# Patient Record
Sex: FEMALE | Race: WHITE | NOT HISPANIC OR LATINO | Employment: FULL TIME | URBAN - METROPOLITAN AREA
[De-identification: names, ages, dates, MRNs, and addresses within clinical notes are randomized per-mention and may not be internally consistent; named-entity substitution may affect disease eponyms.]

---

## 2017-01-31 ENCOUNTER — HOSPITAL ENCOUNTER (OUTPATIENT)
Dept: NUTRITION | Facility: CLINIC | Age: 52
Discharge: HOME | End: 2017-01-31
Attending: FAMILY MEDICINE | Admitting: FAMILY MEDICINE
Payer: COMMERCIAL

## 2017-01-31 PROCEDURE — 97803 MED NUTRITION INDIV SUBSEQ: CPT | Performed by: DIETITIAN, REGISTERED

## 2017-01-31 NOTE — PROGRESS NOTES
"NUTRITION REASSESSMENT NOTE     REASON FOR ASSESSMENT   Angy Haji referred by Dr. Spatz for MNT related to obesity     Patient accompanied by self     NUTRITION HISTORY  Patient continues to track Weight Watcher points and finds she continues to consistently overeat on her flex points.  Patient feels her emotional eating has increased due stress.  Patient continues to be more mindful when eating in motion and reminding herself to sit down when she eats.  Patient was unable to meet goal of limiting weight gain to five pounds during the holidays.                 DIET RECALL   Breakfast: steel cut oats with 1 T brown sugar and cinnamon; strawberry yogurt   Lunch: meatloaf and mashed potatoes, broccoli and orange  Dinner: pulled pork over 1 cup polenta and mixed vegetables; lasagna, carrots and orange; chickpea stew with orzo   Snacks: peanut butter and banana, premium ice cream or regular ice cream, banana, Dove dark chocolates  Beverages: water, coffee, mineral water, alcohol 1 time per week at most  Dining out: varies   Exercise: Patient exercises 5-7 days per week.  Patient is walking or swimming.          ANTHROPOMETRICS  Height: 5'9\"  Weight: 254 lbs  BMI (kg/m2): 37.5 kg/m2   %IBW: 175%  Weight History:  Wt Readings from Last 5 Encounters:   12/07/16 113.399 kg (250 lb)   09/27/16 112.946 kg (249 lb)   07/13/16 112.038 kg (247 lb)   06/23/16 111 kg (244 lb 11.4 oz)   06/20/16 113.172 kg (249 lb 8 oz)     ASSESSED NUTRITION NEEDS  Estimated Energy Needs: 4792-1471 kcals (15-20 kcals/kg) for gradual weight loss   Estimated Protein Needs: 66-79 (1-1.2 gm/kg IBW) for repletion with exercise  RMR: 1440    EVALUATION/PROGRESS TOWARDS GOALS   Previous goals:  Stop food on the move-improving  Practice alternatives to emotional eating-improving  Include snack in the afternoon during the work week-met    Previous Nutrition Diagnosis: Obesity related to excess energy intake as evidenced by BMI of 36.6 kg/m2-no " change    Current Nutrition Diagnosis:Obesity related to excess energy intake as evidenced by BMI of 37.5 kg/m2    INTERVENTIONS   Nutrition Prescription   Continue tracking foods and exercising 5-7 times per week; increase intensity of exercise; increase protein intake during the day to 30 grams per meal      Implementation:   Meals and Snacks: Continue eating 3 meals + snacks as hungry   Nutrition Education (Content):    a)  Discussed progress towards goals.  Congratulated patient on being more mindful with emotional eating.  Encouraged patient to continue practicing emotional eating alternatives.  Supported patient in her struggle with weight loss and emotional eating.    Nutrition Education (Application):    a) Reviewed lower calorie snack options for afternoon snack.       b) Patient verbalized understanding of diet by stating will reduce emotional eating.            Anticipate fair-good compliance    CURRENT GOALS   Check-in with self regarding stress level for the day after dinner to determine plan to avoid emotional eating at night   Add short walk at work 3 out of 4 days     FOLLOW UP/MONITORING    Patient to follow up in 6 weeks  Patient has RD contact information     Time spent with patient: 30 minutes    Mattie Amaya, RD, LD  [facility] Outpatient Dietitian  [phone] (office phone)

## 2017-02-16 ENCOUNTER — THERAPY VISIT (OUTPATIENT)
Dept: PHYSICAL THERAPY | Facility: CLINIC | Age: 52
End: 2017-02-16
Payer: COMMERCIAL

## 2017-02-16 DIAGNOSIS — M54.41 CHRONIC BILATERAL LOW BACK PAIN WITH RIGHT-SIDED SCIATICA: ICD-10-CM

## 2017-02-16 DIAGNOSIS — G89.29 CHRONIC BILATERAL LOW BACK PAIN WITH RIGHT-SIDED SCIATICA: ICD-10-CM

## 2017-02-16 PROCEDURE — 97112 NEUROMUSCULAR REEDUCATION: CPT | Mod: GP | Performed by: PHYSICAL THERAPIST

## 2017-02-16 PROCEDURE — 97110 THERAPEUTIC EXERCISES: CPT | Mod: GP | Performed by: PHYSICAL THERAPIST

## 2017-02-16 NOTE — PROGRESS NOTES
"Subjective:    HPI                    Objective:    System    Physical Exam    General     ROS    Assessment/Plan:      DISCHARGE REPORT    Progress reporting period is from 10/11/16 to 2/16/17.     SUBJECTIVE  Subjective: Some daily pain, sleeping better but not great. Last week had her hip \"go out\" and it resolved with inc frequency of side glide.       Initial Pain level: 3/10   Changes in function: Yes, see goal flow sheet for change in function    ;   ,     The objective findings are from DOS 2/16/17.    OBJECTIVE  Objective: Dec lumbo pelvic control with DL/SL stance. ROM functional limits with some pain with R SB. With ab bracing, pt pain is less and demos improved control      ASSESSMENT/PLAN  Updated problem list and treatment plan: Diagnosis 1:  LBP  Pain -  self management, education, directional preference exercise and home program  Decreased strength - home program  Decreased proprioception - home program  Impaired posture - home program  STG/LTGs have been met or progress has been made towards goals:  Yes (See Goal flow sheet completed today.)  Assessment of Progress: The patient's condition has potential to improve.  The patient has had set backs in their progress.  Self Management Plans:  Patient has been instructed in a home treatment program.  Patient is independent in a home treatment program.  Patient  has been instructed in self management of symptoms.  Patient is independent in self management of symptoms.  I have re-evaluated this patient and find that the nature, scope, duration and intensity of the therapy is appropriate for the medical condition of the patient.  Angy continues to require the following intervention to meet STG and LTG's: PT intervention is no longer required to meet STG/LTG.      Recommendations:  This patient is ready to be discharged from therapy and continue their home treatment program.    Please refer to the daily flowsheet for treatment today, total treatment time and " time spent performing 1:1 timed codes.

## 2017-02-16 NOTE — MR AVS SNAPSHOT
After Visit Summary   2/16/2017    Angy Haji    MRN: [id number]           Patient Information     Date Of Birth          1965        Visit Information        Provider Department      2/16/2017 8:10 AM Maki Ridley PT [facility] Athletic [facility] Physical [facility]        Today's Diagnoses     Chronic bilateral low back pain with right-sided sciatica           Follow-ups after your visit        Your next 10 appointments already scheduled     Feb 21, 2017  2:40 PM CST   SHORT with Maryjane Quintanilla MD   [facility] ([facility])    [address] [place] [address] [phone]            Mar 08, 2017  2:30 PM CST   Follow Up with Mar Xiong RD, LD   [facility] Nutrition Services ([facility])    [address], [address]  [facility] [address] [phone]              Who to contact     If you have questions or need follow up information about today's clinic visit or your schedule please contact [facility] ATHLETIC [facility] PHYSICAL THERAPY directly at [phone].  Normal or non-critical lab and imaging results will be communicated to you by MyChart, letter or phone within 4 business days after the clinic has received the results. If you do not hear from us within 7 days, please contact the clinic through Airbiquityhart or phone. If you have a critical or abnormal lab result, we will notify you by phone as soon as possible.  Submit refill requests through Digital Trowel or call your pharmacy and they will forward the refill request to us. Please allow 3 business days for your refill to be completed.          Additional Information About Your Visit        MyChart Information     Digital Trowel gives you secure access to your electronic health record. If you see a primary care provider, you can also send messages to your care team and make appointments. If you have questions, please  call your primary care clinic.  If you do not have a primary care provider, please call [phone] and they will assist you.        Care EveryWhere ID     This is your Care EveryWhere ID. This could be used by other organizations to access your [facility] medical records  [id number]         Blood Pressure from Last 3 Encounters:   12/07/16 153/66   09/27/16 126/76   09/12/16 131/71    Weight from Last 3 Encounters:   12/07/16 113.4 kg (250 lb)   09/27/16 112.9 kg (249 lb)   07/13/16 112 kg (247 lb)              We Performed the Following     GEOVANNI Progress Notes Report     Neuromuscular Re-Education     Therapeutic Exercises        Primary Care Provider Office Phone # Fax #    Maryjane Quintanilla [phone]       [facility] [address]  [facility] [address]        Thank you!     Thank you for choosing [facility] PHYSICAL THERAPY  for your care. Our goal is always to provide you with excellent care. Hearing back from our patients is one way we can continue to improve our services. Please take a few minutes to complete the written survey that you may receive in the mail after your visit with us. Thank you!             Your Updated Medication List - Protect others around you: Learn how to safely use, store and throw away your medicines at www.disposemymeds.org.          This list is accurate as of: 2/16/17 11:40 AM.  Always use your most recent med list.                   Brand Name Dispense Instructions for use    ciclopirox 0.77 % cream    LOPROX     Apply  topically 2 times daily as needed.       ibuprofen 800 MG tablet    ADVIL/MOTRIN    270 tablet    Take 1 tablet (800 mg) by mouth every 8 hours as needed for pain       lidocaine HCl 2 % Soln     1 Bottle    Swish and spit 5 mL every 2 hours as needed       loratadine 10 MG capsule      Take 10 mg by mouth daily as needed.       metroNIDAZOLE 1 % gel    METROGEL    60 g    Apply topically daily       *  order for DME      Use your CPAP device as directed by your provider.       order for DME     2 Units    Support/compression stockings - knee high, medium compression       * order for DME     1 each    Please measure and distribute 1 pair of 20mmHg - 30mmHg knee high open toe compression stockings. Jobst ultrasheer or equivalent.       triamcinolone 0.1 % cream    KENALOG    30 g    Apply topically 2 times daily       VITAMIN D (CHOLECALCIFEROL) PO      Take 2,000 Units by mouth daily       * Notice:  This list has 2 medication(s) that are the same as other medications prescribed for you. Read the directions carefully, and ask your doctor or other care provider to review them with you.

## 2017-02-27 ENCOUNTER — OFFICE VISIT (OUTPATIENT)
Dept: FAMILY MEDICINE | Facility: CLINIC | Age: 52
End: 2017-02-27
Payer: COMMERCIAL

## 2017-02-27 VITALS
HEIGHT: 68 IN | HEART RATE: 63 BPM | BODY MASS INDEX: 39.33 KG/M2 | TEMPERATURE: 98.1 F | DIASTOLIC BLOOD PRESSURE: 90 MMHG | RESPIRATION RATE: 12 BRPM | OXYGEN SATURATION: 100 % | SYSTOLIC BLOOD PRESSURE: 136 MMHG | WEIGHT: 259.5 LBS

## 2017-02-27 DIAGNOSIS — F43.23 ADJUSTMENT DISORDER WITH MIXED ANXIETY AND DEPRESSED MOOD: Primary | ICD-10-CM

## 2017-02-27 DIAGNOSIS — N95.1 PERIMENOPAUSAL: ICD-10-CM

## 2017-02-27 PROCEDURE — 99214 OFFICE O/P EST MOD 30 MIN: CPT | Performed by: FAMILY MEDICINE

## 2017-02-27 ASSESSMENT — PATIENT HEALTH QUESTIONNAIRE - PHQ9: 5. POOR APPETITE OR OVEREATING: SEVERAL DAYS

## 2017-02-27 ASSESSMENT — ANXIETY QUESTIONNAIRES
6. BECOMING EASILY ANNOYED OR IRRITABLE: NOT AT ALL
2. NOT BEING ABLE TO STOP OR CONTROL WORRYING: SEVERAL DAYS
IF YOU CHECKED OFF ANY PROBLEMS ON THIS QUESTIONNAIRE, HOW DIFFICULT HAVE THESE PROBLEMS MADE IT FOR YOU TO DO YOUR WORK, TAKE CARE OF THINGS AT HOME, OR GET ALONG WITH OTHER PEOPLE: SOMEWHAT DIFFICULT
5. BEING SO RESTLESS THAT IT IS HARD TO SIT STILL: NOT AT ALL
7. FEELING AFRAID AS IF SOMETHING AWFUL MIGHT HAPPEN: NOT AT ALL
3. WORRYING TOO MUCH ABOUT DIFFERENT THINGS: SEVERAL DAYS
1. FEELING NERVOUS, ANXIOUS, OR ON EDGE: SEVERAL DAYS
GAD7 TOTAL SCORE: 4

## 2017-02-27 NOTE — NURSING NOTE
"Chief Complaint   Patient presents with     Menopausal Sx       Initial /90 (BP Location: Left arm, Patient Position: Chair, Cuff Size: Adult Large)  Pulse 63  Temp 98.1  F (36.7  C) (Oral)  Resp 12  Ht 5' 8\" (1.727 m)  Wt 259 lb 8 oz (117.7 kg)  LMP 01/04/2017  SpO2 100%  BMI 39.46 kg/m2 Estimated body mass index is 39.46 kg/(m^2) as calculated from the following:    Height as of this encounter: 5' 8\" (1.727 m).    Weight as of this encounter: 259 lb 8 oz (117.7 kg).  Medication Reconciliation: complete       Karishma Maguire MA     "

## 2017-02-27 NOTE — PROGRESS NOTES
SUBJECTIVE:                                                    Angy Haji is a 51 year old female who presents to clinic today for the following health issues:    She has been feeling some depression and anxiety symptoms for the past months. She has been feeling less interest in activities. Motivation is less. She feels worried and stressed a lot. She has a lot of things in her life that have increased her stress levels. Some systems have changed at work. 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 Her daughter, Linda, has had some health issues lately and difficulty sleeping. Her partner, Rachael, does not have as much patience with their daughter at bedtime, so Angy finds herself taking on those duties more. She is not sleeping as well because of this and also has had some hot flashes that have kept her up at times, usually in spurts. She goes to bed at 10pm and awakens at 6am. [facility], but has only been getting 15 minutes of exercise in at times. She used to meditate, but has not been doing so lately. Had a therapist that moved to [place] and now the drive longer than she is able to manage for appointments. She describes herself as a perfectionist and admits she is hard on herself at times. She denies any SI. She drinks alcohol maybe once or twice per month 1-2 glasses. She denies any drug use.    PHQ-9 SCORE 2/27/2017   Total Score 7     JOANNA-7 SCORE 2/27/2017   Total Score 4            Problem list and histories reviewed & adjusted, as indicated.  Additional history: as documented    Patient Active Problem List   Diagnosis     Oral aphthae     Obesity (BMI 30-39.9)     Disorder of bursae and tendons in shoulder region     CARDIOVASCULAR SCREENING; LDL GOAL LESS THAN 160     Rosacea     Tear of medial cartilage or meniscus of knee, current     Raynaud disease     Vitamin D deficiency disease     Right elbow pain     Sprain of foot     Mild sleep apnea     Atopic rhinitis      Chronic bilateral low back pain with right-sided sciatica     Past Surgical History   Procedure Laterality Date     Hc tooth extraction w/forcep  1984/85     [facility] [facility]     Laparoscopic cholecystectomy  7/29/2013     Procedure: LAPAROSCOPIC CHOLECYSTECTOMY;  Laparoscopic Cholecystectomy;  Surgeon: Fabio Johnson MD;  Location: [facility] OR     Laparoscopic oophorectomy Left 6/23/2016     Procedure: LAPAROSCOPIC OOPHORECTOMY;  Surgeon: Kayley Donnelly MD;  Location: [facility] OR     Laparoscopic salpingectomy Bilateral 6/23/2016     Procedure: LAPAROSCOPIC SALPINGECTOMY;  Surgeon: Kayley Donnelly MD;  Location: [facility] OR       Social History   Substance Use Topics     Smoking status: Never Smoker     Smokeless tobacco: Never Used      Comment: non smoke home     Alcohol use Yes      Comment: 2 Drinks Per Month     Family History   Problem Relation Age of Onset     Cardiovascular Paternal Grandfather      anyerism     Obesity Paternal Grandfather      Respiratory Paternal Grandfather      pulmonary (air sacs hardening)     Allergies Father      hayfever     Genitourinary Problems Father      BPH     Lipids Father      diet therapy     Gallbladder Disease Father      cholecystectomy      Eye Disorder Father      retinal tear x 2      Alzheimer Disease Maternal Grandmother      ? poor STM     Arthritis Maternal Grandmother      HEART DISEASE Maternal Grandmother      Arrythmia-got a pacemaker at age 89     Arthritis Mother      Eye Disorder Mother      [place] /macular tear in one eye     CANCER Paternal Grandmother      Uterine CA- as well as her sister     Gynecology Paternal Grandmother      prolapsed uterus- had 10 kids     Obesity Paternal Grandmother      Breast Cancer Maternal Aunt      Other Cancer Paternal Aunt 80     endo. cancer          Current Outpatient Prescriptions   Medication Sig Dispense Refill     triamcinolone (KENALOG) 0.1 % cream Apply topically 2 times daily 30 g 1     order for DME  Please measure and distribute 1 pair of 20mmHg - 30mmHg knee high open toe compression stockings. Jobst ultrasheer or equivalent. 1 each 1     metroNIDAZOLE (METROGEL) 1 % gel Apply topically daily 60 g 9     ibuprofen (ADVIL,MOTRIN) 800 MG tablet Take 1 tablet (800 mg) by mouth every 8 hours as needed for pain 270 tablet 0     lidocaine HCl 2 % SOLN Swish and spit 5 mL every 2 hours as needed 1 Bottle 11     VITAMIN D, CHOLECALCIFEROL, PO Take 2,000 Units by mouth daily       ORDER FOR DME Use your CPAP device as directed by your provider.       ORDER FOR DME Support/compression stockings - knee high, medium compression   2 Units 11     ciclopirox (LOPROX) 0.77 % cream Apply  topically 2 times daily as needed.       Loratadine 10 MG capsule Take 10 mg by mouth daily as needed.       Allergies   Allergen Reactions     Penicillins Rash     Thimerosal      blisters on inside of eyelid     Recent Labs   Lab Test  10/03/16   0742  06/20/16   0843  09/24/15   0904  09/15/14   0733  09/20/13   1145  09/16/13   0739  08/22/12   0752  08/16/11   0757   05/21/09   0856  05/17/09   1448   A1C   --    --   5.2   --    --   5.1  5.2  5.3   < >   --    --    LDL  51   --   47  24   --   44  50  34   < >   --    --    HDL  75   --   73  75   --   62  66  47*   < >   --    --    TRIG  56   --   58  46   --   64  50  97   < >   --    --    ALT   --    --    --    --    --    --    --   13   --    --   21   CR   --   0.50*   --   0.56   --   0.47*  0.54  0.59   < >   --   0.70   GFRESTIMATED   --   >90  Non  GFR Calc     --   >90  Non  GFR Calc     --   >90  >90  >90   < >   --   >90   GFRESTBLACK   --   >90   GFR Calc     --   >90   GFR Calc     --   >90  >90  >90   < >   --   >90   POTASSIUM   --   3.7   --   4.3   --   3.9  4.3  4.1   < >   --   4.0   TSH   --    --    --    --   1.45   --    --    --    --   1.39   --     < > = values in this interval not displayed.  "     BP Readings from Last 3 Encounters:   12/07/16 153/66   09/27/16 126/76   09/12/16 131/71    Wt Readings from Last 3 Encounters:   12/07/16 113.4 kg (250 lb)   09/27/16 112.9 kg (249 lb)   07/13/16 112 kg (247 lb)        ROS:  Const: no fever     This document serves as a record of the services and decisions personally performed and made by Maryjane Quintanilla MD. It was created on her behalf by Zahra Soni, a trained medical scribe. The creation of this document is based on the provider's statements to the medical scribe.  Zahra Soni February 27, 2017 4:24 PM     OBJECTIVE:                                                    /90 (BP Location: Left arm, Patient Position: Chair, Cuff Size: Adult Large)  Pulse 63  Temp 98.1  F (36.7  C) (Oral)  Resp 12  Ht 1.727 m (5' 8\")  Wt 117.7 kg (259 lb 8 oz)  LMP 01/04/2017  SpO2 100%  BMI 39.46 kg/m2   Body mass index is 39.46 kg/(m^2).   GENERAL: healthy, alert and no distress  PSYCH: mentation appears normal, affect depressed, tearful at times     Diagnostic Test Results:  none      ASSESSMENT/PLAN:                                                    1. Adjustment disorder with mixed anxiety and depressed mood  She is not currently wanting to start medication, but wanted to discuss options at this point. 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 She will pursue finding a therapist on her own or may also consider EAP through [facility]. She is going to start increasing her exercise by committing to a class at the [employer] and is going to start back with meditation, which she did some years ago. We discussed mood changes are common in the perimenopausal transition for many reasons. She is currently not bothered significantly by hot flashes, but I discussed with her that antidepressant medications can also be helpful for hot flashes, as can cutting down on caffeine.     2. " Perimenopausal  See above     Time spent with pt answering questions, discussing findings, counseling and coordinating care was at least half the appointment time, 25 minutes.    Maryjane Quintanilla M.D.       The information in this document, created by the medical scribe for me, accurately reflects the services I personally performed and the decisions made by me. I have reviewed and approved this document for accuracy prior to leaving the patient care area.  2/27/2017 4:24 PM         Maryjane Quintanilla MD  [facility]

## 2017-02-27 NOTE — MR AVS SNAPSHOT
After Visit Summary   2/27/2017    Angy Haji    MRN: [id number]           Patient Information     Date Of Birth          1965        Visit Information        Provider Department      2/27/2017 3:40 PM Maryjane Quintanilla MD [facility]        Today's Diagnoses     Adjustment disorder with mixed anxiety and depressed mood    -  1    Perimenopausal           Follow-ups after your visit        Your next 10 appointments already scheduled     Mar 22, 2017  2:30 PM CDT   Follow Up with Mar Xiong RD, LD   [facility] Nutrition Services ([facility])    [address]  [facility] [address] [phone]              Who to contact     If you have questions or need follow up information about today's clinic visit or your schedule please contact [facility] directly at [phone].  Normal or non-critical lab and imaging results will be communicated to you by MyChart, letter or phone within 4 business days after the clinic has received the results. If you do not hear from us within 7 days, please contact the clinic through Vyattahart or phone. If you have a critical or abnormal lab result, we will notify you by phone as soon as possible.  Submit refill requests through Groove Club or call your pharmacy and they will forward the refill request to us. Please allow 3 business days for your refill to be completed.          Additional Information About Your Visit        MyChart Information     [employer] Club gives you secure access to your electronic health record. If you see a primary care provider, you can also send messages to your care team and make appointments. If you have questions, please call your primary care clinic.  If you do not have a primary care provider, please call [phone] and they will assist you.        Care EveryWhere ID     This is your Care EveryWhere ID. This could be used by other organizations to access your  "[facility] medical records  [id number]        Your Vitals Were     Pulse Temperature Respirations Height Last Period Pulse Oximetry    63 98.1  F (36.7  C) (Oral) 12 5' 8\" (1.727 m) 01/04/2017 100%    BMI (Body Mass Index)                   39.46 kg/m2            Blood Pressure from Last 3 Encounters:   02/27/17 136/90   12/07/16 153/66   09/27/16 126/76    Weight from Last 3 Encounters:   02/27/17 259 lb 8 oz (117.7 kg)   12/07/16 250 lb (113.4 kg)   09/27/16 249 lb (112.9 kg)              Today, you had the following     No orders found for display       Primary Care Provider Office Phone # Fax #    Maryjane Quintanilla [phone]       [facility] [address]  [facility] [address]        Thank you!     Thank you for choosing [facility]  for your care. Our goal is always to provide you with excellent care. Hearing back from our patients is one way we can continue to improve our services. Please take a few minutes to complete the written survey that you may receive in the mail after your visit with us. Thank you!             Your Updated Medication List - Protect others around you: Learn how to safely use, store and throw away your medicines at www.disposemymeds.org.          This list is accurate as of: 2/27/17  5:17 PM.  Always use your most recent med list.                   Brand Name Dispense Instructions for use    ciclopirox 0.77 % cream    LOPROX     Apply  topically 2 times daily as needed.       ibuprofen 800 MG tablet    ADVIL/MOTRIN    270 tablet    Take 1 tablet (800 mg) by mouth every 8 hours as needed for pain       lidocaine HCl 2 % Soln     1 Bottle    Swish and spit 5 mL every 2 hours as needed       loratadine 10 MG capsule      Take 10 mg by mouth daily as needed.       metroNIDAZOLE 1 % gel    METROGEL    60 g    Apply topically daily       * order for DME      Use your CPAP device as directed by your provider.       order for DME     2 Units    " Support/compression stockings - knee high, medium compression       * order for DME     1 each    Please measure and distribute 1 pair of 20mmHg - 30mmHg knee high open toe compression stockings. Jobst ultrasheer or equivalent.       triamcinolone 0.1 % cream    KENALOG    30 g    Apply topically 2 times daily       VITAMIN D (CHOLECALCIFEROL) PO      Take 2,000 Units by mouth daily       * Notice:  This list has 2 medication(s) that are the same as other medications prescribed for you. Read the directions carefully, and ask your doctor or other care provider to review them with you.

## 2017-02-28 ASSESSMENT — PATIENT HEALTH QUESTIONNAIRE - PHQ9: SUM OF ALL RESPONSES TO PHQ QUESTIONS 1-9: 7

## 2017-02-28 ASSESSMENT — ANXIETY QUESTIONNAIRES: GAD7 TOTAL SCORE: 4

## 2017-03-22 ENCOUNTER — HOSPITAL ENCOUNTER (OUTPATIENT)
Dept: NUTRITION | Facility: CLINIC | Age: 52
Discharge: HOME | End: 2017-03-22
Attending: DIETITIAN, REGISTERED | Admitting: DIETITIAN, REGISTERED
Payer: COMMERCIAL

## 2017-03-22 PROCEDURE — 97803 MED NUTRITION INDIV SUBSEQ: CPT | Performed by: DIETITIAN, REGISTERED

## 2017-03-23 ENCOUNTER — TELEPHONE (OUTPATIENT)
Dept: FAMILY MEDICINE | Facility: CLINIC | Age: 52
End: 2017-03-23

## 2017-03-23 DIAGNOSIS — E66.9 OBESITY (BMI 30-39.9): Primary | ICD-10-CM

## 2017-03-23 NOTE — PROGRESS NOTES
"NUTRITION REASSESSMENT NOTE     REASON FOR ASSESSMENT   Angy Haji referred by Dr. Spatz for MNT related to obesity     Patient accompanied by self     NUTRITION HISTORY  Patient continues to track Weight Watcher points and finds she continues to consistently overeat on her flex points.  Patient aiming for \"blue dot\" system with Weight Watchers as indicator of staying within points for the day.  Patient feels she is continuing her emotional eating due to stress.  Patient finding herself eating while multi-taking at home which she is frustrated with herself.               DIET RECALL   Breakfast: steel cut oats with 1 T brown sugar and cinnamon; English muffin with peanut butter; Greek yogurt  Lunch: leftovers   Dinner: lasagna; chicken enchilada; chickpea stew with orzo   Snacks: peanut butter and banana, premium ice cream or regular ice cream, banana, Dove dark chocolates  Beverages: water, coffee, mineral water, alcohol 1 time per week at most  Dining out: varies   Exercise: Patient exercises 5-7 days per week.  Patient is walking or swimming.          ANTHROPOMETRICS  Height: 5'9\"  Weight: 260.6 lbs  BMI (kg/m2): 38.4 kg/m2   %IBW: 179%  Weight History:  Wt Readings from Last 5 Encounters:   02/27/17 117.7 kg (259 lb 8 oz)   12/07/16 113.4 kg (250 lb)   09/27/16 112.9 kg (249 lb)   07/13/16 112 kg (247 lb)   06/23/16 111 kg (244 lb 11.4 oz)     ASSESSED NUTRITION NEEDS  Estimated Energy Needs: 5901-7137 kcals (15-20 kcals/kg) for gradual weight loss   Estimated Protein Needs: 66-79 (1-1.2 gm/kg IBW) for repletion with exercise  RMR: 1440    EVALUATION/PROGRESS TOWARDS GOALS   Previous goals:  Check-in with self regarding stress level for the day after dinner to determine plan to avoid emotional eating at night-not met   Add short walk at work 3 out of 4 days-improving     Previous Nutrition Diagnosis: Obesity related to excess energy intake as evidenced by BMI of 37.5 kg/m2-no change    Current Nutrition " Diagnosis:Obesity related to excess energy intake as evidenced by BMI of 38.4 kg/m2    INTERVENTIONS   Nutrition Prescription   Continue tracking foods and exercising 5-7 times per week; increase intensity of exercise; increase protein intake during the day to 30 grams per meal      Implementation:   Meals and Snacks: Continue eating 3 meals + snacks as hungry   Nutrition Education (Content):    a)  Discussed progress towards goals.  Congratulated patient on increasing walking at work.  Encouraged patient to continue practicing emotional eating alternatives.  Suggest patient focus less on her negative self talk towards food intake.  Supported patient in her struggle with weight loss and emotional eating.    Nutrition Education (Application):    a) Reviewed alternatives to emotional eating.       b) Patient verbalized understanding of diet by stating will reduce emotional eating.            Anticipate fair-good compliance    CURRENT GOALS   Complete physical therapy exercises while checking in with self regarding stress level for the day to determine plan to avoid emotional eating at night   Add 60 minutes for exercise 1 time per week + continue current exercise regimen  Stop and sit when eating     FOLLOW UP/MONITORING    Patient to follow up in 8 weeks  Patient has RD contact information     Time spent with patient: 30 minutes    Mattie Amaya, RD, LD  [facility] Outpatient Dietitian  [phone] (office phone)

## 2017-03-23 NOTE — TELEPHONE ENCOUNTER
FW: nutrition referral  Received: Today       Maryjane Quintanilla MD Casnovsky, Anne, RN                       Previous Messages       ----- Message -----      From: Mar Zee, RD, LD      Sent: 3/23/2017   9:55 AM        To: Maryjane Quintanilla MD   Subject: nutrition referral                               Dear Dr. Quintanilla,     I am working with Angy on her emotional eating and focus on weight loss.  Her nutrition referral has  and am wondering if you can put in a new nutrition referral for me to continue seeing her.  If you agree, can you please enter a new order?     Thank you.     Mattie Gomez, RD, LD   [facility] Outpatient Dietitian   [phone] (office phone)              Referral pended.    Routing to PCP.    Dr. Quintanilla-Please sign and and may close encounter.    Thank you!  HEATHER Rosa, RN

## 2017-04-11 ENCOUNTER — TELEPHONE (OUTPATIENT)
Dept: SLEEP MEDICINE | Facility: CLINIC | Age: 52
End: 2017-04-11

## 2017-04-11 NOTE — TELEPHONE ENCOUNTER
Pt stopped by clinic today to see if she could get a refill on her cpap supplies.  DME company is [facility].    DANNIELLE Yuen

## 2017-04-21 DIAGNOSIS — G47.33 OSA (OBSTRUCTIVE SLEEP APNEA): Primary | ICD-10-CM

## 2017-04-24 DIAGNOSIS — G47.33 OSA (OBSTRUCTIVE SLEEP APNEA): Primary | ICD-10-CM

## 2017-04-25 ENCOUNTER — DOCUMENTATION ONLY (OUTPATIENT)
Dept: SLEEP MEDICINE | Facility: CLINIC | Age: 52
End: 2017-04-25

## 2017-04-25 NOTE — PROGRESS NOTES
Patient was offered choice of vendor and chose [facility].  Patient Angy Haji was set up at [place] on April 25, 2017. Patient received a Katie RespirLearnBops DreamStation CPAP. Pressures were set at 9 cm H2O.   Patient s ramp is  cm H2O for Off and FLEX/EPR is C Flex, 3.  Patient received a Resmed Mask name: QUATTRO  Full Face mask Size Medium, heated tubing and heated humidifier.  Patient is not enrolled in the STM Program and does not need to meet compliance.   Jelani Gong

## 2017-06-08 ENCOUNTER — TELEPHONE (OUTPATIENT)
Dept: SLEEP MEDICINE | Facility: CLINIC | Age: 52
End: 2017-06-08

## 2017-06-08 NOTE — TELEPHONE ENCOUNTER
Pt left voicemail asking when follow up appointment should be.  Return call made to pt and voicemail left for pt informing her per Dr. Calloway's note she should follow up in year or sooner if she has any concerns.  Pt asked to call back with any further questions.    DANNIELLE Yuen

## 2017-07-12 ENCOUNTER — HOSPITAL ENCOUNTER (OUTPATIENT)
Dept: NUTRITION | Facility: CLINIC | Age: 52
Discharge: HOME | End: 2017-07-12
Attending: DIETITIAN, REGISTERED | Admitting: PHYSICIAN ASSISTANT
Payer: COMMERCIAL

## 2017-07-12 PROCEDURE — 97803 MED NUTRITION INDIV SUBSEQ: CPT | Performed by: DIETITIAN, REGISTERED

## 2017-07-12 NOTE — PROGRESS NOTES
"NUTRITION REASSESSMENT NOTE     REASON FOR ASSESSMENT   Angy QUINTANA Eduardodeann referred by Dr. Spatz for MNT related to obesity     Patient accompanied by self     NUTRITION HISTORY  Patient continues to track Weight Watcher points and has been aiming for \"blue dot\" system with Weight Watchers as indicator of staying within points for the day.  Patient feels she is managing her emotional eating better by using alternatives to emotional eating.                 DIET RECALL   Breakfast: steel cut oats with 1 T brown sugar and cinnamon; English muffin with peanut butter; Greek yogurt  Lunch: leftovers   Dinner: lasagna; chicken enchilada; chickpea stew with orzo   Snacks: peanut butter and banana, premium ice cream or regular ice cream, banana, Dove dark chocolates  Beverages: water, coffee, mineral water, alcohol 1 time per week at most  Dining out: varies   Exercise: Patient exercises 5-7 days per week.  Patient is walking or swimming.  Patient added 1 day of 60 minutes exercise.        ANTHROPOMETRICS  Height: 5'9\"  Weight: 254 lbs  BMI (kg/m2): 37.5 kg/m2   %IBW: 175%  Weight History:  Wt Readings from Last 5 Encounters:   02/27/17 117.7 kg (259 lb 8 oz)   12/07/16 113.4 kg (250 lb)   09/27/16 112.9 kg (249 lb)   07/13/16 112 kg (247 lb)   06/23/16 111 kg (244 lb 11.4 oz)     ASSESSED NUTRITION NEEDS  Estimated Energy Needs: 2847-8913 kcals (15-20 kcals/kg) for gradual weight loss   Estimated Protein Needs: 66-79 (1-1.2 gm/kg IBW) for repletion with exercise  RMR: 1440    EVALUATION/PROGRESS TOWARDS GOALS   Previous goals:  Complete physical therapy exercises while checking in with self regarding stress level for the day to determine plan to avoid emotional eating at night-improving   Add 60 minutes for exercise 1 time per week + continue current exercise regimen-met  Stop and sit when eating-improving     Previous Nutrition Diagnosis: Obesity related to excess energy intake as evidenced by BMI of 37.8 kg/m2-no " change    Current Nutrition Diagnosis:Obesity related to excess energy intake as evidenced by BMI of 37.5 kg/m2    INTERVENTIONS   Nutrition Prescription   Continue tracking foods and exercising 5-7 times per week; increase intensity of exercise; increase protein intake during the day to 30 grams per meal      Implementation:   Meals and Snacks: Continue eating 3 meals + snacks as hungry   Nutrition Education (Content):    a)  Discussed progress towards goals.  Congratulated patient on increasing walking at work and 60 minute exercise day.  Encouraged patient to continue practicing emotional eating alternatives.  Suggest patient focus less on her negative self talk towards food intake.  Supported patient in her struggle with weight loss and emotional eating.    Nutrition Education (Application):    a) Reviewed alternatives to emotional eating and ways to make mindful choices when on vacation.     b) Patient verbalized understanding of diet by stating will reduce emotional eating.            Anticipate fair-good compliance    CURRENT GOALS   Practice alternatives to emotional eating  Ask self if hungry before snacking    Add 60 minutes for exercise 2 time per week (Saturday and Sunday) + continue current exercise regimen  Challenge negative food self talk    FOLLOW UP/MONITORING    Patient to follow up in 8 weeks  Patient has RD contact information     Time spent with patient: 30 minutes    Mattie Amaya, RD, LD  [facility] Outpatient Dietitian  [phone] (office phone)

## 2017-09-14 DIAGNOSIS — K12.0 ORAL APHTHAE: ICD-10-CM

## 2017-09-14 NOTE — TELEPHONE ENCOUNTER
Medication Detail      Disp Refills Start End SALMA   lidocaine HCl 2 % SOLN 1 Bottle 11 8/3/2015  No   Sig: Swish and spit 5 mL every 2 hours as needed       Last Office Visit with G, P or [facility] prescribing provider: 2/27/17  Next 5 appointments (look out 90 days)     Oct 02, 2017  8:00 AM CDT   PHYSICAL with Maryjane Quintanilla MD   [facility]    [address] [place] [address] [phone]                   Creatinine   Date Value Ref Range Status   06/20/2016 0.50 (L) 0.52 - 1.04 mg/dL Final

## 2017-09-18 NOTE — TELEPHONE ENCOUNTER
Prescription approved per [facility] Refill Protocol.  Kayley Nathan RN

## 2017-09-21 ENCOUNTER — ALLIED HEALTH/NURSE VISIT (OUTPATIENT)
Dept: NURSING | Facility: CLINIC | Age: 52
End: 2017-09-21
Payer: COMMERCIAL

## 2017-09-21 DIAGNOSIS — Z23 NEED FOR PROPHYLACTIC VACCINATION AND INOCULATION AGAINST INFLUENZA: Primary | ICD-10-CM

## 2017-09-21 PROCEDURE — 90471 IMMUNIZATION ADMIN: CPT

## 2017-09-21 PROCEDURE — 99207 ZZC NO CHARGE NURSE ONLY: CPT

## 2017-09-21 PROCEDURE — 90686 IIV4 VACC NO PRSV 0.5 ML IM: CPT

## 2017-09-21 NOTE — MR AVS SNAPSHOT
After Visit Summary   9/21/2017    Angy Haji    MRN: [id number]           Patient Information     Date Of Birth          1965        Visit Information        Provider Department      9/21/2017 6:00 PM CARE COORDINATOR [facility]        Today's Diagnoses     Need for prophylactic vaccination and inoculation against influenza    -  1       Follow-ups after your visit        Your next 10 appointments already scheduled     Sep 27, 2017  7:30 AM CDT   Follow Up with Mar Xiong RD, LD   [facility] ([facility])    [address]  [facility] [address]   [phone]            Sep 28, 2017  9:15 AM CDT   MA SCREENING DIGITAL BILATERAL with [id number]   [facility] ([facility])    [address] [place] [address], [address]  [facility] [address] [phone]           Do not use any powder, lotion or deodorant under your arms or on your breast. If you do, we will ask you to remove it before your exam.  Wear comfortable, two-piece clothing.  If you have any allergies, tell your care team.  Bring any previous mammograms from other facilities or have them mailed to the breast center.            Oct 02, 2017  8:00 AM CDT   PHYSICAL with Maryjane Quintanilla MD   [facility] ([facility]    [address] [facility] [address] [phone]            Oct 23, 2017  9:45 AM CDT   Colonoscopy with Marshall Alan MD   [facility] ([facility])    [address]  [address]  [facility] [id number] [phone]            Nov 15, 2017  3:00 PM CST   Return Sleep Patient with Nellie Calloway MD   [facility] Sleep [facility] ([facility])    [address] [place] [address] [phone]              Who to contact     If you have questions or need follow up  information about today's clinic visit or your schedule please contact [facility] directly at [phone].  Normal or non-critical lab and imaging results will be communicated to you by MyChart, letter or phone within 4 business days after the clinic has received the results. If you do not hear from us within 7 days, please contact the clinic through [employer] or phone. If you have a critical or abnormal lab result, we will notify you by phone as soon as possible.  Submit refill requests through [employer] or call your pharmacy and they will forward the refill request to us. Please allow 3 business days for your refill to be completed.          Additional Information About Your Visit        [employer] Information     [employer] gives you secure access to your electronic health record. If you see a primary care provider, you can also send messages to your care team and make appointments. If you have questions, please call your primary care clinic.  If you do not have a primary care provider, please call [phone] and they will assist you.        Care EveryWhere ID     This is your Care EveryWhere ID. This could be used by other organizations to access your [facility] medical records  [id number]         Blood Pressure from Last 3 Encounters:   02/27/17 136/90   12/07/16 153/66   09/27/16 126/76    Weight from Last 3 Encounters:   02/27/17 259 lb 8 oz (117.7 kg)   12/07/16 250 lb (113.4 kg)   09/27/16 249 lb (112.9 kg)              We Performed the Following     HC FLU VAC PRESRV FREE QUAD SPLIT VIR 3+YRS IM     Vaccine Administration, Initial [[id number]]        Primary Care Provider Office Phone # Fax #    Maryjane Quintanilla [phone] [address]  [facility] [address]        Equal Access to Services     LUPE NOGUEIRA : Russ Wagner, subhash parker, svetlana mendiola. So [facility] [phone].    ATENCIÓN: Si  ashley santos, tiene a franco disposición servicios gratuitos de asistencia lingüística. Haley bradley [phone].    We comply with applicable federal civil rights laws and [place] laws. We do not discriminate on the basis of race, color, national origin, age, disability sex, sexual orientation or gender identity.            Thank you!     Thank you for choosing [facility]  for your care. Our goal is always to provide you with excellent care. Hearing back from our patients is one way we can continue to improve our services. Please take a few minutes to complete the written survey that you may receive in the mail after your visit with us. Thank you!             Your Updated Medication List - Protect others around you: Learn how to safely use, store and throw away your medicines at www.disposemymeds.org.          This list is accurate as of: 9/21/17  7:40 PM.  Always use your most recent med list.                   Brand Name Dispense Instructions for use Diagnosis    ciclopirox 0.77 % cream    LOPROX     Apply  topically 2 times daily as needed.    Eczema       ibuprofen 800 MG tablet    ADVIL/MOTRIN    270 tablet    Take 1 tablet (800 mg) by mouth every 8 hours as needed for pain    Disorder of bursae and tendons in shoulder region       lidocaine (viscous) 2 % solution    XYLOCAINE    100 mL    SWISH AND SPIT 5ML BY MOUTH EVERY 2 HOURS AS NEEDED    Oral aphthae       loratadine 10 MG capsule      Take 10 mg by mouth daily as needed.        metroNIDAZOLE 1 % gel    METROGEL    60 g    Apply topically daily    Rosacea, Perioral dermatitis       * order for DME      Use your CPAP device as directed by your provider.        order for DME     2 Units    Support/compression stockings - knee high, medium compression    Varicose veins of legs       * order for DME     1 each    Please measure and distribute 1 pair of 20mmHg - 30mmHg knee high open toe compression stockings. Jobst ultrasheer or  equivalent.    Varicose veins of both lower extremities with complications       triamcinolone 0.1 % cream    KENALOG    30 g    Apply topically 2 times daily    Eczema, unspecified type       VITAMIN D (CHOLECALCIFEROL) PO      Take 2,000 Units by mouth daily    Perioral dermatitis, Rosacea       * Notice:  This list has 2 medication(s) that are the same as other medications prescribed for you. Read the directions carefully, and ask your doctor or other care provider to review them with you.

## 2017-09-27 ENCOUNTER — HOSPITAL ENCOUNTER (OUTPATIENT)
Dept: NUTRITION | Facility: CLINIC | Age: 52
Discharge: HOME | End: 2017-09-27
Attending: FAMILY MEDICINE | Admitting: FAMILY MEDICINE
Payer: COMMERCIAL

## 2017-09-27 PROCEDURE — 97803 MED NUTRITION INDIV SUBSEQ: CPT | Performed by: DIETITIAN, REGISTERED

## 2017-09-27 NOTE — PROGRESS NOTES
"NUTRITION REASSESSMENT NOTE     REASON FOR ASSESSMENT   Angy LILIAN Haji referred by Dr. Spatz for MNT related to obesity     Patient accompanied by self     NUTRITION HISTORY  Patient continues to track Weight Watcher points and has been aiming for \"blue dot\" system with Weight Watchers as indicator of staying within points for the day.  Patient feels she is managing her emotional eating better by using alternatives to emotional eating.  Patient managed food while on vacation by staying where they can prepare their own meals and was mindful with treats.  Patient interested in healthy family meal classes.                   DIET RECALL   Breakfast: fruit, yogurt and cereal; steel cut oats with 1 T brown sugar and cinnamon  Lunch: leftovers   Dinner: lasagna; chicken enchilada; chickpea stew with orzo   Snacks: peanut butter and banana, premium ice cream or regular ice cream, banana, Dove dark chocolates  Beverages: water, coffee, mineral water, alcohol 1 time per week at most  Dining out: varies   Exercise: Patient exercises 5-7 days per week.  Patient is walking, biking or swimming.  Patient added 1 day of 60 minutes exercise.        ANTHROPOMETRICS  Height: 5'9\"  Weight: 246.2   BMI (kg/m2): 36.3 kg/m2   %IBW: 169%  Weight History:  Wt Readings from Last 5 Encounters:   02/27/17 117.7 kg (259 lb 8 oz)   12/07/16 113.4 kg (250 lb)   09/27/16 112.9 kg (249 lb)   07/13/16 112 kg (247 lb)   06/23/16 111 kg (244 lb 11.4 oz)     ASSESSED NUTRITION NEEDS  Estimated Energy Needs: 1517-6851 kcals (15-20 kcals/kg) for gradual weight loss   Estimated Protein Needs: 66-79 (1-1.2 gm/kg IBW) for repletion with exercise  RMR: 1440    EVALUATION/PROGRESS TOWARDS GOALS   Previous goals:  Practice alternatives to emotional eating-improving  Ask self if hungry before snacking-improving   Add 60 minutes for exercise 2 time per week (Saturday and Sunday) + continue current exercise regimen-improving  Challenge negative food self " talk-improving     Previous Nutrition Diagnosis: Obesity related to excess energy intake as evidenced by BMI of 37.5 kg/m2-no change    Current Nutrition Diagnosis:Obesity related to excess energy intake as evidenced by BMI of 36.3 kg/m2    INTERVENTIONS   Nutrition Prescription   Continue tracking foods and exercising 5-7 times per week; increase intensity of exercise; increase protein intake during the day to 30 grams per meal      Implementation:   Meals and Snacks: Continue eating 3 meals + snacks as hungry   Nutrition Education (Content):    a)  Discussed progress towards goals.  Congratulated patient on successful meal planning when on vacation. Encouraged patient to continue practicing emotional eating alternatives due to increased stress with school year starting.  Supported patient in her struggle with weight loss and emotional eating.    Nutrition Education (Application):    a) Reviewed alternatives to emotional eating and ways to reduce negative self food talk.   b) Patient verbalized understanding of diet by stating will reduce emotional eating.            Anticipate fair-good compliance    CURRENT GOALS (Patient determined)  5 lb weight loss in 7 weeks   Practice alternatives to emotional eating  Add 60 minutes for exercise 2 time per week (Saturday and Sunday) + continue current exercise regimen (30 minutes 5 times per week)  Challenge negative food self talk    FOLLOW UP/MONITORING    Patient to follow up in 7 weeks  Patient has RD contact information     Time spent with patient: 30 minutes    Mattie Amaya, RD, LD  [facility] Outpatient Dietitian  [phone] (office phone)

## 2017-10-02 ENCOUNTER — OFFICE VISIT (OUTPATIENT)
Dept: FAMILY MEDICINE | Facility: CLINIC | Age: 52
End: 2017-10-02
Payer: COMMERCIAL

## 2017-10-02 VITALS
WEIGHT: 148.5 LBS | TEMPERATURE: 98 F | BODY MASS INDEX: 22.51 KG/M2 | HEART RATE: 65 BPM | SYSTOLIC BLOOD PRESSURE: 126 MMHG | HEIGHT: 68 IN | RESPIRATION RATE: 18 BRPM | OXYGEN SATURATION: 100 % | DIASTOLIC BLOOD PRESSURE: 76 MMHG

## 2017-10-02 DIAGNOSIS — M54.5 LOW BACK PAIN, UNSPECIFIED BACK PAIN LATERALITY, UNSPECIFIED CHRONICITY, WITH SCIATICA PRESENCE UNSPECIFIED: ICD-10-CM

## 2017-10-02 DIAGNOSIS — G47.30 MILD SLEEP APNEA: ICD-10-CM

## 2017-10-02 DIAGNOSIS — L71.9 ROSACEA: ICD-10-CM

## 2017-10-02 DIAGNOSIS — N81.89 PELVIC FLOOR RELAXATION: ICD-10-CM

## 2017-10-02 DIAGNOSIS — L71.0 PERIORAL DERMATITIS: ICD-10-CM

## 2017-10-02 DIAGNOSIS — Z00.00 ENCOUNTER FOR ROUTINE ADULT HEALTH EXAMINATION WITHOUT ABNORMAL FINDINGS: Primary | ICD-10-CM

## 2017-10-02 DIAGNOSIS — M79.671 RIGHT FOOT PAIN: ICD-10-CM

## 2017-10-02 DIAGNOSIS — Z13.1 SCREENING FOR DIABETES MELLITUS: ICD-10-CM

## 2017-10-02 DIAGNOSIS — E55.9 VITAMIN D DEFICIENCY: ICD-10-CM

## 2017-10-02 DIAGNOSIS — E66.9 OBESITY (BMI 30-39.9): ICD-10-CM

## 2017-10-02 DIAGNOSIS — Z13.6 CARDIOVASCULAR SCREENING; LDL GOAL LESS THAN 160: ICD-10-CM

## 2017-10-02 LAB
CHOLEST SERPL-MCNC: 156 MG/DL
DEPRECATED CALCIDIOL+CALCIFEROL SERPL-MC: 25 UG/L (ref 20–75)
GLUCOSE SERPL-MCNC: 88 MG/DL (ref 70–99)
HDLC SERPL-MCNC: 96 MG/DL
LDLC SERPL CALC-MCNC: 49 MG/DL
NONHDLC SERPL-MCNC: 60 MG/DL
TRIGL SERPL-MCNC: 55 MG/DL

## 2017-10-02 PROCEDURE — 99213 OFFICE O/P EST LOW 20 MIN: CPT | Mod: 25 | Performed by: FAMILY MEDICINE

## 2017-10-02 PROCEDURE — 82947 ASSAY GLUCOSE BLOOD QUANT: CPT | Performed by: FAMILY MEDICINE

## 2017-10-02 PROCEDURE — 80061 LIPID PANEL: CPT | Performed by: FAMILY MEDICINE

## 2017-10-02 PROCEDURE — 82306 VITAMIN D 25 HYDROXY: CPT | Performed by: FAMILY MEDICINE

## 2017-10-02 PROCEDURE — 99396 PREV VISIT EST AGE 40-64: CPT | Performed by: FAMILY MEDICINE

## 2017-10-02 PROCEDURE — 36415 COLL VENOUS BLD VENIPUNCTURE: CPT | Performed by: FAMILY MEDICINE

## 2017-10-02 RX ORDER — METRONIDAZOLE 10 MG/G
GEL TOPICAL DAILY
Qty: 60 G | Refills: 9 | Status: SHIPPED | OUTPATIENT
Start: 2017-10-02 | End: 2020-01-24

## 2017-10-02 NOTE — LETTER
October 3, 2017      Angy Haji  [address]  [place] [address]        Dear ,    We are writing to inform you of your test results.    Normal results.     Resulted Orders   Lipid panel reflex to direct LDL   Result Value Ref Range    Cholesterol 156 <200 mg/dL    Triglycerides 55 <150 mg/dL      Comment:      Fasting specimen    HDL Cholesterol 96 >49 mg/dL    LDL Cholesterol Calculated 49 <100 mg/dL      Comment:      Desirable:       <100 mg/dl    Non HDL Cholesterol 60 <130 mg/dL   Glucose   Result Value Ref Range    Glucose 88 70 - 99 mg/dL      Comment:      Fasting specimen   Vitamin D Deficiency   Result Value Ref Range    Vitamin D Deficiency screening 25 20 - 75 ug/L      Comment:      Season, race, dietary intake, and treatment affect the concentration of   25-hydroxy-Vitamin D. Values may decrease during winter months and increase   during summer months. Values 20-29 ug/L may indicate Vitamin D insufficiency   and values <20 ug/L may indicate Vitamin D deficiency.  Vitamin D determination is routinely performed by an immunoassay specific for   25 hydroxyvitamin D3.  If an individual is on vitamin D2 (ergocalciferol)   supplementation, please specify 25 OH vitamin D2 and D3 level determination by   LCMSMS test [id number].         If you have any questions or concerns, please call the clinic at the number listed above.       Sincerely,        Maryjane Quintanilla MD/nr

## 2017-10-02 NOTE — MR AVS SNAPSHOT
After Visit Summary   10/2/2017    Angy Haji    MRN: [id number]           Patient Information     Date Of Birth          1965        Visit Information        Provider Department      10/2/2017 8:00 AM Maryjane Quintanilla MD [facility]        Today's Diagnoses     Encounter for routine adult health examination without abnormal findings    -  1    CARDIOVASCULAR SCREENING; LDL GOAL LESS THAN 160        Screening for diabetes mellitus        Obesity (BMI 30-39.9)        Mild sleep apnea        Rosacea        Perioral dermatitis        Vitamin D deficiency        Right foot pain        Low back pain, unspecified back pain laterality, unspecified chronicity, with sciatica presence unspecified        Vaginal prolapse          Care Instructions      Preventive Health Recommendations  Female Ages 50 - 64    Yearly exam: See your health care provider every year in order to  o Review health changes.   o Discuss preventive care.    o Review your medicines if your doctor has prescribed any.      Get a Pap test every three years (unless you have an abnormal result and your provider advises testing more often).    If you get Pap tests with HPV test, you only need to test every 5 years, unless you have an abnormal result.     You do not need a Pap test if your uterus was removed (hysterectomy) and you have not had cancer.    You should be tested each year for STDs (sexually transmitted diseases) if you're at risk.     Have a mammogram every 1 to 2 years.    Have a colonoscopy at age 50, or have a yearly FIT test (stool test). These exams screen for colon cancer.      Have a cholesterol test every 5 years, or more often if advised.    Have a diabetes test (fasting glucose) every three years. If you are at risk for diabetes, you should have this test more often.     If you are at risk for osteoporosis (brittle bone disease), think about having a bone density scan (DEXA).    Shots: Get a flu shot  each year. Get a tetanus shot every 10 years.    Nutrition:     Eat at least 5 servings of fruits and vegetables each day.    Eat whole-grain bread, whole-wheat pasta and brown rice instead of white grains and rice.    Talk to your provider about Calcium and Vitamin D.     Lifestyle    Exercise at least 150 minutes a week (30 minutes a day, 5 days a week). This will help you control your weight and prevent disease.    Limit alcohol to one drink per day.    No smoking.     Wear sunscreen to prevent skin cancer.     See your dentist every six months for an exam and cleaning.    See your eye doctor every 1 to 2 years.            Follow-ups after your visit        Additional Services     OB/GYN REFERRAL       Your provider has referred you to:  [facility] [place] [phone]   [email or web address]    Please be aware that coverage of these services is subject to the terms and limitations of your health insurance plan.  Call member services at your health plan with any benefit or coverage questions.      Please bring the following with you to your appointment:    (1) Any X-Rays, CTs or MRIs which have been performed.  Contact the facility where they were done to arrange for  prior to your scheduled appointment.   (2) List of current medications   (3) This referral request   (4) Any documents/labs given to you for this referral            ORTHO  REFERRAL       [facility] is referring you to the Orthopedic  Services at [facility].       The  Representative will assist you in the coordination of your Orthopedic and Musculoskeletal Care as prescribed by your physician.    The  Representative will call you within 1 business day to help schedule your appointment, or you may contact the  Representative at:    All areas ~ [phone]     Type of Referral : Non Surgical       Timeframe requested:  "Routine    Coverage of these services is subject to the terms and limitations of your health insurance plan.  Please call member services at your health plan with any benefit or coverage questions.      If X-rays, CT or MRI's have been performed, please contact the facility where they were done to arrange for , prior to your scheduled appointment.  Please bring this referral request to your appointment and present it to your specialist.                  Your next 10 appointments already scheduled     Oct 05, 2017  8:00 AM CDT   (Arrive by 7:45 AM)   MA SCREENING BILATERAL W/ PEARL with [id number]   [facility] ([facility])    [address]  2nd Floor  [facility] [id number] [phone]           Three-dimensional (3D) mammograms are available at [place] locations in [facility], [facility], [facility], and [place]. [facility], [place] locations include [facility] in [place]. Benefits of 3D mammograms include: - Improved rate of cancer detection - Decreases your chance of having to go back for more tests, which means fewer: - \"False-positive\" results (This means that there is an abnormal area but it isn't cancer.) - Invasive testing procedures, such as a biopsy or surgery - Can provide clearer images of the breast if you have dense breast tissue. 3D mammography is an optional exam that anyone can have with a 2D mammogram. It doesn't replace or take the place of a 2D mammogram. 2D mammograms remain an effective screening test for all women.  Not all insurance companies cover the cost of a 3D mammogram. Check with your insurance.            Oct 23, 2017  9:45 AM CDT   Colonoscopy with Marshall Alan MD   [facility] ([facility])    [address]  [address]  [place] [address]   [phone]            Nov 15, 2017  7:30 AM CST   Follow Up with Mar Ornelas " "Constantin Xiong RD, LD   [facility] ([facility])    [address]  [place] [id number] [phone]            Nov 15, 2017  3:00 PM CST   Return Sleep Patient with Nellie Calloway MD   [facility] [place], Sleep Study ([facility])    [address] [place] [address] [phone]              Who to contact     If you have questions or need follow up information about today's clinic visit or your schedule please contact [facility] directly at [phone].  Normal or non-critical lab and imaging results will be communicated to you by [employer], letter or phone within 4 business days after the clinic has received the results. If you do not hear from us within 7 days, please contact the clinic through [employer] or phone. If you have a critical or abnormal lab result, we will notify you by phone as soon as possible.  Submit refill requests through [employer] or call your pharmacy and they will forward the refill request to us. Please allow 3 business days for your refill to be completed.          Additional Information About Your Visit        [employer] Information     [employer] gives you secure access to your electronic health record. If you see a primary care provider, you can also send messages to your care team and make appointments. If you have questions, please call your primary care clinic.  If you do not have a primary care provider, please call [phone] and they will assist you.        Care EveryWhere ID     This is your Care EveryWhere ID. This could be used by other organizations to access your [place] medical records  [id number]        Your Vitals Were     Pulse Temperature Respirations Height Pulse Oximetry BMI (Body Mass Index)    65 98  F (36.7  C) (Oral) 18 5' 8\" (1.727 m) 100% 22.58 kg/m2       Blood Pressure from Last 3 Encounters:   10/02/17 126/76   02/27/17 136/90 "   12/07/16 153/66    Weight from Last 3 Encounters:   10/02/17 148 lb 8 oz (67.4 kg)   02/27/17 259 lb 8 oz (117.7 kg)   12/07/16 250 lb (113.4 kg)              We Performed the Following     Glucose     Lipid panel reflex to direct LDL     OB/GYN REFERRAL     ORTHO  REFERRAL     Vitamin D Deficiency          Where to get your medicines      These medications were sent to [facility] [address]  [address], [facility] [address]     Phone:  [phone]     metroNIDAZOLE 1 % gel          Primary Care Provider Office Phone # Fax #    Maryjane Quintanilla [phone] [address]  [facility] [address]        Equal Access to Services     LUPE NOGUEIRA : Russ Wagner, waaxezio luqadaha, qaybta kaalmada adelynnette, svetlana tam . So [facility] [phone].    ATENCIÓN: Si habla español, tiene a franco disposición servicios gratuitos de asistencia lingüística. Llame al [phone].    We comply with applicable federal civil rights laws and [place] laws. We do not discriminate on the basis of race, color, national origin, age, disability, sex, sexual orientation, or gender identity.            Thank you!     Thank you for choosing [facility]  for your care. Our goal is always to provide you with excellent care. Hearing back from our patients is one way we can continue to improve our services. Please take a few minutes to complete the written survey that you may receive in the mail after your visit with us. Thank you!             Your Updated Medication List - Protect others around you: Learn how to safely use, store and throw away your medicines at www.disposemymeds.org.          This list is accurate as of: 10/2/17  8:33 AM.  Always use your most recent med list.                   Brand Name Dispense Instructions for use Diagnosis    ciclopirox 0.77 % cream    LOPROX     Apply  topically 2 times  daily as needed.    Eczema       ibuprofen 800 MG tablet    ADVIL/MOTRIN    270 tablet    Take 1 tablet (800 mg) by mouth every 8 hours as needed for pain    Disorder of bursae and tendons in shoulder region       lidocaine (viscous) 2 % solution    XYLOCAINE    100 mL    SWISH AND SPIT 5ML BY MOUTH EVERY 2 HOURS AS NEEDED    Oral aphthae       loratadine 10 MG capsule      Take 10 mg by mouth daily as needed.        metroNIDAZOLE 1 % gel    METROGEL    60 g    Apply topically daily    Rosacea, Perioral dermatitis       * order for DME      Use your CPAP device as directed by your provider.        order for DME     2 Units    Support/compression stockings - knee high, medium compression    Varicose veins of legs       * order for DME     1 each    Please measure and distribute 1 pair of 20mmHg - 30mmHg knee high open toe compression stockings. Jobst ultrasheer or equivalent.    Varicose veins of both lower extremities with complications       triamcinolone 0.1 % cream    KENALOG    30 g    Apply topically 2 times daily    Eczema, unspecified type       VITAMIN D (CHOLECALCIFEROL) PO      Take 2,000 Units by mouth daily    Perioral dermatitis, Rosacea       * Notice:  This list has 2 medication(s) that are the same as other medications prescribed for you. Read the directions carefully, and ask your doctor or other care provider to review them with you.

## 2017-10-02 NOTE — PROGRESS NOTES
"   SUBJECTIVE:   CC: Angy Haji is an 52 year old woman who presents for preventive health visit.     Physical   Annual:     Getting at least 3 servings of Calcium per day::  Yes    Bi-annual eye exam::  Yes    Dental care twice a year::  Yes    Sleep apnea or symptoms of sleep apnea::  Sleep apnea    Diet::  Regular (no restrictions)    Frequency of exercise::  6-7 days/week    Duration of exercise::  30-45 minutes    Taking medications regularly::  Yes    Medication side effects::  Not applicable    Additional concerns today::  YES      LMP 6/15/17 15 days long, nothing since.      Would like to check vitamin D. Takes 2000iu daily.     Mood is good. Did not start therapy. Started meditation weekly at lunchtime. Upped the exercise.     She is having a variety of pain. It hurts \"everywhere\" when she starts to move and walk. Her left hip causes problems. She feel weak going up stairs. Waddles up the stairs. When she is seated for two hours, it really hurts to get up ans start moving, lower half of her body hurts when she gets going. Still having pain in her foot. Cramps when she is sleeping right foot or arch or top of the side of the foot. Having trouble sitting up straight.     Pelvic floor seems to have dropped. Seems like there is a bulge.         Today's PHQ-2 Score:   PHQ-2 ( 1999 Pfizer) 10/1/2017   Q1: Little interest or pleasure in doing things 0   Q2: Feeling down, depressed or hopeless 0   PHQ-2 Score 0   Q1: Little interest or pleasure in doing things Not at all   Q2: Feeling down, depressed or hopeless Not at all   PHQ-2 Score 0       Abuse: Current or Past(Physical, Sexual or Emotional)- No  Do you feel safe in your environment - Yes    Social History   Substance Use Topics     Smoking status: Never Smoker     Smokeless tobacco: Never Used      Comment: non smoke home     Alcohol use Yes      Comment: 2 Drinks Per Month     The patient does not drink >3 drinks per day nor >7 drinks per " week.    Reviewed orders with patient.  Reviewed health maintenance and updated orders accordingly - Yes  BP Readings from Last 3 Encounters:   10/02/17 126/76   02/27/17 136/90   12/07/16 153/66    Wt Readings from Last 3 Encounters:   10/02/17 148 lb 8 oz (67.4 kg)   02/27/17 259 lb 8 oz (117.7 kg)   12/07/16 250 lb (113.4 kg)                  Patient Active Problem List   Diagnosis     Oral aphthae     Obesity (BMI 30-39.9)     Disorder of bursae and tendons in shoulder region     CARDIOVASCULAR SCREENING; LDL GOAL LESS THAN 160     Rosacea     Tear of medial cartilage or meniscus of knee, current     Raynaud disease     Vitamin D deficiency disease     Right elbow pain     Sprain of foot     Mild sleep apnea     Atopic rhinitis     Chronic bilateral low back pain with right-sided sciatica     Past Surgical History:   Procedure Laterality Date     HC TOOTH EXTRACTION W/FORCEP  1984/85    [place] Teeth     LAPAROSCOPIC CHOLECYSTECTOMY  7/29/2013    Procedure: LAPAROSCOPIC CHOLECYSTECTOMY;  Laparoscopic Cholecystectomy;  Surgeon: Fabio Johnson MD;  [facility]     LAPAROSCOPIC OOPHORECTOMY Left 6/23/2016    Procedure: LAPAROSCOPIC OOPHORECTOMY;  Surgeon: Kayley Donnelly MD;  [facility]     LAPAROSCOPIC SALPINGECTOMY Bilateral 6/23/2016    Procedure: LAPAROSCOPIC SALPINGECTOMY;  Surgeon: Kayley Donnelly MD;  [facility] OR       Social History   Substance Use Topics     Smoking status: Never Smoker     Smokeless tobacco: Never Used      Comment: non smoke home     Alcohol use Yes      Comment: 2 Drinks Per Month     Family History   Problem Relation Age of Onset     Cardiovascular Paternal Grandfather      anyerism     Obesity Paternal Grandfather      Respiratory Paternal Grandfather      pulmonary (air sacs hardening)     Allergies Father      hayfever     Genitourinary Problems Father      BPH     Lipids Father      diet therapy     Gallbladder Disease Father       cholecystectomy      Eye Disorder Father      retinal tear x 2      Alzheimer Disease Maternal Grandmother      ? poor STM     Arthritis Maternal Grandmother      HEART DISEASE Maternal Grandmother      Arrythmia-got a pacemaker at age 89     Arthritis Mother      Eye Disorder Mother      Cat[place] /macular tear in one eye     CANCER Paternal Grandmother      Uterine CA- as well as her sister     Gynecology Paternal Grandmother      prolapsed uterus- had 10 kids     Obesity Paternal Grandmother      Breast Cancer Maternal Aunt      Other Cancer Paternal Aunt 80     endo. cancer          Current Outpatient Prescriptions   Medication Sig Dispense Refill     lidocaine, viscous, (XYLOCAINE) 2 % solution SWISH AND SPIT 5ML BY MOUTH EVERY 2 HOURS AS NEEDED 100 mL 1     triamcinolone (KENALOG) 0.1 % cream Apply topically 2 times daily 30 g 1     order for DME Please measure and distribute 1 pair of 20mmHg - 30mmHg knee high open toe compression stockings. Jobst ultrasheer or equivalent. 1 each 1     metroNIDAZOLE (METROGEL) 1 % gel Apply topically daily 60 g 9     ibuprofen (ADVIL,MOTRIN) 800 MG tablet Take 1 tablet (800 mg) by mouth every 8 hours as needed for pain 270 tablet 0     VITAMIN D, CHOLECALCIFEROL, PO Take 2,000 Units by mouth daily       ORDER FOR DME Use your CPAP device as directed by your provider.       ORDER FOR DME Support/compression stockings - knee high, medium compression   2 Units 11     ciclopirox (LOPROX) 0.77 % cream Apply  topically 2 times daily as needed.       Loratadine 10 MG capsule Take 10 mg by mouth daily as needed.       Allergies   Allergen Reactions     Penicillins Rash     Thimerosal      blisters on inside of eyelid     Recent Labs   Lab Test  10/03/16   0742  06/20/16   0843  09/24/15   0904  09/15/14   0733  09/20/13   1145  09/16/13   0739  08/22/12   0752  08/16/11   0757   A1C   --    --   5.2   --    --   5.1  5.2  5.3   LDL  51   --   47  24   --   44  50  34   HDL  75   --    73  75   --   62  66  47*   TRIG  56   --   58  46   --   64  50  97   ALT   --    --    --    --    --    --    --   13   CR   --   0.50*   --   0.56   --   0.47*  0.54  0.59   GFRESTIMATED   --   >90  Non  GFR Calc     --   >90  Non  GFR Calc     --   >90  >90  >90   GFRESTBLACK   --   >90   GFR Calc     --   >90   GFR Calc     --   >90  >90  >90   POTASSIUM   --   3.7   --   4.3   --   3.9  4.3  4.1   TSH   --    --    --    --   1.45   --    --    --               Patient over age 50, mutual decision to screen reflected in health maintenance.      Pertinent mammograms are reviewed under the imaging tab.  History of abnormal Pap smear:   Last 3 Pap Results:   PAP (no units)   Date Value   09/24/2015 OTHER-NIL, See Result   09/07/2012 NIL   08/18/2009 NIL       Reviewed and updated as needed this visit by clinical staff  Tobacco  Allergies  Meds  Med Hx  Surg Hx  Fam Hx  Soc Hx        Reviewed and updated as needed this visit by Provider        Past Medical History:   Diagnosis Date     Disorders of bursae and tendons in shoulder region, unspecified      Elevated blood pressure reading without diagnosis of hypertension      Female infertility of unspecified origin      Gallbladder sludge 5/2013     Mild sleep apnea 5/13/2014     Obesity, unspecified      Oral aphthae      Other acne      Other specified hypoglycemia      PONV (postoperative nausea and vomiting)       Past Surgical History:   Procedure Laterality Date     HC TOOTH EXTRACTION W/FORCEP  1984/85    [place] Teeth     LAPAROSCOPIC CHOLECYSTECTOMY  7/29/2013    Procedure: LAPAROSCOPIC CHOLECYSTECTOMY;  Laparoscopic Cholecystectomy;  Surgeon: Fabio Johnson MD;  [facility]     LAPAROSCOPIC OOPHORECTOMY Left 6/23/2016    Procedure: LAPAROSCOPIC OOPHORECTOMY;  Surgeon: Kayley Donnelly MD;  [facility]     LAPAROSCOPIC SALPINGECTOMY Bilateral 6/23/2016     "Procedure: LAPAROSCOPIC SALPINGECTOMY;  Surgeon: Kayley Donnelly MD;  [facility]     Obstetric History       T0      L0     SAB0   TAB0   Ectopic0   Multiple0   Live Births0           ROS:   ROS: 10 point ROS neg other than the symptoms noted above in the HPI.      OBJECTIVE:   /76  Pulse 65  Temp 98  F (36.7  C) (Oral)  Resp 18  Ht 5' 8\" (1.727 m)  Wt 148 lb 8 oz (67.4 kg)  SpO2 100%  BMI 22.58 kg/m2  EXAM:  GENERAL APPEARANCE: healthy, alert and no distress  EYES: Eyes grossly normal to inspection, PERRL and conjunctivae and sclerae normal  HENT: ear canals and TM's normal, nose and mouth without ulcers or lesions, oropharynx clear and oral mucous membranes moist  NECK: no adenopathy, no asymmetry, masses, or scars and thyroid normal to palpation  RESP: lungs clear to auscultation - no rales, rhonchi or wheezes  BREAST: normal without masses, tenderness or nipple discharge and no palpable axillary masses or adenopathy  CV: regular rate and rhythm, normal S1 S2, no S3 or S4, no murmur, click or rub, no peripheral edema and peripheral pulses strong  ABDOMEN: soft, nontender, no hepatosplenomegaly, no masses and bowel sounds normal  : External exam only today reveals slight bulge at the vaginal introitus. Vulva normal.   MS: no musculoskeletal defects are noted and gait is age appropriate without ataxia  SKIN: no suspicious lesions or rashes  NEURO: Normal strength and tone, sensory exam grossly normal, mentation intact and speech normal  PSYCH: mentation appears normal and affect normal/bright    ASSESSMENT/PLAN:   1. Encounter for routine adult health examination without abnormal findings  Pap is utd, mammo is scheduled, colonoscopy is scheduled     2. CARDIOVASCULAR SCREENING; LDL GOAL LESS THAN 160     - Lipid panel reflex to direct LDL    3. Screening for diabetes mellitus     - Glucose    4. Obesity (BMI 30-39.9)   diet and exercise --she is working on this and has " "lost some weight.     5. Mild sleep apnea  cpap daily    6. Rosacea   needs refill   - metroNIDAZOLE (METROGEL) 1 % gel; Apply topically daily  Dispense: 60 g; Refill: 9    7. Perioral dermatitis  Needs refill   - metroNIDAZOLE (METROGEL) 1 % gel; Apply topically daily  Dispense: 60 g; Refill: 9    8. Vitamin D deficiency  Requests vitamin D check today   - Vitamin D Deficiency    9. Right foot pain   she would like further eval.    - ORTHO  REFERRAL    10. Low back pain, unspecified back pain laterality, unspecified chronicity, with sciatica presence unspecified   she would like further eval   - ORTHO  REFERRAL    11. Pelvic floor relaxation     - OB/GYN REFERRAL   COUNSELING:  Reviewed preventive health counseling, as reflected in patient instructions         reports that she has never smoked. She has never used smokeless tobacco.    Estimated body mass index is 22.58 kg/(m^2) as calculated from the following:    Height as of this encounter: 5' 8\" (1.727 m).    Weight as of this encounter: 148 lb 8 oz (67.4 kg).   Weight management plan: diet and exercise    Counseling Resources:  ATP IV Guidelines  Pooled Cohorts Equation Calculator  Breast Cancer Risk Calculator  FRAX Risk Assessment  ICSI Preventive Guidelines  Dietary Guidelines for Americans, 2010  USDA's MyPlate  ASA Prophylaxis  Lung CA Screening    Maryjane Quintanilla MD, MD  [facility]  Answers for HPI/ROS submitted by the patient on 10/1/2017   PHQ-2 Score: 0    "

## 2017-10-02 NOTE — NURSING NOTE
"Chief Complaint   Patient presents with     Physical       Initial /76  Pulse 65  Temp 98  F (36.7  C) (Oral)  Resp 18  Ht 5' 8\" (1.727 m)  Wt 148 lb 8 oz (67.4 kg)  SpO2 100%  BMI 22.58 kg/m2 Estimated body mass index is 22.58 kg/(m^2) as calculated from the following:    Height as of this encounter: 5' 8\" (1.727 m).    Weight as of this encounter: 148 lb 8 oz (67.4 kg).  Medication Reconciliation: complete   Cheryl Fiore MA      "

## 2017-10-05 ENCOUNTER — RADIANT APPOINTMENT (OUTPATIENT)
Dept: MAMMOGRAPHY | Facility: CLINIC | Age: 52
End: 2017-10-05

## 2017-10-05 DIAGNOSIS — Z12.31 VISIT FOR SCREENING MAMMOGRAM: ICD-10-CM

## 2017-10-30 ENCOUNTER — TELEPHONE (OUTPATIENT)
Dept: GASTROENTEROLOGY | Facility: OUTPATIENT CENTER | Age: 52
End: 2017-10-30

## 2017-10-31 ENCOUNTER — TELEPHONE (OUTPATIENT)
Dept: GASTROENTEROLOGY | Facility: OUTPATIENT CENTER | Age: 52
End: 2017-10-31

## 2017-10-31 NOTE — TELEPHONE ENCOUNTER
Patient taking any blood thinners ? no    Heart disease ? denies    Lung disease ? denies      Sleep apnea ? Cpap    Diabetic ? denies    Kidney disease ? denies    Dialysis ? n/a    Electronic implanted medical devices ? denies    Are you taking any narcotic pain medication ?no   What is your daily dosage ?    PTSD ? n/a    Prep instructions reviewed with patient ? Instructions,  policy, MAC sedation plan reviewed. Advised patient to have someone stay with her post exam    Pharmacy : n/a    Indication for procedure :    Colon cancer screening [Z12.11            Referring provider :Maryjane Quintanilla MD

## 2017-11-06 ENCOUNTER — TRANSFERRED RECORDS (OUTPATIENT)
Dept: HEALTH INFORMATION MANAGEMENT | Facility: CLINIC | Age: 52
End: 2017-11-06

## 2017-11-06 ENCOUNTER — DOCUMENTATION ONLY (OUTPATIENT)
Dept: GASTROENTEROLOGY | Facility: OUTPATIENT CENTER | Age: 52
End: 2017-11-06

## 2017-11-08 LAB — COPATH REPORT: NORMAL

## 2017-11-15 ENCOUNTER — HOSPITAL ENCOUNTER (OUTPATIENT)
Dept: NUTRITION | Facility: CLINIC | Age: 52
Discharge: HOME | End: 2017-11-15
Attending: FAMILY MEDICINE | Admitting: FAMILY MEDICINE
Payer: COMMERCIAL

## 2017-11-15 PROCEDURE — 97803 MED NUTRITION INDIV SUBSEQ: CPT | Performed by: DIETITIAN, REGISTERED

## 2017-11-15 NOTE — PROGRESS NOTES
"NUTRITION REASSESSMENT NOTE     REASON FOR ASSESSMENT   Angy QUINTANA Eduardodeann referred by Dr. Spatz for MNT related to obesity     Patient accompanied by self     NUTRITION HISTORY  Patient has been inconsistent with tracking intake during the past 2 weeks due to life stressors.  Patient is trying to reduce emotional eating but feels it has increased in the past 2-3 weeks as she bought 3 pints of ice cream and has eaten them.                     DIET RECALL   Breakfast: fruit, yogurt and cereal; steel cut oats with 1 T brown sugar and cinnamon  Lunch: leftovers   Dinner: lasagna; chicken enchilada; chickpea stew with orzo   Snacks: peanut butter and banana, premium ice cream or regular ice cream, banana, Dove dark chocolates  Beverages: water, coffee, mineral water, alcohol 1 time per week at most  Dining out: varies   Exercise: Patient has not been as consistent with exercise.  Patient walking dog prior to work but has not been going to the gym.          ANTHROPOMETRICS  Height: 5'9\"  Weight: 249 lbs per patient  BMI (kg/m2): 36.7 kg/m2   %IBW: 171%  Weight History: Note error on weight taken at clinic appointment on 10/2/17.  Patient's weight was entered in lbs not kg.  Wt Readings from Last 5 Encounters:   10/02/17 67.4 kg (148 lb 8 oz)   02/27/17 117.7 kg (259 lb 8 oz)   12/07/16 113.4 kg (250 lb)   09/27/16 112.9 kg (249 lb)   07/13/16 112 kg (247 lb)     ASSESSED NUTRITION NEEDS  Estimated Energy Needs: 3124-8296 kcals (15-20 kcals/kg) for gradual weight loss   Estimated Protein Needs: 66-79 (1-1.2 gm/kg IBW) for repletion with exercise  RMR: 1440    EVALUATION/PROGRESS TOWARDS GOALS   Previous goals:  5 lb weight loss in 7 weeks-not met   Practice alternatives to emotional eating-improving   Add 60 minutes for exercise 2 time per week (Saturday and Sunday) + continue current exercise regimen (30 minutes 5 times per week)-not met  Challenge negative food self talk-improving     Previous Nutrition Diagnosis: Obesity " related to excess energy intake as evidenced by BMI of 36.3 kg/m2-no change    Current Nutrition Diagnosis:Obesity related to excess energy intake as evidenced by BMI of 36.7 kg/m2    INTERVENTIONS   Nutrition Prescription   Continue tracking foods and exercising 5-7 times per week; increase intensity of exercise; increase protein intake during the day to 30 grams per meal      Implementation:   Meals and Snacks: Continue eating 3 meals + snacks as hungry   Nutrition Education (Content):    a)  Discussed progress towards goals.  Also discussed high fiber foods with recent diagnosis of diverticulosis. Reviewed emotional eating.  Encouraged patient to continue practicing emotional eating alternatives due to increased stress.  Discussed food tracking and ways to use as tool for useful information instead of obligatory task.  Supported patient in her struggle with weight loss and emotional eating.  Provided Academy [employer] High Fiber diet and Tips to Increase Fiber in Diet handouts along with fiber content of foods handout.    Nutrition Education (Application):    a) Determined eating plan for the holidays by limiting how many cookies patient will bake.     b) Patient verbalized understanding of diet by stating will allow holiday meal to be 1 day not days of increased eating.              Anticipate fair-good compliance    CURRENT GOALS (Patient determined)  Determine how many cookie choices will be baked this holiday season  Focus on holiday meal as one day not days of eating   Be consistent with exercise 3-5 days per week     FOLLOW UP/MONITORING    Patient to follow up in 8 weeks  Patient has RD contact information     Time spent with patient: 30 minutes    Mattie Amaya, RD, LD  [facility] Outpatient Dietitian  [phone] (office phone)

## 2017-12-06 ENCOUNTER — OFFICE VISIT (OUTPATIENT)
Dept: SLEEP MEDICINE | Facility: CLINIC | Age: 52
End: 2017-12-06
Attending: INTERNAL MEDICINE
Payer: COMMERCIAL

## 2017-12-06 VITALS
BODY MASS INDEX: 38.49 KG/M2 | HEART RATE: 65 BPM | DIASTOLIC BLOOD PRESSURE: 65 MMHG | OXYGEN SATURATION: 100 % | SYSTOLIC BLOOD PRESSURE: 118 MMHG | RESPIRATION RATE: 16 BRPM | WEIGHT: 254 LBS | HEIGHT: 68 IN

## 2017-12-06 DIAGNOSIS — G47.30 MILD SLEEP APNEA: Primary | ICD-10-CM

## 2017-12-06 PROCEDURE — 99211 OFF/OP EST MAY X REQ PHY/QHP: CPT | Mod: ZF

## 2017-12-06 NOTE — NURSING NOTE
"Chief Complaint   Patient presents with     CPAP Follow Up       Initial /65  Pulse 65  Resp 16  Ht 1.727 m (5' 7.99\")  Wt 115.2 kg (254 lb)  SpO2 100%  BMI 38.63 kg/m2 Estimated body mass index is 38.63 kg/(m^2) as calculated from the following:    Height as of this encounter: 1.727 m (5' 7.99\").    Weight as of this encounter: 115.2 kg (254 lb).  Medication Reconciliation: complete   Milana Almanza [facility] ~[place]      "

## 2017-12-06 NOTE — PROGRESS NOTES
"Chief complaint: Follow-up of obstructive sleep apnea    History of present illness: 52-year-old female with history of severe obstructive sleep apnea most recent assessment after significant and sustained weight loss showed mild sleep apnea.  She is symptoms of hypersomnolence.  Since her last visit she has received a new machine.  It is currently set at fixed pressure of 9.  She notes that the modifier seems to run out of water she thinks it is related to the smaller water chamber.  She also finds that it no longer swivels 360  at the level of the tubing.  Otherwise she finds it satisfactory.  She wears a fullface mask as some difficulties breathing through her nose.  She has gained back some weight.  She associates this with stress eating.  She describes problems with schooling and behavior issues with her daughter.  She drinks more caffeine than she used to previously about 32 ounces now over 40.  And her exercise regimen has deteriorated.  Suffering from cramps in her right foot that does disrupt her ability to fall asleep on some nights.  She is working with her primary care physician regarding this and is rooming resuming some physical therapy exercises.  She did not denies daytime symptoms of sleepiness on therapy with an Obion of 0.  She rarely drinks alcohol.  No sleep aids.    Download of data from her device in the last 30 days shows excellent compliance with percent of days used greater than 4 hours of 97%  Average use of 7 hours and 40 minutes  Average AHI 3.0  CPAP setting at 9  Excessive leak is noted with average time in large leak over hour and a half.  Individual night graphs document the excessive leak.    Past medical history, medications, allergies reviewed    Social history: See HPI non-smoker works days    Exam pleasant obese female no distress  /65  Pulse 65  Resp 16  Ht 1.727 m (5' 7.99\")  Wt 115.2 kg (254 lb)  SpO2 100%  BMI 38.63 kg/m2     Assessment: 52-year-old female with " overall mild obstructive sleep apnea hypersomnolence associated with sleep apnea resolved on Pap therapy.  Currently with excellent compliance and ongoing good benefit.  At this point her current settings remain effective despite the weight gain.      Plan: Extensive discussion today regarding the importance of weight control and ways to get back into her routine that was effective for her.  She should continue to work with primary care and physical therapy to improve her pain symptoms.  Consider resuming regular swimming.  Consider mindfulness as a way to manage stress and minimize stress eating.  Prescription generated for her to keep her supplies up-to-date no setting change at this time.  She was counseled on the importance of notifying me should she have further weight gain as she may need a pressure adjustment.  Would then put her machine into auto mode.  Otherwise patient will contact me with any new issues or that arise.  I will renew her orders in 1 year with a face-to-face follow-up in 2 years per patient request.  Patient is counseled on the importance of decreasing caffeine use back to 3 before 3.  She was also reminded of the effects of alcohol sedatives and narcotics on worsening sleep apnea.  She does take her device with her when she goes to surgeries.    Twenty-five minutes spent with patient, >50% spent counseling and coordinating care.    Nellie Calloway M.D.  Pulmonary/Critical Care/Sleep Medicine    The above note was dictated using voice recognition software and may include typographical errors. Please contact the author for any clarifications.

## 2017-12-06 NOTE — PATIENT INSTRUCTIONS
1.  Continue CPAP every night for all hours that you are sleeping.  If you nap use CPAP.  As always, try to get at least 8 hours of sleep or more each day, and avoid sleep deprivation. Avoid alcohol.    2.  Reasons that you might need a change to your pressure therapy would be weight gain or loss, waking having inadvertently removed your CPAP overnight, having previously felt refreshed by sleep with CPAP use and now waking un-refreshed, and return of daytime sleepiness. Also, the development of new medical problems can sometimes affect breathing at night-heart failure, stroke, medications such as narcotics.    3.  Please bring CPAP with you if you are hospitalized.  If anticipating surgery be sure to discuss with your surgeon that you have sleep apnea and use PAP therapy.      4.  Maintain your equipment as recommended which includes routine cleaning and replacement of supplies.  Call DME for any questions regarding supplies or maintenance.      5.  Do not drive on engage in potentially dangerous activities if feeling sleepy.    6.  Please see me again in 12-24 months and bring your machine and card to your follow-up visit.      [facility] DME and cpap specialist Dona [phone]  [facility] [phone]    [facility] DME [phone], CPAP specialist [phone]  [facility] [phone]    [place] [facility], [facility] [phone]    [facility] [place] DME  Karie Blanco [phone]  [facility] DME Maryjane [phone]  [facility] phone [phone]         Tips for your CPAP and BIPAP use-    Mask fitting tips  Mask fitting exercise:    To improve your mask seal and your mobility at night, put mask on and secure in place.  Lie down in bed with full pressure and roll to one side, adjust headgear while in that position to  eliminate any leaks. Repeat process rolling to other side.     The mask seal does not have to be perfect:   CPAP machines are designed to make up for small leaks. However, you will not tolerate leaks blowing in your eyes so you will need to adjust.   Any leak should only be near or at the bottom of the mask.  We expect your mask to leak slightly at night.    Do not over-tighten the headgear straps, tighter IS NOT better, we expect minimal leak.    First try re-positioning the mask or headgear before tightening the headgear straps.  Mask leaks are expected due to changing sleeping positions. Try pulling the mask away from your skin allowing the cushion to re-inflate will minimize the leak.  If you struggle for a good fit, try turning the CPAP off and then readjust the mask by pulling it away from your face and then turning back on the CPAP.        Humidifier tips  Humidifiers can be adjusted to increase or decrease the amount of moisture according to your comfort level. You may need to adjust this frequently at first, but then might only change it with seasonal weather changes.     Try INCREASING the humidity if:  You experience a dry, irritated nasal passage or throat.  You have a runny, drippy nose or sneezing fits after using CPAP.  You experience nasal congestion during or after CPAP use.    Try DECREASING the humidity if:  You have excessive condensation or  rain out  in the tubing or mask.  Otherwise keep the tubing warm during the night by running it underneath the blankets or pillow.      Clinic visit after initial CPAP and BIPAP set-up   Bring your equipment with you to your 4 week follow up clinic visit.  We will be extracting your data from the machine.        Travel  Always take your equipment with you.  If you fly with your equipment bring it on with you as a carry on.  Medical equipment does not count as a carry on.    If you travel international the machines take 110-240.  The only adapter needed is  the adapter that will fit into the receptacle (outlet).    You may also want to bring an extension cord as many hotel rooms have limited outlets at the bedside.  Do not travel with water in your humidifier chamber.     Cleaning and Maintenance Guidelines    Equipment Frequency Cleaning Method   Mask First Day    Daily      Weekly Soak mask in hot soapy water for 30 minutes, rinse and air dry.  Wipe nasal cushion with a hot soapy (Ivory, baby shampoo) cloth and rinse.  Baby wipes may also be used.  Do not use anti-bacterial soaps,Reina  liquid soap, rubbing alcohol, bleach or ammonia.  Wash frame in hot soapy water (Ivory, baby shampoo) rinse and let air dry   Headgear Biweekly Wash in hot soapy water, rinse and air dry   Reusable Gray Filter Weekly Wash in hot soapy water, rinse, put in towel squeeze moisture out, let air dry   Disposable White Filter Check Weekly Replace when brown or gray in color; at least every 2 to 3 months   Humidifier Chamber Daily    Weekly Empty distilled water from humidifier and let air dry    Hand wash in hot soapy water, rinse and air dry   Tubing Weekly Wash in hot soapy water, rinse and let air dry   Mask, Tubing and Humidifier Chamber As needed Disinfect: Soak in 1 part vinegar to 3 parts hot water for 30 minutes, rinse well and air dry  Not the material headgear        MASK AND SUPPLY REORDERING  Reminder: Most insurance companies will allow for a new mask, headgear, tubing, and reusable gray filter every six months.  Disposable white ultra-fine filters are covered monthly.    EQUIPMENT NEEDS  Please call our CPAP specialist with any equipment problems, questions or needs.    HOME AND SAFETY INSTRUCTIONS    Do not use frayed or cracked electrical cords, multi plug adaptors, or switched receptacles    Do not immerse electrical equipment into water    Assure that electrical cords do not become a tripping hazard      Your BMI is Body mass index is 38.63 kg/(m^2).  Weight management is a  personal decision.  If you are interested in exploring weight loss strategies, the following discussion covers the approaches that may be successful. Body mass index (BMI) is one way to tell whether you are at a healthy weight, overweight, or obese. It measures your weight in relation to your height.  A BMI of 18.5 to 24.9 is in the healthy range. A person with a BMI of 25 to 29.9 is considered overweight, and someone with a BMI of 30 or greater is considered obese. More than two-thirds of American adults are considered overweight or obese.  Being overweight or obese increases the risk for further weight gain. Excess weight may lead to heart disease and diabetes.  Creating and following plans for healthy eating and physical activity may help you improve your health.  Weight control is part of healthy lifestyle and includes exercise, emotional health, and healthy eating habits. Careful eating habits lifelong are the mainstay of weight control. Though there are significant health benefits from weight loss, long-term weight loss with diet alone may be very difficult to achieve- studies show long-term success with dietary management in less than 10% of people. Attaining a healthy weight may be especially difficult to achieve in those with severe obesity. In some cases, medications, devices and surgical management might be considered.  What can you do?  If you are overweight or obese and are interested in methods for weight loss, you should discuss this with your provider.     Consider reducing daily calorie intake by 500 calories.     Keep a food journal.     Avoiding skipping meals, consider cutting portions instead.    Diet combined with exercise helps maintain muscle while optimizing fat loss. Strength training is particularly important for building and maintaining muscle mass. Exercise helps reduce stress, increase energy, and improves fitness. Increasing exercise without diet control, however, may not burn enough  calories to loose weight.       Start walking three days a week 10-20 minutes at a time    Work towards walking thirty minutes five days a week     Eventually, increase the speed of your walking for 1-2 minutes at time    In addition, we recommend that you review healthy lifestyles and methods for weight loss available through the National Institutes of Health patient information sites:  http://win.niddk.nih.gov/publications/index.htm    And look into health and wellness programs that may be available through your health insurance provider, employer, local community center, or rodolfo club.    Weight management plan: Patient was referred to their PCP to discuss a diet and exercise plan.   Get back into routine with exercise, PT

## 2018-01-24 ENCOUNTER — TRANSFERRED RECORDS (OUTPATIENT)
Dept: HEALTH INFORMATION MANAGEMENT | Facility: CLINIC | Age: 53
End: 2018-01-24

## 2018-02-07 ENCOUNTER — HOSPITAL ENCOUNTER (OUTPATIENT)
Dept: NUTRITION | Facility: CLINIC | Age: 53
Discharge: HOME | End: 2018-02-07
Attending: FAMILY MEDICINE | Admitting: FAMILY MEDICINE
Payer: COMMERCIAL

## 2018-02-07 ENCOUNTER — TELEPHONE (OUTPATIENT)
Dept: FAMILY MEDICINE | Facility: CLINIC | Age: 53
End: 2018-02-07

## 2018-02-07 DIAGNOSIS — E66.9 OBESITY (BMI 30-39.9): Primary | ICD-10-CM

## 2018-02-07 PROCEDURE — 97803 MED NUTRITION INDIV SUBSEQ: CPT | Performed by: DIETITIAN, REGISTERED

## 2018-02-07 NOTE — PROGRESS NOTES
"NUTRITION REASSESSMENT NOTE     REASON FOR ASSESSMENT   Angy QUINTANA Eduardodeann referred by Dr. Quintanilla for MNT related to obesity   Patient accompanied by self     NUTRITION HISTORY  Patient states she struggled with her food intake during the holidays and gained more weight than expected.  Patient has reduced weight in the past 4 weeks.  Patient finds that if she is not on track with her eating during the day that she will then track her intake for more awareness.  Patient feels her emotional eating has increased due to continued life stressors.                       DIET RECALL   Breakfast: fruit, yogurt and cereal; steel cut oats with 1 T brown sugar and cinnamon  Lunch: main dish salad (greens, grain and or beans) + fruit   Dinner: lasagna; chicken enchilada; chickpea stew with orzo   Snacks: peanut butter and banana, premium ice cream or regular ice cream, banana, Dove dark chocolates  Beverages: mainly water, coffee, mineral water, alcohol 1 time per week at most  Dining out: varies   Exercise: Patient has been able to exercise 12 times per month at the .  Patient swimming and cycling.  Patient attended 2 hour cycling class and was able to ride 70 minutes.  Patient swimming 20 minutes but was able to swim 30 minutes which was pleased with herself.            ANTHROPOMETRICS  Height: 5'9\"  Weight: 251.2 lbs per patient  BMI (kg/m2): 37 kg/m2   %IBW: 173%  Weight History: Note error on weight taken at clinic appointment on 10/2/17.  Patient's weight was entered in lbs not kg.  Wt Readings from Last 5 Encounters:   12/06/17 115.2 kg (254 lb)   10/02/17 67.4 kg (148 lb 8 oz)   02/27/17 117.7 kg (259 lb 8 oz)   12/07/16 113.4 kg (250 lb)   09/27/16 112.9 kg (249 lb)     ASSESSED NUTRITION NEEDS  Estimated Energy Needs: 3931-5862 kcals (15-20 kcals/kg) for gradual weight loss   Estimated Protein Needs: 66-79 (1-1.2 gm/kg IBW) for repletion with exercise  RMR: 1440    EVALUATION/PROGRESS TOWARDS GOALS   Previous " goals:  Determine how many cookie choices will be baked this holiday season-met  Focus on holiday meal as one day not days of eating-met  Be consistent with exercise 3-5 days per week-met past 4 weeks     Previous Nutrition Diagnosis: Obesity related to excess energy intake as evidenced by BMI of 36.7 kg/m2-no change    Current Nutrition Diagnosis:Obesity related to excess energy intake as evidenced by BMI of 37 kg/m2    INTERVENTIONS   Nutrition Prescription   Recommend exercise 3-5 times per week; track intake if eating mindlessly; increase fiber to 25-30 grams per day     Implementation:   Meals and Snacks: Continue eating 3 meals + snacks as hungry   Nutrition Education (Content):    a)  Discussed progress towards goals.  Reviewed high fiber foods and emotional eating.  Congratulated patient for refocusing on portion sizes to help reduce weight in the past 4 weeks.  Supported patient in her struggle with weight loss and emotional eating.  Nutrition Education (Application):    a) Determined ways to increase whole grains in the evening by preparing grain ahead of time and freezing.   b) Patient verbalized understanding of diet by stating will aim for 25 grams fiber per day.               Anticipate fair-good compliance    CURRENT GOALS (Patient determined)  Aim for 25 grams fiber per day   Exercise 3 days per week and increase duration to 30 minute sessions  Walk 1 time per week for 10 minutes at work       FOLLOW UP/MONITORING    Patient to follow up in 9 weeks  Patient has RD contact information     Time spent with patient: 30 minutes    Mattie Amaya, RD, LD  [facility] Outpatient Dietitian  [phone] (office phone)

## 2018-02-19 ENCOUNTER — TRANSFERRED RECORDS (OUTPATIENT)
Dept: HEALTH INFORMATION MANAGEMENT | Facility: CLINIC | Age: 53
End: 2018-02-19

## 2018-02-28 ENCOUNTER — APPOINTMENT (OUTPATIENT)
Dept: SLEEP MEDICINE | Facility: CLINIC | Age: 53
End: 2018-02-28
Payer: COMMERCIAL

## 2018-03-14 ENCOUNTER — OFFICE VISIT (OUTPATIENT)
Dept: OBGYN | Facility: CLINIC | Age: 53
End: 2018-03-14
Payer: COMMERCIAL

## 2018-03-14 VITALS
SYSTOLIC BLOOD PRESSURE: 151 MMHG | HEART RATE: 72 BPM | DIASTOLIC BLOOD PRESSURE: 75 MMHG | HEIGHT: 68 IN | WEIGHT: 246 LBS | BODY MASS INDEX: 37.28 KG/M2

## 2018-03-14 DIAGNOSIS — N95.1 PERIMENOPAUSAL VASOMOTOR SYMPTOMS: Primary | ICD-10-CM

## 2018-03-14 PROCEDURE — 99214 OFFICE O/P EST MOD 30 MIN: CPT | Performed by: OBSTETRICS & GYNECOLOGY

## 2018-03-14 ASSESSMENT — ANXIETY QUESTIONNAIRES
IF YOU CHECKED OFF ANY PROBLEMS ON THIS QUESTIONNAIRE, HOW DIFFICULT HAVE THESE PROBLEMS MADE IT FOR YOU TO DO YOUR WORK, TAKE CARE OF THINGS AT HOME, OR GET ALONG WITH OTHER PEOPLE: SOMEWHAT DIFFICULT
7. FEELING AFRAID AS IF SOMETHING AWFUL MIGHT HAPPEN: NOT AT ALL
5. BEING SO RESTLESS THAT IT IS HARD TO SIT STILL: NOT AT ALL
2. NOT BEING ABLE TO STOP OR CONTROL WORRYING: MORE THAN HALF THE DAYS
6. BECOMING EASILY ANNOYED OR IRRITABLE: NOT AT ALL
3. WORRYING TOO MUCH ABOUT DIFFERENT THINGS: MORE THAN HALF THE DAYS
1. FEELING NERVOUS, ANXIOUS, OR ON EDGE: MORE THAN HALF THE DAYS
GAD7 TOTAL SCORE: 8

## 2018-03-14 ASSESSMENT — PATIENT HEALTH QUESTIONNAIRE - PHQ9: 5. POOR APPETITE OR OVEREATING: MORE THAN HALF THE DAYS

## 2018-03-14 NOTE — NURSING NOTE
"Chief Complaint   Patient presents with     Consult       Initial /75  Pulse 72  Ht 5' 8\" (1.727 m)  Wt 246 lb (111.6 kg)  LMP 2018  BMI 37.4 kg/m2 Estimated body mass index is 37.4 kg/(m^2) as calculated from the following:    Height as of this encounter: 5' 8\" (1.727 m).    Weight as of this encounter: 246 lb (111.6 kg).  BP completed using cuff size: regular        The following HM Due: NONE      The following patient reported/Care Every where data was sent to:  P ABSTRACT QUALITY INITIATIVES [[id number]]        N/a      Lani Cantu MA                "

## 2018-03-14 NOTE — MR AVS SNAPSHOT
After Visit Summary   3/14/2018    Angy Haji    MRN: [id number]           Patient Information     Date Of Birth          1965        Visit Information        Provider Department      3/14/2018 2:00 PM Romy Min MD [facility]        Today's Diagnoses     Perimenopausal vasomotor symptoms    -  1       Follow-ups after your visit        Your next 10 appointments already scheduled     Mar 28, 2018 12:30 PM CDT   Office Visit with Romy Min MD   [facility] ([facility])    [address] [place] [id number] [phone]           Bring a current list of meds and any records pertaining to this visit. For Physicals, please bring immunization records and any forms needing to be filled out. Please arrive 10 minutes early to complete paperwork.            Apr 11, 2018  7:30 AM CDT   Follow Up with Mar Xiong RD, LD   [facility] ([facility])    [address] [place] [address], [address]  [facility] [address] [phone]              Who to contact     If you have questions or need follow up information about today's clinic visit or your schedule please contact [facility] directly at [phone].  Normal or non-critical lab and imaging results will be communicated to you by MyChart, letter or phone within 4 business days after the clinic has received the results. If you do not hear from us within 7 days, please contact the clinic through MyChart or phone. If you have a critical or abnormal lab result, we will notify you by phone as soon as possible.  Submit refill requests through [employer] or call your pharmacy and they will forward the refill request to us. Please allow 3 business days for your refill to be completed.          Additional Information About Your Visit        App[employer] Information     [employer] gives you secure access to  "your electronic health record. If you see a primary care provider, you can also send messages to your care team and make appointments. If you have questions, please call your primary care clinic.  If you do not have a primary care provider, please call [phone] and they will assist you.        Care EveryWhere ID     This is your Care EveryWhere ID. This could be used by other organizations to access your [facility] medical records  [id number]        Your Vitals Were     Pulse Height Last Period BMI (Body Mass Index)          72 5' 8\" (1.727 m) 01/08/2018 37.4 kg/m2         Blood Pressure from Last 3 Encounters:   03/14/18 151/75   12/06/17 118/65   10/02/17 126/76    Weight from Last 3 Encounters:   03/14/18 246 lb (111.6 kg)   12/06/17 254 lb (115.2 kg)   10/02/17 148 lb 8 oz (67.4 kg)              Today, you had the following     No orders found for display         Today's Medication Changes          These changes are accurate as of 3/14/18 11:59 PM.  If you have any questions, ask your nurse or doctor.               Start taking these medicines.        Dose/Directions    estrogen (conjugated)-medroxyPROGESTERone 0.3-1.5 MG per tablet   Commonly known as:  PREMPRO   Used for:  Perimenopausal vasomotor symptoms   Started by:  Romy Min MD        Dose:  1 tablet   Take 1 tablet by mouth daily   Quantity:  30 tablet   Refills:  1            Where to get your medicines      These medications were sent to [facility] [place], [place] - [address]  [address] [facility] [address] [address]     Phone:  [phone]     estrogen (conjugated)-medroxyPROGESTERone 0.3-1.5 MG per tablet                Primary Care Provider Office Phone # Fax #    Maryjane Quintanilla [phone] [address]  [facility] [address]        Equal Access to Services     LUPE NOGUEIRA AH: Russ Wagner, subhash parker, jay jay dalal, svetlana patel " mara sherwoodaaglenroy ah. So [facility] [phone].    ATENCIÓN: Si ashley santos, tiene a franco disposición servicios gratuitos de asistencia lingüística. Haley bradley [phone].    We comply with applicable federal civil rights laws and [place] laws. We do not discriminate on the basis of race, color, national origin, age, disability, sex, sexual orientation, or gender identity.            Thank you!     Thank you for choosing [facility]  for your care. Our goal is always to provide you with excellent care. Hearing back from our patients is one way we can continue to improve our services. Please take a few minutes to complete the written survey that you may receive in the mail after your visit with us. Thank you!             Your Updated Medication List - Protect others around you: Learn how to safely use, store and throw away your medicines at www.disposemymeds.org.          This list is accurate as of 3/14/18 11:59 PM.  Always use your most recent med list.                   Brand Name Dispense Instructions for use Diagnosis    ciclopirox 0.77 % cream    LOPROX     Apply  topically 2 times daily as needed.    Eczema       estrogen (conjugated)-medroxyPROGESTERone 0.3-1.5 MG per tablet    PREMPRO    30 tablet    Take 1 tablet by mouth daily    Perimenopausal vasomotor symptoms       ibuprofen 800 MG tablet    ADVIL/MOTRIN    270 tablet    Take 1 tablet (800 mg) by mouth every 8 hours as needed for pain    Disorder of bursae and tendons in shoulder region       lidocaine (viscous) 2 % solution    XYLOCAINE    100 mL    SWISH AND SPIT 5ML BY MOUTH EVERY 2 HOURS AS NEEDED    Oral aphthae       loratadine 10 MG capsule      Take 10 mg by mouth daily as needed.        metroNIDAZOLE 1 % gel    METROGEL    60 g    Apply topically daily    Rosacea, Perioral dermatitis       * order for DME      Use your CPAP device as directed by your provider.        order for DME     2 Units    Support/compression stockings - knee  high, medium compression    Varicose veins of legs       * order for DME     1 each    Please measure and distribute 1 pair of 20mmHg - 30mmHg knee high open toe compression stockings. Jobst ultrasheer or equivalent.    Varicose veins of both lower extremities with complications       triamcinolone 0.1 % cream    KENALOG    30 g    Apply topically 2 times daily    Eczema, unspecified type       VITAMIN D (CHOLECALCIFEROL) PO      Take 2,000 Units by mouth daily    Perioral dermatitis, Rosacea       * Notice:  This list has 2 medication(s) that are the same as other medications prescribed for you. Read the directions carefully, and ask your doctor or other care provider to review them with you.

## 2018-03-14 NOTE — PROGRESS NOTES
GYN Clinic Consultation    Date of visit: 3/18/2018     Chief Complaint: perimenopausal symptoms    HPI:   Angy Haji is a 52 year old  female who I was asked to see in consultation by Maryjane Quintanilla for evaluation of perimenopausal symptoms. She presents today with her wife Danielle. She complains of hot flashes, night sweats, moodiness or irritability, insomnia or sleep disturbance, decreased mental sharpness.     Hot flushes started end of 2018. Throughout the day (approx 4x) and at night (2-6x).   Lots of difficulty with sleep, waking up and unable to fall back asleep  Trouble concentrating, mood swings. Difficulty with decision making.    PHQ-9 SCORE 2017 3/14/2018 3/14/2018   Total Score 7 13 15     JOANNA-7 SCORE 2017 3/14/2018   Total Score 4 8     Menses have been irregular over the past year, less frequent than previous. Patient's last menstrual period was 2018.  Light flow, up to 10 days. Period in , October, December.     Treatments tried: none    History of breast cancer: no  History of coronary heart disease: no  History of venous thromboembolic event or stroke: no  Active liver disease: no    Last mammogram: 10/5/17 normal  Last pap: 9/24/15 NIL, endometrial cells present (was bleeding at the time), neg high risk HPV      Obstetric History:   Obstetric History       T0      L0     SAB0   TAB0   Ectopic0   Multiple0   Live Births0           Gynecologic History:  Patient's last menstrual period was 2018.  STI history: none  Last Pap: 2015   History of abnormal pap: no  History of cervical procedures: no   Contraceptive History: no  The patient is sexually active with female partners.       Past Medical History:  Past Medical History:   Diagnosis Date     Disorders of bursae and tendons in shoulder region, unspecified      Elevated blood pressure reading without diagnosis of hypertension      Female infertility of unspecified origin      Gallbladder  sludge 5/2013     Mild sleep apnea 5/13/2014     Obesity, unspecified      Oral aphthae      Other acne      Other specified hypoglycemia      PONV (postoperative nausea and vomiting)        Past Surgical History:  Past Surgical History:   Procedure Laterality Date     HC TOOTH EXTRACTION W/FORCEP  1984/85    [place]     LAPAROSCOPIC CHOLECYSTECTOMY  7/29/2013    Procedure: LAPAROSCOPIC CHOLECYSTECTOMY;  Laparoscopic Cholecystectomy;  Surgeon: Fabio Johnson MD;  Location: [facility]     LAPAROSCOPIC OOPHORECTOMY Left 6/23/2016    Procedure: LAPAROSCOPIC OOPHORECTOMY;  Surgeon: Kayley Donnelly MD;  Location: [facility] OR     LAPAROSCOPIC SALPINGECTOMY Bilateral 6/23/2016    Procedure: LAPAROSCOPIC SALPINGECTOMY;  Surgeon: Kayley Donnelly MD;  Location: [facility] OR         Medications:  Current Outpatient Prescriptions   Medication     estrogen, conjugated,-medroxyPROGESTERone (PREMPRO) 0.3-1.5 MG per tablet     metroNIDAZOLE (METROGEL) 1 % gel     lidocaine, viscous, (XYLOCAINE) 2 % solution     triamcinolone (KENALOG) 0.1 % cream     order for DME     ibuprofen (ADVIL,MOTRIN) 800 MG tablet     VITAMIN D, CHOLECALCIFEROL, PO     ORDER FOR DME     ORDER FOR DME     ciclopirox (LOPROX) 0.77 % cream     Loratadine 10 MG capsule     No current facility-administered medications for this visit.        Allergy:  Allergies   Allergen Reactions     Penicillins Rash     Thimerosal      blisters on inside of eyelid     Patient denies food, latex or environmental allergies.     Social History:  Works as a therapist at [employer]. Lives with wife and daughter.  Social History   Substance Use Topics     Smoking status: Never Smoker     Smokeless tobacco: Never Used      Comment: non smoke home     Alcohol use Yes      Comment: 2 Drinks Per Month       Family History   Problem Relation Age of Onset     Cardiovascular Paternal Grandfather      anyerism     Obesity Paternal Grandfather      Respiratory Paternal  "Grandfather      pulmonary (air sacs hardening)     Allergies Father      hayfever     Genitourinary Problems Father      BPH     Lipids Father      diet therapy     Gallbladder Disease Father      cholecystectomy      Eye Disorder Father      retinal tear x 2      Alzheimer Disease Maternal Grandmother      ? poor STM     Arthritis Maternal Grandmother      HEART DISEASE Maternal Grandmother      Arrythmia-got a pacemaker at age 89     Arthritis Mother      Eye Disorder Mother      [place] /macular tear in one eye     CANCER Paternal Grandmother      Uterine CA- as well as her sister     Gynecology Paternal Grandmother      prolapsed uterus- had 10 kids     Obesity Paternal Grandmother      Breast Cancer Maternal Aunt      Other Cancer Paternal Aunt 80     endo. cancer        Review of Systems:  Skin: negative  Eyes: negative  Ears/Nose/Throat: negative  Respiratory: No shortness of breath, dyspnea on exertion, cough, or hemoptysis  Cardiovascular: negative  Gastrointestinal: negative  Genitourinary: vaginal dryness, irregular menses  Musculoskeletal: negative  Neurologic: negative  Psychiatric: as above -sleep disturbance and feeling anxious, no suicidal ideation  Hematologic/Lymphatic/Immunologic: negative  Endocrine: +hot flashes and night sweats    Physical Exam:  Vitals:    18 1406   BP: 151/75   Pulse: 72   Weight: 246 lb (111.6 kg)   Height: 5' 8\" (1.727 m)     Body mass index is 37.4 kg/(m^2).    Gen: healthy, alert, active, no distress      Assessment:  Angy Haji is a 52 year old  who presents in consultation for bothersome symptoms in the menopausal transition including hot flushes, sleep disturbance and mood changes.    Plan:  1. Perimenopausal vasomotor symptoms  - estrogen, conjugated,-medroxyPROGESTERone (PREMPRO) 0.3-1.5 MG per tablet; Take 1 tablet by mouth daily  Dispense: 30 tablet; Refill: 1      Discussed that systemic hormone therapy, with estrogen alone or in combination with " progestin, is the most effective therapy for vasomotor symptoms related to menopause. Discussed risks of hormone therapy including thromboembolic disease and breast cancer. As patient has a uterus, discussed importance of progesterone component to protect uterine lining.  Reviewed side effects including breast tenderness, vaginal bleeding, bloating, and headaches. Patient does not have any contraindications. Discussed recommendation of using lowest dose for shortest time needed to relieve symptoms. Patient is  interested in hormone therapy, Plan to re-evaluate yearly.     As an alternative or adjunct to hormone therapy, discussed that antidepressant agents SSRIs and SSNRIs are effective for the treatment of vasomotor symptoms associated with menopause. Additionally discussed that gabapentin is an anticonvulsant agent that has been shown to reduce vasomotor symptoms in several studies but is not FDA-approved for this indication. Common adverse effects from gabapentin include dizziness, somnolence, and peripheral edema.     Discussed lifestyle modifications that can help manage vasomotor symptoms including  layering of clothing, maintaining a lower ambient temperature, consuming cool drinks, avoiding consumption of alcohol and caffeine and exercising.    Follow up: recommend follow up appt in 1 month to see how things are going. Discussed that adjustments can be made to medications. Patient prefers to follow up face to face.    Over 50% of this 30 minute appointment was spent in face to face counseling about the menopausal transition and management options.     Romy Min MD

## 2018-03-15 ASSESSMENT — ANXIETY QUESTIONNAIRES: GAD7 TOTAL SCORE: 8

## 2018-03-15 ASSESSMENT — PATIENT HEALTH QUESTIONNAIRE - PHQ9: SUM OF ALL RESPONSES TO PHQ QUESTIONS 1-9: 15

## 2018-03-27 ENCOUNTER — DOCUMENTATION ONLY (OUTPATIENT)
Dept: SLEEP MEDICINE | Facility: CLINIC | Age: 53
End: 2018-03-27
Payer: COMMERCIAL

## 2018-03-27 NOTE — PROGRESS NOTES
3/27/18 ARABELLA CAME IN AND STATED SHE IS NOT SLEEPING WELL. HER MASK LEAK IS NOT BAD, BUT NOT GREAT. WE TRIED ON THE SIMPLUS FF, AIRFIT F20 IN LARGE. AND MEDIUM, ARABELLA RETURNED THE MEDIUM DEMO CUSHION AND WAS INSISTENT THAT THE LARGE FITS BETTER. RAN A MASK FIR IN OFFICE, WAS ABLE TO GET A GOOD SEAL, WHEN SHE WEARS THE MASK SHE DOES GET A WHISTLING NOISE FROM THE MACHINE, SENT HER HOME WITH NEW TUBING. AS ARABELLA IS NOT SLEEPING WELL, AND THE MASK ISSUES PERSISTS, I WILL INVOLVE STM AND POSSIBLE RT TO HELP WITH PLAN TO GET ARABELLA COMFORTABLE. ARABELLA WILL BE RETURNING TO THE SLEEP LAB ON 4/5/18 TO DO A MASK FITTING IN THE SLEEP LAB BEDS

## 2018-04-05 ENCOUNTER — DOCUMENTATION ONLY (OUTPATIENT)
Dept: SLEEP MEDICINE | Facility: CLINIC | Age: 53
End: 2018-04-05
Payer: COMMERCIAL

## 2018-04-05 NOTE — PROGRESS NOTES
4/5/18 ARABLELA CAME IN FOR A MASK FITTING WITH MYSELF AND STM HOWEVER LOOKING AT HER LEAK SHE HAS GREATLY IMPROVED TO 92% AND AN AHI ALMOST ALWAYS BELOW 5. SHE IS STILL FEELING EXHAUSTED SHE DENIED WAKING UP FROM THE LEAK/MASK BUT DOES WAKE UP TO USE THE BATHROOM, WHICH CAUSES HER TO READJUST THE MASK BEFORE GOING BACK TO BED. I SUGGESTED SPEAKING WITH THE DR ABOUT CHANGING PRESSURES, AS PER A MECHANICAL STANDPOINT EVERYTHING IS IN GREAT WORKING ORDER. WILL CONSULT WITH STM

## 2018-04-06 ENCOUNTER — OFFICE VISIT (OUTPATIENT)
Dept: OBGYN | Facility: CLINIC | Age: 53
End: 2018-04-06
Payer: COMMERCIAL

## 2018-04-06 VITALS
DIASTOLIC BLOOD PRESSURE: 75 MMHG | WEIGHT: 246 LBS | BODY MASS INDEX: 37.4 KG/M2 | HEART RATE: 60 BPM | TEMPERATURE: 97.4 F | SYSTOLIC BLOOD PRESSURE: 131 MMHG

## 2018-04-06 DIAGNOSIS — N95.1 SYMPTOMATIC MENOPAUSAL OR FEMALE CLIMACTERIC STATES: Primary | ICD-10-CM

## 2018-04-06 DIAGNOSIS — R53.83 OTHER FATIGUE: ICD-10-CM

## 2018-04-06 DIAGNOSIS — F41.9 ANXIETY: ICD-10-CM

## 2018-04-06 PROCEDURE — 82947 ASSAY GLUCOSE BLOOD QUANT: CPT | Performed by: OBSTETRICS & GYNECOLOGY

## 2018-04-06 PROCEDURE — 99213 OFFICE O/P EST LOW 20 MIN: CPT | Performed by: OBSTETRICS & GYNECOLOGY

## 2018-04-06 PROCEDURE — 36415 COLL VENOUS BLD VENIPUNCTURE: CPT | Performed by: OBSTETRICS & GYNECOLOGY

## 2018-04-06 PROCEDURE — 84443 ASSAY THYROID STIM HORMONE: CPT | Performed by: OBSTETRICS & GYNECOLOGY

## 2018-04-06 RX ORDER — VENLAFAXINE HYDROCHLORIDE 37.5 MG/1
CAPSULE, EXTENDED RELEASE ORAL
Qty: 46 CAPSULE | Refills: 0 | Status: SHIPPED | OUTPATIENT
Start: 2018-04-06 | End: 2018-08-27

## 2018-04-06 ASSESSMENT — PATIENT HEALTH QUESTIONNAIRE - PHQ9: 5. POOR APPETITE OR OVEREATING: MORE THAN HALF THE DAYS

## 2018-04-06 ASSESSMENT — ANXIETY QUESTIONNAIRES
6. BECOMING EASILY ANNOYED OR IRRITABLE: NOT AT ALL
1. FEELING NERVOUS, ANXIOUS, OR ON EDGE: MORE THAN HALF THE DAYS
7. FEELING AFRAID AS IF SOMETHING AWFUL MIGHT HAPPEN: SEVERAL DAYS
IF YOU CHECKED OFF ANY PROBLEMS ON THIS QUESTIONNAIRE, HOW DIFFICULT HAVE THESE PROBLEMS MADE IT FOR YOU TO DO YOUR WORK, TAKE CARE OF THINGS AT HOME, OR GET ALONG WITH OTHER PEOPLE: SOMEWHAT DIFFICULT
3. WORRYING TOO MUCH ABOUT DIFFERENT THINGS: NEARLY EVERY DAY
GAD7 TOTAL SCORE: 11
2. NOT BEING ABLE TO STOP OR CONTROL WORRYING: MORE THAN HALF THE DAYS
5. BEING SO RESTLESS THAT IT IS HARD TO SIT STILL: SEVERAL DAYS

## 2018-04-06 NOTE — PROGRESS NOTES
GYN CLINIC VISIT  4/6/18  CC: perimenopause follow up     HPI:  Angy Haji is a 52 year old perimenopausal female here for follow up.     In regard to vasomotor symptoms:  Started prempro Friday 3/16.   Intensity of hot flushes is less but still having them. Feels like it is an improvement but not enough that she is comfortable. No libido.     In regard to sleep:  Sleep is still bad. Waking up multiple times. Up for about 1 hour in the middle of the night. Waking up too early. Very tired during the day. Trying new CPAP mask which is not helping sleep.     In regard to mood:  Anxious, worrying, poor concentration, trouble with decision making. Avoidance behaviors. Denies any thoughts of harming self or others.   JOANNA-7 SCORE 2/27/2017 3/14/2018 4/6/2018   Total Score 4 8 11     PHQ-9 SCORE 3/14/2018 3/14/2018 4/6/2018   Total Score 13 15 13       Vitals:    04/06/18 1131   BP: 131/75   Pulse: 60   Temp: 97.4  F (36.3  C)   TempSrc: Oral   Weight: 246 lb (111.6 kg)       Gen: alert, oriented, very pleasant, somewhat anxious      ASSESSMENT:  Perimenopausal sx, anxiety and fatigue.  Some improvement of vasomotor sx on prempro (0.3-1.5mg)    PLAN:  Increase prempro dose and add SNRI.    1. Symptomatic menopausal or female climacteric states  - estrogen, conjugated,-medroxyPROGESTERone (PREMPRO) 0.625-2.5 MG per tablet; Take 1 tablet by mouth daily  Dispense: 30 tablet; Refill: 11    2. Anxiety  - venlafaxine (EFFEXOR-XR) 37.5 MG 24 hr capsule; Take 1 capsule daily for 14 days, then take 2 capsules daily.  Dispense: 46 capsule; Refill: 0    3. Other fatigue  - Glucose  - TSH with free T4 reflex    Over 50% of this 15 min appt was spent discussing perimenopausal sx and anxiety.    Romy Min MD

## 2018-04-06 NOTE — NURSING NOTE
"Chief Complaint   Patient presents with     RECHECK       Initial /75  Pulse 60  Temp 97.4  F (36.3  C) (Oral)  Wt 246 lb (111.6 kg)  BMI 37.4 kg/m2 Estimated body mass index is 37.4 kg/(m^2) as calculated from the following:    Height as of 3/14/18: 5' 8\" (1.727 m).    Weight as of this encounter: 246 lb (111.6 kg).  BP completed using cuff size: large        The following HM Due: NONE      The following patient reported/Care Every where data was sent to:  P ABSTRACT QUALITY INITIATIVES [[id number]]  na      n/a              "

## 2018-04-06 NOTE — MR AVS SNAPSHOT
After Visit Summary   4/6/2018    Angy Haji    MRN: [id number]           Patient Information     Date Of Birth          1965        Visit Information        Provider Department      4/6/2018 11:30 AM Romy Min MD [facility]        Today's Diagnoses     Symptomatic menopausal or female climacteric states    -  1    Anxiety        Other fatigue           Follow-ups after your visit        Your next 10 appointments already scheduled     May 02, 2018  2:30 PM CDT   Follow Up with Mar Xiong RD, LD   [facility] ([facility])    [address] [place] [address], [address]  [facility] [address] [phone]            May 04, 2018 11:30 AM CDT   Office Visit with Romy Min MD   [facility] ([facility])    [address] [place] [address]  [facility] [address] [phone]           Bring a current list of meds and any records pertaining to this visit. For Physicals, please bring immunization records and any forms needing to be filled out. Please arrive 10 minutes early to complete paperwork.              Who to contact     If you have questions or need follow up information about today's clinic visit or your schedule please contact [facility] directly at [phone].  Normal or non-critical lab and imaging results will be communicated to you by MyChart, letter or phone within 4 business days after the clinic has received the results. If you do not hear from us within 7 days, please contact the clinic through MyChart or phone. If you have a critical or abnormal lab result, we will notify you by phone as soon as possible.  Submit refill requests through [employer] or call your pharmacy and they will forward the refill request to us. Please allow 3 business days for your refill to be completed.          Additional Information About Your Visit        [facility]  Information     [employer] gives you secure access to your electronic health record. If you see a primary care provider, you can also send messages to your care team and make appointments. If you have questions, please call your primary care clinic.  If you do not have a primary care provider, please call [phone] and they will assist you.        Care EveryWhere ID     This is your Care EveryWhere ID. This could be used by other organizations to access your [facility] medical records  [id number]        Your Vitals Were     Pulse Temperature BMI (Body Mass Index)             60 97.4  F (36.3  C) (Oral) 37.4 kg/m2          Blood Pressure from Last 3 Encounters:   04/06/18 131/75   03/14/18 151/75   12/06/17 118/65    Weight from Last 3 Encounters:   04/06/18 246 lb (111.6 kg)   03/14/18 246 lb (111.6 kg)   12/06/17 254 lb (115.2 kg)              We Performed the Following     Glucose     TSH with free T4 reflex          Today's Medication Changes          These changes are accurate as of 4/6/18 11:59 PM.  If you have any questions, ask your nurse or doctor.               Start taking these medicines.        Dose/Directions    venlafaxine 37.5 MG 24 hr capsule   Commonly known as:  EFFEXOR-XR   Used for:  Anxiety   Started by:  Romy Min MD        Take 1 capsule daily for 14 days, then take 2 capsules daily.   Quantity:  46 capsule   Refills:  0         These medicines have changed or have updated prescriptions.        Dose/Directions    * estrogen (conjugated)-medroxyPROGESTERone 0.3-1.5 MG per tablet   Commonly known as:  PREMPRO   This may have changed:  Another medication with the same name was added. Make sure you understand how and when to take each.   Used for:  Perimenopausal vasomotor symptoms   Changed by:  Romy Min MD        Dose:  1 tablet   Take 1 tablet by mouth daily   Quantity:  30 tablet   Refills:  1       * estrogen (conjugated)-medroxyPROGESTERone 0.625-2.5 MG per  tablet   Commonly known as:  PREMPRO   This may have changed:  You were already taking a medication with the same name, and this prescription was added. Make sure you understand how and when to take each.   Used for:  Symptomatic menopausal or female climacteric states   Changed by:  Romy Min MD        Dose:  1 tablet   Take 1 tablet by mouth daily   Quantity:  30 tablet   Refills:  11       * Notice:  This list has 2 medication(s) that are the same as other medications prescribed for you. Read the directions carefully, and ask your doctor or other care provider to review them with you.         Where to get your medicines      These medications were sent to [facility] [place], [place] - [address]  [address] [address]     Phone:  [phone]     estrogen (conjugated)-medroxyPROGESTERone 0.625-2.5 MG per tablet    venlafaxine 37.5 MG 24 hr capsule                Primary Care Provider Office Phone # Fax #    Maryjane Quintanilla [phone] [address]  [facility] [address]        Equal Access to Services     [facility] : Hadii ariana mckinnon hadasho Sopeter, waaxda luqadaha, qaybta kaalmada adelynnette, svetlana tam . So [facility] [phone].    ATENCIÓN: Si habla español, tiene a franco disposición servicios gratuitos de asistencia lingüística. [facility] [phone].    We comply with applicable federal civil rights laws and [place] laws. We do not discriminate on the basis of race, color, national origin, age, disability, sex, sexual orientation, or gender identity.            Thank you!     Thank you for choosing [facility]  for your care. Our goal is always to provide you with excellent care. Hearing back from our patients is one way we can continue to improve our services. Please take a few minutes to complete the written survey that you may receive in the mail after your visit with us. Thank  you!             Your Updated Medication List - Protect others around you: Learn how to safely use, store and throw away your medicines at www.disposemymeds.org.          This list is accurate as of 4/6/18 11:59 PM.  Always use your most recent med list.                   Brand Name Dispense Instructions for use Diagnosis    ciclopirox 0.77 % cream    LOPROX     Apply  topically 2 times daily as needed.    Eczema       * estrogen (conjugated)-medroxyPROGESTERone 0.3-1.5 MG per tablet    PREMPRO    30 tablet    Take 1 tablet by mouth daily    Perimenopausal vasomotor symptoms       * estrogen (conjugated)-medroxyPROGESTERone 0.625-2.5 MG per tablet    PREMPRO    30 tablet    Take 1 tablet by mouth daily    Symptomatic menopausal or female climacteric states       ibuprofen 800 MG tablet    ADVIL/MOTRIN    270 tablet    Take 1 tablet (800 mg) by mouth every 8 hours as needed for pain    Disorder of bursae and tendons in shoulder region       lidocaine (viscous) 2 % solution    XYLOCAINE    100 mL    SWISH AND SPIT 5ML BY MOUTH EVERY 2 HOURS AS NEEDED    Oral aphthae       loratadine 10 MG capsule      Take 10 mg by mouth daily as needed.        metroNIDAZOLE 1 % gel    METROGEL    60 g    Apply topically daily    Rosacea, Perioral dermatitis       order for DME      Use your CPAP device as directed by your provider.        order for DME     2 Units    Support/compression stockings - knee high, medium compression    Varicose veins of legs       order for DME     1 each    Please measure and distribute 1 pair of 20mmHg - 30mmHg knee high open toe compression stockings. Jobst ultrasheer or equivalent.    Varicose veins of both lower extremities with complications       triamcinolone 0.1 % cream    KENALOG    30 g    Apply topically 2 times daily    Eczema, unspecified type       venlafaxine 37.5 MG 24 hr capsule    EFFEXOR-XR    46 capsule    Take 1 capsule daily for 14 days, then take 2 capsules daily.    Anxiety        VITAMIN D (CHOLECALCIFEROL) PO      Take 2,000 Units by mouth daily    Perioral dermatitis, Rosacea       * Notice:  This list has 2 medication(s) that are the same as other medications prescribed for you. Read the directions carefully, and ask your doctor or other care provider to review them with you.

## 2018-04-07 LAB
GLUCOSE SERPL-MCNC: 81 MG/DL (ref 70–99)
TSH SERPL DL<=0.005 MIU/L-ACNC: 0.99 MU/L (ref 0.4–4)

## 2018-04-07 ASSESSMENT — ANXIETY QUESTIONNAIRES: GAD7 TOTAL SCORE: 11

## 2018-04-07 ASSESSMENT — PATIENT HEALTH QUESTIONNAIRE - PHQ9: SUM OF ALL RESPONSES TO PHQ QUESTIONS 1-9: 13

## 2018-04-10 ENCOUNTER — ALLIED HEALTH/NURSE VISIT (OUTPATIENT)
Dept: NURSING | Facility: CLINIC | Age: 53
End: 2018-04-10
Payer: COMMERCIAL

## 2018-04-10 DIAGNOSIS — Z23 NEED FOR DIPHTHERIA-TETANUS-PERTUSSIS (TDAP) VACCINE: Primary | ICD-10-CM

## 2018-04-10 PROCEDURE — 90715 TDAP VACCINE 7 YRS/> IM: CPT

## 2018-04-10 PROCEDURE — 90471 IMMUNIZATION ADMIN: CPT

## 2018-04-10 PROCEDURE — 99207 ZZC NO CHARGE NURSE ONLY: CPT

## 2018-05-02 ENCOUNTER — HOSPITAL ENCOUNTER (OUTPATIENT)
Dept: NUTRITION | Facility: CLINIC | Age: 53
Discharge: HOME | End: 2018-05-02
Attending: FAMILY MEDICINE | Admitting: FAMILY MEDICINE
Payer: COMMERCIAL

## 2018-05-02 PROCEDURE — 97803 MED NUTRITION INDIV SUBSEQ: CPT | Performed by: DIETITIAN, REGISTERED

## 2018-05-02 NOTE — PROGRESS NOTES
"NUTRITION REASSESSMENT NOTE     REASON FOR ASSESSMENT   Angy LILIAN Clarkdeann referred by Dr. Quintanilla for MNT related to obesity   Patient accompanied by self     NUTRITION HISTORY  Patient states she has been able to refocus on food choices and has reduced weight since last RD appointment. Patient having difficulty with sleep but is not choosing to eat due to lack of sleep.  Patient has been able to avoid dessert for the past 5 weeks.  Patient has eaten ice cream in the past couple of weeks but is not eating it daily.  Patient has started on hormone therapy and anitdepressant for perimenopausal symptoms.                           DIET RECALL   Breakfast: fruit, yogurt and cereal; steel cut oats with 1 T brown sugar and cinnamon  Lunch: main dish salad (greens, grain and or beans) + fruit   Dinner: lasagna; chicken enchilada; chickpea stew with orzo   Snacks: peanut butter and banana, premium ice cream or regular ice cream, banana, [employer] dark chocolates  Beverages: mainly water, coffee, mineral water, alcohol 1 time per week at most  Dining out: varies   Ex[facility]: [facility]  Patient walking and cycling.     ANTHROPOMETRICS  Height: 5'8\" (note height change in chart previously 5'9\")  Weight: 244 lbs per patient  BMI (kg/m2): 37 kg/m2   %IBW: 174%  Weight History: Note error on weight taken at clinic appointment on 10/2/17.  Patient's weight was entered in lbs not kg.  Wt Readings from Last 5 Encounters:   04/06/18 111.6 kg (246 lb)   03/14/18 111.6 kg (246 lb)   12/06/17 115.2 kg (254 lb)   10/02/17 67.4 kg (148 lb 8 oz)   02/27/17 117.7 kg (259 lb 8 oz)     ASSESSED NUTRITION NEEDS  Estimated Energy Needs: 9082-3782 kcals (15-20 kcals/kg) for gradual weight loss   Estimated Protein Needs: 66-79 (1-1.2 gm/kg IBW) for repletion with exercise  RMR: 1440    EVALUATION/PROGRESS TOWARDS GOALS   Previous goals:  Aim for 25 grams fiber per day-not met   Exercise 3 days per week and " increase duration to 30 minute sessions-met  Walk 1 time per week for 10 minutes at work-met     Previous Nutrition Diagnosis: Obesity related to excess energy intake as evidenced by BMI of 36.7 kg/m2-no change    Current Nutrition Diagnosis:Obesity related to excess energy intake as evidenced by BMI of 37 kg/m2    INTERVENTIONS   Nutrition Prescription   Recommend exercise 3-5 times per week; track intake if eating mindlessly; increase fiber to 25-30 grams per day     Implementation:   Meals and Snacks: Continue eating 3 meals + snacks as hungry   Nutrition Education (Content):    a)  Discussed progress towards goals.  Reviewed current intake.  Congratulated patient on recent weight loss and on increasing exercise regimen.  Encouraged patient to increase movement for weight loss and improved sleep.  Patient receptive to suggestion.  Supported patient in her struggle with weight loss and emotional eating.  Nutrition Education (Application):    a) Determined ways to be mindful with dessert choices [place].     b) Patient verbalized understanding of diet by stating will limit desserts to 1 time per day.                 Anticipate fair-good compliance    CURRENT GOALS (Patient determined)  Exercise 5 days per week for 30 minutes   Limit desserts to 1 time per day when on vacation      FOLLOW UP/MONITORING    Patient to follow up in 12 weeks  Patient has RD contact information     Time spent with patient: 30 minutes    Mtatie Amaya, RD, LD  [facility] Outpatient Dietitian  [phone] (office phone)

## 2018-05-04 ENCOUNTER — OFFICE VISIT (OUTPATIENT)
Dept: OBGYN | Facility: CLINIC | Age: 53
End: 2018-05-04
Payer: COMMERCIAL

## 2018-05-04 VITALS
BODY MASS INDEX: 36.64 KG/M2 | SYSTOLIC BLOOD PRESSURE: 146 MMHG | DIASTOLIC BLOOD PRESSURE: 76 MMHG | HEART RATE: 63 BPM | WEIGHT: 241 LBS

## 2018-05-04 DIAGNOSIS — N95.1 PERIMENOPAUSAL VASOMOTOR SYMPTOMS: Primary | ICD-10-CM

## 2018-05-04 DIAGNOSIS — F41.9 ANXIETY: ICD-10-CM

## 2018-05-04 PROCEDURE — 99214 OFFICE O/P EST MOD 30 MIN: CPT | Performed by: OBSTETRICS & GYNECOLOGY

## 2018-05-04 RX ORDER — SERTRALINE HYDROCHLORIDE 100 MG/1
100 TABLET, FILM COATED ORAL DAILY
Qty: 90 TABLET | Refills: 3 | Status: SHIPPED | OUTPATIENT
Start: 2018-05-04 | End: 2018-10-25

## 2018-05-04 ASSESSMENT — ANXIETY QUESTIONNAIRES
7. FEELING AFRAID AS IF SOMETHING AWFUL MIGHT HAPPEN: SEVERAL DAYS
3. WORRYING TOO MUCH ABOUT DIFFERENT THINGS: MORE THAN HALF THE DAYS
GAD7 TOTAL SCORE: 9
5. BEING SO RESTLESS THAT IT IS HARD TO SIT STILL: NOT AT ALL
6. BECOMING EASILY ANNOYED OR IRRITABLE: NOT AT ALL
2. NOT BEING ABLE TO STOP OR CONTROL WORRYING: MORE THAN HALF THE DAYS
1. FEELING NERVOUS, ANXIOUS, OR ON EDGE: MORE THAN HALF THE DAYS

## 2018-05-04 ASSESSMENT — PATIENT HEALTH QUESTIONNAIRE - PHQ9: 5. POOR APPETITE OR OVEREATING: MORE THAN HALF THE DAYS

## 2018-05-04 NOTE — NURSING NOTE
"Chief Complaint   Patient presents with     Menopausal Sx       Initial /76  Pulse 63  Wt 241 lb (109.3 kg)  BMI 36.64 kg/m2 Estimated body mass index is 36.64 kg/(m^2) as calculated from the following:    Height as of 3/14/18: 5' 8\" (1.727 m).    Weight as of this encounter: 241 lb (109.3 kg).  BP completed using cuff size: large        The following HM Due: NONE      The following patient reported/Care Every where data was sent to:  P ABSTRACT QUALITY INITIATIVES [01475]  na      n/a              "

## 2018-05-04 NOTE — PROGRESS NOTES
18  CC: mood and hot flush follow up    HPI:  Angy Haji is a 53 year old perimenopausal female who presents in follow up of hot flushes and anxiety. Initiated HRT (prempro 0.3-1.5)  for hot flushes and other perimenopausal sx 3/14, increased dose to 0.625-2.5 on .     Hot flushes improved. Fewer and less intense. 2-3 per day, less hot, not sweating.   Prior LMP January. Had a period in April.     Sleep improved but only 50% of the time. Every other day is better.     Still feeling anxious, maybe slight improvement. Started sertraline 50mg on 4/10. No thoughts of harming self or others. Has trouble with decision making, feels overwhelmed easily. Going on a vacation with wife to [place] at the end of May, wants to feel better so she can enjoy her trip.     Constipation. Every 2-3 days hard stool.   JOANNA-7 SCORE 3/14/2018 2018 2018   Total Score 8 11 9     PHQ-9 SCORE 3/14/2018 2018 2018   Total Score 15 13 8     Vitals:    18 1133   BP: 146/76   Pulse: 63   Weight: 241 lb (109.3 kg)     Gen: alert, oriented, very pleasant      53 year old  perimenopausal female with vasomotor symptoms and anxiety.     1. Perimenopausal vasomotor symptoms  Continue current dose as hot flushes are improved.  - estrogen, conjugated,-medroxyPROGESTERone (PREMPRO) 0.625-2.5 MG per tablet; Take 1 tablet by mouth daily  Dispense: 90 tablet; Refill: 3    2. Anxiety  Plan to increase dose of sertraline to 100mg. Encouraged patient to make some behavior changes as well, consider therapy. 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 Discussed sleep hygeine, sleep aids, referral to sleep clinic. Will have patient check back in a few weeks.   - sertraline (ZOLOFT) 100 MG tablet; Take 1 tablet (100 mg) by mouth daily  Dispense: 90 tablet; Refill: 3    Over 50% of this 30 min appointment spent discussing vasomotor symptoms, anxiety and medications.    Romy Min MD

## 2018-05-04 NOTE — MR AVS SNAPSHOT
After Visit Summary   5/4/2018    Angy Haji    MRN: [id number]           Patient Information     Date Of Birth          1965        Visit Information        Provider Department      5/4/2018 11:30 AM Romy Min MD [facility]        Today's Diagnoses     Perimenopausal vasomotor symptoms    -  1    Anxiety           Follow-ups after your visit        Your next 10 appointments already scheduled     May 23, 2018 11:30 AM CDT   Office Visit with Romy Min MD   [facility] ([facility]    [address] [place] [address] [phone]           Bring a current list of meds and any records pertaining to this visit. For Physicals, please bring immunization records and any forms needing to be filled out. Please arrive 10 minutes early to complete paperwork.            Jul 25, 2018  2:30 PM CDT   Follow Up with Mar Xiong RD, LD   [facility] ([facility])    [address] [place], [place] [address], [address]  [facility] [address] [phone]              Who to contact     If you have questions or need follow up information about today's clinic visit or your schedule please contact [facility] directly at [phone].  Normal or non-critical lab and imaging results will be communicated to you by MyChart, letter or phone within 4 business days after the clinic has received the results. If you do not hear from us within 7 days, please contact the clinic through MyChart or phone. If you have a critical or abnormal lab result, we will notify you by phone as soon as possible.  Submit refill requests through [employer] or call your pharmacy and they will forward the refill request to us. Please allow 3 business days for your refill to be completed.          Additional Information About Your Visit        [employer] Information     [employer] gives you  secure access to your electronic health record. If you see a primary care provider, you can also send messages to your care team and make appointments. If you have questions, please call your primary care clinic.  If you do not have a primary care provider, please call [phone] and they will assist you.        Care EveryWhere ID     This is your Care EveryWhere ID. This could be used by other organizations to access your [facility] medical records  [id number]        Your Vitals Were     Pulse BMI (Body Mass Index)                63 36.64 kg/m2           Blood Pressure from Last 3 Encounters:   05/04/18 146/76   04/06/18 131/75   03/14/18 151/75    Weight from Last 3 Encounters:   05/04/18 241 lb (109.3 kg)   04/06/18 246 lb (111.6 kg)   03/14/18 246 lb (111.6 kg)              Today, you had the following     No orders found for display         Today's Medication Changes          These changes are accurate as of 5/4/18 11:59 PM.  If you have any questions, ask your nurse or doctor.               These medicines have changed or have updated prescriptions.        Dose/Directions    * estrogen (conjugated)-medroxyPROGESTERone 0.3-1.5 MG per tablet   Commonly known as:  PREMPRO   This may have changed:  Another medication with the same name was added. Make sure you understand how and when to take each.   Used for:  Perimenopausal vasomotor symptoms   Changed by:  Romy Min MD        Dose:  1 tablet   Take 1 tablet by mouth daily   Quantity:  30 tablet   Refills:  1       * estrogen (conjugated)-medroxyPROGESTERone 0.625-2.5 MG per tablet   Commonly known as:  PREMPRO   This may have changed:  Another medication with the same name was added. Make sure you understand how and when to take each.   Used for:  Symptomatic menopausal or female climacteric states   Changed by:  Romy Min MD        Dose:  1 tablet   Take 1 tablet by mouth daily   Quantity:  30 tablet   Refills:  11       *  estrogen (conjugated)-medroxyPROGESTERone 0.625-2.5 MG per tablet   Commonly known as:  PREMPRO   This may have changed:  You were already taking a medication with the same name, and this prescription was added. Make sure you understand how and when to take each.   Used for:  Perimenopausal vasomotor symptoms   Changed by:  Romy Min MD        Dose:  1 tablet   Take 1 tablet by mouth daily   Quantity:  90 tablet   Refills:  3       * sertraline 50 MG tablet   Commonly known as:  ZOLOFT   This may have changed:  Another medication with the same name was added. Make sure you understand how and when to take each.   Used for:  Anxiety   Changed by:  Romy Min MD        Take 1/2 tablet (25 mg) for 1-2 weeks, then increase to 1 tablet orally daily   Quantity:  30 tablet   Refills:  0       * sertraline 100 MG tablet   Commonly known as:  ZOLOFT   This may have changed:  You were already taking a medication with the same name, and this prescription was added. Make sure you understand how and when to take each.   Used for:  Anxiety   Changed by:  Romy Min MD        Dose:  100 mg   Take 1 tablet (100 mg) by mouth daily   Quantity:  90 tablet   Refills:  3       * Notice:  This list has 5 medication(s) that are the same as other medications prescribed for you. Read the directions carefully, and ask your doctor or other care provider to review them with you.         Where to get your medicines      These medications were sent to [facility] [place], [place] - [address]  [address] [address]     Phone:  [phone]     estrogen (conjugated)-medroxyPROGESTERone 0.625-2.5 MG per tablet    sertraline 100 MG tablet                Primary Care Provider Office Phone # Fax #    Maryjane Quintanilla [phone] [address]  [facility] [address]        Equal Access to Services     LUPE NOGUEIRA AH: Rsus jasso  Bernard, attilada lusondraadaha, qasissyta kacarrol dalal, svetlana migdaliain hayaan braydenshaan jeffradha laMandeepaki avi. So [facility] [phone].    ATENCIÓN: Si ashley santos, tiene a franco disposición servicios gratuitos de asistencia lingüística. Haley al [phone].    We comply with applicable federal civil rights laws and [place] laws. We do not discriminate on the basis of race, color, national origin, age, disability, sex, sexual orientation, or gender identity.            Thank you!     Thank you for choosing [facility]  for your care. Our goal is always to provide you with excellent care. Hearing back from our patients is one way we can continue to improve our services. Please take a few minutes to complete the written survey that you may receive in the mail after your visit with us. Thank you!             Your Updated Medication List - Protect others around you: Learn how to safely use, store and throw away your medicines at www.disposemymeds.org.          This list is accurate as of 5/4/18 11:59 PM.  Always use your most recent med list.                   Brand Name Dispense Instructions for use Diagnosis    ciclopirox 0.77 % cream    LOPROX     Apply  topically 2 times daily as needed.    Eczema       * estrogen (conjugated)-medroxyPROGESTERone 0.3-1.5 MG per tablet    PREMPRO    30 tablet    Take 1 tablet by mouth daily    Perimenopausal vasomotor symptoms       * estrogen (conjugated)-medroxyPROGESTERone 0.625-2.5 MG per tablet    PREMPRO    30 tablet    Take 1 tablet by mouth daily    Symptomatic menopausal or female climacteric states       * estrogen (conjugated)-medroxyPROGESTERone 0.625-2.5 MG per tablet    PREMPRO    90 tablet    Take 1 tablet by mouth daily    Perimenopausal vasomotor symptoms       ibuprofen 800 MG tablet    ADVIL/MOTRIN    270 tablet    Take 1 tablet (800 mg) by mouth every 8 hours as needed for pain    Disorder of bursae and tendons in shoulder region       lidocaine (viscous) 2 %  solution    XYLOCAINE    100 mL    SWISH AND SPIT 5ML BY MOUTH EVERY 2 HOURS AS NEEDED    Oral aphthae       loratadine 10 MG capsule      Take 10 mg by mouth daily as needed.        metroNIDAZOLE 1 % gel    METROGEL    60 g    Apply topically daily    Rosacea, Perioral dermatitis       order for DME      Use your CPAP device as directed by your provider.        order for DME     2 Units    Support/compression stockings - knee high, medium compression    Varicose veins of legs       order for DME     1 each    Please measure and distribute 1 pair of 20mmHg - 30mmHg knee high open toe compression stockings. Jobst ultrasheer or equivalent.    Varicose veins of both lower extremities with complications       * sertraline 50 MG tablet    ZOLOFT    30 tablet    Take 1/2 tablet (25 mg) for 1-2 weeks, then increase to 1 tablet orally daily    Anxiety       * sertraline 100 MG tablet    ZOLOFT    90 tablet    Take 1 tablet (100 mg) by mouth daily    Anxiety       triamcinolone 0.1 % cream    KENALOG    30 g    Apply topically 2 times daily    Eczema, unspecified type       venlafaxine 37.5 MG 24 hr capsule    EFFEXOR-XR    46 capsule    Take 1 capsule daily for 14 days, then take 2 capsules daily.    Anxiety       VITAMIN D (CHOLECALCIFEROL) PO      Take 2,000 Units by mouth daily    Perioral dermatitis, Rosacea       * Notice:  This list has 5 medication(s) that are the same as other medications prescribed for you. Read the directions carefully, and ask your doctor or other care provider to review them with you.

## 2018-05-05 ASSESSMENT — ANXIETY QUESTIONNAIRES: GAD7 TOTAL SCORE: 9

## 2018-05-05 ASSESSMENT — PATIENT HEALTH QUESTIONNAIRE - PHQ9: SUM OF ALL RESPONSES TO PHQ QUESTIONS 1-9: 8

## 2018-06-08 NOTE — PROGRESS NOTES
SUBJECTIVE:   Angy Haji is a 53 year old female who presents to clinic today for the following health issues:      Musculoskeletal problem/pain      Duration: 2 weeks    Description  Location:  Both leg; foot, ankle and calfs. Worst on right leg    Intensity:  severe    Accompanying signs and symptoms: warmth, swelling and redness    History  Previous similar problem: no   Previous evaluation:   Saw [facility] doctor when pt was over sea; rule it was infection and was prescribe Cipro 500mg      Precipitating or alleviating factors:  Trauma or overuse: no   Aggravating factors include:  The swelling increase as the day goes by and with rest the swelling does down    Therapies tried and outcome: massage and physical therapy and  Cipro       Leg swelling started 2 weeks ago several days after plane ride to [place] 6/1.  Both legs were swollen, right was worse.    6/2 started doing lymph massage, has not noticed huge improvement.    6/4 developed red splotches to right leg in addition to swelling, some more mild splotches to left leg.  Redness extended about senior care up lower leg and was most significant in band around ankle.  6/5 right ankle was warm to touch, was seen by physician at [facility], diagnosed with infection, started on 6d cipro 500mg twice a day (last dose this morning).  It is significantly less red    Today redness significantly improved since starting cipro.  Just got back from [place] last night, worsening swelling after the flight again to bilateral legs.  some leg aching at the end of the day to ankle area, no distinct leg pain, no calf pain.  No fever, chills, or body aches.  No chest pain or shortness of breath.  Swelling was better this morning after elevating legs at night.  Worse now at the end of the day.      Does have compression stockings for varicose veins, has not had swelling in the past.      No personal history of blood clot.  Was perimenopausal, late shalini had significant anxiety, depression,  and terrible hot flashes interrupting her sleep, she was started on prempro by MANISHA valencia.      Patient Active Problem List   Diagnosis     Oral aphthae     Obesity (BMI 30-39.9)     Disorder of bursae and tendons in shoulder region     CARDIOVASCULAR SCREENING; LDL GOAL LESS THAN 160     Rosacea     Tear of medial cartilage or meniscus of knee, current     Raynaud disease     Vitamin D deficiency disease     Right elbow pain     Sprain of foot     Mild sleep apnea     Atopic rhinitis     Chronic bilateral low back pain with right-sided sciatica     Past Surgical History:   Procedure Laterality Date     HC TOOTH EXTRACTION W/FORCEP  1984/85    [place] [facility]     LAPAROSCOPIC CHOLECYSTECTOMY  7/29/2013    Procedure: LAPAROSCOPIC CHOLECYSTECTOMY;  Laparoscopic Cholecystectomy;  Surgeon: Fabio Johnson MD;  Location: [facility]     LAPAROSCOPIC OOPHORECTOMY Left 6/23/2016    Procedure: LAPAROSCOPIC OOPHORECTOMY;  Surgeon: Kayley Donnelly MD;  [facility]     LAPAROSCOPIC SALPINGECTOMY Bilateral 6/23/2016    Procedure: LAPAROSCOPIC SALPINGECTOMY;  Surgeon: Kayley Donnelly MD;  Location[facility]       Social History   Substance Use Topics     Smoking status: Never Smoker     Smokeless tobacco: Never Used      Comment: non smoke home     Alcohol use Yes      Comment: 2 Drinks Per Month     Family History   Problem Relation Age of Onset     Cardiovascular Paternal Grandfather      anyerism     Obesity Paternal Grandfather      Respiratory Paternal Grandfather      pulmonary (air sacs hardening)     Allergies Father      hayfever     Genitourinary Problems Father      BPH     Lipids Father      diet therapy     Gallbladder Disease Father      cholecystectomy      Eye Disorder Father      retinal tear x 2      Alzheimer Disease Maternal Grandmother      ? poor STM     Arthritis Maternal Grandmother      HEART DISEASE Maternal Grandmother      Arrythmia-got a pacemaker at age 89     Arthritis Mother       Eye Disorder Mother      [place] /macular tear in one eye     CANCER Paternal Grandmother      Uterine CA- as well as her sister     Gynecology Paternal Grandmother      prolapsed uterus- had 10 kids     Obesity Paternal Grandmother      Breast Cancer Maternal Aunt      Other Cancer Paternal Aunt 80     endo. cancer          Current Outpatient Prescriptions   Medication Sig Dispense Refill     ciclopirox (LOPROX) 0.77 % cream Apply  topically 2 times daily as needed.       estrogen, conjugated,-medroxyPROGESTERone (PREMPRO) 0.625-2.5 MG per tablet Take 1 tablet by mouth daily 90 tablet 3     estrogen, conjugated,-medroxyPROGESTERone (PREMPRO) 0.625-2.5 MG per tablet Take 1 tablet by mouth daily 30 tablet 11     ibuprofen (ADVIL,MOTRIN) 800 MG tablet Take 1 tablet (800 mg) by mouth every 8 hours as needed for pain 270 tablet 0     lidocaine, viscous, (XYLOCAINE) 2 % solution SWISH AND SPIT 5ML BY MOUTH EVERY 2 HOURS AS NEEDED 100 mL 1     Loratadine 10 MG capsule Take 10 mg by mouth daily as needed.       metroNIDAZOLE (METROGEL) 1 % gel Apply topically daily 60 g 9     order for DME Please measure and distribute 1 pair of 20mmHg - 30mmHg knee high open toe compression stockings. Jobst ultrasheer or equivalent. 1 each 1     ORDER FOR DME Use your CPAP device as directed by your provider.       ORDER FOR DME Support/compression stockings - knee high, medium compression   2 Units 11     sertraline (ZOLOFT) 100 MG tablet Take 1 tablet (100 mg) by mouth daily 90 tablet 3     triamcinolone (KENALOG) 0.1 % cream Apply topically 2 times daily 30 g 1     VITAMIN D, CHOLECALCIFEROL, PO Take 2,000 Units by mouth daily       venlafaxine (EFFEXOR-XR) 37.5 MG 24 hr capsule Take 1 capsule daily for 14 days, then take 2 capsules daily. (Patient not taking: Reported on 6/11/2018) 46 capsule 0     [DISCONTINUED] estrogen, conjugated,-medroxyPROGESTERone (PREMPRO) 0.3-1.5 MG per tablet Take 1 tablet by mouth daily 30 tablet 1      [DISCONTINUED] sertraline (ZOLOFT) 50 MG tablet Take 1/2 tablet (25 mg) for 1-2 weeks, then increase to 1 tablet orally daily 30 tablet 0       ROS:  CV, Resp, MSK,  as above, otherwise negative       OBJECTIVE:                                                    /68 (BP Location: Right arm, Patient Position: Chair, Cuff Size: Adult Large)  Pulse 54  Temp 98.7  F (37.1  C) (Oral)  Resp 18  Wt 251 lb 8 oz (114.1 kg)  SpO2 100%  BMI 38.24 kg/m2   GENERAL APPEARANCE: healthy, alert and no distress  EYES: Eyes grossly normal to inspection and conjunctivae and sclerae normal  RESP: respirations nonlabored   MS: pitting edema worse at ankle level and extending half way up bilateral lower legs.  Slight erythema noted to right ankle. Not warm to touch.  Edema is +1 bilaterally.  No calf redness or swelling.  Pedal pulses +2 bilaterally, feet are pink and warm to touch.    PSYCH: mentation appears normal and affect normal/bright        ASSESSMENT/PLAN:                                                    (R60.9) Peripheral edema  (primary encounter diagnosis)  Comment: bilateral presentation not consistent with DVT (though recent travel and is on estrogen) and no calf pain/redness.  No significant erythema, constitutional symptoms, or warmth to suggest cellulitis.  Pedal pulses rule out PAD.  Suspect peripheral edema with hx varicose veins, exacerbated by increase walking on trip and flight with change in atmospheric pressure.    Plan: reassured regaridng above differential.  recommend compression, rest, elevation above heart level, and limiting salt.  If not improving in 1-2 weeks may need to see lymphedema.  Follow up if fever, increased redness, swelling, or more unilateral appearance.      (R03.0) Elevated blood pressure reading without diagnosis of hypertension  Comment:   Plan: follow up with PCP        See Patient Instructions    Mikayla Myers, CNP  [facility]    Patient Instructions   1.  I  think this is peripheral edema which was probably triggered by the pressure changes from the plane ride and increased walking on vacation.  I recommend compression with stockings or ace bandage, elevating legs at night and throughout the day above heart level, limiting salt in diet.  Follow up if not improving on 1-2 weeks, might need to see lymphedema.  I am not seeing symptoms of blood clot or infection.

## 2018-06-11 ENCOUNTER — OFFICE VISIT (OUTPATIENT)
Dept: FAMILY MEDICINE | Facility: CLINIC | Age: 53
End: 2018-06-11
Payer: COMMERCIAL

## 2018-06-11 VITALS
TEMPERATURE: 98.7 F | HEART RATE: 54 BPM | DIASTOLIC BLOOD PRESSURE: 68 MMHG | OXYGEN SATURATION: 100 % | BODY MASS INDEX: 38.24 KG/M2 | SYSTOLIC BLOOD PRESSURE: 140 MMHG | WEIGHT: 251.5 LBS | RESPIRATION RATE: 18 BRPM

## 2018-06-11 DIAGNOSIS — R03.0 ELEVATED BLOOD PRESSURE READING WITHOUT DIAGNOSIS OF HYPERTENSION: ICD-10-CM

## 2018-06-11 DIAGNOSIS — R60.0 PERIPHERAL EDEMA: Primary | ICD-10-CM

## 2018-06-11 PROCEDURE — 99214 OFFICE O/P EST MOD 30 MIN: CPT | Performed by: NURSE PRACTITIONER

## 2018-06-11 NOTE — MR AVS SNAPSHOT
After Visit Summary   6/11/2018    Angy Haji    MRN: [id number]           Patient Information     Date Of Birth          1965        Visit Information        Provider Department      6/11/2018 6:20 PM Mikayla Myers APRN CNP [facility]        Today's Diagnoses     Peripheral edema    -  1      Care Instructions    1.  I think this is peripheral edema which was probably triggered by the pressure changes from the plane ride and increased walking on vacation.  I recommend compression with stockings or ace bandage, elevating legs at night and throughout the day above heart level, limiting salt in diet.  Follow up if not improving on 1-2 weeks, might need to see lymphedema.  I am not seeing symptoms of blood clot or infection.            Follow-ups after your visit        Your next 10 appointments already scheduled     Jun 22, 2018 11:30 AM CDT   Office Visit with Romy Min MD   [facility] ([facility]    [address] [place] [address] [phone]           Bring a current list of meds and any records pertaining to this visit. For Physicals, please bring immunization records and any forms needing to be filled out. Please arrive 10 minutes early to complete paperwork.            Jul 25, 2018  2:30 PM CDT   Follow Up with Mar Xiong RD, LD   [facility] ([facility])    [address] [place], [place] [address]jennie, [address] [address] [phone]              Who to contact     If you have questions or need follow up information about today's clinic visit or your schedule please contact [facility] directly at [phone].  Normal or non-critical lab and imaging results will be communicated to you by MyChart, letter or phone within 4 business days after the clinic has received the results. If you do not hear from us within 7 days,  please contact the clinic through [employer] or phone. If you have a critical or abnormal lab result, we will notify you by phone as soon as possible.  Submit refill requests through [employer] or call your pharmacy and they will forward the refill request to us. Please allow 3 business days for your refill to be completed.          Additional Information About Your Visit        [employer] Information     [employer] gives you secure access to your electronic health record. If you see a primary care provider, you can also send messages to your care team and make appointments. If you have questions, please call your primary care clinic.  If you do not have a primary care provider, please call [phone] and they will assist you.        Care EveryWhere ID     This is your Care EveryWhere ID. This could be used by other organizations to access your [place] medical records  [id number]        Your Vitals Were     Pulse Temperature Respirations Pulse Oximetry BMI (Body Mass Index)       59 98.7  F (37.1  C) (Oral) 18 100% 38.24 kg/m2        Blood Pressure from Last 3 Encounters:   06/11/18 141/79   05/04/18 146/76   04/06/18 131/75    Weight from Last 3 Encounters:   06/11/18 251 lb 8 oz (114.1 kg)   05/04/18 241 lb (109.3 kg)   04/06/18 246 lb (111.6 kg)              Today, you had the following     No orders found for display       Primary Care Provider Office Phone # Fax #    Maryjane Quintanilla [phone]       [address]  [facility] [address]        Equal Access to Services     LUPE NOGUERIA : Hadii aad ku hadasho Soomaali, waaxda luqadaha, qaybta kaalmada adeegyada, svetlana cárdenas. So [facility] [phone].    ATENCIÓN: Si habla español, tiene a franco disposición servicios gratuitos de asistencia lingüística. Llame al [phone].    We comply with applicable federal civil rights laws and [place] laws. We do not discriminate on the basis of race, color, national origin, age,  disability, sex, sexual orientation, or gender identity.            Thank you!     Thank you for choosing [facility]  for your care. Our goal is always to provide you with excellent care. Hearing back from our patients is one way we can continue to improve our services. Please take a few minutes to complete the written survey that you may receive in the mail after your visit with us. Thank you!             Your Updated Medication List - Protect others around you: Learn how to safely use, store and throw away your medicines at www.disposemymeds.org.          This list is accurate as of 6/11/18  6:52 PM.  Always use your most recent med list.                   Brand Name Dispense Instructions for use Diagnosis    ciclopirox 0.77 % cream    LOPROX     Apply  topically 2 times daily as needed.    Eczema       * estrogen (conjugated)-medroxyPROGESTERone 0.625-2.5 MG per tablet    PREMPRO    30 tablet    Take 1 tablet by mouth daily    Symptomatic menopausal or female climacteric states       * estrogen (conjugated)-medroxyPROGESTERone 0.625-2.5 MG per tablet    PREMPRO    90 tablet    Take 1 tablet by mouth daily    Perimenopausal vasomotor symptoms       ibuprofen 800 MG tablet    ADVIL/MOTRIN    270 tablet    Take 1 tablet (800 mg) by mouth every 8 hours as needed for pain    Disorder of bursae and tendons in shoulder region       lidocaine (viscous) 2 % solution    XYLOCAINE    100 mL    SWISH AND SPIT 5ML BY MOUTH EVERY 2 HOURS AS NEEDED    Oral aphthae       loratadine 10 MG capsule      Take 10 mg by mouth daily as needed.        metroNIDAZOLE 1 % gel    METROGEL    60 g    Apply topically daily    Rosacea, Perioral dermatitis       order for DME      Use your CPAP device as directed by your provider.        order for DME     2 Units    Support/compression stockings - knee high, medium compression    Varicose veins of legs       order for DME     1 each    Please measure and distribute 1 pair of  20mmHg - 30mmHg knee high open toe compression stockings. Jobst ultrasheer or equivalent.    Varicose veins of both lower extremities with complications       sertraline 100 MG tablet    ZOLOFT    90 tablet    Take 1 tablet (100 mg) by mouth daily    Anxiety       triamcinolone 0.1 % cream    KENALOG    30 g    Apply topically 2 times daily    Eczema, unspecified type       venlafaxine 37.5 MG 24 hr capsule    EFFEXOR-XR    46 capsule    Take 1 capsule daily for 14 days, then take 2 capsules daily.    Anxiety       VITAMIN D (CHOLECALCIFEROL) PO      Take 2,000 Units by mouth daily    Perioral dermatitis, Rosacea       * Notice:  This list has 2 medication(s) that are the same as other medications prescribed for you. Read the directions carefully, and ask your doctor or other care provider to review them with you.

## 2018-07-11 ENCOUNTER — OFFICE VISIT (OUTPATIENT)
Dept: OBGYN | Facility: CLINIC | Age: 53
End: 2018-07-11
Payer: COMMERCIAL

## 2018-07-11 VITALS
DIASTOLIC BLOOD PRESSURE: 90 MMHG | SYSTOLIC BLOOD PRESSURE: 130 MMHG | WEIGHT: 242 LBS | TEMPERATURE: 97 F | BODY MASS INDEX: 36.8 KG/M2 | HEART RATE: 59 BPM

## 2018-07-11 DIAGNOSIS — N95.1 PERIMENOPAUSAL VASOMOTOR SYMPTOMS: Primary | ICD-10-CM

## 2018-07-11 DIAGNOSIS — F41.9 ANXIETY: ICD-10-CM

## 2018-07-11 PROCEDURE — 99214 OFFICE O/P EST MOD 30 MIN: CPT | Performed by: OBSTETRICS & GYNECOLOGY

## 2018-07-11 ASSESSMENT — ANXIETY QUESTIONNAIRES
7. FEELING AFRAID AS IF SOMETHING AWFUL MIGHT HAPPEN: NOT AT ALL
GAD7 TOTAL SCORE: 1
1. FEELING NERVOUS, ANXIOUS, OR ON EDGE: NOT AT ALL
6. BECOMING EASILY ANNOYED OR IRRITABLE: NOT AT ALL
5. BEING SO RESTLESS THAT IT IS HARD TO SIT STILL: NOT AT ALL
IF YOU CHECKED OFF ANY PROBLEMS ON THIS QUESTIONNAIRE, HOW DIFFICULT HAVE THESE PROBLEMS MADE IT FOR YOU TO DO YOUR WORK, TAKE CARE OF THINGS AT HOME, OR GET ALONG WITH OTHER PEOPLE: NOT DIFFICULT AT ALL
2. NOT BEING ABLE TO STOP OR CONTROL WORRYING: NOT AT ALL
3. WORRYING TOO MUCH ABOUT DIFFERENT THINGS: SEVERAL DAYS

## 2018-07-11 ASSESSMENT — PATIENT HEALTH QUESTIONNAIRE - PHQ9: 5. POOR APPETITE OR OVEREATING: NOT AT ALL

## 2018-07-11 NOTE — NURSING NOTE
"Chief Complaint   Patient presents with     MOOD CHANGES       Initial /81  Pulse 59  Temp 97  F (36.1  C) (Oral)  Wt 242 lb (109.8 kg)  BMI 36.8 kg/m2 Estimated body mass index is 36.8 kg/(m^2) as calculated from the following:    Height as of 3/14/18: 5' 8\" (1.727 m).    Weight as of this encounter: 242 lb (109.8 kg).  BP completed using cuff size: large        The following HM Due: NONE      The following patient reported/Care Every where data was sent to:  P ABSTRACT QUALITY INITIATIVES [92451]  na      n/a              "

## 2018-07-11 NOTE — MR AVS SNAPSHOT
After Visit Summary   7/11/2018    Angy Haji    MRN: [id number]           Patient Information     Date Of Birth          1965        Visit Information        Provider Department      7/11/2018 11:30 AM Romy Min MD [facility]        Today's Diagnoses     Perimenopausal vasomotor symptoms    -  1    Anxiety           Follow-ups after your visit        Your next 10 appointments already scheduled     Jul 25, 2018  2:30 PM CDT   Follow Up with Mar Xiong RD, LD   [facility] ([facility])    [address], [address]  [facility] [address] [phone]              Who to contact     If you have questions or need follow up information about today's clinic visit or your schedule please contact [facility] directly at [phone].  Normal or non-critical lab and imaging results will be communicated to you by MyChart, letter or phone within 4 business days after the clinic has received the results. If you do not hear from us within 7 days, please contact the clinic through MyChart or phone. If you have a critical or abnormal lab result, we will notify you by phone as soon as possible.  Submit refill requests through [employer] or call your pharmacy and they will forward the refill request to us. Please allow 3 business days for your refill to be completed.          Additional Information About Your Visit        MyChart Information     [employer] gives you secure access to your electronic health record. If you see a primary care provider, you can also send messages to your care team and make appointments. If you have questions, please call your primary care clinic.  If you do not have a primary care provider, please call [phone] and they will assist you.        Care EveryWhere ID     This is your Care EveryWhere ID. This could be used by other organizations to access your [facility]  records  [id number]        Your Vitals Were     Pulse Temperature Last Period BMI (Body Mass Index)          59 97  F (36.1  C) (Oral) 06/11/2018 36.8 kg/m2         Blood Pressure from Last 3 Encounters:   07/11/18 130/90   06/11/18 140/68   05/04/18 146/76    Weight from Last 3 Encounters:   07/11/18 242 lb (109.8 kg)   06/11/18 251 lb 8 oz (114.1 kg)   05/04/18 241 lb (109.3 kg)              Today, you had the following     No orders found for display       Primary Care Provider Office Phone # Fax #    Maryjane Quintanilla [phone] [address]  [facility] [address]        Equal Access to Services     LUPE NOGUEIRA : Russ Wagner, wakayla luqadaha, qaybta kaalmada adelynnette, svetlana tam . So [facility] [phone].    ATENCIÓN: Si habla español, tiene a franco disposición servicios gratuitos de asistencia lingüística. Llame al [phone].    We comply with applicable federal civil rights laws and [place] laws. We do not discriminate on the basis of race, color, national origin, age, disability, sex, sexual orientation, or gender identity.            Thank you!     Thank you for choosing [facility]  for your care. Our goal is always to provide you with excellent care. Hearing back from our patients is one way we can continue to improve our services. Please take a few minutes to complete the written survey that you may receive in the mail after your visit with us. Thank you!             Your Updated Medication List - Protect others around you: Learn how to safely use, store and throw away your medicines at www.disposemymeds.org.          This list is accurate as of 7/11/18 11:59 PM.  Always use your most recent med list.                   Brand Name Dispense Instructions for use Diagnosis    ciclopirox 0.77 % cream    LOPROX     Apply  topically 2 times daily as needed.    Eczema       * estrogen (conjugated)-medroxyPROGESTERone  0.625-2.5 MG per tablet    PREMPRO    30 tablet    Take 1 tablet by mouth daily    Symptomatic menopausal or female climacteric states       * estrogen (conjugated)-medroxyPROGESTERone 0.625-2.5 MG per tablet    PREMPRO    90 tablet    Take 1 tablet by mouth daily    Perimenopausal vasomotor symptoms       ibuprofen 800 MG tablet    ADVIL/MOTRIN    270 tablet    Take 1 tablet (800 mg) by mouth every 8 hours as needed for pain    Disorder of bursae and tendons in shoulder region       lidocaine (viscous) 2 % solution    XYLOCAINE    100 mL    SWISH AND SPIT 5ML BY MOUTH EVERY 2 HOURS AS NEEDED    Oral aphthae       loratadine 10 MG capsule      Take 10 mg by mouth daily as needed.        metroNIDAZOLE 1 % gel    METROGEL    60 g    Apply topically daily    Rosacea, Perioral dermatitis       order for DME      Use your CPAP device as directed by your provider.        order for DME     2 Units    Support/compression stockings - knee high, medium compression    Varicose veins of legs       order for DME     1 each    Please measure and distribute 1 pair of 20mmHg - 30mmHg knee high open toe compression stockings. Jobst ultrasheer or equivalent.    Varicose veins of both lower extremities with complications       sertraline 100 MG tablet    ZOLOFT    90 tablet    Take 1 tablet (100 mg) by mouth daily    Anxiety       triamcinolone 0.1 % cream    KENALOG    30 g    Apply topically 2 times daily    Eczema, unspecified type       venlafaxine 37.5 MG 24 hr capsule    EFFEXOR-XR    46 capsule    Take 1 capsule daily for 14 days, then take 2 capsules daily.    Anxiety       VITAMIN D (CHOLECALCIFEROL) PO      Take 2,000 Units by mouth daily    Perioral dermatitis, Rosacea       * Notice:  This list has 2 medication(s) that are the same as other medications prescribed for you. Read the directions carefully, and ask your doctor or other care provider to review them with you.

## 2018-07-11 NOTE — PROGRESS NOTES
"GYN CLINIC VISIT  2018  CC: mood and perimenopause check    HPI:  Angy Haji is a 53 year old  perimenopausal female who presents today in follow up of mood check and vasomotor sx.    I last saw Angy on May 4 at which time we increased her Zoloft to 100 mg daily.  Her hot flashes were well controlled on Prempro 0.625-2.5 mg per tablet so we decided to continue that at that dose.  Angy reports she is feeling well today. She reports improvement and  she is happy with her symptoms.  Her mood is notably better.  She and her wife recently returned from a trip to [place] which they enjoyed.    Hot flushes down to 1/day and not as intense  Sleep is better. Able to fall back asleep when she wakes early, no longer waking up at 430am for the day  Worry is down    Appetite improved. Had lost 6lb but then regained it.  Trying to increase exercise.  Makes most meals from scratch and brings her lunch, rarely eats out.    Still having irregular periods. LMP 18, lasted 6 days, moderate flow.  Recommend patient continue to track menses.  Discussed that it is recommended she normal for irregular periods as she is perimenopausal.  Counseled patient to contact me if periods become prolonged or extremely heavy.    JOANNA-7 SCORE 2018   Total Score 11 9 1       PHQ-9 SCORE 2018   Total Score 13 8 1         Vitals:    18 1125   BP: 150/81   Pulse: 59   Temp: 97  F (36.1  C)   TempSrc: Oral   Weight: 242 lb (109.8 kg)     Estimated body mass index is 36.8 kg/(m^2) as calculated from the following:    Height as of 3/14/18: 5' 8\" (1.727 m).    Weight as of this encounter: 242 lb (109.8 kg).     Well-appearing, pleasant, no distress    ASSESSMENT:  53 year old  perimenopausal female with vasomotor symptoms and anxiety well controlled on Prempro and Zoloft.    PLAN:  Continue medications at current dose.  Recheck in one year sooner as needed.    Patient has been noted " to be hypertensive and a few of our visits.  I encouraged her to follow-up with her primary care physician regarding this.  In the meantime discussed increasing exercise and a low-sodium diet.  Briefly discussed weight loss.    Greater than 50% of this 30 minute appointment was spent in counseling with the patient on issues described above in the history of present illness and in the plan, including evaluation and management of perimenopausal symptoms and anxiety.    Romy Min MD

## 2018-07-12 ASSESSMENT — PATIENT HEALTH QUESTIONNAIRE - PHQ9: SUM OF ALL RESPONSES TO PHQ QUESTIONS 1-9: 1

## 2018-07-12 ASSESSMENT — ANXIETY QUESTIONNAIRES: GAD7 TOTAL SCORE: 1

## 2018-07-25 ENCOUNTER — HOSPITAL ENCOUNTER (OUTPATIENT)
Dept: NUTRITION | Facility: CLINIC | Age: 53
Discharge: HOME | End: 2018-07-25
Attending: DIETITIAN, REGISTERED | Admitting: DIETITIAN, REGISTERED
Payer: COMMERCIAL

## 2018-07-25 PROCEDURE — 97803 MED NUTRITION INDIV SUBSEQ: CPT | Performed by: DIETITIAN, REGISTERED

## 2018-07-26 NOTE — PROGRESS NOTES
"NUTRITION REASSESSMENT NOTE     REASON FOR ASSESSMENT   Angy Haji referred by Dr. Quintanilla for MNT related to obesity   Patient accompanied by self     NUTRITION HISTORY  Patient has been able to refocus on her food choices that she is back from her trip to [place].  Patient has started on hormone therapy and anitdepressant for perimenopausal symptoms and feels it is helping her.  Patient's daughter wants to ride 30 mile bike ride this fall so patient had increased her biking.                    DIET RECALL   Breakfast: fruit, yogurt and cereal; steel cut oats with 1 T brown sugar and cinnamon  Lunch: main dish salad (greens, grain and or beans) + fruit   Dinner: lasagna; chicken enchilada; chickpea stew with orzo   Snacks: peanut butter and banana, premium ice cream or regular ice cream, banana, Dove dark chocolates  Beverages: mainly water, coffee, mineral water, alcohol 1 time per week at most  Dining out: varies   Ex[facility]: [facility]  Patient walking and cycling.     ANTHROPOMETRICS  Height: 5'8\" (note height change in chart previously 5'9\")  Weight: 242 lbs   BMI (kg/m2): 36.1 kg/m2   %IBW: 172%  Weight History: Note error on weight taken at clinic appointment on 10/2/17.  Patient's weight was entered in lbs not kg.  Wt Readings from Last 5 Encounters:   07/11/18 109.8 kg (242 lb)   06/11/18 114.1 kg (251 lb 8 oz)   05/04/18 109.3 kg (241 lb)   04/06/18 111.6 kg (246 lb)   03/14/18 111.6 kg (246 lb)     ASSESSED NUTRITION NEEDS  Estimated Energy Needs: 3379-2287 kcals (15-20 kcals/kg) for gradual weight loss   Estimated Protein Needs: 66-79 (1-1.2 gm/kg IBW) for repletion with exercise  RMR: 1440    EVALUATION/PROGRESS TOWARDS GOALS   Previous goals:  Exercise 5 days per week for 30 minutes-met  Limit desserts to 1 time per day when on vacation-not met      Previous Nutrition Diagnosis: Obesity related to excess energy intake as evidenced by BMI of 37 " kg/m2-no change    Current Nutrition Diagnosis:Obesity related to excess energy intake as evidenced by BMI of 36.1 kg/m2    INTERVENTIONS   Nutrition Prescription   Recommend exercise 3-5 times per week; track intake if eating mindlessly; increase fiber to 25-30 grams per day     Implementation:   Meals and Snacks: Continue eating 3 meals + snacks as hungry   Nutrition Education (Content):    a)  Discussed progress towards goals.  Reviewed current intake.  Congratulated patient on recent weight loss and on increasing exercise regimen.  Encouraged patient to increase movement for weight loss and improved sleep.  Patient receptive to suggestion.  Supported patient in her struggle with weight loss and emotional eating.  Nutrition Education (Application):    a) Determined ways to be mindful with dessert choices.       b) Patient verbalized understanding of diet by stating will limit desserts to 1-2 times per week.                   Anticipate fair-good compliance    CURRENT GOALS (Patient determined)  Exercise 5 days per week for 30 minutes   Add 1 hour continuous exercise per week   Limit alcohol 1-2 times per week  Limit desserts 1-2 times per week       FOLLOW UP/MONITORING    Patient to follow up in 12 weeks  Patient has RD contact information     Time spent with patient: 30 minutes    Mattie Amaya, RD, LD  [facility] Outpatient Dietitian  [phone] (office phone)

## 2018-07-27 ENCOUNTER — TELEPHONE (OUTPATIENT)
Dept: FAMILY MEDICINE | Facility: CLINIC | Age: 53
End: 2018-07-27

## 2018-07-27 DIAGNOSIS — M67.919 DISORDER OF BURSAE AND TENDONS IN SHOULDER REGION: ICD-10-CM

## 2018-07-27 DIAGNOSIS — M71.9 DISORDER OF BURSAE AND TENDONS IN SHOULDER REGION: ICD-10-CM

## 2018-07-27 NOTE — TELEPHONE ENCOUNTER
"Requested Prescriptions   Pending Prescriptions Disp Refills     ibuprofen (ADVIL/MOTRIN) 800 MG tablet [Pharmacy Med Name: IBUPROFEN 800MG TABS]  Last Written Prescription Date:  8/9/2016  Last Fill Quantity: 270 tablet,  # refills: 0   Last Office Visit: 6/11/2018   Future Office Visit:    Next 5 appointments (look out 90 days)     Oct 09, 2018  8:20 AM CDT   PHYSICAL with Maryjane Quintanilla MD   [facility]    [address] [place] [id number] [phone]                270 tablet 0     Sig: TAKE ONE TABLET BY MOUTH EVERY 8 HOURS AS NEEDED FOR PAIN    NSAID Medications Failed    7/27/2018  1:39 PM       Failed - Blood pressure under 140/90 in past 12 months    BP Readings from Last 3 Encounters:   07/11/18 130/90   06/11/18 140/68   05/04/18 146/76                Failed - Normal ALT on file in past 12 months    Recent Labs   Lab Test  08/16/11   0757   ALT  13            Failed - Normal AST on file in past 12 months    Recent Labs   Lab Test  08/16/11   0757   AST  24            Failed - Normal CBC on file in past 12 months    Recent Labs   Lab Test  06/21/16   1122   WBC  5.2   RBC  4.57   HGB  13.7   HCT  40.3   PLT  212       For [facility] ONLY: [id number] = WBC, [id number] = RBC         Failed - Normal serum creatinine on file in past 12 months    Recent Labs   Lab Test  06/20/16   0843   CR  0.50*            Passed - Recent (12 mo) or future (30 days) visit within the authorizing provider's specialty    Patient had office visit in the last 12 months or has a visit in the next 30 days with authorizing provider or within the authorizing provider's specialty.  See \"Patient Info\" tab in inbasket, or \"Choose Columns\" in Meds & Orders section of the refill encounter.           Passed - Patient is age 6-64 years       Passed - No active pregnancy on record       Passed - No positive pregnancy test in past 12 months          "

## 2018-08-01 NOTE — TELEPHONE ENCOUNTER
Routing refill request to provider for review/approval because:  Labs not current  BP not at goal.  Xi Gorman RN [facility] Triage.

## 2018-08-01 NOTE — TELEPHONE ENCOUNTER
Please place lab orders as needed and let Angy know that lab appt and blood pressure check are needed for refill. Maryjane Quintanilla M.D.

## 2018-08-01 NOTE — TELEPHONE ENCOUNTER
Ordered needed labs.  Reception- please call pt and make lab and MA BP appt.  Routing refill back to pcp to sign.  Thanks.  UBALDO Payne

## 2018-08-02 RX ORDER — IBUPROFEN 800 MG/1
TABLET, FILM COATED ORAL
Qty: 270 TABLET | Refills: 0 | Status: SHIPPED | OUTPATIENT
Start: 2018-08-02 | End: 2018-08-14

## 2018-08-02 NOTE — TELEPHONE ENCOUNTER
The patient can be reached today until 1615 at  and after 1615 you can reach her on her cell phone at .

## 2018-08-02 NOTE — TELEPHONE ENCOUNTER
The patient is scheduled for both BP check and labs 8/9/18.  The patient is requesting clarification of the why behind the labs are needed.

## 2018-08-08 ENCOUNTER — TELEPHONE (OUTPATIENT)
Dept: SLEEP MEDICINE | Facility: CLINIC | Age: 53
End: 2018-08-08

## 2018-08-08 NOTE — TELEPHONE ENCOUNTER
Patient called and left message here in [place] today 08/08/18 stating that she needs an appointment to get a new mask. She has seen Leticia EVANS in the past and patient also said Leticia has tried every mask on her already but nothing seems to work. But she wants to know if she should stay with the full face mask or nasal mask.      I spoke with Leticia EVANS today and she said she has tried almost every most there is for the patient. So I sent an email today to the [facility] team to call and schedule an appointment for the patient to see them.

## 2018-08-09 ENCOUNTER — ALLIED HEALTH/NURSE VISIT (OUTPATIENT)
Dept: NURSING | Facility: CLINIC | Age: 53
End: 2018-08-09
Payer: COMMERCIAL

## 2018-08-09 VITALS — DIASTOLIC BLOOD PRESSURE: 60 MMHG | SYSTOLIC BLOOD PRESSURE: 120 MMHG | HEART RATE: 60 BPM

## 2018-08-09 DIAGNOSIS — M67.919 DISORDER OF BURSAE AND TENDONS IN SHOULDER REGION: ICD-10-CM

## 2018-08-09 DIAGNOSIS — M71.9 DISORDER OF BURSAE AND TENDONS IN SHOULDER REGION: ICD-10-CM

## 2018-08-09 DIAGNOSIS — Z01.30 BP CHECK: Primary | ICD-10-CM

## 2018-08-09 LAB
ALT SERPL W P-5'-P-CCNC: 23 U/L (ref 0–50)
AST SERPL W P-5'-P-CCNC: 20 U/L (ref 0–45)
CREAT SERPL-MCNC: 0.58 MG/DL (ref 0.52–1.04)
ERYTHROCYTE [DISTWIDTH] IN BLOOD BY AUTOMATED COUNT: 13.3 % (ref 10–15)
GFR SERPL CREATININE-BSD FRML MDRD: >90 ML/MIN/1.7M2
HCT VFR BLD AUTO: 42.7 % (ref 35–47)
HGB BLD-MCNC: 14.1 G/DL (ref 11.7–15.7)
MCH RBC QN AUTO: 28.3 PG (ref 26.5–33)
MCHC RBC AUTO-ENTMCNC: 33 G/DL (ref 31.5–36.5)
MCV RBC AUTO: 86 FL (ref 78–100)
PLATELET # BLD AUTO: 190 10E9/L (ref 150–450)
RBC # BLD AUTO: 4.98 10E12/L (ref 3.8–5.2)
WBC # BLD AUTO: 4.2 10E9/L (ref 4–11)

## 2018-08-09 PROCEDURE — 85027 COMPLETE CBC AUTOMATED: CPT | Performed by: FAMILY MEDICINE

## 2018-08-09 PROCEDURE — 99207 ZZC NO CHARGE NURSE ONLY: CPT

## 2018-08-09 PROCEDURE — 82565 ASSAY OF CREATININE: CPT | Performed by: FAMILY MEDICINE

## 2018-08-09 PROCEDURE — 36415 COLL VENOUS BLD VENIPUNCTURE: CPT | Performed by: FAMILY MEDICINE

## 2018-08-09 PROCEDURE — 84450 TRANSFERASE (AST) (SGOT): CPT | Performed by: FAMILY MEDICINE

## 2018-08-09 PROCEDURE — 84460 ALANINE AMINO (ALT) (SGPT): CPT | Performed by: FAMILY MEDICINE

## 2018-08-09 NOTE — NURSING NOTE
Angy Haji is a 53 year old patient who comes in today for a Blood Pressure check.  Initial BP:  /60 (BP Location: Left arm, Patient Position: Chair, Cuff Size: Adult Large)  Pulse 60     60  Disposition: follow-up as previously indicated by provider.    Axel Tafoya MA

## 2018-08-09 NOTE — MR AVS SNAPSHOT
After Visit Summary   8/9/2018    Angy Haji    MRN: [id number]           Patient Information     Date Of Birth          1965        Visit Information        Provider Department      8/9/2018 9:00 AM  MEDICAL ASSISTANT [facility]        Today's Diagnoses     BP check    -  1       Follow-ups after your visit        Your next 10 appointments already scheduled     Oct 09, 2018  8:20 AM CDT   PHYSICAL with Maryjane Quintanilla MD   [facility] ([facility])    [address] [place] [id number] [phone]            Oct 17, 2018  2:30 PM CDT   Follow Up with Mar Xiong RD, LD   [facility] ([facility])    [address], [address]  [facility] [address] [phone]              Who to contact     If you have questions or need follow up information about today's clinic visit or your schedule please contact [facility][note 3] directly at [phone].  Normal or non-critical lab and imaging results will be communicated to you by 1Cas[employer]art, letter or phone within 4 business days after the clinic has received the results. If you do not hear from us within 7 days, please contact the clinic through [employer]t or phone. If you have a critical or abnormal lab result, we will notify you by phone as soon as possible.  Submit refill requests through [employer] or call your pharmacy and they will forward the refill request to us. Please allow 3 business days for your refill to be completed.          Additional Information About Your Visit        MyChart Information     [employer] gives you secure access to your electronic health record. If you see a primary care provider, you can also send messages to your care team and make appointments. If you have questions, please call your primary care clinic.  If you do not have a primary care provider, please call [phone] and they will assist  you.        Care EveryWhere ID     This is your Care EveryWhere ID. This could be used by other organizations to access your [place] medical records  [id number]        Your Vitals Were     Pulse                   60            Blood Pressure from Last 3 Encounters:   08/09/18 120/60   07/11/18 130/90   06/11/18 140/68    Weight from Last 3 Encounters:   07/11/18 242 lb (109.8 kg)   06/11/18 251 lb 8 oz (114.1 kg)   05/04/18 241 lb (109.3 kg)              Today, you had the following     No orders found for display       Primary Care Provider Office Phone # Fax #    Maryjane Quintanilla [phone] [address]  [facility] [place] [address]        Equal Access to Services     LUPE NOGUEIRA : Hadii ariana castroo Sopeter, waaxda luqadaha, qaybta kaalmada adeegyada, svetlana tam . So [facility] [phone].    ATENCIÓN: Si habla español, tiene a franco disposición servicios gratuitos de asistencia lingüística. Llame al [phone].    We comply with applicable federal civil rights laws and [place] laws. We do not discriminate on the basis of race, color, national origin, age, disability, sex, sexual orientation, or gender identity.            Thank you!     Thank you for choosing [facility]  for your care. Our goal is always to provide you with excellent care. Hearing back from our patients is one way we can continue to improve our services. Please take a few minutes to complete the written survey that you may receive in the mail after your visit with us. Thank you!             Your Updated Medication List - Protect others around you: Learn how to safely use, store and throw away your medicines at www.disposemymeds.org.          This list is accurate as of 8/9/18  9:44 AM.  Always use your most recent med list.                   Brand Name Dispense Instructions for use Diagnosis    ciclopirox 0.77 % cream    LOPROX     Apply  topically 2 times daily as needed.     Eczema       * estrogen (conjugated)-medroxyPROGESTERone 0.625-2.5 MG per tablet    PREMPRO    30 tablet    Take 1 tablet by mouth daily    Symptomatic menopausal or female climacteric states       * estrogen (conjugated)-medroxyPROGESTERone 0.625-2.5 MG per tablet    PREMPRO    90 tablet    Take 1 tablet by mouth daily    Perimenopausal vasomotor symptoms       ibuprofen 800 MG tablet    ADVIL/MOTRIN    270 tablet    TAKE ONE TABLET BY MOUTH EVERY 8 HOURS AS NEEDED FOR PAIN    Disorder of bursae and tendons in shoulder region       lidocaine (viscous) 2 % solution    XYLOCAINE    100 mL    SWISH AND SPIT 5ML BY MOUTH EVERY 2 HOURS AS NEEDED    Oral aphthae       loratadine 10 MG capsule      Take 10 mg by mouth daily as needed.        metroNIDAZOLE 1 % gel    METROGEL    60 g    Apply topically daily    Rosacea, Perioral dermatitis       order for DME      Use your CPAP device as directed by your provider.        order for DME     2 Units    Support/compression stockings - knee high, medium compression    Varicose veins of legs       order for DME     1 each    Please measure and distribute 1 pair of 20mmHg - 30mmHg knee high open toe compression stockings. Jobst ultrasheer or equivalent.    Varicose veins of both lower extremities with complications       sertraline 100 MG tablet    ZOLOFT    90 tablet    Take 1 tablet (100 mg) by mouth daily    Anxiety       triamcinolone 0.1 % cream    KENALOG    30 g    Apply topically 2 times daily    Eczema, unspecified type       venlafaxine 37.5 MG 24 hr capsule    EFFEXOR-XR    46 capsule    Take 1 capsule daily for 14 days, then take 2 capsules daily.    Anxiety       VITAMIN D (CHOLECALCIFEROL) PO      Take 2,000 Units by mouth daily    Perioral dermatitis, Rosacea       * Notice:  This list has 2 medication(s) that are the same as other medications prescribed for you. Read the directions carefully, and ask your doctor or other care provider to review them with you.

## 2018-08-09 NOTE — PROGRESS NOTES
Excellent! Please call or sent a [employer] message if you have any questions. Maryjane Quintanilla M.D.

## 2018-08-13 ENCOUNTER — TELEPHONE (OUTPATIENT)
Dept: FAMILY MEDICINE | Facility: CLINIC | Age: 53
End: 2018-08-13

## 2018-08-13 NOTE — PROGRESS NOTES
Excellent! Please call or sent a [employer] message if you have any questions. Maryjane Quintanilla M.D.

## 2018-08-13 NOTE — TELEPHONE ENCOUNTER
Patient states for the past 2 to 3 weeks she has noticed that it hurts to swallow anything with structure.  It has progressed to where she will cough with swallowing  Has had a hard time swallowing carrots and chips and now today it hurt to eat a donut.  Patient has also been having issues with dry mouth and is not grecia eif related  She does not feel she needs ER eval as she is not actually choking.  Appointment scheduled for tomorrow AM with Dr Quintanilla.  Debbie Santos RN

## 2018-08-14 ENCOUNTER — TELEPHONE (OUTPATIENT)
Dept: OTHER | Facility: CLINIC | Age: 53
End: 2018-08-14

## 2018-08-14 ENCOUNTER — OFFICE VISIT (OUTPATIENT)
Dept: FAMILY MEDICINE | Facility: CLINIC | Age: 53
End: 2018-08-14
Payer: COMMERCIAL

## 2018-08-14 VITALS
WEIGHT: 250 LBS | TEMPERATURE: 98 F | HEART RATE: 64 BPM | DIASTOLIC BLOOD PRESSURE: 80 MMHG | BODY MASS INDEX: 37.89 KG/M2 | OXYGEN SATURATION: 100 % | HEIGHT: 68 IN | RESPIRATION RATE: 16 BRPM | SYSTOLIC BLOOD PRESSURE: 144 MMHG

## 2018-08-14 DIAGNOSIS — M71.9 DISORDER OF BURSAE AND TENDONS IN SHOULDER REGION: ICD-10-CM

## 2018-08-14 DIAGNOSIS — I87.2 VENOUS (PERIPHERAL) INSUFFICIENCY: ICD-10-CM

## 2018-08-14 DIAGNOSIS — R15.2 FECAL URGENCY: ICD-10-CM

## 2018-08-14 DIAGNOSIS — N39.41 URGE INCONTINENCE: ICD-10-CM

## 2018-08-14 DIAGNOSIS — R49.0 HOARSE VOICE QUALITY: ICD-10-CM

## 2018-08-14 DIAGNOSIS — R13.10 DYSPHAGIA, UNSPECIFIED TYPE: Primary | ICD-10-CM

## 2018-08-14 DIAGNOSIS — M67.919 DISORDER OF BURSAE AND TENDONS IN SHOULDER REGION: ICD-10-CM

## 2018-08-14 DIAGNOSIS — R60.0 PERIPHERAL EDEMA: ICD-10-CM

## 2018-08-14 PROBLEM — E66.01 MORBID OBESITY (H): Status: ACTIVE | Noted: 2018-08-14

## 2018-08-14 PROCEDURE — 99214 OFFICE O/P EST MOD 30 MIN: CPT | Performed by: FAMILY MEDICINE

## 2018-08-14 RX ORDER — NYSTATIN 100000/ML
500000 SUSPENSION, ORAL (FINAL DOSE FORM) ORAL 4 TIMES DAILY
Qty: 280 ML | Refills: 0 | Status: CANCELLED | OUTPATIENT
Start: 2018-08-14 | End: 2018-08-28

## 2018-08-14 RX ORDER — IBUPROFEN 800 MG/1
800 TABLET, FILM COATED ORAL EVERY 8 HOURS PRN
Qty: 100 TABLET | Refills: 0 | Status: SHIPPED | OUTPATIENT
Start: 2018-08-14 | End: 2018-09-06

## 2018-08-14 NOTE — PROGRESS NOTES
SUBJECTIVE:   Angy Haji is a 53 year old female who presents to clinic today for the following health issues:      Concern - Trouble Swallowing   Onset: 2-3 weeks     Description:   Trouble swallowing solids like meat, carrots, donuts, food feels like it is getting stuck, voice is hoarse, tongue has white coating, no swelling, coughing    Intensity: mild    Progression of Symptoms:  worsening    Accompanying Signs & Symptoms:  Dry mouth     Previous history of similar problem:   no    Precipitating factors:   Worsened by: hard foods    Alleviating factors:  Improved by: liquids and hard candy and gum     Therapies Tried and outcome: none       It may have been longer than this. About 2-3 weeks ago, hurt to swallow, difficulty was mild. No severe pain. She thought maybe due to dry mouth which she has noticed after sertraline and prempro. Thought maybe allergies, but not really. Was uncomfortable to swallow at first. Now started to have trouble swallowing, coughing when eating chips, carrots, meat, too large bites. Yesterday was eating a donut and it hurt to swallow.   Hurts to sing for about a year. Hurts a bit to talk last two weeks.   She is having some urge incontinence again and fecal urgency.     Her peripheral edema has improved.  She has noticed more varicose veins.  She would like another referral to vascular surgery and has seen vascular surgery in the past.  She and her wife Danielle are moving to the [facility] as they have gotten her daughter enrolled in [employer] through open enrollment.  The experienced significant frustration with experience at [employer] and began looking at other options this summer.  Problem list and histories reviewed & adjusted, as indicated.  Additional history: as documented    Patient Active Problem List   Diagnosis     Oral aphthae     Obesity (BMI 30-39.9)     Disorder of bursae and tendons in shoulder region     CARDIOVASCULAR SCREENING; LDL GOAL LESS  THAN 160     Rosacea     Tear of medial cartilage or meniscus of knee, current     Raynaud disease     Vitamin D deficiency disease     Right elbow pain     Sprain of foot     Mild sleep apnea     Atopic rhinitis     Chronic bilateral low back pain with right-sided sciatica     Past Surgical History:   Procedure Laterality Date     HC TOOTH EXTRACTION W/FORCEP  1984/85    [place] Teeth     LAPAROSCOPIC CHOLECYSTECTOMY  7/29/2013    Procedure: LAPAROSCOPIC CHOLECYSTECTOMY;  Laparoscopic Cholecystectomy;  Surgeon: Fabio Johnson MD;  Location: [facility] OR     LAPAROSCOPIC OOPHORECTOMY Left 6/23/2016    Procedure: LAPAROSCOPIC OOPHORECTOMY;  Surgeon: Kayley Donnelly MD;  Location: [facility] OR     LAPAROSCOPIC SALPINGECTOMY Bilateral 6/23/2016    Procedure: LAPAROSCOPIC SALPINGECTOMY;  Surgeon: Kayley Donnelly MD;  Location: [facility] OR       Social History   Substance Use Topics     Smoking status: Never Smoker     Smokeless tobacco: Never Used      Comment: non smoke home     Alcohol use Yes      Comment: 2 Drinks Per Month     Family History   Problem Relation Age of Onset     Cardiovascular Paternal Grandfather      anyerism     Obesity Paternal Grandfather      Respiratory Paternal Grandfather      pulmonary (air sacs hardening)     Allergies Father      hayfever     Genitourinary Problems Father      BPH     Lipids Father      diet therapy     Gallbladder Disease Father      cholecystectomy      Eye Disorder Father      retinal tear x 2      Alzheimer Disease Maternal Grandmother      ? poor STM     Arthritis Maternal Grandmother      HEART DISEASE Maternal Grandmother      Arrythmia-got a pacemaker at age 89     Arthritis Mother      Eye Disorder Mother      [place] /macular tear in one eye     Cancer Paternal Grandmother      Uterine CA- as well as her sister     Gynecology Paternal Grandmother      prolapsed uterus- had 10 kids     Obesity Paternal Grandmother      Breast Cancer Maternal Aunt      Other  Cancer Paternal Aunt 80     endo. cancer          Current Outpatient Prescriptions   Medication Sig Dispense Refill     ciclopirox (LOPROX) 0.77 % cream Apply  topically 2 times daily as needed.       estrogen, conjugated,-medroxyPROGESTERone (PREMPRO) 0.625-2.5 MG per tablet Take 1 tablet by mouth daily 90 tablet 3     estrogen, conjugated,-medroxyPROGESTERone (PREMPRO) 0.625-2.5 MG per tablet Take 1 tablet by mouth daily 30 tablet 11     lidocaine, viscous, (XYLOCAINE) 2 % solution SWISH AND SPIT 5ML BY MOUTH EVERY 2 HOURS AS NEEDED 100 mL 1     Loratadine 10 MG capsule Take 10 mg by mouth daily as needed.       metroNIDAZOLE (METROGEL) 1 % gel Apply topically daily 60 g 9     order for DME Please measure and distribute 1 pair of 20mmHg - 30mmHg knee high open toe compression stockings. Jobst ultrasheer or equivalent. 1 each 1     ORDER FOR DME Use your CPAP device as directed by your provider.       ORDER FOR DME Support/compression stockings - knee high, medium compression   2 Units 11     sertraline (ZOLOFT) 100 MG tablet Take 1 tablet (100 mg) by mouth daily 90 tablet 3     VITAMIN D, CHOLECALCIFEROL, PO Take 2,000 Units by mouth daily       ibuprofen (ADVIL/MOTRIN) 800 MG tablet TAKE ONE TABLET BY MOUTH EVERY 8 HOURS AS NEEDED FOR PAIN (Patient not taking: Reported on 8/14/2018) 270 tablet 0     triamcinolone (KENALOG) 0.1 % cream Apply topically 2 times daily (Patient not taking: Reported on 8/14/2018) 30 g 1     venlafaxine (EFFEXOR-XR) 37.5 MG 24 hr capsule Take 1 capsule daily for 14 days, then take 2 capsules daily. (Patient not taking: Reported on 6/11/2018) 46 capsule 0     Allergies   Allergen Reactions     Penicillins Rash     Thimerosal      blisters on inside of eyelid     Recent Labs   Lab Test  08/09/18   0937  04/06/18   1222  10/02/17   0842  10/03/16   0742  06/20/16   0843  09/24/15   0904  09/15/14   0733  09/20/13   1145  09/16/13   0739  08/22/12   0752  08/16/11   0757   A1C   --    --     "--    --    --   5.2   --    --   5.1  5.2  5.3   LDL   --    --   49  51   --   47  24   --   44  50  34   HDL   --    --   96  75   --   73  75   --   62  66  47*   TRIG   --    --   55  56   --   58  46   --   64  50  97   ALT  23   --    --    --    --    --    --    --    --    --   13   CR  0.58   --    --    --   0.50*   --   0.56   --   0.47*  0.54  0.59   GFRESTIMATED  >90   --    --    --   >90  Non  GFR Calc     --   >90  Non  GFR Calc     --   >90  >90  >90   GFRESTBLACK  >90   --    --    --   >90   GFR Calc     --   >90   GFR Calc     --   >90  >90  >90   POTASSIUM   --    --    --    --   3.7   --   4.3   --   3.9  4.3  4.1   TSH   --   0.99   --    --    --    --    --   1.45   --    --    --       BP Readings from Last 3 Encounters:   08/14/18 144/80   08/09/18 120/60   07/11/18 130/90    Wt Readings from Last 3 Encounters:   08/14/18 250 lb (113.4 kg)   07/11/18 242 lb (109.8 kg)   06/11/18 251 lb 8 oz (114.1 kg)              Reviewed and updated as needed this visit by clinical staff       Reviewed and updated as needed this visit by Provider         ROS:  As above     OBJECTIVE:     /80 (BP Location: Left arm, Patient Position: Sitting, Cuff Size: Adult Large)  Pulse 64  Temp 98  F (36.7  C) (Oral)  Resp 16  Ht 5' 8\" (1.727 m)  Wt 250 lb (113.4 kg)  SpO2 100%  BMI 38.01 kg/m2  Body mass index is 38.01 kg/(m^2).  GENERAL: healthy, alert and no distress  EYES: Eyes grossly normal to inspection, PERRL and conjunctivae and sclerae normal  HENT: ear canals and TM's normal, nose and mouth without ulcers or lesions  NECK: no adenopathy, no asymmetry, masses, or scars and thyroid normal to palpation    Diagnostic Test Results:  Results for orders placed or performed in visit on 08/09/18   **Creatinine FUTURE anytime   Result Value Ref Range    Creatinine 0.58 0.52 - 1.04 mg/dL    GFR Estimate >90 >60 mL/min/1.7m2    GFR Estimate " If Black >90 >60 mL/min/1.7m2   **CBC with platelets FUTURE anytime   Result Value Ref Range    WBC 4.2 4.0 - 11.0 10e9/L    RBC Count 4.98 3.8 - 5.2 10e12/L    Hemoglobin 14.1 11.7 - 15.7 g/dL    Hematocrit 42.7 35.0 - 47.0 %    MCV 86 78 - 100 fl    MCH 28.3 26.5 - 33.0 pg    MCHC 33.0 31.5 - 36.5 g/dL    RDW 13.3 10.0 - 15.0 %    Platelet Count 190 150 - 450 10e9/L   **AST FUTURE 2mo   Result Value Ref Range    AST 20 0 - 45 U/L   **ALT FUTURE anytime   Result Value Ref Range    ALT 23 0 - 50 U/L        ASSESSMENT/PLAN:        1. Dysphagia, unspecified type   no evidence of etiology on exam today. Referred to ent for further eval.   - OTOLARYNGOLOGY REFERRAL    2. Hoarse voice quality  As above  - OTOLARYNGOLOGY REFERRAL    3. Urge incontinence  She has had pelvic floor therapy in the past and found it helpful.  She requests another referral to physical therapy.  - [facility]    4. Fecal urgency  Along with her urge incontinence recently she has noted some sense of fecal urgency though no incontinence she requests referral to PT.  - [facility] REF[facility]    5. Peripheral edema  Improving     6. Venous (peripheral) insufficiency  She requests referral to vascular surgery whom she has seen in the past.  - VASCULAR SURGERY REFERRAL    7. Disorder of bursae and tendons in shoulder region  She requests refill of her ibuprofen.  Renal function is stable.  - ibuprofen (ADVIL/MOTRIN) 800 MG tablet; Take 1 tablet (800 mg) by mouth every 8 hours as needed for moderate pain  Dispense: 100 tablet; Refill: 0     Patient Instructions   Schedule with the ENT.         Maryjane Quintanilla MD  [facility]

## 2018-08-14 NOTE — TELEPHONE ENCOUNTER
"Referral received via EPIC \"in box\", per  guidelines referral forwarded to [facility].     Katy Donnelly, ALYN, RN    "

## 2018-08-14 NOTE — MR AVS SNAPSHOT
After Visit Summary   8/14/2018    Angy Haji    MRN: [id number]           Patient Information     Date Of Birth          1965        Visit Information        Provider Department      8/14/2018 11:00 AM Maryjane Quintanilla MD [facility]        Today's Diagnoses     Dysphagia, unspecified type    -  1    Hoarse voice quality        Urge incontinence        Fecal urgency          Care Instructions    Schedule with the ENT.   Schedule with physical therapy.           Follow-ups after your visit        Additional Services     [facility] PT, HAND, AND CHIROPRACTIC REFERRAL       **This order will print in the [facility] Scheduling Office**    Physical Therapy, Hand Therapy and Chiropractic Care are available through:    *[facility]  *[facility]  *[facility]    Call one number to schedule at any of the above locations: [phone].    Your provider has referred you to: Physical Therapy at [facility] or [facility]    Indication/Reason for Referral: Women's Health (Please Complete Special Programs SmartList)  Onset of Illness: longstanding   Therapy Orders: Evaluate and Treat  Special Programs: None and Women's Health: Pelvic Floor Weakness: Incontinence: urge   Special Request: None    Deborah Cabrera      Additional Comments for the Therapist or Chiropractor:      Please be aware that coverage of these services is subject to the terms and limitations of your health insurance plan.  Call member services at your health plan with any benefit or coverage questions.      Please bring the following to your appointment:    *Your personal calendar for scheduling future appointments  *Comfortable clothing            OTOLARYNGOLOGY REFERRAL       Your provider has referred you to: [facility]: Adult Ear, Nose [facility] Throat Clinic (Otolaryngology) - [place] [phone]  [email or web address]  [facility]: [facility] Nose & Throat Specialty Care  [facility] [phone]   [email or web address]    Please be aware that coverage of these services is subject to the terms and limitations of your health insurance plan.  Call member services at your health plan with any benefit or coverage questions.      Please bring the following with you to your appointment:    (1) Any X-Rays, CTs or MRIs which have been performed.  Contact the facility where they were done to arrange for  prior to your scheduled appointment.   (2) List of current medications  (3) This referral request   (4) Any documents/labs given to you for this referral                  Your next 10 appointments already scheduled     Sep 05, 2018  1:30 PM CDT   (Arrive by 1:15 PM)   GEOVANNI For Women Only with Angy Villafuerte PT   [facility] [place] [facility]    [address] [place] [address] [phone]            Sep 12, 2018  3:30 PM CDT   GEOVANNI For Women Only with Angy Villafuerte PT   [facility] [place] ([facility])    [address] [place] [address] [phone]            Sep 19, 2018  3:30 PM CDT   GEOVANNI For Women Only with Angy Villafuerte PT   [facility] [place] [facility]    [address] [place] [address] [phone]            Oct 09, 2018  8:20 AM CDT   PHYSICAL with Maryjane Quintanilla MD   [facility] [place] ([facility])    [address] [place] [address] [phone]            Oct 17, 2018  2:30 PM CDT   Follow Up with Mar Xiong RD, LD   [facility] ([facility])    [address]  [facility] [address] [phone]              Who to contact     If you have questions or need follow up information about today's clinic visit or your schedule please contact [facility] directly at  "[phone].  Normal or non-critical lab and imaging results will be communicated to you by MyChart, letter or phone within 4 business days after the clinic has received the results. If you do not hear from us within 7 days, please contact the clinic through [employer]KAM[employer] or phone. If you have a critical or abnormal lab result, we will notify you by phone as soon as possible.  Submit refill requests through [employer] Here or call your pharmacy and they will forward the refill request to us. Please allow 3 business days for your refill to be completed.          Additional Information About Your Visit        OKKAMhart Information     [employer] Here gives you secure access to your electronic health record. If you see a primary care provider, you can also send messages to your care team and make appointments. If you have questions, please call your primary care clinic.  If you do not have a primary care provider, please call [phone] and they will assist you.        Care EveryWhere ID     This is your Care EveryWhere ID. This could be used by other organizations to access your [place] medical records  [id number]        Your Vitals Were     Pulse Temperature Respirations Height Pulse Oximetry BMI (Body Mass Index)    64 98  F (36.7  C) (Oral) 16 5' 8\" (1.727 m) 100% 38.01 kg/m2       Blood Pressure from Last 3 Encounters:   08/14/18 144/80   08/09/18 120/60   07/11/18 130/90    Weight from Last 3 Encounters:   08/14/18 250 lb (113.4 kg)   07/11/18 242 lb (109.8 kg)   06/11/18 251 lb 8 oz (114.1 kg)              We Performed the Following     [facility]NI PT, HAND, AND CHIROPRACTIC REFERRAL     OTOLARYNGOLOGY REFERRAL        Primary Care Provider Office Phone # Fax #    Maryjane Quintanilla [phone] [address] [place] [address]        Equal Access to Services     LUPE NOGUEIRA : Russ Wagner, waaxda luqadaha, qaybta kaalmasvetlana mendenhall. So wa " [phone].    ATENCIÓN: Si ashley santos, tiene a franco disposición servicios gratuitos de asistencia lingüística. Haley bradley [phone].    We comply with applicable federal civil rights laws and [place] laws. We do not discriminate on the basis of race, color, national origin, age, disability, sex, sexual orientation, or gender identity.            Thank you!     Thank you for choosing [facility]  for your care. Our goal is always to provide you with excellent care. Hearing back from our patients is one way we can continue to improve our services. Please take a few minutes to complete the written survey that you may receive in the mail after your visit with us. Thank you!             Your Updated Medication List - Protect others around you: Learn how to safely use, store and throw away your medicines at www.disposemymeds.org.          This list is accurate as of 8/14/18 12:12 PM.  Always use your most recent med list.                   Brand Name Dispense Instructions for use Diagnosis    ciclopirox 0.77 % cream    LOPROX     Apply  topically 2 times daily as needed.    Eczema       * estrogen (conjugated)-medroxyPROGESTERone 0.625-2.5 MG per tablet    PREMPRO    30 tablet    Take 1 tablet by mouth daily    Symptomatic menopausal or female climacteric states       * estrogen (conjugated)-medroxyPROGESTERone 0.625-2.5 MG per tablet    PREMPRO    90 tablet    Take 1 tablet by mouth daily    Perimenopausal vasomotor symptoms       ibuprofen 800 MG tablet    ADVIL/MOTRIN    270 tablet    TAKE ONE TABLET BY MOUTH EVERY 8 HOURS AS NEEDED FOR PAIN    Disorder of bursae and tendons in shoulder region       lidocaine (viscous) 2 % solution    XYLOCAINE    100 mL    SWISH AND SPIT 5ML BY MOUTH EVERY 2 HOURS AS NEEDED    Oral aphthae       loratadine 10 MG capsule      Take 10 mg by mouth daily as needed.        metroNIDAZOLE 1 % gel    METROGEL    60 g    Apply topically daily    Rosacea, Perioral  dermatitis       order for DME      Use your CPAP device as directed by your provider.        order for DME     2 Units    Support/compression stockings - knee high, medium compression    Varicose veins of legs       order for DME     1 each    Please measure and distribute 1 pair of 20mmHg - 30mmHg knee high open toe compression stockings. Jobst ultrasheer or equivalent.    Varicose veins of both lower extremities with complications       sertraline 100 MG tablet    ZOLOFT    90 tablet    Take 1 tablet (100 mg) by mouth daily    Anxiety       triamcinolone 0.1 % cream    KENALOG    30 g    Apply topically 2 times daily    Eczema, unspecified type       venlafaxine 37.5 MG 24 hr capsule    EFFEXOR-XR    46 capsule    Take 1 capsule daily for 14 days, then take 2 capsules daily.    Anxiety       VITAMIN D (CHOLECALCIFEROL) PO      Take 2,000 Units by mouth daily    Perioral dermatitis, Rosacea       * Notice:  This list has 2 medication(s) that are the same as other medications prescribed for you. Read the directions carefully, and ask your doctor or other care provider to review them with you.

## 2018-08-21 ENCOUNTER — DOCUMENTATION ONLY (OUTPATIENT)
Dept: SLEEP MEDICINE | Facility: CLINIC | Age: 53
End: 2018-08-21

## 2018-08-21 NOTE — TELEPHONE ENCOUNTER
FUTURE VISIT INFORMATION      FUTURE VISIT INFORMATION:    Date: 08/30/2018    Time: 1:15    Location: [facility]  REFERRAL INFORMATION:    Referring provider:  DR ROSE    Referring providers clinic:  [facility]    Reason for visit/diagnosis  swallowing issues, dry mouth, hoarseness and throat pain    RECORDS REQUESTED FROM:       Clinic name Comments Records Status Imaging Status   [facility] OFFICE VISIT:  08/14/2018, 02/15/2011 INTERNAL N/A                                   RECORDS STATUS

## 2018-08-21 NOTE — PROGRESS NOTES
STM Recheck Visit    Subjective measures:    Pt is struggling with her mask.  She would like to come in for a mask fitting.  I went over  December, April and her current downloads with her and it looks like things are better with her new mask. She is still struggling with insomnia and being tired.  She has an appointment with her OB and with her sleep provider.  She scheduled a mask fitting with me for 9/4/18.  She also asked me to print out her downloads and leave them at the  of the [facility] so she can take them to her appointment with her OB next week.    Assessment: Pt not meeting objective benchmarks for leak Patient failing following subjective benchmarks: leak issues  and not feeling benefit from therapy  Action plan: scheduled mask fit appointment  Patient has a follow up visit with Dr. Calloway on 10/25/18  Device type: Auto CPAP  PAP settings: CPAP fixed 9 cm  H20  Objective measures: 14 day rolling measures         Compliance  100 %     % of night spent in large leak  26.4% last  upload      AHI 6.1   last  upload      Average number of minutes 419        Objective measure goal  Compliance   Goal >70%  Leak   Goal < 10%  AHI  Goal < 5  Usage  Goal >240

## 2018-08-27 ENCOUNTER — OFFICE VISIT (OUTPATIENT)
Dept: FAMILY MEDICINE | Facility: CLINIC | Age: 53
End: 2018-08-27
Payer: COMMERCIAL

## 2018-08-27 ENCOUNTER — OFFICE VISIT (OUTPATIENT)
Dept: OBGYN | Facility: CLINIC | Age: 53
End: 2018-08-27
Payer: COMMERCIAL

## 2018-08-27 VITALS
BODY MASS INDEX: 37.89 KG/M2 | DIASTOLIC BLOOD PRESSURE: 80 MMHG | HEIGHT: 68 IN | HEART RATE: 74 BPM | SYSTOLIC BLOOD PRESSURE: 180 MMHG | OXYGEN SATURATION: 99 % | WEIGHT: 250 LBS

## 2018-08-27 VITALS
TEMPERATURE: 98.4 F | WEIGHT: 247 LBS | DIASTOLIC BLOOD PRESSURE: 87 MMHG | BODY MASS INDEX: 37.44 KG/M2 | SYSTOLIC BLOOD PRESSURE: 154 MMHG | HEIGHT: 68 IN | RESPIRATION RATE: 16 BRPM | OXYGEN SATURATION: 98 % | HEART RATE: 82 BPM

## 2018-08-27 DIAGNOSIS — G47.00 INSOMNIA, UNSPECIFIED TYPE: ICD-10-CM

## 2018-08-27 DIAGNOSIS — F43.0 ACUTE REACTION TO STRESS: Primary | ICD-10-CM

## 2018-08-27 DIAGNOSIS — F41.9 ANXIETY: ICD-10-CM

## 2018-08-27 DIAGNOSIS — I83.893 VARICOSE VEINS OF LEG WITH SWELLING, BILATERAL: ICD-10-CM

## 2018-08-27 DIAGNOSIS — F41.9 ANXIETY: Primary | ICD-10-CM

## 2018-08-27 DIAGNOSIS — I87.2 VENOUS (PERIPHERAL) INSUFFICIENCY: Primary | ICD-10-CM

## 2018-08-27 DIAGNOSIS — R03.0 ELEVATED BLOOD PRESSURE READING WITHOUT DIAGNOSIS OF HYPERTENSION: ICD-10-CM

## 2018-08-27 DIAGNOSIS — E66.01 MORBID OBESITY (H): ICD-10-CM

## 2018-08-27 PROCEDURE — 99213 OFFICE O/P EST LOW 20 MIN: CPT | Performed by: PHYSICIAN ASSISTANT

## 2018-08-27 PROCEDURE — 99214 OFFICE O/P EST MOD 30 MIN: CPT | Performed by: OBSTETRICS & GYNECOLOGY

## 2018-08-27 ASSESSMENT — ANXIETY QUESTIONNAIRES
2. NOT BEING ABLE TO STOP OR CONTROL WORRYING: MORE THAN HALF THE DAYS
7. FEELING AFRAID AS IF SOMETHING AWFUL MIGHT HAPPEN: MORE THAN HALF THE DAYS
1. FEELING NERVOUS, ANXIOUS, OR ON EDGE: MORE THAN HALF THE DAYS
5. BEING SO RESTLESS THAT IT IS HARD TO SIT STILL: NEARLY EVERY DAY
GAD7 TOTAL SCORE: 14
6. BECOMING EASILY ANNOYED OR IRRITABLE: NOT AT ALL
3. WORRYING TOO MUCH ABOUT DIFFERENT THINGS: MORE THAN HALF THE DAYS

## 2018-08-27 ASSESSMENT — PATIENT HEALTH QUESTIONNAIRE - PHQ9: 5. POOR APPETITE OR OVEREATING: NEARLY EVERY DAY

## 2018-08-27 NOTE — MR AVS SNAPSHOT
After Visit Summary   8/27/2018    Angy Haji    MRN: [id number]           Patient Information     Date Of Birth          1965        Visit Information        Provider Department      8/27/2018 3:20 PM Andrea Lewis PA-C [facility]        Today's Diagnoses     Acute reaction to stress    -  1    Anxiety        Elevated blood pressure reading without diagnosis of hypertension        Morbid obesity (H)           Follow-ups after your visit        Follow-up notes from your care team     Return in about 2 weeks (around 9/10/2018).      Your next 10 appointments already scheduled     Aug 30, 2018  1:15 PM CDT   (Arrive by 1:00 PM)   NEW THROAT with Miah Wiseman MD   [facility] ([facility])    [address] [place] [address] [phone]           This is a multi-disciplinary care team visit as patients with your type of problem are usually seen by a team of an MD and a Speech-Language Pathologist (who is a specialist in disorders of the voice, throat, and breathing).  Please plan about 2 hours for your visit, which will likely include Laryngeal Function Studies, a Voice/Swallow/Breathing Evaluation, and an Endoscopic Laryngeal Examination to provide a comprehensive evaluation.  Please check with your insurance company to ensure you are covered for these services. - It is important to know that if you are evaluated and/or treated by both a physician and a speech pathologist during your visit, your billing will reflect the input that you receive from both providers as separate professionals. Although most insurance plans do cover these services, we encourage you to contact your insurance company prior to your visit to determine whether there are any coverage limitations that might affect you financially. - Billing/procedure codes that are frequently associated with visits to our clinic include  (but are not limited to) the ones listed below. Most patients will not need all of these items, but it may be useful to ask your insurance company's patient . 56845: Flexible fiberoptic laryngoscopy, 63307: Laryngoscopy; flexible or rigid fiberoptic, with stroboscopy, 61465: Flexible endoscopic evaluation of swallowing, 89572: Laryngeal function aerodynamic evaluation, 80660: Evaluation of Voice and Resonance, 64477: Speech pathology treatment for voice, speech, communication, 89604: Speech pathology treatment for swallowing/oral function for feeding - If you have any concerns or questions, or if you would prefer not to have a speech pathologist involved in your visit, please contact our Clinic Coordinator at [phone], at least 24 hours prior to your appointment.            Aug 30, 2018  1:15 PM CDT   (Arrive by 1:00 PM)   [facility] Speech Pathology and ENT Evaluation with  Ent Dysphonia Slp Provider   [facility] ([facility] [place])    [address]  [address]  [facility] [address] [phone]            Sep 04, 2018 10:30 AM CDT   DME RETURN with VIRTUAL CARE COORDINATOR 4   [facility] [facility]    [address] [place] [address], [address]  [facility] [address] [phone]            Sep 05, 2018 10:00 AM CDT   Office Visit with Maryjane Quintanilla MD   [facility]    [address] [place] [address] [phone]           Bring a current list of meds and any records pertaining to this visit. For Physicals, please bring immunization records and any forms needing to be filled out. Please arrive 10 minutes early to complete paperwork.            Sep 05, 2018  1:30 PM CDT   (Arrive by 1:15 PM)   GEOVANNI For Women Only with Angy Villafuerte PT   [facility] [place] ([facility] [place])    [address] [place] [address] [phone]             Sep 07, 2018  2:30 PM CDT   Office Visit with Romy Min MD   [facility]    [address] [facility]  [address]  [facility] [address] [phone]           Bring a current list of meds and any records pertaining to this visit. For Physicals, please bring immunization records and any forms needing to be filled out. Please arrive 10 minutes early to complete paperwork.            Sep 12, 2018  3:30 PM CDT   GEOVANNI For Women Only with Angy Villafuerte PT   [facility] [place] ([facility] [place])    [address] [facility] [address] [phone]            Sep 19, 2018  3:30 PM CDT   GEOVANNI For Women Only with Angy Villafuerte PT   [facility] [place] ([facility] [place])    [address] [facility] [address] [phone]            Oct 09, 2018  8:20 AM CDT   PHYSICAL with Maryjane Quintanilla MD   [facility]    [address] [facility] [address] [phone]            Oct 17, 2018  2:30 PM CDT   Follow Up with Mar Xiong RD, LD   [facility] ([facility])    [facility] [address]  [facility] [address] [phone]              Future tests that were ordered for you today     Open Future Orders        Priority Expected Expires Ordered    US Venous Competency Bilateral Routine  8/27/2019 8/27/2018            Who to contact     If you have questions or need follow up information about today's clinic visit or your schedule please contact [facility] directly at [phone].  Normal or non-critical lab and imaging results will be communicated to you by MyChart, letter or phone within 4 business days after the clinic has received the results. If you do not hear from us within 7 days, please contact the clinic through MyChart or phone. If you have a critical or abnormal lab  "result, we will notify you by phone as soon as possible.  Submit refill requests through [employer] or call your pharmacy and they will forward the refill request to us. Please allow 3 business days for your refill to be completed.          Additional Information About Your Visit        Cloud[employer] IDEhart Information     [employer] gives you secure access to your electronic health record. If you see a primary care provider, you can also send messages to your care team and make appointments. If you have questions, please call your primary care clinic.  If you do not have a primary care provider, please call [phone] and they will assist you.        Care EveryWhere ID     This is your Care EveryWhere ID. This could be used by other organizations to access your [place] medical records  [id number]        Your Vitals Were     Pulse Temperature Respirations Height Pulse Oximetry BMI (Body Mass Index)    82 98.4  F (36.9  C) (Oral) 16 5' 8\" (1.727 m) 98% 37.56 kg/m2       Blood Pressure from Last 3 Encounters:   08/27/18 154/87   08/27/18 180/80   08/14/18 144/80    Weight from Last 3 Encounters:   08/27/18 247 lb (112 kg)   08/27/18 250 lb (113.4 kg)   08/14/18 250 lb (113.4 kg)              Today, you had the following     No orders found for display       Primary Care Provider Office Phone # Fax #    Maryjane Quintanilla [phone]       [address]  [facility] [address]        Equal Access to Services     [place] : Hadii ariana ku hadasho Soomaali, waaxda luqadaha, qaybta kaalmada amandayaezio, svetlana tam . So [facility] [phone].    ATENCIÓN: Si habla español, tiene a franco disposición servicios gratuitos de asistencia lingüística. Llame al [phone].    We comply with applicable federal civil rights laws and [place] laws. We do not discriminate on the basis of race, color, national origin, age, disability, sex, sexual orientation, or gender identity.            Thank you!     Thank " you for choosing [facility]  for your care. Our goal is always to provide you with excellent care. Hearing back from our patients is one way we can continue to improve our services. Please take a few minutes to complete the written survey that you may receive in the mail after your visit with us. Thank you!             Your Updated Medication List - Protect others around you: Learn how to safely use, store and throw away your medicines at www.disposemymeds.org.          This list is accurate as of 8/27/18 11:59 PM.  Always use your most recent med list.                   Brand Name Dispense Instructions for use Diagnosis    ciclopirox 0.77 % cream    LOPROX     Apply  topically 2 times daily as needed.    Eczema       estrogen (conjugated)-medroxyPROGESTERone 0.625-2.5 MG per tablet    PREMPRO    90 tablet    Take 1 tablet by mouth daily    Perimenopausal vasomotor symptoms       ibuprofen 800 MG tablet    ADVIL/MOTRIN    100 tablet    Take 1 tablet (800 mg) by mouth every 8 hours as needed for moderate pain    Disorder of bursae and tendons in shoulder region       lidocaine (viscous) 2 % solution    XYLOCAINE    100 mL    SWISH AND SPIT 5ML BY MOUTH EVERY 2 HOURS AS NEEDED    Oral aphthae       loratadine 10 MG capsule      Take 10 mg by mouth daily as needed.        metroNIDAZOLE 1 % gel    METROGEL    60 g    Apply topically daily    Rosacea, Perioral dermatitis       order for DME      Use your CPAP device as directed by your provider.        order for DME     2 Units    Support/compression stockings - knee high, medium compression    Varicose veins of legs       order for DME     1 each    Please measure and distribute 1 pair of 20mmHg - 30mmHg knee high open toe compression stockings. Jobst ultrasheer or equivalent.    Varicose veins of both lower extremities with complications       sertraline 100 MG tablet    ZOLOFT    90 tablet    Take 1 tablet (100 mg) by mouth daily    Anxiety        triamcinolone 0.1 % cream    KENALOG    30 g    Apply topically 2 times daily    Eczema, unspecified type       VITAMIN D (CHOLECALCIFEROL) PO      Take 2,000 Units by mouth daily    Perioral dermatitis, Rosacea

## 2018-08-27 NOTE — LETTER
[facility]  [address] [place]  [facility] [address]  Phone: [phone]    August 27, 2018        Angy Haji  [address]  [place] [address]          To whom it may concern:    RE: Angy Haji    Patient was seen and treated today at our clinic and will be missing work starting 8/28/18 for medical leave.  She will be working with PCP to determine length.      Please contact me for questions or concerns.      Sincerely,        Andrea Lewis PA-C

## 2018-08-27 NOTE — PROGRESS NOTES
"SUBJECTIVE:  Angy Haji is a 53 year old  who presents to discuss insomnia and amxiety.  She has consulted with Dr. Min regarding menopausal symptoms, and currently takes Prempro 0.625/2.5 mg daily.  She had initially had very significant hot flashes, and these did improve with use of hormone replacement.  However, she continued to report significant anxiety and Zoloft (50 mg) was begun in early April.  She comes in today as she has noted increased depression and increased sleep disturbance, has had a great number of family and work stressors, and is interested in exploring leave of absence.  Her medical history includes borderline blood glucose testing, with subsequent measures including metformin and weight reduction.  She was able to lose over 100 pounds with dietary modification.  She saw a psychiatrist about 5 years ago and was treated with Wellbutrin.  Her blood pressure is elevated today (180/80).  she is a psychotherapist herself.  She gives a clear and cohesive, well-documented history.  Her speech is rapid.      OBJECTIVE: /80  Pulse 74  Ht 5' 8\" (1.727 m)  Wt 250 lb (113.4 kg)  SpO2 99%  BMI 38.01 kg/m2 Patient was not otherwise examined.     PHQ-9 13 (question 9: 0).     ASSESSMENT: Insomnia, anxiety, depression.    PLAN: We discussed her current symptoms.  I do not think her that her current problems can be attributed to menopause, or treated with further modification of hormone therapy.  We discussed potential risk posed by her elevated blood pressure.  Our triage nurses were able to arrange a family practice appointment at the [facility] for later today.    Please note greater than 50% of this 25 minute appointment were spent in counseling with the patient of the issues described above in the history of present illness and in the plan, including evaluation and managment of menopause, anxiety.    "

## 2018-08-27 NOTE — PROGRESS NOTES
SUBJECTIVE:   Angy Haji is a 53 year old female who presents to clinic today for the following health issues:      Hypertension Follow-up  Patient has had headache the past two weeks, sleep deprived , hormone issues.     Outpatient blood pressures are not being checked.    Low Salt Diet: not monitoring salt      Amount of exercise or physical activity: 2-3 days/week for an average of 15-30 minutes    Problems taking medications regularly: No    Medication side effects: none    Diet: regular (no restrictions)            Problem list and histories reviewed & adjusted, as indicated.  Additional history: 52 y/o new to me female here to discuss some ongoing stress and mental health concerns.  Patient has been overly stressed due to life/work stressors along with going through menopause.  She has been working with GYN who has started HRT along with zoloft.  Despite these treatments, she does feel completely overwhelmed, unable to sleep and generally feels like she needs a break.  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    Things for her sleep are a bit complicated by the fact that she has sleep apnea, and she thinks her mask leaks or has a fit problem.  She is working with them on that.    She has history of morbid obesity and has worked very hard to lose over 100 lbs, but she has been struggling with keeping up on her diet/lifestyle changes with the increase in her stress.  She has also noticed that her BP has been elevated at her visit with GYN today.      BP Readings from Last 3 Encounters:   08/27/18 154/87   08/27/18 180/80   08/14/18 144/80    Wt Readings from Last 3 Encounters:   08/27/18 247 lb (112 kg)   08/27/18 250 lb (113.4 kg)   08/14/18 250 lb (113.4 kg)                    Reviewed and updated as needed this visit by clinical staff       Reviewed and updated as needed this visit by Provider      "    ROS:  Constitutional, HEENT, cardiovascular, pulmonary, gi and gu systems are negative, except as otherwise noted.    OBJECTIVE:     /87  Pulse 82  Temp 98.4  F (36.9  C) (Oral)  Resp 16  Ht 5' 8\" (1.727 m)  Wt 247 lb (112 kg)  SpO2 98%  BMI 37.56 kg/m2  Body mass index is 37.56 kg/(m^2).  GENERAL: alert and no distress  EYES: Eyes grossly normal to inspection  RESP: lungs clear to auscultation - no rales, rhonchi or wheezes  CV: regular rate and rhythm, normal S1 S2, no S3 or S4, no murmur, click or rub, no peripheral edema and peripheral pulses strong  PSYCH: judgement seems intact, does have a bit of pressured speech.    Diagnostic Test Results:  none     ASSESSMENT/PLAN:             1. Acute reaction to stress  Patient has been working very hard trying to cope with multiple stressors, and I do agree she may need some time away from work to help focus on her health.  I did give her an off work note explaining that she is going to start medical leave tomorrow.  I explained that I can fill out forms, but she does have upcoming OV with PCP who does know patient and situation better and may be better to determine the duration of need.  Patient is going to see when paperwork is due, and if before her visit, she can mychart it to me and I will fill out to the best of my ability.    2. Anxiety  She is currently on zoloft and HRT.  She does not want to make any adjustments at this time.    3. Elevated blood pressure reading without diagnosis of hypertension  I think this is a consequence of her poor sleep and overall stress.  Will watch this and see if we need treatment in her upcoming visits.    4. Morbid obesity (H)  Has has recent success in loss of over 100 lbs, but has reached a plateau.  Hopefully addressing her mental health with correlate to her getting back motivated.      She has follow up with PCP 9/5/18.      Andrea Lewis PA-C  [facility]      "

## 2018-08-27 NOTE — MR AVS SNAPSHOT
After Visit Summary   8/27/2018    Angy Haji    MRN: [id number]           Patient Information     Date Of Birth          1965        Visit Information        Provider Department      8/27/2018 11:30 AM Cristel Villafana MD [facility]        Today's Diagnoses     Anxiety    -  1    Insomnia, unspecified type           Follow-ups after your visit        Your next 10 appointments already scheduled     Aug 27, 2018  3:20 PM CDT   Office Visit with Andrea Lewis PA-C   [facility] ([facility])    [address] [facility] [id number] [phone]           Bring a current list of meds and any records pertaining to this visit. For Physicals, please bring immunization records and any forms needing to be filled out. Please arrive 10 minutes early to complete paperwork.            Aug 30, 2018  1:15 PM CDT   (Arrive by 1:00 PM)   NEW THROAT with Miah Wiseman MD   [facility] ([facility])    [address] [id number] [phone]           This is a multi-disciplinary care team visit as patients with your type of problem are usually seen by a team of an MD and a Speech-Language Pathologist (who is a specialist in disorders of the voice, throat, and breathing).  Please plan about 2 hours for your visit, which will likely include Laryngeal Function Studies, a Voice/Swallow/Breathing Evaluation, and an Endoscopic Laryngeal Examination to provide a comprehensive evaluation.  Please check with your insurance company to ensure you are covered for these services. - It is important to know that if you are evaluated and/or treated by both a physician and a speech pathologist during your visit, your billing will reflect the input that you receive from both providers as separate professionals. Although most insurance plans do cover these services, we encourage you  to contact your insurance company prior to your visit to determine whether there are any coverage limitations that might affect you financially. - Billing/procedure codes that are frequently associated with visits to our clinic include (but are not limited to) the ones listed below. Most patients will not need all of these items, but it may be useful to ask your insurance company's patient . 43737: Flexible fiberoptic laryngoscopy, 86335: Laryngoscopy; flexible or rigid fiberoptic, with stroboscopy, 58666: Flexible endoscopic evaluation of swallowing, 14476: Laryngeal function aerodynamic evaluation, 68674: Evaluation of Voice and Resonance, 48461: Speech pathology treatment for voice, speech, communication, 32141: Speech pathology treatment for swallowing/oral function for feeding - If you have any concerns or questions, or if you would prefer not to have a speech pathologist involved in your visit, please contact our Clinic Coordinator at [phone], at least 24 hours prior to your appointment.            Aug 30, 2018  1:15 PM CDT   (Arrive by 1:00 PM)   [facility] Speech Pathology and ENT Evaluation with  Ent Dysphonia Slp Provider    [facility] ([facility] [place])    [address] [facility]  4th Floor  [facility] [id number] [phone]            Sep 04, 2018 10:30 AM CDT   DME RETURN with VIRTUAL CARE COORDINATOR 4   [facility] ([facility])    [address] [place] [phone]            Sep 05, 2018 10:00 AM CDT   Office Visit with Maryjane Quintanilla MD   [facility] ([facility])    [address] [facility] [address] [phone]           Bring a current list of meds and any records pertaining to this visit. For Physicals, please bring immunization records and any forms needing to be filled out. Please arrive 10 minutes early to complete  paperwork.            Sep 05, 2018  1:30 PM CDT   (Arrive by 1:15 PM)   GEOVANNI For Women Only with Angy Villafuerte PT   [facility] [place] ([facility] [place])    [address] [facility] [address]   [phone]            Sep 07, 2018  2:30 PM CDT   Office Visit with Romy Min MD   [facility] ([facility])    [address] [address] [phone]           Bring a current list of meds and any records pertaining to this visit. For Physicals, please bring immunization records and any forms needing to be filled out. Please arrive 10 minutes early to complete paperwork.            Sep 12, 2018  3:30 PM CDT   GEOVANNI For Women Only with Angy Villafuerte PT   [facility] [place] ([facility] [place])    [address] [place] [address]   [phone]            Sep 19, 2018  3:30 PM CDT   GEOVANNI For Women Only with Angy Villafuerte PT   [facility] [place] ([facility] [place])    [address] [place] [id number] [phone]            Oct 09, 2018  8:20 AM CDT   PHYSICAL with Maryjane Quintanilla MD   [facility] ([facility])    [address] [place] [id number] [phone]              Who to contact     If you have questions or need follow up information about today's clinic visit or your schedule please contact [facility] [place] directly at [phone].  Normal or non-critical lab and imaging results will be communicated to you by MyChart, letter or phone within 4 business days after the clinic has received the results. If you do not hear from us within 7 days, please contact the clinic through MyChart or phone. If you have a critical or abnormal lab result, we will notify you by phone as soon as possible.  Submit refill requests through [employer] or call your pharmacy and they will forward the refill request to us.  "Please allow 3 business days for your refill to be completed.          Additional Information About Your Visit        MyChart Information     [employer] gives you secure access to your electronic health record. If you see a primary care provider, you can also send messages to your care team and make appointments. If you have questions, please call your primary care clinic.  If you do not have a primary care provider, please call [phone] and they will assist you.        Care EveryWhere ID     This is your Care EveryWhere ID. This could be used by other organizations to access your [place] medical records  [id number]        Your Vitals Were     Pulse Height Pulse Oximetry BMI (Body Mass Index)          74 5' 8\" (1.727 m) 99% 38.01 kg/m2         Blood Pressure from Last 3 Encounters:   08/27/18 180/80   08/14/18 144/80   08/09/18 120/60    Weight from Last 3 Encounters:   08/27/18 250 lb (113.4 kg)   08/14/18 250 lb (113.4 kg)   07/11/18 242 lb (109.8 kg)              Today, you had the following     No orders found for display       Primary Care Provider Office Phone # Fax #    Maryjane Quintanilla [phone]       [address]  [facility] [address]        Equal Access to Services     LUPE NOGUEIRA : Hadii ariana ku hadasho Soomaali, waaxda luqadaha, qaybta kaalmada adeegyada, waxay idiin hayanastasiyan amanda terry lajarod cárdenas. So [facility] [phone].    ATENCIÓN: Si habla español, tiene a franco disposición servicios gratuitos de asistencia lingüística. Llame al [phone].    We comply with applicable federal civil rights laws and [place] laws. We do not discriminate on the basis of race, color, national origin, age, disability, sex, sexual orientation, or gender identity.            Thank you!     Thank you for choosing [facility]  for your care. Our goal is always to provide you with excellent care. Hearing back from our patients is one way we can continue to improve our services. Please take " a few minutes to complete the written survey that you may receive in the mail after your visit with us. Thank you!             Your Updated Medication List - Protect others around you: Learn how to safely use, store and throw away your medicines at www.disposemymeds.org.          This list is accurate as of 8/27/18  1:52 PM.  Always use your most recent med list.                   Brand Name Dispense Instructions for use Diagnosis    ciclopirox 0.77 % cream    LOPROX     Apply  topically 2 times daily as needed.    Eczema       * estrogen (conjugated)-medroxyPROGESTERone 0.625-2.5 MG per tablet    PREMPRO    30 tablet    Take 1 tablet by mouth daily    Symptomatic menopausal or female climacteric states       * estrogen (conjugated)-medroxyPROGESTERone 0.625-2.5 MG per tablet    PREMPRO    90 tablet    Take 1 tablet by mouth daily    Perimenopausal vasomotor symptoms       ibuprofen 800 MG tablet    ADVIL/MOTRIN    100 tablet    Take 1 tablet (800 mg) by mouth every 8 hours as needed for moderate pain    Disorder of bursae and tendons in shoulder region       lidocaine (viscous) 2 % solution    XYLOCAINE    100 mL    SWISH AND SPIT 5ML BY MOUTH EVERY 2 HOURS AS NEEDED    Oral aphthae       loratadine 10 MG capsule      Take 10 mg by mouth daily as needed.        metroNIDAZOLE 1 % gel    METROGEL    60 g    Apply topically daily    Rosacea, Perioral dermatitis       order for DME      Use your CPAP device as directed by your provider.        order for DME     2 Units    Support/compression stockings - knee high, medium compression    Varicose veins of legs       order for DME     1 each    Please measure and distribute 1 pair of 20mmHg - 30mmHg knee high open toe compression stockings. Jobst ultrasheer or equivalent.    Varicose veins of both lower extremities with complications       sertraline 100 MG tablet    ZOLOFT    90 tablet    Take 1 tablet (100 mg) by mouth daily    Anxiety       triamcinolone 0.1 % cream     KENALOG    30 g    Apply topically 2 times daily    Eczema, unspecified type       venlafaxine 37.5 MG 24 hr capsule    EFFEXOR-XR    46 capsule    Take 1 capsule daily for 14 days, then take 2 capsules daily.    Anxiety       VITAMIN D (CHOLECALCIFEROL) PO      Take 2,000 Units by mouth daily    Perioral dermatitis, Rosacea       * Notice:  This list has 2 medication(s) that are the same as other medications prescribed for you. Read the directions carefully, and ask your doctor or other care provider to review them with you.

## 2018-08-28 ASSESSMENT — ANXIETY QUESTIONNAIRES: GAD7 TOTAL SCORE: 14

## 2018-08-28 ASSESSMENT — PATIENT HEALTH QUESTIONNAIRE - PHQ9: SUM OF ALL RESPONSES TO PHQ QUESTIONS 1-9: 13

## 2018-08-30 ENCOUNTER — OFFICE VISIT (OUTPATIENT)
Dept: OTOLARYNGOLOGY | Facility: CLINIC | Age: 53
End: 2018-08-30
Payer: COMMERCIAL

## 2018-08-30 ENCOUNTER — PRE VISIT (OUTPATIENT)
Dept: OTOLARYNGOLOGY | Facility: CLINIC | Age: 53
End: 2018-08-30

## 2018-08-30 DIAGNOSIS — R49.0 MUSCLE TENSION DYSPHONIA: ICD-10-CM

## 2018-08-30 DIAGNOSIS — R13.10 DYSPHAGIA, UNSPECIFIED TYPE: ICD-10-CM

## 2018-08-30 DIAGNOSIS — R49.0 MUSCLE TENSION DYSPHONIA: Primary | ICD-10-CM

## 2018-08-30 DIAGNOSIS — R49.0 DYSPHONIA: Primary | ICD-10-CM

## 2018-08-30 NOTE — MR AVS SNAPSHOT
After Visit Summary   8/30/2018    Angy Haji    MRN: [id number]           Patient Information     Date Of Birth          1965        Visit Information        Provider Department      8/30/2018 1:15 PM Ruth Walter, SLP [facility]        Today's Diagnoses     Muscle tension dysphonia    -  1    Dysphagia, unspecified type           Follow-ups after your visit        Your next 10 appointments already scheduled     Sep 04, 2018 10:30 AM CDT   DME RETURN with VIRTUAL CARE COORDINATOR 4   [facility] ([facility])    [address] [place] [address] [address]  [facility] [phone] [phone]            Sep 05, 2018 10:00 AM CDT   Office Visit with Maryjane Quintanilla MD   [facility] ([facility])    [address] [place] [address] [phone]           Bring a current list of meds and any records pertaining to this visit. For Physicals, please bring immunization records and any forms needing to be filled out. Please arrive 10 minutes early to complete paperwork.            Sep 05, 2018  1:30 PM CDT   (Arrive by 1:15 PM)   GEOVANNI For Women Only with Angy Villafuerte PT   [facility] [place] ([facility] [place])    [address] [place] [address] [phone]            Sep 07, 2018  2:30 PM CDT   Office Visit with Romy Min MD   [facility] ([facility])    [address] [place] [address]  [address]  [facility] [address] [phone]           Bring a current list of meds and any records pertaining to this visit. For Physicals, please bring immunization records and any forms needing to be filled out. Please arrive 10 minutes early to complete paperwork.            Sep 12, 2018  3:30 PM CDT   GEOVANNI For Women Only with Angy Villafuerte PT   [facility] [place] ([facility] [place])    [address] [facility] [address]    [phone]            Sep 19, 2018  3:30 PM CDT   GEOVANNI For Women Only with Angy Villafuerte PT   [facility] ([facility] [place])    [address] [place] [address] [phone]            Oct 09, 2018  8:20 AM CDT   PHYSICAL with Maryjane Quintanilla MD   [facility] ([facility])    [address] [place] [address] [phone]            Oct 17, 2018  2:30 PM CDT   Follow Up with Mar Xiong RD, LD   [facility] ([facility])    [address]  [facility] [address] [phone]            Oct 25, 2018 11:30 AM CDT   Return Sleep Patient with Nellie Calloway MD   [facility] ([facility])    [address] [place] [address] [phone]              Who to contact     Please call your clinic at [phone] to:    Ask questions about your health    Make or cancel appointments    Discuss your medicines    Learn about your test results    Speak to your doctor            Additional Information About Your Visit        Acer Information     [employer]cer gives you secure access to your electronic health record. If you see a primary care provider, you can also send messages to your care team and make appointments. If you have questions, please call your primary care clinic.  If you do not have a primary care provider, please call [phone] and they will assist you.      Acer is an electronic gateway that provides easy, online access to your medical records. With [employer], you can request a clinic appointment, read your test results, renew a prescription or communicate with your care team.     To access your existing account, please contact your [facility] or call [phone] for assistance.        Care EveryWhere ID     This is your Care EveryWhere  ID. This could be used by other organizations to access your [place] medical records  [id number]         Blood Pressure from Last 3 Encounters:   08/31/18 142/82   08/27/18 154/87   08/27/18 180/80    Weight from Last 3 Encounters:   08/31/18 112 kg (247 lb)   08/27/18 112 kg (247 lb)   08/27/18 113.4 kg (250 lb)              We Performed the Following     C BEHAVIORAL & QUALITATIVE ANALYSIS VOICE AND RESONANCE        Primary Care Provider Office Phone # Fax #    Maryjane Quintanilla [phone] [address]  [facility] [address]        Equal Access to Services     ORQUIDEA NOGUEIRA : Hadii ariana Wagner, waaxda demianadaha, qaybta kaalmada katlin, svetlana tam . So [facility] [phone].    ATENCIÓN: Si habla español, tiene a franco disposición servicios gratuitos de asistencia lingüística. [facility] [phone].    We comply with applicable federal civil rights laws and [place] laws. We do not discriminate on the basis of race, color, national origin, age, disability, sex, sexual orientation, or gender identity.            Thank you!     Thank you for choosing [facility]  for your care. Our goal is always to provide you with excellent care. Hearing back from our patients is one way we can continue to improve our services. Please take a few minutes to complete the written survey that you may receive in the mail after your visit with us. Thank you!             Your Updated Medication List - Protect others around you: Learn how to safely use, store and throw away your medicines at www.disposemymeds.org.          This list is accurate as of 8/30/18 11:59 PM.  Always use your most recent med list.                   Brand Name Dispense Instructions for use Diagnosis    ciclopirox 0.77 % cream    LOPROX     Apply  topically 2 times daily as needed.    Eczema       estrogen (conjugated)-medroxyPROGESTERone 0.625-2.5 MG per tablet    PREMPRO    90 tablet    Take 1  tablet by mouth daily    Perimenopausal vasomotor symptoms       ibuprofen 800 MG tablet    ADVIL/MOTRIN    100 tablet    Take 1 tablet (800 mg) by mouth every 8 hours as needed for moderate pain    Disorder of bursae and tendons in shoulder region       lidocaine (viscous) 2 % solution    XYLOCAINE    100 mL    SWISH AND SPIT 5ML BY MOUTH EVERY 2 HOURS AS NEEDED    Oral aphthae       loratadine 10 MG capsule      Take 10 mg by mouth daily as needed.        metroNIDAZOLE 1 % gel    METROGEL    60 g    Apply topically daily    Rosacea, Perioral dermatitis       order for DME      Use your CPAP device as directed by your provider.        order for DME     2 Units    Support/compression stockings - knee high, medium compression    Varicose veins of legs       order for DME     1 each    Please measure and distribute 1 pair of 20mmHg - 30mmHg knee high open toe compression stockings. Jobst ultrasheer or equivalent.    Varicose veins of both lower extremities with complications       sertraline 100 MG tablet    ZOLOFT    90 tablet    Take 1 tablet (100 mg) by mouth daily    Anxiety       triamcinolone 0.1 % cream    KENALOG    30 g    Apply topically 2 times daily    Eczema, unspecified type       VITAMIN D (CHOLECALCIFEROL) PO      Take 2,000 Units by mouth daily    Perioral dermatitis, Rosacea

## 2018-08-30 NOTE — LETTER
8/30/2018       RE: Angy Haji  [address]  [place] [address]     Dear Colleague,    Thank you for referring your patient, Angy Haji, to the [facility] EAR NOSE AND THROAT at [facility]. Please see a copy of my visit note below.    HISTORY OF PRESENT ILLNESS: Angy Haji is a 53 year old female with a history of difficulty swallowing over the last 4-6 weeks and hoarseness.  She notes he is really she is recently had menopause is having difficulty with CPAP and having difficulty with sleep and also is having difficulty with seasonal allergies.  She feels that her mouth is dry and has to drink a large amount of water to maintain sensation of hydration in her mouth.  She will feel some soreness in her neck approximately a level of 1/10.  She does have vocal fatigue.  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  She has had some difficulty with solid foods over this 4-6 week.  She notes she has lost a great deal of weight and currently weighs 248 pounds.  She has no airway difficulties and except for food sticking she does have food sticking and some soreness in her neck she has no swallowing difficulties    Last 2 Scores for Patient-Answered VHI Questionnaire  VHI Total Score 8/30/2018   VHI Total Score 11       Last 2 Scores for Patient-Answered CSI Questionnaire  No flowsheet data found.      Last 2 Scores for Patient-Answered EAT Questionnaire  No flowsheet data found.        PAST MEDICAL HISTORY:   Past Medical History:   Diagnosis Date     Disorders of bursae and tendons in shoulder region, unspecified      Elevated blood pressure reading without diagnosis of hypertension      Female infertility of unspecified origin      Gallbladder sludge 5/2013     Mild sleep apnea 5/13/2014     Obesity, unspecified      Oral aphthae      Other acne      Other specified hypoglycemia      PONV (postoperative  nausea and vomiting)        PAST SURGICAL HISTORY:   Past Surgical History:   Procedure Laterality Date     HC TOOTH EXTRACTION W/FORCEP  1984/85    [place] [facility]     LAPAROSCOPIC CHOLECYSTECTOMY  7/29/2013    Procedure: LAPAROSCOPIC CHOLECYSTECTOMY;  Laparoscopic Cholecystectomy;  Surgeon: Fabio Johnson MD;  Location: [facility] OR     LAPAROSCOPIC OOPHORECTOMY Left 6/23/2016    Procedure: LAPAROSCOPIC OOPHORECTOMY;  Surgeon: Kayley Donnelly MD;  Location: [facility] OR     LAPAROSCOPIC SALPINGECTOMY Bilateral 6/23/2016    Procedure: LAPAROSCOPIC SALPINGECTOMY;  Surgeon: Kayley Donnelly MD;  Location: [facility] OR       FAMILY HISTORY:   Family History   Problem Relation Age of Onset     Cardiovascular Paternal Grandfather      anyerism     Obesity Paternal Grandfather      Respiratory Paternal Grandfather      pulmonary (air sacs hardening)     Allergies Father      hayfever     Genitourinary Problems Father      BPH     Lipids Father      diet therapy     Gallbladder Disease Father      cholecystectomy      Eye Disorder Father      retinal tear x 2      Alzheimer Disease Maternal Grandmother      ? poor STM     Arthritis Maternal Grandmother      HEART DISEASE Maternal Grandmother      Arrythmia-got a pacemaker at age 89     Arthritis Mother      Eye Disorder Mother      [place] /macular tear in one eye     Cancer Paternal Grandmother      Uterine CA- as well as her sister     Gynecology Paternal Grandmother      prolapsed uterus- had 10 kids     Obesity Paternal Grandmother      Breast Cancer Maternal Aunt      Other Cancer Paternal Aunt 80     endo. cancer        SOCIAL HISTORY:   Social History   Substance Use Topics     Smoking status: Never Smoker     Smokeless tobacco: Never Used      Comment: non smoke home     Alcohol use Yes      Comment: 2 Drinks Per Month       REVIEW OF SYSTEMS: Ten point review of systems was performed and is negative except for: No flowsheet data found.     ALLERGIES: Penicillins  and Thimerosal    MEDICATIONS:   Current Outpatient Prescriptions   Medication Sig Dispense Refill     ciclopirox (LOPROX) 0.77 % cream Apply  topically 2 times daily as needed.       estrogen, conjugated,-medroxyPROGESTERone (PREMPRO) 0.625-2.5 MG per tablet Take 1 tablet by mouth daily 90 tablet 3     ibuprofen (ADVIL/MOTRIN) 800 MG tablet Take 1 tablet (800 mg) by mouth every 8 hours as needed for moderate pain 100 tablet 0     lidocaine, viscous, (XYLOCAINE) 2 % solution SWISH AND SPIT 5ML BY MOUTH EVERY 2 HOURS AS NEEDED 100 mL 1     Loratadine 10 MG capsule Take 10 mg by mouth daily as needed.       metroNIDAZOLE (METROGEL) 1 % gel Apply topically daily 60 g 9     order for DME Please measure and distribute 1 pair of 20mmHg - 30mmHg knee high open toe compression stockings. Jobst ultrasheer or equivalent. 1 each 1     ORDER FOR DME Use your CPAP device as directed by your provider.       ORDER FOR DME Support/compression stockings - knee high, medium compression   2 Units 11     sertraline (ZOLOFT) 100 MG tablet Take 1 tablet (100 mg) by mouth daily 90 tablet 3     triamcinolone (KENALOG) 0.1 % cream Apply topically 2 times daily 30 g 1     VITAMIN D, CHOLECALCIFEROL, PO Take 2,000 Units by mouth daily           PHYSICAL EXAMINATION:  She  is awake, alert and in no apparent distress.    Her tympanic membranes are clear and intact bilaterally. External auditory canals are clear.  Nasal exam shows a mild septal deviation without obstruction.  Examination of the oral cavity shows no suspicious lesions. The mucosa is mildly dry.   There is symmetric movement of the tongue and soft palate.    The oropharynx is clear.  Her neck is supple without significant adenopathy.  There is tenderness to palpation of the thyrohyoid muscle.  Pulse is regular.  Upper airway is clear.  Cranial nerves II-XII are grossly intact.       PROCEDURE: A flexible laryngoscopy  was performed.  Informed consent was obtained and a time out  was performed. 3% lidocaine and 0.25% phenylephrine was sprayed into the nasal cavity and allowed 3 to 5 minutes for effect. The scope was passed through the right sided nostril. Examination showed the vocal folds to be mobile and meet in the midline.  No nodules, polyps or ulcerations are seen.  There is mild inflammation or erythema of the supraglottic or glottic larynx.  With phonation there is moderate t o severe contraction of the supraglottic larynx.  The hypopharynx is otherwise clear as is the subglottis.     At rest       Phonation         IMPRESSION/PLAN: A significant muscle tension dysphonia with myofascial pain of the thyrohyoid muscle which is causing irritation of her swallowing and focal fatigue.  This is exacerbated by a recent menopause and allergies increased recently increased secretions.  In addition to increased hydration I recommended a trial of speech therapy.  I will see her back after completion of therapy if her symptoms are not satisfactorily resolved.      Again, thank you for allowing me to participate in the care of your patient.      Sincerely,    Miah Wiseman MD

## 2018-08-30 NOTE — PROGRESS NOTES
"[facility] VOICE CLINIC  Miah Wiseman Jr., M.D., F.A.C.S.  Theresa Lai M.D., M.P.H.  Ruth Walter, Ph.D., CCC/SLP  Kelli Washburn M.M. (voice), M.JESSIE., CCC/SLP  Sam Ibrahim M.M. (voice), M.JESSIE., [facility]/SLP    [facility] VOICE [facility]  VOICE/SPEECH/BREATHING EVALUATION AND LARYNGEAL EXAMINATION REPORT    Patient: Angy Haji  Date of Visit: 8/30/2018    CHIEF COMPLAINT:  Voice     HISTORY  Angy Haji was seen for initial voice evaluation and laryngeal examination in conjunction with a visit to Dr. Wiseman.  Please refer to Dr. Wiseman s dictation for a more complete history and impressions. 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    Voice symptoms began gradually a year ago, especially noted in singing    Hurts to talk too much, or to sing    Voice fatigues, and becomes gravelly    Swallow symptoms began gradually 4-6 weeks ago, and this problem seems unrelated to the voice problem    Started with difficulties with foods with \"rough edges,\" but has progressed to soft foods    Coughs on foods, but no sense of aspiration; however, she thinks this is because she is being careful with her swallow    Claritin has helped the swallow somewhat    No swallow study or speech pathology intervention    Also cough, breathing, and globus problems that seem related to the voice problem    DIAGNOSIS/REASON FOR REFERRAL  Dysphonia/ Evaluate, perform laryngeal exam, treat as appropriate    Patient Supplied Answers To [facility] Intake General Questionnaire  [facility] - General Form Review 8/30/2018   Are you having any of these symptoms? Increased effort to talk/sing, Throat irritation, Dry throat, Mucus in throat, Shortness of breath, Poor voice quality   Are you having any of these symptoms? Voice tires easily, Voice breaks, Change in vocal pitch, Change in vocal volume, Change in range, Gravelly voice, Raspy voice, Scratchy voice " "  Do you use caffeine? Yes   If you do use caffeine, how many oz. do you typically drink in a day? 32   How many oz. of non-caffeinated fluid do you typically drink in a day? 64   How often do you experience heartburn, indigestion, chest pain, stomach acid coming up, and/or tasting acid in your mouth or throat? Rarely   If you marked \"Other\", please comment: spicey foods  wine   Have reflux medications been recommended to you? No   If yes, are you taking them regularly? N/A   Voice: Yes   Swallowing: Yes   Cough and/or throat-clearing: Yes   Breathing problem: Yes   Throat discomfort and/or the feeling of a lump in your throat: Yes   Other physicians/health care providers who should receive a copy of today's note (please provide first and last name(s), city): [address]rian [address] [place]       Patient Supplied Answers To [employer] Intake Voice Questionnaire  [facility] Intake - Voice Review 8/30/2018   How long have you had this voice problem? 4 to 6 weeks current symproms 12months singing changes   In regards to your voice problem, was there anything unusual about the time the problem was first noticed (such as illness, accident, surgery, etc.)?  If yes, please describe.  You have 200 characters to respond.  We will ask for more detail at your visit. dry mouth med side effect allergies   What do you think caused this voice problem? hopefully GERD or allergies or menopause changes   How quickly did the voice problem develop? Gradually   For your voice problem, how do the symptoms vary? Worse after heavy voice use   Over time, how has the voice problem changed? Worse   How would you rate your typical vocal demand? Extensive: Voice needs go beyond everyday speech; voice demands are high but can be met even if voice is not perfect   Please list activities that involve your voice (such as singing, coaching, etc.): psychotherapist career   Effort for speaking 5   Effort for singing 9   Overall vocal quality 3 " "  Is your voice ever normal, even briefly? No   What health care professionals have you seen for this voice problem?  Who and when? DR House 8 2018   For your voice problem, what testing/studies have you done (such as imaging/reflux testing)? none   Did you receive any therapy or treatment for your voice problem?  Please describe briefly. no   Prior to this episode, have you ever had a voice problem before?  If yes, at that time did you have therapy or treatment for the voice problem?  Please provide a brief description of that treatment. childhood   For your voice problem, is there anything else you'd like to tell us? no   There isn't much I can do to help myself feel better about this problem. Strongly Disagree   How I deal with the voice problem now is under my control. Strongly Agree   I don't have much control over my emotional reactions to this problem. Strongly Disagree   When I am upset about the voice problem, I can find a way to feel better. Strongly Agree   I have control over my day-to-day reactions to the voice problem. Strongly Agree   There isn't much I can do to keep the voice problem from affecting me. Strongly Disagree   I have control over how I think about the voice problem. Strongly Agree   My reaction to the voice problem is not under my control. Strongly Disagree   VPCMEAN 2.5         Patient Supplied Answers To VHI Questionnaire  Voice Handicap Index (VHI-10) 8/30/2018   My voice makes it difficult for people to hear me 1   People have difficulty understanding me in a noisy room 1   My voice difficulties restrict my personal and social life.  2   I feel left out of conversations because of my voice 1   My voice problem causes me to lose income 0   I feel as though I have to strain to produce voice 3   The clarity of my voice is unpredictable 3   My voice problem upsets me 2   My voice makes me feel handicapped 0   People ask, \"What's wrong with your voice?\" 1   VHI-10 14       Patient " Supplied Answers To [facility] Intake Cough/Throat-Clearing Questionnaire  No flowsheet data found.    Patient Supplied Answers To [facility] Intake Swallow Questionnaire  No flowsheet data found.    Patient Supplied Answers To [facility] Intake Throat Discomfort Questionnaire  No flowsheet data found.    CURRENT PATIENT COMPLAINTS    Primary: voice     Secondary: swallow (effort, discomfort, and changed function)    Denies significant dyspnea, dysphagia, or pain    OTHER PERTINENT HISTORY    Significant weight loss (120+ pounds)    Many medications resulting in dry oral mucosa    Please also see Dr. Wiseman's dictation    OBJECTIVE FINDINGS  VOICE/ SPEECH/ NON-COMMUNICATIVE LARYNGEAL BEHAVIORS EVALUATION  An evaluation of the voice was accomplished today; salient features are as follows:    Palpation of the laryngeal area shows:    Tenderness of the thyrohyoid area     Cough/ Throat clear:    Not observed    Highly verbose    Breathing pattern:    Inspirations are often inadequate for speech in volume and frequency    Phonation is not always coordinated with respiration    Talks past end of breath stream    No overt tension is evident.    Voice quality is characterized by    Mild to moderate inconsistent pressed/strained quality, though within cultural normal limits    Mild inconsistent roughness    Mild to moderate inconsistent glottal gomez    Short utterances can be clear, with no noise or pressed quality    Habitual pitch was not formally tested, but is judged to be WNL and appropriate    Loudness is WNL and appropriate for the setting, or sometimes mildly loud    Sustained vowels: mild pressed quality    A singing task shows voice quality is somewhat improved in singing, with less gomez and less pressed quality    GLOBAL ASSESSMENT OF DYSPHONIA: 32 /100    CAPE-V Overall Severity:   15/100    LARYNGEAL EXAMINATION    Endoscopic laryngeal examination was performed by:  Miah Wiseman MD  I provided technical support, and  provided the protocol of instructions for the patient.  Type of exam:   Flexible endoscopy with chip-tip technology     This exam shows:    Laryngeal and Vocal Fold Mucosa    Essentially healthy laryngeal mucosa    moderate presence of dry thickened secretions on the vocal folds and throughout the laryngeal area    Vocal fold mucosa is   o Within normal limits, with no lesions and straight vibratory margins    Neurological and Functional Integrity of the Larynx    Vocal folds are mobile and meet at midline; movement is brisk and symmetric; exam is neurologically normal     Fatigue is evident during a task of 20 quickly repeated vowels  o There seems to be some mild resistance to movement in both adduction and abduction    Elongation of the vocal folds for pitch increase is WNL    On phonation, glottic closure is pressed    Supraglottic Function and Therapy Probes    Moderate four-way constriction of the supraglottic larynx during connected speech    Supraglottic function during singing is improved    Stroboscopy was not warranted  No evaluation of swallow function was done today.    Dr. Wiseman and I reviewed this laryngeal exam with Ms. Haji today, and I provided pertinent explanations, as well as written information:    Endoscopic findings are consistent with audio-perceptual assessment and patient Hx/complaints.    her symptoms are accounted for by dryness, and muscular hyperfunction/imbalance     Because there appears to be a functional component to her symptoms, she would most likely benefit from functional speech therapy.    ASSESSMENT / PLAN  IMPRESSIONS:  R49.0 (Dysphonia)  R13.10 (Dysphagia)    Laryngeal evaluation demonstrated dryness and muscular hyperfunction and imbalance  Dysphonia/dysphagia/discomfort is accounted for by the imbalanced function of the intrinsic and extrinsic laryngeal musculature    Because there appears to be a functional component to her symptoms, she would most likely benefit from  a course of functional speech therapy  RECOMMENDATIONS:     A course of speech therapy is recommended to optimize vocal technique, improve voice quality, promote reduced discomfort, effort and fatigue and help reduce swallow problems through a more balanced muscular function for swallow.    She demonstrates a Good prognosis for improvement given adherence to therapeutic recommendations. Therapeutic     Positive indicators: commitment to process    Negative indicators: none    TREATMENT PLAN  Speech therapy    DURATION/FREQUENCY OF TREATMENT  Six weekly or bi-weekly, one-hour sessions, with two monthly one-hour follow-up sessions    GOALS  Patient goal:    To understand the problem and fix it as much as possible  To have a normal and acceptable voice quality for all her voice needs  To swallow normally and comfortably    Long-term goal(s): In 8 months, Ms. Haji will:  1.  Report a week of typical vocal activities, in which dysphonia and dysphagia do not exceed a level of 2 out of 10, 80% of the time   2.  Report a speaking and singing voice quality and stamina that are acceptable to her, 90%of the time  3.  In a 20-minute conversation task, demonstrate pressed quality and glottal gomez that do not exceed a level of 2 out of 10, 80% of the time, by therapist judgment    This treatment plan was developed with the patient who agreed with the recommendations.    PRIMARY ICD-10 code:  R49.0 (Dysphonia)  SECONDARY ICD-10 code:  R13.10 (Dysphagia)     TOTAL SERVICE TIME: 45 minutes  EVALUATION OF VOICE AND RESONANCE: (84819): 45 minutes    NO CHARGE FACILITY FEE (01637)      Ruth Walter, Ph.D., [facility]-SLP  Speech-Language Pathologist  Director, [facility]  [phone]

## 2018-08-30 NOTE — MR AVS SNAPSHOT
After Visit Summary   8/30/2018    Angy Haji    MRN: [id number]           Patient Information     Date Of Birth          1965        Visit Information        Provider Department      8/30/2018 1:15 PM Miah Wiseman MD [facility]        Today's Diagnoses     Dysphonia    -  1    Muscle tension dysphonia           Follow-ups after your visit        Follow-up notes from your care team     Return if symptoms worsen or fail to improve, for after completion of therapy.      Your next 10 appointments already scheduled     Sep 04, 2018 10:30 AM CDT   DME RETURN with VIRTUAL CARE COORDINATOR 4   [facility]    [address] [place] [address] [address]  [facility] [id number] [phone]            Sep 05, 2018 10:00 AM CDT   Office Visit with Maryjane Quintanilla MD   [facility] ([facility])    [address] [place] [address] [phone]           Bring a current list of meds and any records pertaining to this visit. For Physicals, please bring immunization records and any forms needing to be filled out. Please arrive 10 minutes early to complete paperwork.            Sep 05, 2018  1:30 PM CDT   (Arrive by 1:15 PM)   GEOVANNI For Women Only with Angy Villafuerte PT   [facility] [place] ([facility])    [address] [place] [id number] [phone]            Sep 07, 2018  2:30 PM CDT   Office Visit with Romy Min MD   [facility]    [address] [place] [address]  [address]  [facility] [id number] [phone]           Bring a current list of meds and any records pertaining to this visit. For Physicals, please bring immunization records and any forms needing to be filled out. Please arrive 10 minutes early to complete paperwork.            Sep 12, 2018  3:30 PM CDT   GEOVANNI For Women Only with Angy VASQUEZ  MARLY Villafuerte   [facility] ([facility])    [address] [facility] [id number] [phone]            Sep 19, 2018  3:30 PM CDT   GEOVANNI For Women Only with Angy Villafuerte PT   [facility] ([facility])    [address] [facility] [address] [phone]            Oct 09, 2018  8:20 AM CDT   PHYSICAL with Maryjane Quintanilla MD   [facility] ([facility])    [address] [facility] [address] [phone]            Oct 17, 2018  2:30 PM CDT   Follow Up with Mar Xiong RD, LD   [facility] ([facility])    [address]  [facility] [id number] [phone]            Oct 25, 2018 11:30 AM CDT   Return Sleep Patient with Nellie Calloway MD   [facility] ([facility])    [address] [facility] [address] [phone]              Who to contact     Please call your clinic at [phone] to:    Ask questions about your health    Make or cancel appointments    Discuss your medicines    Learn about your test results    Speak to your doctor            Additional Information About Your Visit        [facility] Information     [facility] gives you secure access to your electronic health record. If you see a primary care provider, you can also send messages to your care team and make appointments. If you have questions, please call your primary care clinic.  If you do not have a primary care provider, please call [phone] and they will assist you.      [facility] is an electronic gateway that provides easy, online access to your medical records. With [facility], you can request a clinic appointment, read your test results, renew a prescription or communicate with your care team.     To access your existing account, please contact your [facility]  [facility] or call [phone] for assistance.        Care EveryWhere ID     This is your Care EveryWhere ID. This could be used by other organizations to access your [place] medical records  [id number]         Blood Pressure from Last 3 Encounters:   08/27/18 154/87   08/27/18 180/80   08/14/18 144/80    Weight from Last 3 Encounters:   08/27/18 112 kg (247 lb)   08/27/18 113.4 kg (250 lb)   08/14/18 113.4 kg (250 lb)              We Performed the Following     IMAGESTREAM RECORDING ORDER     LARYNGOSCOPY FLEX FIBEROPTIC, DIAGNOSTIC        Primary Care Provider Office Phone # Fax #    Maryjane Quintanilla [phone] [address] [facility] [address]        Equal Access to Services     LUPE NOGUEIRA : Hadii ariana mckinnon hadasho Sopeter, waaxda luqadaha, qaybta kaalmada adeegyada, svetlana tam . So [facility] [phone].    ATENCIÓN: Si habla español, tiene a franco disposición servicios gratuitos de asistencia lingüística. Llame al [phone].    We comply with applicable federal civil rights laws and [place] laws. We do not discriminate on the basis of race, color, national origin, age, disability, sex, sexual orientation, or gender identity.            Thank you!     Thank you for choosing [facility]  for your care. Our goal is always to provide you with excellent care. Hearing back from our patients is one way we can continue to improve our services. Please take a few minutes to complete the written survey that you may receive in the mail after your visit with us. Thank you!             Your Updated Medication List - Protect others around you: Learn how to safely use, store and throw away your medicines at www.disposemymeds.org.          This list is accurate as of 8/30/18 11:59 PM.  Always use your most recent med list.                   Brand Name Dispense Instructions for use Diagnosis    ciclopirox 0.77 % cream    LOPROX     Apply   topically 2 times daily as needed.    Eczema       estrogen (conjugated)-medroxyPROGESTERone 0.625-2.5 MG per tablet    PREMPRO    90 tablet    Take 1 tablet by mouth daily    Perimenopausal vasomotor symptoms       ibuprofen 800 MG tablet    ADVIL/MOTRIN    100 tablet    Take 1 tablet (800 mg) by mouth every 8 hours as needed for moderate pain    Disorder of bursae and tendons in shoulder region       lidocaine (viscous) 2 % solution    XYLOCAINE    100 mL    SWISH AND SPIT 5ML BY MOUTH EVERY 2 HOURS AS NEEDED    Oral aphthae       loratadine 10 MG capsule      Take 10 mg by mouth daily as needed.        metroNIDAZOLE 1 % gel    METROGEL    60 g    Apply topically daily    Rosacea, Perioral dermatitis       order for DME      Use your CPAP device as directed by your provider.        order for DME     2 Units    Support/compression stockings - knee high, medium compression    Varicose veins of legs       order for DME     1 each    Please measure and distribute 1 pair of 20mmHg - 30mmHg knee high open toe compression stockings. Jobst ultrasheer or equivalent.    Varicose veins of both lower extremities with complications       sertraline 100 MG tablet    ZOLOFT    90 tablet    Take 1 tablet (100 mg) by mouth daily    Anxiety       triamcinolone 0.1 % cream    KENALOG    30 g    Apply topically 2 times daily    Eczema, unspecified type       VITAMIN D (CHOLECALCIFEROL) PO      Take 2,000 Units by mouth daily    Perioral dermatitis, Rosacea

## 2018-08-30 NOTE — NURSING NOTE
Chief Complaint   Patient presents with     Consult     throat pain, dry mouth, swallowing issues, hoarseness     Dalton Dasilva

## 2018-08-30 NOTE — PROGRESS NOTES
HISTORY OF PRESENT ILLNESS: Angy Haji is a 53 year old female with a history of difficulty swallowing over the last 4-6 weeks and hoarseness.  She notes he is really she is recently had menopause is having difficulty with CPAP and having difficulty with sleep and also is having difficulty with seasonal allergies.  She feels that her mouth is dry and has to drink a large amount of water to maintain sensation of hydration in her mouth.  She will feel some soreness in her neck approximately a level of 1/10.  She does have vocal fatigue.  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  She has had some difficulty with solid foods over this 4-6 week.  She notes she has lost a great deal of weight and currently weighs 248 pounds.  She has no airway difficulties and except for food sticking she does have food sticking and some soreness in her neck she has no swallowing difficulties    Last 2 Scores for Patient-Answered VHI Questionnaire  VHI Total Score 8/30/2018   VHI Total Score 11       Last 2 Scores for Patient-Answered CSI Questionnaire  No flowsheet data found.      Last 2 Scores for Patient-Answered EAT Questionnaire  No flowsheet data found.        PAST MEDICAL HISTORY:   Past Medical History:   Diagnosis Date     Disorders of bursae and tendons in shoulder region, unspecified      Elevated blood pressure reading without diagnosis of hypertension      Female infertility of unspecified origin      Gallbladder sludge 5/2013     Mild sleep apnea 5/13/2014     Obesity, unspecified      Oral aphthae      Other acne      Other specified hypoglycemia      PONV (postoperative nausea and vomiting)        PAST SURGICAL HISTORY:   Past Surgical History:   Procedure Laterality Date     HC TOOTH EXTRACTION W/FORCEP  1984/85    [place] [facility]     LAPAROSCOPIC CHOLECYSTECTOMY  7/29/2013    Procedure: LAPAROSCOPIC CHOLECYSTECTOMY;  Laparoscopic Cholecystectomy;   Surgeon: Fabio Johnson MD;  Location: [facility]     LAPAROSCOPIC OOPHORECTOMY Left 6/23/2016    Procedure: LAPAROSCOPIC OOPHORECTOMY;  Surgeon: Kayley Donnelly MD;  Location: [facility]     LAPAROSCOPIC SALPINGECTOMY Bilateral 6/23/2016    Procedure: LAPAROSCOPIC SALPINGECTOMY;  Surgeon: Kayley Donnelly MD;  Location: [facility] OR       FAMILY HISTORY:   Family History   Problem Relation Age of Onset     Cardiovascular Paternal Grandfather      anyerism     Obesity Paternal Grandfather      Respiratory Paternal Grandfather      pulmonary (air sacs hardening)     Allergies Father      hayfever     Genitourinary Problems Father      BPH     Lipids Father      diet therapy     Gallbladder Disease Father      cholecystectomy      Eye Disorder Father      retinal tear x 2      Alzheimer Disease Maternal Grandmother      ? poor STM     Arthritis Maternal Grandmother      HEART DISEASE Maternal Grandmother      Arrythmia-got a pacemaker at age 89     Arthritis Mother      Eye Disorder Mother      Catrack /macular tear in one eye     Cancer Paternal Grandmother      Uterine CA- as well as her sister     Gynecology Paternal Grandmother      prolapsed uterus- had 10 kids     Obesity Paternal Grandmother      Breast Cancer Maternal Aunt      Other Cancer Paternal Aunt 80     endo. cancer        SOCIAL HISTORY:   Social History   Substance Use Topics     Smoking status: Never Smoker     Smokeless tobacco: Never Used      Comment: non smoke home     Alcohol use Yes      Comment: 2 Drinks Per Month       REVIEW OF SYSTEMS: Ten point review of systems was performed and is negative except for: No flowsheet data found.     ALLERGIES: Penicillins and Thimerosal    MEDICATIONS:   Current Outpatient Prescriptions   Medication Sig Dispense Refill     ciclopirox (LOPROX) 0.77 % cream Apply  topically 2 times daily as needed.       estrogen, conjugated,-medroxyPROGESTERone (PREMPRO) 0.625-2.5 MG per tablet Take 1 tablet by  mouth daily 90 tablet 3     ibuprofen (ADVIL/MOTRIN) 800 MG tablet Take 1 tablet (800 mg) by mouth every 8 hours as needed for moderate pain 100 tablet 0     lidocaine, viscous, (XYLOCAINE) 2 % solution SWISH AND SPIT 5ML BY MOUTH EVERY 2 HOURS AS NEEDED 100 mL 1     Loratadine 10 MG capsule Take 10 mg by mouth daily as needed.       metroNIDAZOLE (METROGEL) 1 % gel Apply topically daily 60 g 9     order for DME Please measure and distribute 1 pair of 20mmHg - 30mmHg knee high open toe compression stockings. Jobst ultrasheer or equivalent. 1 each 1     ORDER FOR DME Use your CPAP device as directed by your provider.       ORDER FOR DME Support/compression stockings - knee high, medium compression   2 Units 11     sertraline (ZOLOFT) 100 MG tablet Take 1 tablet (100 mg) by mouth daily 90 tablet 3     triamcinolone (KENALOG) 0.1 % cream Apply topically 2 times daily 30 g 1     VITAMIN D, CHOLECALCIFEROL, PO Take 2,000 Units by mouth daily           PHYSICAL EXAMINATION:  She  is awake, alert and in no apparent distress.    Her tympanic membranes are clear and intact bilaterally. External auditory canals are clear.  Nasal exam shows a mild septal deviation without obstruction.  Examination of the oral cavity shows no suspicious lesions. The mucosa is mildly dry.   There is symmetric movement of the tongue and soft palate.    The oropharynx is clear.  Her neck is supple without significant adenopathy.  There is tenderness to palpation of the thyrohyoid muscle.  Pulse is regular.  Upper airway is clear.  Cranial nerves II-XII are grossly intact.       PROCEDURE: A flexible laryngoscopy  was performed.  Informed consent was obtained and a time out was performed. 3% lidocaine and 0.25% phenylephrine was sprayed into the nasal cavity and allowed 3 to 5 minutes for effect. The scope was passed through the right sided nostril. Examination showed the vocal folds to be mobile and meet in the midline.  No nodules, polyps or  ulcerations are seen.  There is mild inflammation or erythema of the supraglottic or glottic larynx.  With phonation there is moderate t o severe contraction of the supraglottic larynx.  The hypopharynx is otherwise clear as is the subglottis.     At rest       Phonation         IMPRESSION/PLAN: A significant muscle tension dysphonia with myofascial pain of the thyrohyoid muscle which is causing irritation of her swallowing and focal fatigue.  This is exacerbated by a recent menopause and allergies increased recently increased secretions.  In addition to increased hydration I recommended a trial of speech therapy.  I will see her back after completion of therapy if her symptoms are not satisfactorily resolved.

## 2018-08-31 ENCOUNTER — OFFICE VISIT (OUTPATIENT)
Dept: URGENT CARE | Facility: URGENT CARE | Age: 53
End: 2018-08-31
Payer: COMMERCIAL

## 2018-08-31 VITALS
DIASTOLIC BLOOD PRESSURE: 82 MMHG | WEIGHT: 247 LBS | TEMPERATURE: 98.1 F | HEART RATE: 74 BPM | SYSTOLIC BLOOD PRESSURE: 142 MMHG | OXYGEN SATURATION: 100 % | BODY MASS INDEX: 37.44 KG/M2 | HEIGHT: 68 IN

## 2018-08-31 DIAGNOSIS — F41.9 ANXIETY: ICD-10-CM

## 2018-08-31 DIAGNOSIS — F51.01 PRIMARY INSOMNIA: ICD-10-CM

## 2018-08-31 DIAGNOSIS — R00.2 PALPITATIONS: Primary | ICD-10-CM

## 2018-08-31 PROBLEM — R49.0 MUSCLE TENSION DYSPHONIA: Status: ACTIVE | Noted: 2018-08-31

## 2018-08-31 PROCEDURE — 36415 COLL VENOUS BLD VENIPUNCTURE: CPT | Performed by: INTERNAL MEDICINE

## 2018-08-31 PROCEDURE — 84443 ASSAY THYROID STIM HORMONE: CPT | Performed by: INTERNAL MEDICINE

## 2018-08-31 PROCEDURE — 99214 OFFICE O/P EST MOD 30 MIN: CPT | Performed by: INTERNAL MEDICINE

## 2018-08-31 PROCEDURE — 93000 ELECTROCARDIOGRAM COMPLETE: CPT | Performed by: INTERNAL MEDICINE

## 2018-08-31 RX ORDER — PROPRANOLOL HYDROCHLORIDE 20 MG/1
20 TABLET ORAL 3 TIMES DAILY
Qty: 60 TABLET | Refills: 0 | Status: SHIPPED | OUTPATIENT
Start: 2018-08-31 | End: 2018-09-06

## 2018-08-31 NOTE — MR AVS SNAPSHOT
After Visit Summary   8/31/2018    Angy Haji    MRN: [id number]           Patient Information     Date Of Birth          1965        Visit Information        Provider Department      8/31/2018 7:00 PM Digna Ramirez MD [facility]        Today's Diagnoses     Palpitations    -  1    Anxiety        Primary insomnia          Care Instructions      Propranolol tablets  Brand Name: Inderal  What is this medicine?  PROPRANOLOL (proe PRAN oh lole) is a beta-blocker. Beta-blockers reduce the workload on the heart and help it to beat more regularly. This medicine is used to treat high blood pressure, to control irregular heart rhythms (arrhythmias) and to relieve chest pain caused by angina. It may also be helpful after a heart attack. This medicine is also used to prevent migraine headaches, relieve uncontrollable shaking (tremors), and help certain problems related to the thyroid gland and adrenal gland.  How should I use this medicine?  Take this medicine by mouth with a glass of water. Follow the directions on the prescription label. Take your doses at regular intervals. Do not take your medicine more often than directed. Do not stop taking except on your the advice of your doctor or health care professional.  Talk to your pediatrician regarding the use of this medicine in children. Special care may be needed.  What side effects may I notice from receiving this medicine?  Side effects that you should report to your doctor or health care professional as soon as possible:    allergic reactions like skin rash, itching or hives, swelling of the face, lips, or tongue    breathing problems    changes in blood sugar    cold hands or feet    difficulty sleeping, nightmares    dry peeling skin    hallucinations    muscle cramps or weakness    slow heart rate    swelling of the legs and ankles    vomiting  Side effects that usually do not require medical attention (report to your  doctor or health care professional if they continue or are bothersome):    change in sex drive or performance    diarrhea    dry sore eyes    hair loss    nausea    weak or tired  What may interact with this medicine?  Do not take this medicine with any of the following medications:    feverfew    phenothiazines like chlorpromazine, mesoridazine, prochlorperazine, thioridazine  This medicine may also interact with the following medications:    aluminum hydroxide gel    antipyrine    antiviral medicines for HIV or AIDS    barbiturates like phenobarbital    certain medicines for blood pressure, heart disease, irregular heart beat    cimetidine    ciprofloxacin    diazepam    fluconazole    haloperidol    isoniazid    medicines for cholesterol like cholestyramine or colestipol    medicines for mental depression    medicines for migraine headache like almotriptan, eletriptan, frovatriptan, naratriptan, rizatriptan, sumatriptan, zolmitriptan    NSAIDs, medicines for pain and inflammation, like ibuprofen or naproxen    phenytoin    rifampin    teniposide    theophylline    thyroid medicines    tolbutamide    warfarin    zileuton  What if I miss a dose?  If you miss a dose, take it as soon as you can. If it is almost time for your next dose, take only that dose. Do not take double or extra doses.  Where should I keep my medicine?  Keep out of the reach of children.  Store at room temperature between 15 and 30 degrees C (59 and 86 degrees F). Protect from light. Throw away any unused medicine after the expiration date.  What should I tell my health care provider before I take this medicine?  They need to know if you have any of these conditions:    circulation problems or blood vessel disease    diabetes    history of heart attack or heart disease, vasospastic angina    kidney disease    liver disease    lung or breathing disease, like asthma or emphysema    pheochromocytoma    slow heart rate    thyroid disease    an  unusual or allergic reaction to propranolol, other beta-blockers, medicines, foods, dyes, or preservatives    pregnant or trying to get pregnant    breast-feeding  What should I watch for while using this medicine?  Visit your doctor or health care professional for regular check ups. Check your blood pressure and pulse rate regularly. Ask your health care professional what your blood pressure and pulse rate should be, and when you should contact them.  You may get drowsy or dizzy. Do not drive, use machinery, or do anything that needs mental alertness until you know how this drug affects you. Do not stand or sit up quickly, especially if you are an older patient. This reduces the risk of dizzy or fainting spells. Alcohol can make you more drowsy and dizzy. Avoid alcoholic drinks.  This medicine can affect blood sugar levels. If you have diabetes, check with your doctor or health care professional before you change your diet or the dose of your diabetic medicine.  Do not treat yourself for coughs, colds, or pain while you are taking this medicine without asking your doctor or health care professional for advice. Some ingredients may increase your blood pressure.  NOTE:This sheet is a summary. It may not cover all possible information. If you have questions about this medicine, talk to your doctor, pharmacist, or health care provider. Copyright  2018 [employer]                Follow-ups after your visit        Your next 10 appointments already scheduled     Sep 04, 2018 10:30 AM CDT   DME RETURN with VIRTUAL CARE COORDINATOR 4   [facility] ([facility]    [address] [place] [id number] [phone]            Sep 05, 2018 10:00 AM CDT   Office Visit with Maryjane Quintanilla MD   [facility] ([facility])    [address] [facility] [address] [phone]           Bring a current list of meds and any records  pertaining to this visit. For Physicals, please bring immunization records and any forms needing to be filled out. Please arrive 10 minutes early to complete paperwork.            Sep 05, 2018  1:30 PM CDT   (Arrive by 1:15 PM)   [facility] For Women Only with Angy Villafuerte PT   [facility] [place] ([facility] [place])    [address] [facility] [address]   [phone]            Sep 07, 2018  2:30 PM CDT   Office Visit with Romy Min MD   [facility] ([facility])    [address] [place] [address] [phone]           Bring a current list of meds and any records pertaining to this visit. For Physicals, please bring immunization records and any forms needing to be filled out. Please arrive 10 minutes early to complete paperwork.            Sep 12, 2018  3:30 PM CDT   GEOVANNI For Women Only with Angy Villafuerte PT   [facility] [place] ([facility] [place])    [address] [facility] [address]   [phone]            Sep 19, 2018  3:30 PM CDT   GEOVANNI For Women Only with Angy Villafuerte PT   [facility] [place] ([facility] [place])    [address] [place] [address]   [phone]            Oct 09, 2018  8:20 AM CDT   PHYSICAL with Maryjane Quintanilla MD   [facility] ([facility])    [address] [place] [address]   [phone]            Oct 17, 2018  2:30 PM CDT   Follow Up with Mar Xiong RD, LD   [facility] ([facility])    [address]  [facility] [id number] [phone]            Oct 25, 2018 11:30 AM CDT   Return Sleep Patient with Nellie Calloway MD   [facility] ([facility])    [address] [place] [address] [phone]              Who to contact      "If you have questions or need follow up information about today's clinic visit or your schedule please contact [facility] directly at [phone].  Normal or non-critical lab and imaging results will be communicated to you by MyChart, letter or phone within 4 business days after the clinic has received the results. If you do not hear from us within 7 days, please contact the clinic through [employer] or phone. If you have a critical or abnormal lab result, we will notify you by phone as soon as possible.  Submit refill requests through [employer] or call your pharmacy and they will forward the refill request to us. Please allow 3 business days for your refill to be completed.          Additional Information About Your Visit        [employer] Information     [employer] gives you secure access to your electronic health record. If you see a primary care provider, you can also send messages to your care team and make appointments. If you have questions, please call your primary care clinic.  If you do not have a primary care provider, please call [phone] and they will assist you.        Care EveryWhere ID     This is your Care EveryWhere ID. This could be used by other organizations to access your [facility] [place] medical records  [id number]        Your Vitals Were     Pulse Temperature Height Pulse Oximetry BMI (Body Mass Index)       74 98.1  F (36.7  C) (Tympanic) 5' 8\" (1.727 m) 100% 37.56 kg/m2        Blood Pressure from Last 3 Encounters:   08/31/18 142/82   08/27/18 154/87   08/27/18 180/80    Weight from Last 3 Encounters:   08/31/18 247 lb (112 kg)   08/27/18 247 lb (112 kg)   08/27/18 250 lb (113.4 kg)              We Performed the Following     EKG 12-lead complete w/read - Clinics     TSH        Primary Care Provider Office Phone # Fax #    Maryjane Quintanilla [phone] [address] [facility] [address]        Equal Access to Services     LUPE NOGUEIRA AH: Hadii aad ku " romero Wagner, wakiritda luqadaha, qaybta kaalmada katlin, svetlana patel jeffradha lareidglenroy avi. So [facility] [phone].    ATENCIÓN: Si ashley santos, tiene a franco disposición servicios gratuitos de asistencia lingüística. Haley al [phone].    We comply with applicable federal civil rights laws and [place] laws. We do not discriminate on the basis of race, color, national origin, age, disability, sex, sexual orientation, or gender identity.            Thank you!     Thank you for choosing [facility]  for your care. Our goal is always to provide you with excellent care. Hearing back from our patients is one way we can continue to improve our services. Please take a few minutes to complete the written survey that you may receive in the mail after your visit with us. Thank you!             Your Updated Medication List - Protect others around you: Learn how to safely use, store and throw away your medicines at www.disposemymeds.org.          This list is accurate as of 8/31/18  8:23 PM.  Always use your most recent med list.                   Brand Name Dispense Instructions for use Diagnosis    ciclopirox 0.77 % cream    LOPROX     Apply  topically 2 times daily as needed.    Eczema       estrogen (conjugated)-medroxyPROGESTERone 0.625-2.5 MG per tablet    PREMPRO    90 tablet    Take 1 tablet by mouth daily    Perimenopausal vasomotor symptoms       ibuprofen 800 MG tablet    ADVIL/MOTRIN    100 tablet    Take 1 tablet (800 mg) by mouth every 8 hours as needed for moderate pain    Disorder of bursae and tendons in shoulder region       lidocaine (viscous) 2 % solution    XYLOCAINE    100 mL    SWISH AND SPIT 5ML BY MOUTH EVERY 2 HOURS AS NEEDED    Oral aphthae       loratadine 10 MG capsule      Take 10 mg by mouth daily as needed.        metroNIDAZOLE 1 % gel    METROGEL    60 g    Apply topically daily    Rosacea, Perioral dermatitis       order for DME      Use your CPAP device  as directed by your provider.        order for DME     2 Units    Support/compression stockings - knee high, medium compression    Varicose veins of legs       order for DME     1 each    Please measure and distribute 1 pair of 20mmHg - 30mmHg knee high open toe compression stockings. Jobst ultrasheer or equivalent.    Varicose veins of both lower extremities with complications       sertraline 100 MG tablet    ZOLOFT    90 tablet    Take 1 tablet (100 mg) by mouth daily    Anxiety       triamcinolone 0.1 % cream    KENALOG    30 g    Apply topically 2 times daily    Eczema, unspecified type       VITAMIN D (CHOLECALCIFEROL) PO      Take 2,000 Units by mouth daily    Perioral dermatitis, Rosacea

## 2018-09-01 LAB — TSH SERPL DL<=0.005 MIU/L-ACNC: 2.19 MU/L (ref 0.4–4)

## 2018-09-01 NOTE — PATIENT INSTRUCTIONS
Propranolol tablets  Brand Name: Inderal  What is this medicine?  PROPRANOLOL (proe PRAN oh lole) is a beta-blocker. Beta-blockers reduce the workload on the heart and help it to beat more regularly. This medicine is used to treat high blood pressure, to control irregular heart rhythms (arrhythmias) and to relieve chest pain caused by angina. It may also be helpful after a heart attack. This medicine is also used to prevent migraine headaches, relieve uncontrollable shaking (tremors), and help certain problems related to the thyroid gland and adrenal gland.  How should I use this medicine?  Take this medicine by mouth with a glass of water. Follow the directions on the prescription label. Take your doses at regular intervals. Do not take your medicine more often than directed. Do not stop taking except on your the advice of your doctor or health care professional.  Talk to your pediatrician regarding the use of this medicine in children. Special care may be needed.  What side effects may I notice from receiving this medicine?  Side effects that you should report to your doctor or health care professional as soon as possible:    allergic reactions like skin rash, itching or hives, swelling of the face, lips, or tongue    breathing problems    changes in blood sugar    cold hands or feet    difficulty sleeping, nightmares    dry peeling skin    hallucinations    muscle cramps or weakness    slow heart rate    swelling of the legs and ankles    vomiting  Side effects that usually do not require medical attention (report to your doctor or health care professional if they continue or are bothersome):    change in sex drive or performance    diarrhea    dry sore eyes    hair loss    nausea    weak or tired  What may interact with this medicine?  Do not take this medicine with any of the following medications:    feverfew    phenothiazines like chlorpromazine, mesoridazine, prochlorperazine, thioridazine  This medicine  may also interact with the following medications:    aluminum hydroxide gel    antipyrine    antiviral medicines for HIV or AIDS    barbiturates like phenobarbital    certain medicines for blood pressure, heart disease, irregular heart beat    cimetidine    ciprofloxacin    diazepam    fluconazole    haloperidol    isoniazid    medicines for cholesterol like cholestyramine or colestipol    medicines for mental depression    medicines for migraine headache like almotriptan, eletriptan, frovatriptan, naratriptan, rizatriptan, sumatriptan, zolmitriptan    NSAIDs, medicines for pain and inflammation, like ibuprofen or naproxen    phenytoin    rifampin    teniposide    theophylline    thyroid medicines    tolbutamide    warfarin    zileuton  What if I miss a dose?  If you miss a dose, take it as soon as you can. If it is almost time for your next dose, take only that dose. Do not take double or extra doses.  Where should I keep my medicine?  Keep out of the reach of children.  Store at room temperature between 15 and 30 degrees C (59 and 86 degrees F). Protect from light. Throw away any unused medicine after the expiration date.  What should I tell my health care provider before I take this medicine?  They need to know if you have any of these conditions:    circulation problems or blood vessel disease    diabetes    history of heart attack or heart disease, vasospastic angina    kidney disease    liver disease    lung or breathing disease, like asthma or emphysema    pheochromocytoma    slow heart rate    thyroid disease    an unusual or allergic reaction to propranolol, other beta-blockers, medicines, foods, dyes, or preservatives    pregnant or trying to get pregnant    breast-feeding  What should I watch for while using this medicine?  Visit your doctor or health care professional for regular check ups. Check your blood pressure and pulse rate regularly. Ask your health care professional what your blood pressure and  pulse rate should be, and when you should contact them.  You may get drowsy or dizzy. Do not drive, use machinery, or do anything that needs mental alertness until you know how this drug affects you. Do not stand or sit up quickly, especially if you are an older patient. This reduces the risk of dizzy or fainting spells. Alcohol can make you more drowsy and dizzy. Avoid alcoholic drinks.  This medicine can affect blood sugar levels. If you have diabetes, check with your doctor or health care professional before you change your diet or the dose of your diabetic medicine.  Do not treat yourself for coughs, colds, or pain while you are taking this medicine without asking your doctor or health care professional for advice. Some ingredients may increase your blood pressure.  NOTE:This sheet is a summary. It may not cover all possible information. If you have questions about this medicine, talk to your doctor, pharmacist, or health care provider. Copyright  2018 Elsevier

## 2018-09-01 NOTE — PROGRESS NOTES
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  Was 180/80 last week, improved to 150/80 on second check. /82 today.  No chest pain. + palpitations * 2 days.  Very nervous about elevated BP. No syncope.  No spells.    2) Inability to sleep.  Present * 6 months, got better with conservative measures.  Worse again for past 1 month.  Pt sleeping 4.5 hours.      3) Severe anxiety, pressured speech.  Does not want to harm self or others.  Very stressful job, which she is in the process of leaving.  Also stressful home situation, moving for daughter's school.    4) Full neck feeling, trouble swallowing.  TSH 0.9 in April 2018.  + wt loss. + stress. + alternating diarrhea/const.    5) Perimenopause. Worsening hot flashes. Recent period.      Past Medical History:   Diagnosis Date     Disorders of bursae and tendons in shoulder region, unspecified      Elevated blood pressure reading without diagnosis of hypertension      Female infertility of unspecified origin      Gallbladder sludge 5/2013     Mild sleep apnea 5/13/2014     Obesity, unspecified      Oral aphthae      Other acne      Other specified hypoglycemia      PONV (postoperative nausea and vomiting)      Past Surgical History:   Procedure Laterality Date     HC TOOTH EXTRACTION W/FORCEP  1984/85    Webb Teeth     LAPAROSCOPIC CHOLECYSTECTOMY  7/29/2013    Procedure: LAPAROSCOPIC CHOLECYSTECTOMY;  Laparoscopic Cholecystectomy;  Surgeon: Fabio Johnson MD;  Location: [facility]     LAPAROSCOPIC OOPHORECTOMY Left 6/23/2016    Procedure: LAPAROSCOPIC OOPHORECTOMY;  Surgeon: Kayley Donnelly MD;  Location: [facility]     LAPAROSCOPIC SALPINGECTOMY Bilateral 6/23/2016    Procedure: LAPAROSCOPIC SALPINGECTOMY;  Surgeon: Kayley Donnelly MD;  Location: [facility] OR     Social History   Substance Use Topics      "Smoking status: Never Smoker     Smokeless tobacco: Never Used      Comment: non smoke home     Alcohol use Yes      Comment: 2 Drinks Per Month       ROS:  Pertinent positive and negative ROS as noted above.    Objective:  /82  Pulse 74  Temp 98.1  F (36.7  C) (Tympanic)  Ht 5' 8\" (1.727 m)  Wt 247 lb (112 kg)  SpO2 100%  BMI 37.56 kg/m2    Physical Exam:  General: Not in acute distress. Comfortable.  Eyes: Anicteric, no conjunctival erythema.  ENT: Normocephalic, atraumatic.  Moist oral mucosa.  Pulm: No increased work of breathing . CTAB  CV: 2+ pulses peripherally, warm and well perfused. RRR  Abdomen:  Soft, nontender, nondistended   Skin: No rashes, lesions noted on exposed skin.  Neuro: Gait is normal. No focal weakness on gross examination.  Psych: Pressured speech. Mood fluctuating.    Assessment and Plan:  (R00.2) Palpitations  (primary encounter diagnosis)  (F41.9) Anxiety  (F51.01) Primary insomnia  Plan: EKG 12-lead complete w/read - Clinics, TSH    1)  Elevated BP.  Ok today.  Will start propranolol for anxiety, palpitations, sleep.  Warned of SE of sleepiness and bradycardia.     2) Inability to sleep.  Propranolol as above and melatonin 10mg HS    3) Severe anxiety, pressured speech.  Seeing therapy as of today.  Warned to go to ER if SI    4) Full neck feeling, trouble swallowing. Recheck TSH given sx.    5) Perimenopause. Worsening hot flashes. Recent period.  May need to adjust hormones once BP better controlled.  Has appt next week      There are no Patient Instructions on file for this visit.      Digna Ramirez MD    "

## 2018-09-04 ENCOUNTER — DOCUMENTATION ONLY (OUTPATIENT)
Dept: SLEEP MEDICINE | Facility: CLINIC | Age: 53
End: 2018-09-04

## 2018-09-05 ENCOUNTER — THERAPY VISIT (OUTPATIENT)
Dept: PHYSICAL THERAPY | Facility: CLINIC | Age: 53
End: 2018-09-05
Payer: COMMERCIAL

## 2018-09-05 DIAGNOSIS — N39.46 MIXED STRESS AND URGE URINARY INCONTINENCE: Primary | ICD-10-CM

## 2018-09-05 PROCEDURE — 97535 SELF CARE MNGMENT TRAINING: CPT | Mod: GP | Performed by: PHYSICAL THERAPIST

## 2018-09-05 PROCEDURE — 97530 THERAPEUTIC ACTIVITIES: CPT | Mod: GP | Performed by: PHYSICAL THERAPIST

## 2018-09-05 PROCEDURE — 97112 NEUROMUSCULAR REEDUCATION: CPT | Mod: GP | Performed by: PHYSICAL THERAPIST

## 2018-09-05 PROCEDURE — 97162 PT EVAL MOD COMPLEX 30 MIN: CPT | Mod: GP | Performed by: PHYSICAL THERAPIST

## 2018-09-05 NOTE — PROGRESS NOTES
Pt came in today because she's still struggling with being tired and she's wondering if her mask is the issue.  I reviewed her downloads and it appears that the mask was working well when she first got it and then recently there is intermittent leaking. She has not replaced the cushion in quite awhile.  We decided that since she has struggled to find a mask that pt likes, that she will stick with the Airfit F20 large.  She is eligible for a new mask and will be going to [facility] in [place] to get one.  She has a lot of stress in her life right now that is contributing to her sleep issues.  She is following up with her OB, primary care and her sleep providers to address her concerns.

## 2018-09-05 NOTE — PROGRESS NOTES
SUBJECTIVE:   Angy Haji is a 53 year old female who presents to clinic today for the following health issues:     Insomnia    Onset: 1 month(s) ago     Description:  How long does it take to fall asleep (sleep latency): 15 minutes  Early morning/middle of night awakening: YES                 Excessive snoring/apnea: YES pt uses CPAP  Daytime sleepiness/napping: YES    Precipitating  factors:    Any new stressful situation: YES, family health problems and work  Chronic pain: no  Frequent urination: YES  Caffeine intake: YES  OTC decongestants: YES  Any new medications: propanolol and hormone therapy        Leg discomfort: no        Changes in weight: no    History:    History of hormone therapy recently on treatment   Alcohol use: no                 Self medicating with Alcohol/drugs:  No     She saw her sleep specialist on September 4 and just got a new CPAP mask because her previous mask is leaking.     She was seen in urgent care on August 31 and reported 6 months of inability to sleep initially getting better with conservative measures but worse again for the past month.  She reports she is sleeping 4.5 hours a night.  She has been experiencing severe anxiety.  She has a very stressful job, which she is in the process of leaving.  She also has a stressful home situation now because she is moving for her daughter's school.  She has also had worsening hot flashes.  She saw Dr. Villafana for this recently.  At the urgent care, she was given propranolol to help with anxiety and sleep and melatonin 10 mg at bedtime.    She has been drinking caffeine about 24 to 32 ounces. She is here primarily today for FMLA and short term disability paperwork due to her insomnia and high blood pressure. BP is back to normal.     She is falling asleep well with melatonin 10 mg at bedtime.  However, she is experiencing nighttime awakenings with frequent hot flashes and reports awakening easily with noises around the house as well.   She has continued to work up until she was seen in the urgent care, at which time she was given a letter to be off work.  Her job has been higher stress lately.  She has been able to function at work, but has been finding it more difficult.  She has been having headaches daily.  None severe.    Problem list and histories reviewed & adjusted, as indicated.  Additional history: as documented    Patient Active Problem List   Diagnosis     Oral aphthae     Obesity (BMI 30-39.9)     Disorder of bursae and tendons in shoulder region     CARDIOVASCULAR SCREENING; LDL GOAL LESS THAN 160     Rosacea     Tear of medial cartilage or meniscus of knee, current     Raynaud disease     Vitamin D deficiency disease     Mild sleep apnea     Atopic rhinitis     Morbid obesity (H)     Muscle tension dysphonia     Palpitations     Past Surgical History:   Procedure Laterality Date     HC TOOTH EXTRACTION W/FORCEP  1984/85    [place] [facility]     LAPAROSCOPIC CHOLECYSTECTOMY  7/29/2013    Procedure: LAPAROSCOPIC CHOLECYSTECTOMY;  Laparoscopic Cholecystectomy;  Surgeon: Fabio Johnson MD;  Location: [facility]     LAPAROSCOPIC OOPHORECTOMY Left 6/23/2016    Procedure: LAPAROSCOPIC OOPHORECTOMY;  Surgeon: Kayley Donnelly MD;  Location: [facility]     LAPAROSCOPIC SALPINGECTOMY Bilateral 6/23/2016    Procedure: LAPAROSCOPIC SALPINGECTOMY;  Surgeon: Kayley Donnelly MD;  Location: [facility] OR       Social History   Substance Use Topics     Smoking status: Never Smoker     Smokeless tobacco: Never Used      Comment: non smoke home     Alcohol use Yes      Comment: 2 Drinks Per Month     Family History   Problem Relation Age of Onset     Cardiovascular Paternal Grandfather      anyerism     Obesity Paternal Grandfather      Respiratory Paternal Grandfather      pulmonary (air sacs hardening)     Allergies Father      hayfever     Genitourinary Problems Father      BPH     Lipids Father      diet therapy     Gallbladder Disease Father       cholecystectomy      Eye Disorder Father      retinal tear x 2      Alzheimer Disease Maternal Grandmother      ? poor STM     Arthritis Maternal Grandmother      HEART DISEASE Maternal Grandmother      Arrythmia-got a pacemaker at age 89     Arthritis Mother      Eye Disorder Mother      [place] /macular tear in one eye     Cancer Paternal Grandmother      Uterine CA- as well as her sister     Gynecology Paternal Grandmother      prolapsed uterus- had 10 kids     Obesity Paternal Grandmother      Breast Cancer Maternal Aunt      Other Cancer Paternal Aunt 80     endo. cancer          Current Outpatient Prescriptions   Medication Sig Dispense Refill     ciclopirox (LOPROX) 0.77 % cream Apply  topically 2 times daily as needed.       estrogen, conjugated,-medroxyPROGESTERone (PREMPRO) 0.625-2.5 MG per tablet Take 1 tablet by mouth daily 90 tablet 3     ibuprofen (ADVIL/MOTRIN) 800 MG tablet Take 1 tablet (800 mg) by mouth every 8 hours as needed for moderate pain 100 tablet 0     lidocaine, viscous, (XYLOCAINE) 2 % solution SWISH AND SPIT 5ML BY MOUTH EVERY 2 HOURS AS NEEDED 100 mL 1     Loratadine 10 MG capsule Take 10 mg by mouth daily as needed.       metroNIDAZOLE (METROGEL) 1 % gel Apply topically daily 60 g 9     order for DME Please measure and distribute 1 pair of 20mmHg - 30mmHg knee high open toe compression stockings. Jobst ultrasheer or equivalent. 1 each 1     ORDER FOR DME Use your CPAP device as directed by your provider.       ORDER FOR DME Support/compression stockings - knee high, medium compression   2 Units 11     propranolol (INDERAL) 20 MG tablet Take 1 tablet (20 mg) by mouth 3 times daily 60 tablet 0     sertraline (ZOLOFT) 100 MG tablet Take 1 tablet (100 mg) by mouth daily 90 tablet 3     triamcinolone (KENALOG) 0.1 % cream Apply topically 2 times daily 30 g 1     VITAMIN D, CHOLECALCIFEROL, PO Take 2,000 Units by mouth daily       Allergies   Allergen Reactions     Penicillins Rash      "Thimerosal      blisters on inside of eyelid     Recent Labs   Lab Test  08/31/18 2029 08/09/18   0937  04/06/18   1222  10/02/17   0842  10/03/16   0742  06/20/16   0843  09/24/15   0904  09/15/14   0733   09/16/13   0739  08/22/12   0752  08/16/11   0757   A1C   --    --    --    --    --    --   5.2   --    --   5.1  5.2  5.3   LDL   --    --    --   49  51   --   47  24   --   44  50  34   HDL   --    --    --   96  75   --   73  75   --   62  66  47*   TRIG   --    --    --   55  56   --   58  46   --   64  50  97   ALT   --   23   --    --    --    --    --    --    --    --    --   13   CR   --   0.58   --    --    --   0.50*   --   0.56   --   0.47*  0.54  0.59   GFRESTIMATED   --   >90   --    --    --   >90  Non  GFR Calc     --   >90  Non  GFR Calc     --   >90  >90  >90   GFRESTBLACK   --   >90   --    --    --   >90   GFR Calc     --   >90   GFR Calc     --   >90  >90  >90   POTASSIUM   --    --    --    --    --   3.7   --   4.3   --   3.9  4.3  4.1   TSH  2.19   --   0.99   --    --    --    --    --    < >   --    --    --     < > = values in this interval not displayed.      BP Readings from Last 3 Encounters:   09/06/18 130/74   08/31/18 142/82   08/27/18 154/87    Wt Readings from Last 3 Encounters:   09/06/18 253 lb (114.8 kg)   08/31/18 247 lb (112 kg)   08/27/18 247 lb (112 kg)              Reviewed and updated as needed this visit by clinical staff  Tobacco  Allergies  Meds  Med Hx  Surg Hx  Fam Hx  Soc Hx      Reviewed and updated as needed this visit by Provider         ROS:  As above     OBJECTIVE:     /74 (BP Location: Right arm, Patient Position: Sitting)  Pulse 70  Temp 97.9  F (36.6  C) (Tympanic)  Resp 14  Ht 5' 8\" (1.727 m)  Wt 253 lb (114.8 kg)  SpO2 98%  BMI 38.47 kg/m2  Body mass index is 38.47 kg/(m^2).   BP Readings from Last 6 Encounters:   09/06/18 130/74   08/31/18 142/82   08/27/18 154/87 "   08/27/18 180/80   08/14/18 144/80   08/09/18 120/60      GENERAL: healthy, alert and no distress  PSYCH: mentation appears normal, tearful, speech somewhat pressured, judgement and insight intact and appearance well groomed    Diagnostic Test Results:  none     ASSESSMENT/PLAN:       1. Insomnia, unspecified type  Worse x 1 month. Able to fall asleep with melatonin 10mg nightly. Continues to struggle with awakenings. She has her new cpap mask. I recommended she gradually cut back on her caffeine intake from 32 oz coffee to 0 ounces over the next week and avoid caffeine after noon. Discussed she may temporarily have more headaches while she is cutting down on her caffeine.  However, this will likely help both her insomnia and her hot flashes.  I recommended scheduling with the sleep therapist.  She has begun to see a therapist again for her anxiety as well.  She requests FMLA and short-term disability paperwork today for her insomnia and high blood pressure.  - MENTAL HEALTH REFERRAL  - Adult; Outpatient Treatment; Individual/Couples/Family/Group Therapy/Health Psychology; Other: Not Listed - Enter Referral Details in Scheduling Comments Below    2. Anxiety  High stress lately due to her job and [place] daughter's school. She has seen her therapist and has an appointment scheduled next week.  She will also schedule with her previous psychiatrist.  I recommended she discuss FMLA and short-term disability paperwork with her mental health providers.  She continues sertraline 100 mg daily.  - MENTAL HEALTH REFERRAL  - Adult; Outpatient Treatment; Individual/Couples/Family/Group Therapy/Health Psychology; Other: Not Listed - Enter Referral Details in Scheduling Comments Below    3. Palpitations  Her palpitations and blood pressure have improved with propranolol.  - propranolol (INDERAL) 20 MG tablet; Take 1 tablet (20 mg) by mouth 3 times daily  Dispense: 60 tablet; Refill: 1    4.  Nonintractable episodic headache, unspecified headache type  Suspect secondary to stress, difficulty sleeping, and possibly caffeine.  See recommendations above for insomnia.  She has begun to see a therapist for anxiety.  She will slowly wean off caffeine.  Discussed she may experience headaches as this is happening.    5. Elevated blood pressure reading without diagnosis of hypertension  She was started on propranol in the  for elevated blood pressure and palpitations and anxiety. Today, her bp is normal.     6. Menopausal syndrome (hot flashes)  She has follow up scheduled with Dr. Min. She will continue to work on reducing her caffeine intake as well.     7. Mild sleep apnea  She received her new cpap mask yesterday.          Patient Instructions     1) The goal is to get down to zero caffeine daily while you are struggling with insomnia. This can help your hot flashes and your anxiety as well as your insomnia.   2) Schedule with the sleep therapist.     Treating Insomnia  Good sleeping habits are a key part of treatment. If needed, some medications may help you sleep better at first. Making healthy lifestyle changes and learning to relax can improve your sleep. Treating insomnia takes commitment, but trust that your efforts will pay off. Talk to your health care provider before taking any medication.    Healthy Lifestyle  Your lifestyle affects your health and your sleep. Here are some healthy habits:    Keep a regular sleep schedule. Go to bed and get up at the same time each day.    Exercise regularly. It may help you reduce stress. Avoid strenuous exercise for two to four hours before bedtime.    Avoid or limit naps.    Use your bed only for sleep and sex.    Don t spend too much time in bed trying to fall asleep. If you can t fall asleep, get up and do something until you become tired and drowsy.    Avoid or limit caffeine and nicotine. They can keep you awake at night. Also avoid alcohol. It may help  you fall asleep at first, but your sleep will not be restful.  Before Bedtime  To sleep better every night, try these tips:    Have a bedtime routine to let your body and mind know when it s time to sleep.    Going to bed should be relaxing so try to do only relaxing things around bedtime. Sleep will come sooner.    If your worries don t let you sleep, write them down in a diary. Then close it, and go to bed.    Make sure the room is not too hot or too cold. If it s not dark enough, an eye mask can help. If it s noisy, try using earplugs.  Learn to Relax  Stress, anxiety, and body tension may keep you awake at night. To unwind before bedtime, try reading a book, meditation, or yoga. Also, try the following:    Deep breathing. Sit or lie back in a chair. Take a slow, deep breath. Hold it for 5 counts. Then breathe out slowly through your mouth. Keep doing this until you feel relaxed.    Imagery. Think of the last fun trip you took. In your mind, walk through the trip from start to finish. Put as much detail into the memory as you can remember. It will help you relax.  Cognitive Behavioral Therapy (CBT)  CBT is the most effective treatment for long-term insomnia. It tries to address the underlying causes of your sleep problems, including your habits and how you think about sleep.   Individual Therapy  Hernan Harden, PhD  Insomnia   [facility] [place] [facility]    [facility]: [phone]    [facility]: [phone]  Group Therapy  Dates and times to be announced.  Online Programs    www.SHUTi.me (pronounced shut eye). There is a fee for this program. Enter the code  [place]  if you decide to enroll in this program.     www.sleepIO.com (pronounced sleep ee oh). There is a fee for this program. Enter the code  [place]  if you decide to enroll in this program.  Suggested Resources  Insomnia Treatment Books:    Overcoming Insomnia by Brian Arredondo and Delia Card  (2008)    No More Sleepless Nights by Tanner Carter and Jeanne Euceda (1996)    Say Maya to Insomnia by Hao Anand (2009)    The Insomnia Workbook by Theresa Sarah and Eduard Flaherty (2009)    The Insomnia Answer by Shimon Bates and Roman Henriquez (2006)?  Stress Management and Relaxation Books:    The Relaxation and Stress Reduction Workbook by Rosa Burr, Maki Shultz and Ricki Rowe (2008)    Stress Management Workbook: Techniques and Self-Assessment Procedures by Esperanza Lund and Kei Calixto (1997)    A Mindfulness-Based Stress Reduction Workbook by Jl Mendoza and Dee Dee Walton (2010)    The Complete Stress Management Workbook by Chance Scott, Sp Ruiz and Onofre Lopes (1996)    Assert Yourself by Stephanie Stephens and Taurus Stephens (1977)  Relaxation Resources for Computer Download   These websites offer resources to help you relax. This list is for information only. [facility] is not responsible for the quality of services or the actions of any person or organization  Progressive Muscle Relaxation (PMR):    http://www.Feedback.[employer]/progressive-muscle-relaxation-exercise.html    http://studentsupport.[facility]/counseling/resources/self-help/relaxation-and-stress-management/  Deep Breathing Exercises:    http://www.Feedback.[employer]/breathing-awareness.html  Meditation:       www.Streamfile    www.Communication ScienceguidedIpercastmeditation-site.com You may have to pay for some of these resources.  Guided Imagery:    http://www.[employer]/guided-imagery-scripts.html    http://SOLO/library/cxftxredyj-lcnpwl-kdjbqyu/  Counseling / Behavioral Health  [facility]  Visit [email or web address] or call [phone] to find a clinic close to you.   This is not a prescription and these resources are optional. You must pay for any costs when using these resources. Please ask your insurance carrier if you can be  reimbursed for these resources. If so, you are responsible for sending the needed details to your insurance carrier. These resources may also be tax deductible as medical expenses. Check with your .  These programs and publications are not affiliated in any way with [facility].    2262-3307 [employer]. [address] [place] [address] 18455. All rights reserved. This information is not intended as a substitute for professional medical care. Always follow your healthcare professional's instructions.  This information has been modified by your health care provider with permission from the publisher.          Time spent with pt answering questions, discussing findings, counseling and coordinating care was at least half the appointment time, 25 minutes.       Maryjane Quintanilla M.D.        [facility]

## 2018-09-05 NOTE — PROGRESS NOTES
[facility] Initial Evaluation  Subjective:  HPI                    Objective:  System                                 Pelvic Dysfunction Evaluation:      Diagnostic Tests:    Pelvic Exam:  Recent        Urodynamics:  2014                Abdominal Wall:  Abdominal wall pelvic: increase in IAP and rib flare with effort.        Pelvic Clock Exam:    Ischiocavernosis pain:  -  Bulbocavernosis pain:  -  Transverse Perineal:  -  Levator ANI:  -  Perineal Body:  -      External Assessment:  External assessment pelvic: Gr 2 cystocele w strain; inc IAP w PFM contraction.      Bearing Down/Coughing:  Cystocele      Muscle Contraction/Perineal Mobility:  Slight lift, no urogential triangle descent and substitution  Internal Assessment:      Contraction/Grade:  Fllicker muscles stretched (1)        Symmetry of Contraction Response:  Pushing out during attempts at contraction  SEMG Biofeedback:          Baseline EMG PM:  2 mV          Position:  SupineAdditional History:    Number of Pregnancies: 0                         General     ROS History of current episode:    Onset date/MD order: 8/14/2018.    CC/Present symptoms: RODO, constipation, prolapse.   Pain rating (0-10): 1/10  Conditioning is improving/unchanging/worsening: worseing since onset of menapause    Pelvic/Abdominal Surgeries:2016 left ovary removed  Hx of or present sexually transmitted disease:no  SMHx: Pelvic floor weakness with UI treated w PF PT successfully 4 yrs ago, w worsening of symptoms, including constipation, during perimenopause in 2016  Current occupation: pyschotherapist on    Current activity: not exercising   Goals: decrease incontinence and increase strength  Red flags:no      Urination:  Do you leak on the way to the bathroom or with a strong urge to void? Yes   Do you leak with cough,sneeze, jumping, running?Yes   Any other activities that cause leaking? unsure   Do you have triggers that make you feel you can't wait to go  to the bathroom? Yes What are they? shower.  Type of pad and number used per day? 0  When you leak what is the amount? Small to medium  How long can you delay the need to urinate? 3 - 10 minutes.   Do you feel excessive pressure in pelvic floor:yes.  When? variable  Frequency of daytime urination:every 1-2 hrs  Frequency of nighttime urination:2-4  Can you stop the flow of urine when on the toilet? No  Is the volume of urine passed usually: medium. (8sec rule= 250ml with average bladder storing 400-600ml)  Do you strain to pass urine? No  Do you have a slow or hesitant urinary stream? Yes with incomplete emptying and spray w stream  Do you have difficulty initiating the urine stream? Yes  Is urination painful? No  How many bladder infections have you had in last 12 months?0  Fluid intake(one glass is 8oz or one cup) 6 glasses/day, 4 caffinated glasses/day  0-1 alcohol glasses/day.  Bowel habits:  Frequency of bowel movements? 4-5 times a week  Consistancy of stool? soft, soft formed, hard, Love Stool Scale 1-7 (constipation f/b diarrhea)  Do you ignore the urge to defecate? Yes  Do you strain to pass stool? Yes  Pelvic Pain:  Do you have any pelvic pain with intercourse, exams, use of tampons? No  Is initial penetration during intercourse painful? Yes  Is deeper penetration painful? Yes  Do you use lubricant? Yes What kind? Not sure  Are you sexually active?Yes  Have you ever been worried for your physical safety? No  Have you practiced the PF(kegel) exercises for 4 or more weeks?no  Marinoff Scale:Level 1 (female partner)  (Level 3: Abstinence from intercourse because of severe pain. Level 2: Painful intercourse which limites frequency of activity. Level 1: Painful intercourse not severe enough to prevent activity.)    Treatment/Education provided this session (see flow sheet for additional information):    Self Care Management/Patient education (12 min): Today's session consisted of education regarding pelvic  "floor muscle anatomy, normal bladder function, urge suppression techniques and/or relaxation techniques as indicated, and instruction in how to complete a bladder diary for assessment next visit. Depending on patient presentation, timed voids, double voids, and proper fluid/fiber intake discussed. Pt was instructed in the pathoanatomy of the pelvic floor utilizing pelvic model.  We discussed what pelvic floor physical therapy is, components of exam, and typical patient progression. Prior to internal PFM exam,  patient was told that they were in control of exam progression, and if at any time were uncomfortable and wished to discontinue we would.        NMR: Patient instruction in correct isolation of PFM/TrA then coordinated contraction of \"canister\" muscles: diaphragm, Transverse Abdominals, PFM, and posterior spine musculature.  Educated in initiating contraction from anal sphinctor, elevating through PFM, contraction of TrA, while exhaling slowly.  Cued for maximal and sub-maximal contractions, using hip rotators and adductors for overflow if needed.  Pt required cuing for each progression, with decreasing feedback as tolerated.  Instructed to avoid Valsalva/increased inter-abdominal pressure.  Pt cued through verbal, tactile (internal and external), and visual cuing utilizing biofeedback.  For HEP, pt encouraged to separate each phase of the exercise, then work towards coordination of the phases together with good breath technique with goal of developing good neuromuscular control of area.                          Assessment/Plan:    Patient is a 53 year old female with pelvic complaints.    Patient has the following significant findings with corresponding treatment plan.                Diagnosis 1:  RODO  Decreased strength - therapeutic exercise and therapeutic activities  Decreased proprioception - neuro re-education and therapeutic activities  Impaired muscle performance - neuro re-education  Decreased " function - therapeutic activities  Impaired posture - neuro re-education  Diagnosis 2:  Prolapse   Pain -  self management, education and home program  Decreased strength - therapeutic exercise and therapeutic activities  Decreased proprioception - neuro re-education and therapeutic activities  Impaired muscle performance - neuro re-education  Decreased function - therapeutic activities  Impaired posture - neuro re-education    Therapy Evaluation Codes:   1) History comprised of:   Personal factors that impact the plan of care:      Anxiety and Time since onset of symptoms.    Comorbidity factors that impact the plan of care are:      Menopausal.     Medications impacting care: None.  2) Examination of Body Systems comprised of:   Body structures and functions that impact the plan of care:      Pelvis.   Activity limitations that impact the plan of care are:      [place] and Urinary incontinence.  3) Clinical presentation characteristics are:   Stable/Uncomplicated.  4) Decision-Making    Low complexity using standardized patient assessment instrument and/or measureable assessment of functional outcome.  Cumulative Therapy Evaluation is: Low complexity.    Previous and current functional limitations:  (See Goal Flow Sheet for this information)    Short term and Long term goals: (See Goal Flow Sheet for this information)     Communication ability:  Patient appears to be able to clearly communicate and understand verbal and written communication and follow directions correctly.  Treatment Explanation - The following has been discussed with the patient:   RX ordered/plan of care  Anticipated outcomes  Possible risks and side effects  This patient would benefit from PT intervention to resume normal activities.   Rehab potential is good.    Frequency:  1 X week, once daily  Duration:  for 3 weeks tapering to 1 X a month over 12 weeks  Discharge Plan:  Achieve all LTG.  Independent in home treatment program.  Reach  maximal therapeutic benefit.    Please refer to the daily flowsheet for treatment today, total treatment time and time spent performing 1:1 timed codes.

## 2018-09-06 ENCOUNTER — OFFICE VISIT (OUTPATIENT)
Dept: FAMILY MEDICINE | Facility: CLINIC | Age: 53
End: 2018-09-06
Payer: COMMERCIAL

## 2018-09-06 VITALS
RESPIRATION RATE: 14 BRPM | WEIGHT: 253 LBS | HEART RATE: 70 BPM | DIASTOLIC BLOOD PRESSURE: 74 MMHG | BODY MASS INDEX: 38.34 KG/M2 | OXYGEN SATURATION: 98 % | HEIGHT: 68 IN | SYSTOLIC BLOOD PRESSURE: 130 MMHG | TEMPERATURE: 97.9 F

## 2018-09-06 DIAGNOSIS — R03.0 ELEVATED BLOOD PRESSURE READING WITHOUT DIAGNOSIS OF HYPERTENSION: ICD-10-CM

## 2018-09-06 DIAGNOSIS — F41.9 ANXIETY: ICD-10-CM

## 2018-09-06 DIAGNOSIS — N95.1 MENOPAUSAL SYNDROME (HOT FLASHES): ICD-10-CM

## 2018-09-06 DIAGNOSIS — R00.2 PALPITATIONS: ICD-10-CM

## 2018-09-06 DIAGNOSIS — R51.9 NONINTRACTABLE EPISODIC HEADACHE, UNSPECIFIED HEADACHE TYPE: ICD-10-CM

## 2018-09-06 DIAGNOSIS — G47.00 INSOMNIA, UNSPECIFIED TYPE: Primary | ICD-10-CM

## 2018-09-06 DIAGNOSIS — G47.30 MILD SLEEP APNEA: ICD-10-CM

## 2018-09-06 PROCEDURE — 99214 OFFICE O/P EST MOD 30 MIN: CPT | Performed by: FAMILY MEDICINE

## 2018-09-06 RX ORDER — PROPRANOLOL HYDROCHLORIDE 20 MG/1
20 TABLET ORAL 3 TIMES DAILY
Qty: 60 TABLET | Refills: 1 | Status: SHIPPED | OUTPATIENT
Start: 2018-09-06 | End: 2018-10-19

## 2018-09-06 NOTE — MR AVS SNAPSHOT
After Visit Summary   9/6/2018    Angy Haji    MRN: [id number]           Patient Information     Date Of Birth          1965        Visit Information        Provider Department      9/6/2018 8:20 AM Maryjane Quintanilla MD [facility]        Today's Diagnoses     Insomnia, unspecified type    -  1    Anxiety        Palpitations          Care Instructions      1) The goal is to get down to zero caffeine daily while you are struggling with insomnia. This can help your hot flashes and your anxiety as well as your insomnia.   2) Schedule with the sleep therapist.     Treating Insomnia  Good sleeping habits are a key part of treatment. If needed, some medications may help you sleep better at first. Making healthy lifestyle changes and learning to relax can improve your sleep. Treating insomnia takes commitment, but trust that your efforts will pay off. Talk to your health care provider before taking any medication.    Healthy Lifestyle  Your lifestyle affects your health and your sleep. Here are some healthy habits:    Keep a regular sleep schedule. Go to bed and get up at the same time each day.    Exercise regularly. It may help you reduce stress. Avoid strenuous exercise for two to four hours before bedtime.    Avoid or limit naps.    Use your bed only for sleep and sex.    Don t spend too much time in bed trying to fall asleep. If you can t fall asleep, get up and do something until you become tired and drowsy.    Avoid or limit caffeine and nicotine. They can keep you awake at night. Also avoid alcohol. It may help you fall asleep at first, but your sleep will not be restful.  Before Bedtime  To sleep better every night, try these tips:    Have a bedtime routine to let your body and mind know when it s time to sleep.    Going to bed should be relaxing so try to do only relaxing things around bedtime. Sleep will come sooner.    If your worries don t let you sleep, write them down in a  diary. Then close it, and go to bed.    Make sure the room is not too hot or too cold. If it s not dark enough, an eye mask can help. If it s noisy, try using earplugs.  Learn to Relax  Stress, anxiety, and body tension may keep you awake at night. To unwind before bedtime, try reading a book, meditation, or yoga. Also, try the following:    Deep breathing. Sit or lie back in a chair. Take a slow, deep breath. Hold it for 5 counts. Then breathe out slowly through your mouth. Keep doing this until you feel relaxed.    Imagery. Think of the last fun trip you took. In your mind, walk through the trip from start to finish. Put as much detail into the memory as you can remember. It will help you relax.  Cognitive Behavioral Therapy (CBT)  CBT is the most effective treatment for long-term insomnia. It tries to address the underlying causes of your sleep problems, including your habits and how you think about sleep.   Individual Therapy  Hernan Harden, PhD  Insomnia   [facility]: [phone]    [facility]: [phone]  Group Therapy  Dates and times to be announced.  Online Programs    www.Kovio.me (pronounced shut eye). There is a fee for this program. Enter the code  [facility]  if you decide to enroll in this program.     www.sleepIO.com (pronounced sleep ee oh). There is a fee for this program. Enter the code  [facility]  if you decide to enroll in this program.  Suggested Resources  Insomnia Treatment Books:    Overcoming Insomnia by Brian Arredondo and Delia Card (2008)    No More Sleepless Nights by Tanner Carter and Jeanne Euceda (1996)    Say Maay to Insomnia by Hao Anand (2009)    The Insomnia Workbook by Theresa Sarah and Eduard Flaherty (2009)    The Insomnia Answer by Shimon Bates and Roman Henriquez (2006)?  Stress Management and Relaxation Books:    The Relaxation and Stress Reduction Workbook by Maki Antonio  Kasandra Shultz and Ricki Rowe (2008)    Stress Management Workbook: Techniques and Self-Assessment Procedures by Esperanza Lund and Kei Calixto (1997)    A Mindfulness-Based Stress Reduction Workbook by Jl Mendoza and Dee Dee Walton (2010)    The Complete Stress Management Workbook by Chance Scott, Sp Ruiz and Onofre Lopes (1996)    Assert Yourself by Stephanie Stephens and Taurus Stephens (1977)  Relaxation Resources for Computer Download   These websites offer resources to help you relax. This list is for information only. [facility] is not responsible for the quality of services or the actions of any person or organization  Progressive Muscle Relaxation (PMR):    http://www.Wifi Online/progressive-muscle-relaxation-exercise.html    http://studentsupport.[facility]/counseling/resources/self-help/relaxation-and-stress-management/  Deep Breathing Exercises:    http://www.Wifi Online/breathing-awareness.html  Meditation:       wwwVantage [employer] Centers    www.[employer]oomer[employer] Commercemeditation-site.com You may have to pay for some of these resources.  Guided Imagery:    http://www.Wifi Online/guided-imagery-scripts.html    http://GIVVER/library/vpgkjzfuvo-dkibos-cwlwstx/  Counseling / Behavioral Health  [facility]  Visit www.[facility].org or call [phone] to find a clinic close to you.   This is not a prescription and these resources are optional. You must pay for any costs when using these resources. Please ask your insurance carrier if you can be reimbursed for these resources. If so, you are responsible for sending the needed details to your insurance carrier. These resources may also be tax deductible as medical expenses. Check with your .  These programs and publications are not affiliated in any way with [facility].    7056-2152 [employer]. [address] [place] [address], [place] [address]. All rights  reserved. This information is not intended as a substitute for professional medical care. Always follow your healthcare professional's instructions.  This information has been modified by your health care provider with permission from the publisher.                Follow-ups after your visit        Additional Services     MENTAL HEALTH REFERRAL  - Adult; Outpatient Treatment; Individual/Couples/Family/Group Therapy/Health Psychology; Other: Not Listed - Enter Referral Details in Scheduling Comments Below       All scheduling is subject to the client's specific insurance plan & benefits, provider/location availability, and provider clinical specialities.  Please arrive 15 minutes early for your first appointment and bring your completed paperwork.    Please be aware that coverage of these services is subject to the terms and limitations of your health insurance plan.  Call member services at your health plan with any benefit or coverage questions.                            Your next 10 appointments already scheduled     Sep 07, 2018  2:30 PM CDT   Office Visit with Romy Min MD   [facility]    [address] [place] [id number] [phone]           Bring a current list of meds and any records pertaining to this visit. For Physicals, please bring immunization records and any forms needing to be filled out. Please arrive 10 minutes early to complete paperwork.            Sep 12, 2018  3:30 PM CDT   GEOVANNI For Women Only with Angy Villafuerte PT   [facility] [place] ([facility] [place])    [address] [place] [address] [phone]            Sep 13, 2018  9:40 AM CDT   [facility] with [facility]   [facility]    [address] [place] [address] [phone]           Bring a current list of meds and any records pertaining to this  visit. For Physicals, please bring immunization records and any forms needing to be filled out. Please arrive 10 minutes early to complete paperwork.            Sep 19, 2018  3:30 PM CDT   GEOVANNI For Women Only with Angy Villafuerte PT   [facility] ([facility] [place])    [address] [place] [id number] [phone]            Oct 03, 2018  3:30 PM CDT   GEOVANNI For Women Only with Angy Villafuerte PT   [facility] ([facility] [place])    [address] [place] [id number] [phone]            Oct 09, 2018  8:20 AM CDT   PHYSICAL with Maryjane Quintanilla MD   [facility] ([facility])    [address] [place] [id number] [phone]            Oct 16, 2018  9:10 AM CDT   GEOVANNI For Women Only with Angy Villafuerte PT   [facility] ([facility])    [address] [place] [id number] [phone]            Oct 17, 2018  2:30 PM CDT   Follow Up with Mar Xiong RD, MAKEDA   [facility] ([facility])    [address]  [facility] [id number] [phone]            Oct 25, 2018 11:30 AM CDT   Return Sleep Patient with Nellie Calloway MD   [facility] ([facility])    [address] [phone]            Oct 30, 2018  9:10 AM CDT   GEOVANNI For Women Only with Angy Villafuerte PT   [facility] ([facility])    [address] [place] [id number] [phone]              Who to contact     If you have questions or need follow up information about today's clinic visit or your schedule please contact [facility] directly at [phone].  Normal or non-critical lab and imaging results will be communicated to you by MyChart, letter or phone within 4  "business days after the clinic has received the results. If you do not hear from us within 7 days, please contact the clinic through Rover or phone. If you have a critical or abnormal lab result, we will notify you by phone as soon as possible.  Submit refill requests through Rover or call your pharmacy and they will forward the refill request to us. Please allow 3 business days for your refill to be completed.          Additional Information About Your Visit        Rover Information     Rover gives you secure access to your electronic health record. If you see a primary care provider, you can also send messages to your care team and make appointments. If you have questions, please call your primary care clinic.  If you do not have a primary care provider, please call [phone] and they will assist you.        Care EveryWhere ID     This is your Care EveryWhere ID. This could be used by other organizations to access your [facility] medical records  [id number]        Your Vitals Were     Pulse Temperature Respirations Height Pulse Oximetry BMI (Body Mass Index)    70 97.9  F (36.6  C) (Tympanic) 14 5' 8\" (1.727 m) 98% 38.47 kg/m2       Blood Pressure from Last 3 Encounters:   09/06/18 130/74   08/31/18 142/82   08/27/18 154/87    Weight from Last 3 Encounters:   09/06/18 253 lb (114.8 kg)   08/31/18 247 lb (112 kg)   08/27/18 247 lb (112 kg)              We Performed the Following     MENTAL HEALTH REFERRAL  - Adult; Outpatient Treatment; Individual/Couples/Family/Group Therapy/Health Psychology; Other: Not Listed - Enter Referral Details in Scheduling Comments Below          Today's Medication Changes          These changes are accurate as of 9/6/18  9:31 AM.  If you have any questions, ask your nurse or doctor.               Stop taking these medicines if you haven't already. Please contact your care team if you have questions.     ibuprofen 800 MG tablet   Commonly known as:  ADVIL/MOTRIN   Stopped by:  " Maryjane Quintanilla MD                Where to get your medicines      These medications were sent to [id number] - [place], [place] - [address] AT [facility]  [address], [place] [address]     Phone:  [phone]     propranolol 20 MG tablet                Primary Care Provider Office Phone # Fax #    Maryjane Quintanilla [phone]       [address]  [facility] [address]        Equal Access to Services     [facility] : Hadii aad ku hadasho Soomaali, waaxda luqadaha, qaybta kaalmada adeegyada, waxay idiin hayaan adeeg kharakarly tam . So [facility] [phone].    ATENCIÓN: Si habla español, tiene a franco disposición servicios gratuitos de asistencia lingüística. [facility] [phone].    We comply with applicable federal civil rights laws and [place] laws. We do not discriminate on the basis of race, color, national origin, age, disability, sex, sexual orientation, or gender identity.            Thank you!     Thank you for choosing [facility]  for your care. Our goal is always to provide you with excellent care. Hearing back from our patients is one way we can continue to improve our services. Please take a few minutes to complete the written survey that you may receive in the mail after your visit with us. Thank you!             Your Updated Medication List - Protect others around you: Learn how to safely use, store and throw away your medicines at www.disposemymeds.org.          This list is accurate as of 9/6/18  9:31 AM.  Always use your most recent med list.                   Brand Name Dispense Instructions for use Diagnosis    ciclopirox 0.77 % cream    LOPROX     Apply  topically 2 times daily as needed.    Eczema       estrogen (conjugated)-medroxyPROGESTERone 0.625-2.5 MG per tablet    PREMPRO    90 tablet    Take 1 tablet by mouth daily    Perimenopausal vasomotor symptoms       lidocaine (viscous) 2 % solution    XYLOCAINE    100 mL     SWISH AND SPIT 5ML BY MOUTH EVERY 2 HOURS AS NEEDED    Oral aphthae       loratadine 10 MG capsule      Take 10 mg by mouth daily as needed.        metroNIDAZOLE 1 % gel    METROGEL    60 g    Apply topically daily    Rosacea, Perioral dermatitis       order for DME      Use your CPAP device as directed by your provider.        order for DME     2 Units    Support/compression stockings - knee high, medium compression    Varicose veins of legs       order for DME     1 each    Please measure and distribute 1 pair of 20mmHg - 30mmHg knee high open toe compression stockings. Jobst ultrasheer or equivalent.    Varicose veins of both lower extremities with complications       propranolol 20 MG tablet    INDERAL    60 tablet    Take 1 tablet (20 mg) by mouth 3 times daily    Palpitations       sertraline 100 MG tablet    ZOLOFT    90 tablet    Take 1 tablet (100 mg) by mouth daily    Anxiety       triamcinolone 0.1 % cream    KENALOG    30 g    Apply topically 2 times daily    Eczema, unspecified type       VITAMIN D (CHOLECALCIFEROL) PO      Take 2,000 Units by mouth daily    Perioral dermatitis, Rosacea

## 2018-09-06 NOTE — PATIENT INSTRUCTIONS
1) The goal is to get down to zero caffeine daily while you are struggling with insomnia. This can help your hot flashes and your anxiety as well as your insomnia.   2) Schedule with the sleep therapist.     Treating Insomnia  Good sleeping habits are a key part of treatment. If needed, some medications may help you sleep better at first. Making healthy lifestyle changes and learning to relax can improve your sleep. Treating insomnia takes commitment, but trust that your efforts will pay off. Talk to your health care provider before taking any medication.    Healthy Lifestyle  Your lifestyle affects your health and your sleep. Here are some healthy habits:    Keep a regular sleep schedule. Go to bed and get up at the same time each day.    Exercise regularly. It may help you reduce stress. Avoid strenuous exercise for two to four hours before bedtime.    Avoid or limit naps.    Use your bed only for sleep and sex.    Don t spend too much time in bed trying to fall asleep. If you can t fall asleep, get up and do something until you become tired and drowsy.    Avoid or limit caffeine and nicotine. They can keep you awake at night. Also avoid alcohol. It may help you fall asleep at first, but your sleep will not be restful.  Before Bedtime  To sleep better every night, try these tips:    Have a bedtime routine to let your body and mind know when it s time to sleep.    Going to bed should be relaxing so try to do only relaxing things around bedtime. Sleep will come sooner.    If your worries don t let you sleep, write them down in a diary. Then close it, and go to bed.    Make sure the room is not too hot or too cold. If it s not dark enough, an eye mask can help. If it s noisy, try using earplugs.  Learn to Relax  Stress, anxiety, and body tension may keep you awake at night. To unwind before bedtime, try reading a book, meditation, or yoga. Also, try the following:    Deep breathing. Sit or lie back in a chair. Take  a slow, deep breath. Hold it for 5 counts. Then breathe out slowly through your mouth. Keep doing this until you feel relaxed.    Imagery. Think of the last fun trip you took. In your mind, walk through the trip from start to finish. Put as much detail into the memory as you can remember. It will help you relax.  Cognitive Behavioral Therapy (CBT)  CBT is the most effective treatment for long-term insomnia. It tries to address the underlying causes of your sleep problems, including your habits and how you think about sleep.   Individual Therapy  Hernan Harden, PhD  Insomnia   [facility]: [phone]    [facility]: [phone]  Group Therapy  Dates and times to be announced.  Online Programs    www.[employer] (pronounced shut eye). There is a fee for this program. Enter the code  [facility]  if you decide to enroll in this program.     www.sleepIO.com (pronounced sleep ee oh). There is a fee for this program. Enter the code  [facility]  if you decide to enroll in this program.  Suggested Resources  Insomnia Treatment Books:    Overcoming Insomnia by Brian Arredondo and Delia Card (2008)    No More Sleepless Nights by Tanner Carter and Jeanne Euceda (1996)    Say Maya to Insomnia by Hao Anand (2009)    The Insomnia Workbook by Theresa Sarah and Eduard Flaherty (2009)    The Insomnia Answer by Shimon Bates and Roman Henriquez (2006)?  Stress Management and Relaxation Books:    The Relaxation and Stress Reduction Workbook by Rosa Burr, Maki Shultz and Ricki Rowe (2008)    Stress Management Workbook: Techniques and Self-Assessment Procedures by Esperanza Lund and Kei Calixto (1997)    A Mindfulness-Based Stress Reduction Workbook by Jl Mendoza and Dee Dee Walton (2010)    The Complete Stress Management Workbook by Chance Scott, Sp Ruiz and Onofre Lopes (1996)    Assert Yourself by Stephanie Norton  Angelo and Taurus Stephens (1977)  Relaxation Resources for Computer Download   These websites offer resources to help you relax. This list is for information only. [facility] is not responsible for the quality of services or the actions of any person or organization  Progressive Muscle Relaxation (PMR):    http://www.[employer]/progressive-muscle-relaxation-exercise.html    http://studentsupport.[facility]/counseling/resources/self-help/relaxation-and-stress-management/  Deep Breathing Exercises:    http://www.[employer]/breathing-awareness.html  Meditation:       wwwYourTime Solutions    www.LivelyguidedFuturaMediameditation-site.com You may have to pay for some of these resources.  Guided Imagery:    http://www.[employer]/guided-imagery-scripts.html    http://[employer]/library/abitvfasko-xrwncs-ywwoweg/  Counseling / Behavioral Health  [facility]  Visit www.[email or web address].org or call [phone] to find a clinic close to you.   This is not a prescription and these resources are optional. You must pay for any costs when using these resources. Please ask your insurance carrier if you can be reimbursed for these resources. If so, you are responsible for sending the needed details to your insurance carrier. These resources may also be tax deductible as medical expenses. Check with your .  These programs and publications are not affiliated in any way with [facility].    7064-8312 [employer]. [address] [place] [address], [place] [address]. All rights reserved. This information is not intended as a substitute for professional medical care. Always follow your healthcare professional's instructions.  This information has been modified by your health care provider with permission from the publisher.

## 2018-09-07 ENCOUNTER — TELEPHONE (OUTPATIENT)
Dept: OBGYN | Facility: CLINIC | Age: 53
End: 2018-09-07

## 2018-09-07 ENCOUNTER — OFFICE VISIT (OUTPATIENT)
Dept: OBGYN | Facility: CLINIC | Age: 53
End: 2018-09-07
Payer: COMMERCIAL

## 2018-09-07 VITALS
DIASTOLIC BLOOD PRESSURE: 76 MMHG | HEIGHT: 68 IN | BODY MASS INDEX: 38.28 KG/M2 | SYSTOLIC BLOOD PRESSURE: 138 MMHG | WEIGHT: 252.6 LBS

## 2018-09-07 DIAGNOSIS — R53.83 OTHER FATIGUE: ICD-10-CM

## 2018-09-07 DIAGNOSIS — N95.1 PERIMENOPAUSAL VASOMOTOR SYMPTOMS: Primary | ICD-10-CM

## 2018-09-07 DIAGNOSIS — F41.9 ANXIETY: ICD-10-CM

## 2018-09-07 DIAGNOSIS — G47.00 INSOMNIA, UNSPECIFIED TYPE: ICD-10-CM

## 2018-09-07 PROCEDURE — 99215 OFFICE O/P EST HI 40 MIN: CPT | Performed by: OBSTETRICS & GYNECOLOGY

## 2018-09-07 ASSESSMENT — ANXIETY QUESTIONNAIRES
2. NOT BEING ABLE TO STOP OR CONTROL WORRYING: MORE THAN HALF THE DAYS
5. BEING SO RESTLESS THAT IT IS HARD TO SIT STILL: MORE THAN HALF THE DAYS
1. FEELING NERVOUS, ANXIOUS, OR ON EDGE: MORE THAN HALF THE DAYS
3. WORRYING TOO MUCH ABOUT DIFFERENT THINGS: MORE THAN HALF THE DAYS
6. BECOMING EASILY ANNOYED OR IRRITABLE: NOT AT ALL
GAD7 TOTAL SCORE: 10
7. FEELING AFRAID AS IF SOMETHING AWFUL MIGHT HAPPEN: NOT AT ALL

## 2018-09-07 ASSESSMENT — PATIENT HEALTH QUESTIONNAIRE - PHQ9: 5. POOR APPETITE OR OVEREATING: MORE THAN HALF THE DAYS

## 2018-09-07 NOTE — MR AVS SNAPSHOT
After Visit Summary   9/7/2018    Angy Haji    MRN: [id number]           Patient Information     Date Of Birth          1965        Visit Information        Provider Department      9/7/2018 2:30 PM Romy Min MD [facility]        Today's Diagnoses     Perimenopausal vasomotor symptoms    -  1    Anxiety        Insomnia, unspecified type        Other fatigue           Follow-ups after your visit        Additional Services     ACUPUNCTURE REFERRAL       Your provider has referred you to: [facility] [phone]    Please be aware that coverage of these services is subject to the terms and limitations of your health insurance plan.  Call member services at your health plan with any benefit or coverage questions.      Please bring the following with you to your appointment:    (1) Any X-Rays, CTs or MRIs which have been performed.  Contact the facility where they were done to arrange for  prior to your scheduled appointment.  (2) List of current medications   (3) This referral request   (4) Any documents/labs given to you for this referral  Services Requested: acucpuncture therapy for anxiety and hot flushes/perimenopausal symptoms    Please answer the following questions:    1. How long have you been treating this patient for this pain issue?  Perimenopausal issues for 6 months    2. Have there been any of the following problematic behaviors?      Medication Management Issues: NO      Chemical Dependency: NO      Psychiatric History or Current Psych/Social Issues: YES anxiety    3. Has the patient been to any other pain clinics and/or programs?      NO                  Your next 10 appointments already scheduled     Sep 13, 2018  9:40 AM CDT   Office Visit with Maryjane Quintanilla MD   [facility] ([facility])    [address] [place] [address] [phone]           Bring a current list  of meds and any records pertaining to this visit. For Physicals, please bring immunization records and any forms needing to be filled out. Please arrive 10 minutes early to complete paperwork.            Sep 19, 2018  3:30 PM CDT   [facility] For Women Only with Angy Villafuerte, PT   [facility] ([facility] [place])    [address] [place] [address]   [phone]            Sep 24, 2018  9:30 AM CDT   (Arrive by 9:15 AM)   RETURN SLP VOICE with RALPH [facility] ([facility])    [address] [facility]  4th [facility] [id number] [phone]            Sep 24, 2018  1:50 PM CDT   GEOVANNI For Women Only with Angy Villafuerte PT   [facility] [place] ([facility] [place])    [address] [place] [address]   [phone]            Oct 03, 2018  3:30 PM CDT   GEOVANNI For Women Only with Angy Villafuerte PT   [facility] [place] ([facility] [place])    [address] [place] [address]   [phone]            Oct 04, 2018  8:30 AM CDT   (Arrive by 8:15 AM)   RETURN SLP VOICE with RALPH [facility] ([facility])    [address] [place] [address] [phone]            Oct 08, 2018 11:00 AM CDT   Office Visit with Romy Min MD   [facility]    [address] [place] [id number] [phone]           Bring a current list of meds and any records pertaining to this visit. For Physicals, please bring immunization records and any forms needing to be filled out. Please arrive 10 minutes early to complete paperwork.            Oct 09, 2018  8:20 AM CDT   PHYSICAL with Maryjane Quintanilla MD   [facility] ([facility])    [address] [place] [address] [phone]            Oct 09, 2018  2:00  "PM CDT   New Sleep Patient with Jose Angel Piña PsyD   [facility])    [address] [facility] [address]  [facility] [id number] [phone]            Oct 15, 2018  8:30 AM CDT   (Arrive by 8:15 AM)   RETURN SLP VOICE with RALPH [facility] ([facility])    [address] [facility]  4th Floor  [facility] [address] [phone]              Who to contact     If you have questions or need follow up information about today's clinic visit or your schedule please contact [facility] directly at [phone].  Normal or non-critical lab and imaging results will be communicated to you by [employer] naturalhart, letter or phone within 4 business days after the clinic has received the results. If you do not hear from us within 7 days, please contact the clinic through [employer] natural[employer] or phone. If you have a critical or abnormal lab result, we will notify you by phone as soon as possible.  Submit refill requests through [employer] or call your pharmacy and they will forward the refill request to us. Please allow 3 business days for your refill to be completed.          Additional Information About Your Visit        [employer] Information     [employer] gives you secure access to your electronic health record. If you see a primary care provider, you can also send messages to your care team and make appointments. If you have questions, please call your primary care clinic.  If you do not have a primary care provider, please call [phone] and they will assist you.        Care EveryWhere ID     This is your Care EveryWhere ID. This could be used by other organizations to access your [facility] medical records  [id number]        Your Vitals Were     Height BMI (Body Mass Index)                5' 8\" (1.727 m) 38.41 kg/m2           Blood Pressure from Last 3 Encounters:   09/07/18 138/76   09/06/18 130/74   08/31/18 142/82    Weight from Last " 3 Encounters:   09/07/18 252 lb 9.6 oz (114.6 kg)   09/06/18 253 lb (114.8 kg)   08/31/18 247 lb (112 kg)              We Performed the Following     ACUPUNCTURE REFERRAL        Primary Care Provider Office Phone # Fax #    Maryjane Quintanilla [phone]       [address]  [facility] [address]        Equal Access to Services     [facility]QU[facility]A [facility] : Hadii aad ku hadasho Soomaali, waaxda luqadaha, qaybta kaalmada adeegyada, waxay idiin hayaan adeeg kharash la'anastasiyan ah. So [facility] [phone].    ATENCIÓN: Si ashley santos, tiene a franco disposición servicios gratuitos de asistencia lingüística. Llame al [phone].    We comply with applicable federal civil rights laws and [place] laws. We do not discriminate on the basis of race, color, national origin, age, disability, sex, sexual orientation, or gender identity.            Thank you!     Thank you for choosing [facility]  for your care. Our goal is always to provide you with excellent care. Hearing back from our patients is one way we can continue to improve our services. Please take a few minutes to complete the written survey that you may receive in the mail after your visit with us. Thank you!             Your Updated Medication List - Protect others around you: Learn how to safely use, store and throw away your medicines at www.disposemymeds.org.          This list is accurate as of 9/7/18 11:59 PM.  Always use your most recent med list.                   Brand Name Dispense Instructions for use Diagnosis    estrogen (conjugated)-medroxyPROGESTERone 0.625-2.5 MG per tablet    PREMPRO    90 tablet    Take 1 tablet by mouth daily    Perimenopausal vasomotor symptoms       loratadine 10 MG capsule      Take 10 mg by mouth daily as needed.        metroNIDAZOLE 1 % gel    METROGEL    60 g    Apply topically daily    Rosacea, Perioral dermatitis       order for DME      Use your CPAP device as directed by your provider.        order for DME      1 each    Please measure and distribute 1 pair of 20mmHg - 30mmHg knee high open toe compression stockings. Jobst ultrasheer or equivalent.    Varicose veins of both lower extremities with complications       propranolol 20 MG tablet    INDERAL    60 tablet    Take 1 tablet (20 mg) by mouth 3 times daily    Palpitations       sertraline 100 MG tablet    ZOLOFT    90 tablet    Take 1 tablet (100 mg) by mouth daily    Anxiety       VITAMIN D (CHOLECALCIFEROL) PO      Take 2,000 Units by mouth daily    Perioral dermatitis, Rosacea

## 2018-09-07 NOTE — PROGRESS NOTES
GYN CLINIC VISIT  2018  CC: anxiety and hot flushes    Angy is a 53 year old  perimenopausal female here today for follow up of anxiety and hot flushes.      During her most recent appointment with me on 18 we had checked in and patient was doing well on sertraline 100 mg daily and Prempro 0.625-2.5 mg so we decided to continue at that dose.      At the end of August patient reports her symptoms returned and worsened.  Her sleep disturbance returned, hot flashes and night sweats worsened and she experienced early awakening approximately 3 nights per week.  She regularly practices cognitive behavioral sleep hygiene skills. during the day she was feeling very fatigued, having difficulty making decisions, had difficulty prioritizing tasks and performing her job.  Anxiety heightened despite practices of mindfulness meditation skills and increased exercise.     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  She has been here for 21 years and her job requires a high level of attention to detail, ability to prior to his work tasks, ability to respond quickly to clinical crises, and the need to complete clinical evaluations in a timely manner.  Due to her insomnia, anxiety, and worsening perimenopausal symptoms overall she is finding it very difficult to perform her job duties.      She was seen by my partner Dr. Villafana on 18.  At that time her blood pressure was elevated 180/80.  She has a history of intermittently mildly elevated blood pressures but has not had a formal diagnosis of hypertension and does not take any medication for it.  Is working with a dietitian and started DASH diet.  She was seen by Dr. Andrea Hoffman at [facility] that same day for evaluation of her high blood pressure. She was diagnosed with an acute reaction to stress and recommended to go on medical leave starting .    In addition to perimenopausal symptoms, anxiety  "and hypertension she is dealing with hoarse voice, sleep apnea, insomnia, obesity.  She expresses that she needs time off of work to attend multiple appointments for these issues and this is a source of stress for her.    She denies any thoughts of harming herself or others.    JOANNA-7 SCORE 2018   Total Score 9 1 14     JOANNA 7 today: 10    PHQ-9 SCORE 2018   Total Score 8 1 13     PHQ 9 today: 4      Vitals:    18 1438   BP: 138/76   Weight: 252 lb 9.6 oz (114.6 kg)   Height: 5' 8\" (1.727 m)      Alert, oriented, no distress      ASSESSMENT:  Angy is a 53 year old  perimenopausal female who is experiencing worsening vasomotor symptoms, insomnia and anxiety.  These symptoms may be related to or exacerbated by perimenopause.  Due to the constellation of issues she is dealing with at this time she does not feel it is safe for her to be at work as her clinical judgment impacts the care of adult mental health patients.    1. Perimenopausal vasomotor symptoms  We discussed increasing her Prempro dose.  Patient would like to stay at the current dose until she gets her hypertension under control.  Recommend resting as much as possible continuing psychotherapy and recommend trying acupuncture as an adjunct.  - ACUPUNCTURE REFERRAL    2. Anxiety  Continue sertraline 100 mg daily.  Discussed that we can increase this dose if needed.  Discussed that she may need to see a psychiatrist to manage her anxiety if it continues to worsen.  She is seeing psychotherapy every other week.  - ACUPUNCTURE REFERRAL    3. Insomnia, unspecified type  For her sleep she is working with obstructive sleep apnea specialist and recently got a new mask.  So has an appointment for with the sleep cognitive therapist in mid October.    4. Other fatigue  Recent TSH was 2.1.  Her fatigue is likely attributed to her chronic sleep disturbance and sleep apnea.    Patient and I discussed taking a " limited amount of time off of work in order to address these health issues so that she can be more functional at her job.  She will take 4 weeks off and then restart her job part-time on 9/24.  I recommend she come back to clinic to see me in 2-4 weeks to reassess.  Listed below are the appointments she has set up to address her health issues.    Voice/speech swallow 6-8 sessions  PT for pelvic floor incontinence 8 sessions  Psychotherapy every other week  Sleep cognitive therapist in Mid October  Sleep follow up for obstructive sleep apnea in late October  Follow up with sleep technician for CPAP mask effectiveness  Dietician for weight loss and hypertension every month  Accupuncture for hot flushes and anxiety    Greater than 50% of this 40 minute appointment was spent in counseling with the patient on issues described above in the history of present illness and in the plan, including evaluation and management of perimenopausal symptoms, anxiety, vasomotor symptoms, sleep disturbance and fatigue.    Romy Min MD

## 2018-09-08 ASSESSMENT — ANXIETY QUESTIONNAIRES: GAD7 TOTAL SCORE: 10

## 2018-09-08 ASSESSMENT — PATIENT HEALTH QUESTIONNAIRE - PHQ9: SUM OF ALL RESPONSES TO PHQ QUESTIONS 1-9: 4

## 2018-09-11 DIAGNOSIS — K12.0 CANKER SORE: Primary | ICD-10-CM

## 2018-09-13 NOTE — TELEPHONE ENCOUNTER
LIDOCAINE VISCOUS 2% SOLN        Not on current med list  Last Office Visit: 9/6/2018  Future Office visit:    Next 5 appointments (look out 90 days)     Oct 08, 2018 11:00 AM CDT   Office Visit with Romy Min MD   [facility] ([facility])    [address] [address] [phone]            Oct 09, 2018  8:20 AM CDT   PHYSICAL with Maryjane Quintanilla MD   [facility] ([facility])    [address] [place] [address] [phone]                   Routing refill request to provider for review/approval because:  Drug not on the [facility], [facility] or [facility] refill protocol or controlled substance

## 2018-09-19 ENCOUNTER — OFFICE VISIT (OUTPATIENT)
Dept: OBGYN | Facility: CLINIC | Age: 53
End: 2018-09-19
Payer: COMMERCIAL

## 2018-09-19 VITALS
DIASTOLIC BLOOD PRESSURE: 78 MMHG | WEIGHT: 256 LBS | TEMPERATURE: 97.7 F | HEART RATE: 72 BPM | BODY MASS INDEX: 38.92 KG/M2 | SYSTOLIC BLOOD PRESSURE: 138 MMHG

## 2018-09-19 DIAGNOSIS — G47.00 INSOMNIA, UNSPECIFIED TYPE: ICD-10-CM

## 2018-09-19 DIAGNOSIS — N95.1 PERIMENOPAUSAL VASOMOTOR SYMPTOMS: Primary | ICD-10-CM

## 2018-09-19 DIAGNOSIS — F41.9 ANXIETY: ICD-10-CM

## 2018-09-19 PROCEDURE — 99214 OFFICE O/P EST MOD 30 MIN: CPT | Performed by: OBSTETRICS & GYNECOLOGY

## 2018-09-19 ASSESSMENT — ANXIETY QUESTIONNAIRES
7. FEELING AFRAID AS IF SOMETHING AWFUL MIGHT HAPPEN: SEVERAL DAYS
2. NOT BEING ABLE TO STOP OR CONTROL WORRYING: MORE THAN HALF THE DAYS
5. BEING SO RESTLESS THAT IT IS HARD TO SIT STILL: SEVERAL DAYS
6. BECOMING EASILY ANNOYED OR IRRITABLE: SEVERAL DAYS
1. FEELING NERVOUS, ANXIOUS, OR ON EDGE: MORE THAN HALF THE DAYS
GAD7 TOTAL SCORE: 11
3. WORRYING TOO MUCH ABOUT DIFFERENT THINGS: MORE THAN HALF THE DAYS

## 2018-09-19 ASSESSMENT — PATIENT HEALTH QUESTIONNAIRE - PHQ9: 5. POOR APPETITE OR OVEREATING: MORE THAN HALF THE DAYS

## 2018-09-19 NOTE — PROGRESS NOTES
"GYN CLINIC VISIT  2018  CC:     HPI:  Angy Haji is a 53-year-old  perimenopausal female here for follow-up of anxiety/pression, hot flashes and sleep disorder.   She has been taking a break off of work to try and get her mental and physical health in order.  She is still having a lot of sleep trouble.  She has an appointment with insomnia therapist appt on 10/8  Sleep is still bad, usually only 4-5 hours per night on most nights. Only getting 7+ hours 1-2 nights per week. Waking up in the middle of the night. Some hot flushes. Mind races and hard to fall back asleep. Then wakes up at 4-430am and can't go back to sleep.     Other interventions she is implemented include the following  Cut caffeine in half  Increased exercise, biking or walking almost daily  DASH diet  Weekly therapy started    PHQ-9 SCORE 2018   Total Score 13 4 9     JOANNA-7 SCORE 2018   Total Score 14 10 11        Does not feel ready to return to work on .  Feeling very anxious and having difficulty with decision-making due to sleep deprivation.     Will plan for return to work 10/15, paperwork completed      Vitals:    18 1419 18 1430   BP: (!) 179/91 138/78   Pulse: 72    Temp: 97.7  F (36.5  C)    TempSrc: Oral    Weight: 256 lb (116.1 kg)      Estimated body mass index is 38.92 kg/(m^2) as calculated from the following:    Height as of 18: 5' 8\" (1.727 m).    Weight as of this encounter: 256 lb (116.1 kg).     Gen: Alert and oriented, very pleasant, anxious with some pressured speech      ASSESSMENT:  Angy is a 53-year-old lady with perimenopausal symptoms, anxiety, insomnia    PLAN:  1. Perimenopausal vasomotor symptoms  Continue current dose of HRT  Patient pursuing acupuncture as well    2. Anxiety  Recommend patient increase dose of sertraline to 150 milligrams daily for the next 3 weeks.  Continue weekly therapy.    3. Insomnia, unspecified type  Continue to " work on sleep hygiene and keep appointment with insomnia therapist    Plan to take an additional 3 weeks off of work to get these health issues under control.  [place] paperwork filled out with goal of return to work on 10/15.    In regard to patient's hypertension, I recommend she have this addressed at her appointment with Dr. Quintanilla 10/9.    Greater than 50% of this 30 minute appointment was spent in counseling with the patient on issues described above in the history of present illness and in the plan, including evaluation and management of perimenopausal symptoms, anxiety and insomnia.    Romy Min MD

## 2018-09-19 NOTE — MR AVS SNAPSHOT
After Visit Summary   9/19/2018    Angy Haji    MRN: [id number]           Patient Information     Date Of Birth          1965        Visit Information        Provider Department      9/19/2018 2:00 PM Romy Min MD [facility]        Today's Diagnoses     Perimenopausal vasomotor symptoms    -  1    Anxiety        Insomnia, unspecified type           Follow-ups after your visit        Your next 10 appointments already scheduled     Sep 24, 2018  9:30 AM CDT   (Arrive by 9:15 AM)   RETURN SLP VOICE with RALPH [facility] ([facility])    [address] [address]   [phone]            Oct 03, 2018  3:30 PM CDT   GEOVANNI For Women Only with Angy Villafuerte PT   [place] [facility] [place] ([facility])    [address] [place] [address] [phone]            Oct 04, 2018  8:30 AM CDT   (Arrive by 8:15 AM)   RETURN SLP VOICE with RALPH [facility] ([facility])    [address] [id number] [phone]            Oct 08, 2018 11:00 AM CDT   Office Visit with Romy Min MD   [facility] ([facility])    [address] [place] [id number] [phone]           Bring a current list of meds and any records pertaining to this visit. For Physicals, please bring immunization records and any forms needing to be filled out. Please arrive 10 minutes early to complete paperwork.            Oct 09, 2018  8:20 AM CDT   PHYSICAL with Maryjane Quintanilla MD   [facility] ([facility])    [address] [place] [address] [phone]            Oct 09, 2018  2:00 PM CDT   New Sleep Patient with Jose Angel Piña PsyD   [facility] ([facility])     [address] [facility]  [address]  [facility] [address]   [phone]            Oct 15, 2018  8:30 AM CDT   (Arrive by 8:15 AM)   RETURN SLP VOICE with RALPH [facility] ([facility])    [address] [facility] [address]  [address]th [facility] [address] [phone]            Oct 16, 2018  9:10 AM CDT   GEOVANNI For Women Only with Angy Villafuerte PT   [place] [facility] [place] ([facility] [place])    [address] [facility] [id number] [phone]            Oct 17, 2018  2:30 PM CDT   Follow Up with Mar Xiong RD, LD   [facility] ([facility])    [address] [facility]  [facility] [address]   [phone]            Oct 25, 2018 11:30 AM CDT   Return Sleep Patient with Nellie Calloway MD   [facility] [place] ([facility])    [address] [place] [id number] [phone]              Who to contact     If you have questions or need follow up information about today's clinic visit or your schedule please contact [facility] directly at [phone].  Normal or non-critical lab and imaging results will be communicated to you by [employer], letter or phone within 4 business days after the clinic has received the results. If you do not hear from us within 7 days, please contact the clinic through [employer] or phone. If you have a critical or abnormal lab result, we will notify you by phone as soon as possible.  Submit refill requests through [employer] or call your pharmacy and they will forward the refill request to us. Please allow 3 business days for your refill to be completed.          Additional Information About Your Visit        MyChart Information     [employer] gives you secure access to your electronic health record. If you see a primary care provider, you can also send messages to your care team  and make appointments. If you have questions, please call your primary care clinic.  If you do not have a primary care provider, please call [phone] and they will assist you.        Care EveryWhere ID     This is your Care EveryWhere ID. This could be used by other organizations to access your [place] medical records  [id number]        Your Vitals Were     Pulse Temperature BMI (Body Mass Index)             72 97.7  F (36.5  C) (Oral) 38.92 kg/m2          Blood Pressure from Last 3 Encounters:   09/19/18 138/78   09/07/18 138/76   09/06/18 130/74    Weight from Last 3 Encounters:   09/19/18 256 lb (116.1 kg)   09/07/18 252 lb 9.6 oz (114.6 kg)   09/06/18 253 lb (114.8 kg)              Today, you had the following     No orders found for display       Primary Care Provider Office Phone # Fax #    Maryjane Quintanilla [phone]       [address] [facility] [address]        Equal Access to Services     [facility]: Hadii aad ku hadasho Soomaali, waaxda luqadaha, qaybta kaalmada adeegyada, svetlana tam . So [facility] [phone].    ATENCIÓN: Si habla español, tiene a franco disposición servicios gratuitos de asistencia lingüística. Llame al [phone].    We comply with applicable federal civil rights laws and [place] laws. We do not discriminate on the basis of race, color, national origin, age, disability, sex, sexual orientation, or gender identity.            Thank you!     Thank you for choosing [facility]  for your care. Our goal is always to provide you with excellent care. Hearing back from our patients is one way we can continue to improve our services. Please take a few minutes to complete the written survey that you may receive in the mail after your visit with us. Thank you!             Your Updated Medication List - Protect others around you: Learn how to safely use, store and throw away your medicines at www.disposemymeds.org.           This list is accurate as of 9/19/18  5:47 PM.  Always use your most recent med list.                   Brand Name Dispense Instructions for use Diagnosis    estrogen (conjugated)-medroxyPROGESTERone 0.625-2.5 MG per tablet    PREMPRO    90 tablet    Take 1 tablet by mouth daily    Perimenopausal vasomotor symptoms       lidocaine (viscous) 2 % solution    XYLOCAINE    100 mL    SWISH AND SPIT 5ML BY MOUTH EVERY 3 HOURS AS NEEDED    Canker sore       loratadine 10 MG capsule      Take 10 mg by mouth daily as needed.        metroNIDAZOLE 1 % gel    METROGEL    60 g    Apply topically daily    Rosacea, Perioral dermatitis       order for DME      Use your CPAP device as directed by your provider.        order for DME     1 each    Please measure and distribute 1 pair of 20mmHg - 30mmHg knee high open toe compression stockings. Jobst ultrasheer or equivalent.    Varicose veins of both lower extremities with complications       propranolol 20 MG tablet    INDERAL    60 tablet    Take 1 tablet (20 mg) by mouth 3 times daily    Palpitations       sertraline 100 MG tablet    ZOLOFT    90 tablet    Take 1 tablet (100 mg) by mouth daily    Anxiety       VITAMIN D (CHOLECALCIFEROL) PO      Take 2,000 Units by mouth daily    Perioral dermatitis, Rosacea

## 2018-09-19 NOTE — NURSING NOTE
"Chief Complaint   Patient presents with     RECHECK       Initial BP (!) 179/91  Pulse 72  Temp 97.7  F (36.5  C) (Oral)  Wt 256 lb (116.1 kg)  BMI 38.92 kg/m2 Estimated body mass index is 38.92 kg/(m^2) as calculated from the following:    Height as of 18: 5' 8\" (1.727 m).    Weight as of this encounter: 256 lb (116.1 kg).  BP completed using cuff size: large        The following HM Due: NONE      The following patient reported/Care Every where data was sent to:  P ABSTRACT QUALITY INITIATIVES [76867]  na      n/a              "

## 2018-09-20 ASSESSMENT — ANXIETY QUESTIONNAIRES: GAD7 TOTAL SCORE: 11

## 2018-09-20 ASSESSMENT — PATIENT HEALTH QUESTIONNAIRE - PHQ9: SUM OF ALL RESPONSES TO PHQ QUESTIONS 1-9: 9

## 2018-09-24 ENCOUNTER — TELEPHONE (OUTPATIENT)
Dept: VASCULAR SURGERY | Facility: CLINIC | Age: 53
End: 2018-09-24

## 2018-09-24 ENCOUNTER — OFFICE VISIT (OUTPATIENT)
Dept: OTOLARYNGOLOGY | Facility: CLINIC | Age: 53
End: 2018-09-24
Payer: COMMERCIAL

## 2018-09-24 DIAGNOSIS — R49.0 MUSCLE TENSION DYSPHONIA: Primary | ICD-10-CM

## 2018-09-24 DIAGNOSIS — R13.10 DYSPHAGIA, UNSPECIFIED TYPE: ICD-10-CM

## 2018-09-24 NOTE — MR AVS SNAPSHOT
After Visit Summary   9/24/2018    Angy Haji    MRN: [id number]           Patient Information     Date Of Birth          1965        Visit Information        Provider Department      9/24/2018 9:30 AM [facility]         Follow-ups after your visit        Your next 10 appointments already scheduled     Oct 03, 2018  3:30 PM CDT   GEOVANNI For Women Only with Angy Villafuerte PT   [place] [facility] [place] ([facility] [place])    [address] [place] [address] [phone]            Oct 04, 2018  8:30 AM CDT   (Arrive by 8:15 AM)   RETURN SLP VOICE with [facility]PH [facility] ([facility])    [address] [place] [address]  4th [facility] [phone] [phone]            Oct 04, 2018  9:40 AM CDT   (Arrive by 9:25 AM)   Return Vascular Visit with Jose Angel Solorio MD   [facility] ([facility])    [address] [place] [phone] [phone]            Oct 08, 2018 11:00 AM CDT   Office Visit with Romy Min MD   [facility] ([facility])    [address] [place] [phone]           Bring a current list of meds and any records pertaining to this visit. For Physicals, please bring immunization records and any forms needing to be filled out. Please arrive 10 minutes early to complete paperwork.            Oct 09, 2018  8:20 AM CDT   PHYSICAL with Maryjane Quintanilla MD   [facility] ([facility])    [address] [place] [phone]            Oct 09, 2018  2:00 PM CDT   New Sleep Patient with Jose Angel Piña PsyD   [facility] ([facility])    [address] [address] [phone]            Oct 15, 2018  8:30 AM CDT    (Arrive by 8:15 AM)   RETURN SLP VOICE with RALPH [facility]   [facility]    [address] [facility] [address]  [address] [facility] [phone] [phone]            Oct 16, 2018  9:10 AM CDT   GEOVANNI For Women Only with Angy Villafuerte PT   [place] [facility] [place] ([facility] [place])    [address] [facility] [address] [phone]            Oct 17, 2018  2:30 PM CDT   Follow Up with Mar Xiong RD, LD   [facility] ([facility])    [address]  [facility] [address] [phone]            Oct 25, 2018 11:30 AM CDT   Return Sleep Patient with Nellie Calloway MD   [facility] [place] ([facility])    [address] [facility] [address] [phone]              Who to contact     Please call your clinic at [phone] to:    Ask questions about your health    Make or cancel appointments    Discuss your medicines    Learn about your test results    Speak to your doctor            Additional Information About Your Visit        [employer] Information     [employer] gives you secure access to your electronic health record. If you see a primary care provider, you can also send messages to your care team and make appointments. If you have questions, please call your primary care clinic.  If you do not have a primary care provider, please call [phone] and they will assist you.      [employer] is an electronic gateway that provides easy, online access to your medical records. With [employer], you can request a clinic appointment, read your test results, renew a prescription or communicate with your care team.     To access your existing account, please contact your [facility] or call [phone] for assistance.        Care EveryWhere ID     This is your Care EveryWhere ID. This  could be used by other organizations to access your [place] medical records  [id number]         Blood Pressure from Last 3 Encounters:   09/19/18 138/78   09/07/18 138/76   09/06/18 130/74    Weight from Last 3 Encounters:   09/19/18 116.1 kg (256 lb)   09/07/18 114.6 kg (252 lb 9.6 oz)   09/06/18 114.8 kg (253 lb)              Today, you had the following     No orders found for display       Primary Care Provider Office Phone # Fax #    Maryjane Quintanilla [phone] [address]  [facility] [address]        Equal Access to Services     ORQUIDEA NOGUEIRA : Hadluis Wagner, subhash parker, jay jay dalal, svetlana cárdenas. So [facility] [phone].    ATENCIÓN: Si habla español, tiene a franco disposición servicios gratuitos de asistencia lingüística. Llame al [phone].    We comply with applicable federal civil rights laws and [place] laws. We do not discriminate on the basis of race, color, national origin, age, disability, sex, sexual orientation, or gender identity.            Thank you!     Thank you for choosing [facility]  for your care. Our goal is always to provide you with excellent care. Hearing back from our patients is one way we can continue to improve our services. Please take a few minutes to complete the written survey that you may receive in the mail after your visit with us. Thank you!             Your Updated Medication List - Protect others around you: Learn how to safely use, store and throw away your medicines at www.disposemymeds.org.          This list is accurate as of 9/24/18 11:03 AM.  Always use your most recent med list.                   Brand Name Dispense Instructions for use Diagnosis    estrogen (conjugated)-medroxyPROGESTERone 0.625-2.5 MG per tablet    PREMPRO    90 tablet    Take 1 tablet by mouth daily    Perimenopausal vasomotor symptoms       lidocaine (viscous) 2 % solution    XYLOCAINE    100 mL     SWISH AND SPIT 5ML BY MOUTH EVERY 3 HOURS AS NEEDED    Canker sore       loratadine 10 MG capsule      Take 10 mg by mouth daily as needed.        metroNIDAZOLE 1 % gel    METROGEL    60 g    Apply topically daily    Rosacea, Perioral dermatitis       order for DME      Use your CPAP device as directed by your provider.        order for DME     1 each    Please measure and distribute 1 pair of 20mmHg - 30mmHg knee high open toe compression stockings. Jobst ultrasheer or equivalent.    Varicose veins of both lower extremities with complications       propranolol 20 MG tablet    INDERAL    60 tablet    Take 1 tablet (20 mg) by mouth 3 times daily    Palpitations       sertraline 100 MG tablet    ZOLOFT    90 tablet    Take 1 tablet (100 mg) by mouth daily    Anxiety       VITAMIN D (CHOLECALCIFEROL) PO      Take 2,000 Units by mouth daily    Perioral dermatitis, Rosacea

## 2018-09-24 NOTE — PROGRESS NOTES
[facility]ICE [facility]  Miah Wiseman Jr., M.D., F.A.C.S.  Theresa Lai M.D., M.P.H.  uRth Walter, Ph.D., CCC/SLP  Kelli Washburn M.M. (voice), M.JESSIE., CCC/SLP  Sam Ibrahim M.M. (voice), M.A., [facility]/SLP    [facility]  VOICE/SPEECH/BREATHING THERAPY PROGRESS REPORT    Patient: Angy Haji  Date of Service: 9/24/2018  Date of Last Service: 8/30/18    I had the pleasure of seeing Ms. Haji today, for the 1st therapy session (CPT code 09513).  She was referred by Dr. Wiseman, for medically necessary speech therapy to address a diagnosis of:  R49.0 (Dysphonia)   R13.10 (Dysphagia)  Please refer to her initial evaluation report of 8/30/18 for complete details regarding diagnostic findings.    PROGRESS SINCE LAST SESSION  Ms. aHji was seen for evaluation on 8/30/18.  At that time, it was determined that she would benefit from a course of speech therapy to address the above diagnoses.  Since then, she states she has not had any change in her symptoms, which is expected, as no therapeutic suggestions were made at the time.    Ms. Haji states that:    Has been on Claritin for 3 weeks; has less PND    Her difficulty swallowing dry foods has improved, but is not resolved    She is still on medical leave, so voice is less sore because she is talking less    Talking still makes her voice sore    She still has hypertension and sleep disturbance    Ms. Haji presents today with the following:    Continues to be quite verbose  Voice quality:    Glottal gomez and persistent moderate roughness    Persistent moderate strain, which is another source of noise    Obvious poor airflow, and talks well past the end of her breathstream    Short bursts can be clear    Pitch in speech is at Eb3 to Bb3, with frequent gomez that obscures the perception of pitch     THERAPEUTIC ACTIVITIES  Today Ms. Haji participated in the following therapeutic activities:    [place] concepts of glottal gomez and its effect on her voice  "quality and comfort  o Immediately improved awareness of her own glottal gomez, with some minimal ability to change when she became aware, but very poor carryover in spontaneous speech.    [place] exercises for optimal respiratory mechanics for speech and singing.  o I provided explanation of the anatomy and physiology of respiration for speech and singing; she found this to be helpful  o with guidance, learned improved abdominal relaxation for inhalation; needed frequent cueing throughout the session  o learned techniques for optimal airflow on exhalation  o good learning, but will need practice    [place] exercises to add phonation to the optimal flowing airstream.  o Semi-occluded vocal tract exercises with a straw (\"cup and bubbles\") were highly facilitating for improved quality and comfort  o I provided explanation of the anatomy and physiology of semi-occluded vocla tract exercises, which she found to be helpful  o Nasal continuants were also facilitating  o progressed from neutral syllables to sentences  o Instructed to use for coordination of breath flow with phonation  o good learning, but will need practice     [place] exercises to experience a more forward sensation during phonation.  o speech material with nasal continuants was facilitating, see above  o able to recognize improvement in quality and comfort    [place] techniques to use an optimal pitch range in speech.  o today s pitch range: G3 to D4  o able to maintain in the above exercises in chant mode, with poor carryover to speech    [place] concepts of an optimal regimen for practice.  o she should use an interval schedule of practice, with brief periods of practice frequently throughout each day  o [place] concepts of volitional practice to facilitate motor learning.    An audio recording of today's therapeutic activities was provided, to facilitate practice.    IMPRESSIONS/GOALS/PLAN  Ms. Haji had a productive first session of therapy today, " working on techniques/strategies/exercises that will help her achieve her goal of reducing  R49.0 (Dysphonia)   R13.10 (Dysphagia)   And achieving acceptable and comfortable voice use, allowing her to meet personal and professional vocal demands and fully engage in activities of daily living.   She will work on her exercises on a daily basis, and work on incorporating the techniques into her daily vocal activities.    Goals for this practice period:     practice all exercises according to instructions    attempt to incorporate techniques into daily vocal activities    maintain vigilance for vocal technique; reduce glottal gomez    Plan: I will see Ms. Haji in a week to work on education, modification, and carryover of therapeutic activities to more complex phonatory tasks.    PRIMARY ICD-10 code:  R49.0 (Dysphonia)  SECONDARY ICD-10 code:  R13.10 (Dysphagia)     TOTAL SERVICE TIME: 60 minutes  TREATMENT (11778): 60 minutes  NO CHARGE FACILITY FEE (80806)    Ruth Walter, Ph.D., [facility]-SLP  Speech-Language Pathologist  Director, [facility]  [phone]

## 2018-09-24 NOTE — TELEPHONE ENCOUNTER
----- Message from Cristel Vigil RN sent at 9/24/2018 12:47 PM CDT -----  Regarding: RE: Osmin add on   There is a bilateral venous comp study US that needs to be completed prior to seeing Dr. Solorio in clinic.    Please contact pt to schedule.    Thanks!  ELADIO Vigil, RN, BSN  Interventional Radiology Care Coordinator   Phone:  [phone]    ----- Message -----     From: [place] Licea     Sent: 9/24/2018  11:04 AM       To: Cristel Vigil RN  Subject: Osmin add on                                 Hi-   Patient seen Dr. García in the past and [facility] referred her back to us.   Can you take a look see if she needs anything else, imaging wise  Thanks

## 2018-09-27 ASSESSMENT — ENCOUNTER SYMPTOMS
WEAKNESS: 0
DYSPNEA ON EXERTION: 1
DIARRHEA: 1
SNORES LOUDLY: 0
JOINT SWELLING: 0
PALPITATIONS: 1
SHORTNESS OF BREATH: 1
DYSURIA: 0
COUGH DISTURBING SLEEP: 0
NAIL CHANGES: 0
POLYPHAGIA: 0
DOUBLE VISION: 0
NUMBNESS: 1
EYE PAIN: 0
HEMOPTYSIS: 0
SMELL DISTURBANCE: 0
DIFFICULTY URINATING: 0
EYE WATERING: 0
CHILLS: 0
ALTERED TEMPERATURE REGULATION: 1
BACK PAIN: 1
BLOOD IN STOOL: 0
POSTURAL DYSPNEA: 0
DIZZINESS: 0
DECREASED APPETITE: 0
ABDOMINAL PAIN: 0
NECK MASS: 0
EYE IRRITATION: 1
HALLUCINATIONS: 0
HYPOTENSION: 0
LIGHT-HEADEDNESS: 0
POOR WOUND HEALING: 1
TINGLING: 0
TROUBLE SWALLOWING: 1
DEPRESSION: 0
DISTURBANCES IN COORDINATION: 0
HEADACHES: 1
INCREASED ENERGY: 1
VOMITING: 0
SLEEP DISTURBANCES DUE TO BREATHING: 0
ORTHOPNEA: 0
LEG PAIN: 0
STIFFNESS: 1
JAUNDICE: 0
NAUSEA: 0
SINUS PAIN: 0
SINUS CONGESTION: 1
DECREASED LIBIDO: 1
NERVOUS/ANXIOUS: 1
HEARTBURN: 0
BLOATING: 0
SKIN CHANGES: 0
PARALYSIS: 0
FEVER: 0
FLANK PAIN: 0
SEIZURES: 0
TASTE DISTURBANCE: 0
NIGHT SWEATS: 1
ARTHRALGIAS: 0
CONSTIPATION: 1
SORE THROAT: 1
RECTAL PAIN: 0
WEIGHT LOSS: 0
EXERCISE INTOLERANCE: 1
COUGH: 0
MEMORY LOSS: 0
HOARSE VOICE: 1
HEMATURIA: 0
BOWEL INCONTINENCE: 0
MUSCLE WEAKNESS: 0
SPEECH CHANGE: 1
SYNCOPE: 0
TREMORS: 0
SPUTUM PRODUCTION: 0
POLYDIPSIA: 0
INSOMNIA: 1
LOSS OF CONSCIOUSNESS: 0
HYPERTENSION: 1
NECK PAIN: 0
MYALGIAS: 0
WHEEZING: 0
PANIC: 0
DECREASED CONCENTRATION: 1
FATIGUE: 1
HOT FLASHES: 1
MUSCLE CRAMPS: 1
BRUISES/BLEEDS EASILY: 0
EYE REDNESS: 1
WEIGHT GAIN: 1
SWOLLEN GLANDS: 0

## 2018-09-30 PROBLEM — R13.10 DYSPHAGIA, UNSPECIFIED TYPE: Status: ACTIVE | Noted: 2018-09-30

## 2018-10-01 ENCOUNTER — RADIANT APPOINTMENT (OUTPATIENT)
Dept: ULTRASOUND IMAGING | Facility: CLINIC | Age: 53
End: 2018-10-01
Attending: RADIOLOGY
Payer: COMMERCIAL

## 2018-10-01 DIAGNOSIS — I83.893 VARICOSE VEINS OF LEG WITH SWELLING, BILATERAL: ICD-10-CM

## 2018-10-01 DIAGNOSIS — I87.2 VENOUS (PERIPHERAL) INSUFFICIENCY: ICD-10-CM

## 2018-10-04 ENCOUNTER — OFFICE VISIT (OUTPATIENT)
Dept: VASCULAR SURGERY | Facility: CLINIC | Age: 53
End: 2018-10-04
Payer: COMMERCIAL

## 2018-10-04 VITALS — HEART RATE: 56 BPM | SYSTOLIC BLOOD PRESSURE: 146 MMHG | DIASTOLIC BLOOD PRESSURE: 77 MMHG

## 2018-10-04 DIAGNOSIS — I83.893 SYMPTOMATIC VARICOSE VEINS OF BOTH LOWER EXTREMITIES: Primary | ICD-10-CM

## 2018-10-04 ASSESSMENT — PAIN SCALES - GENERAL: PAINLEVEL: NO PAIN (0)

## 2018-10-04 NOTE — PATIENT INSTRUCTIONS
You have been seen today by Dr. Solorio for venous insufficiency.  -Prescription for Thigh high compression stockings was given, will need for after procedure.  -I will submit for prior authorization for Right leg vein treatment including Endovenous laser ablation and microphlebectomy.  I will call you to schedule once prior authorization is obtained.       For now continue to wear compression stockings as tolerated daily, put bandaid over scabbed ankle area to prevent rubbing or scratching.    Please don't hesitate to contact me with questions or concerns.    Thanks,   ELADIO Vigil, RN, BSN  Interventional Radiology Care Coordinator   Phone:  [phone]

## 2018-10-04 NOTE — LETTER
10/4/2018       RE: Angy Haji  [address]  [place] [address]     Dear Colleague,    Thank you for referring your patient, Angy Haji, to the [facility] at [facility]. Please see a copy of my visit note below.        INTERVENTIONAL RADIOLOGY FOLLOWUP    Name: Angy Haji  Age: 53 year old   Referring Physician: Dr. Quintanilla   REASON FOR VISIT: Venous insufficiency       S: Angy Olivarez is a 53-year-old female who was seen in interventional radiology clinic by Dr. García on 9/19/2016 for venous insufficiency.  She had relatively asymptomatic varicose veins and venous insufficiency at that time.  A mutual decision was made to defer treatment.  She did however engage in conservative treatment with daily compression stocking use which she was compliant with.  There was no significant change in her lower extremity varicose veins/mild swelling until her and her partner went to [place] for vacation in June.  Due to various concerns she did not bring her compression stockings on a trip and during the trip she noted significantly increased swelling as well as lower extremity redness which resulted in a doctor visit in [place].  Symptoms include swelling and aching that are exacerbated by being on her feet and relieved by rest and elevation.  No significant relief with NSAIDs.  Symptoms are significantly worse in the right leg.  She has also noticed increased prominence of her varicosities as well as her spider veins.  She denies any other significant changes in her health with the exception of weight gain.        PAST MEDICAL HISTORY:   Past Medical History:   Diagnosis Date     Disorders of bursae and tendons in shoulder region, unspecified      Elevated blood pressure reading without diagnosis of hypertension      Female infertility of unspecified origin      Gallbladder sludge 5/2013     Mild sleep apnea 5/13/2014     Obesity, unspecified      Oral aphthae       Other acne      Other specified hypoglycemia      PONV (postoperative nausea and vomiting)        PAST SURGICAL HISTORY:   Past Surgical History:   Procedure Laterality Date     HC TOOTH EXTRACTION W/FORCEP  1984/85    [place] [facility]     LAPAROSCOPIC CHOLECYSTECTOMY  7/29/2013    Procedure: LAPAROSCOPIC CHOLECYSTECTOMY;  Laparoscopic Cholecystectomy;  Surgeon: Fabio Johnson MD;  [facility]: [facility]     LAPAROSCOPIC OOPHORECTOMY Left 6/23/2016    Procedure: LAPAROSCOPIC OOPHORECTOMY;  Surgeon: Kayley Donnelly MD;  [facility]     LAPAROSCOPIC SALPINGECTOMY Bilateral 6/23/2016    Procedure: LAPAROSCOPIC SALPINGECTOMY;  Surgeon: Kayley Donnelly MD;  [facility] OR       FAMILY HISTORY:   Family History   Problem Relation Age of Onset     Cardiovascular Paternal Grandfather      anyerism     Obesity Paternal Grandfather      Respiratory Paternal Grandfather      pulmonary (air sacs hardening)     Allergies Father      hayfever     Genitourinary Problems Father      BPH     Lipids Father      diet therapy     Gallbladder Disease Father      cholecystectomy      Eye Disorder Father      retinal tear x 2      Alzheimer Disease Maternal Grandmother      ? poor STM     Arthritis Maternal Grandmother      HEART DISEASE Maternal Grandmother      Arrythmia-got a pacemaker at age 89     Arthritis Mother      Eye Disorder Mother      Catrack /macular tear in one eye     Cancer Paternal Grandmother      Uterine CA- as well as her sister     Gynecology Paternal Grandmother      prolapsed uterus- had 10 kids     Obesity Paternal Grandmother      Breast Cancer Maternal Aunt      Other Cancer Paternal Aunt 80     endo. cancer        SOCIAL HISTORY:   Social History   Substance Use Topics     Smoking status: Never Smoker     Smokeless tobacco: Never Used      Comment: non smoke home     Alcohol use Yes      Comment: 2 Drinks Per Month       PROBLEM LIST:   Patient Active Problem List    Diagnosis Date  Noted     Dysphagia, unspecified type 09/30/2018     Priority: Medium     Muscle tension dysphonia 08/31/2018     Priority: Medium     Palpitations 08/31/2018     Priority: Medium     Morbid obesity (H) 08/14/2018     Priority: Medium     Mild sleep apnea 05/13/2014     Priority: Medium     Atopic rhinitis 05/13/2014     Priority: Medium     Vitamin D deficiency disease 09/20/2013     Priority: Medium     Raynaud disease 04/19/2013     Priority: Medium     Tear of medial cartilage or meniscus of knee, current 09/20/2012     Priority: Medium     Rosacea 09/07/2012     Priority: Medium     CARDIOVASCULAR SCREENING; LDL GOAL LESS THAN 160 05/09/2010     Priority: Medium     Obesity (BMI 30-39.9) 11/15/2003     Priority: Medium     Disorder of bursae and tendons in shoulder region 11/15/2003     Priority: Medium     Problem list name updated by automated process. Provider to review       Oral aphthae 11/10/2003     Priority: Medium       MEDICATIONS:   Prescription Medications as of 10/4/2018             estrogen, conjugated,-medroxyPROGESTERone (PREMPRO) 0.625-2.5 MG per tablet Take 1 tablet by mouth daily    lidocaine, viscous, (XYLOCAINE) 2 % solution SWISH AND SPIT 5ML BY MOUTH EVERY 3 HOURS AS NEEDED    Loratadine 10 MG capsule Take 10 mg by mouth daily as needed.    metroNIDAZOLE (METROGEL) 1 % gel Apply topically daily    order for DME Please measure and distribute 1 pair of 20mmHg - 30mmHg knee high open toe compression stockings. Jobst ultrasheer or equivalent.    ORDER FOR DME Use your CPAP device as directed by your provider.    propranolol (INDERAL) 20 MG tablet Take 1 tablet (20 mg) by mouth 3 times daily    sertraline (ZOLOFT) 100 MG tablet Take 1 tablet (100 mg) by mouth daily    VITAMIN D, CHOLECALCIFEROL, PO Take 2,000 Units by mouth daily          ALLERGIES:   Penicillins and Thimerosal    ROS:  As per HPI      Physical Examination:     PHOTOS:                                                   VITALS:    /77 (BP Location: Left arm, Patient Position: Chair, Cuff Size: Adult Regular)  Pulse 56  Constitutional: Alert, oriented and in no distress.  Cardiovascular: RRR, no m,r,g.  Respiratory: Clear to auscultation bilaterally  Gastrointestinal: Soft, non-tender, non-distended  Neurologic: Grossly intact  Psychiatric: Normal bright affect  Extremities: Warm and well perfused.  Palpable DP pulses.  Prominent varicosities extending from the right medial thigh through the right medial calf.  Increased pigmentation at the distal right calf/ankle.  Prominent patch spider and reticular veins in the posterior right thigh/calf.  Scattered bilateral spider veins.  Bilateral lower extremity edema, right greater than left.  Corona phlebectasia at the medial right ankle with small eschar adjacent to the medial malleolus.    Clinical CEAP: Right C1,2,3,4.  Left C1,3.    Labs:    BMP RESULTS:  Lab Results   Component Value Date     06/20/2016    POTASSIUM 3.7 06/20/2016    CHLORIDE 105 06/20/2016    CO2 27 06/20/2016    ANIONGAP 7 06/20/2016    GLC 81 04/06/2018    BUN 11 06/20/2016    CR 0.58 08/09/2018    GFRESTIMATED >90 08/09/2018    GFRESTBLACK >90 08/09/2018    SHERRIE 9.0 06/20/2016        CBC RESULTS:  Lab Results   Component Value Date    WBC 4.2 08/09/2018    RBC 4.98 08/09/2018    HGB 14.1 08/09/2018    HCT 42.7 08/09/2018    MCV 86 08/09/2018    MCH 28.3 08/09/2018    MCHC 33.0 08/09/2018    RDW 13.3 08/09/2018     08/09/2018       INR/PTT:  No results found for: INR, PTT    Diagnostic studies:     Venous competency study, 10/1/2018;    RLE - +AASV, + GSV from SFJ to midcalf. Incompetent truncal varicosity off GSV at the left thigh and midcalf measuring up to 8mm.  Incompetent perforators, posterior mid thigh, medial calf and posterior calf. +SSV upper calf to mid calf. CFV + with Valsalva at the SF J and femoral vein at the mid thigh.    LLE: +AASV, +GSV @ SFJ with valsalva only and at mid calf.  Posterior left thigh truncal varicosity. +SSV at SPJ- mid calf. + perf posterior calf x 2        Assessment: 53-year-old female with prior history of asymptomatic venous insufficiency with varicosities, now significantly symptomatic (achy pain and swelling), right greater than left.  Physical exam demonstrates increased prominence of varicosities, asymmetric right greater than left leg swelling and right distal calf/ankle skin changes including a small eschar near the medial malleolus.  Imaging demonstrates significant venous incompetence as above.  Laser ablation and microphlebectomy was described to Angy including details such as that it is an outpatient procedure with light sedation (Valium) and performed in the procedural area at the hospital.  Post ablation cares were described.  She is interested in proceeding with the procedure.  LLE will continue to be managed with compression pending results on more symptomatic right side and be reassessed on follow up visits.    Plan:  -Obtain insurance authorization.  -Schedule right AASV and right GSV ablation (dual access) with microphlebectomy of associated symptomatic truncal varicosities in the thigh and calf.     David Gibbons MD  Interventional Radiology Fellow  [facility]  [phone] after hours    I was present with the fellow during the history and exam.  I discussed the case with the fellow and agree with the findings as documented in the assessment and plan.    I spent a total of 25 minutes face-to-face with Angy Haji during today's office visit.  Over 50% of this time was spent counseling the patient and/or coordinating care regarding management of symptomatic BLE varicosities R>L.  See note for details.    Jose Angel Solorio MD  Interventional Radiology and Vascular Imaging Attending  Department of Radiology  [facility]      CC  Patient Care Team:  Maryjane Quintanilla MD as PCP - General (Family  Practice)  Zeyad Barbour MD as Resident (Radiology)  Cody García MD as Resident (Radiology)  MAT ROSE

## 2018-10-04 NOTE — MR AVS SNAPSHOT
After Visit Summary   10/4/2018    Angy Haji    MRN: [id number]           Patient Information     Date Of Birth          1965        Visit Information        Provider Department      10/4/2018 9:40 AM Jose Angel Solorio MD [facility]        Today's Diagnoses     Symptomatic varicose veins of both lower extremities    -  1      Care Instructions    You have been seen today by Dr. Solorio for venous insufficiency.  -Prescription for Thigh high compression stockings was given, will need for after procedure.  -I will submit for prior authorization for Right leg vein treatment including Endovenous laser ablation and microphlebectomy.  I will call you to schedule once prior authorization is obtained.       For now continue to wear compression stockings as tolerated daily, put bandaid over scabbed ankle area to prevent rubbing or scratching.    Please don't hesitate to contact me with questions or concerns.    Thanks,   ELADIO Vigil RN, BSN  Interventional Radiology Care Coordinator   Phone:  [phone]            Follow-ups after your visit        Your next 10 appointments already scheduled     Oct 08, 2018 11:00 AM CDT   Office Visit with Romy Min MD   [facility] ([facility])    [address] [place] [address] [phone]           Bring a current list of meds and any records pertaining to this visit. For Physicals, please bring immunization records and any forms needing to be filled out. Please arrive 10 minutes early to complete paperwork.            Oct 08, 2018  1:30 PM CDT   MA SCREENING BILATERAL W/ PEARL with [id number]   [facility] ([facility])    [address]  [facility] [address] [phone]           How do I prepare for my exam? (Food and drink instructions) No Food and Drink Restrictions.  How do I prepare for my  "exam? (Other instructions) Do not use any powder, lotion or deodorant under your arms or on your breast. If you do, we will ask you to remove it before your exam.  What should I wear: Wear comfortable, two-piece clothing.  How long does the exam take: Most scans will take 15 minutes.  What should I bring: Bring any previous mammograms from other facilities or have them mailed to the breast center.  Do I need a :  No  is needed.  What do I need to tell my doctor: If you have any allergies, tell your care team.  What should I do after the exam: No restrictions, You may resume normal activities.  What is this test: This test is an x-ray of the breast to look for breast disease. The breast is pressed between two plates to flatten and spread the tissue. An X-ray is taken of the breast from different angles.  Who should I call with questions: If you have any questions, please call the Imaging Department where you will have your exam. Directions, parking instructions, and other information is available on our website, [email or web address].  Other information about my exam Three-dimensional (3D) mammograms are available at [place] locations in [facility], [facility], [place], and [place]. [facility] locations include [place] and the [facility] in [place].  Benefits of 3D mammograms include: * Improved rate of cancer detection * Decreases your chance of having to go back for more tests, which means fewer: * \"False-positive\" results (This means that there is an abnormal area but it isn't cancer.) * Invasive testing procedures, such as a biopsy or surgery * Can provide clearer images of the breast if you have dense breast tissue.  *3D mammography is an optional exam that anyone can have with a 2D mammogram. It doesn't replace or take the place of a 2D mammogram. 2D mammograms remain an effective screening test for all women.  Not all insurance companies cover " the cost of a 3D mammogram. Check with your insurance.            Oct 09, 2018  2:00 PM CDT   New Sleep Patient with Jose Angel Piña PsyD   [facility] ([facility])    [address]  [address]  [facility] [address]   [phone]            Oct 15, 2018  8:30 AM CDT   (Arrive by 8:15 AM)   RETURN SLP VOICE with RALPH [facility] ([facility])    [address] [id number] [phone]            Oct 16, 2018  9:10 AM CDT   GEOVANNI For Women Only with Angy Villafuerte PT   [facility] ([facility])    [address] [place] [id number] [phone]            Oct 17, 2018  2:30 PM CDT   Follow Up with Mar Xiong RD, LD   [facility] ([facility])    [address]  [facility] [address]   [phone]            Oct 22, 2018  8:10 AM CDT   GEOVANNI For Women Only with Angy Villafuerte PT   [facility] ([facility])    [address] [place] [id number] [phone]            Oct 25, 2018 11:30 AM CDT   Return Sleep Patient with Nellie Calloway MD   [facility] ([facility])    [address] [place] [id number] [phone]            Oct 29, 2018  8:30 AM CDT   (Arrive by 8:15 AM)   RETURN SLP VOICE with RALPH [facility] ([facility])    [address] [id number] [phone]            Oct 30, 2018 10:00 AM CDT   GEOVANNI For Women Only with Angy Villafuerte PT   [facility] ([facility])    [address] [place] [address] [phone]              Who to contact     Please call your clinic at [phone] to:    Ask questions about your  health    Make or cancel appointments    Discuss your medicines    Learn about your test results    Speak to your doctor            Additional Information About Your Visit        [facility] Information     [employer] gives you secure access to your electronic health record. If you see a primary care provider, you can also send messages to your care team and make appointments. If you have questions, please call your primary care clinic.  If you do not have a primary care provider, please call [phone] and they will assist you.      [employer] is an electronic gateway that provides easy, online access to your medical records. With [employer], you can request a clinic appointment, read your test results, renew a prescription or communicate with your care team.     To access your existing account, please contact your [facility] or call [phone] for assistance.        Care EveryWhere ID     This is your Care EveryWhere ID. This could be used by other organizations to access your [facility] medical records  [id number]        Your Vitals Were     Pulse                   56            Blood Pressure from Last 3 Encounters:   10/04/18 146/77   09/19/18 138/78   09/07/18 138/76    Weight from Last 3 Encounters:   09/19/18 116.1 kg (256 lb)   09/07/18 114.6 kg (252 lb 9.6 oz)   09/06/18 114.8 kg (253 lb)              Today, you had the following     No orders found for display         Today's Medication Changes          These changes are accurate as of 10/4/18 11:59 PM.  If you have any questions, ask your nurse or doctor.               Start taking these medicines.        Dose/Directions    order for DME   Used for:  Symptomatic varicose veins of both lower extremities   Started by:  Jose Angel Solorio MD        Please measure and distribute 1 pair of 20mmHg - 30mmHg thigh high open or closed toe compression stockings. Jobst ultrasheer or equivalent.   Quantity:  2 each   Refills:  3             Where to get your medicines      Some of these will need a paper prescription and others can be bought over the counter.  Ask your nurse if you have questions.     Bring a paper prescription for each of these medications     order for DME                Primary Care Provider Office Phone # Fax #    Maryjane Quintanilla [phone] [address]  [facility] [address]        Equal Access to Services     [facility] : Hadii aad ku hadasho Soomaali, waaxda luqadaha, qaybta kaalmada adeegyada, waxay idiin hayaan adeeg khjoycekarly lajarod . So [facility] [phone].    ATENCIÓN: Si bassamla danielle, tiene a franco disposición servicios gratuitos de asistencia lingüística. Haley al [phone].    We comply with applicable federal civil rights laws and [place] laws. We do not discriminate on the basis of race, color, national origin, age, disability, sex, sexual orientation, or gender identity.            Thank you!     Thank you for choosing [facility]  for your care. Our goal is always to provide you with excellent care. Hearing back from our patients is one way we can continue to improve our services. Please take a few minutes to complete the written survey that you may receive in the mail after your visit with us. Thank you!             Your Updated Medication List - Protect others around you: Learn how to safely use, store and throw away your medicines at www.disposemymeds.org.          This list is accurate as of 10/4/18 11:59 PM.  Always use your most recent med list.                   Brand Name Dispense Instructions for use Diagnosis    estrogen (conjugated)-medroxyPROGESTERone 0.625-2.5 MG per tablet    PREMPRO    90 tablet    Take 1 tablet by mouth daily    Perimenopausal vasomotor symptoms       lidocaine (viscous) 2 % solution    XYLOCAINE    100 mL    SWISH AND SPIT 5ML BY MOUTH EVERY 3 HOURS AS NEEDED    Canker sore       loratadine 10 MG capsule      Take 10 mg by mouth daily as  needed.        metroNIDAZOLE 1 % gel    METROGEL    60 g    Apply topically daily    Rosacea, Perioral dermatitis       order for DME      Use your CPAP device as directed by your provider.        order for DME     1 each    Please measure and distribute 1 pair of 20mmHg - 30mmHg knee high open toe compression stockings. Jobst ultrasheer or equivalent.    Varicose veins of both lower extremities with complications       order for DME     2 each    Please measure and distribute 1 pair of 20mmHg - 30mmHg thigh high open or closed toe compression stockings. Jobst ultrasheer or equivalent.    Symptomatic varicose veins of both lower extremities       propranolol 20 MG tablet    INDERAL    60 tablet    Take 1 tablet (20 mg) by mouth 3 times daily    Palpitations       sertraline 100 MG tablet    ZOLOFT    90 tablet    Take 1 tablet (100 mg) by mouth daily    Anxiety       VITAMIN D (CHOLECALCIFEROL) PO      Take 2,000 Units by mouth daily    Perioral dermatitis, Rosacea

## 2018-10-04 NOTE — PROGRESS NOTES
INTERVENTIONAL RADIOLOGY FOLLOWUP    Name: Angy Haji  Age: 53 year old   Referring Physician: Dr. Quintanilla   REASON FOR VISIT: Venous insufficiency       S: Angy Olivarez is a 53-year-old female who was seen in interventional radiology clinic by Dr. García on 9/19/2016 for venous insufficiency.  She had relatively asymptomatic varicose veins and venous insufficiency at that time.  A mutual decision was made to defer treatment.  She did however engage in conservative treatment with daily compression stocking use which she was compliant with.  There was no significant change in her lower extremity varicose veins/mild swelling until her and her partner went to [place] for vacation in June.  Due to various concerns she did not bring her compression stockings on a trip and during the trip she noted significantly increased swelling as well as lower extremity redness which resulted in a doctor visit in [place].  Symptoms include swelling and aching that are exacerbated by being on her feet and relieved by rest and elevation.  No significant relief with NSAIDs.  Symptoms are significantly worse in the right leg.  She has also noticed increased prominence of her varicosities as well as her spider veins.  She denies any other significant changes in her health with the exception of weight gain.        PAST MEDICAL HISTORY:   Past Medical History:   Diagnosis Date     Disorders of bursae and tendons in shoulder region, unspecified      Elevated blood pressure reading without diagnosis of hypertension      Female infertility of unspecified origin      Gallbladder sludge 5/2013     Mild sleep apnea 5/13/2014     Obesity, unspecified      Oral aphthae      Other acne      Other specified hypoglycemia      PONV (postoperative nausea and vomiting)        PAST SURGICAL HISTORY:   Past Surgical History:   Procedure Laterality Date     HC TOOTH EXTRACTION W/FORCEP  1984/85    [facility]     LAPAROSCOPIC CHOLECYSTECTOMY  7/29/2013     Procedure: LAPAROSCOPIC CHOLECYSTECTOMY;  Laparoscopic Cholecystectomy;  Surgeon: Fabio Johnson MD;  Location: [facility]     LAPAROSCOPIC OOPHORECTOMY Left 6/23/2016    Procedure: LAPAROSCOPIC OOPHORECTOMY;  Surgeon: Kayley Donnelly MD;  [facility]     LAPAROSCOPIC SALPINGECTOMY Bilateral 6/23/2016    Procedure: LAPAROSCOPIC SALPINGECTOMY;  Surgeon: Kayley Donnelly MD;  [facility]       FAMILY HISTORY:   Family History   Problem Relation Age of Onset     Cardiovascular Paternal Grandfather      anyerism     Obesity Paternal Grandfather      Respiratory Paternal Grandfather      pulmonary (air sacs hardening)     Allergies Father      hayfever     Genitourinary Problems Father      BPH     Lipids Father      diet therapy     Gallbladder Disease Father      cholecystectomy      Eye Disorder Father      retinal tear x 2      Alzheimer Disease Maternal Grandmother      ? poor STM     Arthritis Maternal Grandmother      HEART DISEASE Maternal Grandmother      Arrythmia-got a pacemaker at age 89     Arthritis Mother      Eye Disorder Mother      Catrack /macular tear in one eye     Cancer Paternal Grandmother      Uterine CA- as well as her sister     Gynecology Paternal Grandmother      prolapsed uterus- had 10 kids     Obesity Paternal Grandmother      Breast Cancer Maternal Aunt      Other Cancer Paternal Aunt 80     endo. cancer        SOCIAL HISTORY:   Social History   Substance Use Topics     Smoking status: Never Smoker     Smokeless tobacco: Never Used      Comment: non smoke home     Alcohol use Yes      Comment: 2 Drinks Per Month       PROBLEM LIST:   Patient Active Problem List    Diagnosis Date Noted     Dysphagia, unspecified type 09/30/2018     Priority: Medium     Muscle tension dysphonia 08/31/2018     Priority: Medium     Palpitations 08/31/2018     Priority: Medium     Morbid obesity (H) 08/14/2018     Priority: Medium     Mild sleep apnea 05/13/2014     Priority: Medium      Atopic rhinitis 05/13/2014     Priority: Medium     Vitamin D deficiency disease 09/20/2013     Priority: Medium     Raynaud disease 04/19/2013     Priority: Medium     Tear of medial cartilage or meniscus of knee, current 09/20/2012     Priority: Medium     Rosacea 09/07/2012     Priority: Medium     CARDIOVASCULAR SCREENING; LDL GOAL LESS THAN 160 05/09/2010     Priority: Medium     Obesity (BMI 30-39.9) 11/15/2003     Priority: Medium     Disorder of bursae and tendons in shoulder region 11/15/2003     Priority: Medium     Problem list name updated by automated process. Provider to review       Oral aphthae 11/10/2003     Priority: Medium       MEDICATIONS:   Prescription Medications as of 10/4/2018             estrogen, conjugated,-medroxyPROGESTERone (PREMPRO) 0.625-2.5 MG per tablet Take 1 tablet by mouth daily    lidocaine, viscous, (XYLOCAINE) 2 % solution SWISH AND SPIT 5ML BY MOUTH EVERY 3 HOURS AS NEEDED    Loratadine 10 MG capsule Take 10 mg by mouth daily as needed.    metroNIDAZOLE (METROGEL) 1 % gel Apply topically daily    order for DME Please measure and distribute 1 pair of 20mmHg - 30mmHg knee high open toe compression stockings. Jobst ultrasheer or equivalent.    ORDER FOR DME Use your CPAP device as directed by your provider.    propranolol (INDERAL) 20 MG tablet Take 1 tablet (20 mg) by mouth 3 times daily    sertraline (ZOLOFT) 100 MG tablet Take 1 tablet (100 mg) by mouth daily    VITAMIN D, CHOLECALCIFEROL, PO Take 2,000 Units by mouth daily          ALLERGIES:   Penicillins and Thimerosal    ROS:  As per HPI      Physical Examination:     PHOTOS:                                                   VITALS:   /77 (BP Location: Left arm, Patient Position: Chair, Cuff Size: Adult Regular)  Pulse 56  Constitutional: Alert, oriented and in no distress.  Cardiovascular: RRR, no m,r,g.  Respiratory: Clear to auscultation bilaterally  Gastrointestinal: Soft, non-tender,  non-distended  Neurologic: Grossly intact  Psychiatric: Normal bright affect  Extremities: Warm and well perfused.  Palpable DP pulses.  Prominent varicosities extending from the right medial thigh through the right medial calf.  Increased pigmentation at the distal right calf/ankle.  Prominent patch spider and reticular veins in the posterior right thigh/calf.  Scattered bilateral spider veins.  Bilateral lower extremity edema, right greater than left.  Corona phlebectasia at the medial right ankle with small eschar adjacent to the medial malleolus.    Clinical CEAP: Right C1,2,3,4.  Left C1,3.    Labs:    BMP RESULTS:  Lab Results   Component Value Date     06/20/2016    POTASSIUM 3.7 06/20/2016    CHLORIDE 105 06/20/2016    CO2 27 06/20/2016    ANIONGAP 7 06/20/2016    GLC 81 04/06/2018    BUN 11 06/20/2016    CR 0.58 08/09/2018    GFRESTIMATED >90 08/09/2018    GFRESTBLACK >90 08/09/2018    SHERRIE 9.0 06/20/2016        CBC RESULTS:  Lab Results   Component Value Date    WBC 4.2 08/09/2018    RBC 4.98 08/09/2018    HGB 14.1 08/09/2018    HCT 42.7 08/09/2018    MCV 86 08/09/2018    MCH 28.3 08/09/2018    MCHC 33.0 08/09/2018    RDW 13.3 08/09/2018     08/09/2018       INR/PTT:  No results found for: INR, PTT    Diagnostic studies:     Venous competency study, 10/1/2018;    RLE - +AASV, + GSV from SFJ to midcalf. Incompetent truncal varicosity off GSV at the left thigh and midcalf measuring up to 8mm.  Incompetent perforators, posterior mid thigh, medial calf and posterior calf. +SSV upper calf to mid calf. CFV + with Valsalva at the SF J and femoral vein at the mid thigh.    LLE: +AASV, +GSV @ SFJ with valsalva only and at mid calf. Posterior left thigh truncal varicosity. +SSV at SPJ- mid calf. + perf posterior calf x 2        Assessment: 53-year-old female with prior history of asymptomatic venous insufficiency with varicosities, now significantly symptomatic (achy pain and swelling), right greater  than left.  Physical exam demonstrates increased prominence of varicosities, asymmetric right greater than left leg swelling and right distal calf/ankle skin changes including a small eschar near the medial malleolus.  Imaging demonstrates significant venous incompetence as above.  Laser ablation and microphlebectomy was described to Angy including details such as that it is an outpatient procedure with light sedation (Valium) and performed in the procedural area at the hospital.  Post ablation cares were described.  She is interested in proceeding with the procedure.  LLE will continue to be managed with compression pending results on more symptomatic right side and be reassessed on follow up visits.    Plan:  -Obtain insurance authorization.  -Schedule right AASV and right GSV ablation (dual access) with microphlebectomy of associated symptomatic truncal varicosities in the thigh and calf.     David Gibbons MD  Interventional Radiology Fellow  [facility]  [phone] after hours    I was present with the fellow during the history and exam.  I discussed the case with the fellow and agree with the findings as documented in the assessment and plan.    I spent a total of 25 minutes face-to-face with Angy Haji during today's office visit.  Over 50% of this time was spent counseling the patient and/or coordinating care regarding management of symptomatic BLE varicosities R>L.  See note for details.    Jose Angel Solorio MD  Interventional Radiology and Vascular Imaging Attending  Department of Radiology  [facility]      CC  Patient Care Team:  Maryjane Rose MD as PCP - General (Family Practice)  Zeyad Barbour MD as Resident (Radiology)  Cody García MD as Resident (Radiology)  MARYJANE ROSE

## 2018-10-08 ENCOUNTER — RADIANT APPOINTMENT (OUTPATIENT)
Dept: MAMMOGRAPHY | Facility: CLINIC | Age: 53
End: 2018-10-08
Payer: COMMERCIAL

## 2018-10-08 ENCOUNTER — OFFICE VISIT (OUTPATIENT)
Dept: OBGYN | Facility: CLINIC | Age: 53
End: 2018-10-08
Payer: COMMERCIAL

## 2018-10-08 VITALS
HEART RATE: 59 BPM | WEIGHT: 260 LBS | TEMPERATURE: 97.8 F | DIASTOLIC BLOOD PRESSURE: 81 MMHG | SYSTOLIC BLOOD PRESSURE: 146 MMHG | BODY MASS INDEX: 39.53 KG/M2

## 2018-10-08 DIAGNOSIS — N95.1 PERIMENOPAUSAL VASOMOTOR SYMPTOMS: Primary | ICD-10-CM

## 2018-10-08 DIAGNOSIS — Z12.31 VISIT FOR SCREENING MAMMOGRAM: ICD-10-CM

## 2018-10-08 DIAGNOSIS — G47.00 INSOMNIA, UNSPECIFIED TYPE: ICD-10-CM

## 2018-10-08 DIAGNOSIS — F41.9 ANXIETY: ICD-10-CM

## 2018-10-08 PROCEDURE — 99214 OFFICE O/P EST MOD 30 MIN: CPT | Performed by: OBSTETRICS & GYNECOLOGY

## 2018-10-08 RX ORDER — SERTRALINE HYDROCHLORIDE 100 MG/1
150 TABLET, FILM COATED ORAL DAILY
Qty: 135 TABLET | Refills: 3 | Status: ON HOLD | OUTPATIENT
Start: 2018-10-08 | End: 2018-11-15

## 2018-10-08 ASSESSMENT — PATIENT HEALTH QUESTIONNAIRE - PHQ9: 5. POOR APPETITE OR OVEREATING: MORE THAN HALF THE DAYS

## 2018-10-08 ASSESSMENT — ANXIETY QUESTIONNAIRES
5. BEING SO RESTLESS THAT IT IS HARD TO SIT STILL: MORE THAN HALF THE DAYS
2. NOT BEING ABLE TO STOP OR CONTROL WORRYING: SEVERAL DAYS
7. FEELING AFRAID AS IF SOMETHING AWFUL MIGHT HAPPEN: SEVERAL DAYS
6. BECOMING EASILY ANNOYED OR IRRITABLE: SEVERAL DAYS
3. WORRYING TOO MUCH ABOUT DIFFERENT THINGS: SEVERAL DAYS
GAD7 TOTAL SCORE: 10
1. FEELING NERVOUS, ANXIOUS, OR ON EDGE: MORE THAN HALF THE DAYS

## 2018-10-08 NOTE — MR AVS SNAPSHOT
After Visit Summary   10/8/2018    Angy Haji    MRN: [id number]           Patient Information     Date Of Birth          1965        Visit Information        Provider Department      10/8/2018 11:00 AM Romy Min MD [facility]        Today's Diagnoses     Perimenopausal vasomotor symptoms    -  1    Anxiety        Insomnia, unspecified type           Follow-ups after your visit        Your next 10 appointments already scheduled     Oct 09, 2018  2:00 PM CDT   New Sleep Patient with Jose Angel Piña PsyD   [facility] ([facility])    [address]  [address]  [facility] [address]   [phone]            Oct 15, 2018  8:30 AM CDT   (Arrive by 8:15 AM)   RETURN SLP VOICE with RALPH Osborne CRAiLAR Voice ([facility] [place])    [address] [id number] [phone]            Oct 16, 2018  9:10 AM CDT   GEOVANNI For Women Only with Angy Villafuerte PT   [facility] ([facility] [place])    [address] [place] [id number] [phone]            Oct 17, 2018  2:30 PM CDT   Follow Up with Mar Xiong RD, MAKEDA   [facility] ([facility])    [address]  [facility] [address]   [phone]            Oct 22, 2018  8:10 AM CDT   GEOVANNI For Women Only with Angy Villafuerte PT   [facility] ([facility] [place])    [address] [place] [id number] [phone]            Oct 25, 2018 11:30 AM CDT   Return Sleep Patient with Nellie Calloway MD   [facility] ([facility])    [address] [facility] [id number] [phone]            Oct 29, 2018  8:30 AM CDT   (Arrive by 8:15 AM)   RETURN SLP VOICE with RALPH Osborne CRAiLAR Voice (  [facility])    [address]  [address]  [facility] [address]   [phone]            Oct 30, 2018 10:00 AM CDT   GEOVANNI For Women Only with Angy Villafuerte PT   [facility] ([facility] [place])    [address] [place] [id number] [phone]            Nov 12, 2018  8:10 AM CST   GEOVANNI For Women Only with Angy Villafuerte PT   [facility] ([facility] [place])    [address] [place] [address] [phone]            Nov 12, 2018  5:00 PM CST   [facility] with Maryjane Quintanilla MD   [facility] ([facility])    [address] [place] [address] [phone]              Who to contact     If you have questions or need follow up information about today's clinic visit or your schedule please contact [facility] directly at [phone].  Normal or non-critical lab and imaging results will be communicated to you by [employer], letter or phone within 4 business days after the clinic has received the results. If you do not hear from us within 7 days, please contact the clinic through [employer] or phone. If you have a critical or abnormal lab result, we will notify you by phone as soon as possible.  Submit refill requests through [employer] or call your pharmacy and they will forward the refill request to us. Please allow 3 business days for your refill to be completed.          Additional Information About Your Visit        [employer] Information     [employer] gives you secure access to your electronic health record. If you see a primary care provider, you can also send messages to your care team and make appointments. If you have questions, please call your primary care clinic.  If you do not have a primary care provider, please call [phone] and they will assist you.        Care EveryWhere ID     This is your Care EveryWhere ID. This could be used by  other organizations to access your [facility] medical records  [id number]        Your Vitals Were     Pulse Temperature BMI (Body Mass Index)             59 97.8  F (36.6  C) (Oral) 39.53 kg/m2          Blood Pressure from Last 3 Encounters:   10/08/18 146/81   10/04/18 146/77   09/19/18 138/78    Weight from Last 3 Encounters:   10/08/18 260 lb (117.9 kg)   09/19/18 256 lb (116.1 kg)   09/07/18 252 lb 9.6 oz (114.6 kg)              Today, you had the following     No orders found for display         Today's Medication Changes          These changes are accurate as of 10/8/18  8:54 PM.  If you have any questions, ask your nurse or doctor.               These medicines have changed or have updated prescriptions.        Dose/Directions    * sertraline 100 MG tablet   Commonly known as:  ZOLOFT   This may have changed:  Another medication with the same name was added. Make sure you understand how and when to take each.   Used for:  Anxiety   Changed by:  Romy Min MD        Dose:  100 mg   Take 1 tablet (100 mg) by mouth daily   Quantity:  90 tablet   Refills:  3       * sertraline 100 MG tablet   Commonly known as:  ZOLOFT   This may have changed:  You were already taking a medication with the same name, and this prescription was added. Make sure you understand how and when to take each.   Used for:  Anxiety, Perimenopausal vasomotor symptoms   Changed by:  Romy Min MD        Dose:  150 mg   Take 1.5 tablets (150 mg) by mouth daily   Quantity:  135 tablet   Refills:  3       * Notice:  This list has 2 medication(s) that are the same as other medications prescribed for you. Read the directions carefully, and ask your doctor or other care provider to review them with you.         Where to get your medicines      These medications were sent to [facility] [place], [place] - [address]  [address] [address]     Phone:  [phone]      sertraline 100 MG tablet                Primary Care Provider Office Phone # Fax #    Maryjane Quintanilla [phone] [address]  [facility] [address]        Equal Access to Services     LUPE NOGUEIRA : Hadii aad ku hadterio Sojermaineali, waaxda luqadaha, qaybta kaalmada adeshaanyada, svetlana terry yonatan cárdenas. So [facility] [phone].    ATENCIÓN: Si habla español, tiene a franco disposición servicios gratuitos de asistencia lingüística. Llame al [phone].    We comply with applicable federal civil rights laws and [place] laws. We do not discriminate on the basis of race, color, national origin, age, disability, sex, sexual orientation, or gender identity.            Thank you!     Thank you for choosing [facility]  for your care. Our goal is always to provide you with excellent care. Hearing back from our patients is one way we can continue to improve our services. Please take a few minutes to complete the written survey that you may receive in the mail after your visit with us. Thank you!             Your Updated Medication List - Protect others around you: Learn how to safely use, store and throw away your medicines at www.disposemymeds.org.          This list is accurate as of 10/8/18  8:54 PM.  Always use your most recent med list.                   Brand Name Dispense Instructions for use Diagnosis    estrogen (conjugated)-medroxyPROGESTERone 0.625-2.5 MG per tablet    PREMPRO    90 tablet    Take 1 tablet by mouth daily    Perimenopausal vasomotor symptoms       lidocaine (viscous) 2 % solution    XYLOCAINE    100 mL    SWISH AND SPIT 5ML BY MOUTH EVERY 3 HOURS AS NEEDED    Canker sore       loratadine 10 MG capsule      Take 10 mg by mouth daily as needed.        metroNIDAZOLE 1 % gel    METROGEL    60 g    Apply topically daily    Rosacea, Perioral dermatitis       order for DME      Use your CPAP device as directed by your provider.        order for DME     1  each    Please measure and distribute 1 pair of 20mmHg - 30mmHg knee high open toe compression stockings. Jobst ultrasheer or equivalent.    Varicose veins of both lower extremities with complications       order for DME     2 each    Please measure and distribute 1 pair of 20mmHg - 30mmHg thigh high open or closed toe compression stockings. Jobst ultrasheer or equivalent.    Symptomatic varicose veins of both lower extremities       propranolol 20 MG tablet    INDERAL    60 tablet    Take 1 tablet (20 mg) by mouth 3 times daily    Palpitations       * sertraline 100 MG tablet    ZOLOFT    90 tablet    Take 1 tablet (100 mg) by mouth daily    Anxiety       * sertraline 100 MG tablet    ZOLOFT    135 tablet    Take 1.5 tablets (150 mg) by mouth daily    Anxiety, Perimenopausal vasomotor symptoms       VITAMIN D (CHOLECALCIFEROL) PO      Take 2,000 Units by mouth daily    Perioral dermatitis, Rosacea       * Notice:  This list has 2 medication(s) that are the same as other medications prescribed for you. Read the directions carefully, and ask your doctor or other care provider to review them with you.

## 2018-10-08 NOTE — LETTER
[address] [address]  [phone]    2018    Re: Angy Haji  [address]  [place] [address]  [phone] (home) [phone] (work)    : 1965      To Whom It May Concern:      Angy Haji was seen in clinic on 10/8/2018.  She may return to work on 10/9/18 with no restrictions but will need intermittent absences for follow up appointments regarding her health condition. She may use her FMLA for these intermittent absences.        Sincerely,            Romy Min

## 2018-10-08 NOTE — PROGRESS NOTES
"GYN CLINIC VISIT  10/8/2018  CC: follow up on perimenopausal symptoms    HPI:  Angy Haji is a 53 year old  perimenopausal female who presents in follow up for perimenopausal symptoms including hot flushes, sleep disturbance and anxiety. These symptoms had previously been debilitating and interfering with her ability to perform her job effectively and safely. She has taken some time off to attend to multiple appointments and focus on her health. She feels like her symptoms are improved and she is able to return to work.     Last appointment we increased sertraline dose from 100mg to 150mg. She feels like this dose is more effective at controlling her anxiety and she is sleeping more at night. She is still taking same dose of HRT (antonino pro estrogen 0.625-medroxyprogesterone 2.5mg).    Sleep is better - 6-7h per night (previously about 4h on ave) able to fall back asleep if she wakes to go to the bathroom when previously she would be up potentially for >1 hour. Less early awakening  Anxiety improved, overall 6/10 spikes occasionally to 8/10 whereas she previously \"lived at 8/10 with spikes to 11\"  Concentration better, not perfect but a lot better. Still distracted by stressors but generally able to prioritize effectively, still working on it  Less catastrophic thinking  Makes a plan, thinks about how to get support  Talks to a friend or her wife and that helps  Occasional hot flushes but not sweating as much, less frequent  Energy level still low but better in the morning and able to get through until mid afternoon    Going to therapy weekly  Has sleep therapy appt scheduled  Daily exercise  DASH diet  Settling into their new place and routine (moved so daughter can attend school in a different district)    JOANNA 7 score from : 14  JOANNA-7 SCORE 2018 2018 10/8/2018   Total Score 10 11 10     PHQ 9 score from : 13  PHQ-9 SCORE 2018 2018 10/8/2018   Total Score 4 9 9       Vitals:    " 10/08/18 1135   BP: 146/81   Pulse: 59   Temp: 97.8  F (36.6  C)   TempSrc: Oral   Weight: 260 lb (117.9 kg)     Gen: alert, oriented, no distress, normal speech, appropriate eye contact      ASSESSMENT:  Angy Haji is a 53 year old  with perimenopausal vasomotor symptoms (hot flushing and night sweats), anxiety and sleep disturbance. Great improvement with multi-modal therapies. Currently on HRT and sertraline. Additionally patient has made lifestyle changes, sees a therapist regularly, seeing specialist for sleep apnea and sleep hygiene.    PLAN:  Plan to return to work tomorrow with intermittent absences for follow up appointments.     1. Perimenopausal vasomotor symptoms  Continue HRT on current dose.   - sertraline (ZOLOFT) 100 MG tablet; Take 1.5 tablets (150 mg) by mouth daily  Dispense: 135 tablet; Refill: 3    2. Anxiety  Continue sertraline at 150mg daily, continue therapy  - sertraline (ZOLOFT) 100 MG tablet; Take 1.5 tablets (150 mg) by mouth daily  Dispense: 135 tablet; Refill: 3    3. Insomnia, unspecified type  Keep upcoming appt with sleep therapist    Return to clinic in 2 months for follow up and pelvic/pap, sooner PRN.    Over 50% of this 30 minute appointment was spent in discussion of anxiety, perimenopausal symptoms and sleep disturbance.    Romy Min MD

## 2018-10-08 NOTE — NURSING NOTE
"Chief Complaint   Patient presents with     RECHECK       Initial /81  Pulse 59  Temp 97.8  F (36.6  C) (Oral)  Wt 260 lb (117.9 kg)  BMI 39.53 kg/m2 Estimated body mass index is 39.53 kg/(m^2) as calculated from the following:    Height as of 18: 5' 8\" (1.727 m).    Weight as of this encounter: 260 lb (117.9 kg).  BP completed using cuff size: regular        The following HM Due: NONE      The following patient reported/Care Every where data was sent to:  P ABSTRACT QUALITY INITIATIVES [60348]  na      n/a              "

## 2018-10-09 ENCOUNTER — OFFICE VISIT (OUTPATIENT)
Dept: SLEEP MEDICINE | Facility: CLINIC | Age: 53
End: 2018-10-09
Payer: COMMERCIAL

## 2018-10-09 VITALS
HEART RATE: 60 BPM | SYSTOLIC BLOOD PRESSURE: 120 MMHG | HEIGHT: 69 IN | BODY MASS INDEX: 38.6 KG/M2 | RESPIRATION RATE: 16 BRPM | WEIGHT: 260.6 LBS | DIASTOLIC BLOOD PRESSURE: 67 MMHG | OXYGEN SATURATION: 100 %

## 2018-10-09 DIAGNOSIS — G47.00 INSOMNIA, UNSPECIFIED TYPE: Primary | ICD-10-CM

## 2018-10-09 DIAGNOSIS — G47.30 MILD SLEEP APNEA: ICD-10-CM

## 2018-10-09 PROCEDURE — 90791 PSYCH DIAGNOSTIC EVALUATION: CPT | Performed by: PSYCHOLOGIST

## 2018-10-09 ASSESSMENT — ANXIETY QUESTIONNAIRES
1. FEELING NERVOUS, ANXIOUS, OR ON EDGE: SEVERAL DAYS
7. FEELING AFRAID AS IF SOMETHING AWFUL MIGHT HAPPEN: SEVERAL DAYS
3. WORRYING TOO MUCH ABOUT DIFFERENT THINGS: MORE THAN HALF THE DAYS
GAD7 TOTAL SCORE: 11
6. BECOMING EASILY ANNOYED OR IRRITABLE: SEVERAL DAYS
2. NOT BEING ABLE TO STOP OR CONTROL WORRYING: MORE THAN HALF THE DAYS
GAD7 TOTAL SCORE: 10
5. BEING SO RESTLESS THAT IT IS HARD TO SIT STILL: MORE THAN HALF THE DAYS

## 2018-10-09 ASSESSMENT — PATIENT HEALTH QUESTIONNAIRE - PHQ9
5. POOR APPETITE OR OVEREATING: MORE THAN HALF THE DAYS
SUM OF ALL RESPONSES TO PHQ QUESTIONS 1-9: 9

## 2018-10-09 NOTE — NURSING NOTE
"Chief Complaint   Patient presents with     Sleep Problem     Insomnia       Initial /67  Pulse 60  Resp 16  Ht 1.753 m (5' 9\")  Wt 118.2 kg (260 lb 9.6 oz)  SpO2 100%  BMI 38.48 kg/m2 Estimated body mass index is 38.48 kg/(m^2) as calculated from the following:    Height as of this encounter: 1.753 m (5' 9\").    Weight as of this encounter: 118.2 kg (260 lb 9.6 oz).    Medication Reconciliation: complete     ESS   Leticia Blank        "

## 2018-10-09 NOTE — MR AVS SNAPSHOT
After Visit Summary   10/9/2018    Angy Haji    MRN: [id number]           Patient Information     Date Of Birth          1965        Visit Information        Provider Department      10/9/2018 2:00 PM Jose Angel Piña PsyD [facility]        Today's Diagnoses     Mild sleep apnea    -  1      Care Instructions      In discussing your insomnia it appears that menopause symptoms were triggers for your insomnia.  It is often the case that behavioral factors can serve to help worsen or maintain insomnia in addition to other factors.    I think you are a very good candidate for online CBT for insomnia which has been shown to be very effective with telesupport.  Recommend you continue to treat your anxiety with sertraline and your therapy you have scheduled.  Online programs to consider:    SHUTi    SLEEPIO  Go!To Sleep    Basic stimulus control within an 8 hour sleep window include:    Insomnia - Stimulus Control  When someone lays awake in bed over many nights, your body can actually learn to be awake in bed, mainly because that is what has happened so many times.  Your body has actually been  conditioned  or trained to be awake during the night because it has happened so often.  To break this habit you should try to follow these steps to improve your insomnia:    Set a strict bedtime and rise time to keep every day of the week, including weekends    Go to bed at the set time, but only if you are sleepy (not tired or fatigued but drowsy)    Don t lay in bed for more than 15-30 minutes if you can t sleep.  Get up and go do something relaxing like reading or watching TV until you get drowsy again     Get up at the same time every day regardless of how much sleep you get    Use the bedroom only for sleep and sex.  Do NOT watch TV, read, use the computer, play on your cell phone or do work while in bed      Do not take naps during the daytime and avoid any situations where you  might get drowsy or fall asleep unintentionally especially in the evening.             Follow-ups after your visit        Follow-up notes from your care team     Return if symptoms worsen or fail to improve.      Your next 10 appointments already scheduled     Oct 15, 2018  8:30 AM CDT   (Arrive by 8:15 AM)   RETURN SLP VOICE with RALPH [facility] ([facility])    [address] [id number] [phone]            Oct 16, 2018  9:10 AM CDT   GEOVANNI For Women Only with Angy Villafuerte PT   [facility] ([facility] [place])    [address] [place] [phone]            Oct 22, 2018  8:10 AM CDT   GEOVANNI For Women Only with Angy Villafuerte PT   [facility] ([facility] [place])    [address] [place] [phone]            Oct 25, 2018 11:30 AM CDT   Return Sleep Patient with Nellie Calloway MD   [facility] ([facility])    [address] [place] [phone]            Oct 29, 2018  8:30 AM CDT   (Arrive by 8:15 AM)   RETURN SLP VOICE with RAL[facility] ([facility])    [address] [id number] [phone]            Oct 29, 2018  4:30 PM CDT   Follow Up with Mar Xiong RD, LD   [facility] ([facility])    [address]  [facility] [address] [phone]            Oct 30, 2018 10:00 AM CDT   GEOVANNI For Women Only with Angy Villafuerte PT   [facility] ([facility] [place])    [address] [place] [phone]            Nov 12, 2018  8:10 AM CST   GEOVANNI For Women Only with Angy Villafuerte PT   [facility] ([facility] [place])  "   [address] [place] [phone]            Nov 12, 2018  5:00 PM CST   MyChart Physical Adult with Maryjane Quintanilla MD   [facility] ([facility])    [address] [place] [address] [phone]            Nov 13, 2018  8:30 AM CST   (Arrive by 8:15 AM)   RETURN SLP VOICE with RALPH [facility]   [facility] ([facility])    [address] [facility]  [address] [facility] [address] [phone]              Who to contact     If you have questions or need follow up information about today's clinic visit or your schedule please contact [facility] directly at [phone].  Normal or non-critical lab and imaging results will be communicated to you by MyChart, letter or phone within 4 business days after the clinic has received the results. If you do not hear from us within 7 days, please contact the clinic through [employer] or phone. If you have a critical or abnormal lab result, we will notify you by phone as soon as possible.  Submit refill requests through [employer] or call your pharmacy and they will forward the refill request to us. Please allow 3 business days for your refill to be completed.          Additional Information About Your Visit        MyChart Information     [employer] gives you secure access to your electronic health record. If you see a primary care provider, you can also send messages to your care team and make appointments. If you have questions, please call your primary care clinic.  If you do not have a primary care provider, please call [phone] and they will assist you.        Care EveryWhere ID     This is your Care EveryWhere ID. This could be used by other organizations to access your [place] medical records  [id number]        Your Vitals Were     Pulse Respirations Height Pulse Oximetry BMI (Body Mass Index)       60 16 1.753 m (5' 9\") 100% 38.48 kg/m2     "    Blood Pressure from Last 3 Encounters:   10/09/18 120/67   10/08/18 146/81   10/04/18 146/77    Weight from Last 3 Encounters:   10/09/18 118.2 kg (260 lb 9.6 oz)   10/08/18 117.9 kg (260 lb)   09/19/18 116.1 kg (256 lb)              Today, you had the following     No orders found for display       Primary Care Provider Office Phone # Fax #    Maryjane Quintanilla [phone]       [address]  [facility] [address]        Equal Access to Services     [facility]: Hadii ariana ku hadasho Soomaali, waaxda luqadaha, qaybta kaalmaezio dalal, svetlana tam . So [facility] [place] [phone].    ATENCIÓN: Si habla español, tiene a franco disposición servicios gratuitos de asistencia lingüística. [facility] [phone].    We comply with applicable federal civil rights laws and [place] laws. We do not discriminate on the basis of race, color, national origin, age, disability, sex, sexual orientation, or gender identity.            Thank you!     Thank you for choosing [facility]  for your care. Our goal is always to provide you with excellent care. Hearing back from our patients is one way we can continue to improve our services. Please take a few minutes to complete the written survey that you may receive in the mail after your visit with us. Thank you!             Your Updated Medication List - Protect others around you: Learn how to safely use, store and throw away your medicines at www.disposemymeds.org.          This list is accurate as of 10/9/18  3:12 PM.  Always use your most recent med list.                   Brand Name Dispense Instructions for use Diagnosis    estrogen (conjugated)-medroxyPROGESTERone 0.625-2.5 MG per tablet    PREMPRO    90 tablet    Take 1 tablet by mouth daily    Perimenopausal vasomotor symptoms       lidocaine (viscous) 2 % solution    XYLOCAINE    100 mL    SWISH AND SPIT 5ML BY MOUTH EVERY 3 HOURS AS NEEDED    Canker sore       loratadine  10 MG capsule      Take 10 mg by mouth daily as needed.        metroNIDAZOLE 1 % gel    METROGEL    60 g    Apply topically daily    Rosacea, Perioral dermatitis       order for DME      Use your CPAP device as directed by your provider.        order for DME     1 each    Please measure and distribute 1 pair of 20mmHg - 30mmHg knee high open toe compression stockings. Jobst ultrasheer or equivalent.    Varicose veins of both lower extremities with complications       order for DME     2 each    Please measure and distribute 1 pair of 20mmHg - 30mmHg thigh high open or closed toe compression stockings. Jobst ultrasheer or equivalent.    Symptomatic varicose veins of both lower extremities       propranolol 20 MG tablet    INDERAL    60 tablet    Take 1 tablet (20 mg) by mouth 3 times daily    Palpitations       * sertraline 100 MG tablet    ZOLOFT    90 tablet    Take 1 tablet (100 mg) by mouth daily    Anxiety       * sertraline 100 MG tablet    ZOLOFT    135 tablet    Take 1.5 tablets (150 mg) by mouth daily    Anxiety, Perimenopausal vasomotor symptoms       VITAMIN D (CHOLECALCIFEROL) PO      Take 2,000 Units by mouth daily    Perioral dermatitis, Rosacea       * Notice:  This list has 2 medication(s) that are the same as other medications prescribed for you. Read the directions carefully, and ask your doctor or other care provider to review them with you.

## 2018-10-09 NOTE — PROGRESS NOTES
SLEEP MEDICINE CONSULTATION  Sleep Psychology    Name: Angy Haji MRN# [id number]   Age: 53 year old YOB: 1965     Date of Consultation: Oct 9, 2018  Consultation is requested by: No referring provider defined for this encounter.  Primary care provider: Maryjane Quintanilla    Reason for Sleep Consultation     Angy Haji is a 53 year old female seen today for a behavioral sleep medicine consultation because of insomnia.      Assessment and Plan     Sleep Diagnoses/Recommendations:    1. Insomnia, unspecified type  Patient history of insomnia appears to be multi factored and triggered by perimenopausal symptoms and related anxiety.  Angy is generally good knowledge about sleep hygiene and sought consultation to see if there were other strategies to utilize to assist in improving sleep quality.  We discussed the components of cognitive behavioral therapy for insomnia including stimulus control, sleep restriction, cognitive restructuring and relaxation training.  Patient has skills in the number of these areas and may benefit from a structured online cognitive behavioral therapy program that will help guide her in implementing skills, strategies and insights she may already have.  Patient did outline a number of ongoing stressors which may also serve as maintenance factors for her insomnia.  She has had very good success in using behavioral program with medical support to achieve substantial weight loss.  An online CBT I program, which has been shown to be quite effective, also likely will suit a very busy work and life schedule.  Our [place] insomnia program will be available to provide support to the patient as she engages with the online CBT I program, specifically a program that also has a patella support concluded within it.    2. Mild sleep apnea  Follow-up as needed with Dr. Imelda Calloway at the [facility].      See patient instructions for initial treatment  recommendations and behavioral sleep plan.    Summary Counseling:      Angy was provided information about the pathophysiology of insomnia and psychophysiological factors contributing to the onset and maintenance of insomnia associated with mild sleep apnea, perimenopausal onset.  Treatment option were discussed including component of cognitive-behavioral therapy for insomnia. The benefits and potential early side effects of treatment including increased daytime sleepiness were discussed. She was advised to seek medical advise and consultation around use of or changes to prescription sleep medication, Patient was counseled on the importance of avoiding driving if drowsy.        Follow-up: 6 weeks     History of Present Sleep Complaint     Angy Haji is a 53 year old year old female who reports onset of insomnia in January 2018 which she associates with onset of significant menopausal symptoms including hot flashes, night sweats and anxiety.  She consulted with an OB/GYN specialist and was eventually placed on hormone replacement therapy and sertraline for management of symptoms.     Despite this, her insomnia symptoms have persisted.  Patient is a trained therapist and is aware that there have been significant psychosocial stressors also impacting her life.  These included concerns about her daughter in a suboptimal school situation, the family eventually decided to move from [place] to [place] for better schooling, a sense of loss related to moving from home she owns a 23 years, severe illness of a best friend, and concerned about health of parent.    Angy is aware of sleep hygienepractices..  She also is aware of sleep stimulus control strategies.  She indicates she is looking for guidance and insight as to how to implement these strategies or learn different ones to help improve sleep.  She has relaxation training skills including progressive muscle relaxation.    Patient current sleep schedule appears  "to be somewhat varied and she has tried in the past to limit her time in bed based on sleep restriction principles but indicates she has been unable to maintain for more than 4-5 days because she felt so exhausted.  Patient did not have sleep diaries but appears to be averaging about 8-9 hours in bed, consistent with her last recorded average CPAP use time of 7 hours and 40 minutes.  She is using CPAP consistently..  Sleep offset also varies sometimes depending upon how well she slept though work schedule and other family demands impact timing of sleep.  Patient is aware of the importance of avoiding use of screens and computers or TVs in the bedroom.    Previous Sleep Studies:    Initial sleep study in 2001 showed an apnea-hypopnea index of approximately 53 events per hour.  At that time her weight was 340 pounds.  After substantial weight loss of about 120 pounds her most recent sleep evaluation showed mild sleep apnea with an apnea popping index of 5, supine AHI of 14.  Patient continues on CPAP.      Substance Use:    Tobacco: None reported   Caffeine: 3 caffeinated beverages per record review   Alcohol: 2 alcoholic beverages per month  Recreational Drugs: None reported      Screening          [place] Sleepiness Scale  Total score - [place]: 0 .    MICHAEL Total Score: 22       PHQ-9 SCORE 10/9/2018   Total Score 9       JOANNA-7 SCORE 9/19/2018 10/8/2018 10/9/2018   Total Score 11 10 11       Patient Activation Score     FLAVIA Score (Last Two) 11/23/2010 8/23/2011   FLAVIA Raw Score 43 52   Activation Score 68.5 100   FLAVIA Level 4 4         Vitals     /67  Pulse 60  Resp 16  Ht 1.753 m (5' 9\")  Wt 118.2 kg (260 lb 9.6 oz)  SpO2 100%  BMI 38.48 kg/m2     Medical History     Past Medical History:   Diagnosis Date     Disorders of bursae and tendons in shoulder region, unspecified      Elevated blood pressure reading without diagnosis of hypertension      Female infertility of unspecified origin      Gallbladder " sludge 5/2013     Mild sleep apnea 5/13/2014     Obesity, unspecified      Oral aphthae      Other acne      Other specified hypoglycemia      PONV (postoperative nausea and vomiting)        Current Outpatient Prescriptions   Medication Sig Dispense Refill     estrogen, conjugated,-medroxyPROGESTERone (PREMPRO) 0.625-2.5 MG per tablet Take 1 tablet by mouth daily 90 tablet 3     lidocaine, viscous, (XYLOCAINE) 2 % solution SWISH AND SPIT 5ML BY MOUTH EVERY 3 HOURS AS NEEDED 100 mL 0     Loratadine 10 MG capsule Take 10 mg by mouth daily as needed.       metroNIDAZOLE (METROGEL) 1 % gel Apply topically daily 60 g 9     order for DME Please measure and distribute 1 pair of 20mmHg - 30mmHg thigh high open or closed toe compression stockings. Jobst ultrasheer or equivalent. 2 each 3     order for DME Please measure and distribute 1 pair of 20mmHg - 30mmHg knee high open toe compression stockings. Jobst ultrasheer or equivalent. 1 each 1     ORDER FOR DME Use your CPAP device as directed by your provider.       propranolol (INDERAL) 20 MG tablet Take 1 tablet (20 mg) by mouth 3 times daily 60 tablet 1     sertraline (ZOLOFT) 100 MG tablet Take 1.5 tablets (150 mg) by mouth daily 135 tablet 3     sertraline (ZOLOFT) 100 MG tablet Take 1 tablet (100 mg) by mouth daily 90 tablet 3     VITAMIN D, CHOLECALCIFEROL, PO Take 2,000 Units by mouth daily         Past Surgical History:   Procedure Laterality Date     HC TOOTH EXTRACTION W/FORCEP  1984/85    [place] Te[facility]     LAPAROSCOPIC CHOLECYSTECTOMY  7/29/2013    Procedure: LAPAROSCOPIC CHOLECYSTECTOMY;  Laparoscopic Cholecystectomy;  Surgeon: Fabio Johnson MD;  Location: [facility]     LAPAROSCOPIC OOPHORECTOMY Left 6/23/2016    Procedure: LAPAROSCOPIC OOPHORECTOMY;  Surgeon: Kayley Donnelly MD;  Location: [facility]     LAPAROSCOPIC SALPINGECTOMY Bilateral 6/23/2016    Procedure: LAPAROSCOPIC SALPINGECTOMY;  Surgeon: Kayley Donnelly MD;  Location: [facility]           Allergies   Allergen Reactions     Penicillins Rash     Thimerosal Other (See Comments)     blisters on inside of eyelid         Psychiatric History         Psychiatric Hospitalizations: none   Use of Psychotropics yes, sertraline, anxiety, perimenopausal symptoms      Chemical Use     Prior Chemical Dependency Treatment: none         Family History     Family History   Problem Relation Age of Onset     Cardiovascular Paternal Grandfather      anyerism     Obesity Paternal Grandfather      Respiratory Paternal Grandfather      pulmonary (air sacs hardening)     Allergies Father      hayfever     Genitourinary Problems Father      BPH     Lipids Father      diet therapy     Gallbladder Disease Father      cholecystectomy      Eye Disorder Father      retinal tear x 2      Alzheimer Disease Maternal Grandmother      ? poor STM     Arthritis Maternal Grandmother      HEART DISEASE Maternal Grandmother      Arrythmia-got a pacemaker at age 89     Arthritis Mother      Eye Disorder Mother      Catrack /macular tear in one eye     Cancer Paternal Grandmother      Uterine CA- as well as her sister     Gynecology Paternal Grandmother      prolapsed uterus- had 10 kids     Obesity Paternal Grandmother      Breast Cancer Maternal Aunt      Other Cancer Paternal Aunt 80     endo. cancer        Sleep Disorders: None reported    Social History     Social History     Social History     Marital status:      Spouse name: Danielle Zamarripa     Number of children: 0     Years of education: Masters     Occupational History     Psychotherapist [employer]     Social History Main Topics     Smoking status: Never Smoker     Smokeless tobacco: Never Used      Comment: non smoke home     Alcohol use Yes      Comment: 2 Drinks Per Month     Drug use: No     Sexual activity: Yes     Partners: Female     Other Topics Concern     Parent/Sibling W/ Cabg, Mi Or Angioplasty Before 65f 55m? No     Social History  Narrative    8/18/09    Balanced Diet - Yes    Osteoporosis Prevention Measures - Dairy servings per day: 2 and Medication/Supplements (See current meds)    Regular Exercise -  Yes Describe Walking, swimming, biking.     Dental Exam - YES - Date: 5/2009    Eye Exam - YES - Date: 8/2009    Self Breast Exam - No    Abuse: Current or Past (Physical, Sexual or Emotional)- No    Do you feel safe in your environment - Yes    Guns stored in the home - No    Sunscreen used - Yes    Seatbelts used - Yes    Lipids -  YES - Date: 8/10/09    Glucose -  YES - Date: 8/10/09    Colon Cancer Screening - No    Hemoccults - NO    Pap Test -  YES - Date: 7/6/06, no Hx of abnormal paps.    Do you have any concerns about STD's -  No    Mammography - YES - Date: 8/5/09    DEXA - NO    Immunizations reviewed and up to date - Yes, Tdap given 1/22/08    Amara Hall MA                           Angy is , has a 12-year-old daughter, works as a licensed independent clinical .     Mental Status Examination     Angy presented as appropriately dressed and groomed and was oriented X3 with speech language intact.  The patient was cooperative throughout the evaluation with no signs of hallucinations, delusions, loosening of associations or other thought disturbance.  Mood was normal.  Affect was congruent with mood. Insight and judgement we intact.  Memory was intact for recent and remote elements.  There was no report of suicidal ideation, intention or plan. Attention and concentration were within normal.      Time Spent:  50 minutes    Copy:   Maryjane Quintanilla               No referring provider defined for this encounter.    Jose Angel Piña PsyD, LP, CBSM  Certified, Behavioral Sleep Medicine  [facility] -  [place] and [place]

## 2018-10-09 NOTE — Clinical Note
Hi, Just an FYI that your patient indicates she was referred to me by her primary care physician for insomnia.  After consult she elected to do online CBTI with program support from me.

## 2018-10-09 NOTE — PATIENT INSTRUCTIONS
In discussing your insomnia it appears that menopause symptoms were triggers for your insomnia.  It is often the case that behavioral factors can serve to help worsen or maintain insomnia in addition to other factors.    I think you are a very good candidate for online CBT for insomnia which has been shown to be very effective with telesupport.  Recommend you continue to treat your anxiety with sertraline and your therapy you have scheduled.  Online programs to consider:    SHUTi    SLEEPIO  Go!To Sleep    Basic stimulus control within an 8 hour sleep window include:    Insomnia - Stimulus Control  When someone lays awake in bed over many nights, your body can actually learn to be awake in bed, mainly because that is what has happened so many times.  Your body has actually been  conditioned  or trained to be awake during the night because it has happened so often.  To break this habit you should try to follow these steps to improve your insomnia:    Set a strict bedtime and rise time to keep every day of the week, including weekends    Go to bed at the set time, but only if you are sleepy (not tired or fatigued but drowsy)    Don t lay in bed for more than 15-30 minutes if you can t sleep.  Get up and go do something relaxing like reading or watching TV until you get drowsy again     Get up at the same time every day regardless of how much sleep you get    Use the bedroom only for sleep and sex.  Do NOT watch TV, read, use the computer, play on your cell phone or do work while in bed      Do not take naps during the daytime and avoid any situations where you might get drowsy or fall asleep unintentionally especially in the evening.

## 2018-10-10 ASSESSMENT — ANXIETY QUESTIONNAIRES: GAD7 TOTAL SCORE: 11

## 2018-10-10 ASSESSMENT — PATIENT HEALTH QUESTIONNAIRE - PHQ9: SUM OF ALL RESPONSES TO PHQ QUESTIONS 1-9: 9

## 2018-10-12 ENCOUNTER — TELEPHONE (OUTPATIENT)
Dept: VASCULAR SURGERY | Facility: CLINIC | Age: 53
End: 2018-10-12

## 2018-10-12 ENCOUNTER — HOSPITAL ENCOUNTER (OUTPATIENT)
Facility: AMBULATORY SURGERY CENTER | Age: 53
End: 2018-10-12
Attending: RADIOLOGY
Payer: COMMERCIAL

## 2018-10-12 DIAGNOSIS — I83.893 SYMPTOMATIC VARICOSE VEINS OF BOTH LOWER EXTREMITIES: Primary | ICD-10-CM

## 2018-10-12 NOTE — TELEPHONE ENCOUNTER
I spoke with Angy and have mailed a letter with her upcoming vein treatment appt's.  All questions answered.  ELADIO Vigil RN, BSN  Interventional Radiology Care Coordinator   Phone:  [phone]

## 2018-10-12 NOTE — LETTER
October 12, 2018    Angy Haji  [address] [place] [address]    Dear Angy,     Appointment Reminder:    Your EVLA and Microphlebectomy procedure with Dr. Solorio is scheduled for Thursday, November 1st @ 1 pm, at the [facility] building, Please check in at 12 pm to the 5th floor [facility], [address] [place] [address].  -You will need a  as you will be getting sedation  -Go home after the procedure and elevate your legs    Your microsite check appointment with Vidya RUEDA, is scheduled for Friday, November 2nd @ 8:30 am at the [facility], imaging check in first floor.   -Bring your thigh high stockings with you to this appointment, Vidya will have you put the stockings on after she unwraps and inspects your leg.  Remember to wear your stocking day and night for 3 days (ok to remove to shower only) and then wear while awake for there remainder of 3 weeks.    Your Right leg rule out DVT ultrasound has been scheduled for Thursday, November 8th @ 11 am, you may go home after this appt, I will contact you with the results.     Please arrive 15 minutes early.     Allow 30 minutes for this exam.    If you need to change this appt time (keep the date) call [phone].    To check in for these appointments go to the new [facility] building located at [address] [place] [address]. Imaging will be completed on the first floor.        Please feel free to call me with any questions or concerns as this date approaches.    Sincerely,    Halima Vigil, RN, BSN  Interventional Radiology Care Coordinator  Office: [phone]

## 2018-10-19 DIAGNOSIS — R00.2 PALPITATIONS: ICD-10-CM

## 2018-10-22 RX ORDER — PROPRANOLOL HYDROCHLORIDE 20 MG/1
20 TABLET ORAL 3 TIMES DAILY
Qty: 270 TABLET | Refills: 1 | Status: SHIPPED | OUTPATIENT
Start: 2018-10-22 | End: 2018-10-25

## 2018-10-22 NOTE — TELEPHONE ENCOUNTER
"Requested Prescriptions   Pending Prescriptions Disp Refills     propranolol (INDERAL) 20 MG tablet  Last Written Prescription Date:  9/6/2018  Last Fill Quantity: 60 tablet,  # refills: 1   Last Office Visit: 9/6/2018   Future Office Visit:    Next 5 appointments (look out 90 days)     Nov 12, 2018  5:00 PM CST   MyChart Physical Adult with Maryjane Quintanilla MD   [facility]    [address] [facility] [address] [phone]            Dec 06, 2018  9:00 AM CST   Office Visit with Romy Min MD   [facility] ([facility]    [address] [id number] [phone]                  60 tablet 1     Sig: Take 1 tablet (20 mg) by mouth 3 times daily    Beta-Blockers Protocol Passed    10/19/2018 10:41 AM       Passed - Blood pressure under 140/90 in past 12 months    BP Readings from Last 3 Encounters:   10/09/18 120/67   10/08/18 146/81   10/04/18 146/77                Passed - Patient is age 6 or older       Passed - Recent (12 mo) or future (30 days) visit within the authorizing provider's specialty    Patient had office visit in the last 12 months or has a visit in the next 30 days with authorizing provider or within the authorizing provider's specialty.  See \"Patient Info\" tab in inbasket, or \"Choose Columns\" in Meds & Orders section of the refill encounter.                "

## 2018-10-23 NOTE — TELEPHONE ENCOUNTER
Last office visit addressed 9/6/18 - House -    3. Palpitations  Her palpitations and blood pressure have improved with propranolol.  - propranolol (INDERAL) 20 MG tablet; Take 1 tablet (20 mg) by mouth 3 times daily  Dispense: 60 tablet; Refill: 1      Prescription approved per [facility] Refill Protocol.    Signed Prescriptions:                        Disp   Refills    propranolol (INDERAL) 20 MG tablet         270 ta*1        Sig: Take 1 tablet (20 mg) by mouth 3 times daily  Authorizing Provider: MAT ROSE  Ordering User: ASHOK RAHMAN      Closing encounter - no further actions needed at this time    Ashok Rahman RN

## 2018-10-25 ENCOUNTER — OFFICE VISIT (OUTPATIENT)
Dept: FAMILY MEDICINE | Facility: CLINIC | Age: 53
End: 2018-10-25
Payer: COMMERCIAL

## 2018-10-25 VITALS
OXYGEN SATURATION: 100 % | TEMPERATURE: 98.2 F | HEART RATE: 64 BPM | DIASTOLIC BLOOD PRESSURE: 81 MMHG | SYSTOLIC BLOOD PRESSURE: 149 MMHG | BODY MASS INDEX: 38.4 KG/M2 | WEIGHT: 260 LBS

## 2018-10-25 DIAGNOSIS — Z78.0 MENOPAUSE: ICD-10-CM

## 2018-10-25 DIAGNOSIS — F41.9 ANXIETY: ICD-10-CM

## 2018-10-25 DIAGNOSIS — I10 BENIGN ESSENTIAL HYPERTENSION: Primary | ICD-10-CM

## 2018-10-25 RX ORDER — LISINOPRIL 10 MG/1
10 TABLET ORAL DAILY
Qty: 30 TABLET | Refills: 1 | Status: SHIPPED | OUTPATIENT
Start: 2018-10-25 | End: 2018-12-06

## 2018-10-25 RX ORDER — IBUPROFEN 800 MG/1
400 TABLET, FILM COATED ORAL EVERY 4 HOURS PRN
COMMUNITY
Start: 2018-08-16 | End: 2019-02-01

## 2018-10-25 RX ORDER — ESTROGEN,CON/M-PROGEST ACET 0.3-1.5MG
TABLET ORAL
COMMUNITY
Start: 2018-03-14 | End: 2018-10-25

## 2018-10-25 NOTE — MR AVS SNAPSHOT
After Visit Summary   10/25/2018    Angy Haji    MRN: [id number]           Patient Information     Date Of Birth          1965        Visit Information        Provider Department      10/25/2018 3:00 PM Licha Odell MD [facility]        Today's Diagnoses     Benign essential hypertension    -  1    Menopause          Care Instructions    Here is the plan from today's visit    1. Benign essential hypertension  Start the following  - lisinopril (PRINIVIL/ZESTRIL) 10 MG tablet; Take 1 tablet (10 mg) by mouth daily  Dispense: 30 tablet; Refill: 1  Decrease propranolol to two times a day for 1 week then once daily for 1 week     2. Menopause  Continue with current medications     Follow up plan  Please make a clinic appointment for follow up with me (LICHA ODELL) in 2-3  weeks for BP recheck.    Thank you for coming to [facility] today.  Lab Testing:  **If you had lab testing today and your results are reassuring or normal they will be mailed to you or sent through [employer] within 7 days.   **If the lab tests need quick action we will call you with the results.  The phone number we will call with results is # [phone] (home) [phone] (work). If this is not the best number please call our clinic and change the number.  Medication Refills:  If you need any refills please call your pharmacy and they will contact us.   If you need to  your refill at a new pharmacy, please contact the new pharmacy directly. The new pharmacy will help you get your medications transferred faster.   Scheduling:  If you have any concerns about today's visit or wish to schedule another appointment please call our office during normal business hours [phone] (8-5:00 M-F)  If a referral was made to a [facility] and you don't get a call from central scheduling please call [phone].  If a Mammogram was ordered for you at [facility] call  [phone] to schedule or change your appointment.  If you had an XRay/CT/Ultrasound/MRI ordered the number is [phone] to schedule or change your radiology appointment.   Medical Concerns:  If you have urgent medical concerns please call [phone] at any time of the day.    Jono Odell MD                        Dietary Approaches to Stop Hypertension (The DASH Diet)   What is hypertension?   Hypertension is blood pressure that keeps being higher than normal. Blood pressure is the force of blood against artery walls as the heart pumps blood through the body. Blood pressure can be unhealthy if it is above 120/80. The higher your blood pressure, the greater the health risk.   High blood pressure can be controlled if you take these steps:   Maintain a healthy weight.   Are physically active.   Follow a healthy eating plan, which includes foods that do not have a lot of salt and sodium.   Do not drink a lot of alcohol.   Diet affects high blood pressure. Following the DASH diet and reducing the amount of sodium in your diet will help lower your blood pressure. It will also help prevent high blood pressure.   What is the DASH diet?   Dietary Approaches to Stop Hypertension (DASH) is a diet that is low in saturated fat, cholesterol, and total fat. It emphasizes fruits, vegetables, and low-fat dairy foods. The DASH diet also includes whole-grain products, fish, poultry, and nuts. It encourages fewer servings of red meat, sweets, and sugar-containing beverages. It is rich in magnesium, potassium, and calcium, as well as protein and fiber.   How do I get started on the DASH diet?   The DASH diet requires no special foods and has no hard-to-follow recipes. Start by seeing how DASH compares with your current eating habits.   The DASH eating plan illustrated below is based on a diet of 2,000 calories a day. Your healthcare provider or a dietitian can help you determine how many calories a day you need. Most adults  need somewhere between 1600 and 2800 calories a day. Serving sizes for different foods vary from 1/2 cup to 1 and 1/4 cups. Check product nutrition labels for serving sizes and the number of calories per serving.                      Number of        Examples of  Food Group      servings         serving size  ----------------------------------------------------------------    Grains and      7 to 8 per day   1 slice of bread  grain products                   1 cup ready-to-eat cold cereal                                   1/2 cup cooked rice, pasta,                                   or cereal    Vegetables      4 to 5 per day   1 cup raw leafy vegetable                                   1/2 cup cooked vegetable                                   6 ounces (oz) vegetable juice      Fruits          4 to 5 per day   1 medium fruit                                   1/4 cup dried fruit                                   1/2 cup fresh, frozen, or                                   canned fruit                                   6 oz fruit juice      Low-fat or      2 to 3 per day   8 oz milk  fat-free                         1 cup yogurt  dairy foods                      1 and 1/2 oz cheese    Lean meats,  poultry,        2 or fewer per   3 oz cooked lean meat,  or fish         day              skinless poultry, or fish    Nuts, seeds,    4 to 5 per week  1/3 cup or 1 and 1/2 oz nuts  and dry beans                    1 tablespoon or 1/2 oz seeds                                   1/2 cup cooked dry beans    Fats and oils   2 to 3 per day   1 teaspoon soft margarine                                   1 tablespoon low-fat mayonnaise                                   2 tablespoons light salad                                   dressing                                   1 teaspoon vegetable oil    Sweets          5 per week       1 tablespoon sugar                                   1 tablespoon jelly or jam                                    1/2 oz jelly beans                                   8 oz lemonade  ----------------------------------------------------------------  Make changes gradually. Here are some suggestions that might help:   If you now eat 1 or 2 servings of vegetables a day, add a serving at lunch and another at dinner.   If you have not been eating fruit regularly, or have only juice at breakfast, add a serving to your meals or have it as a snack.   Drink milk or water with lunch or dinner instead of soda, sugar-sweetened tea, or alcohol. Choose low-fat (1%) or fat-free (nonfat) dairy products so that you are eating fewer calories and less saturated fat, total fat, and cholesterol.   Read food labels on margarines and salad dressings to choose products lowest in fat.   If you now eat large portions of meat, slowly cut back--by a half or a third at each meal. Limit meat to 6 ounces a day (two 3-ounce servings). Three to 4 ounces is about the size of a deck of cards.   Have 2 or more meatless meals each week. Increase servings of vegetables, rice, pasta, and beans in all meals. Try casseroles, pasta, and stir-gomez dishes, which have less meat and more vegetables, grains, and beans.   Use fruits canned in their own juice. Fresh fruits require little or no preparation. Dried fruits are a good choice to carry with you or to have ready in the car.   Try these snacks ideas: unsalted pretzels or nuts mixed with raisins, staci crackers, low-fat and fat-free yogurt or frozen yogurt, popcorn with no salt or butter added, and raw vegetables.   Choose whole-grain foods to get more nutrients, including minerals and fiber. For example, choose whole-wheat bread, whole-grain cereals, or brown rice.   Use fresh, frozen, or no-salt-added canned vegetables.   Remember to also reduce the salt and sodium in your diet. Try to have no more than 2000 milligrams (mg) of sodium per day, with a goal of further reducing the sodium to 1500 mg per day. Three  important ways to reduce sodium are:   Eat food products with reduced-sodium or no salt added.   Use less salt when you prepare foods and do not add salt to your food at the table.   Read food labels. Aim for foods that contain less than 5% of the daily value of sodium.   The DASH eating plan is not designed for weight loss. But it contains many lower-calorie foods, such as fruits and vegetables. You can make it lower in calories by replacing high-calorie foods with more fruits and vegetables. Some ideas to increase fruits and vegetables and decrease calories include:   Eat a medium apple instead of 4 shortbread cookies. You'll save 80 calories.   Eat 1/4 cup of dried apricots instead of a 2-ounce bag of pork rinds. You'll save 230 calories.   Have a hamburger that's 3 ounces instead of 6 ounces. Add a 1/2 cup serving of carrots and a 1/2 cup serving of spinach. You'll save more than 200 calories.   Instead of 5 ounces of chicken, have a stir gomez with 2 ounces of chicken and 1 and 1/2 cups of raw vegetables. Use just a small amount of vegetable oil. You'll save 50 calories.   Have a 1/2 cup serving of low-fat frozen yogurt instead of a 1-and-1/2-ounce chocolate bar. You'll save about 110 calories.   Use low-fat or fat-free condiments, such as fat-free salad dressings.   Eat smaller portions. Cut back gradually.   Use food labels to compare fat and calorie content in packaged foods. Items marked low fat or fat free may be lower in fat but not lower in calories than their regular versions.   Limit foods with lots of added sugar, such as pies, flavored yogurts, candy bars, ice cream, sherbet, regular soft drinks, and fruit drinks.   Drink water or club soda instead of cola or other soda drinks.     For more information, see the National Heart, Lung, and Blood [place] Web site at: http://www.nhlbi.nih.gov/health/public/heart/hbp/dash/.               Follow-ups after your visit        Your next 10 appointments already  scheduled     Oct 26, 2018 12:00 PM CDT   GEOVANNI For Women Only with Angy Villafuerte PT   [facility] [place] ([facility] [place])    [address] [facility] [address] [phone]            Oct 29, 2018  8:30 AM CDT   (Arrive by 8:15 AM)   RETURN SLP VOICE with RALPH Osborne   [facility] Voice ([facility] [place])    [address]  4th Floor  [facility] [address] [phone]            Oct 29, 2018  4:30 PM CDT   Follow Up with Mar Xiong RD, LD   [facility] ([facility])    [facility] [address]  [facility] [id number] [phone]            Nov 01, 2018  9:30 AM CDT   Office Visit with Romy Min MD   [facility] ([facility])    [address] [address] [phone]           Bring a current list of meds and any records pertaining to this visit. For Physicals, please bring immunization records and any forms needing to be filled out. Please arrive 10 minutes early to complete paperwork.            Nov 01, 2018   Procedure with Jose Angel Solorio MD   [facility] ([facility] [place])    [address]  5th [facility] [address] [phone]           Located in the Clinics and Surgery Center at [address] [place] [address].   parking is very convenient and highly recommended.  is a $6 flat rate fee.  Both  and self parkers should enter the main arrival plaza from [facility]; parking attendants will direct you based on your parking preference.            Nov 01, 2018  1:00 PM CDT   (Arrive by 12:00 PM)   IR STAB PHLEBECTOMY < 10 STABS with [id number]   [facility] ASC Imaging ([facility] [place])    [address]  [address] [facility] [address] [phone]           The day before  the exam:   You may eat your regular diet.   You are encouraged to drink at least 8 eight ounce glasses of clear liquids.   Drink no alcoholic beverages for 24 hours before or after the exam.  The day of the exam:   Do not eat any solid food or milk products for 6 hours prior to the exam. You may drink clear liquids until 2 hours prior to the exam. Clear liquids include the following: water, Jell-O, clear broth, apple juice or any non-carbonated drink that you can see through (no pop!)   The morning of the exam you may take medications as directed with a sip of water.  Please wear loose clothing, such as a sweat suit or jogging clothes. Avoid snaps, zippers and other metal. We may ask you to undress and put on a hospital gown.  Please bring any scans or X-rays taken at other hospitals, if similar tests were done. Also bring a list of your medicines, including vitamins, minerals and over-the-counter drugs. It is safest to leave personal items at home.  Someone will need to drive you to and from the hospital.  Tell your doctor in advance:   If you have allergies to x-ray contrast or iodine.   If you are or may be pregnant.   If you are taking Coumadin (or any other blood thinners) 5 days prior to the exam for any special instructions.   If you are diabetic to determine if your insulin needs have to be adjusted for the exam.  Your doctor will:   Need to do a history and physical within 30 days before this procedure.   Obtain necessary laboratory tests prior to the exam (CBCP, INR and PTT).  If you were given sedation, you cannot drive for 24 hours after the procedure, and an adult must be with you until then.  If you have any questions, please call the Imaging Department where you will have your exam. Directions, parking instructions, and other information are available on our website, [email or web address].            Nov 02, 2018  8:30 AM CDT   (Arrive by 8:15 AM)   Return Visit with SHANON Hair  [facility] ([facility] [place])    [address]  1st Floor  [facility] [address]   [phone]            Nov 07, 2018  9:20 AM CST   Return Sleep Patient with Nellie Calloway MD   [facility] ([facility])    [address] [place] [address]   [phone]            Nov 08, 2018 11:00 AM CST   (Arrive by 10:45 AM)   US LOWER EXTREMITY VENOUS DUPLEX RIGHT with UCUSV1   [facility] US ([facility] [place])    [address]  [address]st [facility] [address]   [phone]           How do I prepare for my exam? (Food and drink instructions) No Food and Drink Restrictions.  How do I prepare for my exam? (Other instructions) You do not need to do anything special to prepare for your exam.  What should I wear: Wear comfortable clothes.  How long does the exam take: Most ultrasounds take 30 to 60 minutes.  What should I bring: Bring a list of your medicines, including vitamins, minerals and over-the-counter drugs. It is safest to leave personal items at home.  Do I need a :  No  is needed.  What do I need to tell my doctor: Tell your doctor about any allergies you may have.  What should I do after the exam: No restrictions, You may resume normal activities.  What is this test: An ultrasound uses sound waves to make pictures of the body. Sound waves do not cause pain. The only discomfort may be the pressure of the wand against your skin or full bladder.  Who should I call with questions: If you have any questions, please call the Imaging Department where you will have your exam. Directions, parking instructions, and other information is available on our website, [email or web address].            Nov 09, 2018  9:30 AM CST   GEOVANNI For Women Only with Angy Villafuerte PT   [facility] [place] ([facility] [place])    [address] [place] [address]  [address]  [facility] [address] [phone]              Who to contact     Please call your clinic at [phone] to:    Ask questions about your health    Make or cancel appointments    Discuss your medicines    Learn about your test results    Speak to your doctor            Additional Information About Your Visit        MyChart Information     [employer] gives you secure access to your electronic health record. If you see a primary care provider, you can also send messages to your care team and make appointments. If you have questions, please call your primary care clinic.  If you do not have a primary care provider, please call [phone] and they will assist you.      [employer] is an electronic gateway that provides easy, online access to your medical records. With [employer], you can request a clinic appointment, read your test results, renew a prescription or communicate with your care team.     To access your existing account, please contact your [facility] or call [phone] for assistance.        Care EveryWhere ID     This is your Care EveryWhere ID. This could be used by other organizations to access your [facility] medical records  [id number]        Your Vitals Were     Pulse Temperature Last Period Pulse Oximetry BMI (Body Mass Index)       64 98.2  F (36.8  C) (Oral) 10/18/2018 100% 38.4 kg/m2        Blood Pressure from Last 3 Encounters:   10/25/18 149/81   10/09/18 120/67   10/08/18 146/81    Weight from Last 3 Encounters:   10/25/18 260 lb (117.9 kg)   10/09/18 260 lb 9.6 oz (118.2 kg)   10/08/18 260 lb (117.9 kg)              Today, you had the following     No orders found for display         Today's Medication Changes          These changes are accurate as of 10/25/18  3:56 PM.  If you have any questions, ask your nurse or doctor.               Start taking these medicines.        Dose/Directions    lisinopril 10 MG tablet   Commonly known as:  PRINIVIL/ZESTRIL    Used for:  Benign essential hypertension   Started by:  Jono Odell MD        Dose:  10 mg   Take 1 tablet (10 mg) by mouth daily   Quantity:  30 tablet   Refills:  1         These medicines have changed or have updated prescriptions.        Dose/Directions    estrogen (conjugated)-medroxyPROGESTERone 0.625-2.5 MG per tablet   Commonly known as:  PREMPRO   This may have changed:  Another medication with the same name was removed. Continue taking this medication, and follow the directions you see here.   Used for:  Perimenopausal vasomotor symptoms   Changed by:  Jono Odell MD        Dose:  1 tablet   Take 1 tablet by mouth daily   Quantity:  90 tablet   Refills:  3       sertraline 100 MG tablet   Commonly known as:  ZOLOFT   This may have changed:  Another medication with the same name was removed. Continue taking this medication, and follow the directions you see here.   Used for:  Anxiety, Perimenopausal vasomotor symptoms   Changed by:  Jono Odell MD        Dose:  150 mg   Take 1.5 tablets (150 mg) by mouth daily   Quantity:  135 tablet   Refills:  3         Stop taking these medicines if you haven't already. Please contact your care team if you have questions.     propranolol 20 MG tablet   Commonly known as:  INDERAL   Stopped by:  Jono Odell MD                Where to get your medicines      These medications were sent to [facility] [place], [place] - [address] [facility] [address]     Phone:  [phone]     lisinopril 10 MG tablet                Primary Care Provider Office Phone # Fax #    Jono Odell [phone]       [address] [facility] [address]        Equal Access to Services     [facility] : Russ jasso Sopeter, waaxda luqadaha, qaybta kaalmada katlin, svetlana cárdenas. So [facility] [phone].    ATENCIÓN: Si habla español, tiene a franco disposición servicios gratuitos de  asistencia lingüística. Haley al [phone].    We comply with applicable federal civil rights laws and [place] laws. We do not discriminate on the basis of race, color, national origin, age, disability, sex, sexual orientation, or gender identity.            Thank you!     Thank you for choosing [facility]  for your care. Our goal is always to provide you with excellent care. Hearing back from our patients is one way we can continue to improve our services. Please take a few minutes to complete the written survey that you may receive in the mail after your visit with us. Thank you!             Your Updated Medication List - Protect others around you: Learn how to safely use, store and throw away your medicines at www.disposemymeds.org.          This list is accurate as of 10/25/18  3:56 PM.  Always use your most recent med list.                   Brand Name Dispense Instructions for use Diagnosis    estrogen (conjugated)-medroxyPROGESTERone 0.625-2.5 MG per tablet    PREMPRO    90 tablet    Take 1 tablet by mouth daily    Perimenopausal vasomotor symptoms       ibuprofen 800 MG tablet    ADVIL/MOTRIN          lidocaine (viscous) 2 % solution    XYLOCAINE    100 mL    SWISH AND SPIT 5ML BY MOUTH EVERY 3 HOURS AS NEEDED    Canker sore       lisinopril 10 MG tablet    PRINIVIL/ZESTRIL    30 tablet    Take 1 tablet (10 mg) by mouth daily    Benign essential hypertension       loratadine 10 MG capsule      Take 10 mg by mouth daily as needed.        metroNIDAZOLE 1 % gel    METROGEL    60 g    Apply topically daily    Rosacea, Perioral dermatitis       order for DME      Use your CPAP device as directed by your provider.        order for DME     1 each    Please measure and distribute 1 pair of 20mmHg - 30mmHg knee high open toe compression stockings. Jobst ultrasheer or equivalent.    Varicose veins of both lower extremities with complications       order for DME     2 each    Please measure  and distribute 1 pair of 20mmHg - 30mmHg thigh high open or closed toe compression stockings. Jobst ultrasheer or equivalent.    Symptomatic varicose veins of both lower extremities       sertraline 100 MG tablet    ZOLOFT    135 tablet    Take 1.5 tablets (150 mg) by mouth daily    Anxiety, Perimenopausal vasomotor symptoms       VITAMIN D (CHOLECALCIFEROL) PO      Take 2,000 Units by mouth daily    Perioral dermatitis, Rosacea

## 2018-10-25 NOTE — PROGRESS NOTES
HPI:       Angy Haji is a 53 year old new patient who presents for the following    Patient has been diagnosed with elevated BP's over the last few years.  Has recently been started on propranolol for this due to associated palpitations and anxiety.  Has also been on HRT for menopausal symptoms for last year.  Elevated BP readings preceded HRT.  Patient here to establish care regarding BP.  She is accompanied by her spouse.    Problem, Medication and Allergy Lists were      Patient Active Problem List    Diagnosis Date Noted     Dysphagia, unspecified type 09/30/2018     Priority: Medium     Muscle tension dysphonia 08/31/2018     Priority: Medium     Palpitations 08/31/2018     Priority: Medium     Morbid obesity (H) 08/14/2018     Priority: Medium     Mild sleep apnea 05/13/2014     Priority: Medium     Atopic rhinitis 05/13/2014     Priority: Medium     Vitamin D deficiency disease 09/20/2013     Priority: Medium     Raynaud disease 04/19/2013     Priority: Medium     Tear of medial cartilage or meniscus of knee, current 09/20/2012     Priority: Medium     Rosacea 09/07/2012     Priority: Medium     CARDIOVASCULAR SCREENING; LDL GOAL LESS THAN 160 05/09/2010     Priority: Medium     Obesity (BMI 30-39.9) 11/15/2003     Priority: Medium     Disorder of bursae and tendons in shoulder region 11/15/2003     Priority: Medium     Problem list name updated by automated process. Provider to review       Oral aphthae 11/10/2003     Priority: Medium         Current Outpatient Prescriptions   Medication Sig Dispense Refill     estrogen, conjugated,-medroxyPROGESTERone (PREMPRO) 0.625-2.5 MG per tablet Take 1 tablet by mouth daily 90 tablet 3     ibuprofen (ADVIL/MOTRIN) 800 MG tablet        lidocaine, viscous, (XYLOCAINE) 2 % solution SWISH AND SPIT 5ML BY MOUTH EVERY 3 HOURS AS NEEDED 100 mL 0     Loratadine 10 MG capsule Take 10 mg by mouth daily as needed.       metroNIDAZOLE (METROGEL) 1 % gel Apply topically  daily 60 g 9     order for DME Please measure and distribute 1 pair of 20mmHg - 30mmHg thigh high open or closed toe compression stockings. Jobst ultrasheer or equivalent. 2 each 3     order for DME Please measure and distribute 1 pair of 20mmHg - 30mmHg knee high open toe compression stockings. Jobst ultrasheer or equivalent. 1 each 1            ORDER FOR DME Use your CPAP device as directed by your provider.       sertraline (ZOLOFT) 100 MG tablet Take 1.5 tablets (150 mg) by mouth daily 135 tablet 3    Propranolol 20 mg  One tab three times a day        VITAMIN D, CHOLECALCIFEROL, PO Take 2,000 Units by mouth daily           Allergies   Allergen Reactions     Penicillins Rash     Thimerosal Other (See Comments)     blisters on inside of eyelid   .    Patient is   a new patient to this clinic and so  I reviewed/updated the Past Medical History, the Family History and the Social History. ,   Past Medical History:   Diagnosis Date     Disorders of bursae and tendons in shoulder region, unspecified      Female infertility of unspecified origin      Gallbladder sludge 5/2013     Mild sleep apnea 5/13/2014     Obesity, unspecified      Oral aphthae      Other acne      Other specified hypoglycemia      PONV (postoperative nausea and vomiting)    ,   Family History     Problem (# of Occurrences) Relation (Name,Age of Onset)    Allergies (1) Father: hayfever    Alzheimer Disease (1) Maternal Grandmother: ? poor STM    Arthritis (2) Maternal Grandmother, Mother    Breast Cancer (1) Maternal Aunt    Cancer (1) Paternal Grandmother: Uterine CA- as well as her sister    Cardiovascular (1) Paternal Grandfather: anyerism    Eye Disorder (2) Father: retinal tear x 2 , Mother: [place] /macular tear in one eye    Gallbladder Disease (1) Father: cholecystectomy     Genitourinary Problems (1) Father: BPH    Gynecology (1) Paternal Grandmother: prolapsed uterus- had 10 kids    HEART DISEASE (1) Maternal Grandmother: Arrythmia-got a  pacemaker at age 89    Lipids (1) Father: diet therapy    Obesity (2) Paternal Grandfather, Paternal Grandmother    Other Cancer (1) Paternal Aunt (80): endo. cancer     Respiratory (1) Paternal Grandfather: pulmonary (air sacs hardening)       and   Social History     Social History     Marital status:      Spouse name: Danielle Zamarripa     Number of children: 2     Years of education: Masters     Occupational History     Psychotherapist [employer]     Social History Main Topics     Smoking status: Never Smoker     Smokeless tobacco: Never Used      Comment: non smoke home     Alcohol use Yes      Comment: 2 Drinks Per Month     Drug use: No     Sexual activity: Yes     Partners: Female     Other Topics Concern     Parent/Sibling W/ Cabg, Mi Or Angioplasty Before 65f 55m? No     Social History Narrative    8/18/09    Balanced Diet - Yes    Osteoporosis Prevention Measures - Dairy servings per day: 2 and Medication/Supplements (See current meds)    Regular Exercise -  Yes Describe Walking, swimming, biking.     Dental Exam - YES - Date: 5/2009    Eye Exam - YES - Date: 8/2009    Self Breast Exam - No    Abuse: Current or Past (Physical, Sexual or Emotional)- No    Do you feel safe in your environment - Yes    Guns stored in the home - No    Sunscreen used - Yes    Seatbelts used - Yes    Lipids -  YES - Date: 8/10/09    Glucose -  YES - Date: 8/10/09    Colon Cancer Screening - No    Hemoccults - NO    Pap Test -  YES - Date: 7/6/06, no Hx of abnormal paps.    Do you have any concerns about STD's -  No    Mammography - YES - Date: 8/5/09    DEXA - NO    Immunizations reviewed and up to date - Yes, Tdap given 1/22/08    Amara Hall MA   .         Review of Systems:   CONSTITUTIONAL: weight gain  SKIN: no worrisome rashes, no worrisome moles, no worrisome lesions  EYES:  Dry eyes  ENT: sore throat   RESP: SOB since propranolol  BREAST:  Negative   CV:  Bilateral LE edema  GI:   constpation   female: incontinence-stress  MUSCULOSKELETAL: negative   NEURO:  Negative   ENDOCRINE:  neg  HEME/ALLERGY/IMMUNE: neg  PSYCHIATRIC:  anxious         Physical Exam:     Vitals:    10/25/18 1455   BP: 149/81   Pulse: 64   Temp: 98.2  F (36.8  C)   TempSrc: Oral   SpO2: 100%   Weight: 260 lb (117.9 kg)     Body mass index is 38.4 kg/(m^2).  Vital signs normal except BP  GENERAL: alert, no distress and over weight  EYES: Eyes grossly normal to inspection, extraocular movements - intact, and PERRL  HENT: ear canals- normal; TMs- normal; Nose- normal; Mouth- no ulcers, no lesions  NECK: no tenderness, no adenopathy, no asymmetry, no masses, no stiffness; thyroid- normal to palpation  RESP: lungs clear to auscultation - no rales, no rhonchi, no wheezes  CV: regular rates and rhythm, normal S1 S2, no S3 or S4 and no murmur, no click or rub -  ABDOMEN: soft, no tenderness, no  hepatosplenomegaly, no masses, normal bowel sounds  MS: extremities- no gross deformities noted, no edema  SKIN: no suspicious lesions, no rashes  NEURO: strength and tone- normal, sensory exam- grossly normal, mentation- intact, speech- normal, reflexes- symmetric  BACK: no CVA tenderness, no paralumbar tenderness  PSYCH: anxious, concentration poor and tangential  LYMPHATICS: normal ant/post cervical, supraclavicular nodes      Results:     None     Assessment and Plan     1. Benign essential hypertension  Start lisinopril (PRINIVIL/ZESTRIL) 10 MG tablet; Take 1 tablet (10 mg) by mouth daily  Dispense: 30 tablet; Refill: 1    Wean propranolol 20 mg to two times a day for 1 week then daily for 1 week     Return to clinic in 2-3 weeks.  Check BP and BMP at that time    2. Menopause  Discussed that would like to get off HRT as it may be playing a role in hypertension.  Patient will discuss with her OB/Gyn    3. Anxiety  Continue Zoloft 150 mg daily and therapy.    45 min spent in direct face to face time with this patient, greater than  50% in counseling regarding above.    Options for treatment and follow-up care were reviewed with the patient. Angy Haji  engaged in the decision making process and verbalized understanding of the options discussed and agreed with the final plan.    Jono Odell MD

## 2018-10-25 NOTE — PATIENT INSTRUCTIONS
Here is the plan from today's visit    1. Benign essential hypertension  Start the following  - lisinopril (PRINIVIL/ZESTRIL) 10 MG tablet; Take 1 tablet (10 mg) by mouth daily  Dispense: 30 tablet; Refill: 1  Decrease propranolol to two times a day for 1 week then once daily for 1 week     2. Menopause  Continue with current medications     Follow up plan  Please make a clinic appointment for follow up with me (LICHA ODELL) in 2-3  weeks for BP recheck.    Thank you for coming to [facility] today.  Lab Testing:  **If you had lab testing today and your results are reassuring or normal they will be mailed to you or sent through [facility] within 7 days.   **If the lab tests need quick action we will call you with the results.  The phone number we will call with results is # [phone] (home) [phone] (work). If this is not the best number please call our clinic and change the number.  Medication Refills:  If you need any refills please call your pharmacy and they will contact us.   If you need to  your refill at a new pharmacy, please contact the new pharmacy directly. The new pharmacy will help you get your medications transferred faster.   Scheduling:  If you have any concerns about today's visit or wish to schedule another appointment please call our office during normal business hours [phone] (8-5:00 M-F)  If a referral was made to a [facility] and you don't get a call from central scheduling please call [phone].  If a Mammogram was ordered for you at [facility] call [phone] to schedule or change your appointment.  If you had an XRay/CT/Ultrasound/MRI ordered the number is [phone] to schedule or change your radiology appointment.   Medical Concerns:  If you have urgent medical concerns please call [phone] at any time of the day.    Licha Odell MD                        Dietary Approaches to Stop Hypertension (The DASH Diet)   What is  hypertension?   Hypertension is blood pressure that keeps being higher than normal. Blood pressure is the force of blood against artery walls as the heart pumps blood through the body. Blood pressure can be unhealthy if it is above 120/80. The higher your blood pressure, the greater the health risk.   High blood pressure can be controlled if you take these steps:   Maintain a healthy weight.   Are physically active.   Follow a healthy eating plan, which includes foods that do not have a lot of salt and sodium.   Do not drink a lot of alcohol.   Diet affects high blood pressure. Following the DASH diet and reducing the amount of sodium in your diet will help lower your blood pressure. It will also help prevent high blood pressure.   What is the DASH diet?   Dietary Approaches to Stop Hypertension (DASH) is a diet that is low in saturated fat, cholesterol, and total fat. It emphasizes fruits, vegetables, and low-fat dairy foods. The DASH diet also includes whole-grain products, fish, poultry, and nuts. It encourages fewer servings of red meat, sweets, and sugar-containing beverages. It is rich in magnesium, potassium, and calcium, as well as protein and fiber.   How do I get started on the DASH diet?   The DASH diet requires no special foods and has no hard-to-follow recipes. Start by seeing how DASH compares with your current eating habits.   The DASH eating plan illustrated below is based on a diet of 2,000 calories a day. Your healthcare provider or a dietitian can help you determine how many calories a day you need. Most adults need somewhere between 1600 and 2800 calories a day. Serving sizes for different foods vary from 1/2 cup to 1 and 1/4 cups. Check product nutrition labels for serving sizes and the number of calories per serving.                      Number of        Examples of  Food Group      servings         serving size  ----------------------------------------------------------------    Grains and       7 to 8 per day   1 slice of bread  grain products                   1 cup ready-to-eat cold cereal                                   1/2 cup cooked rice, pasta,                                   or cereal    Vegetables      4 to 5 per day   1 cup raw leafy vegetable                                   1/2 cup cooked vegetable                                   6 ounces (oz) vegetable juice      Fruits          4 to 5 per day   1 medium fruit                                   1/4 cup dried fruit                                   1/2 cup fresh, frozen, or                                   canned fruit                                   6 oz fruit juice      Low-fat or      2 to 3 per day   8 oz milk  fat-free                         1 cup yogurt  dairy foods                      1 and 1/2 oz cheese    Lean meats,  poultry,        2 or fewer per   3 oz cooked lean meat,  or fish         day              skinless poultry, or fish    Nuts, seeds,    4 to 5 per week  1/3 cup or 1 and 1/2 oz nuts  and dry beans                    1 tablespoon or 1/2 oz seeds                                   1/2 cup cooked dry beans    Fats and oils   2 to 3 per day   1 teaspoon soft margarine                                   1 tablespoon low-fat mayonnaise                                   2 tablespoons light salad                                   dressing                                   1 teaspoon vegetable oil    Sweets          5 per week       1 tablespoon sugar                                   1 tablespoon jelly or jam                                   1/2 oz jelly beans                                   8 oz lemonade  ----------------------------------------------------------------  Make changes gradually. Here are some suggestions that might help:   If you now eat 1 or 2 servings of vegetables a day, add a serving at lunch and another at dinner.   If you have not been eating fruit regularly, or have only juice at breakfast, add a  serving to your meals or have it as a snack.   Drink milk or water with lunch or dinner instead of soda, sugar-sweetened tea, or alcohol. Choose low-fat (1%) or fat-free (nonfat) dairy products so that you are eating fewer calories and less saturated fat, total fat, and cholesterol.   Read food labels on margarines and salad dressings to choose products lowest in fat.   If you now eat large portions of meat, slowly cut back--by a half or a third at each meal. Limit meat to 6 ounces a day (two 3-ounce servings). Three to 4 ounces is about the size of a deck of cards.   Have 2 or more meatless meals each week. Increase servings of vegetables, rice, pasta, and beans in all meals. Try casseroles, pasta, and stir-gomez dishes, which have less meat and more vegetables, grains, and beans.   Use fruits canned in their own juice. Fresh fruits require little or no preparation. Dried fruits are a good choice to carry with you or to have ready in the car.   Try these snacks ideas: unsalted pretzels or nuts mixed with raisins, staci crackers, low-fat and fat-free yogurt or frozen yogurt, popcorn with no salt or butter added, and raw vegetables.   Choose whole-grain foods to get more nutrients, including minerals and fiber. For example, choose whole-wheat bread, whole-grain cereals, or brown rice.   Use fresh, frozen, or no-salt-added canned vegetables.   Remember to also reduce the salt and sodium in your diet. Try to have no more than 2000 milligrams (mg) of sodium per day, with a goal of further reducing the sodium to 1500 mg per day. Three important ways to reduce sodium are:   Eat food products with reduced-sodium or no salt added.   Use less salt when you prepare foods and do not add salt to your food at the table.   Read food labels. Aim for foods that contain less than 5% of the daily value of sodium.   The DASH eating plan is not designed for weight loss. But it contains many lower-calorie foods, such as fruits and  vegetables. You can make it lower in calories by replacing high-calorie foods with more fruits and vegetables. Some ideas to increase fruits and vegetables and decrease calories include:   Eat a medium apple instead of 4 shortbread cookies. You'll save 80 calories.   Eat 1/4 cup of dried apricots instead of a 2-ounce bag of pork rinds. You'll save 230 calories.   Have a hamburger that's 3 ounces instead of 6 ounces. Add a 1/2 cup serving of carrots and a 1/2 cup serving of spinach. You'll save more than 200 calories.   Instead of 5 ounces of chicken, have a stir gomez with 2 ounces of chicken and 1 and 1/2 cups of raw vegetables. Use just a small amount of vegetable oil. You'll save 50 calories.   Have a 1/2 cup serving of low-fat frozen yogurt instead of a 1-and-1/2-ounce chocolate bar. You'll save about 110 calories.   Use low-fat or fat-free condiments, such as fat-free salad dressings.   Eat smaller portions. Cut back gradually.   Use food labels to compare fat and calorie content in packaged foods. Items marked low fat or fat free may be lower in fat but not lower in calories than their regular versions.   Limit foods with lots of added sugar, such as pies, flavored yogurts, candy bars, ice cream, sherbet, regular soft drinks, and fruit drinks.   Drink water or club soda instead of cola or other soda drinks.     For more information, see the National Heart, Lung, and [employer] [place] Web site at: http://www.nhlbi.nih.gov/health/public/heart/hbp/dash/.

## 2018-11-01 ENCOUNTER — OFFICE VISIT (OUTPATIENT)
Dept: OBGYN | Facility: CLINIC | Age: 53
End: 2018-11-01
Payer: COMMERCIAL

## 2018-11-01 ENCOUNTER — TELEPHONE (OUTPATIENT)
Dept: SLEEP MEDICINE | Facility: CLINIC | Age: 53
End: 2018-11-01

## 2018-11-01 VITALS
SYSTOLIC BLOOD PRESSURE: 140 MMHG | TEMPERATURE: 96.8 F | WEIGHT: 256 LBS | BODY MASS INDEX: 37.8 KG/M2 | DIASTOLIC BLOOD PRESSURE: 80 MMHG

## 2018-11-01 DIAGNOSIS — F41.1 GAD (GENERALIZED ANXIETY DISORDER): Primary | ICD-10-CM

## 2018-11-01 DIAGNOSIS — G47.9 SLEEP DISORDER: ICD-10-CM

## 2018-11-01 PROCEDURE — 99214 OFFICE O/P EST MOD 30 MIN: CPT | Performed by: OBSTETRICS & GYNECOLOGY

## 2018-11-01 ASSESSMENT — ANXIETY QUESTIONNAIRES
6. BECOMING EASILY ANNOYED OR IRRITABLE: NEARLY EVERY DAY
3. WORRYING TOO MUCH ABOUT DIFFERENT THINGS: NEARLY EVERY DAY
7. FEELING AFRAID AS IF SOMETHING AWFUL MIGHT HAPPEN: NEARLY EVERY DAY
2. NOT BEING ABLE TO STOP OR CONTROL WORRYING: NEARLY EVERY DAY
5. BEING SO RESTLESS THAT IT IS HARD TO SIT STILL: NEARLY EVERY DAY
GAD7 TOTAL SCORE: 21
1. FEELING NERVOUS, ANXIOUS, OR ON EDGE: NEARLY EVERY DAY

## 2018-11-01 ASSESSMENT — PATIENT HEALTH QUESTIONNAIRE - PHQ9
SUM OF ALL RESPONSES TO PHQ QUESTIONS 1-9: 11
5. POOR APPETITE OR OVEREATING: NEARLY EVERY DAY

## 2018-11-01 NOTE — PROGRESS NOTES
"GYN CLINIC VISIT  2018  CC: mood follow up    HPI:  Angy Haji is a 53 year old  who is well known to me and presents in follow up for perimenopausal symptoms, mood issues and sleep disturbance.    Angy is very upset today and states that her symptoms have been worsening, specifically anxiety and sleep.  She is back at work and is feeling extremely stressed.  She is very worried that her boss is trying to fire her and expresses a lot of anxiety about this possibility.  She expresses that she would feel a great deal of shame if she was fired.  Working at this job for 21 years.  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  Her sleep has been worse.  Sleeping on average 2-5 hours a night.  She has been up since 4 AM today and reports her energy level is very high.  One concern she has is she feels she has been excessively talkative, describing it as \"verbal vomit.\"  She is concerned that it may be due to the increased dose of sertraline.  Last month we increased her dose from 100 mg to 150 mg.  Would like to go back down to 100 mg.    Due to her difficulties performing her job well she request the following accommodations due to her intermittent severe sleep disturbance -   Not required to do in person report, okay for telephone hand off  Not having 3 consecutive groups in a row  Permission to start late on days with severe sleep disturbance    Additionally she requests her [place] be addended to allow for intermittent flare ups 1 per week for 8 hours for severe sleep disturbance.    Angy has established care with a primary care physician Jono Odell for chronic htn and other issues.  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    Vitals:    " 18 0945   BP: 140/80   Temp: 96.8  F (36  C)   TempSrc: Oral   Weight: 256 lb (116.1 kg)     Gen: alert, oriented, tearful, talkative with slightly pressured speech, very pleasant    ASSESSMENT:  53 year old  with worsening anxiety and sleep disorder    PLAN:  1. JOANNA (generalized anxiety disorder)  Strongly recommended that Angy establish care with a psychiatrist to manage her mood disorder and to help with her sleep issues.  For the time being recommend patient decrease sertraline dose to 100 mg daily and continue seeing her therapist weekly.  Contracts for safety.  Discussed that due to the complexity of her anxiety and that it has not improved I recommend she see a specialist.  Discussed my role as her gynecologist is primarily to manage her HRT and do her pelvic exams.    - MENTAL HEALTH REFERRAL  - Adult; Psychiatry and Medication Management; Psychiatry; Other: Not Listed - Enter Referral Details in Scheduling Comments Below; Patient call to schedule    2. Sleep disorder  Patient is working with sleep specialist and using her cyst new CPAP mask.  FMLA paperwork completed to allow for intermittent absences if her sleep disturbance is severe.    Greater than 50% of this 30 minute appointment was spent in counseling with the patient on issues described above in the history of present illness and in the plan, including evaluation and management of anxiety and sleep disorder.    Romy Min MD

## 2018-11-01 NOTE — TELEPHONE ENCOUNTER
Patient left a voice mail in regards to some questions she has with a potential treatment plan. She stated she would like to speak with you before she see's you on 11/15/18. Please give her a call at [phone].    Tete Marrufo

## 2018-11-01 NOTE — NURSING NOTE
"Chief Complaint   Patient presents with     Consult       Initial /80  Temp 96.8  F (36  C) (Oral)  Wt 256 lb (116.1 kg)  LMP 10/18/2018  BMI 37.8 kg/m2 Estimated body mass index is 37.8 kg/(m^2) as calculated from the following:    Height as of 10/9/18: 5' 9\" (1.753 m).    Weight as of this encounter: 256 lb (116.1 kg).  BP completed using cuff size: large    Questioned patient about current smoking habits.  Pt. has never smoked.          The following HM Due: NONE      The following patient reported/Care Every where data was sent to:  P ABSTRACT QUALITY INITIATIVES [[id number]]  na      n/a              "

## 2018-11-01 NOTE — MR AVS SNAPSHOT
After Visit Summary   11/1/2018    Angy Haji    MRN: [id number]           Patient Information     Date Of Birth          1965        Visit Information        Provider Department      11/1/2018 9:30 AM Romy Min MD [facility]'s Diagnoses     JOANNA (generalized anxiety disorder)    -  1    Sleep disorder           Follow-ups after your visit        Additional Services     MENTAL HEALTH REFERRAL  - Adult; Psychiatry and Medication Management; Psychiatry; Other: Not Listed - Enter Referral Details in Scheduling Comments Below; Patient call to schedule       All scheduling is subject to the client's specific insurance plan & benefits, provider/location availability, and provider clinical specialities.  Please arrive 15 minutes early for your first appointment and bring your completed paperwork.    Please be aware that coverage of these services is subject to the terms and limitations of your health insurance plan.  Call member services at your health plan with any benefit or coverage questions.                            Your next 10 appointments already scheduled     Nov 07, 2018  9:20 AM CST   Return Sleep Patient with Nellie Calloway MD   [facility] ([facility])    [address] [place] [id number] [phone]            Nov 08, 2018  4:00 PM CST   Return Visit with Jono Odell MD   [facility] ([facility])    [address],  [address]  [facility] [id number] [phone]            Nov 09, 2018  9:30 AM CST   GEOVANNI For Women Only with Angy Villafuerte PT   [facility] [place] ([facility] [place])    [address] [facility] [address] [phone]            Nov 12, 2018  5:00 PM CST   MyChart Physical Adult with Maryjane Quintanilla MD   [facility] [place] ([facility])    [address]  [facility] [address]   [phone]            Nov 13, 2018  8:30 AM CST   (Arrive by 8:15 AM)   RETURN SLP VOICE with RALPH Osborne [facility] ([facility])    [address] [address]   [phone]            Nov 15, 2018 10:00 AM CST   Return Sleep Patient with Jose Angel Piña PsyD   [facility])    [address] [place] [address]   [phone]            Nov 16, 2018  9:30 AM CST   GEOVANNI For Women Only with Angy Villafuerte PT   [facility] [place] ([facility] [place])    [address] [place] [address]   [phone]            Nov 23, 2018  9:30 AM CST   GEOVANNI For Women Only with Angy Villafuerte PT   [facility] [place] ([facility] [place])    [address] [place] [address]   [phone]            Nov 30, 2018  9:30 AM CST   GEOVANNI For Women Only with Angy Villafuerte PT   [facility] [place] ([facility] [place])    [address] [place] [address]   [phone]            Dec 03, 2018  8:30 AM CST   (Arrive by 8:15 AM)   RETURN SLP VOICE with RALPH [facility]borne [facility] ([facility])    [address] [address] [phone]              Who to contact     If you have questions or need follow up information about today's clinic visit or your schedule please contact [facility] directly at [phone].  Normal or non-critical lab and imaging results will be communicated to you by MyChart, letter or phone within 4 business days after the clinic has received the results. If you do not hear from us within 7 days, please contact the clinic through MyChart or phone. If you have a critical or abnormal lab result, we will notify you by phone as soon as  possible.  Submit refill requests through j-Grab or call your pharmacy and they will forward the refill request to us. Please allow 3 business days for your refill to be completed.          Additional Information About Your Visit        SongFlamehart Information     j-Grab gives you secure access to your electronic health record. If you see a primary care provider, you can also send messages to your care team and make appointments. If you have questions, please call your primary care clinic.  If you do not have a primary care provider, please call [phone] and they will assist you.        Care EveryWhere ID     This is your Care EveryWhere ID. This could be used by other organizations to access your [place] medical records  [id number]        Your Vitals Were     Temperature Last Period BMI (Body Mass Index)             96.8  F (36  C) (Oral) 10/18/2018 37.8 kg/m2          Blood Pressure from Last 3 Encounters:   11/01/18 140/80   10/25/18 149/81   10/09/18 120/67    Weight from Last 3 Encounters:   11/01/18 256 lb (116.1 kg)   10/25/18 260 lb (117.9 kg)   10/09/18 260 lb 9.6 oz (118.2 kg)              We Performed the Following     MENTAL HEALTH REFERRAL  - Adult; Psychiatry and Medication Management; Psychiatry; Other: Not Listed - Enter Referral Details in Scheduling Comments Below; Patient call to schedule        Primary Care Provider Office Phone # Fax #    Jono Gilson Odell [phone]       [address] [facility] [address]        Equal Access to Services     LUPE NOGUEIRA : Hadii ariana jasso Sopeter, waaxda luqadaha, qaybta kaalmada katlin, waxgauzz bertha cárdenas. So [facility] [phone].    ATENCIÓN: Si habla español, tiene a franco disposición servicios gratuitos de asistencia lingüística. Llame al [phone].    We comply with applicable federal civil rights laws and [place] laws. We do not discriminate on the basis of race, color, national origin, age,  disability, sex, sexual orientation, or gender identity.            Thank you!     Thank you for choosing [facility]  for your care. Our goal is always to provide you with excellent care. Hearing back from our patients is one way we can continue to improve our services. Please take a few minutes to complete the written survey that you may receive in the mail after your visit with us. Thank you!             Your Updated Medication List - Protect others around you: Learn how to safely use, store and throw away your medicines at www.disposemymeds.org.          This list is accurate as of 11/1/18 12:46 PM.  Always use your most recent med list.                   Brand Name Dispense Instructions for use Diagnosis    estrogen (conjugated)-medroxyPROGESTERone 0.625-2.5 MG per tablet    PREMPRO    90 tablet    Take 1 tablet by mouth daily    Perimenopausal vasomotor symptoms       ibuprofen 800 MG tablet    ADVIL/MOTRIN          lidocaine (viscous) 2 % solution    XYLOCAINE    100 mL    SWISH AND SPIT 5ML BY MOUTH EVERY 3 HOURS AS NEEDED    Canker sore       lisinopril 10 MG tablet    PRINIVIL/ZESTRIL    30 tablet    Take 1 tablet (10 mg) by mouth daily    Benign essential hypertension       loratadine 10 MG capsule      Take 10 mg by mouth daily as needed.        metroNIDAZOLE 1 % gel    METROGEL    60 g    Apply topically daily    Rosacea, Perioral dermatitis       order for DME      Use your CPAP device as directed by your provider.        order for DME     1 each    Please measure and distribute 1 pair of 20mmHg - 30mmHg knee high open toe compression stockings. Jobst ultrasheer or equivalent.    Varicose veins of both lower extremities with complications       order for DME     2 each    Please measure and distribute 1 pair of 20mmHg - 30mmHg thigh high open or closed toe compression stockings. Jobst ultrasheer or equivalent.    Symptomatic varicose veins of both lower extremities       sertraline 100  MG tablet    ZOLOFT    135 tablet    Take 1.5 tablets (150 mg) by mouth daily    Anxiety, Perimenopausal vasomotor symptoms       VITAMIN D (CHOLECALCIFEROL) PO      Take 2,000 Units by mouth daily    Perioral dermatitis, Rosacea

## 2018-11-02 ENCOUNTER — TELEPHONE (OUTPATIENT)
Dept: FAMILY MEDICINE | Facility: CLINIC | Age: 53
End: 2018-11-02

## 2018-11-02 ASSESSMENT — ANXIETY QUESTIONNAIRES: GAD7 TOTAL SCORE: 21

## 2018-11-02 NOTE — TELEPHONE ENCOUNTER
[facility] phone call message-patient reporting a symptom:     Symptom: Migraine; left hand cramping    When did symptoms begin? 6pm of 11/1/18    Characteristics: (location on body, intensity, what makes it better or worse, associated symptoms )  Under a lot of stress lately; drinking more water, in low light    Additional Details Has had 3 migraines in her lifetime and they were visual disturbances.  She is on BP meds and recently tapering off other med and began Lisinopril. She is a high stroke risk too.      Same Day Visit Offered: NA    Additional comments: Please return call to advise    OK to leave message on voice mail? Yes    Advised patient that RN would call back within 3 hours, unless emergent   Primary language: English      needed? No    Call taken on November 2, 2018 at 3:54 PM by Destiney Tesfaye

## 2018-11-02 NOTE — TELEPHONE ENCOUNTER
"RN returned call to patient to discuss symptoms.    Patient reports developing a left sided migraine yesterday. Denies visual disturbances. States work was stressful the past week. Notes decreased appetite. No fever or vomiting. States feeling a little dizzy. Has tried one dose of extra strength Tylenol. Unable to report if effective. States she got about seven hours of sleep. Reports battling with hypertension since March. Before was 170/92 and resulted with medical leave. Recently started Propanolol taper from 60 mg to 40 mg this week and yesterday 20 mg. Had blood pressure of 140/80's. Also taking Lisinopril for the past week. Just purchased BP machine so will take her blood pressure at home when it is opened. No change in fluid intake per patient. Has tried heat/cool therapy which was effective. Has tried resting and dimming the lights. Patient also notes today left hand feels like it is cramping around the ring finger. Both headache and hand pain \"comes and goes.\" No known injury or trauma.     RN advised home care measures: Tylenol, dim lights, increase fluids, take BP, decrease noises and screen time, and heat/cool therapy. If symptoms persist or worsen, need to be seen. If patient experiences dizziness, worsening pain, vision changes, numbness or tingling, difficulty breathing, chest pain, or shortness of breath needs to be seen in ER. Patient verbalized understanding. Okay with plan. Also informed of on-call provider option.    Jacque Veliz RN    "

## 2018-11-05 ENCOUNTER — OFFICE VISIT (OUTPATIENT)
Dept: FAMILY MEDICINE | Facility: CLINIC | Age: 53
End: 2018-11-05
Payer: COMMERCIAL

## 2018-11-05 VITALS
SYSTOLIC BLOOD PRESSURE: 162 MMHG | OXYGEN SATURATION: 100 % | WEIGHT: 258 LBS | HEART RATE: 60 BPM | BODY MASS INDEX: 38.1 KG/M2 | TEMPERATURE: 98.3 F | DIASTOLIC BLOOD PRESSURE: 100 MMHG | RESPIRATION RATE: 18 BRPM

## 2018-11-05 DIAGNOSIS — I10 BENIGN ESSENTIAL HYPERTENSION: ICD-10-CM

## 2018-11-05 DIAGNOSIS — G44.209 TENSION HEADACHE: Primary | ICD-10-CM

## 2018-11-05 RX ORDER — CYCLOBENZAPRINE HCL 10 MG
10 TABLET ORAL
Qty: 30 TABLET | Refills: 0 | Status: SHIPPED | OUTPATIENT
Start: 2018-11-05 | End: 2019-06-26

## 2018-11-05 NOTE — MR AVS SNAPSHOT
After Visit Summary   11/5/2018    Angy Haji    MRN: [id number]           Patient Information     Date Of Birth          1965        Visit Information        Provider Department      11/5/2018 1:20 PM Licha Odell MD [facility]        Today's Diagnoses     Tension headache    -  1    Benign essential hypertension          Care Instructions    Here is the plan from today's visit    1. Tension headache  OK to continue heat as needed on the neck and upper back.  May use ibuprofen or tylenol as needed for pain  Try the flexeril 10 mg at bedtime as needed  - cyclobenzaprine (FLEXERIL) 10 MG tablet; Take 1 tablet (10 mg) by mouth nightly as needed for other (headache)  Dispense: 30 tablet; Refill: 0    2. Benign essential hypertension  Continue lisinopril 10 mg daily   Continue to wean propranolol as we started last week   OK to continue to wean Zoloft as you are doing    Follow up plan  Please make a clinic appointment for follow up with me (LICHA ODELL) in 2  weeks for recheck BP.    Thank you for coming to [facility] today.  Lab Testing:  **If you had lab testing today and your results are reassuring or normal they will be mailed to you or sent through Compring within 7 days.   **If the lab tests need quick action we will call you with the results.  The phone number we will call with results is # [phone] (home) [phone] (work). If this is not the best number please call our clinic and change the number.  Medication Refills:  If you need any refills please call your pharmacy and they will contact us.   If you need to  your refill at a new pharmacy, please contact the new pharmacy directly. The new pharmacy will help you get your medications transferred faster.   Scheduling:  If you have any concerns about today's visit or wish to schedule another appointment please call our office during normal business hours [phone] (8-5:00 M-F)  If a  referral was made to a [facility] and you don't get a call from central scheduling please call [phone].  If a Mammogram was ordered for you at [facility] call [phone] to schedule or change your appointment.  If you had an XRay/CT/Ultrasound/MRI ordered the number is [phone] to schedule or change your radiology appointment.   Medical Concerns:  If you have urgent medical concerns please call [phone] at any time of the day.    Jono Odell MD            Follow-ups after your visit        Your next 10 appointments already scheduled     Nov 07, 2018  9:20 AM CST   Return Sleep Patient with Nellie Calloway MD   [facility] ([facility])    [address] [place] [id number] [phone]            Nov 08, 2018  4:00 PM CST   Return Visit with Jono Odell MD   [facility] ([facility])    [address] [place] [address],  [address]  [facility] [address]   [phone]            Nov 09, 2018  9:30 AM CST   GEOVANNI For Women Only with Angy Villafuerte PT   [facility] [place] ([facility] [place])    [address] [place] [id number] [phone]            Nov 13, 2018  8:30 AM CST   (Arrive by 8:15 AM)   RETURN SLP VOICE with RALPH Osborne    [facility] ([facility])    [address] [phone] [phone]            Nov 15, 2018 10:00 AM CST   Return Sleep Patient with Jose Angel Piña PsyD   [facility] ([facility] [place] [facility])    [address] [facility]  [address]  [facility] [address] [phone]            Nov 16, 2018  9:30 AM CST   GEOVANNI For Women Only with Angy Villafuerte PT   [facility] [place] ([facility] [place])    [address] [place] [id number] [phone]            Nov 23,  2018  9:30 AM CST   GEOVANNI For Women Only with Angy Villafuerte PT   [facility] [place] ([facility] [place])    [address] [facility] [phone] [phone]            Nov 30, 2018  9:30 AM CST   GEOVANNI For Women Only with Angy Villafuerte PT   [facility] [place] ([facility] [place])    [address] [place] [address] [phone]            Dec 03, 2018  8:30 AM CST   (Arrive by 8:15 AM)   RETURN SLP VOICE with RALPH Osborne   [facility])    [address] [facility]  4th Floor  [facility] [id number] [phone]            Dec 06, 2018  9:00 AM CST   Office Visit with Romy Min MD   [facility] ([facility])    [address] [place] [address] [phone]           Bring a current list of meds and any records pertaining to this visit. For Physicals, please bring immunization records and any forms needing to be filled out. Please arrive 10 minutes early to complete paperwork.              Who to contact     Please call your clinic at [phone] to:    Ask questions about your health    Make or cancel appointments    Discuss your medicines    Learn about your test results    Speak to your doctor            Additional Information About Your Visit        [employer] Information     Tookitaki gives you secure access to your electronic health record. If you see a primary care provider, you can also send messages to your care team and make appointments. If you have questions, please call your primary care clinic.  If you do not have a primary care provider, please call [phone] and they will assist you.      Tookitaki is an electronic gateway that provides easy, online access to your medical records. With Tookitaki, you can request a clinic appointment, read your test results, renew a prescription or communicate with your care team.     To access  your existing account, please contact your [facility] or call [phone] for assistance.        Care EveryWhere ID     This is your Care EveryWhere ID. This could be used by other organizations to access your [place] medical records  [id number]        Your Vitals Were     Pulse Temperature Respirations Last Period Pulse Oximetry Breastfeeding?    60 98.3  F (36.8  C) (Oral) 18 10/18/2018 100% No    BMI (Body Mass Index)                   38.1 kg/m2            Blood Pressure from Last 3 Encounters:   11/05/18 (!) 162/100   11/01/18 140/80   10/25/18 149/81    Weight from Last 3 Encounters:   11/05/18 258 lb (117 kg)   11/01/18 256 lb (116.1 kg)   10/25/18 260 lb (117.9 kg)              Today, you had the following     No orders found for display         Today's Medication Changes          These changes are accurate as of 11/5/18  2:15 PM.  If you have any questions, ask your nurse or doctor.               Start taking these medicines.        Dose/Directions    cyclobenzaprine 10 MG tablet   Commonly known as:  FLEXERIL   Used for:  Tension headache   Started by:  Jono Odell MD        Dose:  10 mg   Take 1 tablet (10 mg) by mouth nightly as needed for other (headache)   Quantity:  30 tablet   Refills:  0         These medicines have changed or have updated prescriptions.        Dose/Directions    sertraline 100 MG tablet   Commonly known as:  ZOLOFT   This may have changed:  how much to take   Used for:  Anxiety, Perimenopausal vasomotor symptoms        Dose:  150 mg   Take 1.5 tablets (150 mg) by mouth daily   Quantity:  135 tablet   Refills:  3            Where to get your medicines      These medications were sent to [facility] [place], [place] - [address]  [address] [address]     Phone:  [phone]     cyclobenzaprine 10 MG tablet                Primary Care Provider Office Phone # Fax #    Jono Odell [phone]  [phone] [address] [facility] [address]        Equal Access to Services     SLADEORQUIDEA MIRLANDE : Hadii aad ku hadterirosie Tamipeter, wakiritda keith, qasissyta cassieivoneezio dalal, svetlana aguilarjoycekarly cárdenas. So [facility] [phone].    ATENCIÓN: Si habla español, tiene a franco disposición servicios gratuitos de asistencia lingüística. Chynaame al [phone].    We comply with applicable federal civil rights laws and [place] laws. We do not discriminate on the basis of race, color, national origin, age, disability, sex, sexual orientation, or gender identity.            Thank you!     Thank you for choosing [facility]  for your care. Our goal is always to provide you with excellent care. Hearing back from our patients is one way we can continue to improve our services. Please take a few minutes to complete the written survey that you may receive in the mail after your visit with us. Thank you!             Your Updated Medication List - Protect others around you: Learn how to safely use, store and throw away your medicines at www.disposemymeds.org.          This list is accurate as of 11/5/18  2:15 PM.  Always use your most recent med list.                   Brand Name Dispense Instructions for use Diagnosis    cyclobenzaprine 10 MG tablet    FLEXERIL    30 tablet    Take 1 tablet (10 mg) by mouth nightly as needed for other (headache)    Tension headache       estrogen (conjugated)-medroxyPROGESTERone 0.625-2.5 MG per tablet    PREMPRO    90 tablet    Take 1 tablet by mouth daily    Perimenopausal vasomotor symptoms       ibuprofen 800 MG tablet    ADVIL/MOTRIN          lidocaine (viscous) 2 % solution    XYLOCAINE    100 mL    SWISH AND SPIT 5ML BY MOUTH EVERY 3 HOURS AS NEEDED    Canker sore       lisinopril 10 MG tablet    PRINIVIL/ZESTRIL    30 tablet    Take 1 tablet (10 mg) by mouth daily    Benign essential hypertension       loratadine 10 MG capsule      Take 10 mg by mouth  daily as needed.        metroNIDAZOLE 1 % gel    METROGEL    60 g    Apply topically daily    Rosacea, Perioral dermatitis       order for DME      Use your CPAP device as directed by your provider.        order for DME     1 each    Please measure and distribute 1 pair of 20mmHg - 30mmHg knee high open toe compression stockings. Jobst ultrasheer or equivalent.    Varicose veins of both lower extremities with complications       order for DME     2 each    Please measure and distribute 1 pair of 20mmHg - 30mmHg thigh high open or closed toe compression stockings. Jobst ultrasheer or equivalent.    Symptomatic varicose veins of both lower extremities       sertraline 100 MG tablet    ZOLOFT    135 tablet    Take 1.5 tablets (150 mg) by mouth daily    Anxiety, Perimenopausal vasomotor symptoms       VITAMIN D (CHOLECALCIFEROL) PO      Take 2,000 Units by mouth daily    Perioral dermatitis, Rosacea

## 2018-11-05 NOTE — PROGRESS NOTES
HPI:       Angy Haji is a 53 year old who presents for the following    Headache  Onset: 5 days ago    Description:   Location:  Left sided temporal - acute onset at end of the day  Character: throbbing pain, sharp pain, squeezing pain  Frequency:  Daily for the last 5 days  Duration:   Daily   Head trauma?: no  Able to do daily activities?: Yes   Do you wake with a headache in the am?: Yes Details: over the last 5 days  Do headaches wake you up at night?:no  Daily pain medication use:  no  New life stressors  :Yes Details: constant stress    Intensity: moderate    Progression of Symptoms:  same, intermittent and waxing and waning    Accompanying Signs & Symptoms:  Stiff neck: Yes Details:  chronic  Neck or upper back pain: Yes Details:  chronic  Fever: no  Sinus pressure: no  Nausea or vomitingYes Details:  Nausea but no vomitting   Dizziness:  :no  Numbness::no  Weakness (in one part of the body?:no  Visual changes: no    History:   Past history of migraines: Yes Details: approximately 3   Previous tests for headaches: no  Neurologist evaluations: no    Precipitating factors:   Does light make it worse: Yes  Does sound make it worse: no    Alleviating factors:  Does sleep help: YES- mildly  Therapies Tried and outcome:  Hot packs to the upper back/neck.  Rest.    Patient currently weaning off propranolol (now on 20 mg daily) and Zoloft (now decreased to 100 mg daily x 1 week) at same time.    Problem, Medication and Allergy Lists were reviewed and are current..    Patient is an established patient of this clinic.         Physical Exam:     Vitals:    11/05/18 1332 11/05/18 1339   BP: (!) 171/91 (!) 162/100   Pulse: 60    Resp: 18    Temp: 98.3  F (36.8  C)    TempSrc: Oral    SpO2: 100%    Weight: 258 lb (117 kg)      Body mass index is 38.1 kg/(m^2).  Vital signs normal except BP  GENERAL: overweight, alert and no distress  EYES: Eyes grossly normal to inspection, extraocular movements - intact, and  PERRL  HENT: TM's pearly grey, normal light reflex bilateral, rhinorrhea clear, oral mucous membranes moist and oropharynx clear  NECK:  Tenderness to cervical spine, no adenopathy, no asymmetry, no masses, no stiffness; thyroid- normal to palpation  RESP: lungs clear to auscultation - no rales, no rhonchi, no wheezes  CV: regular rates and rhythm, normal S1 S2, no S3 or S4 and no murmur, no click or rub -  MS: extremities- no gross deformities noted, no edema  PSYCH: anxious and expansive      Results:     None     Assessment and Plan     1. Tension headache  I suspect patient's headache are secondary to stress/tension.  Discussed etiology and likely exacerbation due to weaning off multiple meds at once.  Trial of muscle relaxer at bedtime and continue warm packs and over the counter pain meds prn during the day.  - cyclobenzaprine (FLEXERIL) 10 MG tablet; Take 1 tablet (10 mg) by mouth nightly as needed for other (headache)  Dispense: 30 tablet; Refill: 0    2. Benign essential hypertension  Not at goal.  Continue ACEI at current dose.  Wean Beta Blocker.  Return to clinic for recheck in 2 weeks.    I suspect patient's underlying anxiety is playing a large role in the above symptoms.  She is getting set up with a psychiatrist and continues to see her therapist.    40 min spent in direct face to face time with this patient, greater than 50% in counseling regarding above.    Options for treatment and follow-up care were reviewed with the patient. Angy Haji  engaged in the decision making process and verbalized understanding of the options discussed and agreed with the final plan.    Jono Odell MD

## 2018-11-05 NOTE — PATIENT INSTRUCTIONS
Here is the plan from today's visit    1. Tension headache  OK to continue heat as needed on the neck and upper back.  May use ibuprofen or tylenol as needed for pain  Try the flexeril 10 mg at bedtime as needed  - cyclobenzaprine (FLEXERIL) 10 MG tablet; Take 1 tablet (10 mg) by mouth nightly as needed for other (headache)  Dispense: 30 tablet; Refill: 0    2. Benign essential hypertension  Continue lisinopril 10 mg daily   Continue to wean propranolol as we started last week   OK to continue to wean Zoloft as you are doing    Follow up plan  Please make a clinic appointment for follow up with me (LICHA RAMACHANDRAN) in 2  weeks for recheck BP.    Thank you for coming to [facility] today.  Lab Testing:  **If you had lab testing today and your results are reassuring or normal they will be mailed to you or sent through [facility] within 7 days.   **If the lab tests need quick action we will call you with the results.  The phone number we will call with results is # [phone] (home) [phone] (work). If this is not the best number please call our clinic and change the number.  Medication Refills:  If you need any refills please call your pharmacy and they will contact us.   If you need to  your refill at a new pharmacy, please contact the new pharmacy directly. The new pharmacy will help you get your medications transferred faster.   Scheduling:  If you have any concerns about today's visit or wish to schedule another appointment please call our office during normal business hours [phone] (8-5:00 M-F)  If a referral was made to a [facility] and you don't get a call from central scheduling please call [phone].  If a Mammogram was ordered for you at [facility] call [phone] to schedule or change your appointment.  If you had an XRay/CT/Ultrasound/MRI ordered the number is [phone] to schedule or change your radiology appointment.   Medical Concerns:  If you  have urgent medical concerns please call [phone] at any time of the day.    Jono Odell MD

## 2018-11-07 ENCOUNTER — OFFICE VISIT (OUTPATIENT)
Dept: SLEEP MEDICINE | Facility: CLINIC | Age: 53
End: 2018-11-07
Payer: COMMERCIAL

## 2018-11-07 VITALS
SYSTOLIC BLOOD PRESSURE: 118 MMHG | BODY MASS INDEX: 38.8 KG/M2 | HEIGHT: 68 IN | WEIGHT: 256 LBS | OXYGEN SATURATION: 98 % | RESPIRATION RATE: 18 BRPM | HEART RATE: 67 BPM | DIASTOLIC BLOOD PRESSURE: 73 MMHG

## 2018-11-07 DIAGNOSIS — G47.00 INSOMNIA, UNSPECIFIED TYPE: ICD-10-CM

## 2018-11-07 DIAGNOSIS — G47.30 MILD SLEEP APNEA: Primary | ICD-10-CM

## 2018-11-07 PROCEDURE — 99214 OFFICE O/P EST MOD 30 MIN: CPT | Performed by: INTERNAL MEDICINE

## 2018-11-07 NOTE — PROGRESS NOTES
Chief complaint: Follow-up of obstructive sleep apnea    History of Present Illness: 53-year-old female with history of mild supine predominant obstructive sleep apnea and daytime sleepiness.  She has had good clinical benefit with the use of CPAP.  Today she reports multiple difficulties with life stressors affecting her sleep.  She reports perimenopausal symptoms that were aggravating sleep particularly earlier this year.  She went under a treatment trial of estrogen followed by SSRI.  She may be experiencing side effects of the SSRI, unclear.  She is aware of her symptoms of mild anthony.  She denies psychosis.  She has an upcoming consultation with psychiatry.  She has been on some short-term medical leave due to hypertension.  Her anthony symptoms are affecting her ability to perform her work duties.    We reviewed her CPAP download and it continues to show excessive leak.  She also has a somewhat elevated AHI.  Ideally would make some changes to her settings however this could potentially aggravate leak.  She did work with [facility] as well as her sleep therapy management team to try to optimize this leak and has not made acceptable progress.  She denies daytime sleepiness that is excessive.  She denies restlessness.  She does not drink excessive alcohol.  She is taking some over-the-counter sleep aid as needed.  She is drinking less caffeine than usual.  She has been going down from 32 to 24 now 16 ounces/day.    [place] Seepiness Scale:4 (Less than 10 normal)    Past Medical History:   Diagnosis Date     Disorders of bursae and tendons in shoulder region, unspecified      Female infertility of unspecified origin      Gallbladder sludge 5/2013     Mild sleep apnea 5/13/2014     Obesity, unspecified      Oral aphthae      Other acne      Other specified hypoglycemia      PONV (postoperative nausea and vomiting)        Allergies   Allergen Reactions     Penicillins Rash     Thimerosal Other (See  Comments)     blisters on inside of eyelid       Current Outpatient Prescriptions   Medication     cyclobenzaprine (FLEXERIL) 10 MG tablet     estrogen, conjugated,-medroxyPROGESTERone (PREMPRO) 0.625-2.5 MG per tablet     ibuprofen (ADVIL/MOTRIN) 800 MG tablet     lidocaine, viscous, (XYLOCAINE) 2 % solution     lisinopril (PRINIVIL/ZESTRIL) 10 MG tablet     Loratadine 10 MG capsule     metroNIDAZOLE (METROGEL) 1 % gel     order for DME     order for DME     ORDER FOR DME     sertraline (ZOLOFT) 100 MG tablet     VITAMIN D, CHOLECALCIFEROL, PO     No current facility-administered medications for this visit.        Social History     Social History     Marital status:      Spouse name: Danielle Zamarripa     Number of children: 1     Years of education: Masters     Occupational History     Psychotherapist [employer]     Social History Main Topics     Smoking status: Never Smoker     Smokeless tobacco: Never Used      Comment: non smoke home     Alcohol use Yes      Comment: 2 Drinks Per Month     Drug use: No     Sexual activity: Yes     Partners: Female     Other Topics Concern     Parent/Sibling W/ Cabg, Mi Or Angioplasty Before 65f 55m? No     Social History Narrative    8/18/09    Balanced Diet - Yes    Osteoporosis Prevention Measures - Dairy servings per day: 2 and Medication/Supplements (See current meds)    Regular Exercise -  Yes Describe Walking, swimming, biking.     Dental Exam - YES - Date: 5/2009    Eye Exam - YES - Date: 8/2009    Self Breast Exam - No    Abuse: Current or Past (Physical, Sexual or Emotional)- No    Do you feel safe in your environment - Yes    Guns stored in the home - No    Sunscreen used - Yes    Seatbelts used - Yes    Lipids -  YES - Date: 8/10/09    Glucose -  YES - Date: 8/10/09    Colon Cancer Screening - No    Hemoccults - NO    Pap Test -  YES - Date: 7/6/06, no Hx of abnormal paps.    Do you have any concerns about STD's -  No    Mammography -  "YES - Date: 8/5/09    DEXA - NO    Immunizations reviewed and up to date - Yes, Tdap given 1/22/08    Amara Hall MA                           Family History   Problem Relation Age of Onset     Cardiovascular Paternal Grandfather      anyerism     Obesity Paternal Grandfather      Respiratory Paternal Grandfather      pulmonary (air sacs hardening)     Allergies Father      hayfever     Genitourinary Problems Father      BPH     Lipids Father      diet therapy     Gallbladder Disease Father      cholecystectomy      Eye Disorder Father      retinal tear x 2      Alzheimer Disease Maternal Grandmother      ? poor STM     Arthritis Maternal Grandmother      HEART DISEASE Maternal Grandmother      Arrythmia-got a pacemaker at age 89     Arthritis Mother      Eye Disorder Mother      Catrack /macular tear in one eye     Cancer Paternal Grandmother      Uterine CA- as well as her sister     Gynecology Paternal Grandmother      prolapsed uterus- had 10 kids     Obesity Paternal Grandmother      Breast Cancer Maternal Aunt      Other Cancer Paternal Aunt 80     endo. cancer          EXAM: Alert, talkative  /73  Pulse 67  Resp 18  Ht 1.727 m (5' 8\")  Wt 116.1 kg (256 lb)  LMP 10/18/2018  SpO2 98%  BMI 38.92 kg/m2  Psychiatric: Pressured speech, difficulty remaining focused    PSG date:3/2/2011  Wt:241 lbs  AHI: 5.1, Supine 14.5, non supine 1    PAP download from 10/7/2018 to 11/5/2018 reviewed:  Per cent of days used greater than 4 Hours 97 % (minimum goal greater than 70%)  Average use on days used: 5 hours  38 min  Settings: CPAP 9 cmH2O  Average AHI 7.5 events per hour (goal less than 5)  Leak excessive    ASSESSMENT: 53-year-old female with mild obesity some insomnia symptoms currently experiencing some symptoms of anthony may be medication related.  Has a lot of stressors going on in her life right now.  Excessive leak hampering my ability to change her settings on CPAP to optimize treatment.    PLAN: We " will contact STM coaches to work with patient to find a better mask fit once that is obtained we will change her settings and put her into auto mode.  Patient should follow-up with her sleep psychologist for ongoing CBT.  She was reprinted instructions from that last visit as she states she lost them.  Patient was also reminded that she can log into my chart on a computer and see her previous instructions.  Prescription generated for her CPAP supplies to be renewed.    Twenty-five minutes spent with patient, >50% spent counseling and coordinating care.      Nellie Calloway M.D.  Pulmonary/Critical Care/Sleep Medicine    The above note was dictated using voice recognition software and may include typographical errors. Please contact the author for any clarifications.

## 2018-11-07 NOTE — MR AVS SNAPSHOT
After Visit Summary   11/7/2018    Angy Haji    MRN: [id number]           Patient Information     Date Of Birth          1965        Visit Information        Provider Department      11/7/2018 9:20 AM Nellie Calloway MD [facility]        Today's Diagnoses     Mild sleep apnea    -  1      Care Instructions    I will ask Terri to contact you to work on mask leak  Work on insomnia instruction (re printed)    1.  Continue CPAP every night for all hours that you are sleeping.  If you nap use CPAP.  As always, try to get at least 8 hours of sleep or more each day, and avoid sleep deprivation. Avoid alcohol.    2.  Reasons that you might need a change to your pressure therapy would be weight gain or loss, waking having inadvertently removed your CPAP overnight, having previously felt refreshed by sleep with CPAP use and now waking un-refreshed, and return of daytime sleepiness. Also, the development of new medical problems can sometimes affect breathing at night-heart failure, stroke, medications such as narcotics.    3.  Please bring CPAP with you if you are hospitalized.  If anticipating surgery be sure to discuss with your surgeon that you have sleep apnea and use PAP therapy.      4.  Maintain your equipment as recommended which includes routine cleaning and replacement of supplies.  Call DME for any questions regarding supplies or maintenance.      5.  Do not drive on engage in potentially dangerous activities if feeling sleepy.    6.  Please see me again in 12 months and bring your machine and card to your follow-up visit.      [facility] DME and cpap specialist [phone]  [facility] [phone]    [facility] DME [phone], CPAP specialist [phone]  [facility] [phone]    [facility], [facility] [phone]    [facility]  Karie Blanco [phone]  [facility] DME Maryjane [phone]  [facility] DME phone [phone]         Tips for your CPAP and BIPAP use-    Mask fitting tips  Mask fitting exercise:    To improve your mask seal and your mobility at night, put mask on and secure in place.  Lie down in bed with full pressure and roll to one side, adjust headgear while in that position to eliminate any leaks. Repeat process rolling to other side.     The mask seal does not have to be perfect:   CPAP machines are designed to make up for small leaks. However, you will not tolerate leaks blowing in your eyes so you will need to adjust.   Any leak should only be near or at the bottom of the mask.  We expect your mask to leak slightly at night.    Do not over-tighten the headgear straps, tighter IS NOT better, we expect minimal leak.    First try re-positioning the mask or headgear before tightening the headgear straps.  Mask leaks are expected due to changing sleeping positions. Try pulling the mask away from your skin allowing the cushion to re-inflate will minimize the leak.  If you struggle for a good fit, try turning the CPAP off and then readjust the mask by pulling it away from your face and then turning back on the CPAP.        Humidifier tips  Humidifiers can be adjusted to increase or decrease the amount of moisture according to your comfort level. You may need to adjust this frequently at first, but then might only change it with seasonal weather changes.     Try INCREASING the humidity if:  You experience a dry, irritated nasal passage or throat.  You have a runny, drippy nose or sneezing fits after using CPAP.  You experience nasal congestion during or after CPAP use.    Try DECREASING the humidity if:  You have excessive condensation or  rain out  in the tubing or mask.  Otherwise keep the tubing warm during the night by running it underneath the  blankets or pillow.      Clinic visit after initial CPAP and BIPAP set-up   Bring your equipment with you to your 4 week follow up clinic visit.  We will be extracting your data from the machine.        Travel  Always take your equipment with you.  If you fly with your equipment bring it on with you as a carry on.  Medical equipment does not count as a carry on.    If you travel international the machines take 110-240.  The only adapter needed is the adapter that will fit into the receptacle (outlet).    You may also want to bring an extension cord as many hot rooms have limited outlets at the bedside.  Do not travel with water in your humidifier chamber.     Cleaning and Maintenance Guidelines    Equipment Frequency Cleaning Method   Mask First Day    Daily      Weekly Soak mask in hot soapy water for 30 minutes, rinse and air dry.  Wipe nasal cushion with a hot soapy (Ivory, baby shampoo) cloth and rinse.  Baby wipes may also be used.  Do not use anti-bacterial soaps,Reina  liquid soap, rubbing alcohol, bleach or ammonia.  Wash frame in hot soapy water (Ivory, baby shampoo) rinse and let air dry   Headgear Biweekly Wash in hot soapy water, rinse and air dry   Reusable Gray Filter Weekly Wash in hot soapy water, rinse, put in towel squeeze moisture out, let air dry   Disposable White Filter Check Weekly Replace when brown or gray in color; at least every 2 to 3 months   Humidifier Chamber Daily    Weekly Empty distilled water from humidifier and let air dry    Hand wash in hot soapy water, rinse and air dry   Tubing Weekly Wash in hot soapy water, rinse and let air dry   Mask, Tubing and Humidifier Chamber As needed Disinfect: Soak in 1 part vinegar to 3 parts hot water for 30 minutes, rinse well and air dry  Not the material headgear        MASK AND SUPPLY REORDERING  Reminder: Most insurance companies will allow for a new mask, headgear, tubing, and reusable gray filter every six months.  Disposable white  ultra-fine filters are covered monthly.    EQUIPMENT NEEDS  Please call our CPAP specialist with any equipment problems, questions or needs.    HOME AND SAFETY INSTRUCTIONS    Do not use frayed or cracked electrical cords, multi plug adaptors, or switched receptacles    Do not immerse electrical equipment into water    Assure that electrical cords do not become a tripping hazard              Follow-ups after your visit        Follow-up notes from your care team     Return in about 1 year (around 11/7/2019).      Your next 10 appointments already scheduled     Nov 13, 2018  8:30 AM CST   (Arrive by 8:15 AM)   RETURN SLP VOICE with RALPH Osborne   [facility] ([facility])    [address]  [address] [facility] [id number] [phone]            Nov 15, 2018 10:00 AM CST   Return Sleep Patient with Jose Angel Piña PsyD   [facility])    [address]  [facility] [id number] [phone]            Nov 19, 2018 10:00 AM CST   RETURN EXTENDED with Jono Odell MD   [facility] ([facility])    [address] [place] [address],  [address]  [facility] [address]   [phone]            Dec 03, 2018  8:30 AM CST   (Arrive by 8:15 AM)   RETURN SLP VOICE with RALPH [facility] ([facility])    [address]  [address] [facility] [id number] [phone]            Dec 06, 2018  9:00 AM CST   Office Visit with Romy Min MD   [facility]    [address] [place] [address]  [address]  [facility] [id number] [phone]           Bring a current list of meds and any records pertaining to this visit. For Physicals, please bring immunization records and any forms needing to be filled out. Please arrive 10 minutes early to complete paperwork.             "  Who to contact     If you have questions or need follow up information about today's clinic visit or your schedule please contact [facility] directly at [phone].  Normal or non-critical lab and imaging results will be communicated to you by [employer], letter or phone within 4 business days after the clinic has received the results. If you do not hear from us within 7 days, please contact the clinic through [employer] or phone. If you have a critical or abnormal lab result, we will notify you by phone as soon as possible.  Submit refill requests through [employer] or call your pharmacy and they will forward the refill request to us. Please allow 3 business days for your refill to be completed.          Additional Information About Your Visit        [employer] Information     [employer] gives you secure access to your electronic health record. If you see a primary care provider, you can also send messages to your care team and make appointments. If you have questions, please call your primary care clinic.  If you do not have a primary care provider, please call [phone] and they will assist you.        Care EveryWhere ID     This is your Care EveryWhere ID. This could be used by other organizations to access your [place] medical records  [id number]        Your Vitals Were     Pulse Respirations Height Last Period Pulse Oximetry BMI (Body Mass Index)    67 18 1.727 m (5' 8\") 10/18/2018 98% 38.92 kg/m2       Blood Pressure from Last 3 Encounters:   11/07/18 118/73   11/05/18 (!) 162/100   11/01/18 140/80    Weight from Last 3 Encounters:   11/07/18 116.1 kg (256 lb)   11/05/18 117 kg (258 lb)   11/01/18 116.1 kg (256 lb)              We Performed the Following     Comprehensive DME          Today's Medication Changes          These changes are accurate as of 11/7/18 10:21 AM.  If you have any questions, ask your nurse or doctor.               These medicines have changed or have updated " prescriptions.        Dose/Directions    sertraline 100 MG tablet   Commonly known as:  ZOLOFT   This may have changed:  how much to take   Used for:  Anxiety, Perimenopausal vasomotor symptoms        Dose:  150 mg   Take 1.5 tablets (150 mg) by mouth daily   Quantity:  135 tablet   Refills:  3                Primary Care Provider Office Phone # Fax #    Jono Odell [phone] [address] [facility] [address]        Equal Access to Services     LUPE NOGUEIRA : Hadii ariana ku hadasho Soomaali, waaxda luqadaha, qaybta kaalmada adeegyada, waxay migdaliain hayanastasiyan amanda tam . So [facility] [phone].    ATENCIÓN: Si ashley santos, tiene a franco disposición servicios gratuitos de asistencia lingüística. [facility] [phone].    We comply with applicable federal civil rights laws and [place] laws. We do not discriminate on the basis of race, color, national origin, age, disability, sex, sexual orientation, or gender identity.            Thank you!     Thank you for choosing [facility]  for your care. Our goal is always to provide you with excellent care. Hearing back from our patients is one way we can continue to improve our services. Please take a few minutes to complete the written survey that you may receive in the mail after your visit with us. Thank you!             Your Updated Medication List - Protect others around you: Learn how to safely use, store and throw away your medicines at www.disposemymeds.org.          This list is accurate as of 11/7/18 10:21 AM.  Always use your most recent med list.                   Brand Name Dispense Instructions for use Diagnosis    cyclobenzaprine 10 MG tablet    FLEXERIL    30 tablet    Take 1 tablet (10 mg) by mouth nightly as needed for other (headache)    Tension headache       estrogen (conjugated)-medroxyPROGESTERone 0.625-2.5 MG per tablet    PREMPRO    90 tablet    Take 1 tablet by mouth daily    Perimenopausal  vasomotor symptoms       ibuprofen 800 MG tablet    ADVIL/MOTRIN          lidocaine (viscous) 2 % solution    XYLOCAINE    100 mL    SWISH AND SPIT 5ML BY MOUTH EVERY 3 HOURS AS NEEDED    Canker sore       lisinopril 10 MG tablet    PRINIVIL/ZESTRIL    30 tablet    Take 1 tablet (10 mg) by mouth daily    Benign essential hypertension       loratadine 10 MG capsule      Take 10 mg by mouth daily as needed.        metroNIDAZOLE 1 % gel    METROGEL    60 g    Apply topically daily    Rosacea, Perioral dermatitis       order for DME      Use your CPAP device as directed by your provider.        order for DME     1 each    Please measure and distribute 1 pair of 20mmHg - 30mmHg knee high open toe compression stockings. Jobst ultrasheer or equivalent.    Varicose veins of both lower extremities with complications       order for DME     2 each    Please measure and distribute 1 pair of 20mmHg - 30mmHg thigh high open or closed toe compression stockings. Jobst ultrasheer or equivalent.    Symptomatic varicose veins of both lower extremities       sertraline 100 MG tablet    ZOLOFT    135 tablet    Take 1.5 tablets (150 mg) by mouth daily    Anxiety, Perimenopausal vasomotor symptoms       VITAMIN D (CHOLECALCIFEROL) PO      Take 2,000 Units by mouth daily    Perioral dermatitis, Rosacea

## 2018-11-07 NOTE — PATIENT INSTRUCTIONS
I will ask Terri to contact you to work on mask leak  Work on insomnia instruction (re printed)    1.  Continue CPAP every night for all hours that you are sleeping.  If you nap use CPAP.  As always, try to get at least 8 hours of sleep or more each day, and avoid sleep deprivation. Avoid alcohol.    2.  Reasons that you might need a change to your pressure therapy would be weight gain or loss, waking having inadvertently removed your CPAP overnight, having previously felt refreshed by sleep with CPAP use and now waking un-refreshed, and return of daytime sleepiness. Also, the development of new medical problems can sometimes affect breathing at night-heart failure, stroke, medications such as narcotics.    3.  Please bring CPAP with you if you are hospitalized.  If anticipating surgery be sure to discuss with your surgeon that you have sleep apnea and use PAP therapy.      4.  Maintain your equipment as recommended which includes routine cleaning and replacement of supplies.  Call DME for any questions regarding supplies or maintenance.      5.  Do not drive on engage in potentially dangerous activities if feeling sleepy.    6.  Please see me again in 12 months and bring your machine and card to your follow-up visit.      [facility] DME and cpap specialist [phone]  [facility] [phone]    [facility] DME [phone], CPAP specialist [phone]  [facility] [phone]    [facility], [facility] [phone]    [facility] DME  Karie Blanco [phone]  [facility] D[facility] Maryjane [phone]  [facility] DME phone [phone]         Tips for your CPAP and BIPAP use-    Mask fitting tips  Mask fitting exercise:    To improve your mask seal and your mobility at night, put mask on and secure in place.  Lie down in bed  with full pressure and roll to one side, adjust headgear while in that position to eliminate any leaks. Repeat process rolling to other side.     The mask seal does not have to be perfect:   CPAP machines are designed to make up for small leaks. However, you will not tolerate leaks blowing in your eyes so you will need to adjust.   Any leak should only be near or at the bottom of the mask.  We expect your mask to leak slightly at night.    Do not over-tighten the headgear straps, tighter IS NOT better, we expect minimal leak.    First try re-positioning the mask or headgear before tightening the headgear straps.  Mask leaks are expected due to changing sleeping positions. Try pulling the mask away from your skin allowing the cushion to re-inflate will minimize the leak.  If you struggle for a good fit, try turning the CPAP off and then readjust the mask by pulling it away from your face and then turning back on the CPAP.        Humidifier tips  Humidifiers can be adjusted to increase or decrease the amount of moisture according to your comfort level. You may need to adjust this frequently at first, but then might only change it with seasonal weather changes.     Try INCREASING the humidity if:  You experience a dry, irritated nasal passage or throat.  You have a runny, drippy nose or sneezing fits after using CPAP.  You experience nasal congestion during or after CPAP use.    Try DECREASING the humidity if:  You have excessive condensation or  rain out  in the tubing or mask.  Otherwise keep the tubing warm during the night by running it underneath the blankets or pillow.      Clinic visit after initial CPAP and BIPAP set-up   Bring your equipment with you to your 4 week follow up clinic visit.  We will be extracting your data from the machine.        Travel  Always take your equipment with you.  If you fly with your equipment bring it on with you as a carry on.  Medical equipment does not count as a carry on.    If  you travel international the machines take 110-240.  The only adapter needed is the adapter that will fit into the receptacle (outlet).    You may also want to bring an extension cord as many hotel rooms have limited outlets at the bedside.  Do not travel with water in your humidifier chamber.     Cleaning and Maintenance Guidelines    Equipment Frequency Cleaning Method   Mask First Day    Daily      Weekly Soak mask in hot soapy water for 30 minutes, rinse and air dry.  Wipe nasal cushion with a hot soapy (Ivory, baby shampoo) cloth and rinse.  Baby wipes may also be used.  Do not use anti-bacterial soaps,Reina  liquid soap, rubbing alcohol, bleach or ammonia.  Wash frame in hot soapy water (Ivory, baby shampoo) rinse and let air dry   Headgear Biweekly Wash in hot soapy water, rinse and air dry   Reusable Gray Filter Weekly Wash in hot soapy water, rinse, put in towel squeeze moisture out, let air dry   Disposable White Filter Check Weekly Replace when brown or gray in color; at least every 2 to 3 months   Humidifier Chamber Daily    Weekly Empty distilled water from humidifier and let air dry    Hand wash in hot soapy water, rinse and air dry   Tubing Weekly Wash in hot soapy water, rinse and let air dry   Mask, Tubing and Humidifier Chamber As needed Disinfect: Soak in 1 part vinegar to 3 parts hot water for 30 minutes, rinse well and air dry  Not the material headgear        MASK AND SUPPLY REORDERING  Reminder: Most insurance companies will allow for a new mask, headgear, tubing, and reusable gray filter every six months.  Disposable white ultra-fine filters are covered monthly.    EQUIPMENT NEEDS  Please call our CPAP specialist with any equipment problems, questions or needs.    HOME AND SAFETY INSTRUCTIONS    Do not use frayed or cracked electrical cords, multi plug adaptors, or switched receptacles    Do not immerse electrical equipment into water    Assure that electrical cords do not become a tripping  hazard

## 2018-11-09 ENCOUNTER — TELEPHONE (OUTPATIENT)
Dept: BEHAVIORAL HEALTH | Facility: CLINIC | Age: 53
End: 2018-11-09

## 2018-11-09 ENCOUNTER — HOSPITAL ENCOUNTER (EMERGENCY)
Facility: CLINIC | Age: 53
Discharge: STILL ADMITTED OR ADMINISTRATIVE | End: 2018-11-09
Attending: PSYCHIATRY & NEUROLOGY | Admitting: PSYCHIATRY & NEUROLOGY
Payer: COMMERCIAL

## 2018-11-09 ENCOUNTER — HOSPITAL ENCOUNTER (INPATIENT)
Facility: CLINIC | Age: 53
LOS: 6 days | Discharge: HOME | DRG: 885 | End: 2018-11-15
Attending: PSYCHIATRY & NEUROLOGY | Admitting: PSYCHIATRY & NEUROLOGY
Payer: COMMERCIAL

## 2018-11-09 VITALS
DIASTOLIC BLOOD PRESSURE: 68 MMHG | SYSTOLIC BLOOD PRESSURE: 155 MMHG | RESPIRATION RATE: 16 BRPM | OXYGEN SATURATION: 100 % | TEMPERATURE: 97.9 F

## 2018-11-09 DIAGNOSIS — R68.2 DRY MOUTH: ICD-10-CM

## 2018-11-09 DIAGNOSIS — E22.2 SIADH (SYNDROME OF INAPPROPRIATE ADH PRODUCTION) (H): ICD-10-CM

## 2018-11-09 DIAGNOSIS — F30.9 MANIA (H): ICD-10-CM

## 2018-11-09 DIAGNOSIS — F30.10 MANIC BEHAVIOR (H): Primary | ICD-10-CM

## 2018-11-09 LAB
AMPHETAMINES UR QL SCN: NEGATIVE
BARBITURATES UR QL: NEGATIVE
BENZODIAZ UR QL: NEGATIVE
CANNABINOIDS UR QL SCN: NEGATIVE
COCAINE UR QL: NEGATIVE
ETHANOL UR QL SCN: NEGATIVE
OPIATES UR QL SCN: NEGATIVE

## 2018-11-09 PROCEDURE — 90791 PSYCH DIAGNOSTIC EVALUATION: CPT

## 2018-11-09 PROCEDURE — 12400006 ZZH R&B MH INTERMEDIATE

## 2018-11-09 PROCEDURE — 25000132 ZZH RX MED GY IP 250 OP 250 PS 637: Performed by: PSYCHIATRY & NEUROLOGY

## 2018-11-09 PROCEDURE — 80320 DRUG SCREEN QUANTALCOHOLS: CPT | Performed by: PSYCHIATRY & NEUROLOGY

## 2018-11-09 PROCEDURE — 99285 EMERGENCY DEPT VISIT HI MDM: CPT | Mod: 25 | Performed by: PSYCHIATRY & NEUROLOGY

## 2018-11-09 PROCEDURE — 99285 EMERGENCY DEPT VISIT HI MDM: CPT | Mod: Z6 | Performed by: PSYCHIATRY & NEUROLOGY

## 2018-11-09 PROCEDURE — 80307 DRUG TEST PRSMV CHEM ANLYZR: CPT | Performed by: PSYCHIATRY & NEUROLOGY

## 2018-11-09 RX ORDER — IBUPROFEN 600 MG/1
600 TABLET, FILM COATED ORAL EVERY 6 HOURS PRN
Status: DISCONTINUED | OUTPATIENT
Start: 2018-11-09 | End: 2018-11-15 | Stop reason: HOSPADM

## 2018-11-09 RX ORDER — OLANZAPINE 5 MG/1
5-10 TABLET ORAL EVERY 6 HOURS PRN
Status: DISCONTINUED | OUTPATIENT
Start: 2018-11-09 | End: 2018-11-15

## 2018-11-09 RX ORDER — HYDROXYZINE HYDROCHLORIDE 25 MG/1
25 TABLET, FILM COATED ORAL EVERY 4 HOURS PRN
Status: DISCONTINUED | OUTPATIENT
Start: 2018-11-09 | End: 2018-11-13

## 2018-11-09 RX ORDER — LORAZEPAM 1 MG/1
2 TABLET ORAL ONCE
Status: COMPLETED | OUTPATIENT
Start: 2018-11-09 | End: 2018-11-09

## 2018-11-09 RX ORDER — CYCLOBENZAPRINE HCL 10 MG
10 TABLET ORAL
Status: DISCONTINUED | OUTPATIENT
Start: 2018-11-09 | End: 2018-11-15 | Stop reason: HOSPADM

## 2018-11-09 RX ORDER — LISINOPRIL 10 MG/1
10 TABLET ORAL DAILY
Status: DISCONTINUED | OUTPATIENT
Start: 2018-11-10 | End: 2018-11-15 | Stop reason: HOSPADM

## 2018-11-09 RX ORDER — METRONIDAZOLE 7.5 MG/G
GEL TOPICAL DAILY PRN
Status: DISCONTINUED | OUTPATIENT
Start: 2018-11-10 | End: 2018-11-14

## 2018-11-09 RX ORDER — LORATADINE 10 MG/1
10 TABLET ORAL DAILY PRN
Status: DISCONTINUED | OUTPATIENT
Start: 2018-11-10 | End: 2018-11-15 | Stop reason: HOSPADM

## 2018-11-09 RX ORDER — OLANZAPINE 10 MG/1
10 TABLET ORAL AT BEDTIME
Status: DISCONTINUED | OUTPATIENT
Start: 2018-11-09 | End: 2018-11-10

## 2018-11-09 RX ADMIN — OLANZAPINE 10 MG: 10 TABLET, FILM COATED ORAL at 21:16

## 2018-11-09 RX ADMIN — LORAZEPAM 2 MG: 1 TABLET ORAL at 12:42

## 2018-11-09 ASSESSMENT — ENCOUNTER SYMPTOMS
HALLUCINATIONS: 0
SHORTNESS OF BREATH: 0
HYPERACTIVE: 1
DYSPHORIC MOOD: 0
ABDOMINAL PAIN: 0
BACK PAIN: 0
SLEEP DISTURBANCE: 1
FEVER: 0
CHEST TIGHTNESS: 0
DIZZINESS: 0
NERVOUS/ANXIOUS: 1
DECREASED CONCENTRATION: 1

## 2018-11-09 ASSESSMENT — ACTIVITIES OF DAILY LIVING (ADL)
ORAL_HYGIENE: INDEPENDENT
LAUNDRY: WITH SUPERVISION
GROOMING: INDEPENDENT
DRESS: STREET CLOTHES

## 2018-11-09 NOTE — ED PROVIDER NOTES
History     Chief Complaint   Patient presents with     Manic Behavior     increasing behavioor changes since april when she started HRT and then zoloft by ob/gyn     The history is provided by the patient, medical records and the spouse.     Angy Haji is a 53 year old female who comes in due to her anthony.  She is hyper verbal, pressured, slightly grandiose, not sleeping, having flight of ideas and spending money.  She is here with his wife who is supportive.  They have some financial stressors and recently moved in order to get a different school set up for their daughter.  She has been having issues getting FMLA for work. 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  She is willing to come into the hospital as she does not want to start antipsychotics.  She is willing to take other mood stabilizers.  She also wants to get off zoloft. She started zoloft for anxiety in August.  She believes this is what triggered the anthony. She denies any history of any manic periods before.  She has had one depressive episode in the past.  She denies any drug use.      Please see the 's assessment in EPIC from today (11/9/18) for further details.    I have reviewed the Medications, Allergies, Past Medical and Surgical History, and Social History in the Epic system.    Review of Systems   Constitutional: Negative for fever.   Eyes: Negative for visual disturbance.   Respiratory: Negative for chest tightness and shortness of breath.    Cardiovascular: Negative for chest pain.   Gastrointestinal: Negative for abdominal pain.   Musculoskeletal: Negative for back pain.   Neurological: Negative for dizziness.   Psychiatric/Behavioral: Positive for decreased concentration and sleep disturbance. Negative for dysphoric mood, hallucinations, self-injury and suicidal ideas. The patient is nervous/anxious and is hyperactive.    All other systems reviewed and are  negative.      Physical Exam   BP: (!) 203/92  Heart Rate: 122  Temp: 95.8  F (35.4  C)  Resp: 20  SpO2: 100 %      Physical Exam   Constitutional: She is oriented to person, place, and time. She appears well-developed and well-nourished.   Cardiovascular: Normal rate, regular rhythm and normal heart sounds.    Pulmonary/Chest: Effort normal and breath sounds normal. No respiratory distress.   Neurological: She is alert and oriented to person, place, and time.   Psychiatric: Thought content normal. Her affect is inappropriate. Her speech is rapid and/or pressured. She is hyperactive. She is not actively hallucinating. Thought content is not paranoid and not delusional. Cognition and memory are normal. She expresses impulsivity. She expresses no homicidal and no suicidal ideation. She expresses no suicidal plans and no homicidal plans.   Angy is a 54 y/o female who looks her age.  She is well groomed with good eye contact.  She is hyper verbal and difficult to interrupt with flight of ideas.   Nursing note and vitals reviewed.      ED Course     ED Course     Procedures               Labs Ordered and Resulted from Time of ED Arrival Up to the Time of Departure from the ED   DRUG ABUSE SCREEN 6 CHEM DEP URINE ([facility])            Assessments & Plan (with Medical Decision Making)   Angy will be admitted to [facility] under Dr. High due to her working at here at [employer] and there being no beds in a location that is not a boundary issue.  She is currently manic but it is unclear if this is a first episode of anthony secondary to an underlying bipolar disorder or due to the serotonin specific reuptake inhibitor (Zoloft) that she has been on.  She did get 2 mg of ativan which did start to slow her down a little and caused her to be slightly sleepy.  Angy would prefer to use a mood stabilizer not in an antipsychotic class of medication.  She is willing to be hospitalized to get this started and to help taper her zoloft.       I have reviewed the nursing notes.    I have reviewed the findings, diagnosis, plan and need for follow up with the patient.    New Prescriptions    No medications on file       Final diagnoses:   Thuy (H)       11/9/2018   [facility], [place], EMERGENCY DEPARTMENT     Lucien Plascencia MD  11/09/18 6308

## 2018-11-09 NOTE — ED AVS SNAPSHOT
[facility], Emergency Department    [address]    [place] [address]    Phone:  [phone]    Fax:  [phone]                                       Angy Haji   MRN: [id number]    Department:  [facility], Emergency Department   Date of Visit:  11/9/2018           Patient Information     Date Of Birth          1965        Your diagnoses for this visit were:     Thuy (H)        You were seen by Lucien Plascencia MD.      Your next 10 appointments already scheduled     Nov 13, 2018  8:30 AM CST   (Arrive by 8:15 AM)   RETURN SLP VOICE with R[facility] ([facility])    [address]  4th Floor  [facility] [id number] [phone]            Nov 15, 2018 10:00 AM CST   Return Sleep Patient with Jose Angel Piña PsyD   [facility] [place] 234 beds at the [facility])    [address]  [facility] [id number] [phone]            Nov 19, 2018 10:00 AM CST   RETURN EXTENDED with Jono Odell MD   [facility] ([facility])    [address] [place] [address],  [address]  [facility] [address]   [phone]            Dec 03, 2018  8:30 AM CST   (Arrive by 8:15 AM)   RETURN SLP VOICE with [facility]AL[facility] ([facility])    [address]  4th Floor  [facility] [id number] [phone]            Dec 06, 2018  9:00 AM CST   Office Visit with Romy Min MD   [facility] ([facility]    [address] [place] [address]  [address]  [facility] [address] [phone]           Bring a current list of meds and any records pertaining to this visit. For Physicals, please bring immunization records and any forms needing to be filled out. Please arrive 10 minutes early to complete paperwork.              24 Hour Appointment Hotline       To make an appointment at  any [facility] clinic, call [phone] ([phone]). If you don't have a family doctor or clinic, we will help you find one. [facility] clinics are conveniently located to serve the needs of you and your family.             Review of your medicines      Our records show that you are taking the medicines listed below. If these are incorrect, please call your family doctor or clinic.        Dose / Directions Last dose taken    cyclobenzaprine 10 MG tablet   Commonly known as:  FLEXERIL   Dose:  10 mg   Quantity:  30 tablet        Take 1 tablet (10 mg) by mouth nightly as needed for other (headache)   Refills:  0        estrogen (conjugated)-medroxyPROGESTERone 0.625-2.5 MG per tablet   Commonly known as:  PREMPRO   Dose:  1 tablet   Quantity:  90 tablet        Take 1 tablet by mouth daily   Refills:  3        ibuprofen 800 MG tablet   Commonly known as:  ADVIL/MOTRIN        Refills:  0        lidocaine (viscous) 2 % solution   Commonly known as:  XYLOCAINE   Quantity:  100 mL        SWISH AND SPIT 5ML BY MOUTH EVERY 3 HOURS AS NEEDED   Refills:  0        lisinopril 10 MG tablet   Commonly known as:  PRINIVIL/ZESTRIL   Dose:  10 mg   Quantity:  30 tablet        Take 1 tablet (10 mg) by mouth daily   Refills:  1        loratadine 10 MG capsule   Dose:  10 mg        Take 10 mg by mouth daily as needed.   Refills:  0        metroNIDAZOLE 1 % gel   Commonly known as:  METROGEL   Quantity:  60 g        Apply topically daily   Refills:  9        order for DME        Use your CPAP device as directed by your provider.   Refills:  0        order for DME   Quantity:  1 each        Please measure and distribute 1 pair of 20mmHg - 30mmHg knee high open toe compression stockings. Jobst ultrasheer or equivalent.   Refills:  1        order for DME   Quantity:  2 each        Please measure and distribute 1 pair of 20mmHg - 30mmHg thigh high open or closed toe compression stockings. Jobst ultrasheer or equivalent.   Refills:  3         sertraline 100 MG tablet   Commonly known as:  ZOLOFT   Dose:  150 mg   Quantity:  135 tablet        Take 1.5 tablets (150 mg) by mouth daily   Refills:  3        VITAMIN D (CHOLECALCIFEROL) PO   Dose:  2000 Units        Take 2,000 Units by mouth daily   Refills:  0                Procedures and tests performed during your visit     Drug abuse screen 6 urine (chem dep) ([facility])      Orders Needing Specimen Collection     None      Pending Results     No orders found from 11/7/2018 to 11/10/2018.            Pending Culture Results     No orders found from 11/7/2018 to 11/10/2018.            Pending Results Instructions     If you had any lab results that were not finalized at the time of your Discharge, you can call the ED Lab Result RN at [phone]. You will be contacted by this team for any positive Lab results or changes in treatment. The nurses are available 7 days a week from 10A to 6:30P.  You can leave a message 24 hours per day and they will return your call.        Thank you for choosing [facility]       Thank you for choosing [facility] for your care. Our goal is always to provide you with excellent care. Hearing back from our patients is one way we can continue to improve our services. Please take a few minutes to complete the written survey that you may receive in the mail after you visit with us. Thank you!        [employer] Information     "[employer] gives you secure access to your electronic health record. If you see a primary care provider, you can also send messages to your care team and make appointments. If you have questions, please call your primary care clinic.  If you do not have a primary care provider, please call [phone] and they will assist you.        Care EveryWhere ID     This is your Care EveryWhere ID. This could be used by other organizations to access your [facility] medical records  [id number]        Equal Access to Services     LUPE NOGUEIRA AH: subhash Nicole  jay jay parker waxay idiin hayaan adeeg kharash la'aan ah. So [facility] [phone].    ATENCIÓN: Si habla danielle, tiene a franco disposición servicios gratuitos de asistencia lingüística. Llame al [phone].    We comply with applicable federal civil rights laws and [place] laws. We do not discriminate on the basis of race, color, national origin, age, disability, sex, sexual orientation, or gender identity.            After Visit Summary       This is your record. Keep this with you and show to your community pharmacist(s) and doctor(s) at your next visit.

## 2018-11-09 NOTE — TELEPHONE ENCOUNTER
S: Liss gave clinical saying pt was bib her wife to [facility] er due to anthony.  B: pt appears manic. Hx of dep and anxiety. Manic phase since July. No hx of Bipolar. She started hormone replacement last January and Zoloft shortly after. Sleeping only 2 hrs per night since July. Pt has been very grandiose since July and this is reportedly getting worse. No psychosis. Issues at work w/ her functioning. Utox neg. Pt denies drug use. Pt uses a CPAP due to sleep apnea and pt has hypertension and Raynaud's Disease.   A: denies SI, not functioning, appears manic and grandiose, hyperverbal, tangential, coop, vol, med cleared  R: #77/chaitanya accepted for [facility] for ITC                   mbw

## 2018-11-09 NOTE — ED AVS SNAPSHOT
[facility], Emergency Department    [address]    [facility] [address]    Phone:  [phone]    Fax:  [phone]                                       Angy Haji   MRN: [id number]    Department:  [facility], Emergency Department   Date of Visit:  11/9/2018           After Visit Summary Signature Page     I have received my discharge instructions, and my questions have been answered. I have discussed any challenges I see with this plan with the nurse or doctor.    ..........................................................................................................................................  Patient/Patient Representative Signature      ..........................................................................................................................................  Patient Representative Print Name and Relationship to Patient    ..................................................               ................................................  Date                                   Time    ..........................................................................................................................................  Reviewed by Signature/Title    ...................................................              ..............................................  Date                                               Time          22EPIC Rev 08/18

## 2018-11-09 NOTE — IP AVS SNAPSHOT
[place] [address] [address]    Phone:  [phone]                                       After Visit Summary   11/9/2018    Angy Haji    MRN: [id number]           After Visit Summary Signature Page     I have received my discharge instructions, and my questions have been answered. I have discussed any challenges I see with this plan with the nurse or doctor.    ..........................................................................................................................................  Patient/Patient Representative Signature      ..........................................................................................................................................  Patient Representative Print Name and Relationship to Patient    ..................................................               ................................................  Date                                   Time    ..........................................................................................................................................  Reviewed by Signature/Title    ...................................................              ..............................................  Date                                               Time          22EPIC Rev 08/18

## 2018-11-09 NOTE — IP AVS SNAPSHOT
MRN:[id number]                      After Visit Summary   11/9/2018    Angy Haji    MRN: [id number]           Thank you!     Thank you for choosing [facility] for your care. Our goal is always to provide you with excellent care.        Patient Information     Date Of Birth          1965        Designated Caregiver       Most Recent Value    Caregiver    Will someone help with your care after discharge? no    Name of designated caregiver Danielle Zamarripa (wife)    Phone number of caregiver [phone]      About your hospital stay     You were admitted on:  November 9, 2018 You last received care in the:  [facility]    You were discharged on:  November 15, 2018       Who to Call     For medical emergencies, please call 911.  For non-urgent questions about your medical care, please call your primary care provider or clinic, [phone]          Attending Provider     Provider Specialty    Devora High MD Psychiatry       Primary Care Provider Office Phone # Fax #    Jono Odell [phone]      After Care Instructions     Diet       Follow this diet upon discharge:       Fluid restriction 1200 ML FLUID      Regular Diet Adult                  Follow-up Appointments     Follow-up and recommended labs and tests        Follow up with primary care provider, Jono Odell, within 7 days for hospital follow- up.  The following labs/tests are recommended: sodium level.                  Your next 10 appointments already scheduled     Nov 19, 2018 10:00 AM CST   RETURN EXTENDED with Jono Odell MD   [facility] ([facility])    [address] [place],  [address]  [facility] [address]   [phone]            Dec 03, 2018  8:30 AM CST   (Arrive by 8:15 AM)   RETURN SLP VOICE with R[facility] ([facility])    [address]  4th [address]  [facility]  [address]   [phone]            Dec 06, 2018  9:00 AM CST   Office Visit with Romy Min MD   [facility] [place] [facility]    [address] [place] [address] [address]  [facility] [id number] [phone]           Bring a current list of meds and any records pertaining to this visit. For Physicals, please bring immunization records and any forms needing to be filled out. Please arrive 10 minutes early to complete paperwork.              Future tests that were ordered for you     Sodium                 Further instructions from your care team       Behavioral Discharge Planning and Instructions    Summary:   Admitted with anthony.     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 Medication adjustment. Swallow study.     Symptoms to Report:  Feeling more agitated,  Losing more sleep, Mood getting worse or Thoughts of suicide    Lifestyle Adjustment:  Follow all treatment recommendations. Develop and follow safety plan.     Psychiatry Follow-up:     You have a therapy appointment with Aylin Caro MA, LP at [facility] on Monday, November 19, 2018 at 10:00 am.     [facility] [id number] [phone] / [phone] / [phone] fax    You have an appointment for medication management with Dr. Ingris Reynoso on Monday, November 26, 2018 at 10:00 am.     Dr. Ingris Reynoso - Psychiatry  [address] [place] [address], [address]  [place] [address]   [phone] /     You indicated that you would like to follow up for medication management with Dr. High at [facility].  You have an appointment with Dr. High on Wednesday, December 26, 2018 at 2:00 pm. Please arrive 15 minutes early to complete paperwork. Please bring your photo ID and insurance card with you to this appointment.      [facility] [address]  [place] [address]  [phone] / Fax: [phone]    Resources:   Crisis Intervention: 544.889.8354 or 566-892-9482 (TTY: 143.380.2026).  Call anytime for help.  National [place] on Mental Illness (www.mn.iliana.org): 602.204.2170 or 524-641-0901.  National Suicide Prevention Line (www.mentalhealthmn.org): 996-923-FKUQ (7057)  COPE (Crisis Services for Adults) in [facility]: [phone] (Available 24/7)    General Medication Instructions:   See your medication sheet(s) for instructions.   Take all medicines as directed.  Make no changes unless your doctor suggests them.   Go to all your doctor visits.  Be sure to have all your required lab tests. This way, your medicines can be refilled on time.  Do not use any drugs not prescribed by your doctor.  Avoid alcohol.    *Please make an appointment with your Primary Care Physician one week after discharge.  PCP to please checks labs - VPA (Valproic Acid Level) and NA+.  Continue with your 1200 ml fluid restriction. PCP to liberalize fluid as indicated. You can continue with saliva replacement as needed.    Pending Results     No orders found from 11/7/2018 to 11/10/2018.            Statement of Approval     Ordered          11/15/18 1436  I have reviewed and agree with all the recommendations and orders detailed in this document.  EFFECTIVE NOW     Approved and electronically signed by:  Devora High MD             Admission Information     Date & Time Provider Department Dept. Phone    11/9/2018 Devora High MD [facility] [phone]      Your Vitals Were     Blood Pressure Pulse Temperature Respirations Height  "Weight    141/77 70 98.7  F (37.1  C) (Oral) 18 1.753 m (5' 9\") 119.6 kg (263 lb 11.2 oz)    Last Period Pulse Oximetry BMI (Body Mass Index)             10/18/2018 98% 38.94 kg/m2         [facility] Information     [facility] gives you secure access to your electronic health record. If you see a primary care provider, you can also send messages to your care team and make appointments. If you have questions, please call your primary care clinic.  If you do not have a primary care provider, please call [phone] and they will assist you.        Care EveryWhere ID     This is your Care EveryWhere ID. This could be used by other organizations to access your [facility] medical records  [id number]        Equal Access to Services     LUPE NOGUEIRA : Russ Wagner, subhash parker, jay jay dalal, svetlana cárdenas. So [facility] [phone].    ATENCIÓN: Si habla español, tiene a franco disposición servicios gratuitos de asistencia lingüística. Llame al [phone].    We comply with applicable federal civil rights laws and [place] laws. We do not discriminate on the basis of race, color, national origin, age, disability, sex, sexual orientation, or gender identity.               Review of your medicines      START taking        Dose / Directions    artificial saliva Soln solution   Used for:  Dry mouth        Dose:  2 spray   Swish and spit 2 mLs (2 sprays) in mouth every hour as needed for dry mouth   Quantity:  44.3 mL   Refills:  0       benztropine 0.5 MG tablet   Commonly known as:  COGENTIN        Dose:  0.5 mg   Take 1 tablet (0.5 mg) by mouth 2 times daily as needed (EPSE)   Quantity:  15 tablet   Refills:  0       divalproex sodium delayed-release 500 MG DR tablet   Commonly known as:  DEPAKOTE        Dose:  1000 mg   Take 2 tablets (1,000 mg) by mouth 2 times daily   Quantity:  120 tablet   Refills:  0       QUEtiapine 100 MG tablet   Commonly known as:  SEROquel        " Dose:  100 mg   Take 1 tablet (100 mg) by mouth 2 times daily   Quantity:  60 tablet   Refills:  0         CONTINUE these medicines which have NOT CHANGED        Dose / Directions    cyclobenzaprine 10 MG tablet   Commonly known as:  FLEXERIL   Used for:  Tension headache        Dose:  10 mg   Take 1 tablet (10 mg) by mouth nightly as needed for other (headache)   Quantity:  30 tablet   Refills:  0       estrogen (conjugated)-medroxyPROGESTERone 0.625-2.5 MG per tablet   Commonly known as:  PREMPRO   Used for:  Perimenopausal vasomotor symptoms        Dose:  1 tablet   Take 1 tablet by mouth daily   Quantity:  90 tablet   Refills:  3       ibuprofen 800 MG tablet   Commonly known as:  ADVIL/MOTRIN        Refills:  0       lidocaine (viscous) 2 % solution   Commonly known as:  XYLOCAINE   Used for:  Canker sore        SWISH AND SPIT 5ML BY MOUTH EVERY 3 HOURS AS NEEDED   Quantity:  100 mL   Refills:  0       lisinopril 10 MG tablet   Commonly known as:  PRINIVIL/ZESTRIL   Used for:  Benign essential hypertension        Dose:  10 mg   Take 1 tablet (10 mg) by mouth daily   Quantity:  30 tablet   Refills:  1       loratadine 10 MG capsule        Dose:  10 mg   Take 10 mg by mouth daily as needed.   Refills:  0       metroNIDAZOLE 1 % gel   Commonly known as:  METROGEL   Used for:  Rosacea, Perioral dermatitis        Apply topically daily   Quantity:  60 g   Refills:  9       order for DME        Use your CPAP device as directed by your provider.   Refills:  0       order for DME   Used for:  Varicose veins of both lower extremities with complications        Please measure and distribute 1 pair of 20mmHg - 30mmHg knee high open toe compression stockings. Jobst ultrasheer or equivalent.   Quantity:  1 each   Refills:  1       order for DME   Used for:  Symptomatic varicose veins of both lower extremities        Please measure and distribute 1 pair of 20mmHg - 30mmHg thigh high open or closed toe compression stockings.  Jobst ultrasheer or equivalent.   Quantity:  2 each   Refills:  3       VITAMIN D (CHOLECALCIFEROL) PO   Used for:  Perioral dermatitis, Rosacea        Dose:  2000 Units   Take 2,000 Units by mouth daily   Refills:  0         STOP taking     sertraline 100 MG tablet   Commonly known as:  ZOLOFT                Where to get your medicines      These medications were sent to [facility] [place], [place] - [address]  [address], [place] [address]     Phone:  [phone]     artificial saliva Soln solution    benztropine 0.5 MG tablet    divalproex sodium delayed-release 500 MG DR tablet    QUEtiapine 100 MG tablet                Protect others around you: Learn how to safely use, store and throw away your medicines at www.disposemymeds.org.             Medication List: This is a list of all your medications and when to take them. Check marks below indicate your daily home schedule. Keep this list as a reference.      Medications           Morning Afternoon Evening Bedtime As Needed    artificial saliva Soln solution   Swish and spit 2 mLs (2 sprays) in mouth every hour as needed for dry mouth   Last time this was given:  2 sprays on 11/15/2018  8:16 AM                                   benztropine 0.5 MG tablet   Commonly known as:  COGENTIN   Take 1 tablet (0.5 mg) by mouth 2 times daily as needed (EPSE)   Last time this was given:  0.5 mg on 11/15/2018  8:24 AM                                   cyclobenzaprine 10 MG tablet   Commonly known as:  FLEXERIL   Take 1 tablet (10 mg) by mouth nightly as needed for other (headache)                                   divalproex sodium delayed-release 500 MG DR tablet   Commonly known as:  DEPAKOTE   Take 2 tablets (1,000 mg) by mouth 2 times daily   Last time this was given:  1,000 mg on 11/15/2018  8:16 AM            8 AM               9 PM           estrogen (conjugated)-medroxyPROGESTERone 0.625-2.5 MG per tablet   Commonly known as:  PREMPRO  "  Take 1 tablet by mouth daily   Last time this was given:  1 tablet on 11/15/2018  8:15 AM                                   ibuprofen 800 MG tablet   Commonly known as:  ADVIL/MOTRIN   Last time this was given:  600 mg on 11/13/2018  9:01 PM                                   lidocaine (viscous) 2 % solution   Commonly known as:  XYLOCAINE   SWISH AND SPIT 5ML BY MOUTH EVERY 3 HOURS AS NEEDED   Last time this was given:  5 mLs on 11/14/2018 10:44 PM                                   lisinopril 10 MG tablet   Commonly known as:  PRINIVIL/ZESTRIL   Take 1 tablet (10 mg) by mouth daily   Last time this was given:  10 mg on 11/15/2018  8:15 AM                                   loratadine 10 MG capsule   Take 10 mg by mouth daily as needed.                                   metroNIDAZOLE 1 % gel   Commonly known as:  METROGEL   Apply topically daily   Last time this was given:  1 g on 11/14/2018 10:43 PM                                   order for DME   Use your CPAP device as directed by your provider.                                order for DME   Please measure and distribute 1 pair of 20mmHg - 30mmHg knee high open toe compression stockings. Jobst ultrasheer or equivalent.                                order for DME   Please measure and distribute 1 pair of 20mmHg - 30mmHg thigh high open or closed toe compression stockings. Jobst ultrasheer or equivalent.                                QUEtiapine 100 MG tablet   Commonly known as:  SEROquel   Take 1 tablet (100 mg) by mouth 2 times daily   Last time this was given:  100 mg on 11/15/2018 12:19 PM                                      VITAMIN D (CHOLECALCIFEROL) PO   Take 2,000 Units by mouth daily   Last time this was given:  2,000 Units on 11/15/2018  8:15 AM                                             More Information        Bipolar Disorder  Bipolar disorder is an illness that causes strong mood swings between depression and  anthony . It used to be called \"manic " "depression.\" The mood swings are different from the normal ups and downs we all experience in our lives. They are more severe, last longer, and can interfere with work and relationships. These episodes are changes from our usual moods and behavior. Their severity can be mild, or drastic and explosive.    In a manic episode, you may think fast and do things quickly. It may seem like you are getting a lot done. At first, this may feel very good. But in the extreme this can lead to a lifestyle that is disorganized, chaotic, and includes risky behavior (spending sprees, sexual acting-out, or drug use). In later stages, it may affect eating (no interest in food) and sleeping (unable to sleep for days at a time). Speech may speed up and become difficult for others to understand. You may appear to others as if you are in your own world.    In a depressive episode, you may feel a lack of interest in normal activities. Sometimes there is sadness or guilt without any clear reason. Thinking may become slow and there can be a lack energy or feeling of hopelessness. Some people have thoughts of harming themselves at this stage. Thoughts can even turn to suicide.  Between these phases you may actually feel OK. This does not mean that the illness is gone. People with this disorder will usually have to treat it all of their life. Medicine and good care can greatly reduce the symptoms.  The exact cause of this illness is unknown. However, there is a genetic link that makes a person more likely to get this problem. Also, the use of drugs such as speed (amphetamine) and cocaine increase the chances of this illness appearing.  Home care  Here is what you can do at home:    Ongoing care and support help people manage this disease. Find a healthcare provider and therapist who meet your needs. Seek help when you feel like you may be heading into either a manic episode or a depressive state.    Be sure to take your medicine and get regular " blood work to check the levels of medicine in your body. Take the medicine and get the follow-up lab work as prescribed, even if you think you don t need to do it.    Be certain to tell each of your healthcare providers about all of the prescription and over-the-counter medicines, and supplements you take. Certain supplements interact with medicines and result in dangerous side effects. You can also use your pharmacist as a resource person when you have questions about medicine interactions.    Talk with your family and trusted friends about your thoughts and feelings. Ask them to help you recognize behavior changes early so you can get help and medicines can be adjusted.    Alcohol and drugs can bring on an episode, and make them worse    If your life is severely impacted by this illness, the Americans with Disabilities Act (ADA) may provide help. The ADA protects people with chronic physical and mental health problems. If you are having trouble keeping jobs, managing workplace issues, or caring for yourself because of your bipolar disorder, contact your local ADA office to see if it can help. The [place] Department of Justice operates a toll-free ADA information line at: 579.754.8537 (Voice); or 055-237-5860 (TTY). It can help you locate a local office.    Follow-up care  Follow up with your healthcare provider or therapist as advised. They can help you to find ways to improve your life.  Call 911  Call 911 if any of these happen:    You have suicidal thoughts, a plan, and the means to  harm yourself, or serious thoughts of hurting someone else    Trouble breathing    Confusion    Drowsiness or trouble wakening    Fainting or loss of consciousness    Rapid heart rate, very low heart rate, or a new irregular heart rate    Seizure    New chest pain that becomes more severe, lasts longer, or spreads into your shoulder, arm, neck, jaw, or back  When to seek medical advice  Call your healthcare provider right away if any of  these happen:    Feeling like your symptoms are getting worse (depression, agitation, and excess energy)    Unable to eat or sleep for more than 48 hours    Feeling out of control (racing thoughts, or poor concentration)    Feeling like you want to harm yourself or another    Being unable to care for yourself  Date Last Reviewed: 10/1/2017    0287-6592 [employer]. [address] [place] [address]. All rights reserved. This information is not intended as a substitute for professional medical care. Always follow your healthcare professional's instructions.

## 2018-11-10 LAB
ANION GAP SERPL CALCULATED.3IONS-SCNC: 5 MMOL/L (ref 3–14)
BUN SERPL-MCNC: 7 MG/DL (ref 7–30)
CALCIUM SERPL-MCNC: 8.9 MG/DL (ref 8.5–10.1)
CHLORIDE SERPL-SCNC: 97 MMOL/L (ref 94–109)
CO2 SERPL-SCNC: 26 MMOL/L (ref 20–32)
CREAT SERPL-MCNC: 0.41 MG/DL (ref 0.52–1.04)
ERYTHROCYTE [DISTWIDTH] IN BLOOD BY AUTOMATED COUNT: 12.4 % (ref 10–15)
GFR SERPL CREATININE-BSD FRML MDRD: >90 ML/MIN/1.7M2
GLUCOSE SERPL-MCNC: 95 MG/DL (ref 70–99)
HCT VFR BLD AUTO: 43.2 % (ref 35–47)
HGB BLD-MCNC: 14.9 G/DL (ref 11.7–15.7)
MCH RBC QN AUTO: 29.5 PG (ref 26.5–33)
MCHC RBC AUTO-ENTMCNC: 34.5 G/DL (ref 31.5–36.5)
MCV RBC AUTO: 86 FL (ref 78–100)
PLATELET # BLD AUTO: 193 10E9/L (ref 150–450)
POTASSIUM SERPL-SCNC: 4.1 MMOL/L (ref 3.4–5.3)
RBC # BLD AUTO: 5.05 10E12/L (ref 3.8–5.2)
SODIUM SERPL-SCNC: 128 MMOL/L (ref 133–144)
TSH SERPL DL<=0.005 MIU/L-ACNC: 2.29 MU/L (ref 0.4–4)
WBC # BLD AUTO: 4.8 10E9/L (ref 4–11)

## 2018-11-10 PROCEDURE — 90853 GROUP PSYCHOTHERAPY: CPT

## 2018-11-10 PROCEDURE — 25000132 ZZH RX MED GY IP 250 OP 250 PS 637: Performed by: PSYCHIATRY & NEUROLOGY

## 2018-11-10 PROCEDURE — 36415 COLL VENOUS BLD VENIPUNCTURE: CPT | Performed by: PSYCHIATRY & NEUROLOGY

## 2018-11-10 PROCEDURE — 85027 COMPLETE CBC AUTOMATED: CPT | Performed by: PSYCHIATRY & NEUROLOGY

## 2018-11-10 PROCEDURE — 12400006 ZZH R&B MH INTERMEDIATE

## 2018-11-10 PROCEDURE — 80048 BASIC METABOLIC PNL TOTAL CA: CPT | Performed by: PSYCHIATRY & NEUROLOGY

## 2018-11-10 PROCEDURE — 84443 ASSAY THYROID STIM HORMONE: CPT | Performed by: PSYCHIATRY & NEUROLOGY

## 2018-11-10 PROCEDURE — 99222 1ST HOSP IP/OBS MODERATE 55: CPT | Mod: AI | Performed by: STUDENT IN AN ORGANIZED HEALTH CARE EDUCATION/TRAINING PROGRAM

## 2018-11-10 RX ORDER — DIVALPROEX SODIUM 500 MG/1
500 TABLET, DELAYED RELEASE ORAL EVERY 12 HOURS SCHEDULED
Status: DISCONTINUED | OUTPATIENT
Start: 2018-11-10 | End: 2018-11-13

## 2018-11-10 RX ORDER — POLYETHYLENE GLYCOL 3350 17 G/17G
17 POWDER, FOR SOLUTION ORAL DAILY PRN
Status: DISCONTINUED | OUTPATIENT
Start: 2018-11-10 | End: 2018-11-15

## 2018-11-10 RX ORDER — DIVALPROEX SODIUM 500 MG/1
1000 TABLET, DELAYED RELEASE ORAL ONCE
Status: COMPLETED | OUTPATIENT
Start: 2018-11-10 | End: 2018-11-10

## 2018-11-10 RX ORDER — OLANZAPINE 10 MG/1
10 TABLET, ORALLY DISINTEGRATING ORAL AT BEDTIME
Status: DISCONTINUED | OUTPATIENT
Start: 2018-11-10 | End: 2018-11-12

## 2018-11-10 RX ORDER — CARBOXYMETHYLCELLULOSE SODIUM 5 MG/ML
1 SOLUTION/ DROPS OPHTHALMIC 3 TIMES DAILY PRN
Status: DISCONTINUED | OUTPATIENT
Start: 2018-11-10 | End: 2018-11-15 | Stop reason: HOSPADM

## 2018-11-10 RX ADMIN — VITAMIN D, TAB 1000IU (100/BT) 2000 UNITS: 25 TAB at 08:47

## 2018-11-10 RX ADMIN — IBUPROFEN 600 MG: 600 TABLET, FILM COATED ORAL at 23:00

## 2018-11-10 RX ADMIN — DIVALPROEX SODIUM 1000 MG: 500 TABLET, DELAYED RELEASE ORAL at 11:55

## 2018-11-10 RX ADMIN — OLANZAPINE 10 MG: 10 TABLET, ORALLY DISINTEGRATING ORAL at 22:58

## 2018-11-10 RX ADMIN — DIVALPROEX SODIUM 500 MG: 500 TABLET, DELAYED RELEASE ORAL at 22:58

## 2018-11-10 RX ADMIN — IBUPROFEN 600 MG: 600 TABLET, FILM COATED ORAL at 08:55

## 2018-11-10 RX ADMIN — POLYETHYLENE GLYCOL 3350 17 G: 17 POWDER, FOR SOLUTION ORAL at 22:59

## 2018-11-10 RX ADMIN — LISINOPRIL 10 MG: 10 TABLET ORAL at 08:47

## 2018-11-10 RX ADMIN — OLANZAPINE 10 MG: 10 TABLET, ORALLY DISINTEGRATING ORAL at 11:34

## 2018-11-10 RX ADMIN — HYDROXYZINE HYDROCHLORIDE 25 MG: 25 TABLET ORAL at 00:42

## 2018-11-10 RX ADMIN — CONJUGATED ESTROGENS AND MEDROXYPROGESTERONE ACETATE 1 TABLET: .625; 2.5 TABLET, SUGAR COATED ORAL at 08:47

## 2018-11-10 ASSESSMENT — ACTIVITIES OF DAILY LIVING (ADL)
LAUNDRY: WITH SUPERVISION
DRESS: STREET CLOTHES
ORAL_HYGIENE: INDEPENDENT
GROOMING: INDEPENDENT
GROOMING: INDEPENDENT
DRESS: STREET CLOTHES
ORAL_HYGIENE: INDEPENDENT
LAUNDRY: WITH SUPERVISION

## 2018-11-10 NOTE — PROGRESS NOTES
Pt has been hyper verbal and was up 3x throughout the night. She is requesting to have her wife present for the initial evaluation by the psychiatrist for a family meeting. She was encouraged to discuss the matter with psychiatrist. Pt's wife Danielle Zamarripa [phone] would like a phone call for update by the psychiatrist. She says she wants to provide him with collateral information. CARMEN is signed by patient.

## 2018-11-10 NOTE — PLAN OF CARE
Problem: Manic Symptoms  Goal: Manic Symptoms  Signs and symptoms of listed problems will be absent or manageable.   Outcome: No Change  Patient presents with a flat, blunt affect and depressed mood. Pt is tearful and complains that she hasn't had much to eat today. Pt was given meal and clamed down. Pt claims that she works at [employer] and is aware of the rules on the unit. Pt makes frequent requests for her clothing and photographs of her child. Pt was told that she would not be able to have her personal items until the doctor sees her tomorrow. Pt continues to request items and belives the rules do not apply to her. Pt's wife and friend where non compliant with visiting hours and did not leave the unit until 2150. Pt's wife questioned staff about when the doctor would be doing rounds in the morning as she would like to speak with the doctor as well. Staff encouraged the wife to request a family meeting for a further date when one can be arranged. Pt's wife became irritable and upset stating that she would be here tomorrow at 0800 regardless. Staff informed the wife that she would not be allowed on the unit until visiting hours. Pt's wife plans on arriving at 0800 anyway. Pt became tearful when her wife left. Pt retreated to her room and remains asleep for the duration of the shift.

## 2018-11-10 NOTE — H&P
Hospitalist  History and Physical         Date of Admission:  11/9/2018    Assessment & Plan   Angy Haji is a 53 year old female who presents with manic behavior.      Manic behavior (H)    Anxiety    Assessment: presents with worsening anxiety, manic symptoms that patient thinks is related to zoloft and also multiple stressors in her personal and work life. No SI/HI, she has no active hallucinations.     Plan:   - Further plan per psychiatry    Hyponatremia  Assessment: Na of 128, likely SIADH in setting of Zoloft  Plan:  - Fluid restrict  - Follow BMP  - hospitalist will continue to follow    Hypertension  Assessment: Currently on lisinopril 10 mg, most recent blood pressure is 137/91.  Plan:  - Continue lisinopril  - Would hold off adding further medications at this point.  - If Blood pressure is difficult to controlled, consider increasing lisinopril to 20 mg.      Obesity (BMI 30-39.9)    Morbid obesity (H)    Assessment: reports 120 lb weight loss over last few years. Does not want to start anti-psychotic due to fear of weight gain    Plan:   - Encouraged continued weight loss      Mild sleep apnea    Assessment: uses CPAP    Plan:   - Continue CPAP if possible       # Pain Assessment:  Current Pain Score 11/9/2018   Patient currently in pain? no   Pain score (0-10) -   Pain location -   Pain descriptors -   Angy cooper pain level was assessed and she currently denies pain.      DVT Prophylaxis: Ambulate every shift  Code Status: Full Code    Disposition: Expected discharge per psychiatry    Kobi Simeon MD    Primary Care Physician   Jono Odell    Chief Complaint     Anthony    History is obtained from the patient    History of Present Illness      Angy Haji is a 53 year old female who presents with anthony.     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  She reports that over the last 6 months she has been having worsening anxiety, insomnia, manic behavior.  Presented to ED due  to fear that she is no longer able to control her manic symptoms.  Reports multiple stressors both in her personal and work life.  She has been having issues getting FMLA approval for work.  She does not endorse any suicidal ideations or intent of self-harm.  She is  with one child.  She denies any history of previous manic episode before, she has had depression in the past, she denies any recreational drug abuse.  Does not endorse any fevers/chills, no headaches.  She reports that she thinks she has on treated hypertension.  She has no chest pain/shortness of breath.  She is tearful at times but otherwise has no other complaints.      Past Medical History    I have reviewed this patient's medical history and updated it with pertinent information if needed.   Past Medical History:   Diagnosis Date     Disorders of bursae and tendons in shoulder region, unspecified      Female infertility of unspecified origin      Gallbladder sludge 5/2013     Mild sleep apnea 5/13/2014     Obesity, unspecified      Oral aphthae      Other acne      Other specified hypoglycemia      PONV (postoperative nausea and vomiting)        Past Surgical History   I have reviewed this patient's surgical history and updated it with pertinent information if needed.  Past Surgical History:   Procedure Laterality Date     HC TOOTH EXTRACTION W/FORCEP  1984/85    [facility]     LAPAROSCOPIC CHOLECYSTECTOMY  7/29/2013    Procedure: LAPAROSCOPIC CHOLECYSTECTOMY;  Laparoscopic Cholecystectomy;  Surgeon: Fabio Johnson MD;  [facility]     LAPAROSCOPIC OOPHORECTOMY Left 6/23/2016    Procedure: LAPAROSCOPIC OOPHORECTOMY;  Surgeon: Kayley Donnelly MD;  [facility]     LAPAROSCOPIC SALPINGECTOMY Bilateral 6/23/2016    Procedure: LAPAROSCOPIC SALPINGECTOMY;  Surgeon: Kayley Donnelly MD;  Location[facility]       Prior to Admission Medications   Prior to Admission Medications   Prescriptions Last Dose Informant  Patient Reported? Taking?   Loratadine 10 MG capsule 11/9/2018 at Unknown time  Yes Yes   Sig: Take 10 mg by mouth daily as needed.   ORDER FOR DME 11/9/2018 at Unknown time  Yes Yes   Sig: Use your CPAP device as directed by your provider.   VITAMIN D, CHOLECALCIFEROL, PO 11/9/2018 at Unknown time  Yes Yes   Sig: Take 2,000 Units by mouth daily   cyclobenzaprine (FLEXERIL) 10 MG tablet 11/8/2018 at Unknown time  No Yes   Sig: Take 1 tablet (10 mg) by mouth nightly as needed for other (headache)   estrogen, conjugated,-medroxyPROGESTERone (PREMPRO) 0.625-2.5 MG per tablet 11/8/2018 at Unknown time  No Yes   Sig: Take 1 tablet by mouth daily   ibuprofen (ADVIL/MOTRIN) 800 MG tablet 11/8/2018 at Unknown time  Yes Yes   lidocaine, viscous, (XYLOCAINE) 2 % solution 11/9/2018 at Unknown time  No Yes   Sig: SWISH AND SPIT 5ML BY MOUTH EVERY 3 HOURS AS NEEDED   lisinopril (PRINIVIL/ZESTRIL) 10 MG tablet 11/9/2018 at Unknown time  No Yes   Sig: Take 1 tablet (10 mg) by mouth daily   metroNIDAZOLE (METROGEL) 1 % gel 11/9/2018 at Unknown time  No Yes   Sig: Apply topically daily   order for DME 11/8/2018 at Unknown time  No Yes   Sig: Please measure and distribute 1 pair of 20mmHg - 30mmHg knee high open toe compression stockings. Jobst ultrasheer or equivalent.   order for DME   No No   Sig: Please measure and distribute 1 pair of 20mmHg - 30mmHg thigh high open or closed toe compression stockings. Jobst ultrasheer or equivalent.   sertraline (ZOLOFT) 100 MG tablet 11/8/2018 at Unknown time  No Yes   Sig: Take 1.5 tablets (150 mg) by mouth daily   Patient taking differently: Take 100 mg by mouth daily       Facility-Administered Medications: None     Allergies   Allergies   Allergen Reactions     Penicillins Rash     Thimerosal Other (See Comments)     blisters on inside of eyelid       Social History   I have reviewed this patient's social history and updated it with pertinent information if needed. Angy Haji  reports  that she has never smoked. She has never used smokeless tobacco. She reports that she drinks alcohol. She reports that she does not use illicit drugs.    Family History   I have reviewed this patient's family history and updated it with pertinent information if needed.   Family History   Problem Relation Age of Onset     Cardiovascular Paternal Grandfather      anyerism     Obesity Paternal Grandfather      Respiratory Paternal Grandfather      pulmonary (air sacs hardening)     Allergies Father      hayfever     Genitourinary Problems Father      BPH     Lipids Father      diet therapy     Gallbladder Disease Father      cholecystectomy      Eye Disorder Father      retinal tear x 2      Alzheimer Disease Maternal Grandmother      ? poor STM     Arthritis Maternal Grandmother      HEART DISEASE Maternal Grandmother      Arrythmia-got a pacemaker at age 89     Arthritis Mother      Eye Disorder Mother      [place] /macular tear in one eye     Cancer Paternal Grandmother      Uterine CA- as well as her sister     Gynecology Paternal Grandmother      prolapsed uterus- had 10 kids     Obesity Paternal Grandmother      Breast Cancer Maternal Aunt      Other Cancer Paternal Aunt 80     endo. cancer        Review of Systems   The 10 point Review of Systems is negative other than noted in the HPI or here.     Physical Exam   Temp: 98.2  F (36.8  C) Temp src: Oral BP: (!) 157/92     Resp: 16 SpO2: 98 % O2 Device: None (Room air)    Vital Signs with Ranges  Temp:  [95.8  F (35.4  C)-98.2  F (36.8  C)] 98.2  F (36.8  C)  Heart Rate:  [] 88  Resp:  [16-20] 16  BP: (155-203)/(68-92) 157/92  SpO2:  [98 %-100 %] 98 %  258 lbs 0 oz    Constitutional: Awake, alert, cooperative, no apparent distress.  Eyes: Conjunctiva and pupils examined and normal.  HEENT: Moist mucous membranes, normal dentition.  Respiratory: Clear to auscultation bilaterally, no crackles or wheezing.  Cardiovascular: Regular rate and rhythm, normal S1 and  S2, and no murmur noted.  GI: Soft, non-distended, non-tender, normal bowel sounds.  Lymph/Hematologic: No anterior cervical or supraclavicular adenopathy.  Skin: No rashes, no cyanosis, no edema.  Musculoskeletal: No joint swelling, erythema or tenderness.  Neurologic: Cranial nerves 2-12 intact, normal strength and sensation.  Psychiatric: Anxious appearing, hyper-verbal.    Data   Data reviewed today:  I personally reviewed no images or EKG's today.  No lab results found in last 7 days.    No results found for this or any previous visit (from the past 24 hour(s)).

## 2018-11-10 NOTE — H&P
"Admitted:     11/09/2018      IDENTIFYING DATA AND REASON FOR REFERRAL:  Angy Haji is a 53-year-old lady who reports she is  to Danielle Can with whom she has been for the last 20 years.  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  She presented to [facility] Emergency Department on 11/09/2018, on account of manic behavior and requested hospitalization on account of her poor function.  Information was gathered through direct patient contact, in addition to chart review.  She is admitted as a voluntary patient.      CHIEF COMPLAINT:  \"Basically, the short version of this is that I shifted from being perimenopausal to being hypomanic.\"      HISTORY OF PRESENT ILLNESS:  Angy Haji reports no history of bipolar disorder.  She tells me that the only psychiatric diagnosis she has had in the past was that of adjustment disorder with depressed and anxious mood, which was ascribed to her in 2011 by Holly Blue MD.  While she was going through significant stress in the context of starting a solo practice which did not take off.  She went on to describe how she had become perimenopause about 5 years ago and had been having problems managing her sleep.  She reports she has had obstructive sleep apnea for 20 years and has been using a CPAP machine over that timeframe.  She states she underwent left ovarian cystectomy 2 years ago at which point, she was found to also have endometriosis and her tubes were also taken.  She reports that she was told at that time that she was not a candidate for hormone replacement therapy because she was still having irregular menstrual flow, had never given birth, was older, was obese and also had family history of endometrial cancer.  She reports she has tried to maintain her weight through diet, exercise and Weight Watchers.  She states she sees a dietitian " regularly and has done so for the last 15 years.      In January of this year, she reports that she became increasingly symptomatic with hot flashes, frequent awakening, urinary symptoms  in spite of her diligent sleep hygiene.  She states she used to listen to meditation tapes to help her with sleep, but this does not help her.  She sought the help of her primary care physician, but was referred to see a gynecologist.  She states she saw Dr. Romy Min in January and she was told that she had the option of hormone replacement therapy or antidepressant medications.  She reports that following much consideration, she was started on hormone replacement therapy in March; at which point, she was placed on Prempro at a low dose.  She reports that she did not immediately benefit from the hormone replacement as her sleep disruption increased but her night sweats decreased.  She reports that the dose of her Prempro was increased and in April, she noticed that she had developed poor concentration, depressed mood, anxiety, was quite distractible, and had difficulty exercising.      She tells me that Effexor was initiated in April, but she experienced significant agitation and tremulousness after just a couple days on the medication.  She tells me she became very concerned because she had also experienced agitation while she was on Wellbutrin in 2011.  However, at the time, she was also going through multiple psychosocial stressors including terminal illness of one of her best friends who had metastatic lung cancer.  She states her daughter was also having a difficult time in school on account of bullying and they had to come up with a 504 plan for her.  She reports that she was also having marital difficulties on account of her refusal to move from the neighborhood where they had resided for many years.  She tells me that she had paid off her home where she had lived for 23 years and was very reluctant to leave the comfort  "of the neighborhood where she knew most of her neighbors and felt very comfortable with the access she had to nearby convenience and grocery stores.  She ultimately stopped Effexor after a few days and started Zoloft initially at 25 mg, but it was slowly escalated to 100 mg. She reports that she noticed that it was benefiting her and decided to stay on that dose until after a planned vacation to [place] where she was gone for the 2 weeks.  She reports that things continued to go well, even after she returned from her vacation and her partner and friends noticed that she was \"more sparkly and back to being involved with my friends and checking my emails again.\"      Over the summer, she reports that a lot happened to her that was very stressful.  She reports that they had their daughter on a wait list to attend a different school but he kept being told that they would not know which school she would go until closer to the time of resumption and this was quite stressful for them.  She states that they eventually found out that it may take several weeks after school resumed for them to know if their daughter could go to a different school.  Consequently, they resolved to relocate to [place] and sold their home in [place] and moved their daughter to [place].  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  She reports that eventually, she had to drop the phone on the client and call 911, and things ended up with the client getting help, but she became less functional at work to the point that it became obvious to her colleagues that she was not doing well and that she was manic.  She claims she tried to get her gynecologist to fill out forms for FMLA, but this became problematic.  She states up to " now, she does not have any approved FMLA in place as her providers were inexperienced in completing the forms.  She therefore elected to come into the hospital to get help for her symptoms.      With respect to mental health symptoms, the patient admits to experiencing racing thoughts, difficulty initiating and maintaining sleep, rapid pressured speech, high anxiety, but she denies the presence of auditory or visual hallucinations.  She also does not endorse delusional themes; she admits that she is very distractible and inattentive.  She tells me she is aware she is overly inclusive in conversations, but she cannot help herself.  She denies use of alcohol or illicit chemicals.  She admits to intermittent crying spells as well as lability of mood.      PAST PSYCHIATRIC HISTORY:  As described in history of present illness.      CHEMICAL USE HISTORY:  None reported.      PAST MEDICAL HISTORY:   1.  Obstructive sleep apnea.   2.  Obesity.   3.  Postoperative nausea and vomiting.   4.  Endometriosis.   5.  History of ovarian cysts.      ALLERGIES:  PENICILLINS AND THIMEROSAL.       PAST SURGICAL HISTORY:  Laparoscopic cholecystectomy, laparoscopic oophorectomy, laparoscopic salpingectomy, wisdom teeth extraction.      MEDICATIONS  PRIOR TO ADMISSION:   1.  Loratadine 10 mg p.o. daily p.r.n.   2.  Flexeril 10 mg at bedtime p.r.n.   3.  Prempro 0.625-2.5 mg 1 p.o. daily.   4.  Ibuprofen 600 mg q.6 h. p.r.n.   5.  Xylocaine 2% swish and spit 5 mL q.3h.  p.r.n.    6.  Lisinopril 10 mg p.o. daily.   7.  MetroGel 1% topical daily p.r.n.   8.  Zoloft 100 mg 1.5 p.o. daily.   9.  Vitamin D3 2000 units p.o. daily.      FAMILY PSYCHIATRIC HISTORY:  None reported.      SOCIAL HISTORY:  The patient reports she grew up in [place], [place].  She reports that her mother is a speech pathologist while her father is a physician.  She has 2 brothers.  She denies history of physical, emotional or sexual abuse.  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  She is  to Danielle Can with whom she shares a 12-year-old daughter.  She denies  or criminal history.      REVIEW OF SYSTEMS:  A 10-point review of systems was negative apart from the pertinent positives in the history of present illness.      VITAL SIGNS:  Blood pressure 137/91,  pulse 79, respirations 17, temperature 97.4, weight 258 pounds.      MENTAL STATUS EXAMINATION:  This is a bespectacled, obese, middle-aged lady who appears her stated age of 53.  She is dressed in hospital scrubs and ambulates on her own without difficulty.  She does not display any abnormal involuntary movements.  She is hyperverbal, pressured, and difficult to interrupt.  She is very overly inclusive.  Her mood is expansive with a labile affect.  She is intermittently teary and at other times euphoric.  She denies the presence of auditory or visual hallucinations.  There are no delusions elicited.  She denies self-harm thoughts, plans, or intent.  She does not endorse any homicidal thoughts, plans, or intent.  She is alert and oriented to time, place and person.  Her fund of knowledge is adequate.  Her gait and station are within normal limits.  Her associations are tight.  Her language is appropriate.  Her recall of recent and remote events is intact.  Her impulse control is marginal.  Her gait and station are within normal limits.  Muscle strength is adequate.  Attention span and concentration are limited.  Risk assessment at this time is considered low to moderate.  She displays fair insight and limited judgment.      DIAGNOSTIC IMPRESSION:  Angy Haji is a 53-year-old  woman with no prior history of anthony who presents to the hospital on account of florid signs of anthony in the context of recent escalation in her sertraline dose from 100 to 150 mg.  She is also going through multiple psychosocial  stressors.  She is agreeable to medication management.      DIAGNOSES:   1.  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   2.  Obesity.   3.  Perimenopause.     4.  Hypertension.   5.  Hyponatremia.   6.  Obstructive sleep apnea.      PLAN:  The patient will be maintained on the ITC.  Staff will provide a safe environment for her and she will be encouraged to participate in individual, milieu and group therapy.  The initial plan was for her to commence lithium but it is noted that she is on lisinopril, which she claims has helped regulate her blood pressure, and she is reluctant to discontinue.  She will therefore be placed on Depakote ER 1000 mg stat and 500 mg b.i.d. while using Zyprexa Zydis 10 mg stat and 10 mg at bedtime; p.r.n. Zyprexa will also be made available and her prognosis has been discussed with her.  She is requesting a family meeting for Tuesday next week with her wife.  Estimated length of stay 4-7 days.         TITA STACK MD             D: 11/10/2018   T: 11/10/2018   MT: YARITZA      Name:     ARABELLA SHAH   MRN:      [id number]        Account:      [id number]   :      1965        Admitted:     2018                   Document: [id number]

## 2018-11-10 NOTE — PROGRESS NOTES
11/09/18 0819   Patient Belongings   Did you bring any home meds/supplements to the hospital?  No   Patient Belongings clothing   Disposition of Belongings Locker   Belongings Search Yes   Clothing Search Yes   Second Staff Lalit   General Info Comment In locker     Phone  Blue lace  Swiss card  Key yash  Keys x22  Shoes no strings  Magazines x2  Underwear x3  Socks x3  Bra  Pants  Nightgown  Shirt      Admission:  I am responsible for any personal items that are not sent to the safe or pharmacy. [facility] is not responsible for loss, theft or damage of any property in my possession.        Patient Signature: ___________________________________________      Date/Time:__________________________     Staff Signature: __________________________________      Date/Time:__________________________     [facility] Staff person, if patient is unable/unwilling to sign:      __________________________________________________________      Date/Time: __________________________        Discharge:  [facility] has returned all of my personal belongings:     Patient Signature: ________________________________________     Date/Time: ____________________________________     Staff Signature: ______________________________________     Date/Time:_____________________________________

## 2018-11-10 NOTE — PLAN OF CARE
Problem: General Plan of Care (Inpatient Behavioral)  Goal: Individualization/Patient Specific Goal (IP Behavioral)  The patient and/or their representative will achieve their patient-specific goals related to the plan of care.    The patient-specific goals include:  1. Take offered medications  2. Focus on self and not become involved in other patients issues  3.  Attend unit programing  4. Build some coping skills and tools to identity symptoms of anthony.       Pt presents with pressured speech, mood is agreeable.  She was very talkative during the admission assessment but able to complete.  2mg of ativan was given at [place] ED prior to admit.  Pt has a strong understanding of how the [facility] system functions this is a focus for talking points.  Contracts for safety.

## 2018-11-10 NOTE — PLAN OF CARE
Problem: Manic Symptoms  Goal: Manic Symptoms  Signs and symptoms of listed problems will be absent or manageable.   Pt has been cooperative with staff, up independent around the unit.  Pt still presents as manic; speech is hyper verbal and tangential.  Pt spent 45+ minutes with the Hospitalist and >115 minutes with Dr. High.  Multiple request during the shift, wife visited this afternoon and will be back later this evening with home CPAP.  Pt has been med compliant and this afternoon was able to sit and watch a film with peers being less intrusive.  Pt would like to attend unit programing this evening, okay for SDU.  Denies any SI.

## 2018-11-11 LAB
ANION GAP SERPL CALCULATED.3IONS-SCNC: 8 MMOL/L (ref 3–14)
BUN SERPL-MCNC: 9 MG/DL (ref 7–30)
CALCIUM SERPL-MCNC: 8.6 MG/DL (ref 8.5–10.1)
CHLORIDE SERPL-SCNC: 90 MMOL/L (ref 94–109)
CO2 SERPL-SCNC: 25 MMOL/L (ref 20–32)
CREAT SERPL-MCNC: 0.44 MG/DL (ref 0.52–1.04)
GFR SERPL CREATININE-BSD FRML MDRD: >90 ML/MIN/1.7M2
GLUCOSE SERPL-MCNC: 75 MG/DL (ref 70–99)
OSMOLALITY SERPL: 258 MMOL/KG (ref 275–295)
OSMOLALITY UR: 349 MMOL/KG (ref 100–1200)
POTASSIUM SERPL-SCNC: 4.4 MMOL/L (ref 3.4–5.3)
SODIUM SERPL-SCNC: 122 MMOL/L (ref 133–144)
SODIUM SERPL-SCNC: 123 MMOL/L (ref 133–144)
SODIUM SERPL-SCNC: 123 MMOL/L (ref 133–144)
SODIUM UR-SCNC: 21 MMOL/L

## 2018-11-11 PROCEDURE — 25000132 ZZH RX MED GY IP 250 OP 250 PS 637: Performed by: PSYCHIATRY & NEUROLOGY

## 2018-11-11 PROCEDURE — 83935 ASSAY OF URINE OSMOLALITY: CPT | Performed by: HOSPITALIST

## 2018-11-11 PROCEDURE — 12400006 ZZH R&B MH INTERMEDIATE

## 2018-11-11 PROCEDURE — 84295 ASSAY OF SERUM SODIUM: CPT | Performed by: HOSPITALIST

## 2018-11-11 PROCEDURE — 99232 SBSQ HOSP IP/OBS MODERATE 35: CPT | Performed by: HOSPITALIST

## 2018-11-11 PROCEDURE — 25000132 ZZH RX MED GY IP 250 OP 250 PS 637: Performed by: INTERNAL MEDICINE

## 2018-11-11 PROCEDURE — 25000125 ZZHC RX 250: Performed by: PSYCHIATRY & NEUROLOGY

## 2018-11-11 PROCEDURE — 36415 COLL VENOUS BLD VENIPUNCTURE: CPT | Performed by: HOSPITALIST

## 2018-11-11 PROCEDURE — 25000132 ZZH RX MED GY IP 250 OP 250 PS 637: Performed by: HOSPITALIST

## 2018-11-11 PROCEDURE — 84300 ASSAY OF URINE SODIUM: CPT | Performed by: HOSPITALIST

## 2018-11-11 PROCEDURE — 36415 COLL VENOUS BLD VENIPUNCTURE: CPT | Performed by: STUDENT IN AN ORGANIZED HEALTH CARE EDUCATION/TRAINING PROGRAM

## 2018-11-11 PROCEDURE — 80048 BASIC METABOLIC PNL TOTAL CA: CPT | Performed by: STUDENT IN AN ORGANIZED HEALTH CARE EDUCATION/TRAINING PROGRAM

## 2018-11-11 PROCEDURE — 83930 ASSAY OF BLOOD OSMOLALITY: CPT | Performed by: STUDENT IN AN ORGANIZED HEALTH CARE EDUCATION/TRAINING PROGRAM

## 2018-11-11 RX ORDER — SALIVA STIMULANT COMB. NO.3
2 SPRAY, NON-AEROSOL (ML) MUCOUS MEMBRANE
Status: DISCONTINUED | OUTPATIENT
Start: 2018-11-11 | End: 2018-11-15 | Stop reason: HOSPADM

## 2018-11-11 RX ADMIN — CARBOXYMETHYLCELLULOSE SODIUM 1 DROP: 5 SOLUTION/ DROPS OPHTHALMIC at 21:42

## 2018-11-11 RX ADMIN — Medication 1 LOZENGE: at 13:27

## 2018-11-11 RX ADMIN — OLANZAPINE 10 MG: 10 TABLET, ORALLY DISINTEGRATING ORAL at 19:42

## 2018-11-11 RX ADMIN — LIDOCAINE HYDROCHLORIDE 5 ML: 20 SOLUTION ORAL; TOPICAL at 02:34

## 2018-11-11 RX ADMIN — CONJUGATED ESTROGENS AND MEDROXYPROGESTERONE ACETATE 1 TABLET: .625; 2.5 TABLET, SUGAR COATED ORAL at 08:14

## 2018-11-11 RX ADMIN — IBUPROFEN 600 MG: 600 TABLET, FILM COATED ORAL at 08:14

## 2018-11-11 RX ADMIN — DIVALPROEX SODIUM 500 MG: 500 TABLET, DELAYED RELEASE ORAL at 19:42

## 2018-11-11 RX ADMIN — CARBOXYMETHYLCELLULOSE SODIUM 1 DROP: 5 SOLUTION/ DROPS OPHTHALMIC at 02:28

## 2018-11-11 RX ADMIN — VITAMIN D, TAB 1000IU (100/BT) 2000 UNITS: 25 TAB at 08:14

## 2018-11-11 RX ADMIN — DIVALPROEX SODIUM 500 MG: 500 TABLET, DELAYED RELEASE ORAL at 08:14

## 2018-11-11 RX ADMIN — Medication 1 LOZENGE: at 17:05

## 2018-11-11 RX ADMIN — IBUPROFEN 600 MG: 600 TABLET, FILM COATED ORAL at 18:18

## 2018-11-11 RX ADMIN — LISINOPRIL 10 MG: 10 TABLET ORAL at 08:14

## 2018-11-11 ASSESSMENT — ACTIVITIES OF DAILY LIVING (ADL)
DRESS: STREET CLOTHES
LAUNDRY: WITH SUPERVISION
LAUNDRY: WITH SUPERVISION
ORAL_HYGIENE: INDEPENDENT
DRESS: STREET CLOTHES
ORAL_HYGIENE: INDEPENDENT
GROOMING: INDEPENDENT
GROOMING: INDEPENDENT;SHOWER

## 2018-11-11 NOTE — PROGRESS NOTES
SPIRITUAL HEALTH SERVICES Progress Note  [facility]    Visited per request.     Pt asked for devotional reading and song. SH and Pt read from Spiritual Health Services Prayer pamphlet and Pt sang a benediction. Pt reflected on her yaron and feels supported by her yaron community.     SH provided a kind and caring presence, listening, devotional reading, and conversation.       SH will visit if needs arise.     Franklin Hernandez  Chaplain Resident

## 2018-11-11 NOTE — PLAN OF CARE
"Problem: Manic Symptoms  Goal: Manic Symptoms  Signs and symptoms of listed problems will be absent or manageable.   Outcome: Improving  Hypomanic,distractible. ,  hyperverbal , friendly and socially out going. Multiple requests sometimes. Pleasant and engaging. Bright affect and conversational. 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 In room or out in ITC lounge. Went to OT and wrap up group. Stated \" I feel better . I slept well last night. \". Had headache at end of shift and was given Ibuprofen 600 mg. Set with CPAP for the night.       "

## 2018-11-11 NOTE — PROGRESS NOTES
SPIRITUAL HEALTH SERVICES Progress Note  [facility]    Visited per follow up.     SH brought Pt a copy of the devotional readings that Pt and SH read earlier in the day. Pt asked for a follow up tomorrow.     SH will follow in the next day.     Franklin Hernandez  Chaplain Resident

## 2018-11-11 NOTE — PLAN OF CARE
Problem: Manic Symptoms  Goal: Manic Symptoms  Signs and symptoms of listed problems will be absent or manageable.   Pt has been up and independent about the unit this shift.  Showered and cleaned up her room prior to her visitors being on the unit.  Speech is less pressured, she still have multiple request but is able to check herself and allows for a break in her talking.  More accommodating to staff and peers time and space.  Na+ was again low, Hospitalist was on the unit new orders placed.  Multiple visitors this afternoon.  Pt continues to agree with the plan of care.  Denies SI.

## 2018-11-11 NOTE — PROGRESS NOTES
[facility]    Hospitalist Progress Note      Assessment & Plan   Angy Haji is a 53 year old female who was admitted on 11/9/2018.  Past history of MARLA who presents with manic behavior.    Manic behavior (H)  Anxiety  - management per Psych; stopped sertraline and started depakote     Hyponatremia  Admission Na 128.  Suspect SIADH, though cannot rule out hypovolemia.  - Na down to 123 this AM, but appears was not placed on fluid restriction yesterday  - check urine Na and osm, serum osm  - monitor Na q6h with goal correction of 6 mEq/24 hours  - as complains of severe dry mouth, will order saliva substitute  ADDENDUM:  Na studies consistent with SIADH, will start 1500 ml fluid restriction (contact hospitalist if >128 prior to 1400 on 11/12 or if Na continues to fall)     Hypertension  - continue PTA lisinopril       Obesity (BMI 30-39.9)    Morbid obesity (H)  Reports 120 lb weight loss over last few years.   - Encouraged continued weight loss       Mild sleep apnea  - Continue CPAP      DVT Prophylaxis: Low Risk/Ambulatory with no VTE prophylaxis indicated  Code Status: Full Code    Disposition: Expected discharge per Psych.    Shimon Gibson    Interval History   Reports some mild dizziness similar to known history of BPPV.  No confusion, no nausea but reports poor appetite.  Complains of dry mouth.  Also with constipation but that has improved this morning.  Numerous questions regarding her meds and possible side effects.    -Data reviewed today: I reviewed all new labs and imaging results over the last 24 hours. I personally reviewed no images or EKG's today.    Physical Exam   Temp: 97.8  F (36.6  C) Temp src: Oral BP: 151/83 Pulse: 63   Resp: 17        Vitals:    11/09/18 2132   Weight: 117 kg (258 lb)     Vital Signs with Ranges  Temp:  [97.8  F (36.6  C)] 97.8  F (36.6  C)  Pulse:  [63] 63  Resp:  [17] 17  BP: (151)/(83) 151/83       Constitutional: Well developed, obese female in no acute  distress  Respiratory: Lungs clear to ausculation bilaterally without crackles or wheezes, no tachypnea  Cardiovascular: regular rate and rhythm, normal S1/S2 without murmur, rubs or gallops, no peripheral edema  GI:   Lymph: trace pedal edema   Other:  alert, somewhat tangential with difficulty concentrating, cranial nerves grossly intact    Medications       cholecalciferol  2,000 Units Oral Daily     divalproex sodium delayed-release  500 mg Oral Q12H ART     estrogen (conjugated)-medroxyPROGESTERone  1 tablet Oral Daily     lisinopril  10 mg Oral Daily     OLANZapine zydis  10 mg Oral At Bedtime       Data     Recent Labs  Lab 11/11/18  0901 11/10/18  0756   WBC  --  4.8   HGB  --  14.9   MCV  --  86   PLT  --  193   * 128*   POTASSIUM 4.4 4.1   CHLORIDE 90* 97   CO2 25 26   BUN 9 7   CR 0.44* 0.41*   ANIONGAP 8 5   SHERRIE 8.6 8.9   GLC 75 95       No results found for this or any previous visit (from the past 24 hour(s)).

## 2018-11-12 ENCOUNTER — APPOINTMENT (OUTPATIENT)
Dept: SPEECH THERAPY | Facility: CLINIC | Age: 53
DRG: 885 | End: 2018-11-12
Attending: PSYCHIATRY & NEUROLOGY
Payer: COMMERCIAL

## 2018-11-12 LAB
SODIUM SERPL-SCNC: 123 MMOL/L (ref 133–144)
SODIUM SERPL-SCNC: 123 MMOL/L (ref 133–144)
SODIUM SERPL-SCNC: 127 MMOL/L (ref 133–144)
SODIUM SERPL-SCNC: 129 MMOL/L (ref 133–144)

## 2018-11-12 PROCEDURE — 99232 SBSQ HOSP IP/OBS MODERATE 35: CPT | Performed by: HOSPITALIST

## 2018-11-12 PROCEDURE — 92610 EVALUATE SWALLOWING FUNCTION: CPT | Mod: GN | Performed by: SPEECH-LANGUAGE PATHOLOGIST

## 2018-11-12 PROCEDURE — 90853 GROUP PSYCHOTHERAPY: CPT

## 2018-11-12 PROCEDURE — 36415 COLL VENOUS BLD VENIPUNCTURE: CPT | Performed by: HOSPITALIST

## 2018-11-12 PROCEDURE — 40000225 ZZH STATISTIC SLP WARD VISIT: Performed by: SPEECH-LANGUAGE PATHOLOGIST

## 2018-11-12 PROCEDURE — 25000132 ZZH RX MED GY IP 250 OP 250 PS 637: Performed by: HOSPITALIST

## 2018-11-12 PROCEDURE — 12400006 ZZH R&B MH INTERMEDIATE

## 2018-11-12 PROCEDURE — 25000132 ZZH RX MED GY IP 250 OP 250 PS 637: Performed by: PSYCHIATRY & NEUROLOGY

## 2018-11-12 PROCEDURE — 84295 ASSAY OF SERUM SODIUM: CPT | Performed by: HOSPITALIST

## 2018-11-12 PROCEDURE — 92526 ORAL FUNCTION THERAPY: CPT | Mod: GN | Performed by: SPEECH-LANGUAGE PATHOLOGIST

## 2018-11-12 RX ORDER — OLANZAPINE 10 MG/1
10 TABLET, ORALLY DISINTEGRATING ORAL 2 TIMES DAILY
Status: DISCONTINUED | OUTPATIENT
Start: 2018-11-12 | End: 2018-11-15

## 2018-11-12 RX ADMIN — DEXTRAN 70, AND HYPROMELLOSE 2910 2 DROP: 1; 3 SOLUTION/ DROPS OPHTHALMIC at 02:18

## 2018-11-12 RX ADMIN — HYDROXYZINE HYDROCHLORIDE 25 MG: 25 TABLET ORAL at 23:27

## 2018-11-12 RX ADMIN — VITAMIN D, TAB 1000IU (100/BT) 2000 UNITS: 25 TAB at 08:43

## 2018-11-12 RX ADMIN — Medication 1 LOZENGE: at 05:03

## 2018-11-12 RX ADMIN — DIVALPROEX SODIUM 500 MG: 500 TABLET, DELAYED RELEASE ORAL at 21:56

## 2018-11-12 RX ADMIN — CONJUGATED ESTROGENS AND MEDROXYPROGESTERONE ACETATE 1 TABLET: .625; 2.5 TABLET, SUGAR COATED ORAL at 08:43

## 2018-11-12 RX ADMIN — DIVALPROEX SODIUM 500 MG: 500 TABLET, DELAYED RELEASE ORAL at 08:43

## 2018-11-12 RX ADMIN — Medication 1 LOZENGE: at 02:46

## 2018-11-12 RX ADMIN — LISINOPRIL 10 MG: 10 TABLET ORAL at 08:43

## 2018-11-12 RX ADMIN — IBUPROFEN 600 MG: 600 TABLET, FILM COATED ORAL at 13:54

## 2018-11-12 RX ADMIN — OLANZAPINE 10 MG: 5 TABLET, FILM COATED ORAL at 11:20

## 2018-11-12 RX ADMIN — OLANZAPINE 10 MG: 10 TABLET, ORALLY DISINTEGRATING ORAL at 21:56

## 2018-11-12 RX ADMIN — IBUPROFEN 600 MG: 600 TABLET, FILM COATED ORAL at 22:46

## 2018-11-12 ASSESSMENT — ACTIVITIES OF DAILY LIVING (ADL)
GROOMING: INDEPENDENT
LAUNDRY: WITH SUPERVISION
DRESS: STREET CLOTHES
ORAL_HYGIENE: INDEPENDENT

## 2018-11-12 NOTE — PLAN OF CARE
Problem: Manic Symptoms  Goal: Manic Symptoms  Signs and symptoms of listed problems will be absent or manageable.   Outcome: No Change  Pt was nice and calm with her peers throughout the shift. However, pt got easily agitated if staffs didn't do exactly what she wanted ( heating food and during OT). Pt was paranoid about running into  and would not leave her room to go use the restroom if  was seen at the Nurse Station. She remains hyperverbal and overly inclusive in her conversations.Pt attended groups and worked on her craft work. Pt finished her food and was med compliant. and fluid restriction is 1200. NA should be checked every 6 hours.

## 2018-11-12 NOTE — PLAN OF CARE
Problem: Manic Symptoms  Goal: Manic Symptoms  Signs and symptoms of listed problems will be absent or manageable.   Outcome: No Change  Patient pleasant and cooperative. Visible and social on the unit. Spent time on SDU. Attended groups and participated appropriately. Hospitalist put 1500 fluid restrict in place, patient is aware. If sodium level goes over 128 we are to contact hospitalist.

## 2018-11-12 NOTE — PROGRESS NOTES
"[facility] Progress Note      Interval History:   Pt seen, team meeting held with , nursing staff, OT, and PA's to review diagnosis and treatment plan. Staff report that she has been able to sleep at night but she remains hyperverbal and overly inclusive in her conversations. She has not displayed any overt psychosis but is a tad grandiose. She reports she is still having word finding difficulties but believes her speech is not as pressured as it was prior to her admission. She states that her anxiety is very high on account of other patients on the [facility] that are more acute than she is. She admits that she remains distractible but a little more concise in her thoughts. She is worried about her difficulty swallowing and reports that she is currently being assessed at an outside clinic for dysphagia. She says she tends to get food going down her airway resulting in repetitive coughing. She also expressed concern about her job and getting her FMLA approved. She was advised that she is not currently in a state to provide direct patient care. 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 She is particularly concerned that \"there are no sensory interventions on the [facility] to help agitated patients.\" She denies the presence of auditory or visual hallucinations. Tolerating medications well without significant side effects.     Review of systems:   The Review of Systems is negative other than noted in the HPI     Medications:       cholecalciferol  2,000 Units Oral Daily     divalproex sodium delayed-release  500 mg Oral Q12H [facility]     estrogen (conjugated)-medroxyPROGESTERone  1 tablet Oral Daily     lisinopril  10 mg Oral Daily     OLANZapine zydis  10 mg Oral At Bedtime     artificial saliva, sore throat lozenge, Carboxymethylcellulose Sod PF, cyclobenzaprine, hydrOXYzine, hypromellose-dextran, ibuprofen, lidocaine " (viscous), loratadine, metroNIDAZOLE, OLANZapine, polyethylene glycol    Mental Status Examination:     Appearance:  awake, alert, adequately groomed and casually dressed  Eye Contact:  better  Speech:  clear, coherent, rapid and pressured. Difficult to interrupt.  Psychomotor Behavior:  no evidence of tardive dyskinesia, dystonia, or tics and fidgeting  Mood:  Manic.  Affect:  Mood congruent with labile affect. Expansive.  Thought Process:  Circumstantial with flight of ideas.  Thought Content:  no evidence of suicidal ideation or homicidal ideation. Denies the presence of auditory or visual hallucinations. Grandiose.  Oriented to:  time, person, and place  Attention Span and Concentration:  limited  Recent and Remote Memory:  Adequate  Fund of Knowledge: appropriate  Muscle Strength and Tone: normal  Gait and Station: Normal  Insight:  fair  Judgment:  limited          Labs/Vitals:     Recent Results (from the past 24 hour(s))   Sodium    Collection Time: 11/11/18  2:29 PM   Result Value Ref Range    Sodium 122 (L) 133 - 144 mmol/L   Sodium    Collection Time: 11/11/18  7:55 PM   Result Value Ref Range    Sodium 123 (L) 133 - 144 mmol/L   Sodium    Collection Time: 11/12/18  2:05 AM   Result Value Ref Range    Sodium 123 (L) 133 - 144 mmol/L   Sodium    Collection Time: 11/12/18  8:37 AM   Result Value Ref Range    Sodium 123 (L) 133 - 144 mmol/L     B/P: 138/81, T: 98, P: 65, R: 16    Impression:   Angy Haji is a 53-year-old  woman with no prior history of anthony who presents to the hospital on account of florid signs of anthony in the context of recent escalation in her sertraline dose from 100 to 150 mg.  She is also going through multiple psychosocial stressors.  She is agreeable to medication management.           DIagnoses:     1.  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   2.   Obesity.   3.  Perimenopause.     4.  Hypertension.   5.  Hyponatremia.   6.  Obstructive sleep apnea.            Plan:   1. Written information given on medications. Side effects, risks, benefits reviewed.  2. Move to SDU when bed available.  3. Continue current medications. Check valproic acid level tomorrow morning.   4. Schedule family meeting for tomorrow morning.   5. Speech consult for reported dysphagia.      Attestation:  Patient has been seen and evaluated by Devora muñiz MD    PATIENT ID  Name: Angy Haji  MRN:[id number]  YOB: 1965

## 2018-11-12 NOTE — PROGRESS NOTES
11/12/18 1526   General Information   Onset Date 11/09/18   Start of Care Date 11/12/18   Referring Physician Dr. Gibson   Patient Profile Review/OT: Additional Occupational Profile Info See Profile for full history and prior level of function   Patient/Family Goals Statement To find out the cause of her increased difficulty swallowing.    Swallowing Evaluation Bedside swallow evaluation   Behaviorial Observations Alert   Mode of current nutrition Oral diet   Type of oral diet Regular;Thin liquid   Respiratory Status Room air   Comments Angy Haji is a 53 year old female who was admitted on 11/9/2018.  Past history of MARLA who presents with manic behavior. Has a history of increased swallowing difficulties. She was seen for a voice/speech/breathing and laryngeal evaluation at the [facility]. Symptoms are accounted for by dryness, and muscular hyperfunction/imbalance.     Clinical Swallow Evaluation   Oral Musculature generally intact   Structural Abnormalities present  (Small bumps at the posterior tongue. )   Dentition present and adequate   Mucosal Quality dry   Mandibular Strength and Mobility intact   Oral Labial Strength and Mobility WFL   Lingual Strength and Mobility impaired anterior elevation;impaired left lateral movement   Velar Elevation intact   Buccal Strength and Mobility intact   Laryngeal Function Cough;Throat clear;Swallow;Voicing initiated;Dry swallow palpated   Oral Musculature Comments Mild deficits for elevation and lateralization to the left.    Additional Documentation Yes   Additional evaluation(s) completed today Recommended   Rationale for completing additional evaluation VFSS to fully assess pharyngeal function.   Swallow Eval   Feeding Assistance no assistance needed   Clinical Swallow Eval: Thin Liquid Texture Trial   Mode of Presentation, Thin Liquids cup;self-fed   Volume of Liquid or Food Presented 3 oz of water   Oral Phase of Swallow WFL   Pharyngeal Phase of Swallow  intact   Diagnostic Statement No overt Sx of aspiration.    Clinical Swallow Eval: Puree Solid Texture Trial   Mode of Presentation, Puree spoon;self-fed   Volume of Puree Presented 4 oz of pudding and 2 of apple sauce   Oral Phase, Puree WFL   Pharyngeal Phase, Puree intact   Clinical Swallow Eval: Solid Food Texture Trial   Mode of Presentation, Solid self-fed   Volume of Solid Food Presented 2 staci crackers   Oral Phase, Solid Poor AP movement;Residue in oral cavity;Premature pharyngeal entry   Oral Residue, Solid mid posterior tongue;soft palate   Pharyngeal Phase, Solid impaired;repeated swallows;feeling of something stuck in throat   Diagnostic Statement Mild to moderate oral residue with dry cracker that clears with an additional swallow and liquid rinse.    Swallow Compensations   Swallow Compensations Alternate viscosity of consistencies;Pacing;Reduce amounts;Multiple swallow   Results Oral difficulties only;Suspect silent aspiration   Esophageal Phase of Swallow   Patient reports or presents with symptoms of esophageal dysphagia Yes   Esophageal comments Complains of foods sticking or burning feeling in her chest.    Swallow Eval: Clinical Impressions   Skilled Criteria for Therapy Intervention Skilled criteria met.  Treatment indicated.   Functional Assessment Scale (FAS) 5   Treatment Diagnosis Mild oral and pharyngeal dysphagia   Diet texture recommendations Regular diet;Thin liquids   Recommended Feeding/Eating Techniques alternate between small bites and sips of food/liquid;maintain upright posture during/after eating for 30 mins;small sips/bites;check mouth frequently for oral residue/pocketing  (Double swallow)   Demonstrates Need for Referral to Another Service other (see comments)  (Continue with voice therapy as an OP.)   Therapy Frequency 5 times/wk   Predicted Duration of Therapy Intervention (days/wks) 5 days   Anticipated Discharge Disposition home w/ outpatient services   Risks and  Benefits of Treatment have been explained. Yes   Patient, family and/or staff in agreement with Plan of Care Yes   Clinical Impression Comments Patient presents with mild oral and pharyngeal dysphagia characterized by mildly reduced oral motor function for elevation and lateralization to the left. Dry mouth noted and currently on fluid restriction. She was very tangential during the evaluation with noted vocal gomez at times. She was able to identify it and self correct. Swallow response was timely for thin liquids with slightly reduced laryngeal elevation noted, but no overt Sx of aspiration. Pureed textures were WFL. Mastication was sufficient for solids with mild to moderate amount of oral residue noted throughout her oral cavity and tongue. It was reduced with a second swallow and cleared with a liquid rinse. She did complain of a globus sensation in her throat. Improvement with oral clearing and decreased globus sensation when the cracker was mixed with pudding to decrease the dryness. She was anxious about waiting for her swallowing to be addressed after she completed her voice therapy. She also complained of esophageal dysfunction of food sticking in her chest with a burning sensation at times. Education provided on soft moist food selections and video swallow study to fully assess pharyngeal function. She was in agreement at this time and MD present and will order a video with an esophagram. Recommend: 1. May continue on a regular diet and thin liquids. 2. Select soft moist foods, double swallow and alternate liquids/solids.    Total Evaluation Time   Total Evaluation Time (Minutes) 30

## 2018-11-12 NOTE — PROGRESS NOTES
Danielle Marilou, patient's wife, called to confirm she will be able to make meeting Tuesday, November 13 at 10:00 AM.

## 2018-11-12 NOTE — PROGRESS NOTES
Patient was informed she is on a fluid Restriction of 1.200 ml/ day.   This shift she had a total of 414. ml today.  She complained of problems swallowing, this is not a current problem but pre- existent.  Speech will do a video esophogram tomorrow.

## 2018-11-12 NOTE — PLAN OF CARE
Problem: Patient Care Overview  Goal: Team Discussion  Team Plan:   Outcome: No Change  BEHAVIORAL TEAM DISCUSSION    Participants: Dr. High, nurses, social workers, psych asst  Progress: Pt is overly inclusive when talking to staff, pt has pressured speech. Pt is still hypomanic. Pt has requested to talk to a  at one point this week.  Pt has a family meeting tomorrow at 1000.   Continued Stay Criteria/Rationale: Pt will remain on the unit, pt meds are being adjusted. Pt is allowed to go to groups and be on the SDU side.   Medical/Physical: Zyprexa 10 MG  Precautions:   Behavioral Orders   Procedures     Code 1 - Restrict to Unit     Routine Programming     As clinically indicated     Status 15     Every 15 minutes.     Plan: Pt will remain on the unit and continue treatment, pt is still hypomanic. Pt would like to move over to the SDU side. Pt meds are being adjusted.Pt will be having a family meeting tomorrow at 1000.   Rationale for change in precautions or plan: Pt needs further observation and treatment, pt will be move to a room on the SDU when a room is available.

## 2018-11-12 NOTE — PROGRESS NOTES
[facility]    Hospitalist Progress Note      Assessment & Plan   Angy Haji is a 53 year old female who was admitted on 11/9/2018.  Past history of MARLA who presents with manic behavior.    Manic behavior (H)  Anxiety  - management per Psych; stopped sertraline and started depakote 11/10     Hyponatremia due to SIADH  Admission Na 128.  Urine studies 11/11 consistent with SIADH, likely due to sertraline.  - Na stable at 123 this AM, will increase fluid restriction to 1200 ml daily  - monitor Na q6h with goal correction of 6 mEq/24 hours  - saliva substitute for dry mouth    Dysphagia  Reporting long history of dysphagia for which she has been seen by SLP and ENT at [facility].    - SLP consulted, recommending video swallow - ordered     Hypertension  - continue PTA lisinopril     Obesity (BMI 30-39.9)  Morbid obesity (H)  Reports 120 lb weight loss over last few years.   - Encouraged continued weight loss     Mild sleep apnea  - Continue CPAP      DVT Prophylaxis: Low Risk/Ambulatory with no VTE prophylaxis indicated  Code Status: Full Code    Disposition: Expected discharge per Psych.    Shimon Gibson    Interval History   Complains of headache and mild dizziness similar to prior vertigo (BPPV).  No significant nausea.  No other complaints.  Some forgetfulness.    -Data reviewed today: I reviewed all new labs and imaging results over the last 24 hours. I personally reviewed no images or EKG's today.    Physical Exam   Temp: 98  F (36.7  C) Temp src: Oral BP: 138/81 Pulse: 65   Resp: 16        Vitals:    11/09/18 2132   Weight: 117 kg (258 lb)     Vital Signs with Ranges  Temp:  [98  F (36.7  C)-98.2  F (36.8  C)] 98  F (36.7  C)  Pulse:  [65-70] 65  Resp:  [16-18] 16  BP: (138-147)/(81-83) 138/81       Constitutional: Well developed, obese female in no acute distress  Respiratory: Lungs clear to ausculation bilaterally without crackles or wheezes, no tachypnea  Cardiovascular: regular rate and rhythm,  normal S1/S2 without murmur, rubs or gallops, no peripheral edema  GI:   Lymph:  Other:  alert, difficulty concentrating, cranial nerves grossly intact    Medications       cholecalciferol  2,000 Units Oral Daily     divalproex sodium delayed-release  500 mg Oral Q12H ART     estrogen (conjugated)-medroxyPROGESTERone  1 tablet Oral Daily     lisinopril  10 mg Oral Daily     OLANZapine zydis  10 mg Oral At Bedtime       Data     Recent Labs  Lab 11/12/18  0837 11/12/18  0205 11/11/18  1955  11/11/18  0901 11/10/18  0756   WBC  --   --   --   --   --  4.8   HGB  --   --   --   --   --  14.9   MCV  --   --   --   --   --  86   PLT  --   --   --   --   --  193   * 123* 123*  < > 123* 128*   POTASSIUM  --   --   --   --  4.4 4.1   CHLORIDE  --   --   --   --  90* 97   CO2  --   --   --   --  25 26   BUN  --   --   --   --  9 7   CR  --   --   --   --  0.44* 0.41*   ANIONGAP  --   --   --   --  8 5   SHERRIE  --   --   --   --  8.6 8.9   GLC  --   --   --   --  75 95   < > = values in this interval not displayed.    No results found for this or any previous visit (from the past 24 hour(s)).

## 2018-11-12 NOTE — PLAN OF CARE
Problem: Patient Care Overview  Goal: Plan of Care/Patient Progress Review  Discharge Planner SLP   Patient plan for discharge: Did not state.  Current status: Bedside swallow evaluation completed per MD orders. Patient presents with mild oral and pharyngeal dysphagia characterized by mildly reduced oral motor function for elevation and lateralization to the left. Dry mouth noted and currently on fluid restriction. She was very tangential during the evaluation with noted vocal gomez at times. She was able to identify it and self correct. Swallow response was timely for thin liquids with slightly reduced laryngeal elevation noted, but no overt Sx of aspiration. Pureed textures were WFL. Mastication was sufficient for solids with mild to moderate amount of oral residue noted throughout her oral cavity and tongue. It was reduced with a second swallow and cleared with a liquid rinse. She did complain of a globus sensation in her throat. Improvement with oral clearing and decreased globus sensation when the cracker was mixed with pudding to decrease the dryness. She was anxious about waiting for her swallowing to be addressed after she completed her voice therapy. She also complained of esophageal dysfunction of food sticking in her chest with a burning sensation at times. Education provided on soft moist food selections and video swallow study to fully assess pharyngeal function. She was in agreement at this time and MD present and will order a video with an esophagram. Recommend: 1. May continue on a regular diet and thin liquids. 2. Select soft moist foods, avoid dry crusty foods/dry meats, double swallow and alternate liquids/solids. 3. SLP will complete a swallow study on 11/13/18.   Barriers to return to prior living situation: N/A  Recommendations for discharge: Pending results of VFSS and medical team.   Rationale for recommendations: Continue with OP voice therapy.        Entered by: Theresa Reno 11/12/2018  4:02 PM

## 2018-11-13 ENCOUNTER — APPOINTMENT (OUTPATIENT)
Dept: GENERAL RADIOLOGY | Facility: CLINIC | Age: 53
DRG: 885 | End: 2018-11-13
Attending: HOSPITALIST
Payer: COMMERCIAL

## 2018-11-13 ENCOUNTER — APPOINTMENT (OUTPATIENT)
Dept: SPEECH THERAPY | Facility: CLINIC | Age: 53
DRG: 885 | End: 2018-11-13
Attending: PSYCHIATRY & NEUROLOGY
Payer: COMMERCIAL

## 2018-11-13 LAB
SODIUM SERPL-SCNC: 126 MMOL/L (ref 133–144)
SODIUM SERPL-SCNC: 127 MMOL/L (ref 133–144)
SODIUM SERPL-SCNC: 135 MMOL/L (ref 133–144)
VALPROATE SERPL-MCNC: 52 MG/L (ref 50–100)

## 2018-11-13 PROCEDURE — 80164 ASSAY DIPROPYLACETIC ACD TOT: CPT | Performed by: HOSPITALIST

## 2018-11-13 PROCEDURE — 36415 COLL VENOUS BLD VENIPUNCTURE: CPT | Performed by: HOSPITALIST

## 2018-11-13 PROCEDURE — 84295 ASSAY OF SERUM SODIUM: CPT | Performed by: HOSPITALIST

## 2018-11-13 PROCEDURE — 99207 ZZC CDG-MDM COMPONENT: MEETS MODERATE - UP CODED: CPT | Performed by: HOSPITALIST

## 2018-11-13 PROCEDURE — 40000225 ZZH STATISTIC SLP WARD VISIT: Performed by: SPEECH-LANGUAGE PATHOLOGIST

## 2018-11-13 PROCEDURE — 90834 PSYTX W PT 45 MINUTES: CPT

## 2018-11-13 PROCEDURE — 92526 ORAL FUNCTION THERAPY: CPT | Mod: GN | Performed by: SPEECH-LANGUAGE PATHOLOGIST

## 2018-11-13 PROCEDURE — 25000125 ZZHC RX 250: Performed by: PSYCHIATRY & NEUROLOGY

## 2018-11-13 PROCEDURE — 25000132 ZZH RX MED GY IP 250 OP 250 PS 637: Performed by: PSYCHIATRY & NEUROLOGY

## 2018-11-13 PROCEDURE — 25000132 ZZH RX MED GY IP 250 OP 250 PS 637: Performed by: INTERNAL MEDICINE

## 2018-11-13 PROCEDURE — 25000132 ZZH RX MED GY IP 250 OP 250 PS 637: Performed by: HOSPITALIST

## 2018-11-13 PROCEDURE — 12400006 ZZH R&B MH INTERMEDIATE

## 2018-11-13 PROCEDURE — 92611 MOTION FLUOROSCOPY/SWALLOW: CPT | Mod: GN | Performed by: SPEECH-LANGUAGE PATHOLOGIST

## 2018-11-13 PROCEDURE — 99232 SBSQ HOSP IP/OBS MODERATE 35: CPT | Performed by: HOSPITALIST

## 2018-11-13 PROCEDURE — 74230 X-RAY XM SWLNG FUNCJ C+: CPT

## 2018-11-13 RX ORDER — DIVALPROEX SODIUM 500 MG/1
1000 TABLET, DELAYED RELEASE ORAL 2 TIMES DAILY
Status: DISCONTINUED | OUTPATIENT
Start: 2018-11-13 | End: 2018-11-15 | Stop reason: HOSPADM

## 2018-11-13 RX ORDER — DIVALPROEX SODIUM 500 MG/1
500 TABLET, DELAYED RELEASE ORAL ONCE
Status: COMPLETED | OUTPATIENT
Start: 2018-11-13 | End: 2018-11-13

## 2018-11-13 RX ORDER — BARIUM SULFATE 400 MG/ML
SUSPENSION ORAL ONCE
Status: DISCONTINUED | OUTPATIENT
Start: 2018-11-13 | End: 2018-11-13 | Stop reason: CLARIF

## 2018-11-13 RX ORDER — LORAZEPAM 0.5 MG/1
.5-1 TABLET ORAL EVERY 6 HOURS PRN
Status: DISCONTINUED | OUTPATIENT
Start: 2018-11-13 | End: 2018-11-15 | Stop reason: HOSPADM

## 2018-11-13 RX ADMIN — OLANZAPINE 10 MG: 10 TABLET, ORALLY DISINTEGRATING ORAL at 21:01

## 2018-11-13 RX ADMIN — IBUPROFEN 600 MG: 600 TABLET, FILM COATED ORAL at 21:01

## 2018-11-13 RX ADMIN — POLYETHYLENE GLYCOL 3350 17 G: 17 POWDER, FOR SOLUTION ORAL at 21:07

## 2018-11-13 RX ADMIN — OLANZAPINE 5 MG: 5 TABLET, FILM COATED ORAL at 11:36

## 2018-11-13 RX ADMIN — LIDOCAINE HYDROCHLORIDE 5 ML: 20 SOLUTION ORAL; TOPICAL at 22:14

## 2018-11-13 RX ADMIN — DIVALPROEX SODIUM 500 MG: 500 TABLET, DELAYED RELEASE ORAL at 08:03

## 2018-11-13 RX ADMIN — LISINOPRIL 10 MG: 10 TABLET ORAL at 08:03

## 2018-11-13 RX ADMIN — CARBOXYMETHYLCELLULOSE SODIUM 1 DROP: 5 SOLUTION/ DROPS OPHTHALMIC at 00:07

## 2018-11-13 RX ADMIN — CONJUGATED ESTROGENS AND MEDROXYPROGESTERONE ACETATE 1 TABLET: .625; 2.5 TABLET, SUGAR COATED ORAL at 08:03

## 2018-11-13 RX ADMIN — DIVALPROEX SODIUM 1000 MG: 500 TABLET, DELAYED RELEASE ORAL at 21:01

## 2018-11-13 RX ADMIN — Medication 1 LOZENGE: at 02:24

## 2018-11-13 RX ADMIN — VITAMIN D, TAB 1000IU (100/BT) 2000 UNITS: 25 TAB at 08:03

## 2018-11-13 RX ADMIN — OLANZAPINE 10 MG: 10 TABLET, ORALLY DISINTEGRATING ORAL at 08:03

## 2018-11-13 RX ADMIN — Medication 1 LOZENGE: at 10:10

## 2018-11-13 RX ADMIN — LIDOCAINE HYDROCHLORIDE 5 ML: 20 SOLUTION ORAL; TOPICAL at 00:06

## 2018-11-13 RX ADMIN — Medication 2 SPRAY: at 21:07

## 2018-11-13 RX ADMIN — DEXTRAN 70, AND HYPROMELLOSE 2910 2 DROP: 1; 3 SOLUTION/ DROPS OPHTHALMIC at 21:09

## 2018-11-13 RX ADMIN — DIVALPROEX SODIUM 500 MG: 500 TABLET, DELAYED RELEASE ORAL at 09:58

## 2018-11-13 ASSESSMENT — ACTIVITIES OF DAILY LIVING (ADL)
GROOMING: INDEPENDENT
ORAL_HYGIENE: INDEPENDENT
DRESS: STREET CLOTHES
LAUNDRY: WITH SUPERVISION

## 2018-11-13 NOTE — PROGRESS NOTES
11/13/18 1300   General Information   Has Not Attended OT as of: 11/13/18     Pt has not attended OT since admit.  Will continue to encourage participation and completion of  self-assessment as able. OT staff will explain the purpose of being involved with treatment plan and provide options to meet current needs and goals.

## 2018-11-13 NOTE — PLAN OF CARE
Problem: Manic Symptoms  Goal: Manic Symptoms  Signs and symptoms of listed problems will be absent or manageable.   Outcome: Improving  Patient still has pressured, tangential , speech and quite hyper verbal with multiple request. Pt has Speech eval today and was NPO from 0930 to 1330. Pt states she is to take sips of water between bites of food.Speech eval was essentially normal. Pt had her water pitcher and had 1195 ml in already in the AM. Water pitcher put on Nurses station and fluids monitored closely. Na level 126 at 0800

## 2018-11-13 NOTE — PROGRESS NOTES
[facility]    Hospitalist Progress Note      Assessment & Plan   Angy Haji is a 53 year old female who was admitted on 11/9/2018.  Past history of MARLA who presents with manic behavior.    Manic behavior (H)  Anxiety  - management per Psych; stopped sertraline and started depakote and olanzapine 11/10     Hyponatremia due to SIADH  Admission Na 128.  Urine studies 11/11 consistent with SIADH, likely due to sertraline.  Slowly improving with fluid restriction.  - Na improved to mid-upper 120's; continue 1200 ml fluid restriction  - decrease Na checks to q8h; goal correction of 6 mEq/24 hours  - saliva substitute for dry mouth    Dysphagia  Reporting long history of dysphagia for which she has been seen by SLP and ENT at [facility].    - SLP consulted, recommending video swallow - will be completed today     Hypertension  - continue PTA lisinopril     Obesity (BMI 30-39.9)  Morbid obesity (H)  Reports 120 lb weight loss over last few years.   - Encouraged continued weight loss     Mild sleep apnea  - Continue CPAP      DVT Prophylaxis: Low Risk/Ambulatory with no VTE prophylaxis indicated  Code Status: Full Code    Disposition: Expected discharge per Psych.    Shimon Gibson    Interval History   Reports mild lower extremity edema.  Tolerating fluid restriction.  No other complaints.    -Data reviewed today: I reviewed all new labs and imaging results over the last 24 hours. I personally reviewed no images or EKG's today.    Physical Exam   Temp: 98.8  F (37.1  C) Temp src: Oral BP: 165/72 Pulse: 97   Resp: 18        Vitals:    11/09/18 2132 11/13/18 0800   Weight: 117 kg (258 lb) 119.6 kg (263 lb 11.2 oz)     Vital Signs with Ranges  Temp:  [97.5  F (36.4  C)-98.8  F (37.1  C)] 98.8  F (37.1  C)  Pulse:  [66-97] 97  Resp:  [16-18] 18  BP: (121-165)/(65-76) 165/72  I/O last 3 completed shifts:  In: 864 [P.O.:864]  Out: 2 [Urine:2]    Constitutional: Well developed, obese female in no acute  distress  Respiratory: Lungs clear to ausculation bilaterally without crackles or wheezes, no tachypnea  Cardiovascular: regular rate and rhythm, normal S1/S2 without murmur, rubs or gallops, trace peripheral edema  GI:   Lymph:  Other:  alert and appropriate, cranial nerves grossly intact    Medications       cholecalciferol  2,000 Units Oral Daily     divalproex sodium delayed-release  1,000 mg Oral BID     estrogen (conjugated)-medroxyPROGESTERone  1 tablet Oral Daily     lisinopril  10 mg Oral Daily     OLANZapine zydis  10 mg Oral BID       Data     Recent Labs  Lab 11/13/18  0818 11/13/18  0400 11/12/18  2148  11/11/18  0901 11/10/18  0756   WBC  --   --   --   --   --  4.8   HGB  --   --   --   --   --  14.9   MCV  --   --   --   --   --  86   PLT  --   --   --   --   --  193   * 127* 129*  < > 123* 128*   POTASSIUM  --   --   --   --  4.4 4.1   CHLORIDE  --   --   --   --  90* 97   CO2  --   --   --   --  25 26   BUN  --   --   --   --  9 7   CR  --   --   --   --  0.44* 0.41*   ANIONGAP  --   --   --   --  8 5   SHERRIE  --   --   --   --  8.6 8.9   GLC  --   --   --   --  75 95   < > = values in this interval not displayed.    No results found for this or any previous visit (from the past 24 hour(s)).

## 2018-11-13 NOTE — PLAN OF CARE
Problem: Manic Symptoms  Goal: Manic Symptoms  Signs and symptoms of listed problems will be absent or manageable.   Pt was transferred to SDU this evening. She seems to be acclimating well. Still hyperverbal at times. Very pleasant, social, and supportive to her peers. Attended wrap up group and did well. C/o a headache and dry mouth before bed. Ibuprofen given for headache.

## 2018-11-13 NOTE — PROGRESS NOTES
11/13/18 1619   General Information   Onset Date 11/09/18   Start of Care Date 11/13/18   Referring Physician Dr. Gibson   Patient Profile Review/OT: Additional Occupational Profile Info See Profile for full history and prior level of function   Patient/Family Goals Statement Patient did not state.   Swallowing Evaluation Videofluoroscopic evaluation   Behaviorial Observations Alert   Mode of current nutrition Oral diet   Type of oral diet Regular;Thin liquid   Respiratory Status Room air   Comments Angy Haji is a 53 year old female who was admitted on 11/9/2018.  Past history of MARLA who presents with manic behavior. Has a history of increased swallowing difficulties. She was seen for a voice/speech/breathing and laryngeal evaluation at the [facility]. Symptoms are accounted for by dryness, and muscular hyperfunction/imbalance.     VFSS Evaluation   VFSS Additional Documentation Yes   VFSS Eval: Radiology   Radiologist Dr. Mireels   Views Taken left lateral   Physical Location of Procedure [facility]   VFSS Eval: Thin Liquid Texture Trial   Mode of Presentation, Thin Liquid cup;straw;self-fed   Order of Presentation 1 2 3 5   Preparatory Phase WFL   Oral Phase, Thin Liquid Premature pharyngeal entry   Pharyngeal Phase, Thin Liquid Residue in valleculae   Rosenbek's Penetration Aspiration Scale: Thin Liquid Trial Results 1 - no aspiration, contrast does not enter airway   Diagnostic Statement Premature spillage with minimal vallecular residue.   VFSS Eval: Puree Solid Texture Trial   Mode of Presentation, Puree spoon;self-fed   Order of Presentation 4   Preparatory Phase WFL   Oral Phase, Puree WFL   Pharyngeal Phase, Puree Residue in valleculae   Rosenbek's Penetration Aspiration Scale: Puree Food Trial Results 1 - no aspiration, contrast does not enter airway   Diagnostic Statement Mild vallecular residue.   VFSS Eval: Semisolid Texture Trial   Mode of Presentation, Semisolid spoon;fed by clinician   Order  of Presentation 5   Preparatory Phase WFL   Oral Phase, Semisolid Poor AP movement;Residue in oral cavity   Pharyngeal Phase, Semisolid Residue in valleculae   Rosenbek's Penetration Aspiration Scale: Semisolid Food Trial Results 1 - no aspiration, contrast does not enter airway   Diagnostic Statement Mild BOT and vallecular residue and piecemeal deglutition.   VFSS Eval: Solid Food Texture Trial   Mode of Presentation, Solid spoon;self-fed   Order of Presentation 6   Preparatory Phase WFL   Oral Phase, Solid Residue in oral cavity   Pharyngeal Phase, Solid Residue in valleculae   Rosenbek's Penetration Aspiration Scale: Solid Food Trial Results 1 - no aspiration, contrast does not enter airway   Diagnostic Statement Mild BOT and vallecular residue and piecemeal deglutition.   Esophageal Phase of Swallow   Patient reports or presents with symptoms of esophageal dysphagia Yes   Esophageal sweep performed during today s vidofluoroscopic exam  Yes;Please refer to radiologist's report for details   Esophageal comments Mild dysmotility and emptying.    Swallow Eval: Clinical Impressions   Skilled Criteria for Therapy Intervention No problems identified which require skilled intervention   Functional Assessment Scale (FAS) 6   Dysphagia Outcome Severity Scale (MIGUEL) Level 6 - MIGUEL   Treatment Diagnosis Minimal oral and pharyngeal dysphagia   Diet texture recommendations Regular diet;Thin liquids   Recommended Feeding/Eating Techniques alternate between small bites and sips of food/liquid;hard swallow w/ each bite or sip;maintain upright posture during/after eating for 30 mins;small sips/bites  (Double swallow)   Anticipated Discharge Disposition home w/ outpatient services   Risks and Benefits of Treatment have been explained. Yes   Patient, family and/or staff in agreement with Plan of Care Yes   Clinical Impression Comments Patient presents with minimal oral and pharyngeal dysphagia on today's study characterized by  premature entry of thin liquids to the level of the pyriform sinuses with minimal vallecular residue after the swallow. Mildly reduced lingual strength to propel the bolus posteriorly, Mildly residued BOT retraction that resulted in mild BOT and vallecular residue with pureed, semi-solids and solids. Piecemeal deglutition of both semi-solids and solids with mild vallecular residue that cleared with an additional swallow or liquid rinse. No penetration or aspiration occurred during this study. Esophagram followed the VFSS with mild dysmotility and mildly slow emptying. Please see radiologist report for more details. Patient was provided education after the study and handout given. Recommend: 1. May continue on a regular diet and thin liquids. 2. Upright and remain upright 30-60 minutes after meals, small bites/sips, double swallow and/or alternate liquids/solids. 2. No further skilled intervention is indicated for swallowing.    Total Evaluation Time   Total Evaluation Time (Minutes) 15

## 2018-11-13 NOTE — PLAN OF CARE
Family meeting held this morning with patient, her wife (Danielle Zamarripa), psychiatrist and . Psychiatrist reviewed case to date. Patient's wife provided additional collateral information about patient. She feels that some of patient's symptoms have coincided with her recent onset of menopause. She apparently saw her gynecologist who did several medication trials with her. She noted that as patient's dose of Zoloft was increased that patient had an increase in racing thoughts. She believes that patient is currently having a manic episode. Stressors currently for patient include issues at work, health issues, and a recent move from their longtime home in [place] to an apartment in [place]. Patient also has not been sleeping well. According to her wife she only sleeps two to four hours per night which then fuels her manic behaviors. Psychiatrist reviewed that he has started patient on Depakote and has seen some improvement, although she remains expansive. She continues to have pressured speech and still is not sleeping well. Patent had been on leave from her job, but had returned to work for one month in October before decompensating again. Patient's wife indicated that she is trying to assist patient in applying for FMLA. Patient recently started seeing therapist Aylin Caro MA, LP ([phone]) with the [facility]. She has upcoming appointments with her on Thursday 11/15 at 1800 and Monday 11/19 at 1000. She has an initial intake to see psychiatrist, Dr. Ingris Reynoso ([phone]), who is in private practice in [place] ([address] [place] [address] , [address]), on Monday, 11/26 at 1000. She indicated that she may want to follow up for psychiatry with Dr. High at [facility] in [place]. If an intake appointment for Dr. High is too far out, she is willing to see Dr. Reynoso in the meantime until she can get in to see Dr. High. Plan is for  patient to remain hospitalized in order to become more stable from her manic episode. In addition, psychiatrist wants to assist patient in getting back to a more normal sleep pattern again as sleep has been an issue for her. Patient will work with psychiatrist and hospitalist regarding doing a swallow study while she is here.

## 2018-11-13 NOTE — PLAN OF CARE
Problem: Patient Care Overview  Goal: Plan of Care/Patient Progress Review  Discharge Planner SLP   Patient plan for discharge: She did not state.   Current status: Video swallow study completed per MD orders. Patient presents with minimal oral and pharyngeal dysphagia on today's study characterized by premature entry of thin liquids to the level of the pyriform sinuses with minimal vallecular residue after the swallow. Mildly reduced lingual strength to propel the bolus posteriorly, Mildly residued BOT retraction that resulted in mild BOT and vallecular residue with pureed, semi-solids and solids. Piecemeal deglutition of both semi-solids and solids with mild vallecular residue that cleared with an additional swallow or liquid rinse. No penetration or aspiration occurred during this study. Esophagram followed the VFSS with mild dysmotility and mildly slow emptying. Please see radiologist report for more details. Patient was provided education after the study and handout given. Recommend: 1. May continue on a regular diet and thin liquids. 2. Upright and remain upright 30-60 minutes after meals, small bites/sips, double swallow and/or alternate liquids/solids. Follow standard GERDS precautions and dry mouth. 2. No further skilled intervention is indicated for swallowing at this time. Will complete the orders.   Barriers to return to prior living situation: None  Recommendations for discharge: Per medical team.  Rationale for recommendations: Patient should resume OP speech therapy for voice and any further f/u with swallowing as indicated.        Entered by: Theresa Reno 11/13/2018 4:38 PM

## 2018-11-14 LAB
SODIUM SERPL-SCNC: 132 MMOL/L (ref 133–144)
SODIUM SERPL-SCNC: 133 MMOL/L (ref 133–144)
SODIUM SERPL-SCNC: 134 MMOL/L (ref 133–144)
SODIUM SERPL-SCNC: 137 MMOL/L (ref 133–144)

## 2018-11-14 PROCEDURE — 99232 SBSQ HOSP IP/OBS MODERATE 35: CPT | Performed by: HOSPITALIST

## 2018-11-14 PROCEDURE — 12400006 ZZH R&B MH INTERMEDIATE

## 2018-11-14 PROCEDURE — 25000125 ZZHC RX 250: Performed by: PSYCHIATRY & NEUROLOGY

## 2018-11-14 PROCEDURE — 84295 ASSAY OF SERUM SODIUM: CPT | Performed by: HOSPITALIST

## 2018-11-14 PROCEDURE — 25000132 ZZH RX MED GY IP 250 OP 250 PS 637: Performed by: PSYCHIATRY & NEUROLOGY

## 2018-11-14 PROCEDURE — 36415 COLL VENOUS BLD VENIPUNCTURE: CPT | Performed by: HOSPITALIST

## 2018-11-14 PROCEDURE — 25000132 ZZH RX MED GY IP 250 OP 250 PS 637: Performed by: HOSPITALIST

## 2018-11-14 RX ORDER — METRONIDAZOLE 10 MG/G
1 GEL TOPICAL DAILY PRN
Status: DISCONTINUED | OUTPATIENT
Start: 2018-11-14 | End: 2018-11-15 | Stop reason: HOSPADM

## 2018-11-14 RX ORDER — BENZTROPINE MESYLATE 0.5 MG/1
0.5 TABLET ORAL 2 TIMES DAILY PRN
Status: DISCONTINUED | OUTPATIENT
Start: 2018-11-14 | End: 2018-11-15 | Stop reason: HOSPADM

## 2018-11-14 RX ADMIN — VITAMIN D, TAB 1000IU (100/BT) 2000 UNITS: 25 TAB at 07:51

## 2018-11-14 RX ADMIN — BENZTROPINE MESYLATE 0.5 MG: 0.5 TABLET ORAL at 23:07

## 2018-11-14 RX ADMIN — Medication 1 LOZENGE: at 23:11

## 2018-11-14 RX ADMIN — CONJUGATED ESTROGENS AND MEDROXYPROGESTERONE ACETATE 1 TABLET: .625; 2.5 TABLET, SUGAR COATED ORAL at 07:51

## 2018-11-14 RX ADMIN — BENZTROPINE MESYLATE 0.5 MG: 0.5 TABLET ORAL at 08:56

## 2018-11-14 RX ADMIN — DEXTRAN 70, AND HYPROMELLOSE 2910 2 DROP: 1; 3 SOLUTION/ DROPS OPHTHALMIC at 03:18

## 2018-11-14 RX ADMIN — OLANZAPINE 10 MG: 10 TABLET, ORALLY DISINTEGRATING ORAL at 07:53

## 2018-11-14 RX ADMIN — OLANZAPINE 5 MG: 5 TABLET, FILM COATED ORAL at 04:00

## 2018-11-14 RX ADMIN — LIDOCAINE HYDROCHLORIDE 5 ML: 20 SOLUTION ORAL; TOPICAL at 22:44

## 2018-11-14 RX ADMIN — OLANZAPINE 10 MG: 10 TABLET, ORALLY DISINTEGRATING ORAL at 21:15

## 2018-11-14 RX ADMIN — POLYETHYLENE GLYCOL 3350 17 G: 17 POWDER, FOR SOLUTION ORAL at 21:15

## 2018-11-14 RX ADMIN — LISINOPRIL 10 MG: 10 TABLET ORAL at 07:51

## 2018-11-14 RX ADMIN — Medication 2 SPRAY: at 00:09

## 2018-11-14 RX ADMIN — Medication 2 SPRAY: at 03:18

## 2018-11-14 RX ADMIN — DIVALPROEX SODIUM 1000 MG: 500 TABLET, DELAYED RELEASE ORAL at 07:51

## 2018-11-14 RX ADMIN — DEXTRAN 70, AND HYPROMELLOSE 2910 2 DROP: 1; 3 SOLUTION/ DROPS OPHTHALMIC at 00:09

## 2018-11-14 RX ADMIN — METRONIDAZOLE 1 G: 10 GEL TOPICAL at 22:43

## 2018-11-14 RX ADMIN — DIVALPROEX SODIUM 1000 MG: 500 TABLET, DELAYED RELEASE ORAL at 21:15

## 2018-11-14 ASSESSMENT — ACTIVITIES OF DAILY LIVING (ADL)
DRESS: INDEPENDENT
LAUNDRY: WITH SUPERVISION
ORAL_HYGIENE: INDEPENDENT
GROOMING: INDEPENDENT

## 2018-11-14 NOTE — PROGRESS NOTES
[facility]    Hospitalist Progress Note      Assessment & Plan   Angy Haji is a 53 year old female who was admitted on 11/9/2018.  Past history of MARLA who presents with manic behavior.    Manic behavior (H)  Anxiety  - management per Psych     Hyponatremia due to SIADH  Admission Na 128.  Urine studies 11/11 consistent with SIADH, likely due to sertraline.  Slowly improving with fluid restriction.  - continue 1200 ml fluid restriction  - decrease Na checks to q12h  - saliva substitute for dry mouth    Dysphagia  Reporting long history of dysphagia for which she has been seen by SLP and ENT at [facility].    - video swallow 11/14 with mild esophageal dysmotility - may follow up with GI as outpatient if symptoms persist     Hypertension  - continue PTA lisinopril     Obesity (BMI 30-39.9)  Morbid obesity (H)  Reports 120 lb weight loss over last few years.   - Encouraged continued weight loss     Mild sleep apnea  - Continue CPAP      DVT Prophylaxis: Low Risk/Ambulatory with no VTE prophylaxis indicated  Code Status: Full Code    Disposition: Expected discharge per Psych.    Shimon Gibson    Interval History   Numerous concerns addressed and questions answered.  Ongoing dry mouth.  Complains of constipation.  Concerned about wearing CPAP without any power a couple evenings ago.  Concerned about weight gain with medication changes, encouraged ongoing exercise and healthy diet choices.  Episode overnight where she felt her tongue was swelling after taking Zyprexa.  Denies any throat closure, dyspnea/wheezing or hives.    -Data reviewed today: I reviewed all new labs and imaging results over the last 24 hours. I personally reviewed no images or EKG's today.    Physical Exam   Temp: 97.8  F (36.6  C) Temp src: Oral BP: 125/63 Pulse: 76   Resp: 18        Vitals:    11/09/18 2132 11/13/18 0800   Weight: 117 kg (258 lb) 119.6 kg (263 lb 11.2 oz)     Vital Signs with Ranges  Temp:  [97.8  F (36.6  C)-98.2  F  (36.8  C)] 97.8  F (36.6  C)  Pulse:  [76-90] 76  Resp:  [18] 18  BP: (125-154)/(61-69) 125/63  I/O last 3 completed shifts:  In: 280 [P.O.:280]  Out: 1 [Urine:1]    Constitutional: Well developed, obese female in no acute distress, sitting on bed  Respiratory: Lungs clear to ausculation bilaterally without crackles or wheezes, no tachypnea  Cardiovascular: regular rate and rhythm, normal S1/S2 without murmur, rubs or gallops  GI:   Lymph:  Other:  alert and appropriate, cranial nerves grossly intact, gross motor movements intact    Medications       cholecalciferol  2,000 Units Oral Daily     divalproex sodium delayed-release  1,000 mg Oral BID     estrogen (conjugated)-medroxyPROGESTERone  1 tablet Oral Daily     lisinopril  10 mg Oral Daily     OLANZapine zydis  10 mg Oral BID       Data     Recent Labs  Lab 11/14/18  0810 11/14/18  0000 11/13/18  1820  11/11/18  0901 11/10/18  0756   WBC  --   --   --   --   --  4.8   HGB  --   --   --   --   --  14.9   MCV  --   --   --   --   --  86   PLT  --   --   --   --   --  193    132* 135  < > 123* 128*   POTASSIUM  --   --   --   --  4.4 4.1   CHLORIDE  --   --   --   --  90* 97   CO2  --   --   --   --  25 26   BUN  --   --   --   --  9 7   CR  --   --   --   --  0.44* 0.41*   ANIONGAP  --   --   --   --  8 5   SHERRIE  --   --   --   --  8.6 8.9   GLC  --   --   --   --  75 95   < > = values in this interval not displayed.    No results found for this or any previous visit (from the past 24 hour(s)).

## 2018-11-14 NOTE — PLAN OF CARE
Problem: Manic Symptoms  Goal: Manic Symptoms  Signs and symptoms of listed problems will be absent or manageable.   Outcome: Therapy, progress toward functional goals is gradual  Pt requested several 1:1 this shift. Pt appears labile, talking in normal voice with another staff member, then upon talking with this writer pt became hyper verbal and began slurring words. When pt was recommended to relax and breath, pt slowed down and words became clear.  Dr. High encouraged pt to take a nap, pt was reminded of this and finally did take a nap. Pt is currently resting in bed.  Pt c/o being constipated. Pt refused prune juice and refused to try Murelax and blamed her fluid restriction. Pt c/o feeling dizzy , orthostatic VS taken, not orthostatic drop.

## 2018-11-14 NOTE — PROGRESS NOTES
[facility] Psychiatric Progress Note      Interval History:   Pt seen, team meeting held with , nursing staff, OT, and PA's to review diagnosis and treatment plan.  Family meeting held with the patient's wife Danielle Zamarripa, unit  Amy and the undersigned.  In the context of the meeting, the patient's wife provided more collateral information regarding the events that had transpired before the patient's hospitalization.  It appears that the patient had been under a lot of stress at work and despite recent 6-week medical leave she had been overwhelmed upon returning to work.  She reportedly was only sleeping 2-4 hours per night on account of her preoccupation with getting her work done and reportedly stay him back for after his meetings.  At this point the patient's wife is taking on the responsibility of getting her FMLA regularized while at the same time setting up outpatient appointments.  She requested to have the patient follow-up with the undersigned at [facility] [place] because they would rather not have patient have to retell her history to a new psychiatrist as she is currently scheduled to see a Dr. Reynoso in [place] on 11/26/2018.  The patient expressed concerns about her interactions with the treatment team.  She expressed several complaints about how she is being treated claiming she was being blamed by staff members for being slow in putting her meal request and also claims to have been getting conflicting information about her fluid restrictions.  She claims she had a hard time comprehending what 1.5 L of fluid is in ounces or cups.  The patient was advised that given her current state and serum level of valproic acid, her dose of Depakote had been increased to 1000 mg twice daily while maintaining Zyprexa 10 mg twice daily.  She does not endorse any self-harm thoughts or plans and the patient's wife indicates that if she has to be  discharged early, she can provide oversight at home with a network of friends that they do have.  It was impressed on them that the patient's hyponatremia is the most important thing to curtail at this point.  She remains on 1200 ml fluid restriction.  Her dysphagia was also evaluated with video swallow by speech therapy on account of her reports of aspirating food.  Speech therapy report suggested no penetration or aspiration occurred during the video swallow study and the esophagram showed mild dysmotility and mildly slow emptying.   Review of systems:   The Review of Systems is negative other than noted in the HPI     Medications:       cholecalciferol  2,000 Units Oral Daily     divalproex sodium delayed-release  1,000 mg Oral BID     estrogen (conjugated)-medroxyPROGESTERone  1 tablet Oral Daily     lisinopril  10 mg Oral Daily     OLANZapine zydis  10 mg Oral BID     artificial saliva, sore throat lozenge, Carboxymethylcellulose Sod PF, cyclobenzaprine, hydrOXYzine, hypromellose-dextran, ibuprofen, lidocaine (viscous), loratadine, metroNIDAZOLE, OLANZapine, polyethylene glycol    Mental Status Examination:     Appearance:  awake, alert, adequately groomed and casually dressed  Eye Contact:  better  Speech:  clear, coherent, rapid and pressured. Difficult to interrupt.  Psychomotor Behavior:  no evidence of tardive dyskinesia, dystonia, or tics and fidgeting  Mood:  Manic.  Affect:  Mood congruent with labile affect. Expansive.   Thought Process:  Circumstantial with flight of ideas.  Admits to racing thoughts  Thought Content:  no evidence of suicidal ideation.  Denies presence of auditory or visual hallucinations  Oriented to:  time, person, and place  Attention Span and Concentration:  limited  Recent and Remote Memory:  Adequate  Fund of Knowledge: appropriate  Muscle Strength and Tone: normal  Gait and Station: Normal  Insight:  fair  Judgment:  limited          Labs/Vitals:     Recent Results (from the  past 24 hour(s))   Sodium    Collection Time: 11/12/18  9:48 PM   Result Value Ref Range    Sodium 129 (L) 133 - 144 mmol/L   Sodium    Collection Time: 11/13/18  4:00 AM   Result Value Ref Range    Sodium 127 (L) 133 - 144 mmol/L   Valproic acid    Collection Time: 11/13/18  4:00 AM   Result Value Ref Range    Valproic Acid Level 52 50 - 100 mg/L   Sodium    Collection Time: 11/13/18  8:18 AM   Result Value Ref Range    Sodium 126 (L) 133 - 144 mmol/L     B/P: 138/81, T: 98, P: 65, R: 16    Impression:   Angy Haji is a 53-year-old  woman with no prior history of anthony who presents to the hospital on account of florid signs of anthony in the context of recent escalation in her sertraline dose from 100 to 150 mg.  She is also going through multiple psychosocial stressors.  She is agreeable to medication management.           DIagnoses:     1.  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   2.  Obesity.   3.  Perimenopause.     4.  Hypertension.   5.  Hyponatremia.   6.  Obstructive sleep apnea.   7.  Dysphagia           Plan:   1. Written information given on medications. Side effects, risks, benefits reviewed.  2.  Increase Depakote DR to 1000 mg twice daily.  Add lorazepam 0.5-1 mg 3 times daily as needed anxiety/agitation.  3.  Continue other medications as prescribed.        Attestation:  Patient has been seen and evaluated by Devora muñiz MD    PATIENT ID  Name: Angy Haji  MRN:[id number]  YOB: 1965

## 2018-11-14 NOTE — PLAN OF CARE
Problem: Manic Symptoms  Goal: Manic Symptoms  Signs and symptoms of listed problems will be absent or manageable.   Outcome: No Change  Pt presents with a full range affect and a somewhat anxious mood. Pt is present and pleasant on the unit and spends time socializing with peers in the lounge and hallway. Pt's speech is pressured and tangential, and she is easily distractibility. Pt had a family meeting this morning and pt stated that it went well. Pt stated that she is not getting much sleep as she is being woken up for sodium checks, but she is now on checks for every 8 hours. Pt had a visitor and also signed CARMEN's this shift. Pt attended wrapup group and participated appropriately.

## 2018-11-15 VITALS
SYSTOLIC BLOOD PRESSURE: 111 MMHG | HEIGHT: 69 IN | DIASTOLIC BLOOD PRESSURE: 70 MMHG | TEMPERATURE: 98.5 F | HEART RATE: 79 BPM | OXYGEN SATURATION: 98 % | BODY MASS INDEX: 39.06 KG/M2 | WEIGHT: 263.7 LBS | RESPIRATION RATE: 16 BRPM

## 2018-11-15 LAB — SODIUM SERPL-SCNC: 133 MMOL/L (ref 133–144)

## 2018-11-15 PROCEDURE — 25000132 ZZH RX MED GY IP 250 OP 250 PS 637: Performed by: PSYCHIATRY & NEUROLOGY

## 2018-11-15 PROCEDURE — 90853 GROUP PSYCHOTHERAPY: CPT

## 2018-11-15 PROCEDURE — 36416 COLLJ CAPILLARY BLOOD SPEC: CPT | Performed by: HOSPITALIST

## 2018-11-15 PROCEDURE — 84295 ASSAY OF SERUM SODIUM: CPT | Performed by: HOSPITALIST

## 2018-11-15 PROCEDURE — 99232 SBSQ HOSP IP/OBS MODERATE 35: CPT | Performed by: HOSPITALIST

## 2018-11-15 RX ORDER — QUETIAPINE FUMARATE 100 MG/1
100 TABLET, FILM COATED ORAL 2 TIMES DAILY
Status: DISCONTINUED | OUTPATIENT
Start: 2018-11-15 | End: 2018-11-15 | Stop reason: HOSPADM

## 2018-11-15 RX ORDER — POLYETHYLENE GLYCOL 3350 17 G/17G
17 POWDER, FOR SOLUTION ORAL DAILY
Status: DISCONTINUED | OUTPATIENT
Start: 2018-11-15 | End: 2018-11-15 | Stop reason: HOSPADM

## 2018-11-15 RX ORDER — BENZTROPINE MESYLATE 0.5 MG/1
0.5 TABLET ORAL 2 TIMES DAILY PRN
Qty: 15 TABLET | Refills: 0 | Status: SHIPPED | OUTPATIENT
Start: 2018-11-15 | End: 2018-11-20

## 2018-11-15 RX ORDER — DIVALPROEX SODIUM 500 MG/1
1000 TABLET, DELAYED RELEASE ORAL 2 TIMES DAILY
Qty: 120 TABLET | Refills: 0 | Status: SHIPPED | OUTPATIENT
Start: 2018-11-15 | End: 2018-12-05

## 2018-11-15 RX ORDER — SALIVA STIMULANT COMB. NO.3
2 SPRAY, NON-AEROSOL (ML) MUCOUS MEMBRANE
Qty: 44.3 ML | Refills: 0 | Status: SHIPPED | OUTPATIENT
Start: 2018-11-15 | End: 2019-02-01

## 2018-11-15 RX ORDER — QUETIAPINE FUMARATE 100 MG/1
100 TABLET, FILM COATED ORAL 2 TIMES DAILY
Qty: 60 TABLET | Refills: 0 | Status: SHIPPED | OUTPATIENT
Start: 2018-11-15 | End: 2018-11-20

## 2018-11-15 RX ADMIN — QUETIAPINE FUMARATE 100 MG: 100 TABLET ORAL at 12:19

## 2018-11-15 RX ADMIN — Medication 2 SPRAY: at 04:32

## 2018-11-15 RX ADMIN — DIVALPROEX SODIUM 1000 MG: 500 TABLET, DELAYED RELEASE ORAL at 08:16

## 2018-11-15 RX ADMIN — POLYETHYLENE GLYCOL 3350 17 G: 17 POWDER, FOR SOLUTION ORAL at 12:17

## 2018-11-15 RX ADMIN — DEXTRAN 70, AND HYPROMELLOSE 2910 2 DROP: 1; 3 SOLUTION/ DROPS OPHTHALMIC at 01:51

## 2018-11-15 RX ADMIN — BENZTROPINE MESYLATE 0.5 MG: 0.5 TABLET ORAL at 08:24

## 2018-11-15 RX ADMIN — Medication 2 SPRAY: at 08:16

## 2018-11-15 RX ADMIN — CONJUGATED ESTROGENS AND MEDROXYPROGESTERONE ACETATE 1 TABLET: .625; 2.5 TABLET, SUGAR COATED ORAL at 08:15

## 2018-11-15 RX ADMIN — VITAMIN D, TAB 1000IU (100/BT) 2000 UNITS: 25 TAB at 08:15

## 2018-11-15 RX ADMIN — LISINOPRIL 10 MG: 10 TABLET ORAL at 08:15

## 2018-11-15 RX ADMIN — OLANZAPINE 10 MG: 10 TABLET, ORALLY DISINTEGRATING ORAL at 08:15

## 2018-11-15 ASSESSMENT — ACTIVITIES OF DAILY LIVING (ADL)
LAUNDRY: WITH SUPERVISION
ORAL_HYGIENE: INDEPENDENT
DRESS: INDEPENDENT
GROOMING: INDEPENDENT

## 2018-11-15 NOTE — PROGRESS NOTES
"Pt is med compliant, continues to be hyper verbal. Pt is to continue with 1200 ml fluid restriction, use Saliva replacement as needed and follow up with PCP in one week to recheck NA+, Dr. High also wants pt's VPA rechecked at that time. Pt is aware.  Pt plans to discharge tonight as pt said,\"My wife could be here between 4:30-5:30 PM for discharge.\" Pt has been up more today, attended groups and slept for 2 hours after Seroquel was given. Pt reports feeling better.  "

## 2018-11-15 NOTE — PROGRESS NOTES
met with patient to go over her appointments following discharge. Patient plans to see Dr. Reynoso for medication management in the interim until her appointment with Dr. High on 12/26/18.  brought up the safety plan which patient declined to discuss as she is familiar with them due to the nature of her job. Patient denies any thoughts of suicide or self harm at this time.

## 2018-11-15 NOTE — PROGRESS NOTES
[facility] [place]    Hospitalist Progress Note      Assessment & Plan   Angy Haji is a 53 year old female who was admitted on 11/9/2018.  Past history of MARLA who presents with manic behavior.    Manic behavior (H)  Anxiety  - management per Psych     Hyponatremia due to SIADH  Admission Na 128.  Urine studies 11/11 consistent with SIADH, likely due to sertraline.  Slowly improved with 1200 ml fluid restriction with Na stable wnl at time of discharge.  Continue 1200 ml fluid restriction and follow up with PCP within 1 week for repeat check.  Liberalize fluid as inidicated.  Continue saliva substitute for dry mouth.     Dysphagia  Reporting long history of dysphagia for which she has been seen by SLP and ENT at [facility].  Video swallow 11/14 with mild esophageal dysmotility - may follow up with GI as outpatient if symptoms persist.     Hypertension  - continue PTA lisinopril     Obesity (BMI 30-39.9)  Morbid obesity (H)  Reports 120 lb weight loss over last few years.   - Encouraged continued weight loss     Mild sleep apnea  - Continue CPAP      DVT Prophylaxis: Low Risk/Ambulatory with no VTE prophylaxis indicated  Code Status: Full Code    Disposition: Expected discharge per Psych.    Shimon Gibson    Interval History   Tolerating fluid restriction, though difficult.  Ongoing dry mouth.  Concerns about medication side effects - encouraged her to discuss with Psychiatry.    -Data reviewed today: I reviewed all new labs and imaging results over the last 24 hours. I personally reviewed no images or EKG's today.    Physical Exam   Temp: 98.7  F (37.1  C) Temp src: Oral BP: 141/77 Pulse: 70   Resp: 18        Vitals:    11/09/18 2132 11/13/18 0800   Weight: 117 kg (258 lb) 119.6 kg (263 lb 11.2 oz)     Vital Signs with Ranges  Temp:  [97.8  F (36.6  C)-98.7  F (37.1  C)] 98.7  F (37.1  C)  Pulse:  [70-90] 70  Resp:  [18] 18  BP: (125-141)/(61-77) 141/77  I/O last 3 completed shifts:  In: 860 [P.O.:860]  Out: -      Constitutional: Well developed, obese female in no acute distress, napping in bed  Respiratory: Lungs clear to ausculation bilaterally without crackles or wheezes, no tachypnea  Cardiovascular: regular rate and rhythm, normal S1/S2 without murmur, rubs or gallops  GI:   Lymph:  Other:  alert and appropriate, cranial nerves grossly intact    Medications       cholecalciferol  2,000 Units Oral Daily     divalproex sodium delayed-release  1,000 mg Oral BID     estrogen (conjugated)-medroxyPROGESTERone  1 tablet Oral Daily     lisinopril  10 mg Oral Daily     polyethylene glycol  17 g Oral Daily     QUEtiapine  100 mg Oral BID       Data     Recent Labs  Lab 11/15/18  0758 11/14/18 2059 11/14/18  1710  11/11/18  0901 11/10/18  0756   WBC  --   --   --   --   --  4.8   HGB  --   --   --   --   --  14.9   MCV  --   --   --   --   --  86   PLT  --   --   --   --   --  193    137 134  < > 123* 128*   POTASSIUM  --   --   --   --  4.4 4.1   CHLORIDE  --   --   --   --  90* 97   CO2  --   --   --   --  25 26   BUN  --   --   --   --  9 7   CR  --   --   --   --  0.44* 0.41*   ANIONGAP  --   --   --   --  8 5   SHERRIE  --   --   --   --  8.6 8.9   GLC  --   --   --   --  75 95   < > = values in this interval not displayed.    No results found for this or any previous visit (from the past 24 hour(s)).

## 2018-11-15 NOTE — PLAN OF CARE
Problem: General Plan of Care (Inpatient Behavioral)  Goal: Discharge Planning  Patient attended Process Group on 11/12/18 and participated completely and appropriately. She did not attend on 11/13 or 11/14/`18.

## 2018-11-15 NOTE — PLAN OF CARE
Problem: Manic Symptoms  Goal: Manic Symptoms  Signs and symptoms of listed problems will be absent or manageable.   Outcome: Improving  Pleasant and cooperative.  Was napping at the beginning of the shift and afterwards c/o feeling groggy. Restless. Remains hyperverbal.  Enjoyed visit with her wife, daughter and best friend. Labile. Elated then tearful. Continued to express guilt for not helping a patient.  Reinforced that she is a patient and here to receive help.  Encouraged pt to have appropriate boundaries.  Discussed frustration with trying to receive MH services prior to admission.  Expressed concern and fear with starting an antipsychotic medication given the potential side effects but hopes the benefits outweigh the risks.  She is working on trusting the process.  Pt trusts her psychiatrist and hopes to follow up with him OP.  Continues to experience distress related to her tongue swelling and was glad that she has received Cogentin.  She wants to stay IP until she feels safe to go home but is inpatient with the process.

## 2018-11-15 NOTE — DISCHARGE INSTRUCTIONS
Behavioral Discharge Planning and Instructions    Summary:   Admitted with anthony.     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 Medication adjustment. Swallow study.     Symptoms to Report:  Feeling more agitated,  Losing more sleep, Mood getting worse or Thoughts of suicide    Lifestyle Adjustment:  Follow all treatment recommendations. Develop and follow safety plan.     Psychiatry Follow-up:     You have a therapy appointment with Aylin Caro MA, LP at [facility] on Monday, November 19, 2018 at 10:00 am.     [facility] [address] [phone] / [phone] / [phone] fax    You have an appointment for medication management with Dr. Ingris Reynoso on Monday, November 26, 2018 at 10:00 am.     Dr. Ingris Reynoso - Psychiatry  [address] [place] [address], [address]  [place] [address] [phone] /     You indicated that you would like to follow up for medication management with Dr. High at [facility].  You have an appointment with Dr. High on Wednesday, December 26, 2018 at 2:00 pm. Please arrive 15 minutes early to complete paperwork. Please bring your photo ID and insurance card with you to this appointment.      [facility] [place] [address]  [place] [address] [phone] / Fax: [phone]    Resources:   Crisis Intervention: 273.596.1218 or 230-856-8877 (TTY: 281.196.7216).  Call anytime for help.  National [place] [employer] Mental Illness (www.[place].org): 777.585.2497 or 849-856-9684.  National Suicide Prevention Line  (www.mentalhealthmn.org): 968-610-SVWR (3484)  COPE (Crisis Services for Adults) in [facility]: 523.771.3221 (Available 24/7)    General Medication Instructions:   See your medication sheet(s) for instructions.   Take all medicines as directed.  Make no changes unless your doctor suggests them.   Go to all your doctor visits.  Be sure to have all your required lab tests. This way, your medicines can be refilled on time.  Do not use any drugs not prescribed by your doctor.  Avoid alcohol.    *Please make an appointment with your Primary Care Physician one week after discharge.  PCP to please checks labs - VPA (Valproic Acid Level) and NA+.  Continue with your 1200 ml fluid restriction. PCP to liberalize fluid as indicated. You can continue with saliva replacement as needed.

## 2018-11-15 NOTE — DISCHARGE SUMMARY
[facility]    Discharge Summary  Adult Psychiatry    Date of Admission:  11/9/2018  Date of Discharge: 11/15/2018  Discharging Provider: Devora High MD  Date of Service (when I saw the patient): 11/15/18    Discharge Diagnoses   1.  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   2.  Obesity.    3.  Perimenopause.     4.  Hypertension.   5.  Hyponatremia.   6.  Obstructive sleep apnea.   7.  Dysphagia.    History of Present Illness   Angy Haji is a 53-year-old lady who reports she is  to Danielle Can with whom she has been for the last 20 years.  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  She presented to [facility] on 11/09/2018, on account of manic behavior and requested hospitalization on account of her poor function.  Information was gathered through direct patient contact, in addition to chart review.  She is admitted as a voluntary patient. For more details, I refer the reader to the initial psychiatric assessment documented on admission by Devora High MD in addition to the history and physical examination documented by Kobi Simeon MD on 11/10/2018.    Hospital Course   Angy Haji was admitted on 11/9/2018.  She was given the opportunity to ventilate her stressors and staff provided adequate time for this to occur as she was obviously hyperverbal with pressured speech.  She was notably circumstantial in her thought process and overly inclusive in describing the events that led to her hospitalization.  She admitted to experiencing racing thoughts with difficulty initiating and maintaining sleep as well as high anxiety.  She denied history of depression or anthony and did not endorse any history of bipolar  disorder in her family.  On account of her presentation, she was advised to commence both an atypical antipsychotic and a mood stabilizer.  She consented to the recommendations of Depakote and olanzapine and these were started initially at a loading dose of 1000 mg of Depakote DR and 10 mg of olanzapine with subsequent dosing of Depakote  mg twice daily and olanzapine 10 mg at bedtime.  However, patient continued to be significantly symptomatic on account of which her medication regimen was optimized to 1000 mg twice daily of Depakote DR and 10 mg twice daily of Zyprexa.  She was also noted to have significant evidence of SIADH on admission with associated hyponatremia of 122 mmol/L.  The hospitalist team comanage the patient placing her on 1200mL fluid restriction and her prior to admission sertraline was discontinued.  She had serial blood draws to monitor her sodium and it eventually normalized at 137 mmol/L on 11/14/2018 with a correction back to 133 mmol/L on 11/15/2018.  She was advised to maintain the 1200 mL fluid restriction by the attending hospitalist.      In the course of her stay, a family meeting was held with the patient, her wife and the treatment team during which additional collateral information was provided and stressors that led to the hospitalization also discussed.  The patient and her wife indicated that she had an appointment scheduled to follow up with a new psychiatrist Dr. Reynoso in [place], [place] on 11/26/2018.  During her stay in the hospital, the patient also had multiple concerns about the milieu and compared the facilities and programs with those at the hospital where she worked.  She expressed several complaints about how she was being treated by staff members and her concerns were attended to.  By 11/15/2018, the patient complained for a second day about heaviness of her tongue but denied any other musculoskeletal stiffness.  This was thought to have been an extrapyramidal  symptom on account of which was offered Cogentin which she claimed she found helpful. Consequently, she was taken off olanzapine and placed on quetiapine at 100 mg twice daily which she tolerated without event. The patient also underwent speech therapy evaluation and video swallow study on account of concerns of dysphagia with risk of aspiration.  She underwent esophagram and this was followed by [facility] with mild dysmotility and mildly slow emptying. Please see radiologist report for more details. Patient was provided education after the study and handout given.  She was advised by speech therapy to continue on a regular diet and thin liquids.  She was also told to be upright and remain upright 30-60 minutes after meals, small bites/sips, double swallow and/or alternate liquids/solids. No further skilled intervention is indicated for swallowing but was advised to follow-up with a GI specialist on outpatient basis if her symptoms persisted.      On 11/15/2018, the patient requested to be discharged from the hospital as she found the milieu less than therapeutic.  She denied experiencing any self-harm thoughts plans or intent and also denied symptoms suggestive of psychosis.  She stated that her wife was admitted we will to provide oversight at home and also spoke about a friend who could be available at short notice to be with her if required.  As there was no clear evidence of dangerousness, the patient was allowed to discharge home with her wife on her currently prescribed medications.  She was also advised to proceed on medical leave as she is not capable of functioning in her role as a psychotherapist at this time.  FMLA forms were completed for her in the course of her hospitalization.    Devora High MD    Significant Results and Procedures   Please see below.    Unresulted Labs Ordered in the Past 30 Days of this Admission     No orders found from 9/10/2018 to 11/10/2018.          Code Status    Full Code    Primary Care Physician   Jono Odell    Physical Exam   Appearance:  awake, alert, adequately groomed and casually dressed  Attitude:  cooperative  Eye Contact:  good  Mood: Dysphoric  Affect:  appropriate and in normal range and mood congruent  Speech:  clear, coherent but rapid  Psychomotor Behavior:  no evidence of tardive dyskinesia, dystonia, or tics and intact station, gait and muscle tone  Thought Process: Logical but circumstantial and overly inclusive  Associations:  no loose associations  Thought Content:  no evidence of suicidal ideation or homicidal ideation and no evidence of psychotic thought  Insight:  good  Judgment: Limited   Oriented to:  time, person, and place  Attention Span and Concentration:  poor  Recent and Remote Memory:  intact   Language: Able to name objects and Able to repeat phrases  Fund of Knowledge: appropriate  Muscle Strength and Tone: normal  Gait and Station: Normal    Time Spent on this Encounter   Devora PATRICK, personally saw the patient today and spent greater than 30 minutes discharging this patient.    Discharge Disposition   Discharged to home  Condition at discharge: Stable    Psychiatry Follow-up:      You have a therapy appointment with Aylin Caro MA, LP at [facility] on Monday, November 19, 2018 at 10:00 am.      [facility] [address]  [phone] / [phone] / [phone] fax     You have an appointment for medication management with Dr. Ingris Reynoso on Monday, November 26, 2018 at 10:00 am.      Dr. Ingris Reynoso - Psychiatry  [address] [place] [address], [address]  [place], [place] [address] [phone] /      You indicated that you would like to follow up for medication management with Dr. High at [facility].  You have an appointment with Dr. High on Wednesday, December 26, 2018 at 2:00 pm. Please  arrive 15 minutes early to complete paperwork. Please bring your photo ID and insurance card with you to this appointment.       [facility] [address]  [place] [address]  [phone] / Fax: [phone]      Consultations This Hospital Stay   HOSPITALIST IP CONSULT  RESPIRATORY CARE IP CONSULT  SWALLOW EVAL SPEECH PATH AT BEDSIDE IP CONSULT    Discharge Orders     Sodium     Follow-up and recommended labs and tests    Follow up with primary care provider, Jono Odell, within 7 days for hospital follow- up.  The following labs/tests are recommended: sodium and valproic acid level.     Diet   Follow this diet upon discharge:      Fluid restriction 1200 ML FLUID     Regular Diet Adult       Discharge Medications   Current Discharge Medication List      START taking these medications    Details   artificial saliva (BIOTENE MT) SOLN solution Swish and spit 2 mLs (2 sprays) in mouth every hour as needed for dry mouth  Qty: 44.3 mL, Refills: 0    Associated Diagnoses: Dry mouth      benztropine (COGENTIN) 0.5 MG tablet Take 1 tablet (0.5 mg) by mouth 2 times daily as needed (EPSE)  Qty: 15 tablet, Refills: 0    Associated Diagnoses: Manic behavior (H)      divalproex sodium delayed-release (DEPAKOTE) 500 MG DR tablet Take 2 tablets (1,000 mg) by mouth 2 times daily  Qty: 120 tablet, Refills: 0    Associated Diagnoses: Manic behavior (H)      QUEtiapine (SEROQUEL) 100 MG tablet Take 1 tablet (100 mg) by mouth 2 times daily  Qty: 60 tablet, Refills: 0    Associated Diagnoses: Manic behavior (H)         CONTINUE these medications which have NOT CHANGED    Details   cyclobenzaprine (FLEXERIL) 10 MG tablet Take 1 tablet (10 mg) by mouth nightly as needed for other (headache)  Qty: 30 tablet, Refills: 0    Associated Diagnoses: Tension headache      estrogen, conjugated,-medroxyPROGESTERone (PREMPRO) 0.625-2.5 MG per tablet Take 1 tablet by mouth daily  Qty: 90 tablet, Refills: 3     Associated Diagnoses: Perimenopausal vasomotor symptoms      ibuprofen (ADVIL/MOTRIN) 800 MG tablet       lidocaine, viscous, (XYLOCAINE) 2 % solution SWISH AND SPIT 5ML BY MOUTH EVERY 3 HOURS AS NEEDED  Qty: 100 mL, Refills: 0    Associated Diagnoses: Canker sore      lisinopril (PRINIVIL/ZESTRIL) 10 MG tablet Take 1 tablet (10 mg) by mouth daily  Qty: 30 tablet, Refills: 1    Associated Diagnoses: Benign essential hypertension      Loratadine 10 MG capsule Take 10 mg by mouth daily as needed.      metroNIDAZOLE (METROGEL) 1 % gel Apply topically daily  Qty: 60 g, Refills: 9    Associated Diagnoses: Rosacea; Perioral dermatitis      !! order for DME Please measure and distribute 1 pair of 20mmHg - 30mmHg knee high open toe compression stockings. Jobst ultrasheer or equivalent.  Qty: 1 each, Refills: 1    Associated Diagnoses: Varicose veins of both lower extremities with complications      !! ORDER FOR DME Use your CPAP device as directed by your provider.      VITAMIN D, CHOLECALCIFEROL, PO Take 2,000 Units by mouth daily    Associated Diagnoses: Perioral dermatitis; Rosacea      !! order for DME Please measure and distribute 1 pair of 20mmHg - 30mmHg thigh high open or closed toe compression stockings. Jobst ultrasheer or equivalent.  Qty: 2 each, Refills: 3    Associated Diagnoses: Symptomatic varicose veins of both lower extremities       !! - Potential duplicate medications found. Please discuss with provider.      STOP taking these medications       sertraline (ZOLOFT) 100 MG tablet Comments:   Reason for Stopping:             Allergies   Allergies   Allergen Reactions     Penicillins Rash     Thimerosal Other (See Comments)     blisters on inside of eyelid     Data   Most Recent 3 CBC's:  Recent Labs   Lab Test  11/10/18   0756  08/09/18   0937  06/21/16   1122   WBC  4.8  4.2  5.2   HGB  14.9  14.1  13.7   MCV  86  86  88   PLT  193  190  212      Most Recent 3 BMP's:  Recent Labs   Lab Test  11/15/18    0758  11/14/18   2059  11/14/18   1710   11/11/18   0901  11/10/18   0756  08/09/18   0937  04/06/18   1222   06/20/16   0843   NA  133  137  134   < >  123*  128*   --    --    --   139   POTASSIUM   --    --    --    --   4.4  4.1   --    --    --   3.7   CHLORIDE   --    --    --    --   90*  97   --    --    --   105   CO2   --    --    --    --   25  26   --    --    --   27   BUN   --    --    --    --   9  7   --    --    --   11   CR   --    --    --    --   0.44*  0.41*  0.58   --    --   0.50*   ANIONGAP   --    --    --    --   8  5   --    --    --   7   SHERRIE   --    --    --    --   8.6  8.9   --    --    --   9.0   GLC   --    --    --    --   75  95   --   81   < >  70    < > = values in this interval not displayed.     Most Recent 2 LFT's:  Recent Labs   Lab Test  08/09/18   0937  08/16/11   0757   AST  20  24   ALT  23  13   ALKPHOS   --   66   BILITOTAL   --   0.4      Panel:  Recent Labs   Lab Test  10/02/17   0842   CHOL  156   LDL  49   HDL  96   TRIG  55     Most Recent 6 Bacteria Isolates From Any Culture (See EPIC Reports for Culture Details):No lab results found.  Most Recent TSH, T4 and A1c Labs:  Recent Labs   Lab Test  11/10/18   0756   09/24/15   0904   TSH  2.29   < >   --    A1C   --    --   5.2    < > = values in this interval not displayed.     Results for orders placed or performed during the hospital encounter of 11/09/18   XR Video Swallow w Esophagram    Narrative    VIDEO SWALLOWING EVALUATION November 13, 2018 2:11 PM     HISTORY: Dysphagia.     COMPARISON: None.    FLUOROSCOPY TIME: 1.9 minutes.     Number of cine runs: 12.    FINDINGS:    Thin: Minimal residue. Otherwise normal.    Nectar: Not administered.    Honey: Not administered.    Pudding: Mild residue. Otherwise normal.    Semisolid: Piecemeal. Otherwise normal.    Solid: Piecemeal. Otherwise normal.    Limited evaluation of the esophagus demonstrates mildly decreased  esophageal motility and slightly delayed  esophageal emptying.    WILLIAM SIMENTAL MD

## 2018-11-15 NOTE — PLAN OF CARE
Problem: Patient Care Overview  Goal: Team Discussion  Team Plan:   Outcome: No Change  BEHAVIORAL TEAM DISCUSSION    Participants: Dr. High, , Nursing staff and PA's  Progress: Pt is adequate for discharge  Continued Stay Criteria/Rationale: Pt will be discharging today  Medical/Physical: N/A  Precautions:   Behavioral Orders   Procedures     Code 1 - Restrict to Unit     Routine Programming     As clinically indicated     Status 15     Every 15 minutes.     Plan: Patient was given medication information and gave verbal consent. Plan of care was discussed with patient and team. Pt is discharging today,  Rationale for change in precautions or plan: Pt is adequate for discharge.

## 2018-11-15 NOTE — PROGRESS NOTES
"[facility] Psychiatric Progress Note      Interval History:   Pt seen, team meeting held with , nursing staff, OT, and PA's to review diagnosis and treatment plan.  Staff reported the patient continues to have multiple requests and remains hyperverbal.  Her serum sodium level is normalizing with the most recent as of this evening documented as 135 mmol/L which is within the normal range.  Patient reports that she feels her thoughts are slowing down and she is better able to organize them.  She claims she feels her tongue is \"heavy.\"  She was advised that this may be a function of her decreased oral fluid intake and not likely to be a function of extrapyramidal symptoms given that she is on olanzapine.  However, Cogentin was made available as needed for EPS EPSE.  The patient's wife faxed in [place] forms for completion of these were completed and faxed to her companies absence number.  Patient denies experiencing any auditory or visual hallucinations.  She denies any self-harm thoughts plans or intent.  She is hoping she would be stable enough for discharge within the next 2 days.  She tells me she slept for about 7 hours last night and feels rested this morning.  She is hoping to get replicate the sleep tonight.  She denies self-harm thoughts plans or intent and describes future orientation.     Review of systems:   The Review of Systems is negative other than noted in the HPI     Medications:       cholecalciferol  2,000 Units Oral Daily     divalproex sodium delayed-release  1,000 mg Oral BID     estrogen (conjugated)-medroxyPROGESTERone  1 tablet Oral Daily     lisinopril  10 mg Oral Daily     OLANZapine zydis  10 mg Oral BID     artificial saliva, sore throat lozenge, benztropine, Carboxymethylcellulose Sod PF, cyclobenzaprine, hypromellose-dextran, ibuprofen, lidocaine (viscous), loratadine, LORazepam, metroNIDAZOLE, OLANZapine, polyethylene glycol    Mental Status Examination: "     Appearance:  awake, alert, adequately groomed and casually dressed  Eye Contact:  better  Speech: Remains hyperverbal but clear and coherent  Psychomotor Behavior:  no evidence of tardive dyskinesia, dystonia, or tics and fidgeting  Mood: Better  Affect:  Mood congruent with labile affect. Expansive.   Thought Process: More logical and organized.  Admits to less racing thoughts  Thought Content:  no evidence of suicidal ideation.  Denies presence of auditory or visual hallucinations  Oriented to:  time, person, and place  Attention Span and Concentration:  limited  Recent and Remote Memory:  Adequate  Fund of Knowledge: appropriate  Muscle Strength and Tone: normal  Gait and Station: Normal  Insight:  fair  Judgment:  limited          Labs/Vitals:     Recent Results (from the past 24 hour(s))   Sodium    Collection Time: 11/13/18  6:20 PM   Result Value Ref Range    Sodium 135 133 - 144 mmol/L   Sodium    Collection Time: 11/14/18 12:00 AM   Result Value Ref Range    Sodium 132 (L) 133 - 144 mmol/L   Sodium    Collection Time: 11/14/18  8:10 AM   Result Value Ref Range    Sodium 133 133 - 144 mmol/L   Sodium    Collection Time: 11/14/18  5:10 PM   Result Value Ref Range    Sodium 134 133 - 144 mmol/L     B/P: 138/81, T: 98, P: 65, R: 16    Impression:   Angy Haji is a 53-year-old  woman with no prior history of anthony who presents to the hospital on account of florid signs of anthony in the context of recent escalation in her sertraline dose from 100 to 150 mg.  She is also going through multiple psychosocial stressors.  She is agreeable to medication management.  Patient is cooperating with recommended treatment.  Her sodium level is normalizing and her mood is also slowly stabilizing          DIagnoses:     1.  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   2.  Obesity.   3.   Perimenopause.     4.  Hypertension.   5.  Hyponatremia-corrected.   6.  Obstructive sleep apnea.   7.  Dysphagia           Plan:   1. Written information given on medications. Side effects, risks, benefits reviewed.  2. Continue current medications as prescribed.  3.  Continue hospitalization        Attestation:  Patient has been seen and evaluated by Devora muñiz MD    PATIENT ID  Name: Angy Haji  MRN:[id number]  YOB: 1965

## 2018-11-15 NOTE — PROGRESS NOTES
Discharge instructions reviewed with patient and her wife including follow-up care plan. Educated on medication regime including review of name, dose, route, frequency, side effects, and next dose needed.  Advised not to stop prescribed medication without consulting their physician.  Instructed to continue with her 1200 ml fluid restriction.  Questions answered.  Receptive to instructions and teachings.  Copy of AVS given to patient. Coping skills and support network reviewed.  Denied SI. All belongings which where brought into the hospital have been returned to patient. Picked up by her wife. Pt D/C'd at 1830 .

## 2018-11-20 ENCOUNTER — OFFICE VISIT (OUTPATIENT)
Dept: FAMILY MEDICINE | Facility: CLINIC | Age: 53
End: 2018-11-20
Payer: COMMERCIAL

## 2018-11-20 ENCOUNTER — OFFICE VISIT (OUTPATIENT)
Dept: PHARMACY | Facility: CLINIC | Age: 53
End: 2018-11-20
Payer: COMMERCIAL

## 2018-11-20 ENCOUNTER — TELEPHONE (OUTPATIENT)
Dept: FAMILY MEDICINE | Facility: CLINIC | Age: 53
End: 2018-11-20

## 2018-11-20 ENCOUNTER — MEDICAL CORRESPONDENCE (OUTPATIENT)
Dept: HEALTH INFORMATION MANAGEMENT | Facility: CLINIC | Age: 53
End: 2018-11-20

## 2018-11-20 VITALS
TEMPERATURE: 98.1 F | HEART RATE: 75 BPM | BODY MASS INDEX: 38.87 KG/M2 | RESPIRATION RATE: 16 BRPM | OXYGEN SATURATION: 100 % | WEIGHT: 263.2 LBS | SYSTOLIC BLOOD PRESSURE: 159 MMHG | DIASTOLIC BLOOD PRESSURE: 99 MMHG

## 2018-11-20 DIAGNOSIS — F30.13: Primary | ICD-10-CM

## 2018-11-20 DIAGNOSIS — E22.2 SIADH (SYNDROME OF INAPPROPRIATE ADH PRODUCTION) (H): ICD-10-CM

## 2018-11-20 DIAGNOSIS — F30.10 MANIC BEHAVIOR (H): Primary | ICD-10-CM

## 2018-11-20 LAB
SODIUM SERPL-SCNC: 134 MMOL/L (ref 133–144)
VALPROATE SERPL-MCNC: 99 MG/L (ref 50–100)

## 2018-11-20 RX ORDER — CARBOXYMETHYLCELLULOSE SODIUM 5 MG/ML
1 SOLUTION/ DROPS OPHTHALMIC 3 TIMES DAILY PRN
COMMUNITY

## 2018-11-20 RX ORDER — PHENOL 1.4 %
10 AEROSOL, SPRAY (ML) MUCOUS MEMBRANE
COMMUNITY
End: 2019-02-01

## 2018-11-20 NOTE — MR AVS SNAPSHOT
After Visit Summary   11/20/2018    Angy Haji    MRN: [id number]           Patient Information     Date Of Birth          1965        Visit Information        Provider Department      11/20/2018 8:20 AM Prema Oneal, [facility]        Today's Diagnoses     Manic disorder, single episode, severe behavior (H)    -  1       Follow-ups after your visit        Your next 10 appointments already scheduled     Nov 28, 2018 11:00 AM CST   Follow Up with Mar Xiong RD, LD   [facility] ([facility])    [address]  [facility] [address]   [phone]            Dec 03, 2018  8:30 AM CST   (Arrive by 8:15 AM)   RETURN SLP VOICE with RALPH Osborne [facility] ([facility])    [address] [place] [address]  [address] [facility] [address] [phone]            Dec 05, 2018  9:30 AM CST   CONSULT with Galo Sandoval MD   [facility] ([facility])    [address],  [address]  [facility] [address]   [phone]            Dec 05, 2018 10:30 AM CST   Follow Up with Mar Xiong RD, MAKEDA   [facility] ([facility])    [address]  [facility] [address]   [phone]            Dec 06, 2018 10:20 AM CST   RETURN EXTENDED with Jono Odell MD   [facility] ([facility])    [address],  [address]  [facility] [address]   [phone]              Who to contact     Please call your clinic at [phone] to:    Ask questions about your health    Make or cancel appointments    Discuss your medicines    Learn about your test results    Speak to your doctor            Additional Information About Your Visit        MyChart Information     MyChart gives you secure access to your electronic health record. If you see a  primary care provider, you can also send messages to your care team and make appointments. If you have questions, please call your primary care clinic.  If you do not have a primary care provider, please call [phone] and they will assist you.      Instant AV is an electronic gateway that provides easy, online access to your medical records. With Instant AV, you can request a clinic appointment, read your test results, renew a prescription or communicate with your care team.     To access your existing account, please contact your [facility] or call [phone] for assistance.        Care EveryWhere ID     This is your Care EveryWhere ID. This could be used by other organizations to access your [place] medical records  [id number]         Blood Pressure from Last 3 Encounters:   11/20/18 (!) 159/99   11/15/18 111/70   11/09/18 155/68    Weight from Last 3 Encounters:   11/20/18 263 lb 3.2 oz (119.4 kg)   11/13/18 263 lb 11.2 oz (119.6 kg)   11/07/18 256 lb (116.1 kg)              Today, you had the following     No orders found for display         Today's Medication Changes          These changes are accurate as of 11/20/18 11:59 PM.  If you have any questions, ask your nurse or doctor.               Stop taking these medicines if you haven't already. Please contact your care team if you have questions.     QUEtiapine 100 MG tablet   Commonly known as:  SEROquel   Stopped by:  Prema Oneal, [facility]                    Primary Care Provider Office Phone # Fax #    Jono Odell [phone]       [address] [facility] [address]        Equal Access to Services     [facility] : Hadii ariana ku hadasho Soomaali, waaxda luqadaha, qaybta kaalmada svetlana dalal. So [facility] [phone].    ATENCIÓN: Si habla español, tiene a franco disposición servicios gratuitos de asistencia lingüística. Llame al [phone].    We comply with applicable  federal civil rights laws and [place] laws. We do not discriminate on the basis of race, color, national origin, age, disability, sex, sexual orientation, or gender identity.            Thank you!     Thank you for choosing [facility]  for your care. Our goal is always to provide you with excellent care. Hearing back from our patients is one way we can continue to improve our services. Please take a few minutes to complete the written survey that you may receive in the mail after your visit with us. Thank you!             Your Updated Medication List - Protect others around you: Learn how to safely use, store and throw away your medicines at www.disposemymeds.org.          This list is accurate as of 11/20/18 11:59 PM.  Always use your most recent med list.                   Brand Name Dispense Instructions for use Diagnosis    artificial saliva Soln solution     44.3 mL    Swish and spit 2 mLs (2 sprays) in mouth every hour as needed for dry mouth    Dry mouth       Carboxymethylcellulose Sod PF 0.5 % Soln ophthalmic solution    REFRESH PLUS     Place 1 drop into both eyes 3 times daily as needed for dry eyes        cyclobenzaprine 10 MG tablet    FLEXERIL    30 tablet    Take 1 tablet (10 mg) by mouth nightly as needed for other (headache)    Tension headache       divalproex sodium delayed-release 500 MG DR tablet    DEPAKOTE    120 tablet    Take 2 tablets (1,000 mg) by mouth 2 times daily    Manic behavior (H)       estrogen (conjugated)-medroxyPROGESTERone 0.625-2.5 MG per tablet    PREMPRO    90 tablet    Take 1 tablet by mouth daily    Perimenopausal vasomotor symptoms       ibuprofen 800 MG tablet    ADVIL/MOTRIN     Take 400 mg by mouth every 4 hours as needed for other (headaches)        lidocaine (viscous) 2 % solution    XYLOCAINE    100 mL    SWISH AND SPIT 5ML BY MOUTH EVERY 3 HOURS AS NEEDED    Canker sore       lisinopril 10 MG tablet    PRINIVIL/ZESTRIL    30 tablet    Take 1  tablet (10 mg) by mouth daily    Benign essential hypertension       loratadine 10 MG capsule      Take 10 mg by mouth daily as needed.        Melatonin 10 MG Tabs tablet      Take 10 mg by mouth nightly as needed for sleep        metroNIDAZOLE 1 % gel    METROGEL    60 g    Apply topically daily    Rosacea, Perioral dermatitis       order for DME      Use your CPAP device as directed by your provider.        order for DME     1 each    Please measure and distribute 1 pair of 20mmHg - 30mmHg knee high open toe compression stockings. Jobst ultrasheer or equivalent.    Varicose veins of both lower extremities with complications       order for DME     2 each    Please measure and distribute 1 pair of 20mmHg - 30mmHg thigh high open or closed toe compression stockings. Jobst ultrasheer or equivalent.    Symptomatic varicose veins of both lower extremities       VITAMIN D (CHOLECALCIFEROL) PO      Take 2,000 Units by mouth daily    Perioral dermatitis, Rosacea

## 2018-11-20 NOTE — PROGRESS NOTES
Preceptor Attestation:   Patient seen, evaluated and discussed with the resident. I have verified the content of the note, which accurately reflects my assessment of the patient and the plan of care.   Supervising Physician:  Bharat Steele MD

## 2018-11-20 NOTE — MR AVS SNAPSHOT
After Visit Summary   11/20/2018    Angy Haji    MRN: [id number]           Patient Information     Date Of Birth          1965        Visit Information        Provider Department      11/20/2018 8:40 AM Janet Nieto MD [facility]        Today's Diagnoses     Manic behavior (H)    -  1    SIADH (syndrome of inappropriate ADH production) (H)          Care Instructions    We will check a sodium and depakote level today.     I placed a referral for you to see Dr. Tapia here at [facility]. You will receive a myra from Behavioral health here at [facility].           Follow-ups after your visit        Additional Services     BEHAVIORAL HEALTH REFERRAL ([facility] interal and external)       Referring MD: JANET NIETO    May leave message on voicemail: Yes  PHQ-9 Score: not completed  GAD7 Score: not compelted                  Your next 10 appointments already scheduled     Nov 28, 2018 11:00 AM CST   Follow Up with Mar Xiong RD, MAKEDA   [facility] ([facility])    [address] [address]   [phone]            Dec 03, 2018  8:30 AM CST   (Arrive by 8:15 AM)   RETURN SLP VOICE with RALPH Osborne    [facility] ([facility])    [address] [place] [id number] [phone]            Dec 05, 2018 10:30 AM CST   Follow Up with Mar Xiong RD, MAKEDA   [facility] ([facility])    [address]  [facility] [address]   [phone]            Dec 06, 2018  9:00 AM CST   Office Visit with Romy Min MD   [facility]    [address] [place] [address] [phone]           Bring a current list of meds and any records pertaining to this visit. For Physicals, please bring  immunization records and any forms needing to be filled out. Please arrive 10 minutes early to complete paperwork.            Dec 06, 2018 10:20 AM CST   RETURN EXTENDED with Jono Odell MD   [facility] ([facility])    [address],  [address]  [place] [address]   [phone]              Who to contact     Please call your clinic at [phone] to:    Ask questions about your health    Make or cancel appointments    Discuss your medicines    Learn about your test results    Speak to your doctor            Additional Information About Your Visit        [employer]S Information     Hitpost gives you secure access to your electronic health record. If you see a primary care provider, you can also send messages to your care team and make appointments. If you have questions, please call your primary care clinic.  If you do not have a primary care provider, please call [phone] and they will assist you.      Hitpost is an electronic gateway that provides easy, online access to your medical records. With [employer], you can request a clinic appointment, read your test results, renew a prescription or communicate with your care team.     To access your existing account, please contact your [facility] or call [phone] for assistance.        Care EveryWhere ID     This is your Care EveryWhere ID. This could be used by other organizations to access your [place] medical records  [id number]        Your Vitals Were     Pulse Temperature Respirations Pulse Oximetry BMI (Body Mass Index)       75 98.1  F (36.7  C) (Oral) 16 100% 38.87 kg/m2        Blood Pressure from Last 3 Encounters:   11/20/18 (!) 159/99   11/15/18 111/70   11/09/18 155/68    Weight from Last 3 Encounters:   11/20/18 263 lb 3.2 oz (119.4 kg)   11/13/18 263 lb 11.2 oz (119.6 kg)   11/07/18 256 lb (116.1 kg)              We Performed the Following     BEHAVIORAL HEALTH REFERRAL  ([facility] interal and external)     Sodium     Valproic acid free     Valproic acid        Primary Care Provider Office Phone # Fax #    Jono Odell [phone]       [address] [facility] [address]        Equal Access to Services     SLADEORQUIDEA MIRLANDE : Hadii ariaan ku hadterio Soomaali, waaxda luqadaha, qaybta kaalmada adeegyada, svetlana stone hayanastasiyan adeshaan terry lareidglenroy cárdenas. So [facility] [phone].    ATENCIÓN: Si habla español, tiene a franco disposición servicios gratuitos de asistencia lingüística. Llame al [phone].    We comply with applicable federal civil rights laws and [place] laws. We do not discriminate on the basis of race, color, national origin, age, disability, sex, sexual orientation, or gender identity.            Thank you!     Thank you for choosing [facility]  for your care. Our goal is always to provide you with excellent care. Hearing back from our patients is one way we can continue to improve our services. Please take a few minutes to complete the written survey that you may receive in the mail after your visit with us. Thank you!             Your Updated Medication List - Protect others around you: Learn how to safely use, store and throw away your medicines at www.disposemymeds.org.          This list is accurate as of 11/20/18 10:30 AM.  Always use your most recent med list.                   Brand Name Dispense Instructions for use Diagnosis    artificial saliva Soln solution     44.3 mL    Swish and spit 2 mLs (2 sprays) in mouth every hour as needed for dry mouth    Dry mouth       benztropine 0.5 MG tablet    COGENTIN    15 tablet    Take 1 tablet (0.5 mg) by mouth 2 times daily as needed (EPSE)    Manic behavior (H)       Carboxymethylcellulose Sod PF 0.5 % Soln ophthalmic solution    REFRESH PLUS     Place 1 drop into both eyes 3 times daily as needed for dry eyes        cyclobenzaprine 10 MG tablet    FLEXERIL    30 tablet    Take 1 tablet (10  mg) by mouth nightly as needed for other (headache)    Tension headache       divalproex sodium delayed-release 500 MG DR tablet    DEPAKOTE    120 tablet    Take 2 tablets (1,000 mg) by mouth 2 times daily    Manic behavior (H)       estrogen (conjugated)-medroxyPROGESTERone 0.625-2.5 MG per tablet    PREMPRO    90 tablet    Take 1 tablet by mouth daily    Perimenopausal vasomotor symptoms       ibuprofen 800 MG tablet    ADVIL/MOTRIN     Take 400 mg by mouth every 4 hours as needed for other (headaches)        lidocaine (viscous) 2 % solution    XYLOCAINE    100 mL    SWISH AND SPIT 5ML BY MOUTH EVERY 3 HOURS AS NEEDED    Canker sore       lisinopril 10 MG tablet    PRINIVIL/ZESTRIL    30 tablet    Take 1 tablet (10 mg) by mouth daily    Benign essential hypertension       loratadine 10 MG capsule      Take 10 mg by mouth daily as needed.        Melatonin 10 MG Tabs tablet      Take 10 mg by mouth nightly as needed for sleep        metroNIDAZOLE 1 % gel    METROGEL    60 g    Apply topically daily    Rosacea, Perioral dermatitis       order for DME      Use your CPAP device as directed by your provider.        order for DME     1 each    Please measure and distribute 1 pair of 20mmHg - 30mmHg knee high open toe compression stockings. Jobst ultrasheer or equivalent.    Varicose veins of both lower extremities with complications       order for DME     2 each    Please measure and distribute 1 pair of 20mmHg - 30mmHg thigh high open or closed toe compression stockings. Jobst ultrasheer or equivalent.    Symptomatic varicose veins of both lower extremities       QUEtiapine 100 MG tablet    SEROquel    60 tablet    Take 1 tablet (100 mg) by mouth 2 times daily    Manic behavior (H)       VITAMIN D (CHOLECALCIFEROL) PO      Take 2,000 Units by mouth daily    Perioral dermatitis, Rosacea

## 2018-11-20 NOTE — TELEPHONE ENCOUNTER
Psychiatry Consult Referral:  Please schedule this patient with Dr. Tapia.  This is for a one time consult only, not for ongoing psychiatric care.  Dr. Tapia provides recommendations to the PCP for ongoing care.     Please let the patient know that this is an educational consult clinic.  For that reason, Dr. Tapia and a resident will be seeing the patient together.  If the patient is not comfortable with that we can assist with making arrangements for a psychiatry consult at an outside clinic.    If you are unable to reach the patient after two phone calls, please send a letter and close this encounter.    Thank you!

## 2018-11-20 NOTE — PROGRESS NOTES
Hospitalization Follow-up Visit         [facility]       Hospital Follow-up Visit:    Hospital:  [facility]   Date of Admission: 11/9/2018  Date of Discharge: 11/15/2018  Reason(s) for Admission: Manic Behavior            Problems taking medications regularly:  Yes, stopped taking Seroquel and benztropine on Saturday 11/17 due to nausea, vomiting, dry mouth, dry eyes, and constipation.        Post Discharge Medication Reconciliation: discharge medications reconciled and changed, per note/orders (see AVS).       Problems adhering to non-medication therapy:  As above       Medications reviewed by: by PharmD    Summary of hospitalization:  [facility] discharge summary reviewed  Diagnostic Tests/Treatments reviewed.  Follow up needed: recheck sodium in setting of SIADH, valproic acid level.  Other Healthcare Providers Involved in Patient s Care:         Psychiatry   Patient is scheduled to see Dr. Ingris Reynoso on Monday, 11/26.  She would prefer to see psychiatry here at [facility] with Dr. Tapia  Update since discharge: improved.  Since discharge, patient reports she feels more clear, less sedated, less unorganized.  Has been sleeping 7 hours a night.  Pressured speech has improved.  No hallucinations or delusions.  No suicidal ideations or homicidal ideations.  Wife reports that patient still has some issues with tracking.  Patient's wife believes that patient has improved significantly since prior to hospitalization.  She is a licensed clinical  and works in geriatric psychiatry.  Wife is able to identify symptoms of anthony and patient.  Plan of care communicated with patient and patient's wife.          No family hx of bipolar disorder. Denies a prior history of anthony or bipolar disorder.   Patient believes manic symptoms are due to Zoloft.  She started Zoloft April 2018.  Following this, she started to exhibit disorganized behavior, pressured speech, sleep impairments.  Her wife noticed  worsening of the symptoms as her Zoloft dose was increased.  The patient and wife believe that she was experiencing hypomania.         Review of Systems:   CONSTITUTIONAL: no fatigue, no unexpected change in weight  SKIN: no worrisome rashes  EYES: no acute vision problems or changes, no dry eyes  ENT: no ear problems, no mouth problems, no throat problems  RESP: no significant cough, no shortness of breath  CV: no chest pain, no palpitations  GI: no nausea, no vomiting, no constipation, no diarrhea  PSYCHIATRIC: NEGATIVE for changes in mood or affect, SI, HI, hallucinations or delusions.             Physical Exam:     Vitals:    11/20/18 0921   BP: (!) 159/99   Pulse: 75   Resp: 16   Temp: 98.1  F (36.7  C)   TempSrc: Oral   SpO2: 100%   Weight: 263 lb 3.2 oz (119.4 kg)     Body mass index is 38.87 kg/(m^2).    GENERAL: healthy, alert, no distress  HENT: eyes grossly normal to inspection  RESP: non labored breathing  Neuro: alert and oriented x 3  PSYCH: speech- coherent, normal rate and volume. Tangential at times but redirectable; able to articulate logical thoughts, able to abstract reason, no hallucinations or delusions, affect- normal         Results:     Results for orders placed or performed during the hospital encounter of 11/09/18   XR Video Swallow w Esophagram    Narrative    VIDEO SWALLOWING EVALUATION November 13, 2018 2:11 PM     HISTORY: Dysphagia.     COMPARISON: None.    FLUOROSCOPY TIME: 1.9 minutes.     Number of cine runs: 12.    FINDINGS:    Thin: Minimal residue. Otherwise normal.    Nectar: Not administered.    Honey: Not administered.    Pudding: Mild residue. Otherwise normal.    Semisolid: Piecemeal. Otherwise normal.    Solid: Piecemeal. Otherwise normal.    Limited evaluation of the esophagus demonstrates mildly decreased  esophageal motility and slightly delayed esophageal emptying.    WILLIAM SIMENTAL MD   CBC with platelets   Result Value Ref Range    WBC 4.8 4.0 - 11.0 10e9/L    RBC  Count 5.05 3.8 - 5.2 10e12/L    Hemoglobin 14.9 11.7 - 15.7 g/dL    Hematocrit 43.2 35.0 - 47.0 %    MCV 86 78 - 100 fl    MCH 29.5 26.5 - 33.0 pg    MCHC 34.5 31.5 - 36.5 g/dL    RDW 12.4 10.0 - 15.0 %    Platelet Count 193 150 - 450 10e9/L   Basic metabolic panel   Result Value Ref Range    Sodium 128 (L) 133 - 144 mmol/L    Potassium 4.1 3.4 - 5.3 mmol/L    Chloride 97 94 - 109 mmol/L    Carbon Dioxide 26 20 - 32 mmol/L    Anion Gap 5 3 - 14 mmol/L    Glucose 95 70 - 99 mg/dL    Urea Nitrogen 7 7 - 30 mg/dL    Creatinine 0.41 (L) 0.52 - 1.04 mg/dL    GFR Estimate >90 >60 mL/min/1.7m2    GFR Estimate If Black >90 >60 mL/min/1.7m2    Calcium 8.9 8.5 - 10.1 mg/dL   TSH with free T4 reflex and/or T3 as indicated   Result Value Ref Range    TSH 2.29 0.40 - 4.00 mU/L   Basic metabolic panel   Result Value Ref Range    Sodium 123 (L) 133 - 144 mmol/L    Potassium 4.4 3.4 - 5.3 mmol/L    Chloride 90 (L) 94 - 109 mmol/L    Carbon Dioxide 25 20 - 32 mmol/L    Anion Gap 8 3 - 14 mmol/L    Glucose 75 70 - 99 mg/dL    Urea Nitrogen 9 7 - 30 mg/dL    Creatinine 0.44 (L) 0.52 - 1.04 mg/dL    GFR Estimate >90 >60 mL/min/1.7m2    GFR Estimate If Black >90 >60 mL/min/1.7m2    Calcium 8.6 8.5 - 10.1 mg/dL   Sodium random urine   Result Value Ref Range    Sodium Urine mmol/L 21 mmol/L   Osmolality urine   Result Value Ref Range    Urine Osmolality 349 100 - 1200 mmol/kg   Sodium   Result Value Ref Range    Sodium 122 (L) 133 - 144 mmol/L   Osmolality   Result Value Ref Range    Osmolality 258 (L) 275 - 295 mmol/kg   Sodium   Result Value Ref Range    Sodium 123 (L) 133 - 144 mmol/L   Sodium   Result Value Ref Range    Sodium 123 (L) 133 - 144 mmol/L   Sodium   Result Value Ref Range    Sodium 123 (L) 133 - 144 mmol/L   Sodium   Result Value Ref Range    Sodium 129 (L) 133 - 144 mmol/L   Sodium   Result Value Ref Range    Sodium 127 (L) 133 - 144 mmol/L   Sodium   Result Value Ref Range    Sodium 127 (L) 133 - 144 mmol/L   Valproic  acid   Result Value Ref Range    Valproic Acid Level 52 50 - 100 mg/L   Sodium   Result Value Ref Range    Sodium 126 (L) 133 - 144 mmol/L   Sodium   Result Value Ref Range    Sodium 135 133 - 144 mmol/L   Sodium   Result Value Ref Range    Sodium 132 (L) 133 - 144 mmol/L   Sodium   Result Value Ref Range    Sodium 133 133 - 144 mmol/L   Sodium   Result Value Ref Range    Sodium 137 133 - 144 mmol/L   Sodium   Result Value Ref Range    Sodium 134 133 - 144 mmol/L   Sodium   Result Value Ref Range    Sodium 133 133 - 144 mmol/L       Assessment and Plan      1. SIADH (syndrome of inappropriate ADH production) (H)  Sodium 122 on admission, improved to 133 on day of discharge 11/15.  Will recheck sodium today.  Patient has been following a 1.5 L water restriction.  - Sodium    2. Manic behavior (H)  Do not believe the patient is acutely manic at this time requiring further intervention.  Since discharge, overall she has been doing well with improvement in speech, sleep, and organization.  She is currently on Depakote 1000 mg twice a day.  Has stopped her Seroquel and Cogentin due to adverse side effects as mentioned above.  Recommend psychiatry visit for medication management.  Will not start an alternative second generation antipsychotic at this time, as patient has not tolerated Zyprexa or Seroquel, and will await further recommendations from psychiatry.  This is in agreement with patient.  Patient would like to see Dr. Tapia at [facility].  Will check a Depakote level today.  - Valproic acid  - Valproic acid free  - BEHAVIORAL HEALTH REFERRAL ([facility] interal and external)    Blood pressure elevated in clinic today. Has been elevated in the past. Is currently on lisinopril 10mg daily.  Unable to discuss blood pressure in detail at today's visit, as patient arrived late and the focus was on her mental health.  Will discuss at follow-up visit.  Plan to increase lisinopril dose if blood pressure remains  elevated.    E&M code to be billed if TCM cannot be: 75986    Type of decision making: Moderate complexity (34620)    Options for treatment and follow-up care were reviewed with the patient  Angy Haji   engaged in the decision making process and verbalized understanding of the options discussed and agreed with the final plan.    Vidya Rai MD  Family Medicine PGY3 Resident

## 2018-11-20 NOTE — PROGRESS NOTES
Clinical Pharmacy Note     Angy was referred by Dr. Odell for pharmacy services for Hospital follow up and medication review       MEDICATION REVIEW:  Discussed all medication indications, dosage and effectiveness, adverse effects, and adherence with patient/caregiver.    Pt had meds with them: no  Pt had med list with them: no  Pt was knowledgeable about meds: yes  Medications set up by: patient  Medications administered by someone else (e.g., LTCF): No  Pt uses a medication box or automated dispenser: no  Called pharmacy to obtain or clarify med list:  no  Called HHN or LTCF to obtain or clarify med list:  no    Medication Discrepancies  Medications on EMR med list that pt is NOT taking:  none  Medications pt IS taking that are NOT on EMR med list (e.g., from specialist, hospital): yes, benztropine and quetiapine   OTC meds/ dietary supplements pt taking on own that are NOT on EMR med list:  yes, refresh eye drops  Dosage listed differently than how patient is taking: none  Frequency listed differently than how patient is taking: none  Duplicate medication on list (two occurrences of the same medication):  none  TOTAL NUMBER OF MEDICATION DISCREPANCIES:  3  ______________________________________________________________________    Subjective:  Angy is here today for follow up after recent hospitalization. She was admitted (11/9-11/15) because of manic behavior. The primary focus of the visit was the events leading up to hospitalization and how she has been feeling since discharge.        1)   Manic Episode;     Angy believes that her anthony was caused by sertraline. She started taking sertraline in late April 2018 as an adjuvant to hormonal therapy for menopausal symptoms. Her menopausal symptoms were primarily night sweats and hot flashes. Hormonal therapy was effective in decreasing the intensity of her episodes but not the frequency. Since sertraline was added there has been mild if any improvement of  "menopausal symptoms.     She does note that she has underlying anxiety which she has controlled for years with therapy, exercise, and lifestyle.     According to her wife her manic behavior slowly ramped up over the last six months (primarily cyclical thought patterns and rapid speech) and she relates it to dose increases in her sertraline dose. On September 19th was her last dose increase (to sertraline 150 mg daily). Starting mid October her behavior became severely manic to the point her wife could not hold a conversation with her and they decided to go to the ED.     Hospital course     In the ED sertraline was stopped and she was given lorazepam which \"slowed her down a bit\"      She was started on olanzapine and divalproex. Due to EPS she was then started on benztropine.     She experienced \"tongue swelling\" and was switched off olanzapine to quetiapine.     On Saturday she self-discontinued quetiapine due to vomiting, somnolence, shuffling gait, and tiredness. She has continued to take divalproex DR 1,000 mg BID.     To me Angy still seems manic. According to her wife she has come down a lot from where she was pre-hospital. She does not interrupt but when she answers questions she tells the entire back story leading up to the answer with tangents and is cyclical in her speech. I tried to jazmín from her wife if this level of activation is her baseline and I believe she is still experiencing some anthony. Rachael (Angy's wife) is very supportive of Angy but it is obvious that recent events are taking a toll on her.     She notes that her quality of life (compared to a year ago being 10/10) is a 2/10. 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           She wishes to establish care at " [facility] with Dr. Odell and potentially with behavorial health as well.     Patient reported being compliant most of the time   Patient reports some side effects including dry mouth, dry eyes.    Objective:    There were no vitals taken for this visit.  BP Readings from Last 6 Encounters:   11/20/18 (!) 159/99   11/15/18 111/70   11/09/18 155/68   11/07/18 118/73   11/05/18 (!) 162/100   11/01/18 140/80     Estimated Creatinine Clearance: 208.9 mL/min (based on Cr of 0.44).  GFR Estimate   Date Value Ref Range Status   11/11/2018 >90 >60 mL/min/1.7m2 Final     Comment:     Non  GFR Calc   11/10/2018 >90 >60 mL/min/1.7m2 Final     Comment:     Non  GFR Calc   08/09/2018 >90 >60 mL/min/1.7m2 Final     Comment:     Non  GFR Calc     GFR Estimate If Black   Date Value Ref Range Status   11/11/2018 >90 >60 mL/min/1.7m2 Final     Comment:      GFR Calc   11/10/2018 >90 >60 mL/min/1.7m2 Final     Comment:      GFR Calc   08/09/2018 >90 >60 mL/min/1.7m2 Final     Comment:      GFR Calc     Immunization History   Administered Date(s) Administered     Influenza (IIV3) PF 10/23/2011, 10/12/2012, 09/27/2013     Influenza Vaccine IM 3yrs+ 4 Valent IIV4 09/23/2014, 09/24/2015, 09/27/2016, 09/21/2017     MMR 01/15/1969, 01/15/1985     TD (ADULT, 7+) 08/03/1999     TDAP Vaccine (Adacel) 01/22/2008, 04/10/2018     Patient Active Problem List   Diagnosis     Oral aphthae     Obesity (BMI 30-39.9)     Disorder of bursae and tendons in shoulder region     CARDIOVASCULAR SCREENING; LDL GOAL LESS THAN 160     Rosacea     Tear of medial cartilage or meniscus of knee, current     Raynaud disease     Vitamin D deficiency disease     Mild sleep apnea     Atopic rhinitis     Morbid obesity (H)     Muscle tension dysphonia     Palpitations     Dysphagia, unspecified type     Anxiety     Manic behavior (H)     SOCIAL HISTORY:   reports that she  has never smoked. She has never used smokeless tobacco. She reports that she drinks alcohol. She reports that she does not use illicit drugs.    Current Outpatient Prescriptions   Medication Sig Dispense Refill     artificial saliva (BIOTENE MT) SOLN solution Swish and spit 2 mLs (2 sprays) in mouth every hour as needed for dry mouth 44.3 mL 0     Carboxymethylcellulose Sod PF (REFRESH PLUS) 0.5 % SOLN ophthalmic solution Place 1 drop into both eyes 3 times daily as needed for dry eyes       divalproex sodium delayed-release (DEPAKOTE) 500 MG DR tablet Take 2 tablets (1,000 mg) by mouth 2 times daily 120 tablet 0     estrogen, conjugated,-medroxyPROGESTERone (PREMPRO) 0.625-2.5 MG per tablet Take 1 tablet by mouth daily 90 tablet 3     ibuprofen (ADVIL/MOTRIN) 800 MG tablet Take 400 mg by mouth every 4 hours as needed for other (headaches)        lidocaine, viscous, (XYLOCAINE) 2 % solution SWISH AND SPIT 5ML BY MOUTH EVERY 3 HOURS AS NEEDED 100 mL 0     lisinopril (PRINIVIL/ZESTRIL) 10 MG tablet Take 1 tablet (10 mg) by mouth daily 30 tablet 1     Melatonin 10 MG TABS tablet Take 10 mg by mouth nightly as needed for sleep       metroNIDAZOLE (METROGEL) 1 % gel Apply topically daily 60 g 9     order for DME Please measure and distribute 1 pair of 20mmHg - 30mmHg thigh high open or closed toe compression stockings. Jobst ultrasheer or equivalent. 2 each 3     order for DME Please measure and distribute 1 pair of 20mmHg - 30mmHg knee high open toe compression stockings. Jobst ultrasheer or equivalent. 1 each 1     ORDER FOR DME Use your CPAP device as directed by your provider.       VITAMIN D, CHOLECALCIFEROL, PO Take 2,000 Units by mouth daily       cyclobenzaprine (FLEXERIL) 10 MG tablet Take 1 tablet (10 mg) by mouth nightly as needed for other (headache) (Patient not taking: Reported on 11/20/2018) 30 tablet 0     Loratadine 10 MG capsule Take 10 mg by mouth daily as needed.       Allergies   Allergen Reactions      Seroquel [Quetiapine] Nausea and Vomiting     Had constant vomiting     Penicillins Rash     Thimerosal Other (See Comments)     blisters on inside of eyelid       Environmental factors that may impact patient: none..cr[p    There were no vitals taken for this visit.    Drug Therapy Assessment:  Drug therapy problems identified:       1. Manic disorder, single episode, severe behavior (H)  Status- uncontrolled   DTP- needs additional drug therapy  degree 2  Goal of therapy is to stop anthony symptoms and return to baseline. Angy appears to be experiencing anthony still. Although she is not as activated as pre-hospital she has not returned to baseline. It is still unclear if sertraline caused anthony or there is an underlying issue potentially related to bipolar disorder, anxiety, hormone imbalance, social stress, or other. If she had underlying undiagnosed bipolar disorder, sertraline may have precipitated manic episode as this is not uncommon with SSRIs. Although rare, there are documented cases of sertraline, alone, causing anthony, they become more common in patients with underlying bipolar disorder. Sertraline was discontinued 9 days ago, given half life of the drug she may still be experiencing some of its effects.  Another potential consideration related to vomiting and fatigue (that Angy attributed to quetiapine) is serotonin specific reuptake inhibitor withdrawal since it was stopped abruptly during hospitalization. Unclear if patient received antiemetics while inpatient that could mask the withdrawal symptoms. Patient would benefit from hormone level check to make sure levels are balanced given hormonal therapy. She seems to be doing well with divalproex DR 1,000 mg BID (typical doses for acute anthony are 1,500-2,500 mg/day). If additional therapy is needed lithium IR started at 300 mg TID is a good therapeutic option. Furthermore, genetic screening maybe useful for Angy not only to determine her metabolism  of sertraline but this could assist in future clinical decisions. Patient is heavily resistant to antipsychotics which are another option. Angy has follow up appointment with Dr. Odell next week. As sertraline continues to clear from Angy's system I am interested in how she will present next week.      2. Hypertension   Status- uncontrolled   DTP- needs additional drug therapy/dose too low degree 2  Blood pressure goal is <130/80 mmHg per ACC/AHA 2017. Patient was above goal in clinic today. Acute manic episode likely causing elevation in blood pressure. However, patient blood pressures over last year have all been above goal. Blood pressure should be reassessed at next clinic visit to see if it is still elevated. If high, lisinopril dose can be increased to 20 mg daily or hydrochlorothiazide 25 mg daily can be added.      All medications were reviewed and found to be indicated, effective, safe and convenient unless drug therapy problem identified as described above.        Drug Therapy Plan and Follow up:    Plan  1. Manic disorder, single episode, severe behavior (H)  - Continue divalproex DR 1,000 mg BID  - Follow up with Dr. Odell next week   - Consider starting Lithium  mg TID if additional agent is needed.   - Recheck blood pressure at next clinic appointment. If high, I recommend increasing lisinopril to 20 mg daily.     Follow up: with PharmD as requested by patient or physician.     Options for treatment and/or follow-up care were reviewed with the patient. Patient was engaged and actively involved in the decision making process.  Angy QUINTANA Eduardodeann verbalized understanding of the options discussed and was satisfied with the final plan.      Dr. Odell was provided my action  via routed note and Dr. Morales was available for supervision during this visit and is the authorizing prescriber for this visit through the pharmacist collaborative practice agreement.      SERGIO Huitron Pharm.D. IV Student      ===============    I was present with the pharmacy student who participated in the service and in the documentation of this note. I have verified the history, personally performed the medical decision making, and have verified the content of the note, which accurately reflects my assessment of the patient and the plan of care.      =================          Total Time: (P) 1 hr  # DTPs Identified: (P) 2

## 2018-11-20 NOTE — PATIENT INSTRUCTIONS
We will check a sodium and depakote level today.     I placed a referral for you to see Dr. Tapia here at [facility]. You will receive a myra from Behavioral health here at [facility].

## 2018-11-20 NOTE — LETTER
November 25, 2018      Angy Haji  [address]    [place] [address]        Dear Angy,    Thank you for getting your care at [facility]. Please see below for your valproic acid level test results.    Resulted Orders   Valproic acid   Result Value Ref Range    Valproic Acid Level 99 50 - 100 mg/L   Valproic acid free   Result Value Ref Range    Valproic Acid Free 13.3 6.0 - 20.0 ug/mL      Comment:      Analysis performed by [facility] [employer] [employer] [place] [address]       Sincerely,    Vidya Rai MD

## 2018-11-21 ENCOUNTER — TELEPHONE (OUTPATIENT)
Dept: FAMILY MEDICINE | Facility: CLINIC | Age: 53
End: 2018-11-21

## 2018-11-21 NOTE — TELEPHONE ENCOUNTER
[facility] phone call message- patient requesting results.    Test: Lab    Date of test: 11/20/18    Additional Comments: Gave results for sodium level. In addition, she wanted the results for the Depakote (anti seizure)    Lab tab checked to verify if result comment present with in each order: Yes    Letters tab checked to verify if lab result letter has been entered: Yes    OK to leave a message on voice mail? Yes    Advised patient response may take up to 2 business days: Yes    Primary language: English      needed? No    Call taken on November 21, 2018 at 9:04 AM by Destiney Tesfaye    Route to [facility]

## 2018-11-22 LAB — VALPROATE FREE SERPL-MCNC: 13.3 UG/ML

## 2018-11-24 ENCOUNTER — HEALTH MAINTENANCE LETTER (OUTPATIENT)
Age: 53
End: 2018-11-24

## 2018-11-26 NOTE — PROGRESS NOTES
I have verified the content of the note, which accurately reflects my assessment of the patient and the plan of care.   Roddy Nickerson, PharmD

## 2018-11-27 ENCOUNTER — TELEPHONE (OUTPATIENT)
Dept: SLEEP MEDICINE | Facility: CLINIC | Age: 53
End: 2018-11-27

## 2018-11-27 NOTE — TELEPHONE ENCOUNTER
Reason for call:  Other   Patient called regarding (reason for call): appointment and call back  Additional comments: patient would like a call back regarding her mask leaking     Phone number to reach patient:  Home number on file [phone] (home)    Best Time:  Any      Can we leave a detailed message on this number?  YES

## 2018-11-28 NOTE — TELEPHONE ENCOUNTER
Called pt back regarding her leak.  She is still having varying leak. Looking at her data, some nights her leak is better and other nights it's high.  She has replaced the cushion recently.  I will review the data with Dr. Calloway and talk with her about recommendations and call patient back tomorrow to discuss options and a plan.

## 2018-11-29 ENCOUNTER — DOCUMENTATION ONLY (OUTPATIENT)
Dept: SLEEP MEDICINE | Facility: CLINIC | Age: 53
End: 2018-11-29

## 2018-11-29 DIAGNOSIS — G47.33 OSA (OBSTRUCTIVE SLEEP APNEA): Primary | ICD-10-CM

## 2018-11-29 NOTE — PROGRESS NOTES
STM Recheck Visit    Subjective measures:   Pt is struggling with mask leak and is concerned about elevated AHI. I reviewed her download with her.  It appears that she is still having obstructive events when her leak is controlled.  She is open to adjusting the pressure to Auto and also coming in to do a mask fitting with STM to try to find an optimal mask.     Assessment: Pt not meeting objective benchmarks for AHI and leak Patient failing following subjective benchmarks: leak issues   Action plan: schedule mask fit appointment and order placed to provider for pressure change    Device type: Auto CPAP  PAP settings: CPAP fixed 9 cm  H20    Objective measures: 14 day rolling measures         Compliance 85.7 %     % of night spent in large leak  7.7 % last  upload      AHI 7.1   last  upload      Average number of minutes 329        Objective measure goal  Compliance   Goal >70%  Leak   Goal < 10%  AHI  Goal < 5  Usage  Goal >240

## 2018-11-29 NOTE — Clinical Note
Change to Auto to see if it helps with AHI.  She's having events even on the nights that her leak is controlled.  Is this ok?

## 2018-12-05 ENCOUNTER — OFFICE VISIT (OUTPATIENT)
Dept: PSYCHOLOGY | Facility: CLINIC | Age: 53
End: 2018-12-05
Payer: COMMERCIAL

## 2018-12-05 ENCOUNTER — TELEPHONE (OUTPATIENT)
Dept: PSYCHOLOGY | Facility: CLINIC | Age: 53
End: 2018-12-05

## 2018-12-05 DIAGNOSIS — F30.10 MANIC BEHAVIOR (H): ICD-10-CM

## 2018-12-05 DIAGNOSIS — F30.9 MANIA (H): Primary | ICD-10-CM

## 2018-12-05 RX ORDER — DIVALPROEX SODIUM 500 MG/1
1000 TABLET, DELAYED RELEASE ORAL 2 TIMES DAILY
Qty: 120 TABLET | Refills: 0 | Status: SHIPPED | OUTPATIENT
Start: 2018-12-05 | End: 2018-12-14

## 2018-12-05 RX ORDER — RISPERIDONE 1 MG/1
0.5 TABLET ORAL 2 TIMES DAILY
Qty: 20 TABLET | Refills: 1 | Status: SHIPPED | OUTPATIENT
Start: 2018-12-05 | End: 2018-12-14 | Stop reason: SINTOL

## 2018-12-05 NOTE — PATIENT INSTRUCTIONS
- Follow up with me on Dec 14 at 4PM. Psychiatry Clinic -- [phone]  - Start Riserdal 0.5mg twice daily.  - Continue Depakote 1000mg twice daily.  - Amberly Gordon, my nurse partner, will call you on Friday. If you're tolerating the Risperdal, can increase to 1mg twice daily.  - Please call [employer] to find out what day treatment programs they would cover.

## 2018-12-05 NOTE — PROGRESS NOTES
[facility]  PSYCHIATRY OUTPATIENT CONSULT      Angy Haji is a 53 year old female who prefers the name Angy and pronouns she, her, hers.    Pt seen by:  Dr. Tapia and Dr. Sandoval  Referred by PCP:  Jono Odell  Referral Question:  Make recommendations for bipolar disorder.  History Provided by:  patient and her wife Rachael who was a limited historian.   We spent 60 minutes face to face time with the pt, greater than 50% in counseling and care coordination.      DIAGNOSES                                   Bipolar Disorder, Type I, current manic episode, perimenopausal onset   Vs. Medication-induced bipolar and related disorder     ASSESSMENT                                    SUMMARY:     Angy Haji is a 52yo female with a history of anxiety who presents for follow-up care after admission to in psychiatry last month for an acute manic episode in the context of insomnia related perimenopausal hotflashes and recent addition of sertraline, as well as a variety of psychosocial stressors including recent move, conflicts at work, and daughter with mental health needs. Presentation is most consistent with an acute manic episode. Whether or not this is primary bipolar disorder or secondary to sertraline could be debate and will require longer period of observation. She today reports ongoing manic symptoms and presents as such in interview. She is pressured, tangential, and labile, and is difficult to interview.    Pt needs ongoing psychiatric care and so will I will continue to follow her case for the time being. Will likely transfer to another resident over the next several months.    Angy did report that she is having difficulty swallowing.  This was evaluated while she was hospitalized.  Unclear whether related to antipsychotic or some underlying  neurologic condition that could also explain the anthony.  We will continue to evaluate.    Will refer for ECT consult as well in the event  "risperidone isn't tolerated or is ineffective.    Settled on adding low-dose risperidone. Discussed risks of recurrent EPSE.    TREATMENT DISCUSSION:   Pharmacotherapy and Drug Interactions-      RECOMMENDATIONS                                                                                                       1) MEDICATION:  - Continue depakote 1000mg BID  - Start risperidone 0.5mg BID x 2 days then increase to 1mg BID if tolerating  - If ongoing insomnia with risperidone, add lorazepam 2mg at bedtime x 2 weeks    2) THERAPY:  Advised pt to call insurer for covered PHP and day programs. Wife Rachael states she can facilitate this.    3) FOLLOW-UP: by phone with my nurse partner Amberly Gordon, then with me again at [facility] on Dec 14    4) REFERALS:  - Will refer to Dr. Donnelly for ECT consultation for if pt cannot tolerate antipsychotic treatment    5) CRISIS NUMBERS:   None today. If needed in the future, would give:  [facility] - [phone]  COPE 24/7 [facility] Mobile Team for Adults [[phone]];  Child [[phone]]    [facility] - [phone]  Urgent Care Adult [facility]: Drop-in, 24/7 crisis line and [facility] Mobile Team [[phone]]   CRISIS TEXT LINE: Text [phone] from anywhere, anytime, any crisis 24/7;  OR SEE www.crisistextline.org     CHIEF COMPLAINT                                                                                                             \" I had my first manic episode \"     HISTORY OF PRESENT ILLNESS                                                                                         Pertinent Background:  Reports history of anxiety and possible singular episode of depression in teenage years.    Psych critical item history includes psych hosp (<3), anthony. No substance use concerns.    Most recent history    Patient presents with his wife Rachael after having been recently hospitalized 1 mo for manic episode. Reports that she developed " "manic symptoms starting sometime in the summer. The patient is actively manic, rambling and tangential, and so establishing a clear timeline of symptom progression was challenging. She states that she began having difficulty with sleep over a year ago because of perimenopausal hot flashes. She reports a history of at least a singular depressive episode and lifelong difficulties with \"general anxiety symptoms,\" but denies history of manic or hypomanic symptoms prior to this recent episode. Reports (and patient's wife confirms) mood lability, increased goal directed activity including impulsive shopping, elevated mood and intermittent/inappopriate crying, and decreased sleep. Patient's symptoms of anthony appear to have begun sometime this summer. She was started on sertraline. A dose increase increase coincided worth worsening racing thoughts and pressured speech. She has been upsetting her wife. Rachael states the patient has been intrusive, hyperverbal, interruptive. The patient has been on medical leave from work. She attempted to return but has received criticism for supervisors. Additionally, family has been stressed because of a need to relocate their 13yo to a different school because of the child's own mental health issues.    Pt is unsure if she has gained weight on Depakote. She is reporting cognitive dulling.    Recent Symptoms:   Depression:  feeling hopeless, feeling trapped, excessive crying and overwhelmed  Elevated:  increased energy, decreased sleep need, increased activity, grandiosity, distractibility , racing thoughts, pressured speech and excessive spending  Psychosis:  denies hallucinations, denies delusional thought content  Anxiety:  excessive worry, feeling fearful and nervous/overwhelmed    Recent Substance Use  none reported    SUBSTANCE USE HISTORY                                                                None    PSYCHIATRIC HISTORY     As described above. Pt was admitted for first time to " inpt psychiatry in November 2019 (see discontinue summary). Was initially started on Depakote and olanzapine. Lithium was avoided because pt is on lisinopril for HTN. Pt developed EPSE, including possible acute dystonic reaction in tongue, as well as Parkinsonism (flat facies, shuffling gate), and siallorhea. Olanzapine was discontinued and switched to quetiapine 100mg BID, but pt continued to report side effects and so discontinued after discharge.    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 She is worried about revealing her mental health problems to her provider and has felt they have not been supportive of her needs. She is  to her wife Rachael and they have a 13yo dtr. They recently moved to a new apartment in the [place] area.    No family history of Schizophrenia or Bipolar Disorder. Family history positive for anxiety disorders.    MEDICAL / SURGICAL HISTORY                                   Patient Active Problem List   Diagnosis     Oral aphthae     Obesity (BMI 30-39.9)     Disorder of bursae and tendons in shoulder region     CARDIOVASCULAR SCREENING; LDL GOAL LESS THAN 160     Rosacea     Tear of medial cartilage or meniscus of knee, current     Raynaud disease     Vitamin D deficiency disease     Mild sleep apnea     Atopic rhinitis     Morbid obesity (H)     Muscle tension dysphonia     Palpitations     Dysphagia, unspecified type     Anxiety     Manic behavior (H)       Past Surgical History:   Procedure Laterality Date     HC TOOTH EXTRACTION W/FORCEP  1984/85    [place] [facility]     LAPAROSCOPIC CHOLECYSTECTOMY  7/29/2013    Procedure: LAPAROSCOPIC CHOLECYSTECTOMY;  Laparoscopic Cholecystectomy;  Surgeon: Fabio Johnson MD;  Location: [facility] OR     LAPAROSCOPIC OOPHORECTOMY Left 6/23/2016    Procedure: LAPAROSCOPIC OOPHORECTOMY;  Surgeon: Kayley Donnelly  MD Maki;  Location: [facility] OR     LAPAROSCOPIC SALPINGECTOMY Bilateral 6/23/2016    Procedure: LAPAROSCOPIC SALPINGECTOMY;  Surgeon: Kayley Donnelly MD;  Location: [facility] OR       MEDICAL REVIEW OF SYSTEMS                                                                                    A comprehensive review of systems was performed and is negative other than noted in the HPI.       ALLERGY                                Seroquel [quetiapine]; Penicillins; and Thimerosal   MEDICATIONS                                 Current Outpatient Prescriptions   Medication Sig Dispense Refill     artificial saliva (BIOTENE MT) SOLN solution Swish and spit 2 mLs (2 sprays) in mouth every hour as needed for dry mouth 44.3 mL 0     Carboxymethylcellulose Sod PF (REFRESH PLUS) 0.5 % SOLN ophthalmic solution Place 1 drop into both eyes 3 times daily as needed for dry eyes       cyclobenzaprine (FLEXERIL) 10 MG tablet Take 1 tablet (10 mg) by mouth nightly as needed for other (headache) (Patient not taking: Reported on 11/20/2018) 30 tablet 0     divalproex sodium delayed-release (DEPAKOTE) 500 MG DR tablet Take 2 tablets (1,000 mg) by mouth 2 times daily 120 tablet 0     estrogen, conjugated,-medroxyPROGESTERone (PREMPRO) 0.625-2.5 MG per tablet Take 1 tablet by mouth daily 90 tablet 3     ibuprofen (ADVIL/MOTRIN) 800 MG tablet Take 400 mg by mouth every 4 hours as needed for other (headaches)        lidocaine, viscous, (XYLOCAINE) 2 % solution SWISH AND SPIT 5ML BY MOUTH EVERY 3 HOURS AS NEEDED 100 mL 0     lisinopril (PRINIVIL/ZESTRIL) 10 MG tablet Take 1 tablet (10 mg) by mouth daily 30 tablet 1     Loratadine 10 MG capsule Take 10 mg by mouth daily as needed.       Melatonin 10 MG TABS tablet Take 10 mg by mouth nightly as needed for sleep       metroNIDAZOLE (METROGEL) 1 % gel Apply topically daily 60 g 9     order for DME Please measure and distribute 1 pair of 20mmHg - 30mmHg thigh high open or closed toe compression  stockings. Jobst ultrasheer or equivalent. 2 each 3     order for DME Please measure and distribute 1 pair of 20mmHg - 30mmHg knee high open toe compression stockings. Jobst ultrasheer or equivalent. 1 each 1     ORDER FOR DME Use your CPAP device as directed by your provider.       VITAMIN D, CHOLECALCIFEROL, PO Take 2,000 Units by mouth daily       VITALS   There were no vitals taken for this visit.   MENTAL STATUS EXAM                                                             Alertness: alert  and oriented  Appearance: well groomed  Behavior/Demeanor: cooperative, with good  eye contact   Speech: increased rate and pressured  Language: intact  Psychomotor: restless, fidgety and agitated  Mood: anxious, elevated and labile  Affect: labile and tearful; was congruent to mood; was congruent to content  Thought Process/Associations: circumstantial, tangential, overinclusive  and difficult to follow  Thought Content:  Reports as described in HPI;  Denies suicidal ideation, violent ideation, delusions, preoccupations and obsessions   Perception:  Reports none;  Denies auditory hallucinations, visual hallucinations, depersonalization and derealization  Insight: fair  Judgment: adequate for safety  Cognition: (6) does  appear grossly intact; formal cognitive testing was not done  Gait and Station: unremarkable    LABS and DATA     PHQ9 TODAY = not completed    Recent Labs   Lab Test  11/11/18   0901  11/10/18   0756  08/09/18   0937   CR  0.44*  0.41*  0.58   GFRESTIMATED  >90  >90  >90     Recent Labs   Lab Test  08/09/18   0937  08/16/11   0757   AST  20  24   ALT  23  13   ALKPHOS   --   66        STATEMENTS REGARDING TREATMENT RISK and CONSULT PROCESS      TREATMENT RISK STATEMENT:  The risks, benefits, alternatives and potential adverse effects have been explained and are understood by the pt. The pt agrees to the treatment plan with the ability to do so. The pt knows to call the clinic for any problems or to  access emergency care if needed.  Medical and CD concerns are documented above.  Psychotropic drug interaction check was done, including changes made today, and is discussed above.     STATEMENT REGARDING CONSULT:  Intervention decisions emerging from this consult will be either made by the PCP or initiated today in agreement with PCP.  Note, this is a one time consult only.  No psychiatry follow-up will be provided. PCP is encouraged to contact this consultant if future assistance is desired.    COUNSELING AND COORDINATION OF CARE CONSISTED OF:  Diagnosis, impressions, risk and benefits of treatment options, instructions for treatment and follow up and plan for additional supporting services.    Patient staffed with Dr. Tapia, who will review/sign this note.  I saw the patient with the resident, and participated in key portions of the service, including the mental status examination and developing the plan of care. I reviewed key portions of the history with the resident. I agree with the findings and plan as documented in this note.    Dacia Tapia

## 2018-12-05 NOTE — MR AVS SNAPSHOT
After Visit Summary   12/5/2018    Angy Haji    MRN: [id number]           Patient Information     Date Of Birth          1965        Visit Information        Provider Department      12/5/2018 9:30 AM Galo Sandoval MD [facility]        Today's Diagnoses     Thuy (H)    -  1    Manic behavior (H)          Care Instructions    - Follow up with me on Dec 14 at 4PM. Psychiatry Clinic -- [phone]  - Start Riserdal 0.5mg twice daily.  - Continue Depakote 1000mg twice daily.  - Amberly Gordon, my nurse partner, will call you on Friday. If you're tolerating the Risperdal, can increase to 1mg twice daily.  - Please call [employer] to find out what day treatment programs they would cover.          Follow-ups after your visit        Your next 10 appointments already scheduled     Dec 06, 2018 10:20 AM CST   RETURN EXTENDED with Jono Odell MD   [facility] ([facility])    [address],  [address]  [place] [id number] [phone]            Dec 10, 2018  9:30 AM CST   DME RETURN with VIRTUAL CARE COORDINATOR 4   [facility]    [address] [place] [address], [address]  [facility] [address] [phone]              Who to contact     Please call your clinic at [phone] to:    Ask questions about your health    Make or cancel appointments    Discuss your medicines    Learn about your test results    Speak to your doctor            Additional Information About Your Visit        [employer] Information     [employer] gives you secure access to your electronic health record. If you see a primary care provider, you can also send messages to your care team and make appointments. If you have questions, please call your primary care clinic.  If you do not have a primary care provider, please call [phone] and they will assist you.      [employer] is an electronic gateway that provides  easy, online access to your medical records. With [email or web address], you can request a clinic appointment, read your test results, renew a prescription or communicate with your care team.     To access your existing account, please contact your [facility] or call [phone] for assistance.        Care EveryWhere ID     This is your Care EveryWhere ID. This could be used by other organizations to access your [facility] medical records  [id number]         Blood Pressure from Last 3 Encounters:   11/20/18 (!) 159/99   11/15/18 111/70   11/09/18 155/68    Weight from Last 3 Encounters:   11/20/18 263 lb 3.2 oz (119.4 kg)   11/13/18 263 lb 11.2 oz (119.6 kg)   11/07/18 256 lb (116.1 kg)              Today, you had the following     No orders found for display         Today's Medication Changes          These changes are accurate as of 12/5/18 11:27 AM.  If you have any questions, ask your nurse or doctor.               Start taking these medicines.        Dose/Directions    risperiDONE 1 MG tablet   Commonly known as:  risperDAL   Used for:  Thuy (H)        Dose:  0.5 mg   Take 0.5 tablets (0.5 mg) by mouth 2 times daily for 20 days   Quantity:  20 tablet   Refills:  1            Where to get your medicines      These medications were sent to [facility] [place], [place] - [address]   [address] [facility] [address]     Phone:  [phone]     divalproex sodium delayed-release 500 MG DR tablet    risperiDONE 1 MG tablet                Primary Care Provider Office Phone # Fax #    Jono Odell [phone]       [address] [facility] [address]        Equal Access to Services     LUPE NOGUEIRA : Russ Wagner, wakayla parker, qaybta kaalsvetlana medrano. So [facility] [phone].    ATENCIÓN: Si habla español, tiene a franco disposición servicios gratuitos de asistencia lingüística. Llame al  [phone].    We comply with applicable federal civil rights laws and [place] laws. We do not discriminate on the basis of race, color, national origin, age, disability, sex, sexual orientation, or gender identity.            Thank you!     Thank you for choosing [facility]  for your care. Our goal is always to provide you with excellent care. Hearing back from our patients is one way we can continue to improve our services. Please take a few minutes to complete the written survey that you may receive in the mail after your visit with us. Thank you!             Your Updated Medication List - Protect others around you: Learn how to safely use, store and throw away your medicines at www.disposemymeds.org.          This list is accurate as of 12/5/18 11:27 AM.  Always use your most recent med list.                   Brand Name Dispense Instructions for use Diagnosis    artificial saliva Soln solution     44.3 mL    Swish and spit 2 mLs (2 sprays) in mouth every hour as needed for dry mouth    Dry mouth       Carboxymethylcellulose Sod PF 0.5 % Soln ophthalmic solution    REFRESH PLUS     Place 1 drop into both eyes 3 times daily as needed for dry eyes        cyclobenzaprine 10 MG tablet    FLEXERIL    30 tablet    Take 1 tablet (10 mg) by mouth nightly as needed for other (headache)    Tension headache       divalproex sodium delayed-release 500 MG DR tablet    DEPAKOTE    120 tablet    Take 2 tablets (1,000 mg) by mouth 2 times daily    Manic behavior (H)       estrogen conj-medroxyPROGESTERone 0.625-2.5 MG tablet    PREMPRO    90 tablet    Take 1 tablet by mouth daily    Perimenopausal vasomotor symptoms       ibuprofen 800 MG tablet    ADVIL/MOTRIN     Take 400 mg by mouth every 4 hours as needed for other (headaches)        lidocaine VISCOUS 2 % solution    XYLOCAINE    100 mL    SWISH AND SPIT 5ML BY MOUTH EVERY 3 HOURS AS NEEDED    Canker sore       lisinopril 10 MG tablet     PRINIVIL/ZESTRIL    30 tablet    Take 1 tablet (10 mg) by mouth daily    Benign essential hypertension       loratadine 10 MG capsule      Take 10 mg by mouth daily as needed.        Melatonin 10 MG Tabs tablet      Take 10 mg by mouth nightly as needed for sleep        metroNIDAZOLE 1 % external gel    METROGEL    60 g    Apply topically daily    Rosacea, Perioral dermatitis       order for DME      Use your CPAP device as directed by your provider.        order for DME     1 each    Please measure and distribute 1 pair of 20mmHg - 30mmHg knee high open toe compression stockings. Jobst ultrasheer or equivalent.    Varicose veins of both lower extremities with complications       order for DME     2 each    Please measure and distribute 1 pair of 20mmHg - 30mmHg thigh high open or closed toe compression stockings. Jobst ultrasheer or equivalent.    Symptomatic varicose veins of both lower extremities       risperiDONE 1 MG tablet    risperDAL    20 tablet    Take 0.5 tablets (0.5 mg) by mouth 2 times daily for 20 days    Thuy (H)       VITAMIN D (CHOLECALCIFEROL) PO      Take 2,000 Units by mouth daily    Perioral dermatitis, Rosacea

## 2018-12-05 NOTE — PROGRESS NOTES
Opened in error, see other note from this visit   Complex Repair And Skin Substitute Graft Text: The defect edges were debeveled with a #15 scalpel blade.  The primary defect was closed partially with a complex linear closure.  Given the location of the remaining defect, shape of the defect and the proximity to free margins a skin substitute graft was deemed most appropriate to repair the remaining defect.  The graft was trimmed to fit the size of the remaining defect.  The graft was then placed in the primary defect, oriented appropriately, and sutured into place.

## 2018-12-06 ENCOUNTER — OFFICE VISIT (OUTPATIENT)
Dept: FAMILY MEDICINE | Facility: CLINIC | Age: 53
End: 2018-12-06
Payer: COMMERCIAL

## 2018-12-06 VITALS
DIASTOLIC BLOOD PRESSURE: 85 MMHG | HEART RATE: 75 BPM | BODY MASS INDEX: 39.22 KG/M2 | OXYGEN SATURATION: 100 % | RESPIRATION RATE: 16 BRPM | SYSTOLIC BLOOD PRESSURE: 138 MMHG | WEIGHT: 265.6 LBS

## 2018-12-06 DIAGNOSIS — I73.00 RAYNAUD'S DISEASE WITHOUT GANGRENE: ICD-10-CM

## 2018-12-06 DIAGNOSIS — F30.10 MANIC BEHAVIOR (H): ICD-10-CM

## 2018-12-06 DIAGNOSIS — L30.1 DYSHIDROTIC ECZEMA: ICD-10-CM

## 2018-12-06 DIAGNOSIS — I10 BENIGN ESSENTIAL HYPERTENSION: Primary | ICD-10-CM

## 2018-12-06 LAB
BUN SERPL-MCNC: 13.5 MG/DL (ref 7–19)
CALCIUM SERPL-MCNC: 9.2 MG/DL (ref 8.5–10.1)
CHLORIDE SERPLBLD-SCNC: 98.5 MMOL/L (ref 98–110)
CO2 SERPL-SCNC: 24.4 MMOL/L (ref 20–32)
CREAT SERPL-MCNC: 0.4 MG/DL (ref 0.5–1)
GFR SERPL CREATININE-BSD FRML MDRD: >90 ML/MIN/1.7 M2
GLUCOSE SERPL-MCNC: 86.4 MG'DL (ref 70–99)
POTASSIUM SERPL-SCNC: 4.9 MMOL/DL (ref 3.3–4.5)
SODIUM SERPL-SCNC: 132.3 MMOL/L (ref 132.6–141.4)

## 2018-12-06 RX ORDER — LISINOPRIL 10 MG/1
10 TABLET ORAL DAILY
Qty: 90 TABLET | Refills: 1 | Status: SHIPPED | OUTPATIENT
Start: 2018-12-06 | End: 2019-06-26

## 2018-12-06 NOTE — PATIENT INSTRUCTIONS
Here is the plan from today's visit    1. Benign essential hypertension  - continue lisinopril (PRINIVIL/ZESTRIL) 10 MG tablet; Take 1 tablet (10 mg) by mouth daily  Dispense: 90 tablet; Refill: 1  - Basic Metabolic Panel ([facility])    2. Manic behavior (H)  Continue follow up with psychiatry    3. Dyshidrotic eczema  Apply superglue as needed to the cracks  Use Aquaphor with white cotton gloves at bedtime as needed    4. Raynaud's disease without gangrene  Prevent rapid changes in temps as much as possible    Follow up plan  Please make a clinic appointment for follow up with me (LICHA RAMACHANDRAN) in 6 months for recheck BP.    Thank you for coming to [facility] today.  Lab Testing:  **If you had lab testing today and your results are reassuring or normal they will be mailed to you or sent through [employer] within 7 days.   **If the lab tests need quick action we will call you with the results.  The phone number we will call with results is # [phone] (home) [phone] (work). If this is not the best number please call our clinic and change the number.  Medication Refills:  If you need any refills please call your pharmacy and they will contact us.   If you need to  your refill at a new pharmacy, please contact the new pharmacy directly. The new pharmacy will help you get your medications transferred faster.   Scheduling:  If you have any concerns about today's visit or wish to schedule another appointment please call our office during normal business hours [phone] (8-5:00 M-F)  If a referral was made to a [facility] and you don't get a call from central scheduling please call [phone].  If a Mammogram was ordered for you at [facility] call [phone] to schedule or change your appointment.  If you had an XRay/CT/Ultrasound/MRI ordered the number is [phone] to schedule or change your radiology appointment.   Medical Concerns:  If you have urgent medical  concerns please call [phone] at any time of the day.    Jono Odell MD

## 2018-12-06 NOTE — MR AVS SNAPSHOT
After Visit Summary   12/6/2018    Angy Haji    MRN: [id number]           Patient Information     Date Of Birth          1965        Visit Information        Provider Department      12/6/2018 10:20 AM Licha Odell MD [facility]        Today's Diagnoses     Benign essential hypertension    -  1    Manic behavior (H)        Dyshidrotic eczema        Raynaud's disease without gangrene          Care Instructions    Here is the plan from today's visit    1. Benign essential hypertension  - continue lisinopril (PRINIVIL/ZESTRIL) 10 MG tablet; Take 1 tablet (10 mg) by mouth daily  Dispense: 90 tablet; Refill: 1  - Basic Metabolic Panel ([facility])    2. Manic behavior (H)  Continue follow up with psychiatry    3. Dyshidrotic eczema  Apply superglue as needed to the cracks  Use Aquaphor with white cotton gloves at bedtime as needed    4. Raynaud's disease without gangrene  Prevent rapid changes in temps as much as possible    Follow up plan  Please make a clinic appointment for follow up with me (LICHA ODELL) in 6 months for recheck BP.    Thank you for coming to [facility] today.  Lab Testing:  **If you had lab testing today and your results are reassuring or normal they will be mailed to you or sent through [employer] within 7 days.   **If the lab tests need quick action we will call you with the results.  The phone number we will call with results is # [phone] (home) [phone] (work). If this is not the best number please call our clinic and change the number.  Medication Refills:  If you need any refills please call your pharmacy and they will contact us.   If you need to  your refill at a new pharmacy, please contact the new pharmacy directly. The new pharmacy will help you get your medications transferred faster.   Scheduling:  If you have any concerns about today's visit or wish to schedule another appointment please call our office during normal  business hours [phone] (8-5:00 M-F)  If a referral was made to a [facility] and you don't get a call from central scheduling please call [phone].  If a Mammogram was ordered for you at [facility] call [phone] to schedule or change your appointment.  If you had an XRay/CT/Ultrasound/MRI ordered the number is [phone] to schedule or change your radiology appointment.   Medical Concerns:  If you have urgent medical concerns please call [phone] at any time of the day.    Jono Odell MD            Follow-ups after your visit        Your next 10 appointments already scheduled     Dec 10, 2018  9:30 AM CST   DME RETURN with VIRTUAL CARE COORDINATOR 4   [facility] [place] [facility] ([facility])    [address] [place] [address] [address]  [facility] [address] [phone]            Dec 14, 2018  4:00 PM CST   Adult Med Follow UP with Galo Sandoval MD   Psychiatry Clinic ([facility])    [place] [id number]  [address] [place] [address] [phone]              Who to contact     Please call your clinic at [phone] to:    Ask questions about your health    Make or cancel appointments    Discuss your medicines    Learn about your test results    Speak to your doctor            Additional Information About Your Visit        MyChart Information     [employer] gives you secure access to your electronic health record. If you see a primary care provider, you can also send messages to your care team and make appointments. If you have questions, please call your primary care clinic.  If you do not have a primary care provider, please call [phone] and they will assist you.      [employer] is an electronic gateway that provides easy, online access to your medical records. With [employer], you can request a clinic appointment, read your test results, renew a prescription or communicate with your care  team.     To access your existing account, please contact your [facility] or call [phone] for assistance.        Care EveryWhere ID     This is your Care EveryWhere ID. This could be used by other organizations to access your [facility] medical records  [id number]        Your Vitals Were     Pulse Respirations Pulse Oximetry BMI (Body Mass Index)          75 16 100% 39.22 kg/m2         Blood Pressure from Last 3 Encounters:   12/06/18 138/85   11/20/18 (!) 159/99   11/15/18 111/70    Weight from Last 3 Encounters:   12/06/18 265 lb 9.6 oz (120.5 kg)   11/20/18 263 lb 3.2 oz (119.4 kg)   11/13/18 263 lb 11.2 oz (119.6 kg)              We Performed the Following     Basic Metabolic Panel ([facility])          Where to get your medicines      These medications were sent to [facility] [place], [place] - [address] [facility] [address]     Phone:  [phone]     lisinopril 10 MG tablet          Primary Care Provider Office Phone # Fax #    Jono Odell [phone]       [address] [facility] [address]        Equal Access to Services     LUPE NOGUEIRA AH: Hadii ariana ku hadasho Soomaali, waaxda luqadaha, qaybta kaalmada adeegyada, svetlana cárdenas. So [facility] [phone].    ATENCIÓN: Si habla español, tiene a franco disposición servicios gratuitos de asistencia lingüística. Llame al [phone].    We comply with applicable federal civil rights laws and [place] laws. We do not discriminate on the basis of race, color, national origin, age, disability, sex, sexual orientation, or gender identity.            Thank you!     Thank you for choosing [facility]  for your care. Our goal is always to provide you with excellent care. Hearing back from our patients is one way we can continue to improve our services. Please take a few minutes to complete the written survey that you may receive in  the mail after your visit with us. Thank you!             Your Updated Medication List - Protect others around you: Learn how to safely use, store and throw away your medicines at www.disposemymeds.org.          This list is accurate as of 12/6/18 11:07 AM.  Always use your most recent med list.                   Brand Name Dispense Instructions for use Diagnosis    artificial saliva Soln solution     44.3 mL    Swish and spit 2 mLs (2 sprays) in mouth every hour as needed for dry mouth    Dry mouth       Carboxymethylcellulose Sod PF 0.5 % Soln ophthalmic solution    REFRESH PLUS     Place 1 drop into both eyes 3 times daily as needed for dry eyes        cyclobenzaprine 10 MG tablet    FLEXERIL    30 tablet    Take 1 tablet (10 mg) by mouth nightly as needed for other (headache)    Tension headache       divalproex sodium delayed-release 500 MG DR tablet    DEPAKOTE    120 tablet    Take 2 tablets (1,000 mg) by mouth 2 times daily    Manic behavior (H)       estrogen conj-medroxyPROGESTERone 0.625-2.5 MG tablet    PREMPRO    90 tablet    Take 1 tablet by mouth daily    Perimenopausal vasomotor symptoms       ibuprofen 800 MG tablet    ADVIL/MOTRIN     Take 400 mg by mouth every 4 hours as needed for other (headaches)        lidocaine VISCOUS 2 % solution    XYLOCAINE    100 mL    SWISH AND SPIT 5ML BY MOUTH EVERY 3 HOURS AS NEEDED    Canker sore       lisinopril 10 MG tablet    PRINIVIL/ZESTRIL    90 tablet    Take 1 tablet (10 mg) by mouth daily    Benign essential hypertension       loratadine 10 MG capsule      Take 10 mg by mouth daily as needed.        Melatonin 10 MG Tabs tablet      Take 10 mg by mouth nightly as needed for sleep        metroNIDAZOLE 1 % external gel    METROGEL    60 g    Apply topically daily    Rosacea, Perioral dermatitis       order for DME      Use your CPAP device as directed by your provider.        order for DME     1 each    Please measure and distribute 1 pair of 20mmHg - 30mmHg  knee high open toe compression stockings. Jobst ultrasheer or equivalent.    Varicose veins of both lower extremities with complications       order for DME     2 each    Please measure and distribute 1 pair of 20mmHg - 30mmHg thigh high open or closed toe compression stockings. Jobst ultrasheer or equivalent.    Symptomatic varicose veins of both lower extremities       risperiDONE 1 MG tablet    risperDAL    20 tablet    Take 0.5 tablets (0.5 mg) by mouth 2 times daily for 20 days    Thuy (H)       VITAMIN D (CHOLECALCIFEROL) PO      Take 2,000 Units by mouth daily    Perioral dermatitis, Rosacea

## 2018-12-06 NOTE — PROGRESS NOTES
HPI:       Angy Haji is a 53 year old who presents for the following    1.  Hypertension:  Pt here for f/u of   hypertension. She indicates that he is feeling well and   denies any symptoms referable to her elevated blood pressure.   Specifically denies chest pain, palpitations, dyspnea, orthopnea,   PND or peripheral edema. Current medication regimen is as listed   below. Patient denies any side effects of medication.    2.  Manic behavior:  Patient with significant changes in behavior over last 3-4 weeks.  Was hospitalized at [facility] with symptoms.  (Those notes reviewed).  Was seen by outpatient psychiatry yesterday and has follow up scheduled.  Overall feel like anthony symptoms improving.    3.  Patient complains of dry cracking fingers and hands.  Has occasionally used Aquaphor with limited success.  Wondering if related to her Raynaud's syndrome.    4.  Patient with history of Raynaud's without systemic rheumatologic disease.  Wondering about symptom management.  Notices changes with the cold temp.  Describes symptoms of fingers turning purple in color and some burning.  No loss of sensation.    Problem, Medication and Allergy Lists were reviewed and are current.    Patient is an established patient of this clinic.         Physical Exam:     Vitals:    12/06/18 1014   BP: 138/85   Pulse: 75   Resp: 16   SpO2: 100%   Weight: 265 lb 9.6 oz (120.5 kg)     Body mass index is 39.22 kg/(m^2).  Vitals were reviewed and were normal  GENERAL: alert and no distress  MS: hands cool to the touch  DERM: dry skin of bilateral hands with cracking at finger tips  PSYCH: anxious, tangential and labile      Results:      Results from the last 24 hours  Results for orders placed or performed in visit on 12/06/18 (from the past 24 hour(s))   Basic Metabolic Panel ([facility])   Result Value Ref Range    Urea Nitrogen 13.5 7.0 - 19.0 mg/dL    Calcium 9.2 8.5 - 10.1 mg/dL    Chloride 98.5 98.0 - 110.0  mmol/L    Carbon Dioxide 24.4 20.0 - 32.0 mmol/L    Creatinine 0.4 (L) 0.5 - 1.0 mg/dL    Glucose 86.4 70.0 - 99.0 mg'dL    Potassium 4.9 (H) 3.3 - 4.5 mmol/dL    Sodium 132.3 (L) 132.6 - 141.4 mmol/L    GFR Estimate >90 >60.0 mL/min/1.7 m2    GFR Estimate If Black >90 >60.0 mL/min/1.7 m2       Assessment and Plan     1. Benign essential hypertension  BP now at goal.  BMP acceptable.  Continue current dose of ACEI.  Recheck in 6 months, sooner if needed  - lisinopril (PRINIVIL/ZESTRIL) 10 MG tablet; Take 1 tablet (10 mg) by mouth daily  Dispense: 90 tablet; Refill: 1  - Basic Metabolic Panel ([facility])    2. Manic behavior (H)  Patient to continue follow up with psychiatry    3. Dyshidrotic eczema  Recommend superglue the cracks in the skin.  Aquaphor at bedtime with white cotton glove occlusion.  Use nightly as needed.    4. Raynaud's disease without gangrene  Discussed natural history.  Could consider change from ACEI to Calcium channel blocker (Norvasc) in attempt to improve symptoms of #4 and treat #1.  For now, recommend preventing rapid changes in temperature by wearing gloves, etc.    Options for treatment and follow-up care were reviewed with the patient. Angy Haji  engaged in the decision making process and verbalized understanding of the options discussed and agreed with the final plan.    40 min spent in direct face to face time with this patient, greater than 50% in counseling regarding above.    Jono Odell MD

## 2018-12-07 ENCOUNTER — CARE COORDINATION (OUTPATIENT)
Dept: PSYCHIATRY | Facility: CLINIC | Age: 53
End: 2018-12-07

## 2018-12-07 DIAGNOSIS — F30.9 MANIA (H): Primary | ICD-10-CM

## 2018-12-07 RX ORDER — BENZTROPINE MESYLATE 1 MG/1
1 TABLET ORAL 2 TIMES DAILY
Qty: 28 TABLET | Refills: 0 | Status: SHIPPED | OUTPATIENT
Start: 2018-12-07 | End: 2019-01-15

## 2018-12-07 NOTE — PROGRESS NOTES
RE: Follow-up on Friday  Received: 2 days ago       Galo Sandoval MD Pratt, Laura, RN                   I would also like to know how she's sleeping. I'll put med recs in my note for if she isn't.     Thanks!            Previous Messages       ----- Message -----      From: Amberly Gordon RN      Sent: 12/5/2018  11:32 AM        To: Galo Sandoval MD   Subject: RE: Follow-up on Friday                           Will do!   ----- Message -----      From: Galo Sandoval MD      Sent: 12/5/2018  11:12 AM        To: Amberly Gordon RN   Subject: Follow-up on Friday                               José Luis Gaming,     Can you please call this patient on Friday to check in about her manic symptoms and to see if she is tolerating her new med. I'm starting her on 0.5mg risperidone BID today. If she is tolerating it, she can increase to 1mg BID. Check for extrapyramidal symptoms (muscle stiffness, flat facies, shuffling gait, tongue stiffness/diffulty swalling) and sialorrhea.     Thank you so much. I'll put in my note shortly on her for your reference.     Gagan

## 2018-12-07 NOTE — PROGRESS NOTES
"Writer called pt at [phone] to receive an update per provider's request. Pt and pt's \"best friend,\" Cheyenne provided information/update.     As mentioned in the provider's office visit note from 12/5, today the pt remains tangential and pressured while talking to writer on the phone. Although, Cheyenne reports the pt's speech is much more direct, circumstantial, and even paced. Writer asked if she has been tolerating the risperidone 0.5 mg BID. The pt explained that on Wednesday night, there were some issues with the pharmacy so she was unable to take the early dose. She said Dr. Tapia mentioned taking the full daily 1 mg all at bedtime, which the pt tried due to missing the dose earlier. The pt reports this helped with her sleep and she slept approximately 7 hours. Yesterday, the pt tried taking 0.5 mg early in the day and at night. After the daytime dose, the pt reported feeling groggy and sleepy for a few hours, but then \"felt good.\" She wonders if going forward, it would be better to take just 1 mg at bedtime, as it has helped with sleep, but doesn't cause grogginess throughout the day. Also, last night the pt reports receiving approximately 6 hours of sleep. This was interrupted by a nightmare that she had. She reports it took her about 2 hours to fall back asleep.     Writer inquired about SEs of the medication. She does report some drooling and says it's more noticeable today. Also, she feels like she's slurring her words. Writer did not hear this on the phone, but Cheyenne agrees with the pt. When writer asked her about tongue swelling specifically, the pt hesitated. She said she notices a slight difference and believes it's the cause of the slurring. The pt also reports difficulty with swallowing, but says this was occurring prior to her hospitalization. Cheyenne denied any flat facies or a shuffling gait. The pt also denies this and denied any muscle stiffness.     The pt said her anxiety has decreased, but is still " "there. She recently learned that she used all of her FMLA and the case will be closed this weekend. The pt continues to have marital issues as well, which causes her a lot of stress. Although, she feels much calmer than before. She did say she's still struggling with \"too much goal directed activity and not enough resting.\"     Writer inquired about any safety concerns, which she denies at this time. She would like to know the provider's thoughts about taking risperidone 1 mg at bedtime vs 0.5 mg BID. Writer agreed to relay all of the above information to the provider for suggestions.  "

## 2018-12-07 NOTE — TELEPHONE ENCOUNTER
"Have her do the followin. Add benztropine 1mg BID (please order 2 week supply)  2. Change risperidone to 1mg QHS    If tongue \"swelling\" or difficulty swallowing worsens, stop risperidone and call clinic for further guidance and/or go directly to the ED.    Okay?    "

## 2018-12-07 NOTE — PROGRESS NOTES
"Writer called pt and relayed the provider's suggestions. She voiced understanding and agreed to follow his suggestions. Pt requested the Cogentin is sent to the [facility] pharmacy on [address] 14 d/s of benztropine 1 mg sent to this pharmacy. The pt understands that she should stop the risperidone if she notices any worsening SEs and agreed to call the clinic and/or go to the ED. After hours number also provided. Pt plans on attending upcoming appt on 12/14, although she was surprised to hear of the appt location. At this time, she plans on still attending the appt and said, \"I just need to get over my fear.\"   "

## 2018-12-09 ENCOUNTER — TELEPHONE (OUTPATIENT)
Dept: BEHAVIORAL HEALTH | Facility: CLINIC | Age: 53
End: 2018-12-09

## 2018-12-09 ENCOUNTER — DOCUMENTATION ONLY (OUTPATIENT)
Dept: PSYCHIATRY | Facility: CLINIC | Age: 53
End: 2018-12-09

## 2018-12-09 ENCOUNTER — TELEPHONE (OUTPATIENT)
Dept: PSYCHIATRY | Facility: CLINIC | Age: 53
End: 2018-12-09

## 2018-12-09 NOTE — TELEPHONE ENCOUNTER
Pt called intake requesting to speak to on call resident for [facility] regarding possible side effects from her Risperdal; pt states she can be reached at   Spoke with Kaleigh, on call resident, who will follow up with pt at telephone number she provided

## 2018-12-09 NOTE — TELEPHONE ENCOUNTER
"After Hours Phone Call  12/9/18 at 5:45pm    Phone number called: [phone]    Reason for call: pt experiencing side effects to risperidone    Angy and her wife called in tonight to discuss what to do with her risperidone given she has been experiencing significant side effects since starting to take it last week. She was put on benztropine 1mg bid on Friday by her primary psychiatrist, Dr. Sandoval, however, she has not found it very helpful. She has been experiencing difficulty thinking, feeling disorganized, tongue swelling and difficulty w/ speech, and significant restlessness. Her wife describes her as \"a zombie who can't sit still\". Angy confirms this and states that she feels sedated, but can't stay put in pne spot. She denies any difficulty breathing or choking episodes related to her tongue. She states she already eats small bites and takes small sips of liquid as she has difficulty swallowing at baseline. We discussed that if she starts to experience difficulty with her breathing or is choking on saliva or food/water she should go to an ED immediately.     I recommended she discontinue the risperidone immediately which she was in agreement with. I recommended that she could take 2mg benztropine tonight, but she only has 1 pill left at the 1mg dose (plans to  refill tomorrow). Given her significant side effects recommended she could try taking a dose of Benadryl on top of the benztropine just for tonight. She states she would like a call from either Dr. Sandoval or his nurse prior to her appointment on 12/14 in case there are changes that could be made before then.     - Angy to discontinue risperidone 1mg tonight given significant side effects  - recommended one time dose of Benadryl 50mg + benztropine 1mg to target akathisia and dystonias (pt only has one pill left of benztropine - would have recommended taking 2mg instead)  - Angy requests call back from primary team in AM - will route this " note to Dr. Sandoval  - patient to go to ED immediately if experiencing difficulty with breathing or choking episodes    Anita Costello MD  Psychiatry PGY2

## 2018-12-10 ENCOUNTER — TELEPHONE (OUTPATIENT)
Dept: FAMILY MEDICINE | Facility: CLINIC | Age: 53
End: 2018-12-10

## 2018-12-10 NOTE — TELEPHONE ENCOUNTER
[facility] PSYCHIATRY CLINIC FACULTY NOTE    This patient was seen at [facility] last WED, was given Risperdal for anthony. She rcv'd follow-up call from the Psych Clinic Friday at which time she reported adverse effects from Risperdal, so Cogentin was added. The pt called on-call tonight and was told to discontinue Risperdal d/t ongoing adverse effects. I called pt tonight as well to check on her. Agree with discontinuing Risperdal, now her third antipsychotic med which caused adverse effects (Zyprexa and Seroquel prior). No more antipsychotics for now and continue 2mg Cogentin for 7 days. Risperdal should be washed out in 4 days. She sounded better from a anthony standpoint and understands what to do if breathing problems occur. Currently, medically safe. Will see Dr Sandoval on the 14th and I will speak with him before then.

## 2018-12-10 NOTE — TELEPHONE ENCOUNTER
RN spoke with patient earlier today. She stated that her skin is still intact, however it is dry and cracked. RN advised her that she could try some nail polish remover only if indeed there are no breaks in the skin. Also advised her that if the remover causes any pain, to stop immediately and wash out with water and allow the glue to come off on its own with time (may soak in warm water or something mild if desired).  Joan Park RN on 12/10/2018 at 4:55 PM

## 2018-12-10 NOTE — TELEPHONE ENCOUNTER
[facility] phone call message-patient reporting a symptom:     Symptom: Super glued fingers together     When did symptoms begin? Today    Characteristics: (location on body, intensity, what makes it better or worse, associated symptoms )      Additional Details Patient managed to get her fingers apart. Is wondering what she can do for the remaining residue left on her fingers since it won't come off.    Same Day Visit Offered: Yes, declined    Additional comments:     OK to leave message on voice mail? Yes    Advised patient that RN would call back within 3 hours, unless emergent   Primary language: English      needed? No    Call taken on December 10, 2018 at 2:01 PM by Anne-Marie Goldberg

## 2018-12-11 ENCOUNTER — CARE COORDINATION (OUTPATIENT)
Dept: PSYCHIATRY | Facility: CLINIC | Age: 53
End: 2018-12-11

## 2018-12-11 NOTE — PROGRESS NOTES
"Writer received message from Dr. Tapia to review recent epic message and call pt for more information (see earlier encounter from [facility]). Dr. Tapia also called pt on 12/9 due to adverse SEs that did not resolve for the pt (see 12/9 encounters for further information).     On 12/7, the pt initially called with some SEs from Risperdal. At that time, the med was changed to 1 mg at bedtime and Cogentin 1 mg BID was added. Over the weekend, the Risperdal was discontinued by Dr. Tapia, but the provider asked that she continue on Cogentin. Today, the pt called [facility] in regards to new symptoms/possible SEs. She reports feeling exhausted, nauseous, body chills, decrease in body temperature, dizziness, increased crying, slight confusion, memory loss, and difficulty tracking things. She also reported a possible increase in urinary retention and more difficulty reading fine print. Pt has been taking the Cogentin QAM and at bedtime. Sunday night and Monday night, she also took Benadryl 50 mg with the nightly Cogentin as recommended by Dr. Tapia. She wonders if she should take it with the daytime dose as well, although it makes her very \"groggy.\" At this time, writer asked that pt not take the nightly Cogentin dose until writer hears from the providers. Pt agreed to this. The pt also wants the provider to be aware that she's nervous about attending the appt on Friday and is worried she will run into patients she knows. She would prefer to wait in a room or elsewhere than the lobby. Writer agreed to help the pt with this at the time of her appt on Friday.   "

## 2018-12-12 ENCOUNTER — TELEPHONE (OUTPATIENT)
Dept: PSYCHIATRY | Facility: CLINIC | Age: 53
End: 2018-12-12

## 2018-12-12 ENCOUNTER — OFFICE VISIT (OUTPATIENT)
Dept: PSYCHOLOGY | Facility: CLINIC | Age: 53
End: 2018-12-12
Payer: COMMERCIAL

## 2018-12-12 VITALS
TEMPERATURE: 96 F | OXYGEN SATURATION: 100 % | RESPIRATION RATE: 20 BRPM | DIASTOLIC BLOOD PRESSURE: 80 MMHG | HEART RATE: 70 BPM | SYSTOLIC BLOOD PRESSURE: 141 MMHG

## 2018-12-12 DIAGNOSIS — R13.10 DYSPHAGIA, UNSPECIFIED TYPE: ICD-10-CM

## 2018-12-12 DIAGNOSIS — F31.10 BIPOLAR AFFECTIVE DISORDER, CURRENT EPISODE MANIC, CURRENT EPISODE SEVERITY UNSPECIFIED (H): Primary | ICD-10-CM

## 2018-12-12 NOTE — PROGRESS NOTES
Dr. Tapia contacted writer and asked that pt attend appt at [facility] today with her and Dr. Sandoval. She asked that the pt come in anytime between 9:30 am and 11 am if possible.     Called pt. No answer. LVM relaying the above information and asked that she call writer if she has any further questions (reminder set to follow up with pt in 30 mins).

## 2018-12-12 NOTE — PATIENT INSTRUCTIONS
- Reduced Cogentin (benztropine) to 1mg at bedtime only. Stop the morning dose.  - Discontinue Benadryl.  - Continue Depakote twice daily as before.  - Please go to lab on Friday morning.  - Neurology Clinic [facility] [phone]   [email or web address]

## 2018-12-12 NOTE — PROGRESS NOTES
Writer reviewed pt's chart and it appears she is now scheduled for 10 am with Dr. Tapia and Dr. Sandoval at [facility]. Called pt and confirmed this as well. No further action needed by writer at this time.

## 2018-12-12 NOTE — PROGRESS NOTES
"  [facility] PSYCHIATRY CLINIC PROGRESS NOTE     CARE TEAM:  PCP- Jono Odell    Specialty Providers- {n:235472}    Therapist- {n:585539}     Community  Team- {n:518055}    Angy Haji is a 53 year old female who prefers the name *** & pronouns {p:372339}.     Date of initial diagnostic assessment is ***.  Date of most recent transfer of care assessment is {n:176856}.    Pertinent Background:  This patient first experienced mental health issues *** and has received treatment   for ***.  See last diagnostic/transfer of care assessment for detailed history.  Notably, ***.       Psych critical item history includes {cr:828156}.    INTERIM HISTORY                                                                                                                 4, 4   The patient reports {a:992194} treatment adherence.  History was provided by the {PATIENT. FAMILY:529842} who was a {h:798838} historian.  The last visit ended with {mc:831450}.   Since the last visit:   - Sleeping better  - But still some cognitive dulling and difficulty organizing tasks  - Drooling and tongue swelling coming down now. Had 2 days of slurred speech.  - Still experiencing mood instability. Crying easily at stressors. Tearful about her cognition impairments. Rachael has asked her to stop driving because Angy has been \"really foggy.\" Troubles with short term memory. Has historically been very good with memory.  - \"Motorized.\"  - Chills - noticed temp has been going down, has been checking at home, 97F, 96.1F. \"Chills felt like what you get when you have a fever.\"  - No diaphoresis. No fever. No muscle stiffness. \"No hunched over shuffly thing like what she had in the hospital.\"  - Intermittent headaches, which are her typical.  - No palpitations.   - \"Can't think my way out of a paper bag.\"  - Has had \"weak urinary stream\" for some time now. Difficulty emptying. Hard to know when this started. Documented earlier this summer. Has been a little " "worse recently.  - No constipation. No loose stools or diarrhea.  - Family history - maternal brother h/o depression. Mother has Mild Cognitive Impairment. Mother has had \"situational bouts\" with mood/anxiety, but no hospitalizations are major impairments. Maternal Gma/Gpa may have had depression. Maternal Gma had vascular dementia diagnosed in 80s and  at 95.    RECENT SYMPTOMS:   {psy:368136}  EATING DISORDER: {n:960648}    RECENT SUBSTANCE USE:     ALCOHOL- {n:868081}      TOBACCO- {n:464850}     CAFFEINE- {c:039987}  OPIOIDS- {n:558581}         NARCAN KIT- {n:252654}        CANNABIS- {n:586512}            OTHER ILLICIT DRUGS- {drugs:040674}      CURRENT SOCIAL HISTORY:  FINANCIAL SUPPORT- {f:507636}       CHILDREN- {n:234840}      LIVING SITUATION- ***      SOCIAL/ SPIRITUAL SUPPORT- ***     FEELS SAFE AT HOME- {n:108856}     MEDICAL ROS (2,10):  Reports {ros:940791}      Denies {ros:554541}    PSYCH and CD Critical Summary Points since 2018           {NONE:112767::\"None\"}    PAST PSYCH MED TRIALS   see EMR Problem List: Hx of psychiatric care    MEDICAL / SURGICAL HISTORY                                   Pregnant or breastfeeding- {n:075712}      Contraception- ***    Neurologic Hx- {n:142596}  Patient Active Problem List   Diagnosis     Oral aphthae     Obesity (BMI 30-39.9)     Disorder of bursae and tendons in shoulder region     CARDIOVASCULAR SCREENING; LDL GOAL LESS THAN 160     Rosacea     Tear of medial cartilage or meniscus of knee, current     Raynaud disease     Vitamin D deficiency disease     Mild sleep apnea     Atopic rhinitis     Morbid obesity (H)     Muscle tension dysphonia     Palpitations     Dysphagia, unspecified type     Anxiety     Manic behavior (H)       Major Surgery- {n:261490}    ALLERGY                                Seroquel [quetiapine]; Penicillins; and Thimerosal  MEDICATIONS                               Current Outpatient Medications   Medication Sig Dispense " Refill     artificial saliva (BIOTENE MT) SOLN solution Swish and spit 2 mLs (2 sprays) in mouth every hour as needed for dry mouth (Patient not taking: Reported on 12/6/2018) 44.3 mL 0     benztropine (COGENTIN) 1 MG tablet Take 1 tablet (1 mg) by mouth 2 times daily 28 tablet 0     Carboxymethylcellulose Sod PF (REFRESH PLUS) 0.5 % SOLN ophthalmic solution Place 1 drop into both eyes 3 times daily as needed for dry eyes       cyclobenzaprine (FLEXERIL) 10 MG tablet Take 1 tablet (10 mg) by mouth nightly as needed for other (headache) 30 tablet 0     divalproex sodium delayed-release (DEPAKOTE) 500 MG DR tablet Take 2 tablets (1,000 mg) by mouth 2 times daily 120 tablet 0     estrogen, conjugated,-medroxyPROGESTERone (PREMPRO) 0.625-2.5 MG per tablet Take 1 tablet by mouth daily 90 tablet 3     ibuprofen (ADVIL/MOTRIN) 800 MG tablet Take 400 mg by mouth every 4 hours as needed for other (headaches)        lidocaine, viscous, (XYLOCAINE) 2 % solution SWISH AND SPIT 5ML BY MOUTH EVERY 3 HOURS AS NEEDED 100 mL 0     lisinopril (PRINIVIL/ZESTRIL) 10 MG tablet Take 1 tablet (10 mg) by mouth daily 90 tablet 1     Loratadine 10 MG capsule Take 10 mg by mouth daily as needed.       Melatonin 10 MG TABS tablet Take 10 mg by mouth nightly as needed for sleep       metroNIDAZOLE (METROGEL) 1 % gel Apply topically daily 60 g 9     order for DME Please measure and distribute 1 pair of 20mmHg - 30mmHg thigh high open or closed toe compression stockings. Jobst ultrasheer or equivalent. (Patient not taking: Reported on 12/6/2018) 2 each 3     order for DME Please measure and distribute 1 pair of 20mmHg - 30mmHg knee high open toe compression stockings. Jobst ultrasheer or equivalent. 1 each 1     ORDER FOR DME Use your CPAP device as directed by your provider.       risperiDONE (RISPERDAL) 1 MG tablet Take 0.5 tablets (0.5 mg) by mouth 2 times daily for 20 days 20 tablet 1     VITAMIN D, CHOLECALCIFEROL, PO Take 2,000 Units by  mouth daily       VITALS                                                                                                                          3, 3   /80 (BP Location: Left arm, Patient Position: Sitting, Cuff Size: Adult Large)   Pulse 70   Temp 96  F (35.6  C) (Oral)   Resp 20   SpO2 100%    MENTAL STATUS EXAM                                                                                           9, 14 cog gs     Alertness: {a:553219}  Appearance: {a:889200}  Behavior/Demeanor: {b:611138}, with {e:996492} eye contact   Speech: {s:330213}  Language: {l:847470}  Psychomotor: {p:086023}  Mood: {m:922487}  Affect: {a:467421}; {was:528294} congruent to mood; {was:557655} congruent to content  Thought Process/Associations: {t:886244}  Thought Content:  Reports {t:500246};  Denies {t:170338}  Perception:  Reports {p:418424};  Denies {p:942045}  Insight: {i:844901}  Judgment: {j:670209}  Cognition: (6) {co}  Gait and Station: {u:482134}    LABS and DATA     AIMS:  {a:823998}    PHQ9 TODAY = ***  PHQ-9 SCORE 10/8/2018 10/9/2018 2018   PHQ-9 Total Score 9 9 11     MoCA (18) - 25/30, missed points on fluency (only 6 words with F) and delayed recall (1/5 only but got the remainders with category cues)    PSYCHLABVALPROATE  Recent Labs   Lab Test 18  1255 18  0400   SOFIYA 99 52     ANTIPSYCHOTIC LABS  [glu, A1C, lipids (mehran LDL), liver enzymes, WBC, ANEU, Hgb, plts]  q12 mo  Recent Labs   Lab Test 18  1118 18  0901 11/10/18  0756 18  1222  09/24/15  0904  13  0739 12  0752 11  0757   GLC 86.4 75 95 81   < > 84   < > 75 87 84   A1C  --   --   --   --   --  5.2  --  5.1 5.2 5.3    < > = values in this interval not displayed.     Recent Labs   Lab Test 10/02/17  0842 10/03/16  0742 09/24/15  0904 09/15/14  0733   CHOL 156 137 132 108   TRIG 55 56 58 46   LDL 49 51 47 24   HDL 96 75 73 75     Recent Labs   Lab Test 18  0937 11  0757   AST  20 24   ALT 23 13   ALKPHOS  --  66     Recent Labs   Lab Test 11/10/18  0756 08/09/18  0937 06/21/16  1122 07/23/13  0818 04/19/13  1434   WBC 4.8 4.2 5.2  --  6.3   HGB 14.9 14.1 13.7 14.1 12.8    190 212  --  180           DIAGNOSIS     Bipolar Disorder, Unspecified    ASSESSMENT                                                                                                                   m2, h3     TODAY    Saw emergently today because of concerns about NMS and anticholinergic SEs. She does not have NMS and anticholinergic effects are improving. Had been taking Benadryl and Cogentin together by accident.    Would like for her to be seen by neurology.    [place] PRESCRIPTION MONITORING PROGRAM [] {was:845600} checked today:  {P:685529}.    PSYCHOTROPIC DRUG INTERACTIONS:    {n:148557}.  MANAGEMENT:  {di:589041}     PLAN                                                                                                                                m2, h3     1) PSYCHOTROPIC MEDICATIONS:  - ***    2) THERAPY:    {n:436148}    3) NEXT DUE:    Labs- {n:393739}  EKG- {n:388567}  Rating Scales- {n:272556}    4) REFERRALS:    {REF:146766}    5) RTC: ***    6) CRISIS NUMBERS:   [place] [facility] [phone] ([place]    [phone] (after hours)  {CRISIS:594811}    TREATMENT RISK STATEMENT:  The risks, benefits, alternatives and potential adverse effects have been discussed and are understood by the pt. The pt understands the risks of using street drugs or alcohol. There are no medical contraindications, the pt agrees to treatment with the ability to do so. The pt knows to call the clinic for any problems or to access emergency care if needed.  Medical and substance use concerns are documented above.  Psychotropic drug interaction check was done, including changes made today.     PSYCHIATRY CLINIC INDIVIDUAL PSYCHOTHERAPY NOTE                                                [16]   Start  time- {psyN/A:853587}        End time- {psyN/A:615735}  Date last reviewed - 12/12/18       Date next due - 3/12/19 (or 12 months if Medicare)     Subjective: This supportive psychotherapy session addressed issues related to {Psych Therapy issues:684195}.  Patient's reaction: {STAGES OF CHANGE:576946} in the context of mental status appropriate for ambulatory setting.  Progress: {good,fair,poor:674727}  Plan: RTC ***  Psychotherapy services during this visit included  myself and the patient.   TREATMENT  PLAN          SYMPTOMS;PROBLEMS   MEASURABLE GOALS;    FUNCTIONAL IMPROVEMENT INTERVENTIONS;    GAINS MADE DISCHARGE CRITERIA   {Psy ROS TX Plan:757743}   {Measurable Goals:282576} {INT:564208} {PSYDC:380289}   {Psy ROS TX Plan:695950}   {Measurable Goals:647325} {INT:564733} {PSYDC:277330}     PROVIDER:  Galo Sandoval MD    Patient staffed in clinic with  *** who will sign the note.  Supervisor is  ***.  OR  Patient not staffed in clinic.  Note will be reviewed and signed by supervisor  ***.

## 2018-12-12 NOTE — PROGRESS NOTES
"Writer called pt to receive update and check in. The pt reports feeling better shortly after talking with writer yesterday evening. She took the nightly dose of Cogentin with Benadryl 50 mg. The pt said, I didn't want to be without anything. She went onto explain that Dr. Tapia informed her that Risperdal could remain in her blood for a few days and her brain even longer. The Cogentin would treat the SEs she experienced from the Risperdal, which is why she decided to take last night's dose. The pt still wonders what she should do until Friday. She said, \"I don't think this is life threatening. It's more of an annoyance, so I can continue taking Cogentin if that's what the providers want.\" Writer voiced understanding and agreed to reach out to the providers for input.   "

## 2018-12-12 NOTE — TELEPHONE ENCOUNTER
Social Work  Outgoing Voicemail Message  [facility]      Left a detailed voicemail message for Angy Haji. SW introduced self and reason for call: referral by Dr. Sandoval to possibly assist with paperwork needs.  SW offered to meet with Angy prior to appointment on 12/14/18.     Writer requested call back. Included writer's direct contact information and availability.     Plan: SW will attempt to assist patient either in clinic or via phone.        KURT Gomez, LICSW

## 2018-12-12 NOTE — PROGRESS NOTES
"  [facility] PSYCHIATRY CLINIC PROGRESS NOTE     CARE TEAM:  PCP- Jono Odell    Specialty Providers- OB-GYN, ENT    Therapist- no     Community  Team- no    Angy LILIAN Haji is a 53 year old female who prefers the name Angy & pronouns she, her.  Date of initial diagnostic assessment is 12/5/18 .  Date of most recent transfer of care assessment is N/A.    Pertinent Background: Pt has not had significant mental health history until 2018 when developed perimenopausal mood/anxiety symptoms.      Psych critical item history includes manic episode and psychiatric hospitalization (Nov 2018)..    INTERIM HISTORY                                                                                                                 4, 4   The patient reports good treatment adherence.  History was provided by the patient and her wife Rachael who were good historians. The last visit ended with the following med change: started Risperidone.   Since the last visit:   -Patient tried risperidone for several days but quickly developed extrapyramidal symptoms including tongue fullness.  Also was experiencing sialorrhea and cognitive dulling/fatigue.  Tried benztropine 1 mg twice daily but this only made her more tired and did not alleviate the tongue swelling sensation.  She had 2 days of slurred speech and so over the weekend called the on-call psychiatrist who recommended discontinuation of risperidone.  She has been off risperidone now for approximately 4 days.  Side effects have begun to subside.  She did not experience the parkinsonian symptoms that she had had with olanzapine while in the hospital.  - Dysphagia worsened while on risperidone.  This problem began sometime over the summer preceding her admission to psychiatry.  Antipsychotic meds were making this symptom worse.  Was seen by ENT earlier this year and was told she had \"Muscle tension dysphonia.\"  - Misunderstood instructions about Benadryl.  Was taking Benadryl and Cogentin " "together.  Was helping her sleep but was developing some worsening urinary retention.  - Sleeping better  - But still some cognitive dulling and difficulty organizing tasks  - Still experiencing mood instability. Crying easily at stressors. Tearful about her cognition impairments. Rachael has asked her to stop driving because Angy has been \"really foggy.\" Troubles with short term memory. Has historically been very good with memory.  - \"Motorized.\"  - Chills - noticed temp has been going down, has been checking at home, 97F, 96.1F. \"Chills felt like what you get when you have a fever.\"  - No diaphoresis. No fever. No muscle stiffness. \"No hunched over shuffly thing like what she had in the hospital.\"  - Intermittent headaches, which are her typical.  - No palpitations.   - \"Can't think my way out of a paper bag.\"  - Has had \"weak urinary stream\" for some time now. Difficulty emptying. Hard to know when this started. Documented earlier this summer. Has been a little worse recently.  - No constipation. No loose stools or diarrhea.  - Family history - no family history of major neurocognitive disorders.  Maternal brother h/o depression. Mother has Mild Cognitive Impairment. Mother has had \"situational bouts\" with mood/anxiety, but no hospitalizations are major impairments. Maternal Gma/Gpa may have had depression. Maternal Gma had vascular dementia diagnosed in 80s and  at 95.    RECENT SYMPTOMS:   DEPRESSION:  reports-depressed mood, low energy, excessive guilt, excessive crying and overwhelmed;  DENIES- anhedonia and hypersomnia  VERITO/HYPOMANIA:  reports-distractibility  and inter-episode mood instability;  DENIES- increased energy, decreased sleep need and increased activity  PSYCHOSIS:  reports-none;  DENIES- delusions, auditory hallucinations and disorganized behavior    RECENT SUBSTANCE USE:     ALCOHOL- no      TOBACCO- no     CAFFEINE- quit  OPIOIDS- no         NARCAN KIT- N/A        CANNABIS- no          "   OTHER ILLICIT DRUGS- none      CURRENT SOCIAL HISTORY:  FINANCIAL SUPPORT- currently on leave from work; wife is employed       CHILDREN- 12 yr dtr      LIVING SITUATION- with wife and dtr in [facility] in [place]      SOCIAL/ SPIRITUAL SUPPORT- wife and daughter    FEELS SAFE AT HOME- yes     MEDICAL ROS (2,10):  Reports A comprehensive review of systems was performed and is negative other than noted in the HPI.      Denies A comprehensive review of systems was performed and is negative other than noted in the HPI.    PSYCH and CD Critical Summary Points since July 2018 Nov 2018 - hospitalized for acute manic episode.  Started on combination of Depakote and olanzapine.  Developed severe extrapyramidal symptoms (shuffling gait, flat facies, muscle rigidity, tongue fullness) and so switched from olanzapine to Seroquel.  Could not tolerate quetiapine for similar reasons and so discontinue that as well.  Nov 2018 - outpatient psychiatric intake.  Started on low-dose risperidone but could not tolerate again.  Depakote ongoing.  Referred to neurology for consideration of major neurocognitive disorders.    PAST PSYCH MED TRIALS   see EMR Problem List: Hx of psychiatric care    MEDICAL / SURGICAL HISTORY                                   Pregnant or breastfeeding- no      Contraception- HRT for menopause    Neurologic Hx- no  Patient Active Problem List   Diagnosis     Oral aphthae     Obesity (BMI 30-39.9)     Disorder of bursae and tendons in shoulder region     CARDIOVASCULAR SCREENING; LDL GOAL LESS THAN 160     Rosacea     Tear of medial cartilage or meniscus of knee, current     Raynaud disease     Vitamin D deficiency disease     Mild sleep apnea     Atopic rhinitis     Morbid obesity (H)     Muscle tension dysphonia     Palpitations     Dysphagia, unspecified type     Anxiety     Manic behavior (H)       Major Surgery- unknown    ALLERGY                                Seroquel [quetiapine]; Penicillins; and  Thimerosal  MEDICATIONS                               Current Outpatient Medications   Medication Sig Dispense Refill     artificial saliva (BIOTENE MT) SOLN solution Swish and spit 2 mLs (2 sprays) in mouth every hour as needed for dry mouth (Patient not taking: Reported on 12/6/2018) 44.3 mL 0     benztropine (COGENTIN) 1 MG tablet Take 1 tablet (1 mg) by mouth 2 times daily 28 tablet 0     Carboxymethylcellulose Sod PF (REFRESH PLUS) 0.5 % SOLN ophthalmic solution Place 1 drop into both eyes 3 times daily as needed for dry eyes       cyclobenzaprine (FLEXERIL) 10 MG tablet Take 1 tablet (10 mg) by mouth nightly as needed for other (headache) 30 tablet 0     divalproex sodium delayed-release (DEPAKOTE) 500 MG DR tablet Take 2 tablets (1,000 mg) by mouth 2 times daily 120 tablet 0     estrogen, conjugated,-medroxyPROGESTERone (PREMPRO) 0.625-2.5 MG per tablet Take 1 tablet by mouth daily 90 tablet 3     ibuprofen (ADVIL/MOTRIN) 800 MG tablet Take 400 mg by mouth every 4 hours as needed for other (headaches)        lidocaine, viscous, (XYLOCAINE) 2 % solution SWISH AND SPIT 5ML BY MOUTH EVERY 3 HOURS AS NEEDED 100 mL 0     lisinopril (PRINIVIL/ZESTRIL) 10 MG tablet Take 1 tablet (10 mg) by mouth daily 90 tablet 1     Loratadine 10 MG capsule Take 10 mg by mouth daily as needed.       Melatonin 10 MG TABS tablet Take 10 mg by mouth nightly as needed for sleep       metroNIDAZOLE (METROGEL) 1 % gel Apply topically daily 60 g 9     order for DME Please measure and distribute 1 pair of 20mmHg - 30mmHg thigh high open or closed toe compression stockings. Jobst ultrasheer or equivalent. (Patient not taking: Reported on 12/6/2018) 2 each 3     order for DME Please measure and distribute 1 pair of 20mmHg - 30mmHg knee high open toe compression stockings. Jobst ultrasheer or equivalent. 1 each 1     ORDER FOR DME Use your CPAP device as directed by your provider.       risperiDONE (RISPERDAL) 1 MG tablet Take 0.5 tablets  (0.5 mg) by mouth 2 times daily for 20 days 20 tablet 1     VITAMIN D, CHOLECALCIFEROL, PO Take 2,000 Units by mouth daily       VITALS                                                                                                                          3, 3   /80 (BP Location: Left arm, Patient Position: Sitting, Cuff Size: Adult Large)   Pulse 70   Temp 96  F (35.6  C) (Oral)   Resp 20   SpO2 100%    MENTAL STATUS EXAM                                                                                           9, 14 cog gs     Alertness: alert  and oriented  Appearance: well groomed  Behavior/Demeanor: cooperative, with good  eye contact   Speech: Slightly pressured, but not nearly as pressured as last appointment a week ago  Language: Some word finding difficulties but otherwise intact  Psychomotor: No muscle stiffness or rigidity, no cogwheeling  Mood: worried and labile  Affect: labile and tearful; was congruent to mood; was congruent to content  Thought Process/Associations: unremarkable  Thought Content:  Reports none;  Denies suicidal ideation, violent ideation, delusions, magical thinking and paranoid ideation  Perception:  Reports none;  Denies auditory hallucinations, depersonalization and derealization  Insight: good  Judgment: good  Cognition: (6) Completed MoCA --see results below  Gait and Station: No shuffling gait, no stiff posture, normal arm swing    LABS and DATA     AIMS:  0 (12/13/18)    PHQ9 TODAY = not completed    PHQ-9 SCORE 10/8/2018 10/9/2018 11/1/2018   PHQ-9 Total Score 9 9 11     MoCA (12/12/18) - 25/30, missed points on fluency (only 6 words with F) and delayed recall (1/5 only but got the remainders with category cues)    PSYCHLABVALPROATE  Recent Labs   Lab Test 11/20/18  1255 11/13/18  0400   SOFIYA 99 52     ANTIPSYCHOTIC LABS  [glu, A1C, lipids (mehran LDL), liver enzymes, WBC, ANEU, Hgb, plts]  q12 mo  Recent Labs   Lab Test 12/06/18  1118 11/11/18  0901 11/10/18  0756  04/06/18  1222  09/24/15  0904  09/16/13  0739 08/22/12  0752 08/16/11  0757   GLC 86.4 75 95 81   < > 84   < > 75 87 84   A1C  --   --   --   --   --  5.2  --  5.1 5.2 5.3    < > = values in this interval not displayed.     Recent Labs   Lab Test 10/02/17  0842 10/03/16  0742 09/24/15  0904 09/15/14  0733   CHOL 156 137 132 108   TRIG 55 56 58 46   LDL 49 51 47 24   HDL 96 75 73 75     Recent Labs   Lab Test 08/09/18  0937 08/16/11  0757   AST 20 24   ALT 23 13   ALKPHOS  --  66     Recent Labs   Lab Test 11/10/18  0756 08/09/18  0937 06/21/16  1122 07/23/13  0818 04/19/13  1434   WBC 4.8 4.2 5.2  --  6.3   HGB 14.9 14.1 13.7 14.1 12.8    190 212  --  180       DIAGNOSIS     Bipolar Disorder, Unspecified    ASSESSMENT                                                                                                                   m2, h3     TODAY    Saw emergently today because of concerns for NMS and anticholinergic SEs, as well as EPSE caused by risperidone. She does not have NMS and anticholinergic effects are improving. Had been taking Benadryl and Cogentin together by accident.  Instructed patient to stop risperidone (which she did 4 days ago) and discontinue diphenhydramine.  She will take Cogentin for 3 more days and then stop.    Mood improving, sleep improving, though still frequently crying for minor stressors. No SI. Racing thoughts are improving. Grandiosity resolved. Much easier to redirect/interrupt than previous appt.    Discussed with patient that we are still uncertain about underlying diagnosis.  She has no past history of anthony or notable major depressive disorders, and there is no family history of major psychiatric illness. This is rather late onset if truly bipolar disorder (thoug not impossible).  We are also concerned about Parkinson's disease or some other neurologic condition.  She has reported symptoms of heart rhythm abnormalities, urinary and fecal retention, dysphagia/dysphonia.   He was seen by ENT but not given a clear diagnosis or explanation about why she is having trouble swallowing.  The urinary retention was worsened by anticholinergics but this symptom does predate her use of benztropine and diphenhydramine last week.  Combination of dysautonomia and high sensitivity to antipsychotics with significant parkinsonian extrapyramidal symptoms are concerning. Will refer to neurology.     Less likely (and not discussed) with patient is contribution of lisinopril to manic symptoms.  There are rare documented case reports of lisinopril causing anthony.  Patient was started on lisinopril in late October 2017 and then approximately 1 month later developed early signs of mood irregularities.  Can discuss this with patient at next appointment.    [place] PRESCRIPTION MONITORING PROGRAM [] was checked today:  not using controlled substances.    PSYCHOTROPIC DRUG INTERACTIONS:    no.  MANAGEMENT:  N/A     PLAN                                                                                                                                m2, h3     1) PSYCHOTROPIC MEDICATIONS:  -Continue Depakote 1000 mg twice daily  -Continue benztropine 1 mg at bedtime for 3 days and then stop (for residual/improving EPSE)  -Discontinue Risperdal    2) THERAPY:    None currently but will consider at future date    3) NEXT DUE:    Labs- Repeat valproic acid level at next, repeat LFTs  EKG- Repeat at next  Rating Scales- Repeat MoCA at next    4) REFERRALS:    Neurology    5) RTC: Friday appt at [facility]    6) CRISIS NUMBERS:   Provided routinely in AVS.    ONLY if a [facility]: [facility] [phone] (clinic), [phone] (after hours)     TREATMENT RISK STATEMENT:  The risks, benefits, alternatives and potential adverse effects have been discussed and are understood by the pt. The pt understands the risks of using street drugs or alcohol. There are no medical contraindications, the pt agrees to  treatment with the ability to do so. The pt knows to call the clinic for any problems or to access emergency care if needed.  Medical and substance use concerns are documented above.  Psychotropic drug interaction check was done, including changes made today.     PROVIDER:  Galo Sandoval MD    Patient was staffed with Dr. Tapia, who will review and sign this note.  I saw the patient with the resident, and participated in key portions of the service, including the mental status examination and developing the plan of care. I reviewed key portions of the history with the resident. I agree with the findings and plan as documented in this note.    Dacia Tapia

## 2018-12-13 ENCOUNTER — OFFICE VISIT (OUTPATIENT)
Dept: NEUROLOGY | Facility: CLINIC | Age: 53
End: 2018-12-13
Attending: STUDENT IN AN ORGANIZED HEALTH CARE EDUCATION/TRAINING PROGRAM
Payer: COMMERCIAL

## 2018-12-13 VITALS
SYSTOLIC BLOOD PRESSURE: 138 MMHG | BODY MASS INDEX: 39.18 KG/M2 | TEMPERATURE: 98.4 F | WEIGHT: 265.3 LBS | DIASTOLIC BLOOD PRESSURE: 63 MMHG | OXYGEN SATURATION: 100 % | RESPIRATION RATE: 18 BRPM | HEART RATE: 74 BPM

## 2018-12-13 DIAGNOSIS — R25.9 ABNORMAL INVOLUNTARY MOVEMENT: Primary | ICD-10-CM

## 2018-12-13 ASSESSMENT — ENCOUNTER SYMPTOMS
EYE IRRITATION: 1
SINUS CONGESTION: 0
CHILLS: 1
INCREASED ENERGY: 1
SPUTUM PRODUCTION: 0
TINGLING: 0
WHEEZING: 0
MEMORY LOSS: 1
ABDOMINAL PAIN: 0
PALPITATIONS: 1
FEVER: 0
NAIL CHANGES: 0
WEIGHT GAIN: 1
NAUSEA: 1
SORE THROAT: 0
DIZZINESS: 1
DOUBLE VISION: 0
WEAKNESS: 0
SMELL DISTURBANCE: 0
INSOMNIA: 1
HOT FLASHES: 1
HEMATURIA: 0
POLYDIPSIA: 0
SHORTNESS OF BREATH: 1
BLOOD IN STOOL: 0
LOSS OF CONSCIOUSNESS: 0
EYE WATERING: 0
HEADACHES: 1
COUGH DISTURBING SLEEP: 0
FATIGUE: 1
PANIC: 0
TROUBLE SWALLOWING: 1
DIFFICULTY URINATING: 1
TASTE DISTURBANCE: 0
SLEEP DISTURBANCES DUE TO BREATHING: 0
LEG PAIN: 0
POOR WOUND HEALING: 1
HEMOPTYSIS: 0
HEARTBURN: 0
HYPERTENSION: 1
ORTHOPNEA: 0
HOARSE VOICE: 1
NERVOUS/ANXIOUS: 1
DECREASED CONCENTRATION: 1
WEIGHT LOSS: 0
FLANK PAIN: 0
BLOATING: 0
DECREASED LIBIDO: 1
VOMITING: 1
POLYPHAGIA: 0
RECTAL PAIN: 0
EXERCISE INTOLERANCE: 1
NECK MASS: 0
SYNCOPE: 0
DEPRESSION: 0
CONSTIPATION: 1
DYSPNEA ON EXERTION: 1
PARALYSIS: 0
EYE REDNESS: 1
COUGH: 0
EYE PAIN: 0
NIGHT SWEATS: 0
SEIZURES: 0
ALTERED TEMPERATURE REGULATION: 1
HYPOTENSION: 0
DIARRHEA: 0
SKIN CHANGES: 0
SNORES LOUDLY: 0
DECREASED APPETITE: 1
POSTURAL DYSPNEA: 0
TREMORS: 0
JAUNDICE: 0
SPEECH CHANGE: 1
NUMBNESS: 0
BOWEL INCONTINENCE: 0
DYSURIA: 0
HALLUCINATIONS: 0
LIGHT-HEADEDNESS: 0
DISTURBANCES IN COORDINATION: 0
SINUS PAIN: 0

## 2018-12-13 ASSESSMENT — PAIN SCALES - GENERAL: PAINLEVEL: NO PAIN (0)

## 2018-12-13 NOTE — PROGRESS NOTES
Neurology referral  [phone]  This patient is scheduled on 12/13/2018 at 2:30pm.       Thank you!   Camelia

## 2018-12-13 NOTE — LETTER
12/13/2018       RE: Angy Haji  [id number]  [address]  [facility] [place] [facility] [address]     Dear Colleague,    Thank you for referring your patient, Angy Haji, to the [facility] NEUROLOGY at [facility]. Please see a copy of my visit note below.      Answers for HPI/ROS submitted by the patient on 12/13/2018   General Symptoms: Yes  Skin Symptoms: Yes  HENT Symptoms: Yes  EYE SYMPTOMS: Yes  HEART SYMPTOMS: Yes  LUNG SYMPTOMS: Yes  INTESTINAL SYMPTOMS: Yes  URINARY SYMPTOMS: Yes  GYNECOLOGIC SYMPTOMS: Yes  BREAST SYMPTOMS: No  SKELETAL SYMPTOMS: No  BLOOD SYMPTOMS: No  NERVOUS SYSTEM SYMPTOMS: Yes  MENTAL HEALTH SYMPTOMS: Yes  Fever: No  Loss of appetite: Yes  Weight loss: No  Weight gain: Yes  Fatigue: Yes  Night sweats: No  Chills: Yes  Increased stress: Yes  Excessive hunger: No  Excessive thirst: No  Feeling hot or cold when others believe the temperature is normal: Yes  Loss of height: No  Post-operative complications: No  Surgical site pain: No  Hallucinations: No  Change in or Loss of Energy: Yes  Hyperactivity: No  Confusion: Yes  Changes in hair: Yes  Changes in moles/birth marks: No  Itching: Yes  Rashes: No  Changes in nails: No  Acne: No  Hair in places you don't want it: No  Change in facial hair: No  Warts: No  Non-healing sores: Yes  Scarring: No  Flaking of skin: Yes  Color changes of hands/feet in cold : Yes  Sun sensitivity: No  Skin thickening: No  Ear pain: No  Ear discharge: No  Hearing loss: No  Tinnitus: Yes  Nosebleeds: No  Congestion: No  Sinus pain: No  Trouble swallowing: Yes   Voice hoarseness: Yes  Mouth sores: Yes  Sore throat: No  Tooth pain: No  Gum tenderness: No  Bleeding gums: No  Change in taste: No  Change in sense of smell: No  Dry mouth: Yes  Hearing aid used: No  Neck lump: No  Eye pain: No  Vision loss: No  Dry eyes: Yes  Watery eyes: No  Eye bulging: No  Double vision: No  Flashing of lights: No  Spots: No  Floaters: No  Redness:  Yes  Crossed eyes: No  Tunnel Vision: No  Yellowing of eyes: No  Eye irritation: Yes  Cough: No  Sputum or phlegm: No  Coughing up blood: No  Difficulty breating or shortness of breath: Yes  Snoring: No  Wheezing: No  Difficulty breathing on exertion: Yes  Nighttime Cough: No  Difficulty breathing when lying flat: No  Chest pain or pressure: No  Fast or irregular heartbeat: Yes  Pain in legs with walking: No  Trouble breathing while lying down: No  Fingers or toes appear blue: Yes  High blood pressure: Yes  Low blood pressure: No  Fainting: No  Murmurs: No  Pacemaker: No  Varicose veins: Yes  Edema or swelling: Yes  Wake up at night with shortness of breath: No  Light-headedness: No  Exercise intolerance: Yes  Heart burn or indigestion: No  Nausea: Yes  Vomiting: Yes  Abdominal pain: No  Bloating: No  Constipation: Yes  Diarrhea: No  Blood in stool: No  Black stools: No  Rectal or Anal pain: No  Fecal incontinence: No  Yellowing of skin or eyes: No  Vomit with blood: No  Change in stools: No  Trouble holding urine or incontinence: Yes  Pain or burning: No  Trouble starting or stopping: No  Increased frequency of urination: No  Blood in urine: No  Decreased frequency of urination: No  Frequent nighttime urination: No  Flank pain: No  Difficulty emptying bladder: Yes  Trouble with coordination: No  Dizziness or trouble with balance: Yes  Fainting or black-out spells: No  Memory loss: Yes  Headache: Yes  Seizures: No  Speech problems: Yes  Tingling: No  Tremor: No  Weakness: No  Difficulty walking: No  Paralysis: No  Numbness: No  Bleeding or spotting between periods: No  Heavy or painful periods: No  Irregular periods: Yes  Vaginal discharge: No  Hot flashes: Yes  Vaginal dryness: No  Genital ulcers: No  Reduced libido: Yes  Painful intercourse: No  Difficulty with sexual arousal: No  Post-menopausal bleeding: No  Nervous or Anxious: Yes  Depression: No  Trouble sleeping: Yes  Trouble thinking or concentrating:  Yes  Mood changes: Yes  Panic attacks: No      Service Date: 2018      Jono Odell MD   [facility]    [address] [place] [address]      RE: Angy Haji   MRN: [id number]   : 1965      Dear Johan:      This patient was recently hospitalized in the mental health unit at the [facility] and was diagnosed with some manic part of the bipolarity.  However, there were a couple of questions that required a neurological consultation and that is what the cause of her sudden anthony  that appear perhaps related to menopause.  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  She is also on Depakote 500 mg at bedtime without any significant gain or side effect but she just started it.  She also has hypertension and is on lisinopril.  She takes Cogentin and Flexeril.      The patient has no history of previous neurological problems.  She has had no ataxia, numbness, tingling, strokes, seizures.  She does report that she has had some difficulty swallowing and perhaps some dysarthria and she had her upper GI which did show apparently some sort of diverticulum or something in her throat and she has been put on a diet with improvement.  She had no history of strokes.  No family history of any kind and neurological disorder.  No history of Patrick's, etc.        PAST MEDICAL HISTORY:  Her past medical history shows her diagnoses include manic behavior, anxiety, dysphagia, muscle tension dysphonia, palpitation, morbidity, obesity, sleep apnea on CPAP, D deficiency.        PAST SURGICAL HISTORY:  Mostly laparoscopic, cholecystectomy, oophorectomy and salpingectomy.      FAMILY HISTORY:  Noncontributory.      SOCIAL HISTORY:  She is a therapist.        PHYSICAL EXAMINATION:  Her exam is very normal.     VITAL SIGNS:  Blood pressure 138/63.  Pulse is normal at 74.     GENERAL:   She is a  delightful woman.  She is accompanied by her partner.     NEUROLOGIC:   Her mental status exam is excellent.  She is a little racing in her thoughts.  Her cranial nerve exam is completely normal.  There are no signs of any bulbar disorder.  She has a hyperactive gag reflex.  Her coordination is very good and symmetrical throughout.  Her reflexes are brisk in the uppers and reduced but obtained slightly in the ankles.  Plantars are flexor.      Her gait and station are unremarkable.      In summary, she has a completely normal neurological exam.  The dysphagia seems to be related to possible esophageal diverticulum by her description and is responding to a diet.  I do not see any evidence of any neurological disorder to explain her anthony.  There is no family history of any kind of neurodegenerative or [place]'s to explain it.  We will do a plain CT scan, gave her a lot of reassurance and wished her well.      Sincerely,       Jonny Roberts MD

## 2018-12-13 NOTE — PROGRESS NOTES
Answers for HPI/ROS submitted by the patient on 12/13/2018   General Symptoms: Yes  Skin Symptoms: Yes  HENT Symptoms: Yes  EYE SYMPTOMS: Yes  HEART SYMPTOMS: Yes  LUNG SYMPTOMS: Yes  INTESTINAL SYMPTOMS: Yes  URINARY SYMPTOMS: Yes  GYNECOLOGIC SYMPTOMS: Yes  BREAST SYMPTOMS: No  SKELETAL SYMPTOMS: No  BLOOD SYMPTOMS: No  NERVOUS SYSTEM SYMPTOMS: Yes  MENTAL HEALTH SYMPTOMS: Yes  Fever: No  Loss of appetite: Yes  Weight loss: No  Weight gain: Yes  Fatigue: Yes  Night sweats: No  Chills: Yes  Increased stress: Yes  Excessive hunger: No  Excessive thirst: No  Feeling hot or cold when others believe the temperature is normal: Yes  Loss of height: No  Post-operative complications: No  Surgical site pain: No  Hallucinations: No  Change in or Loss of Energy: Yes  Hyperactivity: No  Confusion: Yes  Changes in hair: Yes  Changes in moles/birth marks: No  Itching: Yes  Rashes: No  Changes in nails: No  Acne: No  Hair in places you don't want it: No  Change in facial hair: No  Warts: No  Non-healing sores: Yes  Scarring: No  Flaking of skin: Yes  Color changes of hands/feet in cold : Yes  Sun sensitivity: No  Skin thickening: No  Ear pain: No  Ear discharge: No  Hearing loss: No  Tinnitus: Yes  Nosebleeds: No  Congestion: No  Sinus pain: No  Trouble swallowing: Yes   Voice hoarseness: Yes  Mouth sores: Yes  Sore throat: No  Tooth pain: No  Gum tenderness: No  Bleeding gums: No  Change in taste: No  Change in sense of smell: No  Dry mouth: Yes  Hearing aid used: No  Neck lump: No  Eye pain: No  Vision loss: No  Dry eyes: Yes  Watery eyes: No  Eye bulging: No  Double vision: No  Flashing of lights: No  Spots: No  Floaters: No  Redness: Yes  Crossed eyes: No  Tunnel Vision: No  Yellowing of eyes: No  Eye irritation: Yes  Cough: No  Sputum or phlegm: No  Coughing up blood: No  Difficulty breating or shortness of breath: Yes  Snoring: No  Wheezing: No  Difficulty breathing on exertion: Yes  Nighttime Cough: No  Difficulty  breathing when lying flat: No  Chest pain or pressure: No  Fast or irregular heartbeat: Yes  Pain in legs with walking: No  Trouble breathing while lying down: No  Fingers or toes appear blue: Yes  High blood pressure: Yes  Low blood pressure: No  Fainting: No  Murmurs: No  Pacemaker: No  Varicose veins: Yes  Edema or swelling: Yes  Wake up at night with shortness of breath: No  Light-headedness: No  Exercise intolerance: Yes  Heart burn or indigestion: No  Nausea: Yes  Vomiting: Yes  Abdominal pain: No  Bloating: No  Constipation: Yes  Diarrhea: No  Blood in stool: No  Black stools: No  Rectal or Anal pain: No  Fecal incontinence: No  Yellowing of skin or eyes: No  Vomit with blood: No  Change in stools: No  Trouble holding urine or incontinence: Yes  Pain or burning: No  Trouble starting or stopping: No  Increased frequency of urination: No  Blood in urine: No  Decreased frequency of urination: No  Frequent nighttime urination: No  Flank pain: No  Difficulty emptying bladder: Yes  Trouble with coordination: No  Dizziness or trouble with balance: Yes  Fainting or black-out spells: No  Memory loss: Yes  Headache: Yes  Seizures: No  Speech problems: Yes  Tingling: No  Tremor: No  Weakness: No  Difficulty walking: No  Paralysis: No  Numbness: No  Bleeding or spotting between periods: No  Heavy or painful periods: No  Irregular periods: Yes  Vaginal discharge: No  Hot flashes: Yes  Vaginal dryness: No  Genital ulcers: No  Reduced libido: Yes  Painful intercourse: No  Difficulty with sexual arousal: No  Post-menopausal bleeding: No  Nervous or Anxious: Yes  Depression: No  Trouble sleeping: Yes  Trouble thinking or concentrating: Yes  Mood changes: Yes  Panic attacks: No

## 2018-12-13 NOTE — NURSING NOTE
Chief Complaint   Patient presents with     RECHECK     P NEW - Internsal Referral     I HANNAH WAS NOT ABLE TO REVIEW MEDICATIONS WITH PT.  Hannah Elliott, CMA

## 2018-12-14 ENCOUNTER — ALLIED HEALTH/NURSE VISIT (OUTPATIENT)
Dept: PSYCHIATRY | Facility: CLINIC | Age: 53
End: 2018-12-14
Attending: PSYCHIATRY & NEUROLOGY
Payer: COMMERCIAL

## 2018-12-14 ENCOUNTER — APPOINTMENT (OUTPATIENT)
Dept: LAB | Facility: CLINIC | Age: 53
End: 2018-12-14
Attending: PSYCHIATRY & NEUROLOGY
Payer: COMMERCIAL

## 2018-12-14 VITALS
WEIGHT: 264.2 LBS | SYSTOLIC BLOOD PRESSURE: 141 MMHG | DIASTOLIC BLOOD PRESSURE: 83 MMHG | BODY MASS INDEX: 39.02 KG/M2 | HEART RATE: 69 BPM

## 2018-12-14 DIAGNOSIS — Z71.89 COUNSELING AND COORDINATION OF CARE: ICD-10-CM

## 2018-12-14 DIAGNOSIS — Z79.899 LONG TERM USE OF DRUG: Primary | ICD-10-CM

## 2018-12-14 DIAGNOSIS — F43.0 ACUTE REACTION TO STRESS: ICD-10-CM

## 2018-12-14 DIAGNOSIS — F30.9 MANIA (H): Primary | ICD-10-CM

## 2018-12-14 DIAGNOSIS — Z79.899 ON VALPROIC ACID THERAPY: Primary | ICD-10-CM

## 2018-12-14 DIAGNOSIS — F30.10 MANIC BEHAVIOR (H): ICD-10-CM

## 2018-12-14 DIAGNOSIS — F31.9 BIPOLAR I DISORDER (H): ICD-10-CM

## 2018-12-14 LAB
ALBUMIN SERPL-MCNC: 3.4 G/DL (ref 3.4–5)
ALP SERPL-CCNC: 50 U/L (ref 40–150)
ALT SERPL W P-5'-P-CCNC: 23 U/L (ref 0–50)
ANION GAP SERPL CALCULATED.3IONS-SCNC: 12 MMOL/L (ref 3–14)
AST SERPL W P-5'-P-CCNC: 17 U/L (ref 0–45)
BASOPHILS # BLD AUTO: 0 10E9/L (ref 0–0.2)
BASOPHILS NFR BLD AUTO: 0.5 %
BILIRUB SERPL-MCNC: 0.5 MG/DL (ref 0.2–1.3)
BUN SERPL-MCNC: 14 MG/DL (ref 7–30)
CALCIUM SERPL-MCNC: 8.7 MG/DL (ref 8.5–10.1)
CHLORIDE SERPL-SCNC: 94 MMOL/L (ref 94–109)
CO2 SERPL-SCNC: 23 MMOL/L (ref 20–32)
CREAT SERPL-MCNC: 0.43 MG/DL (ref 0.52–1.04)
DIFFERENTIAL METHOD BLD: ABNORMAL
EOSINOPHIL # BLD AUTO: 0.1 10E9/L (ref 0–0.7)
EOSINOPHIL NFR BLD AUTO: 2.8 %
ERYTHROCYTE [DISTWIDTH] IN BLOOD BY AUTOMATED COUNT: 12.3 % (ref 10–15)
GFR SERPL CREATININE-BSD FRML MDRD: >90 ML/MIN/1.7M2
GLUCOSE SERPL-MCNC: 71 MG/DL (ref 70–99)
HCT VFR BLD AUTO: 42.7 % (ref 35–47)
HGB BLD-MCNC: 14.3 G/DL (ref 11.7–15.7)
IMM GRANULOCYTES # BLD: 0 10E9/L (ref 0–0.4)
IMM GRANULOCYTES NFR BLD: 0.5 %
LYMPHOCYTES # BLD AUTO: 1.4 10E9/L (ref 0.8–5.3)
LYMPHOCYTES NFR BLD AUTO: 32.4 %
MCH RBC QN AUTO: 29.1 PG (ref 26.5–33)
MCHC RBC AUTO-ENTMCNC: 33.5 G/DL (ref 31.5–36.5)
MCV RBC AUTO: 87 FL (ref 78–100)
MONOCYTES # BLD AUTO: 0.5 10E9/L (ref 0–1.3)
MONOCYTES NFR BLD AUTO: 10.8 %
NEUTROPHILS # BLD AUTO: 2.3 10E9/L (ref 1.6–8.3)
NEUTROPHILS NFR BLD AUTO: 53 %
NRBC # BLD AUTO: 0 10*3/UL
NRBC BLD AUTO-RTO: 0 /100
PLATELET # BLD AUTO: 147 10E9/L (ref 150–450)
POTASSIUM SERPL-SCNC: 4.2 MMOL/L (ref 3.4–5.3)
PROT SERPL-MCNC: 7.6 G/DL (ref 6.8–8.8)
RBC # BLD AUTO: 4.92 10E12/L (ref 3.8–5.2)
SODIUM SERPL-SCNC: 129 MMOL/L (ref 133–144)
VALPROATE SERPL-MCNC: 109 MG/L (ref 50–100)
WBC # BLD AUTO: 4.4 10E9/L (ref 4–11)

## 2018-12-14 PROCEDURE — 85025 COMPLETE CBC W/AUTO DIFF WBC: CPT | Performed by: STUDENT IN AN ORGANIZED HEALTH CARE EDUCATION/TRAINING PROGRAM

## 2018-12-14 PROCEDURE — 80164 ASSAY DIPROPYLACETIC ACD TOT: CPT | Performed by: STUDENT IN AN ORGANIZED HEALTH CARE EDUCATION/TRAINING PROGRAM

## 2018-12-14 PROCEDURE — G0463 HOSPITAL OUTPT CLINIC VISIT: HCPCS | Mod: ZF

## 2018-12-14 PROCEDURE — 80048 BASIC METABOLIC PNL TOTAL CA: CPT | Performed by: STUDENT IN AN ORGANIZED HEALTH CARE EDUCATION/TRAINING PROGRAM

## 2018-12-14 PROCEDURE — 36415 COLL VENOUS BLD VENIPUNCTURE: CPT | Performed by: STUDENT IN AN ORGANIZED HEALTH CARE EDUCATION/TRAINING PROGRAM

## 2018-12-14 PROCEDURE — 80053 COMPREHEN METABOLIC PANEL: CPT | Performed by: STUDENT IN AN ORGANIZED HEALTH CARE EDUCATION/TRAINING PROGRAM

## 2018-12-14 RX ORDER — DIVALPROEX SODIUM 500 MG/1
TABLET, DELAYED RELEASE ORAL
Qty: 120 TABLET | Refills: 0 | Status: SHIPPED | OUTPATIENT
Start: 2018-12-14 | End: 2019-01-04

## 2018-12-14 ASSESSMENT — PAIN SCALES - GENERAL: PAINLEVEL: NO PAIN (0)

## 2018-12-14 NOTE — PATIENT INSTRUCTIONS
- Decrease morning depakote to 500mg at bedtime and continue 1000mg at bedtime.  - Follow-up 1 week

## 2018-12-14 NOTE — PROGRESS NOTES
[facility] PSYCHIATRY CLINIC PROGRESS NOTE     CARE TEAM:  PCP- Jono Odell    Specialty Providers- OB-GYN, ENT    Therapist- no     Community  Team- no    Angy LILIAN Haji is a 53 year old female who prefers the name Angy & pronouns she, her.     Date of initial diagnostic assessment is 12/5/18 .  Date of most recent transfer of care assessment is N/A.    Pertinent Background: Pt has not had significant mental health history until 2018 when developed perimenopausal mood/anxiety symptoms.      Psych critical item history includes manic episode and psychiatric hospitalization (Nov 2018)..    INTERIM HISTORY                                                                                                                 4, 4   The patient reports good treatment adherence.  History was provided by the patient and her wife Rachael who were good historians. The last visit ended with the following med change: started Risperidone.   Since the last visit:     - Sleep is much better.  - Much less emotionally distressed, less crying.  - Focus and concentration a little better, but still trouble with forgetfulness and tracking info  - Trending generally better  - Would like to get back to routine exercise, going to Y, etc. Acupuncture.  - Mood improving. Less sensitive.  - Easier to interrupt and have more regular conversation.    RECENT SYMPTOMS:   DEPRESSION:  reports-depressed mood, low energy, excessive guilt, excessive crying and overwhelmed;  DENIES- anhedonia and hypersomnia  VERITO/HYPOMANIA:  reports-distractibility  and inter-episode mood instability;  DENIES- increased energy, decreased sleep need and increased activity  PSYCHOSIS:  reports-none;  DENIES- delusions, auditory hallucinations and disorganized behavior    RECENT SUBSTANCE USE:     ALCOHOL- no      TOBACCO- no     CAFFEINE- quit  OPIOIDS- no         NARCAN KIT- N/A        CANNABIS- no            OTHER ILLICIT DRUGS- none      CURRENT SOCIAL  HISTORY:  FINANCIAL SUPPORT- currently on leave from work; wife is employed       CHILDREN- 12 yr dtr      LIVING SITUATION- with wife and dtr in [facility] in [place]      SOCIAL/ SPIRITUAL SUPPORT- wife and daughter    FEELS SAFE AT HOME- yes     MEDICAL ROS (2,10):  Reports A comprehensive review of systems was performed and is negative other than noted in the HPI.      Denies A comprehensive review of systems was performed and is negative other than noted in the HPI.    PSYCH and CD Critical Summary Points since July 2018 Nov 2018 - hospitalized for acute manic episode.  Started on combination of Depakote and olanzapine.  Developed severe extrapyramidal symptoms (shuffling gait, flat facies, muscle rigidity, tongue fullness) and so switched from olanzapine to Seroquel.  Could not tolerate quetiapine for similar reasons and so discontinue that as well.  Nov 2018 - outpatient psychiatric intake.  Started on low-dose risperidone but could not tolerate again.  Depakote ongoing.  Referred to neurology for consideration of major neurocognitive disorders.    PAST PSYCH MED TRIALS   see EMR Problem List: Hx of psychiatric care    MEDICAL / SURGICAL HISTORY                                   Pregnant or breastfeeding- no      Contraception- HRT for menopause    Neurologic Hx- no  Patient Active Problem List   Diagnosis     Oral aphthae     Obesity (BMI 30-39.9)     Disorder of bursae and tendons in shoulder region     CARDIOVASCULAR SCREENING; LDL GOAL LESS THAN 160     Rosacea     Tear of medial cartilage or meniscus of knee, current     Raynaud disease     Vitamin D deficiency disease     Mild sleep apnea     Atopic rhinitis     Morbid obesity (H)     Muscle tension dysphonia     Palpitations     Dysphagia, unspecified type     Anxiety     Manic behavior (H)       Major Surgery- unknown    ALLERGY                                Seroquel [quetiapine]; Penicillins; and Thimerosal  MEDICATIONS                                Current Outpatient Medications   Medication Sig Dispense Refill     artificial saliva (BIOTENE MT) SOLN solution Swish and spit 2 mLs (2 sprays) in mouth every hour as needed for dry mouth (Patient not taking: Reported on 12/6/2018) 44.3 mL 0     benztropine (COGENTIN) 1 MG tablet Take 1 tablet (1 mg) by mouth 2 times daily 28 tablet 0     Carboxymethylcellulose Sod PF (REFRESH PLUS) 0.5 % SOLN ophthalmic solution Place 1 drop into both eyes 3 times daily as needed for dry eyes       cyclobenzaprine (FLEXERIL) 10 MG tablet Take 1 tablet (10 mg) by mouth nightly as needed for other (headache) 30 tablet 0     divalproex sodium delayed-release (DEPAKOTE) 500 MG DR tablet Take 2 tablets (1,000 mg) by mouth 2 times daily 120 tablet 0     estrogen, conjugated,-medroxyPROGESTERone (PREMPRO) 0.625-2.5 MG per tablet Take 1 tablet by mouth daily 90 tablet 3     ibuprofen (ADVIL/MOTRIN) 800 MG tablet Take 400 mg by mouth every 4 hours as needed for other (headaches)        lidocaine, viscous, (XYLOCAINE) 2 % solution SWISH AND SPIT 5ML BY MOUTH EVERY 3 HOURS AS NEEDED 100 mL 0     lisinopril (PRINIVIL/ZESTRIL) 10 MG tablet Take 1 tablet (10 mg) by mouth daily 90 tablet 1     Loratadine 10 MG capsule Take 10 mg by mouth daily as needed.       Melatonin 10 MG TABS tablet Take 10 mg by mouth nightly as needed for sleep       metroNIDAZOLE (METROGEL) 1 % gel Apply topically daily 60 g 9     order for DME Please measure and distribute 1 pair of 20mmHg - 30mmHg thigh high open or closed toe compression stockings. Jobst ultrasheer or equivalent. (Patient not taking: Reported on 12/6/2018) 2 each 3     order for DME Please measure and distribute 1 pair of 20mmHg - 30mmHg knee high open toe compression stockings. Jobst ultrasheer or equivalent. 1 each 1     ORDER FOR DME Use your CPAP device as directed by your provider.       risperiDONE (RISPERDAL) 1 MG tablet Take 0.5 tablets (0.5 mg) by mouth 2 times daily for 20 days 20 tablet  1     VITAMIN D, CHOLECALCIFEROL, PO Take 2,000 Units by mouth daily       VITALS                                                                                                                          3, 3   There were no vitals taken for this visit.   MENTAL STATUS EXAM                                                                                           9, 14 cog gs     Alertness: alert  and oriented  Appearance: well groomed  Behavior/Demeanor: cooperative, with good  eye contact   Speech: Slightly pressured, but not nearly as pressured as last appointment a week ago  Language: Some word finding difficulties but otherwise intact  Psychomotor: No muscle stiffness or rigidity, no cogwheeling  Mood: anxious and improving  Affect: full range; was congruent to mood; was congruent to content  Thought Process/Associations: unremarkable  Thought Content:  Reports none;  Denies suicidal ideation, violent ideation, delusions, magical thinking and paranoid ideation  Perception:  Reports none;  Denies auditory hallucinations, depersonalization and derealization  Insight: good  Judgment: good  Cognition: (6) Completed MoCA --see results below  Gait and Station: No shuffling gait, no stiff posture, normal arm swing    LABS and DATA     AIMS:  0 (12/13/18)    PHQ9 TODAY = not completed    PHQ-9 SCORE 10/8/2018 10/9/2018 11/1/2018   PHQ-9 Total Score 9 9 11     MoCA (12/12/18) - 25/30, missed points on fluency (only 6 words with F) and delayed recall (1/5 only but got the remainders with category cues)    PSYCHLABVALPROATE  Recent Labs   Lab Test 12/14/18  1235 11/20/18  1255 11/13/18  0400   SOFIYA 109* 99 52     ANTIPSYCHOTIC LABS  [glu, A1C, lipids (mehran LDL), liver enzymes, WBC, ANEU, Hgb, plts]  q12 mo  Recent Labs   Lab Test 12/14/18  1235 12/06/18  1118 11/11/18  0901 11/10/18  0756  09/24/15  0904  09/16/13  0739 08/22/12  0752 08/16/11  0757   GLC 71 86.4 75 95   < > 84   < > 75 87 84   A1C  --   --   --   --   --   5.2  --  5.1 5.2 5.3    < > = values in this interval not displayed.     Recent Labs   Lab Test 10/02/17  0842 10/03/16  0742 09/24/15  0904 09/15/14  0733   CHOL 156 137 132 108   TRIG 55 56 58 46   LDL 49 51 47 24   HDL 96 75 73 75     Recent Labs   Lab Test 12/14/18  1235 08/09/18  0937 08/16/11  0757   AST 17 20 24   ALT 23 23 13   ALKPHOS 50  --  66     Recent Labs   Lab Test 12/14/18  1235 11/10/18  0756 08/09/18  0937 06/21/16  1122   WBC 4.4 4.8 4.2 5.2   ANEU 2.3  --   --   --    HGB 14.3 14.9 14.1 13.7   * 193 190 212       DIAGNOSIS     Bipolar Disorder, Unspecified    ASSESSMENT                                                                                                                   m2, h3     TODAY    Returning for follow-up care of recent acute manic episode resulting in hospitalization in November.  Patient reports improvement in manic symptoms including improvements in affect of instability, pressured speech, racing thoughts, increased goal-directed activities.  Partner Rachael feels that patient has also improved.  Sleep is improving.  Continues to have some cognitive dulling which may be related to either medication and/or recovery from acute manic episode.    Seen by neurology yesterday, no concern for movement disorder.    Discussed low platelet count.  Also discussed low sodium level.  Depakote can cause both thrombocytopenia and SIADH.  Agree to lower the morning dose of Depakote and recheck labs next Thursday.  Patient agrees to this plan.  We will see her back a week from today.    [place] PRESCRIPTION MONITORING PROGRAM [] was checked today:  not using controlled substances.    PSYCHOTROPIC DRUG INTERACTIONS:    no.  MANAGEMENT:  N/A     PLAN                                                                                                                                m2, h3     1) PSYCHOTROPIC MEDICATIONS:  -Decrease Depakote to 500mg in the morning and 1000mg at  bedtime  -Discontinue benztropine  -Discontinue risperidone    2) THERAPY:    Patient follows with individual psychotherapist in private practice    3) NEXT DUE:    Labs- Repeat VPA lvl, BMP, PLT  EKG- Repeat at next  Rating Scales- Repeat MoCA at next    4) REFERRALS:    None    5) RTC: 1 wk    6) CRISIS NUMBERS:   Provided routinely in AVS.    ONLY if a QAMAR PT: [facility] [phone] (clinic), [phone] (after hours)     TREATMENT RISK STATEMENT:  The risks, benefits, alternatives and potential adverse effects have been discussed and are understood by the pt. The pt understands the risks of using street drugs or alcohol. There are no medical contraindications, the pt agrees to treatment with the ability to do so. The pt knows to call the clinic for any problems or to access emergency care if needed.  Medical and substance use concerns are documented above.  Psychotropic drug interaction check was done, including changes made today.     PROVIDER:  Galo Sandoval MD    Patient was staffed with Dr. Tapia, who will review and sign this note.    I am the Outpatient  for the Psychiatry Clinic and am administratively closing this note. I am routing it to Dr. Tapia for her attestation as the physician supervising Dr. Sandoval.  Magda Reno, PhD LP    I saw the patient with the resident, and participated in key portions of the service, including the mental status examination and developing the plan of care. I reviewed key portions of the history with the resident. I agree with the findings and plan as documented in this note.    Dacia Tapia

## 2018-12-14 NOTE — Clinical Note
Cierra, I closed this but please add your attestation. It appears Gagan never routed it to you.Thanks,Samantha

## 2018-12-14 NOTE — PROGRESS NOTES
Service Date: 2018      Jono Odell MD   [facility]    [address] [place] [address]      RE: Angy Haji   MRN: [id number]   : 1965      Dear Johan:      This patient was recently hospitalized in the mental health unit at the [facility] and was diagnosed with some manic part of the bipolarity.  However, there were a couple of questions that required a neurological consultation and that is what the cause of her sudden anthony  that appear perhaps related to menopause.  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  She is also on Depakote 500 mg at bedtime without any significant gain or side effect but she just started it.  She also has hypertension and is on lisinopril.  She takes Cogentin and Flexeril.      The patient has no history of previous neurological problems.  She has had no ataxia, numbness, tingling, strokes, seizures.  She does report that she has had some difficulty swallowing and perhaps some dysarthria and she had her upper GI which did show apparently some sort of diverticulum or something in her throat and she has been put on a diet with improvement.  She had no history of strokes.  No family history of any kind and neurological disorder.  No history of McDowell's, etc.        PAST MEDICAL HISTORY:  Her past medical history shows her diagnoses include manic behavior, anxiety, dysphagia, muscle tension dysphonia, palpitation, morbidity, obesity, sleep apnea on CPAP, D deficiency.        PAST SURGICAL HISTORY:  Mostly laparoscopic, cholecystectomy, oophorectomy and salpingectomy.      FAMILY HISTORY:  Noncontributory.      SOCIAL HISTORY:  She is a therapist.        PHYSICAL EXAMINATION:  Her exam is very normal.     VITAL SIGNS:  Blood pressure 138/63.  Pulse is normal at 74.     GENERAL:   She is a delightful woman.  She is accompanied by her  partner.     NEUROLOGIC:   Her mental status exam is excellent.  She is a little racing in her thoughts.  Her cranial nerve exam is completely normal.  There are no signs of any bulbar disorder.  She has a hyperactive gag reflex.  Her coordination is very good and symmetrical throughout.  Her reflexes are brisk in the uppers and reduced but obtained slightly in the ankles.  Plantars are flexor.      Her gait and station are unremarkable.      In summary, she has a completely normal neurological exam.  The dysphagia seems to be related to possible esophageal diverticulum by her description and is responding to a diet.  I do not see any evidence of any neurological disorder to explain her anthony.  There is no family history of any kind of neurodegenerative or Wright's to explain it.  We will do a plain CT scan, gave her a lot of reassurance and wished her well.      Sincerely,       MD KERRY Pacheco MD             D: 2018   T: 2018   MT: TRACY      Name:     ARABELLA SHAH   MRN:      [id number]        Account:      [id number]   :      1965           Service Date: 2018      Document: [id number]

## 2018-12-17 ENCOUNTER — TELEPHONE (OUTPATIENT)
Dept: PSYCHIATRY | Facility: CLINIC | Age: 53
End: 2018-12-17

## 2018-12-17 ENCOUNTER — MYC MEDICAL ADVICE (OUTPATIENT)
Dept: PSYCHIATRY | Facility: CLINIC | Age: 53
End: 2018-12-17

## 2018-12-17 NOTE — TELEPHONE ENCOUNTER
On 12/17/2018 records were  faxed to [facility] at [phone], by Leona Gonzalez per fax cover sheet.  I sent the original to scanning, and held a copy in psychiatry until scanning is complete and routed this to Pita Marroquin, [facility]

## 2018-12-19 ENCOUNTER — TELEPHONE (OUTPATIENT)
Dept: PSYCHIATRY | Facility: CLINIC | Age: 53
End: 2018-12-19

## 2018-12-19 NOTE — TELEPHONE ENCOUNTER
[facility] Call Center    Phone Message    May a detailed message be left on voicemail: yes    Reason for Call: Other:   This patient called to see if there were any other neurology tests she needed to complete prior to her appointment on 12/21/18. She stated a call back is not urgent but if there is anything she needs to do that she would like to have time to do it.      Action Taken: Message routed to:  Other: HANNA Davies

## 2018-12-20 ENCOUNTER — DOCUMENTATION ONLY (OUTPATIENT)
Dept: SLEEP MEDICINE | Facility: CLINIC | Age: 53
End: 2018-12-20

## 2018-12-20 DIAGNOSIS — Z79.899 ON VALPROIC ACID THERAPY: ICD-10-CM

## 2018-12-20 DIAGNOSIS — Z79.899 LONG TERM USE OF DRUG: ICD-10-CM

## 2018-12-20 LAB
ANION GAP SERPL CALCULATED.3IONS-SCNC: 9 MMOL/L (ref 3–14)
BUN SERPL-MCNC: 15 MG/DL (ref 7–30)
CALCIUM SERPL-MCNC: 9.2 MG/DL (ref 8.5–10.1)
CHLORIDE SERPL-SCNC: 96 MMOL/L (ref 94–109)
CO2 SERPL-SCNC: 27 MMOL/L (ref 20–32)
CREAT SERPL-MCNC: 0.47 MG/DL (ref 0.52–1.04)
GFR SERPL CREATININE-BSD FRML MDRD: >90 ML/MIN/{1.73_M2}
GLUCOSE SERPL-MCNC: 71 MG/DL (ref 70–99)
PLATELET # BLD AUTO: 139 10E9/L (ref 150–450)
POTASSIUM SERPL-SCNC: 4.4 MMOL/L (ref 3.4–5.3)
SODIUM SERPL-SCNC: 132 MMOL/L (ref 133–144)
VALPROATE SERPL-MCNC: 78 MG/L (ref 50–100)

## 2018-12-20 PROCEDURE — 36415 COLL VENOUS BLD VENIPUNCTURE: CPT | Performed by: STUDENT IN AN ORGANIZED HEALTH CARE EDUCATION/TRAINING PROGRAM

## 2018-12-20 PROCEDURE — 80164 ASSAY DIPROPYLACETIC ACD TOT: CPT | Performed by: STUDENT IN AN ORGANIZED HEALTH CARE EDUCATION/TRAINING PROGRAM

## 2018-12-20 PROCEDURE — 80048 BASIC METABOLIC PNL TOTAL CA: CPT | Performed by: STUDENT IN AN ORGANIZED HEALTH CARE EDUCATION/TRAINING PROGRAM

## 2018-12-20 PROCEDURE — 85049 AUTOMATED PLATELET COUNT: CPT | Performed by: STUDENT IN AN ORGANIZED HEALTH CARE EDUCATION/TRAINING PROGRAM

## 2018-12-20 NOTE — PROGRESS NOTES
Patient came to [facility] for mask fitting appointment on December 20, 2018. Patient requested to switch masks because poor seal/leak. Patient tried on the following masks: Resmed N20 small and her current F20 large.  Patient is going to try both the N20 and the F20 and keep track for two weeks of which one she uses so we can look at her leak and see which mask fits better.  I went over her download again with her.  She also received a new large cushion for her FFM and CPAP wipes. She will let me know if she has any issues before then.

## 2018-12-20 NOTE — TELEPHONE ENCOUNTER
Provider informed writer that he has entered labs for the pt to complete. Writer called pt at [phone]. No answer. LVM relaying that only labs need to be completed prior to there appt.

## 2018-12-20 NOTE — TELEPHONE ENCOUNTER
Received incoming phone call from the pt, who said her question was a little different than writer interpreted it. She said she scheduled a CT without contrast appt for tomorrow. This was ordered by her neurologist. Although, at her last appt with Dr. Sandoval and Dr. Tapia, the providers were uncertain if a CT would be the best imaging order. She would like to know for certain what their thoughts are, but said she can discuss this at her appt tomorrow as well. Writer agreed to reach out to the providers and call her back this evening.    Discussed situation with Dr. Sandoval. He is uncertain why a CT was ordered vs MRI. He will discuss with the pt's neurologist and then discuss further with the pt at her psych appt tomorrow morning.    Called pt at the number in the previous message and relayed the above information. She voiced understanding and will plan on discussing this at tomorrow's appt. Asked that she keep the CT appt until she hears otherwise. Pt agreed to this.

## 2018-12-21 ENCOUNTER — HOSPITAL ENCOUNTER (OUTPATIENT)
Dept: CT IMAGING | Facility: CLINIC | Age: 53
Discharge: HOME | End: 2018-12-21
Attending: PSYCHIATRY & NEUROLOGY | Admitting: PSYCHIATRY & NEUROLOGY
Payer: COMMERCIAL

## 2018-12-21 ENCOUNTER — OFFICE VISIT (OUTPATIENT)
Dept: PSYCHIATRY | Facility: CLINIC | Age: 53
End: 2018-12-21
Attending: PSYCHIATRY & NEUROLOGY
Payer: COMMERCIAL

## 2018-12-21 ENCOUNTER — TELEPHONE (OUTPATIENT)
Dept: PSYCHIATRY | Facility: CLINIC | Age: 53
End: 2018-12-21

## 2018-12-21 VITALS
BODY MASS INDEX: 39.13 KG/M2 | DIASTOLIC BLOOD PRESSURE: 83 MMHG | HEART RATE: 73 BPM | WEIGHT: 265 LBS | SYSTOLIC BLOOD PRESSURE: 138 MMHG

## 2018-12-21 DIAGNOSIS — R25.9 ABNORMAL INVOLUNTARY MOVEMENT: ICD-10-CM

## 2018-12-21 DIAGNOSIS — F31.9 BIPOLAR I DISORDER (H): Primary | ICD-10-CM

## 2018-12-21 PROCEDURE — G0463 HOSPITAL OUTPT CLINIC VISIT: HCPCS | Mod: 25

## 2018-12-21 PROCEDURE — 70450 CT HEAD/BRAIN W/O DYE: CPT

## 2018-12-21 RX ORDER — LITHIUM CARBONATE 300 MG/1
300 CAPSULE ORAL AT BEDTIME
Qty: 30 CAPSULE | Refills: 0 | Status: SHIPPED | OUTPATIENT
Start: 2018-12-21 | End: 2019-01-04

## 2018-12-21 ASSESSMENT — PAIN SCALES - GENERAL: PAINLEVEL: NO PAIN (0)

## 2018-12-21 ASSESSMENT — PATIENT HEALTH QUESTIONNAIRE - PHQ9: SUM OF ALL RESPONSES TO PHQ QUESTIONS 1-9: 4

## 2018-12-21 NOTE — PROGRESS NOTES
[facility] PSYCHIATRY CLINIC PROGRESS NOTE     CARE TEAM:  PCP- Jono Odell    Specialty Providers- OB-GYN, ENT    Therapist- no     Community  Team- no    Angy Haji is a 53 year old female who prefers the name Angy & pronouns she, her.     Date of initial diagnostic assessment is 12/5/18 .  Date of most recent transfer of care assessment is N/A.    Pertinent Background: Pt has not had significant mental health history until 2018 when developed perimenopausal mood/anxiety symptoms.      Psych critical item history includes manic episode and psychiatric hospitalization (Nov 2018)..    INTERIM HISTORY                                                                                                                 4, 4   The patient reports good treatment adherence.  History was provided by the patient and her wife Rachael who were good historians. The last visit ended with the following med change: started Risperidone.   Since the last visit:     - Mood alternating between calm and distressed.  - Has been making strides in staying on task, getting less distracted.  - Still feeling anxious and can become tearful with stressful. Normally would handle these things with ease/calm.  - Hard to do sustained tasks that require focus/attention, eg. Disability paperwork  - Ongoing cognitive impairments  - Describes negative self-talk which leads to crying and emotional dysregulation  - Sleeping is improved. More consistent. 7-8 hours.    RECENT SYMPTOMS:   DEPRESSION:  reports-depressed mood, low energy, excessive guilt, excessive crying and overwhelmed;  DENIES- anhedonia and hypersomnia  VERITO/HYPOMANIA:  reports-distractibility  and inter-episode mood instability;  DENIES- increased energy, decreased sleep need and increased activity  PSYCHOSIS:  reports-none;  DENIES- delusions, auditory hallucinations and disorganized behavior    RECENT SUBSTANCE USE:     ALCOHOL- no      TOBACCO- no     CAFFEINE- quit  OPIOIDS- no          NARCAN KIT- N/A        CANNABIS- no            OTHER ILLICIT DRUGS- none      CURRENT SOCIAL HISTORY:  FINANCIAL SUPPORT- currently on leave from work; wife is employed       CHILDREN- 12 yr dtr      LIVING SITUATION- with wife and dtr in [facility] [place] in [place]      SOCIAL/ SPIRITUAL SUPPORT- wife and daughter    FEELS SAFE AT HOME- yes     MEDICAL ROS (2,10):  Reports A comprehensive review of systems was performed and is negative other than noted in the HPI.      Denies A comprehensive review of systems was performed and is negative other than noted in the HPI.    PSYCH and CD Critical Summary Points since July 2018 Nov 2018 - hospitalized for acute manic episode.  Started on combination of Depakote and olanzapine.  Developed severe extrapyramidal symptoms (shuffling gait, flat facies, muscle rigidity, tongue fullness) and so switched from olanzapine to Seroquel.  Could not tolerate quetiapine for similar reasons and so discontinue that as well.  Nov 2018 - outpatient psychiatric intake.  Started on low-dose risperidone but could not tolerate again.  Depakote ongoing.  Referred to neurology for consideration of major neurocognitive disorders.  December 2018 - added low dose Li for anthony    PAST PSYCH MED TRIALS   see EMR Problem List: Hx of psychiatric care    MEDICAL / SURGICAL HISTORY                                   Pregnant or breastfeeding- no      Contraception- HRT for menopause    Neurologic Hx- no  Patient Active Problem List   Diagnosis     Oral aphthae     Obesity (BMI 30-39.9)     Disorder of bursae and tendons in shoulder region     CARDIOVASCULAR SCREENING; LDL GOAL LESS THAN 160     Rosacea     Tear of medial cartilage or meniscus of knee, current     Raynaud disease     Vitamin D deficiency disease     Mild sleep apnea     Atopic rhinitis     Morbid obesity (H)     Muscle tension dysphonia     Palpitations     Dysphagia, unspecified type     Anxiety     Manic behavior (H)       Major Surgery-  unknown    ALLERGY                                Seroquel [quetiapine]; Olanzapine; Penicillins; Risperidone; and Thimerosal  MEDICATIONS                               Current Outpatient Medications   Medication Sig Dispense Refill     benztropine (COGENTIN) 1 MG tablet Take 1 tablet (1 mg) by mouth 2 times daily 28 tablet 0     Carboxymethylcellulose Sod PF (REFRESH PLUS) 0.5 % SOLN ophthalmic solution Place 1 drop into both eyes 3 times daily as needed for dry eyes       cyclobenzaprine (FLEXERIL) 10 MG tablet Take 1 tablet (10 mg) by mouth nightly as needed for other (headache) 30 tablet 0     divalproex sodium delayed-release (DEPAKOTE) 500 MG DR tablet Take 500mg in morning and 1000mg at bedtime 120 tablet 0     estrogen, conjugated,-medroxyPROGESTERone (PREMPRO) 0.625-2.5 MG per tablet Take 1 tablet by mouth daily 90 tablet 3     ibuprofen (ADVIL/MOTRIN) 800 MG tablet Take 400 mg by mouth every 4 hours as needed for other (headaches)        lidocaine, viscous, (XYLOCAINE) 2 % solution SWISH AND SPIT 5ML BY MOUTH EVERY 3 HOURS AS NEEDED 100 mL 0     lisinopril (PRINIVIL/ZESTRIL) 10 MG tablet Take 1 tablet (10 mg) by mouth daily 90 tablet 1     lithium 300 MG capsule Take 1 capsule (300 mg) by mouth At Bedtime 30 capsule 0     Loratadine 10 MG capsule Take 10 mg by mouth daily as needed.       Melatonin 10 MG TABS tablet Take 10 mg by mouth nightly as needed for sleep       metroNIDAZOLE (METROGEL) 1 % gel Apply topically daily 60 g 9     order for DME Please measure and distribute 1 pair of 20mmHg - 30mmHg knee high open toe compression stockings. Jobst ultrasheer or equivalent. 1 each 1     ORDER FOR DME Use your CPAP device as directed by your provider.       VITAMIN D, CHOLECALCIFEROL, PO Take 2,000 Units by mouth daily       artificial saliva (BIOTENE MT) SOLN solution Swish and spit 2 mLs (2 sprays) in mouth every hour as needed for dry mouth (Patient not taking: Reported on 12/6/2018) 44.3 mL 0     order  for DME Please measure and distribute 1 pair of 20mmHg - 30mmHg thigh high open or closed toe compression stockings. Jobst ultrasheer or equivalent. (Patient not taking: Reported on 12/6/2018) 2 each 3     VITALS                                                                                                                          3, 3   /83   Pulse 73   Wt 120.2 kg (265 lb)   BMI 39.13 kg/m     MENTAL STATUS EXAM                                                                                           9, 14 cog gs     Alertness: alert  and oriented  Appearance: well groomed  Behavior/Demeanor: cooperative, with good  eye contact   Speech: continues to be moderately pressured and rapid  Language: Some word finding difficulties but otherwise intact  Psychomotor: No muscle stiffness or rigidity, no cogwheeling  Mood: anxious and labile  Affect: expansive, labile; was congruent to mood; was congruent to content  Thought Process/Associations: unremarkable  Thought Content:  Reports none;  Denies suicidal ideation, violent ideation, delusions, magical thinking and paranoid ideation  Perception:  Reports none;  Denies auditory hallucinations, depersonalization and derealization  Insight: good  Judgment: good  Cognition: (6) does not appear grossly intact; formal cognitive testing was not done  Gait and Station: No shuffling gait, no stiff posture, normal arm swing    LABS and DATA     AIMS:  0 (12/13/18)    PHQ9    PHQ-9 SCORE 10/8/2018 10/9/2018 11/1/2018   PHQ-9 Total Score 9 9 11     MoCA (12/12/18) - 25/30, missed points on fluency (only 6 words with F) and delayed recall (1/5 only but got the remainders with category cues)    PSYCHLABVALPROATE  Recent Labs   Lab Test 12/20/18  1214 12/14/18  1235 11/20/18  1255 11/13/18  0400   SOFIYA 78 109* 99 52     ANTIPSYCHOTIC LABS  [glu, A1C, lipids (mehran LDL), liver enzymes, WBC, ANEU, Hgb, plts]  q12 mo  Recent Labs   Lab Test 12/20/18  1214 12/14/18  1235  12/06/18  1118 11/11/18  0901  09/24/15  0904  09/16/13  0739 08/22/12  0752 08/16/11  0757   GLC 71 71 86.4 75   < > 84   < > 75 87 84   A1C  --   --   --   --   --  5.2  --  5.1 5.2 5.3    < > = values in this interval not displayed.     Recent Labs   Lab Test 10/02/17  0842 10/03/16  0742 09/24/15  0904 09/15/14  0733   CHOL 156 137 132 108   TRIG 55 56 58 46   LDL 49 51 47 24   HDL 96 75 73 75     Recent Labs   Lab Test 12/14/18  1235 08/09/18  0937 08/16/11  0757   AST 17 20 24   ALT 23 23 13   ALKPHOS 50  --  66     Recent Labs   Lab Test 12/20/18  1214 12/14/18  1235 11/10/18  0756 08/09/18  0937 06/21/16  1122   WBC  --  4.4 4.8 4.2 5.2   ANEU  --  2.3  --   --   --    HGB  --  14.3 14.9 14.1 13.7   * 147* 193 190 212       DIAGNOSIS     Bipolar Disorder, unspecified, most recent episode anthony    ASSESSMENT                                                                                                                   m2, h3     TODAY    Angy continues to experience symptoms of mood instability, cognitive impairments, and pressured speech. She has made improvements in better managing her ADLs and completing them in an orderly fashion, but gets easily overwhelmed by more complex tasks. This appears to be due to ongoing residual symptoms of anthony. Generally, she appears to be trending in a positive direction, but continues to require time off from work, which I support.    Difficult course. Most likely the anthony is because of Bipolar I disorder, though we will continue to monitor for indications of major neurocognitive disorders such as Parkinson's. She has failed 3 antipsychotic trials because of severe side effects, further complicating her treatment course and prolonging her recovery time.    Had dropped depakote dose last week because of borderline thrombocytopenia. PLTs are still low today, but pt appears a bit worse today in terms of mood instability. Unclear whether this is because of dose  reduction. Discussed with patient about risks of thrombocytopenia, decided in collaboration with pt and her wife to continue current dose and add on low-dose lithium for add'l mood stabilization. Will continue to trend PLT count. Again, thromboctyopenia appears to be due to depakote, though historically patient has always run on the low end (around 200), so the current change does not reflect a drastic reduction.    [place] PRESCRIPTION MONITORING PROGRAM [] was checked today:  not using controlled substances.    PSYCHOTROPIC DRUG INTERACTIONS:    no.  MANAGEMENT:  N/A     PLAN                                                                                                                                m2, h3     1) PSYCHOTROPIC MEDICATIONS:  -Continue depakote to 500mg in the morning and 1000mg at bedtime  -Start lithium 300mg at bedtime  -Discontinue benztropine  -Discontinue risperidone    2) THERAPY:    Patient follows with individual psychotherapist in private practice    3) NEXT DUE:    Labs- PLT count, Li lvl, VPA level prior to next appt  EKG- Repeat at next  Rating Scales- Repeat MoCA at next    4) REFERRALS:    None    5) RTC: 2 wk    6) CRISIS NUMBERS:   Provided routinely in AVS.    ONLY if a [facility]: [facility] [phone] (clinic), [phone] (after hours)     TREATMENT RISK STATEMENT:  The risks, benefits, alternatives and potential adverse effects have been discussed and are understood by the pt. The pt understands the risks of using street drugs or alcohol. There are no medical contraindications, the pt agrees to treatment with the ability to do so. The pt knows to call the clinic for any problems or to access emergency care if needed.  Medical and substance use concerns are documented above.  Psychotropic drug interaction check was done, including changes made today.     PROVIDER:  Galo Sandoval MD    Patient was staffed with Dr. Tapia, who will review and sign this note.  I saw  the patient with the resident, and participated in key portions of the service, including the mental status examination and developing the plan of care. I reviewed key portions of the history with the resident. I agree with the findings and plan as documented in this note.    Dacia Tapia

## 2018-12-21 NOTE — PATIENT INSTRUCTIONS
- Start Lithium 300mg at bedtime. Can wait until next Wednesday night. Call if problems.  - Continue Depakote 500mg in the morning and 1000mg at bedtime.  - Do lab draw 5 days after starting Lithium.   - Follow-up in 2 weeks        Thank you for coming to the PSYCHIATRY CLINIC.    Lab Testing:  If you had lab testing today and your results are reassuring or normal they will be mailed to you or sent through [facility] within 7 days.   If the lab tests need quick action we will call you with the results.  The phone number we will call with results is # [phone] (home) [phone] (work). If this is not the best number please call our clinic and change the number.    Medication Refills:  If you need any refills please call your pharmacy and they will contact us. Our fax number for refills is [phone]. Please allow three business for refill processing.   If you need to  your refill at a new pharmacy, please contact the new pharmacy directly. The new pharmacy will help you get your medications transferred.     Scheduling:  If you have any concerns about today's visit or wish to schedule another appointment please call our office during normal business hours [phone] (8-5:00 M-F)    Contact Us:  Please call [phone] during business hours (8-5:00 M-F).  If after clinic hours, or on the weekend, please call  [phone].    Financial Assistance [phone]  [facility] [phone]  [facility]illing [phone]  Medical Records [phone]      MENTAL HEALTH CRISIS NUMBERS:  [facility]:   [facility] - [phone]   Crisis Residence [facility] - [phone]   Walk-In Counseling [facility] - [phone]   COPE 24/7 [place] Mobile Team for Adults - [[phone]]; Child - [[phone]]     Crisis Connection - [phone]     [facility]:   [facility] - [phone]   Walk-in counseling [facility] - [phone]    Walk-in counseling [facility] - [phone]   Crisis Residence [facility] - [phone]   Urgent Care Adult Mental Health:   --Drop-in, 24/7 crisis line, [facility] Mobile Team [[phone]]    CRISIS TEXT LINE: Text [phone] from anywhere, anytime, any crisis 24/7;    OR SEE www.crisistextline.org     Poison Control Center - [phone]    CHILD: [facility] needs assessment team - [phone]     [facility] Lifeline - [phone]; or [employer] Lifeline - [phone]    If you have a medical emergency please call 911or go to the nearest ER.                    _____________________________________________    Again thank you for choosing PSYCHIATRY CLINIC and please let us know how we can best partner with you to improve you and your family's health.  You may be receiving a survey in the mail regarding this appointment. We would love to have your feedback, both positive and negative, so please fill out the survey and return it using the provided envelope. The survey is done by an external company, so your answers are anonymous.

## 2018-12-21 NOTE — LETTER
December 21, 2018      TO: [employer]       To whom it may concern:    I am writing in regards to Angy Haji. Her claim is [id number] and her control number is [id number].    Ms. Haji was hospitalized for a severe manic episode secondary to Bipolar I Disorder in November 2018. She has since come under my care of outpatient psychiatric services. Though she is no longer hospitalized, she continues to experience severe symptoms of bipolar disorder. These include problems that impair cognition and prevent her from working.    It is imperative that she remain off work as she continues to recover and obtain ongoing psychiatric care. At the present, we anticipate a minimum of 12 months of time to adequately recover to the point she is able to return to work.    Thank you for consideration and understanding of Ms. Haji's difficult situation.    Sincerely,          Gagan Sandoval MD, MPH

## 2018-12-21 NOTE — TELEPHONE ENCOUNTER
Health Call Center    Phone Message    May a detailed message be left on voicemail: yes    Reason for Call: Other:   Patient called regarding [facility] test from 12/21/18. She spoke with someone at [facility] about insurance and out of pocket costs and would like to proceed with submission of test.    Action Taken: Message routed to: Amberly Gordon RNCC

## 2018-12-26 ENCOUNTER — TELEPHONE (OUTPATIENT)
Dept: PSYCHIATRY | Facility: CLINIC | Age: 53
End: 2018-12-26

## 2018-12-26 NOTE — TELEPHONE ENCOUNTER
Received return phone call from the pt. The pt confirmed that the swab has already been done, she has called insurance, and would like to go forward with the processing the test. Writer voiced understanding. Located the [facility] envelope and swabs. Given to Imelda Beyer LPN to complete the process.    The pt also relayed some worsening symptoms including, mood lability, tearfulness, racing speech, and grandiose thinking. She went onto explain that the holidays caused her a lot of stress. Over the weekend, she went to her parents home in [place]. She said her father causes the majority of her stress and this is when she noticed a return/worsening of symptoms. Specifically the rapid speech and mood lability. She also reports a difficulty in calming down and some sleep disturbance. The pt is not sure if this is caused by the stress or if it could be caused by the recent med change. She is currently taking Lithium 300 mg.The pt reports she decided to start this on Sunday, even though she voiced some hesitation about starting it in the past. She continues on 1500 mg of Depakote and plans on having a lab draw prior to her appt next Friday, 1/4. The pt currently denies any SI or overall safety concerns. Writer agreed to reach out to the providers regarding her symptoms. Asked that she c/b if the symptoms continue to worsen prior to her appt next Friday, which she agreed to do.

## 2018-12-26 NOTE — TELEPHONE ENCOUNTER
2019, 19 pages of results was received from [facility]. The original copies were sent to scanning and copies were put in Dr. Sandoval's folder. A note was routed to Dr. Sandoval.Imelda Beyer LPN    Waiting for swab envelope to be signed 2018.Text sent to Dr. Tapia. Dr. Tapia signed swab envelope and test was over nighted via [facility] Chaim Beyer LPN    On 2018,  Angy Haji ,  1965, arrived for Gene [facility]ight Test.  Obtained buccal swab of both cheeks and patient's insurance information. Patient signed Patient Consent form and verified information on Cheek Swab Envelope. Ordering clinician,Dr. Tapia for Dr. Sandoval, signed Cheek Swab Envelope. Order placed with [facility]. [employer] scheduled to  overnight envelope..Imelda Beyer LPN

## 2018-12-26 NOTE — TELEPHONE ENCOUNTER
Called pt at [phone]. No answer. LVM with information on the scheduling process for [facility] testing. Requested a c/b to schedule this.

## 2018-12-27 NOTE — TELEPHONE ENCOUNTER
Galo Sandoval MD Pratt, Laura RN   Cc: Dacia Tapia MD   Caller: Unspecified (6 days ago,  1:59 PM)             Jose Locke, one more thing ...     Willie and I were chatting. She thinks we should go back to the previous Depakote dose in addition to increasing the lithium.     Can you please call her and have her increase the Depakote back to 1000mg BID?     She also needs a lithium level 5-7 days after her most recent Li increase (which I have ordered).     Once we get her to a therapeutic dose of Lithium, we can start going back down on Depakote again.     Thanks for your help on this. Sorry to make more work.     Gagan        Tried calling pt. No answer. LVM requesting a c/b to discuss the provider's suggestion further.

## 2018-12-27 NOTE — TELEPHONE ENCOUNTER
Received return phone call from the pt. Relayed the provider's suggestions. Pt agreed to this and voiced understanding as to why it should be increased at this time. She will have her lithium level drawn on 1/3. Provided education as to when she should have this level drawn. Pt voiced understanding and wrote down directions regarding the lithium lab draw.

## 2018-12-27 NOTE — TELEPHONE ENCOUNTER
Called pt at the preferred number in the demographics section to relay the provider's suggestion. The pt agreed to go forward with this suggestion. She voiced additional concerns/return in symptoms. These include:   Poor concentration    Difficulty with organization  Forgetfulness     She reports these were some of her main concerns/most noticeable symptoms at the beginning of her anthony. The pt will increase her Lithium tonight and plans on calling the clinic if her symptoms continue to worsen.

## 2018-12-29 ENCOUNTER — TRANSFERRED RECORDS (OUTPATIENT)
Dept: HEALTH INFORMATION MANAGEMENT | Facility: CLINIC | Age: 53
End: 2018-12-29

## 2019-01-02 DIAGNOSIS — F31.9 BIPOLAR I DISORDER (H): ICD-10-CM

## 2019-01-02 LAB
LITHIUM SERPL-SCNC: 0.37 MMOL/L (ref 0.6–1.2)
PLATELET # BLD AUTO: 152 10E9/L (ref 150–450)

## 2019-01-02 PROCEDURE — 85049 AUTOMATED PLATELET COUNT: CPT | Performed by: STUDENT IN AN ORGANIZED HEALTH CARE EDUCATION/TRAINING PROGRAM

## 2019-01-02 PROCEDURE — 36415 COLL VENOUS BLD VENIPUNCTURE: CPT | Performed by: STUDENT IN AN ORGANIZED HEALTH CARE EDUCATION/TRAINING PROGRAM

## 2019-01-02 PROCEDURE — 80178 ASSAY OF LITHIUM: CPT | Performed by: STUDENT IN AN ORGANIZED HEALTH CARE EDUCATION/TRAINING PROGRAM

## 2019-01-04 ENCOUNTER — ALLIED HEALTH/NURSE VISIT (OUTPATIENT)
Dept: PSYCHIATRY | Facility: CLINIC | Age: 54
End: 2019-01-04
Attending: SOCIAL WORKER
Payer: COMMERCIAL

## 2019-01-04 ENCOUNTER — OFFICE VISIT (OUTPATIENT)
Dept: PSYCHIATRY | Facility: CLINIC | Age: 54
End: 2019-01-04
Attending: PSYCHIATRY & NEUROLOGY
Payer: COMMERCIAL

## 2019-01-04 ENCOUNTER — DOCUMENTATION ONLY (OUTPATIENT)
Dept: SLEEP MEDICINE | Facility: CLINIC | Age: 54
End: 2019-01-04

## 2019-01-04 ENCOUNTER — TELEPHONE (OUTPATIENT)
Dept: PSYCHIATRY | Facility: CLINIC | Age: 54
End: 2019-01-04

## 2019-01-04 VITALS
SYSTOLIC BLOOD PRESSURE: 121 MMHG | HEART RATE: 74 BPM | BODY MASS INDEX: 39.4 KG/M2 | WEIGHT: 266.8 LBS | DIASTOLIC BLOOD PRESSURE: 78 MMHG

## 2019-01-04 DIAGNOSIS — Z71.89 COUNSELING AND COORDINATION OF CARE: Primary | ICD-10-CM

## 2019-01-04 DIAGNOSIS — F31.9 BIPOLAR I DISORDER (H): ICD-10-CM

## 2019-01-04 PROCEDURE — G0463 HOSPITAL OUTPT CLINIC VISIT: HCPCS | Mod: ZF

## 2019-01-04 RX ORDER — DIVALPROEX SODIUM 500 MG/1
1000 TABLET, DELAYED RELEASE ORAL 2 TIMES DAILY
Qty: 120 TABLET | Refills: 1 | Status: SHIPPED | OUTPATIENT
Start: 2019-01-04 | End: 2019-02-01

## 2019-01-04 RX ORDER — LITHIUM CARBONATE 300 MG/1
900 CAPSULE ORAL AT BEDTIME
Qty: 90 CAPSULE | Refills: 1 | Status: SHIPPED | OUTPATIENT
Start: 2019-01-04 | End: 2019-01-15

## 2019-01-04 ASSESSMENT — PAIN SCALES - GENERAL: PAINLEVEL: NO PAIN (0)

## 2019-01-04 NOTE — PROGRESS NOTES
[facility] PSYCHIATRY CLINIC PROGRESS NOTE     CARE TEAM:  PCP- Jono Odell    Specialty Providers- OB-GYN, ENT    Therapist- no     Community  Team- no    Angy LILIAN Haji is a 53 year old female who prefers the name Angy & pronouns she, her.  Date of initial diagnostic assessment is 12/5/18 .  Date of most recent transfer of care assessment is N/A.    Pertinent Background: Pt has not had significant mental health history until 2018 when developed perimenopausal mood/anxiety symptoms.      Psych critical item history includes manic episode and psychiatric hospitalization (Nov 2018)..    INTERIM HISTORY                                                          4, 4   The patient reports good treatment adherence.  History was provided by the patient and her wife Rachael who were good historians. The last visit ended with the following med change: started Risperidone.   Since the last visit:     Pt reports mild improvements in mood and organization. She feel that she is starting to who denies any tasks.  As pressured less labile.  Wife feels similarly about her.    Asked about ECT appointment with Dr. Donnelly. They would like to defer this appointment to a later date as needed, and improvement in her mood symptoms.  Patient reports that her sleep has approached its baseline.    RECENT SYMPTOMS:   DEPRESSION:  reports-depressed mood, low energy, excessive guilt, excessive crying and overwhelmed;  DENIES- anhedonia and hypersomnia  VERITO/HYPOMANIA:  reports-distractibility  and inter-episode mood instability;  DENIES- increased energy, decreased sleep need and increased activity  PSYCHOSIS:  reports-none;  DENIES- delusions, auditory hallucinations and disorganized behavior    RECENT SUBSTANCE USE:     ALCOHOL- no      TOBACCO- no     CAFFEINE- quit  OPIOIDS- no         NARCAN KIT- N/A        CANNABIS- no            OTHER ILLICIT DRUGS- none      CURRENT SOCIAL HISTORY:  FINANCIAL SUPPORT- currently on leave from work; wife  is employed       CHILDREN- 12 yr dtr      LIVING SITUATION- with wife and dtr in apt in [place]      SOCIAL/ SPIRITUAL SUPPORT- wife and daughter    FEELS SAFE AT HOME- yes     MEDICAL ROS (2,10):  Reports A comprehensive review of systems was performed and is negative other than noted in the HPI.      Denies A comprehensive review of systems was performed and is negative other than noted in the HPI.    PSYCH and CD Critical Summary Points since July 2018 Nov 2018 - hospitalized for acute manic episode.  Started on combination of Depakote and olanzapine.  Developed severe extrapyramidal symptoms (shuffling gait, flat facies, muscle rigidity, tongue fullness) and so switched from olanzapine to Seroquel.  Could not tolerate quetiapine for similar reasons and so discontinue that as well.  Nov 2018 - outpatient psychiatric intake.  Started on low-dose risperidone but could not tolerate again.  Depakote ongoing.  Referred to neurology for consideration of major neurocognitive disorders.  December 2018 - added low dose Li for anthony    PAST PSYCH MED TRIALS   see EMR Problem List: Hx of psychiatric care    MEDICAL / SURGICAL HISTORY                                   Pregnant or breastfeeding- no      Contraception- HRT for menopause    Neurologic Hx- no  Patient Active Problem List   Diagnosis     Oral aphthae     Obesity (BMI 30-39.9)     Disorder of bursae and tendons in shoulder region     CARDIOVASCULAR SCREENING; LDL GOAL LESS THAN 160     Rosacea     Tear of medial cartilage or meniscus of knee, current     Raynaud disease     Vitamin D deficiency disease     Mild sleep apnea     Atopic rhinitis     Morbid obesity (H)     Muscle tension dysphonia     Palpitations     Dysphagia, unspecified type     Anxiety     Manic behavior (H)       Major Surgery- unknown    ALLERGY               Seroquel [quetiapine]; Olanzapine; Penicillins; Risperidone; and Thimerosal  MEDICATIONS        Current Outpatient Medications    Medication Sig Dispense Refill     benztropine (COGENTIN) 1 MG tablet Take 1 tablet (1 mg) by mouth 2 times daily 28 tablet 0     Carboxymethylcellulose Sod PF (REFRESH PLUS) 0.5 % SOLN ophthalmic solution Place 1 drop into both eyes 3 times daily as needed for dry eyes       cyclobenzaprine (FLEXERIL) 10 MG tablet Take 1 tablet (10 mg) by mouth nightly as needed for other (headache) 30 tablet 0     divalproex sodium delayed-release (DEPAKOTE) 500 MG DR tablet Take 500mg in morning and 1000mg at bedtime 120 tablet 0     estrogen, conjugated,-medroxyPROGESTERone (PREMPRO) 0.625-2.5 MG per tablet Take 1 tablet by mouth daily 90 tablet 3     ibuprofen (ADVIL/MOTRIN) 800 MG tablet Take 400 mg by mouth every 4 hours as needed for other (headaches)        lidocaine, viscous, (XYLOCAINE) 2 % solution SWISH AND SPIT 5ML BY MOUTH EVERY 3 HOURS AS NEEDED 100 mL 0     lisinopril (PRINIVIL/ZESTRIL) 10 MG tablet Take 1 tablet (10 mg) by mouth daily 90 tablet 1     lithium 300 MG capsule Take 1 capsule (300 mg) by mouth At Bedtime 30 capsule 0     Loratadine 10 MG capsule Take 10 mg by mouth daily as needed.       Melatonin 10 MG TABS tablet Take 10 mg by mouth nightly as needed for sleep       metroNIDAZOLE (METROGEL) 1 % gel Apply topically daily 60 g 9     order for DME Please measure and distribute 1 pair of 20mmHg - 30mmHg knee high open toe compression stockings. Jobst ultrasheer or equivalent. 1 each 1     ORDER FOR DME Use your CPAP device as directed by your provider.       VITAMIN D, CHOLECALCIFEROL, PO Take 2,000 Units by mouth daily       artificial saliva (BIOTENE MT) SOLN solution Swish and spit 2 mLs (2 sprays) in mouth every hour as needed for dry mouth (Patient not taking: Reported on 12/6/2018) 44.3 mL 0     order for DME Please measure and distribute 1 pair of 20mmHg - 30mmHg thigh high open or closed toe compression stockings. Jobst ultrasheer or equivalent. (Patient not taking: Reported on 12/6/2018) 2 each  3     VITALS                                                                        3, 3   /78   Pulse 74   Wt 121 kg (266 lb 12.8 oz)   BMI 39.40 kg/m     MENTAL STATUS EXAM                                                                 9, 14 cog gs     Alertness: alert  and oriented  Appearance: well groomed  Behavior/Demeanor: cooperative, with good  eye contact   Speech: continues to be moderately pressured and rapid  Language: Some word finding difficulties but otherwise intact  Psychomotor: No muscle stiffness or rigidity, no cogwheeling  Mood: anxious and labile  Affect: expansive, labile; was congruent to mood; was congruent to content  Thought Process/Associations: unremarkable  Thought Content:  Reports none;  Denies suicidal ideation, violent ideation, delusions, magical thinking and paranoid ideation  Perception:  Reports none;  Denies auditory hallucinations, depersonalization and derealization  Insight: good  Judgment: good  Cognition: (6) does not appear grossly intact; formal cognitive testing was not done  Gait and Station: No shuffling gait, no stiff posture, normal arm swing    LABS and DATA     AIMS:  0 (12/13/18)    PHQ9    PHQ-9 SCORE 10/9/2018 11/1/2018 12/21/2018   PHQ-9 Total Score 9 11 4     MoCA (12/12/18) - 25/30, missed points on fluency (only 6 words with F) and delayed recall (1/5 only but got the remainders with category cues)    PSYCHLABVALPROATE  Recent Labs   Lab Test 12/20/18  1214 12/14/18  1235 11/20/18  1255 11/13/18  0400   SOFIYA 78 109* 99 52     ANTIPSYCHOTIC LABS  [glu, A1C, lipids (mehran LDL), liver enzymes, WBC, ANEU, Hgb, plts]  q12 mo  Recent Labs   Lab Test 12/20/18  1214 12/14/18  1235 12/06/18  1118 11/11/18  0901  09/24/15  0904  09/16/13  0739 08/22/12  0752 08/16/11  0757   GLC 71 71 86.4 75   < > 84   < > 75 87 84   A1C  --   --   --   --   --  5.2  --  5.1 5.2 5.3    < > = values in this interval not displayed.     Recent Labs   Lab Test 10/02/17  0842  10/03/16  0742 09/24/15  0904 09/15/14  0733   CHOL 156 137 132 108   TRIG 55 56 58 46   LDL 49 51 47 24   HDL 96 75 73 75     Recent Labs   Lab Test 12/14/18  1235 08/09/18  0937 08/16/11  0757   AST 17 20 24   ALT 23 23 13   ALKPHOS 50  --  66     Recent Labs   Lab Test 01/02/19  0839 12/20/18  1214 12/14/18  1235 11/10/18  0756 08/09/18  0937 06/21/16  1122   WBC  --   --  4.4 4.8 4.2 5.2   ANEU  --   --  2.3  --   --   --    HGB  --   --  14.3 14.9 14.1 13.7    139* 147* 193 190 212     LITHIUM LABS     [level, renal, SG, TSH, WBC]    q6 mo  Recent Labs   Lab Test 01/02/19  0839   LITHIUM 0.37*     Recent Labs   Lab Test 12/20/18  1214 12/14/18  1235 12/06/18  1118 11/20/18  1255  11/11/18  0901   CR 0.47* 0.43* 0.4*  --   --  0.44*   GFRESTIMATED >90 >90 >90  --   --  >90   * 129* 132.3* 134   < > 123*   POTASSIUM 4.4 4.2 4.9*  --   --  4.4   SHERRIE 9.2 8.7 9.2  --   --  8.6    < > = values in this interval not displayed.     Recent Labs   Lab Test 11/01/12  0819   SG 1.015     Recent Labs   Lab Test 11/10/18  0756 08/31/18  2029 04/06/18  1222 09/20/13  1145   TSH 2.29 2.19 0.99 1.45     Recent Labs   Lab Test 12/14/18  1235 11/10/18  0756 08/09/18  0937 06/21/16  1122   WBC 4.4 4.8 4.2 5.2   ANEU 2.3  --   --   --          DIAGNOSIS     Bipolar Disorder, unspecified, most recent episode anthony    ASSESSMENT                                                                 m2, h3       TODAY    Patient reports modest improvements in mood instability with initiation of lithium. Is more organized, less pressured, and less and tangential on exam. Appears to be responding to combination of lithium and Depakote. Will titrate lithium to therapeutic level before making decisions about depakote.  Platelet count has normalized, alleviating or concerns about Depakote-related to thrombocytopenia.    ADDENDUM: Patient initiated lithium clinic about confusion and disorganized behavior.  Instructed by Dr. Tapia to  decrease lithium to 750 mg at bedtime out of concern for lithium toxicity.    [place] PRESCRIPTION MONITORING PROGRAM [] was checked today:  not using controlled substances.    PSYCHOTROPIC DRUG INTERACTIONS:    no.  MANAGEMENT:  N/A     PLAN                                                                                         m2, h3     1) PSYCHOTROPIC MEDICATIONS:  -Continue depakote to 500mg in the morning and 1000mg at bedtime  -Start lithium 300mg at bedtime  -Discontinue benztropine  -Discontinue risperidone    2) THERAPY:    Patient follows with individual psychotherapist in private practice    3) NEXT DUE:    Labs- PLT count, Li lvl, VPA level prior to next appt  EKG- Repeat at next  Rating Scales- Repeat MoCA at next    4) REFERRALS:    None    5) RTC: 2 weeks; pt scheduled for 4 weeks out but will call for sooner opening    6) CRISIS NUMBERS:   Provided routinely in AVS.    ONLY if a [facility] PT: [facility] [phone] (clinic), [phone] (after hours)     TREATMENT RISK STATEMENT:  The risks, benefits, alternatives and potential adverse effects have been discussed and are understood by the pt. The pt understands the risks of using street drugs or alcohol. There are no medical contraindications, the pt agrees to treatment with the ability to do so. The pt knows to call the clinic for any problems or to access emergency care if needed.  Medical and substance use concerns are documented above.  Psychotropic drug interaction check was done, including changes made today.     PROVIDER:  Galo Sandoval MD    Patient was not staffed in clinic. Note to be reviewed by Dr. Tapia.  I did not see this pt directly. I have reviewed the documentation, and I agree with the resident's plan of care.    Dacia Tapia

## 2019-01-04 NOTE — TELEPHONE ENCOUNTER
On 1/4/2019 the patient signed an CARMEN authorizing medical records to be released from [facility] to [employer] for the purpose of disability. I sent the request to medical records via the tube system and kept a copy in psychiatry until scanning is complete.  Lexii Marroquin [facility]

## 2019-01-04 NOTE — PATIENT INSTRUCTIONS
- Increase Lithium to 900mg at bedtime  - Continue depakote 1000mg twice daily.  - Go to lab 2-3 days before our next appt.  - Let's meet again for follow-up in 2 weeks.  - Cancel appt with Dr. Donnelly.    Thank you for coming to the PSYCHIATRY CLINIC.    Lab Testing:  If you had lab testing today and your results are reassuring or normal they will be mailed to you or sent through TATE'S LIST within 7 days.   If the lab tests need quick action we will call you with the results.  The phone number we will call with results is # [phone] (home) [phone] (work). If this is not the best number please call our clinic and change the number.    Medication Refills:  If you need any refills please call your pharmacy and they will contact us. Our fax number for refills is [phone]. Please allow three business for refill processing.   If you need to  your refill at a new pharmacy, please contact the new pharmacy directly. The new pharmacy will help you get your medications transferred.     Scheduling:  If you have any concerns about today's visit or wish to schedule another appointment please call our office during normal business hours [phone] (8-5:00 M-F)    Contact Us:  Please call [phone] during business hours (8-5:00 M-F).  If after clinic hours, or on the weekend, please call  [phone].    Financial Assistance [phone]  [employer] Billing [phone]  [place] Billing [phone]  Medical Records [phone]      MENTAL HEALTH CRISIS NUMBERS:  [facility]:   [facility] - [phone]   Crisis Residence [facility] - [facility] Residence - [phone]   Walk-In Counseling Center [facility] - [phone]   [facility] Mobile Team for Adults - [[phone]]; Child - [[phone]]     Crisis Connection - [phone]     [facility]:   [facility] - [phone]   Walk-in counseling [facility] - [phone]   Walk-in counseling [facility] -  [facility] - [phone]   Crisis Residence [facility] - [phone]   Urgent Care Adult Mental Health:   --Drop-in, 24/7 crisis line, and [facility] Mobile Team [[phone]]    CRISIS TEXT LINE: Text [phone] from anywhere, anytime, any crisis 24/7;    OR SEE www.crisistextline.org     Poison Control Center - [phone]    CHILD: [facility] needs assessment team - [phone]     [facility] Lifeline - [phone]; or [facility] Lifeline - [phone]    If you have a medical emergency please call 911or go to the nearest ER.                    _____________________________________________    Again thank you for choosing PSYCHIATRY CLINIC and please let us know how we can best partner with you to improve you and your family's health.  You may be receiving a survey in the mail regarding this appointment. We would love to have your feedback, both positive and negative, so please fill out the survey and return it using the provided envelope. The survey is done by an external company, so your answers are anonymous.

## 2019-01-04 NOTE — PROGRESS NOTES
STM Recheck Visit    Subjective measures:   Pt asked if we could talk on Monday as she was going to an appointment and didn't have time to talk.    Assessment: Pt not meeting objective benchmarks for leak    Action plan: Pt to call back on 1/7/19    Device type:  Auto-CPAP     PAP settings: CPAP min  9 cm  H20     CPAP max  12 cm  H20       90th % pressure 10.2 cm  H20    Objective measures: 14 day rolling measures         Compliance  85.7%     % of night spent in large leak  27.7% last  upload      AHI 3.8   last  upload      Average number of minutes 433            Objective measure goal  Compliance   Goal >70%  Leak   Goal < 10%  AHI  Goal < 5  Usage  Goal >240

## 2019-01-07 ENCOUNTER — DOCUMENTATION ONLY (OUTPATIENT)
Dept: SLEEP MEDICINE | Facility: CLINIC | Age: 54
End: 2019-01-07

## 2019-01-07 ASSESSMENT — PATIENT HEALTH QUESTIONNAIRE - PHQ9: SUM OF ALL RESPONSES TO PHQ QUESTIONS 1-9: 9

## 2019-01-07 NOTE — PROGRESS NOTES
Social Work Follow-Up  [facility] Psychiatry Clinic    Patient Name:  Angy Haji  /Age:  1965 (53 year old)    Reason for Follow-Up:  Requested by patient.    Data/Intervention:  -Update since last visit. Angy reported some changes in symptoms while making a medication adjustment. She was pleased she was able to talk with clinic and get earlier appointment with provider. She plans to meet with Dr. Sandoval after meeting with writer. Angy's main concern is ensuring her disability insurance has documentation needed to continue her disability determination. She reported that her request had been denied, then Dr. Sandoval wrote a letter noting her condition may need a year to treat and she was awarded benefits through beginning of February. This appears to be through short term disability insurance. Angy also received a letter from her insurance that they will work with her to provide a seamless transition to possible long term disability insurance. Angy appears concerned about this and ensuring on-going benefits after beginning of February. She is requesting that writer send all of records to disability insurance company to ensure that it continues.  -Angy had started a release of information, but was told by medical records that it does not cover psychiatry clinic. ANAHI assisted Angy in completing CARMEN for clinic and put in medical records folder.   -Provided empathic listening. Angy also reported that she had a short leave last . Combined with current leave, she received letter from  that she does not have any FMLA left. She has put in an accomodation request to retain her position for several more weeks. She is unsure what outcome will be. ANAHI also checked in about greetings and acknowledgements. ANAHI had run into patient earlier in the day and gave brief greeting. ANAHI checked to see how/if patient wanted to be acknowledged outside of the clinic. Angy stated it is okay to say [place]urban.    Assessment:  Angy  has strong support from her family and a history as a  that will assist her in completing necessary paperwork and self-advocacy. She is continuing to experience symptoms as she makes a medication transition and tries to find the appropriate interventions to meet her needs. Writer initially had some difficulty following her thoughts and identifying needs as the letter she shared was from early December and she is currently approved for insurance benefits. Angy is likely trying to ensure insurance company has all necessary paperwork so that there is no disruption of benefits.    Plan:  SW will be available as needed to provided support and resources to Angy.     Will route to patient's psychiatric provider(s) as an FYI.    Please call or EPIC message with any questions or concerns.    Abby Koroma) Carlos MSW, LICSW  [phone]    This is a non-billable encounter as it was solely for the purposes of outreach and/or care coordination.

## 2019-01-07 NOTE — PROGRESS NOTES
[facility] Recheck Visit    Subjective measures:  Spoke with pt and I went over her detailed download for the last 14 days.  She has kept track of which nights she used the nasal mask vs the FFM.  The nights that she has a high leak and slightly higher AHI correlate to the nights that she wore her FFM.  The nights that her leak was good and AHI was lower were the nights that she used her Nasal mask.  She is going to continue to use the nasal mask and get one from [facility] when she is eligible    Assessment: Pt meeting objective benchmarks.  Patient meeting subjective benchmarks.   Action plan: follow up per provider request    Device type: Auto-CPAP    PAP settings: CPAP min  9 cm  H20     CPAP max 12 cm  H20      Objective measures: 14 day rolling measures         Compliance  100%     % of night spent in large leak  28.8% last  upload      AHI 3.8 last  upload      Average number of minutes 464        Objective measure goal  Compliance   Goal >70%  Leak   Goal < 10%  AHI  Goal < 5  Usage  Goal >240

## 2019-01-08 ENCOUNTER — TELEPHONE (OUTPATIENT)
Dept: PSYCHIATRY | Facility: CLINIC | Age: 54
End: 2019-01-08

## 2019-01-08 ENCOUNTER — HOSPITAL ENCOUNTER (OUTPATIENT)
Dept: NUTRITION | Facility: CLINIC | Age: 54
Discharge: HOME | End: 2019-01-08
Attending: DIETITIAN, REGISTERED | Admitting: DIETITIAN, REGISTERED
Payer: COMMERCIAL

## 2019-01-08 DIAGNOSIS — F31.9 BIPOLAR I DISORDER (H): ICD-10-CM

## 2019-01-08 PROCEDURE — 97803 MED NUTRITION INDIV SUBSEQ: CPT | Performed by: DIETITIAN, REGISTERED

## 2019-01-08 RX ORDER — LITHIUM CARBONATE 150 MG/1
CAPSULE ORAL
Qty: 30 CAPSULE | Refills: 0 | Status: SHIPPED | OUTPATIENT
Start: 2019-01-08 | End: 2019-01-15

## 2019-01-08 NOTE — PROGRESS NOTES
"NUTRITION REASSESSMENT NOTE      REASON FOR ASSESSMENT   Angy Haji referred by Dr. Quintanilla for MNT related to obesity   Patient accompanied by self      NUTRITION HISTORY  Patient with multiple changes in the past few months as patient newly diagnosed with bipolar.  Patient states medications are being adjusted to find correct dose.  Patient is on medical leave and does not have structured days as she has in the past.  Patient states she has changed PCP clinic to [facility] with Dr. Odell.                     DIET RECALL   Breakfast: banana; fruit, yogurt and cereal; steel cut oats with 1 T brown sugar and cinnamon  Lunch: main dish salad (greens, grain and or beans) + fruit   Dinner: lasagna; chicken enchilada; chickpea stew with orzo   Snacks: peanut butter and banana, premium ice cream or regular ice cream, banana, Dove dark chocolates  Beverages: mainly water, coffee, mineral water  Dining out: varies   Exercise: Patient has not been exercising consistently.  Patient determining where/if she will continue gym membership.      ANTHROPOMETRICS  Height: 5'8\" (note height change in chart previously 5'9\")  Weight: 265 lbs   BMI (kg/m2): 39.4 kg/m2   %IBW: 189%  Weight History:   Wt Readings from Last 5 Encounters:   12/13/18 120.3 kg (265 lb 4.8 oz)   12/06/18 120.5 kg (265 lb 9.6 oz)   11/20/18 119.4 kg (263 lb 3.2 oz)   11/13/18 119.6 kg (263 lb 11.2 oz)   11/07/18 116.1 kg (256 lb)      ASSESSED NUTRITION NEEDS  Estimated Energy Needs: 1281-2022 kcals (15-20 kcals/kg) for gradual weight loss   Estimated Protein Needs: 66-79 (1-1.2 gm/kg IBW) for repletion with exercise  RMR: 1440     EVALUATION/PROGRESS TOWARDS GOALS   Previous goals:  Exercise 5 days per week for 30 minutes-not met   Add 1 hour continuous exercise per week-not met  Limit alcohol 1-2 times per week-met  Limit desserts 1-2 times per week-improving      Previous Nutrition Diagnosis: Obesity related to excess energy intake as evidenced by BMI " of 36.1 kg/m2-no change     Current Nutrition Diagnosis:Obesity related to excess energy intake as evidenced by BMI of 39.4 kg/m2     INTERVENTIONS   Nutrition Prescription   Recommend exercise 3-5 times per week; track intake if eating mindlessly; increase fiber to 25-30 grams per day      Implementation:   Meals and Snacks: Continue eating 3 meals + snacks as hungry   Nutrition Education (Content):               a)  Discussed progress towards goals.  Reviewed struggles with evening eating.  Encouraged patient to keep tracking simple and to focus on one goal.  Supported patient in her struggle with food and mental health issues.     Nutrition Education (Application):               a) Determined options for grocery shopping since patient struggling with getting to the grocery store.                    b) Patient verbalized understanding of diet by stating will track evening snacking.                     Anticipate fair-good compliance     CURRENT GOALS   Track evening snacking  Look into online grocery shopping    FOLLOW UP/MONITORING    Patient to follow up in 4 weeks  Patient has RD contact information      Time spent with patient: 30 minutes     Mattie Amaya, RD, LD  [facility] Outpatient Dietitian  [phone] (office phone)

## 2019-01-08 NOTE — TELEPHONE ENCOUNTER
Discussed the situation with the provider. The provider gave writer KATYA to lower the pt's Lithium dose to 750 mg at bedtime. She asked that writer remind pt not to take Ibuprofen with Lithium and asked that she check in often.     Called pt and discussed the provider's suggestion. She agreed to this plan and will  Rx for 150 mg caps this evening. #30 sent to [facility] in [place]. Pt denies using Ibuprofen and will continue to avoid using it. She says the provider may get her scheduled on 1/16 or 1/17, but she will check in with this writer as needed.

## 2019-01-08 NOTE — TELEPHONE ENCOUNTER
Writer received incoming phone call from pt and her spouse, Rachael. According to the pt, Friday and Saturday were fine since the increase in Lithium. Sunday and yesterday/Monday did not go well. She went onto explain that Sunday she was very emotional. Pt became emotional and tearful on the phone. She voiced feelings of shame regarding her mental illness. The pt later explained that yesterday she felt very groggy and confused. Pt's wife then pitched in and provided additional information. She said the pt was extremely confused, could barely answer simple questions, and had poverty of speech. Both Rachael and the pt feel the symptoms are better today, but still not great. While speaking with writer on the phone, the pt's speech was normal and clear. Writer agreed to discuss their concerns further with the providers and then c/b with suggestions.

## 2019-01-10 ENCOUNTER — MYC MEDICAL ADVICE (OUTPATIENT)
Dept: FAMILY MEDICINE | Facility: CLINIC | Age: 54
End: 2019-01-10

## 2019-01-10 DIAGNOSIS — E66.9 OBESITY (BMI 30-39.9): Primary | ICD-10-CM

## 2019-01-10 DIAGNOSIS — I10 BENIGN ESSENTIAL HYPERTENSION: ICD-10-CM

## 2019-01-11 ENCOUNTER — MYC MEDICAL ADVICE (OUTPATIENT)
Dept: FAMILY MEDICINE | Facility: CLINIC | Age: 54
End: 2019-01-11

## 2019-01-11 ENCOUNTER — TELEPHONE (OUTPATIENT)
Dept: PSYCHIATRY | Facility: CLINIC | Age: 54
End: 2019-01-11

## 2019-01-14 DIAGNOSIS — F31.9 BIPOLAR I DISORDER (H): ICD-10-CM

## 2019-01-14 LAB
LITHIUM SERPL-SCNC: 0.55 MMOL/L (ref 0.6–1.2)
PLATELET # BLD AUTO: 170 10E9/L (ref 150–450)
VALPROATE SERPL-MCNC: 107 MG/L (ref 50–100)

## 2019-01-14 PROCEDURE — 36415 COLL VENOUS BLD VENIPUNCTURE: CPT | Performed by: STUDENT IN AN ORGANIZED HEALTH CARE EDUCATION/TRAINING PROGRAM

## 2019-01-14 PROCEDURE — 85049 AUTOMATED PLATELET COUNT: CPT | Performed by: STUDENT IN AN ORGANIZED HEALTH CARE EDUCATION/TRAINING PROGRAM

## 2019-01-14 PROCEDURE — 80178 ASSAY OF LITHIUM: CPT | Performed by: STUDENT IN AN ORGANIZED HEALTH CARE EDUCATION/TRAINING PROGRAM

## 2019-01-14 PROCEDURE — 80164 ASSAY DIPROPYLACETIC ACD TOT: CPT | Performed by: STUDENT IN AN ORGANIZED HEALTH CARE EDUCATION/TRAINING PROGRAM

## 2019-01-14 NOTE — TELEPHONE ENCOUNTER
Social Work   Incoming Voicemail  [facility]    Incoming Voicemail From:  Angy Haji    Content of Voicemail:    Angy is requesting assistance accessing recent records from Dr. Sandoval for her disability insurance via My Chart and to provide to her therapist. Patient requesting a call back and provided contact information.    Response/Plan:    SW returned call and talked directly with Angy. Sw encouraged Angy to call representative with My Chart at [phone]. Writer believes she may need to re-request the records but did not want to provide incorrect information.    Angy reported her spouse suggested that she was okay with documentation for now. She is approved for disability through February 4th and the claims person is supposed to contact her the week prior to this. SW encouraged Angy to take a break for the week as it appears she may have other tasks to attend to.      Will route to patient's current psychiatric provider(s) as an FYI.   Please call or EPIC message with any questions or concerns.    Abby Gonzalez, MSW, LICSW

## 2019-01-15 ENCOUNTER — OFFICE VISIT (OUTPATIENT)
Dept: PSYCHIATRY | Facility: CLINIC | Age: 54
End: 2019-01-15
Attending: PSYCHIATRY & NEUROLOGY
Payer: COMMERCIAL

## 2019-01-15 VITALS
BODY MASS INDEX: 40.05 KG/M2 | SYSTOLIC BLOOD PRESSURE: 122 MMHG | HEART RATE: 72 BPM | DIASTOLIC BLOOD PRESSURE: 76 MMHG | WEIGHT: 271.2 LBS

## 2019-01-15 DIAGNOSIS — F31.9 BIPOLAR I DISORDER (H): ICD-10-CM

## 2019-01-15 PROCEDURE — G0463 HOSPITAL OUTPT CLINIC VISIT: HCPCS | Mod: ZF

## 2019-01-15 RX ORDER — LITHIUM CARBONATE 150 MG/1
150 CAPSULE ORAL AT BEDTIME
Qty: 30 CAPSULE | Refills: 1 | Status: SHIPPED | OUTPATIENT
Start: 2019-01-15 | End: 2019-03-01

## 2019-01-15 RX ORDER — LITHIUM CARBONATE 300 MG/1
600 CAPSULE ORAL AT BEDTIME
Qty: 60 CAPSULE | Refills: 1 | Status: SHIPPED | OUTPATIENT
Start: 2019-01-15 | End: 2019-03-01

## 2019-01-15 ASSESSMENT — PAIN SCALES - GENERAL: PAINLEVEL: NO PAIN (0)

## 2019-01-15 NOTE — PATIENT INSTRUCTIONS
- Continue Lithium and Depakote, no changes.   - Let's follow-up in 4 weeks. Please plan to get labs drawn within a few days of this appt.        Thank you for coming to the PSYCHIATRY CLINIC.    Lab Testing:  If you had lab testing today and your results are reassuring or normal they will be mailed to you or sent through [employer] within 7 days.   If the lab tests need quick action we will call you with the results.  The phone number we will call with results is # [phone] (home) [phone] (work). If this is not the best number please call our clinic and change the number.    Medication Refills:  If you need any refills please call your pharmacy and they will contact us. Our fax number for refills is [phone]. Please allow three business for refill processing.   If you need to  your refill at a new pharmacy, please contact the new pharmacy directly. The new pharmacy will help you get your medications transferred.     Scheduling:  If you have any concerns about today's visit or wish to schedule another appointment please call our office during normal business hours [phone] (8-5:00 M-F)    Contact Us:  Please call [phone] during business hours (8-5:00 M-F).  If after clinic hours, or on the weekend, please call  [phone].    Financial Assistance [phone]  [facility] [phone]  [facility] [phone]  Medical Records [phone]      MENTAL HEALTH CRISIS NUMBERS:  [facility]:   [facility] - [phone]   Crisis Residence [facility] - [phone]   Walk-In Counseling Center [facility] - [phone]   COPE 24/7 [facility] Mobile Team for Adults - [[phone]]; Child - [[phone]]     Crisis Connection - [phone]     [facility]:   [facility] - [phone]   Walk-in counseling [facility] - [phone]   Walk-in counseling [facility] - [phone]   Crisis Residence [facility]  [facility] - [phone]   Urgent Care Adult Mental Health:   --Drop-in, 24/7 crisis line, and [facility] Mobile Team [[phone]]    CRISIS TEXT LINE: Text [phone] from anywhere, anytime, any crisis 24/7;    OR SEE [email or web address]     Poison Control Center - [phone]    CHILD: [facility] needs assessment team - [phone]     [facility] [employer] - [phone]; or [facility] Lif[facility] - [phone]    If you have a medical emergency please call 911or go to the nearest ER.                    _____________________________________________    Again thank you for choosing PSYCHIATRY CLINIC and please let us know how we can best partner with you to improve you and your family's health.  You may be receiving a survey in the mail regarding this appointment. We would love to have your feedback, both positive and negative, so please fill out the survey and return it using the provided envelope. The survey is done by an external company, so your answers are anonymous.

## 2019-01-15 NOTE — PROGRESS NOTES
"  [facility] PROGRESS NOTE     CARE TEAM:  PCP- Jono Odell    Specialty Providers- OB-GYN, ENT    Therapist- no     Community MH Team- no    Angy LILIAN Haji is a 53 year old female who prefers the name Angy & pronouns she, her.     Date of initial diagnostic assessment is 12/5/18 .  Date of most recent transfer of care assessment is N/A.    Pertinent Background: Pt has not had significant mental health history until 2018 when developed perimenopausal mood/anxiety symptoms.      Psych critical item history includes manic episode and psychiatric hospitalization (Nov 2018)..    INTERIM HISTORY                                                          4, 4   The patient reports good treatment adherence.  History was provided by the patient and her wife Rachael who were good historians. The last visit ended with the following med change: started Risperidone.   Since the last visit:     Feeling a lot better since Li reduction. Was confused and had slowed speech. Couldn't communicate clearly. Per Rachael, \"Sounded really goofy and odd.\" Pt was sleepy, too. Last several days have been \"really good\" (since lowering Li to 750).    Going to therapy every week. Doing accupuncture weekly starting next week. Is also resuming MH support group through [facility] next week. Also planning to join [facility]. Generally feeling more upbeat about life right now.    Rachael agrees that Angy has seemed better in recent days. Both are report satisfaction with progress. Mood has been much more stable and pt is continuing to manage progressively more complex tasks again. Still, stress tolerance is low than normal though.    RECENT SYMPTOMS:   DEPRESSION:  reports-depressed mood, low energy, excessive guilt, excessive crying and overwhelmed;  DENIES- anhedonia and hypersomnia  VERITO/HYPOMANIA:  reports-distractibility  and inter-episode mood instability;  DENIES- increased energy, decreased sleep need and increased " activity  PSYCHOSIS:  reports-none;  DENIES- delusions, auditory hallucinations and disorganized behavior    RECENT SUBSTANCE USE:     ALCOHOL- no      TOBACCO- no     CAFFEINE- quit  OPIOIDS- no         NARCAN KIT- N/A        CANNABIS- no            OTHER ILLICIT DRUGS- none      CURRENT SOCIAL HISTORY:  FINANCIAL SUPPORT- currently on leave from work; wife is employed       CHILDREN- 12 yr dtr      LIVING SITUATION- [facility] dtr [facility] [place]      SOCIAL/ SPIRITUAL SUPPORT- wife and daughter    FEELS SAFE AT HOME- yes     MEDICAL ROS (2,10):  Reports A comprehensive review of systems was performed and is negative other than noted in the HPI.      Denies A comprehensive review of systems was performed and is negative other than noted in the HPI.    PSYCH and CD Critical Summary Points since July 2018 Nov 2018 - hospitalized for acute manic episode.  Started on combination of Depakote and olanzapine.  Developed severe extrapyramidal symptoms (shuffling gait, flat facies, muscle rigidity, tongue fullness) and so switched from olanzapine to Seroquel.  Could not tolerate quetiapine for similar reasons and so discontinue that as well.  Nov 2018 - outpatient psychiatric intake.  Started on low-dose risperidone but could not tolerate again.  Depakote ongoing.  Referred to neurology for consideration of major neurocognitive disorders.  December 2018 - added low dose Li for anthony  January 2019 - presumed lithium toxicity following increase of dose and so Li lowered from 900mg to 750mg    PAST PSYCH MED TRIALS   see EMR Problem List: Hx of psychiatric care    MEDICAL / SURGICAL HISTORY                                   Pregnant or breastfeeding- no      Contraception- HRT for menopause    Neurologic Hx- no  Patient Active Problem List   Diagnosis     Oral aphthae     Obesity (BMI 30-39.9)     Disorder of bursae and tendons in shoulder region     CARDIOVASCULAR SCREENING; LDL GOAL LESS THAN 160     Rosacea      Tear of medial cartilage or meniscus of knee, current     Raynaud disease     Vitamin D deficiency disease     Mild sleep apnea     Atopic rhinitis     Morbid obesity (H)     Muscle tension dysphonia     Palpitations     Dysphagia, unspecified type     Anxiety     Manic behavior (H)       Major Surgery- unknown    ALLERGY               Seroquel [quetiapine]; Olanzapine; Penicillins; Risperidone; and Thimerosal  MEDICATIONS        Current Outpatient Medications   Medication Sig Dispense Refill     Carboxymethylcellulose Sod PF (REFRESH PLUS) 0.5 % SOLN ophthalmic solution Place 1 drop into both eyes 3 times daily as needed for dry eyes       cyclobenzaprine (FLEXERIL) 10 MG tablet Take 1 tablet (10 mg) by mouth nightly as needed for other (headache) 30 tablet 0     divalproex sodium delayed-release (DEPAKOTE) 500 MG DR tablet Take 2 tablets (1,000 mg) by mouth 2 times daily 120 tablet 1     estrogen, conjugated,-medroxyPROGESTERone (PREMPRO) 0.625-2.5 MG per tablet Take 1 tablet by mouth daily 90 tablet 3     ibuprofen (ADVIL/MOTRIN) 800 MG tablet Take 400 mg by mouth every 4 hours as needed for other (headaches)        lidocaine, viscous, (XYLOCAINE) 2 % solution SWISH AND SPIT 5ML BY MOUTH EVERY 3 HOURS AS NEEDED 100 mL 0     lisinopril (PRINIVIL/ZESTRIL) 10 MG tablet Take 1 tablet (10 mg) by mouth daily 90 tablet 1     lithium (ESKALITH) 150 MG capsule Take 1 capsule (150 mg) by mouth At Bedtime Along with two 300mg tabs for total of 750mg at bedtime 30 capsule 1     lithium 300 MG capsule Take 2 capsules (600 mg) by mouth At Bedtime In add'n to 150mg tab for total of 750mg. 60 capsule 1     Loratadine 10 MG capsule Take 10 mg by mouth daily as needed.       Melatonin 10 MG TABS tablet Take 10 mg by mouth nightly as needed for sleep       metroNIDAZOLE (METROGEL) 1 % gel Apply topically daily 60 g 9     order for DME Please measure and distribute 1 pair of 20mmHg - 30mmHg knee high open toe compression  stockings. Jobst ultrasheer or equivalent. 1 each 1     ORDER FOR DME Use your CPAP device as directed by your provider.       VITAMIN D, CHOLECALCIFEROL, PO Take 2,000 Units by mouth daily       artificial saliva (BIOTENE MT) SOLN solution Swish and spit 2 mLs (2 sprays) in mouth every hour as needed for dry mouth (Patient not taking: Reported on 12/6/2018) 44.3 mL 0     order for DME Please measure and distribute 1 pair of 20mmHg - 30mmHg thigh high open or closed toe compression stockings. Jobst ultrasheer or equivalent. (Patient not taking: Reported on 12/6/2018) 2 each 3     VITALS                                                                        3, 3   /76   Pulse 72   Wt 123 kg (271 lb 3.2 oz)   BMI 40.05 kg/m     MENTAL STATUS EXAM                                                                 9, 14 cog gs     Alertness: alert  and oriented  Appearance: well groomed  Behavior/Demeanor: cooperative, with good  eye contact   Speech: continues to be moderately pressured and rapid  Language: intact and no problems  Psychomotor: No muscle stiffness or rigidity, no cogwheeling  Mood: description consistent with euthymia  Affect: full range; was congruent to mood; was congruent to content  Thought Process/Associations: unremarkable  Thought Content:  Reports none;  Denies suicidal ideation, violent ideation, delusions, magical thinking and paranoid ideation  Perception:  Reports none;  Denies auditory hallucinations, depersonalization and derealization  Insight: good  Judgment: good  Cognition: (6) does not appear grossly intact; formal cognitive testing was not done  Gait and Station: No shuffling gait, no stiff posture, normal arm swing    LABS and DATA     AIMS:  0 (12/13/18)    PHQ9 = not completed    PHQ-9 SCORE 11/1/2018 12/21/2018 1/4/2019   PHQ-9 Total Score 11 4 9     MoCA (12/12/18) - 25/30, missed points on fluency (only 6 words with F) and delayed recall (1/5 only but got the remainders  with category cues)    PSYCHLABVALPROATE  Recent Labs   Lab Test 01/14/19  0913 12/20/18  1214 12/14/18  1235 11/20/18  1255   SOFIYA 107* 78 109* 99     ANTIPSYCHOTIC LABS  [glu, A1C, lipids (mehran LDL), liver enzymes, WBC, ANEU, Hgb, plts]  q12 mo  Recent Labs   Lab Test 12/20/18  1214 12/14/18  1235 12/06/18  1118 11/11/18  0901  09/24/15  0904  09/16/13  0739 08/22/12  0752 08/16/11  0757   GLC 71 71 86.4 75   < > 84   < > 75 87 84   A1C  --   --   --   --   --  5.2  --  5.1 5.2 5.3    < > = values in this interval not displayed.     Recent Labs   Lab Test 10/02/17  0842 10/03/16  0742 09/24/15  0904 09/15/14  0733   CHOL 156 137 132 108   TRIG 55 56 58 46   LDL 49 51 47 24   HDL 96 75 73 75     Recent Labs   Lab Test 12/14/18  1235 08/09/18  0937 08/16/11  0757   AST 17 20 24   ALT 23 23 13   ALKPHOS 50  --  66     Recent Labs   Lab Test 01/14/19  0913 01/02/19  0839 12/20/18  1214 12/14/18  1235 11/10/18  0756 08/09/18  0937 06/21/16  1122   WBC  --   --   --  4.4 4.8 4.2 5.2   ANEU  --   --   --  2.3  --   --   --    HGB  --   --   --  14.3 14.9 14.1 13.7    152 139* 147* 193 190 212     LITHIUM LABS     [level, renal, SG, TSH, WBC]    q6 mo  Recent Labs   Lab Test 01/14/19  0913 01/02/19  0839   LITHIUM 0.55* 0.37*     Recent Labs   Lab Test 12/20/18  1214 12/14/18  1235 12/06/18  1118 11/20/18  1255  11/11/18  0901   CR 0.47* 0.43* 0.4*  --   --  0.44*   GFRESTIMATED >90 >90 >90  --   --  >90   * 129* 132.3* 134   < > 123*   POTASSIUM 4.4 4.2 4.9*  --   --  4.4   SHERRIE 9.2 8.7 9.2  --   --  8.6    < > = values in this interval not displayed.     Recent Labs   Lab Test 11/01/12  0819   SG 1.015     Recent Labs   Lab Test 11/10/18  0756 08/31/18  2029 04/06/18  1222 09/20/13  1145   TSH 2.29 2.19 0.99 1.45     Recent Labs   Lab Test 12/14/18  1235 11/10/18  0756 08/09/18  0937 06/21/16  1122   WBC 4.4 4.8 4.2 5.2   ANEU 2.3  --   --   --          DIAGNOSIS     Bipolar Disorder, unspecified, most recent  episode anthony    ASSESSMENT                                                                 m2, h3       TODAY    Pt developed lithium toxicity after increasing to 900mg. Lowered dose and now looking much better, both per her and Rachael's report, and on exam. Much more organized, improved affective regulation. Cognition also appears to be improved per subjective reports.    Will continue with Li/DVP dual-therapy for now, given stability. Patient and wife in agreement with this plan.    [place] PRESCRIPTION MONITORING PROGRAM [] was checked today:  not using controlled substances.    PSYCHOTROPIC DRUG INTERACTIONS:    no.  MANAGEMENT:  N/A     PLAN                                                                                         m2, h3     1) PSYCHOTROPIC MEDICATIONS:  -Continue depakote to 1000mg in the morning and 1000mg at bedtime  -Continue lithium 750mg at bedtime    2) THERAPY:    Patient follows with individual psychotherapist in private practice    3) NEXT DUE:    Labs- Repeat Li and DVP labs in mo  EKG- Repeat at next  Rating Scales- Repeat MoCA at next    4) REFERRALS:    None    5) RTC: 2 weeks    6) CRISIS NUMBERS:   Provided routinely in AVS.    ONLY if a [facility] PT: Krissy [place] [phone] (clinic), [phone] (after hours)     TREATMENT RISK STATEMENT:  The risks, benefits, alternatives and potential adverse effects have been discussed and are understood by the pt. The pt understands the risks of using street drugs or alcohol. There are no medical contraindications, the pt agrees to treatment with the ability to do so. The pt knows to call the clinic for any problems or to access emergency care if needed.  Medical and substance use concerns are documented above.  Psychotropic drug interaction check was done, including changes made today.     PROVIDER:  Galo Sandoval MD    Patient was not staffed in clinic. Note to be reviewed by Dr. Tapia.  I did not see this pt directly. I have  reviewed the documentation, and I agree with the resident's plan of care.    Dacia Tapia

## 2019-01-30 ENCOUNTER — MYC MEDICAL ADVICE (OUTPATIENT)
Dept: PSYCHIATRY | Facility: CLINIC | Age: 54
End: 2019-01-30

## 2019-02-01 ENCOUNTER — OFFICE VISIT (OUTPATIENT)
Dept: PSYCHIATRY | Facility: CLINIC | Age: 54
End: 2019-02-01
Attending: PSYCHIATRY & NEUROLOGY
Payer: COMMERCIAL

## 2019-02-01 ENCOUNTER — OFFICE VISIT (OUTPATIENT)
Dept: PHARMACY | Facility: CLINIC | Age: 54
End: 2019-02-01
Payer: COMMERCIAL

## 2019-02-01 VITALS
SYSTOLIC BLOOD PRESSURE: 136 MMHG | HEART RATE: 60 BPM | WEIGHT: 275.6 LBS | BODY MASS INDEX: 40.7 KG/M2 | DIASTOLIC BLOOD PRESSURE: 82 MMHG

## 2019-02-01 DIAGNOSIS — F31.9 BIPOLAR DISORDER (H): Primary | ICD-10-CM

## 2019-02-01 DIAGNOSIS — F31.9 BIPOLAR I DISORDER (H): ICD-10-CM

## 2019-02-01 PROCEDURE — G0463 HOSPITAL OUTPT CLINIC VISIT: HCPCS | Mod: ZF

## 2019-02-01 PROCEDURE — 99607 MTMS BY PHARM ADDL 15 MIN: CPT | Performed by: PHARMACIST

## 2019-02-01 PROCEDURE — 99605 MTMS BY PHARM NP 15 MIN: CPT | Performed by: PHARMACIST

## 2019-02-01 RX ORDER — DIVALPROEX SODIUM 125 MG/1
375 TABLET, DELAYED RELEASE ORAL EVERY MORNING
Qty: 90 TABLET | Refills: 1 | Status: SHIPPED | OUTPATIENT
Start: 2019-02-01 | End: 2019-03-01

## 2019-02-01 RX ORDER — DIVALPROEX SODIUM 500 MG/1
TABLET, DELAYED RELEASE ORAL
Qty: 90 TABLET | Refills: 1 | Status: SHIPPED | OUTPATIENT
Start: 2019-02-01 | End: 2019-03-01

## 2019-02-01 ASSESSMENT — PATIENT HEALTH QUESTIONNAIRE - PHQ9: SUM OF ALL RESPONSES TO PHQ QUESTIONS 1-9: 16

## 2019-02-01 ASSESSMENT — PAIN SCALES - GENERAL: PAINLEVEL: NO PAIN (0)

## 2019-02-01 NOTE — PROGRESS NOTES
SUBJECTIVE/OBJECTIVE:                           Angy Haji is a 53 year old female coming in for an initial visit for Medication Therapy Management. She is here with her wife, Rachael.  She was referred to me from Dr. Gagan Sandoval.    Chief Complaint: Genesight results review.    Allergies/ADRs: Reviewed in Epic  Tobacco: No tobacco use  Alcohol: rarely  PMH: Reviewed in Epic    Medication Adherence/Access:  Patient uses pill box(es).  Patient takes medications 2 time(s) per day.   Per patient, misses medication 0 times per week. Wife confirms good medication compliance    Med Regimen:   8AM: prempro, lisinopril, depakote, vitamin D  10pm: lithium, depakote  PRN: eye drops, flexeril (rarely uses)    Bipolar Disorder:   Current therapy:  - Lithium  mg nightly  - Depakote  mg every morning and 1000 mg nightly (decreased today from 1000 mg BID by Dr. Sandoval)    Angy was admitted to [facility] on 11/9/2018 due to concern for new onset anthony symptoms.  Symptoms of anthony including racing thoughts, decreased need for sleep, pressured speech, disorganized thinking.  She does not have a history of bipolar disorder.  Patient had started Zoloft in April 2018 for menopausal and anxiety symptoms.  It was increased to 150 mg daily in September 2018.      Timeline/Details of recent medication changes:   - During hospitalization patient was started on Depakote and olanzapine.  She developed muscle stiffness, tongue swelling and hyponatremia and was changed from olanzapine to Seroquel. She was discharged on 11/15/2018 on Seroquel 100 mg BID and Depakote 1000 mg BID.   - Angy discontinued Seroquel shortly after discharge due to shuffling gait, somnolence, sedation.    - On 12/5/18, she was started on risperidone 0.5 mg BID, increased to 1 mg BID however subsequently discontinued on 12/9/2018 due to tongue swelling and possibly EPS.  - On 12/14/2018 Depakote was decreased to 500 mg every morning and 1000 mg QHS.   - On  12/21/18 Lithium 300 mg nightly was started.    - On 12/26/18, lithium was increased to to 600 mg nightly and Depakote increased to 1000mg BID on 12/27/18 d/t worsening anthony symptoms.    - On 1/4/19, lithium was further increased to 900 mg nightly.  On 1/6 and 1/7/19 patient's wife reported Angy was very groggy, confused, and had difficulty answering questions.    - On 1/8/19, lithium was reduced to 750 mg nightly d/t concern for lithium toxicity.  Patient and wife deny any other medication changes during this time.  Denies changes to lisinopril, denies use of any OTC NSAIDs, denies acute illness that would contribute to dehydration such as vomiting, diarrhea.  - Today (2/1), Depakote decreased to 875 mg every morning and 1000 mg nightly.     Lithium level:   0.55mmol/L 1/14/19 @0913  Dose: 750mg QHS  0.37mmol/L 1/2/19 @0839 Dose: 600mg QHS      VPA level:   107mg/L 1/14/19 @0913 Dose: 1000mg BID  78mg/L 12/20/18 @1214 Dose: 500mg QAM and 1000mg QHS  109mg/L 12/14/18 @1235 Dose: 1000mg BID    Today patient and wife report improvement in anthony symptoms and AYLA side effects from antipsychotics.  They are concerned regarding cognitive symptoms of slowed processing speed, trouble organizing and prioritizing, cognitive dulling.  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 In terms of side effects, endorses onset of heartburn after initiating lithium- is eating larger meals later at night. Denies presence of side effects other than cognitive symptoms. Genesight testing was ordered d/t h/o medication sensitivities and significant side effects. Results were reviewed with pt and wife today.       Hypertension: Current medications include lisinopril 10mg daily.  No changes have been made to lisinopril since initiation of lithium.      BP Readings from Last 1 Encounters:   02/01/19 136/82     Pulse Readings from Last 1 Encounters:   02/01/19 60     Wt Readings from Last 1 Encounters:   02/01/19  "275 lb 9.6 oz (125 kg)     Ht Readings from Last 1 Encounters:   11/09/18 5' 9\" (1.753 m)     Estimated body mass index is 40.7 kg/m  as calculated from the following:    Height as of 11/9/18: 5' 9\" (1.753 m).    Weight as of an earlier encounter on 2/1/19: 275 lb 9.6 oz (125 kg).    Temp Readings from Last 1 Encounters:   12/13/18 98.4  F (36.9  C) (Oral)     Allergic rhinitis: Current medications include loratadine 10mg daily PRN- summers only.  Pt feels that current therapy is effective when needed.       ASSESSMENT:                             Current medications were reviewed today.     Medication Adherence: excellent    Bipolar disorder: Patient was seen by Dr. Sandoval immediately preceding MTM appt today.  Agree with plan to slowly decrease Depakote, which may improve cognition.    Lithium has linear pharmacokinetics, however past levels do not follow this trend. Pt has had consistent timing of administration (10PM), consistent timing of levels (~11-12 hour trough), and no changes in interacting medications (lisinopril, NSAIDs).  Checked retail dispensing system and confirmed patient is getting correct strength of lithium (she is taking two 300mg capsules and one 150mg capsule).  Given initial lithium level, would have expected 750mg of lithium to result in serum level of 0.46mmol/L and 900mg to result in serum level of 0.55mmol/L. Lithium level is subtherapeutic, but higher than expected at 0.55mmol/L on 750mg. Given concern for toxicity at 900 mg nightly and higher than expected level on 750mg, may be beneficial to recheck lithium level again prior to increasing dose.    Reviewed with patient side effects of Depakote and lithium.  Lithium can cause GI side effects of nausea and diarrhea, heartburn is less common.  Counseled on lifestyle modifications to reduce heartburn.  Performed drug interaction check-Tums PRN would be safe to use if needed. Reviewed Genesight results as summarized below with patient " and wife.        Pharmacogenetic Results:  Genesight testing conducted and reported on 18.     Select PGX Results: (see report scanned in media tab for more detailed results)     Gene Genotype/Phenotype   CY Ultrarapid metabolizer   CYP2D6 Intermediate metabolizer    CYP2B6 Normal metabolizer   GDF0S55 Intermediate metabolizer   CYP2C9 Normal metabolizer   CY Normal metabolizer   SLC6A4 Reduced expression       Pharmacogenetic assessment and recommendations:  Resendiz Findings:    1.  Angy is in ultra-rapid metabolizer at CY.  This can result in lower than expected serum levels at normal dosing of drugs predominantly metabolized by 1A2, including previously prescribed olanzapine.    2.  Angy is an intermediate metabolizer at CYP2D6 and LBR9N59.  This can result in higher than expected serum levels at normal dosing of drugs predominantly metabolized through these pathways, including previously prescribed risperidone (a CYP2D6 major substrate).  - This may have contributed, in part, to Angy's experience of EPSE on relatively low dose (2mg) of risperidone.    3. 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   - Recommend using antidepressants cautiously given bipolar diagnosis.    4.  Angy does not have any metabolic variations that would impact dosing of Depakote or lithium.  Additionally, Angy's metabolic variations do not explain her side effects to olanzapine and Seroquel.    Hypertension: Stable. Patient is meeting BP goal of < 140/90mmHg.  Reviewed interaction with lisinopril and lithium (lisinopril can increase lithium levels).  Patient, wife, and PCP are aware of interaction.    Allergic rhinitis: Stable.        PLAN:                            1.  Reviewed Genesight results with patient as discussed above.    2.  Agree with plan to reduce Depakote.      3. Could consider future  increase of lithium due to currently subtherapeutic level.  Given nonlinear past lithium levels and h/o side effects on lithium 900mg, may consider rechecking lithium level prior to future dose increase.    4.  Counseled on lifestyle modifications to minimize heartburn symptoms: Limit/avoid spicy or acidic foods, caffeine, chocolate, eat smaller meals, avoid laying down within 30-60 minutes after a meal.  If ineffective, can try Tums as needed.    I spent 90 minutes with this patient today. A copy of the visit note was provided to the patient's referring provider.    Will follow up in as needed.    The patient was given a copy of [employer] results.     Chart documentation was completed in part with Dragon voice-recognition software. Even though reviewed, some grammatical, spelling, and word errors may remain.    Anusha Nieto, PharmD, BCPP  Medication Therapy Management Pharmacist  [facility]  [phone]

## 2019-02-01 NOTE — Clinical Note
José Luis Farah, Here is my note on Angy. Nothing on Genesight results that impact current therapy. Intermediate 2D6 may have contributed, in part, to side effects with risperidone. Her lithium levels are a little curious to me- not quite linear like we would expect. May be worth checking a level again before increasing lithium further. Thanks for the referral!Anusha

## 2019-02-01 NOTE — PATIENT INSTRUCTIONS
- Decrease morning dose of Depakote to 875mg in the morning and 1000mg at bedtime.  - Can further decrease to 750mg in the morning and 1000mg at bedtime in 2 weeks if you feel mood is stable and cognition is improving.  - Let's meet again in 1 month.      Thank you for coming to the PS[facility].    Lab Testing:  If you had lab testing today and your results are reassuring or normal they will be mailed to you or sent through [employer] within 7 days.   If the lab tests need quick action we will call you with the results.  The phone number we will call with results is # [phone] (home) [phone] (work). If this is not the best number please call our clinic and change the number.    Medication Refills:  If you need any refills please call your pharmacy and they will contact us. Our fax number for refills is [phone]. Please allow three business for refill processing.   If you need to  your refill at a new pharmacy, please contact the new pharmacy directly. The new pharmacy will help you get your medications transferred.     Scheduling:  If you have any concerns about today's visit or wish to schedule another appointment please call our office during normal business hours [phone] (8-5:00 M-F)    Contact Us:  Please call [phone] during business hours (8-5:00 M-F).  If after clinic hours, or on the weekend, please call  [phone].    Financial Assistance [phone]  "[employer]" Billing [phone]  [place] Billing [phone]  Medical Records [phone]      MENTAL HEALTH CRISIS NUMBERS:  [facility]:   [facility] - [phone]   Crisis Residence [facility] - [phone]   Walk-In Counseling [facility] - [phone]   COPE 24/7 [place] Mobile Team for Adults - [[phone]]; Child - [[phone]]     Crisis Connection - [phone]     [facility]:   [facility] - [phone]   Walk-in counseling [facility]  [place] - [phone]   Walk-in counseling [facility] - [phone]   Crisis Residence [facility] Burkett [facility] - [phone]   Urgent Care Adult Mental Health:   --Drop-in, 24/7 crisis line, and [facility] Mobile Team [[phone]]    CRISIS TEXT LINE: Text [phone] from anywhere, anytime, any crisis 24/7;    OR SEE www.crisistextline.org     Poison Control Center - [phone]    CHILD: [facility] needs assessment team - [phone]     [employer] Lifeline - 1-426.611.3357; or [employer] Lifeline - [phone]    If you have a medical emergency please call 911or go to the nearest ER.                    _____________________________________________    Again thank you for choosing [facility] and please let us know how we can best partner with you to improve you and your family's health.  You may be receiving a survey in the mail regarding this appointment. We would love to have your feedback, both positive and negative, so please fill out the survey and return it using the provided envelope. The survey is done by an external company, so your answers are anonymous.

## 2019-02-01 NOTE — PROGRESS NOTES
[facility] PSYCHIATRY CLINIC PROGRESS NOTE     CARE TEAM:  PCP- Jono Odell    Specialty Providers- OB-GYN, ENT    Therapist- no     Community  Team- no    Angy LILIAN Haji is a 53 year old female who prefers the name Angy & pronouns she, her.     Date of initial diagnostic assessment is 12/5/18 .  Date of most recent transfer of care assessment is N/A.    Pertinent Background: Pt has not had significant mental health history until 2018 when developed perimenopausal mood/anxiety symptoms.      Psych critical item history includes manic episode and psychiatric hospitalization (Nov 2018)..    INTERIM HISTORY                                                          4, 4   The patient reports good treatment adherence.  History was provided by the patient and her wife Rachael who were good historians. The last visit ended with the following med change: started Risperidone.   Since the last visit:     Stress tolerance still not what it used to be. Getting tearful easily.  Still feeling like cognition isn't where it should be  Burst into tears at [facility] trying to get new license  Frustrated with cognitive slowing    RECENT SYMPTOMS:   DEPRESSION:  reports-depressed mood, low energy, excessive guilt, excessive crying and overwhelmed;  DENIES- anhedonia and hypersomnia  VERITO/HYPOMANIA:  reports-distractibility  and inter-episode mood instability;  DENIES- increased energy, decreased sleep need and increased activity  PSYCHOSIS:  reports-none;  DENIES- delusions, auditory hallucinations and disorganized behavior    RECENT SUBSTANCE USE:     ALCOHOL- no      TOBACCO- no     CAFFEINE- quit  OPIOIDS- no         NARCAN KIT- N/A        CANNABIS- no            OTHER ILLICIT DRUGS- none      CURRENT SOCIAL HISTORY:  FINANCIAL SUPPORT- currently on leave from work; wife is employed       CHILDREN- 12 yr dtr      LIVING SITUATION- with wife and dtr in [facility] in [place]      SOCIAL/ SPIRITUAL SUPPORT- wife and daughter    FEELS SAFE AT HOME-  yes     MEDICAL ROS (2,10):  Reports A comprehensive review of systems was performed and is negative other than noted in the HPI.      Denies A comprehensive review of systems was performed and is negative other than noted in the HPI.    PSYCH and CD Critical Summary Points since July 2018 Nov 2018 - hospitalized for acute manic episode.  Started on combination of Depakote and olanzapine.  Developed severe extrapyramidal symptoms (shuffling gait, flat facies, muscle rigidity, tongue fullness) and so switched from olanzapine to Seroquel.  Could not tolerate quetiapine for similar reasons and so discontinue that as well.  Nov 2018 - outpatient psychiatric intake.  Started on low-dose risperidone but could not tolerate again.  Depakote ongoing.  Referred to neurology for consideration of major neurocognitive disorders.  December 2018 - added low dose Li for anthony  January 2019 - presumed lithium toxicity following increase of dose and so Li lowered from 900mg to 750mg  January 2019 -- decreased depakote by 125mg d/t cognitive impairment    PAST PSYCH MED TRIALS   see EMR Problem List: Hx of psychiatric care    MEDICAL / SURGICAL HISTORY                                   Pregnant or breastfeeding- no      Contraception- HRT for menopause    Neurologic Hx- no  Patient Active Problem List   Diagnosis     Oral aphthae     Obesity (BMI 30-39.9)     Disorder of bursae and tendons in shoulder region     CARDIOVASCULAR SCREENING; LDL GOAL LESS THAN 160     Rosacea     Tear of medial cartilage or meniscus of knee, current     Raynaud disease     Vitamin D deficiency disease     Mild sleep apnea     Atopic rhinitis     Morbid obesity (H)     Muscle tension dysphonia     Palpitations     Dysphagia, unspecified type     Anxiety     Manic behavior (H)       Major Surgery- unknown    ALLERGY               Seroquel [quetiapine]; Olanzapine; Penicillins; Risperidone; and Thimerosal  MEDICATIONS        Current Outpatient  Medications   Medication Sig Dispense Refill     Carboxymethylcellulose Sod PF (REFRESH PLUS) 0.5 % SOLN ophthalmic solution Place 1 drop into both eyes 3 times daily as needed for dry eyes       cyclobenzaprine (FLEXERIL) 10 MG tablet Take 1 tablet (10 mg) by mouth nightly as needed for other (headache) 30 tablet 0     divalproex sodium delayed-release (DEPAKOTE) 500 MG DR tablet Take 2 tablets (1,000 mg) by mouth 2 times daily 120 tablet 1     estrogen, conjugated,-medroxyPROGESTERone (PREMPRO) 0.625-2.5 MG per tablet Take 1 tablet by mouth daily 90 tablet 3     ibuprofen (ADVIL/MOTRIN) 800 MG tablet Take 400 mg by mouth every 4 hours as needed for other (headaches)        lidocaine, viscous, (XYLOCAINE) 2 % solution SWISH AND SPIT 5ML BY MOUTH EVERY 3 HOURS AS NEEDED 100 mL 0     lisinopril (PRINIVIL/ZESTRIL) 10 MG tablet Take 1 tablet (10 mg) by mouth daily 90 tablet 1     lithium (ESKALITH) 150 MG capsule Take 1 capsule (150 mg) by mouth At Bedtime Along with two 300mg tabs for total of 750mg at bedtime 30 capsule 1     lithium 300 MG capsule Take 2 capsules (600 mg) by mouth At Bedtime In add'n to 150mg tab for total of 750mg. 60 capsule 1     Loratadine 10 MG capsule Take 10 mg by mouth daily as needed.       Melatonin 10 MG TABS tablet Take 10 mg by mouth nightly as needed for sleep       metroNIDAZOLE (METROGEL) 1 % gel Apply topically daily 60 g 9     order for DME Please measure and distribute 1 pair of 20mmHg - 30mmHg knee high open toe compression stockings. Jobst ultrasheer or equivalent. 1 each 1     ORDER FOR DME Use your CPAP device as directed by your provider.       VITAMIN D, CHOLECALCIFEROL, PO Take 2,000 Units by mouth daily       artificial saliva (BIOTENE MT) SOLN solution Swish and spit 2 mLs (2 sprays) in mouth every hour as needed for dry mouth (Patient not taking: Reported on 12/6/2018) 44.3 mL 0     order for DME Please measure and distribute 1 pair of 20mmHg - 30mmHg thigh high open or  closed toe compression stockings. Jobst ultrasheer or equivalent. (Patient not taking: Reported on 12/6/2018) 2 each 3     VITALS                                                                        3, 3   /82   Pulse 60   Wt 125 kg (275 lb 9.6 oz)   BMI 40.70 kg/m     MENTAL STATUS EXAM                                                                 9, 14 cog gs     Alertness: alert  and oriented  Appearance: well groomed  Behavior/Demeanor: cooperative, with good  eye contact   Speech: continues to be moderately pressured and rapid  Language: intact and no problems  Psychomotor: No muscle stiffness or rigidity, no cogwheeling  Mood: anxious and labile  Affect: full range; was congruent to mood; was congruent to content  Thought Process/Associations: unremarkable  Thought Content:  Reports none;  Denies suicidal ideation, violent ideation, delusions, magical thinking and paranoid ideation  Perception:  Reports none;  Denies auditory hallucinations, depersonalization and derealization  Insight: good  Judgment: good  Cognition: (6) does not appear grossly intact; formal cognitive testing was not done  Gait and Station: No shuffling gait, no stiff posture, normal arm swing    LABS and DATA     AIMS:  0 (12/13/18)    PHQ9 = 16     PHQ-9 SCORE 11/1/2018 12/21/2018 1/4/2019   PHQ-9 Total Score 11 4 9     MoCA (12/12/18) - 25/30, missed points on fluency (only 6 words with F) and delayed recall (1/5 only but got the remainders with category cues)    PSYCHLABVALPROATE  Recent Labs   Lab Test 01/14/19  0913 12/20/18  1214 12/14/18  1235 11/20/18  1255   SOFIYA 107* 78 109* 99     ANTIPSYCHOTIC LABS  [glu, A1C, lipids (mehran LDL), liver enzymes, WBC, ANEU, Hgb, plts]  q12 mo  Recent Labs   Lab Test 12/20/18  1214 12/14/18  1235 12/06/18  1118 11/11/18  0901  09/24/15  0904  09/16/13  0739 08/22/12  0752 08/16/11  0757   GLC 71 71 86.4 75   < > 84   < > 75 87 84   A1C  --   --   --   --   --  5.2  --  5.1 5.2 5.3    < >  = values in this interval not displayed.     Recent Labs   Lab Test 10/02/17  0842 10/03/16  0742 09/24/15  0904 09/15/14  0733   CHOL 156 137 132 108   TRIG 55 56 58 46   LDL 49 51 47 24   HDL 96 75 73 75     Recent Labs   Lab Test 12/14/18  1235 08/09/18  0937 08/16/11  0757   AST 17 20 24   ALT 23 23 13   ALKPHOS 50  --  66     Recent Labs   Lab Test 01/14/19  0913 01/02/19  0839 12/20/18  1214 12/14/18  1235 11/10/18  0756 08/09/18  0937 06/21/16  1122   WBC  --   --   --  4.4 4.8 4.2 5.2   ANEU  --   --   --  2.3  --   --   --    HGB  --   --   --  14.3 14.9 14.1 13.7    152 139* 147* 193 190 212     LITHIUM LABS     [level, renal, SG, TSH, WBC]    q6 mo  Recent Labs   Lab Test 01/14/19  0913 01/02/19  0839   LITHIUM 0.55* 0.37*     Recent Labs   Lab Test 12/20/18  1214 12/14/18  1235 12/06/18  1118 11/20/18  1255  11/11/18  0901   CR 0.47* 0.43* 0.4*  --   --  0.44*   GFRESTIMATED >90 >90 >90  --   --  >90   * 129* 132.3* 134   < > 123*   POTASSIUM 4.4 4.2 4.9*  --   --  4.4   SHERRIE 9.2 8.7 9.2  --   --  8.6    < > = values in this interval not displayed.     Recent Labs   Lab Test 11/01/12  0819   SG 1.015     Recent Labs   Lab Test 11/10/18  0756 08/31/18  2029 04/06/18  1222 09/20/13  1145   TSH 2.29 2.19 0.99 1.45     Recent Labs   Lab Test 12/14/18  1235 11/10/18  0756 08/09/18  0937 06/21/16  1122   WBC 4.4 4.8 4.2 5.2   ANEU 2.3  --   --   --          DIAGNOSIS     Bipolar Disorder, unspecified, most recent episode anthony    ASSESSMENT                                                                 m2, h3       TODAY    Pt trending in right direction. No further manic symptoms. Sleep is stabilizing. However, main concerns are her cognition and related difficulties with emotion regulation. Is getting easily overwhelmed, we think in part because her cognition is slow and she is having hard time organizing information in her head to make decisions. As result, gets easily frustrated and is easily  tearful.    We anticipate her return to work in the not too distant future. However, no sooner than 6 weeks from now, and then perhaps longer pending response to medication changes described below. Additionally, when she does return, she will need to work part-time hours, no more than 0.5 FTE for the first several weeks back.    Will lower Depakote today, which is likely causing cognitive impairments. Ideally would like to see if she can tolerate Li monotherapy, but will have to move slowly given high sensitivity to medication changes.    [place] PRESCRIPTION MONITORING PROGRAM [] was checked today:  not using controlled substances.    PSYCHOTROPIC DRUG INTERACTIONS:    no.  MANAGEMENT:  N/A     PLAN                                                                                         m2, h3     1) PSYCHOTROPIC MEDICATIONS:  -Decrease morning depakote to 875mg in the morning and 1000mg at bedtime. Can further decrease morning dose by another 125mg if after 2 weeks cognition has improved and mood is stable.  -Continue lithium 750mg at bedtime    2) THERAPY:    Patient follows with individual psychotherapist in private practice    3) NEXT DUE:    Labs- Repeat Li labs in 2 months, Depakote labs in 6 mos  EKG- N/A  Rating Scales- Consider MoCA again late Fall 2019    4) REFERRALS:    None    5) RTC: 4 weeks    6) CRISIS NUMBERS:   Provided routinely in AVS.    ONLY if a [facility]: [facility] [phone] (clinic), [phone] (after hours)     TREATMENT RISK STATEMENT:  The risks, benefits, alternatives and potential adverse effects have been discussed and are understood by the pt. The pt understands the risks of using street drugs or alcohol. There are no medical contraindications, the pt agrees to treatment with the ability to do so. The pt knows to call the clinic for any problems or to access emergency care if needed.  Medical and substance use concerns are documented above.  Psychotropic drug interaction  check was done, including changes made today.     PROVIDER:  Galo Sandoval MD    Patient was staffed by Dr. Tapia.  I saw the patient with the resident, and participated in key portions of the service, including the mental status examination and developing the plan of care. I reviewed key portions of the history with the resident. I agree with the findings and plan as documented in this note.    Dacia Tapia

## 2019-02-04 ENCOUNTER — OFFICE VISIT (OUTPATIENT)
Dept: FAMILY MEDICINE | Facility: CLINIC | Age: 54
End: 2019-02-04
Payer: COMMERCIAL

## 2019-02-04 VITALS
WEIGHT: 275 LBS | SYSTOLIC BLOOD PRESSURE: 110 MMHG | HEART RATE: 70 BPM | BODY MASS INDEX: 41.68 KG/M2 | OXYGEN SATURATION: 100 % | DIASTOLIC BLOOD PRESSURE: 75 MMHG | HEIGHT: 68 IN | TEMPERATURE: 97.4 F

## 2019-02-04 DIAGNOSIS — Z00.00 PREVENTATIVE HEALTH CARE: Primary | ICD-10-CM

## 2019-02-04 ASSESSMENT — ENCOUNTER SYMPTOMS
DYSPHORIC MOOD: 0
CHILLS: 0
FEVER: 0
DYSURIA: 0
NERVOUS/ANXIOUS: 0
ABDOMINAL PAIN: 0
NAUSEA: 0
VOMITING: 0
SHORTNESS OF BREATH: 0

## 2019-02-04 ASSESSMENT — MIFFLIN-ST. JEOR: SCORE: 1910.89

## 2019-02-04 NOTE — PROGRESS NOTES
Preceptor Attestation:   Patient seen, evaluated and discussed with the resident. I have verified the content of the note, which accurately reflects my assessment of the patient and the plan of care.   Supervising Physician:  Madelaine Duncan MD

## 2019-02-04 NOTE — PATIENT INSTRUCTIONS
Recommendations from today's MTM visit:                                                        1.  Reviewed Genesight results with patient and wife.    2.  Agree with plan to reduce Depakote.      3. Counseled on lifestyle modifications to minimize heartburn symptoms: Limit/avoid spicy or acidic foods, caffeine, chocolate, eat smaller meals, avoid laying down within 30-60 minutes after a meal.  If ineffective, can try Tums as needed.      To schedule another MTM appointment, please call the clinic directly or you may call the MTM scheduling line at [phone] or toll-free at [phone].     My Clinical Pharmacist's contact information:                                                      It was a pleasure talking with you today!  Please feel free to contact me with any questions or concerns you have.      Anusha Nieto, PharmD, BCPP  Medication Therapy Management Pharmacist  [facility]  [phone]      You may receive a survey about the MTM services you received.  I would appreciate your feedback to help me serve you better in the future. Please fill it out and return it when you can. Your comments will be anonymous.

## 2019-02-04 NOTE — PROGRESS NOTES
"       HPI       Angy Haji is a 53 year old  who presents for   Chief Complaint   Patient presents with     RECHECK     Pap and pelvic exam     Last pap completed 9/24/2015: NIL, HPV negative. On this pap, there were endometrial cells present. Patient was not post-menopausal at the time. She was seen by Ob/Gyn who deferred an endometrial biopsy. Angy is currently taking prempro for menopausal symptoms which started 1/2018 and worsened 3/2018. She was experiencing intolerable hot flashes, night sweats, difficulty sleeping. Was started on prempro 3/2018.     Patient's LMP 10/18/2018.   Mother's age of menopause: 53    There is a family hx of endometrial cancer in paternal grandmother and 2 paternal aunts.     Patient's last mammogram completed 10/08/2018.    +++++++    Problem, Medication and Allergy Lists were reviewed and updated if needed..    Patient is an established patient of this clinic..         Review of Systems:   Review of Systems   Constitutional: Negative for chills and fever.   Respiratory: Negative for shortness of breath.    Cardiovascular: Negative for chest pain.   Gastrointestinal: Negative for abdominal pain, nausea and vomiting.   Genitourinary: Negative for dysuria, pelvic pain, vaginal bleeding and vaginal discharge.   Psychiatric/Behavioral: Negative for behavioral problems and dysphoric mood. The patient is not nervous/anxious.             Physical Exam:     Vitals:    02/04/19 1313   BP: 110/75   Pulse: 70   Temp: 97.4  F (36.3  C)   TempSrc: Oral   SpO2: 100%   Weight: 124.7 kg (275 lb)   Height: 1.727 m (5' 8\")     Body mass index is 41.81 kg/m .  Vital signs normal except BMI     Physical Exam   Constitutional: No distress.   HENT:   Head: Normocephalic and atraumatic.   Cardiovascular: Normal rate and regular rhythm.   Pulmonary/Chest: Effort normal and breath sounds normal.   Neurological: She is alert.   Skin: She is not diaphoretic.   Psychiatric: She has a normal mood and " affect. Her behavior is normal.         Results:   No testing ordered today    Assessment and Plan        1. Preventative health care  Patient presents for pap smear today. As mentioned above, last pap completed 9/2015: NIL, HPV negative. Therefore, next pap is not due until 9/2020; pap not completed. There were endometrial cells presents on last pap however endometrial biopsy was deferred as patient was not post-menopausal at that time. LMP 10/18/2018, not currently post-menopausal. Up to date on mammogram.        There are no discontinued medications.    Options for treatment and follow-up care were reviewed with the patient. Angy Haji  engaged in the decision making process and verbalized understanding of the options discussed and agreed with the final plan.    Vidya Rai MD  Family Medicine PGY3 Resident

## 2019-02-05 ENCOUNTER — TELEPHONE (OUTPATIENT)
Dept: PSYCHIATRY | Facility: CLINIC | Age: 54
End: 2019-02-05

## 2019-02-05 ENCOUNTER — HOSPITAL ENCOUNTER (OUTPATIENT)
Dept: NUTRITION | Facility: CLINIC | Age: 54
Discharge: HOME | End: 2019-02-05
Attending: FAMILY MEDICINE | Admitting: FAMILY MEDICINE
Payer: COMMERCIAL

## 2019-02-05 PROCEDURE — 97803 MED NUTRITION INDIV SUBSEQ: CPT | Performed by: DIETITIAN, REGISTERED

## 2019-02-05 NOTE — PROGRESS NOTES
"NUTRITION REASSESSMENT NOTE      REASON FOR ASSESSMENT   Angy Haji referred by Dr. Odell for MNT related to  Obesity (BMI 30-39.9) [E66.9]  - Primary       Benign essential hypertension [I10]       Patient accompanied by self      NUTRITION HISTORY  Patient with multiple changes in the past few months as patient newly diagnosed with bipolar.  Patient states medications are being adjusted to find correct dose.  Patient is on medical leave and does not have structured days as she has in the past.  Patient states she has changed PCP clinic to [facility] with Dr. Odell.                     DIET RECALL   Breakfast: yogurt, granola (measures), fruit + coffee; refried beans + cheese in tortilla (small)   Lunch: leftovers    Dinner: crock pot soup; vegetarian options as daughter has become vegetarian in the past few weeks    Snacks: Kettle corn; cheese and crackers; lemon cake dessert; premium ice cream or regular ice cream; cheese cake   Beverages: mainly water, coffee, mineral water  Dining out: varies   Exercise: Patient has not been exercising consistently.  Patient joined gym in the past month but states has not been going.        ANTHROPOMETRICS  Height: 5'8\" (note height change in chart previously 5'9\")  Weight: 275 lbs   BMI (kg/m2): 42.8 kg/m2   %IBW: 196%  Weight History:   Wt Readings from Last 5 Encounters:   02/04/19 124.7 kg (275 lb)   12/13/18 120.3 kg (265 lb 4.8 oz)   12/06/18 120.5 kg (265 lb 9.6 oz)   11/20/18 119.4 kg (263 lb 3.2 oz)   11/13/18 119.6 kg (263 lb 11.2 oz)      ASSESSED NUTRITION NEEDS  Estimated Energy Needs: 1470-0074 kcals (15-20 kcals/kg) for gradual weight loss   Estimated Protein Needs: 66-79 (1-1.2 gm/kg IBW) for repletion with exercise  RMR: 1440     EVALUATION/PROGRESS TOWARDS GOALS   Previous goals:  Track evening snacking-met  Look into online grocery shopping-not met     Previous Nutrition Diagnosis: Obesity related to excess energy intake as evidenced by BMI of 39.4 " kg/m2-no change     Current Nutrition Diagnosis:Obesity related to excess energy intake as evidenced by BMI of 41.8 kg/m2     INTERVENTIONS   Nutrition Prescription   Recommend exercise 3-5 times per week; track intake if eating mindlessly; increase fiber to 25-30 grams per day      Implementation:   Meals and Snacks: Continue eating 3 meals + deliberate snacking (if hungry)   Nutrition Education (Content):               a)  Discussed progress towards goals.  Reviewed struggles with evening eating and change in eating pattern with family.  Supported patient in her struggle with food and mental health issues.     Nutrition Education (Application):               a) Determined options for changing evening snacking pattern.                     b) Patient verbalized understanding of diet by stating will eat snacks at the table.                       Anticipate fair-good compliance     CURRENT GOALS   Snack at dining table only  Limit snacking to 2 times per week  Look into online grocery shopping  Resume Sunday meal prep  Exercise 3 times per week for 30 minutes    FOLLOW UP/MONITORING    Patient to follow up in 4 weeks  Patient has RD contact information      Time spent with patient: 30 minutes     Mattie Amaya, RD, LD  [facility] Outpatient Dietitian  [phone] (office phone)

## 2019-02-05 NOTE — TELEPHONE ENCOUNTER
On 12/3/2018 the patient signed an CARMEN authorizing medical records to be released from [facility] to [facility] for the purpose of disability. I sent the request to medical records via the tube system and kept a copy in psychiatry until scanning is complete.  Lexii Marroquin [facility]

## 2019-02-06 ENCOUNTER — TELEPHONE (OUTPATIENT)
Dept: PSYCHIATRY | Facility: CLINIC | Age: 54
End: 2019-02-06

## 2019-02-06 NOTE — TELEPHONE ENCOUNTER
[facility] Call Center    Phone Message    May a detailed message be left on voicemail: yes    Reason for Call: Other:   Patient reports she is out of [facility] but her job has made accommodations for her to keep her job. Because of this, she has a couple questions:     1) She is wondering if Dr. Sandoval's note from 2/1/19 could be released to [employer]. She needs to send follow up information to [employer].    2) She sent a message via [employer] to Dr. Sandoval's team regarding something she needs from [employer] for her extended leave of absence. She is wondering if that message has been seen.      Action Taken: Message routed to:  Other: Amberly Gordon RNCC

## 2019-02-06 NOTE — TELEPHONE ENCOUNTER
Writer called pt and informed her that the [employer] message has been received and forwarded to the provider. Will keep her updated regarding the progress of the letter.    Writer uncertain how to release note via [employer]. Offered to print off note and fax to [employer] directly (CARMEN on file). Pt agreed to this and asked that writer fax 2/1 note with claim number: [id number] to [employer] at [phone]. Writer did this.

## 2019-03-01 ENCOUNTER — OFFICE VISIT (OUTPATIENT)
Dept: PSYCHIATRY | Facility: CLINIC | Age: 54
End: 2019-03-01
Attending: PSYCHIATRY & NEUROLOGY
Payer: COMMERCIAL

## 2019-03-01 VITALS
SYSTOLIC BLOOD PRESSURE: 123 MMHG | HEART RATE: 79 BPM | WEIGHT: 280.2 LBS | BODY MASS INDEX: 42.6 KG/M2 | DIASTOLIC BLOOD PRESSURE: 82 MMHG

## 2019-03-01 DIAGNOSIS — F31.9 BIPOLAR I DISORDER (H): ICD-10-CM

## 2019-03-01 PROCEDURE — G0463 HOSPITAL OUTPT CLINIC VISIT: HCPCS | Mod: ZF

## 2019-03-01 RX ORDER — DIVALPROEX SODIUM 125 MG/1
125 TABLET, DELAYED RELEASE ORAL EVERY MORNING
Qty: 14 TABLET | Refills: 0 | Status: SHIPPED | OUTPATIENT
Start: 2019-03-01 | End: 2019-03-29

## 2019-03-01 RX ORDER — DIVALPROEX SODIUM 500 MG/1
TABLET, DELAYED RELEASE ORAL
Qty: 90 TABLET | Refills: 1 | Status: SHIPPED | OUTPATIENT
Start: 2019-03-01 | End: 2019-03-29

## 2019-03-01 RX ORDER — LITHIUM CARBONATE 150 MG/1
150 CAPSULE ORAL AT BEDTIME
Qty: 30 CAPSULE | Refills: 1 | Status: SHIPPED | OUTPATIENT
Start: 2019-03-01 | End: 2019-03-29

## 2019-03-01 RX ORDER — LITHIUM CARBONATE 300 MG/1
600 CAPSULE ORAL AT BEDTIME
Qty: 60 CAPSULE | Refills: 1 | Status: SHIPPED | OUTPATIENT
Start: 2019-03-01 | End: 2019-03-29

## 2019-03-01 ASSESSMENT — PAIN SCALES - GENERAL: PAINLEVEL: NO PAIN (0)

## 2019-03-01 ASSESSMENT — PATIENT HEALTH QUESTIONNAIRE - PHQ9: SUM OF ALL RESPONSES TO PHQ QUESTIONS 1-9: 10

## 2019-03-01 NOTE — PATIENT INSTRUCTIONS
- Continue lithium 750mg at bedtime  - Decrease morning Depakote to 625mg for 2 weeks, then decrease again to 500mg. Continue the 1000mg at bedtime.  - Follow-up with me again in 4 weeks      Thank you for coming to the PSYCHIATRY CLINIC.    Lab Testing:  If you had lab testing today and your results are reassuring or normal they will be mailed to you or sent through [facility] within 7 days.   If the lab tests need quick action we will call you with the results.  The phone number we will call with results is # [phone] (home) [phone] (work). If this is not the best number please call our clinic and change the number.    Medication Refills:  If you need any refills please call your pharmacy and they will contact us. Our fax number for refills is [phone]. Please allow three business for refill processing.   If you need to  your refill at a new pharmacy, please contact the new pharmacy directly. The new pharmacy will help you get your medications transferred.     Scheduling:  If you have any concerns about today's visit or wish to schedule another appointment please call our office during normal business hours [phone] (8-5:00 M-F)    Contact Us:  Please call [phone] during business hours (8-5:00 M-F).  If after clinic hours, or on the weekend, please call  [phone].    Financial Assistance [phone]  [facility] [phone]  [facility] [phone]  Medical Records [phone]      MENTAL HEALTH CRISIS NUMBERS:  [facility]:   [facility] - [phone]   Crisis Residence [facility] - [facility] - [phone]   Walk-In Counseling Center [facility] - [phone]   COPE 24/7 [place] Mobile Team for Adults - [[phone]]; Child - [[phone]]     Crisis Connection - [phone]     [facility]:   [facility] - [phone]   Walk-in counseling [facility] - [phone]   Walk-in counseling [facility] - [phone]   Crisis Residence [facility] - [phone]   Urgent Care Adult Mental Health:   --Drop-in, 24/7 crisis line, and [facility] Mobile Team [[phone]]    CRISIS TEXT LINE: Text [phone] from anywhere, anytime, any crisis 24/7;    OR SEE www.crisistextline.org     Poison Control Center - [phone]    CHILD: [facility] needs assessment team - [phone]     [facility] Lifeline - [phone]; or [place] [facility] - [phone]    If you have a medical emergency please call 911or go to the nearest ER.                    _____________________________________________    Again thank you for choosing PSYCHIATRY CLINIC and please let us know how we can best partner with you to improve you and your family's health.  You may be receiving a survey in the mail regarding this appointment. We would love to have your feedback, both positive and negative, so please fill out the survey and return it using the provided envelope. The survey is done by an external company, so your answers are anonymous.

## 2019-03-01 NOTE — LETTER
March 1, 2019      Re: Angy Haji       Dear Carmen Serrato,    It is okay for Angy Haji to return to work on a part-time basis starting March 18. There are no restrictions, but she can only work three days a week. This restricted work schedule should continue for 4 weeks.    Thank you again for accommodating Angy's needs.     Sincerely,          Galo Sandoval MD MPH

## 2019-03-01 NOTE — PROGRESS NOTES
[facility] PSYCHIATRY CLINIC PROGRESS NOTE     CARE TEAM:  PCP- Jono Odell    Specialty Providers- OB-GYN, ENT    Therapist- no     Community  Team- no    Angy LILIAN Haji is a 53 year old female who prefers the name Angy & pronouns she, her.     Date of initial diagnostic assessment is 12/5/18 .  Date of most recent transfer of care assessment is N/A.    Pertinent Background: Pt has not had significant mental health history until 2018 when developed perimenopausal mood/anxiety symptoms.      Psych critical item history includes manic episode and psychiatric hospitalization (Nov 2018)..    INTERIM HISTORY                                                          4, 4   The patient reports good treatment adherence.  History was provided by the patient and her wife Rachael who were good historians. The last visit ended with the following med change: started Risperidone.   Since the last visit:     Doing better. More regulated.  Plans to return to work. Somewhat anxious about this. Will start PT.  More organized.  Having some difficulty with processing speed and recall. Depakote SE? Or Lithium?    RECENT SYMPTOMS:   DEPRESSION:  reports-low energy, weight changes and poor concentration /memory;  DENIES- anhedonia and hypersomnia  VERITO/HYPOMANIA:  reports-distractibility  and inter-episode mood instability;  DENIES- increased energy, decreased sleep need and increased activity  PSYCHOSIS:  reports-none;  DENIES- delusions, auditory hallucinations and disorganized behavior    RECENT SUBSTANCE USE:     ALCOHOL- no      TOBACCO- no     CAFFEINE- quit  OPIOIDS- no         NARCAN KIT- N/A        CANNABIS- no            OTHER ILLICIT DRUGS- none      CURRENT SOCIAL HISTORY:  FINANCIAL SUPPORT- currently on leave from work; wife is employed       CHILDREN- 12 yr dtr      LIVING SITUATION- with wife and dtr in [facility] in [place]      SOCIAL/ SPIRITUAL SUPPORT- wife and daughter    FEELS SAFE AT HOME- yes     MEDICAL ROS (2,10):   Reports A comprehensive review of systems was performed and is negative other than noted in the HPI.      Denies A comprehensive review of systems was performed and is negative other than noted in the HPI.    PSYCH and CD Critical Summary Points since July 2018 Nov 2018 - hospitalized for acute manic episode.  Started on combination of Depakote and olanzapine.  Developed severe extrapyramidal symptoms (shuffling gait, flat facies, muscle rigidity, tongue fullness) and so switched from olanzapine to Seroquel.  Could not tolerate quetiapine for similar reasons and so discontinue that as well.  Nov 2018 - outpatient psychiatric intake.  Started on low-dose risperidone but could not tolerate again.  Depakote ongoing.  Referred to neurology for consideration of major neurocognitive disorders.  December 2018 - added low dose Li for anthony  January 2019 - presumed lithium toxicity following increase of dose and so Li lowered from 900mg to 750mg  January 2019 -- decreased depakote by 125mg d/t cognitive impairment  March 2019 -- again decreased depakote to 500mg in the morning, 1000mg at bedtime    PAST PSYCH MED TRIALS   see EMR Problem List: Hx of psychiatric care    MEDICAL / SURGICAL HISTORY                                   Pregnant or breastfeeding- no      Contraception- HRT for menopause    Neurologic Hx- no  Patient Active Problem List   Diagnosis     Oral aphthae     Obesity (BMI 30-39.9)     Disorder of bursae and tendons in shoulder region     CARDIOVASCULAR SCREENING; LDL GOAL LESS THAN 160     Rosacea     Tear of medial cartilage or meniscus of knee, current     Raynaud disease     Vitamin D deficiency disease     Mild sleep apnea     Atopic rhinitis     Morbid obesity (H)     Muscle tension dysphonia     Palpitations     Dysphagia, unspecified type     Anxiety     Manic behavior (H)       Major Surgery- unknown    ALLERGY               Seroquel [quetiapine]; Olanzapine; Penicillins; Risperidone; and  Thimerosal  MEDICATIONS        Current Outpatient Medications   Medication Sig Dispense Refill     Carboxymethylcellulose Sod PF (REFRESH PLUS) 0.5 % SOLN ophthalmic solution Place 1 drop into both eyes 3 times daily as needed for dry eyes       divalproex sodium delayed-release (DEPAKOTE) 125 MG DR tablet Take 3 tablets (375 mg) by mouth every morning 90 tablet 1     divalproex sodium delayed-release (DEPAKOTE) 500 MG DR tablet Take 1 tablet in the morning and 2 tablets at bedtime. 90 tablet 1     estrogen, conjugated,-medroxyPROGESTERone (PREMPRO) 0.625-2.5 MG per tablet Take 1 tablet by mouth daily 90 tablet 3     lidocaine, viscous, (XYLOCAINE) 2 % solution SWISH AND SPIT 5ML BY MOUTH EVERY 3 HOURS AS NEEDED 100 mL 0     lisinopril (PRINIVIL/ZESTRIL) 10 MG tablet Take 1 tablet (10 mg) by mouth daily 90 tablet 1     lithium (ESKALITH) 150 MG capsule Take 1 capsule (150 mg) by mouth At Bedtime Along with two 300mg tabs for total of 750mg at bedtime 30 capsule 1     lithium 300 MG capsule Take 2 capsules (600 mg) by mouth At Bedtime In add'n to 150mg tab for total of 750mg. 60 capsule 1     Loratadine 10 MG capsule Take 10 mg by mouth daily as needed.       metroNIDAZOLE (METROGEL) 1 % gel Apply topically daily 60 g 9     order for DME Please measure and distribute 1 pair of 20mmHg - 30mmHg thigh high open or closed toe compression stockings. Jobst ultrasheer or equivalent. 2 each 3     order for DME Please measure and distribute 1 pair of 20mmHg - 30mmHg knee high open toe compression stockings. Jobst ultrasheer or equivalent. 1 each 1     ORDER FOR DME Use your CPAP device as directed by your provider.       VITAMIN D, CHOLECALCIFEROL, PO Take 2,000 Units by mouth daily       cyclobenzaprine (FLEXERIL) 10 MG tablet Take 1 tablet (10 mg) by mouth nightly as needed for other (headache) (Patient not taking: Reported on 2/1/2019) 30 tablet 0     VITALS                                                                         3, 3   /82   Pulse 79   Wt 127.1 kg (280 lb 3.2 oz)   BMI 42.60 kg/m     MENTAL STATUS EXAM                                                                 9, 14 cog gs     Alertness: alert  and oriented  Appearance: well groomed  Behavior/Demeanor: cooperative, with good  eye contact   Speech: continues to be moderately pressured and rapid  Language: intact and no problems  Psychomotor: No muscle stiffness or rigidity, no cogwheeling  Mood: anxious and labile  Affect: full range; was congruent to mood; was congruent to content  Thought Process/Associations: unremarkable  Thought Content:  Reports none;  Denies suicidal ideation, violent ideation, delusions, magical thinking and paranoid ideation  Perception:  Reports none;  Denies auditory hallucinations, depersonalization and derealization  Insight: good  Judgment: good  Cognition: (6) does not appear grossly intact; formal cognitive testing was not done  Gait and Station: No shuffling gait, no stiff posture, normal arm swing    LABS and DATA     AIMS:  0 (12/13/18)    PHQ9 = 10     PHQ-9 SCORE 12/21/2018 1/4/2019 2/1/2019   PHQ-9 Total Score 4 9 16     MoCA (12/12/18) - 25/30, missed points on fluency (only 6 words with F) and delayed recall (1/5 only but got the remainders with category cues)    PSYCHLABVALPROATE  Recent Labs   Lab Test 01/14/19  0913 12/20/18  1214 12/14/18  1235 11/20/18  1255   SOFIYA 107* 78 109* 99     ANTIPSYCHOTIC LABS  [glu, A1C, lipids (mehran LDL), liver enzymes, WBC, ANEU, Hgb, plts]  q12 mo  Recent Labs   Lab Test 12/20/18  1214 12/14/18  1235 12/06/18  1118 11/11/18  0901  09/24/15  0904  09/16/13  0739 08/22/12  0752 08/16/11  0757   GLC 71 71 86.4 75   < > 84   < > 75 87 84   A1C  --   --   --   --   --  5.2  --  5.1 5.2 5.3    < > = values in this interval not displayed.     Recent Labs   Lab Test 10/02/17  0842 10/03/16  0742 09/24/15  0904 09/15/14  0733   CHOL 156 137 132 108   TRIG 55 56 58 46   LDL 49 51 47 24   HDL  96 75 73 75     Recent Labs   Lab Test 12/14/18  1235 08/09/18  0937 08/16/11  0757   AST 17 20 24   ALT 23 23 13   ALKPHOS 50  --  66     Recent Labs   Lab Test 01/14/19  0913 01/02/19  0839 12/20/18  1214 12/14/18  1235 11/10/18  0756 08/09/18  0937 06/21/16  1122   WBC  --   --   --  4.4 4.8 4.2 5.2   ANEU  --   --   --  2.3  --   --   --    HGB  --   --   --  14.3 14.9 14.1 13.7    152 139* 147* 193 190 212     LITHIUM LABS     [level, renal, SG, TSH, WBC]    q6 mo  Recent Labs   Lab Test 01/14/19  0913 01/02/19  0839   LITHIUM 0.55* 0.37*     Recent Labs   Lab Test 12/20/18  1214 12/14/18  1235 12/06/18  1118 11/20/18  1255  11/11/18  0901   CR 0.47* 0.43* 0.4*  --   --  0.44*   GFRESTIMATED >90 >90 >90  --   --  >90   * 129* 132.3* 134   < > 123*   POTASSIUM 4.4 4.2 4.9*  --   --  4.4   SHERRIE 9.2 8.7 9.2  --   --  8.6    < > = values in this interval not displayed.     Recent Labs   Lab Test 11/01/12  0819   SG 1.015     Recent Labs   Lab Test 11/10/18  0756 08/31/18  2029 04/06/18  1222 09/20/13  1145   TSH 2.29 2.19 0.99 1.45     Recent Labs   Lab Test 12/14/18  1235 11/10/18  0756 08/09/18  0937 06/21/16  1122   WBC 4.4 4.8 4.2 5.2   ANEU 2.3  --   --   --          DIAGNOSIS     Bipolar Disorder, unspecified, most recent episode anthony, in remission    ASSESSMENT                                                                 m2, h3       TODAY    Pt continues to do better. Mood stable and plans to return to work PT later this month. Is having some cognitive SEs from depakote. Would like to lower Depakote again today. At next appt will inc Li. Ultimately would like to do Li monotherapy but moving very slowly given h/o of medication intolerance. Pt and wife agree to this plan.    [place] PRESCRIPTION MONITORING PROGRAM [] was checked today:  not using controlled substances.    PSYCHOTROPIC DRUG INTERACTIONS:    no.  MANAGEMENT:  N/A     PLAN                                                                                          m2, h3     1) PSYCHOTROPIC MEDICATIONS:  -Decrease morning depakote to 625mg in the morning and 1000mg at bedtime. Can further decrease morning dose by another 125mg if after 2 weeks cognition has improved and mood is stable.  -Continue lithium 750mg at bedtime    2) THERAPY:    Patient follows with individual psychotherapist in private practice    3) NEXT DUE:    Labs- Repeat Li labs in 2 months, Depakote labs in 6 mos  EKG- N/A  Rating Scales- Consider MoCA again late Fall 2019    4) REFERRALS:    None    5) RTC: 4 weeks    6) CRISIS NUMBERS:   Provided routinely in AVS.    ONLY if a [facility]: [facility] [phone] (clinic), [phone] (after hours)     TREATMENT RISK STATEMENT:  The risks, benefits, alternatives and potential adverse effects have been discussed and are understood by the pt. The pt understands the risks of using street drugs or alcohol. There are no medical contraindications, the pt agrees to treatment with the ability to do so. The pt knows to call the clinic for any problems or to access emergency care if needed.  Medical and substance use concerns are documented above.  Psychotropic drug interaction check was done, including changes made today.     PROVIDER:  Galo Sandoval MD    Patient was not staffed today. Will route to Dr. Liu to review/sign.    Attestation:  I did not see Angy LILIAN Haji directly. I have reviewed the documentation and I agree with the resident's plan of care.   Chai Liu MD

## 2019-03-01 NOTE — LETTER
March 1, 2019      Re: Angy Haji         Dear Carmen Serrato,    It is okay for Angy Haji to return to work on a part-time basis starting March 18. There are no restrictions, but she can only work three days a week. This restrict work schedule should continue for 4 weeks.    Thank you again for accommodating Angy's needs.     Sincerely,          Galo Sandoval MD MPH

## 2019-03-05 ENCOUNTER — HOSPITAL ENCOUNTER (OUTPATIENT)
Dept: NUTRITION | Facility: CLINIC | Age: 54
Discharge: HOME | End: 2019-03-05
Attending: FAMILY MEDICINE | Admitting: FAMILY MEDICINE
Payer: COMMERCIAL

## 2019-03-05 PROCEDURE — 97803 MED NUTRITION INDIV SUBSEQ: CPT | Performed by: DIETITIAN, REGISTERED

## 2019-03-05 NOTE — PROGRESS NOTES
"NUTRITION REASSESSMENT NOTE      REASON FOR ASSESSMENT   Angy Haji referred by Dr. Odell for MNT related to  Obesity (BMI 30-39.9) [E66.9]  - Primary       Benign essential hypertension [I10]       Patient accompanied by self      NUTRITION HISTORY  Patient with multiple changes in the past few months as patient newly diagnosed with bipolar.  Patient states medications are being adjusted to find correct dose.  Patient will be returning to work in 2 weeks.  Patient states she has changed PCP clinic to [facility] with Dr. Odell.                     DIET RECALL   Breakfast: yogurt, granola (measures), fruit + coffee; refried beans + cheese in tortilla (small)   Lunch: leftovers    Dinner: crock pot soup; vegetarian options as daughter has become vegetarian in the past few weeks    Snacks: Kettle corn; cheese and crackers; lemon cake dessert; premium ice cream or regular ice cream; cheese cake   Beverages: mainly water, coffee, mineral water  Dining out: varies   Exercise: Patient exercising 3 times per week (walking, walking dog, swimming or elliptical).          ANTHROPOMETRICS  Height: 5'8\" (note height change in chart previously 5'9\")  Weight: 279 lbs   BMI (kg/m2): 42.6 kg/m2   %IBW: 199%  Weight History:   Wt Readings from Last 5 Encounters:   02/04/19 124.7 kg (275 lb)   12/13/18 120.3 kg (265 lb 4.8 oz)   12/06/18 120.5 kg (265 lb 9.6 oz)   11/20/18 119.4 kg (263 lb 3.2 oz)   11/13/18 119.6 kg (263 lb 11.2 oz)      ASSESSED NUTRITION NEEDS  Estimated Energy Needs: 0958-3588 kcals (15-20 kcals/kg) for gradual weight loss   Estimated Protein Needs: 66-79 (1-1.2 gm/kg IBW) for repletion with exercise  RMR: 1440     EVALUATION/PROGRESS TOWARDS GOALS   Previous goals:  Snack at dining table only-not met   Limit snacking to 2 times per week-not met   Look into online grocery shopping-not met   Resume Sunday meal prep-met   Exercise 3 times per week for 30 minutes-met      Previous Nutrition Diagnosis: " Obesity related to excess energy intake as evidenced by BMI of 41.8 kg/m2-no change     Current Nutrition Diagnosis:Obesity related to excess energy intake as evidenced by BMI of 42.9 kg/m2     INTERVENTIONS   Nutrition Prescription   Recommend exercise 3-5 times per week; track intake if eating mindlessly; increase fiber to 25-30 grams per day      Implementation:   Meals and Snacks: Continue eating 3 meals + deliberate snacking (if hungry)   Nutrition Education (Content):               a)  Discussed progress towards goals.  Congratulated patient on resuming exercise and meal prep on Sundays.  Reviewed struggles with evening eating as patient was not able to eat snacks at table or reduce frequency of snacking.  Supported patient in her struggle with food and mental health issues.     Nutrition Education (Application):               a) Determined meal planning for when patient returns to work.                       b) Patient verbalized understanding of diet by stating will focus on reducing evening snacking.                         Anticipate fair-good compliance     CURRENT GOALS   Limit snacking to 3-4 times per week   Continue Sunday meal prep  Continue exercising 3 times per week for 30 minutes (choose non work days)    FOLLOW UP/MONITORING    Patient to follow up in 6 weeks  Patient has RD contact information      Time spent with patient: 30 minutes     Mattie Amaya, RD, LD  [facility] Outpatient Dietitian  [phone] (office phone)

## 2019-03-29 ENCOUNTER — OFFICE VISIT (OUTPATIENT)
Dept: PSYCHIATRY | Facility: CLINIC | Age: 54
End: 2019-03-29
Attending: PSYCHIATRY & NEUROLOGY
Payer: COMMERCIAL

## 2019-03-29 VITALS
BODY MASS INDEX: 42.98 KG/M2 | DIASTOLIC BLOOD PRESSURE: 73 MMHG | SYSTOLIC BLOOD PRESSURE: 141 MMHG | WEIGHT: 282.65 LBS

## 2019-03-29 DIAGNOSIS — F31.9 BIPOLAR I DISORDER (H): ICD-10-CM

## 2019-03-29 PROCEDURE — G0463 HOSPITAL OUTPT CLINIC VISIT: HCPCS | Mod: ZF

## 2019-03-29 RX ORDER — DIVALPROEX SODIUM 125 MG/1
125 TABLET, DELAYED RELEASE ORAL EVERY MORNING
Qty: 14 TABLET | Refills: 0 | Status: SHIPPED | OUTPATIENT
Start: 2019-03-29 | End: 2019-05-03

## 2019-03-29 RX ORDER — LITHIUM CARBONATE 150 MG/1
150 CAPSULE ORAL AT BEDTIME
Qty: 30 CAPSULE | Refills: 1 | Status: SHIPPED | OUTPATIENT
Start: 2019-03-29 | End: 2019-05-03

## 2019-03-29 RX ORDER — DIVALPROEX SODIUM 500 MG/1
TABLET, DELAYED RELEASE ORAL
Qty: 90 TABLET | Refills: 1 | Status: SHIPPED | OUTPATIENT
Start: 2019-03-29 | End: 2019-05-03

## 2019-03-29 RX ORDER — LITHIUM CARBONATE 300 MG/1
600 CAPSULE ORAL AT BEDTIME
Qty: 60 CAPSULE | Refills: 1 | Status: SHIPPED | OUTPATIENT
Start: 2019-03-29 | End: 2019-05-03

## 2019-03-29 ASSESSMENT — PAIN SCALES - GENERAL: PAINLEVEL: NO PAIN (0)

## 2019-03-29 NOTE — PROGRESS NOTES
[facility] PSYCHIATRY CLINIC PROGRESS NOTE     CARE TEAM:  PCP- Jono Odell    Specialty Providers- OB-GYN, ENT    Therapist- no     Community  Team- no    Angy LILIAN Haji is a 53 year old female who prefers the name Angy & pronouns she, her.     Date of initial diagnostic assessment is 12/5/18 .  Date of most recent transfer of care assessment is N/A.    Pertinent Background: Pt has not had significant mental health history until 2018 when developed perimenopausal mood/anxiety symptoms.      Psych critical item history includes manic episode and psychiatric hospitalization (Nov 2018)..    INTERIM HISTORY                                                          4, 4   The patient reports good treatment adherence.  History was provided by the patient and her wife Rachael who were good historians. The last visit ended with the following med change: lowered depakote.   Since the last visit:     Doing okay. Has had a stressful few weeks. Resumed work at 0.6FTE, goes to 0.8 in 2 weeks. 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   On days off has been tearful at times, feeling easily overwhelmed. Mood feels a little lower the last several days.  Has been doing some more exercise, 2-3x weekly. Ongoing back pain.  Sleep has been regular. Occasionally gets up in middle of night to urinate. Gets 7-8 hours.  Much better than in months past but still feel sensitive. 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  Has psychotherapist but not seeing as frequently because of insurance limitations.  Feels the cognitive SEs have improved at lower dose of depakote.    RECENT SYMPTOMS:   DEPRESSION:  reports-depressed mood, low energy and poor concentration /memory; feeling easily overwhelmed DENIES- anhedonia and hypersomnia  VERITO/HYPOMANIA:  reports-distractibility ;  DENIES- increased energy, decreased sleep need and  increased activity  PSYCHOSIS:  reports-none;  DENIES- delusions, auditory hallucinations and disorganized behavior    RECENT SUBSTANCE USE:     ALCOHOL- no      TOBACCO- no     CAFFEINE- quit  OPIOIDS- no         NARCAN KIT- N/A        CANNABIS- no            OTHER ILLICIT DRUGS- none      CURRENT SOCIAL HISTORY:  FINANCIAL SUPPORT- currently on leave from work; wife is employed       CHILDREN- 12 yr dtr      LIVING SITUATION- with wife and dtr in [facility] in [place]      SOCIAL/ SPIRITUAL SUPPORT- wife and daughter    FEELS SAFE AT HOME- yes     MEDICAL ROS (2,10):  Reports A comprehensive review of systems was performed and is negative other than noted in the HPI.      Denies A comprehensive review of systems was performed and is negative other than noted in the HPI.    PSYCH and CD Critical Summary Points since July 2018 Nov 2018 - hospitalized for acute manic episode.  Started on combination of Depakote and olanzapine.  Developed severe extrapyramidal symptoms (shuffling gait, flat facies, muscle rigidity, tongue fullness) and so switched from olanzapine to Seroquel.  Could not tolerate quetiapine for similar reasons and so discontinue that as well.  Nov 2018 - outpatient psychiatric intake.  Started on low-dose risperidone but could not tolerate again.  Depakote ongoing.  Referred to neurology for consideration of major neurocognitive disorders.  December 2018 - added low dose Li for anthony  January 2019 - presumed lithium toxicity following increase of dose and so Li lowered from 900mg to 750mg  January 2019 -- decreased depakote by 125mg d/t cognitive impairment  March 2019 -- again decreased depakote to 500mg in the morning, 1000mg at bedtime    PAST PSYCH MED TRIALS   see EMR Problem List: Hx of psychiatric care    MEDICAL / SURGICAL HISTORY                                   Pregnant or breastfeeding- no      Contraception- HRT for menopause    Neurologic Hx- no  Patient Active Problem List   Diagnosis      Oral aphthae     Obesity (BMI 30-39.9)     Disorder of bursae and tendons in shoulder region     CARDIOVASCULAR SCREENING; LDL GOAL LESS THAN 160     Rosacea     Tear of medial cartilage or meniscus of knee, current     Raynaud disease     Vitamin D deficiency disease     Mild sleep apnea     Atopic rhinitis     Morbid obesity (H)     Muscle tension dysphonia     Palpitations     Dysphagia, unspecified type     Anxiety     Manic behavior (H)       Major Surgery- unknown    ALLERGY               Seroquel [quetiapine]; Olanzapine; Penicillins; Risperidone; and Thimerosal  MEDICATIONS        Current Outpatient Medications   Medication Sig Dispense Refill     Carboxymethylcellulose Sod PF (REFRESH PLUS) 0.5 % SOLN ophthalmic solution Place 1 drop into both eyes 3 times daily as needed for dry eyes       divalproex sodium delayed-release (DEPAKOTE) 125 MG DR tablet Take 1 tablet (125 mg) by mouth every morning 14 tablet 0     divalproex sodium delayed-release (DEPAKOTE) 500 MG DR tablet Take 1 tablet in the morning and 2 tablets at bedtime. 90 tablet 1     estrogen, conjugated,-medroxyPROGESTERone (PREMPRO) 0.625-2.5 MG per tablet Take 1 tablet by mouth daily 90 tablet 3     lidocaine, viscous, (XYLOCAINE) 2 % solution SWISH AND SPIT 5ML BY MOUTH EVERY 3 HOURS AS NEEDED 100 mL 0     lisinopril (PRINIVIL/ZESTRIL) 10 MG tablet Take 1 tablet (10 mg) by mouth daily 90 tablet 1     lithium (ESKALITH) 150 MG capsule Take 1 capsule (150 mg) by mouth At Bedtime Along with two 300mg tabs for total of 750mg at bedtime 30 capsule 1     lithium 300 MG capsule Take 2 capsules (600 mg) by mouth At Bedtime In add'n to 150mg tab for total of 750mg. 60 capsule 1     Loratadine 10 MG capsule Take 10 mg by mouth daily as needed.       metroNIDAZOLE (METROGEL) 1 % gel Apply topically daily 60 g 9     order for DME Please measure and distribute 1 pair of 20mmHg - 30mmHg thigh high open or closed toe compression stockings. Jobst  ultrasheer or equivalent. 2 each 3     order for DME Please measure and distribute 1 pair of 20mmHg - 30mmHg knee high open toe compression stockings. Jobst ultrasheer or equivalent. 1 each 1     ORDER FOR DME Use your CPAP device as directed by your provider.       VITAMIN D, CHOLECALCIFEROL, PO Take 2,000 Units by mouth daily       cyclobenzaprine (FLEXERIL) 10 MG tablet Take 1 tablet (10 mg) by mouth nightly as needed for other (headache) (Patient not taking: Reported on 2/1/2019) 30 tablet 0     VITALS                                                                        3, 3   /73   Wt 128.2 kg (282 lb 10.4 oz)   BMI 42.98 kg/m     MENTAL STATUS EXAM                                                                 9, 14 cog gs     Alertness: alert  and oriented  Appearance: well groomed  Behavior/Demeanor: cooperative, with good  eye contact   Speech: continues to be moderately pressured and rapid  Language: intact and no problems  Psychomotor: No muscle stiffness or rigidity, no cogwheeling  Mood: anxious  Affect: full range; was congruent to mood; was congruent to content  Thought Process/Associations: unremarkable  Thought Content:  Reports none;  Denies suicidal ideation, violent ideation, delusions, magical thinking and paranoid ideation  Perception:  Reports none;  Denies auditory hallucinations, depersonalization and derealization  Insight: good  Judgment: good  Cognition: (6) does not appear grossly intact; formal cognitive testing was not done  Gait and Station: No shuffling gait, no stiff posture, normal arm swing    LABS and DATA     AIMS:  0 (12/13/18)    PHQ9 = 10     PHQ-9 SCORE 1/4/2019 2/1/2019 3/1/2019   PHQ-9 Total Score 9 16 10     MoCA (12/12/18) - 25/30, missed points on fluency (only 6 words with F) and delayed recall (1/5 only but got the remainders with category cues)    PSYCHLABVALPROATE  Recent Labs   Lab Test 01/14/19  0913 12/20/18  1214 12/14/18  1235 11/20/18  1255   SOFIYA  107* 78 109* 99     ANTIPSYCHOTIC LABS  [glu, A1C, lipids (mehran LDL), liver enzymes, WBC, ANEU, Hgb, plts]  q12 mo  Recent Labs   Lab Test 12/20/18  1214 12/14/18  1235 12/06/18  1118 11/11/18  0901  09/24/15  0904  09/16/13  0739 08/22/12  0752 08/16/11  0757   GLC 71 71 86.4 75   < > 84   < > 75 87 84   A1C  --   --   --   --   --  5.2  --  5.1 5.2 5.3    < > = values in this interval not displayed.     Recent Labs   Lab Test 10/02/17  0842 10/03/16  0742 09/24/15  0904 09/15/14  0733   CHOL 156 137 132 108   TRIG 55 56 58 46   LDL 49 51 47 24   HDL 96 75 73 75     Recent Labs   Lab Test 12/14/18  1235 08/09/18  0937 08/16/11  0757   AST 17 20 24   ALT 23 23 13   ALKPHOS 50  --  66     Recent Labs   Lab Test 01/14/19  0913 01/02/19  0839 12/20/18  1214 12/14/18  1235 11/10/18  0756 08/09/18  0937 06/21/16  1122   WBC  --   --   --  4.4 4.8 4.2 5.2   ANEU  --   --   --  2.3  --   --   --    HGB  --   --   --  14.3 14.9 14.1 13.7    152 139* 147* 193 190 212     LITHIUM LABS     [level, renal, SG, TSH, WBC]    q6 mo  Recent Labs   Lab Test 01/14/19  0913 01/02/19  0839   LITHIUM 0.55* 0.37*     Recent Labs   Lab Test 12/20/18  1214 12/14/18  1235 12/06/18  1118 11/20/18  1255  11/11/18  0901   CR 0.47* 0.43* 0.4*  --   --  0.44*   GFRESTIMATED >90 >90 >90  --   --  >90   * 129* 132.3* 134   < > 123*   POTASSIUM 4.4 4.2 4.9*  --   --  4.4   SHERRIE 9.2 8.7 9.2  --   --  8.6    < > = values in this interval not displayed.     Recent Labs   Lab Test 11/01/12  0819   SG 1.015     Recent Labs   Lab Test 11/10/18  0756 08/31/18  2029 04/06/18  1222 09/20/13  1145   TSH 2.29 2.19 0.99 1.45     Recent Labs   Lab Test 12/14/18  1235 11/10/18  0756 08/09/18  0937 06/21/16  1122   WBC 4.4 4.8 4.2 5.2   ANEU 2.3  --   --   --          DIAGNOSIS     Bipolar Disorder, Type I  - Current mild depressive symptoms  - Last anthony Nov/Dec 2018  H/o EPSE secondary to neuroleptic use  H/o Thromboctyopenia secondary to  depakote  H/o lithium toxicity, resolved    ASSESSMENT                                                                 m2, h3       Angy Haji is a 52yo F w history of anxiety who was referred for assessment following inpatient psychiatric hospitalization for anthony I November 2018. When we met, Angy remained manic. Care was complicated by sensitivity to antipsychotic medications (EPSE incl difficulty swallowing), thrombocytopenia secondary to depakote, and later lithium toxicity in the setting of concurrent depakote use. Pt had previously had not significant depressive episodes and no h/o anthony. Please see my note from 12/5/19 for add'l history.    TODAY    Pt presents with worsening mood instability, particularly feeling more easily overwhelmed as she had in month previous. We attributed this to the number of psychosocial stressors. No further evidence of anthony at this time. Discussed eventual goals of discontinuing depakote and getting to lithium monotherapy, but given number of changes, we elected to maintain the current doses. Discussed increasing Li to 900mg in 2 weeks, pending her mood. She will call clinic to relay her decision at that time. Would need Li labs (ordered) if increases.    Following Li inc, would then look to decrease Depakote further, probably by 125mg q-2 weeks as before.    [place] PRESCRIPTION MONITORING PROGRAM [] was checked today:  not using controlled substances.    PSYCHOTROPIC DRUG INTERACTIONS:    no.  MANAGEMENT:  N/A     PLAN                                                                                         m2, h3     1) PSYCHOTROPIC MEDICATIONS:  -Continue Depakote 500mg QAM and 1000mg QHS  -Continue lithium 750mg at bedtime    2) THERAPY:    Patient follows with individual psychotherapist in private practice, q-2 weeks    3) NEXT DUE:    Labs- Repeat labs at next, but send for labs 5-7 days if and after Li increase  EKG- N/A  Rating Scales- Consider MoCA again late Fall  2019    4) REFERRALS:    None    5) RTC: 4 weeks    6) CRISIS NUMBERS:   Provided routinely in AVS.    ONLY if a QAMAR PT: [facility] [phone] (clinic), [phone] (after hours)     TREATMENT RISK STATEMENT:  The risks, benefits, alternatives and potential adverse effects have been discussed and are understood by the pt. The pt understands the risks of using street drugs or alcohol. There are no medical contraindications, the pt agrees to treatment with the ability to do so. The pt knows to call the clinic for any problems or to access emergency care if needed.  Medical and substance use concerns are documented above.  Psychotropic drug interaction check was done, including changes made today.     PROVIDER:  Galo Sandoval MD    Patient was staffed today by Dr. Tapia who will review/sign this note.  I saw the patient with the resident, and participated in key portions of the service, including the mental status examination and developing the plan of care. I reviewed key portions of the history with the resident. I agree with the findings and plan as documented in this note.    Dacia Tapia

## 2019-03-29 NOTE — PATIENT INSTRUCTIONS
- Please call in 2 weeks to let us know if you go want to go up on the Lithium.  - Keep the Depakote the same.  - Consider revisiting your ELIANNIKHIL Inga mindfulness recordings.  - You'll be due for labs 5-7 days after you increase the lithium (if you do indeed increase lithium).  - Follow-up with me in 2 weeks.      Thank you for coming to the PSYCHIATRY CLINIC.    Lab Testing:  If you had lab testing today and your results are reassuring or normal they will be mailed to you or sent through [employer] within 7 days.   If the lab tests need quick action we will call you with the results.  The phone number we will call with results is # [phone] (home) [phone] (work). If this is not the best number please call our clinic and change the number.    Medication Refills:  If you need any refills please call your pharmacy and they will contact us. Our fax number for refills is [phone]. Please allow three business for refill processing.   If you need to  your refill at a new pharmacy, please contact the new pharmacy directly. The new pharmacy will help you get your medications transferred.     Scheduling:  If you have any concerns about today's visit or wish to schedule another appointment please call our office during normal business hours [phone] (8-5:00 M-F)    Contact Us:  Please call [phone] during business hours (8-5:00 M-F).  If after clinic hours, or on the weekend, please call  [phone].    Financial Assistance [phone]  [facility] [employer] Billing [phone]  [facility] Billing [phone]  Medical Records [phone]      MENTAL HEALTH CRISIS NUMBERS:  [facility]:   [facility] - [phone]   Crisis Residence [facility] [place] - [facility] - [phone]   Walk-In Counseling [facility] [phone]   COPE 24/7 [place] Mobile Team for Adults - [[phone]]; Child - [[phone]]        [facility]:   [facility] - [phone]   Walk-in counseling   [facility] - [phone]   Walk-in counseling [facility] - [phone]   Crisis Residence [facility] - [phone]   Urgent Care Adult Mental Health:   --Drop-in, 24/7 crisis line, and [facility] Mobile Team [[phone]]    CRISIS TEXT LINE: Text [phone] from anywhere, anytime, any crisis 24/7;    OR SEE www.crisistextline.org     Poison Control Center - [phone]    CHILD: [facility] needs assessment team - [phone]     [facility] Lifeline - [phone]; or [facility] - [phone]    If you have a medical emergency please call 911or go to the nearest ER.                    _____________________________________________    Again thank you for choosing PSYCHIATRY CLINIC and please let us know how we can best partner with you to improve you and your family's health.  You may be receiving a survey in the mail regarding this appointment. We would love to have your feedback, both positive and negative, so please fill out the survey and return it using the provided envelope. The survey is done by an external company, so your answers are anonymous.

## 2019-04-10 ENCOUNTER — MYC MEDICAL ADVICE (OUTPATIENT)
Dept: FAMILY MEDICINE | Facility: CLINIC | Age: 54
End: 2019-04-10

## 2019-04-12 ENCOUNTER — TELEPHONE (OUTPATIENT)
Dept: PSYCHIATRY | Facility: CLINIC | Age: 54
End: 2019-04-12

## 2019-04-12 NOTE — TELEPHONE ENCOUNTER
Writer left voice mail message for pt, returning her call.  Writer prompted pt to request to speak to another nurse if writer was not available.

## 2019-04-12 NOTE — TELEPHONE ENCOUNTER
----- Message from Jorge A Meneses RN sent at 4/12/2019 12:00 PM CDT -----  Contact: [phone]      ----- Message -----  From: Kia Chery  Sent: 4/12/2019  11:33 AM  To: UBALDO Davies, would like a call back regarding the increase in her Lithium..    Thanks!

## 2019-04-17 NOTE — TELEPHONE ENCOUNTER
Received return phone call from the pt. She had questions regarding the lithium and lab draws. Provided education as to when lab draws should be completed and possible SEs to watch for while increasing the lithium dose. Pt voiced understanding and will complete labs prior to her upcoming appt with the provider on Friday, 5/3.

## 2019-04-18 DIAGNOSIS — N95.1 PERIMENOPAUSAL VASOMOTOR SYMPTOMS: ICD-10-CM

## 2019-04-26 ENCOUNTER — HOSPITAL ENCOUNTER (OUTPATIENT)
Dept: NUTRITION | Facility: CLINIC | Age: 54
Discharge: HOME | End: 2019-04-26
Attending: FAMILY MEDICINE | Admitting: FAMILY MEDICINE
Payer: COMMERCIAL

## 2019-04-26 PROCEDURE — 97803 MED NUTRITION INDIV SUBSEQ: CPT | Performed by: DIETITIAN, REGISTERED

## 2019-04-26 NOTE — PROGRESS NOTES
"NUTRITION REASSESSMENT NOTE      REASON FOR ASSESSMENT   Angy QUINTANA Eduardodeann referred by Dr. Odell for MNT related to  Obesity (BMI 30-39.9) [E66.9]  - Primary       Benign essential hypertension [I10]       Patient accompanied by self      NUTRITION HISTORY  Patient with multiple changes in the past few months as patient newly diagnosed with bipolar.  Patient states medications are being adjusted to find correct dose.  Patient has returned to work in the past 6 weeks and adjusting to new schedule.  Patient continues to struggle with evening eating as feels want to self soothe with food.  Patient concerned with weight gain due to new medications.       DIET RECALL   Breakfast: yogurt, granola (measures), fruit + coffee; refried beans + cheese in tortilla (small)   Lunch: leftovers    Dinner: crock pot soup; vegetarian options as daughter has become vegetarian in the past few weeks    Snacks: Kettle corn; cheese and crackers; lemon cake dessert; premium ice cream or regular ice cream; cheese cake   Beverages: mainly water, coffee, mineral water  Dining out: varies   Exercise: Patient exercising 1-3 times per week (walking, walking dog, swimming or elliptical).         MEDICATIONS:  Reviewed     ANTHROPOMETRICS  Height: 5'8\" (note height change in chart previously 5'9\")  Weight: 275 lbs   BMI (kg/m2): 41.9 kg/m2   %IBW: 196%  Weight History:   Wt Readings from Last 5 Encounters:   02/04/19 124.7 kg (275 lb)   12/13/18 120.3 kg (265 lb 4.8 oz)   12/06/18 120.5 kg (265 lb 9.6 oz)   11/20/18 119.4 kg (263 lb 3.2 oz)   11/13/18 119.6 kg (263 lb 11.2 oz)     ASSESSED NUTRITION NEEDS  Estimated Energy Needs: 7066-0989 kcals (15-20 kcals/kg) for gradual weight loss   Estimated Protein Needs: 66-79 (1-1.2 gm/kg IBW) for repletion with exercise  RMR: 1440     EVALUATION/PROGRESS TOWARDS GOALS   Previous goals:  Limit snacking to 3-4 times per week-not met  Continue Sunday meal prep-improving   Continue exercising 3 times per week " for 30 minutes (choose non work days)-not met      Previous Nutrition Diagnosis: Obesity related to excess energy intake as evidenced by BMI of 42.9 kg/m2-no change     Current Nutrition Diagnosis:Obesity related to excess energy intake as evidenced by BMI of 41.9 kg/m2     INTERVENTIONS   Nutrition Prescription   Recommend exercise 3-5 times per week; track intake if eating mindlessly; increase fiber to 25-30 grams per day      Implementation:   Meals and Snacks: Continue eating 3 meals + deliberate snacking (if hungry)   Nutrition Education (Content):               a)  Discussed progress towards goals.  Congratulated patient on awareness of emotional eating in the evening.  Reviewed struggles with evening eating as patient was not able to reduce dessert intake.  Supported patient in her struggle with food and mental health issues.     Nutrition Education (Application):               a) Determined non-food alternative options for emotional eating.                         b) Patient verbalized understanding of diet by stating will limit  desserts to 3 times per week.                       Anticipate fair-good compliance     CURRENT GOALS   Limit desserts to 3 per week with practice of mindful eating   Exercise 3 times per week for 30 minutes     FOLLOW UP/MONITORING    Patient to follow up in 7 weeks  Patient has RD contact information      Time spent with patient: 30 minutes     Mattie Amaya, RD, LD  [facility] Outpatient Dietitian  [phone] (office phone)

## 2019-05-01 DIAGNOSIS — F31.9 BIPOLAR I DISORDER (H): ICD-10-CM

## 2019-05-01 LAB
ALBUMIN SERPL-MCNC: 3.2 G/DL (ref 3.4–5)
ALP SERPL-CCNC: 46 U/L (ref 40–150)
ALT SERPL W P-5'-P-CCNC: 13 U/L (ref 0–50)
ANION GAP SERPL CALCULATED.3IONS-SCNC: 8 MMOL/L (ref 3–14)
AST SERPL W P-5'-P-CCNC: 19 U/L (ref 0–45)
BASOPHILS # BLD AUTO: 0 10E9/L (ref 0–0.2)
BASOPHILS NFR BLD AUTO: 0.4 %
BILIRUB SERPL-MCNC: 0.3 MG/DL (ref 0.2–1.3)
BUN SERPL-MCNC: 15 MG/DL (ref 7–30)
CALCIUM SERPL-MCNC: 9 MG/DL (ref 8.5–10.1)
CHLORIDE SERPL-SCNC: 103 MMOL/L (ref 94–109)
CO2 SERPL-SCNC: 24 MMOL/L (ref 20–32)
CREAT SERPL-MCNC: 0.54 MG/DL (ref 0.52–1.04)
DIFFERENTIAL METHOD BLD: NORMAL
EOSINOPHIL # BLD AUTO: 0.1 10E9/L (ref 0–0.7)
EOSINOPHIL NFR BLD AUTO: 2.5 %
ERYTHROCYTE [DISTWIDTH] IN BLOOD BY AUTOMATED COUNT: 11.8 % (ref 10–15)
GFR SERPL CREATININE-BSD FRML MDRD: >90 ML/MIN/{1.73_M2}
GLUCOSE SERPL-MCNC: 97 MG/DL (ref 70–99)
HCT VFR BLD AUTO: 43.7 % (ref 35–47)
HGB BLD-MCNC: 14.4 G/DL (ref 11.7–15.7)
IMM GRANULOCYTES # BLD: 0 10E9/L (ref 0–0.4)
IMM GRANULOCYTES NFR BLD: 0.5 %
LITHIUM SERPL-SCNC: 0.61 MMOL/L (ref 0.6–1.2)
LYMPHOCYTES # BLD AUTO: 1.4 10E9/L (ref 0.8–5.3)
LYMPHOCYTES NFR BLD AUTO: 25.3 %
MCH RBC QN AUTO: 30.8 PG (ref 26.5–33)
MCHC RBC AUTO-ENTMCNC: 33 G/DL (ref 31.5–36.5)
MCV RBC AUTO: 93 FL (ref 78–100)
MONOCYTES # BLD AUTO: 0.3 10E9/L (ref 0–1.3)
MONOCYTES NFR BLD AUTO: 5.8 %
NEUTROPHILS # BLD AUTO: 3.7 10E9/L (ref 1.6–8.3)
NEUTROPHILS NFR BLD AUTO: 65.5 %
NRBC # BLD AUTO: 0 10*3/UL
NRBC BLD AUTO-RTO: 0 /100
PLATELET # BLD AUTO: 174 10E9/L (ref 150–450)
POTASSIUM SERPL-SCNC: 4.6 MMOL/L (ref 3.4–5.3)
PROT SERPL-MCNC: 7.3 G/DL (ref 6.8–8.8)
RBC # BLD AUTO: 4.68 10E12/L (ref 3.8–5.2)
SODIUM SERPL-SCNC: 135 MMOL/L (ref 133–144)
SP GR UR STRIP: 1.01 (ref 1–1.03)
TSH SERPL DL<=0.005 MIU/L-ACNC: 2.69 MU/L (ref 0.4–4)
VALPROATE SERPL-MCNC: 77 MG/L (ref 50–100)
WBC # BLD AUTO: 5.7 10E9/L (ref 4–11)

## 2019-05-01 PROCEDURE — 81003 URINALYSIS AUTO W/O SCOPE: CPT | Performed by: STUDENT IN AN ORGANIZED HEALTH CARE EDUCATION/TRAINING PROGRAM

## 2019-05-01 PROCEDURE — 80178 ASSAY OF LITHIUM: CPT | Performed by: STUDENT IN AN ORGANIZED HEALTH CARE EDUCATION/TRAINING PROGRAM

## 2019-05-01 PROCEDURE — 80053 COMPREHEN METABOLIC PANEL: CPT | Performed by: STUDENT IN AN ORGANIZED HEALTH CARE EDUCATION/TRAINING PROGRAM

## 2019-05-01 PROCEDURE — 80164 ASSAY DIPROPYLACETIC ACD TOT: CPT | Performed by: STUDENT IN AN ORGANIZED HEALTH CARE EDUCATION/TRAINING PROGRAM

## 2019-05-01 PROCEDURE — 84443 ASSAY THYROID STIM HORMONE: CPT | Performed by: STUDENT IN AN ORGANIZED HEALTH CARE EDUCATION/TRAINING PROGRAM

## 2019-05-01 PROCEDURE — 36415 COLL VENOUS BLD VENIPUNCTURE: CPT | Performed by: STUDENT IN AN ORGANIZED HEALTH CARE EDUCATION/TRAINING PROGRAM

## 2019-05-01 PROCEDURE — 85025 COMPLETE CBC W/AUTO DIFF WBC: CPT | Performed by: STUDENT IN AN ORGANIZED HEALTH CARE EDUCATION/TRAINING PROGRAM

## 2019-05-03 ENCOUNTER — OFFICE VISIT (OUTPATIENT)
Dept: PSYCHIATRY | Facility: CLINIC | Age: 54
End: 2019-05-03
Attending: PSYCHIATRY & NEUROLOGY
Payer: COMMERCIAL

## 2019-05-03 ENCOUNTER — OFFICE VISIT (OUTPATIENT)
Dept: OBGYN | Facility: CLINIC | Age: 54
End: 2019-05-03
Payer: COMMERCIAL

## 2019-05-03 VITALS
HEART RATE: 66 BPM | WEIGHT: 286.2 LBS | SYSTOLIC BLOOD PRESSURE: 124 MMHG | DIASTOLIC BLOOD PRESSURE: 78 MMHG | BODY MASS INDEX: 43.52 KG/M2

## 2019-05-03 VITALS
TEMPERATURE: 97.4 F | DIASTOLIC BLOOD PRESSURE: 80 MMHG | SYSTOLIC BLOOD PRESSURE: 130 MMHG | WEIGHT: 285 LBS | BODY MASS INDEX: 43.33 KG/M2

## 2019-05-03 DIAGNOSIS — F31.9 BIPOLAR I DISORDER (H): ICD-10-CM

## 2019-05-03 DIAGNOSIS — N95.1 PERIMENOPAUSAL VASOMOTOR SYMPTOMS: ICD-10-CM

## 2019-05-03 PROCEDURE — G0463 HOSPITAL OUTPT CLINIC VISIT: HCPCS | Mod: ZF

## 2019-05-03 PROCEDURE — 99213 OFFICE O/P EST LOW 20 MIN: CPT | Performed by: OBSTETRICS & GYNECOLOGY

## 2019-05-03 RX ORDER — DIVALPROEX SODIUM 500 MG/1
1000 TABLET, DELAYED RELEASE ORAL AT BEDTIME
Qty: 60 TABLET | Refills: 2 | Status: SHIPPED | OUTPATIENT
Start: 2019-05-03 | End: 2019-06-14

## 2019-05-03 RX ORDER — LITHIUM CARBONATE 300 MG/1
900 CAPSULE ORAL AT BEDTIME
Qty: 60 CAPSULE | Refills: 2 | Status: SHIPPED | OUTPATIENT
Start: 2019-05-03 | End: 2019-06-14

## 2019-05-03 RX ORDER — DIVALPROEX SODIUM 250 MG/1
250 TABLET, DELAYED RELEASE ORAL EVERY MORNING
Qty: 30 TABLET | Refills: 0 | Status: SHIPPED | OUTPATIENT
Start: 2019-05-03 | End: 2019-05-28

## 2019-05-03 ASSESSMENT — ANXIETY QUESTIONNAIRES
1. FEELING NERVOUS, ANXIOUS, OR ON EDGE: SEVERAL DAYS
3. WORRYING TOO MUCH ABOUT DIFFERENT THINGS: SEVERAL DAYS
6. BECOMING EASILY ANNOYED OR IRRITABLE: NOT AT ALL
2. NOT BEING ABLE TO STOP OR CONTROL WORRYING: SEVERAL DAYS
5. BEING SO RESTLESS THAT IT IS HARD TO SIT STILL: NOT AT ALL
7. FEELING AFRAID AS IF SOMETHING AWFUL MIGHT HAPPEN: NOT AT ALL
GAD7 TOTAL SCORE: 4

## 2019-05-03 ASSESSMENT — PAIN SCALES - GENERAL: PAINLEVEL: NO PAIN (0)

## 2019-05-03 ASSESSMENT — PATIENT HEALTH QUESTIONNAIRE - PHQ9
5. POOR APPETITE OR OVEREATING: SEVERAL DAYS
SUM OF ALL RESPONSES TO PHQ QUESTIONS 1-9: 7

## 2019-05-03 NOTE — PROGRESS NOTES
[facility] PSYCHIATRY CLINIC PROGRESS NOTE     CARE TEAM:  PCP- Jono Odell    Specialty Providers- OB-GYN, ENT    Therapist- no     Community  Team- no    Angy LILIAN Haji is a 53 year old female who prefers the name Angy & pronouns she, her.     Date of initial diagnostic assessment is 12/5/18 .  Date of most recent transfer of care assessment is N/A.    Pertinent Background: Pt has not had significant mental health history until 2018 when developed perimenopausal mood/anxiety symptoms.      Psych critical item history includes manic episode and psychiatric hospitalization (Nov 2018)..    INTERIM HISTORY                                                          4, 4   The patient reports good treatment adherence.  History was provided by the patient and her wife Rachael who were good historians. The last visit ended with the following med change: lowered depakote.   Since the last visit:     - Back to work FT now. Mostly okay. Stress related to completing notes.  - Continues on combined HRT per OB/GYN. Hot flashes and sleep are under control.   - Mood a little low. Not enjoying a lot right now. Has found enjoyment in being outside.  - Has noticed fine tremor that started before inc of Li  - Gradual weight gain and alopecia reported.  - Sleep good. No manic symptoms.    RECENT SYMPTOMS:   DEPRESSION:  reports-depressed mood, low energy and poor concentration /memory; feeling easily overwhelmed DENIES- insomnia, excessive guilt and indecisiveness  VERITO/HYPOMANIA:  reports-distractibility ;  DENIES- increased energy, decreased sleep need and increased activity  PSYCHOSIS:  reports-none;  DENIES- delusions, auditory hallucinations and disorganized behavior    RECENT SUBSTANCE USE:     ALCOHOL- no      TOBACCO- no     CAFFEINE- quit  OPIOIDS- no         NARCAN KIT- N/A        CANNABIS- no            OTHER ILLICIT DRUGS- none      CURRENT SOCIAL HISTORY:  FINANCIAL SUPPORT- currently on leave from work; wife is employed        CHILDREN- 12 yr dtr      LIVING SITUATION- with wife and dtr in apt in [place]      SOCIAL/ SPIRITUAL SUPPORT- wife and daughter    FEELS SAFE AT HOME- yes     MEDICAL ROS (2,10):  Reports A comprehensive review of systems was performed and is negative other than noted in the HPI.      Denies A comprehensive review of systems was performed and is negative other than noted in the HPI.    PSYCH and CD Critical Summary Points since July 2018 Nov 2018 - hospitalized for acute manic episode.  Started on combination of Depakote and olanzapine.  Developed severe extrapyramidal symptoms (shuffling gait, flat facies, muscle rigidity, tongue fullness) and so switched from olanzapine to Seroquel.  Could not tolerate quetiapine for similar reasons and so discontinue that as well.  Nov 2018 - outpatient psychiatric intake.  Started on low-dose risperidone but could not tolerate again.  Depakote ongoing.  Referred to neurology for consideration of major neurocognitive disorders.  December 2018 - added low dose Li for anthony  January 2019 - presumed lithium toxicity following increase of dose and so Li lowered from 900mg to 750mg  January 2019 -- decreased depakote by 125mg d/t cognitive impairment  March 2019 -- again decreased depakote to 500mg in the morning, 1000mg at bedtime  May 2019 -- Dec Depakote to 250mg QAM, 1000mg QHS    PAST PSYCH MED TRIALS   see EMR Problem List: Hx of psychiatric care    MEDICAL / SURGICAL HISTORY                                   Pregnant or breastfeeding- no      Contraception- HRT for menopause    Neurologic Hx- no  Patient Active Problem List   Diagnosis     Oral aphthae     Obesity (BMI 30-39.9)     Disorder of bursae and tendons in shoulder region     CARDIOVASCULAR SCREENING; LDL GOAL LESS THAN 160     Rosacea     Tear of medial cartilage or meniscus of knee, current     Raynaud disease     Vitamin D deficiency disease     Mild sleep apnea     Atopic rhinitis     Morbid obesity  (H)     Muscle tension dysphonia     Palpitations     Dysphagia, unspecified type     Anxiety     Manic behavior (H)       Major Surgery- unknown    ALLERGY               Seroquel [quetiapine]; Olanzapine; Penicillins; Risperidone; and Thimerosal  MEDICATIONS        Current Outpatient Medications   Medication Sig Dispense Refill     Carboxymethylcellulose Sod PF (REFRESH PLUS) 0.5 % SOLN ophthalmic solution Place 1 drop into both eyes 3 times daily as needed for dry eyes       divalproex sodium delayed-release (DEPAKOTE) 125 MG DR tablet Take 1 tablet (125 mg) by mouth every morning 14 tablet 0     divalproex sodium delayed-release (DEPAKOTE) 500 MG DR tablet Take 1 tablet in the morning and 2 tablets at bedtime. 90 tablet 1     estrogen conj-medroxyPROGESTERone (PREMPRO) 0.625-2.5 MG tablet Take 1 tablet by mouth daily 90 tablet 3     lidocaine, viscous, (XYLOCAINE) 2 % solution SWISH AND SPIT 5ML BY MOUTH EVERY 3 HOURS AS NEEDED 100 mL 0     lisinopril (PRINIVIL/ZESTRIL) 10 MG tablet Take 1 tablet (10 mg) by mouth daily 90 tablet 1     lithium (ESKALITH) 150 MG capsule Take 1 capsule (150 mg) by mouth At Bedtime Along with two 300mg tabs for total of 750mg at bedtime 30 capsule 1     lithium 300 MG capsule Take 2 capsules (600 mg) by mouth At Bedtime In add'n to 150mg tab for total of 750mg. 60 capsule 1     Loratadine 10 MG capsule Take 10 mg by mouth daily as needed.       metroNIDAZOLE (METROGEL) 1 % gel Apply topically daily 60 g 9     order for DME Please measure and distribute 1 pair of 20mmHg - 30mmHg thigh high open or closed toe compression stockings. Jobst ultrasheer or equivalent. 2 each 3     order for DME Please measure and distribute 1 pair of 20mmHg - 30mmHg knee high open toe compression stockings. Jobst ultrasheer or equivalent. 1 each 1     ORDER FOR DME Use your CPAP device as directed by your provider.       VITAMIN D, CHOLECALCIFEROL, PO Take 2,000 Units by mouth daily       cyclobenzaprine  (FLEXERIL) 10 MG tablet Take 1 tablet (10 mg) by mouth nightly as needed for other (headache) (Patient not taking: Reported on 2/1/2019) 30 tablet 0     VITALS                                                                        3, 3   /78   Pulse 66   Wt 129.8 kg (286 lb 3.2 oz)   BMI 43.52 kg/m     MENTAL STATUS EXAM                                                                 9, 14 cog gs     Alertness: alert  and oriented  Appearance: well groomed  Behavior/Demeanor: cooperative, with good  eye contact   Speech: normal and regular rate and rhythm  Language: intact and no problems  Psychomotor: No muscle stiffness or rigidity, no cogwheeling  Mood: anxious  Affect: full range, very briefly tearful; was congruent to mood; was congruent to content  Thought Process/Associations: unremarkable  Thought Content:  Reports none;  Denies suicidal ideation, violent ideation, delusions, magical thinking and paranoid ideation  Perception:  Reports none;  Denies auditory hallucinations, depersonalization and derealization  Insight: good  Judgment: good  Cognition: (6) does not appear grossly intact; formal cognitive testing was not done  Gait and Station: No shuffling gait, no stiff posture, normal arm swing    LABS and DATA     AIMS:  0 (12/13/18)    PHQ9 = 10     PHQ-9 SCORE 2/1/2019 3/1/2019 5/3/2019   PHQ-9 Total Score 16 10 7     MoCA (12/12/18) - 25/30, missed points on fluency (only 6 words with F) and delayed recall (1/5 only but got the remainders with category cues)    PSYCHLABVALPROATE  Recent Labs   Lab Test 05/01/19  0921 01/14/19  0913 12/20/18  1214 12/14/18  1235   SOFIYA 77 107* 78 109*     ANTIPSYCHOTIC LABS  [glu, A1C, lipids (mehran LDL), liver enzymes, WBC, ANEU, Hgb, plts]  q12 mo  Recent Labs   Lab Test 05/01/19  0921 12/20/18  1214 12/14/18  1235 12/06/18  1118  09/24/15  0904  09/16/13  0739 08/22/12  0752 08/16/11  0757   GLC 97 71 71 86.4   < > 84   < > 75 87 84   A1C  --   --   --   --   --   5.2  --  5.1 5.2 5.3    < > = values in this interval not displayed.     Recent Labs   Lab Test 10/02/17  0842 10/03/16  0742 09/24/15  0904 09/15/14  0733   CHOL 156 137 132 108   TRIG 55 56 58 46   LDL 49 51 47 24   HDL 96 75 73 75     Recent Labs   Lab Test 05/01/19  0921 12/14/18  1235 08/09/18  0937 08/16/11  0757   AST 19 17 20 24   ALT 13 23 23 13   ALKPHOS 46 50  --  66     Recent Labs   Lab Test 05/01/19  0921 01/14/19  0913 01/02/19  0839 12/20/18  1214 12/14/18  1235 11/10/18  0756 08/09/18  0937   WBC 5.7  --   --   --  4.4 4.8 4.2   ANEU 3.7  --   --   --  2.3  --   --    HGB 14.4  --   --   --  14.3 14.9 14.1    170 152 139* 147* 193 190     LITHIUM LABS     [level, renal, SG, TSH, WBC]    q6 mo  Recent Labs   Lab Test 05/01/19  0921 01/14/19  0913 01/02/19  0839   LITHIUM 0.61 0.55* 0.37*     Recent Labs   Lab Test 05/01/19  0921 12/20/18  1214 12/14/18  1235 12/06/18  1118   CR 0.54 0.47* 0.43* 0.4*   GFRESTIMATED >90 >90 >90 >90    132* 129* 132.3*   POTASSIUM 4.6 4.4 4.2 4.9*   SHERRIE 9.0 9.2 8.7 9.2     Recent Labs   Lab Test 05/01/19  1140 11/01/12  0819   SG 1.008 1.015     Recent Labs   Lab Test 05/01/19  0921 11/10/18  0756 08/31/18  2029 04/06/18  1222   TSH 2.69 2.29 2.19 0.99     Recent Labs   Lab Test 05/01/19  0921 12/14/18  1235 11/10/18  0756 08/09/18  0937   WBC 5.7 4.4 4.8 4.2   ANEU 3.7 2.3  --   --          DIAGNOSIS     Bipolar Disorder, Type I  - Current mild depressive symptoms  - Last anthony Nov/Dec 2018  H/o EPSE secondary to neuroleptic use  H/o Thromboctyopenia secondary to depakote  H/o lithium toxicity, resolved    ASSESSMENT                                                                 m2, h3     Angy Haji is a 52yo F w history of anxiety who was referred for assessment following inpatient psychiatric hospitalization for anthony I November 2018. When we met, Angy remained manic. Care was complicated by sensitivity to antipsychotic medications (EPSE incl  difficulty swallowing), thrombocytopenia secondary to depakote, and later lithium toxicity in the setting of concurrent depakote use. Pt had previously had not significant depressive episodes and no h/o anthony. Please see my note from 12/5/19 for add'l history.    TODAY    Pt returns for follow-up reporting mild depressive symptoms. Otherwise coping well. Back to work FT and continue to look for new house, which has been stressful. Much less tearful and emotional incontinence compared to last appts, more regulated. Reporting some weight gain and alopecia, likely due to Depakote, and tremor due to Lithium (which is not impairing at this point).    Discussed modest reduction of Depakote today, keeping Li the same.    [place] PRESCRIPTION MONITORING PROGRAM [] was checked today:  not using controlled substances.    PSYCHOTROPIC DRUG INTERACTIONS:    no.  MANAGEMENT:  N/A     PLAN                                                                                         m2, h3     1) PSYCHOTROPIC MEDICATIONS:  -Decrease Depakote to 250mg QAM and 1000mg QHS  -Continue lithium 900mg at bedtime    2) THERAPY:    Patient follows with individual psychotherapist in private practice, q-2 weeks    3) NEXT DUE:    Labs-Reviewed, repeat Li labs in August 2019  EKG- N/A  Rating Scales- Consider MoCA again late Fall 2019    4) REFERRALS:    None    5) RTC: 4 weeks, the transition to new resident psychiatrist    6) CRISIS NUMBERS:   Provided routinely in AVS.    ONLY if a QAMAR PT: [facility] [phone] (clinic), [phone] (after hours)     TREATMENT RISK STATEMENT:  The risks, benefits, alternatives and potential adverse effects have been discussed and are understood by the pt. The pt understands the risks of using street drugs or alcohol. There are no medical contraindications, the pt agrees to treatment with the ability to do so. The pt knows to call the clinic for any problems or to access emergency care if needed.   Medical and substance use concerns are documented above.  Psychotropic drug interaction check was done, including changes made today.     PROVIDER:  Galo Sandoval MD    Patient was not staffed today. Will route note to Dr. Tapia to review/sign.  I did not see this pt directly. I have reviewed the documentation, and I agree with the resident's plan of care.    Dacia Tapia

## 2019-05-03 NOTE — PATIENT INSTRUCTIONS
Thank you for coming to the PSYCHIATRY CLINIC.    Lab Testing:  If you had lab testing today and your results are reassuring or normal they will be mailed to you or sent through Editorially within 7 days.   If the lab tests need quick action we will call you with the results.  The phone number we will call with results is # [phone] (home) [phone] (work). If this is not the best number please call our clinic and change the number.    Medication Refills:  If you need any refills please call your pharmacy and they will contact us. Our fax number for refills is [phone]. Please allow three business for refill processing.   If you need to  your refill at a new pharmacy, please contact the new pharmacy directly. The new pharmacy will help you get your medications transferred.     Scheduling:  If you have any concerns about today's visit or wish to schedule another appointment please call our office during normal business hours [phone] (8-5:00 M-F)    Contact Us:  Please call [phone] during business hours (8-5:00 M-F).  If after clinic hours, or on the weekend, please call  [phone].    Financial Assistance [phone]  [employer] Billing [phone]  [facility] [phone]  Medical Records [phone]      MENTAL HEALTH CRISIS NUMBERS:  [facility]:   [facility] - [phone]   Crisis Residence [facility] Residence - [phone]   Walk-In Counseling [facility] [phone]   COPE 24/7 [place] Mobile Team for Adults - [[phone]]; Child - [[phone]]        [facility]:   [facility] - [phone]   Walk-in counseling [facility] - [phone]   Walk-in counseling [facility] - [phone]   Crisis Residence [facility] - [phone]   Urgent Care Adult Mental Health:   --Drop-in, 24/7 crisis line, and [place] Mobile Team [[phone]]    CRISIS TEXT LINE: Text [phone]  from anywhere, anytime, any crisis 24/7;    OR SEE www.crisistextline.org     Poison Control Center - [phone]    CHILD: [facility] needs assessment team - [phone]     [facility] - [phone]; or [facility] - [phone]    If you have a medical emergency please call 911or go to the nearest ER.                    _____________________________________________    Again thank you for choosing PSYCHIATRY CLINIC and please let us know how we can best partner with you to improve you and your family's health.  You may be receiving a survey in the mail regarding this appointment. We would love to have your feedback, both positive and negative, so please fill out the survey and return it using the provided envelope. The survey is done by an external company, so your answers are anonymous.

## 2019-05-04 ASSESSMENT — ANXIETY QUESTIONNAIRES: GAD7 TOTAL SCORE: 4

## 2019-05-06 ASSESSMENT — PATIENT HEALTH QUESTIONNAIRE - PHQ9: SUM OF ALL RESPONSES TO PHQ QUESTIONS 1-9: 7

## 2019-05-25 DIAGNOSIS — F31.9 BIPOLAR I DISORDER (H): ICD-10-CM

## 2019-05-28 RX ORDER — DIVALPROEX SODIUM 250 MG/1
250 TABLET, DELAYED RELEASE ORAL EVERY MORNING
Qty: 30 TABLET | Refills: 0 | Status: SHIPPED | OUTPATIENT
Start: 2019-05-28 | End: 2019-06-14

## 2019-05-28 NOTE — PROGRESS NOTES
Patient seen for one time evaluation and treatment.  Patient did not return for further treatment and current status is unknown.  Please see initial evaluation for further information.

## 2019-05-28 NOTE — TELEPHONE ENCOUNTER
Last Seen 5/3/19  RTC 4 weeks  Cancel None  No-Show None    Next Appt 6/14/19    Incoming Refill From [facility] via interface    Medication Requested divalproex sodium delayed-release (DEPAKOTE) 250 MG DR tablet    Directions Take 1 tablet (250 mg) by mouth every morning    Qty 30    Last Refill approximately 5/3/19      Medication Refill Approved Per Refill Protocol

## 2019-06-01 DIAGNOSIS — F31.9 BIPOLAR I DISORDER (H): ICD-10-CM

## 2019-06-01 RX ORDER — DIVALPROEX SODIUM 250 MG/1
250 TABLET, DELAYED RELEASE ORAL EVERY MORNING
Qty: 30 TABLET | Refills: 0 | Status: CANCELLED | OUTPATIENT
Start: 2019-06-01

## 2019-06-03 NOTE — TELEPHONE ENCOUNTER
Post reviewing the chart, it appears meds last refilled on 5/28/19. Writer placed a call to pharmacy and spoke with Rodríguez and confirmed last refill on 5/29/19. Writer was requested to disregard the refill request.     No further action needed by this writer

## 2019-06-04 ENCOUNTER — TELEPHONE (OUTPATIENT)
Dept: PSYCHIATRY | Facility: CLINIC | Age: 54
End: 2019-06-04

## 2019-06-04 NOTE — TELEPHONE ENCOUNTER
Medication Question (Lithium )     Galo Sandoval MD  You 4 minutes ago (2:24 PM)      That's okay. Have her take the remaining 300mg at bedtime tonight and then go back to 900mg at bedtime tomorrow night.    Routing comment      Writer placed a call to patient at [phone] to relay providers recommendations. No answer at number provided. LVM, requesting a call back. Clinic number provided.

## 2019-06-04 NOTE — TELEPHONE ENCOUNTER
"Writer received incoming call from patient [phone] reporting confusion with medication. Patient mentioned \" I am not sure how this happened.\" Reports taking Lithium 600 mg before realizing she takes that in PM. Patient mentioned taking Lithium 900 mg at 10 pm on 6/3/19. Denies experiencing dizziness, blurred vision, nausea, fatigue, chest pain or seizure. She shared a history of tremors but report they have not worsened. Educated patient to admit to the ED if symptoms arise or worsen. Patient would like further directions on taking the PM dose of lithium for tonight. Writer agreed to reach out to provider for further recommendations.   "

## 2019-06-04 NOTE — TELEPHONE ENCOUNTER
Writer received incoming call from patient [phone]. Relayed provides recommendation to take Lithium 300 mg at bedtime tonight and resume regular dose from tomorrow. Patient verbalized understanding.     No further action needed by this writer

## 2019-06-14 ENCOUNTER — OFFICE VISIT (OUTPATIENT)
Dept: PSYCHIATRY | Facility: CLINIC | Age: 54
End: 2019-06-14
Attending: STUDENT IN AN ORGANIZED HEALTH CARE EDUCATION/TRAINING PROGRAM
Payer: COMMERCIAL

## 2019-06-14 VITALS
WEIGHT: 291 LBS | DIASTOLIC BLOOD PRESSURE: 83 MMHG | BODY MASS INDEX: 44.25 KG/M2 | HEART RATE: 73 BPM | SYSTOLIC BLOOD PRESSURE: 138 MMHG

## 2019-06-14 DIAGNOSIS — R63.5 WEIGHT GAIN: Primary | ICD-10-CM

## 2019-06-14 DIAGNOSIS — F31.9 BIPOLAR I DISORDER (H): ICD-10-CM

## 2019-06-14 PROCEDURE — G0463 HOSPITAL OUTPT CLINIC VISIT: HCPCS | Mod: ZF

## 2019-06-14 RX ORDER — DIVALPROEX SODIUM 250 MG/1
250 TABLET, DELAYED RELEASE ORAL EVERY MORNING
Qty: 30 TABLET | Refills: 0 | Status: CANCELLED | OUTPATIENT
Start: 2019-06-14

## 2019-06-14 RX ORDER — DIVALPROEX SODIUM 500 MG/1
1000 TABLET, DELAYED RELEASE ORAL AT BEDTIME
Qty: 60 TABLET | Refills: 2 | Status: SHIPPED | OUTPATIENT
Start: 2019-06-14 | End: 2019-07-17

## 2019-06-14 RX ORDER — LITHIUM CARBONATE 300 MG/1
900 CAPSULE ORAL AT BEDTIME
Qty: 60 CAPSULE | Refills: 2 | Status: SHIPPED | OUTPATIENT
Start: 2019-06-14 | End: 2019-07-17

## 2019-06-14 ASSESSMENT — PAIN SCALES - GENERAL: PAINLEVEL: NO PAIN (0)

## 2019-06-14 NOTE — PROGRESS NOTES
"  [facility] PSYCHIATRY CLINIC PROGRESS NOTE     CARE TEAM:  PCP- Jono Odell    Specialty Providers- OB-GYN, ENT    Therapist- no     Community  Team- no    Angy Haji is a 54 year old female who prefers the name Angy & pronouns she, her.     Date of initial diagnostic assessment is 12/5/18 .  Date of most recent transfer of care assessment is N/A.    Pertinent Background: Pt has not had significant mental health history until 2018 when developed perimenopausal mood/anxiety symptoms.      Psych critical item history includes manic episode and psychiatric hospitalization (Nov 2018)..    INTERIM HISTORY                                                          4, 4   The patient reports good treatment adherence.  History was provided by the patient and her wife Rachael who were good historians. The last visit ended with the following med change: lowered depakote.   Since the last visit:     - Feeling better  - Work going well. Documentation getting in, \"Trying to be B student.\"  - Sleeping 7.5 hours/night, wakes 1x to urinate  - Periodic tremor, Tolerating it and not impairing  - More able to take lead on housing search. Generally feeling more in control and less overwhelmed by life stressors  - Feeling more able to manage her life  - Still gaining some weight, stressed about this.    RECENT SYMPTOMS:   DEPRESSION:  reports-depressed mood, low energy and poor concentration /memory DENIES- insomnia, excessive guilt and indecisiveness  VERITO/HYPOMANIA:  reports-distractibility ;  DENIES- increased energy, decreased sleep need and increased activity  PSYCHOSIS:  reports-none;  DENIES- delusions, auditory hallucinations and disorganized behavior    RECENT SUBSTANCE USE:     ALCOHOL- no      TOBACCO- no     CAFFEINE- quit  OPIOIDS- no         NARCAN KIT- N/A        CANNABIS- no            OTHER ILLICIT DRUGS- none      CURRENT SOCIAL HISTORY:  FINANCIAL SUPPORT- currently on leave from work; wife is employed     "   CHILDREN- 12 yr dtr      LIVING SITUATION- with wife and dtr in apt in [place]      SOCIAL/ SPIRITUAL SUPPORT- wife and daughter    FEELS SAFE AT HOME- yes     MEDICAL ROS (2,10):  Reports A comprehensive review of systems was performed and is negative other than noted in the HPI.      Denies A comprehensive review of systems was performed and is negative other than noted in the HPI.    PSYCH and CD Critical Summary Points since July 2018 Nov 2018 - hospitalized for acute manic episode.  Started on combination of Depakote and olanzapine.  Developed severe extrapyramidal symptoms (shuffling gait, flat facies, muscle rigidity, tongue fullness) and so switched from olanzapine to Seroquel.  Could not tolerate quetiapine for similar reasons and so discontinue that as well.  Nov 2018 - outpatient psychiatric intake.  Started on low-dose risperidone but could not tolerate again.  Depakote ongoing.  Referred to neurology for consideration of major neurocognitive disorders.  December 2018 - added low dose Li for anthony  January 2019 - presumed lithium toxicity following increase of dose and so Li lowered from 900mg to 750mg  January 2019 -- decreased depakote by 125mg d/t cognitive impairment  March 2019 -- again decreased depakote to 500mg in the morning, 1000mg at bedtime  May 2019 -- Dec Depakote to 250mg QAM, 1000mg QHS  June 2019 -- Dec Depakote to 1000mg QHS    PAST PSYCH MED TRIALS   see EMR Problem List: Hx of psychiatric care    MEDICAL / SURGICAL HISTORY                                   Pregnant or breastfeeding- no      Contraception- HRT for menopause    Neurologic Hx- no  Patient Active Problem List   Diagnosis     Oral aphthae     Obesity (BMI 30-39.9)     Disorder of bursae and tendons in shoulder region     CARDIOVASCULAR SCREENING; LDL GOAL LESS THAN 160     Rosacea     Tear of medial cartilage or meniscus of knee, current     Raynaud disease     Vitamin D deficiency disease     Mild sleep apnea      Atopic rhinitis     Morbid obesity (H)     Muscle tension dysphonia     Palpitations     Dysphagia, unspecified type     Anxiety     Manic behavior (H)       Major Surgery- unknown    ALLERGY               Seroquel [quetiapine]; Olanzapine; Penicillins; Risperidone; and Thimerosal  MEDICATIONS        Current Outpatient Medications   Medication Sig Dispense Refill     Carboxymethylcellulose Sod PF (REFRESH PLUS) 0.5 % SOLN ophthalmic solution Place 1 drop into both eyes 3 times daily as needed for dry eyes       divalproex sodium delayed-release (DEPAKOTE) 250 MG DR tablet Take 1 tablet (250 mg) by mouth every morning 30 tablet 0     estrogen conj-medroxyPROGESTERone (PREMPRO) 0.625-2.5 MG tablet Take 1 tablet by mouth daily 90 tablet 3     lidocaine, viscous, (XYLOCAINE) 2 % solution SWISH AND SPIT 5ML BY MOUTH EVERY 3 HOURS AS NEEDED 100 mL 0     lisinopril (PRINIVIL/ZESTRIL) 10 MG tablet Take 1 tablet (10 mg) by mouth daily 90 tablet 1     Loratadine 10 MG capsule Take 10 mg by mouth daily as needed.       metroNIDAZOLE (METROGEL) 1 % gel Apply topically daily 60 g 9     order for DME Please measure and distribute 1 pair of 20mmHg - 30mmHg thigh high open or closed toe compression stockings. Jobst ultrasheer or equivalent. 2 each 3     order for DME Please measure and distribute 1 pair of 20mmHg - 30mmHg knee high open toe compression stockings. Jobst ultrasheer or equivalent. 1 each 1     ORDER FOR DME Use your CPAP device as directed by your provider.       VITAMIN D, CHOLECALCIFEROL, PO Take 2,000 Units by mouth daily       cyclobenzaprine (FLEXERIL) 10 MG tablet Take 1 tablet (10 mg) by mouth nightly as needed for other (headache) (Patient not taking: Reported on 2/1/2019) 30 tablet 0     divalproex sodium delayed-release (DEPAKOTE) 500 MG DR tablet Take 2 tablets (1,000 mg) by mouth At Bedtime 60 tablet 2     lithium 300 MG capsule Take 3 capsules (900 mg) by mouth At Bedtime 60 capsule 2     VITALS                                                                         3, 3   /83   Pulse 73   Wt 132 kg (291 lb)   BMI 44.25 kg/m     MENTAL STATUS EXAM                                                                 9, 14 cog gs     Alertness: alert  and oriented  Appearance: well groomed  Behavior/Demeanor: cooperative, with good  eye contact   Speech: normal and regular rate and rhythm  Language: intact and no problems  Psychomotor: No muscle stiffness or rigidity, no cogwheeling  Mood: description consistent with euthymia  Affect: full range, very briefly tearful; was congruent to mood; was congruent to content  Thought Process/Associations: unremarkable  Thought Content:  Reports none;  Denies suicidal ideation, violent ideation, delusions, magical thinking and paranoid ideation  Perception:  Reports none;  Denies auditory hallucinations, depersonalization and derealization  Insight: good  Judgment: good  Cognition: (6) does not appear grossly intact; formal cognitive testing was not done  Gait and Station: No shuffling gait, no stiff posture, normal arm swing    LABS and DATA     AIMS:  0 (12/13/18)    PHQ9 = 6     PHQ-9 SCORE 3/1/2019 5/3/2019 5/3/2019   PHQ-9 Total Score 10 7 7     MoCA (12/12/18) - 25/30, missed points on fluency (only 6 words with F) and delayed recall (1/5 only but got the remainders with category cues)    PSYCHLABVALPROATE  Recent Labs   Lab Test 05/01/19  0921 01/14/19  0913 12/20/18  1214 12/14/18  1235   SOFIYA 77 107* 78 109*     ANTIPSYCHOTIC LABS  [glu, A1C, lipids (mehran LDL), liver enzymes, WBC, ANEU, Hgb, plts]  q12 mo  Recent Labs   Lab Test 05/01/19  0921 12/20/18  1214 12/14/18  1235 12/06/18  1118  09/24/15  0904  09/16/13  0739 08/22/12  0752 08/16/11  0757   GLC 97 71 71 86.4   < > 84   < > 75 87 84   A1C  --   --   --   --   --  5.2  --  5.1 5.2 5.3    < > = values in this interval not displayed.     Recent Labs   Lab Test 10/02/17  0842 10/03/16  0742 09/24/15  0904  09/15/14  0733   CHOL 156 137 132 108   TRIG 55 56 58 46   LDL 49 51 47 24   HDL 96 75 73 75     Recent Labs   Lab Test 05/01/19  0921 12/14/18  1235 08/09/18  0937 08/16/11  0757   AST 19 17 20 24   ALT 13 23 23 13   ALKPHOS 46 50  --  66     Recent Labs   Lab Test 05/01/19  0921 01/14/19  0913 01/02/19  0839 12/20/18  1214 12/14/18  1235 11/10/18  0756 08/09/18  0937   WBC 5.7  --   --   --  4.4 4.8 4.2   ANEU 3.7  --   --   --  2.3  --   --    HGB 14.4  --   --   --  14.3 14.9 14.1    170 152 139* 147* 193 190     LITHIUM LABS     [level, renal, SG, TSH, WBC]    q6 mo  Recent Labs   Lab Test 05/01/19  0921 01/14/19  0913 01/02/19  0839   LITHIUM 0.61 0.55* 0.37*     Recent Labs   Lab Test 05/01/19  0921 12/20/18  1214 12/14/18  1235 12/06/18  1118   CR 0.54 0.47* 0.43* 0.4*   GFRESTIMATED >90 >90 >90 >90    132* 129* 132.3*   POTASSIUM 4.6 4.4 4.2 4.9*   SHERRIE 9.0 9.2 8.7 9.2     Recent Labs   Lab Test 05/01/19  1140 11/01/12  0819   SG 1.008 1.015     Recent Labs   Lab Test 05/01/19  0921 11/10/18  0756 08/31/18  2029 04/06/18  1222   TSH 2.69 2.29 2.19 0.99     Recent Labs   Lab Test 05/01/19  0921 12/14/18  1235 11/10/18  0756 08/09/18  0937   WBC 5.7 4.4 4.8 4.2   ANEU 3.7 2.3  --   --          DIAGNOSIS     Bipolar Disorder, Type I  - Current mild depressive symptoms  - Last anthony Nov/Dec 2018  H/o EPSE secondary to neuroleptic use  H/o Thromboctyopenia secondary to depakote  H/o lithium toxicity, resolved    ASSESSMENT                                                                 m2, h3     Angy Haji is a 54yo F w history of anxiety who was referred for assessment following inpatient psychiatric hospitalization for anthony I November 2018. When we met, Angy remained manic. Care was complicated by sensitivity to antipsychotic medications (EPSE incl difficulty swallowing), thrombocytopenia secondary to depakote, and later lithium toxicity in the setting of concurrent depakote use. Pt had  previously had not significant depressive episodes and no h/o anthony. Please see my note from 12/5/19 for add'l history.    TODAY    Angy returns for follow-up reporting improvement in mood symptoms. Euthymic today. Generally doing better and not as emotionally sensitive to life stressors as before. Will continue with gradual Depakote reduction, keeping Li the same.    Expressed ongoing concerns related to weight gain. Likely secondary to Depakote, so hopefully dose reductions will help. Also offered weight mgmt referral, which she will think about. She had previously been successful in reducing weight to 225lb, so hopeful she can do it again. She would like to avoid weight loss meds and bariatric surgery.    [place] PRESCRIPTION MONITORING PROGRAM [] was checked today:  not using controlled substances.    PSYCHOTROPIC DRUG INTERACTIONS:    no.  MANAGEMENT:  N/A     PLAN                                                                                         m2, h3     1) PSYCHOTROPIC MEDICATIONS:  -Decrease Depakote to 1000mg at bedtime -- plan to taper by 250mg every month  -Continue lithium 900mg at bedtime    2) THERAPY:    Patient follows with individual psychotherapist in private practice, q-2 weeks    3) NEXT DUE:    Labs-Reviewed, repeat Li labs in August 2019  EKG- N/A  Rating Scales- Consider MoCA again late Fall 2019    4) REFERRALS: None    5) RTC: Transfer to Dr. Villa, follow-up in 4-6 weeks    6) CRISIS NUMBERS:   Provided routinely in AVS.    ONLY if a QAMAR PT: [facility] [phone] (clinic), [phone] (after hours)     TREATMENT RISK STATEMENT:  The risks, benefits, alternatives and potential adverse effects have been discussed and are understood by the pt. The pt understands the risks of using street drugs or alcohol. There are no medical contraindications, the pt agrees to treatment with the ability to do so. The pt knows to call the clinic for any problems or to access emergency care  if needed.  Medical and substance use concerns are documented above.  Psychotropic drug interaction check was done, including changes made today.     PROVIDER:  Galo Sandoval MD    Patient was not staffed today. Will route note to Dr. Tapia to review/sign.  I did not see this pt directly. I have reviewed the documentation, and I agree with the resident's plan of care.    Dacia Tapia

## 2019-06-14 NOTE — PATIENT INSTRUCTIONS
Your provider has referred you to: [facility]: [facility] [place] [phone]  [email or web address]    Please be aware that coverage of these services is subject to the terms and limitations of your health insurance plan.  Call member services at your health plan with any benefit or coverage questions.      Please bring the following with you to your appointment:      (1) List of current medications   (2) This referral request   (3) Any documents/labs given to you for this referral    Thank you for coming to the PSYCHIATRY CLINIC.    Lab Testing:  If you had lab testing today and your results are reassuring or normal they will be mailed to you or sent through [facility] [employer] within 7 days.   If the lab tests need quick action we will call you with the results.  The phone number we will call with results is # [phone] (home) [phone] (work). If this is not the best number please call our clinic and change the number.    Medication Refills:  If you need any refills please call your pharmacy and they will contact us. Our fax number for refills is [phone]. Please allow three business for refill processing.   If you need to  your refill at a new pharmacy, please contact the new pharmacy directly. The new pharmacy will help you get your medications transferred.     Scheduling:  If you have any concerns about today's visit or wish to schedule another appointment please call our office during normal business hours [phone] (8-5:00 M-F)    Contact Us:  Please call [phone] during business hours (8-5:00 M-F).  If after clinic hours, or on the weekend, please call  [phone].    Financial Assistance [phone]  [facility] Billing [phone]  [facility]illing [phone]  Medical Records [phone]      MENTAL HEALTH CRISIS NUMBERS:  [facility]:   [facility] - [phone]    Crisis Residence [facility] - [phone]   Walk-In Counseling Center [facility] - [phone]   COPE 24/7 [facility] Mobile Team for Adults - [[phone]]; Child - [[phone]]        [facility]:   [facility] - [phone]   Walk-in counseling [facility] - [phone]   Walk-in counseling [facility] - [phone]   Crisis Residence [facility] Residence - [phone]   Urgent Care Adult Mental Health:   --Drop-in, 24/7 crisis line, and [facility] Mobile Team [[phone]]    CRISIS TEXT LINE: Text [phone] from anywhere, anytime, any crisis 24/7;    OR SEE [email or web address]     Poison Control Center - [phone]    CHILD: [facility] needs assessment team - [phone]     [facility] Lifeline - [phone]; or [facility] Lifeline - 1-924.234.5065    If you have a medical emergency please call 911or go to the nearest ER.                    _____________________________________________    Again thank you for choosing PSYCHIATRY CLINIC and please let us know how we can best partner with you to improve you and your family's health.  You may be receiving a survey in the mail regarding this appointment. We would love to have your feedback, both positive and negative, so please fill out the survey and return it using the provided envelope. The survey is done by an external company, so your answers are anonymous.

## 2019-06-18 ASSESSMENT — PATIENT HEALTH QUESTIONNAIRE - PHQ9: SUM OF ALL RESPONSES TO PHQ QUESTIONS 1-9: 6

## 2019-06-26 ENCOUNTER — OFFICE VISIT (OUTPATIENT)
Dept: FAMILY MEDICINE | Facility: CLINIC | Age: 54
End: 2019-06-26
Payer: COMMERCIAL

## 2019-06-26 VITALS
HEART RATE: 65 BPM | RESPIRATION RATE: 16 BRPM | OXYGEN SATURATION: 98 % | HEIGHT: 68 IN | TEMPERATURE: 97.8 F | WEIGHT: 290 LBS | SYSTOLIC BLOOD PRESSURE: 118 MMHG | BODY MASS INDEX: 43.95 KG/M2 | DIASTOLIC BLOOD PRESSURE: 75 MMHG

## 2019-06-26 DIAGNOSIS — M54.50 CHRONIC BILATERAL LOW BACK PAIN WITHOUT SCIATICA: ICD-10-CM

## 2019-06-26 DIAGNOSIS — G89.29 CHRONIC BILATERAL LOW BACK PAIN WITHOUT SCIATICA: ICD-10-CM

## 2019-06-26 DIAGNOSIS — I10 BENIGN ESSENTIAL HYPERTENSION: Primary | ICD-10-CM

## 2019-06-26 DIAGNOSIS — L84 CALLUS OF FOOT: ICD-10-CM

## 2019-06-26 RX ORDER — LISINOPRIL 10 MG/1
10 TABLET ORAL DAILY
Qty: 90 TABLET | Refills: 1 | Status: SHIPPED | OUTPATIENT
Start: 2019-06-26 | End: 2019-12-20

## 2019-06-26 ASSESSMENT — ENCOUNTER SYMPTOMS
BACK PAIN: 1
HEADACHES: 0
SHORTNESS OF BREATH: 0

## 2019-06-26 ASSESSMENT — MIFFLIN-ST. JEOR: SCORE: 1973.93

## 2019-06-26 NOTE — PROGRESS NOTES
Preceptor Attestation:   Patient seen, evaluated and discussed with the resident. I was present for and supervised the entire procedure. I have verified the content of the note, which accurately reflects my assessment of the patient and the plan of care.   Supervising Physician:  Camille Vallejo MD

## 2019-06-26 NOTE — PROGRESS NOTES
HPI       Angy Haji is a 54 year old  who presents for   Chief Complaint   Patient presents with     Recheck Medication     Lisinopril     Referral     Pt for low back pain     Derm Problem     right foot bump     -BP follow up  Does not check BP at home.  Was started on lisinopril about 6 months ago by PCP.  Current dose is 10 mg lisinopril daily. CMP done 5/1/2019 with normal creatinine and GFR.  Of note, patient is also on lithium, therefore if future adjustments are made to lisinopril, will need to monitor lithium level and kidney function more closely.  Denies chest pain, shortness of breath, headaches, vision changes.    -Low back pain  Has been an ongoing intermittent problem for years.  Located in the low back.  In the past, pain would radiate down into her feet.  Says that goes into her thighs now.  Has experienced weight gain secondary to her psych meds and this is caused an exacerbation of her low back pain since January 2019.  Says she wakes up every day at night due to back pain and has a hard time finding comfortable position in bed.  She would like to go back to physical therapy which was beneficial to her in the past.  Her psychiatrist is currently slowly titrating down on her Depakote to hopefully eventually discontinue with the plan to stay on lithium only.  This should hopefully help with her weight management.    Right and left feet  Calluses on outside of both foot, very painful. Has changed out her shoes to help with pain.      +++++++  Problem, Medication and Allergy Lists were reviewed and updated if needed..    Patient is an established patient of this clinic..         Review of Systems:   Review of Systems   Respiratory: Negative for shortness of breath.    Cardiovascular: Negative for chest pain.   Genitourinary: Negative for decreased urine volume.   Musculoskeletal: Positive for back pain.   Neurological: Negative for headaches.            Physical Exam:     Vitals:     "06/26/19 0924   BP: 118/75   Pulse: 65   Resp: 16   Temp: 97.8  F (36.6  C)   TempSrc: Oral   SpO2: 98%   Weight: 131.5 kg (290 lb)   Height: 1.727 m (5' 8\")     Body mass index is 44.09 kg/m .  Vital signs normal except BMI     Physical Exam   Constitutional: No distress.   Obese   HENT:   Head: Normocephalic and atraumatic.   Pulmonary/Chest: Effort normal.   Musculoskeletal:   Tender to palpation along the lower back.   Neurological: She is alert.   Skin: She is not diaphoretic.   Psychiatric: She has a normal mood and affect. Her behavior is normal. Her speech is not rapid and/or pressured.       Results:   No testing ordered today    Assessment and Plan        1. Benign essential hypertension  Average blood pressure at goal.  Will continue with current dose of lisinopril 10 mg daily.  As mentioned above, as patient is also taking lithium, if lisinopril dose were to be increased in the future, will need to monitor lithium level and kidney function closely with changes.  CMP completed 5/1/2019, wnl.   - lisinopril (PRINIVIL/ZESTRIL) 10 MG tablet; Take 1 tablet (10 mg) by mouth daily  Dispense: 90 tablet; Refill: 1    2. Chronic bilateral low back pain without sciatica  PT referral for chronic low back pain which has been beneficial to the patient in the past.  Follow-up if no improvement in back pain with 4-6 weeks of PT.  - [facility]RACTIC REFERRAL - [facility]; Future    3. Callus of foot  Callus present on lateral aspects of left and right foot. Pared down with size 10 scalpel without difficulty. Discussed use of pumice stones in the future to help manage callus at home.   - TRIM HYPERKERATOTIC SKIN LESION,2-4       Medications Discontinued During This Encounter   Medication Reason     lisinopril (PRINIVIL/ZESTRIL) 10 MG tablet Reorder       Options for treatment and follow-up care were reviewed with the patient. Angy Haji  engaged in the decision making process and verbalized understanding of the " options discussed and agreed with the final plan.    Vidya Rai MD  Family Medicine PGY3 Resident

## 2019-07-16 NOTE — PROGRESS NOTES
"     [facility]  Psychiatry Clinic  TRANSFER of CARE DIAGNOSTIC ASSESSMENT       CARE TEAM:  PCP- Jono Odell    Therapist- Aylin Caro MA, LP in private practice      Specialty Providers- OB/GYN, ENT    Community  Team- erik    Angy LILIAN Haji is a 54 year old female who prefers the name Angy & pronouns she, her, hers.  Date of initial Diagnostic Assessment (DA) is 12/5/18.  Date of most recent Transfer of Care Diagnostic Assessment is 07/16/19.     Pertinent Background: Patient has not had significant mental health history until 2018, when she developed perimenopausal mood and anxiety symptoms.  She was subsequently referred to our clinic following a hospitalization for anthony in November 2018.  Her care has been complicated by sensitivity to antipsychotics (EPSE including difficulty swallowing), thrombocytopenia secondary to Depakote, lithium toxicity in setting of concurrent Depakote use.     Psych critical item history includes Manic episode, psychiatric hospitalization in November 2018.    INTERIM HISTORY      [4, 4]   The patient reports good treatment adherence.  History was provided by the patient and family who was a good historian.  The last visit ended with the following med change: lowered depakote by 250 mg.   Since the last visit:   - Wife Danielle present with patient today.  - Things have been going fine. Stress level is high; have been looking for a house, looked at 40+ houses, just closed on a house last Friday in [place]. Living in apt until end of Sept. Plan to move as soon as they can do some work on the house.   - Sleeping okay. Few nights where difficult to fall asleep, mostly sleeping 7-8hrs. Wakes x1 to urinate, which is normal, and falls back asleep easily  - Mood has been \"fine to a little low\". Occasionally has difficulty finding enjoyment in things. Some anxious mood with worry about the move to a new house.   - Doing some overeating. Wt 292 today. " Exercising inconsistently, continues to work with dietician on setting Highest exercise and dieting goals. Highest weight was 340 20+ years ago; over 11years, dropped to 225 using weight watchers and dietician and exercise.   - Did make contact with weight management clinic; didn't schedule due to busy schedule; feels good about current plan for now.   - Wife and patient haven't seen any manic behaviors or signs, but with the stress of this upcoming move, they do both worry it could be destabilizing for her mood  - No recent dose change in Lisinopril.     RECENT PSYCH ROS:   Depression:  intermittent low mood and anhedonia, overall mood maybe a little lower than her baseline but seems proportionate to stressor of moving, occasionally overwhelmed and having trouble making decisions, energy level fair, a little low due to a busy summer  Elevated:  none  Psychosis:  none  Anxiety:  feels somewhat overwhelmed at times  Panic Attack:  none  Trauma Related:  none  Dysregulation:  none  Eating Disorder: some overeating, working with dietician   Pertinent Negative Symptoms:  NO suicidal ideation, self-injurious behavior/urges, violent ideation, aggression, psychosis, hallucinations, delusions, anthony and hypomania    RECENT SUBSTANCE USE:     Alcohol- No  Tobacco- No  Caffeine- No  Opioids- No           Narcan Kit- N/A   Cannabis- No     Other illicit drugs- none    CURRENT SOCIAL HISTORY:  Financial/ Work- Restarted at 0.6 time in March, then back to 0.8 which is normal for her, 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   Fine tremor in bilateral hands, noticeable when doing fine  movements, unchanged for past few months. Few pound weight gain Denies polydipsia, polyuria and nocturia, rash, abdominal or back/ flank pain, change related to urination and frequent infections    PSYCHIATRIC HISTORY                                                                                  *     SIB- No  Suicidal Ideation Hx- no  Suicide Attempt- #- no, most recent- no    Violence/Aggression Hx- no  Psychosis Hx- no  Eating Disorder Hx- history of issues with elevated weight, overeating. Further history unspecified    Psych Hosp- #- 1, most recent- November 2018   Commitment- no  ECT- no  Outpatient Programs [ DBT, Day Treatment etc]- no    Past Psychotropic Med Trials- see next section    PAST PSYCHOTROPIC MED TRIALS                                                           *     Sertraline  Depakote  Olanzapine (EPSE)  Lithium  Seroquel (EPSE)    Psych and CD critical summary point since July 2018 Nov 2018 - hospitalized for acute manic episode.  Started combo Depakote + Olanz. Developed severe EPSE (shuffling gait, flat facies, muscle rigidity, tongue fullness), switched from OLZ to Seroquel.  Could not tolerate quetiapine for similar reasons, discontinued that as well.  Nov 2018 - outpatient psychiatric intake.  Started on low-dose risperidone but could not tolerate again.  Depakote ongoing.  Referred to neurology for consideration of major neurocognitive disorders.  December 2018 - added low dose Li for anthony  January 2019 - presumed lithium toxicity following increase of dose and so Li lowered from 900mg to 750mg  January 2019 -- decreased depakote by 125mg d/t cognitive impairment  March 2019 -- again decreased depakote to 500mg in the morning, 1000mg at bedtime  May 2019 -- Dec Depakote to 250mg QAM, 1000mg QHS  June 2019 -- Dec Depakote to 1000mg QHS  SUBSTANCE USE HISTORY                                                                         *     Past Use- none reported   Treatment- #, most  recent- no  Medical Consequences- no  HIV/Hepatitis- no  Legal Consequences- no    SOCIAL and FAMILY HISTORY      [1ea, 1ea]       patient reported               *    Financial Support- documented above  Family/ Children/ Relationships- documented above  Living Situation- documented above  Cultural/ Social/ Spiritual Support- documented above  Trauma History (self-report)- unknown  Legal- no  Early History/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   to wife Freddy, have school aged daughter.  FAMILY MENTAL HEALTH HX-   -Family history positive for anxiety disorders  -No family history of thought disorders or bipolar disorder  MEDICAL / SURGICAL HISTORY                                   Pregnant or breastfeeding- no      Contraception- HRT for menopause    Patient Active Problem List   Diagnosis     Oral aphthae     Obesity (BMI 30-39.9)     Disorder of bursae and tendons in shoulder region     CARDIOVASCULAR SCREENING; LDL GOAL LESS THAN 160     Rosacea     Tear of medial cartilage or meniscus of knee, current     Raynaud disease     Vitamin D deficiency disease     Mild sleep apnea     Atopic rhinitis     Morbid obesity (H)     Muscle tension dysphonia     Palpitations     Dysphagia, unspecified type     Anxiety     Manic behavior (H)     Benign essential hypertension     Callus of foot       Major Surgery-     ALLERGY       Seroquel [quetiapine]; Olanzapine; Penicillins; Risperidone; and Thimerosal  MEDICATIONS        Current Outpatient Medications   Medication Sig Dispense Refill     Carboxymethylcellulose Sod PF (REFRESH PLUS) 0.5 % SOLN ophthalmic solution Place 1 drop into both eyes 3 times daily as needed for dry eyes       divalproex sodium delayed-release (DEPAKOTE) 250 MG DR tablet Take 3 tablets (750 mg) by mouth At Bedtime 90 tablet 1     estrogen conj-medroxyPROGESTERone (PREMPRO) 0.625-2.5 MG tablet Take 1 tablet by mouth daily 90 tablet 3     lidocaine, viscous,  (XYLOCAINE) 2 % solution SWISH AND SPIT 5ML BY MOUTH EVERY 3 HOURS AS NEEDED 100 mL 0     lisinopril (PRINIVIL/ZESTRIL) 10 MG tablet Take 1 tablet (10 mg) by mouth daily 90 tablet 1     lithium 300 MG capsule Take 3 capsules (900 mg) by mouth At Bedtime 60 capsule 2     Loratadine 10 MG capsule Take 10 mg by mouth daily as needed.       metroNIDAZOLE (METROGEL) 1 % gel Apply topically daily 60 g 9     order for DME Please measure and distribute 1 pair of 20mmHg - 30mmHg thigh high open or closed toe compression stockings. Jobst ultrasheer or equivalent. 2 each 3     order for DME Please measure and distribute 1 pair of 20mmHg - 30mmHg knee high open toe compression stockings. Jobst ultrasheer or equivalent. 1 each 1     ORDER FOR DME Use your CPAP device as directed by your provider.       VITAMIN D, CHOLECALCIFEROL, PO Take 2,000 Units by mouth daily       VITALS      [3, 3]   /68   Pulse 68   Wt 132.5 kg (292 lb)   BMI 44.40 kg/m     MENTAL STATUS EXAM      [9, 14 cog gs]     Alertness: alert  and oriented  Appearance: well groomed  Behavior/Demeanor: cooperative, pleasant and calm, with good  eye contact   Speech: normal and regular rate and rhythm  Language: intact  Psychomotor: fine tremor in bilateral hands noted at rest  Mood: slightly low at times  Affect: euthymic; was congruent to mood; was congruent to content  Thought Process/Associations: unremarkable  Thought Content:  Reports mild preoccupation with life stressors;  Denies suicidal ideation, violent ideation, delusions and paranoid ideation  Perception:  Reports none;  Denies auditory hallucinations and visual hallucinations  Insight: good  Judgment: excellent  Cognition: (6) does  appear grossly intact; formal cognitive testing was not done  Gait and Station: unremarkable    LABS and DATA     AIMS:  0 on 12/13/2018    PHQ9 TODAY = 7  PHQ-9 SCORE 5/3/2019 5/3/2019 6/14/2019   PHQ-9 Total Score 7 7 6     LITHIUM LABS     [level, renal, SG,  TSH, WBC]    q6 mo  Recent Labs   Lab Test 05/01/19  0921 01/14/19  0913 01/02/19  0839   LITHIUM 0.61 0.55* 0.37*     Recent Labs   Lab Test 05/01/19  0921 12/20/18  1214 12/14/18  1235 12/06/18  1118   CR 0.54 0.47* 0.43* 0.4*   GFRESTIMATED >90 >90 >90 >90    132* 129* 132.3*   POTASSIUM 4.6 4.4 4.2 4.9*   SHERRIE 9.0 9.2 8.7 9.2     Recent Labs   Lab Test 05/01/19  1140 11/01/12  0819   SG 1.008 1.015     Recent Labs   Lab Test 05/01/19  0921 11/10/18  0756 08/31/18  2029 04/06/18  1222   TSH 2.69 2.29 2.19 0.99     Recent Labs   Lab Test 05/01/19  0921 12/14/18  1235 11/10/18  0756 08/09/18  0937   WBC 5.7 4.4 4.8 4.2   ANEU 3.7 2.3  --   --      VALPROIC ACID LABS     [liver enzymes, WBC, ANEU, Hgb, plts, +ammonia]  q6-12 mo  Recent Labs   Lab Test 05/01/19  0921 01/14/19  0913   SOFIYA 77 107*     Recent Labs   Lab Test 05/01/19  0921 12/14/18  1235   AST 19 17   ALT 13 23   ALKPHOS 46 50     Recent Labs   Lab Test 05/01/19  0921 01/14/19  0913  12/14/18  1235   WBC 5.7  --   --  4.4   ANEU 3.7  --   --  2.3   HGB 14.4  --   --  14.3    170   < > 147*    < > = values in this interval not displayed.           PSYCHOTROPIC DRUG INTERACTIONS     Concurrent use of LITHIUM and ACE INHIBITORS may result in increased risk of lithium toxicity.    MANAGEMENT:  Monitoring for adverse effects, routine vitals, routine labs and patient is aware of risks    RISK STATEMENT for SAFETY     Angy LILIAN Haji did not appear to be an imminent safety risk to self or others.    DIAGNOSIS     Bipolar Disorder, Type I  - Current mild depressive symptoms  - Last anthony Nov/Dec 2018  H/o EPSE secondary to neuroleptic use  H/o Thromboctyopenia secondary to depakote  H/o lithium toxicity, resolved    ASSESSMENT      [m2, h3]     TODAY Angy returns for follow-up reporting overall ongoing period of stability.  She reports experiencing intermittent and episodic periods of feeling overwhelmed, stressed, mildly anhedonic, and mildly  decreased mood in the context of a stressful work life balance.  Overall, Angy seems to be reacting appropriately to personal and professional stressors, and there is no evidence of emerging anthony or depressive symptoms.  She is tolerating her medications well, and is agreeable to continuing a slow dose decrease of Depakote today.  Given the uptick in psychosocial stressors, she and wife are agreeable to close follow-up in 4 to 6 weeks time for reassessment.       PLAN      [m2, h3]     1) PSYCHOTROPIC MEDICATIONS:  -Decrease Depakote to 750 mg at bedtime   -tentative plan to taper by 250 mg every month  -Continue lithium 900 at bedtime    2) [place]  was checked today:  not using controlled substances.    3) THERAPY:    Continue individual psychotherapist in private practice every 2 weeks    4) NEXT DUE:    Labs- [place] labs due Nov 2019  EKG- N/A  Rating Scales- Could consider [place] repeat in fall 2019    5) REFERRALS:    discussed wt mgmt referral at last visit     6) RTC: 4-6wks    7) CRISIS NUMBERS:   Provided routinely in AVS   ONLY if a LARISSA PT: [facility] [phone] (clinic), [phone] (after hours)     TREATMENT RISK STATEMENT:  The risks, benefits, alternatives and potential adverse effects have been discussed and are understood by the pt. The pt understands the risks of using street drugs or alcohol. There are no medical contraindications, the pt agrees to treatment with the ability to do so. The pt knows to call the clinic for any problems or to access emergency care if needed.  Medical and substance use concerns are documented above.  Psychotropic drug interaction check was done, including changes made today.    PROVIDER: Ricci Villa MD    Patient staffed in clinic with Dr. Burnett who will sign the note.  Supervisor is Dr. Liu.  I saw the patient with the resident, and participated in key portions of the service, including the mental status examination and developing the plan of  care. I reviewed key portions of the history with the resident. I agree with the findings and plan as documented in this note.    Vivian Burnett MD

## 2019-07-17 ENCOUNTER — OFFICE VISIT (OUTPATIENT)
Dept: PSYCHIATRY | Facility: CLINIC | Age: 54
End: 2019-07-17
Attending: PSYCHIATRY & NEUROLOGY
Payer: COMMERCIAL

## 2019-07-17 VITALS
WEIGHT: 292 LBS | DIASTOLIC BLOOD PRESSURE: 68 MMHG | SYSTOLIC BLOOD PRESSURE: 100 MMHG | HEART RATE: 68 BPM | BODY MASS INDEX: 44.4 KG/M2

## 2019-07-17 DIAGNOSIS — F31.9 BIPOLAR I DISORDER (H): ICD-10-CM

## 2019-07-17 PROCEDURE — G0463 HOSPITAL OUTPT CLINIC VISIT: HCPCS | Mod: ZF

## 2019-07-17 RX ORDER — LITHIUM CARBONATE 300 MG/1
900 CAPSULE ORAL AT BEDTIME
Qty: 60 CAPSULE | Refills: 2 | Status: SHIPPED | OUTPATIENT
Start: 2019-07-17 | End: 2019-08-28

## 2019-07-17 RX ORDER — DIVALPROEX SODIUM 250 MG/1
750 TABLET, DELAYED RELEASE ORAL AT BEDTIME
Qty: 90 TABLET | Refills: 1 | Status: SHIPPED | OUTPATIENT
Start: 2019-07-17 | End: 2019-08-28

## 2019-07-17 ASSESSMENT — PAIN SCALES - GENERAL: PAINLEVEL: NO PAIN (0)

## 2019-07-17 NOTE — PATIENT INSTRUCTIONS
Nice to meet you and your wife today, Angy!  We will continue with the decrease in your Depakote today.  Your medication regimen will be as follows:  1. Decrease your Depakote dose to 750 mg at bedtime  2. Continue taking Lithium 900 mg daily    I look forward to working with you!    Thank you for coming to the PSYCHIATRY CLINIC.    Lab Testing:  If you had lab testing today and your results are reassuring or normal they will be mailed to you or sent through [employer] within 7 days.   If the lab tests need quick action we will call you with the results.  The phone number we will call with results is # [phone] (home) [phone] (work). If this is not the best number please call our clinic and change the number.    Medication Refills:  If you need any refills please call your pharmacy and they will contact us. Our fax number for refills is [phone]. Please allow three business for refill processing.   If you need to  your refill at a new pharmacy, please contact the new pharmacy directly. The new pharmacy will help you get your medications transferred.     Scheduling:  If you have any concerns about today's visit or wish to schedule another appointment please call our office during normal business hours [phone] (8-5:00 M-F)    Contact Us:  Please call [phone] during business hours (8-5:00 M-F).  If after clinic hours, or on the weekend, please call  [phone].    Financial Assistance [phone]  [facility] Billing [phone]  Central Billing Office, [facility]: [phone]  [place] Billing [phone]  Medical Records [phone]      MENTAL HEALTH CRISIS NUMBERS:  [facility]:   [facility] - [phone]   Crisis Residence [facility] - [phone]   Walk-In Counseling Center [facility] - [phone]   COPE 24/7 [facility] Mobile Team for Adults - [[phone]]; Child - [[phone]]        [facility]:   [facility] - [phone]    Walk-in counseling [facility] - [phone]   Walk-in counseling [facility] - [phone]   Crisis Residence [facility] - [phone]   Urgent Care Adult Mental Health:   --Drop-in, 24/7 crisis line, and [facility]tt [facility]don[facility] Mobile Team [[phone]]    CRISIS TEXT LINE: Text [phone] from anywhere, anytime, any crisis 24/7;    OR SEE www.crisistextline.org     Poison Control Center - [phone]    CHILD: [facility] needs assessment team - [phone]     [facility] Lifeline - [phone]; or [employer] Lifeline - [phone]    If you have a medical emergency please call 911or go to the nearest ER.                    _____________________________________________    Again thank you for choosing PSYCHIATRY CLINIC and please let us know how we can best partner with you to improve you and your family's health.  You may be receiving a survey in the mail regarding this appointment. We would love to have your feedback, both positive and negative, so please fill out the survey and return it using the provided envelope. The survey is done by an external company, so your answers are anonymous.

## 2019-07-18 ASSESSMENT — PATIENT HEALTH QUESTIONNAIRE - PHQ9: SUM OF ALL RESPONSES TO PHQ QUESTIONS 1-9: 7

## 2019-07-26 ENCOUNTER — HOSPITAL ENCOUNTER (OUTPATIENT)
Dept: NUTRITION | Facility: CLINIC | Age: 54
Discharge: HOME | End: 2019-07-26
Attending: DIETITIAN, REGISTERED | Admitting: DIETITIAN, REGISTERED
Payer: COMMERCIAL

## 2019-07-26 PROCEDURE — 97803 MED NUTRITION INDIV SUBSEQ: CPT | Performed by: DIETITIAN, REGISTERED

## 2019-07-26 NOTE — PROGRESS NOTES
"NUTRITION REASSESSMENT NOTE      REASON FOR ASSESSMENT   Angy QUINTANA Eduardodeann referred by Dr. Odell for MNT related to  Obesity (BMI 30-39.9) [E66.9]  - Primary       Benign essential hypertension [I10]       Patient accompanied by self      NUTRITION HISTORY  Patient with multiple changes in the past few months as patient newly diagnosed with bipolar, changes in medications, working new schedule and bought new home.  Patient continues to struggle with evening eating as feels want to self soothe with food.  Patient concerned with weight gain due to new medications.       DIET RECALL   Breakfast: yogurt, granola (measures), fruit + coffee; refried beans + cheese in tortilla (small)   Lunch: leftovers or [employer]    Dinner: hamburger with fruit and vegetables; daughter continues to follow vegetarian diet     Snacks: Kettle corn; cheese and crackers; lemon cake dessert; premium ice cream or regular ice cream; cheese cake   Beverages: mainly water, coffee, mineral water  Dining out: varies   Exercise: Patient exercising 3 times per week (walking, walking dog, swimming or elliptical).          MEDICATIONS:  Reviewed      ANTHROPOMETRICS  Height: 5'8\" (note height change in chart previously 5'9\")  Weight: 290 lbs   BMI (kg/m2): 41.9 kg/m2   %IBW: 196%  Weight History:   Wt Readings from Last 5 Encounters:   06/26/19 131.5 kg (290 lb)   05/03/19 129.3 kg (285 lb)   02/04/19 124.7 kg (275 lb)   12/13/18 120.3 kg (265 lb 4.8 oz)   12/06/18 120.5 kg (265 lb 9.6 oz)     ASSESSED NUTRITION NEEDS  Estimated Energy Needs: 9722-8168 kcals (15-20 kcals/kg) for gradual weight loss   Estimated Protein Needs: 66-79 (1-1.2 gm/kg IBW) for repletion with exercise  RMR: 1440     EVALUATION/PROGRESS TOWARDS GOALS   Previous goals:  Limit desserts to 3 per week with practice of mindful eating-not met   Exercise 3 times per week for 30 minutes-met      Previous Nutrition Diagnosis: Obesity related to excess energy intake as evidenced by BMI " of 41.9 kg/m2-no change     Current Nutrition Diagnosis:Obesity related to excess energy intake as evidenced by BMI of 44 kg/m2     INTERVENTIONS   Nutrition Prescription   Recommend exercise 3-5 times per week; track intake if eating mindlessly; increase fiber to 25-30 grams per day      Implementation:   Meals and Snacks: Continue eating 3 meals + deliberate snacking (if hungry)   Nutrition Education (Content):               a)  Discussed progress towards goals.  Congratulated patient on awareness of emotional eating in the evening.  Reviewed struggles with evening eating as patient was not able to reduce dessert intake.  Supported patient in her struggle with food and mental health issues.     Nutrition Education (Application):               a) Determined non-food alternative options for emotional eating.                         b) Patient verbalized understanding of diet by stating will limit  desserts to 3 times per week.                       Anticipate fair-good compliance     CURRENT GOALS   Limit desserts to 3 per week with practice of mindful eating   Exercise 5 times per week for 30 minutes  Prepare meals on the weekend for the week ahead     FOLLOW UP/MONITORING    Patient to follow up in 7 weeks  Patient has RD contact information      Time spent with patient: 30 minutes     Mattie Amaya, RD, LD  [facility] Outpatient Dietitian  [phone] (office phone)

## 2019-08-09 ENCOUNTER — THERAPY VISIT (OUTPATIENT)
Dept: PHYSICAL THERAPY | Facility: CLINIC | Age: 54
End: 2019-08-09
Attending: FAMILY MEDICINE
Payer: COMMERCIAL

## 2019-08-09 DIAGNOSIS — M54.50 CHRONIC BILATERAL LOW BACK PAIN WITHOUT SCIATICA: ICD-10-CM

## 2019-08-09 DIAGNOSIS — G89.29 CHRONIC BILATERAL LOW BACK PAIN WITHOUT SCIATICA: ICD-10-CM

## 2019-08-09 PROCEDURE — 97110 THERAPEUTIC EXERCISES: CPT | Mod: GP | Performed by: PHYSICAL THERAPIST

## 2019-08-09 PROCEDURE — 97161 PT EVAL LOW COMPLEX 20 MIN: CPT | Mod: GP | Performed by: PHYSICAL THERAPIST

## 2019-08-09 NOTE — PROGRESS NOTES
[facility] Initial Evaluation  Subjective:  C/C: Patient is a 54-year-old woman with complaints of right low back pain that is aching in nature and 6/10 in severity.  Pain disturbs sleep nightly.  Patient is able to fall asleep but then is awakened at approximately 4 AM.  Pain will be relieved with being off for 3 to 4 hours in the morning.  Will note minor low back pain during the day.  Is most provoked during the day with long car rides, greater than 2 hours.  No complaints of change in bowel and bladder.  Also will note intermittent numbness/tingling in feet.  Hx: 1/2019- Patient noticed onset of current symptoms.  Can give no history of specific trauma or causal factor.  PMH: Recent onset of bipolar illness.  Uses a CPAP at night to sleep.  Has a history of knee pain.  General health:  Fair-good.  Patient works as a psychotherapist.  Walks 1/2-hour 5 days a week.                          Objective:  Standing Alignment:        Lumbar:  Lordosis incr            Gait:    Gait Type:  Normal   Assistive Devices:  None      Flexibility/Screens:   Negative screens: Hip                    Lumbar/SI Evaluation  ROM:    AROM Lumbar:   Flexion:          Fingers to ankles- (+/-)  Ext:                    50% (+)   Side Bend:        Left:  90% -pulling    Right:  50% (++)  Rotation:           Left:     Right:   Side Glide:        Left:     Right:           Lumbar Myotomes:    T12-L3 (Hip Flex):  Left: 5    Right: 5  L2-4 (Quads):  Left:  5    Right:  5  L4 (Ankle DF):  Left:  5    Right:  5  L5 (Great Toe Ext): Left: 5    Right: 5   S1 (Toe Raise):  Left: 5-    Right: 5  Lumbar DTR's:    L4 (Quad):  Left:  2   Right:  2  S1 (Achilles):  Left:  2   Right:  1  Cord Signs:  normal    Lumbar Dermtomes:  normal                Neural Tension/Mobility:      Left side:SLR or SLR w/DF  negative.   Right side:   SLR w/DF and SLR positive.  Lumbar Palpation:      Tenderness not present at Left:    Erector  Spinae  Tenderness present at Right: Erector Spinae    Lumbar Provocation:      Left negative with:  Mobility and PROM hip  Right positive with: Mobility and PROM hip                                                                                      Musculoskeletal:        Back:        ROS    Assessment/Plan:    Patient is a 54 year old female with lumbar complaints.    Patient has the following significant findings with corresponding treatment plan.                Diagnosis 1:  Low back pain  Pain -  manual therapy, self management, education, directional preference exercise and home program  Decreased ROM/flexibility - manual therapy and therapeutic exercise  Decreased joint mobility - manual therapy and therapeutic exercise  Decreased strength - therapeutic exercise and therapeutic activities  Impaired muscle performance - neuro re-education  Decreased function - therapeutic activities  Impaired posture - neuro re-education    Therapy Evaluation Codes:   1) History comprised of:   Personal factors that impact the plan of care:    Cumulative Therapy Evaluation is: Low complexity.    Previous and current functional limitations:  (See Goal Flow Sheet for this information)    Short term and Long term goals: (See Goal Flow Sheet for this information)     Communication ability:  Patient appears to be able to clearly communicate and understand verbal and written communication and follow directions correctly.  Treatment Explanation - The following has been discussed with the patient:   RX ordered/plan of care  Anticipated outcomes  Possible risks and side effects  This patient would benefit from PT intervention to resume normal activities.   Rehab potential is good.    Frequency:  1 X week, once daily  Duration:  for 8 weeks  Discharge Plan:  Achieve all LTG.  Independent in home treatment program.  Reach maximal therapeutic benefit.    Please refer to the daily flowsheet for treatment today, total treatment time and  time spent performing 1:1 timed codes.

## 2019-08-12 NOTE — PROGRESS NOTES
[facility] Initial Evaluation  Subjective:       Pertinent medical history includes:  High blood pressure, incontinence, menopausal, overweight and sleep disorder/apnea.  Medical allergies: other. Other medical allergies details: penicillin.  Surgeries include:  Other. Other surgery history details: fall bladder, left ovary, and fallopian tubes.     Primary job tasks include:  Prolonged sitting.           Patient is a psychotherapist.         Oswestry Score: 18 %                 Objective:  System    Physical Exam    General     ROS    Assessment/Plan:

## 2019-08-23 ENCOUNTER — THERAPY VISIT (OUTPATIENT)
Dept: PHYSICAL THERAPY | Facility: CLINIC | Age: 54
End: 2019-08-23
Payer: COMMERCIAL

## 2019-08-23 DIAGNOSIS — G89.29 CHRONIC BILATERAL LOW BACK PAIN WITHOUT SCIATICA: Primary | ICD-10-CM

## 2019-08-23 DIAGNOSIS — M54.50 CHRONIC BILATERAL LOW BACK PAIN WITHOUT SCIATICA: Primary | ICD-10-CM

## 2019-08-23 PROCEDURE — 97140 MANUAL THERAPY 1/> REGIONS: CPT | Mod: GP | Performed by: PHYSICAL THERAPIST

## 2019-08-23 PROCEDURE — 97112 NEUROMUSCULAR REEDUCATION: CPT | Mod: GP | Performed by: PHYSICAL THERAPIST

## 2019-08-23 PROCEDURE — 97110 THERAPEUTIC EXERCISES: CPT | Mod: GP | Performed by: PHYSICAL THERAPIST

## 2019-08-26 NOTE — PROGRESS NOTES
"       [facility]  Psychiatry Clinic  PSYCHIATRIC PROGRESS NOTE     Date of initial Diagnostic Assessment (DA) is 12/5/18.  Date of most recent Transfer of Care Diagnostic Assessment is 07/16/19.     CARE TEAM:  PCP- Jono Odell    Therapist- Aylin Caro MA, LP in private practice      Specialty Providers- OB/GYN, ENT    Community MH Team- erik    Angy Haji is a 54 year old female who prefers the name Angy & pronouns she, her, hers.     Pertinent Background: Patient has not had significant mental health history until 2018, when she developed perimenopausal mood and anxiety symptoms.  She was subsequently referred to our clinic following a hospitalization for anthony in November 2018.  Her care has been complicated by sensitivity to antipsychotics (EPSE including difficulty swallowing), thrombocytopenia secondary to Depakote, lithium toxicity in setting of concurrent Depakote use.     Psych critical item history includes Manic episode, psychiatric hospitalization in November 2018.    INTERIM HISTORY      [4, 4]   The patient reports good treatment adherence.  History was provided by the patient and family who were good historians.  The last visit ended with the following med change: lowered depakote by 250 mg.   Since the last visit:   - Wife Rachael present with patient today.  - Nutrition assessment 7/26 including exercise recs, tracking food intake, other setting of goals, and 7wk follow up  - Doing \"okay overall.\" \"Stable.\"  Lots of stress with move which they had to push back, redoing floors in new house, packing for last month, dealing with movers, unpacking in new home. Still lots of unpacking, but house is livable.  - Mother is visiting, which is \"mostly good.\" Has some neurocognitive issues, and can be frustrating for patient.   - Mood kind of \"egh\", some anhedonia. Doing lots of stress eating. Working with dietician, setting intake goals, exercise goals. Lots of time spent " "thinking about dessert during day, \"I'd feel better if I had ice cream.\" Exercising is \"hit or miss\". Taking dog for walk everyday.   - 7-7.5 hrs per night of sleep in last few weeks. Would like to be getting more like 8hrs.   - Mild irritability with 12yo daughter, mostly due to challenges of raising a teenager.   - No recent dose change in Lisinopril, aware of risks of concurrent lisinopril and lithium use, agrees to contact clinic should her PCP recommend increasing lisinopril, notes that she has discussed this risk with her PCP as well.    RECENT PSYCH ROS:   Depression:  intermittent low mood and anhedonia, overall mood stable, proportionate to stressor of moving  Elevated:  none  Psychosis:  none  Anxiety:  feels somewhat overwhelmed at times  Panic Attack:  none  Trauma Related:  none  Dysregulation:  none  Eating Disorder: some overeating, working with dietician   Pertinent Negative Symptoms:  NO suicidal ideation, self-injurious behavior/urges, violent ideation, aggression, psychosis, hallucinations, delusions, anthony and hypomania    RECENT SUBSTANCE USE:     Alcohol- very rare, always social, never more than 1  Tobacco- No  Caffeine- No  Opioids- No           Narcan Kit- N/A   Cannabis- No     Other illicit drugs- none    CURRENT SOCIAL HISTORY:  Financial/ Work- Restarted at 0.6 time in March, then back to 0.8 which is normal for her, 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 in [place], lives with wife Danielle and teenage daughter   Social/ Spiritual Support- Family (wife and daughter)     FEELS SAFE AT HOME- yes     MEDICAL ROS [2,10]:  Reports A comprehensive review of systems was performed and is negative other than noted in the HPI.   Fine tremor in bilateral hands, noticeable when doing fine movements, unchanged for past few months.  Denies polydipsia, polyuria and nocturia, rash, abdominal " or back/ flank pain, change related to urination and frequent infections    PSYCH and CD Critical Summary Points since July 2019 July 2019 - no change  Aug 2019 - decreased the Depakote to 500 mg nightly    PAST MED TRIALS          See last Diagnostic Assessment  MEDICAL / SURGICAL HISTORY                                   Pregnant or breastfeeding- no      Contraception- HRT for menopause    Patient Active Problem List   Diagnosis     Oral aphthae     Obesity (BMI 30-39.9)     Disorder of bursae and tendons in shoulder region     CARDIOVASCULAR SCREENING; LDL GOAL LESS THAN 160     Rosacea     Tear of medial cartilage or meniscus of knee, current     Raynaud disease     Vitamin D deficiency disease     Mild sleep apnea     Atopic rhinitis     Chronic bilateral low back pain without sciatica     Morbid obesity (H)     Muscle tension dysphonia     Palpitations     Dysphagia, unspecified type     Anxiety     Manic behavior (H)     Benign essential hypertension     Callus of foot       Major Surgery- n/a    ALLERGY       Seroquel [quetiapine]; Olanzapine; Penicillins; Risperidone; and Thimerosal  MEDICATIONS        Current Outpatient Medications   Medication Sig Dispense Refill     Carboxymethylcellulose Sod PF (REFRESH PLUS) 0.5 % SOLN ophthalmic solution Place 1 drop into both eyes 3 times daily as needed for dry eyes       divalproex sodium delayed-release (DEPAKOTE) 250 MG DR tablet Take 2 tablets (500 mg) by mouth At Bedtime 60 tablet 1     estrogen conj-medroxyPROGESTERone (PREMPRO) 0.625-2.5 MG tablet Take 1 tablet by mouth daily 90 tablet 3     lidocaine, viscous, (XYLOCAINE) 2 % solution SWISH AND SPIT 5ML BY MOUTH EVERY 3 HOURS AS NEEDED 100 mL 0     lisinopril (PRINIVIL/ZESTRIL) 10 MG tablet Take 1 tablet (10 mg) by mouth daily 90 tablet 1     lithium 300 MG capsule Take 3 capsules (900 mg) by mouth At Bedtime 90 capsule 1     Loratadine 10 MG capsule Take 10 mg by mouth daily as needed.        metroNIDAZOLE (METROGEL) 1 % gel Apply topically daily 60 g 9     order for DME Please measure and distribute 1 pair of 20mmHg - 30mmHg thigh high open or closed toe compression stockings. Jobst ultrasheer or equivalent. 2 each 3     order for DME Please measure and distribute 1 pair of 20mmHg - 30mmHg knee high open toe compression stockings. Jobst ultrasheer or equivalent. 1 each 1     ORDER FOR DME Use your CPAP device as directed by your provider.       VITAMIN D, CHOLECALCIFEROL, PO Take 2,000 Units by mouth daily       VITALS      [3, 3]   /85   Pulse 72   Wt 132.9 kg (293 lb)   BMI 44.55 kg/m     MENTAL STATUS EXAM      [9, 14 cog gs]     Alertness: alert  and oriented  Appearance: well groomed  Behavior/Demeanor: cooperative, pleasant and calm, with good  eye contact   Speech: normal and regular rate and rhythm  Language: intact  Psychomotor: fine tremor in bilateral hands noted at rest  Mood: stable, pretty good  Affect: euthymic; was congruent to mood; was congruent to content  Thought Process/Associations: unremarkable  Thought Content:  Reports none;  Denies suicidal ideation, violent ideation, delusions and paranoid ideation  Perception:  Reports none;  Denies auditory hallucinations and visual hallucinations  Insight: good  Judgment: excellent  Cognition: (6) does  appear grossly intact; formal cognitive testing was not done  Gait and Station: unremarkable    LABS and DATA     AIMS:  0 on 12/13/2018    PHQ9 TODAY = 9  PHQ-9 SCORE 5/3/2019 6/14/2019 7/17/2019   PHQ-9 Total Score 7 6 7     LITHIUM LABS     [level, renal, SG, TSH, WBC]    q6 mo  Recent Labs   Lab Test 05/01/19  0921 01/14/19  0913 01/02/19  0839   LITHIUM 0.61 0.55* 0.37*     Recent Labs   Lab Test 05/01/19  0921 12/20/18  1214 12/14/18  1235 12/06/18  1118   CR 0.54 0.47* 0.43* 0.4*   GFRESTIMATED >90 >90 >90 >90    132* 129* 132.3*   POTASSIUM 4.6 4.4 4.2 4.9*   SHERRIE 9.0 9.2 8.7 9.2     Recent Labs   Lab Test  05/01/19  1140 11/01/12  0819   SG 1.008 1.015     Recent Labs   Lab Test 05/01/19  0921 11/10/18  0756 08/31/18 2029 04/06/18  1222   TSH 2.69 2.29 2.19 0.99       VALPROIC ACID LABS     [liver enzymes, WBC, ANEU, Hgb, plts, +ammonia]  q6-12 mo  Recent Labs   Lab Test 05/01/19  0921 01/14/19  0913   SOFIYA 77 107*     Recent Labs   Lab Test 05/01/19  0921 12/14/18  1235   AST 19 17   ALT 13 23   ALKPHOS 46 50     Recent Labs   Lab Test 05/01/19  0921 01/14/19  0913  12/14/18  1235   WBC 5.7  --   --  4.4   ANEU 3.7  --   --  2.3   HGB 14.4  --   --  14.3    170   < > 147*    < > = values in this interval not displayed.       PSYCHOTROPIC DRUG INTERACTIONS     Concurrent use of LITHIUM and ACE INHIBITORS may result in increased risk of lithium toxicity.    MANAGEMENT:  Monitoring for adverse effects, routine vitals, routine labs and patient is aware of risks    RISK STATEMENT for SAFETY     Angy Haji did not appear to be an imminent safety risk to self or others.    DIAGNOSIS     Bipolar Disorder, Type I (late onset)  - Current mild depressive symptoms  - only manic episode was Nov/Dec 2018  H/o EPSE secondary to neuroleptic use  H/o Thromboctyopenia secondary to depakote  H/o lithium toxicity, resolved    ASSESSMENT      [m2, h3]     TODAY Angy returns for follow-up endorsing ongoing mood stability.  She and wife Freddy continue to feel that their life remains quite stressful in context of raising a teenage daughter, working full-time, and recent move to new home.  Angy continues to feel that she is having appropriate reaction to the stressors without presence of emerging manic or depressive symptoms.  Tolerating medications well, and hoping to continue a slow dose decrease of Depakote with goal of discontinuation and lithium monotherapy.  Agreeable to follow-up in 4-6 weeks, at which point we will obtain lithium and valproic acid labs.  No SI/HI/SIB/AVH, no safety concerns today. Refills provided.        PLAN      [m2, h3]     1) PSYCHOTROPIC MEDICATIONS:  -Decrease Depakote to 500 mg at bedtime   -tentative plan to taper by 250 mg every month  -Continue lithium 900 at bedtime    2) [place]  was not checked today:  not using controlled substances.    3) THERAPY:    Continue individual psychotherapist in private practice every 2 weeks    4) NEXT DUE:    Labs- [place] and VPA labs due Nov 2019, will order at next visit  EKG- N/A  Rating Scales- Repeat MOCA next visit    5) REFERRALS:    discussed wt mgmt referral at last visit     6) RTC: 4-6wks    7) CRISIS NUMBERS:   Provided routinely in AVS   ONLY if a [facility] PT: Krissy [place] [phone] (clinic), [phone] (after hours)     TREATMENT RISK STATEMENT:  The risks, benefits, alternatives and potential adverse effects have been discussed and are understood by the pt. The pt understands the risks of using street drugs or alcohol. There are no medical contraindications, the pt agrees to treatment with the ability to do so. The pt knows to call the clinic for any problems or to access emergency care if needed.  Medical and substance use concerns are documented above.  Psychotropic drug interaction check was done, including changes made today.    PROVIDER: Ricci Villa MD    Patient not staffed in clinic. Supervisor is Dr. Liu,  who will sign the note.      Attestation:  I did not see Angy LILIAN Haji directly. I have reviewed the documentation and I agree with the resident's plan of care.   Chai Liu MD, MD

## 2019-08-28 ENCOUNTER — OFFICE VISIT (OUTPATIENT)
Dept: PSYCHIATRY | Facility: CLINIC | Age: 54
End: 2019-08-28
Attending: PSYCHIATRY & NEUROLOGY
Payer: COMMERCIAL

## 2019-08-28 VITALS
DIASTOLIC BLOOD PRESSURE: 85 MMHG | SYSTOLIC BLOOD PRESSURE: 135 MMHG | BODY MASS INDEX: 44.55 KG/M2 | HEART RATE: 72 BPM | WEIGHT: 293 LBS

## 2019-08-28 DIAGNOSIS — F31.9 BIPOLAR I DISORDER (H): ICD-10-CM

## 2019-08-28 PROCEDURE — G0463 HOSPITAL OUTPT CLINIC VISIT: HCPCS | Mod: ZF

## 2019-08-28 RX ORDER — DIVALPROEX SODIUM 250 MG/1
500 TABLET, DELAYED RELEASE ORAL AT BEDTIME
Qty: 60 TABLET | Refills: 1 | Status: SHIPPED | OUTPATIENT
Start: 2019-08-28 | End: 2019-10-02

## 2019-08-28 RX ORDER — LITHIUM CARBONATE 300 MG/1
900 CAPSULE ORAL AT BEDTIME
Qty: 90 CAPSULE | Refills: 1 | Status: SHIPPED | OUTPATIENT
Start: 2019-08-28 | End: 2019-10-02

## 2019-08-28 ASSESSMENT — PAIN SCALES - GENERAL: PAINLEVEL: NO PAIN (0)

## 2019-08-28 NOTE — PATIENT INSTRUCTIONS
Thank you for coming to the PSYCHIATRY CLINIC.    Lab Testing:  If you had lab testing today and your results are reassuring or normal they will be mailed to you or sent through [employer] within 7 days.   If the lab tests need quick action we will call you with the results.  The phone number we will call with results is # [phone] (home) [phone] (work). If this is not the best number please call our clinic and change the number.    Medication Refills:  If you need any refills please call your pharmacy and they will contact us. Our fax number for refills is [phone]. Please allow three business for refill processing.   If you need to  your refill at a new pharmacy, please contact the new pharmacy directly. The new pharmacy will help you get your medications transferred.     Scheduling:  If you have any concerns about today's visit or wish to schedule another appointment please call our office during normal business hours [phone] (8-5:00 M-F)    Contact Us:  Please call [phone] during business hours (8-5:00 M-F).  If after clinic hours, or on the weekend, please call  [phone].    Financial Assistance [phone]  [facility] Billing [phone]  [facility] Billing Office, [facility]: [phone]  [facility] Billing [phone]  Medical Records [phone]      MENTAL HEALTH CRISIS NUMBERS:  [facility]:   [facility] - [phone]   Crisis Residence [facility] - [phone]   Walk-In Counseling [facility] [phone]   COPE 24/7 [facility] Mobile Team for Adults - [[phone]]; Child - [[phone]]        Psychiatric:   [facility] - [phone]   Walk-in counseling [facility] - [phone]   Walk-in counseling [facility] - [phone]   Crisis Residence [facility] - [phone]   Urgent Care Adult Mental Health:   --Drop-in, 24/7 crisis line, and [facility] Mobile Team  [[phone]]    CRISIS TEXT LINE: Text [phone] from anywhere, anytime, any crisis 24/7;    OR SEE www.crisistextline.org     Poison Control Center - [phone]    CHILD: [facility] needs assessment team - [phone]     [facility] - [phone]; or [facility] Lif[facility] - [phone]    If you have a medical emergency please call 911or go to the nearest ER.                    _____________________________________________    Again thank you for choosing PSYCHIATRY CLINIC and please let us know how we can best partner with you to improve you and your family's health.  You may be receiving a survey in the mail regarding this appointment. We would love to have your feedback, both positive and negative, so please fill out the survey and return it using the provided envelope. The survey is done by an external company, so your answers are anonymous.

## 2019-08-30 ASSESSMENT — PATIENT HEALTH QUESTIONNAIRE - PHQ9: SUM OF ALL RESPONSES TO PHQ QUESTIONS 1-9: 9

## 2019-09-06 ENCOUNTER — THERAPY VISIT (OUTPATIENT)
Dept: PHYSICAL THERAPY | Facility: CLINIC | Age: 54
End: 2019-09-06
Payer: COMMERCIAL

## 2019-09-06 DIAGNOSIS — M54.50 CHRONIC BILATERAL LOW BACK PAIN WITHOUT SCIATICA: Primary | ICD-10-CM

## 2019-09-06 DIAGNOSIS — G89.29 CHRONIC BILATERAL LOW BACK PAIN WITHOUT SCIATICA: Primary | ICD-10-CM

## 2019-09-06 PROCEDURE — 97110 THERAPEUTIC EXERCISES: CPT | Mod: GP | Performed by: PHYSICAL THERAPIST

## 2019-09-06 PROCEDURE — 97112 NEUROMUSCULAR REEDUCATION: CPT | Mod: GP | Performed by: PHYSICAL THERAPIST

## 2019-09-06 PROCEDURE — 97140 MANUAL THERAPY 1/> REGIONS: CPT | Mod: GP | Performed by: PHYSICAL THERAPIST

## 2019-09-20 ENCOUNTER — THERAPY VISIT (OUTPATIENT)
Dept: PHYSICAL THERAPY | Facility: CLINIC | Age: 54
End: 2019-09-20
Payer: COMMERCIAL

## 2019-09-20 ENCOUNTER — HOSPITAL ENCOUNTER (OUTPATIENT)
Dept: NUTRITION | Facility: CLINIC | Age: 54
Discharge: HOME | End: 2019-09-20
Admitting: DIETITIAN, REGISTERED
Payer: COMMERCIAL

## 2019-09-20 DIAGNOSIS — M54.50 CHRONIC BILATERAL LOW BACK PAIN WITHOUT SCIATICA: Primary | ICD-10-CM

## 2019-09-20 DIAGNOSIS — G89.29 CHRONIC BILATERAL LOW BACK PAIN WITHOUT SCIATICA: Primary | ICD-10-CM

## 2019-09-20 PROCEDURE — 97112 NEUROMUSCULAR REEDUCATION: CPT | Mod: GP | Performed by: PHYSICAL THERAPIST

## 2019-09-20 PROCEDURE — 97803 MED NUTRITION INDIV SUBSEQ: CPT | Performed by: DIETITIAN, REGISTERED

## 2019-09-20 PROCEDURE — 97110 THERAPEUTIC EXERCISES: CPT | Mod: GP | Performed by: PHYSICAL THERAPIST

## 2019-09-20 NOTE — PROGRESS NOTES
"NUTRITION REASSESSMENT NOTE      REASON FOR ASSESSMENT   Angy QUINTANA Raissa referred by Dr. Odell for MNT related to  Obesity (BMI 30-39.9) [E66.9]  - Primary       Benign essential hypertension [I10]       Patient accompanied by self      NUTRITION HISTORY  Patient with multiple changes in the past few months as patient newly diagnosed with bipolar, changes in medications, working new schedule and bought new home.  Patient continues to struggle with evening eating as feels want to self soothe with food.  Patient concerned with weight gain due to new medications.       DIET RECALL   Breakfast: yogurt, granola (measures), fruit + coffee; 2 slices toast with butter + 1/2 cup cottage cheese    Lunch: leftovers (includes fruit and vegetable daily)  Dinner: entree, side, fruit and vegetable- farmers' pastahamburger with fruit and vegetables; daughter continues to follow vegetarian diet     Snacks: Kettle corn; cheese and crackers; lemon cake dessert; premium ice cream or regular ice cream; cheese cake   Beverages: mainly water, coffee, mineral water  Dining out: varies   Exercise: Patient exercising 5 times per week (walking, walking dog, swimming or elliptical).          MEDICATIONS:  Reviewed      ANTHROPOMETRICS  Height: 5'8\" (note height change in chart previously 5'9\")  Weight: 292 lbs   BMI (kg/m2): 41.9 kg/m2   %IBW: 196%  Weight History:   Wt Readings from Last 10 Encounters:   06/26/19 131.5 kg (290 lb)   05/03/19 129.3 kg (285 lb)   02/04/19 124.7 kg (275 lb)   12/13/18 120.3 kg (265 lb 4.8 oz)   12/06/18 120.5 kg (265 lb 9.6 oz)   11/20/18 119.4 kg (263 lb 3.2 oz)   11/13/18 119.6 kg (263 lb 11.2 oz)   11/07/18 116.1 kg (256 lb)   11/05/18 117 kg (258 lb)   11/01/18 116.1 kg (256 lb)      ASSESSED NUTRITION NEEDS  Estimated Energy Needs: 7406-4885 kcals (15-20 kcals/kg) for gradual weight loss   Estimated Protein Needs: 66-79 (1-1.2 gm/kg IBW) for repletion with exercise  RMR: 1440     EVALUATION/PROGRESS TOWARDS " GOALS   Previous goals:  Limit desserts to 3 per week with practice of mindful eating-not met  Exercise 5 times per week for 30 minutes-met  Prepare meals on the weekend for the week ahead-met      Previous Nutrition Diagnosis: Obesity related to excess energy intake as evidenced by BMI of 41.9 kg/m2-no change     Current Nutrition Diagnosis:Obesity related to excess energy intake as evidenced by BMI of 41.9 kg/m2     INTERVENTIONS   Nutrition Prescription   Recommend exercise 3-5 times per week; track intake if eating mindlessly; increase fiber to 25-30 grams per day      Implementation:   Meals and Snacks: Continue eating 3 meals + deliberate snacking (if hungry)   Nutrition Education (Content):               a)  Discussed progress towards goals.  Congratulated patient on awareness of emotional eating in the evening.  Reviewed struggles with evening eating as patient was not able to reduce dessert intake.  Supported patient in her struggle with food and mental health issues.     Nutrition Education (Application):               a) Determined non-food alternative options for emotional eating.  Discussed tracking exercise and desserts in monthly planner.                         b) Patient verbalized understanding of diet by stating will limit  desserts to 3 times per week.                       Anticipate fair-good compliance     CURRENT GOALS   Limit desserts to 3-4 per week by practicing mindful eating   Exercise 5 times per week for 30 minutes  Pick alternative activity to self soothe    FOLLOW UP/MONITORING    Patient to follow up in 8 weeks  Patient has RD contact information      Time spent with patient: 30 minutes     Mattie Amaya, RD, LD  [facility] Outpatient Dietitian  [phone] (office phone)

## 2019-09-25 ENCOUNTER — TELEPHONE (OUTPATIENT)
Dept: VASCULAR SURGERY | Facility: CLINIC | Age: 54
End: 2019-09-25

## 2019-09-25 DIAGNOSIS — I83.893 SYMPTOMATIC VARICOSE VEINS OF BOTH LOWER EXTREMITIES: Primary | ICD-10-CM

## 2019-09-27 ENCOUNTER — TELEPHONE (OUTPATIENT)
Dept: VASCULAR SURGERY | Facility: CLINIC | Age: 54
End: 2019-09-27

## 2019-09-27 DIAGNOSIS — I83.893 SYMPTOMATIC VARICOSE VEINS OF BOTH LOWER EXTREMITIES: Primary | ICD-10-CM

## 2019-09-27 NOTE — TELEPHONE ENCOUNTER
I received a call from Angy, due to health issues she wasn't able to follow up on her varicose vein treatment.  She is back and ready to schedule.  I am unable to get a procedure date that works for her in time for her prior authorization expiration and her imaging is almost a year old.  Plan is to get her a new venous competency study and to see Dr Solorio in clinic.  Pt confirms the plan.  ELADIO Vigil RN, BSN  Interventional Radiology Nurse Coordinator   Phone:  [phone]

## 2019-09-27 NOTE — TELEPHONE ENCOUNTER
See other tele note, pt's prior authoration has run out for vein treatment.  Plan is to get her back for repeat competency ultrasound and clinic visit with Dr Solorio and resubmit.  All pt questions answered.  ELADIO Vigil RN, BSN  Interventional Radiology Nurse Coordinator   Phone:  [phone]

## 2019-09-29 NOTE — PROGRESS NOTES
"       [facility]  Psychiatry Clinic  PSYCHIATRIC PROGRESS NOTE     Date of initial Diagnostic Assessment (DA) is 12/5/18.  Date of most recent Transfer of Care Diagnostic Assessment is 07/16/19.     CARE TEAM:  PCP- Jono Odell    Therapist- Aylin Caro MA, LP in private practice      Specialty Providers- OB/GYN, ENT    Community MH Team- erik    Angy Haji is a 54 year old female who prefers the name Angy & pronouns she, her, hers.     Pertinent Background: Patient has not had significant mental health history until 2018, when she developed perimenopausal mood and anxiety symptoms.  She was subsequently referred to our clinic following a hospitalization for anthony in November 2018.  Her care has been complicated by sensitivity to antipsychotics (EPSE including difficulty swallowing), thrombocytopenia secondary to Depakote, lithium toxicity in setting of concurrent Depakote use.     Psych critical item history includes Manic episode, psychiatric hospitalization in November 2018.    INTERIM HISTORY      [4, 4]   The patient reports good treatment adherence.  History was provided by the patient and family who were good historians.  The last visit ended with the following med change: lowered depakote by 250 mg.   Since the last visit:   - Saw dietician 9/20, reviewed ongoing issues with emotional eating, worked on nonfood alternatives, worked on limiting desserts to 3-4/week, regular exercise routine, f/u in 8wks  - Wife Rachael present with patient today.  - Doing well overall. Ongoing elevated stress level associated with moving, \"lots to do.\"  - \"I'm not enjoying a whole lot\". Denies depressed mood, but having some anhedonia. Mostly feeling stressed and overwhelmed.  Also endorses a sense of grief and loss associated with selling their house  - Wife notes Angy is really attached to their old house for \"really, lots of emotional reasons.\" She bought this house 24y ago when she was " "single. Wife Rachael moved in 2000, and they build their life together while living in this house.   - Daughter involved in daily activities that requires transportaion. Getting bids on repairs on new homes, and getting ready to put their old house on the market.   - No hopelessness, no SI, no thoughts about not being alive. Sleep is stable, just busy frequently into the night. Appetite remains an issue due to using food as a coping mechanism for stress. Working with dietician on this.   - Energy level is so-so, seems mostly related to how busy they are.   - Saturday was their wedding anniversary. Went biking, then went to [employer]. Felt \"bad\" on the bike ride due to feeling out of shape. Notes \"it was a beautiful day and I enjoyed being outside\".   - Notes one \"close call\" near panic attack recently which she was able to resolve before it peaked. Had a little hyperventilation.   - Taking walks with the dog which she finds calming.   - Tolerating medications well, ongoing minimal tremor bilaterally which hasn't changed.     RECENT PSYCH ROS:   Depression:  anhedonia, low energy and overwhelmed  Elevated:  none  Psychosis:  none  Anxiety:  Continues to feel somewhat overwhelmed in context of multiple social stressors  Panic Attack:  1 recent episode of panic with elevated anxiety and brief period of hyperventilation, which resolved with deep breathing and taking a cool shower  Trauma Related:  none  Dysregulation:  none  Eating Disorder: some overeating, working with dietician   Pertinent Negative Symptoms:  NO suicidal ideation, self-injurious behavior/urges, violent ideation, aggression, psychosis, hallucinations, delusions, anthony and hypomania    RECENT SUBSTANCE USE:     Alcohol- very rare, weekly at most, always social, never more than 1  Tobacco- No  Caffeine- drinks around 24oz of coffee during the day  Opioids- No           Narcan Kit- N/A   Cannabis- No     Other illicit drugs- none    CURRENT SOCIAL " HISTORY:  Financial/ Work- 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   Fine tremor in bilateral hands, noticeable when doing fine movements, unchanged for past few months.  Denies polydipsia, polyuria and nocturia, rash, abdominal or back/ flank pain, change related to urination and frequent infections    PSYCH and CD Critical Summary Points since July 2019 July 2019 - no change  Aug 2019 - decreased the Depakote to 500 mg nightly  Oct 2019 - no changes, VPA and LI labs ordered    PAST MED TRIALS          See last Diagnostic Assessment  MEDICAL / SURGICAL HISTORY                                   Pregnant or breastfeeding- no      Contraception- HRT for menopause    Patient Active Problem List   Diagnosis     Oral aphthae     Obesity (BMI 30-39.9)     Disorder of bursae and tendons in shoulder region     CARDIOVASCULAR SCREENING; LDL GOAL LESS THAN 160     Rosacea     Tear of medial cartilage or meniscus of knee, current     Raynaud disease     Vitamin D deficiency disease     Mild sleep apnea     Atopic rhinitis     Chronic bilateral low back pain without sciatica     Morbid obesity (H)     Muscle tension dysphonia     Palpitations     Dysphagia, unspecified type     Anxiety     Manic behavior (H)     Benign essential hypertension     Callus of foot       Major Surgery- n/a    ALLERGY       Seroquel [quetiapine]; Olanzapine; Penicillins; Risperidone; and Thimerosal  MEDICATIONS        Current Outpatient Medications   Medication Sig Dispense Refill     Carboxymethylcellulose Sod PF (REFRESH PLUS) 0.5 % SOLN  ophthalmic solution Place 1 drop into both eyes 3 times daily as needed for dry eyes       divalproex sodium delayed-release (DEPAKOTE) 250 MG DR tablet Take 2 tablets (500 mg) by mouth At Bedtime 60 tablet 1     estrogen conj-medroxyPROGESTERone (PREMPRO) 0.625-2.5 MG tablet Take 1 tablet by mouth daily 90 tablet 3     lidocaine, viscous, (XYLOCAINE) 2 % solution SWISH AND SPIT 5ML BY MOUTH EVERY 3 HOURS AS NEEDED 100 mL 0     lisinopril (PRINIVIL/ZESTRIL) 10 MG tablet Take 1 tablet (10 mg) by mouth daily 90 tablet 1     lithium 300 MG capsule Take 3 capsules (900 mg) by mouth At Bedtime 90 capsule 1     Loratadine 10 MG capsule Take 10 mg by mouth daily as needed.       metroNIDAZOLE (METROGEL) 1 % gel Apply topically daily 60 g 9     order for DME Please measure and distribute 1 pair of 20mmHg - 30mmHg thigh high open or closed toe compression stockings. Jobst ultrasheer or equivalent. 2 each 3     order for DME Please measure and distribute 1 pair of 20mmHg - 30mmHg knee high open toe compression stockings. Jobst ultrasheer or equivalent. 1 each 1     ORDER FOR DME Use your CPAP device as directed by your provider.       VITAMIN D, CHOLECALCIFEROL, PO Take 2,000 Units by mouth daily       VITALS      [3, 3]   There were no vitals taken for this visit.   MENTAL STATUS EXAM      [9, 14 cog gs]     Alertness: alert  and oriented  Appearance: well groomed  Behavior/Demeanor: cooperative, pleasant and calm, with good  eye contact   Speech: normal and regular rate and rhythm  Language: intact  Psychomotor: fine tremor in bilateral hands noted at rest  Mood: stable, pretty good  Affect: slightly less bright than previous visit, however still brightens at appropriate times; was congruent to mood; was congruent to content  Thought Process/Associations: unremarkable  Thought Content:  Reports none;  Denies suicidal ideation, violent ideation, delusions and paranoid ideation  Perception:  Reports none;  Denies auditory  hallucinations and visual hallucinations  Insight: excellent  Judgment: excellent  Cognition: (6) does  appear grossly intact; formal cognitive testing was not done  Gait and Station: unremarkable    LABS and DATA     AIMS:  0 on 12/13/2018    PHQ9 TODAY = 5  PHQ-9 SCORE 6/14/2019 7/17/2019 8/28/2019   PHQ-9 Total Score 6 7 9     LITHIUM LABS     [level, renal, SG, TSH, WBC]    q6 mo  Recent Labs   Lab Test 05/01/19  0921 01/14/19  0913 01/02/19  0839   LITHIUM 0.61 0.55* 0.37*     Recent Labs   Lab Test 05/01/19  0921 12/20/18  1214 12/14/18  1235 12/06/18  1118   CR 0.54 0.47* 0.43* 0.4*   GFRESTIMATED >90 >90 >90 >90    132* 129* 132.3*   POTASSIUM 4.6 4.4 4.2 4.9*   SHERRIE 9.0 9.2 8.7 9.2     Recent Labs   Lab Test 05/01/19  1140 11/01/12  0819   SG 1.008 1.015     Recent Labs   Lab Test 05/01/19  0921 11/10/18  0756 08/31/18  2029 04/06/18  1222   TSH 2.69 2.29 2.19 0.99       VALPROIC ACID LABS     [liver enzymes, WBC, ANEU, Hgb, plts, +ammonia]  q6-12 mo  Recent Labs   Lab Test 05/01/19  0921 01/14/19  0913   SOFIYA 77 107*     Recent Labs   Lab Test 05/01/19  0921 12/14/18  1235   AST 19 17   ALT 13 23   ALKPHOS 46 50     Recent Labs   Lab Test 05/01/19  0921 01/14/19  0913  12/14/18  1235   WBC 5.7  --   --  4.4   ANEU 3.7  --   --  2.3   HGB 14.4  --   --  14.3    170   < > 147*    < > = values in this interval not displayed.       PSYCHOTROPIC DRUG INTERACTIONS     Concurrent use of LITHIUM and ACE INHIBITORS may result in increased risk of lithium toxicity.    MANAGEMENT:  Monitoring for adverse effects, routine vitals, routine labs and patient is aware of risks    RISK STATEMENT for SAFETY     Angy Haji did not appear to be an imminent safety risk to self or others.    DIAGNOSIS     Bipolar Disorder, Type I (late onset), depressive episode, mild    (only manic episode was Nov/Dec 2018)  H/o EPSE secondary to neuroleptic use  H/o Thromboctyopenia secondary to depakote  H/o lithium toxicity,  resolved    ASSESSMENT      [m2, h3]     TODAY Angy returns for follow-up endorsing ongoing overall mood stability.  Ongoing sense of feeling overwhelmed with multiple social stressors.  Notes some uptick in symptoms of anhedonia, which she relates at least in part due to the grief process associated with selling her house that she has owned since the 90s.  Denies overt symptoms of depression, hopelessness, thoughts of death, suicidal thoughts.  No safety concerns.  Explored possibility that she is experiencing some emerging depressive symptoms versus coping appropriately with ongoing high levels of social stressors.  She and wife Freddy agree with plan to continue to monitor for depressive symptoms and make no changes today in her medications.  Agreed with plan to have blood drawn prior to next visit for both Depakote and lithium labs.  Using this data, we will consider an additional dose decrease in Depakote to 250 mg in 1 month's time.  Provided refills.       PLAN      [m2, h3]     1) PSYCHOTROPIC MEDICATIONS:  - Continue Depakote 500 mg at bedtime   -tentative plan to taper by 250 mg every month  -Continue lithium 900 at bedtime    2) [place]  [place]:  not using controlled substances.    3) THERAPY:    Continue individual psychotherapist in private practice every 2 weeks  Looking for new therapist due to insurance issues. Provided referral list for [facility] and [facility]    4) NEXT DUE:    Labs- Li and VPA labs ordered  EKG- N/A  Rating Scales- Repeat MOCA next visit    5) REFERRALS:    discussed wt mgmt referral at last visit     6) RTC: 4wks    7) CRISIS NUMBERS:   Provided routinely in AVS   ONLY if a [facility] PT: Krissy [place] Lar[place] [phone] (clinic), [phone] (after hours)     TREATMENT RISK STATEMENT:  The risks, benefits, alternatives and potential adverse effects have been discussed and are understood by the pt. The pt understands the risks of using street drugs or alcohol. There are no  medical contraindications, the pt agrees to treatment with the ability to do so. The pt knows to call the clinic for any problems or to access emergency care if needed.  Medical and substance use concerns are documented above.  Psychotropic drug interaction check was done, including changes made today.    PROVIDER: Ricci Villa MD    Patient not staffed in clinic. Supervisor is Dr. Liu,  who will sign the note.      Attestation:  I did not see Angy Haji directly. I have reviewed the documentation and I agree with the resident's plan of care.   Chai Liu MD, MD

## 2019-09-30 ENCOUNTER — HEALTH MAINTENANCE LETTER (OUTPATIENT)
Age: 54
End: 2019-09-30

## 2019-10-02 ENCOUNTER — OFFICE VISIT (OUTPATIENT)
Dept: PSYCHIATRY | Facility: CLINIC | Age: 54
End: 2019-10-02
Attending: PSYCHIATRY & NEUROLOGY
Payer: COMMERCIAL

## 2019-10-02 VITALS
HEART RATE: 68 BPM | SYSTOLIC BLOOD PRESSURE: 147 MMHG | DIASTOLIC BLOOD PRESSURE: 89 MMHG | WEIGHT: 293 LBS | BODY MASS INDEX: 44.7 KG/M2

## 2019-10-02 DIAGNOSIS — F31.9 BIPOLAR I DISORDER (H): ICD-10-CM

## 2019-10-02 PROCEDURE — G0463 HOSPITAL OUTPT CLINIC VISIT: HCPCS | Mod: ZF

## 2019-10-02 RX ORDER — LITHIUM CARBONATE 300 MG/1
900 CAPSULE ORAL AT BEDTIME
Qty: 90 CAPSULE | Refills: 0 | Status: SHIPPED | OUTPATIENT
Start: 2019-10-02 | End: 2019-11-06

## 2019-10-02 RX ORDER — DIVALPROEX SODIUM 250 MG/1
500 TABLET, DELAYED RELEASE ORAL AT BEDTIME
Qty: 60 TABLET | Refills: 0 | Status: SHIPPED | OUTPATIENT
Start: 2019-10-02 | End: 2019-11-06

## 2019-10-02 ASSESSMENT — PAIN SCALES - GENERAL: PAINLEVEL: NO PAIN (0)

## 2019-10-02 ASSESSMENT — PATIENT HEALTH QUESTIONNAIRE - PHQ9: SUM OF ALL RESPONSES TO PHQ QUESTIONS 1-9: 5

## 2019-10-02 NOTE — PATIENT INSTRUCTIONS
No changes today! Please get labs drawn (btw 9a and noon) any time before our next visit.  See you in a month!    Mental Health Clinics in the [facility]    [facility] ([facility]) [phone]  [facility] ([facility]) ([place]) [phone]  [facility] (Several Locations) [phone]  [facility] ([facility]) [phone]  [facility] ([place]) [phone]  [facility] ([facility]) [phone]  [facility] ([place]) [phone]  [facility]* ([place]) [phone]  [place] [facility] ([place]) [phone]  [facility] ([place]) [phone]  [place] [facility]) [phone]  [facility] ([facility]) [phone]  [facility] ([place]) [phone]  [facility] ([facility]) [phone]  [facility] ([place]) [phone]  [facility] ([place]) [phone]  [facility] [place] ([place]) [phone]  [facility] ([place]) [phone]  [facility] ([place]) [phone]  [facility] ([place]) [phone]    *Offers sliding fee schedule    You can also contact your insurance company, by calling the [phone] number on your insurance card, and they would be able to give you a list of providers they would cover.     Therapy Clinics in/near [place]:    [facility] ([facility]) [phone]  [facility] ([facility]) [phone]  [facility] ([place]) [phone]  [facility] ([facility]) [phone]  [facility])  [phone]   [facility] (women only) ([facility]) [phone]  [facility]  [facility] ([facility]) [phone]  [facility] (Multiple Locations) [phone]  [facility] ([facility]) [phone]  [facility]* ([place]) [phone]  [facility] ([place]) [phone]  [facility] ([facility]) [phone]  [place] [facility] ([place], [facility]) [phone]  [facility]* ([facility]) [phone]   [facility] ([facility]) [phone]  [facility] ([place]) [phone]  [facility] ([facility]) [phone]  [facility] ([facility]) [phone]  [facility]* ([facility]) [phone]    *Offers sliding fee schedule    You can also contact your insurance company, by calling the [phone] number on your insurance card, and they would be able to give you a list of providers they would cover.     Thank you for coming to the PSYCHIATRY CLINIC.    Lab Testing:  If you had lab testing today and your results are reassuring or normal they will be mailed to you or sent through [employer] within 7 days.   If the lab tests need quick action we will call you with the results.  The phone number we will call with results is # [phone] (home) [phone] (work). If this is not the best number please call our clinic and change the number.    Medication Refills:  If you need any refills please call your pharmacy and they will contact us. Our fax number for refills is [phone]. Please allow three business for refill processing.   If you need to  your refill at a new pharmacy, please contact the new pharmacy directly. The new pharmacy will help you get your medications transferred.     Scheduling:  If you have any concerns about today's visit or wish to schedule another  appointment please call our office during normal business hours [phone] (8-5:00 M-F)    Contact Us:  Please call [phone] during business hours (8-5:00 M-F).  If after clinic hours, or on the weekend, please call  [phone].    Financial Assistance [phone]  [facility] [phone]  [facility], [facility]: [phone]  [place]illing [phone]  Medical Records [phone]      MENTAL HEALTH CRISIS NUMBERS:  [facility]:   [facility] - [phone]   Crisis Residence [facility] - [phone]   Walk-In Counseling Center [facility] [place] - [phone]   COPE 24/7 [place] Mobile Team for Adults - [[phone]]; Child - [[phone]]        [facility] - [phone]   Walk-in counseling [facility] - [phone]   Walk-in counseling [facility] - [phone]   Crisis Residence [facility] - [phone]   Urgent Care Adult Mental Health:   --Drop-in, 24/7 crisis line, and [facility] Mobile Team [[phone]]    CRISIS TEXT LINE: Text [phone] from anywhere, anytime, any crisis 24/7;    OR SEE www.crisistextline.org     Poison Control Center - [phone]    CHILD: [facility] needs assessment team - [phone]     Trans Lifeline - 1-605.241.3267; or [place] [facility] - 1-219.384.4082    If you have a medical emergency please call 911or go to the nearest ER.                    _____________________________________________    Again thank you for choosing PSYCHIATRY CLINIC and please let us know how we can best partner with you to improve you and your family's health.  You may be receiving a survey in the mail regarding this appointment. We would love to have your feedback, both positive and negative, so please fill out the survey and return it using the provided envelope. The survey is done by an external company, so your answers are anonymous.

## 2019-10-15 DIAGNOSIS — F31.9 BIPOLAR I DISORDER (H): ICD-10-CM

## 2019-10-15 LAB
ALBUMIN SERPL-MCNC: 3.6 G/DL (ref 3.4–5)
ALBUMIN UR-MCNC: NEGATIVE MG/DL
ALP SERPL-CCNC: 61 U/L (ref 40–150)
ALT SERPL W P-5'-P-CCNC: 18 U/L (ref 0–50)
AMMONIA PLAS-SCNC: 44 UMOL/L (ref 10–50)
ANION GAP SERPL CALCULATED.3IONS-SCNC: 6 MMOL/L (ref 3–14)
APPEARANCE UR: CLEAR
AST SERPL W P-5'-P-CCNC: 13 U/L (ref 0–45)
BACTERIA #/AREA URNS HPF: ABNORMAL /HPF
BASOPHILS # BLD AUTO: 0 10E9/L (ref 0–0.2)
BASOPHILS NFR BLD AUTO: 0.4 %
BILIRUB SERPL-MCNC: 0.5 MG/DL (ref 0.2–1.3)
BILIRUB UR QL STRIP: NEGATIVE
BUN SERPL-MCNC: 10 MG/DL (ref 7–30)
CALCIUM SERPL-MCNC: 9.1 MG/DL (ref 8.5–10.1)
CHLORIDE SERPL-SCNC: 105 MMOL/L (ref 94–109)
CO2 SERPL-SCNC: 27 MMOL/L (ref 20–32)
COLOR UR AUTO: ABNORMAL
CREAT SERPL-MCNC: 0.45 MG/DL (ref 0.52–1.04)
DIFFERENTIAL METHOD BLD: NORMAL
EOSINOPHIL # BLD AUTO: 0.2 10E9/L (ref 0–0.7)
EOSINOPHIL NFR BLD AUTO: 3.1 %
ERYTHROCYTE [DISTWIDTH] IN BLOOD BY AUTOMATED COUNT: 11.9 % (ref 10–15)
GFR SERPL CREATININE-BSD FRML MDRD: >90 ML/MIN/{1.73_M2}
GLUCOSE SERPL-MCNC: 82 MG/DL (ref 70–99)
GLUCOSE UR STRIP-MCNC: NEGATIVE MG/DL
HCT VFR BLD AUTO: 43.7 % (ref 35–47)
HGB BLD-MCNC: 14.2 G/DL (ref 11.7–15.7)
HGB UR QL STRIP: NEGATIVE
IMM GRANULOCYTES # BLD: 0 10E9/L (ref 0–0.4)
IMM GRANULOCYTES NFR BLD: 0.2 %
KETONES UR STRIP-MCNC: NEGATIVE MG/DL
LEUKOCYTE ESTERASE UR QL STRIP: NEGATIVE
LITHIUM SERPL-SCNC: 0.65 MMOL/L (ref 0.6–1.2)
LYMPHOCYTES # BLD AUTO: 1.6 10E9/L (ref 0.8–5.3)
LYMPHOCYTES NFR BLD AUTO: 30.3 %
MCH RBC QN AUTO: 29.8 PG (ref 26.5–33)
MCHC RBC AUTO-ENTMCNC: 32.5 G/DL (ref 31.5–36.5)
MCV RBC AUTO: 92 FL (ref 78–100)
MONOCYTES # BLD AUTO: 0.3 10E9/L (ref 0–1.3)
MONOCYTES NFR BLD AUTO: 5.4 %
NEUTROPHILS # BLD AUTO: 3.3 10E9/L (ref 1.6–8.3)
NEUTROPHILS NFR BLD AUTO: 60.6 %
NITRATE UR QL: NEGATIVE
NRBC # BLD AUTO: 0 10*3/UL
NRBC BLD AUTO-RTO: 0 /100
PH UR STRIP: 7 PH (ref 5–7)
PLATELET # BLD AUTO: 200 10E9/L (ref 150–450)
POTASSIUM SERPL-SCNC: 4.3 MMOL/L (ref 3.4–5.3)
PROT SERPL-MCNC: 7.7 G/DL (ref 6.8–8.8)
RBC # BLD AUTO: 4.76 10E12/L (ref 3.8–5.2)
RBC #/AREA URNS AUTO: 0 /HPF (ref 0–2)
SODIUM SERPL-SCNC: 138 MMOL/L (ref 133–144)
SOURCE: ABNORMAL
SP GR UR STRIP: 1.01 (ref 1–1.03)
SQUAMOUS #/AREA URNS AUTO: 1 /HPF (ref 0–1)
TSH SERPL DL<=0.005 MIU/L-ACNC: 2.47 MU/L (ref 0.4–4)
UROBILINOGEN UR STRIP-MCNC: NORMAL MG/DL (ref 0–2)
VALPROATE SERPL-MCNC: 30 MG/L (ref 50–100)
WBC # BLD AUTO: 5.4 10E9/L (ref 4–11)
WBC #/AREA URNS AUTO: <1 /HPF (ref 0–5)

## 2019-10-15 PROCEDURE — 82140 ASSAY OF AMMONIA: CPT | Performed by: PSYCHIATRY & NEUROLOGY

## 2019-10-15 PROCEDURE — 80053 COMPREHEN METABOLIC PANEL: CPT | Performed by: PSYCHIATRY & NEUROLOGY

## 2019-10-15 PROCEDURE — 80164 ASSAY DIPROPYLACETIC ACD TOT: CPT | Performed by: PSYCHIATRY & NEUROLOGY

## 2019-10-15 PROCEDURE — 36415 COLL VENOUS BLD VENIPUNCTURE: CPT | Performed by: PSYCHIATRY & NEUROLOGY

## 2019-10-15 PROCEDURE — 85025 COMPLETE CBC W/AUTO DIFF WBC: CPT | Performed by: PSYCHIATRY & NEUROLOGY

## 2019-10-15 PROCEDURE — 80178 ASSAY OF LITHIUM: CPT | Performed by: PSYCHIATRY & NEUROLOGY

## 2019-10-15 PROCEDURE — 81001 URINALYSIS AUTO W/SCOPE: CPT | Performed by: PSYCHIATRY & NEUROLOGY

## 2019-10-15 PROCEDURE — 84443 ASSAY THYROID STIM HORMONE: CPT | Performed by: PSYCHIATRY & NEUROLOGY

## 2019-10-16 ENCOUNTER — ANCILLARY PROCEDURE (OUTPATIENT)
Dept: MAMMOGRAPHY | Facility: CLINIC | Age: 54
End: 2019-10-16
Attending: FAMILY MEDICINE
Payer: COMMERCIAL

## 2019-10-16 DIAGNOSIS — Z12.31 SCREENING MAMMOGRAM, ENCOUNTER FOR: ICD-10-CM

## 2019-10-18 ENCOUNTER — THERAPY VISIT (OUTPATIENT)
Dept: PHYSICAL THERAPY | Facility: CLINIC | Age: 54
End: 2019-10-18
Payer: COMMERCIAL

## 2019-10-18 DIAGNOSIS — M54.50 CHRONIC BILATERAL LOW BACK PAIN WITHOUT SCIATICA: Primary | ICD-10-CM

## 2019-10-18 DIAGNOSIS — G89.29 CHRONIC BILATERAL LOW BACK PAIN WITHOUT SCIATICA: Primary | ICD-10-CM

## 2019-10-18 PROCEDURE — 97110 THERAPEUTIC EXERCISES: CPT | Mod: GP | Performed by: PHYSICAL THERAPIST

## 2019-10-18 PROCEDURE — 97112 NEUROMUSCULAR REEDUCATION: CPT | Mod: GP | Performed by: PHYSICAL THERAPIST

## 2019-11-01 ENCOUNTER — THERAPY VISIT (OUTPATIENT)
Dept: PHYSICAL THERAPY | Facility: CLINIC | Age: 54
End: 2019-11-01
Payer: COMMERCIAL

## 2019-11-01 DIAGNOSIS — M54.50 CHRONIC BILATERAL LOW BACK PAIN WITHOUT SCIATICA: Primary | ICD-10-CM

## 2019-11-01 DIAGNOSIS — G89.29 CHRONIC BILATERAL LOW BACK PAIN WITHOUT SCIATICA: Primary | ICD-10-CM

## 2019-11-01 DIAGNOSIS — I83.893 SYMPTOMATIC VARICOSE VEINS OF BOTH LOWER EXTREMITIES: Primary | ICD-10-CM

## 2019-11-01 PROCEDURE — 97110 THERAPEUTIC EXERCISES: CPT | Mod: GP | Performed by: PHYSICAL THERAPIST

## 2019-11-01 PROCEDURE — 97112 NEUROMUSCULAR REEDUCATION: CPT | Mod: GP | Performed by: PHYSICAL THERAPIST

## 2019-11-05 NOTE — PROGRESS NOTES
"       [facility]  Psychiatry Clinic  PSYCHIATRIC PROGRESS NOTE     Date of initial Diagnostic Assessment (DA) is 12/5/18.  Date of most recent Transfer of Care Diagnostic Assessment is 07/16/19.     CARE TEAM:  PCP- Jono Odell    Therapist- Aylin Caro MA, LP in private practice      Specialty Providers- OB/GYN, ENT    Community MH Team- no Wife- Rachael Haji is a 54 year old female who prefers the name Angy & pronouns she, her, hers.     Pertinent Background: Patient has not had significant mental health history until 2018, when she developed perimenopausal mood and anxiety symptoms.  She was subsequently referred to our clinic following a hospitalization for anthony in November 2018.  Her care has been complicated by sensitivity to antipsychotics (EPSE including difficulty swallowing), thrombocytopenia secondary to Depakote, lithium toxicity in setting of concurrent Depakote use.     Psych critical item history includes Manic episode, psychiatric hospitalization in November 2018.    INTERIM HISTORY      [4, 4]   The patient reports good treatment adherence.  History was provided by the patient and family who were good historians.  The last visit ended with the following med change: no change.   Since the last visit:   - Mood a bit less depressed. \"More 'stressed' mood and less 'sad' mood.\"  - Wife Rachael notes seems like pt has been enjoying herself more, hasn't seemed \"so flat\", \"less anhedonic.\"   - Been getting out to enjoy herself, went to a show at the [employer] after doing chores over the weekend, enjoyed herself.  - Notes getting some pleasure and comfort from preparing their old house for new owners.    - Ongoing anxiety regarding stressors, recent move. Putting [place] house on the market soon, meeting with realtor tomorrow. Moving washer, donating piano to a nonprofit. Lots of change.  - Sleeping okay. Getting to bed easily, wakes to use restroom. Having some back " pain, affecting sleep, but doing PT to address this  - Continues with Aylin Caro for therapy every other week. Looking for a new therapist in network. Has intake on Friday in [place] with Verna Chan (private practive)    RECENT PSYCH ROS:   Depression:  low energy, overwhelmed and ongoing low mood and anhedonia but improved over last month  Elevated:  none  Psychosis:  none  Anxiety:  Continues to feel somewhat overwhelmed in context of multiple social stressors  Panic Attack:  1 recent episode of panic with elevated anxiety and brief period of hyperventilation, which resolved with deep breathing and taking a cool shower  Trauma Related:  none  Dysregulation:  none  Eating Disorder: some overeating, working with dietician   Pertinent Negative Symptoms:  NO suicidal ideation, self-injurious behavior/urges, violent ideation, aggression, psychosis, hallucinations, delusions, anthony and hypomania    RECENT SUBSTANCE USE:     Alcohol- very rare, weekly at most, always social, never more than 1  Tobacco- No  Caffeine- drinks around 24oz of coffee during the day  Opioids- No           Narcan Kit- N/A   Cannabis- No     Other illicit drugs- none    CURRENT SOCIAL HISTORY:  Financial/ Work- 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   Fine tremor in bilateral hands, noticeable when doing fine movements, unchanged for past few months.  Denies polydipsia, polyuria and nocturia, rash, abdominal or back/ flank pain, change related to urination and frequent infections    PSYCH and CD Critical Summary Points since July 2019             July 2019 - no change  Aug 2019 - decreased the Depakote to 500 mg nightly  Oct 2019 - no changes, VPA and LI labs ordered  Nov 2019 - VPA decreased to 250 mg nightly    PAST MED TRIALS          See last Diagnostic Assessment  MEDICAL / SURGICAL HISTORY                                   Pregnant or breastfeeding- no      Contraception- HRT for menopause    Patient Active Problem List   Diagnosis     Oral aphthae     Obesity (BMI 30-39.9)     Disorder of bursae and tendons in shoulder region     CARDIOVASCULAR SCREENING; LDL GOAL LESS THAN 160     Rosacea     Tear of medial cartilage or meniscus of knee, current     Raynaud disease     Vitamin D deficiency disease     Mild sleep apnea     Atopic rhinitis     Chronic bilateral low back pain without sciatica     Morbid obesity (H)     Muscle tension dysphonia     Palpitations     Dysphagia, unspecified type     Anxiety     Manic behavior (H)     Benign essential hypertension     Callus of foot       Major Surgery- n/a    ALLERGY       Seroquel [quetiapine]; Olanzapine; Penicillins; Risperidone; and Thimerosal  MEDICATIONS        Current Outpatient Medications   Medication Sig Dispense Refill     Carboxymethylcellulose Sod PF (REFRESH PLUS) 0.5 % SOLN ophthalmic solution Place 1 drop into both eyes 3 times daily as needed for dry eyes       divalproex sodium delayed-release (DEPAKOTE) 250 MG DR tablet Take 1 tablet (250 mg) by mouth At Bedtime 30 tablet 1     estrogen conj-medroxyPROGESTERone (PREMPRO) 0.625-2.5 MG tablet Take 1 tablet by mouth daily 90 tablet 3     lidocaine, viscous, (XYLOCAINE) 2 % solution SWISH AND SPIT 5ML BY MOUTH EVERY 3 HOURS AS NEEDED 100 mL 0     lisinopril (PRINIVIL/ZESTRIL) 10 MG tablet Take 1 tablet (10 mg) by mouth daily 90 tablet 1     lithium 300 MG capsule Take 3 capsules (900 mg) by mouth At Bedtime 90 capsule 1     Loratadine 10 MG capsule Take 10 mg by mouth daily as needed.       metroNIDAZOLE (METROGEL) 1 % gel Apply  topically daily 60 g 9     order for DME Please measure and distribute 1 pair of 20mmHg - 30mmHg thigh high open or closed toe compression stockings. Jobst ultrasheer or equivalent. 1 each 6     order for DME Please measure and distribute 1 pair of 20mmHg - 30mmHg knee high open or closed toe compression stockings. Jobst ultrasheer or equivalent. 1 each 6     order for DME Please measure and distribute 1 pair of 20mmHg - 30mmHg thigh high open or closed toe compression stockings. Jobst ultrasheer or equivalent. 2 each 3     order for DME Please measure and distribute 1 pair of 20mmHg - 30mmHg knee high open toe compression stockings. Jobst ultrasheer or equivalent. 1 each 1     ORDER FOR DME Use your CPAP device as directed by your provider.       VITAMIN D, CHOLECALCIFEROL, PO Take 2,000 Units by mouth daily       VITALS      [3, 3]   /84   Pulse 76   Wt 132.5 kg (292 lb)   BMI 44.40 kg/m     MENTAL STATUS EXAM      [9, 14 cog gs]     Alertness: alert  and oriented  Appearance: well groomed  Behavior/Demeanor: cooperative, pleasant and calm, with good  eye contact   Speech: normal and regular rate and rhythm  Language: intact  Psychomotor: fine tremor in bilateral hands noted when using hands, not present at rest when holding hands together  Mood: stable  Affect: Full range, restricted at moments, brighter overall compared to last visit; was congruent to mood; was congruent to content  Thought Process/Associations: unremarkable  Thought Content:  Reports none;  Denies suicidal ideation, violent ideation, delusions and paranoid ideation  Perception:  Reports none;  Denies auditory hallucinations and visual hallucinations  Insight: excellent  Judgment: excellent  Cognition: (6) does  appear grossly intact; formal cognitive testing was not done  Gait and Station: unremarkable    LABS and DATA     AIMS:  0 on 12/13/2018    PHQ9 TODAY = 6  PHQ-9 SCORE 7/17/2019 8/28/2019 10/2/2019   PHQ-9 Total Score 7 9 5      LITHIUM LABS     [level, renal, SG, TSH, WBC]    q6 mo  Recent Labs   Lab Test 10/15/19  1037 05/01/19  0921 01/14/19  0913 01/02/19  0839   LITHIUM 0.65 0.61 0.55* 0.37*     Recent Labs   Lab Test 10/15/19  1037 05/01/19  0921 12/20/18  1214 12/14/18  1235   CR 0.45* 0.54 0.47* 0.43*   GFRESTIMATED >90 >90 >90 >90    135 132* 129*   POTASSIUM 4.3 4.6 4.4 4.2   SHERRIE 9.1 9.0 9.2 8.7     Recent Labs   Lab Test 10/15/19  1222 05/01/19  1140 11/01/12  0819   SG 1.006 1.008 1.015     Recent Labs   Lab Test 10/15/19  1037 05/01/19  0921 11/10/18  0756 08/31/18  2029   TSH 2.47 2.69 2.29 2.19       VALPROIC ACID LABS     [liver enzymes, WBC, ANEU, Hgb, plts, +ammonia]  q6-12 mo  Recent Labs   Lab Test 10/15/19  1037 05/01/19  0921   SOFIYA 30* 77     Recent Labs   Lab Test 10/15/19  1037 05/01/19  0921   AST 13 19   ALT 18 13   ALKPHOS 61 46     Recent Labs   Lab Test 10/15/19  1037 05/01/19  0921   WBC 5.4 5.7   ANEU 3.3 3.7   HGB 14.2 14.4    174       PSYCHOTROPIC DRUG INTERACTIONS     Concurrent use of LITHIUM and ACE INHIBITORS may result in increased risk of lithium toxicity.    MANAGEMENT:  Monitoring for adverse effects, routine vitals, routine labs and patient is aware of risks    RISK STATEMENT for SAFETY     Angy Haji did not appear to be an imminent safety risk to self or others.    DIAGNOSIS     Bipolar Disorder, Type I (late onset), depressive episode, mild    (only manic episode was Nov/Dec 2018)  H/o EPSE secondary to neuroleptic use  H/o Thromboctyopenia secondary to depakote  H/o lithium toxicity, resolved    ASSESSMENT      [m2, h3]     TODAY Angy returns for follow-up endorsing ongoing mood stability.  Continues to struggle with intermittent anxiety and sense of feeling overwhelmed in context of multiple social stressors, raising teenage daughter, move to new house, selling former house which she is owned for 20 years.  Over the past month, she has noticed a lightness to her mood,  "improvement in symptoms of anhedonia, and feels that overall she has been doing \"a little better.\"  No acute safety concerns no SI thoughts, hopelessness.  Tolerating medications well.  Appears that overall, patient is continuing to do quite well in the face of multiple social stressors without evidence of overt manic or depressive symptoms.  Moving forward, showed evidence of mood instability become apparent, her recent lithium level does indicate that there is room to increase the dose.  Today, we will continue with a cautious and slow dose decrease of Depakote, and she will follow-up with me in 1 month's time.       PLAN      [m2, h3]     1) PSYCHOTROPIC MEDICATIONS:  - Decrease Depakote to 250 mg at bedtime  - Continue lithium 900 at bedtime    2) [place]  was checked today:  none.    3) THERAPY:  Continue individual psychotherapist in private practice every 2 weeks  Looking for new therapist due to insurance issues. Provided referral list for [facility]    4) NEXT DUE:    Labs- Li and VPA labs updated last month  EKG- N/A  Rating Scales- serial PHQ9    5) REFERRALS:  No    6) RTC: 1 month    7) CRISIS NUMBERS:   Provided routinely in AVS   ONLY if a [facility] PT: [facility] [place] [phone] (clinic), [phone] (after hours)     TREATMENT RISK STATEMENT:  The risks, benefits, alternatives and potential adverse effects have been discussed and are understood by the pt. The pt understands the risks of using street drugs or alcohol. There are no medical contraindications, the pt agrees to treatment with the ability to do so. The pt knows to call the clinic for any problems or to access emergency care if needed.  Medical and substance use concerns are documented above.  Psychotropic drug interaction check was done, including changes made today.    PROVIDER: Ricci Villa MD    Patient not staffed in clinic. Supervisor is Dr. Liu,  who will sign the note.      Attestation:  I did not see Angy QUINTANA" Raissa directly. I have reviewed the documentation and I agree with the resident's plan of care.   Chai Liu MD, MD

## 2019-11-06 ENCOUNTER — OFFICE VISIT (OUTPATIENT)
Dept: PSYCHIATRY | Facility: CLINIC | Age: 54
End: 2019-11-06
Attending: PSYCHIATRY & NEUROLOGY
Payer: COMMERCIAL

## 2019-11-06 VITALS
HEART RATE: 76 BPM | BODY MASS INDEX: 44.4 KG/M2 | WEIGHT: 292 LBS | DIASTOLIC BLOOD PRESSURE: 84 MMHG | SYSTOLIC BLOOD PRESSURE: 131 MMHG

## 2019-11-06 DIAGNOSIS — F31.9 BIPOLAR I DISORDER (H): ICD-10-CM

## 2019-11-06 PROCEDURE — G0463 HOSPITAL OUTPT CLINIC VISIT: HCPCS | Mod: ZF

## 2019-11-06 RX ORDER — DIVALPROEX SODIUM 250 MG/1
250 TABLET, DELAYED RELEASE ORAL AT BEDTIME
Qty: 30 TABLET | Refills: 1 | Status: SHIPPED | OUTPATIENT
Start: 2019-11-06 | End: 2019-12-13

## 2019-11-06 RX ORDER — LITHIUM CARBONATE 300 MG/1
900 CAPSULE ORAL AT BEDTIME
Qty: 90 CAPSULE | Refills: 1 | Status: SHIPPED | OUTPATIENT
Start: 2019-11-06 | End: 2019-12-13

## 2019-11-06 ASSESSMENT — PATIENT HEALTH QUESTIONNAIRE - PHQ9: SUM OF ALL RESPONSES TO PHQ QUESTIONS 1-9: 6

## 2019-11-06 ASSESSMENT — PAIN SCALES - GENERAL: PAINLEVEL: NO PAIN (0)

## 2019-11-06 NOTE — PATIENT INSTRUCTIONS
Good to see you today, Angy!  Let's do the following today:  - Decrease Depakote to 250 mg every night  - Continue current dose of Lithium, no change  - Follow up with me in 1 mo    Thank you for coming to the PSYCHIATRY CLINIC.    Lab Testing:  If you had lab testing today and your results are reassuring or normal they will be mailed to you or sent through [facility] within 7 days.   If the lab tests need quick action we will call you with the results.  The phone number we will call with results is # [phone] (home) [phone] (work). If this is not the best number please call our clinic and change the number.    Medication Refills:  If you need any refills please call your pharmacy and they will contact us. Our fax number for refills is [phone]. Please allow three business for refill processing.   If you need to  your refill at a new pharmacy, please contact the new pharmacy directly. The new pharmacy will help you get your medications transferred.     Scheduling:  If you have any concerns about today's visit or wish to schedule another appointment please call our office during normal business hours [phone] (8-5:00 M-F)    Contact Us:  Please call [phone] during business hours (8-5:00 M-F).  If after clinic hours, or on the weekend, please call  [phone].    Financial Assistance [phone]  [facility] Billing [phone]  [facility] Billing Office, [facility]: [phone]  [facility] Billing [phone]  Medical Records [phone]      MENTAL HEALTH CRISIS NUMBERS:  [facility]:   [facility] - [phone]   Crisis Residence \[facility] [place] [facility]\"" - [facility] - [phone]   Walk-In Counseling Center \[facility] [place] [facility]\"" - [phone]   COPE 24/7 [place] Mobile Team for Adults - [[phone]]; Child - [[phone]]        [facility]:   [facility] - [phone]   Walk-in counseling [facility] - [phone]   Walk-in counseling [facility] - [phone]   Crisis Residence [facility] - [phone]   Urgent Care Adult Mental Health:   --Drop-in, 24/7 crisis line, and [facility] Mobile Team [[phone]]    CRISIS TEXT LINE: Text [phone] from anywhere, anytime, any crisis 24/7;    OR SEE www.crisistextline.org     Poison Control Center - [phone]    CHILD: [facility] needs assessment team - [phone]     [facility] Lifeline - [phone]; or [facility] Lifeline - 1-760.271.4369    If you have a medical emergency please call 911or go to the nearest ER.                    _____________________________________________    Again thank you for choosing PSYCHIATRY CLINIC and please let us know how we can best partner with you to improve you and your family's health.  You may be receiving a survey in the mail regarding this appointment. We would love to have your feedback, both positive and negative, so please fill out the survey and return it using the provided envelope. The survey is done by an external company, so your answers are anonymous.

## 2019-11-07 ENCOUNTER — OFFICE VISIT (OUTPATIENT)
Dept: VASCULAR SURGERY | Facility: CLINIC | Age: 54
End: 2019-11-07
Payer: COMMERCIAL

## 2019-11-07 ENCOUNTER — ANCILLARY PROCEDURE (OUTPATIENT)
Dept: ULTRASOUND IMAGING | Facility: CLINIC | Age: 54
End: 2019-11-07
Attending: RADIOLOGY
Payer: COMMERCIAL

## 2019-11-07 VITALS — BODY MASS INDEX: 44.7 KG/M2 | WEIGHT: 293 LBS

## 2019-11-07 DIAGNOSIS — I83.893 SYMPTOMATIC VARICOSE VEINS OF BOTH LOWER EXTREMITIES: Primary | ICD-10-CM

## 2019-11-07 DIAGNOSIS — I83.893 SYMPTOMATIC VARICOSE VEINS OF BOTH LOWER EXTREMITIES: ICD-10-CM

## 2019-11-07 ASSESSMENT — PAIN SCALES - GENERAL: PAINLEVEL: NO PAIN (1)

## 2019-11-07 NOTE — LETTER
11/7/2019       RE: Angy Haji  [address]  [place] [address]     Dear Colleague,    Thank you for referring your patient, Angy Haji, to the [facility] VASCULAR [facility]INIC at [facility]. Please see a copy of my visit note below.    SUBJECTIVE:  Angy Haji returns to discuss management of symptomatic right lower extremity varicose veins. She was initally seen for same problem by Dr. Kobi García on 9/19/2016 and again by me on 10/24/2018 at which time I recommended EVLA of the right AASV and GSV with microphlebectomy of the associated symptomatic varicosities in the thigh and calf.  Patient was unable to proceed with treatment due to other life stresses.  Specifically, she had her first manic episode for which she was hospitalized and had subsequent leave of absence from work from November through March.  States she is doing better now and is back to work. She states that her lower extremity symptoms of aching, swelling and itching persist and remain much worse on the right than the left.  She continues with knee high graded compression stockings most days.  No other complaints.      Patient Active Problem List   Diagnosis     Oral aphthae     Obesity (BMI 30-39.9)     Disorder of bursae and tendons in shoulder region     CARDIOVASCULAR SCREENING; LDL GOAL LESS THAN 160     Rosacea     Tear of medial cartilage or meniscus of knee, current     Raynaud disease     Vitamin D deficiency disease     Mild sleep apnea     Atopic rhinitis     Chronic bilateral low back pain without sciatica     Morbid obesity (H)     Muscle tension dysphonia     Palpitations     Dysphagia, unspecified type     Anxiety     Manic behavior (H)     Benign essential hypertension     Callus of foot      Current Outpatient Medications   Medication Sig     Carboxymethylcellulose Sod PF (REFRESH PLUS) 0.5 % SOLN ophthalmic solution Place 1 drop into both eyes 3 times daily as needed for dry eyes      divalproex sodium delayed-release (DEPAKOTE) 250 MG DR tablet Take 1 tablet (250 mg) by mouth At Bedtime     estrogen conj-medroxyPROGESTERone (PREMPRO) 0.625-2.5 MG tablet Take 1 tablet by mouth daily     lidocaine, viscous, (XYLOCAINE) 2 % solution SWISH AND SPIT 5ML BY MOUTH EVERY 3 HOURS AS NEEDED     lisinopril (PRINIVIL/ZESTRIL) 10 MG tablet Take 1 tablet (10 mg) by mouth daily     lithium 300 MG capsule Take 3 capsules (900 mg) by mouth At Bedtime     Loratadine 10 MG capsule Take 10 mg by mouth daily as needed.     metroNIDAZOLE (METROGEL) 1 % gel Apply topically daily     order for DME Please measure and distribute 1 pair of 20mmHg - 30mmHg thigh high open or closed toe compression stockings. Jobst ultrasheer or equivalent.     order for DME Please measure and distribute 1 pair of 20mmHg - 30mmHg knee high open or closed toe compression stockings. Jobst ultrasheer or equivalent.     order for DME Please measure and distribute 1 pair of 20mmHg - 30mmHg thigh high open or closed toe compression stockings. Jobst ultrasheer or equivalent.     order for DME Please measure and distribute 1 pair of 20mmHg - 30mmHg knee high open toe compression stockings. Jobst ultrasheer or equivalent.     ORDER FOR DME Use your CPAP device as directed by your provider.     VITAMIN D, CHOLECALCIFEROL, PO Take 2,000 Units by mouth daily     No current facility-administered medications for this visit.      Social History     Tobacco Use     Smoking status: Never Smoker     Smokeless tobacco: Never Used     Tobacco comment: non smoke home   Substance Use Topics     Alcohol use: Yes     Comment: 2 Drinks Per Month     Drug use: No      REVIEW OF SYSTEMS: As above.     OBJECTIVE:  BP (P) 137/74 (BP Location: Left arm, Patient Position: Chair, Cuff Size: Adult Regular)   Pulse (P) 64   Temp (P) 98.1  F (36.7  C) (Oral)   Wt 133.4 kg (294 lb)   SpO2 (P) 100%   BMI 44.70 kg/m     General appearance: Pleasant, cooperative, no  distress.  Lungs: Clear to auscultation with normal respiratory effort.   Heart: Regular rate and rhythm with normal S1, S2.  No murmurs, clicks, or gallops.  Mental Status: cooperative, normal affect, no gross thought process defects during casual conversation.  Lower Extremities: Right calf and ankle are larger than the left secondary to non-pitting chronic venous stasis edema.  No hyperpigmentation or dystrophic skin changes. No ulcerations.    Right lower extremity: Very prominent bulging varicosities from medial mid thigh through the medial mid/low calf with adjacent scattered telangiectasias.  Reticular veins in the lateral thigh and calf and lateral popliteal fossa.    Left lower extremity: Mild scattered reticular veins and telangiectasias in the thigh and calf without bulging varicosities.     Clinical CEAP: Right: C1,2,3. Left: C1.     IMAGING: Bilateral lower extremity venous competency ultrasound from 11/7/2019 is reviewed and demonstrates the following:  RLE: Incompetent AASV (5+cm long in the upper thigh). Incompetent GSV from SFJ to mid thigh and form upper to mid calf with reflux into associated incompetent truncal varicosities in the thigh and calf measuring up to 6.9mm diameter. Isolated incompetence of the SSV at mid calf only.   LLE: Incompetent AASV. Isolated incompetence GSV at SFJ with valsalva only and at mid calf without associated truncal varicosities.    ASSESSMENT AND PLAN:   1. Long standing symptomatic RLE varicose veins, refractory to years of conservative therapy with graded compression stocking use. Symptomatic varicosities in the right leg are secondary to combined AASV and GSV incompetence (as detailed above) with reflux into associated incompetent symptomatic varicosities in the thigh and calf.  The incompetent right AASV and GSV are amenable to endovenous laser ablation.  Microphlebectomy will be performed of the more prominent associated truncal varicosities in the thigh and  calf.     I had a thorough discussion with the patient regarding her findings and proposed treatment plan, including a discussion of alternatives, risks and benefits.  Following this discussion, she does wish to proceed.  This will be scheduled once prior authorization is obtained from her insurance company.     I spent a total of 25 minutes face-to-face with Angy Haji during today's office visit.  Over 50% of this time was spent counseling the patient and/or coordinating care regarding evaluation of symptomatic RLE varicose veins and discussion of management plan.  See note for details.    Jose Angel Solorio MD  Interventional Radiology and Vascular Imaging Attending  Department of Radiology  [facility]

## 2019-11-07 NOTE — NURSING NOTE
Vascular Rooming Note     Angy Haji's goals for this visit include:   Chief Complaint   Patient presents with     Follow Up     Angy, is being seen today for a follow up varicose veins, right leg is dull ache and swelling,and  sometimes itches, is better with compression stockings, here to go over the pros/cons of procedure,      Trudi Proctor LPN

## 2019-11-07 NOTE — PROGRESS NOTES
SUBJECTIVE:  Angy Haji returns to discuss management of symptomatic right lower extremity varicose veins. She was initally seen for same problem by Dr. Kobi García on 9/19/2016 and again by me on 10/24/2018 at which time I recommended EVLA of the right AASV and GSV with microphlebectomy of the associated symptomatic varicosities in the thigh and calf.  Patient was unable to proceed with treatment due to other life stresses.  Specifically, she had her first manic episode for which she was hospitalized and had subsequent leave of absence from work from November through March.  States she is doing better now and is back to work. She states that her lower extremity symptoms of aching, swelling and itching persist and remain much worse on the right than the left.  She continues with knee high graded compression stockings most days.  No other complaints.      Patient Active Problem List   Diagnosis     Oral aphthae     Obesity (BMI 30-39.9)     Disorder of bursae and tendons in shoulder region     CARDIOVASCULAR SCREENING; LDL GOAL LESS THAN 160     Rosacea     Tear of medial cartilage or meniscus of knee, current     Raynaud disease     Vitamin D deficiency disease     Mild sleep apnea     Atopic rhinitis     Chronic bilateral low back pain without sciatica     Morbid obesity (H)     Muscle tension dysphonia     Palpitations     Dysphagia, unspecified type     Anxiety     Manic behavior (H)     Benign essential hypertension     Callus of foot      Current Outpatient Medications   Medication Sig     Carboxymethylcellulose Sod PF (REFRESH PLUS) 0.5 % SOLN ophthalmic solution Place 1 drop into both eyes 3 times daily as needed for dry eyes     divalproex sodium delayed-release (DEPAKOTE) 250 MG DR tablet Take 1 tablet (250 mg) by mouth At Bedtime     estrogen conj-medroxyPROGESTERone (PREMPRO) 0.625-2.5 MG tablet Take 1 tablet by mouth daily     lidocaine, viscous, (XYLOCAINE) 2 % solution SWISH AND SPIT 5ML BY MOUTH  EVERY 3 HOURS AS NEEDED     lisinopril (PRINIVIL/ZESTRIL) 10 MG tablet Take 1 tablet (10 mg) by mouth daily     lithium 300 MG capsule Take 3 capsules (900 mg) by mouth At Bedtime     Loratadine 10 MG capsule Take 10 mg by mouth daily as needed.     metroNIDAZOLE (METROGEL) 1 % gel Apply topically daily     order for DME Please measure and distribute 1 pair of 20mmHg - 30mmHg thigh high open or closed toe compression stockings. Jobst ultrasheer or equivalent.     order for DME Please measure and distribute 1 pair of 20mmHg - 30mmHg knee high open or closed toe compression stockings. Jobst ultrasheer or equivalent.     order for DME Please measure and distribute 1 pair of 20mmHg - 30mmHg thigh high open or closed toe compression stockings. Jobst ultrasheer or equivalent.     order for DME Please measure and distribute 1 pair of 20mmHg - 30mmHg knee high open toe compression stockings. Jobst ultrasheer or equivalent.     ORDER FOR DME Use your CPAP device as directed by your provider.     VITAMIN D, CHOLECALCIFEROL, PO Take 2,000 Units by mouth daily     No current facility-administered medications for this visit.      Social History     Tobacco Use     Smoking status: Never Smoker     Smokeless tobacco: Never Used     Tobacco comment: non smoke home   Substance Use Topics     Alcohol use: Yes     Comment: 2 Drinks Per Month     Drug use: No      REVIEW OF SYSTEMS: As above.     OBJECTIVE:  BP (P) 137/74 (BP Location: Left arm, Patient Position: Chair, Cuff Size: Adult Regular)   Pulse (P) 64   Temp (P) 98.1  F (36.7  C) (Oral)   Wt 133.4 kg (294 lb)   SpO2 (P) 100%   BMI 44.70 kg/m    General appearance: Pleasant, cooperative, no distress.  Lungs: Clear to auscultation with normal respiratory effort.   Heart: Regular rate and rhythm with normal S1, S2.  No murmurs, clicks, or gallops.  Mental Status: cooperative, normal affect, no gross thought process defects during casual conversation.  Lower Extremities:  Right calf and ankle are larger than the left secondary to non-pitting chronic venous stasis edema.  No hyperpigmentation or dystrophic skin changes. No ulcerations.    Right lower extremity: Very prominent bulging varicosities from medial mid thigh through the medial mid/low calf with adjacent scattered telangiectasias.  Reticular veins in the lateral thigh and calf and lateral popliteal fossa.    Left lower extremity: Mild scattered reticular veins and telangiectasias in the thigh and calf without bulging varicosities.     Clinical CEAP: Right: C1,2,3. Left: C1.     IMAGING: Bilateral lower extremity venous competency ultrasound from 11/7/2019 is reviewed and demonstrates the following:  RLE: Incompetent AASV (5+cm long in the upper thigh). Incompetent GSV from SFJ to mid thigh and form upper to mid calf with reflux into associated incompetent truncal varicosities in the thigh and calf measuring up to 6.9mm diameter. Isolated incompetence of the SSV at mid calf only.   LLE: Incompetent AASV. Isolated incompetence GSV at SFJ with valsalva only and at mid calf without associated truncal varicosities.    ASSESSMENT AND PLAN:   1. Long standing symptomatic RLE varicose veins, refractory to years of conservative therapy with graded compression stocking use. Symptomatic varicosities in the right leg are secondary to combined AASV and GSV incompetence (as detailed above) with reflux into associated incompetent symptomatic varicosities in the thigh and calf.  The incompetent right AASV and GSV are amenable to endovenous laser ablation.  Microphlebectomy will be performed of the more prominent associated truncal varicosities in the thigh and calf.     I had a thorough discussion with the patient regarding her findings and proposed treatment plan, including a discussion of alternatives, risks and benefits.  Following this discussion, she does wish to proceed.  This will be scheduled once prior authorization is obtained from  her insurance company.     I spent a total of 25 minutes face-to-face with Angy Haji during today's office visit.  Over 50% of this time was spent counseling the patient and/or coordinating care regarding evaluation of symptomatic RLE varicose veins and discussion of management plan.  See note for details.    Jose Angel Solorio MD  Interventional Radiology and Vascular Imaging Attending  Department of Radiology  [facility]

## 2019-11-15 ENCOUNTER — HOSPITAL ENCOUNTER (OUTPATIENT)
Dept: NUTRITION | Facility: CLINIC | Age: 54
Discharge: HOME | End: 2019-11-15
Attending: DIETITIAN, REGISTERED | Admitting: DIETITIAN, REGISTERED
Payer: COMMERCIAL

## 2019-11-15 PROCEDURE — 97803 MED NUTRITION INDIV SUBSEQ: CPT | Performed by: DIETITIAN, REGISTERED

## 2019-11-15 NOTE — PROGRESS NOTES
"NUTRITION REASSESSMENT NOTE      REASON FOR ASSESSMENT   Angy QUINTANA Eduardodeann referred by Dr. Odell for MNT related to  Obesity (BMI 30-39.9) [E66.9]  - Primary       Benign essential hypertension [I10]       Patient accompanied by self      NUTRITION HISTORY  Patient continues with multiple life changes and stressors.  Patient struggling with evening eating as feels want to self soothe with food.  Patient concerned with weight gain due to new medications but is pleased her weight is stable.      DIET RECALL   Breakfast: cereal and banana; yogurt, granola (measures), fruit + coffee; 2 slices toast with butter + 1/2 cup cottage cheese    Lunch: leftovers (includes fruit and vegetable daily)  Dinner: Tuscan white bean soup; roasted chicken; taco; protein + fruit and vegetable; daughter continues to follow vegetarian diet     Snacks: ice cream    Beverages: mainly water, coffee, mineral water  Dining out: varies   Exercise: Patient exercising 2-3 times per week (walking, walking dog, swimming or elliptical).          MEDICATIONS:  Reviewed      ANTHROPOMETRICS  Height: 5'8\" (note height change in chart previously 5'9\")  Weight: 292 lbs   BMI (kg/m2): 41.9 kg/m2   %IBW: 196%  Weight History:   Wt Readings from Last 10 Encounters:   11/07/19 133.4 kg (294 lb)   06/26/19 131.5 kg (290 lb)   05/03/19 129.3 kg (285 lb)   02/04/19 124.7 kg (275 lb)   12/13/18 120.3 kg (265 lb 4.8 oz)   12/06/18 120.5 kg (265 lb 9.6 oz)   11/20/18 119.4 kg (263 lb 3.2 oz)   11/13/18 119.6 kg (263 lb 11.2 oz)   11/07/18 116.1 kg (256 lb)   11/05/18 117 kg (258 lb)      ASSESSED NUTRITION NEEDS  Estimated Energy Needs: 7783-2423 kcals (15-20 kcals/kg) for gradual weight loss   Estimated Protein Needs: 66-79 (1-1.2 gm/kg IBW) for repletion with exercise  RMR: 1440     EVALUATION/PROGRESS TOWARDS GOALS   Previous goals:  Limit desserts to 3-4 per week by practicing mindful eating-not met   Exercise 5 times per week for 30 minutes-not met   Pick " alternative activity to self soothe-met    Previous Nutrition Diagnosis: Obesity related to excess energy intake as evidenced by BMI of 41.9 kg/m2-no change     Current Nutrition Diagnosis:Obesity related to excess energy intake as evidenced by BMI of 41.9 kg/m2     INTERVENTIONS   Nutrition Prescription   Recommend exercise 3-5 times per week; track intake if eating mindlessly; increase fiber to 25-30 grams per day      Implementation:   Meals and Snacks: Continue eating 3 meals + deliberate snacking (if hungry)   Nutrition Education (Content):               a)  Discussed progress towards goals.  Congratulated patient on tracking exercise and desserts in planner.  Reviewed struggles with evening eating as patient was not able to reduce dessert intake.  Supported patient in her struggle with food and mental health issues.     Nutrition Education (Application):               a) Determined plans for eating at holiday events.                           b) Patient verbalized understanding of diet by stating will bring non sugar foods to holiday events                        Anticipate fair-good compliance     CURRENT GOALS   Bring non sugar foods to holiday events  Plan days for exercise 3 days per week   Give additional dessert portions away when hosting events     FOLLOW UP/MONITORING    Patient to follow up in 4 weeks  Patient has RD contact information      Time spent with patient: 30 minutes     Mattie Amaya, RD, LD  [facility] Outpatient Dietitian  [phone] (office phone)

## 2019-12-04 ENCOUNTER — OFFICE VISIT (OUTPATIENT)
Dept: SLEEP MEDICINE | Facility: CLINIC | Age: 54
End: 2019-12-04
Payer: COMMERCIAL

## 2019-12-04 VITALS
WEIGHT: 293 LBS | BODY MASS INDEX: 44.41 KG/M2 | DIASTOLIC BLOOD PRESSURE: 72 MMHG | OXYGEN SATURATION: 100 % | SYSTOLIC BLOOD PRESSURE: 112 MMHG | HEIGHT: 68 IN | RESPIRATION RATE: 18 BRPM | HEART RATE: 59 BPM

## 2019-12-04 DIAGNOSIS — E66.01 MORBID OBESITY (H): ICD-10-CM

## 2019-12-04 DIAGNOSIS — G47.33 OSA (OBSTRUCTIVE SLEEP APNEA): Primary | ICD-10-CM

## 2019-12-04 PROCEDURE — 99214 OFFICE O/P EST MOD 30 MIN: CPT | Performed by: INTERNAL MEDICINE

## 2019-12-04 ASSESSMENT — MIFFLIN-ST. JEOR: SCORE: 1996.61

## 2019-12-04 NOTE — PATIENT INSTRUCTIONS
1.  Continue CPAP every night for all hours that you are sleeping.  If you nap use CPAP.  As always, try to get at least 8 hours of sleep or more each day, and avoid sleep deprivation. Avoid alcohol.    2.  Reasons that you might need a change to your pressure therapy would be weight gain or loss, waking having inadvertently removed your CPAP overnight, having previously felt refreshed by sleep with CPAP use and now waking un-refreshed, and return of daytime sleepiness. Also, the development of new medical problems can sometimes affect breathing at night-heart failure, stroke, medications such as narcotics.    3.  Please bring CPAP with you if you are hospitalized.  If anticipating surgery be sure to discuss with your surgeon that you have sleep apnea and use PAP therapy.      4.  Maintain your equipment as recommended which includes routine cleaning and replacement of supplies.  Call DME for any questions regarding supplies or maintenance.      5.  Do not drive on engage in potentially dangerous activities if feeling sleepy.    6.  Please see me again in 12 months and bring your machine and card to your follow-up visit.      [facility] DME and cpap specialist [phone]  [facility] [phone]    [facility] D[facility] [phone], CPAP specialist [phone]  [facility] [phone]    [facility], [facility] [phone]    [facility] D[facility]  Karie Blanco [phone]  [facility] Maryjane [phone]  [facility] phone [phone]         Tips for your CPAP and BIPAP use-    Mask fitting tips  Mask fitting exercise:    To improve your mask seal and your mobility at night, put mask on and secure in place.  Lie down in bed with full pressure and roll to one side, adjust headgear while in that position to eliminate any  leaks. Repeat process rolling to other side.     The mask seal does not have to be perfect:   CPAP machines are designed to make up for small leaks. However, you will not tolerate leaks blowing in your eyes so you will need to adjust.   Any leak should only be near or at the bottom of the mask.  We expect your mask to leak slightly at night.    Do not over-tighten the headgear straps, tighter IS NOT better, we expect minimal leak.    First try re-positioning the mask or headgear before tightening the headgear straps.  Mask leaks are expected due to changing sleeping positions. Try pulling the mask away from your skin allowing the cushion to re-inflate will minimize the leak.  If you struggle for a good fit, try turning the CPAP off and then readjust the mask by pulling it away from your face and then turning back on the CPAP.        Humidifier tips  Humidifiers can be adjusted to increase or decrease the amount of moisture according to your comfort level. You may need to adjust this frequently at first, but then might only change it with seasonal weather changes.     Try INCREASING the humidity if:  You experience a dry, irritated nasal passage or throat.  You have a runny, drippy nose or sneezing fits after using CPAP.  You experience nasal congestion during or after CPAP use.    Try DECREASING the humidity if:  You have excessive condensation or  rain out  in the tubing or mask.  Otherwise keep the tubing warm during the night by running it underneath the blankets or pillow.      Clinic visit after initial CPAP and BIPAP set-up   Bring your equipment with you to your 4 week follow up clinic visit.  We will be extracting your data from the machine.        Travel  Always take your equipment with you.  If you fly with your equipment bring it on with you as a carry on.  Medical equipment does not count as a carry on.    If you travel international the machines take 110-240.  The only adapter needed is the adapter that  will fit into the receptacle (outlet).    You may also want to bring an extension cord as many hotel rooms have limited outlets at the bedside.  Do not travel with water in your humidifier chamber.     Cleaning and Maintenance Guidelines    Equipment Frequency Cleaning Method   Mask First Day    Daily      Weekly Soak mask in hot soapy water for 30 minutes, rinse and air dry.  Wipe nasal cushion with a hot soapy (Ivory, baby shampoo) cloth and rinse.  Baby wipes may also be used.  Do not use anti-bacterial soaps,Reina  liquid soap, rubbing alcohol, bleach or ammonia.  Wash frame in hot soapy water (Ivory, baby shampoo) rinse and let air dry   Headgear Biweekly Wash in hot soapy water, rinse and air dry   Reusable Gray Filter Weekly Wash in hot soapy water, rinse, put in towel squeeze moisture out, let air dry   Disposable White Filter Check Weekly Replace when brown or gray in color; at least every 2 to 3 months   Humidifier Chamber Daily    Weekly Empty distilled water from humidifier and let air dry    Hand wash in hot soapy water, rinse and air dry   Tubing Weekly Wash in hot soapy water, rinse and let air dry   Mask, Tubing and Humidifier Chamber As needed Disinfect: Soak in 1 part vinegar to 3 parts hot water for 30 minutes, rinse well and air dry  Not the material headgear        MASK AND SUPPLY REORDERING  Reminder: Most insurance companies will allow for a new mask, headgear, tubing, and reusable gray filter every six months.  Disposable white ultra-fine filters are covered monthly.    EQUIPMENT NEEDS  Please call our CPAP specialist with any equipment problems, questions or needs.    HOME AND SAFETY INSTRUCTIONS    Do not use frayed or cracked electrical cords, multi plug adaptors, or switched receptacles    Do not immerse electrical equipment into water    Assure that electrical cords do not become a tripping hazard      Your BMI is Body mass index is 44.85 kg/m .  Weight management is a personal decision.   If you are interested in exploring weight loss strategies, the following discussion covers the approaches that may be successful. Body mass index (BMI) is one way to tell whether you are at a healthy weight, overweight, or obese. It measures your weight in relation to your height.  A BMI of 18.5 to 24.9 is in the healthy range. A person with a BMI of 25 to 29.9 is considered overweight, and someone with a BMI of 30 or greater is considered obese. More than two-thirds of American adults are considered overweight or obese.  Being overweight or obese increases the risk for further weight gain. Excess weight may lead to heart disease and diabetes.  Creating and following plans for healthy eating and physical activity may help you improve your health.  Weight control is part of healthy lifestyle and includes exercise, emotional health, and healthy eating habits. Careful eating habits lifelong are the mainstay of weight control. Though there are significant health benefits from weight loss, long-term weight loss with diet alone may be very difficult to achieve- studies show long-term success with dietary management in less than 10% of people. Attaining a healthy weight may be especially difficult to achieve in those with severe obesity. In some cases, medications, devices and surgical management might be considered.  What can you do?  If you are overweight or obese and are interested in methods for weight loss, you should discuss this with your provider.     Consider reducing daily calorie intake by 500 calories.     Keep a food journal.     Avoiding skipping meals, consider cutting portions instead.    Diet combined with exercise helps maintain muscle while optimizing fat loss. Strength training is particularly important for building and maintaining muscle mass. Exercise helps reduce stress, increase energy, and improves fitness. Increasing exercise without diet control, however, may not burn enough calories to loose  weight.       Start walking three days a week 10-20 minutes at a time    Work towards walking thirty minutes five days a week     Eventually, increase the speed of your walking for 1-2 minutes at time    In addition, we recommend that you review healthy lifestyles and methods for weight loss available through the National Institutes of Health patient information sites:  http://win.niddk.nih.gov/publications/index.htm    And look into health and wellness programs that may be available through your health insurance provider, employer, local community center, or rodolfo club.    Weight management plan: Patient was referred to their PCP to discuss a diet and exercise plan.

## 2019-12-04 NOTE — PROGRESS NOTES
Chief complaint: Follow-up of obstructive sleep apnea    History of Present Illness: 54-year-old female with history of overall mild obstructive sleep apnea, obesity, bipolar 1 disorder with history of anthony.  She is currently doing much better on new medications.  She is been able to resolve leak issues that were present last year.  She is no longer using full facemask.  She is using a nasal mask.  She uses CPAP nightly.  Denies difficulty initiating or maintaining sleep.  She is little frustrated by difficulty returning to sleep when she wakes up close to her normal wake up time to go to the bathroom.  She is moved to a new home which does have some light coming in from behind the shades.  She drinks about 24 ounces of caffeinated coffee through the day with last around 2 PM.  Rare alcohol.  She is working with a nutritionist.  She has upcoming varicose vein surgery planned.    Total score - [place]: 0 (12/4/2019 11:00 AM) (Less than 10 normal)    MICHAEL Total Score: 5 (normal 0-7, mild 8-14, moderate 15-21, severe 22-28)    Past Medical History:   Diagnosis Date     Disorders of bursae and tendons in shoulder region, unspecified      Female infertility of unspecified origin      Gallbladder sludge 5/2013     Mild sleep apnea 5/13/2014     Obesity, unspecified      Oral aphthae      Other acne      Other specified hypoglycemia      PONV (postoperative nausea and vomiting)        Allergies   Allergen Reactions     Seroquel [Quetiapine] Nausea and Vomiting     Had constant vomiting     Olanzapine      Tongue welling, bad EPSE (Parkinsonian)     Penicillins Rash     Risperidone      Tongue welling, bad EPSE (Parkinsonian)       Thimerosal Other (See Comments)     blisters on inside of eyelid       Current Outpatient Medications   Medication     Carboxymethylcellulose Sod PF (REFRESH PLUS) 0.5 % SOLN ophthalmic solution     divalproex sodium delayed-release (DEPAKOTE) 250 MG DR tablet     estrogen  conj-medroxyPROGESTERone (PREMPRO) 0.625-2.5 MG tablet     lidocaine, viscous, (XYLOCAINE) 2 % solution     lisinopril (PRINIVIL/ZESTRIL) 10 MG tablet     lithium 300 MG capsule     Loratadine 10 MG capsule     metroNIDAZOLE (METROGEL) 1 % gel     order for DME     ORDER FOR DME     VITAMIN D, CHOLECALCIFEROL, PO     No current facility-administered medications for this visit.        Social History     Socioeconomic History     Marital status:      Spouse name: Danielle Zamarripa     Number of children: 1     Years of education: Masters     Highest education level: Not on file   Occupational History     Occupation: Psychotherapist     Employer: [employer]   Social Needs     Financial resource strain: Not on file     Food insecurity:     Worry: Not on file     Inability: Not on file     Transportation needs:     Medical: Not on file     Non-medical: Not on file   Tobacco Use     Smoking status: Never Smoker     Smokeless tobacco: Never Used     Tobacco comment: non smoke home   Substance and Sexual Activity     Alcohol use: Yes     Comment: 2 Drinks Per Month     Drug use: No     Sexual activity: Yes     Partners: Female   Lifestyle     Physical activity:     Days per week: Not on file     Minutes per session: Not on file     Stress: Not on file   Relationships     Social connections:     Talks on phone: Not on file     Gets together: Not on file     Attends Synagogue service: Not on file     Active member of club or organization: Not on file     Attends meetings of clubs or organizations: Not on file     Relationship status: Not on file     Intimate partner violence:     Fear of current or ex partner: Not on file     Emotionally abused: Not on file     Physically abused: Not on file     Forced sexual activity: Not on file   Other Topics Concern     Parent/sibling w/ CABG, MI or angioplasty before 65F 55M? No   Social History Narrative    8/18/09    Balanced Diet - Yes    Osteoporosis  "Prevention Measures - Dairy servings per day: 2 and Medication/Supplements (See current meds)    Regular Exercise -  Yes Describe Walking, swimming, biking.     Dental Exam - YES - Date: 5/2009    Eye Exam - YES - Date: 8/2009    Self Breast Exam - No    Abuse: Current or Past (Physical, Sexual or Emotional)- No    Do you feel safe in your environment - Yes    Guns stored in the home - No    Sunscreen used - Yes    Seatbelts used - Yes    Lipids -  YES - Date: 8/10/09    Glucose -  YES - Date: 8/10/09    Colon Cancer Screening - No    Hemoccults - NO    Pap Test -  YES - Date: 7/6/06, no Hx of abnormal paps.    Do you have any concerns about STD's -  No    Mammography - YES - Date: 8/5/09    DEXA - NO    Immunizations reviewed and up to date - Yes, Tdap given 1/22/08    Amara Hall MA                           Family History   Problem Relation Age of Onset     Cardiovascular Paternal Grandfather         anyerism     Obesity Paternal Grandfather      Respiratory Paternal Grandfather         pulmonary (air sacs hardening)     Allergies Father         hayfever     Lipids Father         diet therapy     Gallbladder Disease Father         cholecystectomy      Eye Disorder Father         retinal tear x 2      Prostate Cancer Father      Alzheimer Disease Maternal Grandmother         ? poor STM     Arthritis Maternal Grandmother      Heart Disease Maternal Grandmother         Arrythmia-got a pacemaker at age 89     Arthritis Mother      Eye Disorder Mother         Cataract/macular tear in one eye     Cancer Paternal Grandmother         Uterine CA- as well as her sister     Gynecology Paternal Grandmother         prolapsed uterus- had 10 kids     Obesity Paternal Grandmother      Breast Cancer Maternal Aunt      Other Cancer Paternal Aunt 80        endo. cancer          EXAM: Pleasant, alert, no distress  /72   Pulse 59   Resp 18   Ht 1.727 m (5' 8\")   Wt 133.8 kg (295 lb)   SpO2 100%   BMI 44.85 kg/m  "   Psychiatric: Mood and affect appear normal    PSG 3/2/2011  Wt 241 lbs  AHI 5.1, Supine AHI 14.5, non supine AHI 1    PAP download from 11/4/2019 to 12/3/2019 reviewed:  Per cent of days used greater than 4 Hours 100 % (minimum goal greater than 70%)  Average use on days used: 7 hours 41 min  Settings: Min EPAP 9 cmH2O    Max EPAP 12 cmH2O  Pressures delivered 90/95th percentile for pressure 10.3 cmH2O  Average AHI 0.7 events per hour (goal less than 5)  Leak insignificant    ASSESSMENT: 54-year-old female with overall mild obstructive sleep apnea getting excellent clinical benefit and meeting compliance goals with use of CPAP.  Leak issues have resolved with changing of mask style.  Nocturia causing some sleep offset insomnia on occasion.    PLAN: Prescription generated to keep her supplies up-to-date.  No pressure changes recommended.  She should continue to her efforts at weight control with nutrition.  She should take her device with her when she goes to procedures requiring sedation.  Counseled about the effects of sedatives and narcotics and anesthesia on worsening.  Recommended trying to eliminate the light coming in from behind her shades.  Patient was provided light blocking eye mask to try in the meantime.  Please see patient instructions for further details of counseling provided.  She is agreeable with this plan.  She will follow-up with me in the next 2 years.  I will renew her prescription for supplies in 1 year.  She will contact me via [facility] if new issues arise.    Twenty-five minutes spent with patient, >50% spent counseling and coordinating care.      Nellie Calloway M.D.  Pulmonary/Critical Care/Sleep Medicine    [facility]   [address], [address]   [address] [place] [address]. [place] [address]   Appointments: [phone]    The above note was dictated using voice recognition software and may include typographical errors. Please  contact the author for any clarifications.

## 2019-12-06 ENCOUNTER — THERAPY VISIT (OUTPATIENT)
Dept: PHYSICAL THERAPY | Facility: CLINIC | Age: 54
End: 2019-12-06
Payer: COMMERCIAL

## 2019-12-06 DIAGNOSIS — G89.29 CHRONIC BILATERAL LOW BACK PAIN WITHOUT SCIATICA: Primary | ICD-10-CM

## 2019-12-06 DIAGNOSIS — M54.50 CHRONIC BILATERAL LOW BACK PAIN WITHOUT SCIATICA: Primary | ICD-10-CM

## 2019-12-06 PROCEDURE — 97110 THERAPEUTIC EXERCISES: CPT | Mod: GP | Performed by: PHYSICAL THERAPIST

## 2019-12-06 PROCEDURE — 97112 NEUROMUSCULAR REEDUCATION: CPT | Mod: GP | Performed by: PHYSICAL THERAPIST

## 2019-12-06 NOTE — PROGRESS NOTES
Subjective:  HPI                    Objective:  System    Physical Exam    General     ROS    Assessment/Plan:    PROGRESS  REPORT    Progress reporting period is to 12/6/19.       SUBJECTIVE  Subjective changes noted by patient:  .  Subjective: Patient states that low back pain has improved.  Did note an aggravation of back pain with shoveling.  Sleep has improved.  Is being very compliant with home exercise program.    Current pain level is   .     Previous pain level was   Initial Pain level: 6/10.   Changes in function:  Yes (See Goal flowsheet attached for changes in current functional level)  Adverse reaction to treatment or activity: None    OBJECTIVE  Changes noted in objective findings:    Objective: Active range of motion of lumbar spine: Extension range of motion improved, slight pain at end range.  Sidebending slightly painful to the right.  Significant improvement in standing posture.  Significant improvement in patient's ability to control lumbar base.  Good performance of exercise program.  Able to allow lumbar extension to be pain-free with neuromuscular reeducation of form.  Slight cueing to extend from the top down and less from lower lumbar segments helpful.     ASSESSMENT/PLAN  Updated problem list and treatment plan: Diagnosis 1:  LBP  Pain -  manual therapy, self management, education and home program  Decreased ROM/flexibility - manual therapy and therapeutic exercise  Decreased joint mobility - manual therapy and therapeutic exercise  Decreased strength - therapeutic exercise and therapeutic activities  Impaired muscle performance - neuro re-education  Decreased function - therapeutic activities  Impaired posture - neuro re-education  STG/LTGs have been met or progress has been made towards goals:  Yes (See Goal flow sheet completed today.)  Assessment of Progress: The patient's condition is improving.  Self Management Plans:  Patient is independent in a home treatment program.  Patient is  independent in self management of symptoms.    Angy continues to require the following intervention to meet STG and LTG's:  PT    Recommendations:  May bring patient back to improve mechanics.      Please refer to the daily flowsheet for treatment today, total treatment time and time spent performing 1:1 timed codes.

## 2019-12-06 NOTE — LETTER
[facility]AP[facility]  [address]  [facility] [address]  [phone]    2019    Re: Angy Haji   :   1965  MRN:  [id number]   REFERRING PHYSICIAN:   Camille Vallejo    [facility]    Date of Initial Evaluation:  2019  Visits:  Rxs Used: 7  Reason for Referral:  Chronic bilateral low back pain without sciatica    EVALUATION SUMMARY    PROGRESS  REPORT    Progress reporting period is to 19.       SUBJECTIVE  Subjective changes noted by patient:  .  Subjective: Patient states that low back pain has improved.  Did note an aggravation of back pain with shoveling.  Sleep has improved.  Is being very compliant with home exercise program.    Current pain level is   .     Previous pain level was   Initial Pain level: 6/10.   Changes in function:  Yes (See Goal flowsheet attached for changes in current functional level)  Adverse reaction to treatment or activity: None    OBJECTIVE  Changes noted in objective findings:    Objective: Active range of motion of lumbar spine: Extension range of motion improved, slight pain at end range.  Sidebending slightly painful to the right.  Significant improvement in standing posture.  Significant improvement in patient's ability to control lumbar base.  Good performance of exercise program.  Able to allow lumbar extension to be pain-free with neuromuscular reeducation of form.  Slight cueing to extend from the top down and less from lower lumbar segments helpful.     ASSESSMENT/PLAN  Updated problem list and treatment plan: Diagnosis 1:  LBP  Pain -  manual therapy, self management, education and home program  Decreased ROM/flexibility - manual therapy and therapeutic exercise  Decreased joint mobility - manual therapy and therapeutic exercise  Decreased strength - therapeutic exercise and therapeutic activities  Impaired muscle performance - neuro  re-education  Decreased function - therapeutic activities  Impaired posture - neuro re-education  STG/LTGs have been met or progress has been made towards goals:  Yes (See Goal flow sheet completed today.)  Assessment of Progress: The patient's condition is improving.  Self Management Plans:  Patient is independent in a home treatment program.  Patient is independent in self management of symptoms.    Angy continues to require the following intervention to meet STG and LTG's:  PT    Recommendations:  May bring patient back to improve mechanics.        Thank you for your referral.    INQUIRIES  Therapist: Jana Price, PT, ScD, [facility]   [facility] [place] [facility]  [address] [place] [address] [id number]  [facility] [address]  Phone: [phone]  Fax: [phone]

## 2019-12-09 ENCOUNTER — TELEPHONE (OUTPATIENT)
Dept: INTERVENTIONAL RADIOLOGY/VASCULAR | Facility: CLINIC | Age: 54
End: 2019-12-09

## 2019-12-10 NOTE — PROGRESS NOTES
[facility]  Psychiatry Clinic  PSYCHIATRIC PROGRESS NOTE     Date of initial Diagnostic Assessment (DA) is 12/5/18.  Date of most recent Transfer of Care Diagnostic Assessment is 07/16/19.     CARE TEAM:    PCP- Jono Odell      Therapist- [facility] (private practive)      Specialty Providers- OB/GYN, ENT, PT, Nutrition      Wife- Rachael Haji is a 54 year old female who prefers the name Angy & pronouns she, her, hers.     Pertinent Background: Patient has not had significant mental health history until 2018, when she developed perimenopausal mood and anxiety symptoms.  She was subsequently referred to our clinic following a hospitalization for anthony in November 2018.  Her care has been complicated by sensitivity to antipsychotics (EPSE including difficulty swallowing), thrombocytopenia secondary to Depakote, lithium toxicity in setting of concurrent Depakote use.     Psych critical item history includes Manic episode, psychiatric hospitalization in November 2018.    INTERIM HISTORY      [4, 4]   The patient reports good treatment adherence.  History was provided by the patient and family who were good historians.  The last visit ended with no changes.   Since the last visit:   -Since last visit saw nutrition, MARLA follow-up, PT, and most recently had varicose vein surgery 12/12.  -Didn't notice any change when decreased Depakote last month. No racing thoughts, impulsivity, racing thoughts, changes to sleep, elevated mood  - Stressors related to selling house, moving into new house, work stress are ongoing.   - Sticking with new therapist for now  - Mood has been okay.  Denies overt depressed mood.  Anhedonia still sometimes present, but continuing trend toward improvement, more able to find enjoyment in day.   - Had anxiety hosting Thanksgiving, but holiday went well, food was good, people had fun  - Wife Rachael's mom was in hospital over  "Thanksgiving with mild infection, which was a mild stressor  - Went to a friend's cabin after holiday in [facility]. \"Parts of it were fun, I played games, watched movies, enjoyed conversation, got some reading in.\"  - Wife thinks things are unchanged. Feels like patient still struggles with more anhedonia than is her baseline.      RECENT PSYCH ROS:   Depression:  low energy, overwhelmed and ongoing low mood and anhedonia but improved over last month  Elevated:  none  Psychosis:  none  Anxiety:  Continues to feel somewhat overwhelmed in context of multiple social stressors  Panic Attack:  none  Trauma Related:  none  Dysregulation:  none  Eating Disorder: some overeating, working with dietician   Pertinent Negative Symptoms:  NO suicidal ideation, self-injurious behavior/urges, violent ideation, aggression, psychosis, hallucinations, delusions, anthony and hypomania    RECENT SUBSTANCE USE:     Alcohol- very rare, weekly at most, always social, never more than 1  Tobacco- No  Caffeine- drinks around 24oz of coffee during the day  Opioids- No           Narcan Kit- N/A   Cannabis- No     Other illicit drugs- none    CURRENT SOCIAL HISTORY:  Financial/ Work- 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       Partner/ - wifeRachael- 13 years old daughter      Living situation- Moved into [facility] in [place], lives with wife Danielle and teenage daughter   Social/ Spiritual Support- Family (wife and daughter)     FEELS SAFE AT HOME- yes     PSYCH and CD Critical Summary Points since July 2019 July 2019 - no change  Aug 2019 - decreased the Depakote to 500 mg nightly  Oct 2019 - no changes, VPA and LI labs ordered  Nov 2019 - VPA decreased to 250 mg nightly  Dec 2019 -no change    PAST MED TRIALS          See last Diagnostic Assessment  MEDICAL / SURGICAL HISTORY                                   Pregnant or breastfeeding- no      Contraception- HRT for menopause    Patient " Active Problem List   Diagnosis     Oral aphthae     Obesity (BMI 30-39.9)     Disorder of bursae and tendons in shoulder region     CARDIOVASCULAR SCREENING; LDL GOAL LESS THAN 160     Rosacea     Tear of medial cartilage or meniscus of knee, current     Raynaud disease     Vitamin D deficiency disease     Mild sleep apnea     Atopic rhinitis     Chronic bilateral low back pain without sciatica     Morbid obesity (H)     Muscle tension dysphonia     Palpitations     Dysphagia, unspecified type     Anxiety     Manic behavior (H)     Benign essential hypertension     Callus of foot       Major Surgery- n/a    MEDICAL REVIEW OF SYSTEMS    [2, 10]     A comprehensive review of systems was performed and is negative other than noted in the HPI.    ALLERGY       Seroquel [quetiapine]; Olanzapine; Penicillins; Risperidone; and Thimerosal  MEDICATIONS        Current Outpatient Medications   Medication Sig Dispense Refill     divalproex sodium delayed-release (DEPAKOTE) 250 MG DR tablet Take 1 tablet (250 mg) by mouth At Bedtime 30 tablet 1     lithium 300 MG capsule Take 3 capsules (900 mg) by mouth At Bedtime 90 capsule 1     Carboxymethylcellulose Sod PF (REFRESH PLUS) 0.5 % SOLN ophthalmic solution Place 1 drop into both eyes 3 times daily as needed for dry eyes       estrogen conj-medroxyPROGESTERone (PREMPRO) 0.625-2.5 MG tablet Take 1 tablet by mouth daily 90 tablet 3     lidocaine, viscous, (XYLOCAINE) 2 % solution SWISH AND SPIT 5ML BY MOUTH EVERY 3 HOURS AS NEEDED 100 mL 0     lisinopril (PRINIVIL/ZESTRIL) 10 MG tablet Take 1 tablet (10 mg) by mouth daily 90 tablet 1     Loratadine 10 MG capsule Take 10 mg by mouth daily as needed.       metroNIDAZOLE (METROGEL) 1 % gel Apply topically daily 60 g 9     order for DME Please measure and distribute 1 pair of 20mmHg - 30mmHg knee high open toe compression stockings. Jobst ultrasheer or equivalent. 1 each 1     ORDER FOR DME Use your CPAP device as directed by your  provider.       VITAMIN D, CHOLECALCIFEROL, PO Take 2,000 Units by mouth daily       VITALS      [3, 3]   /85   Pulse 80    MENTAL STATUS EXAM      [9, 14 cog gs]     Alertness: alert  and oriented  Appearance: well groomed  Behavior/Demeanor: cooperative, pleasant and calm, with good  eye contact   Speech: normal and regular rate and rhythm  Language: intact  Psychomotor: no observed tremor today  Mood: stable, some anhedonia  Affect: Full range, brightens at appropriate times; was congruent to mood; was congruent to content  Thought Process/Associations: unremarkable  Thought Content:  Reports none;  Denies suicidal ideation, violent ideation, delusions and paranoid ideation  Perception:  Reports none;  Denies auditory hallucinations and visual hallucinations  Insight: excellent  Judgment: excellent  Cognition: (6) does  appear grossly intact; formal cognitive testing was not done  Gait and Station: unremarkable    LABS and DATA     AIMS:  0 on 12/13/2018  [place]: 25 out of 30, lost 1 point for clock hands, 1 point for naming 7 words beginning with F, and 3 points on delayed recall, though recalled all 5 with category cues (25/30 on 12/12/18)  PHQ9 TODAY = 4  PHQ-9 SCORE 8/28/2019 10/2/2019 11/6/2019   PHQ-9 Total Score 9 5 6     LITHIUM LABS     [level, renal, SG, TSH, WBC]    q6 mo  Recent Labs   Lab Test 10/15/19  1037 05/01/19  0921 01/14/19  0913 01/02/19  0839   LITHIUM 0.65 0.61 0.55* 0.37*     Recent Labs   Lab Test 10/15/19  1037 05/01/19  0921 12/20/18  1214 12/14/18  1235   CR 0.45* 0.54 0.47* 0.43*   GFRESTIMATED >90 >90 >90 >90    135 132* 129*   POTASSIUM 4.3 4.6 4.4 4.2   SHERRIE 9.1 9.0 9.2 8.7     Recent Labs   Lab Test 10/15/19  1222 05/01/19  1140 11/01/12  0819   SG 1.006 1.008 1.015     Recent Labs   Lab Test 10/15/19  1037 05/01/19  0921 11/10/18  0756 08/31/18  2029   TSH 2.47 2.69 2.29 2.19       VALPROIC ACID LABS     [liver enzymes, WBC, ANEU, Hgb, plts, +ammonia]  q6-12 mo  Recent  Labs   Lab Test 10/15/19  1037 05/01/19  0921   SOFIYA 30* 77     Recent Labs   Lab Test 10/15/19  1037 05/01/19  0921   AST 13 19   ALT 18 13   ALKPHOS 61 46     Recent Labs   Lab Test 10/15/19  1037 05/01/19  0921   WBC 5.4 5.7   ANEU 3.3 3.7   HGB 14.2 14.4    174       PSYCHOTROPIC DRUG INTERACTIONS     Concurrent use of LITHIUM and ACE INHIBITORS may result in increased risk of lithium toxicity.    MANAGEMENT:  Monitoring for adverse effects, routine vitals, routine labs and patient is aware of risks    RISK STATEMENT for SAFETY     Angy Haji did not appear to be an imminent safety risk to self or others.    DIAGNOSIS     Bipolar Disorder, Type I (late onset), depressive episode, mild    (only manic episode was Nov/Dec 2018)  H/o EPSE secondary to neuroleptic use  H/o Thromboctyopenia secondary to depakote  H/o lithium toxicity, resolved    ASSESSMENT      [m2, h3]     TODAY Angy returns for follow-up.  Ongoing mood stability, no evidence of manic symptoms with decrease in Depakote last month.  Ongoing sense of feeling overwhelmed with multiple social stressors, mild anhedonia, continues to slowly improve.  No overt depressed mood.  It is possible that patient is experiencing some affect of restriction in the context of Depakote use.  I suspect that although this could could be contributing, patient is also experiencing psychodynamic stress and concern with regard to the implications of her mental health diagnoses, other large scale life stressors. No acute safety concerns, tolerating medications well. Engaged with new therapist. Overall plan is continue moving cautiously toward Li monotherapy. No change today. Tentatively, will plan to reduce VPA to 125 mg next month, then stop. This, along with working with dietician, healthy eating, and exercise, will hopefully address weight gain over the past year. F/u in 1 mo.        PLAN      [m2, h3]     1) PSYCHOTROPIC MEDICATIONS:  - Continue Depakote 250 mg  at bedtime  - Continue lithium 900 at bedtime    2) [place]  was checked today:  none.    3) THERAPY:  Continue individual psychotherapist in private practice every 2 weeks  Looking for new therapist due to insurance issues. Provided referral list for [facility]    4) NEXT DUE:    Labs- Li and VPA labs updated last month  EKG- N/A  Rating Scales- serial PHQ9    5) REFERRALS:  No    6) RTC: 1 mo    7) CRISIS NUMBERS:   Provided routinely in AVS   ONLY if a [facility] PT: Krissy [place] [phone] (clinic), [phone] (after hours)     TREATMENT RISK STATEMENT:  The risks, benefits, alternatives and potential adverse effects have been discussed and are understood by the pt. The pt understands the risks of using street drugs or alcohol. There are no medical contraindications, the pt agrees to treatment with the ability to do so. The pt knows to call the clinic for any problems or to access emergency care if needed.  Medical and substance use concerns are documented above.  Psychotropic drug interaction check was done, including changes made today.    PROVIDER: Ricci Villa MD    Patient staffed in clinic by Dr Tapia who will sign note. Supervisor is Dr. Liu.   I saw the patient with the resident, and participated in key portions of the service, including the mental status examination and developing the plan of care. I reviewed key portions of the history with the resident. I agree with the findings and plan as documented in this note.    Dacia Tapia MD

## 2019-12-12 ENCOUNTER — APPOINTMENT (OUTPATIENT)
Dept: MEDSURG UNIT | Facility: CLINIC | Age: 54
End: 2019-12-12
Attending: RADIOLOGY
Payer: COMMERCIAL

## 2019-12-12 ENCOUNTER — APPOINTMENT (OUTPATIENT)
Dept: INTERVENTIONAL RADIOLOGY/VASCULAR | Facility: CLINIC | Age: 54
End: 2019-12-12
Attending: RADIOLOGY
Payer: COMMERCIAL

## 2019-12-12 ENCOUNTER — HOSPITAL ENCOUNTER (OUTPATIENT)
Facility: CLINIC | Age: 54
Discharge: HOME | End: 2019-12-12
Attending: RADIOLOGY | Admitting: RADIOLOGY
Payer: COMMERCIAL

## 2019-12-12 VITALS
HEIGHT: 68 IN | BODY MASS INDEX: 44.41 KG/M2 | OXYGEN SATURATION: 99 % | TEMPERATURE: 97.5 F | SYSTOLIC BLOOD PRESSURE: 157 MMHG | DIASTOLIC BLOOD PRESSURE: 76 MMHG | RESPIRATION RATE: 16 BRPM | HEART RATE: 74 BPM | WEIGHT: 293 LBS

## 2019-12-12 DIAGNOSIS — I83.893 SYMPTOMATIC VARICOSE VEINS OF BOTH LOWER EXTREMITIES: ICD-10-CM

## 2019-12-12 PROCEDURE — 25800030 ZZH RX IP 258 OP 636: Performed by: RADIOLOGY

## 2019-12-12 PROCEDURE — 37765 STAB PHLEB VEINS XTR 10-20: CPT

## 2019-12-12 PROCEDURE — 25000125 ZZHC RX 250: Performed by: STUDENT IN AN ORGANIZED HEALTH CARE EDUCATION/TRAINING PROGRAM

## 2019-12-12 PROCEDURE — 27210738 ZZH ACCESSORY CR2

## 2019-12-12 PROCEDURE — 27210732 ZZH ACCESSORY CR1

## 2019-12-12 PROCEDURE — 27211193 ZZ H WOUND GLUE CR1

## 2019-12-12 PROCEDURE — 25000125 ZZHC RX 250: Performed by: RADIOLOGY

## 2019-12-12 PROCEDURE — 40000167 ZZH STATISTIC PP CARE STAGE 2

## 2019-12-12 PROCEDURE — 27210775 ZZH KIT CR11

## 2019-12-12 PROCEDURE — 25000132 ZZH RX MED GY IP 250 OP 250 PS 637: Performed by: PHYSICIAN ASSISTANT

## 2019-12-12 PROCEDURE — 36478 ENDOVENOUS LASER 1ST VEIN: CPT

## 2019-12-12 RX ORDER — OXYCODONE HYDROCHLORIDE 5 MG/1
5 TABLET ORAL EVERY 6 HOURS PRN
Status: DISCONTINUED | OUTPATIENT
Start: 2019-12-12 | End: 2020-01-24

## 2019-12-12 RX ORDER — LIDOCAINE HYDROCHLORIDE 10 MG/ML
1-30 INJECTION, SOLUTION EPIDURAL; INFILTRATION; INTRACAUDAL; PERINEURAL
Status: COMPLETED | OUTPATIENT
Start: 2019-12-12 | End: 2019-12-12

## 2019-12-12 RX ORDER — NICOTINE POLACRILEX 4 MG
15-30 LOZENGE BUCCAL
Status: DISCONTINUED | OUTPATIENT
Start: 2019-12-12 | End: 2019-12-12 | Stop reason: HOSPADM

## 2019-12-12 RX ORDER — DEXTROSE MONOHYDRATE 25 G/50ML
25-50 INJECTION, SOLUTION INTRAVENOUS
Status: DISCONTINUED | OUTPATIENT
Start: 2019-12-12 | End: 2019-12-12 | Stop reason: HOSPADM

## 2019-12-12 RX ORDER — DIAZEPAM 5 MG
15 TABLET ORAL
Status: COMPLETED | OUTPATIENT
Start: 2019-12-12 | End: 2019-12-12

## 2019-12-12 RX ADMIN — DIAZEPAM 15 MG: 5 TABLET ORAL at 14:02

## 2019-12-12 RX ADMIN — LIDOCAINE HYDROCHLORIDE 10 ML: 10 INJECTION, SOLUTION INFILTRATION; PERINEURAL at 15:34

## 2019-12-12 RX ADMIN — SODIUM BICARBONATE: 84 INJECTION, SOLUTION INTRAVENOUS at 15:35

## 2019-12-12 ASSESSMENT — MIFFLIN-ST. JEOR: SCORE: 1996.48

## 2019-12-12 NOTE — IR NOTE
Patient Name: Angy Haji  Medical Record Number: [id number]  Today's Date: 12/12/2019    Procedure: endovenous ablation right anterior accessory saphenous vein and great saphenous vein with microphlebectomy  Proceduralist: Dr LILIAN Solorio MD, Dr CRISTINA Mack MD    Sedation Notes: valium given pre procedure as ordered    Procedure start time: 1501  Puncture time: 1507  Procedure end time: 1645    Report given to: UBALDO Epstein 2A   : none    Other Notes: Pt arrived to IR room 5 from . Consent reviewed, pt confirmed. Pt denies any questions or concerns regarding procedure. Pt positioned supine and monitored per protocol. Sites cleansed and dressed per protocol. Pt tolerated procedure without any noted complications. Pt transferred back to .

## 2019-12-12 NOTE — IP AVS SNAPSHOT
MRN:[id number]                      After Visit Summary   12/12/2019    Angy Haji    MRN: [id number]           Visit Information        Department      12/12/2019 11:20 AM [facility], [place], Interventional Radiology          Review of your medicines      CONTINUE these medicines which have NOT CHANGED       Dose / Directions   carboxymethylcellulose PF 0.5 % ophthalmic solution  Commonly known as:  REFRESH PLUS      Dose:  1 drop  Place 1 drop into both eyes 3 times daily as needed for dry eyes  Refills:  0     divalproex sodium delayed-release 250 MG DR tablet  Commonly known as:  DEPAKOTE  Used for:  Bipolar I disorder (H)      Dose:  250 mg  Take 1 tablet (250 mg) by mouth At Bedtime  Quantity:  30 tablet  Refills:  1     estrogen conj-medroxyPROGESTERone 0.625-2.5 MG tablet  Commonly known as:  PREMPRO  Used for:  Perimenopausal vasomotor symptoms      Dose:  1 tablet  Take 1 tablet by mouth daily  Quantity:  90 tablet  Refills:  3     lidocaine VISCOUS 2 % solution  Commonly known as:  XYLOCAINE  Used for:  Canker sore      SWISH AND SPIT 5ML BY MOUTH EVERY 3 HOURS AS NEEDED  Quantity:  100 mL  Refills:  0     lisinopril 10 MG tablet  Commonly known as:  PRINIVIL/ZESTRIL  Used for:  Benign essential hypertension      Dose:  10 mg  Take 1 tablet (10 mg) by mouth daily  Quantity:  90 tablet  Refills:  1     lithium 300 MG capsule  Used for:  Bipolar I disorder (H)      Dose:  900 mg  Take 3 capsules (900 mg) by mouth At Bedtime  Quantity:  90 capsule  Refills:  1     loratadine 10 MG capsule      Dose:  10 mg  Take 10 mg by mouth daily as needed.  Refills:  0     metroNIDAZOLE 1 % external gel  Commonly known as:  Metrogel  Used for:  Rosacea, Perioral dermatitis      Apply topically daily  Quantity:  60 g  Refills:  9     order for DME      Use your CPAP device as directed by your provider.  Refills:  0     order for DME  Used for:  Varicose veins of both lower extremities with  complications      Please measure and distribute 1 pair of 20mmHg - 30mmHg knee high open toe compression stockings. Jobst ultrasheer or equivalent.  Quantity:  1 each  Refills:  1     VITAMIN D (CHOLECALCIFEROL) PO  Used for:  Perioral dermatitis, Rosacea      Dose:  2,000 Units  Take 2,000 Units by mouth daily  Refills:  0              Protect others around you: Learn how to safely use, store and throw away your medicines at www.disposemymeds.org.       Follow-ups after your visit       Your next 10 appointments already scheduled    Dec 13, 2019  9:00 AM CST  Adult Med Follow UP with Ricci Villa MD  Psychiatry Clinic ([facility]) [address]  [address] [address]  [phone]      Dec 13, 2019 12:00 PM CST  (Arrive by 11:45 AM)  Return Visit with SHANON Hair [facility] Interventional Radiology ([facility]) [address] [place] [id number] [phone]      Dec 19, 2019 10:30 AM CST  US LOWER EXTREMITY VENOUS DUPLEX RIGHT with UCUSV1  [facility] US ([facility]) [address] [place] [id number] [phone]   How do I prepare for my exam? (Food and drink instructions)  No Food and Drink Restrictions.    How do I prepare for my exam? (Other instructions)  You do not need to do anything special to prepare for your exam.    What should I wear: Wear comfortable clothes.    How long does the exam take: Most ultrasounds take 30 to 60 minutes.    What should I bring: Bring a list of your medicines, including vitamins, minerals and over-the-counter drugs. It is safest to leave personal items at home.    Do I need a : No  is needed.    What do I need to tell my doctor: Tell your doctor about any allergies you may have.    What should I do after the exam: No restrictions, You may resume normal activities.    What is this  test: An ultrasound uses sound waves to make pictures of the body. Sound waves do not cause pain. The only discomfort may be the pressure of the wand against your skin or full bladder.    Who should I call with questions: If you have any questions, please call the Imaging Department where you will have your exam. Directions, parking instructions, and other information is available on our website, [email or web address].     Jan 17, 2020  2:00 PM CST  Follow Up with Mar Xiong RD, LD  [facility] ([facility]) [address]  [facility] [id number] [phone]         Care Instructions       Further instructions from your care team       [facility]  Interventional Radiology  Discharge Instructions for  Endovenous Laser Ablation of Legs        AFTER YOU GO HOME:       Drink plenty of fluids.       Resume your regular diet, unless otherwise instructed by your Primary Physician.    IF YOU RECEIVED SEDATION:            DO NOT smoke for at least 24 hours       DO NOT drink alcoholic beverages the day of your procedure       DO NOT drive or operate machinery at home or at work for 24 hours.          DO NOT take a bath or shower for at least 12 hours following your procedure.       DO NOT make any important or legal decisions for 24 hours following your procedure.        No tub baths, hot tubs or swimming for one week.        No vigorous lower extremity exercise (i.e. Stair stepper, running, or biking) for one week.    ADDITIONAL INFORMATION:    Sclerotherapy only  After you go home:  Resume regular diet  Resume normal activity, wear stockings and a 20 minute a day walk is encouraged.  Hot tubs, baths, use of leg weights and strenuous leg exercises should be avoided for 1 week.   Wear compression stockings for 24 hours straight, then during the daytime hours for the remainder of 2 weeks. You may remove to shower and if you have  your legs elevated.  For discomfort try moist heat using a washcloth and ibuprofen.   For follow up appointments:  You will be called and scheduled.  You will have a sclerotherapy check every 3 weeks until treatment completed.  Endovenous laser ablation (usually in combination with microphlebectomy or sclerotherapy)  After you go home:  Drink plenty of fluids  Resume regular diet  Resume normal activity, wear stockings and a 20 minute a day walk is encouraged.  Hot tubs, baths, use of leg weights and strenuous leg exercises should be avoided for 1 week.   Do not take a shower for at least 12 hours following your procedure  Wear compression stockings for 3 days straight, then wear during day time hours for remainder of 3 weeks.  You may remove briefly to shower and if you have your legs elevated.  For mild pain or discomfort Ibuprofen 600 mg oral every 8 hours as needed, for moderate to severe pain oxycodone 5 mg 1-2 tabs every 6 hours as needed.  IF you received sedation:  Do not smoke for 24 hours following your procedure  Do not drink alcoholic beverages day of your procedure  Do not drive or operate machinery at home or work for 24 hours following your procedure  Do not make any important or legal decisions for 24 hours following your procedure  For follow up appointments you will be called to schedule:  If you received sclerotherapy, you will have a sclerotherapy check every 3 weeks until treatment completed.  If EVLA only you will have an ultrasound scheduled for 1 week after procedure and an ultrasound followed by clinic appointment with your provider at 1 month post procedure  Microphlebectomy (usually in combination with EVLA)  After you go home:  Drink plenty of fluids  Resume regular diet  Activity after microphlebectomy is bedrest or legs elevated for 24 hours or until your microsite check appointment next day.  After that activity as tolerated, walking 20 minutes a day is encouraged.  Hot tubs, baths,  use of leg weights and strenuous leg exercises should be avoided for 1 week.   Do not take a shower for at least 12 hours following your procedure  For mild pain or discomfort Ibuprofen 600 mg oral every 8 hours as needed, for moderate to severe pain oxycodone 5 mg 1-2 tabs every 6 hours as needed.  IF you received sedation:  Do not smoke for 24 hours following your procedure  Do not drink alcoholic beverages day of your procedure  Do not drive or operate machinery at home or work for 24 hours following your procedure  Do not make any important or legal decisions for 24 hours following your procedure  For follow up appointments you will be called to schedule:  After microphlebectomy you have already been scheduled for microsite check day after your procedure with advanced practice provider in clinic.  There they will unwrap your legs, assess your wounds and place you into compression stockings.  Please bring your stockings to this procedure.  Wear compression stockings for 3 days straight, then wear during day time hours for remainder of 3 weeks.  You may remove briefly to shower and if you have your legs elevated.  If EVLA you will have an ultrasound scheduled for 1 week after procedure and an ultrasound followed by clinic appointment with your provider at 1 month post procedure      Procedural Physician: Dr Solorio/Dr Mack  Date:December 12, 2019  Telephone Number:    [phone] Monday-Friday 8-4:30                                       [phone]    IR clinic RN                                       [phone] After 4:30 PM Monday-Friday, Weekends and Holidays, The hospital  will answer, ask for the Interventional Radiologist on call. Someone is available 24 hours /day                   Additional Information About Your Visit       [employer] Information    [facility] gives you secure access to your electronic health record. If you see a primary care provider, you can also send messages to your care  "team and make appointments. If you have questions, please call your primary care clinic.  If you do not have a primary care provider, please call [phone] and they will assist you.       Care EveryWhere ID    This is your Care EveryWhere ID. This could be used by other organizations to access your [facility] medical records  [id number]       Your Vitals Were  Most recent update: 12/12/2019  5:10 PM    Blood Pressure   157/76            Pulse   74          Temperature   97.5  F (36.4  C) (Oral)          Respirations   16          Height   1.727 m (5' 7.99\")             Weight   133.8 kg (295 lb)    Pulse Oximetry   99%    BMI (Body Mass Index)   44.87 kg/m           Primary Care Provider Office Phone # Fax #    Jono Odell [phone]      Equal Access to Services    [facility] : Hadii ariana mckinnon hadasho Soomaali, waaxda luqadaha, qaybta kaalmada adeegyada, svetlana tam . So [facility] [phone].    ATENCIÓN: Si habla español, tiene a franco disposición servicios gratuitos de asistencia lingüística. Haley al [phone].    We comply with applicable federal and state civil rights laws, including the [place] Human Rights Act. We do not discriminate on the basis of race, color, creed, Scientology, national origin, marital status, age, disability, sex, sexual orientation, or gender identity.       Thank you!    Thank you for choosing [facility] for your care. Our goal is always to provide you with excellent care. Hearing back from our patients is one way we can continue to improve our services. Please take a few minutes to complete the written survey that you may receive in the mail after you visit with us. Thank you!            Medication List      Medications       Morning Afternoon Evening Bedtime As Needed   carboxymethylcellulose PF 0.5 % ophthalmic solution  Also known as:  REFRESH PLUS  INSTRUCTIONS:  Place 1 drop into both eyes 3 times daily as needed for dry eyes      "               divalproex sodium delayed-release 250 MG DR tablet  Also known as:  DEPAKOTE  INSTRUCTIONS:  Take 1 tablet (250 mg) by mouth At Bedtime                    estrogen conj-medroxyPROGESTERone 0.625-2.5 MG tablet  Also known as:  PREMPRO  INSTRUCTIONS:  Take 1 tablet by mouth daily                    lidocaine VISCOUS 2 % solution  Also known as:  XYLOCAINE  INSTRUCTIONS:  SWISH AND SPIT 5ML BY MOUTH EVERY 3 HOURS AS NEEDED                    lisinopril 10 MG tablet  Also known as:  PRINIVIL/ZESTRIL  INSTRUCTIONS:  Take 1 tablet (10 mg) by mouth daily                    lithium 300 MG capsule  INSTRUCTIONS:  Take 3 capsules (900 mg) by mouth At Bedtime                    loratadine 10 MG capsule  INSTRUCTIONS:  Take 10 mg by mouth daily as needed.                    metroNIDAZOLE 1 % external gel  Also known as:  Metrogel  INSTRUCTIONS:  Apply topically daily                    order for DME  INSTRUCTIONS:  Use your CPAP device as directed by your provider.                    order for DME  INSTRUCTIONS:  Please measure and distribute 1 pair of 20mmHg - 30mmHg knee high open toe compression stockings. Jobst ultrasheer or equivalent.                    VITAMIN D (CHOLECALCIFEROL) PO  INSTRUCTIONS:  Take 2,000 Units by mouth daily

## 2019-12-12 NOTE — PROCEDURES
[facility], [place]    Procedure: IR Procedure Note  Date/Time: 12/12/2019 4:47 PM  Performed by: Rafael Mack MD  Authorized by: Rafael Mack MD   IR Fellow Physician: Rafael Mack  Other(s) attending procedure: Staff: Dr. Solorio    UNIVERSAL PROTOCOL   Site Marked: Yes  Prior Images Obtained and Reviewed:  Yes  Required items: Required blood products, implants, devices and special equipment available    Patient identity confirmed:  Verbally with patient  Patient was reevaluated immediately before administering moderate or deep sedation or anesthesia  Confirmation Checklist:  Patient's identity using two indicators, relevant allergies, procedure was appropriate and matched the consent or emergent situation and correct equipment/implants were available  Time out: Immediately prior to the procedure a time out was called    Preparation: Patient was prepped and draped in usual sterile fashion      SEDATION    Patient Sedated: No    Vital signs: Vital signs monitored during sedation    See dictated procedure note for full details.  Findings: EVLA of right AASV, right GSV and microphlebectomy right thigh and calf varicose veins   No immediate complications     Specimens: none    Complications: None    Condition: Stable    Plan: -Bed rest as ordered   -Follow up ultrasound and clinic follow up as ordered     PROCEDURE   Patient Tolerance:  Patient tolerated the procedure well with no immediate complications    Length of time physician/provider present for 1:1 monitoring during sedation: 0

## 2019-12-12 NOTE — IP AVS SNAPSHOT
[facility], [place], Interventional Radiology  [address] [facility] [address]  Phone:  [phone]                                    After Visit Summary   12/12/2019    Angy Haji    MRN: [id number]           After Visit Summary Signature Page    I have received my discharge instructions, and my questions have been answered. I have discussed any challenges I see with this plan with the nurse or doctor.    ..........................................................................................................................................  Patient/Patient Representative Signature      ..........................................................................................................................................  Patient Representative Print Name and Relationship to Patient    ..................................................               ................................................  Date                                   Time    ..........................................................................................................................................  Reviewed by Signature/Title    ...................................................              ..............................................  Date                                               Time          22EPIC Rev 08/18

## 2019-12-12 NOTE — PROGRESS NOTES
Pt prepped for varicose vein ablation.Consent signed with wife at bedside.Questions answered.Rachael wife will take care of pt after procedure.

## 2019-12-12 NOTE — IR NOTE
EVLA of the Left GSV and AASV.    Left GSV  Tumescence- 500mL  Treatment Length- 47cm  Joules- 2363  Time- 338sec  Holley- 7W  Access point- 15cm below Left knee      Left AASV  Tumescence- 100mL  Treatment Length- 17cm  Joules- 857  Time- 122sec  Holley- 7W  Access point- Mid Thigh

## 2019-12-13 ENCOUNTER — OFFICE VISIT (OUTPATIENT)
Dept: INTERVENTIONAL RADIOLOGY/VASCULAR | Facility: CLINIC | Age: 54
End: 2019-12-13
Payer: COMMERCIAL

## 2019-12-13 ENCOUNTER — OFFICE VISIT (OUTPATIENT)
Dept: PSYCHIATRY | Facility: CLINIC | Age: 54
End: 2019-12-13
Attending: PSYCHIATRY & NEUROLOGY
Payer: COMMERCIAL

## 2019-12-13 VITALS — DIASTOLIC BLOOD PRESSURE: 85 MMHG | HEART RATE: 80 BPM | SYSTOLIC BLOOD PRESSURE: 138 MMHG

## 2019-12-13 DIAGNOSIS — F31.9 BIPOLAR I DISORDER (H): ICD-10-CM

## 2019-12-13 DIAGNOSIS — Z48.01 ENCOUNTER FOR CHANGE OR REMOVAL OF SURGICAL WOUND DRESSING: Primary | ICD-10-CM

## 2019-12-13 DIAGNOSIS — I83.891 SYMPTOMATIC VARICOSE VEINS, RIGHT: Primary | ICD-10-CM

## 2019-12-13 PROCEDURE — G0463 HOSPITAL OUTPT CLINIC VISIT: HCPCS | Mod: ZF

## 2019-12-13 RX ORDER — OXYCODONE HYDROCHLORIDE 10 MG/1
5 TABLET ORAL EVERY 6 HOURS PRN
Qty: 12 TABLET | Refills: 0 | Status: SHIPPED | OUTPATIENT
Start: 2019-12-13 | End: 2020-01-24

## 2019-12-13 RX ORDER — LITHIUM CARBONATE 300 MG/1
900 CAPSULE ORAL AT BEDTIME
Qty: 90 CAPSULE | Refills: 1 | Status: SHIPPED | OUTPATIENT
Start: 2019-12-13 | End: 2020-01-24

## 2019-12-13 RX ORDER — DIVALPROEX SODIUM 250 MG/1
250 TABLET, DELAYED RELEASE ORAL AT BEDTIME
Qty: 30 TABLET | Refills: 1 | Status: SHIPPED | OUTPATIENT
Start: 2019-12-13 | End: 2020-01-24

## 2019-12-13 ASSESSMENT — ENCOUNTER SYMPTOMS
DYSURIA: 0
LIGHT-HEADEDNESS: 0
WEIGHT LOSS: 0
INCREASED ENERGY: 0
INSOMNIA: 0
MUSCLE CRAMPS: 1
WEIGHT GAIN: 1
DECREASED APPETITE: 0
DECREASED CONCENTRATION: 0
NECK PAIN: 0
POLYDIPSIA: 0
FATIGUE: 0
JOINT SWELLING: 0
ARTHRALGIAS: 0
PANIC: 0
LEG PAIN: 0
PALPITATIONS: 0
FLANK PAIN: 0
SLEEP DISTURBANCES DUE TO BREATHING: 0
BACK PAIN: 1
HALLUCINATIONS: 0
STIFFNESS: 0
FEVER: 0
NERVOUS/ANXIOUS: 1
EXERCISE INTOLERANCE: 0
ORTHOPNEA: 0
SYNCOPE: 0
HEMATURIA: 0
NIGHT SWEATS: 0
DEPRESSION: 0
MUSCLE WEAKNESS: 0
DIFFICULTY URINATING: 0
HYPOTENSION: 0
MYALGIAS: 0
POLYPHAGIA: 0
HYPERTENSION: 1
CHILLS: 0
ALTERED TEMPERATURE REGULATION: 0

## 2019-12-13 ASSESSMENT — PAIN SCALES - GENERAL: PAINLEVEL: NO PAIN (0)

## 2019-12-13 NOTE — PROGRESS NOTES
Returned from IR s/p EVLA to right leg.  Right leg wrapped in ace bandage, CDI.  Ambulated to bathroom, and upon return had a golf ball sized blood spot to right upper thigh Ace wrap.  MD notified and patient was placed on bedrest with legs elevated X 1 hour.  Right leg also with ice packs.  After 1.5 hour patient walked to bathroom again, with no new bleeding noted.  Assisted patient to get dressed and while patient was putting coat on, noted small puddle of blood at right leg.  Patient rested back on bed.  MD notified and leg was re-wrapped.  Reviewed discharge instructions with patient.  Patient sent home with some extra ACE wraps.  Discharged to home.

## 2019-12-13 NOTE — LETTER
12/13/2019       RE: Angy Haji  [address]  [place] [address]     Dear Colleague,    Thank you for referring your patient, Angy Haji, to the [facility] INTERVENTIONAL RADIOLOGY at [facility]. Please see a copy of my visit note below.    HPI:  Angy is a 53 yo with long standing symptomatic RLE varicose veins, refractory to  years of conservative therapy with graded compression stocking use.  Symptomatic varicosities in the right leg secondary to combined AASV and GSV incompetence with reflux into associated incompetent symptomatic varicosities in the thigh and calf.   PROCEDURE: 12/12/2019  1. Endovenous laser ablation right great saphenous vein.  2. Endovenous laser ablation right accessory saphenous vein (separate  access).  3. Microphlebectomy right thigh and calf truncal varicosities (16 stab wounds)    Angy had a difficult night.  She tells me that she had problems with bleeding in recovery.  They changed the dressings twice before she left to go home.  By 1 AM the dressings were saturated again and had to be completely changed by her partner.  When she came in to clinic today at 11 AM there was dried blood that had saturated the kerlix, but had not gone through to the ace wrap bandages.    The dressings were removed and the leg cleansed with water.  Only 2 stab sites bled a little bit.  [place] dressings were applied to the stab wounds.  A thigh high compression stocking was put on that fit very nicely.    The patient had a lot of pain overnight.  The recovery area forgot to print the narcotic prescription and give to the patient.  Tylenol was not of much use for the pain.  I spoke with Dr. Solorio and so he wrote a new prescription and it was brought to the clinic for her to fill.    ASSESSMENT:  Angy had a small amount of bleeding overnight, but she is doing much better today.  She experienced a lot of pain overnight that was not controled with  OTC pain medications.    PLAN:  Patient was given dressings to cover 3 days.  We reviewed wear the stockings for the next 3 days, taking showers and wearing dressings until the stab sites no longer bled.  She will then go to wearing her stockings during the day.    She has an ultrasound scheduled in one week.  Discussed using pain medication and transitioning over to tylenol    30 minutes was spent with Bhavani Stone MS, APRN, CNS, CRN  Clinical Nurse Specialist  Interventional Radiology  [phone] (voice mail)  [phone] (pager)

## 2019-12-13 NOTE — PATIENT INSTRUCTIONS
No changes today.  Great to see you today, Angy.  I look forward to seeing you next month.   - Dr. Villa    Thank you for coming to the PSYCHIATRY CLINIC.    Lab Testing:  If you had lab testing today and your results are reassuring or normal they will be mailed to you or sent through [employer] within 7 days.   If the lab tests need quick action we will call you with the results.  The phone number we will call with results is # [phone] (home) [phone] (work). If this is not the best number please call our clinic and change the number.    Medication Refills:  If you need any refills please call your pharmacy and they will contact us. Our fax number for refills is [phone]. Please allow three business for refill processing.   If you need to  your refill at a new pharmacy, please contact the new pharmacy directly. The new pharmacy will help you get your medications transferred.     Scheduling:  If you have any concerns about today's visit or wish to schedule another appointment please call our office during normal business hours [phone] (8-5:00 M-F)    Contact Us:  Please call [phone] during business hours (8-5:00 M-F).  If after clinic hours, or on the weekend, please call  [phone].    Financial Assistance [phone]  [facility] [employer] Billing [phone]  Central Billing Office, [facility]: [phone]  [place] Billing [phone]  Medical Records [phone]      MENTAL HEALTH CRISIS NUMBERS:  [facility]:   [facility] - [phone]   Crisis Residence [facility] - [facility] Residence - [phone]   Walk-In Counseling Center [facility] - [phone]   COPE 24/7 [place] Mobile Team for Adults - [[phone]]; Child - [[phone]]        [facility]:   [facility] - [phone]   Walk-in counseling [facility] - [phone]   Walk-in counseling [facility] - [phone]   Crisis Residence [facility]  - [phone]   Urgent Care Adult Mental Health:   --Drop-in, 24/7 crisis line, and [facility] Mobile Team [[phone]]    CRISIS TEXT LINE: Text [phone] from anywhere, anytime, any crisis 24/7;    OR SEE www.crisistextline.org     Poison Control Center - [phone]    CHILD: [facility] needs assessment team - [phone]     [facility] - [phone]; or [facility] - [phone]    If you have a medical emergency please call 911or go to the nearest ER.                    _____________________________________________    Again thank you for choosing PSYCHIATRY CLINIC and please let us know how we can best partner with you to improve you and your family's health.  You may be receiving a survey in the mail regarding this appointment. We would love to have your feedback, both positive and negative, so please fill out the survey and return it using the provided envelope. The survey is done by an external company, so your answers are anonymous.

## 2019-12-13 NOTE — DISCHARGE INSTRUCTIONS
[facility]  Interventional Radiology  Discharge Instructions for  Endovenous Laser Ablation of Legs        AFTER YOU GO HOME:       Drink plenty of fluids.       Resume your regular diet, unless otherwise instructed by your Primary Physician.    IF YOU RECEIVED SEDATION:            DO NOT smoke for at least 24 hours       DO NOT drink alcoholic beverages the day of your procedure       DO NOT drive or operate machinery at home or at work for 24 hours.          DO NOT take a bath or shower for at least 12 hours following your procedure.       DO NOT make any important or legal decisions for 24 hours following your procedure.        No tub baths, hot tubs or swimming for one week.        No vigorous lower extremity exercise (i.e. Stair stepper, running, or biking) for one week.    ADDITIONAL INFORMATION:    Sclerotherapy only  After you go home:  Resume regular diet  Resume normal activity, wear stockings and a 20 minute a day walk is encouraged.  Hot tubs, baths, use of leg weights and strenuous leg exercises should be avoided for 1 week.   Wear compression stockings for 24 hours straight, then during the daytime hours for the remainder of 2 weeks. You may remove to shower and if you have your legs elevated.  For discomfort try moist heat using a washcloth and ibuprofen.   For follow up appointments:  You will be called and scheduled.  You will have a sclerotherapy check every 3 weeks until treatment completed.  Endovenous laser ablation (usually in combination with microphlebectomy or sclerotherapy)  After you go home:  Drink plenty of fluids  Resume regular diet  Resume normal activity, wear stockings and a 20 minute a day walk is encouraged.  Hot tubs, baths, use of leg weights and strenuous leg exercises should be avoided for 1 week.   Do not take a shower for at least 12 hours following your procedure  Wear compression stockings for 3 days straight, then wear during day time hours for  remainder of 3 weeks.  You may remove briefly to shower and if you have your legs elevated.  For mild pain or discomfort Ibuprofen 600 mg oral every 8 hours as needed, for moderate to severe pain oxycodone 5 mg 1-2 tabs every 6 hours as needed.  IF you received sedation:  Do not smoke for 24 hours following your procedure  Do not drink alcoholic beverages day of your procedure  Do not drive or operate machinery at home or work for 24 hours following your procedure  Do not make any important or legal decisions for 24 hours following your procedure  For follow up appointments you will be called to schedule:  If you received sclerotherapy, you will have a sclerotherapy check every 3 weeks until treatment completed.  If EVLA only you will have an ultrasound scheduled for 1 week after procedure and an ultrasound followed by clinic appointment with your provider at 1 month post procedure  Microphlebectomy (usually in combination with EVLA)  After you go home:  Drink plenty of fluids  Resume regular diet  Activity after microphlebectomy is bedrest or legs elevated for 24 hours or until your microsite check appointment next day.  After that activity as tolerated, walking 20 minutes a day is encouraged.  Hot tubs, baths, use of leg weights and strenuous leg exercises should be avoided for 1 week.   Do not take a shower for at least 12 hours following your procedure  For mild pain or discomfort Ibuprofen 600 mg oral every 8 hours as needed, for moderate to severe pain oxycodone 5 mg 1-2 tabs every 6 hours as needed.  IF you received sedation:  Do not smoke for 24 hours following your procedure  Do not drink alcoholic beverages day of your procedure  Do not drive or operate machinery at home or work for 24 hours following your procedure  Do not make any important or legal decisions for 24 hours following your procedure  For follow up appointments you will be called to schedule:  After microphlebectomy you have already been  scheduled for microsite check day after your procedure with advanced practice provider in clinic.  There they will unwrap your legs, assess your wounds and place you into compression stockings.  Please bring your stockings to this procedure.  Wear compression stockings for 3 days straight, then wear during day time hours for remainder of 3 weeks.  You may remove briefly to shower and if you have your legs elevated.  If EVLA you will have an ultrasound scheduled for 1 week after procedure and an ultrasound followed by clinic appointment with your provider at 1 month post procedure      Procedural Physician: Dr Solorio/Dr Mack  Date:December 12, 2019  Telephone Number:    [phone] Monday-Friday 8-4:30                                       [phone]    IR clinic RN                                       [phone] After 4:30 PM Monday-Friday, Weekends and Holidays, The hospital  will answer, ask for the Interventional Radiologist on call. Someone is available 24 hours /day

## 2019-12-13 NOTE — PROGRESS NOTES
HPI:  Angy is a 55 yo with long standing symptomatic RLE varicose veins, refractory to  years of conservative therapy with graded compression stocking use.  Symptomatic varicosities in the right leg secondary to combined AASV and GSV incompetence with reflux into associated incompetent symptomatic varicosities in the thigh and calf.   PROCEDURE: 12/12/2019  1. Endovenous laser ablation right great saphenous vein.  2. Endovenous laser ablation right accessory saphenous vein (separate  access).  3. Microphlebectomy right thigh and calf truncal varicosities (16 stab wounds)    Angy had a difficult night.  She tells me that she had problems with bleeding in recovery.  They changed the dressings twice before she left to go home.  By 1 AM the dressings were saturated again and had to be completely changed by her partner.  When she came in to clinic today at 11 AM there was dried blood that had saturated the kerlix, but had not gone through to the ace wrap bandages.    The dressings were removed and the leg cleansed with water.  Only 2 stab sites bled a little bit.  [place]apore dressings were applied to the stab wounds.  A thigh high compression stocking was put on that fit very nicely.    The patient had a lot of pain overnight.  The recovery area forgot to print the narcotic prescription and give to the patient.  Tylenol was not of much use for the pain.  I spoke with Dr. Solorio and so he wrote a new prescription and it was brought to the clinic for her to fill.    ASSESSMENT:  Angy had a small amount of bleeding overnight, but she is doing much better today.  She experienced a lot of pain overnight that was not controled with OTC pain medications.    PLAN:  Patient was given dressings to cover 3 days.  We reviewed wear the stockings for the next 3 days, taking showers and wearing dressings until the stab sites no longer bled.  She will then go to wearing her stockings during the day.    She has an ultrasound  scheduled in one week.  Discussed using pain medication and transitioning over to tylenol    30 minutes was spent with Bhavani Stone MS, APRN, CNS, CRN  Clinical Nurse Specialist  Interventional Radiology  [phone] (voice mail)  [phone] (pager)

## 2019-12-16 DIAGNOSIS — I10 BENIGN ESSENTIAL HYPERTENSION: ICD-10-CM

## 2019-12-16 NOTE — TELEPHONE ENCOUNTER
"Request for medication refill: lisinopril (PRINIVIL/ZESTRIL) 10 MG tablet    Providers if patient needs an appointment and you are willing to give a one month supply please refill for one month and  send a letter/MyChart using \".SMILLIMITEDREFILL\" .smillimited and route chart to \"P [facility] \" (Giving one month refill in non controlled medications is strongly recommended before denial)    If refill has been denied, meaning absolutely no refills without visit, please complete the smart phrase \".smirxrefuse\" and route it to the \"P SMI MED REFILLS\"  pool to inform the patient and the pharmacy.    Shana Staton CMA        "

## 2019-12-17 ENCOUNTER — TELEPHONE (OUTPATIENT)
Dept: VASCULAR SURGERY | Facility: CLINIC | Age: 54
End: 2019-12-17

## 2019-12-17 DIAGNOSIS — I83.893 SYMPTOMATIC VARICOSE VEINS OF BOTH LOWER EXTREMITIES: Primary | ICD-10-CM

## 2019-12-17 NOTE — TELEPHONE ENCOUNTER
I called and left a voicemail including my call back number.  I called to see how Angy was doing post EVLA and Phleb, her f/up 1 month with an US.  Msg sent to clinic coordinators to schedule.  ELADIO Vigil RN, BSN  Interventional Radiology Nurse Coordinator   Phone:  [phone]

## 2019-12-19 ENCOUNTER — ANCILLARY PROCEDURE (OUTPATIENT)
Dept: ULTRASOUND IMAGING | Facility: CLINIC | Age: 54
End: 2019-12-19
Attending: RADIOLOGY
Payer: COMMERCIAL

## 2019-12-19 DIAGNOSIS — I83.893 SYMPTOMATIC VARICOSE VEINS OF BOTH LOWER EXTREMITIES: ICD-10-CM

## 2019-12-19 NOTE — TELEPHONE ENCOUNTER
I spoke with Angy.  She does still have 2 phleb sites that ooze every once in a while. I encouraged her to watch for signs of infection and keep the areas Dry.  She is wearing compression.  She feels hard spot on her inner thigh and it is tender.  This is normal post EVLA.    ELADIO Vigil, RN, BSN  Interventional Radiology Nurse Coordinator   Phone:  [phone]

## 2019-12-20 RX ORDER — LISINOPRIL 10 MG/1
10 TABLET ORAL DAILY
Qty: 90 TABLET | Refills: 1 | Status: SHIPPED | OUTPATIENT
Start: 2019-12-20 | End: 2020-01-24

## 2019-12-20 NOTE — TELEPHONE ENCOUNTER
Refilled prescription for lisinopril. Will see pt in clinic on 1/8/20 for complete physical exam.   Magali Lira MD , PGY-1, [facility]

## 2020-01-16 ENCOUNTER — ANCILLARY PROCEDURE (OUTPATIENT)
Dept: ULTRASOUND IMAGING | Facility: CLINIC | Age: 55
End: 2020-01-16
Attending: RADIOLOGY
Payer: COMMERCIAL

## 2020-01-16 ENCOUNTER — OFFICE VISIT (OUTPATIENT)
Dept: VASCULAR SURGERY | Facility: CLINIC | Age: 55
End: 2020-01-16
Payer: COMMERCIAL

## 2020-01-16 VITALS
HEART RATE: 66 BPM | SYSTOLIC BLOOD PRESSURE: 129 MMHG | OXYGEN SATURATION: 99 % | TEMPERATURE: 97.8 F | DIASTOLIC BLOOD PRESSURE: 80 MMHG

## 2020-01-16 DIAGNOSIS — I83.893 SYMPTOMATIC VARICOSE VEINS OF BOTH LOWER EXTREMITIES: ICD-10-CM

## 2020-01-16 DIAGNOSIS — I83.893 SYMPTOMATIC VARICOSE VEINS OF BOTH LOWER EXTREMITIES: Primary | ICD-10-CM

## 2020-01-16 ASSESSMENT — PAIN SCALES - GENERAL: PAINLEVEL: MILD PAIN (2)

## 2020-01-16 NOTE — LETTER
RE: Angy Haji  [address]  [facility] [address]     Dear Colleague,    Thank you for referring your patient, Angy Haji, to the [facility] VASCULAR [facility] at [facility]. Please see a copy of my visit note below.        Interventional Radiology Clinic Visit    Date of this visit: 1/16/2020    Angy Haji presents for consultation for follow up varicose vein treatment.     Referring Physician: Dr. Cruz      History Of Present Illness:    Angy Haji is a 54 year old female with a history of  symptomatic right lower extremity varicose veins of the thigh and calf s/p endovenous laser ablation right great saphenous vein and right accessory saphenous vein (separate access with microphlebectomy right thigh and calf truncal varicosities  via 16 stab wounds. Post op course complicated by pain and bleeding requiring multiple dressing changes. She saw Vidya Stone in Clinic 12/13/2019 and was placed in compression. Patient reports that since that time she has had improvement in her bleeding and improved pain. She does note hard lumps along the inner thigh and upper calf in the area of the previous EVLA and microphlebectomy that are tender to the touch and associated with overlying discoloration.  Her symptoms in the upper inner calf are exacerbated by periods of activity.  Patient describes that since her procedure she has had significant improvement in right lower extremity swelling/aching as well as the itching associated with the bulging varicosity which underwent microphlebectomy.  Patient describes that she is compliant with her thigh-high prescription grade compression stockings.  No new concerns.     Review of Systems:    10 Point ROS is positive as noted in the HPI. Otherwise, the remainder of the ROS is negative.    Past Medical/Surgical History:    Past Medical History:   Diagnosis Date     Disorders of bursae and tendons in shoulder region,  unspecified      Female infertility of unspecified origin      Gallbladder sludge 5/2013     Mild sleep apnea 5/13/2014     Obesity, unspecified      Oral aphthae      Other acne      Other specified hypoglycemia      PONV (postoperative nausea and vomiting)      Past Surgical History:   Procedure Laterality Date     HC TOOTH EXTRACTION W/FORCEP  1984/85    [place] Te[place]     IR ENDOVENOUS ABLATION VARICOSE VEINS  12/12/2019     LAPAROSCOPIC CHOLECYSTECTOMY  7/29/2013    Procedure: LAPAROSCOPIC CHOLECYSTECTOMY;  Laparoscopic Cholecystectomy;  Surgeon: Fabio Johnson MD;  Location: [facility] OR     LAPAROSCOPIC OOPHORECTOMY Left 6/23/2016    Procedure: LAPAROSCOPIC OOPHORECTOMY;  Surgeon: Kayley Donnelly MD;  Location: [facility] OR     LAPAROSCOPIC SALPINGECTOMY Bilateral 6/23/2016    Procedure: LAPAROSCOPIC SALPINGECTOMY;  Surgeon: Kayley Donnelly MD;  Location: [facility] OR       Current Medications:    Current Outpatient Medications   Medication Sig Dispense Refill     Carboxymethylcellulose Sod PF (REFRESH PLUS) 0.5 % SOLN ophthalmic solution Place 1 drop into both eyes 3 times daily as needed for dry eyes       divalproex sodium delayed-release (DEPAKOTE) 250 MG DR tablet Take 1 tablet (250 mg) by mouth At Bedtime 30 tablet 1     estrogen conj-medroxyPROGESTERone (PREMPRO) 0.625-2.5 MG tablet Take 1 tablet by mouth daily 90 tablet 3     lidocaine, viscous, (XYLOCAINE) 2 % solution SWISH AND SPIT 5ML BY MOUTH EVERY 3 HOURS AS NEEDED 100 mL 0     lisinopril (PRINIVIL/ZESTRIL) 10 MG tablet Take 1 tablet (10 mg) by mouth daily 90 tablet 1     lithium 300 MG capsule Take 3 capsules (900 mg) by mouth At Bedtime 90 capsule 1     Loratadine 10 MG capsule Take 10 mg by mouth daily as needed.       metroNIDAZOLE (METROGEL) 1 % gel Apply topically daily 60 g 9     order for DME Please measure and distribute 1 pair of 20mmHg - 30mmHg knee high open toe compression stockings. Jobst ultrasheer or equivalent. 1 each 1      ORDER FOR DME Use your CPAP device as directed by your provider.       oxyCODONE IR (ROXICODONE) 10 MG tablet Take 0.5 tablets (5 mg) by mouth every 6 hours as needed for severe pain 12 tablet 0     VITAMIN D, CHOLECALCIFEROL, PO Take 2,000 Units by mouth daily         Allergies:    Seroquel [quetiapine]; Olanzapine; Penicillins; Risperidone; and Thimerosal    Family History:    Family History   Problem Relation Age of Onset     Cardiovascular Paternal Grandfather         anyerism     Obesity Paternal Grandfather      Respiratory Paternal Grandfather         pulmonary (air sacs hardening)     Allergies Father         hayfever     Lipids Father         diet therapy     Gallbladder Disease Father         cholecystectomy      Eye Disorder Father         retinal tear x 2      Prostate Cancer Father      Alzheimer Disease Maternal Grandmother         ? poor STM     Arthritis Maternal Grandmother      Heart Disease Maternal Grandmother         Arrythmia-got a pacemaker at age 89     Arthritis Mother      Eye Disorder Mother         Cataract/macular tear in one eye     Cancer Paternal Grandmother         Uterine CA- as well as her sister     Gynecology Paternal Grandmother         prolapsed uterus- had 10 kids     Obesity Paternal Grandmother      Breast Cancer Maternal Aunt      Other Cancer Paternal Aunt 80        endo. cancer        Social History:    Social History     Socioeconomic History     Marital status:      Spouse name: Danielle Zamarripa     Number of children: 1     Years of education: Masters     Highest education level: None   Occupational History     Occupation: Psychotherapist     Employer: [employer]   Social Needs     Financial resource strain: None     Food insecurity:     Worry: None     Inability: None     Transportation needs:     Medical: None     Non-medical: None   Tobacco Use     Smoking status: Never Smoker     Smokeless tobacco: Never Used     Tobacco comment: non  smoke home   Substance and Sexual Activity     Alcohol use: Yes     Comment: 2 Drinks Per Month     Drug use: No     Sexual activity: Yes     Partners: Female   Lifestyle     Physical activity:     Days per week: None     Minutes per session: None     Stress: None   Relationships     Social connections:     Talks on phone: None     Gets together: None     Attends Mor[employer] service: None     Active member of club or organization: None     Attends meetings of clubs or organizations: None     Relationship status: None     Intimate partner violence:     Fear of current or ex partner: None     Emotionally abused: None     Physically abused: None     Forced sexual activity: None   Other Topics Concern     Parent/sibling w/ CABG, MI or angioplasty before 65F 55M? No   Social History Narrative    8/18/09    Balanced Diet - Yes    Osteoporosis Prevention Measures - Dairy servings per day: 2 and Medication/Supplements (See current meds)    Regular Exercise -  Yes Describe Walking, swimming, biking.     Dental Exam - YES - Date: 5/2009    Eye Exam - YES - Date: 8/2009    Self Breast Exam - No    Abuse: Current or Past (Physical, Sexual or Emotional)- No    Do you feel safe in your environment - Yes    Guns stored in the home - No    Sunscreen used - Yes    Seatbelts used - Yes    Lipids -  YES - Date: 8/10/09    Glucose -  YES - Date: 8/10/09    Colon Cancer Screening - No    Hemoccults - NO    Pap Test -  YES - Date: 7/6/06, no Hx of abnormal paps.    Do you have any concerns about STD's -  No    Mammography - YES - Date: 8/5/09    DEXA - NO    Immunizations reviewed and up to date - Yes, Tdap given 1/22/08    Amara Hall MA                           Physical Examination:   VITALS:   /80 (BP Location: Left arm, Patient Position: Chair, Cuff Size: Adult Regular)   Pulse 66   Temp 97.8  F (36.6  C) (Oral)   SpO2 99%   General appearance: Pleasant, cooperative, no distress.  Lungs: Breathing comfortably on room  air.  Heart: Regular rate and rhythm.  Mental Status: cooperative, normal affect, no gross thought process defects during casual conversation.  Lower Extremities: Symmetric lower extremities.  Well-healed areas of incision of microphlebectomy in the thigh and calf.  Mild areas of discoloration on right great saphenous vein distribution of the thigh and upper calf.  Approximately 3 cm hard palpable lump of the lower right inner thigh and smaller palpable lump of the medial right upper calf  deep to areas of discoloration.  No significant residual bulging varicosities are noted.  No dystrophic skin changes or scaling.  No wounds.  No dystrophic skin changes. No ulcerations.   Reticular veins and telangiectasias in the bilateral thigh and calf and lateral popliteal fossa.      Laboratory Studies:    Lab Results   Component Value Date    HGB 14.2 10/15/2019     Lab Results   Component Value Date     10/15/2019     Lab Results   Component Value Date    WBC 5.4 10/15/2019       No results found for: INR      Lab Results   Component Value Date    CR 0.45 10/15/2019     Lab Results   Component Value Date    BUN 10 10/15/2019       No results found for: FETO    Imaging:   Results for orders placed or performed in visit on 01/16/20 (from the past 24 hour(s))   US venous lower ext uni right (vascular lab)    Narrative    POST RIGHT ANTERIOR ACCESSORY SAPHENOUS VEIN AND GREAT SAPHENOUS VEIN  ENDOVASCULAR LASER ABLATION DUPLEX VENOUS ULTRASOUND 1/16/2020    CLINICAL HISTORY: Right great saphenous and anterior accessory  saphenous vein endovascular laser ablation 12/12/2019. Reevaluate.    COMPARISONS: 12/19/2019    REFERRING PROVIDER: RY RIVERA S    TECHNIQUE: Right common femoral, anterior accessory, and great  saphenous veins evaluated with grayscale, color Doppler, Doppler  waveform ultrasound.    FINDINGS: Right common femoral, saphenofemoral junction, and anterior  accessory saphenous femoral junction are  patent and fully  compressible.    Anterior accessory saphenous vein ablation edge extends to within 13.8  mm of the junction, previously 12.2 mm.     Anterior accessory saphenous vein remains occluded and noncompressible  through the mid thigh.    Great saphenous ablation edge extends to within 24.8 mm of the  saphenofemoral junction, previously 26.7 mm.    Great saphenous vein remains occluded and noncompressible to the  proximal calf.    Great saphenous vein is patent and fully compressible in the mid calf.      Impression    IMPRESSION:  1. Right anterior accessory saphenous vein remains ablated and  noncompressible through the mid thigh. Ablation edge extends to within  13.8 mm of the junction, previously 12.2 mm.    2. Right great saphenous vein remains ablated and noncompressible to  the proximal calf. Ablation edge extends to within 24.8 mm at the  saphenofemoral junction, previously 26.7 mm. Right great saphenous  vein is patent and fully compressible in the mid calf.    TRINA SCHWARTZ MD     US lower extremity duplex on the right from today demonstrates post ablation changes with appropriate distance from the saphenofemoral junction. No visualized significant patent varicosities.     Focused bedside ultrasound of the areas of hard palpable/tender lump of the lower medial right thigh and upper medial calf both demonstrates thrombosed tortuous varicose vein branches without significant evidence of a subcutaneous hematoma.    ASSESSMENT/PLAN:      Angy Haji is a  54 year old female with a history of  symptomatic right lower extremity varicose veins of the thigh and calf s/p endovenous laser ablation right great saphenous vein and right accessory saphenous vein (separate access with microphlebectomy right thigh and calf truncal varicosities  via 16 stab wounds.    -  Patient notes areas of hard palpable lumps of the medial inner thigh and upper calf in the areas of the previous ablation and microphlebectomy  ridge on bedside ultrasound are secondary to residual thrombosed tortuous varicosities and/or stumps of veins from the microphlebectomy.  We discussed with the patient that these areas are likely to continue to reabsorb and will improve over time.  Alternatively if the varicosities recanalized and become symptomatic, percutaneous sclerotherapy can be pursued.  Otherwise patient is doing well from a procedural standpoint.  - Recommend follow-up in 6 months with repeat right lower extremity venous duplex ultrasound to ensure chronic occlusion of the treated veins.   - Patient can transition from thigh-high to knee-high compression stockings for increased comfort.    It was a pleasure seeing the patient.     Signed,    Josué Moctezuma MD  Interventional Radiology Fellow  IR pass pager: [phone]  Personal pager: [phone]    I was present with the fellow during the history and exam.  I discussed the case with the fellow and agree with the findings as documented in the assessment and plan.    I spent a total of 15 minutes face-to-face with Angy Haji during today's office visit.  Over 50% of this time was spent counseling the patient and/or coordinating care regarding follow up treatment of symptomatic right lower extremity varicose veins.  See note for details.    Jose Angel Solorio MD  Interventional Radiology and Vascular Imaging Attending  Department of Radiology  [facility]    CC  Patient Care Team:  Magali Lira MD as PCP -   [place] MD as Assigned PCP  Jonny Roberts MD as MD (Neurology)  Chai Liu MD as MD (Psychiatry)  Ricci Villa MD as Resident (Student in organized health care education/training program)  Jose Angel Solorio MD as MD (Radiology)  Cristel Vigil, UBALDO as Specialty Care Coordinator (Vascular and Interventional Radiology)  PROVIDER NOT IN SYSTEM

## 2020-01-16 NOTE — PROGRESS NOTES
Interventional Radiology Clinic Visit    Date of this visit: 1/16/2020    Angy Haji presents for consultation for follow up varicose vein treatment.     Referring Physician: Dr. Cruz        History Of Present Illness:    Angy Haji is a 54 year old female with a history of symptomatic right lower extremity varicose veins of the thigh and calf s/p endovenous laser ablation right great saphenous vein and right accessory saphenous vein (separate access with microphlebectomy right thigh and calf truncal varicosities via 16 stab wounds. Post op course complicated by pain and bleeding requiring multiple dressing changes. She saw Vidya Stone in Clinic 12/13/2019 and was placed in compression. Patient reports that since that time she has had improvement in her bleeding and improved pain. She does note hard lumps along the inner thigh and upper calf in the area of the previous EVLA and microphlebectomy that are tender to the touch and associated with overlying discoloration.  Her symptoms in the upper inner calf are exacerbated by periods of activity.  Patient describes that since her procedure she has had significant improvement in right lower extremity swelling/aching as well as the itching associated with the bulging varicosity which underwent microphlebectomy.  Patient describes that she is compliant with her thigh-high prescription grade compression stockings.  No new concerns.     Review of Systems:    10 Point ROS is positive as noted in the HPI. Otherwise, the remainder of the ROS is negative.    Past Medical/Surgical History:    Past Medical History:   Diagnosis Date     Disorders of bursae and tendons in shoulder region, unspecified      Female infertility of unspecified origin      Gallbladder sludge 5/2013     Mild sleep apnea 5/13/2014     Obesity, unspecified      Oral aphthae      Other acne      Other specified hypoglycemia      PONV (postoperative nausea and vomiting)      Past Surgical  History:   Procedure Laterality Date     HC TOOTH EXTRACTION W/FORCEP  1984/85    [place] [facility]     IR ENDOVENOUS ABLATION VARICOSE VEINS  12/12/2019     LAPAROSCOPIC CHOLECYSTECTOMY  7/29/2013    Procedure: LAPAROSCOPIC CHOLECYSTECTOMY;  Laparoscopic Cholecystectomy;  Surgeon: Fabio Johnson MD;  Location: [facility]     LAPAROSCOPIC OOPHORECTOMY Left 6/23/2016    Procedure: LAPAROSCOPIC OOPHORECTOMY;  Surgeon: Kayley Donnelly MD;  Location: [facility]     LAPAROSCOPIC SALPINGECTOMY Bilateral 6/23/2016    Procedure: LAPAROSCOPIC SALPINGECTOMY;  Surgeon: Kayley Donnelly MD;  Location: [facility] OR       Current Medications:    Current Outpatient Medications   Medication Sig Dispense Refill     Carboxymethylcellulose Sod PF (REFRESH PLUS) 0.5 % SOLN ophthalmic solution Place 1 drop into both eyes 3 times daily as needed for dry eyes       divalproex sodium delayed-release (DEPAKOTE) 250 MG DR tablet Take 1 tablet (250 mg) by mouth At Bedtime 30 tablet 1     estrogen conj-medroxyPROGESTERone (PREMPRO) 0.625-2.5 MG tablet Take 1 tablet by mouth daily 90 tablet 3     lidocaine, viscous, (XYLOCAINE) 2 % solution SWISH AND SPIT 5ML BY MOUTH EVERY 3 HOURS AS NEEDED 100 mL 0     lisinopril (PRINIVIL/ZESTRIL) 10 MG tablet Take 1 tablet (10 mg) by mouth daily 90 tablet 1     lithium 300 MG capsule Take 3 capsules (900 mg) by mouth At Bedtime 90 capsule 1     Loratadine 10 MG capsule Take 10 mg by mouth daily as needed.       metroNIDAZOLE (METROGEL) 1 % gel Apply topically daily 60 g 9     order for DME Please measure and distribute 1 pair of 20mmHg - 30mmHg knee high open toe compression stockings. Jobst ultrasheer or equivalent. 1 each 1     ORDER FOR DME Use your CPAP device as directed by your provider.       oxyCODONE IR (ROXICODONE) 10 MG tablet Take 0.5 tablets (5 mg) by mouth every 6 hours as needed for severe pain 12 tablet 0     VITAMIN D, CHOLECALCIFEROL, PO Take 2,000 Units by mouth daily          Allergies:    Seroquel [quetiapine]; Olanzapine; Penicillins; Risperidone; and Thimerosal    Family History:    Family History   Problem Relation Age of Onset     Cardiovascular Paternal Grandfather         anyerism     Obesity Paternal Grandfather      Respiratory Paternal Grandfather         pulmonary (air sacs hardening)     Allergies Father         hayfever     Lipids Father         diet therapy     Gallbladder Disease Father         cholecystectomy      Eye Disorder Father         retinal tear x 2      Prostate Cancer Father      Alzheimer Disease Maternal Grandmother         ? poor STM     Arthritis Maternal Grandmother      Heart Disease Maternal Grandmother         Arrythmia-got a pacemaker at age 89     Arthritis Mother      Eye Disorder Mother         Cataract/macular tear in one eye     Cancer Paternal Grandmother         Uterine CA- as well as her sister     Gynecology Paternal Grandmother         prolapsed uterus- had 10 kids     Obesity Paternal Grandmother      Breast Cancer Maternal Aunt      Other Cancer Paternal Aunt 80        endo. cancer        Social History:      Social History     Socioeconomic History     Marital status:      Spouse name: Danielle Zamarripa     Number of children: 1     Years of education: Masters     Highest education level: None   Occupational History     Occupation: Psychotherapist     Employer: [employer]   Social Needs     Financial resource strain: None     Food insecurity:     Worry: None     Inability: None     Transportation needs:     Medical: None     Non-medical: None   Tobacco Use     Smoking status: Never Smoker     Smokeless tobacco: Never Used     Tobacco comment: non smoke home   Substance and Sexual Activity     Alcohol use: Yes     Comment: 2 Drinks Per Month     Drug use: No     Sexual activity: Yes     Partners: Female   Lifestyle     Physical activity:     Days per week: None     Minutes per session: None     Stress:  None   Relationships     Social connections:     Talks on phone: None     Gets together: None     Attends Hinduism service: None     Active member of club or organization: None     Attends meetings of clubs or organizations: None     Relationship status: None     Intimate partner violence:     Fear of current or ex partner: None     Emotionally abused: None     Physically abused: None     Forced sexual activity: None   Other Topics Concern     Parent/sibling w/ CABG, MI or angioplasty before 65F 55M? No   Social History Narrative    8/18/09    Balanced Diet - Yes    Osteoporosis Prevention Measures - Dairy servings per day: 2 and Medication/Supplements (See current meds)    Regular Exercise -  Yes Describe Walking, swimming, biking.     Dental Exam - YES - Date: 5/2009    Eye Exam - YES - Date: 8/2009    Self Breast Exam - No    Abuse: Current or Past (Physical, Sexual or Emotional)- No    Do you feel safe in your environment - Yes    Guns stored in the home - No    Sunscreen used - Yes    Seatbelts used - Yes    Lipids -  YES - Date: 8/10/09    Glucose -  YES - Date: 8/10/09    Colon Cancer Screening - No    Hemoccults - NO    Pap Test -  YES - Date: 7/6/06, no Hx of abnormal paps.    Do you have any concerns about STD's -  No    Mammography - YES - Date: 8/5/09    DEXA - NO    Immunizations reviewed and up to date - Yes, Tdap given 1/22/08    Amara Hall MA                           Physical Examination:   VITALS:   /80 (BP Location: Left arm, Patient Position: Chair, Cuff Size: Adult Regular)   Pulse 66   Temp 97.8  F (36.6  C) (Oral)   SpO2 99%   General appearance: Pleasant, cooperative, no distress.  Lungs: Breathing comfortably on room air.  Heart: Regular rate and rhythm.  Mental Status: cooperative, normal affect, no gross thought process defects during casual conversation.  Lower Extremities: Symmetric lower extremities.  Well-healed areas of incision of microphlebectomy in the thigh and  calf.  Mild areas of discoloration on right great saphenous vein distribution of the thigh and upper calf.  Approximately 3 cm hard palpable lump of the lower right inner thigh and smaller palpable lump of the medial right upper calf  deep to areas of discoloration.  No significant residual bulging varicosities are noted.  No dystrophic skin changes or scaling.  No wounds.  No dystrophic skin changes. No ulcerations.   Reticular veins and telangiectasias in the bilateral thigh and calf and lateral popliteal fossa.        Laboratory Studies:    Lab Results   Component Value Date    HGB 14.2 10/15/2019     Lab Results   Component Value Date     10/15/2019     Lab Results   Component Value Date    WBC 5.4 10/15/2019       No results found for: INR      Lab Results   Component Value Date    CR 0.45 10/15/2019     Lab Results   Component Value Date    BUN 10 10/15/2019       No results found for: FETO    Imaging:   Results for orders placed or performed in visit on 01/16/20 (from the past 24 hour(s))   US venous lower ext uni right (vascular lab)    Narrative    POST RIGHT ANTERIOR ACCESSORY SAPHENOUS VEIN AND GREAT SAPHENOUS VEIN  ENDOVASCULAR LASER ABLATION DUPLEX VENOUS ULTRASOUND 1/16/2020    CLINICAL HISTORY: Right great saphenous and anterior accessory  saphenous vein endovascular laser ablation 12/12/2019. Reevaluate.    COMPARISONS: 12/19/2019    REFERRING PROVIDER: RY RIVERA S    TECHNIQUE: Right common femoral, anterior accessory, and great  saphenous veins evaluated with grayscale, color Doppler, Doppler  waveform ultrasound.    FINDINGS: Right common femoral, saphenofemoral junction, and anterior  accessory saphenous femoral junction are patent and fully  compressible.    Anterior accessory saphenous vein ablation edge extends to within 13.8  mm of the junction, previously 12.2 mm.     Anterior accessory saphenous vein remains occluded and noncompressible  through the mid thigh.    Great  saphenous ablation edge extends to within 24.8 mm of the  saphenofemoral junction, previously 26.7 mm.    Great saphenous vein remains occluded and noncompressible to the  proximal calf.    Great saphenous vein is patent and fully compressible in the mid calf.      Impression    IMPRESSION:  1. Right anterior accessory saphenous vein remains ablated and  noncompressible through the mid thigh. Ablation edge extends to within  13.8 mm of the junction, previously 12.2 mm.    2. Right great saphenous vein remains ablated and noncompressible to  the proximal calf. Ablation edge extends to within 24.8 mm at the  saphenofemoral junction, previously 26.7 mm. Right great saphenous  vein is patent and fully compressible in the mid calf.    TRINA SCHWARTZ MD     US lower extremity duplex on the right from today demonstrates post ablation changes with appropriate distance from the saphenofemoral junction. No visualized significant patent varicosities.     Focused bedside ultrasound of the areas of hard palpable/tender lump of the lower medial right thigh and upper medial calf both demonstrates thrombosed tortuous varicose vein branches without significant evidence of a subcutaneous hematoma.    ASSESSMENT/PLAN:      Angy Haji is a  54 year old female with a history of symptomatic right lower extremity varicose veins of the thigh and calf s/p endovenous laser ablation right great saphenous vein and right accessory saphenous vein (separate access with microphlebectomy right thigh and calf truncal varicosities via 16 stab wounds.    -  Patient notes areas of hard palpable lumps of the medial inner thigh and upper calf in the areas of the previous ablation and microphlebectomy ridge on bedside ultrasound are secondary to residual thrombosed tortuous varicosities and/or stumps of veins from the microphlebectomy.  We discussed with the patient that these areas are likely to continue to reabsorb and will improve over time.   Alternatively if the varicosities recanalized and become symptomatic, percutaneous sclerotherapy can be pursued.  Otherwise patient is doing well from a procedural standpoint.  - Recommend follow-up in 6 months with repeat right lower extremity venous duplex ultrasound to ensure chronic occlusion of the treated veins.   - Patient can transition from thigh-high to knee-high compression stockings for increased comfort.    It was a pleasure seeing the patient.     Signed,    Josué Moctezuma MD  Interventional Radiology Fellow  IR pass pager: [phone]  Personal pager: [phone]    I was present with the fellow during the history and exam.  I discussed the case with the fellow and agree with the findings as documented in the assessment and plan.    I spent a total of 15 minutes face-to-face with Angy Haji during today's office visit.  Over 50% of this time was spent counseling the patient and/or coordinating care regarding follow up treatment of symptomatic right lower extremity varicose veins.  See note for details.    Jose Angel Solorio MD  Interventional Radiology and Vascular Imaging Attending  Department of Radiology  [facility]    CC  Patient Care Team:  Magali Lira MD as PCP - [facility], Maryjane TEJEDA MD as Assigned PCP  Jonny Roberts MD as MD (Neurology)  Chai Liu MD as MD (Psychiatry)  Ricci Villa MD as Resident (Student in organized health care education/training program)  Jose Angel Solorio MD as MD (Radiology)  Cristel Vigil, RN as Specialty Care Coordinator (Vascular and Interventional Radiology)  PROVIDER NOT IN SYSTEM

## 2020-01-16 NOTE — NURSING NOTE
Vascular Rooming Note     Angy Haji's goals for this visit include:   Chief Complaint   Patient presents with     Follow Up     Angy, is being seen today for a 1 month follow up varicose veins, has been getting a little better, has bumps through the leg which hurts, pain is usually 2/10, as reported by patient.     Trudi Proctor LPN

## 2020-01-20 NOTE — PROGRESS NOTES
"     [facility]  Psychiatry Clinic  PSYCHIATRIC PROGRESS NOTE     Date of initial Diagnostic Assessment (DA) is 12/5/18.  Date of most recent Transfer of Care Diagnostic Assessment is 07/16/19.     CARE TEAM:    PCP- [facility]     Therapist- [facility] (private practive)      Specialty Providers- OB/GYN, ENT, PT, Nutrition      Wife- Rachael Haji is a 54 year old female who prefers the name Angy & pronouns she, her, hers.     Pertinent Background: Patient has not had significant mental health history until 2018, when she developed perimenopausal mood and anxiety symptoms.  She was subsequently referred to our clinic following a hospitalization for anthony in November 2018.  Her care has been complicated by sensitivity to antipsychotics (EPSE including difficulty swallowing), thrombocytopenia secondary to Depakote, lithium toxicity in setting of concurrent Depakote use.     Psych critical item history includes Manic episode, psychiatric hospitalization in November 2018.    INTERIM HISTORY      [4, 4]   The patient reports good treatment adherence.  History was provided by the patient and family who were good historians.  The last visit ended with no changes.   Since the last visit:   - No big changes since last visit. Working on getting settled in with new therapist, set goal to work on \"lack of enjoyment\", do more enjoyable things  - Mood same, not overtly depressed. No elevated mood. Some irritability, but states while laughing \"we have a really irritating child right now.\"  - Ongoing struggle with anhedonia, but doing more activities. Helping with motivation. Going to see lots of movies with wifeRachael. Enjoying this. Reading more, enjoying some fiction. Connected with friends from old neighborhood over the holidays.   - Wife Rachael states \"I think it's been better\" since last month.   - Sleeping 7-8 hrs per night, ongoing waking in early morning to " "use restroom.  - Appetite stable, did have weight gain over holidays which is not unusual, doesn't try to avoid sweets. Varicose vein surgery contributed as well.   - Walking 20 min per day, mostly at work, sometimes at home if not too icy.   - Energy level during day is \"okay\". Feels tired at night. Most energized in the mornings. Busy at work.   - No racing thoughts. Lots on plate in terms of social stressors, sometimes will feel preoccupied and find it difficult to focus.     RECENT PSYCH ROS:   Depression:  low energy, overwhelmed and ongoing low mood and anhedonia but improved over last month  Elevated:  none  Psychosis:  none  Anxiety:  Continues to feel somewhat overwhelmed in context of multiple social stressors  Panic Attack:  none  Trauma Related:  none  Dysregulation:  none  Eating Disorder: some overeating, working with dietician   Pertinent Negative Symptoms:  NO suicidal ideation, self-injurious behavior/urges, violent ideation, aggression, psychosis, hallucinations, delusions, anthony and hypomania    RECENT SUBSTANCE USE:     Alcohol- very rare, weekly at most, always social, never more than 1  Tobacco- No  Caffeine- drinks around 24oz of coffee during the day  Opioids- No           Narcan Kit- N/A   Cannabis- No     Other illicit drugs- none    CURRENT SOCIAL HISTORY:  Financial/ Work- 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       Partner/ - wife, Rachael Alejandra- 13 years old daughter      Living situation- Moved into [facility] in [place], lives with wife Danielle and teenage daughter   Social/ Spiritual Support- Family (wife and daughter)     FEELS SAFE AT HOME- yes     PSYCH and CD Critical Summary Points since July 2019 July 2019 - no change  Aug 2019 - decreased the Depakote to 500 mg nightly  Oct 2019 - no changes, VPA and LI labs ordered  Nov 2019 - VPA decreased to 250 mg nightly  Dec 2019 -no change  Jan 2020 - VPA decreased to 125 mg every " night    PAST MED TRIALS          See last Diagnostic Assessment  MEDICAL / SURGICAL HISTORY                                   Pregnant or breastfeeding- no      Contraception- HRT for menopause    Patient Active Problem List   Diagnosis     Oral aphthae     Obesity (BMI 30-39.9)     Disorder of bursae and tendons in shoulder region     CARDIOVASCULAR SCREENING; LDL GOAL LESS THAN 160     Rosacea     Tear of medial cartilage or meniscus of knee, current     Raynaud disease     Vitamin D deficiency disease     Mild sleep apnea     Atopic rhinitis     Chronic bilateral low back pain without sciatica     Morbid obesity (H)     Muscle tension dysphonia     Palpitations     Dysphagia, unspecified type     Anxiety     Manic behavior (H)     Benign essential hypertension     Callus of foot     Varicose veins of right lower extremity with edema       Major Surgery- n/a    MEDICAL REVIEW OF SYSTEMS    [2, 10]     A comprehensive review of systems was performed and is negative other than noted in the HPI.    ALLERGY       Seroquel [quetiapine]; Olanzapine; Penicillins; Risperidone; and Thimerosal  MEDICATIONS        Current Outpatient Medications   Medication Sig Dispense Refill     Carboxymethylcellulose Sod PF (REFRESH PLUS) 0.5 % SOLN ophthalmic solution Place 1 drop into both eyes 3 times daily as needed for dry eyes       divalproex sodium delayed-release (DEPAKOTE) 250 MG DR tablet Take 1 tablet (250 mg) by mouth At Bedtime 30 tablet 1     estrogen conj-medroxyPROGESTERone (PREMPRO) 0.625-2.5 MG tablet Take 1 tablet by mouth daily 90 tablet 3     lidocaine, viscous, (XYLOCAINE) 2 % solution SWISH AND SPIT 5ML BY MOUTH EVERY 3 HOURS AS NEEDED 100 mL 0     lisinopril (PRINIVIL/ZESTRIL) 10 MG tablet Take 1 tablet (10 mg) by mouth daily 90 tablet 1     lithium 300 MG capsule Take 3 capsules (900 mg) by mouth At Bedtime 90 capsule 1     Loratadine 10 MG capsule Take 10 mg by mouth daily as needed.       metroNIDAZOLE  (METROGEL) 1 % gel Apply topically daily 60 g 9     order for DME Please measure and distribute 1 pair of 20mmHg - 30mmHg knee high open toe compression stockings. Jobst ultrasheer or equivalent. 1 each 1     ORDER FOR DME Use your CPAP device as directed by your provider.       oxyCODONE IR (ROXICODONE) 10 MG tablet Take 0.5 tablets (5 mg) by mouth every 6 hours as needed for severe pain 12 tablet 0     VITAMIN D, CHOLECALCIFEROL, PO Take 2,000 Units by mouth daily       VITALS      [3, 3]   Wt 137.8 kg (303 lb 12.8 oz)   LMP 01/08/2020   BMI 46.20 kg/m     MENTAL STATUS EXAM      [9, 14 cog gs]     Alertness: alert  and oriented  Appearance: well groomed  Behavior/Demeanor: cooperative, pleasant and calm, with good  eye contact   Speech: normal and regular rate and rhythm  Language: intact  Psychomotor: Mild bilateral tremor in hands  Mood: stable, anhedonia ongoing but improved  Affect: Full range, brightens at appropriate times; was congruent to mood; was congruent to content  Thought Process/Associations: unremarkable  Thought Content:  Reports none;  Denies suicidal ideation, violent ideation, delusions and paranoid ideation  Perception:  Reports none;  Denies auditory hallucinations and visual hallucinations  Insight: excellent  Judgment: excellent  Cognition: (6) does  appear grossly intact; formal cognitive testing was not done  Gait and Station: unremarkable    LABS and DATA     AIMS:  Not needed  [place]: 25 out of 30 12/13/2019(25/30 on 12/12/18)  PHQ9 TODAY = 3  PHQ-9 SCORE 8/28/2019 10/2/2019 11/6/2019   PHQ-9 Total Score 9 5 6     LITHIUM LABS     [level, renal, SG, TSH, WBC]    q6 mo  Recent Labs   Lab Test 10/15/19  1037 05/01/19  0921 01/14/19  0913 01/02/19  0839   LITHIUM 0.65 0.61 0.55* 0.37*     Recent Labs   Lab Test 10/15/19  1037 05/01/19  0921 12/20/18  1214 12/14/18  1235   CR 0.45* 0.54 0.47* 0.43*   GFRESTIMATED >90 >90 >90 >90    135 132* 129*   POTASSIUM 4.3 4.6 4.4 4.2   SHERRIE 9.1  9.0 9.2 8.7     Recent Labs   Lab Test 10/15/19  1222 05/01/19  1140 11/01/12  0819   SG 1.006 1.008 1.015     Recent Labs   Lab Test 10/15/19  1037 05/01/19  0921 11/10/18  0756 08/31/18  2029   TSH 2.47 2.69 2.29 2.19       VALPROIC ACID LABS     [liver enzymes, WBC, ANEU, Hgb, plts, +ammonia]  q6-12 mo  Recent Labs   Lab Test 10/15/19  1037 05/01/19  0921   SOFIYA 30* 77     Recent Labs   Lab Test 10/15/19  1037 05/01/19  0921   AST 13 19   ALT 18 13   ALKPHOS 61 46     Recent Labs   Lab Test 10/15/19  1037 05/01/19  0921   WBC 5.4 5.7   ANEU 3.3 3.7   HGB 14.2 14.4    174       PSYCHOTROPIC DRUG INTERACTIONS     Concurrent use of LITHIUM and ACE INHIBITORS may result in increased risk of lithium toxicity.  Concurrent use of LITHIUM and SEROTONERGIC AGENTS may result in increased risk of serotonin syndrome.      MANAGEMENT:  Monitoring for adverse effects, routine vitals, routine labs and patient is aware of risks, using lowest effective doses of agents    RISK STATEMENT for SAFETY     Angy Haji did not appear to be an imminent safety risk to self or others.    DIAGNOSIS     Bipolar Disorder, Type I (late onset), depressive episode, mild    (only manic episode was Nov/Dec 2018)  H/o EPSE secondary to neuroleptic use  H/o Thromboctyopenia secondary to depakote  H/o lithium toxicity, resolved    ASSESSMENT      [m2, h3]     TODAY Angy returns for follow-up.  Since last visit, ongoing overall mood stability without evidence of manic symptoms.  Working on behavioral activation techniques with new therapist to target ongoing mild anhedonia.  No overt depressed mood, no acute safety concerns.  Ongoing intermittent anxiety in context of multiple life stressors, no worse in the last month.  No panic attacks.  No medication side effects aside from ongoing bilateral hand tremor, mild, thought to be due to Depakote.  No worse.  Recommended continuing with cautious taper of Depakote, with decreased to 125 mg today.   Tentative plan, should her mood remain overall stable, will be to discontinue within the next 1 to 2 months.  Recommended follow-up with me in 1 month's time.       PLAN      [m2, h3]     1) PSYCHOTROPIC MEDICATIONS:  - Decrease Depakote to 125 mg at bedtime  - Continue lithium 900 at bedtime    2) MN  was checked today:  -Oxycodone 10 mg tab quantity 1212/13/19    3) THERAPY: Continue    4) NEXT DUE:    Labs- Li and VPA labs Apr 2020  EKG- N/A  Rating Scales- serial PHQ9    5) REFERRALS:  No    6) RTC: 1 month    7) CRISIS NUMBERS:   Provided routinely in AVS   ONLY if a [facility] PT: Krissy [place] Michie [phone] (clinic), [phone] (after hours)     TREATMENT RISK STATEMENT:  The risks, benefits, alternatives and potential adverse effects have been discussed and are understood by the pt. The pt understands the risks of using street drugs or alcohol. There are no medical contraindications, the pt agrees to treatment with the ability to do so. The pt knows to call the clinic for any problems or to access emergency care if needed.  Medical and substance use concerns are documented above.  Psychotropic drug interaction check was done, including changes made today.    PROVIDER: Ricci Villa MD      Patient not staffed in clinic.  Supervisor is Dr. Liu, who will sign the note.    Attestation:  I did not see Angy Haji directly. I have reviewed the documentation and I agree with the resident's plan of care.   Chai Liu MD

## 2020-01-24 ENCOUNTER — OFFICE VISIT (OUTPATIENT)
Dept: FAMILY MEDICINE | Facility: CLINIC | Age: 55
End: 2020-01-24
Payer: COMMERCIAL

## 2020-01-24 ENCOUNTER — OFFICE VISIT (OUTPATIENT)
Dept: PSYCHIATRY | Facility: CLINIC | Age: 55
End: 2020-01-24
Attending: PSYCHIATRY & NEUROLOGY
Payer: COMMERCIAL

## 2020-01-24 VITALS
BODY MASS INDEX: 44.41 KG/M2 | WEIGHT: 293 LBS | DIASTOLIC BLOOD PRESSURE: 80 MMHG | HEART RATE: 54 BPM | TEMPERATURE: 98 F | OXYGEN SATURATION: 100 % | SYSTOLIC BLOOD PRESSURE: 164 MMHG | HEIGHT: 68 IN | RESPIRATION RATE: 16 BRPM

## 2020-01-24 VITALS — WEIGHT: 293 LBS | BODY MASS INDEX: 46.2 KG/M2

## 2020-01-24 DIAGNOSIS — E55.9 VITAMIN D DEFICIENCY DISEASE: ICD-10-CM

## 2020-01-24 DIAGNOSIS — E66.01 MORBID OBESITY (H): Primary | ICD-10-CM

## 2020-01-24 DIAGNOSIS — J30.9 ALLERGIC RHINITIS, UNSPECIFIED SEASONALITY, UNSPECIFIED TRIGGER: ICD-10-CM

## 2020-01-24 DIAGNOSIS — I10 BENIGN ESSENTIAL HYPERTENSION: ICD-10-CM

## 2020-01-24 DIAGNOSIS — L71.9 ROSACEA: ICD-10-CM

## 2020-01-24 DIAGNOSIS — L71.0 PERIORAL DERMATITIS: ICD-10-CM

## 2020-01-24 DIAGNOSIS — F31.9 BIPOLAR I DISORDER (H): ICD-10-CM

## 2020-01-24 DIAGNOSIS — Z00.01 ENCOUNTER FOR ROUTINE ADULT MEDICAL EXAM WITH ABNORMAL FINDINGS: ICD-10-CM

## 2020-01-24 DIAGNOSIS — G47.30 MILD SLEEP APNEA: ICD-10-CM

## 2020-01-24 LAB
CHOLEST SERPL-MCNC: 218.5 MG/DL (ref 0–200)
CHOLEST/HDLC SERPL: 3.3 {RATIO} (ref 0–5)
HBA1C MFR BLD: 4.9 % (ref 4.1–5.7)
HDLC SERPL-MCNC: 67.2 MG/DL
LDLC SERPL CALC-MCNC: 113 MG/DL (ref 0–129)
TRIGL SERPL-MCNC: 192.1 MG/DL (ref 0–150)
VLDL CHOLESTEROL: 38.4 MG/DL (ref 7–32)

## 2020-01-24 PROCEDURE — G0463 HOSPITAL OUTPT CLINIC VISIT: HCPCS | Mod: ZF

## 2020-01-24 RX ORDER — LITHIUM CARBONATE 300 MG/1
900 CAPSULE ORAL AT BEDTIME
Qty: 90 CAPSULE | Refills: 1 | Status: SHIPPED | OUTPATIENT
Start: 2020-01-24 | End: 2020-02-28

## 2020-01-24 RX ORDER — LISINOPRIL 10 MG/1
10 TABLET ORAL DAILY
Qty: 90 TABLET | Refills: 1 | Status: SHIPPED | OUTPATIENT
Start: 2020-01-24 | End: 2020-06-03

## 2020-01-24 RX ORDER — DIVALPROEX SODIUM 125 MG/1
125 TABLET, DELAYED RELEASE ORAL AT BEDTIME
Qty: 30 TABLET | Refills: 1 | Status: SHIPPED | OUTPATIENT
Start: 2020-01-24 | End: 2020-02-28

## 2020-01-24 RX ORDER — METRONIDAZOLE 10 MG/G
GEL TOPICAL DAILY
Qty: 60 G | Refills: 9 | Status: SHIPPED | OUTPATIENT
Start: 2020-01-24 | End: 2021-12-12

## 2020-01-24 ASSESSMENT — PAIN SCALES - GENERAL: PAINLEVEL: NO PAIN (0)

## 2020-01-24 ASSESSMENT — MIFFLIN-ST. JEOR: SCORE: 2023.87

## 2020-01-24 NOTE — PATIENT INSTRUCTIONS
1) Go to lab for A1C and lipids.     2) Follow-up with OB, Psych, PT as you had discussed with me    3) Continue caring for yourself as you are! You are doing a great job!     4) If above labs are abnormal, I'll have you schedule a visit with me in at earliest available. If they are normal, come back to see me in April or May.

## 2020-01-24 NOTE — PATIENT INSTRUCTIONS
Good to see you today, Angy!  Today, we'll decrease Depakote to 125 mg every night.   No change in Lithium.   I look forward to seeing you next month!  - Dr. Villa    Thank you for coming to the PSYCHIATRY CLINIC.    Lab Testing:  If you had lab testing today and your results are reassuring or normal they will be mailed to you or sent through "[employer] within 7 days.   If the lab tests need quick action we will call you with the results.  The phone number we will call with results is # [phone] (home) [phone] (work). If this is not the best number please call our clinic and change the number.    Medication Refills:  If you need any refills please call your pharmacy and they will contact us. Our fax number for refills is [phone]. Please allow three business for refill processing.   If you need to  your refill at a new pharmacy, please contact the new pharmacy directly. The new pharmacy will help you get your medications transferred.     Scheduling:  If you have any concerns about today's visit or wish to schedule another appointment please call our office during normal business hours [phone] (8-5:00 M-F)    Contact Us:  Please call [phone] during business hours (8-5:00 M-F).  If after clinic hours, or on the weekend, please call  [phone].    Financial Assistance [phone]  [facility] Billing [phone]  [place] Billing Office, [facility]: [phone]  [facility] Billing [phone]  Medical Records [phone]      MENTAL HEALTH CRISIS NUMBERS:  [facility]:   [facility] - [phone]   Crisis Residence [facility] - [phone]   Walk-In Counseling Center [facility] - [phone]   COPE 24/7 [place] Mobile Team for Adults - [[phone]]; Child - [[phone]]        [facility]:   [facility] - [phone]   Walk-in counseling [facility] - [phone]   Walk-in counseling [facility] -  [phone]   Crisis Residence  [facility] - [phone]   Urgent Care Adult Mental Health:   --Drop-in, 24/7 crisis line, and [place] Mobile Team [[phone]]    CRISIS TEXT LINE: Text [phone] from anywhere, anytime, any crisis 24/7;    OR SEE www.crisistextline.org     National Suicide Prevention Lifeline: 662-878-MMOZ (260-993-3342) or dial 988    Poison Control Center - 1-569.759.4607    CHILD: [facility] needs assessment team - [phone]     Trans Lifeline - 1-234.609.4713; or [employer] Lifeline - 1-249.717.6024    If you have a medical emergency please call 911or go to the nearest ER.                    _____________________________________________    Again thank you for choosing PSYCHIATRY CLINIC and please let us know how we can best partner with you to improve you and your family's health.  You may be receiving a survey regarding this appointment. We would love to have your feedback, both positive and negative. The survey is done by an external company, so your answers are anonymous.

## 2020-01-24 NOTE — PROGRESS NOTES
Female Physical Note          HPI       Concerns today:    Left index finger with cystic lesion for ~8 weeks. Non-painful. Doesn't limit ROM. Noticed all of a sudden.     Mild sleep apnea: has used CPAP for a few years. Just had annual check-in with sleep medicine doc and apneas and hypopneas were low and her CPAP settings were changed from fixed pressure of 9 to variable pressure between 9-12.     Rhinitis: takes loratidine daily. Symptoms worsened when she stopped this temporarily. Doesn't use flonase.     Vitamin D deficiency: takes vitamin d daily     Morbid obesity 2/2 antipsychotics for bipolar type 1 diagnosed Fall 2018. Currently on lithium and depakote. Tapering off of depakote. Had significant reactions to three antipsychotics (olanzopine, risperidone, ?: tongue swelling, etc). Managed by psych.     Hypertension: has been on 10mg lisinopril since Fall 2018. Psych monitoring lithium level. Currently walking 20-30min five days per week. In the past, she was active member at her health club between bicycling and swimming. In fall of 2018, had sudden conditions that forced she, wife, and daughter to move from their house in [place] of 25 years to [facility]. Grieving this, cancer diagnosis of three friends/close family, menopause, etc. Summary: 2018 significant year of changes.      Palpitations occasionally, not necessarily related to anxiety. Lasts only a few seconds. No SOB or lightheadedness at those times.     Menopause: hot flashes, night sweats, causing poor sleep. On Prempro. Working with OB for this. Been on this dose since June 2018. Will reassess this March with Dr. Min. Plan at that point was 1-3 years.       Patient Active Problem List   Diagnosis     Oral aphthae     Disorder of bursae and tendons in shoulder region     CARDIOVASCULAR SCREENING; LDL GOAL LESS THAN 160     Rosacea     Tear of medial cartilage or meniscus of knee, current     Raynaud disease     Vitamin D deficiency disease      Mild sleep apnea     Atopic rhinitis     Chronic bilateral low back pain without sciatica     Morbid obesity (H)     Muscle tension dysphonia     Palpitations     Dysphagia, unspecified type     Anxiety     Benign essential hypertension     Callus of foot     Varicose veins of right lower extremity with edema     Bipolar I disorder (H)       Past Medical History:   Diagnosis Date     Disorders of bursae and tendons in shoulder region, unspecified      Female infertility of unspecified origin      Gallbladder sludge 5/2013     Mild sleep apnea 5/13/2014     Obesity, unspecified      Oral aphthae      Other acne      Other specified hypoglycemia      PONV (postoperative nausea and vomiting)         Family History   Problem Relation Age of Onset     Cardiovascular Paternal Grandfather         anyerism     Obesity Paternal Grandfather      Respiratory Paternal Grandfather         pulmonary (air sacs hardening)     Allergies Father         hayfever     Lipids Father         diet therapy     Gallbladder Disease Father         cholecystectomy      Eye Disorder Father         retinal tear x 2      Prostate Cancer Father      Alzheimer Disease Maternal Grandmother         ? poor STM     Arthritis Maternal Grandmother      Heart Disease Maternal Grandmother         Arrythmia-got a pacemaker at age 89     Arthritis Mother      Eye Disorder Mother         Cataract/macular tear in one eye     Cancer Paternal Grandmother         Uterine CA- as well as her sister     Gynecology Paternal Grandmother         prolapsed uterus- had 10 kids     Obesity Paternal Grandmother      Breast Cancer Maternal Aunt      Other Cancer Paternal Aunt 80        endo. cancer               Review of Systems:     Review of Systems:  CONSTITUTIONAL: NEGATIVE for fever, chills, change in weight  INTEGUMENTARY/SKIN: NEGATIVE for worrisome rashes, moles or lesions  EYES: NEGATIVE for vision changes or irritation  ENT/MOUTH: NEGATIVE for ear, mouth and  throat problems  RESP: NEGATIVE for significant cough or SOB  BREAST: NEGATIVE for masses, tenderness or discharge  CV: NEGATIVE for chest pain, palpitations or peripheral edema  GI: NEGATIVE for nausea, abdominal pain, heartburn, or change in bowel habits  : NEGATIVE for frequency, dysuria, or hematuria  MUSCULOSKELETAL: NEGATIVE for significant arthralgias or myalgia  NEURO: NEGATIVE for weakness, dizziness or paresthesias  ENDOCRINE: NEGATIVE for temperature intolerance, skin/hair changes  HEME/ALLERGY: NEGATIVE for bleeding problems  PSYCHIATRIC: NEGATIVE for changes in mood or affect         Social History     Social History     Socioeconomic History     Marital status:      Spouse name: Danielle Zamarripa     Number of children: 1     Years of education: Masters     Highest education level: Not on file   Occupational History     Occupation: Psychotherapist     Employer: [employer]   Social Needs     Financial resource strain: Not on file     Food insecurity:     Worry: Not on file     Inability: Not on file     Transportation needs:     Medical: Not on file     Non-medical: Not on file   Tobacco Use     Smoking status: Never Smoker     Smokeless tobacco: Never Used     Tobacco comment: non smoke home   Substance and Sexual Activity     Alcohol use: Yes     Comment: 2 Drinks Per Month     Drug use: No     Sexual activity: Yes     Partners: Female   Lifestyle     Physical activity:     Days per week: Not on file     Minutes per session: Not on file     Stress: Not on file   Relationships     Social connections:     Talks on phone: Not on file     Gets together: Not on file     Attends Protestant service: Not on file     Active member of club or organization: Not on file     Attends meetings of clubs or organizations: Not on file     Relationship status: Not on file     Intimate partner violence:     Fear of current or ex partner: Not on file     Emotionally abused: Not on file      Physically abused: Not on file     Forced sexual activity: Not on file   Other Topics Concern     Parent/sibling w/ CABG, MI or angioplasty before 65F 55M? No   Social History Narrative    09    Balanced Diet - Yes    Osteoporosis Prevention Measures - Dairy servings per day: 2 and Medication/Supplements (See current meds)    Regular Exercise -  Yes Describe Walking, swimming, biking.     Dental Exam - YES - Date: 2009    Eye Exam - YES - Date: 2009    Self Breast Exam - No    Abuse: Current or Past (Physical, Sexual or Emotional)- No    Do you feel safe in your environment - Yes    Guns stored in the home - No    Sunscreen used - Yes    Seatbelts used - Yes    Lipids -  YES - Date: 8/10/09    Glucose -  YES - Date: 8/10/09    Colon Cancer Screening - No    Hemoccults - NO    Pap Test -  YES - Date: 06, no Hx of abnormal paps.    Do you have any concerns about STD's -  No    Mammography - YES - Date: 09    DEXA - NO    Immunizations reviewed and up to date - Yes, Tdap given 08    Amara Hall MA                           Marital Status:  Who lives in your household? Linda (8th grade daughter), Racahel (wife)    Has anyone hurt you physically, for example by pushing, hitting, slapping or kicking you or forcing you to have sex? Denies  Do you feel threatened or controlled by a partner, ex-partner or anyone in your life? Denies    Sexual Health     Sexual concerns: Low interest overall, lots of vaginal dryness   STI History: Neg  Pregnancy History:   LMP Patient's last menstrual period was 2020.   Last Pap Smear Date:   Lab Results   Component Value Date    PAP OTHER-NIL, See Result 2015    PAP NIL 2012    PAP NIL 2009     Abnormal Pap History: None    Recommended Screening     The 10-year ASCVD risk score (Ramsey DELFINA Jr., et al., 2013) is: 2.9%    Values used to calculate the score:      Age: 54 years      Sex: Female      Is Non- : No      " Diabetic: No      Tobacco smoker: No      Systolic Blood Pressure: 164 mmHg      Is BP treated: Yes      HDL Cholesterol: 67.2 mg/dL      Total Cholesterol: 218.5 mg/dL     Cholesterol Level (>46 yo or at risk):  Recommended and patient accepted testing. and Pap/HPV cotest every 5 years for women 30-65   Recommended and patient accepted testing.  Breast CA Screening (>39 yo or 10 y before 1st degree relative diagnosis): Normal mammo 10/16/19  Colon cancer screening: polyps in Nov 2017, rec repeat colonoscopy in 11/22         Physical Exam:     Vitals: BP (!) 164/80   Pulse 54   Temp 98  F (36.7  C) (Oral)   Resp 16   Ht 1.72 m (5' 7.72\")   Wt 137 kg (302 lb)   LMP 01/08/2020   SpO2 100%   BMI 46.30 kg/m    BMI= Body mass index is 46.3 kg/m .   GENERAL: healthy, alert and no distress, obese  EYES: Eyes grossly normal to inspection, extraocular movements - intact, and PERRL  HENT: ear canals- normal; TMs- normal; Nose- normal; Mouth- no ulcers, no lesions  NECK: no tenderness, no adenopathy, no asymmetry, no masses, no stiffness; thyroid- normal to palpation  RESP: lungs clear to auscultation - no rales, no rhonchi, no wheezes  BREAST: deferred exam (recent normal mammo)   CV: regular rates and rhythm, no appreciable murmur  ABDOMEN: obese, soft, no tenderness,  no masses, normal bowel sounds  MSK: Firm cystic swelling of left index finger--nonpainful, no erythema or edema, no warmth. Otherwise,  no gross deformities noted, no edema  SKIN: no suspicious lesions, no rashes  NEURO: strength and tone- normal, sensory exam- grossly normal, mentation- intact, speech- normal, reflexes- symmetric  BACK: no CVA tenderness, no paralumbar tenderness  PSYCH: Alert and oriented times 3; speech- coherent , normal rate and volume; able to articulate logical thoughts, able to abstract reason, no tangential thoughts, no hallucinations or delusions, affect- anxious      Assessment and Plan   Angy was seen today for " physical.    Mild sleep apnea  Has used CPAP for a few years. Just had annual check-in with sleep medicine doc and apneas and hypopneas were low and her CPAP settings were changed from fixed pressure of 9 to variable pressure between 9-12.   - Continue following with sleep medicine docs    Atopic rhinitis  takes loratidine daily. Symptoms worsened when she stopped this temporarily. Doesn't use flonase.    Vitamin D deficiency disease  Continue taking Vit D daily.    Morbid obesity (H)   2/2 antipsychotics for bipolar type 1 diagnosed Fall 2018. Currently on lithium and depakote. Tapering off of depakote. Had significant reactions to three antipsychotics (olanzopine, risperidone, ?: tongue swelling, etc). Managed by psych. A1C and lipid panel today WNL, though VLDL slightly eleevated.   - Continue walking regularly  - Work on establishing new community and return to swimming/biking as able  - Possibly refer to pool therapy at next visit    Bipolar I disorder (H)  - Continue adhering to medication regimen recommended by psych    Rosacea and perioral dermatitis  -     metroNIDAZOLE (METROGEL) 1 % external gel; Apply topically daily    Benign essential hypertension  -     lisinopril (PRINIVIL/ZESTRIL) 10 MG tablet; Take 1 tablet (10 mg) by mouth daily    On Prempro for menopause symptoms: Patient following with OB to discuss risks/benefits of continued use.     Health Care Maintenance: Normal Physical Exam  - Due for pap smear Sept 2020  - Discuss vaginal dryness/ sexual interest at future visit  - Follow up April/May 2020      Options for treatment and follow-up care were reviewed with the patient . Angy Haji and/or guardian engaged in the decision making process and verbalized understanding of the options discussed and agreed with the final plan.    Magali Lira MD

## 2020-01-24 NOTE — PROGRESS NOTES
Preceptor Attestation:   Patient seen, evaluated and discussed with the resident. I have verified the content of the note, which accurately reflects my assessment of the patient and the plan of care.   Supervising Physician:  Jorge A Donnelly MD

## 2020-01-30 NOTE — ASSESSMENT & PLAN NOTE
2/2 antipsychotics for bipolar type 1 diagnosed Fall 2018. Currently on lithium and depakote. Tapering off of depakote. Had significant reactions to three antipsychotics (olanzopine, risperidone, ?: tongue swelling, etc). Managed by psych.     - Continue walking regularly  - Work on establishing new community and return to swimming/biking as able  - Possibly refer to pool therapy at next visit

## 2020-01-30 NOTE — ASSESSMENT & PLAN NOTE
Has used CPAP for a few years. Just had annual check-in with sleep medicine doc and apneas and hypopneas were low and her CPAP settings were changed from fixed pressure of 9 to variable pressure between 9-12.     - Continue following with sleep medicine docs

## 2020-02-24 NOTE — PROGRESS NOTES
"     [facility]  Psychiatry Clinic  PSYCHIATRIC PROGRESS NOTE     Date of initial Diagnostic Assessment (DA) is 12/5/18.  Date of most recent Transfer of Care Diagnostic Assessment is 07/16/19.     CARE TEAM:    PCP- [facility]     Therapist- [facility] (private practive)      Specialty Providers- OB/GYN, ENT, PT, Nutrition      Wife- Rachael Haji is a 54 year old female who prefers the name Angy & pronouns she, her, hers.     Pertinent Background: Patient has not had significant mental health history until 2018, when she developed perimenopausal mood and anxiety symptoms.  She was subsequently referred to our clinic following a hospitalization for anthony in November 2018.  Her care has been complicated by sensitivity to antipsychotics (EPSE including difficulty swallowing), thrombocytopenia secondary to Depakote, lithium toxicity in setting of concurrent Depakote use.     Psych critical item history includes Manic episode, psychiatric hospitalization in November 2018.    INTERIM HISTORY      [4, 4]   The patient reports good treatment adherence.  History was provided by the patient and family who were good historians.  The last visit ended with decreasing VPA to 125 mg.   Since the last visit:   - Doing well. Mood still mildly low, mild anhedonia, but no worse in last month.  - Working on behavioral activation techniques. 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 Reading 15 min daily, enjoying reading mystery novels. Really enjoying making time for reading.   - Went on Martinez's Day date with Rachael, at at [employer], then saw a play at [employer]. \"It was a great date. It was really fun.\"   - Walking most days, takes walk every day during lunch break. Working with new primary care resident in clinic, who is considering pool therapy referral.   - Sleeping 7-8hrs per night. Wakes up to use bathroom x2. No decreased need for sleep. Daytime energy \"okay\", " "usually pretty tired when gets home from work.   - Seeing individual therapist every other week. Did have to cancel today because daughter has GI appt, seeing her next week instead. Processing having closed on their old house yesterday. Had a small gathering of friends and family last week as a \"farewell party\" at their old house. Found this very meaningful and cathartic.     RECENT PSYCH ROS:   Depression:  low energy, overwhelmed and ongoing low mood and anhedonia. continuing to improve with behavioral activation  Elevated:  none  Psychosis:  none  Anxiety:  ongoing mild intermittent nervousness, decreasing overall  Panic Attack:  none  Trauma Related:  none  Dysregulation:  none  Eating Disorder: some overeating, working with dietician, PCP  Pertinent Negative Symptoms:  NO suicidal ideation, self-injurious behavior/urges, violent ideation, aggression, psychosis, hallucinations, delusions, anthony and hypomania    RECENT SUBSTANCE USE:     Alcohol- very rare, weekly at most, always social, never more than 1  Tobacco- No  Caffeine- drinks around 24oz of coffee during the day  Opioids- No           Narcan Kit- N/A   Cannabis- No     Other illicit drugs- none    CURRENT SOCIAL HISTORY:  Financial/ Work- 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       Partner/ - wife, Rachael Alejandra- 13 years old daughter      Living situation- Moved into [facility] in [place], lives with wife Danielle and teenage daughter   Social/ Spiritual Support- Family (wife and daughter)     FEELS SAFE AT HOME- yes     PSYCH and CD Critical Summary Points since July 2019 July 2019 - no change  Aug 2019 - decreased the Depakote to 500 mg nightly  Oct 2019 - no changes, VPA and LI labs ordered  Nov 2019 - VPA decreased to 250 mg nightly  Dec 2019 -no change  Jan 2020 - VPA decreased to 125 mg every night  Feb 2020 - no change    PAST MED TRIALS          See last Diagnostic Assessment  MEDICAL / SURGICAL " HISTORY                                   Pregnant or breastfeeding- no      Contraception- HRT for menopause    Patient Active Problem List   Diagnosis     Oral aphthae     Disorder of bursae and tendons in shoulder region     CARDIOVASCULAR SCREENING; LDL GOAL LESS THAN 160     Rosacea     Tear of medial cartilage or meniscus of knee, current     Raynaud disease     Vitamin D deficiency disease     Mild sleep apnea     Atopic rhinitis     Chronic bilateral low back pain without sciatica     Morbid obesity (H)     Muscle tension dysphonia     Palpitations     Dysphagia, unspecified type     Anxiety     Benign essential hypertension     Callus of foot     Varicose veins of right lower extremity with edema     Bipolar I disorder (H)       Major Surgery- n/a    MEDICAL REVIEW OF SYSTEMS    [2, 10]     A comprehensive review of systems was performed and is negative other than noted in the HPI.    ALLERGY       Seroquel [quetiapine]; Olanzapine; Penicillins; Risperidone; and Thimerosal  MEDICATIONS        Current Outpatient Medications   Medication Sig Dispense Refill     Carboxymethylcellulose Sod PF (REFRESH PLUS) 0.5 % SOLN ophthalmic solution Place 1 drop into both eyes 3 times daily as needed for dry eyes       divalproex sodium delayed-release (DEPAKOTE) 125 MG DR tablet Take 1 tablet (125 mg) by mouth At Bedtime 30 tablet 0     estrogen conj-medroxyPROGESTERone (PREMPRO) 0.625-2.5 MG tablet Take 1 tablet by mouth daily 90 tablet 3     lisinopril (PRINIVIL/ZESTRIL) 10 MG tablet Take 1 tablet (10 mg) by mouth daily 90 tablet 1     lithium 300 MG capsule Take 3 capsules (900 mg) by mouth At Bedtime 90 capsule 0     Loratadine 10 MG capsule Take 10 mg by mouth daily as needed.       metroNIDAZOLE (METROGEL) 1 % external gel Apply topically daily 60 g 9     order for DME Please measure and distribute 1 pair of 20mmHg - 30mmHg knee high open toe compression stockings. Jobst ultrasheer or equivalent. 1 each 1     ORDER  FOR DME Use your CPAP device as directed by your provider.       VITAMIN D, CHOLECALCIFEROL, PO Take 2,000 Units by mouth daily       VITALS      [3, 3]   /83   Pulse 79   Wt 139 kg (306 lb 6.4 oz)   BMI 46.98 kg/m     MENTAL STATUS EXAM      [9, 14 cog gs]     Alertness: alert  and oriented  Appearance: well groomed  Behavior/Demeanor: cooperative, pleasant and calm, with good  eye contact   Speech: normal and regular rate and rhythm  Language: intact  Psychomotor: Mild bilateral tremor in hands  Mood: stable, anhedonia ongoing but improved  Affect: Full range, brightens at appropriate times; was congruent to mood; was congruent to content  Thought Process/Associations: unremarkable  Thought Content:  Reports none;  Denies suicidal ideation, violent ideation, delusions and paranoid ideation  Perception:  Reports none;  Denies auditory hallucinations and visual hallucinations  Insight: excellent  Judgment: excellent  Cognition: (6) does  appear grossly intact; formal cognitive testing was not done  Gait and Station: unremarkable    LABS and DATA     AIMS:  Not needed  Newcomb: 25 out of 30 12/13/2019(25/30 on 12/12/18)    PHQ9 TODAY = 3  PHQ-9 SCORE 8/28/2019 10/2/2019 11/6/2019   PHQ-9 Total Score 9 5 6     LITHIUM LABS     [level, renal, SG, TSH, WBC]    q6 mo  Recent Labs   Lab Test 10/15/19  1037 05/01/19  0921 01/14/19  0913 01/02/19  0839   LITHIUM 0.65 0.61 0.55* 0.37*     Recent Labs   Lab Test 10/15/19  1037 05/01/19  0921 12/20/18  1214 12/14/18  1235   CR 0.45* 0.54 0.47* 0.43*   GFRESTIMATED >90 >90 >90 >90    135 132* 129*   POTASSIUM 4.3 4.6 4.4 4.2   SHERRIE 9.1 9.0 9.2 8.7     Recent Labs   Lab Test 10/15/19  1222 05/01/19  1140 11/01/12  0819   SG 1.006 1.008 1.015     Recent Labs   Lab Test 10/15/19  1037 05/01/19  0921 11/10/18  0756 08/31/18  2029   TSH 2.47 2.69 2.29 2.19       VALPROIC ACID LABS     [liver enzymes, WBC, ANEU, Hgb, plts, +ammonia]  q6-12 mo  Recent Labs   Lab Test  10/15/19  1037 05/01/19  0921   SOFIYA 30* 77     Recent Labs   Lab Test 10/15/19  1037 05/01/19  0921   AST 13 19   ALT 18 13   ALKPHOS 61 46     Recent Labs   Lab Test 10/15/19  1037 05/01/19  0921   WBC 5.4 5.7   ANEU 3.3 3.7   HGB 14.2 14.4    174       PSYCHOTROPIC DRUG INTERACTIONS     Concurrent use of LITHIUM and ACE INHIBITORS may result in increased risk of lithium toxicity.  Concurrent use of LITHIUM and SEROTONERGIC AGENTS may result in increased risk of serotonin syndrome.      MANAGEMENT:  Monitoring for adverse effects, routine vitals, routine labs and patient is aware of risks, using lowest effective doses of agents    RISK STATEMENT for SAFETY     Angy Haji did not appear to be an imminent safety risk to self or others.    DIAGNOSIS     Bipolar Disorder, Type I (late onset), depressive episode, mild    (only manic episode was Nov/Dec 2018)  H/o EPSE secondary to neuroleptic use  H/o Thromboctyopenia secondary to depakote  H/o lithium toxicity, resolved    ASSESSMENT      [m2, h3]     TODAY Angy returns for followup. Ongoing overall mood stability, no evidence of psychotic or manic symptomology. Continues to implement behavioral activation techniques with resulting partial improvement in her mild subsyndromal depressive symptoms (predominantly anhedonia). Making progress coping with anxiety triggers, primarily work/personal life stress and coping with adjustment [place]. Denies medication side effects aside from mild ongoing bilateral hand tremor. Increasing daily exercise; working on additional weight management strategies with new PCP; I am hopeful that full discontinuation of VPA will aid in weight loss goals/curb additional weight gain. Plan to continue current medications this month, and at next visit, plan tentatively to complete cross taper from VPA to Li by stopping VPA. Continue with Qe.o.week therapy, and follow up with me in 6 weeks.      PLAN       [m2, h3]     1) PSYCHOTROPIC MEDICATIONS:  - Continue Depakote 125 mg at bedtime  - Continue lithium 900 at bedtime    2) MN  was checked today:  -Oxycodone 10 mg tab quantity 12/13/19    3) THERAPY: Continue    4) NEXT DUE:    Labs- Li and VPA labs Apr 2020  EKG- N/A  Rating Scales- serial PHQ9    5) REFERRALS:  No    6) RTC: 6 weeks    7) CRISIS NUMBERS:   Provided routinely in AVS   ONLY if a [facility] PT: Krissy DAO [place] [phone] (clinic), [phone] (after hours)     TREATMENT RISK STATEMENT:  The risks, benefits, alternatives and potential adverse effects have been discussed and are understood by the pt. The pt understands the risks of using street drugs or alcohol. There are no medical contraindications, the pt agrees to treatment with the ability to do so. The pt knows to call the clinic for any problems or to access emergency care if needed.  Medical and substance use concerns are documented above.  Psychotropic drug interaction check was done, including changes made today.    PROVIDER: Ricci Villa MD      Patient not staffed in clinic.  Supervisor is Dr. Liu, who will sign the note.    Attestation:  I did not see Angy Haji directly. I have reviewed the documentation and I agree with the resident's plan of care.   Chai Liu MD

## 2020-02-28 ENCOUNTER — OFFICE VISIT (OUTPATIENT)
Dept: PSYCHIATRY | Facility: CLINIC | Age: 55
End: 2020-02-28
Attending: PSYCHIATRY & NEUROLOGY
Payer: COMMERCIAL

## 2020-02-28 VITALS
HEART RATE: 79 BPM | SYSTOLIC BLOOD PRESSURE: 137 MMHG | WEIGHT: 293 LBS | DIASTOLIC BLOOD PRESSURE: 83 MMHG | BODY MASS INDEX: 46.98 KG/M2

## 2020-02-28 DIAGNOSIS — F31.9 BIPOLAR I DISORDER (H): ICD-10-CM

## 2020-02-28 PROCEDURE — G0463 HOSPITAL OUTPT CLINIC VISIT: HCPCS | Mod: ZF

## 2020-02-28 RX ORDER — DIVALPROEX SODIUM 125 MG/1
125 TABLET, DELAYED RELEASE ORAL AT BEDTIME
Qty: 30 TABLET | Refills: 0 | Status: SHIPPED | OUTPATIENT
Start: 2020-02-28 | End: 2020-03-26

## 2020-02-28 RX ORDER — LITHIUM CARBONATE 300 MG/1
900 CAPSULE ORAL AT BEDTIME
Qty: 90 CAPSULE | Refills: 0 | Status: SHIPPED | OUTPATIENT
Start: 2020-02-28 | End: 2020-04-15

## 2020-02-28 ASSESSMENT — PAIN SCALES - GENERAL: PAINLEVEL: NO PAIN (0)

## 2020-02-28 NOTE — PATIENT INSTRUCTIONS
Thank you for coming to the PSYCHIATRY CLINIC.    Lab Testing:  If you had lab testing today and your results are reassuring or normal they will be mailed to you or sent through [employer] within 7 days.   If the lab tests need quick action we will call you with the results.  The phone number we will call with results is # [phone] (home) [phone] (work). If this is not the best number please call our clinic and change the number.    Medication Refills:  If you need any refills please call your pharmacy and they will contact us. Our fax number for refills is [phone]. Please allow three business for refill processing.   If you need to  your refill at a new pharmacy, please contact the new pharmacy directly. The new pharmacy will help you get your medications transferred.     Scheduling:  If you have any concerns about today's visit or wish to schedule another appointment please call our office during normal business hours [phone] (8-5:00 M-F)    Contact Us:  Please call [phone] during business hours (8-5:00 M-F).  If after clinic hours, or on the weekend, please call  [phone].    Financial Assistance [phone]  [employer] [facility] Billing [phone]  [facility] Billing Office, [facility]: [phone]  [facility] Billing [phone]  Medical Records [phone]      MENTAL HEALTH CRISIS NUMBERS:  [facility]:   [facility] - [phone]   Crisis Residence [facility] - [phone]   Walk-In Counseling [facility] [phone]   COPE 24/7 [place] Mobile Team for Adults - [[phone]]; Child - [[phone]]        [facility]:   [facility] - [phone]   Walk-in counseling [facility] - [phone]   Walk-in counseling [facility] - [phone]   Crisis [facility]idence [facility] - [phone]   Urgent Care Adult Mental Health:   --Drop-in, 24/7 crisis line, and [facility] Mobile Team  [[phone]]    CRISIS TEXT LINE: Text 013-930 from anywhere, anytime, any crisis 24/7;    OR SEE www.crisistextline.org     National Suicide Prevention Lifeline: 268-446-URBU (795-093-2350)    Poison Control Center - 6-586-493-5338    CHILD: [facility] needs assessment team - [phone]     [facility] Lifeline - 1-451.895.9226; or [facility] Lifeline - 1-371.361.4681    If you have a medical emergency please call 911or go to the nearest ER.                    _____________________________________________    Again thank you for choosing PSYCHIATRY CLINIC and please let us know how we can best partner with you to improve you and your family's health.  You may be receiving a survey regarding this appointment. We would love to have your feedback, both positive and negative. The survey is done by an external company, so your answers are anonymous.

## 2020-03-11 ENCOUNTER — OFFICE VISIT (OUTPATIENT)
Dept: FAMILY MEDICINE | Facility: CLINIC | Age: 55
End: 2020-03-11
Payer: COMMERCIAL

## 2020-03-11 VITALS
SYSTOLIC BLOOD PRESSURE: 121 MMHG | HEIGHT: 68 IN | HEART RATE: 68 BPM | RESPIRATION RATE: 16 BRPM | BODY MASS INDEX: 44.41 KG/M2 | TEMPERATURE: 98.9 F | DIASTOLIC BLOOD PRESSURE: 80 MMHG | WEIGHT: 293 LBS | OXYGEN SATURATION: 100 %

## 2020-03-11 DIAGNOSIS — R05.9 COUGH: Primary | ICD-10-CM

## 2020-03-11 RX ORDER — PREDNISONE 20 MG/1
TABLET ORAL
Qty: 8 TABLET | Refills: 0 | Status: SHIPPED | OUTPATIENT
Start: 2020-03-11 | End: 2020-06-26

## 2020-03-11 RX ORDER — ALBUTEROL SULFATE 90 UG/1
2 AEROSOL, METERED RESPIRATORY (INHALATION) EVERY 4 HOURS PRN
Qty: 1 INHALER | Refills: 3 | Status: SHIPPED | OUTPATIENT
Start: 2020-03-11 | End: 2020-09-24

## 2020-03-11 ASSESSMENT — MIFFLIN-ST. JEOR: SCORE: 2039.75

## 2020-03-11 NOTE — PATIENT INSTRUCTIONS
Albuterol inhaler  May use 2 puffs every 1-4 hr as needed for cough    Prednisone tablets start on Thur morning    Tylenol while taking prednisone

## 2020-03-11 NOTE — PROGRESS NOTES
"Angy Haji is a 54 year old female here for the following issues:    Cough   Angy has had a dry cough, sore throat, post nasal drip, fatigue, and congestion since 3/5/2020.  Her cough has worsened since Sunday night, when she started having \"coughing fits\" that would last for 45 minutes to an hour.  She has used Nyquil with little relief, and had difficulty sleeping the past two nights.  She has no hx of asthma, but has used inhalers in the past.  Nonsmoker.  Denies body aches or headaches.      Patient Active Problem List   Diagnosis     Oral aphthae     Disorder of bursae and tendons in shoulder region     CARDIOVASCULAR SCREENING; LDL GOAL LESS THAN 160     Rosacea     Tear of medial cartilage or meniscus of knee, current     Raynaud disease     Vitamin D deficiency disease     Mild sleep apnea     Atopic rhinitis     Chronic bilateral low back pain without sciatica     Morbid obesity (H)     Muscle tension dysphonia     Palpitations     Dysphagia, unspecified type     Anxiety     Benign essential hypertension     Callus of foot     Varicose veins of right lower extremity with edema     Bipolar I disorder (H)       Current Outpatient Medications   Medication Sig Dispense Refill     Carboxymethylcellulose Sod PF (REFRESH PLUS) 0.5 % SOLN ophthalmic solution Place 1 drop into both eyes 3 times daily as needed for dry eyes       divalproex sodium delayed-release (DEPAKOTE) 125 MG DR tablet Take 1 tablet (125 mg) by mouth At Bedtime 30 tablet 0     estrogen conj-medroxyPROGESTERone (PREMPRO) 0.625-2.5 MG tablet Take 1 tablet by mouth daily 90 tablet 3     lisinopril (PRINIVIL/ZESTRIL) 10 MG tablet Take 1 tablet (10 mg) by mouth daily 90 tablet 1     lithium 300 MG capsule Take 3 capsules (900 mg) by mouth At Bedtime 90 capsule 0     Loratadine 10 MG capsule Take 10 mg by mouth daily as needed.       metroNIDAZOLE (METROGEL) 1 % external gel Apply topically daily 60 g 9     order for DME Please measure and " "distribute 1 pair of 20mmHg - 30mmHg knee high open toe compression stockings. Jobst ultrasheer or equivalent. 1 each 1     ORDER FOR DME Use your CPAP device as directed by your provider.       VITAMIN D, CHOLECALCIFEROL, PO Take 2,000 Units by mouth daily         Allergies   Allergen Reactions     Seroquel [Quetiapine] Nausea and Vomiting     Had constant vomiting     Olanzapine      Tongue welling, bad EPSE (Parkinsonian)     Penicillins Rash     Risperidone      Tongue welling, bad EPSE (Parkinsonian)       Thimerosal Other (See Comments)     blisters on inside of eyelid        EXAM  /80   Pulse 68   Temp 98.9  F (37.2  C) (Oral)   Resp 16   Ht 1.72 m (5' 7.72\")   Wt 138.6 kg (305 lb 8 oz)   SpO2 100%   BMI 46.84 kg/m    Gen: Alert, pleasant, NAD, dry cough  HEENT:  Conjunctiva nl, TM normal bilaterally, OP clear, no posterior erythema  COR: S1,S2, no murmur  Lungs: CTA bilaterally, no rhonchi, wheezes or rales      Assessment:  (R05) Cough  (primary encounter diagnosis)  Comment: suspect post reactive cough from influenza like illness  Plan: albuterol (PROAIR HFA/PROVENTIL HFA/VENTOLIN         HFA) 108 (90 Base) MCG/ACT inhaler, predniSONE         (DELTASONE) 20 MG tablet        Recommend use of albuterol MDI every 1-4 hr for coughing fits, chest tightness or wheezing. Gave burst of steroids. If needed could consider addition of Advair is symptoms persist.     Zuleima Serrano MD  Internal Medicine/Pediatrics      I, Cody Cortez, am serving as a scribe to document services personally performed by Dr. Zuleima Serrano, based on data collection and the provider's statements to me. Dr. Serrano has reviewed, edited, and approved the above note.     "

## 2020-03-11 NOTE — NURSING NOTE
"54 year old  Chief Complaint   Patient presents with     Cough     Since last week, dry cough, had sore throat last week but has gone away. post nasal drip and cough has been getting worse the last two days, especially during the night.         Blood pressure 121/80, pulse 68, temperature 98.9  F (37.2  C), temperature source Oral, resp. rate 16, height 1.72 m (5' 7.72\"), weight 138.6 kg (305 lb 8 oz), SpO2 100 %, not currently breastfeeding. Body mass index is 46.84 kg/m .  Patient Active Problem List   Diagnosis     Oral aphthae     Disorder of bursae and tendons in shoulder region     CARDIOVASCULAR SCREENING; LDL GOAL LESS THAN 160     Rosacea     Tear of medial cartilage or meniscus of knee, current     Raynaud disease     Vitamin D deficiency disease     Mild sleep apnea     Atopic rhinitis     Chronic bilateral low back pain without sciatica     Morbid obesity (H)     Muscle tension dysphonia     Palpitations     Dysphagia, unspecified type     Anxiety     Benign essential hypertension     Callus of foot     Varicose veins of right lower extremity with edema     Bipolar I disorder (H)       Wt Readings from Last 2 Encounters:   03/11/20 138.6 kg (305 lb 8 oz)   01/24/20 137 kg (302 lb)     BP Readings from Last 3 Encounters:   03/11/20 121/80   01/24/20 (!) 164/80   01/16/20 129/80         Current Outpatient Medications   Medication     Carboxymethylcellulose Sod PF (REFRESH PLUS) 0.5 % SOLN ophthalmic solution     divalproex sodium delayed-release (DEPAKOTE) 125 MG DR tablet     estrogen conj-medroxyPROGESTERone (PREMPRO) 0.625-2.5 MG tablet     lisinopril (PRINIVIL/ZESTRIL) 10 MG tablet     lithium 300 MG capsule     Loratadine 10 MG capsule     metroNIDAZOLE (METROGEL) 1 % external gel     order for DME     ORDER FOR DME     VITAMIN D, CHOLECALCIFEROL, PO     No current facility-administered medications for this visit.        Social History     Tobacco Use     Smoking status: Never Smoker     Smokeless " tobacco: Never Used     Tobacco comment: non smoke home   Substance Use Topics     Alcohol use: Yes     Comment: 2 Drinks Per Month     Drug use: No       Health Maintenance Due   Topic Date Due     HIV SCREENING  05/07/1982     ZOSTER IMMUNIZATION (1 of 2) 05/07/2017     URINE DRUG SCREEN  11/09/2019       Lab Results   Component Value Date    PAP OTHER-NIL, See Result 09/24/2015 March 11, 2020 3:08 PM

## 2020-03-25 ENCOUNTER — HOSPITAL ENCOUNTER (OUTPATIENT)
Dept: NUTRITION | Facility: CLINIC | Age: 55
Discharge: HOME | End: 2020-03-25
Attending: DIETITIAN, REGISTERED | Admitting: DIETITIAN, REGISTERED
Payer: COMMERCIAL

## 2020-03-25 DIAGNOSIS — F31.9 BIPOLAR I DISORDER (H): ICD-10-CM

## 2020-03-25 PROCEDURE — 97803 MED NUTRITION INDIV SUBSEQ: CPT | Mod: 95 | Performed by: DIETITIAN, REGISTERED

## 2020-03-26 RX ORDER — DIVALPROEX SODIUM 125 MG/1
125 TABLET, DELAYED RELEASE ORAL AT BEDTIME
Qty: 30 TABLET | Refills: 0 | Status: SHIPPED | OUTPATIENT
Start: 2020-03-26 | End: 2020-04-15

## 2020-03-26 NOTE — TELEPHONE ENCOUNTER
[id number] MG    Last refilled: 2/28/20  Qty: 30 : 0    Last seen: 2/28/20  RTC: 6 WEEKS  Cancel: 0  No-show: 0  Next appt: 4/15/20    Refill decision: Refilled for 30 days per protocol.  *Continue Depakote 125 mg at bedtime

## 2020-04-13 NOTE — PROGRESS NOTES
VIDEO VISIT:   Angy Haji is a 54 year old pt. who is being evaluated via a billable video visit.     Type of service:  Video visit for medication management  Time of service:    Date:  04/15/2020    Video Start Time:  152p        Video End Time:  2:41p  Reason for Video Visit: Patient unable to travel due to Covid-19  Originating Site (patient location): Patient's home  Distant Site (provider location): Remote location  Mode of Communication:  Video Conference via [email or web address]    Patient has given verbal consent for a telephone visit?  yes   How would the pt like to obtain the AVS? Mail a copy  Patient information regarding use of video visit has been provided by rooming staff.       [facility]  Psychiatry Clinic  PSYCHIATRIC PROGRESS NOTE     Date of initial Diagnostic Assessment (DA) is 12/5/18.  Date of most recent Transfer of Care Diagnostic Assessment is 07/16/19.     CARE TEAM:    PCP- [facility]     Therapist- [facility] (private practive)      Specialty Providers- OB/GYN, ENT, PT, Nutrition      Wife- Rachael Haji is a 54 year old female who prefers the name Angy & pronouns she, her, hers.     Pertinent Background: Patient has not had significant mental health history until 2018, when she developed perimenopausal mood and anxiety symptoms.  She was subsequently referred to our clinic following a hospitalization for anthony in November 2018.  Her care has been complicated by sensitivity to antipsychotics (EPSE including difficulty swallowing), thrombocytopenia secondary to Depakote, lithium toxicity in setting of concurrent Depakote use.     Psych critical item history includes Manic episode, psychiatric hospitalization in November 2018.    INTERIM HISTORY      [4, 4]   The patient reports good treatment adherence.  History was provided by the patient and family who were good historians.  The last visit ended with no change  Since the last  "visit:   - Doing well overall. Started working from home this week. Daughter Gricel doing homes[employer] and remote schooling, which is stressful for whole family.  - Working on increasing exercise daily; walking during lunch. Still reading each night for pleasure. Getting more enjoyment out of day to day life. Occasional low motivation after work, but not sustained  - Doing social visits with friends over Zoom, virtual [place] with friends  - Still struggling with snacking, \"dessert snacking\". Weighed herself at work the other day, 306.4 (stable). Doing visits remotely with dietician. Some situational feelings of hopelessless about this particular stressor.   - Continues with weekly remote therapy visits  - No overt sustained low mood, no SI, plan, intent. Anxiety well managed  - Took 4d course Prednisone 40 mg daily and inhaler on 3/12. Had energy while taking steroid, and made it harder to fall asleep. But no elevated mood, decreased need for sleep, impulsivity, recklessness, grandiosity.  - No new concerns for side effects.   - Wanting to discontinue Depakote. In addition to concern for weight gain, still feels like cognition is dulled/slowed from baseline. Learning new telehealth platforms, COVID19 protocols, etc, has taken more time and effort than she feels it otherwise would.   - Some ongoing foot cramping L>R and tightness in her achilles tendon. Hasn't doing her PT and exercises that helped in the past.     RECENT PSYCH ROS:   Depression:  anhedonia and occasional low motivation are improving  Elevated:  none  Psychosis:  none  Anxiety:  ongoing mild intermittent nervousness, decreasing overall  Panic Attack:  none  Trauma Related:  none  Dysregulation:  none  Eating Disorder: some overeating, working with dietician, PCP  Pertinent Negative Symptoms:  NO suicidal ideation, self-injurious behavior/urges, violent ideation, aggression, psychosis, hallucinations, delusions, anthony and hypomania    RECENT SUBSTANCE " USE:     Alcohol- 1 glass of wine per week  Tobacco- No  Caffeine- drinks around 24oz of coffee during the day  Opioids- No           Narcan Kit- N/A   Cannabis- No     Other illicit drugs- none    CURRENT SOCIAL HISTORY:  Financial/ Work- 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       Partner/ - wifeRachael- 13 years old daughter, Gricel      Living situation- Moved into new [place] in [place], lives with wife Danielle and teenage daughter   Social/ Spiritual Support- Family (wife and daughter)     FEELS SAFE AT HOME- yes     PSYCH and CD Critical Summary Points since July 2019 July 2019 - no change  Aug 2019 - decreased the Depakote to 500 mg nightly  Oct 2019 - no changes, VPA and LI labs ordered  Nov 2019 - VPA decreased to 250 mg nightly  Dec 2019 -no change  Jan 2020 - VPA decreased to 125 mg every night  Feb 2020 - no change  Apr 2020 - Stopped VPA    PAST MED TRIALS          See last Diagnostic Assessment  MEDICAL / SURGICAL HISTORY                                   Pregnant or breastfeeding- no      Contraception- HRT for menopause    Patient Active Problem List   Diagnosis     Oral aphthae     Disorder of bursae and tendons in shoulder region     CARDIOVASCULAR SCREENING; LDL GOAL LESS THAN 160     Rosacea     Tear of medial cartilage or meniscus of knee, current     Raynaud disease     Vitamin D deficiency disease     Mild sleep apnea     Atopic rhinitis     Chronic bilateral low back pain without sciatica     Morbid obesity (H)     Muscle tension dysphonia     Palpitations     Dysphagia, unspecified type     Anxiety     Benign essential hypertension     Callus of foot     Varicose veins of right lower extremity with edema     Bipolar I disorder (H)       Major Surgery- n/a    MEDICAL REVIEW OF SYSTEMS    [2, 10]     A comprehensive review of systems was performed and is negative other than noted in the HPI.    ALLERGY       Seroquel [quetiapine]; Olanzapine;  Penicillins; Risperidone; and Thimerosal  MEDICATIONS        Current Outpatient Medications   Medication Sig Dispense Refill     albuterol (PROAIR HFA/PROVENTIL HFA/VENTOLIN HFA) 108 (90 Base) MCG/ACT inhaler Inhale 2 puffs into the lungs every 4 hours as needed for shortness of breath / dyspnea or wheezing 1 Inhaler 3     Carboxymethylcellulose Sod PF (REFRESH PLUS) 0.5 % SOLN ophthalmic solution Place 1 drop into both eyes 3 times daily as needed for dry eyes       divalproex sodium delayed-release (DEPAKOTE) 125 MG DR tablet Take 1 tablet (125 mg) by mouth At Bedtime 30 tablet 0     estrogen conj-medroxyPROGESTERone (PREMPRO) 0.625-2.5 MG tablet Take 1 tablet by mouth daily 90 tablet 3     lisinopril (PRINIVIL/ZESTRIL) 10 MG tablet Take 1 tablet (10 mg) by mouth daily 90 tablet 1     lithium 300 MG capsule Take 3 capsules (900 mg) by mouth At Bedtime 90 capsule 0     Loratadine 10 MG capsule Take 10 mg by mouth daily as needed.       metroNIDAZOLE (METROGEL) 1 % external gel Apply topically daily 60 g 9     order for DME Please measure and distribute 1 pair of 20mmHg - 30mmHg knee high open toe compression stockings. Jobst ultrasheer or equivalent. 1 each 1     ORDER FOR DME Use your CPAP device as directed by your provider.       predniSONE (DELTASONE) 20 MG tablet Take 2 tablets each morning with food till gone 8 tablet 0     VITAMIN D, CHOLECALCIFEROL, PO Take 2,000 Units by mouth daily       VITALS      [3, 3]   There were no vitals taken for this visit. , telehealth  MENTAL STATUS EXAM      [9, 14 cog gs]     Alertness: alert  and oriented  Appearance: well groomed, dressed in professional clothing  Behavior/Demeanor: cooperative, pleasant and calm, with good  eye contact   Speech: normal and regular rate and rhythm  Language: intact  Psychomotor: normal or unremarkable  Mood: stable, anhedonia improving  Affect: Full range, brightens at appropriate times; was congruent to mood; was congruent to  content  Thought Process/Associations: unremarkable  Thought Content:  Reports none;  Denies suicidal ideation, violent ideation, delusions and paranoid ideation  Perception:  Reports none;  Denies auditory hallucinations and visual hallucinations  Insight: excellent  Judgment: excellent  Cognition: (6) does  appear grossly intact; formal cognitive testing was not done  Gait and Station: unremarkable    LABS and DATA     AIMS:  Not needed  [facility]: 25 out of 30 12/13/2019(25/30 on 12/12/18)    PHQ9 TODAY = not obtained, telehealth  PHQ-9 SCORE 8/28/2019 10/2/2019 11/6/2019   PHQ-9 Total Score 9 5 6     LITHIUM LABS     [level, renal, SG, TSH, WBC]    q6 mo  Recent Labs   Lab Test 10/15/19  1037 05/01/19  0921 01/14/19  0913 01/02/19  0839   LITHIUM 0.65 0.61 0.55* 0.37*     Recent Labs   Lab Test 10/15/19  1037 05/01/19  0921 12/20/18  1214 12/14/18  1235   CR 0.45* 0.54 0.47* 0.43*   GFRESTIMATED >90 >90 >90 >90    135 132* 129*   POTASSIUM 4.3 4.6 4.4 4.2   SHERRIE 9.1 9.0 9.2 8.7     Recent Labs   Lab Test 10/15/19  1222 05/01/19  1140 11/01/12  0819   SG 1.006 1.008 1.015     Recent Labs   Lab Test 10/15/19  1037 05/01/19  0921 11/10/18  0756 08/31/18  2029   TSH 2.47 2.69 2.29 2.19       VALPROIC ACID LABS     [liver enzymes, WBC, ANEU, Hgb, plts, +ammonia]  q6-12 mo  Recent Labs   Lab Test 10/15/19  1037 05/01/19  0921   SOFIYA 30* 77     Recent Labs   Lab Test 10/15/19  1037 05/01/19  0921   AST 13 19   ALT 18 13   ALKPHOS 61 46     Recent Labs   Lab Test 10/15/19  1037 05/01/19  0921   WBC 5.4 5.7   ANEU 3.3 3.7   HGB 14.2 14.4    174       PSYCHOTROPIC DRUG INTERACTIONS     Concurrent use of LITHIUM and ACE INHIBITORS may result in increased risk of lithium toxicity.  Concurrent use of LITHIUM and SEROTONERGIC AGENTS may result in increased risk of serotonin syndrome.    MANAGEMENT:  Monitoring for adverse effects, routine vitals, routine labs and patient is aware of risks, using lowest effective doses  of agents    RISK STATEMENT for SAFETY     Angy Haji did not appear to be an imminent safety risk to self or others.    DIAGNOSIS     Bipolar Disorder, Type I (late onset), depressive episode, mild    (only manic episode was Nov/Dec 2018)  H/o EPSE secondary to neuroleptic use  H/o Thromboctyopenia secondary to depakote  H/o lithium toxicity, resolved    ASSESSMENT      [m2, h3]     TODAY patient returns for follow-up via telehealth due to COVID-19 pandemic.  Mood stable since last visit.  Anhedonia and low motivation continue to improve, now largely only situational in the context of work stress.  No evidence of depression.  Patient prescribed 4-day burst of prednisone for persistent cough approximately 1 month ago.  She tolerated this well, and leading up to, during, or since taking this, she reports no evidence of manic symptoms.  Remains quite motivated to discontinue Depakote, as has been discussed during previous visits.  Depakote was most recently decreased from 250 mg to 125 mg at bedtime on January 24.  Ideally, would repeat lithium labs and evaluate current lithium level, as most recent lithium level was 6 months ago, prior to discontinuing Depakote.  However due to COVID-19 pandemic, I believe the risks outweigh the benefits of asking patient to come to a hospital/clinic to have labs drawn.  I discussed risks of mood instability associated with medication changes, and asked that she have a low threshold to contact clinic should she experience new/worsening depressive or manic/hypomanic symptomology moving forward.  Should that be the case, would repeat lithium labs and if subtherapeutic, would increase lithium.  For now, continue current dose of lithium 900 at bedtime, and defer labs until stay-at-home order is through.  No acute safety concerns.  Follow-up scheduled in 1 month.     PLAN      [m2, h3]     1) PSYCHOTROPIC MEDICATIONS:  - Stop Depakote 125 mg at bedtime  - Continue lithium 900 at  bedtime    2) MN  was checked today:  -Oxycodone 10 mg tab #12 12/13/19    3) THERAPY: Continue    4) NEXT DUE:    Labs- Li and VPA labs Apr 2020, defer pending COVID19 pandemic  EKG- N/A  Rating Scales- serial PHQ9    5) REFERRALS:  No    6) RTC: 1 mo as scheduled    7) CRISIS NUMBERS:   Provided routinely in AVS   ONLY if a [facility] PT: Krissy DAO [place] [phone] (clinic), [phone] (after hours)     TREATMENT RISK STATEMENT:  The risks, benefits, alternatives and potential adverse effects have been discussed and are understood by the pt. The pt understands the risks of using street drugs or alcohol. There are no medical contraindications, the pt agrees to treatment with the ability to do so. The pt knows to call the clinic for any problems or to access emergency care if needed.  Medical and substance use concerns are documented above.  Psychotropic drug interaction check was done, including changes made today.    PROVIDER: Ricci Villa MD      Patient not staffed in clinic.  Supervisor is Dr. Liu, who will sign the note.    Attestation:  I did not see Angy Haji directly. I have reviewed the documentation and I agree with the resident's plan of care.   Chai Liu MD

## 2020-04-15 ENCOUNTER — VIRTUAL VISIT (OUTPATIENT)
Dept: PSYCHIATRY | Facility: CLINIC | Age: 55
End: 2020-04-15
Attending: PSYCHIATRY & NEUROLOGY
Payer: COMMERCIAL

## 2020-04-15 DIAGNOSIS — F31.9 BIPOLAR I DISORDER (H): ICD-10-CM

## 2020-04-15 RX ORDER — LITHIUM CARBONATE 300 MG/1
900 CAPSULE ORAL AT BEDTIME
Qty: 90 CAPSULE | Refills: 2 | Status: SHIPPED | OUTPATIENT
Start: 2020-04-15 | End: 2020-05-15

## 2020-04-15 ASSESSMENT — PAIN SCALES - GENERAL: PAINLEVEL: NO PAIN (0)

## 2020-04-15 NOTE — PROGRESS NOTES
"Angy Haji is a 54 year old female who is being evaluated via a billable video visit.      The patient has been notified of following:     \"This video visit will be conducted via a call between you and your physician/provider. We have found that certain health care needs can be provided without the need for an in-person physical exam.  This service lets us provide the care you need with a video conversation.  If a prescription is necessary we can send it directly to your pharmacy.  If lab work is needed we can place an order for that and you can then stop by our lab to have the test done at a later time.    Video visits are billed at different rates depending on your insurance coverage.  Please reach out to your insurance provider with any questions.    If during the course of the call the physician/provider feels a video visit is not appropriate, you will not be charged for this service.\"    Patient has given verbal consent for Video visit? Yes    How would you like to obtain your AVS? Mail a copy          "

## 2020-04-22 ENCOUNTER — HOSPITAL ENCOUNTER (OUTPATIENT)
Dept: NUTRITION | Facility: CLINIC | Age: 55
Discharge: HOME | End: 2020-04-22
Attending: DIETITIAN, REGISTERED | Admitting: DIETITIAN, REGISTERED
Payer: COMMERCIAL

## 2020-04-22 PROCEDURE — 97803 MED NUTRITION INDIV SUBSEQ: CPT

## 2020-04-22 NOTE — PROGRESS NOTES
"  The patient has been notified of following:      \"This telephone visit will be conducted via a call between you and your physician/provider. We have found that certain health care needs can be provided without the need for a physical exam.  This service lets us provide the care you need with a short phone conversation.  If a prescription is necessary we can send it directly to your pharmacy.  If lab work is needed we can place an order for that and you can then stop by our lab to have the test done at a later time.     Telephone visits are billed at different rates depending on your insurance coverage. During this emergency period, for some insurers they may be billed the same as an in-person visit.  Please reach out to your insurance provider with any questions.     If during the course of the call the physician/provider feels a telephone visit is not appropriate, you will not be charged for this service.\"     Patient has given verbal consent for Telephone visit?  Yes    NUTRITION REASSESSMENT NOTE (TELEPHONE VISIT DUE TO COVID-19)     REASON FOR ASSESSMENT   Angy Haji referred by Dr. Odell for MNT related to  Obesity (BMI 30-39.9) [E66.9]  - Primary       Benign essential hypertension [I10]       Patient accompanied by self (via telephone)     NUTRITION HISTORY  Patient continues with multiple life changes and stressors.  Patient struggling with evening eating as feels want to self soothe with food. Patient is stress eating with due to COVID-19 and schedule changes.  Patient preparing and planning meals for family.     DIET RECALL   Breakfast: cereal and banana; yogurt + granola (measures), fruit + coffee; 2 slices toast with butter + 1/2 cup cottage cheese    Lunch: leftovers (includes fruit and vegetable daily)  Dinner: Tuscan white bean soup; roasted chicken; taco; protein + fruit and vegetable; daughter continues to follow vegetarian diet     Snacks: ice cream or brownies   Beverages: mainly water, " "coffee, mineral water  Dining out: none currently  Exercise: Patient exercising 5-6 times per week. (biking, hiking, walking, and walking dog).          MEDICATIONS:  Reviewed      ANTHROPOMETRICS  Height: 5'8\" (note height change in chart previously 5'9\")  Weight: 305 lbs   BMI (kg/m2): 46.3 kg/m2   %IBW: 217%  Weight History:   Wt Readings from Last 10 Encounters:   03/11/20 138.6 kg (305 lb 8 oz)   01/24/20 137 kg (302 lb)   12/12/19 133.8 kg (295 lb)   12/04/19 133.8 kg (295 lb)   11/07/19 133.4 kg (294 lb)   06/26/19 131.5 kg (290 lb)   05/03/19 129.3 kg (285 lb)   02/04/19 124.7 kg (275 lb)   12/13/18 120.3 kg (265 lb 4.8 oz)   12/06/18 120.5 kg (265 lb 9.6 oz)      ASSESSED NUTRITION NEEDS  Estimated Energy Needs: 7567-1455 kcals (15-20 kcals/kg) for gradual weight loss   Estimated Protein Needs: 66-79 (1-1.2 gm/kg IBW) for repletion with exercise  RMR: 1440     EVALUATION/PROGRESS TOWARDS GOALS   Previous goals:  Exercise 5 times per week-met   Determine relaxation options besides stress eating 2 times per week-improving (read newspaper Sunday mornings, fun activity)    Previous Nutrition Diagnosis: Obesity related to excess energy intake as evidenced by BMI of 41.9 kg/m2-no change     Current Nutrition Diagnosis:Obesity related to excess energy intake as evidenced by BMI of 46.3 kg/m2     INTERVENTIONS   Nutrition Prescription   Recommend exercise 3-5 times per week; track intake if eating mindlessly; increase fiber to 25-30 grams per day      Implementation:   Meals and Snacks: Continue eating 3 meals + deliberate snacking (if hungry)   Nutrition Education (Content):               a)  Discussed progress towards goals.  Congratulated patient on tracking exercise and desserts in planner.  Reviewed struggles with evening eating as patient has increased dessert intake due to stress.  Supported patient in her struggle with food and mental health issues.     Nutrition Education (Application):               a) " Determined alternative options to stress eating.                             b) Patient verbalized understanding of diet by stating will practice other relaxation techniques besides stress eating 2 times per week.                      Anticipate fair-good compliance     CURRENT GOALS   Exercise 5 times per week   Add snack in afternoon   Plan 1 day per week without dessert after dinner     FOLLOW UP/MONITORING    Patient to follow up in 4 weeks  Patient has RD contact information      Time spent with patient: 20 minutes     Mattie Amaya, RD, LD  [facility] Outpatient Dietitian  [phone] (office phone)

## 2020-05-12 NOTE — PROGRESS NOTES
Video- Visit Details  Type of service:  video visit for medication management  Time of service:    Date:  05/15/2020    Video Start Time:  910   Video End Time:  10    Reason for video visit:  Patient unable to travel due to Covid-19  Originating Site (patient location):  Patient's home  Distant Site (provider location):  Remote location  Mode of Communication:  Video Conference via [email or web address]  Psychiatry Clinic  PSYCHIATRIC PROGRESS NOTE     Date of initial Diagnostic Assessment (DA) is 12/5/18.  Date of most recent Transfer of Care Diagnostic Assessment is 07/16/19.     CARE TEAM:    PCP- [facility]     Therapist- [facility] (private practive)      Specialty Providers- OB/GYN, ENT, PT, Nutrition      Wife- Rachael Haji is a 53 year old female who prefers the name Angy & pronouns she, her, hers.     Pertinent Background: Patient has not had significant mental health history until 2018, when she developed perimenopausal mood and anxiety symptoms.  She was subsequently referred to our clinic following a hospitalization for anthony in November 2018.  Her care has been complicated by sensitivity to antipsychotics (EPSE including difficulty swallowing), thrombocytopenia secondary to Depakote, lithium toxicity in setting of concurrent Depakote use.     Psych critical item history includes Manic episode, psychiatric hospitalization in November 2018.    INTERIM HISTORY      [4, 4]   The patient reports good treatment adherence.  History was provided by the patient and family who were good historians.  The last visit ended with stopping VPA.  Since the last visit:   - Stopped VPA last month. Went well. No manic symptoms noted. Sleeping 7.5-8hrs per night, no racing thoughts, mood feels stable, no elevated mood or depressed moods. No increased speech.   - Thinks tremor in hands resolved. No increased urination, no changes in thirst  -  "Continues with behavioral activation strategies, reading for pleasure, gardening. Enjoying these activities. Had family Zoom meetings x2 in last month, enjoyed these. Doing other Zoom meetings with friends and coworkers as well. Through birthday party for daughter's 14th birthday last weekend also, \"it was great.\"  - Visited old house to  a few things, and felt like it brought her some closure  - Anxiety well controlled overall, no more worry than usual    RECENT PSYCH ROS:   Depression:  anhedonia and occasional low motivation are improving  Elevated:  none  Psychosis:  none  Anxiety:  none  Panic Attack:  none  Trauma Related:  none  Dysregulation:  none  Eating Disorder: some overeating, working with dietician, PCP  Pertinent Negative Symptoms:  NO suicidal ideation, self-injurious behavior/urges, violent ideation, aggression, psychosis, hallucinations, delusions, anthony and hypomania    RECENT SUBSTANCE USE:     Alcohol- 1 glass of wine per week  Tobacco- No  Caffeine- drinks around 24oz of coffee during the day  Opioids- No           Narcan Kit- N/A   Cannabis- No     Other illicit drugs- none    CURRENT SOCIAL HISTORY:  Financial/ Work- 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       Partner/ - wife, Rachael Alejandra- 13 years old daughterGricel      Living situation- Moved into [facility] in [place], lives with wife Danielle and teenage daughter   Social/ Spiritual Support- Family (wife and daughter)     FEELS SAFE AT HOME- yes     PSYCH and CD Critical Summary Points since July 2019 July 2019 - no change  Aug 2019 - decreased the Depakote to 500 mg nightly  Oct 2019 - no changes, VPA and LI labs ordered  Nov 2019 - VPA decreased to 250 mg nightly  Dec 2019 -no change  Jan 2020 - VPA decreased to 125 mg every night  Feb 2020 - no change  Apr 2020 - Stopped VPA  May 2020 - no changes    PAST MED TRIALS          See last Diagnostic Assessment  MEDICAL / SURGICAL " HISTORY                                   Pregnant or breastfeeding- no      Contraception- HRT for menopause    Patient Active Problem List   Diagnosis     Oral aphthae     Disorder of bursae and tendons in shoulder region     CARDIOVASCULAR SCREENING; LDL GOAL LESS THAN 160     Rosacea     Tear of medial cartilage or meniscus of knee, current     Raynaud disease     Vitamin D deficiency disease     Mild sleep apnea     Atopic rhinitis     Chronic bilateral low back pain without sciatica     Morbid obesity (H)     Muscle tension dysphonia     Palpitations     Dysphagia, unspecified type     Anxiety     Benign essential hypertension     Callus of foot     Varicose veins of right lower extremity with edema     Bipolar I disorder (H)       Major Surgery- n/a    MEDICAL REVIEW OF SYSTEMS    [2, 10]     A comprehensive review of systems was performed and is negative other than noted in the HPI.    ALLERGY       Seroquel [quetiapine]; Olanzapine; Penicillins; Risperidone; and Thimerosal  MEDICATIONS        Current Outpatient Medications   Medication Sig Dispense Refill     albuterol (PROAIR HFA/PROVENTIL HFA/VENTOLIN HFA) 108 (90 Base) MCG/ACT inhaler Inhale 2 puffs into the lungs every 4 hours as needed for shortness of breath / dyspnea or wheezing 1 Inhaler 3     Carboxymethylcellulose Sod PF (REFRESH PLUS) 0.5 % SOLN ophthalmic solution Place 1 drop into both eyes 3 times daily as needed for dry eyes       estrogen conj-medroxyPROGESTERone (PREMPRO) 0.625-2.5 MG tablet Take 1 tablet by mouth daily 90 tablet 3     lisinopril (PRINIVIL/ZESTRIL) 10 MG tablet Take 1 tablet (10 mg) by mouth daily 90 tablet 1     lithium 300 MG capsule Take 3 capsules (900 mg) by mouth At Bedtime 90 capsule 2     Loratadine 10 MG capsule Take 10 mg by mouth daily as needed.       metroNIDAZOLE (METROGEL) 1 % external gel Apply topically daily 60 g 9     order for DME Please measure and distribute 1 pair of 20mmHg - 30mmHg knee high open toe  compression stockings. Jobst ultrasheer or equivalent. 1 each 1     ORDER FOR DME Use your CPAP device as directed by your provider.       predniSONE (DELTASONE) 20 MG tablet Take 2 tablets each morning with food till gone 8 tablet 0     VITAMIN D, CHOLECALCIFEROL, PO Take 2,000 Units by mouth daily       VITALS      [3, 3]   There were no vitals taken for this visit. , telehealth  MENTAL STATUS EXAM      [9, 14 cog gs]     Alertness: alert  and oriented  Appearance: well groomed, dressed in professional clothing  Behavior/Demeanor: cooperative, pleasant and calm, with good  eye contact   Speech: normal and regular rate and rhythm  Language: intact  Psychomotor: normal or unremarkable  Mood: stable, anhedonia improving  Affect: Full range, brightens at appropriate times; was congruent to mood; was congruent to content  Thought Process/Associations: unremarkable  Thought Content:  Reports none;  Denies suicidal ideation, violent ideation, delusions and paranoid ideation  Perception:  Reports none;  Denies auditory hallucinations and visual hallucinations  Insight: excellent  Judgment: excellent  Cognition: (6) does  appear grossly intact; formal cognitive testing was not done  Gait and Station: unremarkable    LABS and DATA     AIMS:  Not needed  [place]: 25 out of 30 12/13/2019(25/30 on 12/12/18)    PHQ9 TODAY = not obtained, telehealth  PHQ-9 SCORE 8/28/2019 10/2/2019 11/6/2019   PHQ-9 Total Score 9 5 6     LITHIUM LABS     [level, renal, SG, TSH, WBC]    q6 mo  Recent Labs   Lab Test 10/15/19  1037 05/01/19  0921 01/14/19  0913 01/02/19  0839   LITHIUM 0.65 0.61 0.55* 0.37*     Recent Labs   Lab Test 10/15/19  1037 05/01/19  0921 12/20/18  1214 12/14/18  1235   CR 0.45* 0.54 0.47* 0.43*   GFRESTIMATED >90 >90 >90 >90    135 132* 129*   POTASSIUM 4.3 4.6 4.4 4.2   SHERRIE 9.1 9.0 9.2 8.7     Recent Labs   Lab Test 10/15/19  1222 05/01/19  1140 11/01/12  0819   SG 1.006 1.008 1.015     Recent Labs   Lab Test  10/15/19  1037 05/01/19  0921 11/10/18  0756 08/31/18 2029   TSH 2.47 2.69 2.29 2.19       VALPROIC ACID LABS     [liver enzymes, WBC, ANEU, Hgb, plts, +ammonia]  q6-12 mo  Recent Labs   Lab Test 10/15/19  1037 05/01/19  0921   SOFIYA 30* 77     Recent Labs   Lab Test 10/15/19  1037 05/01/19  0921   AST 13 19   ALT 18 13   ALKPHOS 61 46     Recent Labs   Lab Test 10/15/19  1037 05/01/19  0921   WBC 5.4 5.7   ANEU 3.3 3.7   HGB 14.2 14.4    174       PSYCHOTROPIC DRUG INTERACTIONS     Concurrent use of LITHIUM and ACE INHIBITORS may result in increased risk of lithium toxicity.  Concurrent use of LITHIUM and SEROTONERGIC AGENTS may result in increased risk of serotonin syndrome.    MANAGEMENT:  Monitoring for adverse effects, routine vitals, routine labs and patient is aware of risks, using lowest effective doses of agents    RISK STATEMENT for SAFETY     Angy LILIAN Haji did not appear to be an imminent safety risk to self or others.    DIAGNOSIS     Bipolar Disorder, Type I (late onset), depressive episode, mild    (only manic episode was Nov/Dec 2018)  H/o EPSE secondary to neuroleptic use  H/o Thromboctyopenia secondary to depakote  H/o lithium toxicity, resolved    ASSESSMENT      [m2, h3]     TODAY Interval stability since last visit when depakote was discontinued. Mood stable, ongoing implementation of behavioral activation strategies with resultant improvement in anhedonic symptoms. No evidence of manic symptoms 1 month since discontinuation of Depakote. No acute safety concerns. Tremor has resolved as well, indicating strong likelihood that this side effect was caused by VPA. Denies other side effects. Rescheduled with me for 6 weeks. Refills provided.      PLAN      [m2, h3]     1) PSYCHOTROPIC MEDICATIONS:  - Continue lithium 900 at bedtime    2) [place]  was checked today:  -Oxycodone 10 mg tab #12 12/13/19    3) THERAPY: Continue    4) NEXT DUE:    Labs- Li and VPA labs Apr 2020, defer pending COVID19  pandemic  EKG- N/A  Rating Scales- serial PHQ9    5) REFERRALS:  No    6) RTC: 6weeks    7) CRISIS NUMBERS:   Provided routinely in AVS   ONLY if a QAMAR PT: [facility] [phone] (clinic), [phone] (after hours)     TREATMENT RISK STATEMENT:  The risks, benefits, alternatives and potential adverse effects have been discussed and are understood by the pt. The pt understands the risks of using street drugs or alcohol. There are no medical contraindications, the pt agrees to treatment with the ability to do so. The pt knows to call the clinic for any problems or to access emergency care if needed.  Medical and substance use concerns are documented above.  Psychotropic drug interaction check was done, including changes made today.    PROVIDER: Ricci Villa MD    Patient not staffed in clinic.  Supervisor is Dr. Liu, who will sign the note.    Attestation:  I did not see Angy Haji directly. I have reviewed the documentation and I agree with the resident's plan of care.   Chai Liu MD

## 2020-05-15 ENCOUNTER — VIRTUAL VISIT (OUTPATIENT)
Dept: PSYCHIATRY | Facility: CLINIC | Age: 55
End: 2020-05-15
Attending: PSYCHIATRY & NEUROLOGY
Payer: COMMERCIAL

## 2020-05-15 DIAGNOSIS — F31.9 BIPOLAR I DISORDER (H): ICD-10-CM

## 2020-05-15 RX ORDER — LITHIUM CARBONATE 300 MG/1
900 CAPSULE ORAL AT BEDTIME
Qty: 90 CAPSULE | Refills: 1 | Status: SHIPPED | OUTPATIENT
Start: 2020-05-15 | End: 2020-06-26

## 2020-05-15 ASSESSMENT — PAIN SCALES - GENERAL: PAINLEVEL: NO PAIN (0)

## 2020-05-15 NOTE — PATIENT INSTRUCTIONS
Thank you for coming to the PSYCHIATRY CLINIC.    Lab Testing:  If you had lab testing today and your results are reassuring or normal they will be mailed to you or sent through [employer] within 7 days.   If the lab tests need quick action we will call you with the results.  The phone number we will call with results is # [phone] (home) [phone] (work). If this is not the best number please call our clinic and change the number.    Medication Refills:  If you need any refills please call your pharmacy and they will contact us. Our fax number for refills is [phone]. Please allow three business for refill processing.   If you need to  your refill at a new pharmacy, please contact the new pharmacy directly. The new pharmacy will help you get your medications transferred.     Scheduling:  If you have any concerns about today's visit or wish to schedule another appointment please call our office during normal business hours [phone] (8-5:00 M-F)    Contact Us:  Please call [phone] during business hours (8-5:00 M-F).  If after clinic hours, or on the weekend, please call [phone].    Financial Assistance: [phone]  [phone]  [place] Billing [facility], [facility]: [phone]  [phone]  Medical Records: [phone]    MENTAL HEALTH CRISIS NUMBERS:  [facility]:   [facility]: [phone]  Crisis Residence [facility] Residence: [phone]   [facility]: [phone]   [facility]  [place] Mobile Team: [phone] (adults) [phone] (child)     [facility]:   [facility]: [phone]   Walk-in counseling [facility] House: [phone]   Walk-in counseling [facility]: [phone]   Crisis Residence [facility] Residence: [phone]   Urgent Care Adult Mental Health: [phone] Mobile team AND  crisis line    Other Crisis Numbers:    National Suicide Prevention Lifeline: 190-447-HLXI (139-983-2936)  CRISIS TEXT LINE: Text to 391517 for any crisis 24/7; OR www.crisistextline.org   Poison Control Center: 6-243-519-7694  CHILD: [facility] needs assessment team: [phone]   Trans Lifeline: 1-742.374.1375  [place] [facility] Lifeline: 1-898.233.3960    For a medical emergency please call 911 or go to the nearest ER.         _____________________________________________    Again thank you for choosing PSYCHIATRY CLINIC and please let us know how we can best partner with you to improve you and your family's health.    You may be receiving a survey regarding this appointment. We would love to have your feedback, both positive and negative. The survey is done by an external company, so your answers are anonymous.

## 2020-05-15 NOTE — PROGRESS NOTES
"VIDEO VISIT  Angy Haji is a 55 year old patient who is being evaluated via a billable video visit.      The patient has been notified of following:   \"This video visit will be conducted via a call between you and your physician/provider. We have found that certain health care needs can be provided without the need for an in-person physical exam. This service lets us provide the care you need with a video conversation. If a prescription is necessary we can send it directly to your pharmacy. If lab work is needed we can place an order for that and you can then stop by our lab to have the test done at a later time. Insurers are generally covering virtual visits as they would in-office visits so billing should not be different than normal.  If for some reason you do get billed incorrectly, you should contact the billing office to correct it and that number is in the AVS .    Patient has given verbal consent for video visit?:  Yes     How would you like to obtain your AVS?:  [employer]    Video invitation for patient:  DOXY provider, invite not needed      AVS SmartPhrase [PsychAVS] has been placed in 'Patient Instructions':  Yes     "

## 2020-06-03 ENCOUNTER — MYC MEDICAL ADVICE (OUTPATIENT)
Dept: OBGYN | Facility: CLINIC | Age: 55
End: 2020-06-03

## 2020-06-03 DIAGNOSIS — I10 BENIGN ESSENTIAL HYPERTENSION: ICD-10-CM

## 2020-06-03 DIAGNOSIS — N95.1 PERIMENOPAUSAL VASOMOTOR SYMPTOMS: ICD-10-CM

## 2020-06-03 RX ORDER — LISINOPRIL 10 MG/1
10 TABLET ORAL DAILY
Qty: 90 TABLET | Refills: 1 | Status: SHIPPED | OUTPATIENT
Start: 2020-06-03 | End: 2020-09-23

## 2020-06-03 NOTE — TELEPHONE ENCOUNTER
Routing to . Please see [employer] message and advise. Med and pharmacy teed up. Thanks.   Jeannette Schaefer, RN-BSN

## 2020-06-04 NOTE — TELEPHONE ENCOUNTER
Refilled lisinopril. Psych is monitoring lithium levels.   Sent [employer] message to patient for her to schedule in person visit in Aug/September, or sooner if she has other concerns.     Consider switching hypertension meds at that point to one that interacts less with lithium.     Magali Lira, PGY1

## 2020-06-09 ENCOUNTER — TELEPHONE (OUTPATIENT)
Dept: RADIOLOGY | Facility: CLINIC | Age: 55
End: 2020-06-09

## 2020-06-09 NOTE — TELEPHONE ENCOUNTER
Spoke with patient regarding appointment on 6/11 at 11:20 video visit with Dr. Solorio.    Called to see if patient wanted to be seen earlier.    Patient stated that the cold not as they are working at that time.    We discussed that patient should wear shorts during the visit, as well as send photos via my chart.   We also discussed how to send photos.    I sent patient a my chart message containing information on how to send the photos and when to  Call on 6/11 for appointment.    Patient understands and had no further questions at  this time.

## 2020-06-10 ENCOUNTER — ANCILLARY PROCEDURE (OUTPATIENT)
Dept: ULTRASOUND IMAGING | Facility: CLINIC | Age: 55
End: 2020-06-10
Payer: COMMERCIAL

## 2020-06-10 DIAGNOSIS — I83.893 SYMPTOMATIC VARICOSE VEINS OF BOTH LOWER EXTREMITIES: ICD-10-CM

## 2020-06-11 ENCOUNTER — VIRTUAL VISIT (OUTPATIENT)
Dept: VASCULAR SURGERY | Facility: CLINIC | Age: 55
End: 2020-06-11
Payer: COMMERCIAL

## 2020-06-11 DIAGNOSIS — I83.891 SYMPTOMATIC VARICOSE VEINS OF RIGHT LOWER EXTREMITY: Primary | ICD-10-CM

## 2020-06-11 ASSESSMENT — PAIN SCALES - GENERAL: PAINLEVEL: NO PAIN (0)

## 2020-06-11 NOTE — NURSING NOTE
Vascular Rooming Note     Angy Haji's goals for this visit include:   Chief Complaint   Patient presents with     LEWIS Osborn, is participating in a video visit today for a follow up regarding varicose veins, some swelling at the end of the day, still discoloration of the skin and feels bumpy, better than before, as reported by patient       Trudi Proctor LPN

## 2020-06-11 NOTE — PROGRESS NOTES
"Angy Haji is a 55 year old female who is being evaluated via a billable video visit.      The patient has been notified of following:     \"This video visit will be conducted via a call between you and your physician/provider. We have found that certain health care needs can be provided without the need for an in-person physical exam.  This service lets us provide the care you need with a video conversation.  If a prescription is necessary we can send it directly to your pharmacy.  If lab work is needed we can place an order for that and you can then stop by our lab to have the test done at a later time.    Video visits are billed at different rates depending on your insurance coverage.  Please reach out to your insurance provider with any questions.    If during the course of the call the physician/provider feels a video visit is not appropriate, you will not be charged for this service.\"    Patient has given verbal consent for Video visit? Yes    My Chart for AVS    Patient would like an email sent to [email or web address]    Will anyone else be joining your video visit? No        Video-Visit Details    Type of service:  Video Visit    Video Start Time: 11:25 AM  Video End Time: 11:42 AM    Originating Location (pt. Location): Home    Distant Location (provider location):  [facility]     Platform used for Video Visit: Christian Solorio MD    SUBJECTIVE:  Angy Haji is a 55 year old female with a history of symptomatic right lower extremity varicose veins of the thigh and calf s/p endovenous laser ablation right great saphenous vein and right accessory saphenous vein (separate access) with microphlebectomy right thigh and calf truncal varicosities on 12/12/2019.  She returns for routine 6-month follow-up visit.  She is doing well.  Postprocedure tenderness has resolved.  She states that her leg feels \"a lot better\" overall.  No further aching.  Has still some itching in the calf " and a little ankle swelling at the end of the day right greater than left though this is 10% of what it was preoperatively without stocking use.  She does note that discoloration persists in the inner aspect of the thigh and calf with still some palpable lumps in these areas though this is 50% improved from her 1 month follow-up visit.  She does continue stocking use as needed.  No other complaints.    Patient Active Problem List   Diagnosis     Oral aphthae     Disorder of bursae and tendons in shoulder region     CARDIOVASCULAR SCREENING; LDL GOAL LESS THAN 160     Rosacea     Tear of medial cartilage or meniscus of knee, current     Raynaud disease     Vitamin D deficiency disease     Mild sleep apnea     Atopic rhinitis     Chronic bilateral low back pain without sciatica     Morbid obesity (H)     Muscle tension dysphonia     Palpitations     Dysphagia, unspecified type     Anxiety     Benign essential hypertension     Callus of foot     Varicose veins of right lower extremity with edema     Bipolar I disorder (H)      Current Outpatient Medications   Medication Sig     Carboxymethylcellulose Sod PF (REFRESH PLUS) 0.5 % SOLN ophthalmic solution Place 1 drop into both eyes 3 times daily as needed for dry eyes     estrogen conj-medroxyPROGESTERone (PREMPRO) 0.625-2.5 MG tablet Take 1 tablet by mouth daily     lisinopril (ZESTRIL) 10 MG tablet Take 1 tablet (10 mg) by mouth daily     lithium 300 MG capsule Take 3 capsules (900 mg) by mouth At Bedtime     Loratadine 10 MG capsule Take 10 mg by mouth daily as needed.     metroNIDAZOLE (METROGEL) 1 % external gel Apply topically daily     order for DME Please measure and distribute 1 pair of 20mmHg - 30mmHg knee high open toe compression stockings. Jobst ultrasheer or equivalent.     ORDER FOR DME Use your CPAP device as directed by your provider.     VITAMIN D, CHOLECALCIFEROL, PO Take 2,000 Units by mouth daily     albuterol (PROAIR HFA/PROVENTIL HFA/VENTOLIN HFA)  108 (90 Base) MCG/ACT inhaler Inhale 2 puffs into the lungs every 4 hours as needed for shortness of breath / dyspnea or wheezing     predniSONE (DELTASONE) 20 MG tablet Take 2 tablets each morning with food till gone     No current facility-administered medications for this visit.      Social History     Tobacco Use     Smoking status: Never Smoker     Smokeless tobacco: Never Used     Tobacco comment: non smoke home   Substance Use Topics     Alcohol use: Yes     Comment: 2 Drinks Per Month     Drug use: No      REVIEW OF SYSTEMS: As above.    OBJECTIVE:  General appearance: Pleasant, cooperative, no distress.  Eyes anicteric.  Lungs: Breathing comfortably on room air with normal respiratory effort.   Right lower extremity: No edema.  Some discoloration in the medial thigh and calf though improved from prior photos.  No significant residual bulging varicose veins visualized.  Mental Status: cooperative, normal affect, no gross thought process defects during casual conversation.    IMAGING: Right lower extremity venous duplex ultrasound from 6/10/2020 is reviewed.  This demonstrates satisfactory chronic occlusion/absorption of the treated right great and anterior accessory saphenous vein status post endovenous laser ablation.    ASSESSMENT AND PLAN:   1.  Patient doing well 6 months status post treatment of symptomatic right lower extremity varicose veins.  Presenting varicose vein symptoms are largely resolved.  Patient seems happy with her results.  There is still some residual discoloration in the medial thigh and calf which may be due to still some slowly resorbing stumps of treated veins versus some hemosiderin deposition.  I explained that while this can persist long-term, this often does continue to slowly fade over time though can take months to years.  I mentioned that there are some cosmetic treatments to lighten this if she has concern in the future. I did recommend continued use of knee-high graded  compression stockings as needed for maintenance purposes.  Patient does state that she has adequate refills on her prior prescription for new stockings.  Further follow-up will be PRN.    I spent a total of 15 minutes face-to-face with Angy Haji during today's virtual video visit.  Over 50% of this time was spent counseling the patient and/or coordinating care regarding follow up treatment of symptomatic RLE varicose veins.  See note for details.    Jose Angel Solorio MD  Interventional Radiology and Vascular Imaging Attending  Department of Radiology  [facility]

## 2020-06-11 NOTE — PATIENT INSTRUCTIONS
You have had your virtual visit follow up with Dr Solorio regarding 6 month post varicose vein treatment.  Your Right venous duplex ultrasound completed today 6/11/20 has been discussed with you during this visit.    Plan:    Your follow up is complete at this time.  Should you start experiencing additional leg symptoms please don't hesitate to contact me and we will get you back in for assessment.    Thank you,    ELADIO Vigil RN, BSN  Interventional Radiology Nurse Coordinator   Phone:  [phone]

## 2020-06-11 NOTE — LETTER
"6/11/2020       RE: Angy Haji  [address] [facility] [address]     Dear Colleague,    Thank you for referring your patient, Angy Haji, to the [facility] at [facility]. Please see a copy of my visit note below.    Angy Haji is a 55 year old female who is being evaluated via a billable video visit.        Video-Visit Details    Type of service:  Video Visit    Video Start Time: 11:25 AM  Video End Time: 11:42 AM    Originating Location (pt. Location): Home    Distant Location (provider location):  [facility]     Platform used for Video Visit: Christian Solorio MD    SUBJECTIVE:  Angy Haji is a 55 year old female with a history of symptomatic right lower extremity varicose veins of the thigh and calf s/p endovenous laser ablation right great saphenous vein and right accessory saphenous vein (separate access) with microphlebectomy right thigh and calf truncal varicosities on 12/12/2019.  She returns for routine 6-month follow-up visit.  She is doing well.  Postprocedure tenderness has resolved.  She states that her leg feels \"a lot better\" overall.  No further aching.  Has still some itching in the calf and a little ankle swelling at the end of the day right greater than left though this is 10% of what it was preoperatively without stocking use.  She does note that discoloration persists in the inner aspect of the thigh and calf with still some palpable lumps in these areas though this is 50% improved from her 1 month follow-up visit.  She does continue stocking use as needed.  No other complaints.    Patient Active Problem List   Diagnosis     Oral aphthae     Disorder of bursae and tendons in shoulder region     CARDIOVASCULAR SCREENING; LDL GOAL LESS THAN 160     Rosacea     Tear of medial cartilage or meniscus of knee, current     Raynaud disease     Vitamin D deficiency disease     Mild sleep apnea     " Atopic rhinitis     Chronic bilateral low back pain without sciatica     Morbid obesity (H)     Muscle tension dysphonia     Palpitations     Dysphagia, unspecified type     Anxiety     Benign essential hypertension     Callus of foot     Varicose veins of right lower extremity with edema     Bipolar I disorder (H)      Current Outpatient Medications   Medication Sig     Carboxymethylcellulose Sod PF (REFRESH PLUS) 0.5 % SOLN ophthalmic solution Place 1 drop into both eyes 3 times daily as needed for dry eyes     estrogen conj-medroxyPROGESTERone (PREMPRO) 0.625-2.5 MG tablet Take 1 tablet by mouth daily     lisinopril (ZESTRIL) 10 MG tablet Take 1 tablet (10 mg) by mouth daily     lithium 300 MG capsule Take 3 capsules (900 mg) by mouth At Bedtime     Loratadine 10 MG capsule Take 10 mg by mouth daily as needed.     metroNIDAZOLE (METROGEL) 1 % external gel Apply topically daily     order for DME Please measure and distribute 1 pair of 20mmHg - 30mmHg knee high open toe compression stockings. Jobst ultrasheer or equivalent.     ORDER FOR DME Use your CPAP device as directed by your provider.     VITAMIN D, CHOLECALCIFEROL, PO Take 2,000 Units by mouth daily     albuterol (PROAIR HFA/PROVENTIL HFA/VENTOLIN HFA) 108 (90 Base) MCG/ACT inhaler Inhale 2 puffs into the lungs every 4 hours as needed for shortness of breath / dyspnea or wheezing     predniSONE (DELTASONE) 20 MG tablet Take 2 tablets each morning with food till gone     No current facility-administered medications for this visit.      Social History     Tobacco Use     Smoking status: Never Smoker     Smokeless tobacco: Never Used     Tobacco comment: non smoke home   Substance Use Topics     Alcohol use: Yes     Comment: 2 Drinks Per Month     Drug use: No      REVIEW OF SYSTEMS: As above.    OBJECTIVE:  General appearance: Pleasant, cooperative, no distress.  Eyes anicteric.  Lungs: Breathing comfortably on room air with normal respiratory effort.   Right  lower extremity: No edema.  Some discoloration in the medial thigh and calf though improved from prior photos.  No significant residual bulging varicose veins visualized.  Mental Status: cooperative, normal affect, no gross thought process defects during casual conversation.    IMAGING: Right lower extremity venous duplex ultrasound from 6/10/2020 is reviewed.  This demonstrates satisfactory chronic occlusion/absorption of the treated right great and anterior accessory saphenous vein status post endovenous laser ablation.    ASSESSMENT AND PLAN:   1.  Patient doing well 6 months status post treatment of symptomatic right lower extremity varicose veins.  Presenting varicose vein symptoms are largely resolved.  Patient seems happy with her results.  There is still some residual discoloration in the medial thigh and calf which may be due to still some slowly resorbing stumps of treated veins versus some hemosiderin deposition.  I explained that while this can persist long-term, this often does continue to slowly fade over time though can take months to years.  I mentioned that there are some cosmetic treatments to lighten this if she has concern in the future. I did recommend continued use of knee-high graded compression stockings as needed for maintenance purposes.  Patient does state that she has adequate refills on her prior prescription for new stockings.  Further follow-up will be PRN.    I spent a total of 15 minutes face-to-face with Angy Haji during today's virtual video visit.  Over 50% of this time was spent counseling the patient and/or coordinating care regarding follow up treatment of symptomatic RLE varicose veins.  See note for details.    Jose Angel Solorio MD  Interventional Radiology and Vascular Imaging Attending  Department of Radiology  [facility]

## 2020-06-17 ENCOUNTER — HOSPITAL ENCOUNTER (OUTPATIENT)
Dept: NUTRITION | Facility: CLINIC | Age: 55
Discharge: HOME | End: 2020-06-17
Attending: DIETITIAN, REGISTERED | Admitting: DIETITIAN, REGISTERED
Payer: COMMERCIAL

## 2020-06-17 PROCEDURE — 97803 MED NUTRITION INDIV SUBSEQ: CPT | Mod: 95 | Performed by: DIETITIAN, REGISTERED

## 2020-06-17 NOTE — PROGRESS NOTES
"Angy Haji is a 55 year old female who is being evaluated via a billable telephone visit.      The patient has been notified of following:     \"This telephone visit will be conducted via a call between you and your physician/provider. We have found that certain health care needs can be provided without the need for a physical exam.  This service lets us provide the care you need with a short phone conversation.  If a prescription is necessary we can send it directly to your pharmacy.  If lab work is needed we can place an order for that and you can then stop by our lab to have the test done at a later time.    Telephone visits are billed at different rates depending on your insurance coverage. During this emergency period, for some insurers they may be billed the same as an in-person visit.  Please reach out to your insurance provider with any questions.    If during the course of the call the physician/provider feels a telephone visit is not appropriate, you will not be charged for this service.\"    Patient has given verbal consent for Telephone visit?  Yes    NUTRITION REASSESSMENT NOTE (TELEPHONE VISIT DUE TO COVID-19)     REASON FOR ASSESSMENT   Angy Haji referred by Dr. Odell for MNT related to  Obesity (BMI 30-39.9) [E66.9]  - Primary       Benign essential hypertension [I10]       Patient accompanied by self (via telephone)     NUTRITION HISTORY  Patient continues with multiple life changes and stressors.  Patient's evening eating has improved as trying to distract herself by reading.  Patient aware that she likes to self soothe with ice cream.  Patient and spouse have been preparing meals for the family.       DIET RECALL   Breakfast: cereal and banana; yogurt + granola (measures), fruit + coffee; 2 slices toast with butter + 1/2 cup cottage cheese    Lunch: leftovers (includes fruit and vegetable daily)  Dinner: sheet pan dinner with chicken and vegetables or grilled salmon and grilled vegetables; " "taco; protein + fruit and vegetable  Snacks: fruit, string cheese, Ritz crackers or yogurt; dessert in the evening   Beverages: mainly water, coffee, mineral water  Dining out: none currently  Exercise: Patient exercising 5-6 times per week. (biking, hiking, walking, and walking dog).          MEDICATIONS:  Reviewed      ANTHROPOMETRICS  Height: 5'8\" (note height change in chart previously 5'9\")  Weight: 305 lbs   BMI (kg/m2): 46.3 kg/m2   %IBW: 217%  Weight History:   Wt Readings from Last 10 Encounters:   03/11/20 138.6 kg (305 lb 8 oz)   01/24/20 137 kg (302 lb)   12/12/19 133.8 kg (295 lb)   12/04/19 133.8 kg (295 lb)   11/07/19 133.4 kg (294 lb)   06/26/19 131.5 kg (290 lb)   05/03/19 129.3 kg (285 lb)   02/04/19 124.7 kg (275 lb)   12/13/18 120.3 kg (265 lb 4.8 oz)   12/06/18 120.5 kg (265 lb 9.6 oz)     ASSESSED NUTRITION NEEDS  Estimated Energy Needs: 8369-3706 kcals (15-20 kcals/kg) for gradual weight loss   Estimated Protein Needs: 66-79 (1-1.2 gm/kg IBW) for repletion with exercise  RMR: 1440     EVALUATION/PROGRESS TOWARDS GOALS   Previous goals:  Exercise 5 times per week-met   Add snack in afternoon-met   Plan 1 day per week without dessert after dinner-met      Previous Nutrition Diagnosis: Obesity related to excess energy intake as evidenced by BMI of 46.3kg/m2-no change     Current Nutrition Diagnosis:Obesity related to excess energy intake as evidenced by BMI of 46.3 kg/m2     INTERVENTIONS   Nutrition Prescription   Recommend exercise 3-5 times per week; track intake if eating mindlessly; increase fiber to 25-30 grams per day      Implementation:   Meals and Snacks: Continue eating 3 meals + deliberate snacking (if hungry)   Nutrition Education (Content):               a)  Discussed progress towards goals.  Congratulated patient on tracking exercise and desserts in planner and reducing evening desserts.  Reviewed challenges with food.   Patient feels she has reduced stress eating. Supported " patient in her struggle with food and mental health issues.     Nutrition Education (Application):               a) Determined alternative options to evening meals.                       b) Patient verbalized understanding of diet by stating will reduce desserts to 5 times per week.        Anticipate fair-good compliance     CURRENT GOALS   Exercise 5 times per week   Add snack in afternoon   Plan 2 days per week without dessert after dinner     FOLLOW UP/MONITORING    Patient to follow up in 6 weeks  Patient has RD contact information      Time spent with patient: 25 minutes     Mattie Amaya, RD, LD  [facility] Outpatient Dietitian  [phone] (office phone)

## 2020-06-23 NOTE — PROGRESS NOTES
"Video- Visit Details  Type of service:  video visit for medication management  Time of service:    Date:  06/26/2020    Video Start Time: 915 video End Time: 1010    Reason for video visit:  Patient unable to travel due to Covid-19  Originating Site (patient location):  [facility] [place]   Location- Patient's home  Distant Site (provider location):  Remote location  Mode of Communication:  Video Conference via Doxy[employer]me  Consent:  Patient has given verbal consent for video visit?: Yes            [facility]  Psychiatry Clinic  PSYCHIATRIC PROGRESS NOTE     Date of initial Diagnostic Assessment (DA) is 12/5/18.  Date of most recent Transfer of Care Diagnostic Assessment is 07/16/19.     CARE TEAM:    PCP- [facility]     Therapist- [facility] (private practive)      Specialty Providers- OB/GYN, ENT, PT, Nutrition      Wife- Rachael Haji is a 55 year old female who prefers the name Angy & pronouns she, her, hers.     Pertinent Background: Patient has not had significant mental health history until 2018, when she developed perimenopausal mood and anxiety symptoms.  She was subsequently referred to our clinic following a hospitalization for anthony in November 2018.  Her care has been complicated by sensitivity to antipsychotics (EPSE including difficulty swallowing), thrombocytopenia secondary to Depakote, lithium toxicity in setting of concurrent Depakote use.     Psych critical item history includes Manic episode, psychiatric hospitalization in November 2018.    INTERIM HISTORY      [4, 4]   The patient reports good treatment adherence.  History was provided by the patient and family who were good historians.  The last visit ended with stopping VPA.  Since the last visit:   - Doing pretty well.  - \"Biggest challenge is stress management.\"   - Hard getting daughter through end of school year, was behind in homework. Wife's mom was having breathing problems, was " "staying temporarily at their house, and then became acutely ill and needed to go to hospital, new dx of \"CHF or COPD\". She's now discharged, still staying at patient's house. Distressing to hear about Miah Canales's murder and the protests; not   - Mood stable, slightly low off/on. No major changes since last visit. Mild anhedonia unchanged as well, not constant, often situational in context of busy life. Some irritability off and on; checks in with wife regularly who feels like it's normal range.    Sleep still good, but had a few more nights of difficulty falling asleep and a few more nighttime waking (2-3x per night to use bathroom, gets back to sleep easily). 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 No racing thoughts, impulsivity, recklessness, gradiosity. No paranoia, no AVH.  - On two week furlough from work; took 2 days and went to [place] alone; went hiking, kayaking, read books, \"it was heaven.\"  - Planning a weekly date night with Rachael    RECENT PSYCH ROS:   Depression:  anhedonia and occasional low motivation, improving  Elevated:  none  Psychosis:  none  Anxiety:  none  Panic Attack:  none  Trauma Related:  none  Dysregulation:  none  Eating Disorder: some overeating, working with dietician, PCP  Pertinent Negative Symptoms:  NO suicidal ideation, self-injurious behavior/urges, violent ideation, aggression, psychosis, hallucinations, delusions, anthony and hypomania    RECENT SUBSTANCE USE:     Alcohol- 1 glass of wine per week  Tobacco- No  Caffeine- drinks around 24oz of coffee during the day  Opioids- No           Narcan Kit- N/A   Cannabis- No     Other illicit drugs- none    CURRENT SOCIAL HISTORY:  Financial/ Work- 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       Partner/ - wife, Rachael  Children- 13 years old daughterGricel      Living situation- Moved " into new house in [place], lives with wife Danielle and teenage daughter   Social/ Spiritual Support- Family (wife and daughter)     FEELS SAFE AT HOME- yes     PSYCH and CD Critical Summary Points since July 2019 July 2019 - no change  Aug 2019 - decreased the Depakote to 500 mg nightly  Oct 2019 - no changes, VPA and LI labs ordered  Nov 2019 - VPA decreased to 250 mg nightly  Dec 2019 -no change  Jan 2020 - VPA decreased to 125 mg every night  Feb 2020 - no change  Apr 2020 - Stopped VPA  May 2020 - no changes  Jun 2020 -no changes    PAST MED TRIALS          See last Diagnostic Assessment  MEDICAL / SURGICAL HISTORY                                   Pregnant or breastfeeding- no      Contraception- HRT for menopause    Patient Active Problem List   Diagnosis     Oral aphthae     Disorder of bursae and tendons in shoulder region     CARDIOVASCULAR SCREENING; LDL GOAL LESS THAN 160     Rosacea     Tear of medial cartilage or meniscus of knee, current     Raynaud disease     Vitamin D deficiency disease     Mild sleep apnea     Atopic rhinitis     Chronic bilateral low back pain without sciatica     Morbid obesity (H)     Muscle tension dysphonia     Palpitations     Dysphagia, unspecified type     Anxiety     Benign essential hypertension     Callus of foot     Varicose veins of right lower extremity with edema     Bipolar I disorder (H)       Major Surgery- n/a    MEDICAL REVIEW OF SYSTEMS    [2, 10]     A comprehensive review of systems was performed and is negative other than noted in the HPI.    ALLERGY       Seroquel [quetiapine]; Olanzapine; Penicillins; Risperidone; and Thimerosal  MEDICATIONS        Current Outpatient Medications   Medication Sig Dispense Refill     albuterol (PROAIR HFA/PROVENTIL HFA/VENTOLIN HFA) 108 (90 Base) MCG/ACT inhaler Inhale 2 puffs into the lungs every 4 hours as needed for shortness of breath / dyspnea or wheezing 1 Inhaler 3     Carboxymethylcellulose Sod PF  (REFRESH PLUS) 0.5 % SOLN ophthalmic solution Place 1 drop into both eyes 3 times daily as needed for dry eyes       estrogen conj-medroxyPROGESTERone (PREMPRO) 0.625-2.5 MG tablet Take 1 tablet by mouth daily 90 tablet 3     lisinopril (ZESTRIL) 10 MG tablet Take 1 tablet (10 mg) by mouth daily 90 tablet 1     lithium 300 MG capsule Take 3 capsules (900 mg) by mouth At Bedtime 90 capsule 1     Loratadine 10 MG capsule Take 10 mg by mouth daily as needed.       metroNIDAZOLE (METROGEL) 1 % external gel Apply topically daily 60 g 9     order for DME Please measure and distribute 1 pair of 20mmHg - 30mmHg knee high open toe compression stockings. Jobst ultrasheer or equivalent. 1 each 1     ORDER FOR DME Use your CPAP device as directed by your provider.       predniSONE (DELTASONE) 20 MG tablet Take 2 tablets each morning with food till gone 8 tablet 0     VITAMIN D, CHOLECALCIFEROL, PO Take 2,000 Units by mouth daily       VITALS      [3, 3]   There were no vitals taken for this visit. , telehealth  MENTAL STATUS EXAM      [9, 14 cog gs]     Alertness: alert  and oriented  Appearance: well groomed, dressed in professional clothing, seated in 3 season porch  Behavior/Demeanor: cooperative, pleasant and calm, with good  eye contact   Speech: normal and regular rate and rhythm  Language: intact  Psychomotor: normal or unremarkable  Mood: stable  Affect: Full range, brightens at appropriate times; was congruent to mood; was congruent to content  Thought Process/Associations: unremarkable  Thought Content:  Reports none;  Denies suicidal ideation, violent ideation, delusions and paranoid ideation  Perception:  Reports none;  Denies auditory hallucinations and visual hallucinations  Insight: excellent  Judgment: excellent  Cognition: (6) does  appear grossly intact; formal cognitive testing was not done  Gait and Station: unremarkable    LABS and DATA     AIMS:  Not needed  [place]: 25 out of 30 12/13/2019(25/30 on  12/12/18)    PHQ9 TODAY = not obtained, telehealth  PHQ-9 SCORE 8/28/2019 10/2/2019 11/6/2019   PHQ-9 Total Score 9 5 6     LITHIUM LABS     [level, renal, SG, TSH, WBC]    q6 mo  Recent Labs   Lab Test 10/15/19  1037 05/01/19  0921 01/14/19  0913 01/02/19  0839   LITHIUM 0.65 0.61 0.55* 0.37*     Recent Labs   Lab Test 10/15/19  1037 05/01/19  0921 12/20/18  1214 12/14/18  1235   CR 0.45* 0.54 0.47* 0.43*   GFRESTIMATED >90 >90 >90 >90    135 132* 129*   POTASSIUM 4.3 4.6 4.4 4.2   SHERRIE 9.1 9.0 9.2 8.7     Recent Labs   Lab Test 10/15/19  1222 05/01/19  1140 11/01/12  0819   SG 1.006 1.008 1.015     Recent Labs   Lab Test 10/15/19  1037 05/01/19  0921 11/10/18  0756 08/31/18  2029   TSH 2.47 2.69 2.29 2.19       PSYCHOTROPIC DRUG INTERACTIONS     Concurrent use of LITHIUM and ACE INHIBITORS may result in increased risk of lithium toxicity.  Concurrent use of LITHIUM and SEROTONERGIC AGENTS may result in increased risk of serotonin syndrome.    MANAGEMENT:  Monitoring for adverse effects, routine vitals, routine labs and patient is aware of risks, using lowest effective doses of agents    RISK STATEMENT for SAFETY     Angy Haji did not appear to be an imminent safety risk to self or others.    DIAGNOSIS     Bipolar Disorder, Type I (late onset), depressive episode, mild    (only manic episode was Nov/Dec 2018)  H/o EPSE secondary to neuroleptic use  H/o Thromboctyopenia secondary to depakote  H/o lithium toxicity, resolved    ASSESSMENT      [m2, h3]     TODAY interval stability since last visit.  Situational stress resulting in periods of increased irritability, however no sustained periods of depressed mood.  No evidence of manic symptoms, hypomanic symptoms, psychotic symptoms.  Tolerating medications well, denies side effects.  No acute safety concerns.  Notes she plans to see her OB/GYN at some point this summer and discuss potential discontinuation of HRT.  I recommended we repeat lithium lab work  today to have an up-to-date lithium level, and monitor mood stability closely should her HRT dose be changed/discontinued.  She was agreeable with this plan.  Refill provided.  Follow-up scheduled in 1 month.       PLAN      [m2, h3]     1) PSYCHOTROPIC MEDICATIONS:  - Continue lithium 900 at bedtime    2) [place]  was checked today:  -Oxycodone 10 mg tab #12 12/13/19    3) THERAPY: Continue    4) NEXT DUE:    Labs- Li labwork ordered  EKG- N/A  Rating Scales- serial PHQ9    5) REFERRALS:  No    6) RTC: 4 weeks with me    7) CRISIS NUMBERS:   Provided routinely in AVS   ONLY if a [facility]: [place] [phone] (clinic), [phone] (after hours)     TREATMENT RISK STATEMENT:  The risks, benefits, alternatives and potential adverse effects have been discussed and are understood by the pt. The pt understands the risks of using street drugs or alcohol. There are no medical contraindications, the pt agrees to treatment with the ability to do so. The pt knows to call the clinic for any problems or to access emergency care if needed.  Medical and substance use concerns are documented above.  Psychotropic drug interaction check was done, including changes made today.    PROVIDER: Ricci Villa MD    Patient not staffed in clinic.  Supervisor is Dr. Liu, who will sign the note.    Attestation:  I did not see Angy Haji directly. I have reviewed the documentation and I agree with the resident's plan of care.   Chai Liu MD

## 2020-06-26 ENCOUNTER — VIRTUAL VISIT (OUTPATIENT)
Dept: PSYCHIATRY | Facility: CLINIC | Age: 55
End: 2020-06-26
Attending: PSYCHIATRY & NEUROLOGY
Payer: COMMERCIAL

## 2020-06-26 DIAGNOSIS — F31.9 BIPOLAR I DISORDER (H): ICD-10-CM

## 2020-06-26 DIAGNOSIS — Z79.899 LONG TERM USE OF DRUG: Primary | ICD-10-CM

## 2020-06-26 RX ORDER — LITHIUM CARBONATE 300 MG/1
900 CAPSULE ORAL AT BEDTIME
Qty: 90 CAPSULE | Refills: 1 | Status: SHIPPED | OUTPATIENT
Start: 2020-06-26 | End: 2020-07-29

## 2020-06-26 ASSESSMENT — PAIN SCALES - GENERAL: PAINLEVEL: NO PAIN (0)

## 2020-06-26 NOTE — PROGRESS NOTES
"VIDEO VISIT  Angy Haji is a 55 year old patient who is being evaluated via a billable video visit.      The patient has been notified of following:   \"This video visit will be conducted via a call between you and your physician/provider. We have found that certain health care needs can be provided without the need for an in-person physical exam. This service lets us provide the care you need with a video conversation. If a prescription is necessary we can send it directly to your pharmacy. If lab work is needed we can place an order for that and you can then stop by our lab to have the test done at a later time. Insurers are generally covering virtual visits as they would in-office visits so billing should not be different than normal.  If for some reason you do get billed incorrectly, you should contact the billing office to correct it and that number is in the AVS .    Patient has given verbal consent for video visit?:  Yes     How would you like to obtain your AVS?:  [employer]    Video invitation for patient:  DOXY provider, invite not needed      AVS SmartPhrase [PsychAVS] has been placed in 'Patient Instructions':  Yes     "

## 2020-06-26 NOTE — PATIENT INSTRUCTIONS
Thank you for coming to the PSYCHIATRY CLINIC.    Great seeing you today, Angy!  No change to her lithium.  I sent a refill to your pharmacy.  I ordered repeat lithium lab work (both blood work and a urine sample).  You can walk in to any [facility] lab with no appointment to have these labs performed.  Please do so in the next 3 weeks.  I will contact you with any abnormal results.  I scheduled you for video visit follow-up on Wednesday, July 29, at 3:15 PM.    Please contact me with any questions or concerns prior to our next visit!    Take care,  Dr. Villa    Lab Testing:  If you had lab testing today and your results are reassuring or normal they will be mailed to you or sent through [employer] within 7 days. If the lab tests need quick action we will call you with the results. The phone number we will call with results is # [phone] (home) [phone] (work). If this is not the best number please call our clinic and change the number.    Medication Refills:  If you need any refills please call your pharmacy and they will contact us. Our fax number for refills is [phone]. Please allow three business for refill processing. If you need to  your refill at a new pharmacy, please contact the new pharmacy directly. The new pharmacy will help you get your medications transferred.     Scheduling:  If you have any concerns about today's visit or wish to schedule another appointment please call our office during normal business hours [phone] (8-5:00 M-F)    Contact Us:  Please call [phone] during business hours (8-5:00 M-F).  If after clinic hours, or on the weekend, please call  [phone].    Financial Assistance [phone]  [facility] Billing [phone]  Central Billing Office, [facility]: [phone]  [facility] Billing [phone]  Medical Records [phone]      MENTAL HEALTH CRISIS NUMBERS:  For a medical emergency please call  911 or go to the nearest ER.     [facility]:   [place]  [facility] -[phone]   Crisis Residence [facility] -[phone]   Walk-In Counseling Center [facility] -[phone]   COPE 24/7 [facility] Mobile Team -[phone] (adults)/[phone] (child)  CHILD: [facility] needs assessment team - [phone]      [facility]:   [facility] - [phone]   Walk-in counseling [facility] - [phone]   Walk-in counseling [facility] - [phone]   Crisis Residence [facility] - [phone]  Urgent Care Adult Mental Tiq[facility]-[phone] mobile unit/ 24/7 crisis line    National Crisis Numbers:   National Suicide Prevention Lifeline: 3-604-559-TALK (382-487-5369)  Poison Control Center - 1-835.585.1489  [employer]/resources for a list of additional resources (SOS)  Trans Lifeline a hotline for transgender people 1-290.554.4619  The [place] a hotline for LGBT youth 1-990.653.3087  Crisis Text Line: For any crisis 24/7   To: 330205  see www.crisistextline.org  - IF MAKING A CALL FEELS TOO HARD, send a text!         Again thank you for choosing PSYCHIATRY CLINIC and please let us know how we can best partner with you to improve you and your family's health.    You may be receiving a survey regarding this appointment. We would love to have your feedback, both positive and negative. The survey is done by an external company, so your answers are anonymous.

## 2020-07-06 NOTE — Clinical Note
She's fixed!!!!! She is switching to a nasal mask full time as her high leak and elevated AHI correlated to the nights that she used the FFM vs the Nasal.  Another case where nasal is better!! :) back pain/knee pain/injury

## 2020-07-15 DIAGNOSIS — Z79.899 LONG TERM USE OF DRUG: ICD-10-CM

## 2020-07-15 LAB
ALBUMIN SERPL-MCNC: 3.4 G/DL (ref 3.4–5)
ALBUMIN UR-MCNC: NEGATIVE MG/DL
ALP SERPL-CCNC: 84 U/L (ref 40–150)
ALT SERPL W P-5'-P-CCNC: 18 U/L (ref 0–50)
ANION GAP SERPL CALCULATED.3IONS-SCNC: 7 MMOL/L (ref 3–14)
APPEARANCE UR: CLEAR
AST SERPL W P-5'-P-CCNC: 12 U/L (ref 0–45)
BACTERIA #/AREA URNS HPF: ABNORMAL /HPF
BASOPHILS # BLD AUTO: 0 10E9/L (ref 0–0.2)
BASOPHILS NFR BLD AUTO: 0.2 %
BILIRUB SERPL-MCNC: 0.3 MG/DL (ref 0.2–1.3)
BILIRUB UR QL STRIP: NEGATIVE
BUN SERPL-MCNC: 13 MG/DL (ref 7–30)
CALCIUM SERPL-MCNC: 9.4 MG/DL (ref 8.5–10.1)
CHLORIDE SERPL-SCNC: 105 MMOL/L (ref 94–109)
CO2 SERPL-SCNC: 24 MMOL/L (ref 20–32)
COLOR UR AUTO: ABNORMAL
CREAT SERPL-MCNC: 0.58 MG/DL (ref 0.52–1.04)
DIFFERENTIAL METHOD BLD: NORMAL
EOSINOPHIL # BLD AUTO: 0.2 10E9/L (ref 0–0.7)
EOSINOPHIL NFR BLD AUTO: 2.9 %
ERYTHROCYTE [DISTWIDTH] IN BLOOD BY AUTOMATED COUNT: 12.7 % (ref 10–15)
GFR SERPL CREATININE-BSD FRML MDRD: >90 ML/MIN/{1.73_M2}
GLUCOSE SERPL-MCNC: 104 MG/DL (ref 70–99)
GLUCOSE UR STRIP-MCNC: NEGATIVE MG/DL
HCT VFR BLD AUTO: 42.9 % (ref 35–47)
HGB BLD-MCNC: 13.9 G/DL (ref 11.7–15.7)
HGB UR QL STRIP: NEGATIVE
IMM GRANULOCYTES # BLD: 0 10E9/L (ref 0–0.4)
IMM GRANULOCYTES NFR BLD: 0.4 %
KETONES UR STRIP-MCNC: NEGATIVE MG/DL
LEUKOCYTE ESTERASE UR QL STRIP: NEGATIVE
LITHIUM SERPL-SCNC: 0.52 MMOL/L (ref 0.6–1.2)
LYMPHOCYTES # BLD AUTO: 2.1 10E9/L (ref 0.8–5.3)
LYMPHOCYTES NFR BLD AUTO: 25.4 %
MCH RBC QN AUTO: 29.5 PG (ref 26.5–33)
MCHC RBC AUTO-ENTMCNC: 32.4 G/DL (ref 31.5–36.5)
MCV RBC AUTO: 91 FL (ref 78–100)
MONOCYTES # BLD AUTO: 0.3 10E9/L (ref 0–1.3)
MONOCYTES NFR BLD AUTO: 4.1 %
NEUTROPHILS # BLD AUTO: 5.6 10E9/L (ref 1.6–8.3)
NEUTROPHILS NFR BLD AUTO: 67 %
NITRATE UR QL: NEGATIVE
NRBC # BLD AUTO: 0 10*3/UL
NRBC BLD AUTO-RTO: 0 /100
PH UR STRIP: 7 PH (ref 5–7)
PLATELET # BLD AUTO: 219 10E9/L (ref 150–450)
POTASSIUM SERPL-SCNC: 4.4 MMOL/L (ref 3.4–5.3)
PROT SERPL-MCNC: 7.5 G/DL (ref 6.8–8.8)
RBC # BLD AUTO: 4.71 10E12/L (ref 3.8–5.2)
RBC #/AREA URNS AUTO: <1 /HPF (ref 0–2)
SODIUM SERPL-SCNC: 136 MMOL/L (ref 133–144)
SOURCE: ABNORMAL
SP GR UR STRIP: 1.01 (ref 1–1.03)
SQUAMOUS #/AREA URNS AUTO: <1 /HPF (ref 0–1)
TSH SERPL DL<=0.005 MIU/L-ACNC: 2.46 MU/L (ref 0.4–4)
UROBILINOGEN UR STRIP-MCNC: NORMAL MG/DL (ref 0–2)
WBC # BLD AUTO: 8.3 10E9/L (ref 4–11)
WBC #/AREA URNS AUTO: 0 /HPF (ref 0–5)

## 2020-07-15 PROCEDURE — 84443 ASSAY THYROID STIM HORMONE: CPT | Performed by: STUDENT IN AN ORGANIZED HEALTH CARE EDUCATION/TRAINING PROGRAM

## 2020-07-15 PROCEDURE — 80178 ASSAY OF LITHIUM: CPT | Performed by: STUDENT IN AN ORGANIZED HEALTH CARE EDUCATION/TRAINING PROGRAM

## 2020-07-15 PROCEDURE — 80053 COMPREHEN METABOLIC PANEL: CPT | Performed by: STUDENT IN AN ORGANIZED HEALTH CARE EDUCATION/TRAINING PROGRAM

## 2020-07-15 PROCEDURE — 36415 COLL VENOUS BLD VENIPUNCTURE: CPT | Performed by: STUDENT IN AN ORGANIZED HEALTH CARE EDUCATION/TRAINING PROGRAM

## 2020-07-15 PROCEDURE — 85025 COMPLETE CBC W/AUTO DIFF WBC: CPT | Performed by: STUDENT IN AN ORGANIZED HEALTH CARE EDUCATION/TRAINING PROGRAM

## 2020-07-15 PROCEDURE — 81001 URINALYSIS AUTO W/SCOPE: CPT | Performed by: STUDENT IN AN ORGANIZED HEALTH CARE EDUCATION/TRAINING PROGRAM

## 2020-07-27 NOTE — PROGRESS NOTES
"  Video- Visit Details  Type of service:  video visit for medication management  Time of service:    Date:  7/29/2020    Video Start Time:  315 Video End Time: 345    Reason for video visit:  Patient unable to travel due to Covid-19  Originating Site (patient location):  [facility]- [place]   Location- Patient's home  Distant Site (provider location):  [facility]  Mode of Communication:  Video Conference via [email or web address]  Consent:  Patient has given verbal consent for video visit?: Yes        [facility]  Psychiatry Clinic   PSYCHIATRY PROGRESS NOTE     Date of initial Diagnostic Assessment (DA) is 12/5/18.  Date of most recent Transfer of Care Diagnostic Assessment is 07/16/19.     CARE TEAM:    PCP- [facility]     Therapist- [facility] (private practive)      Specialty Providers- OB/GYN, ENT, PT, Nutrition      Wife- Rachael Haji is a 55 year old female who prefers the name Angy & pronouns she, her, hers.     Pertinent Background: Patient has not had significant mental health history until 2018, when she developed perimenopausal mood and anxiety symptoms.  She was subsequently referred to our clinic following a hospitalization for anthony in November 2018.  Her care has been complicated by sensitivity to antipsychotics (EPSE including difficulty swallowing), thrombocytopenia secondary to Depakote, lithium toxicity in setting of concurrent Depakote use.     Psych critical item history includes Manic episode, psychiatric hospitalization in November 2018.    INTERIM HISTORY      [4, 4]   The patient reports good treatment adherence.  History was provided by the patient and family who were good historians.  The last visit ended with no changes  Since the last visit:   - \"Things going overall fine.\" Few days in last month with increase in irritability and tearfulness \"a day here, a day there\". Stressors mainly COVID related, working from home with a " "teenage daughter on summer break.  - Mood \"egh\", overwhelmed at times. Denies depression, no anhedonia. Still walking, reading, gardening most days. Also doing weekly date night with wife.  - Sleeping 7-8hrs per night.  Energy level during day is normal.  No elevated mood, euphoric mood, racing thoughts, changes in speech.  - Plans to make virtual appt with OBGYN; states there is a chance her HRT will be decreased in dose. Was started on Pempro for hotflashes and low mood/anxietyapprox 5-6mo prior to first manic episode 2018. A few months after starting Pempro, antidepressant was started, and shortly after, developed anthony.     RECENT PSYCH ROS:   Depression:  occasionally overwhelmed  Elevated:  none  Psychosis:  none  Anxiety:  none  Panic Attack:  none  Trauma Related:  none  Dysregulation:  none  Eating Disorder: some overeating, working with dietician, PCP  Pertinent Negative Symptoms:  NO suicidal ideation, self-injurious behavior/urges, violent ideation, aggression, psychosis, hallucinations, delusions, anthony and hypomania    RECENT SUBSTANCE USE:     Alcohol- 1 glass of wine per week  Tobacco- No  Caffeine- drinks around 24oz of coffee during the day  Opioids- No           Narcan Kit- N/A   Cannabis- No     Other illicit drugs- none    CURRENT SOCIAL HISTORY:  Financial/ Work- 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       Partner/ - wifeRachael- 13 years old daughterGricel      Living situation- [place] in [place], lives with wife Danielle and teenage daughter   Social/ Spiritual Support- Family (wife and daughter)     FEELS SAFE AT HOME- yes     PSYCH and CD Critical Summary Points since July 2019 July 2019 - no change  Aug 2019 - decreased the Depakote to 500 mg nightly  Oct 2019 - no changes, VPA and LI labs ordered  Nov 2019 - VPA decreased to 250 mg nightly  Dec 2019 -no change  Jan 2020 - VPA decreased to 125 mg every night  Feb 2020 - no " change  Apr 2020 - Stopped VPA  May 2020 - no changes  Jun 2020 -no changes  Jul 2020 - no changes    PAST MED TRIALS          See last Diagnostic Assessment  MEDICAL / SURGICAL HISTORY                                   Pregnant or breastfeeding- no      Contraception- HRT for menopause    Patient Active Problem List   Diagnosis     Oral aphthae     Disorder of bursae and tendons in shoulder region     CARDIOVASCULAR SCREENING; LDL GOAL LESS THAN 160     Rosacea     Tear of medial cartilage or meniscus of knee, current     Raynaud disease     Vitamin D deficiency disease     Mild sleep apnea     Atopic rhinitis     Chronic bilateral low back pain without sciatica     Morbid obesity (H)     Muscle tension dysphonia     Palpitations     Dysphagia, unspecified type     Anxiety     Benign essential hypertension     Callus of foot     Varicose veins of right lower extremity with edema     Bipolar I disorder (H)       Major Surgery- n/a    MEDICAL REVIEW OF SYSTEMS    [2, 10]     A comprehensive review of systems was performed and is negative other than noted in the HPI.    ALLERGY       Seroquel [quetiapine]; Olanzapine; Penicillins; Risperidone; and Thimerosal  MEDICATIONS        Current Outpatient Medications   Medication Sig Dispense Refill     albuterol (PROAIR HFA/PROVENTIL HFA/VENTOLIN HFA) 108 (90 Base) MCG/ACT inhaler Inhale 2 puffs into the lungs every 4 hours as needed for shortness of breath / dyspnea or wheezing 1 Inhaler 3     Carboxymethylcellulose Sod PF (REFRESH PLUS) 0.5 % SOLN ophthalmic solution Place 1 drop into both eyes 3 times daily as needed for dry eyes       estrogen conj-medroxyPROGESTERone (PREMPRO) 0.625-2.5 MG tablet Take 1 tablet by mouth daily 90 tablet 3     lisinopril (ZESTRIL) 10 MG tablet Take 1 tablet (10 mg) by mouth daily 90 tablet 1     lithium 300 MG capsule Take 3 capsules (900 mg) by mouth At Bedtime 90 capsule 1     Loratadine 10 MG capsule Take 10 mg by mouth daily as needed.        metroNIDAZOLE (METROGEL) 1 % external gel Apply topically daily 60 g 9     order for DME Please measure and distribute 1 pair of 20mmHg - 30mmHg knee high open toe compression stockings. Jobst ultrasheer or equivalent. 1 each 1     ORDER FOR DME Use your CPAP device as directed by your provider.       VITAMIN D, CHOLECALCIFEROL, PO Take 2,000 Units by mouth daily       VITALS      [3, 3]   There were no vitals taken for this visit. , telehealth  MENTAL STATUS EXAM      [9, 14 cog gs]     Alertness: alert  and oriented  Appearance: well groomed, dressed in professional clothing, seated in 3 season porch  Behavior/Demeanor: cooperative, pleasant and calm, with good  eye contact   Speech: normal and regular rate and rhythm  Language: intact  Psychomotor: normal or unremarkable  Mood: stable  Affect: Full range, brightens at appropriate times; was congruent to mood; was congruent to content  Thought Process/Associations: unremarkable  Thought Content:  Reports none;  Denies suicidal ideation, violent ideation, delusions and paranoid ideation  Perception:  Reports none;  Denies auditory hallucinations and visual hallucinations  Insight: excellent  Judgment: excellent  Cognition: (6) does  appear grossly intact; formal cognitive testing was not done  Gait and Station: unremarkable    LABS and DATA     AIMS:  Not needed  [place]: not obtained this telehealth visit    PHQ9 TODAY = not obtained, telehealth  PHQ-9 SCORE 8/28/2019 10/2/2019 11/6/2019   PHQ-9 Total Score 9 5 6     LITHIUM LABS     [level, renal, SG, TSH, WBC]    q6 mo  Recent Labs   Lab Test 07/15/20  1617 10/15/19  1037 05/01/19  0921 01/14/19  0913   LITHIUM 0.52* 0.65 0.61 0.55*     Recent Labs   Lab Test 07/15/20  1617 10/15/19  1037 05/01/19  0921 12/20/18  1214   CR 0.58 0.45* 0.54 0.47*   GFRESTIMATED >90 >90 >90 >90    138 135 132*   POTASSIUM 4.4 4.3 4.6 4.4   SHERRIE 9.4 9.1 9.0 9.2     Recent Labs   Lab Test 07/15/20  1633 10/15/19  1222  05/01/19  1140 11/01/12  0819   SG 1.008 1.006 1.008 1.015     Recent Labs   Lab Test 07/15/20  1617 10/15/19  1037 05/01/19  0921 11/10/18  0756   TSH 2.46 2.47 2.69 2.29       PSYCHOTROPIC DRUG INTERACTIONS     Concurrent use of LITHIUM and ACE INHIBITORS may result in increased risk of lithium toxicity.    MANAGEMENT:  Monitoring for adverse effects, routine vitals, routine labs, using lowest therapeutic dose of [agents] and patient is aware of risks    RISK STATEMENT for SAFETY     Angy Hjai did not appear to be an imminent safety risk to self or others.    DIAGNOSIS     Bipolar Disorder, Type I (late onset), current depressive episode, mild    (h/o one manic episode Nov/Dec 2018)    H/o EPSE secondary to neuroleptic use  H/o Thrombocytopenia secondary to depakote  H/o lithium toxicity, resolved    ASSESSMENT      [m2, h3]     TODAY interval stability since last visit.  Mood stable, no evidence of depression, anthony/hypomania, SI/HI, or safety concerns.  Tolerating medications well without side effects.  Ongoing situational stress balancing work, relationship, parenting, though coping well with the use of behavioral activation strategies (gardening, regular walks, date night, book club).  Reviewed labs obtained following last visit with patient.  Noted lithium level slightly low at 0.52, however this was obtained at approximately 4:15 PM, so is likely lower than a true trough level (patient typically takes medicine at 10 PM every night).  I reiterated my recommendation of close monitoring for ongoing mood stability should her HRT dose be changed at her upcoming OB/GYN visit.  Patient agreeable with this plan.  Refill provided.  Follow-up scheduled in 1 month.     PLAN      [m2, h3]     1) PSYCHOTROPIC MEDICATIONS:  - Continue lithium 900 at bedtime    2) [place]  was checked today: oxycodone 10 mg tab #6 12/13/2019    3) THERAPY: Continue    4) NEXT DUE:    Labs- Li labs January 2021  EKG-PRN  Rating Scales-  serial PHQ9    5) REFERRALS:  No    6) RTC: 1 month    7) CRISIS NUMBERS:   Provided routinely in AVS   ONLY if a QAMAR PT: [facility] [place] [facility] [phone] (clinic), [phone] (after hours)     TREATMENT RISK STATEMENT:  The risks, benefits, alternatives and potential adverse effects have been discussed and are understood by the pt. The pt understands the risks of using street drugs or alcohol. There are no medical contraindications, the pt agrees to treatment with the ability to do so. The pt knows to call the clinic for any problems or to access emergency care if needed.  Medical and substance use concerns are documented above.  Psychotropic drug interaction check was done, including changes made today.    PROVIDER: Ricci Villa MD    Patient not staffed in clinic.  Supervisor is Dr. Tapia, who will sign the note.    I did not see this pt directly. I have reviewed the documentation, and I agree with the resident's plan of care.    Dacia Tapia MD

## 2020-07-29 ENCOUNTER — HOSPITAL ENCOUNTER (OUTPATIENT)
Dept: NUTRITION | Facility: CLINIC | Age: 55
Discharge: HOME | End: 2020-07-29
Attending: DIETITIAN, REGISTERED | Admitting: DIETITIAN, REGISTERED
Payer: COMMERCIAL

## 2020-07-29 ENCOUNTER — VIRTUAL VISIT (OUTPATIENT)
Dept: PSYCHIATRY | Facility: CLINIC | Age: 55
End: 2020-07-29
Attending: PSYCHIATRY & NEUROLOGY
Payer: COMMERCIAL

## 2020-07-29 VITALS — WEIGHT: 293 LBS | BODY MASS INDEX: 47.22 KG/M2

## 2020-07-29 DIAGNOSIS — F31.9 BIPOLAR I DISORDER (H): ICD-10-CM

## 2020-07-29 PROCEDURE — 97803 MED NUTRITION INDIV SUBSEQ: CPT | Mod: TEL | Performed by: DIETITIAN, REGISTERED

## 2020-07-29 RX ORDER — LITHIUM CARBONATE 300 MG/1
900 CAPSULE ORAL AT BEDTIME
Qty: 90 CAPSULE | Refills: 1 | Status: SHIPPED | OUTPATIENT
Start: 2020-07-29 | End: 2020-09-02

## 2020-07-29 ASSESSMENT — PAIN SCALES - GENERAL: PAINLEVEL: NO PAIN (0)

## 2020-07-29 NOTE — PATIENT INSTRUCTIONS
Thank you for coming to the PSYCHIATRY CLINIC.    Good to see you today, Angy!  No changes. I sent refills to your pharmacy. Your lab results were unremarkable.   I scheduled you for a followup appt via [employer] ([email or web address]) on Wed Sept 2 at 4:15.   Let me know if you have any questions before then!    Take care,  Dr. Villa  Lab Testing:  If you had lab testing today and your results are reassuring or normal they will be mailed to you or sent through [employer] within 7 days. If the lab tests need quick action we will call you with the results. The phone number we will call with results is # [phone] (home) [phone] (work). If this is not the best number please call our clinic and change the number.    Medication Refills:  If you need any refills please call your pharmacy and they will contact us. Our fax number for refills is [phone]. Please allow three business for refill processing. If you need to  your refill at a new pharmacy, please contact the new pharmacy directly. The new pharmacy will help you get your medications transferred.     Scheduling:  If you have any concerns about today's visit or wish to schedule another appointment please call our office during normal business hours [phone] (8-5:00 M-F)    Contact Us:  Please call [phone] during business hours (8-5:00 M-F).  If after clinic hours, or on the weekend, please call  [phone].    Financial Assistance [phone]  [facility] Billing [phone]  Central Billing Office, [facility]: [phone]  [place] Billing [phone]  Medical Records [phone]      MENTAL HEALTH CRISIS NUMBERS:  For a medical emergency please call  911 or go to the nearest ER.     [facility]:   [facility] -[phone]   Crisis Residence [facility] -[phone]   Walk-In Counseling Center [facility] -[phone]   COPE 24/7 [place] Mobile Team -[phone] (adults)/[phone] (child)  CHILD: [facility]  needs assessment team - [phone]      [facility]:   [facility] - [phone]   Walk-in counseling [facility] - [phone]   Walk-in counseling [facility] - [phone]   Crisis Residence [facility] - [phone]  Urgent Care Adult Mental [facility]-[phone] mobile unit/ 24/7 crisis line    National Crisis Numbers:   National Suicide Prevention Lifeline: 0-154-102-TALK (989-362-3140)  Poison Control Center - 2-013-600-0409  [place]/resources for a list of additional resources (SOS)  Trans Lifeline a hotline for transgender people 1-230.856.1733  The [place] Project a hotline for LGBT youth 6-334-414-7728  Crisis Text Line: For any crisis 24/7   To: 292867  see www.crisistextline.org  - IF MAKING A CALL FEELS TOO HARD, send a text!         Again thank you for choosing PSYCHIATRY CLINIC and please let us know how we can best partner with you to improve you and your family's health.    You may be receiving a survey regarding this appointment. We would love to have your feedback, both positive and negative. The survey is done by an external company, so your answers are anonymous.

## 2020-07-29 NOTE — PROGRESS NOTES
"Angy Haij is a 55 year old female who is being evaluated via a billable telephone visit.      The patient has been notified of following:     \"This telephone visit will be conducted via a call between you and your physician/provider. We have found that certain health care needs can be provided without the need for a physical exam.  This service lets us provide the care you need with a short phone conversation.  If a prescription is necessary we can send it directly to your pharmacy.  If lab work is needed we can place an order for that and you can then stop by our lab to have the test done at a later time.    Telephone visits are billed at different rates depending on your insurance coverage. During this emergency period, for some insurers they may be billed the same as an in-person visit.  Please reach out to your insurance provider with any questions.    If during the course of the call the physician/provider feels a telephone visit is not appropriate, you will not be charged for this service.\"    Patient has given verbal consent for Telephone visit?  Yes      NUTRITION REASSESSMENT NOTE (TELEPHONE VISIT DUE TO COVID-19)     REASON FOR ASSESSMENT   Angy Haji referred by Dr. Odell for MNT related to  Obesity (BMI 30-39.9) [E66.9]  - Primary       Benign essential hypertension [I10]       Patient accompanied by self (via telephone)     NUTRITION HISTORY  Patient continues with multiple life changes and stressors.  Patient aware that she likes to self soothe with ice cream as has substituted water, hot tea or longer reading time for distraction from ice cream.  Patient and spouse have been preparing meals for the family.  Patient would like to begin focusing on weight loss again.  Patient is not sure if Weight [employer] is meeting face to face so bought scale and is weighing self 1 time per week.        DIET RECALL   Breakfast: cereal and banana; yogurt + granola (measures), fruit + coffee; 2 slices toast " "with butter + 1/2 cup cottage cheese    Lunch: leftovers (includes fruit and vegetable daily)  Dinner: sheet pan dinner with chicken and vegetables or grilled salmon and grilled vegetables; taco; protein + fruit and vegetable  Snacks: fruit, string cheese, Ritz crackers or yogurt; dessert in the evening   Beverages: mainly water, coffee, mineral water  Dining out: none currently  Exercise: Patient exercising 5-6 times per week. (biking, kayaking, hiking, walking, and walking dog).          MEDICATIONS:  Reviewed      ANTHROPOMETRICS  Height: 5'8\" (note height change in chart previously 5'9\")  Weight: 308 lbs   BMI (kg/m2): 46.8 kg/m2   %IBW: 220%  Weight History:   Wt Readings from Last 10 Encounters:   07/29/20 139.7 kg (308 lb)   03/11/20 138.6 kg (305 lb 8 oz)   01/24/20 137 kg (302 lb)   12/12/19 133.8 kg (295 lb)   12/04/19 133.8 kg (295 lb)   11/07/19 133.4 kg (294 lb)   06/26/19 131.5 kg (290 lb)   05/03/19 129.3 kg (285 lb)   02/04/19 124.7 kg (275 lb)   12/13/18 120.3 kg (265 lb 4.8 oz)     ASSESSED NUTRITION NEEDS  Estimated Energy Needs: 7954-2054 kcals (15-20 kcals/kg) for gradual weight loss   Estimated Protein Needs: 66-79 (1-1.2 gm/kg IBW) for repletion with exercise  RMR: 1440     EVALUATION/PROGRESS TOWARDS GOALS   Previous goals:  Exercise 5 times per week-met   Add snack in afternoon-met   Plan 2 day per week without dessert after dinner-improving      Previous Nutrition Diagnosis: Obesity related to excess energy intake as evidenced by BMI of 46.3kg/m2-no change     Current Nutrition Diagnosis:Obesity related to excess energy intake as evidenced by BMI of 46.8 kg/m2     INTERVENTIONS   Nutrition Prescription   Recommend exercise 5 times per week; track intake if eating mindlessly; increase fiber to 25-30 grams per day      Implementation:   Meals and Snacks: Continue eating 3 meals + deliberate snacking (if hungry)   Nutrition Education (Content):               a)  Discussed progress towards " goals.  Congratulated patient on tracking exercise and desserts in planner and reducing evening desserts.  Reviewed challenges with food.  Discussed carbohydrates portions of foods to help with weight reduction.  Supported patient in her struggle with food and weight.    Nutrition Education (Application):               a) Determined ways to balance carbohydrates.                       b) Patient verbalized understanding of diet by stating will reduce desserts to 4 times per week.        Anticipate fair-good compliance     CURRENT GOALS   Continue exercising 5 times per week   Aim for 45-60 grams carbohydrates at evening meal    Avoid desserts Monday, Tuesday and Wednesday after dinner     FOLLOW UP/MONITORING    Patient to follow up in 4 weeks  Patient has RD contact information      Time spent with patient: 23 minutes     Mattie Amaya, RD, LD  [facility] Outpatient Dietitian  [phone] (office phone)

## 2020-07-29 NOTE — Clinical Note
José Luis Tapia. Had to addend this note to fix the video visit date at top of note. Sending back to you for signature.  Thanks, Ricci CRUZ

## 2020-07-29 NOTE — PROGRESS NOTES
"VIDEO VISIT  Angy Haji is a 55 year old patient who is being evaluated via a billable video visit.      The patient has been notified of following:   \"This video visit will be conducted via a call between you and your physician/provider. We have found that certain health care needs can be provided without the need for an in-person physical exam. This service lets us provide the care you need with a video conversation. If a prescription is necessary we can send it directly to your pharmacy. If lab work is needed we can place an order for that and you can then stop by our lab to have the test done at a later time. Insurers are generally covering virtual visits as they would in-office visits so billing should not be different than normal.  If for some reason you do get billed incorrectly, you should contact the billing office to correct it and that number is in the AVS .    Video Conference to be completed via:  [email or web address]    Patient has given verbal consent for video visit?:  Yes    Patient would prefer that any video invitations be sent by: Send to e-mail at: [email or web address]      How would patient like to obtain AVS?:  Mail a copy    AVS SmartPhrase [PsychAVS] has been placed in 'Patient Instructions':  Yes    "

## 2020-08-31 NOTE — PROGRESS NOTES
"Video- Visit Details  Type of service:  video visit for medication management  Time of service:    Date:  9/2/2020    Video Start Time:  417        Video End Time: 5:09 PM    Reason for video visit:  Patient unable to travel due to Covid-19  Originating Site (patient location):  [facility]- [place]   Location- Patient's home  Distant Site (provider location):  Remote location  Mode of Communication:  Video Conference via [email or web address]  Consent:  Patient has given verbal consent for video visit?: Yes        [facility]  Psychiatry Clinic   PSYCHIATRY PROGRESS NOTE     Date of initial Diagnostic Assessment (DA) is 12/5/18.  Date of most recent Transfer of Care Diagnostic Assessment is 07/16/19.     CARE TEAM:    PCP- [facility]     Therapist- [facility] (private practive)      Specialty Providers- OB/GYN, ENT, PT, Nutrition      Wife- Rachael Haji is a 55 year old female who prefers the name Angy & pronouns she, her, hers.     Pertinent Background: Patient has not had significant mental health history until 2018, when she developed perimenopausal mood and anxiety symptoms.  She was subsequently referred to our clinic following a hospitalization for anthony in November 2018.  Her care has been complicated by sensitivity to antipsychotics (EPSE including difficulty swallowing), thrombocytopenia secondary to Depakote, lithium toxicity in setting of concurrent Depakote use.     Psych critical item history includes Manic episode, psychiatric hospitalization in November 2018.    INTERIM HISTORY      [4, 4]   The patient reports good treatment adherence.  History was provided by the patient and family who were good historians.  The last visit ended with no changes  Since the last visit:   - Doing \"okay\" overall.   - Occasional irritable mood when stressed. Occasional \"blegh\" feeling. Actively building in activities she enjoys.  - Getting half hour exercise most days of the " "week, but not meeting her exercise goals. Has been enjoying doing some biking around her neighborhood, trail riding also. [place] to [place] [employer] for [employer] cafe and some fair food, had a picnic at [employer]. Enjoyed this very much. Trying to increase time spent seeing friends  - Discusses this in therapy. Identifies ways certain habits end up \"sabatoging\" her day. I.e. working through lunch instead of taking walk. Feels like working remotely makes addressing some of these habits harder.   - Sleeping fine, 10:30-6:30. Little more trouble falling asleep (~20-30 min). Using headspace medication and/or deep breathing exercises to help  - No racing thoughts, no impulsivity, normal daytime energy    RECENT PSYCH ROS:   Depression:  occasionally overwhelmed, occasional anhedonia  Elevated:  none  Psychosis:  none  Anxiety:  none  Panic Attack:  none  Trauma Related:  none  Dysregulation:  none  Eating Disorder: some overeating, working with dietician, PCP  Pertinent Negative Symptoms:  NO suicidal ideation, self-injurious behavior/urges, violent ideation, aggression, psychosis, hallucinations, delusions, anthony and hypomania    RECENT SUBSTANCE USE:     Alcohol- 1 glass of wine per week  Tobacco- No  Caffeine- drinks around 24oz of coffee during the day  Opioids- No           Narcan Kit- N/A   Cannabis- No     Other illicit drugs- none    CURRENT SOCIAL HISTORY:  Financial/ Work- 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       Partner/ - wifeRachael- 13 years old daughter, Gricel      Living situation- Moved into [facility] in [place], lives with wife Danielle and teenage daughter   Social/ Spiritual Support- Family (wife and daughter)     FEELS SAFE AT HOME- yes     PSYCH and CD Critical Summary Points since July 2019 July 2019 - no change  Aug 2019 - decreased the Depakote to 500 mg nightly  Oct 2019 - no changes, VPA and LI labs ordered  Nov 2019 - VPA decreased to 250 mg " nightly  Dec 2019 -no change  Jan 2020 - VPA decreased to 125 mg every night  Feb 2020 - no change  Apr 2020 - Stopped VPA  May 2020 - no changes  Jun 2020 -no changes  Jul 2020 - no changes  Aug 2021 -no changes    PAST MED TRIALS          See last Diagnostic Assessment  MEDICAL / SURGICAL HISTORY                                   Pregnant or breastfeeding- no      Contraception- HRT for menopause    Patient Active Problem List   Diagnosis     Oral aphthae     Disorder of bursae and tendons in shoulder region     CARDIOVASCULAR SCREENING; LDL GOAL LESS THAN 160     Rosacea     Tear of medial cartilage or meniscus of knee, current     Raynaud disease     Vitamin D deficiency disease     Mild sleep apnea     Atopic rhinitis     Chronic bilateral low back pain without sciatica     Morbid obesity (H)     Muscle tension dysphonia     Palpitations     Dysphagia, unspecified type     Anxiety     Benign essential hypertension     Callus of foot     Varicose veins of right lower extremity with edema     Bipolar I disorder (H)       Major Surgery- n/a    MEDICAL REVIEW OF SYSTEMS    [2, 10]     A comprehensive review of systems was performed and is negative other than noted in the HPI.    ALLERGY       Seroquel [quetiapine]; Olanzapine; Penicillins; Risperidone; and Thimerosal  MEDICATIONS        Current Outpatient Medications   Medication Sig Dispense Refill     albuterol (PROAIR HFA/PROVENTIL HFA/VENTOLIN HFA) 108 (90 Base) MCG/ACT inhaler Inhale 2 puffs into the lungs every 4 hours as needed for shortness of breath / dyspnea or wheezing 1 Inhaler 3     Carboxymethylcellulose Sod PF (REFRESH PLUS) 0.5 % SOLN ophthalmic solution Place 1 drop into both eyes 3 times daily as needed for dry eyes       estrogen conj-medroxyPROGESTERone (PREMPRO) 0.625-2.5 MG tablet Take 1 tablet by mouth daily 90 tablet 3     lisinopril (ZESTRIL) 10 MG tablet Take 1 tablet (10 mg) by mouth daily 90 tablet 1     lithium 300 MG capsule Take 3  capsules (900 mg) by mouth At Bedtime 90 capsule 1     Loratadine 10 MG capsule Take 10 mg by mouth daily as needed.       metroNIDAZOLE (METROGEL) 1 % external gel Apply topically daily 60 g 9     order for DME Please measure and distribute 1 pair of 20mmHg - 30mmHg knee high open toe compression stockings. Jobst ultrasheer or equivalent. 1 each 1     ORDER FOR DME Use your CPAP device as directed by your provider.       VITAMIN D, CHOLECALCIFEROL, PO Take 2,000 Units by mouth daily       VITALS      [3, 3]   There were no vitals taken for this visit. , telehealth  MENTAL STATUS EXAM      [9, 14 cog gs]     Alertness: alert  and oriented  Appearance: well groomed, dressed in professional clothing, at home, seated in 3 season porch  Behavior/Demeanor: cooperative, pleasant and calm, with good  eye contact   Speech: normal and regular rate and rhythm  Language: intact  Psychomotor: normal or unremarkable  Mood: stable  Affect: Full range, brightens at appropriate times; was congruent to mood; was congruent to content  Thought Process/Associations: unremarkable  Thought Content:  Reports none;  Denies suicidal ideation, violent ideation, delusions and paranoid ideation  Perception:  Reports none;  Denies auditory hallucinations and visual hallucinations  Insight: excellent  Judgment: excellent  Cognition: (6) does  appear grossly intact; formal cognitive testing was not done  Gait and Station: unremarkable    LABS and DATA     AIMS:  Not needed    PHQ9 TODAY = not obtained, telehealth  PHQ-9 SCORE 8/28/2019 10/2/2019 11/6/2019   PHQ-9 Total Score 9 5 6     LITHIUM LABS     [level, renal, SG, TSH, WBC]    q6 mo  Recent Labs   Lab Test 07/15/20  1617 10/15/19  1037 05/01/19  0921 01/14/19  0913   LITHIUM 0.52* 0.65 0.61 0.55*     Recent Labs   Lab Test 07/15/20  1617 10/15/19  1037 05/01/19  0921 12/20/18  1214   CR 0.58 0.45* 0.54 0.47*   GFRESTIMATED >90 >90 >90 >90    138 135 132*   POTASSIUM 4.4 4.3 4.6 4.4  "  SHERRIE 9.4 9.1 9.0 9.2     Recent Labs   Lab Test 07/15/20  1633 10/15/19  1222 05/01/19  1140 11/01/12  0819   SG 1.008 1.006 1.008 1.015     Recent Labs   Lab Test 07/15/20  1617 10/15/19  1037 05/01/19  0921 11/10/18  0756   TSH 2.46 2.47 2.69 2.29       PSYCHOTROPIC DRUG INTERACTIONS     Concurrent use of LITHIUM and ACE INHIBITORS may result in increased risk of lithium toxicity.    MANAGEMENT:  Monitoring for adverse effects, routine vitals, routine labs, using lowest therapeutic dose of [agents] and patient is aware of risks    RISK STATEMENT for SAFETY     Angy Haji did not appear to be an imminent safety risk to self or others.    DIAGNOSIS     Bipolar Disorder, Type I (late onset), current depressive episode, mild    (h/o one manic episode Nov/Dec 2018)    H/o EPSE secondary to neuroleptic use  H/o Thrombocytopenia secondary to depakote  H/o lithium toxicity, resolved    ASSESSMENT      [m2, h3]     TODAY interval stability since last visit.  Mood remains stable without evidence of manic or hypomanic symptoms, psychotic symptoms, SI/HI, or safety concerns.  Denies overtly depressed mood or sustained periods of anhedonia, however continues to struggle with occasional sense of feeling overwhelmed, overly stressed, and fighting actively to avoid periods of anhedonia.  States that she sometimes feels \"resigned\" to experiencing on and off symptoms of anhedonia, however she denies hopelessness, helplessness.  Angy denies feeling that she is overtly depressed, and continues to feel that she is making progress in addressing the symptoms with a combination of psychotherapy, mindfulness, positive lifestyle changes.  Had corinna discussion regarding the appropriateness of making a medication adjustment versus watchful waiting; I agree with patient's assessment that it would be preferable to continue for now with lithium monotherapy.  Moving forward, should these interventions not address these mild depressive " symptoms, or should her symptoms worsen to the point that she is meeting criteria for current major depressive episode, would consider augmenting lithium, potentially with Lamictal for bipolar depression.  Patient agreeable with this plan.  Lithium refill provided.  Follow-up scheduled in 6 weeks.     PLAN      [m2, h3]     1) PSYCHOTROPIC MEDICATIONS:  - Continue lithium 900 at bedtime    2) [place]  was checked today: oxycodone 10 mg tab #6 12/13/2019    3) THERAPY: Continue    4) NEXT DUE:    Labs- Li labs January 2021  EKG-PRN  Rating Scales- serial PHQ9    5) REFERRALS:  No    6) RTC: 6 weeks    7) CRISIS NUMBERS:   Provided routinely in AVS   ONLY if a [facility] PT: [facility] [phone] (clinic), [phone] (after hours)     TREATMENT RISK STATEMENT:  The risks, benefits, alternatives and potential adverse effects have been discussed and are understood by the pt. The pt understands the risks of using street drugs or alcohol. There are no medical contraindications, the pt agrees to treatment with the ability to do so. The pt knows to call the clinic for any problems or to access emergency care if needed.  Medical and substance use concerns are documented above.  Psychotropic drug interaction check was done, including changes made today.    PROVIDER: Ricci Villa MD    Patient not staffed in clinic.  Supervisor is Dr. Tapia, who will sign the note.    I did not see this pt directly. I have reviewed the documentation, and I agree with the resident's plan of care.    Dacia Tapia MD

## 2020-09-02 ENCOUNTER — VIRTUAL VISIT (OUTPATIENT)
Dept: PSYCHIATRY | Facility: CLINIC | Age: 55
End: 2020-09-02
Attending: PSYCHIATRY & NEUROLOGY
Payer: COMMERCIAL

## 2020-09-02 DIAGNOSIS — F31.9 BIPOLAR I DISORDER (H): ICD-10-CM

## 2020-09-02 RX ORDER — LITHIUM CARBONATE 300 MG/1
900 CAPSULE ORAL AT BEDTIME
Qty: 90 CAPSULE | Refills: 1 | Status: SHIPPED | OUTPATIENT
Start: 2020-09-02 | End: 2020-12-02

## 2020-09-02 ASSESSMENT — PAIN SCALES - GENERAL: PAINLEVEL: NO PAIN (0)

## 2020-09-02 NOTE — PATIENT INSTRUCTIONS
Thank you for coming to the PSYCHIATRY CLINIC.    Lab Testing:  If you had lab testing today and your results are reassuring or normal they will be mailed to you or sent through [employer] within 7 days. If the lab tests need quick action we will call you with the results. The phone number we will call with results is # [phone] (home) [phone] (work). If this is not the best number please call our clinic and change the number.    Medication Refills:  If you need any refills please call your pharmacy and they will contact us. Our fax number for refills is [phone]. Please allow three business for refill processing. If you need to  your refill at a new pharmacy, please contact the new pharmacy directly. The new pharmacy will help you get your medications transferred.     Scheduling:  If you have any concerns about today's visit or wish to schedule another appointment please call our office during normal business hours [phone] (8-5:00 M-F)    Contact Us:  Please call [phone] during business hours (8-5:00 M-F).  If after clinic hours, or on the weekend, please call  [phone].    Financial Assistance [phone]  [facility] Billing [phone]  [place] Billing Office, [facility]: [phone]  [place] Billing [phone]  Medical Records [phone]      MENTAL HEALTH CRISIS NUMBERS:  For a medical emergency please call  911 or go to the nearest ER.     [facility]:   [facility] -[phone]   Crisis Residence [facility] -[phone]   Walk-In Counseling [facility] -[phone]   COPE 24/7 [place] Mobile Team -[phone] (adults)/[phone] (child)  CHILD: [facility] needs assessment team - [phone]      [facility]:   [facility] - [phone]   Walk-in counseling [facility] - [phone]   Walk-in counseling [facility] - [phone]   Crisis Residence [facility] -  [phone]  Urgent Care Adult Mental [place]-[phone] mobile unit/ 24/7 crisis line    National Crisis Numbers:   National Suicide Prevention Lifeline: 7-407-085-TALK (392-857-8026)  Poison Control Center - 2-559-078-1717  [employer]/resources for a list of additional resources (SOS)  Trans Lifeline a hotline for transgender people 7-320-250-1043  The [place] Project a hotline for LGBT youth 1-383.330.6036  Crisis Text Line: For any crisis 24/7   To: 732383  see www.crisistextline.org  - IF MAKING A CALL FEELS TOO HARD, send a text!         Again thank you for choosing PSYCHIATRY CLINIC and please let us know how we can best partner with you to improve you and your family's health.    You may be receiving a survey regarding this appointment. We would love to have your feedback, both positive and negative. The survey is done by an external company, so your answers are anonymous.

## 2020-09-02 NOTE — PROGRESS NOTES
"VIDEO VISIT  Angy Haji is a 55 year old patient who is being evaluated via a billable video visit.      The patient has been notified of following:   \"This video visit will be conducted via a call between you and your physician/provider. We have found that certain health care needs can be provided without the need for an in-person physical exam. This service lets us provide the care you need with a video conversation. If a prescription is necessary we can send it directly to your pharmacy. If lab work is needed we can place an order for that and you can then stop by our lab to have the test done at a later time. Insurers are generally covering virtual visits as they would in-office visits so billing should not be different than normal.  If for some reason you do get billed incorrectly, you should contact the billing office to correct it and that number is in the AVS .    Video Conference to be completed via:  [email or web address]    Patient has given verbal consent for video visit?:  Yes    Patient would prefer that any video invitations be sent by: Send to e-mail at: [email or web address]      How would patient like to obtain AVS?:  Mail a copy    AVS SmartPhrase [PsychAVS] has been placed in 'Patient Instructions':  Yes    "

## 2020-09-16 ENCOUNTER — HOSPITAL ENCOUNTER (OUTPATIENT)
Dept: NUTRITION | Facility: CLINIC | Age: 55
Discharge: HOME | End: 2020-09-16
Attending: DIETITIAN, REGISTERED | Admitting: DIETITIAN, REGISTERED
Payer: COMMERCIAL

## 2020-09-16 VITALS — WEIGHT: 293 LBS | BODY MASS INDEX: 47.68 KG/M2

## 2020-09-16 PROCEDURE — 97803 MED NUTRITION INDIV SUBSEQ: CPT | Mod: TEL | Performed by: DIETITIAN, REGISTERED

## 2020-09-16 NOTE — PROGRESS NOTES
"Angy Haji is a 55 year old female who is being evaluated via a billable telephone visit.      The patient has been notified of following:     \"This telephone visit will be conducted via a call between you and your physician/provider. We have found that certain health care needs can be provided without the need for a physical exam.  This service lets us provide the care you need with a short phone conversation.  If a prescription is necessary we can send it directly to your pharmacy.  If lab work is needed we can place an order for that and you can then stop by our lab to have the test done at a later time.    Telephone visits are billed at different rates depending on your insurance coverage. During this emergency period, for some insurers they may be billed the same as an in-person visit.  Please reach out to your insurance provider with any questions.    If during the course of the call the physician/provider feels a telephone visit is not appropriate, you will not be charged for this service.\"    Patient has given verbal consent for Telephone visit?  Yes    NUTRITION REASSESSMENT NOTE (TELEPHONE VISIT DUE TO COVID-19)     REASON FOR ASSESSMENT   Angy Haji referred by Dr. Odell for MNT related to  Obesity (BMI 30-39.9) [E66.9]  - Primary       Benign essential hypertension [I10]       Patient accompanied by self (via telephone)     NUTRITION HISTORY  Patient continues with multiple life changes and stressors.  Patient aware that she likes to self soothe with ice cream as has substituted water, hot tea or longer reading time for distraction from ice cream.  Patient and spouse have been preparing meals for the family.  Patient would like to begin focusing on weight loss again.  Patient is not sure if Weight Wat[employer] is meeting face to face so bought scale and is weighing self 1 time per week.  Patient is aware that social cues trigger her to eat.  Patient's spouse is also working on weight loss.  " "      DIET RECALL   Breakfast: cereal and banana; yogurt + granola (measures), fruit + coffee; 2 slices toast with butter + 1/2 cup cottage cheese    Lunch: leftovers (includes fruit and vegetable daily)  Dinner: sheet pan dinner with chicken and vegetables or grilled salmon and grilled vegetables; taco; protein + fruit and vegetable  Snacks: fruit, string cheese, Ritz crackers or yogurt; dessert in the evening   Beverages: mainly water, coffee, mineral water  Dining out: none currently  Exercise: Patient exercising 5-6 times per week. (biking, kayaking, hiking, walking, and walking dog).          MEDICATIONS:  Reviewed      ANTHROPOMETRICS  Height: 5'8\" (note height change in chart previously 5'9\")  Weight: 311 (patient reported)   BMI (kg/m2): 47.6 kg/m2   %IBW: 222%  Weight History:   Wt Readings from Last 10 Encounters:   09/16/20 141.1 kg (311 lb)   07/29/20 139.7 kg (308 lb)   03/11/20 138.6 kg (305 lb 8 oz)   01/24/20 137 kg (302 lb)   12/12/19 133.8 kg (295 lb)   12/04/19 133.8 kg (295 lb)   11/07/19 133.4 kg (294 lb)   06/26/19 131.5 kg (290 lb)   05/03/19 129.3 kg (285 lb)   02/04/19 124.7 kg (275 lb)     ASSESSED NUTRITION NEEDS  Estimated Energy Needs: 4671-9806 kcals (15-20 kcals/kg) for gradual weight loss   Estimated Protein Needs: 66-79 (1-1.2 gm/kg IBW) for repletion with exercise  RMR: 1440     EVALUATION/PROGRESS TOWARDS GOALS   Previous goals:  Continue exercising 5 times per week-met  Aim for 45-60 grams carbohydrates at evening meal-not met    Avoid desserts Monday, Tuesday and Wednesday after dinner-not met     Previous Nutrition Diagnosis: Obesity related to excess energy intake as evidenced by BMI of 46.8 kg/m2-no change     Current Nutrition Diagnosis:Obesity related to excess energy intake as evidenced by BMI of 47.6 kg/m2     INTERVENTIONS   Nutrition Prescription   Recommend exercise 5 times per week; track intake if eating mindlessly; increase fiber to 25-30 grams per day "      Implementation:   Meals and Snacks: Continue eating 3 meals + deliberate snacking (if hungry)   Nutrition Education (Content):               a)  Discussed progress towards goals.  Congratulated patient for continuing to track exercise.  Reviewed current food and eating behavior challenges.  Discussed carbohydrates portions of foods to help with weight reduction.  Supported patient in her struggle with food and weight.    Nutrition Education (Application):               a) Determined ways to balance carbohydrates at dinner.  Discussed ways to redirect self when gets triggered by social cues.                       b) Patient verbalized understanding of diet by stating will reduce desserts to 5 times per week.        Anticipate fair-good compliance     CURRENT GOALS   Continue exercising 5 times per week   Aim for 45-60 grams carbohydrates at evening meal    Avoid desserts 2 days per week after dinner     FOLLOW UP/MONITORING    Patient to follow up in 8 weeks  Patient has RD contact information      Time spent with patient: 22 minutes     Mattie Amaya, RD, LD  [facility] Outpatient Dietitian  [phone] (office phone)

## 2020-09-21 NOTE — PROGRESS NOTES
"Chief Complaint   Patient presents with     Gyn Exam     Medication Follow-up     hormone replacement therapy, talk about if going to continue.       Initial /77 (BP Location: Left arm, Patient Position: Sitting, Cuff Size: Adult Large)   Pulse 70   Temp 97.3  F (36.3  C) (Oral)   Ht 1.72 m (5' 7.7\")   Wt 143.1 kg (315 lb 6.4 oz)   LMP 2020   Breastfeeding No   BMI 48.38 kg/m   Estimated body mass index is 48.38 kg/m  as calculated from the following:    Height as of this encounter: 1.72 m (5' 7.7\").    Weight as of this encounter: 143.1 kg (315 lb 6.4 oz).  BP completed using cuff size: regular, on forearm    Questioned patient about current smoking habits.  Pt. has never smoked.          The following HM Due: mammogram  pap smear      The following patient reported/Care Every where data was sent to:  P ABSTRACT QUALITY INITIATIVES [67847]  n/a      patient has appointment for today and orders have been placed                "

## 2020-09-23 ENCOUNTER — OFFICE VISIT (OUTPATIENT)
Dept: OBGYN | Facility: CLINIC | Age: 55
End: 2020-09-23
Payer: COMMERCIAL

## 2020-09-23 VITALS
DIASTOLIC BLOOD PRESSURE: 77 MMHG | BODY MASS INDEX: 44.41 KG/M2 | HEART RATE: 70 BPM | SYSTOLIC BLOOD PRESSURE: 131 MMHG | HEIGHT: 68 IN | TEMPERATURE: 97.3 F | WEIGHT: 293 LBS

## 2020-09-23 DIAGNOSIS — N95.1 PERIMENOPAUSAL VASOMOTOR SYMPTOMS: ICD-10-CM

## 2020-09-23 DIAGNOSIS — Z12.4 SCREENING FOR CERVICAL CANCER: Primary | ICD-10-CM

## 2020-09-23 DIAGNOSIS — N39.43 DRIBBLING OF URINE: ICD-10-CM

## 2020-09-23 DIAGNOSIS — N39.3 SUI (STRESS URINARY INCONTINENCE, FEMALE): ICD-10-CM

## 2020-09-23 DIAGNOSIS — I10 BENIGN ESSENTIAL HYPERTENSION: ICD-10-CM

## 2020-09-23 DIAGNOSIS — Z12.39 SCREENING FOR BREAST CANCER: ICD-10-CM

## 2020-09-23 PROCEDURE — G0145 SCR C/V CYTO,THINLAYER,RESCR: HCPCS | Performed by: OBSTETRICS & GYNECOLOGY

## 2020-09-23 PROCEDURE — 99213 OFFICE O/P EST LOW 20 MIN: CPT | Performed by: OBSTETRICS & GYNECOLOGY

## 2020-09-23 PROCEDURE — 87624 HPV HI-RISK TYP POOLED RSLT: CPT | Performed by: OBSTETRICS & GYNECOLOGY

## 2020-09-23 RX ORDER — LISINOPRIL 10 MG/1
10 TABLET ORAL DAILY
Qty: 90 TABLET | Refills: 0 | Status: SHIPPED | OUTPATIENT
Start: 2020-09-23 | End: 2020-10-09

## 2020-09-23 RX ORDER — LISINOPRIL 10 MG/1
10 TABLET ORAL DAILY
Qty: 90 TABLET | Refills: 1 | Status: CANCELLED | OUTPATIENT
Start: 2020-09-23

## 2020-09-23 ASSESSMENT — MIFFLIN-ST. JEOR: SCORE: 2079.39

## 2020-09-23 NOTE — TELEPHONE ENCOUNTER
Verify that the refill encounter hasn't been started Yes    [facility] phone call message- patient requesting a refill:    Full Medication Name: lisinopril (ZESTRIL) 10 MG tablet     Dose: Take 1 tablet (10 mg) by mouth daily - Oral      Pharmacy confirmed as   [facility] [place] [place] - [address]  [address]  [address]  [facility] [address]  Phone: [phone] Fax: [phone] Alternate Fax: [phone], [phone], [phone]  : Yes    Medication tab checked to see if medication has been sent  Yes    Additional Comments: Patient is out of medication, no refills left at pharmacy.      OK to leave a message on voice mail? Yes    Advised patient refill may take up to 2 business days? Yes    Primary language: English      needed? No    Call taken on September 23, 2020 at 9:30 AM by Darshana Sanders to [facility] MED REFILL

## 2020-09-23 NOTE — TELEPHONE ENCOUNTER
Patient requesting refill of Lisinopril. Last office visit with Dr. Lira was 1/24/20. Patient does have follow up appointment scheduled with Dr. Lira for 10/9/20. RN unable to fill due to medication warning of interaction with Lithium. Routing to PCP high priority to address.  Dianne Villafana RN

## 2020-09-23 NOTE — PROGRESS NOTES
GYN Clinic Visit    Date of visit: 2020     Chief Complaint: pap    HPI:   Angy Haji is a 55 year old  female who presents for routine breast, pelvic exam and pap. She also wants to discuss her HRT and stress urinary incontinence.     Mood is stable. Still having menses - light flow for 1 week every 3-4 months. Hot flushes well controlled. Discussed HRT, patient would like to continue for now.     Stress urinary incontinence and dribbling after voiding. Interested in trying pelvic floor PT again, requests referral. Leaks daily.       Past Medical History:  Past Medical History:   Diagnosis Date     Disorders of bursae and tendons in shoulder region, unspecified      Female infertility of unspecified origin      Gallbladder sludge 2013     Mild sleep apnea 2014     Obesity, unspecified      Oral aphthae      Other acne      Other specified hypoglycemia      PONV (postoperative nausea and vomiting)        Past Surgical History:  Past Surgical History:   Procedure Laterality Date     HC TOOTH EXTRACTION W/FORCEP      [facility]     IR ENDOVENOUS ABLATION VARICOSE VEINS  2019     LAPAROSCOPIC CHOLECYSTECTOMY  2013    Procedure: LAPAROSCOPIC CHOLECYSTECTOMY;  Laparoscopic Cholecystectomy;  Surgeon: Fabio Johnson MD;  [facility]     LAPAROSCOPIC OOPHORECTOMY Left 2016    Procedure: LAPAROSCOPIC OOPHORECTOMY;  Surgeon: Kayley Donnelly MD;  [facility]     LAPAROSCOPIC SALPINGECTOMY Bilateral 2016    Procedure: LAPAROSCOPIC SALPINGECTOMY;  Surgeon: Kayley Donnelly MD;  [facility]         Medications:  Current Outpatient Medications   Medication     Carboxymethylcellulose Sod PF (REFRESH PLUS) 0.5 % SOLN ophthalmic solution     estrogen conj-medroxyPROGESTERone (PREMPRO) 0.625-2.5 MG tablet     lisinopril (ZESTRIL) 10 MG tablet     lithium 300 MG capsule     Loratadine 10 MG capsule     metroNIDAZOLE (METROGEL) 1 % external gel      ORDER FOR DME     VITAMIN D, CHOLECALCIFEROL, PO     albuterol (PROAIR HFA/PROVENTIL HFA/VENTOLIN HFA) 108 (90 Base) MCG/ACT inhaler     No current facility-administered medications for this visit.        Allergy:  Allergies   Allergen Reactions     Seroquel [Quetiapine] Nausea and Vomiting     Had constant vomiting     Olanzapine      Tongue welling, bad EPSE (Parkinsonian)     Penicillins Rash     Risperidone      Tongue welling, bad EPSE (Parkinsonian)       Thimerosal Other (See Comments)     blisters on inside of eyelid     Patient denies food, latex or environmental allergies.     Social History:  Lives with wife and daughter. Works as a therapist at [employer].  Social History     Tobacco Use     Smoking status: Never Smoker     Smokeless tobacco: Never Used     Tobacco comment: non smoke home   Substance Use Topics     Alcohol use: Yes     Comment: 2 Drinks Per Month     Drug use: No         Family History   Problem Relation Age of Onset     Cardiovascular Paternal Grandfather         anyerism     Obesity Paternal Grandfather      Respiratory Paternal Grandfather         pulmonary (air sacs hardening)     Allergies Father         hayfever     Lipids Father         diet therapy     Gallbladder Disease Father         cholecystectomy      Eye Disorder Father         retinal tear x 2      Prostate Cancer Father      Alzheimer Disease Maternal Grandmother         ? poor STM     Arthritis Maternal Grandmother      Heart Disease Maternal Grandmother         Arrythmia-got a pacemaker at age 89     Arthritis Mother      Eye Disorder Mother         Cataract/macular tear in one eye     Cancer Paternal Grandmother         Uterine CA- as well as her sister     Gynecology Paternal Grandmother         prolapsed uterus- had 10 kids     Obesity Paternal Grandmother      Breast Cancer Maternal Aunt      Other Cancer Paternal Aunt 80        endo. cancer          Physical Exam:  Vitals:    09/23/20 0836   BP: 131/77   BP  "Location: Left arm   Patient Position: Sitting   Cuff Size: Adult Regular   Pulse: 70   Temp: 97.3  F (36.3  C)   TempSrc: Oral   Weight: 143.1 kg (315 lb 6.4 oz)   Height: 1.72 m (5' 7.7\")     Body mass index is 48.38 kg/m .    Gen: healthy, alert, active, no distress  Neck: supple, no adenopathy, normal thyroid  CV: normal pulses  Resp: normal respiratory effort  Breast: symmetric, no masses or irregularities, no skin changes.  Abd: soft, non-tender, non-distended  Extremities: nontender, no edema  :   - external genitalia: vulva and perineum are normal without lesion, mass or erythema  - urethra: well supported urethra, no hypermobility, normal Skenes and Bartholins.   - bladder: no tenderness, no masses  - vagina: intact, rugated mucosa without lesions or abnormal discharge.    - cervix: normal, no lesions or abnormal discharge. Pap smear obtained.   - uterus: 6 size, no masses or tenderness, exam limited by body habitus  - adnexa: no masses or tenderness  - rectal: deferred      Assessment:  Angy Haji is a 55 year old  who presents for pap, breast cancer screening, discussion of stress urinary incontinence and HRT.    Plan:  1. Screening for cervical cancer  - HPV High Risk Types DNA Cervical  - Pap imaged thin layer screen with HPV - recommended age 30 - 65 years (select HPV order below)    2. Screening for breast cancer  - MA Screening Digital Bilateral; Future    3. ANNABEL (stress urinary incontinence, female)  - [facility]RACT[facility] REFERRAL; Future    4. Dribbling of urine  - [facility], [facility] CHIR[facility]RACTIC REFERRAL; Future    5. Perimenopausal vasomotor symptoms  Discussed risks of HRT including thromboembolic disease and breast cancer. Discussed importance of progesterone component to protect uterine lining. Discussed recommendation of using lowest dose for shortest time needed to relieve symptoms. Plan to re-evaluate yearly.     - estrogen conj-medroxyPROGESTERone (PREMPRO) 0.625-2.5 " MG tablet; Take 1 tablet by mouth daily  Dispense: 90 tablet; Refill: 3      Romy Min MD

## 2020-09-24 NOTE — TELEPHONE ENCOUNTER
Refilled lisinopril. Psych is monitoring lithium levels.     Routed to FD to call pt to schedule virtual visit w/ me within one month for hypertension follow-up. Will consider switching hypertension meds at that point to one that interacts less with lithium.      Magali Lira, PGY2

## 2020-09-26 LAB
COPATH REPORT: NORMAL
PAP: NORMAL

## 2020-09-29 LAB
FINAL DIAGNOSIS: NORMAL
HPV HR 12 DNA CVX QL NAA+PROBE: NEGATIVE
HPV16 DNA SPEC QL NAA+PROBE: NEGATIVE
HPV18 DNA SPEC QL NAA+PROBE: NEGATIVE
SPECIMEN DESCRIPTION: NORMAL
SPECIMEN SOURCE CVX/VAG CYTO: NORMAL

## 2020-10-09 ENCOUNTER — OFFICE VISIT (OUTPATIENT)
Dept: FAMILY MEDICINE | Facility: CLINIC | Age: 55
End: 2020-10-09
Payer: COMMERCIAL

## 2020-10-09 ENCOUNTER — THERAPY VISIT (OUTPATIENT)
Dept: PHYSICAL THERAPY | Facility: CLINIC | Age: 55
End: 2020-10-09
Attending: OBSTETRICS & GYNECOLOGY
Payer: COMMERCIAL

## 2020-10-09 VITALS
RESPIRATION RATE: 16 BRPM | BODY MASS INDEX: 47.74 KG/M2 | OXYGEN SATURATION: 99 % | WEIGHT: 293 LBS | HEART RATE: 67 BPM | SYSTOLIC BLOOD PRESSURE: 123 MMHG | DIASTOLIC BLOOD PRESSURE: 76 MMHG | TEMPERATURE: 98.9 F

## 2020-10-09 DIAGNOSIS — M76.31 IT BAND SYNDROME, RIGHT: ICD-10-CM

## 2020-10-09 DIAGNOSIS — E66.01 OBESITY, CLASS III, BMI 40-49.9 (MORBID OBESITY) (H): ICD-10-CM

## 2020-10-09 DIAGNOSIS — M89.8X7 RETROCALCANEAL EXOSTOSIS: ICD-10-CM

## 2020-10-09 DIAGNOSIS — M54.50 CHRONIC BILATERAL LOW BACK PAIN WITHOUT SCIATICA: ICD-10-CM

## 2020-10-09 DIAGNOSIS — N81.89 PELVIC FLOOR WEAKNESS IN FEMALE: ICD-10-CM

## 2020-10-09 DIAGNOSIS — N39.3 SUI (STRESS URINARY INCONTINENCE, FEMALE): ICD-10-CM

## 2020-10-09 DIAGNOSIS — I10 BENIGN ESSENTIAL HYPERTENSION: Primary | ICD-10-CM

## 2020-10-09 DIAGNOSIS — N39.43 DRIBBLING OF URINE: ICD-10-CM

## 2020-10-09 DIAGNOSIS — G47.30 MILD SLEEP APNEA: ICD-10-CM

## 2020-10-09 DIAGNOSIS — L71.0 PERIORAL DERMATITIS: ICD-10-CM

## 2020-10-09 DIAGNOSIS — R00.2 PALPITATIONS: ICD-10-CM

## 2020-10-09 DIAGNOSIS — F31.9 BIPOLAR I DISORDER (H): ICD-10-CM

## 2020-10-09 DIAGNOSIS — L71.9 ROSACEA: ICD-10-CM

## 2020-10-09 DIAGNOSIS — N39.46 URINARY INCONTINENCE, MIXED: ICD-10-CM

## 2020-10-09 DIAGNOSIS — G89.29 CHRONIC BILATERAL LOW BACK PAIN WITHOUT SCIATICA: ICD-10-CM

## 2020-10-09 PROBLEM — E66.813 OBESITY, CLASS III, BMI 40-49.9 (MORBID OBESITY) (H): Status: ACTIVE | Noted: 2018-08-14

## 2020-10-09 PROCEDURE — 97112 NEUROMUSCULAR REEDUCATION: CPT | Mod: GP | Performed by: PHYSICAL THERAPIST

## 2020-10-09 PROCEDURE — 99214 OFFICE O/P EST MOD 30 MIN: CPT | Mod: GC | Performed by: STUDENT IN AN ORGANIZED HEALTH CARE EDUCATION/TRAINING PROGRAM

## 2020-10-09 PROCEDURE — 97110 THERAPEUTIC EXERCISES: CPT | Mod: GP | Performed by: PHYSICAL THERAPIST

## 2020-10-09 PROCEDURE — 97161 PT EVAL LOW COMPLEX 20 MIN: CPT | Mod: GP | Performed by: PHYSICAL THERAPIST

## 2020-10-09 RX ORDER — LISINOPRIL 10 MG/1
10 TABLET ORAL DAILY
Qty: 90 TABLET | Refills: 3 | Status: SHIPPED | OUTPATIENT
Start: 2020-10-09 | End: 2021-12-09

## 2020-10-09 RX ORDER — ALBUTEROL SULFATE 90 UG/1
AEROSOL, METERED RESPIRATORY (INHALATION)
COMMUNITY
Start: 2020-03-26 | End: 2021-08-27

## 2020-10-09 RX ORDER — DIVALPROEX SODIUM 125 MG/1
TABLET, DELAYED RELEASE ORAL
COMMUNITY
Start: 2020-03-23 | End: 2020-10-14

## 2020-10-09 NOTE — PROGRESS NOTES
[facility] Initial Evaluation  Subjective:    Therapist Generated HPI Evaluation  Problem details: MD order 9/23/20. Angy has been having stress UI for years. She has worked with PT 8 years ago. It got better but currently she has urinary dribbling past 3 months. The amount of leakage is moderate post voiding. She mentions some leakage with urgency- large to moderate leaks during those episodes. Dribbling is happening most of the time. She uses one to two pads during daytime. She also mentions stress UI- coughing, sneezing, jumping. Sexually active and no pain with social activity. She is on hormone therapy- menstrual cycle every 3 months. She has urgency with loose stools with minimal leakage based on stool consistency. No constipation issues or lower abdomen pain mentioned. .         Type of problem:  Pelvic dysfunction and incontinence.    This is a chronic and recurrent condition.  Condition occurred with:  Insidious onset.  Where condition occurred: for unknown reasons.  Patient reports pain:  Lower lumbar spine, lumbar spine right and lumbar spine left (R>L).    Pain is the same all the time.  Since onset symptoms are gradually worsening.  Symptoms are exacerbated by laughing, coughing, sneezing and jumping (urgency)        Restrictions due to condition include:  Working in normal job without restrictions.  Barriers include:  None as reported by patient.    Patient Health History  Angy Haji being seen for UI, dribbling.     Date of Onset: 3 montha ago.   Problem occurred: unknown   Pain is reported as 1/10 on pain scale.  General health as reported by patient is fair.  Pertinent medical history includes: high blood pressure, osteoarthritis, menopausal, incontinence, numbness/tingling, overweight and sleep disorder/apnea. Other medical history details: Bipolar illness 11/18. .   Red flags:  Pain at rest/night.  Medical allergies: other. Other medical allergies details: pencillin,  antipsychotic.    Other surgery history details: gall baldder, left ovary removal, varicose veins.    Current medications:  High blood pressure medication, hormone replacement therapy and other. Other medications details: lithium.    Current occupation is Psycho therapist.   Primary job tasks include:  Computer work and prolonged sitting.                                    Objective:  System                                 Pelvic Dysfunction Evaluation:        Flexibility:  normal      Abdominal Wall:  normal        Pelvic Clock Exam:  normal                External Assessment:    Skin Condition:  Normal      Tissue Symmetry:  Normal  Introitus:  Normal  Muscle Contraction/Perineal Mobility:  Normal, substitution and slight lift, no urogential triangle descent  Internal Assessment:  Internal assessment pelvic: Poor PFM isoaltion adn awreness demonstrated, Increased gluteal adn addominal substitution present with PFM contraction. Laycock 1+/3/5/5.               Additional History:    Number of Pregnancies: NA  Number of Live Births: NA                       General     ROS    Assessment/Plan:    Patient is a 55 year old female with pelvic complaints.    Patient has the following significant findings with corresponding treatment plan.                Diagnosis 1: Pelvic floor weakness, mixed urinary incontinence, postvoid dribbling, fecal incontinence Decreased strength - therapeutic exercise, therapeutic activities and home program  Decreased function - therapeutic activities and home program    Therapy Evaluation Codes:   1) History comprised of:   Personal factors that impact the plan of care:      Past/current experiences and Time since onset of symptoms.    Comorbidity factors that impact the plan of care are:      check HPI.     Medications impacting care: Check HPI.  2) Examination of Body Systems comprised of:   Body structures and functions that impact the plan of care:      Pelvis.   Activity limitations that  impact the plan of care are:      Fecal incontinence, Stress incontinence, Urge incontinence, Urinary incontinence and Post void dribbling.  3) Clinical presentation characteristics are:   Stable/Uncomplicated.  4) Decision-Making    Low complexity using standardized patient assessment instrument and/or measureable assessment of functional outcome.  Cumulative Therapy Evaluation is: Low complexity.    Previous and current functional limitations:  (See Goal Flow Sheet for this information)    Short term and Long term goals: (See Goal Flow Sheet for this information)     Communication ability:  Patient appears to be able to clearly communicate and understand verbal and written communication and follow directions correctly.  Treatment Explanation - The following has been discussed with the patient:   RX ordered/plan of care  Anticipated outcomes  Possible risks and side effects  This patient would benefit from PT intervention to resume normal activities.   Rehab potential is good.    Frequency:  1 X week, once daily  Duration:  for 6 weeks  Discharge Plan:  Achieve all LTG.  Independent in home treatment program.  Reach maximal therapeutic benefit.    Please refer to the daily flowsheet for treatment today, total treatment time and time spent performing 1:1 timed codes.

## 2020-10-09 NOTE — PROGRESS NOTES
Preceptor Attestation:   Patient seen, evaluated and discussed with the resident. I have verified the content of the note, which accurately reflects my assessment of the patient and the plan of care.   Supervising Physician:  Kelsey Owusu, DO

## 2020-10-09 NOTE — PROGRESS NOTES
[facility]  Clinic Note    Assessment and Plan   Angy Haji is a 55 year old who presented to clinic today for the following issues:      Benign essential hypertension: Well controlled on lisinopril 10mg daily since Fall 2018. Psych monitoring lithium level.   - Continue lisinopril 10mg daily  - Continue regular exercise (30 min/day)   - At follow-up, encourage home BP monitoring    Obesity, class III, BMI 48:  2/2 antipsychotics for bipolar type 1 diagnosed Fall 2018. Currently on lithium and depakote.  Weight up ~15 lb from Nov 2019. Pt continues to be active. Discussed exercise, but didn't discuss diet much. Continue to monitor. A1C in Jan 2020 4.9.   - Monitor weight  - Recheck A1C and lipids in ~Jan 2021, or at next visit  - Discuss diet further at follow-up (has followed closely w/ dietician in past)   - Also consider pool therapy (also w/ joint pain and tight IT band)     Palpitations  Unclear significance. RRR on exam today. TSH normal in July 2020 (considered in context of lithium). Does have MARLA, which could predispose to a fib; but episodes lasting <15 seconds and only a couple times per week. CHADSVASC would be 2 (for female sex and hypertension). Discussed options: EKG vs loop recorder vs cards referral vs continue to monitor. Decided on monitoring. Pt will keep log (see AVS for details she'll log).     Right sided IT band syndrome:  Worsened since PT stopped d/t Covid. Goal is to be able to put shoes on from standing position. No red flag signs/symptoms.   - Referred for PT  - Could do sports med referral in future if pain persists/worsens  - Could consider topical lidocaine patches (ok to prescribe if pt requests).   - No topical NSAIDs d/t systemic absorption and risk w/ concurrent lithium use     Retrocalcaneal exostosis/ achilles heel spur, left-sided:  Concentric exercises (provided some patient-learning resources). Pt will report back if symptoms persist/worsen.     Bipolar  "I disorder (H)  Follows w/ psychiatry. Mood stable currently (stably on edge of depression, but not feeling quite depressed).    Rosacea  Appears to have plenty of refills of metronidazole gel. Ok to refill for one year if pharmacy requests refills.      Zoster vaccination  Deferred at this time after discussing guidance from CDC. Will readdress at age 57.    Follow up w/ Dr. Lira in ~6 months (April 2021), or sooner w/ new or worsening symptoms     Magali Lira, PGY-2       HPI       Angy Haji is a 55 year old who presents for Referral (right side hip/thigh low back pain not improving. Difficult to put her shoes on w/o pain. Feels like its \"pulling\" down her thigh. Prolonged sitting increases pain and becomes stiff.  ), RECHECK (blood pressure. Not checking BP at home. ), and Refill Request (Lisinopril, metronidazole gel, )    Blood pressure:  - On lisinopril 10mg daily since Fall 2018  - Psych monitoring lithium level   - Goal: walk 30min every day of the week + one day of ~1 hour of some other sort of exercise (biking, hiking)   - Not feeling great w/ exercise (more winded, low energy). Unsure if related to weight gain, heat, lithium, etc.     Obesity:  - Highest weight 341. Lost 120lb over 10 years w/ exercise and diet modifications. Lowest ~225. Since then, was diagnosed w/ anthony and has fluctuated quite a bit since then... has stayed mostly from 260-315lb.     \"Heart flutter\"  - Frequency: sometimes a couple of times per week for 15 seconds or so (maybe not even that), and then stops. Random.   - Timing: Not sure if anything consistent when it happens. Might be wandering around house doing something. Seems like usually when she's up moving around. Not when she's sitting. Not w/ exercise exertion, per se. Hasn't woken her up at night.   - Tries breathing slowly to calm it down.   - Doesn't necessarily feel anxious when it happens.     Low back, right thigh, right IT band tightness/pain:   Right IT band " "especially, tight and stiff. Wants to be able to put shoes on from standing position. Feels tight and pulling. No numbness or tingling of either lower extremity. Has some urinary incontinence, but no incontinence of bowel. Pt thinks unrelated. Pt will start pelvic floor therapy today for urinary incontinence.     \"Should I get Shingles vaccine?\"  Discussed risks/benefits briefly. Will defer to age 57 (but open to conversation/possibly doing earlier/later).    \"Bump on heel\" Left  Suspect retrocalcaneal exostosis/ achilles heel spur. Hurts occasionally, especially w/ recurrent use/w/ manipulation/pressure on bump.     Mood:  Stable. Feeling kind of low, but not depressed. No anthony or hypomania recently. Following closely with psychiatry. Taking meds as prescribed.     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     Patient Active Problem List   Diagnosis     Oral aphthae     Disorder of bursae and tendons in shoulder region     CARDIOVASCULAR SCREENING; LDL GOAL LESS THAN 160     Rosacea     Tear of medial cartilage or meniscus of knee, current     Raynaud disease     Vitamin D deficiency disease     Mild sleep apnea     Atopic rhinitis     Chronic bilateral low back pain without sciatica     Obesity, Class III, BMI 40-49.9 (morbid obesity) (H)     Muscle tension dysphonia     Palpitations     Dysphagia, unspecified type     Anxiety     Benign essential hypertension     Callus of foot     Varicose veins of right lower extremity with edema     Bipolar I disorder (H)     +++++++    Patient is an established patient of this clinic..         Review of Systems:   See HPI.         Physical Exam:   /76   Pulse 67   Temp 98.9  F (37.2  C) (Oral)   Resp 16   Wt 141.2 kg (311 lb 3.2 oz)   LMP 07/05/2020 (Exact Date)   SpO2 99%   Breastfeeding No   BMI 47.74 kg/m    General appearance: well-developed, obese, " "comfortable, wearing 4-leaf clover mask   Lungs: breathing comfortably on room air without increased effort, CTAB  Heart: RRR w/o murmur. No irregularity, extra beats, or missed beats. No murmur.   Psych: Mood \"okay.\" Affect full-ranged and congruent w/ affect. Insight and judgement very good.   Neurologic: alert and oriented to person, place, and time; spontaneous speech intact, comprehends appropriately  MSK: Antalgic gait w/ right hip swing. External rotation of right hip slightly limited. Has ~1cm onesimo prominence at insertion of achilles tendon on left calcaneous that is mildly TTP w/o surrounding inflammation or tenderness of achilles tendon.     Results:     No results found for any visits on 10/09/20.      Options for treatment and follow-up care were reviewed with the patient. Angy Haji engaged in the decision making process and verbalized understanding of the options discussed and agreed with the final plan.    Magali Lira MD , PGY-2, [facility]      "

## 2020-10-09 NOTE — PATIENT INSTRUCTIONS
Here is the plan from today's visit    1. Benign essential hypertension  - lisinopril (ZESTRIL) 10 MG tablet; Take 1 tablet (10 mg) by mouth daily  Dispense: 90 tablet; Refill: 3    2. It band syndrome, right  Physical therapy referral     3. Log for heart flutter. For one week, keep track of these things:   1) How many times per day do you have flutters?  2) How long are they lasting (if you can measure, great!)   3) What are you doing when they happen?  4) What do you do that helps them stop?  5) Do you feel short of breath or light headed or nauseated when they happen?   6) What were you doing right before it happened?     Update me if episodes last longer than 5 minutes at any point, or if you have new symptoms (like significant SOB or lightheadness associated w/ it).    4. Achilles tendinopathy/retrocalcaneal exostosis:   Look at Youtube for some exercises (try to find physical therapist, MD, or DO who does video)   - [facility], PT, HAND AND CHIROPRACTIC REFERRAL - [facility]; Future      Follow up plan  Follow-up w/ me in 6 months re: blood pressure, heart flutter, hip/knee/back/heel; or sooner if new/worsening/concerning symptoms.     Thank you for coming to [facility] today.  Lab Testing:  **If you had lab testing today and your results are reassuring or normal they will be mailed to you or sent through [employer] within 7 days.   **If the lab tests need quick action we will call you with the results.  The phone number we will call with results is # [phone] (home) [phone] (work). If this is not the best number please call our clinic and change the number.  Medication Refills:  If you need any refills please call your pharmacy and they will contact us.   If you need to  your refill at a new pharmacy, please contact the new pharmacy directly. The new pharmacy will help you get your medications transferred faster.   Scheduling:  If you have any concerns about today's visit or wish to schedule another  appointment please call our office during normal business hours [phone] (8-5:00 M-F)  If a referral was made to a [facility] and you don't get a call from central scheduling please call [phone].  If a Mammogram was ordered for you at [facility] call [phone] to schedule or change your appointment.  If you had an XRay/CT/Ultrasound/MRI ordered the number is [phone] to schedule or change your radiology appointment.   Medical Concerns:  If you have urgent medical concerns please call [phone] at any time of the day.    Magali Lira MD

## 2020-10-14 ENCOUNTER — VIRTUAL VISIT (OUTPATIENT)
Dept: PSYCHIATRY | Facility: CLINIC | Age: 55
End: 2020-10-14
Attending: PSYCHIATRY & NEUROLOGY
Payer: COMMERCIAL

## 2020-10-14 ENCOUNTER — TELEPHONE (OUTPATIENT)
Dept: PSYCHIATRY | Facility: CLINIC | Age: 55
End: 2020-10-14

## 2020-10-14 DIAGNOSIS — F31.9 BIPOLAR 1 DISORDER (H): Primary | ICD-10-CM

## 2020-10-14 PROCEDURE — 99214 OFFICE O/P EST MOD 30 MIN: CPT | Mod: HN | Performed by: STUDENT IN AN ORGANIZED HEALTH CARE EDUCATION/TRAINING PROGRAM

## 2020-10-14 NOTE — TELEPHONE ENCOUNTER
On 10/14/2020, at 1504, writer called patient at mobile to confirm Virtual Visit. Writer unable to make contact with patient. Writer left detailed voice message for callback. [phone], left as call back number. OMAYRA Rg, EMT

## 2020-10-14 NOTE — PROGRESS NOTES
"VIDEO VISIT  Angy Haji is a 55 year old patient who is being evaluated via a billable video visit.      The patient has been notified of following:   \"We have found that certain health care needs can be provided without the need for an in-person physical exam. This service lets us provide the care you need with a video conversation. If a prescription is necessary we can send it directly to your pharmacy. If lab work is needed we can place an order for that and you can then stop by our lab to have the test done at a later time. Insurers are generally covering virtual visits as they would in-office visits so billing should not be different than normal.  If for some reason you do get billed incorrectly, you should contact the billing office to correct it and that number is in the AVS .    Patient has given verbal consent for video visit?: Yes   How would you like to obtain your AVS?: [employer]  AVS SmartPhrase [PsychAVS] has been placed in 'Patient Instructions': Yes      Video- Visit Details  Type of service:  video visit for medication management  Time of service:    Date:  10/14/2020    Video Start Time:  318 Video End Time:  405    Reason for video visit:  Patient unable to travel due to Covid-19  Originating Site (patient location):  [facility]   Location- Patient's home  Distant Site (provider location):  Remote location  Mode of Communication:  Video Conference via [email or web address]  Consent:  Patient has given verbal consent for video visit?: Yes            [facility]  Psychiatry Clinic   PSYCHIATRY PROGRESS NOTE     Date of initial Diagnostic Assessment (DA) is 12/5/18.  Date of most recent Transfer of Care Diagnostic Assessment is 07/16/19.     CARE TEAM:    PCP- [facility]     Therapist- [facility] (private practive)      Specialty Providers- OB/GYN, ENT, PT, Nutrition      Wife- Rachael Haji is a 55 year old female who prefers the name Angy & " "pronouns she, her, hers.     Pertinent Background: Patient has not had significant mental health history until 2018, when she developed perimenopausal mood and anxiety symptoms.  She was subsequently referred to our clinic following a hospitalization for anthony in November 2018.  Her care has been complicated by sensitivity to antipsychotics (EPSE including difficulty swallowing), thrombocytopenia secondary to Depakote, lithium toxicity in setting of concurrent Depakote use.     Psych critical item history includes Manic episode, psychiatric hospitalization in November 2018.    INTERIM HISTORY      [4, 4]   The patient reports good treatment adherence.  History was provided by the patient and family who were good historians.  The last visit ended with no changes  Since the last visit:   - Mood unchanged. Still stable, no overt depression, some ongoing mild lingering anhedonia, \"slightly under what I want.\"  - Continues to focus on daily walking, exercise, biking or hiking on weekends. Reading daily for enjoyment  - Some new stressors; daughter struggling with anxiety/depression, new self injury, calorie restriction.  Daughter has mental health services, including crisis numbers.  Wife Rachael considering pursuing gastric bypass. Having some high $ repairs done to the house.  Feels situationally overwhelmed at times  - Started using Headspace elis. Has had some more difficulty falling asleep, thinks due to stress. Using guided meditations for sleep.  Still getting around 8 hours per night sleep.  No elevated mood, impulsivity, decreased need for sleep.  - Working harder on connecting with friends. Spending more time socializing, hosting dinners, catching up with friends who she hadn't seen as much since COVID pandemic started. Enjoyed these interactions, felt fulfilling and \"filled up\"  -Had recent appointment with OB GYN, plans to continue HRT for another year.    RECENT PSYCH ROS:   Depression:  occasionally overwhelmed, " mild anhedonia  Elevated:  none  Psychosis:  none  Anxiety:  none  Panic Attack:  none  Trauma Related:  none  Dysregulation:  none  Eating Disorder: some overeating, working with dietician, PCP  Pertinent Negative Symptoms:  NO suicidal ideation, self-injurious behavior/urges, violent ideation, aggression, psychosis, hallucinations, delusions, anthony and hypomania    RECENT SUBSTANCE USE:     Alcohol- 1 glass of wine socially a few times per month  Tobacco- No  Caffeine- drinks around 24oz of coffee during the day  Opioids- No           Narcan Kit- N/A   Cannabis- No     Other illicit drugs- none    CURRENT SOCIAL HISTORY:  Financial/ Work- 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       Partner/ - wife, Rachael Alejandra- 13 years old daughter, Gricel      Living situation- Moved into new house in [place], lives with wife Danielle and teenage daughter   Social/ Spiritual Support- Family (wife and daughter)     FEELS SAFE AT HOME- yes     PSYCH and CD Critical Summary Points since July 2019 July 2019 - no change  Aug 2019 - decreased the Depakote to 500 mg nightly  Oct 2019 - no changes, VPA and LI labs ordered  Nov 2019 - VPA decreased to 250 mg nightly  Dec 2019 -no change  Jan 2020 - VPA decreased to 125 mg every night  Feb 2020 - no change  Apr 2020 - Stopped VPA  May 2020 - no changes  Jun 2020 -no changes  Jul 2020 - no changes  Aug 2020 -no changes  Oct 2020 - no changes    PAST MED TRIALS          See last Diagnostic Assessment  MEDICAL / SURGICAL HISTORY                                   Pregnant or breastfeeding- no      Contraception- HRT for menopause    Patient Active Problem List   Diagnosis     Oral aphthae     Disorder of bursae and tendons in shoulder region     CARDIOVASCULAR SCREENING; LDL GOAL LESS THAN 160     Rosacea     Tear of medial cartilage or meniscus of knee, current     Raynaud disease     Vitamin D deficiency disease     Mild sleep apnea     Atopic  rhinitis     Chronic bilateral low back pain without sciatica     Obesity, Class III, BMI 40-49.9 (morbid obesity) (H)     Muscle tension dysphonia     Palpitations     Dysphagia, unspecified type     Anxiety     Benign essential hypertension     Callus of foot     Varicose veins of right lower extremity with edema     Bipolar I disorder (H)     Pelvic floor weakness in female     Urinary incontinence, mixed     Dribbling of urine       Major Surgery- n/a    MEDICAL REVIEW OF SYSTEMS    [2, 10]     A comprehensive review of systems was performed and is negative other than noted in the HPI.    ALLERGY       Seroquel [quetiapine], Olanzapine, Penicillins, Risperidone, and Thimerosal  MEDICATIONS        Current Outpatient Medications   Medication Sig Dispense Refill     Carboxymethylcellulose Sod PF (REFRESH PLUS) 0.5 % SOLN ophthalmic solution Place 1 drop into both eyes 3 times daily as needed for dry eyes       estrogen conj-medroxyPROGESTERone (PREMPRO) 0.625-2.5 MG tablet Take 1 tablet by mouth daily 90 tablet 3     lisinopril (ZESTRIL) 10 MG tablet Take 1 tablet (10 mg) by mouth daily 90 tablet 3     lithium 300 MG capsule Take 3 capsules (900 mg) by mouth At Bedtime 90 capsule 1     Loratadine 10 MG capsule Take 10 mg by mouth daily as needed.       metroNIDAZOLE (METROGEL) 1 % external gel Apply topically daily 60 g 9     ORDER FOR DME Use your CPAP device as directed by your provider.       PROAIR  (90 Base) MCG/ACT inhaler        VITAMIN D, CHOLECALCIFEROL, PO Take 2,000 Units by mouth daily       VITALS      [3, 3]   There were no vitals taken for this visit. , telehealth  MENTAL STATUS EXAM      [9, 14 cog gs]     Alertness: alert  and oriented  Appearance: well groomed, dressed in professional clothing, at home, seated in 3 season porch  Behavior/Demeanor: cooperative, pleasant and calm, with good  eye contact   Speech: normal and regular rate and rhythm  Language: intact  Psychomotor: normal or  unremarkable  Mood: stable  Affect: Full range, brightens at appropriate times; was congruent to mood; was congruent to content  Thought Process/Associations: unremarkable  Thought Content:  Reports none;  Denies suicidal ideation, violent ideation, delusions and paranoid ideation  Perception:  Reports none;  Denies auditory hallucinations and visual hallucinations  Insight: excellent  Judgment: excellent  Cognition: (6) does  appear grossly intact; formal cognitive testing was not done  Gait and Station: unremarkable    LABS and DATA     AIMS:  Not needed    PHQ9 TODAY = not obtained, telehealth  PHQ-9 SCORE 8/28/2019 10/2/2019 11/6/2019   PHQ-9 Total Score 9 5 6     LITHIUM LABS     [level, renal, SG, TSH, WBC]    q6 mo  Recent Labs   Lab Test 07/15/20  1617 10/15/19  1037 05/01/19  0921 01/14/19  0913   LITHIUM 0.52* 0.65 0.61 0.55*     Recent Labs   Lab Test 07/15/20  1617 10/15/19  1037 05/01/19  0921 12/20/18  1214   CR 0.58 0.45* 0.54 0.47*   GFRESTIMATED >90 >90 >90 >90    138 135 132*   POTASSIUM 4.4 4.3 4.6 4.4   SHERRIE 9.4 9.1 9.0 9.2     Recent Labs   Lab Test 07/15/20  1633 10/15/19  1222 05/01/19  1140 11/01/12  0819   SG 1.008 1.006 1.008 1.015     Recent Labs   Lab Test 07/15/20  1617 10/15/19  1037 05/01/19  0921 11/10/18  0756   TSH 2.46 2.47 2.69 2.29       PSYCHOTROPIC DRUG INTERACTIONS     Concurrent use of LITHIUM and ACE INHIBITORS may result in increased risk of lithium toxicity.    MANAGEMENT:  Monitoring for adverse effects, routine vitals, routine labs, using lowest therapeutic dose of [agents] and patient is aware of risks    RISK STATEMENT for SAFETY     Angy Haji did not appear to be an imminent safety risk to self or others.    DIAGNOSIS     Bipolar Disorder, Type I (late onset), current depressive episode, mild    (h/o one manic episode Nov/Dec 2018)    H/o EPSE secondary to neuroleptic use  H/o Thrombocytopenia secondary to depakote  H/o lithium toxicity,  resolved    ASSESSMENT      [m2, h3]     TODAY interval stability since last visit, with some ongoing mild anhedonia, relatively unchanged in the past year.  No signs or symptoms of worsening depression, hypomania, anthony, or psychosis.  No acute safety concerns.  Continues to struggle with several work and family stressors including daughter's mental health, wife's physical health, home expenses, work life balance.  Feels situationally overwhelmed, though continuing to actively and intentionally build in time for mindfulness and wellness, including use of meditation elis on her phone, reading, and daily exercise.  Tolerating medications well, no side effects.  I agree with patient's expressed preference to make no medication changes at this time, and rather to continue focusing on psychosocial coping mechanisms including therapy, mindfulness activity, and regular psychiatric appointments.  Follow-up in 8 weeks.  Refills provided.    As discussed briefly in last note, should patient begin to experience worsening depressive symptoms, options could include repeating a lithium level and titrating dose versus augmenting with low-dose Lamictal. .     PLAN      [m2, h3]     1) PSYCHOTROPIC MEDICATIONS:  - Continue lithium 900 at bedtime    2) [place]  was checked today: oxycodone 10 mg tab #6 12/13/2019    3) THERAPY: Continue    4) NEXT DUE:    Labs- Li labs January 2021  EKG-PRN  Rating Scales- serial PHQ9    5) REFERRALS:  No    6) RTC: 8 weeks    7) CRISIS NUMBERS:   Provided routinely in AVS   ONLY if a QAMAR PT: [facility] [phone] (clinic), [phone] (after hours)     TREATMENT RISK STATEMENT:  The risks, benefits, alternatives and potential adverse effects have been discussed and are understood by the pt. The pt understands the risks of using street drugs or alcohol. There are no medical contraindications, the pt agrees to treatment with the ability to do so. The pt knows to call the clinic for any  problems or to access emergency care if needed.  Medical and substance use concerns are documented above.  Psychotropic drug interaction check was done, including changes made today.    PROVIDER: Ricci Villa MD    Patient not staffed in clinic.  Supervisor is Dr. Tapia, who will sign the note.    I did not see this pt directly. I have reviewed the documentation, and I agree with the resident's plan of care.    Dacia Tapia MD

## 2020-10-14 NOTE — PATIENT INSTRUCTIONS
Thank you for coming to the [facility] [place] [facility].    Great to see you today, Angy.  No changes to your medications.  I sent refills to your pharmacy.  I have scheduled you for follow-up with me at 345 on Wednesday, December 9.  Please contact clinic with any questions or concerns you might have before this appointment.     Take care,  Dr. Villa    Lab Testing:  If you had lab testing today and your results are reassuring or normal they will be mailed to you or sent through [employer]ZI within 7 days. If the lab tests need quick action we will call you with the results. The phone number we will call with results is # [phone] (home) [phone] (work). If this is not the best number please call our clinic and change the number.    Medication Refills:  If you need any refills please call your pharmacy and they will contact us. Our fax number for refills is [phone]. Please allow three business for refill processing. If you need to  your refill at a new pharmacy, please contact the new pharmacy directly. The new pharmacy will help you get your medications transferred.     Scheduling:  If you have any concerns about today's visit or wish to schedule another appointment please call our office during normal business hours [phone] (8-5:00 M-F)    Contact Us:  Please call [phone] during business hours (8-5:00 M-F).  If after clinic hours, or on the weekend, please call  [phone].    Financial Assistance [phone]  [facility] Billing [phone]  Central Billing Office, [facility]: [phone]  [place] Billing [phone]  Medical Records [phone]      MENTAL HEALTH CRISIS NUMBERS:  For a medical emergency please call  911 or go to the nearest ER.     [facility]:   [facility] -[phone]   Crisis Residence [facility] Residence -[phone]   Walk-In Counseling Center [facility] -[phone]   COPE 24/7 [facility] Team  -[phone] (adults)/[phone] (child)  [facility]: [facility] needs [facility] team - [phone]      [facility]:   [facility] - [phone]   Walk-in counseling [facility] - [phone]   Walk-in counseling [facility] - [phone]   Crisis Residence [facility] Residence - [phone]  Urgent Care Adult Mental [facility]-[phone] mobile unit/ 24/7 crisis line    National Crisis Numbers:   National Suicide Prevention Lifeline: 2-764-538-TALK (407-280-0565)  Poison Control Center - 1-647.414.2718  Snapguide/resources for a list of additional resources (SOS)  Trans Lifeline a hotline for transgender people 0-869-022-7095  The [employer] a hotline for LGBT youth 6-226-636-0914  Crisis Text Line: For any crisis 24/7   To: 999033  see www.crisistextline.org  - IF MAKING A CALL FEELS TOO HARD, send a text!         Again thank you for choosing [facility] [place] [facility] and please let us know how we can best partner with you to improve you and your family's health.    You may be receiving a survey regarding this appointment. We would love to have your feedback, both positive and negative. The survey is done by an external company, so your answers are anonymous.

## 2020-10-16 ENCOUNTER — THERAPY VISIT (OUTPATIENT)
Dept: PHYSICAL THERAPY | Facility: CLINIC | Age: 55
End: 2020-10-16
Payer: COMMERCIAL

## 2020-10-16 DIAGNOSIS — N39.46 URINARY INCONTINENCE, MIXED: ICD-10-CM

## 2020-10-16 DIAGNOSIS — N81.89 PELVIC FLOOR WEAKNESS IN FEMALE: ICD-10-CM

## 2020-10-16 PROCEDURE — 97112 NEUROMUSCULAR REEDUCATION: CPT | Mod: GP | Performed by: PHYSICAL THERAPIST

## 2020-10-16 PROCEDURE — 97110 THERAPEUTIC EXERCISES: CPT | Mod: GP | Performed by: PHYSICAL THERAPIST

## 2020-10-23 ENCOUNTER — THERAPY VISIT (OUTPATIENT)
Dept: PHYSICAL THERAPY | Facility: CLINIC | Age: 55
End: 2020-10-23
Payer: COMMERCIAL

## 2020-10-23 DIAGNOSIS — N39.46 URINARY INCONTINENCE, MIXED: ICD-10-CM

## 2020-10-23 DIAGNOSIS — N81.89 PELVIC FLOOR WEAKNESS IN FEMALE: ICD-10-CM

## 2020-10-23 PROCEDURE — 97112 NEUROMUSCULAR REEDUCATION: CPT | Mod: GP | Performed by: PHYSICAL THERAPIST

## 2020-10-23 PROCEDURE — 97110 THERAPEUTIC EXERCISES: CPT | Mod: GP | Performed by: PHYSICAL THERAPIST

## 2020-11-06 ENCOUNTER — THERAPY VISIT (OUTPATIENT)
Dept: PHYSICAL THERAPY | Facility: CLINIC | Age: 55
End: 2020-11-06
Payer: COMMERCIAL

## 2020-11-06 DIAGNOSIS — M76.31 IT BAND SYNDROME, RIGHT: ICD-10-CM

## 2020-11-06 DIAGNOSIS — M25.551 HIP PAIN, RIGHT: ICD-10-CM

## 2020-11-06 PROCEDURE — 97110 THERAPEUTIC EXERCISES: CPT | Mod: GP | Performed by: PHYSICAL THERAPIST

## 2020-11-06 PROCEDURE — 97161 PT EVAL LOW COMPLEX 20 MIN: CPT | Mod: GP | Performed by: PHYSICAL THERAPIST

## 2020-11-06 PROCEDURE — 97140 MANUAL THERAPY 1/> REGIONS: CPT | Mod: GP | Performed by: PHYSICAL THERAPIST

## 2020-11-06 NOTE — PROGRESS NOTES
[facility] Initial Evaluation  Subjective:    Therapist Generated HPI Evaluation  Problem details: MD order 10/9/20. She has been having LBP for the past 10 year on and off, she goes to PT does her exercises, gets better , fade off on her routine and the pain returns. Current pain has been for the past 3 moths in her right hip radiating to her knees. She has increased difficulty reaching her foot and hard to put of sock, shoes and wash her feet from the standing position. In sitting position she has to pull the lower leg up and notices pain in the side of her hip. Stiffness present in ehr hip for more than 1 hour- driving, it hurts to walk after prolonged sitting and takes few steps to get better. Pain and difficulty getting in and out of chair. She mentioned the pain to her provider and was sent to PT for further management. .         Type of problem:  Right hip.    This is a chronic condition.  Condition occurred with:  Insidious onset.  Where condition occurred: for unknown reasons.  Patient reports pain:  IT band, greater trochanter and lateral.  Pain is described as aching (stretching pain) and is intermittent.  Pain radiates to:  Low back and knee. Pain is worse during the day (right hip pain, left lower back in night).  Since onset symptoms are gradually worsening.  Associated symptoms:  Loss of motion/stiffness, loss of strength, tingling and numbness (tingling and numb right foot while walking). Symptoms are exacerbated by bending/squatting, sitting and transfers  and relieved by nothing.      Restrictions due to condition include:  Working in normal job without restrictions.  Barriers include:  None as reported by patient.    Patient Health History  Angy Haji being seen for right hip pain.     Date of Onset: 3 months.   Problem occurred: unknown   Pain is reported as 3/10 on pain scale.    Pertinent medical history includes: incontinence, menopausal, overweight and sleep  disorder/apnea. Other medical history details: bipolar illness.   Red flags:  Changes in bowel and bladder habits, cold/hot extremity and pain at rest/night.   Other medical allergies details: pencillin, thimerasol.   Surgeries include:  Other. Other surgery history details: removal of left ovary, both tubes, removal gall bladder.    Current medications:  High blood pressure medication and hormone replacement therapy. Other medications details: lithium.    Current occupation is Psychotherpist.   Primary job tasks include:  Computer work and prolonged sitting.                                    Objective:        Flexibility/Screens:       Lower Extremity:      Decreased right lower extremity flexibility:  Hip ER's; Piriformis and IT Band               Lumbar/SI Evaluation  ROM:    AROM Lumbar:   Flexion:       Till ankle pain level 3/10  Ext:                      Side Bend:        Left:     Right:   Rotation:           Left:     Right:   Side Glide:        Left:     Right:                     Lumbar Palpation:      Tenderness present at Right: Erector Spinae; Piriformis; PSIS; Gluteus Medius and Greater Trochanter                                          Hip Evaluation  HIP AROM:  AROM:   Left Hip:     Normal    Right Hip:                      Hip Strength:    Flexion:   Left: 5/5   Pain:  Right: 3+/5    Pain: weak/painful                    Extension:  Left: 4+/5  Pain:Right: 3+/5    Pain:    Abduction:  Left: 4/5     Pain:Right: 3/5    Pain:  Adduction:  Left: 5/5    Pain:Right: 5/5   Pain:  Internal Rotation:  Left: 5/5    Pain:Right: 4+/5   Pain:  External Rotation:  Left: 5/5   Pain:  Right: 4+/5   Pain:            Hip Special Testing:       Right hip positive for the following special tests:  Leslie's    Hip Palpation:      Right hip tenderness present at:  Greater Trachanter; IT Band; Piriformis; Abductors; Gluteus Medius and Bursa             General     ROS    Assessment/Plan:    Patient is a 55 year old  female with right side hip complaints.    Patient has the following significant findings with corresponding treatment plan.                Diagnosis 1:  Right hip pain- IT band syndrome, Bursitis  Pain -  hot/cold therapy, US, electric stimulation, manual therapy, STS, splint/taping/bracing/orthotics, self management, education and home program  Decreased strength - therapeutic exercise, therapeutic activities and home program  Inflammation - cold therapy, US, electric stimulation and self management/home program  Decreased function - therapeutic activities and home program    Therapy Evaluation Codes:   1) History comprised of:   Personal factors that impact the plan of care:      Past/current experiences and Time since onset of symptoms.    Comorbidity factors that impact the plan of care are:      check HPI.     Medications impacting care: Check HPI.  2) Examination of Body Systems comprised of:   Body structures and functions that impact the plan of care:      Hip.   Activity limitations that impact the plan of care are:      Bending, Sitting, Walking and Laying down.  3) Clinical presentation characteristics are:   Stable/Uncomplicated.  4) Decision-Making    Low complexity using standardized patient assessment instrument and/or measureable assessment of functional outcome.  Cumulative Therapy Evaluation is: Low complexity.    Previous and current functional limitations:  (See Goal Flow Sheet for this information)    Short term and Long term goals: (See Goal Flow Sheet for this information)     Communication ability:  Patient appears to be able to clearly communicate and understand verbal and written communication and follow directions correctly.  Treatment Explanation - The following has been discussed with the patient:   RX ordered/plan of care  Anticipated outcomes  Possible risks and side effects  This patient would benefit from PT intervention to resume normal activities.   Rehab potential is  good.    Frequency:  1 X week, once daily  Duration:  for 6 weeks  Discharge Plan:  Achieve all LTG.  Independent in home treatment program.  Reach maximal therapeutic benefit.    Please refer to the daily flowsheet for treatment today, total treatment time and time spent performing 1:1 timed codes.

## 2020-11-08 ENCOUNTER — TELEPHONE (OUTPATIENT)
Dept: PSYCHIATRY | Facility: CLINIC | Age: 55
End: 2020-11-08

## 2020-11-08 ENCOUNTER — TELEPHONE (OUTPATIENT)
Dept: BEHAVIORAL HEALTH | Facility: CLINIC | Age: 55
End: 2020-11-08

## 2020-11-08 NOTE — TELEPHONE ENCOUNTER
Pt called requesting resident psyc call back as she accidentially took her nighttime lithium this am, and had already taken last nights dose.  Pls call pt at [phone]

## 2020-11-11 ENCOUNTER — HOSPITAL ENCOUNTER (OUTPATIENT)
Dept: NUTRITION | Facility: CLINIC | Age: 55
Discharge: HOME | End: 2020-11-11
Attending: DIETITIAN, REGISTERED | Admitting: DIETITIAN, REGISTERED
Payer: COMMERCIAL

## 2020-11-11 PROCEDURE — 97803 MED NUTRITION INDIV SUBSEQ: CPT | Mod: 95 | Performed by: DIETITIAN, REGISTERED

## 2020-11-11 NOTE — PROGRESS NOTES
"Angy Haji is a 55 year old female who is being evaluated via a billable video visit.      The patient has been notified of following:     \"This video visit will be conducted via a call between you and your physician/provider. We have found that certain health care needs can be provided without the need for an in-person physical exam.  This service lets us provide the care you need with a video conversation.  If a prescription is necessary we can send it directly to your pharmacy.  If lab work is needed we can place an order for that and you can then stop by our lab to have the test done at a later time.    Video visits are billed at different rates depending on your insurance coverage.  Please reach out to your insurance provider with any questions.    If during the course of the call the physician/provider feels a video visit is not appropriate, you will not be charged for this service.\"    Patient has given verbal consent for Video visit? Yes    Video-Visit Details    Type of service:  Video Visit    Video Start Time: 9:39  Video End Time: 11:10    Originating Location (pt. Location): Home    Distant Location (provider location):  [facility] NUTRITION SERVICES     Platform used for Video Visit: [employer]-converted to telephone visit during middle of appointment due to poor connections         NUTRITION REASSESSMENT NOTE (TELEPHONE VISIT DUE TO COVID-19)     REASON FOR ASSESSMENT   Angy Haji referred by Dr. Odell for MNT related to  Obesity (BMI 30-39.9) [E66.9]  - Primary       Benign essential hypertension [I10]       Patient accompanied by self (via telephone)     NUTRITION HISTORY  Patient continues with multiple life changes and stressors.  Patient aware that she likes to self soothe with ice cream as has substituted water, hot tea or longer reading time for distraction from ice cream.  Patient and spouse have been preparing meals for the family.  Patient would like to begin focusing on " "weight loss again.  Patient is not sure if Weight [employer] is meeting face to face so bought scale and is weighing self 1 time per week.  Patient is aware that social cues trigger her to eat.  Patient's spouse is also working on weight loss.        DIET RECALL   Breakfast: cereal and banana; yogurt + granola (measures), fruit + coffee; 2 slices toast with butter + 1/2 cup cottage cheese    Lunch: leftovers (includes fruit and vegetable daily)  Snack: fruit or cheese with lower salt nut thin crackers   Dinner: chicken thighs with beans and ricesheet pan dinner with chicken and vegetables or grilled salmon and grilled vegetables; taco; protein + fruit and vegetable  Snacks: fruit, string cheese, Ritz crackers or yogurt; dessert in the evening   Beverages: mainly water, coffee, mineral water  Dining out: none currently  Exercise: Patient exercising 5-6 times per week. (biking, kayaking, hiking, walking, and walking dog).          MEDICATIONS:  Reviewed      ANTHROPOMETRICS  Height: 5'8\" (note height change in chart previously 5'9\")  Weight: 314.4 (patient reported)   BMI (kg/m2): 47.6 kg/m2   %IBW: 222%  Weight History:   Wt Readings from Last 10 Encounters:   10/09/20 141.2 kg (311 lb 3.2 oz)   09/23/20 143.1 kg (315 lb 6.4 oz)   09/16/20 141.1 kg (311 lb)   07/29/20 139.7 kg (308 lb)   03/11/20 138.6 kg (305 lb 8 oz)   01/24/20 137 kg (302 lb)   12/12/19 133.8 kg (295 lb)   12/04/19 133.8 kg (295 lb)   11/07/19 133.4 kg (294 lb)   06/26/19 131.5 kg (290 lb)     ASSESSED NUTRITION NEEDS  Estimated Energy Needs: 8302-4384 kcals (15-20 kcals/kg) for gradual weight loss   Estimated Protein Needs: 66-79 (1-1.2 gm/kg IBW) for repletion with exercise  RMR: 1440     EVALUATION/PROGRESS TOWARDS GOALS   Previous goals:  Continue exercising 5 times per week-met  Aim for 45-60 grams carbohydrates at evening meal-not met   Avoid desserts 2 days per week after dinner-improving      Previous Nutrition Diagnosis: Obesity related to " excess energy intake as evidenced by BMI of 47.6 kg/m2-no change     Current Nutrition Diagnosis:Obesity related to excess energy intake as evidenced by BMI of 47.7 kg/m2     INTERVENTIONS   Nutrition Prescription   Recommend exercise 5 times per week; track intake if eating mindlessly; increase fiber to 25-30 grams per day      Implementation:   Meals and Snacks: Continue eating 3 meals + deliberate snacking (if hungry)   Nutrition Education (Content):               a)  Discussed progress towards goals.  Congratulated patient for consistent exercise.  Reviewed current food and eating behavior challenges as patient has been feeling that she has been stress eating.  Discussed carbohydrates portions of foods to help with weight reduction.  Supported patient in her struggle with food and weight.    Nutrition Education (Application):               a) Determined ways to balance carbohydrates at dinner by measuring grain portion.  Discussed ways to redirect self when gets triggered by social cues in the evening.                       b) Patient verbalized understanding of diet by stating will reduce desserts to 5 times per week.        Expected patient engagement: good     CURRENT GOALS   Continue exercising 30 minutes 5 times per week + 1 hour exercise 1-2 times on the weekend  Set reminder on phone to track carbohydrates at dinner every other day   Leave calendar open to remind self to track   Avoid desserts 2 days per week after dinner   Put baked goods in freezer 2 days after baking     FOLLOW UP/MONITORING    Patient to follow up in 7 weeks  Patient has RD contact information      Time spent with patient: 29 minutes     Mattie Amaya, RD, LD  [facility] Outpatient Dietitian  [phone] (office phone)

## 2020-11-13 ENCOUNTER — THERAPY VISIT (OUTPATIENT)
Dept: PHYSICAL THERAPY | Facility: CLINIC | Age: 55
End: 2020-11-13
Payer: COMMERCIAL

## 2020-11-13 DIAGNOSIS — M25.551 HIP PAIN, RIGHT: ICD-10-CM

## 2020-11-13 PROCEDURE — 97140 MANUAL THERAPY 1/> REGIONS: CPT | Mod: GP | Performed by: PHYSICAL THERAPIST

## 2020-11-13 PROCEDURE — 97112 NEUROMUSCULAR REEDUCATION: CPT | Mod: GP | Performed by: PHYSICAL THERAPIST

## 2020-11-13 PROCEDURE — 97110 THERAPEUTIC EXERCISES: CPT | Mod: GP | Performed by: PHYSICAL THERAPIST

## 2020-11-20 ENCOUNTER — THERAPY VISIT (OUTPATIENT)
Dept: PHYSICAL THERAPY | Facility: CLINIC | Age: 55
End: 2020-11-20
Payer: COMMERCIAL

## 2020-11-20 DIAGNOSIS — M25.551 HIP PAIN, RIGHT: ICD-10-CM

## 2020-11-20 PROCEDURE — 97110 THERAPEUTIC EXERCISES: CPT | Mod: GP | Performed by: PHYSICAL THERAPIST

## 2020-11-20 PROCEDURE — 97140 MANUAL THERAPY 1/> REGIONS: CPT | Mod: GP | Performed by: PHYSICAL THERAPIST

## 2020-11-20 PROCEDURE — 97112 NEUROMUSCULAR REEDUCATION: CPT | Mod: GP | Performed by: PHYSICAL THERAPIST

## 2020-11-27 ENCOUNTER — THERAPY VISIT (OUTPATIENT)
Dept: PHYSICAL THERAPY | Facility: CLINIC | Age: 55
End: 2020-11-27
Payer: COMMERCIAL

## 2020-11-27 DIAGNOSIS — M25.551 HIP PAIN, RIGHT: ICD-10-CM

## 2020-11-27 PROCEDURE — 97140 MANUAL THERAPY 1/> REGIONS: CPT | Mod: GP | Performed by: PHYSICAL THERAPIST

## 2020-11-27 PROCEDURE — 97110 THERAPEUTIC EXERCISES: CPT | Mod: GP | Performed by: PHYSICAL THERAPIST

## 2020-12-02 ENCOUNTER — MYC MEDICAL ADVICE (OUTPATIENT)
Dept: PSYCHIATRY | Facility: CLINIC | Age: 55
End: 2020-12-02

## 2020-12-02 ENCOUNTER — ANCILLARY PROCEDURE (OUTPATIENT)
Dept: MAMMOGRAPHY | Facility: CLINIC | Age: 55
End: 2020-12-02
Attending: OBSTETRICS & GYNECOLOGY
Payer: COMMERCIAL

## 2020-12-02 DIAGNOSIS — F31.9 BIPOLAR I DISORDER (H): ICD-10-CM

## 2020-12-02 DIAGNOSIS — Z12.39 SCREENING FOR BREAST CANCER: ICD-10-CM

## 2020-12-02 PROCEDURE — 77067 SCR MAMMO BI INCL CAD: CPT | Mod: GC | Performed by: RADIOLOGY

## 2020-12-02 PROCEDURE — 77063 BREAST TOMOSYNTHESIS BI: CPT | Mod: GC | Performed by: RADIOLOGY

## 2020-12-02 RX ORDER — LITHIUM CARBONATE 300 MG/1
900 CAPSULE ORAL AT BEDTIME
Qty: 90 CAPSULE | Refills: 0 | Status: SHIPPED | OUTPATIENT
Start: 2020-12-02 | End: 2020-12-09

## 2020-12-02 NOTE — TELEPHONE ENCOUNTER
Received RF request from patient     Last seen: 10/14/20  RTC: 8 weeks  Cancel: none  No-show: none  Next appt: 12/9/20     Medication requested: lithium 300 MG capsule  Directions: Take 3 capsules (900 mg) by mouth At Bedtime   Qty: 90  Last Rx written 9/2/20 for 30 ds with 1 rf       Plan per 10/14 virtual visit:    - Continue lithium 900 at bedtime       Medication refill approved per refill protocol. Sent 30 ds electronically to the [facility] pharmacy and informed patient through [employer].     Routed to Dr. Villa for FYI.

## 2020-12-07 RX ORDER — LITHIUM CARBONATE 300 MG/1
CAPSULE ORAL
Qty: 90 CAPSULE | Refills: 1 | OUTPATIENT
Start: 2020-12-07

## 2020-12-07 NOTE — PROGRESS NOTES
"VIDEO VISIT  Angy Haji is a 55 year old patient who is being evaluated via a billable video visit.      The patient has been notified of following:   \"We have found that certain health care needs can be provided without the need for an in-person physical exam. This service lets us provide the care you need with a video conversation. If a prescription is necessary we can send it directly to your pharmacy. If lab work is needed we can place an order for that and you can then stop by our lab to have the test done at a later time. Insurers are generally covering virtual visits as they would in-office visits so billing should not be different than normal.  If for some reason you do get billed incorrectly, you should contact the billing office to correct it and that number is in the AVS .    Patient has given verbal consent for video visit?: Yes   How would you like to obtain your AVS?: uVore  AVS SmartPhrase [PsychAVS] has been placed in 'Patient Instructions': Yes      Video- Visit Details  Type of service:  video visit for medication management  Time of service:    Date:  12/07/2020    Video Start Time:  400       Video End Time:  445    Reason for video visit:  Services only offered telehealth  Originating Site (patient location):  [facility]   Location- Patient's home  Distant Site (provider location):  Remote location  Mode of Communication:  Video Conference via [email or web address]  Consent:  Patient has given verbal consent for video visit?: Yes            [facility]  Psychiatry Clinic   PSYCHIATRY PROGRESS NOTE     Date of initial Diagnostic Assessment (DA) is 12/5/18.  Date of most recent Transfer of Care Diagnostic Assessment is 07/16/19.     CARE TEAM:    PCP- [facility]     Therapist- [facility] (private practive)      Specialty Providers- OB/GYN, ENT, PT, Nutrition      Wife- Rachael Haji is a 55 year old female who prefers the name Angy & " LEFT UNIT VIA WHEELCHAIR FOR TRANSPORT HOME "pronouns she, her, hers.      Psych pertinent item history includes Manic episode, psychiatric hospitalization in November 2018.    INTERIM HISTORY      [4, 4]   The patient reports good treatment adherence.  History was provided by the patient and family who were good historians.  The last visit ended with no changes  Since the last visit:   - Some more low and irritable moods since last visit. Slightly more anhedonia, having to work harder to feel enjoyment. No hopelessness, but feeling overwhelmed. No anthony/hypomania symptoms per pt and wife. Starting to feel like it's not just situational, and maybe a med change could be helpful.  - Lots of ongoing stressors. Daughters MH doing better, but now strigguling more in school. Wife Rachael going through with bariatric surgery in Feb, which is stressful to Angy. Increased COVID restrictions are \"a bummer.\" Dad's 80th BDay is soon, and disappointing that it's on Zoom. Wishes that [place] could be in person.  - Sleep is stable, no decreased need for sleep, started pelvic floor therapy to help with nighttime wakings  - Still reading for pleasure, thought current novel is \"heavy\".     RECENT PSYCH ROS:   Depression:  depressed mood, anhedonia, irritable and overwhelmed   Elevated:  none  Psychosis:  none  Anxiety:  none  Panic Attack:  none  Trauma Related:  none  Dysregulation:  none  Eating Disorder: some overeating, working with dietician, PCP  Pertinent Negative Symptoms:  NO suicidal ideation, self-injurious behavior/urges, violent ideation, aggression, psychosis, hallucinations, delusions, anthony and hypomania    RECENT SUBSTANCE USE:     Alcohol- 1, sometimes 2 glasses of wine in evening, 0-2x per week  Tobacco- No  Caffeine- drinks around 24oz of coffee during the day  Opioids- No           Narcan Kit- N/A   Cannabis- No     Other illicit drugs- none    CURRENT SOCIAL HISTORY:  Financial/ Work- 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     "   Partner/ - wife, Rachael  Children- 13 years old daughter, Gricel      Living situation- Moved into [facility] in [place], lives with wife Danielle and teenage daughter   Social/ Spiritual Support- Family (wife and daughter)     FEELS SAFE AT HOME- yes     PSYCH and CD Critical Summary Points since July 2019            Aug 2019 - decreased the Depakote to 500 mg nightly  Nov 2019 - VPA decreased to 250 mg nightly  Jan 2020 - VPA decreased to 125 mg every night  Apr 2020 - Stopped VPA  Oct 2020 - inc Li to 1050 mg QHS    PAST MED TRIALS          See last Diagnostic Assessment  MEDICAL / SURGICAL HISTORY                                   Pregnant or breastfeeding- no      Contraception- HRT for menopause    Patient Active Problem List   Diagnosis     Oral aphthae     Disorder of bursae and tendons in shoulder region     CARDIOVASCULAR SCREENING; LDL GOAL LESS THAN 160     Rosacea     Tear of medial cartilage or meniscus of knee, current     Raynaud disease     Vitamin D deficiency disease     Mild sleep apnea     Atopic rhinitis     Chronic bilateral low back pain without sciatica     Obesity, Class III, BMI 40-49.9 (morbid obesity) (H)     Muscle tension dysphonia     Palpitations     Dysphagia, unspecified type     Anxiety     Benign essential hypertension     Callus of foot     Varicose veins of right lower extremity with edema     Bipolar I disorder (H)     Pelvic floor weakness in female     Urinary incontinence, mixed     Dribbling of urine     Hip pain, right     It band syndrome, right       Major Surgery- n/a    MEDICAL REVIEW OF SYSTEMS    [2, 10]     A comprehensive review of systems was performed and is negative other than noted in the HPI.    ALLERGY       Seroquel [quetiapine], Olanzapine, Penicillins, Risperidone, and Thimerosal  MEDICATIONS        Current Outpatient Medications   Medication Sig Dispense Refill     Carboxymethylcellulose Sod PF (REFRESH PLUS) 0.5 % SOLN ophthalmic solution Place 1  drop into both eyes 3 times daily as needed for dry eyes       estrogen conj-medroxyPROGESTERone (PREMPRO) 0.625-2.5 MG tablet Take 1 tablet by mouth daily 90 tablet 3     lisinopril (ZESTRIL) 10 MG tablet Take 1 tablet (10 mg) by mouth daily 90 tablet 3     lithium 300 MG capsule Take 3 capsules (900 mg) by mouth At Bedtime 90 capsule 0     Loratadine 10 MG capsule Take 10 mg by mouth daily as needed.       metroNIDAZOLE (METROGEL) 1 % external gel Apply topically daily 60 g 9     ORDER FOR DME Use your CPAP device as directed by your provider.       PROAIR  (90 Base) MCG/ACT inhaler        VITAMIN D, CHOLECALCIFEROL, PO Take 2,000 Units by mouth daily       VITALS      [3, 3]   There were no vitals taken for this visit. , telehealth  MENTAL STATUS EXAM      [9, 14 cog gs]     Alertness: alert  and oriented  Appearance: well groomed, dressed in professional clothing, at home, seated in 3 season porch  Behavior/Demeanor: cooperative, pleasant and calm, with good  eye contact   Speech: normal and regular rate and rhythm  Language: intact  Psychomotor: normal or unremarkable  Mood: stable  Affect: Full range, brightens at appropriate times; was congruent to mood; was congruent to content  Thought Process/Associations: unremarkable  Thought Content:  Reports none;  Denies suicidal ideation, violent ideation, delusions and paranoid ideation  Perception:  Reports none;  Denies auditory hallucinations and visual hallucinations  Insight: excellent  Judgment: excellent  Cognition: (6) does  appear grossly intact; formal cognitive testing was not done  Gait and Station: unremarkable    LABS and DATA     AIMS:  Not needed    PHQ9 TODAY = not obtained, telehealth  PHQ-9 SCORE 8/28/2019 10/2/2019 11/6/2019   PHQ-9 Total Score 9 5 6     LITHIUM LABS     [level, renal, SG, TSH, WBC]    q6 mo  Recent Labs   Lab Test 07/15/20  1617 10/15/19  1037 05/01/19  0921 01/14/19  0913   LITHIUM 0.52* 0.65 0.61 0.55*     Recent Labs    Lab Test 07/15/20  1617 10/15/19  1037 05/01/19  0921 12/20/18  1214   CR 0.58 0.45* 0.54 0.47*   GFRESTIMATED >90 >90 >90 >90    138 135 132*   POTASSIUM 4.4 4.3 4.6 4.4   SHERRIE 9.4 9.1 9.0 9.2     Recent Labs   Lab Test 07/15/20  1633 10/15/19  1222 05/01/19  1140 11/01/12  0819   SG 1.008 1.006 1.008 1.015     Recent Labs   Lab Test 07/15/20  1617 10/15/19  1037 05/01/19  0921 11/10/18  0756   TSH 2.46 2.47 2.69 2.29       PSYCHOTROPIC DRUG INTERACTIONS     Concurrent use of LITHIUM and ACE INHIBITORS may result in increased risk of lithium toxicity.    MANAGEMENT:  Monitoring for adverse effects, routine vitals, routine labs, using lowest therapeutic dose of [agents] and patient is aware of risks    RISK STATEMENT for SAFETY     Angy LILIAN Haji did not appear to be an imminent safety risk to self or others.    DIAGNOSIS     Bipolar Disorder, Type I (late onset), current depressive episode, mild, without psychotic features    (h/o one manic episode Nov/Dec 2018)    H/o EPSE secondary to neuroleptic use  H/o Thrombocytopenia secondary to depakote  H/o lithium toxicity, resolved    ASSESSMENT      [m2, h3]     TODAY Increased depressive symptoms since last visit. She has experienced mild anhedonia off/on for the duration of our time working together, abated somewhat with discontinuation of VPA, however never resolving completely. Multiple ongoing and new stressors in past few months have led to worsening of symptoms. No hopelessness, SI, or acute safety concerns. No evidence of anthony/hypomania, no psychotic thought content. Recommended increasing lithium to 1050 mg nightly for target serum level ~0.8. F/u scheduled in 5 weeks.        PLAN      [m2, h3]     1) PSYCHOTROPIC MEDICATIONS:  - Increase lithium to 1050 mg at bedtime    2) MN  was checked today: none    3) THERAPY: Continue    4) NEXT DUE:    Labs- Li labs January 2021  EKG-PRN  Rating Scales- serial PHQ9    5) REFERRALS:  No    6) RTC: jan 13th  2021    7) CRISIS NUMBERS:   Provided routinely in AVS   ONLY if a [facility] PT: [facility] [phone] (clinic), [phone] (after hours)     TREATMENT RISK STATEMENT:  The risks, benefits, alternatives and potential adverse effects have been discussed and are understood by the pt. The pt understands the risks of using street drugs or alcohol. There are no medical contraindications, the pt agrees to treatment with the ability to do so. The pt knows to call the clinic for any problems or to access emergency care if needed.  Medical and substance use concerns are documented above.  Psychotropic drug interaction check was done, including changes made today.    PROVIDER: Ricci Villa MD    Patient not staffed in clinic.  Supervisor is Dr. Tapia, who will sign the note.

## 2020-12-09 ENCOUNTER — VIRTUAL VISIT (OUTPATIENT)
Dept: PSYCHIATRY | Facility: CLINIC | Age: 55
End: 2020-12-09
Attending: PSYCHIATRY & NEUROLOGY
Payer: COMMERCIAL

## 2020-12-09 DIAGNOSIS — F31.9 BIPOLAR I DISORDER (H): ICD-10-CM

## 2020-12-09 PROCEDURE — 99214 OFFICE O/P EST MOD 30 MIN: CPT | Mod: 95 | Performed by: STUDENT IN AN ORGANIZED HEALTH CARE EDUCATION/TRAINING PROGRAM

## 2020-12-09 RX ORDER — LITHIUM CARBONATE 300 MG/1
900 CAPSULE ORAL AT BEDTIME
Qty: 90 CAPSULE | Refills: 1 | Status: SHIPPED | OUTPATIENT
Start: 2020-12-09 | End: 2021-01-13

## 2020-12-09 RX ORDER — LITHIUM CARBONATE 600 MG/1
600 CAPSULE ORAL AT BEDTIME
Qty: 30 CAPSULE | Refills: 1 | Status: SHIPPED | OUTPATIENT
Start: 2020-12-09 | End: 2020-12-09

## 2020-12-09 RX ORDER — LITHIUM CARBONATE 300 MG/1
900 CAPSULE ORAL AT BEDTIME
Qty: 90 CAPSULE | Refills: 0 | Status: CANCELLED | OUTPATIENT
Start: 2020-12-09

## 2020-12-09 RX ORDER — LITHIUM CARBONATE 450 MG
450 TABLET, EXTENDED RELEASE ORAL DAILY
Qty: 30 TABLET | Refills: 1 | Status: SHIPPED | OUTPATIENT
Start: 2020-12-09 | End: 2020-12-09

## 2020-12-09 RX ORDER — LITHIUM CARBONATE 150 MG/1
150 CAPSULE ORAL AT BEDTIME
Qty: 30 CAPSULE | Refills: 1 | Status: SHIPPED | OUTPATIENT
Start: 2020-12-09 | End: 2021-01-13

## 2020-12-09 ASSESSMENT — PAIN SCALES - GENERAL: PAINLEVEL: NO PAIN (0)

## 2020-12-09 NOTE — PATIENT INSTRUCTIONS
Thank you for coming to the [facility] MENTAL HEALTH & ADDICTION [place] [facility].      Good to see you today, Angy!  I have sent refills to your pharmacy.  We will increase lithium to 1050 mg at bedtime today.   Our next visit is scheduled for Jan 13th. Please go to [email or web address] for that visit.   Please reach out via phone or [employer]oliCloudhart with any questions or concerns prior to your next appointment.     Take care,  Dr. Villa    Lab Testing:  If you had lab testing today and your results are reassuring or normal they will be mailed to you or sent through [employer] within 7 days. If the lab tests need quick action we will call you with the results. The phone number we will call with results is # [phone] (home) [phone] (work). If this is not the best number please call our clinic and change the number.    Medication Refills:  If you need any refills please call your pharmacy and they will contact us. Our fax number for refills is [phone]. Please allow three business for refill processing. If you need to  your refill at a new pharmacy, please contact the new pharmacy directly. The new pharmacy will help you get your medications transferred.     Scheduling:  If you have any concerns about today's visit or wish to schedule another appointment please call our office during normal business hours [phone] (8-5:00 M-F)    Contact Us:  Please call [phone] during business hours (8-5:00 M-F).  If after clinic hours, or on the weekend, please call  [phone].    Financial Assistance [phone]  [facility] Billing [phone]  Central Billing Office, [facility]: [phone]  [facility] Billing [phone]  Medical Records [phone]  [facility] Patient Bill of Rights [email or web address]       MENTAL HEALTH CRISIS NUMBERS:  For a medical emergency please call  911 or go to the nearest ER.     [facility]:    [facility] -[phone]   Crisis Residence [facility] -[phone]   Walk-In Counseling Center [facility] [place] [facility] -[phone]   COPE 24/7 [place] Mobile Team -[phone] (adults)/[phone] (child)  CHILD: [facility] needs assessment team - [phone]      [facility] - [phone]   Walk-in counseling [facility] - [phone]   Walk-in counseling [facility] - [phone]   Crisis Residence [facility] - [phone]  Urgent Care Adult Mental Dtebnd-[phone] mobile unit/ 24/7 crisis line    National Crisis Numbers:   National Suicide Prevention Lifeline: 4-646-698-TALK (568-452-7101)  Poison Control Center - 1-861.528.4091  e[employer]lik/resources for a list of additional resources (SOS)  Trans Lifeline a hotline for transgender people 7-286-965-1099  The [employer] a hotline for LGBT youth 1-501.374.3354  Crisis Text Line: For any crisis 24/7   To: 077059  see www.crisistextline.org  - IF MAKING A CALL FEELS TOO HARD, send a text!         Again thank you for choosing [facility] M[facility] HE[facility] ADDI[facility] and please let us know how we can best partner with you to improve you and your family's health.    You may be receiving a survey regarding this appointment. We would love to have your feedback, both positive and negative. The survey is done by an external company, so your answers are anonymous.

## 2020-12-11 ENCOUNTER — THERAPY VISIT (OUTPATIENT)
Dept: PHYSICAL THERAPY | Facility: CLINIC | Age: 55
End: 2020-12-11
Payer: COMMERCIAL

## 2020-12-11 DIAGNOSIS — M25.551 HIP PAIN, RIGHT: ICD-10-CM

## 2020-12-11 PROCEDURE — 97112 NEUROMUSCULAR REEDUCATION: CPT | Mod: GP | Performed by: PHYSICAL THERAPIST

## 2020-12-11 PROCEDURE — 97110 THERAPEUTIC EXERCISES: CPT | Mod: GP | Performed by: PHYSICAL THERAPIST

## 2020-12-18 ENCOUNTER — THERAPY VISIT (OUTPATIENT)
Dept: PHYSICAL THERAPY | Facility: CLINIC | Age: 55
End: 2020-12-18
Payer: COMMERCIAL

## 2020-12-18 DIAGNOSIS — M25.551 HIP PAIN, RIGHT: ICD-10-CM

## 2020-12-18 DIAGNOSIS — G47.33 OBSTRUCTIVE SLEEP APNEA (ADULT) (PEDIATRIC): Primary | ICD-10-CM

## 2020-12-18 PROCEDURE — 97530 THERAPEUTIC ACTIVITIES: CPT | Mod: GP | Performed by: PHYSICAL THERAPIST

## 2020-12-18 PROCEDURE — 97110 THERAPEUTIC EXERCISES: CPT | Mod: GP | Performed by: PHYSICAL THERAPIST

## 2020-12-18 PROCEDURE — 97112 NEUROMUSCULAR REEDUCATION: CPT | Mod: GP | Performed by: PHYSICAL THERAPIST

## 2020-12-18 NOTE — PROGRESS NOTES
Subjective:  HPI  Physical Exam                    Objective:  System    Physical Exam    General     ROS    Assessment/Plan:    PROGRESS  REPORT    Progress reporting period is from 11/6/20 to 12/18/20.       SUBJECTIVE  Subjective changes noted by patient:  Subjective: Angy is getting better strength wise and fuctionally. She stillw ants to work with ehr ROM, flexibility adn strength program to be able to do her ADL without pain and discomfort    Current pain level is  Current Pain level: 2/10.     Previous pain level was    .   Changes in function:  Yes (See Goal flowsheet attached for changes in current functional level)  Adverse reaction to treatment or activity:     OBJECTIVE  Changes noted in objective findings:  Yes,   Objective: Improving hip flexiiblity demonstrated. MMT 3+/5. Supported squat to 90 degrees, Anble to transfer from chair without support.      ASSESSMENT/PLAN  Updated problem list and treatment plan: Diagnosis 1:  R hip pain- IT band syndrome  Pain -  hot/cold therapy, US, electric stimulation, manual therapy, STS, self management, education and home program  Decreased ROM/flexibility - manual therapy, therapeutic exercise, therapeutic activity and home program  Decreased strength - therapeutic exercise, therapeutic activities and home program  Impaired balance - neuro re-education, gait training, therapeutic activities and home program  Impaired gait - gait training and home program  Decreased function - therapeutic activities and home program  STG/LTGs have been met or progress has been made towards goals:  Yes (See Goal flow sheet completed today.)  Assessment of Progress: The patient's condition is improving.  The patient's condition has potential to improve.  Patient is meeting short term goals and is progressing towards long term goals.  Self Management Plans:  Patient has been instructed in a home treatment program.  Patient  has been instructed in self management of symptoms.  I have  re-evaluated this patient and find that the nature, scope, duration and intensity of the therapy is appropriate for the medical condition of the patient.  Angy continues to require the following intervention to meet STG and LTG's:  PT    Recommendations:  This patient would benefit from continued therapy.     Frequency:  1 X week, once daily  Duration:  for 6 weeks        Please refer to the daily flowsheet for treatment today, total treatment time and time spent performing 1:1 timed codes.

## 2021-01-06 ENCOUNTER — VIRTUAL VISIT (OUTPATIENT)
Dept: SLEEP MEDICINE | Facility: CLINIC | Age: 56
End: 2021-01-06
Payer: COMMERCIAL

## 2021-01-06 VITALS — HEIGHT: 69 IN | BODY MASS INDEX: 43.4 KG/M2 | WEIGHT: 293 LBS

## 2021-01-06 DIAGNOSIS — M54.50 CHRONIC BILATERAL LOW BACK PAIN WITHOUT SCIATICA: ICD-10-CM

## 2021-01-06 DIAGNOSIS — F31.9 BIPOLAR I DISORDER (H): ICD-10-CM

## 2021-01-06 DIAGNOSIS — E66.01 OBESITY, CLASS III, BMI 40-49.9 (MORBID OBESITY) (H): ICD-10-CM

## 2021-01-06 DIAGNOSIS — R35.1 NOCTURIA: ICD-10-CM

## 2021-01-06 DIAGNOSIS — G47.30 MILD SLEEP APNEA: Primary | ICD-10-CM

## 2021-01-06 DIAGNOSIS — G89.29 CHRONIC BILATERAL LOW BACK PAIN WITHOUT SCIATICA: ICD-10-CM

## 2021-01-06 PROCEDURE — 99214 OFFICE O/P EST MOD 30 MIN: CPT | Mod: 95 | Performed by: INTERNAL MEDICINE

## 2021-01-06 SDOH — HEALTH STABILITY: MENTAL HEALTH: HOW OFTEN DO YOU HAVE 6 OR MORE DRINKS ON ONE OCCASION?: NEVER

## 2021-01-06 SDOH — HEALTH STABILITY: MENTAL HEALTH: HOW MANY STANDARD DRINKS CONTAINING ALCOHOL DO YOU HAVE ON A TYPICAL DAY?: 1 OR 2

## 2021-01-06 SDOH — HEALTH STABILITY: MENTAL HEALTH: HOW OFTEN DO YOU HAVE A DRINK CONTAINING ALCOHOL?: MONTHLY OR LESS

## 2021-01-06 ASSESSMENT — MIFFLIN-ST. JEOR: SCORE: 2107.28

## 2021-01-06 NOTE — PROGRESS NOTES
Angy Haji is a 55 year old female who is being evaluated via a billable video visit.      How would you like to obtain your AVS? MyChart  If the video visit is dropped, the invitation should be resent by: Send to e-mail at: [email or web address]  Will anyone else be joining your video visit? No      Video Start Time: 8:58 AM  Assessment & Plan     Mild sleep apnea  Meeting compliance goals and getting excellent clinical benefit with CPAP.  Pressure requirements increased by 1 cm of water likely related to weight gain and sleeping more on back.  No pressure adjustments required today.  Patient instructed on how to change humidification settings for comfort.  Urged to follow-up with DME provider if further help needed.  Prescription generated keep supplies up-to-date.  Patient should follow-up in 1 year.    Bipolar I disorder (H)  Currently managing with stable medication.  Had weight gain associated with meds.  No significant sleep disruption at this time associated with bipolar.  Continue to follow with psychiatry.    Nocturia  Getting up 2 times a night to go to the bathroom.  Working with physical therapy for urinary leakage.  Watching fluid intake and caffeine intake.    Chronic bilateral low back pain without sciatica  Increasing sleep disruption when on sides.  Sleeping more on back.  However, still getting optimal treatment of sleep apnea with current settings.  Continue physical therapy.    Obesity, Class III, BMI 40-49.9 (morbid obesity) (H)  Has had weight gain associated with psychiatric medications as well as pandemic and holidays.  Continues to work with dietitian and has a plan for cutting carbs.  Also increasing physical activity and continuing physical therapy.        30 minutes spent on the date of the encounter doing chart review, history and exam, documentation and further activities as noted above             Return in about 1 year (around 1/6/2022).    Nellie Calloway MD  [facility]  [facility] [place]    Ruby Haji is a 55 year old who presents to clinic today for the following health issues : MARLA    HPI       55-year-old female with history of obesity, bipolar disorder, overall mild obstructive sleep apnea.  Sleep apnea severity likely fluctuates with weight.  She reports keeping a regular sleep schedule.  Going to bed around 10 PM and rise time around 630.  She has been getting up at least twice to go to the bathroom.  Usually she can return to sleep okay but not always.  She has occasional difficulty falling asleep at night as well.  She will use meditation elis for this.  She has been suffering more low back pain and hip pain which has been affecting her sleep and causing her to sleep more on her back.  She is also gained weight during the pandemic.  She is working with physical therapy for back pain and urinary leakage strengthening pelvic floor.  She continues to work with dietitian and has a plan to cut carbs.  She has been trying to be physically active as able with her aches and pains.  She occasionally suffers more nasal congestion and would like to have more humidification and CPAP.  Otherwise pressures feel comfortable.  She uses Pap nightly.    Total score - [place]: 3 (1/7/2021  3:00 PM) (Less than 10 normal)    Insomnia Severity Score MICHAEL Total Score: 6      Objective       Vitals:  No vitals were obtained today due to virtual visit.    Physical Exam   GENERAL: Healthy, alert and no distress  EYES: Eyes grossly normal to inspection.  No discharge or erythema, or obvious scleral/conjunctival abnormalities.  RESP: No audible wheeze, cough, or visible cyanosis.  No visible retractions or increased work of breathing.    SKIN: Visible skin clear. No significant rash, abnormal pigmentation or lesions.  NEURO: Cranial nerves grossly intact.  Mentation and speech appropriate for age.  PSYCH: Mentation appears normal, affect normal/bright, judgement and  insight intact, normal speech and appearance well-groomed.    CPAP download reviewed in  CARE :  Data reviewed from 12/7/2020 to 1/5/2021  Percent of days with usage greater than 4 hours equals 97%  Average use on days used 7 hours and 56 minutes  Current settings min pressure 9, max pressure 12  90th percentile for pressure is 11.1  Leak acceptable    PSG 3/2/2011  Wt 241 lbs  AHI 5.1, Supine AHI 14.5, non supine AHI 1      Video-Visit Details    Type of service:  Video Visit    Video End Time: 9:20 AM    Originating Location (pt. Location): Home    Distant Location (provider location):  Home Office    Platform used for Video Visit: Christian

## 2021-01-06 NOTE — PATIENT INSTRUCTIONS
For general sleep health questions: http://sleepeducation.org    For tips about PAP and COVID -19:  https://www.thoracic.org/patients/patient-resources/resources/covid-19-and-home-pap-therapy.pdf        Continue PAP therapy every night, for all hours that you are sleeping.  If you nap use CPAP.  As always, try to get at least 8 hours of sleep or more each day, keep a regular sleep schedule, and avoid sleep deprivation. Avoid alcohol.    Reasons that you might need a change to your pressure therapy would be weight gain or loss, waking having inadvertently removed your PAP overnight, having previously felt refreshed by sleep with CPAP use and now waking un-refreshed, and return of daytime sleepiness. Also, the development of new medical problems  (such as heart failure, stroke, medications such as narcotics) can sometimes affect breathing at night and change your PAP therapy needs.    Please bring PAP with you if you are hospitalized.  If anticipating surgery be sure to discuss with your surgeon that you have sleep apnea and use PAP therapy.      Maintain your equipment as recommended which includes routine cleaning and replacement of supplies.      Call DME for any questions regarding supplies or maintenance.    [facility], [facility] [phone]      Do not drive on engage in potentially dangerous activities if feeling sleepy.    Please follow up in sleep clinic again in 12 months and bring your machine and card to your follow-up visit.          Tips for your PAP use-    Mask fitting tips  Mask fitting exercise:    To improve your mask seal and your mobility at night, put mask on and secure in place.  Lie down in bed with full pressure and roll to one side, adjust headgear while in that position to eliminate any leaks. Repeat process rolling to other side.     The mask seal does not have to be perfect:   CPAP machines are designed to make up for small leaks. However, you  will not tolerate leaks blowing in your eyes so you will need to adjust.   Any leak should only be near or at the bottom of the mask.  We expect your mask to leak slightly at night.    Do not over-tighten the headgear straps, tighter IS NOT better, we expect minimal leak.    First try re-positioning the mask or headgear before tightening the headgear straps.  Mask leaks are expected due to changing sleeping positions. Try pulling the mask away from your skin allowing the cushion to re-inflate will minimize the leak.  If you struggle for a good fit, try turning the CPAP off and then readjust the mask by pulling it away from your face and then turning back on the CPAP.        Humidifier tips  Humidifiers can be adjusted to increase or decrease the amount of moisture according to your comfort level. You may need to adjust this frequently at first, but then might only change it with seasonal weather changes.     Try INCREASING the humidity if:  You experience a dry, irritated nasal passage or throat.  You have a runny, drippy nose or sneezing fits after using CPAP.  You experience nasal congestion during or after CPAP use.    Try DECREASING the humidity if:  You have excessive condensation or  rain out  in the tubing or mask.  Otherwise keep the tubing warm during the night by running it underneath the blankets or pillow.      Clinic visit after initial PAP set-up   Bring your equipment with you to your 4 week follow up clinic visit.  We will be extracting your data from the machine.        Travel  Always take your equipment with you.  If you fly with your equipment bring it on with you as a carry on.  Medical equipment does not count as a carry on.    If you travel international the machines take 110-240.  The only adapter needed is the adapter that will fit into the receptacle (outlet).    You may also want to bring an extension cord as many hotel rooms have limited outlets at the bedside.  Do not travel with water in  your humidifier chamber.     Cleaning and Maintenance Guidelines    Equipment Frequency Cleaning Method   Mask First Day    Daily      Weekly Soak mask in hot soapy water for 30 minutes, rinse and air dry.  Wipe nasal cushion with a hot soapy (Ivory, baby shampoo) cloth and rinse.  Baby wipes may also be used.  Do not use anti-bacterial soaps,Reina  liquid soap, rubbing alcohol, bleach or ammonia.  Wash frame in hot soapy water (Ivory, baby shampoo) rinse and let air dry   Headgear Biweekly Wash in hot soapy water, rinse and air dry   Reusable Gray Filter Weekly Wash in hot soapy water, rinse, put in towel squeeze moisture out, let air dry   Disposable White Filter Check Weekly Replace when brown or gray in color; at least every 2 to 3 months   Humidifier Chamber Daily    Weekly Empty distilled water from humidifier and let air dry    Hand wash in hot soapy water, rinse and air dry   Tubing Weekly Wash in hot soapy water, rinse and let air dry   Mask, Tubing and Humidifier Chamber As needed Disinfect: Soak in 1 part distilled white vinegar to 3 parts hot water for 30 minutes, rinse well and air dry  Not the material headgear        MASK AND SUPPLY REORDERING and EQUIPMENT NEEDS through your DME and per your insurance  Reminder: Most insurance companies will allow for a new mask, headgear, tubing, and reusable gray filter every six months.  Disposable white ultra-fine filters are covered monthly.      HOME AND SAFETY INSTRUCTIONS    Do not use frayed or cracked electrical cords, multi plug adaptors, or switched receptacles    Do not immerse electrical equipment into water    Assure that electrical cords do not become a tripping hazard      Your BMI is Body mass index is 46.81 kg/m .  Weight management is a personal decision.  If you are interested in exploring weight loss strategies, the following discussion covers the approaches that may be successful. Body mass index (BMI) is one way to tell whether you are at a  healthy weight, overweight, or obese. It measures your weight in relation to your height.  A BMI of 18.5 to 24.9 is in the healthy range. A person with a BMI of 25 to 29.9 is considered overweight, and someone with a BMI of 30 or greater is considered obese. More than two-thirds of American adults are considered overweight or obese.  Being overweight or obese increases the risk for further weight gain. Excess weight may lead to heart disease and diabetes.  Creating and following plans for healthy eating and physical activity may help you improve your health.  Weight control is part of healthy lifestyle and includes exercise, emotional health, and healthy eating habits. Careful eating habits lifelong are the mainstay of weight control. Though there are significant health benefits from weight loss, long-term weight loss with diet alone may be very difficult to achieve- studies show long-term success with dietary management in less than 10% of people. Attaining a healthy weight may be especially difficult to achieve in those with severe obesity. In some cases, medications, devices and surgical management might be considered.  What can you do?  If you are overweight or obese and are interested in methods for weight loss, you should discuss this with your provider.     Consider reducing daily calorie intake by 500 calories.     Keep a food journal.     Avoiding skipping meals, consider cutting portions instead.    Diet combined with exercise helps maintain muscle while optimizing fat loss. Strength training is particularly important for building and maintaining muscle mass. Exercise helps reduce stress, increase energy, and improves fitness. Increasing exercise without diet control, however, may not burn enough calories to loose weight.       Start walking three days a week 10-20 minutes at a time    Work towards walking thirty minutes five days a week     Eventually, increase the speed of your walking for 1-2 minutes at  time    In addition, we recommend that you review healthy lifestyles and methods for weight loss available through the National Institutes of Health patient information sites:  http://win.niddk.nih.gov/publications/index.htm    And look into health and wellness programs that may be available through your health insurance provider, employer, local community center, or rodolfo club.    Weight management plan: Patient was referred to their PCP to discuss a diet and exercise plan.

## 2021-01-08 ENCOUNTER — THERAPY VISIT (OUTPATIENT)
Dept: PHYSICAL THERAPY | Facility: CLINIC | Age: 56
End: 2021-01-08
Payer: COMMERCIAL

## 2021-01-08 DIAGNOSIS — M25.551 HIP PAIN, RIGHT: ICD-10-CM

## 2021-01-08 PROCEDURE — 97530 THERAPEUTIC ACTIVITIES: CPT | Mod: GP | Performed by: PHYSICAL THERAPIST

## 2021-01-08 PROCEDURE — 97110 THERAPEUTIC EXERCISES: CPT | Mod: GP | Performed by: PHYSICAL THERAPIST

## 2021-01-08 PROCEDURE — 97112 NEUROMUSCULAR REEDUCATION: CPT | Mod: GP | Performed by: PHYSICAL THERAPIST

## 2021-01-11 NOTE — PROGRESS NOTES
"VIDEO VISIT  Angy Haji is a 55 year old patient who is being evaluated via a billable video visit.      The patient has been notified of following:   \"We have found that certain health care needs can be provided without the need for an in-person physical exam. This service lets us provide the care you need with a video conversation. If a prescription is necessary we can send it directly to your pharmacy. If lab work is needed we can place an order for that and you can then stop by our lab to have the test done at a later time. Insurers are generally covering virtual visits as they would in-office visits so billing should not be different than normal.  If for some reason you do get billed incorrectly, you should contact the billing office to correct it and that number is in the AVS .    Patient has given verbal consent for video visit?: Yes   How would you like to obtain your AVS?: [email or web address]  AVS SmartPhrase [PsychAVS] has been placed in 'Patient Instructions': Yes      Video- Visit Details  Type of service:  video visit for medication management  Time of service:    Date:  01/13/2021    Video Start Time:  349 Video End Time:  441    Reason for video visit:  Services only offered telehealth  Originating Site (patient location):  [facility]   Location- Patient's home  Distant Site (provider location):  Remote location  Mode of Communication:  Video Conference via [email or web address]  Consent:  Patient has given verbal consent for video visit?: Yes        [facility]  Psychiatry Clinic   PSYCHIATRY PROGRESS NOTE     Date of initial Diagnostic Assessment (DA) is 12/5/18.  Date of most recent Transfer of Care Diagnostic Assessment is 07/16/19.     CARE TEAM:    PCP- [facility]     Therapist- [facility] (private practive)      Specialty Providers- OB/GYN, ENT, PT, Nutrition      Wife- Rachael Haji is a 55 year old female who prefers the name Angy & pronouns she, " "her, hers.      Psych pertinent item history includes Manic episode, psychiatric hospitalization in November 2018.    INTERIM HISTORY         The patient reports good treatment adherence.  History was provided by the patient and family who were good historians.  The last visit ended with increasing Litihium to 1050 mg at bedtime.  Since the last visit:   - Increased lithium as scheduled at last visit 1 mo ago.   - Mood maybe slightly better since last visit. Anhedonia also slightly improved. \"Like 25%\". Less overwhelmed.  - Denies new side effects. Perhaps polydipsia/polyuria, \"but I always feel thirsty and pee a lot\". No tremor.  - Enjoyed holidays. COVID bubbles with family for Xmas mitch and Xmas.  R[place] cabin on [place] for New Years w family, went skiing, snowshoeing; had a really good time.   - Diarrhea and upset stomach a few times since last visit; thinks got food poisoining x1, and the second time, wife had same sx. No emesis.   - Sleep stable, no changes. A little trouble falling asleep over the holiday, but used Headspace elis, helped.  - Appetite stable, still snacking, Xmas treats don't help. Enjoyed delivering holiday cookies to friends  - Wife Rachael having gastric sleeve procedure in next month or so; Angy trying to be supportive, also has her own concerns about this; they are thinking about doing some couple's therapy    RECENT PSYCH ROS:   Depression:  depressed mood, anhedonia, irritable and overwhelmed, all partially improved since last visit  Elevated:  none  Psychosis:  none  Anxiety:  excessive worry and catastrophic thinking  Panic Attack:  none  Trauma Related:  none  Dysregulation:  none  Eating Disorder: some overeating, working with dietician, PCP  Pertinent Negative Symptoms:  NO suicidal ideation, self-injurious behavior/urges, violent ideation, aggression, psychosis, hallucinations, delusions, anthony and hypomania    RECENT SUBSTANCE USE:     Alcohol- drinking rarely, glass of wine " "during holidays, 1 drink/wk or less  Caffeine- drinks around 24oz of coffee during the day    CURRENT SOCIAL HISTORY:  Financial/ Work- 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       Partner/ - wife, Rachael Alejandra- 13 years old daughter, Gricel (now prefers name \"Jason\")     Living situation- Moved into [facility] in [place], lives with wife Danielle and teenage daughter   Social/ Spiritual Support- Family (wife and daughter)     FEELS SAFE AT HOME- yes     PSYCH and CD Critical Summary Points since July 2019            Aug 2019 - decreased the Depakote to 500 mg nightly  Nov 2019 - VPA decreased to 250 mg nightly  Jan 2020 - VPA decreased to 125 mg every night  Apr 2020 - Stopped VPA  Dec 2020 - inc Li to 1050 mg at bedtime  Jan 2021- no changes, Li labs ordered    PAST MED TRIALS          See last Diagnostic Assessment  MEDICAL / SURGICAL HISTORY                                   Pregnant or breastfeeding- no      Contraception- HRT for menopause    Patient Active Problem List   Diagnosis     Oral aphthae     Disorder of bursae and tendons in shoulder region     CARDIOVASCULAR SCREENING; LDL GOAL LESS THAN 160     Rosacea     Tear of medial cartilage or meniscus of knee, current     Raynaud disease     Vitamin D deficiency disease     Mild sleep apnea     Atopic rhinitis     Chronic bilateral low back pain without sciatica     Obesity, Class III, BMI 40-49.9 (morbid obesity) (H)     Muscle tension dysphonia     Palpitations     Dysphagia, unspecified type     Anxiety     Benign essential hypertension     Callus of foot     Varicose veins of right lower extremity with edema     Bipolar I disorder (H)     Pelvic floor weakness in female     Urinary incontinence, mixed     Dribbling of urine     Hip pain, right     It band syndrome, right       Major Surgery- n/a    MEDICAL REVIEW OF SYSTEMS      A comprehensive review of systems was performed and is negative other than noted in the " HPI.    ALLERGY       Seroquel [quetiapine], Olanzapine, Penicillins, Risperidone, and Thimerosal  MEDICATIONS        Current Outpatient Medications   Medication Sig Dispense Refill     Carboxymethylcellulose Sod PF (REFRESH PLUS) 0.5 % SOLN ophthalmic solution Place 1 drop into both eyes 3 times daily as needed for dry eyes       estrogen conj-medroxyPROGESTERone (PREMPRO) 0.625-2.5 MG tablet Take 1 tablet by mouth daily 90 tablet 3     lisinopril (ZESTRIL) 10 MG tablet Take 1 tablet (10 mg) by mouth daily 90 tablet 3     lithium (ESKALITH) 150 MG capsule Take 1 capsule (150 mg) by mouth At Bedtime 30 capsule 1     lithium 300 MG capsule Take 3 capsules (900 mg) by mouth At Bedtime Take with one 150mg capsule for total dose 1050 mg at bedtime. 90 capsule 1     Loratadine 10 MG capsule Take 10 mg by mouth daily as needed.       metroNIDAZOLE (METROGEL) 1 % external gel Apply topically daily 60 g 9     ORDER FOR DME Use your CPAP device as directed by your provider.       PROAIR  (90 Base) MCG/ACT inhaler        VITAMIN D, CHOLECALCIFEROL, PO Take 2,000 Units by mouth daily       VITALS        There were no vitals taken for this visit. , telehealth  MENTAL STATUS EXAM           Alertness: alert  and oriented  Appearance: well groomed, dressed in professional clothing, at home, seated in dining room  Behavior/Demeanor: cooperative, pleasant and calm, with good  eye contact   Speech: normal and regular rate and rhythm  Language: intact  Psychomotor: normal or unremarkable  Mood: stable  Affect: Full range, brightens at appropriate times; was congruent to mood; was congruent to content  Thought Process/Associations: unremarkable  Thought Content:  Reports none;  Denies suicidal ideation, violent ideation, delusions and paranoid ideation  Perception:  Reports none;  Denies auditory hallucinations and visual hallucinations  Insight: excellent  Judgment: excellent  Cognition: (6) does  appear grossly intact; formal  cognitive testing was not done  Gait and Station: unremarkable    LABS and DATA     AIMS:  Not needed    PHQ9 TODAY = not obtained, telehealth  PHQ-9 SCORE 8/28/2019 10/2/2019 11/6/2019   PHQ-9 Total Score 9 5 6     LITHIUM LABS     [level, renal, SG, TSH, WBC]    q6 mo  Recent Labs   Lab Test 07/15/20  1617 10/15/19  1037 05/01/19  0921 01/14/19  0913   LITHIUM 0.52* 0.65 0.61 0.55*     Recent Labs   Lab Test 07/15/20  1617 10/15/19  1037 05/01/19  0921 12/20/18  1214   CR 0.58 0.45* 0.54 0.47*   GFRESTIMATED >90 >90 >90 >90    138 135 132*   POTASSIUM 4.4 4.3 4.6 4.4   SHERRIE 9.4 9.1 9.0 9.2     Recent Labs   Lab Test 07/15/20  1633 10/15/19  1222 05/01/19  1140 11/01/12  0819   SG 1.008 1.006 1.008 1.015     Recent Labs   Lab Test 07/15/20  1617 10/15/19  1037 05/01/19  0921 11/10/18  0756   TSH 2.46 2.47 2.69 2.29       PSYCHOTROPIC DRUG INTERACTIONS     Concurrent use of LITHIUM and ACE INHIBITORS may result in increased risk of lithium toxicity.    MANAGEMENT:  Monitoring for adverse effects, routine vitals, routine labs, using lowest therapeutic dose of [agents] and patient is aware of risks    RISK STATEMENT for SAFETY     Angy LILIAN Haji did not appear to be an imminent safety risk to self or others.    DIAGNOSIS     Bipolar Disorder, Type I (late onset), current depressive episode, mild, without psychotic features, with partial improvement    (h/o one manic episode Nov/Dec 2018)    H/o EPSE secondary to neuroleptic use  H/o Thrombocytopenia secondary to depakote  H/o lithium toxicity, resolved    ASSESSMENT        TODAY Partial improvement in depressed mood and anhedonia in the month since increasing Lithium. Discussed my hope that she experiences additional improvement in mood over the next two to four weeks. No side effects with increased lithium dose. No hopelessness or SI, no acute safety concerns. Expresses interest in starting couples therapy; I will reach out to couple's therapists in our clinic  to assess for availability. Informed Angy of my parental leave in March. Will send Gangkr message informing her as to the covering provider during my absence.     [place]  was checked today: none     PLAN        1) PSYCHOTROPIC MEDICATIONS:  - Continue Lithium 1050 mg at bedtime    2) THERAPY: Continue; will assess availability for couple's therapists in clinic    3) OTHER:    Labs- Li labs January 2021, ordered today  EKG-PRN  Rating Scales- serial PHQ9    4) RTC: 1mo    5) CRISIS NUMBERS:   Provided routinely in AVS   ONLY if a [facility] PT: Krissy Mazaview [phone] (clinic), [phone] (after hours)     TREATMENT RISK STATEMENT:  The risks, benefits, alternatives and potential adverse effects have been discussed and are understood by the pt. The pt understands the risks of using street drugs or alcohol. There are no medical contraindications, the pt agrees to treatment with the ability to do so. The pt knows to call the clinic for any problems or to access emergency care if needed.  Medical and substance use concerns are documented above.  Psychotropic drug interaction check was done, including changes made today.    PROVIDER: Ricci Villa MD    Patient not staffed in clinic.  Supervisor is Dr. Tapia, who will sign the note.

## 2021-01-13 ENCOUNTER — TELEPHONE (OUTPATIENT)
Dept: PSYCHIATRY | Facility: CLINIC | Age: 56
End: 2021-01-13

## 2021-01-13 ENCOUNTER — VIRTUAL VISIT (OUTPATIENT)
Dept: PSYCHIATRY | Facility: CLINIC | Age: 56
End: 2021-01-13
Attending: PSYCHIATRY & NEUROLOGY
Payer: COMMERCIAL

## 2021-01-13 DIAGNOSIS — Z79.899 LONG TERM USE OF DRUG: Primary | ICD-10-CM

## 2021-01-13 DIAGNOSIS — F31.9 BIPOLAR I DISORDER (H): ICD-10-CM

## 2021-01-13 PROCEDURE — 99214 OFFICE O/P EST MOD 30 MIN: CPT | Mod: HN | Performed by: STUDENT IN AN ORGANIZED HEALTH CARE EDUCATION/TRAINING PROGRAM

## 2021-01-13 RX ORDER — LITHIUM CARBONATE 150 MG/1
150 CAPSULE ORAL AT BEDTIME
Qty: 30 CAPSULE | Refills: 1 | Status: SHIPPED | OUTPATIENT
Start: 2021-01-13 | End: 2021-02-17

## 2021-01-13 RX ORDER — LITHIUM CARBONATE 300 MG/1
900 CAPSULE ORAL AT BEDTIME
Qty: 90 CAPSULE | Refills: 1 | Status: SHIPPED | OUTPATIENT
Start: 2021-01-13 | End: 2021-03-17

## 2021-01-13 NOTE — PATIENT INSTRUCTIONS
Good to see you today, Angy.  No changes today. Please go to lab in the next few weeks to have your lithium level drawn 12 hours after your nighttime dose.  I have sent refills to your pharmacy.  Our next visit is scheduled for Feb 17 Wed at 345.. Please go to [email or web address] for that visit.   Please reach out via phone or [facility] (QEEP)t with any questions or concerns prior to your next appointment.     Take care,  Dr. Villa    Thank you for coming to the [facility] HEALTH & [facility] [place] [facility].    Lab Testing:  If you had lab testing today and your results are reassuring or normal they will be mailed to you or sent through [employer] within 7 days. If the lab tests need quick action we will call you with the results. The phone number we will call with results is # [phone] (home) [phone] (work). If this is not the best number please call our clinic and change the number.    Medication Refills:  If you need any refills please call your pharmacy and they will contact us. Our fax number for refills is [phone]. Please allow three business for refill processing. If you need to  your refill at a new pharmacy, please contact the new pharmacy directly. The new pharmacy will help you get your medications transferred.     Scheduling:  If you have any concerns about today's visit or wish to schedule another appointment please call our office during normal business hours [phone] (8-5:00 M-F)    Contact Us:  Please call [phone] during business hours (8-5:00 M-F).  If after clinic hours, or on the weekend, please call  [phone].    Financial Assistance [phone]  [facility] Billing [phone]  Central Billing Office, [facility]: [phone]  [place] Billing [phone]  Medical Records [phone]  [place] Patient Bill of Rights [email or web address]       MENTAL HEALTH CRISIS NUMBERS:  For a medical  emergency please call  911 or go to the nearest ER.     [facility]:   [facility] -[phone]   Crisis Residence [facility] -[phone]   Walk-In Counseling Center [facility] -[phone]   COPE 24/7 [place] [facility] Team -[phone] (adults)/[phone] (child)  CHILD: [facility] needs assessment team - [phone]      [facility]:   [facility] - [phone]   Walk-in counseling [facility] - [phone]   Walk-in counseling [facility] - [phone]   Crisis Residence [facility] - [phone]  Urgent Care Adult Mental Mvjxhb-[phone] mobile unit/ 24/7 crisis line    National Crisis Numbers:   National Suicide Prevention Lifeline: 9-748-690-TALK (550-919-4855)  Poison Control Center - 8-649-384-0051  Click Security/resources for a list of additional resources (SOS)  Trans Lifeline a hotline for transgender people 5-350-116-0486  The [place] Project a hotline for LGBT youth 1-646.650.8318  Crisis Text Line: For any crisis 24/7   To: 476354  see www.crisistextline.org  - IF MAKING A CALL FEELS TOO HARD, send a text!         Again thank you for choosing [facility] MENTAL HEALTH & ADDICTION [facility] and please let us know how we can best partner with you to improve you and your family's health.    You may be receiving a survey regarding this appointment. We would love to have your feedback, both positive and negative. The survey is done by an external company, so your answers are anonymous.

## 2021-01-13 NOTE — Clinical Note
José Luis Palomares  This patient expressed interest in starting couple's therapy in clinic. Do you happen to have openings, or if not, is there anyone else in our clinic I could reach out to?  Thanks!  Ricci Villa

## 2021-01-13 NOTE — TELEPHONE ENCOUNTER
On January 13, 2021, at 2:32 PM, writer called patient at [phone] to confirm Virtual Visit. Writer unable to make contact with patient. Writer left detailed voice message for call back. [phone] left as call back number. Sofia Martínez, [facility]

## 2021-01-15 ENCOUNTER — THERAPY VISIT (OUTPATIENT)
Dept: PHYSICAL THERAPY | Facility: CLINIC | Age: 56
End: 2021-01-15
Payer: COMMERCIAL

## 2021-01-15 DIAGNOSIS — N39.46 URINARY INCONTINENCE, MIXED: ICD-10-CM

## 2021-01-15 DIAGNOSIS — N81.89 PELVIC FLOOR WEAKNESS IN FEMALE: ICD-10-CM

## 2021-01-15 PROCEDURE — 97110 THERAPEUTIC EXERCISES: CPT | Mod: GP | Performed by: PHYSICAL THERAPIST

## 2021-01-15 PROCEDURE — 97112 NEUROMUSCULAR REEDUCATION: CPT | Mod: GP | Performed by: PHYSICAL THERAPIST

## 2021-01-15 NOTE — PROGRESS NOTES
Subjective:  HPI  Physical Exam                    Objective:  System    Physical Exam    General     ROS    Assessment/Plan:    PROGRESS  REPORT    Progress reporting period is from 10/9/20 to 1/15/21.       SUBJECTIVE  Subjective changes noted by patient:   Subjective: Voiding cycle every 2 hrs going well. Couple of times 2.5 hrs and noticed some moderate leaks. She is able to perform 10 sec hold. Using dilator 2-3 times/ day.     Current pain level is   .     Previous pain level was    .   Changes in function:  Yes (See Goal flowsheet attached for changes in current functional level)  Adverse reaction to treatment or activity:     OBJECTIVE  Changes noted in objective findings:  Yes,   Objective: Dilator twice a day advised. 4/ week woke up only once at night to pee, 3 / week 2 times at night. Advised to avoid drinking water 2 hrs before bed time.  [place] 2+/7/8/6. Improved PFM isolationa dn contraction demonstrated, needed cues for elevator contraction.      ASSESSMENT/PLAN  Updated problem list and treatment plan: Diagnosis 1:  PFD_ UI  Decreased strength - therapeutic exercise, therapeutic activities and home program  Decreased function - therapeutic activities and home program  STG/LTGs have been met or progress has been made towards goals:  Yes (See Goal flow sheet completed today.)  Assessment of Progress: The patient's condition is improving.  The patient's condition has potential to improve.  Patient is meeting short term goals and is progressing towards long term goals.  Self Management Plans:  Patient has been instructed in a home treatment program.  Patient  has been instructed in self management of symptoms.  I have re-evaluated this patient and find that the nature, scope, duration and intensity of the therapy is appropriate for the medical condition of the patient.  Angy continues to require the following intervention to meet STG and LTG's:  PT    Recommendations:  This patient would benefit from  continued therapy.     Frequency:  1 X a month, once daily  Duration:  for 2 visits        Please refer to the daily flowsheet for treatment today, total treatment time and time spent performing 1:1 timed codes.

## 2021-01-22 ENCOUNTER — THERAPY VISIT (OUTPATIENT)
Dept: PHYSICAL THERAPY | Facility: CLINIC | Age: 56
End: 2021-01-22
Payer: COMMERCIAL

## 2021-01-22 DIAGNOSIS — M25.551 HIP PAIN, RIGHT: ICD-10-CM

## 2021-01-22 PROCEDURE — 97110 THERAPEUTIC EXERCISES: CPT | Mod: GP | Performed by: PHYSICAL THERAPIST

## 2021-01-22 PROCEDURE — 97112 NEUROMUSCULAR REEDUCATION: CPT | Mod: GP | Performed by: PHYSICAL THERAPIST

## 2021-01-27 ENCOUNTER — HOSPITAL ENCOUNTER (OUTPATIENT)
Dept: NUTRITION | Facility: CLINIC | Age: 56
Discharge: HOME | End: 2021-01-27
Attending: DIETITIAN, REGISTERED | Admitting: DIETITIAN, REGISTERED
Payer: COMMERCIAL

## 2021-01-27 PROCEDURE — 97803 MED NUTRITION INDIV SUBSEQ: CPT | Mod: GT | Performed by: DIETITIAN, REGISTERED

## 2021-01-27 NOTE — PROGRESS NOTES
Video-Visit Details    Type of service:  Video Visit    Video Start Time (time video started): 9:00    Video End Time (time video stopped): 9:32    Originating Location (pt. Location): Home    Distant Location (provider location):  [facility] NUTRITION SERVICES     Mode of Communication:  Video Conference via [facility]    NUTRITION REASSESSMENT NOTE (TELEPHONE VISIT DUE TO COVID-19)     REASON FOR ASSESSMENT   Angy QUINTANA Raissa referred by Dr. Odell for MNT related to  Obesity (BMI 30-39.9) [E66.9]  - Primary       Benign essential hypertension [I10]       Patient accompanied by self (via telephone)     NUTRITION HISTORY  Patient continues with multiple life changes and stressors.  Patient aware that she likes to self soothe with ice cream as has substituted water, hot tea or longer reading time for distraction from ice cream.  Patient and spouse have been preparing meals for the family.  Patient would like to begin focusing on weight loss again.  Patient is not sure if Weight Watchers is meeting face to face so bought scale and is weighing self 1 time per week.  Patient is aware that social cues trigger her to eat.  Patient's spouse is also working on weight loss.       Patient has been able to track carbohydrates at dinner 2 times per week -3 times in January.  Patient feels her stress eating has increased in the past 2 months.     DIET RECALL   Breakfast: cereal and banana; yogurt + granola (measures), fruit + coffee; 2 slices toast with butter + 1/2 cup cottage cheese    Lunch: leftovers (includes fruit and vegetable daily)  Snack: fruit or cheese with lower salt nut thin crackers   Dinner: Tuna steak, Asian marinade, stir gomez, baked vegetable + brown rice (3/4 cup) + canned peaches; chicken thighs with beans and rice; sheet pan dinner with chicken and vegetables or grilled salmon and grilled vegetables; taco; protein + fruit and vegetable  Snacks: fruit, string cheese, Ritz crackers or yogurt;  "dessert in the evening   Beverages: mainly water, coffee, mineral water  Dining out: none currently  Exercise: Patient exercising 5-6 times per week. (cross country skiing, snow shoeing, walking and walking dog).          MEDICATIONS:  Reviewed      ANTHROPOMETRICS  Height: 5'8\" (note height change in chart previously 5'9\")  Weight: 316.4 (patient reported)   BMI (kg/m2): 48.1 kg/m2   %IBW: 225%  Weight History:   Wt Readings from Last 10 Encounters:   01/06/21 143.8 kg (317 lb)   10/09/20 141.2 kg (311 lb 3.2 oz)   09/23/20 143.1 kg (315 lb 6.4 oz)   09/16/20 141.1 kg (311 lb)   07/29/20 139.7 kg (308 lb)   03/11/20 138.6 kg (305 lb 8 oz)   01/24/20 137 kg (302 lb)   12/12/19 133.8 kg (295 lb)   12/04/19 133.8 kg (295 lb)   11/07/19 133.4 kg (294 lb)      ASSESSED NUTRITION NEEDS  Estimated Energy Needs: 5628-6574 kcals (15-20 kcals/kg) for gradual weight loss   Estimated Protein Needs: 66-79 (1-1.2 gm/kg IBW) for repletion with exercise  RMR: 1440     EVALUATION/PROGRESS TOWARDS GOALS   Previous goals:  Continue exercising 30 minutes 5 times per week + 1 hour exercise 1-2 times on the weekend (walking 30 minutes daily) 4 loops; used treadmill (podcast); walking with friend 60-90 minutes (mall walking); snow shoeing; cross country skiing-met   Set reminder on phone to track carbohydrates at dinner every other day-N/A  Leave calendar open to remind self to track-N/A  Avoid desserts 2 days per week after dinner -met   Put baked goods in freezer 2 days after baking-met      Previous Nutrition Diagnosis: Obesity related to excess energy intake as evidenced by BMI of 47.7 kg/m2-no change     Current Nutrition Diagnosis:Obesity related to excess energy intake as evidenced by BMI of 48.1 kg/m2     INTERVENTIONS   Nutrition Prescription   Recommend exercise 5 times per week; track intake if eating mindlessly; increase fiber to 25-30 grams per day      Implementation:   Meals and Snacks: Continue eating 3 meals + deliberate " snacking (if hungry)   Nutrition Education (Content):               a)  Discussed progress towards goals.  Reviewed intake of evening carbohydrates.  Congratulated patient for consistent exercise pattern.  Reviewed current food and eating behavior challenges as patient has been feeling that she has been stress eating.  Discussed carbohydrates portions of foods to help with weight reduction.  Supported patient in her struggle with food and weight.    Nutrition Education (Application):               a) Determined alternatives to emotional eating.                         b) Patient verbalized understanding of diet by stating will reduce desserts to 4 times per week.        Expected patient engagement: good     CURRENT GOALS   Self care 3 times per week to reduce stress eating   Aim for 45-60 grams carbohydrates 3 times per week at dinner   Aim for 3 days per week no desserts     FOLLOW UP/MONITORING    Patient to follow up in 7 weeks  Patient has RD contact information      Time spent with patient: 32 minutes     Mattie Amaya, RD, LD  [facility] Outpatient Dietitian  [phone] (office phone)

## 2021-01-29 ENCOUNTER — THERAPY VISIT (OUTPATIENT)
Dept: PHYSICAL THERAPY | Facility: CLINIC | Age: 56
End: 2021-01-29
Payer: COMMERCIAL

## 2021-01-29 DIAGNOSIS — N81.89 PELVIC FLOOR WEAKNESS IN FEMALE: ICD-10-CM

## 2021-01-29 DIAGNOSIS — N39.46 URINARY INCONTINENCE, MIXED: ICD-10-CM

## 2021-01-29 DIAGNOSIS — M25.551 HIP PAIN, RIGHT: ICD-10-CM

## 2021-01-29 PROCEDURE — 97110 THERAPEUTIC EXERCISES: CPT | Mod: GP | Performed by: PHYSICAL THERAPIST

## 2021-01-29 PROCEDURE — 97112 NEUROMUSCULAR REEDUCATION: CPT | Mod: GP | Performed by: PHYSICAL THERAPIST

## 2021-02-02 ENCOUNTER — TELEPHONE (OUTPATIENT)
Dept: PSYCHIATRY | Facility: CLINIC | Age: 56
End: 2021-02-02

## 2021-02-02 NOTE — TELEPHONE ENCOUNTER
Took a call from patient who just wanted to verify that there are lab orders and whether she needs to fast prior. Orders are in for CBC, TSH with free T4 reflex, routine UA, CMP, and lithium level. Advised her that trough level is best for lithium level, and glucose does get checked with the CMP. She takes lithium at 10 pm, so she will get blood drawn at 10 am. She will also fast.     Routed to Dr. Villa for FYI.

## 2021-02-03 DIAGNOSIS — Z79.899 LONG TERM USE OF DRUG: ICD-10-CM

## 2021-02-03 LAB
ALBUMIN SERPL-MCNC: 3.9 G/DL (ref 3.4–5)
ALBUMIN UR-MCNC: NEGATIVE MG/DL
ALP SERPL-CCNC: 88 U/L (ref 40–150)
ALT SERPL W P-5'-P-CCNC: 17 U/L (ref 0–50)
ANION GAP SERPL CALCULATED.3IONS-SCNC: 7 MMOL/L (ref 3–14)
APPEARANCE UR: CLEAR
AST SERPL W P-5'-P-CCNC: 13 U/L (ref 0–45)
BACTERIA #/AREA URNS HPF: ABNORMAL /HPF
BASOPHILS # BLD AUTO: 0 10E9/L (ref 0–0.2)
BASOPHILS NFR BLD AUTO: 0.5 %
BILIRUB SERPL-MCNC: 0.4 MG/DL (ref 0.2–1.3)
BILIRUB UR QL STRIP: NEGATIVE
BUN SERPL-MCNC: 11 MG/DL (ref 7–30)
CALCIUM SERPL-MCNC: 9.8 MG/DL (ref 8.5–10.1)
CHLORIDE SERPL-SCNC: 107 MMOL/L (ref 94–109)
CO2 SERPL-SCNC: 23 MMOL/L (ref 20–32)
COLOR UR AUTO: YELLOW
CREAT SERPL-MCNC: 0.58 MG/DL (ref 0.52–1.04)
DIFFERENTIAL METHOD BLD: ABNORMAL
EOSINOPHIL # BLD AUTO: 0.2 10E9/L (ref 0–0.7)
EOSINOPHIL NFR BLD AUTO: 2.6 %
ERYTHROCYTE [DISTWIDTH] IN BLOOD BY AUTOMATED COUNT: 12.6 % (ref 10–15)
GFR SERPL CREATININE-BSD FRML MDRD: >90 ML/MIN/{1.73_M2}
GLUCOSE SERPL-MCNC: 96 MG/DL (ref 70–99)
GLUCOSE UR STRIP-MCNC: NEGATIVE MG/DL
HCT VFR BLD AUTO: 47.5 % (ref 35–47)
HGB BLD-MCNC: 15 G/DL (ref 11.7–15.7)
HGB UR QL STRIP: NEGATIVE
IMM GRANULOCYTES # BLD: 0 10E9/L (ref 0–0.4)
IMM GRANULOCYTES NFR BLD: 0.5 %
KETONES UR STRIP-MCNC: NEGATIVE MG/DL
LEUKOCYTE ESTERASE UR QL STRIP: NEGATIVE
LITHIUM SERPL-SCNC: 0.67 MMOL/L (ref 0.6–1.2)
LYMPHOCYTES # BLD AUTO: 1.9 10E9/L (ref 0.8–5.3)
LYMPHOCYTES NFR BLD AUTO: 24.5 %
MCH RBC QN AUTO: 28.4 PG (ref 26.5–33)
MCHC RBC AUTO-ENTMCNC: 31.6 G/DL (ref 31.5–36.5)
MCV RBC AUTO: 90 FL (ref 78–100)
MONOCYTES # BLD AUTO: 0.4 10E9/L (ref 0–1.3)
MONOCYTES NFR BLD AUTO: 5.3 %
NEUTROPHILS # BLD AUTO: 5.1 10E9/L (ref 1.6–8.3)
NEUTROPHILS NFR BLD AUTO: 66.6 %
NITRATE UR QL: NEGATIVE
NRBC # BLD AUTO: 0 10*3/UL
NRBC BLD AUTO-RTO: 0 /100
PH UR STRIP: 7.5 PH (ref 5–7)
PLATELET # BLD AUTO: 234 10E9/L (ref 150–450)
POTASSIUM SERPL-SCNC: 4.2 MMOL/L (ref 3.4–5.3)
PROT SERPL-MCNC: 8.2 G/DL (ref 6.8–8.8)
RBC # BLD AUTO: 5.28 10E12/L (ref 3.8–5.2)
RBC #/AREA URNS AUTO: 0 /HPF (ref 0–2)
SODIUM SERPL-SCNC: 137 MMOL/L (ref 133–144)
SOURCE: ABNORMAL
SP GR UR STRIP: 1.01 (ref 1–1.03)
SQUAMOUS #/AREA URNS AUTO: <1 /HPF (ref 0–1)
TSH SERPL DL<=0.005 MIU/L-ACNC: 2.84 MU/L (ref 0.4–4)
UROBILINOGEN UR STRIP-MCNC: NORMAL MG/DL (ref 0–2)
WBC # BLD AUTO: 7.7 10E9/L (ref 4–11)
WBC #/AREA URNS AUTO: <1 /HPF (ref 0–5)

## 2021-02-03 PROCEDURE — 81001 URINALYSIS AUTO W/SCOPE: CPT | Performed by: STUDENT IN AN ORGANIZED HEALTH CARE EDUCATION/TRAINING PROGRAM

## 2021-02-03 PROCEDURE — 80053 COMPREHEN METABOLIC PANEL: CPT | Performed by: STUDENT IN AN ORGANIZED HEALTH CARE EDUCATION/TRAINING PROGRAM

## 2021-02-03 PROCEDURE — 80178 ASSAY OF LITHIUM: CPT | Performed by: STUDENT IN AN ORGANIZED HEALTH CARE EDUCATION/TRAINING PROGRAM

## 2021-02-03 PROCEDURE — 36415 COLL VENOUS BLD VENIPUNCTURE: CPT | Performed by: STUDENT IN AN ORGANIZED HEALTH CARE EDUCATION/TRAINING PROGRAM

## 2021-02-03 PROCEDURE — 84443 ASSAY THYROID STIM HORMONE: CPT | Performed by: STUDENT IN AN ORGANIZED HEALTH CARE EDUCATION/TRAINING PROGRAM

## 2021-02-03 PROCEDURE — 85025 COMPLETE CBC W/AUTO DIFF WBC: CPT | Performed by: STUDENT IN AN ORGANIZED HEALTH CARE EDUCATION/TRAINING PROGRAM

## 2021-02-05 ENCOUNTER — THERAPY VISIT (OUTPATIENT)
Dept: PHYSICAL THERAPY | Facility: CLINIC | Age: 56
End: 2021-02-05
Payer: COMMERCIAL

## 2021-02-05 DIAGNOSIS — M25.551 HIP PAIN, RIGHT: ICD-10-CM

## 2021-02-05 PROCEDURE — 97110 THERAPEUTIC EXERCISES: CPT | Mod: GP | Performed by: PHYSICAL THERAPIST

## 2021-02-05 PROCEDURE — 97530 THERAPEUTIC ACTIVITIES: CPT | Mod: GP | Performed by: PHYSICAL THERAPIST

## 2021-02-05 PROCEDURE — 97112 NEUROMUSCULAR REEDUCATION: CPT | Mod: GP | Performed by: PHYSICAL THERAPIST

## 2021-02-12 ENCOUNTER — THERAPY VISIT (OUTPATIENT)
Dept: PHYSICAL THERAPY | Facility: CLINIC | Age: 56
End: 2021-02-12
Payer: COMMERCIAL

## 2021-02-12 DIAGNOSIS — M25.551 HIP PAIN, RIGHT: ICD-10-CM

## 2021-02-12 PROCEDURE — 97112 NEUROMUSCULAR REEDUCATION: CPT | Mod: GP | Performed by: PHYSICAL THERAPIST

## 2021-02-12 PROCEDURE — 97530 THERAPEUTIC ACTIVITIES: CPT | Mod: GP | Performed by: PHYSICAL THERAPIST

## 2021-02-12 PROCEDURE — 97110 THERAPEUTIC EXERCISES: CPT | Mod: GP | Performed by: PHYSICAL THERAPIST

## 2021-02-15 NOTE — PROGRESS NOTES
" Video- Visit Details  Type of service:  video visit for medication management  Time of service:    Date:  02/17/2021    Video Start Time:  353 Video End Time:  425    Reason for video visit:  Services only offered telehealth  Originating Site (patient location):  [facility] [place]   Location- Patient's home  Distant Site (provider location):  Remote location  Mode of Communication:  Video Conference via [email or web address]  Consent:  Patient has given verbal consent for video visit?: Yes        [facility]  Psychiatry Clinic   PSYCHIATRY PROGRESS NOTE     Date of initial Diagnostic Assessment (DA) is 12/5/18.  Date of most recent   Transfer of Care Diagnostic Assessment is 07/16/19.     CARE TEAM:    PCP- [facility]     Therapist- [facility] (private practive)      Specialty Providers- OB/GYN, ENT, PT, Nutrition      Wife- Rachael Haji is a 53 year old female who prefers the name Angy & pronouns she, her, hers.      Psych pertinent item history includes Manic episode, psychiatric hospitalization in November 2018.    INTERIM HISTORY         The patient reports good treatment adherence.  History was provided by the patient and family who were good historians.  The last visit ended without changes.   Since the last visit:   - Thinks she's noticed additional gains in mood since last visit.  Partial improvement in anhedonia.   - Resumed qThurs date nights with wife. Attended a film festival, saw one film in person which she enjoyed  - No elevated moods, manic sx. Sleep normal. No SI thoughts. Energy level during day \"okay\", sometimes low  - Occasional worry, no racing thoughts. No NVD, no polyuria, no polydipsia, no other side effects.  - Starting to lose weight gained over holiday. In Aug was 310lbs, 319lbs over holidays, 317lbs today.   - Ongoing occasional word finding difficulties, ex couldn't remember massage therapist's name. Has noticed some improvement since " "stopping VPA.     RECENT PSYCH ROS:   Depression:  depressed mood, anhedonia, irritable and overwhelmed, all partially improved since last visit  Elevated:  none  Psychosis:  none  Anxiety:  excessive worry  Panic Attack:  none  Trauma Related:  none  Dysregulation:  none  Eating Disorder: some overeating, working with dietician, PCP, improving since last visit  Pertinent Negative Symptoms:  NO suicidal ideation, self-injurious behavior/urges, violent ideation, aggression, psychosis, hallucinations, delusions, anthony and hypomania    RECENT SUBSTANCE USE:     Alcohol- drinking rarely, glass of wine during holidays, 1 drink/wk or less  Caffeine- drinks around 24oz of coffee during the day    CURRENT SOCIAL HISTORY:  Financial/ Work- 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       Partner/ - wife, Rachael Alejandra- 13 years old daughter, Gricel (now prefers name \"Jason\")     Living situation- [place], lives with wife Danielle and teenage daughter   Social/ Spiritual Support- Family (wife and daughter)     FEELS SAFE AT HOME- yes     PSYCH and CD Critical Summary Points since July 2019            Aug 2019 - decreased the Depakote to 500 mg nightly  Nov 2019 - VPA decreased to 250 mg nightly  Jan 2020 - VPA decreased to 125 mg every night  Apr 2020 - Stopped VPA  Dec 2020 - inc Li to 1050 mg at bedtime  Jan 2021- no changes, Li labs ordered  Feb 2021 - no changes    PAST MED TRIALS          See last Diagnostic Assessment  MEDICAL / SURGICAL HISTORY                                   Pregnant or breastfeeding- no      Contraception- HRT for menopause    Patient Active Problem List   Diagnosis     Oral aphthae     Disorder of bursae and tendons in shoulder region     CARDIOVASCULAR SCREENING; LDL GOAL LESS THAN 160     Rosacea     Tear of medial cartilage or meniscus of knee, current     Raynaud disease     Vitamin D deficiency disease     Mild sleep apnea     Atopic rhinitis "     Chronic bilateral low back pain without sciatica     Obesity, Class III, BMI 40-49.9 (morbid obesity) (H)     Muscle tension dysphonia     Palpitations     Dysphagia, unspecified type     Anxiety     Benign essential hypertension     Callus of foot     Varicose veins of right lower extremity with edema     Bipolar I disorder (H)     Pelvic floor weakness in female     Urinary incontinence, mixed     Dribbling of urine     Hip pain, right     It band syndrome, right       Major Surgery- n/a    MEDICAL REVIEW OF SYSTEMS      A comprehensive review of systems was performed and is negative other than noted in the HPI.    ALLERGY       Seroquel [quetiapine], Olanzapine, Penicillins, Risperidone, and Thimerosal  MEDICATIONS        Current Outpatient Medications   Medication Sig Dispense Refill     Carboxymethylcellulose Sod PF (REFRESH PLUS) 0.5 % SOLN ophthalmic solution Place 1 drop into both eyes 3 times daily as needed for dry eyes       estrogen conj-medroxyPROGESTERone (PREMPRO) 0.625-2.5 MG tablet Take 1 tablet by mouth daily 90 tablet 3     lisinopril (ZESTRIL) 10 MG tablet Take 1 tablet (10 mg) by mouth daily 90 tablet 3     lithium (ESKALITH) 150 MG capsule Take 1 capsule (150 mg) by mouth At Bedtime 30 capsule 1     lithium 300 MG capsule Take 3 capsules (900 mg) by mouth At Bedtime Take with one 150mg capsule for total dose 1050 mg at bedtime. 90 capsule 1     Loratadine 10 MG capsule Take 10 mg by mouth daily as needed.       metroNIDAZOLE (METROGEL) 1 % external gel Apply topically daily 60 g 9     ORDER FOR DME Use your CPAP device as directed by your provider.       PROAIR  (90 Base) MCG/ACT inhaler        VITAMIN D, CHOLECALCIFEROL, PO Take 2,000 Units by mouth daily       VITALS        There were no vitals taken for this visit. , telehealth  MENTAL STATUS EXAM           Alertness: alert  and oriented  Appearance: well groomed, dressed in professional clothing, at home, seated in dining  room  Behavior/Demeanor: cooperative, pleasant and calm, with good  eye contact   Speech: normal and regular rate and rhythm  Language: intact  Psychomotor: normal or unremarkable  Mood: stable  Affect: Full range, brightens at appropriate times; was congruent to mood; was congruent to content  Thought Process/Associations: unremarkable  Thought Content:  Reports none;  Denies suicidal ideation, violent ideation, delusions and paranoid ideation  Perception:  Reports none;  Denies auditory hallucinations and visual hallucinations  Insight: excellent  Judgment: excellent  Cognition: (6) does  appear grossly intact; formal cognitive testing was not done  Gait and Station: unremarkable    LABS and DATA     AIMS:  Not needed    PHQ9 TODAY = not obtained, telehealth  PHQ-9 SCORE 8/28/2019 10/2/2019 11/6/2019   PHQ-9 Total Score 9 5 6     LITHIUM LABS     [level, renal, SG, TSH, WBC]    q6 mo  Recent Labs   Lab Test 02/03/21  1019 07/15/20  1617 10/15/19  1037 05/01/19  0921   LITHIUM 0.67 0.52* 0.65 0.61     Recent Labs   Lab Test 02/03/21  1019 07/15/20  1617 10/15/19  1037 05/01/19  0921   CR 0.58 0.58 0.45* 0.54   GFRESTIMATED >90 >90 >90 >90    136 138 135   POTASSIUM 4.2 4.4 4.3 4.6   SHERRIE 9.8 9.4 9.1 9.0     Recent Labs   Lab Test 02/03/21  1011 07/15/20  1633 10/15/19  1222 05/01/19  1140   SG 1.011 1.008 1.006 1.008     Recent Labs   Lab Test 02/03/21  1019 07/15/20  1617 10/15/19  1037 05/01/19  0921   TSH 2.84 2.46 2.47 2.69       PSYCHOTROPIC DRUG INTERACTIONS     Concurrent use of LITHIUM and ACE INHIBITORS may result in increased risk of lithium toxicity.    MANAGEMENT:  Monitoring for adverse effects, routine vitals, routine labs, using lowest therapeutic dose of [agents] and patient is aware of risks    RISK STATEMENT for SAFETY     Angy Haji did not appear to be an imminent safety risk to self or others.    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 No evidence of anthony, no SI or safety concerns. Li labwork reassuring, most recent Li level indicates there is room for further increase in dose should need be. Patient feels that combination of Li increase and increased social activity is continuing to improve her mood, prefers to make no changes today. Moving forward, could consider additional dose increase in Lithium vs augmenting with lamotrigine should depression worsen. She has history of severe EPSE on several antipsychotics; would NOT prescribe neuroleptic medications moving forward.     Recommended follow up in 1 mo. Will attempt to arrange for repeat MOCA at next visit given Angy's reports of incomplete improvement in cog dulling since VPA stopped, and ongoing intermittent word finding difficulty.    [place]  was checked today: none     PLAN        1) PSYCHOTROPIC MEDICATIONS:  - Continue Lithium 1050 mg at bedtime    2) THERAPY: Continue    3) OTHER:    Labs- Li labs Aug 2021    4) RTC: 1 mo with me. Dr. Salazar covering during my parental leave    5) CRISIS NUMBERS:   Provided routinely in AVS   ONLY if a [facility] PT: [facility] [place] [facility] [phone] (clinic), [phone] (after hours)     TREATMENT RISK STATEMENT:  The risks, benefits, alternatives and potential adverse effects have been discussed and are understood by the pt. The pt understands the risks of using street drugs or alcohol. There are no medical contraindications, the pt agrees to treatment with the ability to do so. The pt knows to call the clinic for any problems or to access emergency care if needed.  Medical and substance use concerns are documented above.  Psychotropic drug  interaction check was done, including changes made today.    PROVIDER: Ricci Villa MD    Patient not staffed in clinic.  Supervisor is Dr. Tapia, who will sign the note.

## 2021-02-17 ENCOUNTER — TELEPHONE (OUTPATIENT)
Dept: PSYCHIATRY | Facility: CLINIC | Age: 56
End: 2021-02-17

## 2021-02-17 ENCOUNTER — VIRTUAL VISIT (OUTPATIENT)
Dept: PSYCHIATRY | Facility: CLINIC | Age: 56
End: 2021-02-17
Attending: PSYCHIATRY & NEUROLOGY
Payer: COMMERCIAL

## 2021-02-17 DIAGNOSIS — F31.9 BIPOLAR I DISORDER (H): ICD-10-CM

## 2021-02-17 PROCEDURE — 99214 OFFICE O/P EST MOD 30 MIN: CPT | Mod: HN | Performed by: STUDENT IN AN ORGANIZED HEALTH CARE EDUCATION/TRAINING PROGRAM

## 2021-02-17 RX ORDER — LITHIUM CARBONATE 150 MG/1
150 CAPSULE ORAL AT BEDTIME
Qty: 30 CAPSULE | Refills: 0 | Status: SHIPPED | OUTPATIENT
Start: 2021-02-17 | End: 2021-03-17

## 2021-02-17 ASSESSMENT — PAIN SCALES - GENERAL: PAINLEVEL: NO PAIN (0)

## 2021-02-17 NOTE — PATIENT INSTRUCTIONS
Good to see you today, Angy.  No changes today  I have sent refills to your pharmacy.  Our next visit is scheduled for March 17 at 4:15. I will let you know about arrangements for completing a MOCA at that visit.   Please reach out via phone or [employer] with any questions or concerns prior to your next appointment.     Take care,  Dr. Villa            **For crisis resources, please see the information at the end of this document**     Patient Education      Thank you for coming to the [facility] MENTAL HEALTH & ADDICTION [place] [facility]IN[facility].    Lab Testing:  If you had lab testing today and your results are reassuring or normal they will be mailed to you or sent through Dok[employer]o within 7 days. If the lab tests need quick action we will call you with the results. The phone number we will call with results is # [phone] (home) [phone] (work). If this is not the best number please call our clinic and change the number.    Medication Refills:  If you need any refills please call your pharmacy and they will contact us. Our fax number for refills is [phone]. Please allow three business for refill processing. If you need to  your refill at a new pharmacy, please contact the new pharmacy directly. The new pharmacy will help you get your medications transferred.     Scheduling:  If you have any concerns about today's visit or wish to schedule another appointment please call our office during normal business hours [phone] (8-5:00 M-F)    Contact Us:  Please call [phone] during business hours (8-5:00 M-F).  If after clinic hours, or on the weekend, please call  [phone].    Financial Assistance [phone]  [facility] Billing [phone]  Central Billing Office, [facility]: [phone]  [facility] Billing [phone]  Medical Records [phone]  [facility] Patient Bill of Rights [email or web address]       MENTAL HEALTH CRISIS  NUMBERS:  For a medical emergency please call  911 or go to the nearest ER.     [facility]:   [facility] -[phone]   Crisis Residence [facility] -[phone]   Walk-In Counseling Center [facility] -[phone]   COPE 24/7 [place] Mobile Team -[phone] (adults)/[phone] (child)  CHILD: [facility] needs assessment team - [phone]      [facility]:   [facility] - [phone]   Walk-in counseling [facility] - [phone]   Walk-in counseling [facility] - [phone]   Crisis Residence [facility] Residence - [phone]  Urgent Care Adult Mental Uczgbl-[phone] mobile unit/ 24/7 crisis line    National Crisis Numbers:   National Suicide Prevention Lifeline: 7-452-057-TALK (114-088-3480)  Poison Control Center - 1-718.852.5194  Skycatch/resources for a list of additional resources (SOS)  Trans Lifeline a hotline for transgender people 1-410.785.2871  The Mayco Project a hotline for LGBT youth 1-860.788.5473  Crisis Text Line: For any crisis 24/7   To: 242123  see www.crisistextline.org  - IF MAKING A CALL FEELS TOO HARD, send a text!         Again thank you for choosing [facility] MENTAL HEALTH & ADDICTION [facility] and please let us know how we can best partner with you to improve you and your family's health.    You may be receiving a survey regarding this appointment. We would love to have your feedback, both positive and negative. The survey is done by an external company, so your answers are anonymous.

## 2021-02-17 NOTE — TELEPHONE ENCOUNTER
On February 17, 2021, at 2:22 PM, writer called patient at [phone] to confirm Virtual Visit. Writer unable to make contact with patient. Writer left detailed voice message for call back. [phone] left as call back number. Sofia Martínez, [facility]

## 2021-02-17 NOTE — PROGRESS NOTES
"VIDEO VISIT  Angy Haji is a 55 year old patient who is being evaluated via a billable video visit.      The patient has been notified of following:   \"This video visit will be conducted via a call between you and your physician/provider. We have found that certain health care needs can be provided without the need for an in-person physical exam. This service lets us provide the care you need with a video conversation. If a prescription is necessary we can send it directly to your pharmacy. If lab work is needed we can place an order for that and you can then stop by our lab to have the test done at a later time. Insurers are generally covering virtual visits as they would in-office visits so billing should not be different than normal.  If for some reason you do get billed incorrectly, you should contact the billing office to correct it and that number is in the AVS .    Video Conference to be completed via:  [email or web address]    Patient has given verbal consent for video visit?:  Yes    Patient would prefer that any video invitations be sent by: Send to e-mail at: [email or web address]      How would patient like to obtain AVS?:  Sunshine    AVS SmartPhrase [PsychAVS] has been placed in 'Patient Instructions':  Yes    "

## 2021-02-19 ENCOUNTER — THERAPY VISIT (OUTPATIENT)
Dept: PHYSICAL THERAPY | Facility: CLINIC | Age: 56
End: 2021-02-19
Payer: COMMERCIAL

## 2021-02-19 DIAGNOSIS — N81.89 PELVIC FLOOR WEAKNESS IN FEMALE: ICD-10-CM

## 2021-02-19 DIAGNOSIS — N39.46 URINARY INCONTINENCE, MIXED: ICD-10-CM

## 2021-02-19 PROCEDURE — 97112 NEUROMUSCULAR REEDUCATION: CPT | Mod: GP | Performed by: PHYSICAL THERAPIST

## 2021-02-19 PROCEDURE — 97110 THERAPEUTIC EXERCISES: CPT | Mod: GP | Performed by: PHYSICAL THERAPIST

## 2021-02-19 NOTE — PROGRESS NOTES
Subjective:  HPI  Physical Exam                    Objective:  System    Physical Exam    General     ROS    Assessment/Plan:    PROGRESS  REPORT    Progress reporting period is from 10/9/20 to 2/19/21.       SUBJECTIVE  Subjective changes noted by patient:  Subjective: Angy mentions she is definetly getting better with her PFM control, No leaks alst 2 weeks, Still wakes up once in the night to void. Uses restroom every 2 hours. Some days able to control the urgency better than other days.    Current pain level is   .     Previous pain level was    .   Changes in function:  Yes (See Goal flowsheet attached for changes in current functional level)  Adverse reaction to treatment or activity: None    OBJECTIVE  Changes noted in objective findings:  Yes,   Objective: Nikki 3/8-10/8/8. Improved PFM isolation demonstrated with exercises, Patient needs to work on void cycles and prevent walking up in the night to use restroom. She also wants to work on ehr abdominal strength which has helped a lot in getting her PFM control. She wants to continue PT once a month to address these current needs.      ASSESSMENT/PLAN  Updated problem list and treatment plan: Diagnosis 1:  PFD-UI- PFM weakness  Decreased strength - therapeutic exercise, therapeutic activities and home program  Decreased function - therapeutic activities and home program  STG/LTGs have been met or progress has been made towards goals:  Yes (See Goal flow sheet completed today.)  Assessment of Progress: The patient's condition is improving.  The patient's condition has potential to improve.  Patient is meeting short term goals and is progressing towards long term goals.  Self Management Plans:  Patient has been instructed in a home treatment program.  Patient  has been instructed in self management of symptoms.  I have re-evaluated this patient and find that the nature, scope, duration and intensity of the therapy is appropriate for the medical condition of  the patientArsenio Travis continues to require the following intervention to meet STG and LTG's:  PT    Recommendations:  This patient would benefit from continued therapy.     Frequency:  1 X a month, once daily  Duration:  for 3 months        Please refer to the daily flowsheet for treatment today, total treatment time and time spent performing 1:1 timed codes.

## 2021-02-22 DIAGNOSIS — N81.89 PELVIC FLOOR WEAKNESS IN FEMALE: Primary | ICD-10-CM

## 2021-02-22 NOTE — PROGRESS NOTES
Hi Dr Stanton,     Thanks for referring Angy to [facility] Rehab services at [facility]. I had the pleasure to evaluate and treat her for stress UI and dribbling issues. She is getting much better with UI and gaining more strength. She still has nocturia and working on improving her abdominal strength along with pelvic floor weakness. She currently has minimal to no leaks but wants to continue PT to improve her strength and function. Please check progress report in Epic.     Can you please send updated PT orders through [facility] to treat her stress UI and PFM weakness when you can?     Thanks!     Lacey Powell, PT   [facility] Rehab Services

## 2021-02-26 ENCOUNTER — THERAPY VISIT (OUTPATIENT)
Dept: PHYSICAL THERAPY | Facility: CLINIC | Age: 56
End: 2021-02-26
Payer: COMMERCIAL

## 2021-02-26 DIAGNOSIS — M25.551 HIP PAIN, RIGHT: ICD-10-CM

## 2021-02-26 DIAGNOSIS — N81.89 PELVIC FLOOR WEAKNESS IN FEMALE: ICD-10-CM

## 2021-02-26 PROCEDURE — 97112 NEUROMUSCULAR REEDUCATION: CPT | Mod: GP | Performed by: PHYSICAL THERAPIST

## 2021-02-26 PROCEDURE — 97530 THERAPEUTIC ACTIVITIES: CPT | Mod: GP | Performed by: PHYSICAL THERAPIST

## 2021-02-26 PROCEDURE — 97110 THERAPEUTIC EXERCISES: CPT | Mod: GP | Performed by: PHYSICAL THERAPIST

## 2021-03-05 ENCOUNTER — THERAPY VISIT (OUTPATIENT)
Dept: PHYSICAL THERAPY | Facility: CLINIC | Age: 56
End: 2021-03-05
Payer: COMMERCIAL

## 2021-03-05 DIAGNOSIS — M25.551 HIP PAIN, RIGHT: ICD-10-CM

## 2021-03-05 PROCEDURE — 97530 THERAPEUTIC ACTIVITIES: CPT | Mod: GP | Performed by: PHYSICAL THERAPIST

## 2021-03-05 PROCEDURE — 97112 NEUROMUSCULAR REEDUCATION: CPT | Mod: GP | Performed by: PHYSICAL THERAPIST

## 2021-03-05 PROCEDURE — 97110 THERAPEUTIC EXERCISES: CPT | Mod: GP | Performed by: PHYSICAL THERAPIST

## 2021-03-17 ENCOUNTER — VIRTUAL VISIT (OUTPATIENT)
Dept: PSYCHIATRY | Facility: CLINIC | Age: 56
End: 2021-03-17
Attending: PSYCHIATRY & NEUROLOGY
Payer: COMMERCIAL

## 2021-03-17 ENCOUNTER — TELEPHONE (OUTPATIENT)
Dept: PSYCHIATRY | Facility: CLINIC | Age: 56
End: 2021-03-17

## 2021-03-17 DIAGNOSIS — F31.9 BIPOLAR I DISORDER (H): ICD-10-CM

## 2021-03-17 PROCEDURE — 99214 OFFICE O/P EST MOD 30 MIN: CPT | Mod: HN | Performed by: STUDENT IN AN ORGANIZED HEALTH CARE EDUCATION/TRAINING PROGRAM

## 2021-03-17 RX ORDER — LITHIUM CARBONATE 150 MG/1
150 CAPSULE ORAL AT BEDTIME
Qty: 30 CAPSULE | Refills: 1 | Status: SHIPPED | OUTPATIENT
Start: 2021-03-17 | End: 2021-04-28

## 2021-03-17 RX ORDER — LITHIUM CARBONATE 300 MG/1
900 CAPSULE ORAL AT BEDTIME
Qty: 90 CAPSULE | Refills: 1 | Status: SHIPPED | OUTPATIENT
Start: 2021-03-17 | End: 2021-04-28

## 2021-03-17 ASSESSMENT — PATIENT HEALTH QUESTIONNAIRE - PHQ9
SUM OF ALL RESPONSES TO PHQ QUESTIONS 1-9: 4
SUM OF ALL RESPONSES TO PHQ QUESTIONS 1-9: 4
10. IF YOU CHECKED OFF ANY PROBLEMS, HOW DIFFICULT HAVE THESE PROBLEMS MADE IT FOR YOU TO DO YOUR WORK, TAKE CARE OF THINGS AT HOME, OR GET ALONG WITH OTHER PEOPLE: NOT DIFFICULT AT ALL

## 2021-03-17 ASSESSMENT — PAIN SCALES - GENERAL: PAINLEVEL: NO PAIN (0)

## 2021-03-17 NOTE — TELEPHONE ENCOUNTER
On March 17, 2021, at 2:02 PM, writer called patient at [phone] to confirm Virtual Visit. Writer unable to make contact with patient. Writer left detailed voice message for call back. [phone] left as call back number. Sofia Martínez, [facility]

## 2021-03-17 NOTE — PROGRESS NOTES
"VIDEO VISIT  Angy Haji is a 55 year old patient who is being evaluated via a billable video visit.      The patient has been notified of following:   \"This video visit will be conducted via a call between you and your physician/provider. We have found that certain health care needs can be provided without the need for an in-person physical exam. This service lets us provide the care you need with a video conversation. If a prescription is necessary we can send it directly to your pharmacy. If lab work is needed we can place an order for that and you can then stop by our lab to have the test done at a later time. Insurers are generally covering virtual visits as they would in-office visits so billing should not be different than normal.  If for some reason you do get billed incorrectly, you should contact the billing office to correct it and that number is in the AVS .    Video Conference to be completed via:  George    Patient has given verbal consent for video visit?:  Yes    Patient would prefer that any video invitations be sent by: Send to e-mail at: [email or web address]      How would patient like to obtain AVS?:  [employer]    AVS SmartPhrase [PsychAVS] has been placed in 'Patient Instructions':  Yes  "

## 2021-03-17 NOTE — TELEPHONE ENCOUNTER
On March 17, 2021 at 3:31 PM writer called patient and completed the rooming process. Sofia Martínez CMA

## 2021-03-17 NOTE — PATIENT INSTRUCTIONS
It was nice to see you today Angy    We will not make any changes to your medications today. There is room to increase your lithium if needed     I will see you again in 6 weeks        **For crisis resources, please see the information at the end of this document**     Patient Education      Thank you for coming to the [facility] MENTAL HEALTH & ADDICTION [place] CLIN[facility].    Lab Testing:  If you had lab testing today and your results are reassuring or normal they will be mailed to you or sent through [employer]s within 7 days. If the lab tests need quick action we will call you with the results. The phone number we will call with results is # [phone] (home) [phone] (work). If this is not the best number please call our clinic and change the number.    Medication Refills:  If you need any refills please call your pharmacy and they will contact us. Our fax number for refills is [phone]. Please allow three business for refill processing. If you need to  your refill at a new pharmacy, please contact the new pharmacy directly. The new pharmacy will help you get your medications transferred.     Scheduling:  If you have any concerns about today's visit or wish to schedule another appointment please call our office during normal business hours [phone] (8-5:00 M-F)    Contact Us:  Please call [phone] during business hours (8-5:00 M-F).  If after clinic hours, or on the weekend, please call  [phone].    Financial Assistance [phone]  [facility] Billing [phone]  Central Billing Office, [facility]: [phone]  [place] Billing [phone]  Medical Records [phone]  [place] Patient Bill of Rights [email or web address]       MENTAL HEALTH CRISIS NUMBERS:  For a medical emergency please call  911 or go to the nearest ER.     [facility]:   [facility] -[phone]   Crisis Residence [facility]  [facility] -[phone]   [facility] -[phone]   COPE 24/7 [place] Mobile Team -[phone] (adults)/[phone] (child)  CHILD: [facility] needs assessment team - [phone]      [facility]:   [facility] - [phone]   Walk-in counseling [facility] - [phone]   Walk-in counseling [facility] - [phone]   Crisis Residence [facility] - [phone]  Urgent Care Adult Mental Gpsjif-[phone] mobile unit/ 24/7 crisis line    National Crisis Numbers:   National Suicide Prevention Lifeline: 6-246-401-TALK (256-972-9628)  Poison Control Center - 8-270-495-0068  [employer]/resources for a list of additional resources (SOS)  Trans Lifeline a hotline for transgender people 1-371.912.5012  The Mayco Project a hotline for LGBT youth 7-319-992-1611  Crisis Text Line: For any crisis 24/7   To: 994003  see www.crisistextline.org  - IF MAKING A CALL FEELS TOO HARD, send a text!         Again thank you for choosing [facility] MENTAL HEALTH & ADDICTION [facility] and please let us know how we can best partner with you to improve you and your family's health.    You may be receiving a survey regarding this appointment. We would love to have your feedback, both positive and negative. The survey is done by an external company, so your answers are anonymous.

## 2021-03-17 NOTE — PROGRESS NOTES
Video- Visit Details  Type of service:  video visit for medication management  Time of service:    Date:  03/17/2021    Video Start Time:  4:02 PM        Video End Time:  4:30 pm    Reason for video visit:  Patient unable to travel due to Covid-19  Originating Site (patient location):  [facility]   Location- Patient's home  Distant Site (provider location):  [facility]  Mode of Communication:  Video Conference via AmWell  Consent:  Patient has given verbal consent for video visit?: Yes        [facility]  Psychiatry Clinic   PSYCHIATRY PROGRESS NOTE     Date of initial Diagnostic Assessment (DA) is 12/5/18.  Date of most recent   Transfer of Care Diagnostic Assessment is 07/16/19.     CARE TEAM:    PCP- [facility]     Therapist- [facility] (private practive)      Specialty Providers- OB/GYN, ENT, PT, Nutrition      Wife- Rachael Haji is a 55 year old female who prefers the name Angy & pronouns she, her, hers.      Psych pertinent item history includes Manic episode, psychiatric hospitalization in November 2018.    INTERIM HISTORY         The patient reports good treatment adherence.  History was provided by the patient and family who were good historians.  The last visit ended without changes.   Since the last visit:   - Angy reports that sleep is going well, and she is getting about 8 hours/night. She was previously getting up frequently to use the bathroom, but this has improved after working with PT  - Concentration is slowly improving. She is reading murder mystery novels to exercise her cognition. She notices some word finding difficulty  - Her wife just had bariatric surgery a week ago, so there is a lot of stress related to this  - Feels that the higher dose of lithium was helpful for depression. She is still not fully able to enjoy things. She noted that there are also a lot of situational factors, such as parenting a  "teenage daughter, the COVID pandemic, a recent move, and not being able to get outside to garden    RECENT PSYCH ROS:   Depression:  anhedonia and overwhelmed  Elevated:  none  Psychosis:  none  Anxiety:  excessive worry  Panic Attack:  none  Trauma Related:  none  Dysregulation:  none  Eating Disorder: some overeating, working with dietician  Pertinent Negative Symptoms:  NO suicidal ideation, self-injurious behavior/urges, violent ideation, aggression, psychosis, hallucinations, delusions, anthony and hypomania    RECENT SUBSTANCE USE:     Alcohol- drinking rarely, glass of wine during holidays, 1 drink/wk or less  Caffeine- drinks around 24oz of coffee during the day    CURRENT SOCIAL HISTORY:  Financial/ Work- 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       Partner/ - wife, Rachael Alejandra- 13 years old daughter, Gricel (now prefers name \"Jason\")     Living situation- M[facility] into [facility] in [place], lives with wife Danielle and teenage daughter   Social/ Spiritual Support- Family (wife and daughter)     FEELS SAFE AT HOME- yes     PSYCH and CD Critical Summary Points since July 2019            Aug 2019 - decreased the Depakote to 500 mg nightly  Nov 2019 - VPA decreased to 250 mg nightly  Jan 2020 - VPA decreased to 125 mg every night  Apr 2020 - Stopped VPA  Dec 2020 - inc Li to 1050 mg at bedtime  Jan 2021- no changes, Li labs ordered  Feb 2021 - no changes    PAST MED TRIALS          See last Diagnostic Assessment  MEDICAL / SURGICAL HISTORY                                   Pregnant or breastfeeding- no      Contraception- HRT for menopause    Patient Active Problem List   Diagnosis     Oral aphthae     Disorder of bursae and tendons in shoulder region     CARDIOVASCULAR SCREENING; LDL GOAL LESS THAN 160     Rosacea     Tear of medial cartilage or meniscus of knee, current     Raynaud disease     Vitamin D deficiency disease     Mild sleep apnea     Atopic rhinitis     Chronic " bilateral low back pain without sciatica     Obesity, Class III, BMI 40-49.9 (morbid obesity) (H)     Muscle tension dysphonia     Palpitations     Dysphagia, unspecified type     Anxiety     Benign essential hypertension     Callus of foot     Varicose veins of right lower extremity with edema     Bipolar I disorder (H)     Pelvic floor weakness in female     Urinary incontinence, mixed     Dribbling of urine     Hip pain, right     It band syndrome, right       Major Surgery- n/a    MEDICAL REVIEW OF SYSTEMS      A comprehensive review of systems was performed and is negative other than noted in the HPI.    ALLERGY       Seroquel [quetiapine], Olanzapine, Penicillins, Risperidone, and Thimerosal  MEDICATIONS        Current Outpatient Medications   Medication Sig Dispense Refill     Carboxymethylcellulose Sod PF (REFRESH PLUS) 0.5 % SOLN ophthalmic solution Place 1 drop into both eyes 3 times daily as needed for dry eyes       estrogen conj-medroxyPROGESTERone (PREMPRO) 0.625-2.5 MG tablet Take 1 tablet by mouth daily 90 tablet 3     lisinopril (ZESTRIL) 10 MG tablet Take 1 tablet (10 mg) by mouth daily 90 tablet 3     lithium (ESKALITH) 150 MG capsule Take 1 capsule (150 mg) by mouth At Bedtime 30 capsule 0     lithium 300 MG capsule Take 3 capsules (900 mg) by mouth At Bedtime Take with one 150mg capsule for total dose 1050 mg at bedtime. 90 capsule 1     Loratadine 10 MG capsule Take 10 mg by mouth daily as needed.       metroNIDAZOLE (METROGEL) 1 % external gel Apply topically daily 60 g 9     ORDER FOR DME Use your CPAP device as directed by your provider.       PROAIR  (90 Base) MCG/ACT inhaler        VITAMIN D, CHOLECALCIFEROL, PO Take 2,000 Units by mouth daily       VITALS        There were no vitals taken for this visit. , telehealth  MENTAL STATUS EXAM           Alertness: alert  and oriented  Appearance: well groomed  Behavior/Demeanor: cooperative, pleasant and calm, with good  eye contact  "  Speech: normal and regular rate and rhythm  Language: intact  Psychomotor: normal or unremarkable  Mood: \"okay\" \"difficult to enjoy things\"  Affect: full range; was congruent to mood; was congruent to content  Thought Process/Associations: unremarkable  Thought Content:  Reports none;  Denies suicidal ideation, violent ideation, delusions and paranoid ideation  Perception:  Reports none;  Denies auditory hallucinations and visual hallucinations  Insight: good  Judgment: good  Cognition: (6) does  appear grossly intact; formal cognitive testing was not done  Gait and Station: unremarkable    LABS and DATA     AIMS:  Not needed    PHQ9 TODAY = not obtained, telehealth  PHQ-9 SCORE 8/28/2019 10/2/2019 11/6/2019   PHQ-9 Total Score 9 5 6     LITHIUM LABS     [level, renal, SG, TSH, WBC]    q6 mo  Recent Labs   Lab Test 02/03/21  1019 07/15/20  1617 10/15/19  1037 05/01/19  0921   LITHIUM 0.67 0.52* 0.65 0.61     Recent Labs   Lab Test 02/03/21  1019 07/15/20  1617 10/15/19  1037 05/01/19  0921   CR 0.58 0.58 0.45* 0.54   GFRESTIMATED >90 >90 >90 >90    136 138 135   POTASSIUM 4.2 4.4 4.3 4.6   SHERRIE 9.8 9.4 9.1 9.0     Recent Labs   Lab Test 02/03/21  1011 07/15/20  1633 10/15/19  1222 05/01/19  1140   SG 1.011 1.008 1.006 1.008     Recent Labs   Lab Test 02/03/21  1019 07/15/20  1617 10/15/19  1037 05/01/19  0921   TSH 2.84 2.46 2.47 2.69       PSYCHOTROPIC DRUG INTERACTIONS     Concurrent use of LITHIUM and ACE INHIBITORS may result in increased risk of lithium toxicity.    MANAGEMENT:  Monitoring for adverse effects, routine vitals, routine labs, using lowest therapeutic dose of [agents] and patient is aware of risks    RISK STATEMENT for SAFETY     Angy Haji did not appear to be an imminent safety risk to self or others.    DIAGNOSIS     Bipolar 1 disorder, current depressive episode, mild    (h/o one manic episode Nov/Dec 2018)    H/o EPSE secondary to neuroleptic use  H/o Thrombocytopenia secondary to " Depakote  H/o lithium toxicity, resolved    ASSESSMENT        TODAY Angy reports ongoing low grade depressive symptoms, with prominent anhedonia. The recent lithium increase was somewhat helpful for her symptoms. She noted there were a number of situational factors also playing a role, including the COVID pandemic and a recent move. We discussed possible pharmacologic strategies, including increasing lithium further or adding lamotrigine. She prefers to not make any medication changes today. She anticipates that her mood will improve as Spring approaches. I will see her again in 6 weeks.     [place]  was checked today: none     PLAN        1) PSYCHOTROPIC MEDICATIONS:  - Continue Lithium 1050 mg at bedtime    2) THERAPY: Continue    3) OTHER:    Labs- Li labs Aug 2021    4) RTC: 6 weeks    5) CRISIS NUMBERS:   Provided routinely in AVS   ONLY if a [facility]AMAR PT: [facility] [phone] (clinic), [phone] (after hours)     TREATMENT RISK STATEMENT:  The risks, benefits, alternatives and potential adverse effects have been discussed and are understood by the pt. The pt understands the risks of using street drugs or alcohol. There are no medical contraindications, the pt agrees to treatment with the ability to do so. The pt knows to call the clinic for any problems or to access emergency care if needed.  Medical and substance use concerns are documented above.  Psychotropic drug interaction check was done, including changes made today.    PROVIDER: Ricci Salazar MD    Patient not staffed in clinic.  Supervisor is Dr. Tapia, who will sign the note.

## 2021-03-18 ASSESSMENT — PATIENT HEALTH QUESTIONNAIRE - PHQ9: SUM OF ALL RESPONSES TO PHQ QUESTIONS 1-9: 4

## 2021-03-19 ENCOUNTER — THERAPY VISIT (OUTPATIENT)
Dept: PHYSICAL THERAPY | Facility: CLINIC | Age: 56
End: 2021-03-19
Payer: COMMERCIAL

## 2021-03-19 DIAGNOSIS — M25.551 HIP PAIN, RIGHT: ICD-10-CM

## 2021-03-19 PROCEDURE — 97110 THERAPEUTIC EXERCISES: CPT | Mod: GP | Performed by: PHYSICAL THERAPIST

## 2021-03-19 PROCEDURE — 97530 THERAPEUTIC ACTIVITIES: CPT | Mod: GP | Performed by: PHYSICAL THERAPIST

## 2021-03-19 PROCEDURE — 97112 NEUROMUSCULAR REEDUCATION: CPT | Mod: GP | Performed by: PHYSICAL THERAPIST

## 2021-03-20 ENCOUNTER — HEALTH MAINTENANCE LETTER (OUTPATIENT)
Age: 56
End: 2021-03-20

## 2021-03-24 ENCOUNTER — HOSPITAL ENCOUNTER (OUTPATIENT)
Dept: NUTRITION | Facility: CLINIC | Age: 56
Discharge: HOME | End: 2021-03-24
Attending: DIETITIAN, REGISTERED | Admitting: DIETITIAN, REGISTERED
Payer: COMMERCIAL

## 2021-03-24 PROCEDURE — 97803 MED NUTRITION INDIV SUBSEQ: CPT | Mod: GT | Performed by: DIETITIAN, REGISTERED

## 2021-03-24 NOTE — PROGRESS NOTES
"Video-Visit Details    Type of service:  Video Visit    Video Start Time (time video started): 9:35    Video End Time (time video stopped): 10:00    Originating Location (pt. Location): Home    Distant Location (provider location):  [facility] NUTRITION SERVICES     Mode of Communication:  Video Conference via [facility]    NUTRITION REASSESSMENT NOTE (TELEPHONE VISIT DUE TO COVID-19)     REASON FOR ASSESSMENT   Angy QUINTANA Eduardodeann referred by Dr. Odell for MNT related to  Obesity (BMI 30-39.9) [E66.9]  - Primary       Benign essential hypertension [I10]       Patient accompanied by self (via telephone)     NUTRITION HISTORY  Patient continues with multiple life changes and stressors.  Patient aware that she likes to self soothe with ice cream which she has recently increased.  Patient is aware that social cues trigger her to eat. Patient's spouse recently has weight loss surgery so there is adjust with eating in the home.      DIET RECALL   Breakfast: cereal and banana; yogurt + granola (measures), fruit + coffee; 2 slices toast with butter + 1/2 cup cottage cheese    Lunch: leftovers (includes fruit and vegetable daily)  Snack: fruit or cheese with lower salt nut thin crackers   Dinner: Tuna steak, Asian marinade, stir gomez, baked vegetable + brown rice (3/4 cup) + canned peaches; chicken thighs with beans and rice; sheet pan dinner with chicken and vegetables or grilled salmon and grilled vegetables; taco; protein + fruit and vegetable  Snacks: fruit, string cheese, Ritz crackers or yogurt; dessert in the evening (ice cream recently)  Beverages: mainly water, coffee, mineral water  Dining out: none currently  Exercise: Patient exercising 5-6 times per week. (cross country skiing, snow shoeing, walking and walking dog).  Patient exercises 1 hour on weekends (hiking or walking).    MEDICATIONS:  Reviewed      ANTHROPOMETRICS  Height: 5'8\" (note height change in chart previously 5'9\")  Weight: 317 lbs "   BMI (kg/m2): 48.1 kg/m2   %IBW: 225%  Weight History:   Wt Readings from Last 10 Encounters:   01/06/21 143.8 kg (317 lb)   10/09/20 141.2 kg (311 lb 3.2 oz)   09/23/20 143.1 kg (315 lb 6.4 oz)   09/16/20 141.1 kg (311 lb)   07/29/20 139.7 kg (308 lb)   03/11/20 138.6 kg (305 lb 8 oz)   01/24/20 137 kg (302 lb)   12/12/19 133.8 kg (295 lb)   12/04/19 133.8 kg (295 lb)   11/07/19 133.4 kg (294 lb)      ASSESSED NUTRITION NEEDS  Estimated Energy Needs: 7070-2660 kcals (15-20 kcals/kg) for gradual weight loss   Estimated Protein Needs: 66-79 (1-1.2 gm/kg IBW) for repletion with exercise  RMR: 1440     EVALUATION/PROGRESS TOWARDS GOALS   Previous goals:  Self care 3 times per week to reduce stress eating-met (reading 3-4 days per week, bath, stopping tasks after dinner)  Aim for 45-60 grams carbohydrates 3 times per week at dinner-not met   Aim for 3 days per week no desserts-improving (4 weeks met goal; 4 weeks hit goal 2 times)    Previous Nutrition Diagnosis: Obesity related to excess energy intake as evidenced by BMI of 48.1 kg/m2-no change     Current Nutrition Diagnosis:Obesity related to excess energy intake as evidenced by BMI of 48.1 kg/m2     INTERVENTIONS   Nutrition Prescription   Recommend exercise 5 times per week; track intake if eating mindlessly; increase fiber to 25-30 grams per day      Implementation:   Meals and Snacks: Continue eating 3 meals + deliberate snacking (if hungry)   Nutrition Education (Content):               a)  Discussed progress towards goals.  Congratulated patient for tracking dessert intake.  Reviewed current food and eating behavior challenges as patient has been feeling that she has been stress eating.  Discussed carbohydrate intake and patient feels now is not a time she is able to focus on it.  Supported patient in her struggle with food and weight.    Nutrition Education (Application):               a) Determined alternatives to emotional eating.                         b)  Patient verbalized understanding of diet by stating will reduce desserts to 4 times per week.        Expected patient engagement: good     CURRENT GOALS   Practice alternatives to stress eating   Walk at lunch and after work   Meditation at least 5 minutes in the early evening   Aim for 3 days per week with no desserts     FOLLOW UP/MONITORING    Patient to follow up in 7 weeks  Patient has RD contact information      Time spent with patient: 25 minutes     Mattie Amaya, RD, LD  [facility] Outpatient Dietitian  [phone] (office phone)

## 2021-03-26 ENCOUNTER — THERAPY VISIT (OUTPATIENT)
Dept: PHYSICAL THERAPY | Facility: CLINIC | Age: 56
End: 2021-03-26
Payer: COMMERCIAL

## 2021-03-26 DIAGNOSIS — N39.46 URINARY INCONTINENCE, MIXED: ICD-10-CM

## 2021-03-26 DIAGNOSIS — N81.89 PELVIC FLOOR WEAKNESS IN FEMALE: ICD-10-CM

## 2021-03-26 PROCEDURE — 97112 NEUROMUSCULAR REEDUCATION: CPT | Mod: GP | Performed by: PHYSICAL THERAPIST

## 2021-03-26 PROCEDURE — 97110 THERAPEUTIC EXERCISES: CPT | Mod: GP | Performed by: PHYSICAL THERAPIST

## 2021-04-16 ENCOUNTER — THERAPY VISIT (OUTPATIENT)
Dept: PHYSICAL THERAPY | Facility: CLINIC | Age: 56
End: 2021-04-16
Payer: COMMERCIAL

## 2021-04-16 DIAGNOSIS — M25.551 HIP PAIN, RIGHT: ICD-10-CM

## 2021-04-16 PROCEDURE — 97530 THERAPEUTIC ACTIVITIES: CPT | Mod: GP | Performed by: PHYSICAL THERAPIST

## 2021-04-16 PROCEDURE — 97112 NEUROMUSCULAR REEDUCATION: CPT | Mod: GP | Performed by: PHYSICAL THERAPIST

## 2021-04-16 PROCEDURE — 97110 THERAPEUTIC EXERCISES: CPT | Mod: GP | Performed by: PHYSICAL THERAPIST

## 2021-04-23 ENCOUNTER — ANCILLARY PROCEDURE (OUTPATIENT)
Dept: GENERAL RADIOLOGY | Facility: CLINIC | Age: 56
End: 2021-04-23
Attending: FAMILY MEDICINE
Payer: COMMERCIAL

## 2021-04-23 ENCOUNTER — THERAPY VISIT (OUTPATIENT)
Dept: PHYSICAL THERAPY | Facility: CLINIC | Age: 56
End: 2021-04-23
Payer: COMMERCIAL

## 2021-04-23 ENCOUNTER — OFFICE VISIT (OUTPATIENT)
Dept: FAMILY MEDICINE | Facility: CLINIC | Age: 56
End: 2021-04-23
Payer: COMMERCIAL

## 2021-04-23 VITALS
DIASTOLIC BLOOD PRESSURE: 77 MMHG | WEIGHT: 293 LBS | HEART RATE: 68 BPM | SYSTOLIC BLOOD PRESSURE: 130 MMHG | OXYGEN SATURATION: 100 % | HEIGHT: 68 IN | TEMPERATURE: 98.4 F | BODY MASS INDEX: 44.41 KG/M2

## 2021-04-23 DIAGNOSIS — M76.31 IT BAND SYNDROME, RIGHT: ICD-10-CM

## 2021-04-23 DIAGNOSIS — G89.29 CHRONIC BILATERAL LOW BACK PAIN WITHOUT SCIATICA: Primary | ICD-10-CM

## 2021-04-23 DIAGNOSIS — E66.01 OBESITY, CLASS III, BMI 40-49.9 (MORBID OBESITY) (H): ICD-10-CM

## 2021-04-23 DIAGNOSIS — I10 BENIGN ESSENTIAL HYPERTENSION: ICD-10-CM

## 2021-04-23 DIAGNOSIS — F31.9 BIPOLAR I DISORDER (H): ICD-10-CM

## 2021-04-23 DIAGNOSIS — G89.29 CHRONIC BILATERAL LOW BACK PAIN WITHOUT SCIATICA: ICD-10-CM

## 2021-04-23 DIAGNOSIS — M25.551 HIP PAIN, RIGHT: ICD-10-CM

## 2021-04-23 DIAGNOSIS — M54.50 CHRONIC BILATERAL LOW BACK PAIN WITHOUT SCIATICA: Primary | ICD-10-CM

## 2021-04-23 DIAGNOSIS — N81.89 PELVIC FLOOR WEAKNESS IN FEMALE: ICD-10-CM

## 2021-04-23 DIAGNOSIS — M54.50 CHRONIC BILATERAL LOW BACK PAIN WITHOUT SCIATICA: ICD-10-CM

## 2021-04-23 DIAGNOSIS — N39.46 URINARY INCONTINENCE, MIXED: ICD-10-CM

## 2021-04-23 PROCEDURE — 99214 OFFICE O/P EST MOD 30 MIN: CPT | Mod: GC | Performed by: STUDENT IN AN ORGANIZED HEALTH CARE EDUCATION/TRAINING PROGRAM

## 2021-04-23 PROCEDURE — 97110 THERAPEUTIC EXERCISES: CPT | Mod: GP | Performed by: PHYSICAL THERAPIST

## 2021-04-23 PROCEDURE — 97112 NEUROMUSCULAR REEDUCATION: CPT | Mod: GP | Performed by: PHYSICAL THERAPIST

## 2021-04-23 RX ORDER — CYCLOBENZAPRINE HCL 5 MG
5 TABLET ORAL
Qty: 7 TABLET | Refills: 0 | Status: SHIPPED | OUTPATIENT
Start: 2021-04-23 | End: 2021-06-02

## 2021-04-23 RX ORDER — LIDOCAINE HCL 4% 4 G/100G
CREAM TOPICAL
Qty: 133 G | Refills: 1 | Status: SHIPPED | OUTPATIENT
Start: 2021-04-23 | End: 2021-04-23 | Stop reason: ALTCHOICE

## 2021-04-23 RX ORDER — LIDOCAINE/PRILOCAINE 2.5 %-2.5%
CREAM (GRAM) TOPICAL PRN
Qty: 30 G | Refills: 1 | Status: SHIPPED | OUTPATIENT
Start: 2021-04-23 | End: 2021-06-02

## 2021-04-23 ASSESSMENT — MIFFLIN-ST. JEOR: SCORE: 2074.17

## 2021-04-23 NOTE — PROGRESS NOTES
Subjective:  HPI  Physical Exam                    Objective:  System    Physical Exam    General     ROS    Assessment/Plan:    DISCHARGE REPORT    Progress reporting period is from 11/6/21 to 4/23/21.       SUBJECTIVE  Subjective changes noted by patient:  Subjective: Urges getting better, strength of contraction in PFM is getting better as well. Overall doing better adn this routine is going well and thinks it will take time to get stronger and willing to perform exercises next few months.     Current pain level is   .     Previous pain level was    .   Changes in function:  Yes (See Goal flowsheet attached for changes in current functional level)  Adverse reaction to treatment or activity: None    OBJECTIVE  Changes noted in objective findings:  Yes,   Objective: Void cycle every 2 hr 15 min, no leaks last week, small leaks do happen when she tries to go past 2.5 hrs and has the urge to go to restroom. [place] 3/10/10/10. Improved PFM isolation with contraction demonstrated. Advsied to work on ehr PFM exercises 3 x week. Abdominal strengthening exercises 3 x week. advsied on how to progress her exercises for the next 3 months.      ASSESSMENT/PLAN  Updated problem list and treatment plan: Diagnosis 1:  PFD- UI    STG/LTGs have been met or progress has been made towards goals:  Yes (See Goal flow sheet completed today.)  Assessment of Progress: The patient's condition is improving.  The patient's condition has potential to improve.  Patient is meeting short term goals and is progressing towards long term goals.  Self Management Plans:  Patient is independent in a home treatment program.  Patient is independent in self management of symptoms.  I have re-evaluated this patient and find that the nature, scope, duration and intensity of the therapy is appropriate for the medical condition of the patient.  Angy continues to require the following intervention to meet STG and LTG's:  PT intervention is no longer  required to meet STG/LTG.    Recommendations:  This patient is ready to be discharged from therapy and continue their home treatment program.    Please refer to the daily flowsheet for treatment today, total treatment time and time spent performing 1:1 timed codes.

## 2021-04-23 NOTE — PROGRESS NOTES
Assessment & Plan     Subacute on Chronic bilateral low back pain without sciatica  Subacute on chronic right sided low back pain with new numbness/tingling in toes.  Differential broad at this point, including lumbar radiculopathy, disc hernation, spondylolisthesis, lumbar muscle spasm, compression fracture possible but seems less likely. Will obtain xrays today. Lidocaine gel and short course of flexeril at night for pain control. Avoid NSAIDs due to potential lithium toxicity. Continue PT.  - X-ray lumbar spine 2-3 views*  - cyclobenzaprine (FLEXERIL) 5 MG tablet  Dispense: 7 tablet; Refill: 0  - lidocaine (BENGAY LIDOCAINE) 4 % external cream  Dispense: 133 g; Refill: 1    It band syndrome, right  Continue PT.    Obesity, Class III, BMI 40-49.9 (morbid obesity) (H)  Patient with 3lb weight gain in last 6 months. Currently seeing dietician, weight watchers has helped in past. Exercise somewhat more limited recently due to pain.   - Hemoglobin A1c ([facility])  - Lipid [place] ([facility])    Benign essential hypertension  BP at goal today. Continue current regimen. Continue to monitor at home    Bipolar I Disorder  Patient currently feeling slightly more down, slightly improved since Feb when psychiatry increased her lithium dose. Have had ongoing discussions with psych regarding management and potentially adding supplemental medication for depression symptoms. No SI. Has follow up with psych next week.      No follow-ups on file.    Gabe Msoer MD  [facility] STEPHY Travis is a 55 year old who presents for the following health issues     HPI   Back pain  Currently going to PT for right hip pain (IT band syndrome) since November, has a few more appointments set up--next 4/30.  Had been getting better with PT. Also doing home exercises, waking dog.     Had flair of pain about a month ago that crept up on her after doing some yard work. No obvious trauma/injury. harder for her  "to sleep on right (and left side). Pain 2-3/10, dull when upright, then when laying down more sharp 6/10. Also has started to have R foot numbness/tingling when walking on outside of toes.  More weak going up stairs--not confident when going up stairs.  Having more R sided low back pain on R side  Had lost ~120lbs  Over last 10 years, was down to 220--has gained more since manic episode.     Denies fevers, pelvic numbness/tingling. Has had ongoing leakage of urine, no changes, seeing PT for pelvic floor strengthening exercises.    Heating pad, tylenol.  Currently avoiding NSAIDs as patient is on lithium.    Mood  More depressive sx. Feeling fatigue, loss of interest.  Denies SI.  Has been working with residents at the   Increased lithium a little in Feb  Seeing psych next week.       Review of Systems   Constitutional, HEENT, cardiovascular, pulmonary, gi and gu systems are negative, except as otherwise noted.      Objective    /77   Pulse 68   Temp 98.4  F (36.9  C) (Oral)   Ht 1.727 m (5' 8\")   Wt 142.1 kg (313 lb 3.2 oz)   SpO2 100%   BMI 47.62 kg/m    Body mass index is 47.62 kg/m .  Physical Exam  Vitals signs reviewed.   Constitutional:       General: She is not in acute distress.     Appearance: Normal appearance. She is not ill-appearing.   HENT:      Head: Normocephalic and atraumatic.   Eyes:      Conjunctiva/sclera: Conjunctivae normal.   Pulmonary:      Effort: Pulmonary effort is normal. No respiratory distress.   Musculoskeletal:      Comments: Back:  No obvious deformity or swelling. Tenderness along lumbar spine, greatest at L2. Fullness and mild tenderness of R paraspinal muscles. ROM slightly limited due to pain, worst with extension. Normal gross strength/ sensation in lower extremities. Patient with external rotation of L hip while standing/walking.  Hip:  External rotation slightly limited in L hip. Mild discomfort with palpation along IT band   Skin:     General: Skin is warm and " dry.   Neurological:      General: No focal deficit present.      Mental Status: She is alert.      Motor: No weakness.      Gait: Gait normal.   Psychiatric:         Behavior: Behavior normal.         Thought Content: Thought content normal.

## 2021-04-23 NOTE — PROGRESS NOTES
Preceptor Attestation:   Patient seen and discussed with the resident. Assessment and plan reviewed with resident and agreed upon.   Supervising Physician:  DO [facility]

## 2021-04-23 NOTE — PATIENT INSTRUCTIONS
Patient Education     Here is the plan from today's visit    1. Acute on Chronic bilateral low back pain without sciatica  - X-ray lumbar spine 2-3 views*; Future  - cyclobenzaprine (FLEXERIL) 5 MG tablet; Take 1 tablet (5 mg) by mouth nightly as needed for muscle spasms  Dispense: 7 tablet; Refill: 0  - lidocaine (BENGAY LIDOCAINE) 4 % external cream; Apply topically once as needed for mild pain  Dispense: 133 g; Refill: 1    2. It band syndrome, right      3. Obesity, Class III, BMI 40-49.9 (morbid obesity) (H)  - Hemoglobin A1c ([facility]); Future  - Lipid [place] ([facility]); Future      Please call or return to clinic if your symptoms don't go away.    Follow up plan  No follow-ups on file.      Thank you for coming to [facility] today.  Lab Testing:  **If you had lab testing today and your results are reassuring or normal they will be mailed to you or sent through [employer] within 7 days.   **If the lab tests need quick action we will call you with the results.  The phone number we will call with results is # [phone] (home) [phone] (work). If this is not the best number please call our clinic and change the number.  Medication Refills:  If you need any refills please call your pharmacy and they will contact us.   If you need to  your refill at a new pharmacy, please contact the new pharmacy directly. The new pharmacy will help you get your medications transferred faster.   Scheduling:  If you have any concerns about today's visit or wish to schedule another appointment please call our office during normal business hours [phone] (8-5:00 M-F)  If a referral was made to a [facility] and you don't get a call from central scheduling please call [phone].  If a Mammogram was ordered for you at [facility] call [phone] to schedule or change your appointment.  If you had an XRay/CT/Ultrasound/MRI ordered the number is [phone] to schedule or change your  radiology appointment.   Medical Concerns:  If you have urgent medical concerns please call [phone] at any time of the day.    Gabe Moser MD

## 2021-04-28 ENCOUNTER — VIRTUAL VISIT (OUTPATIENT)
Dept: PSYCHIATRY | Facility: CLINIC | Age: 56
End: 2021-04-28
Attending: PSYCHIATRY & NEUROLOGY
Payer: COMMERCIAL

## 2021-04-28 ENCOUNTER — TELEPHONE (OUTPATIENT)
Dept: PSYCHIATRY | Facility: CLINIC | Age: 56
End: 2021-04-28

## 2021-04-28 DIAGNOSIS — F31.9 BIPOLAR I DISORDER (H): Primary | ICD-10-CM

## 2021-04-28 DIAGNOSIS — Z79.899 ENCOUNTER FOR LONG-TERM (CURRENT) USE OF MEDICATIONS: ICD-10-CM

## 2021-04-28 PROCEDURE — 99214 OFFICE O/P EST MOD 30 MIN: CPT | Mod: HN | Performed by: STUDENT IN AN ORGANIZED HEALTH CARE EDUCATION/TRAINING PROGRAM

## 2021-04-28 RX ORDER — LITHIUM CARBONATE 150 MG/1
150 CAPSULE ORAL AT BEDTIME
Qty: 30 CAPSULE | Refills: 1 | Status: SHIPPED | OUTPATIENT
Start: 2021-04-28 | End: 2021-06-18

## 2021-04-28 RX ORDER — LITHIUM CARBONATE 300 MG/1
900 CAPSULE ORAL AT BEDTIME
Qty: 90 CAPSULE | Refills: 1 | Status: SHIPPED | OUTPATIENT
Start: 2021-04-28 | End: 2021-06-18

## 2021-04-28 NOTE — TELEPHONE ENCOUNTER
On April 28, 2021, at 2:37 PM, writer called patient at [phone] to confirm Virtual Visit. Writer unable to make contact with patient. Writer left detailed voice message for call back. [phone] left as call back number. Sofia Martínez, [facility]

## 2021-04-28 NOTE — PROGRESS NOTES
"VIDEO VISIT  Angy Haji is a 55 year old patient who is being evaluated via a billable video visit.      The patient has been notified of following:   \"We have found that certain health care needs can be provided without the need for an in-person physical exam. This service lets us provide the care you need with a video conversation. If a prescription is necessary we can send it directly to your pharmacy. If lab work is needed we can place an order for that and you can then stop by our lab to have the test done at a later time. Insurers are generally covering virtual visits as they would in-office visits so billing should not be different than normal.  If for some reason you do get billed incorrectly, you should contact the billing office to correct it and that number is in the AVS .    Patient has given verbal consent for video visit?: Yes   How would you like to obtain your AVS?: LegalJump  AVS SmartPhrase [PsychAVS] has been placed in 'Patient Instructions': Yes      Video- Visit Details  Type of service:  video visit for medication management  Time of service:    Date:  04/28/2021    Video Start Time:  3pm     Video End Time:  3:30 pm    Reason for video visit:  Patient unable to travel due to Covid-19  Originating Site (patient location):  [facility] [place]   Location- Patient's home  Distant Site (provider location):  [facility]  Mode of Communication:  Video Conference via [email or web address]  Consent:  Patient has given verbal consent for video visit?: Yes        [facility]  Psychiatry Clinic   PSYCHIATRY PROGRESS NOTE     Date of initial Diagnostic Assessment (DA) is 12/5/18.  Date of most recent   Transfer of Care Diagnostic Assessment is 07/16/19.     CARE TEAM:    PCP- [facility]     Therapist- [facility] (private practive)      Specialty Providers- OB/GYN, ENT, PT, Nutrition      Wife- Rachael Haji is a 55 year old female who prefers " "the name Angy & pronouns she, her, hers.      Psych pertinent item history includes Manic episode, psychiatric hospitalization in November 2018.    INTERIM HISTORY         The patient reports good treatment adherence.  History was provided by the patient and family who were good historians.  The last visit ended without changes.   Since the last visit:   - Angy reports that she continues to have low grade depressive symptoms. The intensity of symptoms varies, but she has generally felt \"below neutral\"  - Less interest in doing activities. However, she does get some enjoyment after she actually does the activity. She had a nice time at her friend's cabin recently  - No symptoms of anthony. Sleeping well at night  - She noted there are some situational factors contributing, such as her work environment. She is working to improve these things as much as possible. She is also looking forward to gardening now that the weather is warming up    RECENT PSYCH ROS:   Depression:  depressed mood and anhedonia  Elevated:  none  Psychosis:  none  Anxiety:  None  Panic Attack:  none  Trauma Related:  none  Dysregulation:  none  Eating Disorder: some overeating, working with dietician  Pertinent Negative Symptoms:  NO suicidal ideation, self-injurious behavior/urges, violent ideation, aggression, psychosis, hallucinations, delusions, anthony and hypomania    RECENT SUBSTANCE USE:     Alcohol- drinking rarely, glass of wine during holidays, 1 drink/wk or less  Caffeine- drinks around 24oz of coffee during the day    CURRENT SOCIAL HISTORY:  Financial/ Work- Working SW/psychotherapist       Partner/ - wifeRachael- 13 years old daughterGricel (now prefers name \"Jason\")     Living situation- Lives in [place] with wife Danielle and teenage daughter   Social/ Spiritual Support- Family (wife and daughter)     FEELS SAFE AT HOME- yes     PSYCH and CD Critical Summary Points since July 2019            Aug 2019 - decreased the " Depakote to 500 mg nightly  Nov 2019 - VPA decreased to 250 mg nightly  Jan 2020 - VPA decreased to 125 mg every night  Apr 2020 - Stopped VPA  Dec 2020 - inc Li to 1050 mg at bedtime  Jan 2021- no changes, Li labs ordered  Feb 2021 - no changes    PAST MED TRIALS          See last Diagnostic Assessment  MEDICAL / SURGICAL HISTORY                                   Pregnant or breastfeeding- no      Contraception- HRT for menopause    Patient Active Problem List   Diagnosis     Oral aphthae     CARDIOVASCULAR SCREENING; LDL GOAL LESS THAN 160     Rosacea     Raynaud disease     Vitamin D deficiency disease     Mild sleep apnea     Atopic rhinitis     Chronic bilateral low back pain without sciatica     Obesity, Class III, BMI 40-49.9 (morbid obesity) (H)     Muscle tension dysphonia     Palpitations     Dysphagia, unspecified type     Anxiety     Benign essential hypertension     Callus of foot     Varicose veins of right lower extremity with edema     Bipolar I disorder (H)     Dribbling of urine     Hip pain, right     It band syndrome, right       Major Surgery- n/a    MEDICAL REVIEW OF SYSTEMS      A comprehensive review of systems was performed and is negative other than noted in the HPI.    ALLERGY       Seroquel [quetiapine], Olanzapine, Penicillins, Risperidone, and Thimerosal  MEDICATIONS        Current Outpatient Medications   Medication Sig Dispense Refill     Carboxymethylcellulose Sod PF (REFRESH PLUS) 0.5 % SOLN ophthalmic solution Place 1 drop into both eyes 3 times daily as needed for dry eyes       cyclobenzaprine (FLEXERIL) 5 MG tablet Take 1 tablet (5 mg) by mouth nightly as needed for muscle spasms 7 tablet 0     estrogen conj-medroxyPROGESTERone (PREMPRO) 0.625-2.5 MG tablet Take 1 tablet by mouth daily 90 tablet 3     lidocaine-prilocaine (EMLA) 2.5-2.5 % external cream Apply topically as needed for moderate pain 30 g 1     lisinopril (ZESTRIL) 10 MG tablet Take 1 tablet (10 mg) by mouth daily  "90 tablet 3     lithium (ESKALITH) 150 MG capsule Take 1 capsule (150 mg) by mouth At Bedtime 30 capsule 1     lithium 300 MG capsule Take 3 capsules (900 mg) by mouth At Bedtime Take with one 150mg capsule for total dose 1050 mg at bedtime. 90 capsule 1     Loratadine 10 MG capsule Take 10 mg by mouth daily as needed.       metroNIDAZOLE (METROGEL) 1 % external gel Apply topically daily 60 g 9     ORDER FOR DME Use your CPAP device as directed by your provider.       PROAIR  (90 Base) MCG/ACT inhaler        VITAMIN D, CHOLECALCIFEROL, PO Take 2,000 Units by mouth daily       VITALS        There were no vitals taken for this visit. , telehealth  MENTAL STATUS EXAM           Alertness: alert  and oriented  Appearance: well groomed  Behavior/Demeanor: cooperative, pleasant and calm, with good  eye contact   Speech: normal and regular rate and rhythm  Language: intact  Psychomotor: normal or unremarkable  Mood: \"below neutral\"  Affect: full range; was congruent to mood; was congruent to content  Thought Process/Associations: unremarkable  Thought Content:  Reports none;  Denies suicidal ideation, violent ideation, delusions and paranoid ideation  Perception:  Reports none;  Denies auditory hallucinations and visual hallucinations  Insight: good  Judgment: good  Cognition: (6) does  appear grossly intact; formal cognitive testing was not done  Gait and Station: unremarkable    LABS and DATA     AIMS:  NA    PHQ9 TODAY =  PHQ-9 SCORE 10/2/2019 11/6/2019 3/17/2021   PHQ-9 Total Score MyChart - - 4 (Minimal depression)   PHQ-9 Total Score 5 6 4     LITHIUM LABS     [level, renal, SG, TSH, WBC]    q6 mo  Recent Labs   Lab Test 02/03/21  1019 07/15/20  1617 10/15/19  1037 05/01/19  0921   LITHIUM 0.67 0.52* 0.65 0.61     Recent Labs   Lab Test 02/03/21  1019 07/15/20  1617 10/15/19  1037 05/01/19  0921   CR 0.58 0.58 0.45* 0.54   GFRESTIMATED >90 >90 >90 >90    136 138 135   POTASSIUM 4.2 4.4 4.3 4.6   SHERRIE 9.8 " 9.4 9.1 9.0     Recent Labs   Lab Test 02/03/21  1011 07/15/20  1633 10/15/19  1222 05/01/19  1140   SG 1.011 1.008 1.006 1.008     Recent Labs   Lab Test 02/03/21  1019 07/15/20  1617 10/15/19  1037 05/01/19  0921   TSH 2.84 2.46 2.47 2.69       PSYCHOTROPIC DRUG INTERACTIONS     Concurrent use of LITHIUM and ACE INHIBITORS may result in increased risk of lithium toxicity.    MANAGEMENT:  Monitoring for adverse effects, routine vitals, routine labs, using lowest therapeutic dose of [agents] and patient is aware of risks    RISK STATEMENT for SAFETY     Angy Haji did not appear to be an imminent safety risk to self or others.    DIAGNOSIS     Bipolar 1 disorder, current depressive episode, mild    (h/o one manic episode Nov/Dec 2018)    H/o EPSE secondary to neuroleptic use  H/o Thrombocytopenia secondary to Depakote  H/o lithium toxicity, resolved    ASSESSMENT        TODAY Angy continues to experience low grade depressive symptoms, predominantly with lack of interest in activities. Discussed the option to increase lithium vs add lamotrigine vs make no med changes and work on lifestyle interventions. Angy prefers to have a lower maintenance dose of lithium to reduce long term risk of CKD. She elected to make no med changes today. If she decides, we can increase her lithium by 150 mg before her next visit, which should bring her level closer to 0.8. She will get lithium labs before our next visit. We will also try and complete a MOCA at our next visit due to her concerns of word finding difficulties.     [place]  was checked today: none     PLAN        1) PSYCHOTROPIC MEDICATIONS:  - Continue Lithium 1050 mg at bedtime    2) THERAPY: Continue    3) OTHER:    Labs- Li labs ordered    4) RTC: 6 weeks    5) CRISIS NUMBERS:   Provided routinely in AVS   ONLY if a [facility] PT: Krissy DAO Gower [phone] (clinic), [phone] (after hours)     TREATMENT RISK STATEMENT:  The risks, benefits, alternatives and  potential adverse effects have been discussed and are understood by the pt. The pt understands the risks of using street drugs or alcohol. There are no medical contraindications, the pt agrees to treatment with the ability to do so. The pt knows to call the clinic for any problems or to access emergency care if needed.  Medical and substance use concerns are documented above.  Psychotropic drug interaction check was done, including changes made today.    PROVIDER: Ricci Salazar MD    Patient not staffed in clinic.  Supervisor is Dr. Tapia, who will sign the note.   Head atraumatic, normal cephalic shape.

## 2021-04-28 NOTE — PATIENT INSTRUCTIONS
We will not make any changes to your medications today    Send me a message if you would like to increase your lithium before our next visit    I will put an order in for a blood draw. Have this done about 12 hours after your last lithium dose    I will see you again in June          **For crisis resources, please see the information at the end of this document**     Patient Education      Thank you for coming to the [facility].    Lab Testing:  If you had lab testing today and your results are reassuring or normal they will be mailed to you or sent through [employer] within 7 days. If the lab tests need quick action we will call you with the results. The phone number we will call with results is # [phone] (home) [phone] (work). If this is not the best number please call our clinic and change the number.    Medication Refills:  If you need any refills please call your pharmacy and they will contact us. Our fax number for refills is [phone]. Please allow three business for refill processing. If you need to  your refill at a new pharmacy, please contact the new pharmacy directly. The new pharmacy will help you get your medications transferred.     Scheduling:  If you have any concerns about today's visit or wish to schedule another appointment please call our office during normal business hours [phone] (8-5:00 M-F)    Contact Us:  Please call [phone] during business hours (8-5:00 M-F).  If after clinic hours, or on the weekend, please call  [phone].    Financial Assistance [phone]  [facility] Billing [phone]  Central Billing Office, [facility]: [phone]  [facility]illing [phone]  Medical Records [phone]  [facility] Patient Bill of Rights [email or web address]       MENTAL HEALTH CRISIS NUMBERS:  For a medical emergency please call  911 or go to the  nearest ER.     [facility]:   [facility] -[phone]   Crisis Residence [facility] -[phone]   Walk-In Counseling Center [facility] -[phone]   COPE 24/7 [facility] Mobile Team -[phone] (adults)/[phone] (child)  CHILD: [facility] needs assessment team - [phone]      [facility]:   [facility] - [phone]   Walk-in counseling [facility] - [phone]   Walk-in counseling  - [phone]   Crisis Residence [facility] - [phone]  Urgent Care Adult Mental [facility]-[phone] mobile unit/ 24/7 crisis line    National Crisis Numbers:   National Suicide Prevention Lifeline: 6-193-581-TALK (197-963-0357)  Poison Control Center - 1-371.694.6979  VTL Group/resources for a list of additional resources (SOS)  Trans Lifeline a hotline for transgender people 1-707.172.1801  The [place] Project a hotline for LGBT youth 1-580.601.9240  Crisis Text Line: For any crisis 24/7   To: 997902  see www.crisistextline.org  - IF MAKING A CALL FEELS TOO HARD, send a text!         Again thank you for choosing [facility] MENTAL HEALTH & ADDICTION [facility] and please let us know how we can best partner with you to improve you and your family's health.    You may be receiving a survey regarding this appointment. We would love to have your feedback, both positive and negative. The survey is done by an external company, so your answers are anonymous.

## 2021-05-05 ENCOUNTER — ANCILLARY PROCEDURE (OUTPATIENT)
Dept: GENERAL RADIOLOGY | Facility: CLINIC | Age: 56
End: 2021-05-05
Attending: FAMILY MEDICINE
Payer: COMMERCIAL

## 2021-05-05 DIAGNOSIS — M54.50 CHRONIC BILATERAL LOW BACK PAIN WITHOUT SCIATICA: ICD-10-CM

## 2021-05-05 DIAGNOSIS — Z79.899 ENCOUNTER FOR LONG-TERM (CURRENT) USE OF MEDICATIONS: ICD-10-CM

## 2021-05-05 DIAGNOSIS — E66.01 OBESITY, CLASS III, BMI 40-49.9 (MORBID OBESITY) (H): ICD-10-CM

## 2021-05-05 DIAGNOSIS — G89.29 CHRONIC BILATERAL LOW BACK PAIN WITHOUT SCIATICA: ICD-10-CM

## 2021-05-05 LAB
ANION GAP SERPL CALCULATED.3IONS-SCNC: 5 MMOL/L (ref 3–14)
BUN SERPL-MCNC: 10 MG/DL (ref 7–30)
CALCIUM SERPL-MCNC: 9.6 MG/DL (ref 8.5–10.1)
CHLORIDE SERPL-SCNC: 109 MMOL/L (ref 94–109)
CHOLEST SERPL-MCNC: 182 MG/DL
CO2 SERPL-SCNC: 25 MMOL/L (ref 20–32)
CREAT SERPL-MCNC: 0.56 MG/DL (ref 0.52–1.04)
GFR SERPL CREATININE-BSD FRML MDRD: >90 ML/MIN/{1.73_M2}
GLUCOSE SERPL-MCNC: 108 MG/DL (ref 70–99)
HBA1C MFR BLD: 5.4 % (ref 0–5.6)
HDLC SERPL-MCNC: 72 MG/DL
LDLC SERPL CALC-MCNC: 85 MG/DL
LITHIUM SERPL-SCNC: 0.78 MMOL/L (ref 0.6–1.2)
NONHDLC SERPL-MCNC: 109 MG/DL
POTASSIUM SERPL-SCNC: 4.3 MMOL/L (ref 3.4–5.3)
SODIUM SERPL-SCNC: 138 MMOL/L (ref 133–144)
TRIGL SERPL-MCNC: 123 MG/DL
TSH SERPL DL<=0.005 MIU/L-ACNC: 2.61 MU/L (ref 0.4–4)

## 2021-05-05 PROCEDURE — 84443 ASSAY THYROID STIM HORMONE: CPT | Performed by: PATHOLOGY

## 2021-05-05 PROCEDURE — 80048 BASIC METABOLIC PNL TOTAL CA: CPT | Performed by: PATHOLOGY

## 2021-05-05 PROCEDURE — 36415 COLL VENOUS BLD VENIPUNCTURE: CPT | Performed by: PATHOLOGY

## 2021-05-05 PROCEDURE — 83036 HEMOGLOBIN GLYCOSYLATED A1C: CPT | Performed by: PATHOLOGY

## 2021-05-05 PROCEDURE — 80061 LIPID PANEL: CPT | Performed by: PATHOLOGY

## 2021-05-05 PROCEDURE — 80178 ASSAY OF LITHIUM: CPT | Mod: 90 | Performed by: PATHOLOGY

## 2021-05-05 PROCEDURE — 72100 X-RAY EXAM L-S SPINE 2/3 VWS: CPT | Performed by: RADIOLOGY

## 2021-05-19 ENCOUNTER — HOSPITAL ENCOUNTER (OUTPATIENT)
Dept: NUTRITION | Facility: CLINIC | Age: 56
Discharge: HOME | End: 2021-05-19
Attending: DIETITIAN, REGISTERED | Admitting: DIETITIAN, REGISTERED
Payer: COMMERCIAL

## 2021-05-19 DIAGNOSIS — E66.01 OBESITY, CLASS III, BMI 40-49.9 (MORBID OBESITY) (H): Primary | ICD-10-CM

## 2021-05-19 PROCEDURE — 97803 MED NUTRITION INDIV SUBSEQ: CPT | Mod: GT | Performed by: DIETITIAN, REGISTERED

## 2021-05-19 NOTE — PROGRESS NOTES
Video-Visit Details    Type of service:  Video Visit    Video Start Time (time video started): 10:30    Video End Time (time video stopped): 11:00    Originating Location (pt. Location): Home    Distant Location (provider location):  [facility] NUTRITION SERVICES     Mode of Communication:  Video Conference via [facility]    NUTRITION REASSESSMENT NOTE (TELEPHONE VISIT DUE TO COVID-19)     REASON FOR ASSESSMENT   Angy QUINTANA Raissa referred by Dr. Odell for MNT related to  Obesity (BMI 30-39.9) [E66.9]  - Primary       Benign essential hypertension [I10]       Patient accompanied by self (via telephone)     NUTRITION HISTORY  Patient continues with multiple life changes and stressors.  Patient aware that she likes to self soothe with ice cream which she has recently increased.  Patient is aware that social cues trigger her to eat. Patient's spouse recently has weight loss surgery so there is adjust with eating in the home.     5/19 Patient walks 30 minutes most days + weekend 60 minute exercise (biking or hiking); lower back pain -foot going numb when walking and pain at night; dessert (3 days no dessert); asking self if hungry.  Patient made Malt o Meal as substitute for dessert.  Patient limiting dessert if eating dinner later or brushing teeth right after dinner and tracking dessert on calendar.  Patient had 4 day celebration for her birthday and felt was able to make wise choices.  Patient made cream cheese brownies for birthday dessert and froze remaining dessert.  Patient continues to navigate new eating regimen with spouses's weight loss surgery.      DIET RECALL   Breakfast: cereal and banana; yogurt + granola (measures), fruit + coffee; 2 slices toast with butter + 1/2 cup cottage cheese    Lunch: leftovers (includes fruit and vegetable daily)  Snack: fruit or cheese with lower salt nut thin crackers   Dinner: Tuna steak, Asian marinade, stir gomez, baked vegetable + brown rice (3/4 cup) +  "canned peaches; chicken thighs with beans and rice; sheet pan dinner with chicken and vegetables or grilled salmon and grilled vegetables; taco; protein + fruit and vegetable  Snacks: fruit, string cheese, Ritz crackers or yogurt; dessert in the evening (ice cream recently)  Beverages: mainly water, coffee, mineral water  Dining out: none currently  Exercise: Patient exercising 5-6 times per week. (cross country skiing, snow shoeing, walking and walking dog).  Patient exercises 1 hour on weekends (hiking or walking).     MEDICATIONS:  Reviewed      ANTHROPOMETRICS  Height: 5'8\" (note height change in chart previously 5'9\")  Weight: 313 lbs (314 lbs per patient)  BMI (kg/m2): 47.5 kg/m2   %IBW: 223%  Weight History:   Wt Readings from Last 10 Encounters:   04/23/21 142.1 kg (313 lb 3.2 oz)   01/06/21 143.8 kg (317 lb)   10/09/20 141.2 kg (311 lb 3.2 oz)   09/23/20 143.1 kg (315 lb 6.4 oz)   09/16/20 141.1 kg (311 lb)   07/29/20 139.7 kg (308 lb)   03/11/20 138.6 kg (305 lb 8 oz)   01/24/20 137 kg (302 lb)   12/12/19 133.8 kg (295 lb)   12/04/19 133.8 kg (295 lb)       ASSESSED NUTRITION NEEDS  Estimated Energy Needs: 0244-4675 kcals (15-20 kcals/kg) for gradual weight loss   Estimated Protein Needs: 66-79 (1-1.2 gm/kg IBW) for repletion with exercise  RMR: 1440     EVALUATION/PROGRESS TOWARDS GOALS   Previous goals:  Practice alternatives to stress eating-improving   Walk at lunch and after work-met  Meditation at least 5 minutes in the early evening-improving   Aim for 3 days per week with no desserts -met    Previous Nutrition Diagnosis: Obesity related to excess energy intake as evidenced by BMI of 48.1 kg/m2-no change     Current Nutrition Diagnosis:Obesity related to excess energy intake as evidenced by BMI of 47.5 kg/m2     INTERVENTIONS   Nutrition Prescription   Recommend exercise 5 times per week; track intake if eating mindlessly; increase fiber to 25-30 grams per day      Implementation:   Meals and " Snacks: Continue eating 3 meals + deliberate snacking (if hungry)   Nutrition Education (Content):               a)  Discussed progress towards goals.  Congratulated patient for tracking dessert intake and reducing desserts.  Reviewed current food and eating behavior challenges.  Discussed carbohydrate intake and patient feels she would be able to increase awareness of carbohydrate intake.  Supported patient in her struggle with food and weight.    Nutrition Education (Application):               a) Determined alternatives to emotional eating.                         b) Patient verbalized understanding of diet by stating will reduce desserts to 3 times per week.        Expected patient engagement: good     CURRENT GOALS  Use treadmill 1 time per week   Aim for no desserts 4 times per week   45-60 grams CHO at dinner 2 times per week     FOLLOW UP/MONITORING    Patient to follow up in 8 weeks  Patient has RD contact information      Time spent with patient: 30 minutes     Mattie Amaya, RD, LD  [facility] Outpatient Dietitian  [phone] (office phone)

## 2021-06-02 ENCOUNTER — OFFICE VISIT (OUTPATIENT)
Dept: FAMILY MEDICINE | Facility: CLINIC | Age: 56
End: 2021-06-02
Payer: COMMERCIAL

## 2021-06-02 VITALS
OXYGEN SATURATION: 100 % | DIASTOLIC BLOOD PRESSURE: 74 MMHG | TEMPERATURE: 98.4 F | HEART RATE: 61 BPM | WEIGHT: 293 LBS | BODY MASS INDEX: 47.56 KG/M2 | SYSTOLIC BLOOD PRESSURE: 120 MMHG | RESPIRATION RATE: 16 BRPM

## 2021-06-02 DIAGNOSIS — M43.10 ANTEROLISTHESIS: ICD-10-CM

## 2021-06-02 DIAGNOSIS — M70.61 GREATER TROCHANTERIC BURSITIS OF RIGHT HIP: ICD-10-CM

## 2021-06-02 DIAGNOSIS — M40.56 LORDOSIS OF LUMBAR REGION: Primary | ICD-10-CM

## 2021-06-02 DIAGNOSIS — M51.369 DEGENERATION OF LUMBAR INTERVERTEBRAL DISC: ICD-10-CM

## 2021-06-02 DIAGNOSIS — E66.01 OBESITY, CLASS III, BMI 40-49.9 (MORBID OBESITY) (H): ICD-10-CM

## 2021-06-02 DIAGNOSIS — I10 BENIGN ESSENTIAL HYPERTENSION: ICD-10-CM

## 2021-06-02 DIAGNOSIS — M76.31 IT BAND SYNDROME, RIGHT: ICD-10-CM

## 2021-06-02 DIAGNOSIS — M54.50 RIGHT-SIDED LOW BACK PAIN WITHOUT SCIATICA, UNSPECIFIED CHRONICITY: ICD-10-CM

## 2021-06-02 PROCEDURE — 99214 OFFICE O/P EST MOD 30 MIN: CPT | Mod: GC | Performed by: STUDENT IN AN ORGANIZED HEALTH CARE EDUCATION/TRAINING PROGRAM

## 2021-06-02 RX ORDER — LIDOCAINE 50 MG/G
1-2 PATCH TOPICAL EVERY 24 HOURS
Qty: 30 PATCH | Refills: 1 | Status: SHIPPED | OUTPATIENT
Start: 2021-06-02 | End: 2021-08-09

## 2021-06-02 NOTE — PATIENT INSTRUCTIONS
Patient Education   Here is the plan from today's visit    1. Lordosis of lumbar region  2. Anterolisthesis of L4/5  3. Degeneration of lumbar intervertebral disc  4. Right-sided low back pain without sciatica, unspecified chronicity  5. Probable greater trochanteric bursitis  Physical therapy should help improve/stabilize/prevent worsening of these conditions. If symptoms worsen, specifically if you have new weakness of your legs, new incontinence of bowel or bladder, significant unintentional weight loss, or pain that is consistently waking you up from sleep, then come back right away and we will get an MRI (or you could just MyChart me and maybe** I can get an MRI order placed).     Hopefully that doesn't happen though, and you and I can just re-assess in 2-3 months after some PT.    The other thing to consider would be a sports medicine referral for a greater trochanter steroid injection because it seems you probably also have greater trochanter bursitis.     Please call or return to clinic if your symptoms don't go away.    Thank you for coming to [facility] today.  COVID-19 Vaccine:  If you are eligible for the COVID-19 vaccine, you can schedule via [employer] or call [facility] Scheduling at [phone]. If you need assistance with scheduling, please speak to a Care Coordinator or your provider.   Lab Testing:  **If you had lab testing today and your results are reassuring or normal they will be mailed to you or sent through [employer] within 7 days.   **If the lab tests need quick action we will call you with the results.  **If you are having labs done on a different day, please call [phone] to schedule at [facility] or [phone] for other [facility] locations.   The phone number we will call with results is # [phone] (home) [phone] (work). If this is not the best number please call our clinic and change the number.  Medication Refills:  If you need any refills please call  your pharmacy and they will contact us.   If you need to  your refill at a new pharmacy, please contact the new pharmacy directly. The new pharmacy will help you get your medications transferred faster.   Scheduling:  If you have any concerns about today's visit or wish to schedule another appointment please call our office during normal business hours [phone] (8-5:00 M-F)  If a referral was made to a [facility] and you don't get a call from central scheduling please call [phone].  If a Mammogram was ordered for you at [facility] call [phone] to schedule or change your appointment.  If you had an EKG/XRay/CT/Ultrasound/MRI ordered the number is [phone] to schedule or change your radiology appointment.   Medical Concerns:  If you have urgent medical concerns please call [phone] at any time of the day.    Magali Lira MD

## 2021-06-02 NOTE — PROGRESS NOTES
Preceptor Attestation:   Patient seen, evaluated and discussed with the resident. I have verified the content of the note, which accurately reflects my assessment of the patient and the plan of care.   Supervising Physician:  Rudi Morales MD

## 2021-06-02 NOTE — PROGRESS NOTES
[facility] Note  [facility]     Assessment and Plan   Angy Haji is a 56 year old who presented to clinic today for follow-up of right-sided low back pain w/ numbness/tingling of lateral three digits on right foot. Ongoing since April 2021 and relatively stable since then. Reviewed Dr. Moser clinic note from 4/23 as well as lumbar x-ray from 5/5, which shows mild L4/L5 anterolisthesis as well as significant lordosis of lumbar region. Plan for supportive treatment right now (PT). Patient can't do NSAIDs d/t being on lithium. No red flag signs/symptoms at this time-- counseled on monitoring for those.     Also reviewed and discussed lab results from 5/5 including BMP (normal, monitoring b/c on lithium and lisinopril), A1c (stable, within lab range variability, normal at 5.4), lipids (normal)/     1. Lordosis of lumbar region  2. Anterolisthesis of L4/5  3. Degeneration of lumbar intervertebral disc  4. Right-sided low back pain without sciatica, unspecified chronicity    Physical therapy should help improve/stabilize/prevent worsening of these conditions. If symptoms worsen, specifically if you have new weakness of your legs, new incontinence of bowel or bladder, significant unintentional weight loss, or pain that is consistently waking you up from sleep, then come back right away and we will get an MRI (or you could MyChart me and I can try to order the MRI that way).    Hopefully that doesn't happen though, and you and I can just re-assess in 2-3 months after some PT.    5. Probable greater trochanteric bursitis + IT band syndrome (right)  Prescribed lidocaine patches to apply to greater trochanter at night. Unclear if this is true bursitis vs worsening of IT band syndrome vs combination of both. Patient will monitor and may return (or ask for Sports Med Referral) for greater trochanter steroid injection.     6. Obesity - Stable. Working hard on this.     7. Hypertension-  Controlled on 10mg lisinopril daily. Labs stable.    Follow-up in 2-3 months re: low back pain w/ numbness of right toes, or sooner as needed.           HPI       Angy Haji is a 56 year old who presents for follow up X  ray results    Presents for follow-up of acute on chronic right lower back pain w/ radiculopathy/numbness of lateral three toes of right foot. Worse with walking. Better with rest.     Was doing physical therapy (both pelvic floor and IT band), and after doing some of her right-sided IT band exercises, had onset of more right low back pain and right foot numbness. These symptoms started early April 2021. Symptoms have been relatively stable since onset.     The numbness/tingling (somewhere between the two) occurs pretty much every time with running. Not able to sleep on side because it hurts like heck to sleep on right side, but even hurts to sleep on left side. Laying on back is okay. Using two pillows under knees for comfort. Pain sometimes wakes her up in the middle of the night, but not consistently. (She usually wakes up at least 1 time to pee, but that has improved w/ pelvic floor physical therapy...when trying to go back to sleep, she notices the pain.)     No new urinary or bowel incontinence. No significant unintentional weight loss. No notable weakness.     Does have urinary frequency, and is doing pelvic floor PT for this. Trying to cut back caffeine.     Lidocaine gel didn't work because too messy and need huge bandaid to cover area.   Muscle relaxants helped her sleep better, but she's unsure if they helped with pain.     PHQ-9 and JOANNA-7 done today, scores 6 and 4 respectively. No SI.        Patient is an established patient of this clinic..         Physical Exam:   /74   Pulse 61   Temp 98.4  F (36.9  C) (Oral)   Resp 16   Wt 141.9 kg (312 lb 12.8 oz)   SpO2 100%   BMI 47.56 kg/m      Tip toe walk intact. Walking on heels intact. No weakness in dorsi or plantar flexion  "at ankle.   Straight leg raise negative bilaterally.   Reduced internal range of motion at right hip d/t \"tightness.\" Otherwise range of motion at hips is relatively normal. Strength in flexion and extension normal at hips and knees.   Has tenderness to palpation over right greater trochanter > left.   Sensation intact to light touch bilateral lower extremities.       Options for treatment and follow-up care were reviewed with the patient. Angy Haji engaged in the decision making process and verbalized understanding of the options discussed and agreed with the final plan.    Magali Lira MD , PGY-2, [facility]      "

## 2021-06-09 ENCOUNTER — THERAPY VISIT (OUTPATIENT)
Dept: PHYSICAL THERAPY | Facility: CLINIC | Age: 56
End: 2021-06-09
Payer: COMMERCIAL

## 2021-06-09 DIAGNOSIS — M43.10 ANTEROLISTHESIS: ICD-10-CM

## 2021-06-09 DIAGNOSIS — M51.369 DEGENERATION OF LUMBAR INTERVERTEBRAL DISC: ICD-10-CM

## 2021-06-09 DIAGNOSIS — M54.41 RIGHT-SIDED LOW BACK PAIN WITH RIGHT-SIDED SCIATICA: ICD-10-CM

## 2021-06-09 DIAGNOSIS — M40.56 LORDOSIS OF LUMBAR REGION: ICD-10-CM

## 2021-06-09 DIAGNOSIS — M54.50 RIGHT-SIDED LOW BACK PAIN WITHOUT SCIATICA, UNSPECIFIED CHRONICITY: ICD-10-CM

## 2021-06-09 PROCEDURE — 97110 THERAPEUTIC EXERCISES: CPT | Mod: GP | Performed by: PHYSICAL THERAPIST

## 2021-06-09 PROCEDURE — 97530 THERAPEUTIC ACTIVITIES: CPT | Mod: GP | Performed by: PHYSICAL THERAPIST

## 2021-06-09 PROCEDURE — 97161 PT EVAL LOW COMPLEX 20 MIN: CPT | Mod: GP | Performed by: PHYSICAL THERAPIST

## 2021-06-09 NOTE — PROGRESS NOTES
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 She feels this could be due to increased walking. Radiology indicates DDD at L4-L5  An increase in lumbar lordosis. We spent time discussing her anatomy and diagnosis, to support her HEP and body mechanics. Angy feels she is somewhat better since seeing her MD. Myotomes intact. Negative SLR and Slump. Some numbness along L5 in the right foot.    The history is provided by the patient. No  was used.   Patient Health History  Angy Haji being seen for Right thigh/hip pain and right foot tingling/numbness.       Problem occurred: Unsure   Pain is reported as 4/10 on pain scale.  General health as reported by patient is good.  Pertinent medical history includes: high blood pressure, overweight, incontinence, menopausal, sleep disorder/apnea and numbness/tingling. Other medical history details: Bipolar.   Red flags:  Pain at rest/night.  Medical allergies: other. Other medical allergies details: Penicillin, antipyschotic meds.   Surgeries include:  Other. Other surgery history details: gall bladder and left ovary.    Current medications:  High blood pressure medication, hormone replacement therapy and other. Other medications details: mood stibilizer, Vitamin D, lidocane patch.    Current occupation is [employer] for [employer].   Primary job tasks include:  Computer work and prolonged sitting.                  Patient Health History  Angy Haji being seen for Right thigh/hip pain and right foot tingling/numbness.       Problem occurred: Unsure   Pain is reported as 4/10 on pain scale.  General health as reported by patient is good.  Pertinent medical history includes: high blood pressure, overweight, incontinence, menopausal, sleep disorder/apnea and numbness/tingling. Other medical history details:  Bipolar.   Red flags:  Pain at rest/night.  Medical allergies: other. Other medical allergies details: Penicillin, antipyschotic meds.   Surgeries include:  Other. Other surgery history details: gall bladder and left ovary.    Current medications:  High blood pressure medication, hormone replacement therapy and other. Other medications details: mood stibilizer, Vitamin D, lidocane patch.    Current occupation is [employer] for [employer].   Primary job tasks include:  Computer work and prolonged sitting.                                    Objective:  Standing Alignment:        Lumbar:  Lordosis incr            Gait:      Deviations:  Hip:  Decr dynamic control L               Lumbar/SI Evaluation  ROM:  AROM Lumbar: not assessed      Lumbar Myotomes:  normal                Lumbar Dermtomes:              L5 Right:  Hypo-light touch    Neural Tension/Mobility:      Left side:SLR; SLR w/DF or Slump  negative.     Right side:   SLR w/DF; Slump or SLR  negative.   Lumbar Palpation:  not assessed                                                                                              Musculoskeletal:        Legs:        ROS    Assessment/Plan:    Patient is a 56 year old female with lumbar complaints.    Patient has the following significant findings with corresponding treatment plan.                Diagnosis 1:  LBP  Pain -  self management, education and home program  Decreased strength - therapeutic exercise, therapeutic activities and home program  Impaired muscle performance - neuro re-education and home program  Decreased function - therapeutic activities and home program    Therapy Evaluation Codes:   1) History comprised of:   Personal factors that impact the plan of care:      None.    Comorbidity factors that impact the plan of care are:      High blood pressure, Menopausal, Overweight and Pain at night/rest.     Medications impacting care: High blood pressure and mood stabilizer.  2) Examination of Body  Systems comprised of:   Body structures and functions that impact the plan of care:      Lumbar spine.   Activity limitations that impact the plan of care are:      Sitting, Standing, Walking and Working.  3) Clinical presentation characteristics are:   Stable/Uncomplicated.  4) Decision-Making    Low complexity using standardized patient assessment instrument and/or measureable assessment of functional outcome.  Cumulative Therapy Evaluation is: Low complexity.    Previous and current functional limitations:  (See Goal Flow Sheet for this information)    Short term and Long term goals: (See Goal Flow Sheet for this information)     Communication ability:  Patient appears to be able to clearly communicate and understand verbal and written communication and follow directions correctly.  Treatment Explanation - The following has been discussed with the patient:   RX ordered/plan of care  Anticipated outcomes  Possible risks and side effects  This patient would benefit from PT intervention to resume normal activities.   Rehab potential is good.    Frequency:  1 X week, once daily  Duration:  for 6 weeks  Discharge Plan:  Achieve all LTG.  Independent in home treatment program.  Reach maximal therapeutic benefit.    Please refer to the daily flowsheet for treatment today, total treatment time and time spent performing 1:1 timed codes.

## 2021-06-14 ASSESSMENT — ANXIETY QUESTIONNAIRES
7. FEELING AFRAID AS IF SOMETHING AWFUL MIGHT HAPPEN: NOT AT ALL
3. WORRYING TOO MUCH ABOUT DIFFERENT THINGS: SEVERAL DAYS
GAD7 TOTAL SCORE: 4
5. BEING SO RESTLESS THAT IT IS HARD TO SIT STILL: NOT AT ALL
1. FEELING NERVOUS, ANXIOUS, OR ON EDGE: SEVERAL DAYS
6. BECOMING EASILY ANNOYED OR IRRITABLE: SEVERAL DAYS
2. NOT BEING ABLE TO STOP OR CONTROL WORRYING: SEVERAL DAYS
IF YOU CHECKED OFF ANY PROBLEMS ON THIS QUESTIONNAIRE, HOW DIFFICULT HAVE THESE PROBLEMS MADE IT FOR YOU TO DO YOUR WORK, TAKE CARE OF THINGS AT HOME, OR GET ALONG WITH OTHER PEOPLE: SOMEWHAT DIFFICULT

## 2021-06-14 ASSESSMENT — PATIENT HEALTH QUESTIONNAIRE - PHQ9
SUM OF ALL RESPONSES TO PHQ QUESTIONS 1-9: 6
5. POOR APPETITE OR OVEREATING: NOT AT ALL

## 2021-06-15 ASSESSMENT — ANXIETY QUESTIONNAIRES: GAD7 TOTAL SCORE: 4

## 2021-06-16 ENCOUNTER — THERAPY VISIT (OUTPATIENT)
Dept: PHYSICAL THERAPY | Facility: CLINIC | Age: 56
End: 2021-06-16
Payer: COMMERCIAL

## 2021-06-16 DIAGNOSIS — M54.41 RIGHT-SIDED LOW BACK PAIN WITH RIGHT-SIDED SCIATICA: ICD-10-CM

## 2021-06-16 PROCEDURE — 97110 THERAPEUTIC EXERCISES: CPT | Mod: GP | Performed by: PHYSICAL THERAPIST

## 2021-06-16 PROCEDURE — 97116 GAIT TRAINING THERAPY: CPT | Mod: GP | Performed by: PHYSICAL THERAPIST

## 2021-06-16 PROCEDURE — 97035 APP MDLTY 1+ULTRASOUND EA 15: CPT | Mod: GP | Performed by: PHYSICAL THERAPIST

## 2021-06-18 ENCOUNTER — VIRTUAL VISIT (OUTPATIENT)
Dept: PSYCHIATRY | Facility: CLINIC | Age: 56
End: 2021-06-18
Attending: PSYCHIATRY & NEUROLOGY
Payer: COMMERCIAL

## 2021-06-18 DIAGNOSIS — F31.9 BIPOLAR I DISORDER (H): ICD-10-CM

## 2021-06-18 PROCEDURE — 99214 OFFICE O/P EST MOD 30 MIN: CPT | Mod: HN | Performed by: STUDENT IN AN ORGANIZED HEALTH CARE EDUCATION/TRAINING PROGRAM

## 2021-06-18 RX ORDER — LITHIUM CARBONATE 150 MG/1
150 CAPSULE ORAL AT BEDTIME
Qty: 30 CAPSULE | Refills: 1 | Status: SHIPPED | OUTPATIENT
Start: 2021-06-18 | End: 2021-08-27 | Stop reason: DRUGHIGH

## 2021-06-18 RX ORDER — LITHIUM CARBONATE 300 MG/1
900 CAPSULE ORAL AT BEDTIME
Qty: 90 CAPSULE | Refills: 1 | Status: SHIPPED | OUTPATIENT
Start: 2021-06-18 | End: 2021-08-27

## 2021-06-18 ASSESSMENT — PAIN SCALES - GENERAL: PAINLEVEL: NO PAIN (0)

## 2021-06-18 NOTE — PATIENT INSTRUCTIONS
It was nice to see you again today    We will not make any changes to your medications. Let me know if you would like to increase your lithium by 150 mg    Make an appointment to meet your new resident for about 2 months out          **For crisis resources, please see the information at the end of this document**     Patient Education      Thank you for coming to the [facility] [facility].    Lab Testing:  If you had lab testing today and your results are reassuring or normal they will be mailed to you or sent through [employer] within 7 days. If the lab tests need quick action we will call you with the results. The phone number we will call with results is # [phone] (home) [phone] (work). If this is not the best number please call our clinic and change the number.    Medication Refills:  If you need any refills please call your pharmacy and they will contact us. Our fax number for refills is [phone]. Please allow three business for refill processing. If you need to  your refill at a new pharmacy, please contact the new pharmacy directly. The new pharmacy will help you get your medications transferred.     Scheduling:  If you have any concerns about today's visit or wish to schedule another appointment please call our office during normal business hours [phone] (8-5:00 M-F)    Contact Us:  Please call [phone] during business hours (8-5:00 M-F).  If after clinic hours, or on the weekend, please call  [phone].    Financial Assistance [phone]  [facility] Billing [phone]  Central Billing Office, ealth: [phone]  [place] [place] Billing [phone]  Medical Records [phone]  [facility] Patient Bill of Rights [email or web address]       MENTAL HEALTH CRISIS NUMBERS:  For a medical emergency please call  911 or go to the nearest ER.     [facility]:   [facility]  [facility] -[phone]   Crisis Residence [facility] -[phone]   Walk-In Counseling Center [facility] -[phone]   COPE 24/7 [facility] Mobile Team -[phone] (adults)/[phone] (child)  CHILD: [facility] needs assessment team - [phone]      [facility]:   [facility] - [phone]   Walk-in counseling [facility] - [phone]   Walk-in counseling [facility] - [phone]   Crisis Residence [facility] - [phone]  Urgent Care Adult Mental [facility]-[phone] mobile unit/ 24/7 crisis line    National Crisis Numbers:   National Suicide Prevention Lifeline: 2-586-989-TALK (409-188-2679)  Poison Control Center - 1-414.282.8488  "[employer]/resources for a list of additional resources (SOS)  Trans Lifeline a hotline for transgender people 1-290.100.1428  [employer] a hotline for LGBT youth 1-828.310.1825  Crisis Text Line: For any crisis 24/7   To: 479349  see www.crisistextline.org  - IF MAKING A CALL FEELS TOO HARD, send a text!         Again thank you for choosing [facility] M[facility] HE[facility] and please let us know how we can best partner with you to improve you and your family's health.    You may be receiving a survey regarding this appointment. We would love to have your feedback, both positive and negative. The survey is done by an external company, so your answers are anonymous.

## 2021-06-18 NOTE — PROGRESS NOTES
"Video- Visit Details  Type of service:  video visit for medication management  Time of service:    Date:  06/18/2021    Video Start Time:  2:00 PM        Video End Time:  2:50 pm    Reason for video visit:  Patient unable to travel due to Covid-19  Originating Site (patient location):  [facility]   Location- Patient's home  Distant Site (provider location):  [facility]  Mode of Communication:  Video Conference via [email or web address]  Consent:  Patient has given verbal consent for video visit?: Yes        [facility]  Psychiatry Clinic   PSYCHIATRY PROGRESS NOTE     Date of initial Diagnostic Assessment (DA) is 12/5/18.  Date of most recent   Transfer of Care Diagnostic Assessment is 07/16/19.     CARE TEAM:    PCP- [facility]     Therapist- [facility] (private practive)      Specialty Providers- OB/GYN, ENT, PT, Nutrition      Wife- Rachael Haji is a 56 year old female who prefers the name Angy & pronouns she, her, hers.      Psych pertinent item history includes Manic episode, psychiatric hospitalization in November 2018.    INTERIM HISTORY         The patient reports good treatment adherence.  History was provided by the patient and family who were good historians.  The last visit ended without changes.   Since the last visit:   - Angy reports continued low grade depressive symptoms (feeling \"blah\"). Most activities do not sound fun, but she does get enjoyment after she starts doing them  - Still walking every day. Getting a little less sleep (7 hours/night) because her partner is waking up early. Trying to read every day. Some stress in the family due to her child's recent coming out as transgender  - No symptoms of hypomania/anthony, including no elevated mood, increased energy, impulsivity, or pressured speech  - We spent time in our visit completing a MOCA (see results below). She is experiencing ongoing word finding difficulties and " mistakenly calling things by the wrong name    RECENT PSYCH ROS:   Depression:  depressed mood and anhedonia  Elevated:  none  Psychosis:  none  Anxiety:  None  Panic Attack:  none  Trauma Related:  none  Dysregulation:  none  Eating Disorder: some overeating, working with dietician  Pertinent Negative Symptoms:  NO suicidal ideation, anthony, or psychosis    RECENT SUBSTANCE USE:     None reported    CURRENT SOCIAL HISTORY:  Financial/ Work- Working SW/psychotherapist       Partner/ - wifeRachael  Children- 15 years old child    Living situation- Lives in [place] with wife Danielle and teenage daughter   Social/ Spiritual Support- Family (wife and daughter)     FEELS SAFE AT HOME- yes     PSYCH and CD Critical Summary Points since July 2019            Aug 2019 - decreased the Depakote to 500 mg nightly  Nov 2019 - VPA decreased to 250 mg nightly  Jan 2020 - VPA decreased to 125 mg every night  Apr 2020 - Stopped VPA  Dec 2020 - inc Li to 1050 mg at bedtime  Jan 2021- no changes, Li labs ordered  Feb 2021 - no changes    PAST MED TRIALS          See last Diagnostic Assessment  MEDICAL / SURGICAL HISTORY                                   Pregnant or breastfeeding- no      Contraception- HRT for menopause    Patient Active Problem List   Diagnosis     Oral aphthae     CARDIOVASCULAR SCREENING; LDL GOAL LESS THAN 160     Rosacea     Raynaud disease     Vitamin D deficiency disease     Mild sleep apnea     Atopic rhinitis     Chronic bilateral low back pain without sciatica     Obesity, Class III, BMI 40-49.9 (morbid obesity) (H)     Muscle tension dysphonia     Palpitations     Dysphagia, unspecified type     Anxiety     Benign essential hypertension     Callus of foot     Varicose veins of right lower extremity with edema     Bipolar I disorder (H)     Dribbling of urine     Hip pain, right     It band syndrome, right     Right-sided low back pain with right-sided sciatica       Major Surgery- n/a    MEDICAL  "REVIEW OF SYSTEMS      A comprehensive review of systems was not performed today    ALLERGY       Seroquel [quetiapine], Olanzapine, Penicillins, Risperidone, and Thimerosal  MEDICATIONS        Current Outpatient Medications   Medication Sig Dispense Refill     Carboxymethylcellulose Sod PF (REFRESH PLUS) 0.5 % SOLN ophthalmic solution Place 1 drop into both eyes 3 times daily as needed for dry eyes       estrogen conj-medroxyPROGESTERone (PREMPRO) 0.625-2.5 MG tablet Take 1 tablet by mouth daily 90 tablet 3     lidocaine (LIDODERM) 5 % patch Place 1-2 patches onto the skin every 24 hours To prevent lidocaine toxicity, patient should be patch free for 12 hrs daily. 30 patch 1     lisinopril (ZESTRIL) 10 MG tablet Take 1 tablet (10 mg) by mouth daily 90 tablet 3     lithium (ESKALITH) 150 MG capsule Take 1 capsule (150 mg) by mouth At Bedtime 30 capsule 1     lithium 300 MG capsule Take 3 capsules (900 mg) by mouth At Bedtime Take with one 150mg capsule for total dose 1050 mg at bedtime. 90 capsule 1     Loratadine 10 MG capsule Take 10 mg by mouth daily as needed.       metroNIDAZOLE (METROGEL) 1 % external gel Apply topically daily 60 g 9     ORDER FOR DME Use your CPAP device as directed by your provider.       PROAIR  (90 Base) MCG/ACT inhaler        VITAMIN D, CHOLECALCIFEROL, PO Take 2,000 Units by mouth daily       VITALS        There were no vitals taken for this visit. , telehealth  MENTAL STATUS EXAM           Alertness: alert  and oriented  Appearance: well groomed  Behavior/Demeanor: cooperative, pleasant and calm, with good  eye contact   Speech: normal and regular rate and rhythm  Language: intact  Psychomotor: normal or unremarkable  Mood: \"blah\"  Affect: full range; was congruent to mood; was congruent to content  Thought Process/Associations: unremarkable  Thought Content:  Reports none;  Denies suicidal ideation, violent ideation, delusions and paranoid ideation  Perception:  Reports none;  " Denies auditory hallucinations and visual hallucinations  Insight: good  Judgment: good  Cognition: (6) oriented: time, person, and place  attention span: intact  concentration: intact  recent memory: intact  remote memory: intact  fund of knowledge: appropriate  Gait and Station: unremarkable    LABS and DATA     AIMS:  NA    MOCA: 28/30. Missed 1 point in language fluency and 1 point in delayed recall    PHQ9 TODAY =  PHQ-9 SCORE 11/6/2019 3/17/2021 6/14/2021   PHQ-9 Total Score MyChart - 4 (Minimal depression) -   PHQ-9 Total Score 6 4 6     LITHIUM LABS     [level, renal, SG, TSH, WBC]    q6 mo  Recent Labs   Lab Test 05/05/21  0853 02/03/21  1019 07/15/20  1617 10/15/19  1037   LITHIUM 0.78 0.67 0.52* 0.65     Recent Labs   Lab Test 05/05/21  0853 02/03/21  1019 07/15/20  1617 10/15/19  1037   CR 0.56 0.58 0.58 0.45*   GFRESTIMATED >90 >90 >90 >90    137 136 138   POTASSIUM 4.3 4.2 4.4 4.3   SHERRIE 9.6 9.8 9.4 9.1     Recent Labs   Lab Test 02/03/21  1011 07/15/20  1633 10/15/19  1222 05/01/19  1140   SG 1.011 1.008 1.006 1.008     Recent Labs   Lab Test 05/05/21  0853 02/03/21  1019 07/15/20  1617 10/15/19  1037   TSH 2.61 2.84 2.46 2.47       PSYCHOTROPIC DRUG INTERACTIONS     Concurrent use of LITHIUM and ACE INHIBITORS may result in increased risk of lithium toxicity.    MANAGEMENT:  Monitoring for adverse effects, routine vitals, routine labs, using lowest therapeutic dose of [agents] and patient is aware of risks    RISK STATEMENT for SAFETY     Angy Haji did not appear to be an imminent safety risk to self or others.    DIAGNOSIS     Bipolar 1 disorder, current depressive episode, mild    (h/o one manic episode Nov/Dec 2018)    H/o EPSE secondary to neuroleptic use  H/o Thrombocytopenia secondary to Depakote  H/o lithium toxicity, resolved    ASSESSMENT        TODAY Angy reports ongoing low grade depressive symptoms, which are unchanged over the last several months. We discussed the option of  increasing lithium by 150 mg (last level was 0.78). She ultimately opted to not make a medication change today. Lamotrigine could also be considered, though we agreed to not make a major med change just prior to the resident transition. Would avoid antipsychotics due to her history of EPSE. MOCA today was 28/30. Last 2 MOCAs were 25/30. We agreed to keep an eye on her word finding difficulties, and repeat cognitive testing as needed.     [place]  was checked today: none     PLAN        1) PSYCHOTROPIC MEDICATIONS:  - Continue Lithium 1050 mg at bedtime    2) THERAPY: Continue    3) OTHER:    Labs- Li labs due 11/2021    4) RTC: 2 months w/new G3 resident    5) CRISIS NUMBERS:   Provided routinely in AVS   ONLY if a QAMAR PT: [facility] [phone] (clinic), [phone] (after hours)     TREATMENT RISK STATEMENT:  The risks, benefits, alternatives and potential adverse effects have been discussed and are understood by the pt. The pt understands the risks of using street drugs or alcohol. There are no medical contraindications, the pt agrees to treatment with the ability to do so. The pt knows to call the clinic for any problems or to access emergency care if needed.  Medical and substance use concerns are documented above.  Psychotropic drug interaction check was done, including changes made today.    PROVIDER: Ricci Salazar MD    Patient not staffed in clinic.  Supervisor is Dr. Tapia, who will sign the note.

## 2021-06-18 NOTE — PROGRESS NOTES
"VIDEO VISIT  Angy Haji is a 56 year old patient who is being evaluated via a billable video visit.      The patient has been notified of following:   \"This video visit will be conducted via a call between you and your physician/provider. We have found that certain health care needs can be provided without the need for an in-person physical exam. This service lets us provide the care you need with a video conversation. If a prescription is necessary we can send it directly to your pharmacy. If lab work is needed we can place an order for that and you can then stop by our lab to have the test done at a later time. Insurers are generally covering virtual visits as they would in-office visits so billing should not be different than normal.  If for some reason you do get billed incorrectly, you should contact the billing office to correct it and that number is in the AVS .    Video Conference to be completed via:  [email or web address]    Patient has given verbal consent for video visit?:  Yes    Patient would prefer that any video invitations be sent by: Send to e-mail at: [email or web address]      How would patient like to obtain AVS?:  Sunshine    AVS SmartPhrase [PsychAVS] has been placed in 'Patient Instructions':  Yes  "

## 2021-06-23 ENCOUNTER — THERAPY VISIT (OUTPATIENT)
Dept: PHYSICAL THERAPY | Facility: CLINIC | Age: 56
End: 2021-06-23
Payer: COMMERCIAL

## 2021-06-23 DIAGNOSIS — M54.41 RIGHT-SIDED LOW BACK PAIN WITH RIGHT-SIDED SCIATICA: ICD-10-CM

## 2021-06-23 PROCEDURE — 97035 APP MDLTY 1+ULTRASOUND EA 15: CPT | Mod: GP | Performed by: PHYSICAL THERAPIST

## 2021-06-23 PROCEDURE — 97530 THERAPEUTIC ACTIVITIES: CPT | Mod: GP | Performed by: PHYSICAL THERAPIST

## 2021-06-23 PROCEDURE — 97110 THERAPEUTIC EXERCISES: CPT | Mod: GP | Performed by: PHYSICAL THERAPIST

## 2021-07-08 ENCOUNTER — THERAPY VISIT (OUTPATIENT)
Dept: PHYSICAL THERAPY | Facility: CLINIC | Age: 56
End: 2021-07-08
Payer: COMMERCIAL

## 2021-07-08 DIAGNOSIS — M54.41 RIGHT-SIDED LOW BACK PAIN WITH RIGHT-SIDED SCIATICA: ICD-10-CM

## 2021-07-08 PROCEDURE — 97035 APP MDLTY 1+ULTRASOUND EA 15: CPT | Mod: GP | Performed by: PHYSICAL THERAPIST

## 2021-07-08 PROCEDURE — 97110 THERAPEUTIC EXERCISES: CPT | Mod: GP | Performed by: PHYSICAL THERAPIST

## 2021-07-08 PROCEDURE — 97140 MANUAL THERAPY 1/> REGIONS: CPT | Mod: GP | Performed by: PHYSICAL THERAPIST

## 2021-07-14 ENCOUNTER — HOSPITAL ENCOUNTER (OUTPATIENT)
Dept: NUTRITION | Facility: CLINIC | Age: 56
Discharge: HOME | End: 2021-07-14
Attending: DIETITIAN, REGISTERED | Admitting: DIETITIAN, REGISTERED
Payer: COMMERCIAL

## 2021-07-14 PROCEDURE — 97803 MED NUTRITION INDIV SUBSEQ: CPT | Mod: 95 | Performed by: DIETITIAN, REGISTERED

## 2021-07-14 NOTE — PROGRESS NOTES
Video-Visit Details    Type of service:  Video Visit    Video Start Time (time video started): 9:35    Video End Time (time video stopped): 9:59    Originating Location (pt. Location): Home    Distant Location (provider location):  [facility] NUTRITION SERVICES     Mode of Communication:  Video Conference via [facility]    NUTRITION REASSESSMENT NOTE (TELEPHONE VISIT DUE TO COVID-19)     REASON FOR ASSESSMENT   Angy LILIAN Haji referred by Dr. Odell for MNT related to  Obesity (BMI 30-39.9) [E66.9]  - Primary       Benign essential hypertension [I10]       Patient accompanied by self (via telephone)     NUTRITION HISTORY  Patient continues with multiple life changes and stressors.  Patient aware that she likes to self soothe with ice cream which she has recently increased.  Patient is aware that social cues trigger her to eat. Patient's spouse recently has weight loss surgery so there is adjust with eating in the home.      5/19 Patient walks 30 minutes most days + weekend 60 minute exercise (biking or hiking); lower back pain -foot going numb when walking and pain at night; dessert (3 days no dessert); asking self if hungry.  Patient made Malt o Meal as substitute for dessert.  Patient limiting dessert if eating dinner later or brushing teeth right after dinner and tracking dessert on calendar.  Patient had 4 day celebration for her birthday and felt was able to make wise choices.  Patient made cream cheese brownies for birthday dessert and froze remaining dessert.  Patient continues to navigate new eating regimen with spouses's weight loss surgery.     7/14/2021 Success-taking walking routes with hills.   Evening meals consisting of grilled chicken/pork loin, grilled sweet potatoes and 2 vegetables   Patient having ice cream thoughts in afternoon-to self soothe.  Patient practicing distractions to avoid eating ice cream at nigt (brushing teeth, asking self if hungry, substituted Malt 0 Meal,  "mineral water, too tired).       DIET RECALL   Breakfast: cereal and banana; yogurt + granola (measures), fruit + coffee; 2 slices toast with butter + 1/2 cup cottage cheese    Lunch: leftovers (includes fruit and vegetable daily)  Snack: fruit or cheese with lower salt nut thin crackers   Dinner: Tuna steak, Asian marinade, stir gomez, baked vegetable + brown rice (3/4 cup) + canned peaches; chicken thighs with beans and rice; sheet pan dinner with chicken and vegetables or grilled salmon and grilled vegetables; taco; protein + fruit and vegetable  Snacks: fruit, string cheese, Ritz crackers or yogurt; dessert in the evening (ice cream recently)  Beverages: mainly water, coffee, mineral water  Dining out: none currently  Exercise: Patient exercising 5-6 times per week. (cross country skiing, snow shoeing, walking and walking dog).  Patient exercises 1 hour on weekends (hiking or walking).     MEDICATIONS:  Reviewed      ANTHROPOMETRICS  Height: 5'8\" (note height change in chart previously 5'9\")  Weight: 312 lbs   BMI (kg/m2): 47.5 kg/m2   %IBW: 223%  Weight History:   Wt Readings from Last 10 Encounters:   06/02/21 141.9 kg (312 lb 12.8 oz)   04/23/21 142.1 kg (313 lb 3.2 oz)   01/06/21 143.8 kg (317 lb)   10/09/20 141.2 kg (311 lb 3.2 oz)   09/23/20 143.1 kg (315 lb 6.4 oz)   09/16/20 141.1 kg (311 lb)   07/29/20 139.7 kg (308 lb)   03/11/20 138.6 kg (305 lb 8 oz)   01/24/20 137 kg (302 lb)   12/12/19 133.8 kg (295 lb)         ASSESSED NUTRITION NEEDS  Estimated Energy Needs: 8235-8253 kcals (15-20 kcals/kg) for gradual weight loss   Estimated Protein Needs: 66-79 (1-1.2 gm/kg IBW) for repletion with exercise  RMR: 1440     EVALUATION/PROGRESS TOWARDS GOALS   Previous goals:  Use treadmill 1 time per week /add hills (walking 2 times per week in the morning-70 minutes)hiking at cabin    Aim for no desserts 4 times per week (75% of time) -improving   45-60 grams CHO at dinner 2 times per week (50% of time) " -improving    Previous Nutrition Diagnosis: Obesity related to excess energy intake as evidenced by BMI of 47.4 kg/m2-no change     Current Nutrition Diagnosis:Obesity related to excess energy intake as evidenced by BMI of 47.4 kg/m2     INTERVENTIONS   Nutrition Prescription   Recommend exercise 5 times per week; track intake if eating mindlessly; increase fiber to 25-30 grams per day      Implementation:   Meals and Snacks: Continue eating 3 meals + deliberate snacking (if hungry)   Nutrition Education (Content):               a)  Discussed progress towards goals.  Congratulated patient for tracking dessert intake and reducing desserts.  Reviewed current food and eating behavior challenges.  Discussed carbohydrate intake and patient feels she would be able to increase awareness of carbohydrate intake.  Supported patient in her struggle with food and weight.    Nutrition Education (Application):               a) Determined alternatives to emotional eating.                         b) Patient verbalized understanding of diet by stating will reduce desserts to 3 times per week.        Expected patient engagement: good     CURRENT GOALS  Use treadmill 1 time per week   Aim for no desserts 4 times per week   45-60 grams CHO at dinner 2 times per week     FOLLOW UP/MONITORING    Patient to follow up in 8 weeks  Patient has RD contact information      Time spent with patient: 24 minutes     Mattie Amaya, RD, LD  [facility] Outpatient Dietitian  [phone] (office phone)

## 2021-07-21 ENCOUNTER — THERAPY VISIT (OUTPATIENT)
Dept: PHYSICAL THERAPY | Facility: CLINIC | Age: 56
End: 2021-07-21
Payer: COMMERCIAL

## 2021-07-21 DIAGNOSIS — M54.41 RIGHT-SIDED LOW BACK PAIN WITH RIGHT-SIDED SCIATICA: ICD-10-CM

## 2021-07-21 PROCEDURE — 97035 APP MDLTY 1+ULTRASOUND EA 15: CPT | Mod: GP | Performed by: PHYSICAL THERAPIST

## 2021-07-21 PROCEDURE — 97530 THERAPEUTIC ACTIVITIES: CPT | Mod: GP | Performed by: PHYSICAL THERAPIST

## 2021-07-21 PROCEDURE — 97110 THERAPEUTIC EXERCISES: CPT | Mod: GP | Performed by: PHYSICAL THERAPIST

## 2021-08-09 ENCOUNTER — TRANSCRIBE ORDERS (OUTPATIENT)
Dept: OTHER | Age: 56
End: 2021-08-09

## 2021-08-09 ENCOUNTER — TELEPHONE (OUTPATIENT)
Dept: OTHER | Age: 56
End: 2021-08-09

## 2021-08-09 DIAGNOSIS — M70.61 GREATER TROCHANTERIC BURSITIS OF RIGHT HIP: ICD-10-CM

## 2021-08-09 DIAGNOSIS — M70.60 TROCHANTERIC BURSITIS, UNSPECIFIED LATERALITY: Primary | ICD-10-CM

## 2021-08-09 RX ORDER — LIDOCAINE 50 MG/G
1-2 PATCH TOPICAL EVERY 24 HOURS
Qty: 30 PATCH | Refills: 1 | Status: SHIPPED | OUTPATIENT
Start: 2021-08-09 | End: 2021-08-11

## 2021-08-09 NOTE — PROGRESS NOTES
Refilled lidocaine patches per patient request for greater trochanteric pain.   Also placed referral to sports medicine and routed to care coordinator to help follow through with scheduling for greater trochanteric bursa injection.   Magali Lira MD

## 2021-08-10 DIAGNOSIS — M70.61 GREATER TROCHANTERIC BURSITIS OF RIGHT HIP: ICD-10-CM

## 2021-08-10 NOTE — TELEPHONE ENCOUNTER
"Request for medication refill:    Lidocaine patch 5%     Providers if patient needs an appointment and you are willing to give a one month supply please refill for one month and  send a letter/MyChart using \".SMILLIMITEDREFILL\" .smillimited and route chart to \"P SMI \" (Giving one month refill in non controlled medications is strongly recommended before denial)    If refill has been denied, meaning absolutely no refills without visit, please complete the smart phrase \".smirxrefuse\" and route it to the \"P SMI MED REFILLS\"  pool to inform the patient and the pharmacy.    Milana Palma, CMA        "

## 2021-08-11 RX ORDER — LIDOCAINE 50 MG/G
1-2 PATCH TOPICAL EVERY 24 HOURS
Qty: 30 PATCH | Refills: 1 | Status: SHIPPED | OUTPATIENT
Start: 2021-08-11 | End: 2023-03-01

## 2021-08-22 NOTE — PROGRESS NOTES
[facility]  Psychiatry Clinic  TRANSFER of CARE DIAGNOSTIC ASSESSMENT     CARE TEAM:  PCP- Magali Lira, Psychotherapist- [facility] with Verna Chan  Specialty Providers- OB/GYN, ENT, PT, Nutrition      Wife- Rachael Haji is a 56 year old who prefers the name Angy and uses pronouns she, her, hers.      DIAGNOSIS     Bipolar 1 disorder, current depressive episode, moderate   H/o EPSE secondary to neuroleptic use  H/o Thrombocytopenia secondary to Depakote  H/o lithium toxicity, resolved     ASSESSMENT   Angy has a history of bipolar I disorder with a history of 1 manic episode in 2018 who has been stable on Lithium monotherapy for many months. She reports worsening depression that started a couple of months ago and seemed to have intensified due to multiple  Stressors in the summer. The major stressor occurred in June when her child came out as transgender male. Angy reports that this felt very surprising and frustrating for her. She has also noticed that her history of intermittent palpitations have become daily. She will be following up with her primary care provider later today to get an EKG. She prefers monotherapy due to her previous bad experience with medications and we plan to optimize Lithium to 1200mg. The last dose increase was in Decemeber 2020 to 1050mg. She will have a new Lithium serum level done before next appointment with additional lithium labs. She's tried Depakote and Seroquel in the past with side effects. She's unwilling to try a second generation antipsychotic mood stabilizer for bipolar depression like lurasidone and vraylar. A possible  Future  Consideration could be lamictal. We also talked about increasing the frequency of therapy from every other week or weekly to cope through the ongoing stressors in patient's life.No SI, HI. Or acute concerns.    [place] review was not needed today. Patient is not taking any controlled substances.      "PLAN                                                                                                                1) Meds-  - Increase Lithium to 1200mg at bedtime    2) Psychotherapy-Continue individual therapy    3) Next due-  Labs- [facility] labs due 09/2021  EKG- 08/2021  Rating scales- N/A    4) Referrals-  None    5) Dispo- RTC in 4 weeks       PERTINENT BACKGROUND                           [most recent eval 08/22/21]    Patient has not had significant mental health history until 2018, when she developed perimenopausal mood and anxiety symptoms.  She was subsequently referred to our clinic following a hospitalization for anthony in November 2018.  Her care has been complicated by sensitivity to antipsychotics (EPSE including difficulty swallowing), thrombocytopenia secondary to Depakote, lithium toxicity in setting of concurrent Depakote use    Psych pertinent item history includes Manic episode(h/o one manic episode Nov/Dec 2018), psychiatric hospitalization in November 2018.     SUBJECTIVE   Patient presented for a follow up with her wife, Rachael.    Since last visit:  Angy reports that she has been feeling depressed for a couple of months and didn't want to add on a new medication because of past bad experiences so instead had requested for a lithium increase which hasn't been very helpful. A year ago patient's daughter came out as non-binaryand some months ago came out as transgender male which has been very stressful for patient. Patient expresses that she feels that this change \"came out of nowhere\" and she's angry. She feels angry that she's will be unable to protect her child from stereotypes and bias that might come up due to this change. She also feels frustrated because her child has had three names and she really wanted a daughter.  Her wife reports that patient has been depressed even before their child came out as transgender male. The family is on a wait list list for gender speciality care at [facility] and they will all be involved with treatment planning.     She recently came back from vacation at the [facility] where she felt like her physical endurance was reduced and feels tired.She's unsure if this is related to Lithium or from weight gain. She reports palpitations which used to be intermittent but now daily. She called her clinic about it yesterday and will get an EKG today at her PCP's. We talked about the type of support she's been getting. She reports that she feels supported at times and unsupported because she doesn't like to ask for help. She expressed that it's really hard to ask for help when she's so used to be the one providing it. We spent some time talking though the things her wife had noticed, patient's reactions to it, and how to manage stressors.    RECENT PSYCH ROS:   Depression:  depressed mood,irritability, anhedonia and overwhelmed  Elevated:  none  Psychosis:  none  Anxiety:  excessive worry, feeling fearful and ruminations  Trauma Related:  none  Sleep: Interrupted by waking up to urinate and pain from bursitis  Other: N/A    Adverse Effects: Excessive tiredness from Lithium   Pertinent Negative Symptoms: No suicidal ideation, self-injurious behavior/urges or violent ideation  Recent Substance Use:   Alcohol- 1 drink (beer or wine) per week     FAMILY and SOCIAL HISTORY                                 pt reported     Family Hx: Family history positive for anxiety disorders    Social Hx:  Financial/ Work-  Restarted at 0.6 time in March, then back to 0.8 in 2019, 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   to wife Freddy, have a teenage son..      PSYCHIATRIC HISTORY     SIB- None  Suicide Attempt [#, most recent]- None  Suicidal Ideation Hx- None    Violence/Aggression Hx- None  Psychosis Hx- None  Eating Disorder Hx- history of issues with elevated weight, overeating. Further history unspecified  Other- N/A    Psych Hosp [#, most recent]- x1; Nov 2018  Commitment- None  ECT- None  Outpatient Programs - None  Other - N/A     Treatment History  Aug 2019 - decreased the Depakote to 500 mg nightly  Nov 2019 - VPA decreased to 250 mg nightly  Jan 2020 - VPA decreased to 125 mg every night  Apr 2020 - Stopped VPA  Dec 2020 - inc Li to 1050 mg at bedtime  Jan 2021- no changes, Li labs ordered  Feb 2021 - no changes  08/26/2021: Resident transfer visit. Increased Lithium to 1200mg.     PAST MED TRIALS      Medication Max Dose (mg) Dates / Duration Helpful? DC Reason / Adverse Effects?   Sertraline       Depakote    Tremors   Olanzapine    EPSE   Seroquel    EPSE   Lithium 1050 current        SUBSTANCE USE HISTORY     Past Use- none reported  Treatment- #, most recent- N/A  Medical Consequences- N/A  Legal Consequences- N/A  Other- N/A     MEDICAL HISTORY and ALLERGY     ALLERGIES: Seroquel [quetiapine], Olanzapine, Penicillins, Risperidone, and Thimerosal    Patient Active Problem List   Diagnosis     Oral aphthae     CARDIOVASCULAR SCREENING; LDL GOAL LESS THAN 160     Rosacea     Raynaud disease     Vitamin D deficiency disease     Mild sleep apnea     Atopic rhinitis     Chronic bilateral low back pain without sciatica     Obesity, Class III, BMI 40-49.9 (morbid obesity) (H)     Muscle tension dysphonia     Palpitations     Dysphagia, unspecified type     Anxiety     Benign essential hypertension     Callus of foot     Varicose veins of right lower extremity with edema     Bipolar I disorder (H)     Dribbling of urine     Hip pain, right     It band syndrome, right     Right-sided low back pain  with right-sided sciatica        MEDICAL REVIEW OF SYSTEMS   Contraception- Not assessed     MEDICATIONS     Current Outpatient Medications   Medication Sig Dispense Refill     Carboxymethylcellulose Sod PF (REFRESH PLUS) 0.5 % SOLN ophthalmic solution Place 1 drop into both eyes 3 times daily as needed for dry eyes       estrogen conj-medroxyPROGESTERone (PREMPRO) 0.625-2.5 MG tablet Take 1 tablet by mouth daily 90 tablet 3     lidocaine (LIDODERM) 5 % patch Place 1-2 patches onto the skin every 24 hours To prevent lidocaine toxicity, patient should be patch free for 12 hrs daily. 30 patch 1     lisinopril (ZESTRIL) 10 MG tablet Take 1 tablet (10 mg) by mouth daily 90 tablet 3     lithium (ESKALITH) 150 MG capsule Take 1 capsule (150 mg) by mouth At Bedtime 30 capsule 1     lithium 300 MG capsule Take 3 capsules (900 mg) by mouth At Bedtime Take with one 150mg capsule for total dose 1050 mg at bedtime. 90 capsule 1     Loratadine 10 MG capsule Take 10 mg by mouth daily as needed.       metroNIDAZOLE (METROGEL) 1 % external gel Apply topically daily 60 g 9     ORDER FOR DME Use your CPAP device as directed by your provider.       PROAIR  (90 Base) MCG/ACT inhaler        VITAMIN D, CHOLECALCIFEROL, PO Take 2,000 Units by mouth daily        VITALS   There were no vitals taken for this visit.    MENTAL STATUS EXAM     Alertness: alert   Appearance: well groomed  Behavior/Demeanor: cooperative, with good  eye contact   Speech: regular rate and rhythm  Language: intact  Psychomotor: normal or unremarkable  Mood: depressed  Affect: appropriate and intermittently tearful; congruent to: mood- yes, content- yes  Thought Process/Associations: linear and logical  Thought Content:  Reports none;  Denies suicidal ideation and violent ideation  Perception:  Reports none;  Denies auditory hallucinations and visual hallucinations  Insight: good  Judgment: good  Cognition: does  appear grossly intact; formal cognitive testing  was not done  Gait and Station: unremarkable     LABS and DATA     PHQ9 TODAY = 12  PHQ 11/6/2019 3/17/2021 6/14/2021   PHQ-9 Total Score 6 4 6   Q9: Thoughts of better off dead/self-harm past 2 weeks Not at all Not at all Not at all       Recent Labs   Lab Test 05/05/21  0853 02/03/21  1019 07/15/20  1617   CR 0.56 0.58 0.58   GFRESTIMATED >90 >90 >90     Recent Labs   Lab Test 02/03/21  1019 07/15/20  1617 10/15/19  1037   AST 13 12 13   ALT 17 18 18   ALKPHOS 88 84 61     Recent Labs   Lab Test 05/05/21  0853 02/03/21  1019 07/15/20  1617   LITHIUM 0.78 0.67 0.52*     Recent Labs   Lab Test 05/05/21  0853 02/03/21  1019   CR 0.56 0.58   GFRESTIMATED >90 >90    137   POTASSIUM 4.3 4.2   SHERRIE 9.6 9.8     Recent Labs   Lab Test 02/03/21  1011 07/15/20  1633   SG 1.011 1.008     Recent Labs   Lab Test 05/05/21  0853 02/03/21  1019   TSH 2.61 2.84     Recent Labs   Lab Test 02/03/21  1019 07/15/20  1617   WBC 7.7 8.3   ANEU 5.1 5.6       ECG N/A QTc      PSYCHOTROPIC DRUG INTERACTIONS     Concurrent use of LITHIUM and ACE INHIBITORS may result in increased risk of lithium toxicity.    MANAGEMENT:  Monitoring for adverse effects, routine labs, periodic EKGs and patient is aware of risks     RISK STATEMENT for SAFETY     Angy Haji did not appear to be an imminent safety risk to self or others.    TREATMENT RISK STATEMENT: The risks, benefits, alternatives and potential adverse effects have been discussed and are understood by the pt. The pt understands the risks of using street drugs or alcohol. There are no medical contraindications, the pt agrees to treatment with the ability to do so. The pt knows to call the clinic for any problems or to access emergency care if needed.  Medical and substance use concerns are documented above.  Psychotropic drug interaction check was done, including changes made today.     PROVIDER: Tolulope Oluwadamilola Odebunmi, MD    Patient not staffed in clinic.  Note will be  reviewed and signed by supervisor Dr. Liu.      Attestation:  I did not see Angy QUINTANA Eduardodeann directly. I have reviewed the documentation and I agree with the resident's plan of care.   Chai Liu MD

## 2021-08-27 ENCOUNTER — ANCILLARY PROCEDURE (OUTPATIENT)
Dept: GENERAL RADIOLOGY | Facility: CLINIC | Age: 56
End: 2021-08-27
Attending: FAMILY MEDICINE
Payer: COMMERCIAL

## 2021-08-27 ENCOUNTER — OFFICE VISIT (OUTPATIENT)
Dept: ORTHOPEDICS | Facility: CLINIC | Age: 56
End: 2021-08-27
Payer: COMMERCIAL

## 2021-08-27 ENCOUNTER — OFFICE VISIT (OUTPATIENT)
Dept: FAMILY MEDICINE | Facility: CLINIC | Age: 56
End: 2021-08-27
Payer: COMMERCIAL

## 2021-08-27 ENCOUNTER — OFFICE VISIT (OUTPATIENT)
Dept: PSYCHIATRY | Facility: CLINIC | Age: 56
End: 2021-08-27
Attending: PSYCHIATRY & NEUROLOGY
Payer: COMMERCIAL

## 2021-08-27 VITALS
TEMPERATURE: 97.9 F | DIASTOLIC BLOOD PRESSURE: 79 MMHG | RESPIRATION RATE: 16 BRPM | BODY MASS INDEX: 46.68 KG/M2 | HEART RATE: 64 BPM | SYSTOLIC BLOOD PRESSURE: 136 MMHG | WEIGHT: 293 LBS | OXYGEN SATURATION: 100 %

## 2021-08-27 VITALS — BODY MASS INDEX: 44.41 KG/M2 | HEIGHT: 68 IN | WEIGHT: 293 LBS

## 2021-08-27 VITALS
SYSTOLIC BLOOD PRESSURE: 142 MMHG | BODY MASS INDEX: 47.04 KG/M2 | HEART RATE: 64 BPM | WEIGHT: 293 LBS | DIASTOLIC BLOOD PRESSURE: 80 MMHG

## 2021-08-27 DIAGNOSIS — M54.50 LUMBAR PAIN WITH RADIATION DOWN RIGHT LEG: Primary | ICD-10-CM

## 2021-08-27 DIAGNOSIS — M70.60 TROCHANTERIC BURSITIS, UNSPECIFIED LATERALITY: ICD-10-CM

## 2021-08-27 DIAGNOSIS — M79.604 LUMBAR PAIN WITH RADIATION DOWN RIGHT LEG: Primary | ICD-10-CM

## 2021-08-27 DIAGNOSIS — R00.2 PALPITATIONS: Primary | ICD-10-CM

## 2021-08-27 DIAGNOSIS — F31.9 BIPOLAR I DISORDER (H): ICD-10-CM

## 2021-08-27 PROCEDURE — 99214 OFFICE O/P EST MOD 30 MIN: CPT | Mod: GC | Performed by: STUDENT IN AN ORGANIZED HEALTH CARE EDUCATION/TRAINING PROGRAM

## 2021-08-27 PROCEDURE — 93005 ELECTROCARDIOGRAM TRACING: CPT | Mod: GC | Performed by: STUDENT IN AN ORGANIZED HEALTH CARE EDUCATION/TRAINING PROGRAM

## 2021-08-27 PROCEDURE — G0463 HOSPITAL OUTPT CLINIC VISIT: HCPCS

## 2021-08-27 PROCEDURE — 99204 OFFICE O/P NEW MOD 45 MIN: CPT | Performed by: FAMILY MEDICINE

## 2021-08-27 PROCEDURE — 73502 X-RAY EXAM HIP UNI 2-3 VIEWS: CPT | Mod: RT | Performed by: RADIOLOGY

## 2021-08-27 PROCEDURE — 90792 PSYCH DIAG EVAL W/MED SRVCS: CPT | Mod: HN | Performed by: STUDENT IN AN ORGANIZED HEALTH CARE EDUCATION/TRAINING PROGRAM

## 2021-08-27 RX ORDER — LITHIUM CARBONATE 300 MG/1
1200 CAPSULE ORAL AT BEDTIME
Qty: 120 CAPSULE | Refills: 1 | Status: SHIPPED | OUTPATIENT
Start: 2021-08-27 | End: 2021-10-15

## 2021-08-27 ASSESSMENT — ENCOUNTER SYMPTOMS
DIAPHORESIS: 0
FEVER: 0
ABDOMINAL PAIN: 0
CONSTIPATION: 0
DIZZINESS: 0
DYSPHORIC MOOD: 1
SHORTNESS OF BREATH: 0
CHILLS: 0
DIFFICULTY URINATING: 0
PALPITATIONS: 1
BACK PAIN: 1
FATIGUE: 1
DIARRHEA: 0
LIGHT-HEADEDNESS: 0
NERVOUS/ANXIOUS: 1

## 2021-08-27 ASSESSMENT — MIFFLIN-ST. JEOR: SCORE: 2063.72

## 2021-08-27 ASSESSMENT — PATIENT HEALTH QUESTIONNAIRE - PHQ9: SUM OF ALL RESPONSES TO PHQ QUESTIONS 1-9: 12

## 2021-08-27 ASSESSMENT — PAIN SCALES - GENERAL: PAINLEVEL: NO PAIN (0)

## 2021-08-27 NOTE — PROGRESS NOTES
Assessment & Plan     Palpitations  Patient with increased palpitation x 2 weeks. Daily, with 10+ episodes a day. No chest pain, dizziness, lightheadedness, or pre-syncope. Notes one episode of SOB when hiking associated with the palpitations, but otherwise no SOB. EKG performed in clinic today with sinus bradycardia at a rate of 57 with a QTc 437; compared to previous EKG in 2018 with a rate of 66, QTc 432. Patient saw her psychiatrist today who increased her dose of lithium (due to increasing depression) with BMP, TSH, and CBC ordered for 5 days after Lithium increase. Recommending Zio patch monitoring as patient has had increased intermittent palpitations, but during our work up in clinic, we were not able to capture any of those episodes. Discussed with patient options regarding timing of Zio patch monitoring with options of waiting for labs to come back vs ordering it today. Given patient's concern for these palpitations, she opted for Zio patch to be ordered today.    Plan to follow up in 3-4 weeks once Zio patch is complete to review data gathered and to make next steps. If everything comes back wnl, consider ECHO.    - EKG 12-lead complete w/read - Clinics  - Leadless EKG Monitor 8 to 14 Days    Return in about 4 weeks (around 9/24/2021).    eLticia Hoover MD  [facility] STEPHY Travis is a 56 year old who presents for the following health issues  accompanied by her spouse, Rachael:    HPI     Palpitations  - Has had palpitations before, but it was always like one set of little flutters and then it goes away  - 2 weeks ago; multiple sets of flutters throughout each day, daily  - 10-20 sets a day  - When it flutters, it feels like 10 beats or so and stops  - A couple of times where it felt more intense and stronger  - Has had them with no activity; has also had them during exercise  - While on vacation, some SOB with palpitations on hikes, uncertain if it was due to elevation or due  "to excretion  - Thinking it was stress related (lot of stress in life), went on vacation, and it did not calm down  - No dizziness or lightheadedness. Never felt like she is going to pass out, no chest pain, no shortness of breath other than hiking episode.   - Tired with exertion x 2 years; feels like \"my endurance is crap.\"  - Depressive since 11/2020; had an increase in lithium; saw psychiatrist today    Review of Systems   Constitutional: Positive for fatigue. Negative for chills, diaphoresis and fever.   Respiratory: Negative for shortness of breath.    Cardiovascular: Positive for palpitations. Negative for chest pain.   Gastrointestinal: Negative for abdominal pain, constipation and diarrhea.   Endocrine: Positive for heat intolerance.   Genitourinary: Negative for difficulty urinating and vaginal bleeding.   Musculoskeletal: Positive for back pain (chronic).   Skin: Negative for pallor.   Neurological: Negative for dizziness and light-headedness.   Psychiatric/Behavioral: Positive for dysphoric mood. The patient is nervous/anxious.           Objective    /79   Pulse 64   Temp 97.9  F (36.6  C) (Oral)   Resp 16   Wt 139.3 kg (307 lb)   SpO2 100%   BMI 46.68 kg/m    Body mass index is 46.68 kg/m .  Physical Exam  Constitutional:       General: She is not in acute distress.     Appearance: Normal appearance. She is obese. She is not ill-appearing, toxic-appearing or diaphoretic.   HENT:      Head: Normocephalic and atraumatic.   Eyes:      Conjunctiva/sclera: Conjunctivae normal.   Neck:      Thyroid: No thyroid mass, thyromegaly or thyroid tenderness.   Cardiovascular:      Rate and Rhythm: Normal rate and regular rhythm.      Pulses: Normal pulses.      Heart sounds: Normal heart sounds. No murmur heard.     Pulmonary:      Effort: Pulmonary effort is normal. No respiratory distress.      Breath sounds: Normal breath sounds. No wheezing.   Abdominal:      General: Bowel sounds are normal. "   Musculoskeletal:      Cervical back: Neck supple.      Right lower leg: Edema present.      Left lower leg: Edema present.   Skin:     General: Skin is warm and dry.      Capillary Refill: Capillary refill takes less than 2 seconds.   Neurological:      Mental Status: She is alert. Mental status is at baseline.   Psychiatric:         Attention and Perception: Attention and perception normal.         Mood and Affect: Affect normal. Mood is depressed.         Speech: Speech normal.         Behavior: Behavior normal. Behavior is cooperative.         Thought Content: Thought content normal.         Cognition and Memory: Cognition normal.         Judgment: Judgment normal.
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
## 2021-08-27 NOTE — PATIENT INSTRUCTIONS
Patient Education   Here is the plan from today's visit    1. Palpitations  - EKG 12-lead complete w/read - Clinics, which showed sinus bradycardia (normal rhythm, but slow)  - Leadless EKG Monitor 8 to 14 Days; Future (to evaluate your heart rhythms more consistently/constantly)  - Your Psychiatrist ordered BMP, TSH, and CBC, so we are not going to double dip, but we will also look at those.  - See you again in 3-4 weeks to go over the patch results and think about next steps.    Please call or return to clinic if your symptoms don't go away.    Follow up plan  Return in about 4 weeks (around 9/24/2021).    Thank you for coming to [facility] today.  COVID-19 Vaccine:  [facility] has walk-in appointments for COVID-19 vaccines. No appointment needed!   You also have the option of receiving Moderna vaccine during your physician appointment. Please ask your care team for more information!  Lab Testing:  **If you had lab testing today and your results are reassuring or normal they will be mailed to you or sent through [employer] within 7 days.   **If the lab tests need quick action we will call you with the results.  **If you are having labs done on a different day, please call [phone] to schedule at [facility] or [phone] for other [facility] locations.   The phone number we will call with results is # [phone] (home) [phone] (work). If this is not the best number please call our clinic and change the number.  Medication Refills:  If you need any refills please call your pharmacy and they will contact us.   If you need to  your refill at a new pharmacy, please contact the new pharmacy directly. The new pharmacy will help you get your medications transferred faster.   Scheduling:  If you have any concerns about today's visit or wish to schedule another appointment please call our office during normal business hours [phone] (8-5:00 M-F)  If a referral was  made to a [facility] and you don't get a call from central scheduling please call [phone].  If a Mammogram was ordered for you at [facility] call [phone] to schedule or change your appointment.  If you had an EKG/XRay/CT/Ultrasound/MRI ordered the number is [phone] to schedule or change your radiology appointment.   Medical Concerns:  If you have urgent medical concerns please call [phone] at any time of the day.    Leticia Hoover MD

## 2021-08-27 NOTE — PATIENT INSTRUCTIONS
Treatment Plan Today:     1) Medications-  Increase Lithium to 1200 mg at bedtime    2) Labs to do before next appointment: Lithium level, BMP, CBC+plts, TSH w/T4 reflrx  (orders are in)    3) Follow-up appt with Dr. Sims in 1  month    4) Crisis numbers are below and clinic after hours number is [phone]      ------------------------------------------------------------------------    Thank you for coming to the [facility]    Lab Testing:  If you had lab testing today and your results are reassuring or normal they will be mailed to you or sent through [facility] within 7 days. If the lab tests need quick action we will call you with the results. The phone number we will call with results is # [phone] (home) [phone] (work). If this is not the best number please call our clinic and change the number.    Medication Refills:  If you need any refills please call your pharmacy and they will contact us. Our fax number for refills is [phone]. Please allow three business for refill processing. If you need to  your refill at a new pharmacy, please contact the new pharmacy directly. The new pharmacy will help you get your medications transferred.     Scheduling:  If you have any concerns about today's visit or wish to schedule another appointment please call our office during normal business hours [phone] (8-5:00 M-F)    Contact Us:  Please call [phone] during business hours (8-5:00 M-F).  If after clinic hours, or on the weekend, please call  [phone].    Financial Assistance [phone]  [facility] Billing [phone]  Central Billing Office, [facility]: [phone]  [facility] Billing [phone]  Medical Records [phone]  [facility] Patient Bill of Rights [email or web address]       MENTAL HEALTH CRISIS NUMBERS:  For a medical emergency please call  911 or go to the nearest ER.     [facility] ([facility])  [facility]  [phone]    [facility]:   [facility] -[phone]   Crisis Residence [facility] -[phone]   Walk-In Counseling Center [facility] -[phone]   COPE 24/7 [place] Mobile Team -[phone] (adults)/[phone] (child)  CHILD: [facility] needs assessment team - [phone]      [facility]:   [facility] - [phone]   Walk-in counseling [facility] - [phone]   Walk-in counseling  - [phone]   Crisis Residence [facility] - [phone]  Urgent Care Adult Mental Xqyvqq-[phone] mobile unit/ 24/7 crisis line    National Crisis Numbers:   National Suicide Prevention Lifeline: 7-164-935-TALK (606-746-7110)  Poison Control Center - 6-399-779-0676  [employer]/resources for a list of additional resources (SOS)  Trans Lifeline a hotline for transgender people 0-085-567-6871  The [place] [employer] a hotline for LGBT youth 1-625.295.7174  Crisis Text Line: For any crisis 24/7   To: 764693  see www.crisistextline.org  - IF MAKING A CALL FEELS TOO HARD, send a text!       Again thank you for choosing [facility] and please let us know how we can best partner with you to improve you and your family's health.    You may be receiving a survey regarding this appointment. We would love to have your feedback, both positive and negative. The survey is done by an external company, so your answers are anonymous.
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
## 2021-08-30 NOTE — PROGRESS NOTES
Preceptor Attestation:  Patient seen and evaluated in person. I discussed the patient with the resident. I have verified the content of the note, which accurately reflects my assessment of the patient and the plan of care.   Supervising Physician:  Aishwarya Frias DO

## 2021-09-01 ENCOUNTER — THERAPY VISIT (OUTPATIENT)
Dept: PHYSICAL THERAPY | Facility: CLINIC | Age: 56
End: 2021-09-01
Payer: COMMERCIAL

## 2021-09-01 DIAGNOSIS — M54.41 RIGHT-SIDED LOW BACK PAIN WITH RIGHT-SIDED SCIATICA: ICD-10-CM

## 2021-09-01 PROCEDURE — 97110 THERAPEUTIC EXERCISES: CPT | Mod: GP | Performed by: PHYSICAL THERAPIST

## 2021-09-01 NOTE — PROGRESS NOTES
"Subjective:  HPI  Physical Exam                    Objective:  System    Physical Exam    General     ROS    Assessment/Plan:    PROGRESS  REPORT    Progress reporting period is from 6/9 to 9/1/2021.       SUBJECTIVE  Subjective changes noted by patient:  Angy was able to hike in [place] [employer] for a vacation. She recently had an US of the hip - not indicating a bursitis. MRI of the lumbar spine is scheduled due to continued right leg weakness. Fairly consistent with HEP - Angy does feel stronger since starting PT..       Current Pain level: 3/10.      Initial Pain level: 4/10.   Changes in function:  Yes (See Goal flowsheet attached for changes in current functional level)  Adverse reaction to treatment or activity: None    OBJECTIVE  Changes noted in objective findings:  Improved core strength and hip stability with gait. Still weak with a 4\" step down.         ASSESSMENT/PLAN  Updated problem list and treatment plan: Diagnosis 1:  LBP  Pain -  self management, education and home program  Decreased strength - therapeutic exercise, therapeutic activities and home program  Impaired gait - home program  Impaired muscle performance - neuro re-education and home program  Decreased function - therapeutic activities and home program  STG/LTGs have been met or progress has been made towards goals:  Yes (See Goal flow sheet completed today.)  Assessment of Progress: The patient's condition has potential to improve.  Self Management Plans:  Patient has been instructed in a home treatment program.  Patient  has been instructed in self management of symptoms.      Recommendations:  MRI scheduled for the low back. Will hold the chart for now.    Please refer to the daily flowsheet for treatment today, total treatment time and time spent performing 1:1 timed codes.          "

## 2021-09-03 ENCOUNTER — LAB (OUTPATIENT)
Dept: LAB | Facility: CLINIC | Age: 56
End: 2021-09-03
Attending: STUDENT IN AN ORGANIZED HEALTH CARE EDUCATION/TRAINING PROGRAM
Payer: COMMERCIAL

## 2021-09-03 ENCOUNTER — ANCILLARY PROCEDURE (OUTPATIENT)
Dept: CARDIOLOGY | Facility: CLINIC | Age: 56
End: 2021-09-03
Attending: STUDENT IN AN ORGANIZED HEALTH CARE EDUCATION/TRAINING PROGRAM
Payer: COMMERCIAL

## 2021-09-03 DIAGNOSIS — R00.2 PALPITATIONS: ICD-10-CM

## 2021-09-03 DIAGNOSIS — F31.9 BIPOLAR I DISORDER (H): ICD-10-CM

## 2021-09-03 LAB
ANION GAP SERPL CALCULATED.3IONS-SCNC: 3 MMOL/L (ref 3–14)
BASOPHILS # BLD AUTO: 0 10E3/UL (ref 0–0.2)
BASOPHILS NFR BLD AUTO: 1 %
BUN SERPL-MCNC: 14 MG/DL (ref 7–30)
CALCIUM SERPL-MCNC: 9.4 MG/DL (ref 8.5–10.1)
CHLORIDE BLD-SCNC: 107 MMOL/L (ref 94–109)
CO2 SERPL-SCNC: 27 MMOL/L (ref 20–32)
CREAT SERPL-MCNC: 0.61 MG/DL (ref 0.52–1.04)
EOSINOPHIL # BLD AUTO: 0.2 10E3/UL (ref 0–0.7)
EOSINOPHIL NFR BLD AUTO: 3 %
ERYTHROCYTE [DISTWIDTH] IN BLOOD BY AUTOMATED COUNT: 12.6 % (ref 10–15)
GFR SERPL CREATININE-BSD FRML MDRD: >90 ML/MIN/1.73M2
GLUCOSE BLD-MCNC: 78 MG/DL (ref 70–99)
HCT VFR BLD AUTO: 41.6 % (ref 35–47)
HGB BLD-MCNC: 13.3 G/DL (ref 11.7–15.7)
IMM GRANULOCYTES # BLD: 0 10E3/UL
IMM GRANULOCYTES NFR BLD: 1 %
LITHIUM SERPL-SCNC: 0.8 MMOL/L
LYMPHOCYTES # BLD AUTO: 1.4 10E3/UL (ref 0.8–5.3)
LYMPHOCYTES NFR BLD AUTO: 22 %
MCH RBC QN AUTO: 28.2 PG (ref 26.5–33)
MCHC RBC AUTO-ENTMCNC: 32 G/DL (ref 31.5–36.5)
MCV RBC AUTO: 88 FL (ref 78–100)
MONOCYTES # BLD AUTO: 0.4 10E3/UL (ref 0–1.3)
MONOCYTES NFR BLD AUTO: 6 %
NEUTROPHILS # BLD AUTO: 4.4 10E3/UL (ref 1.6–8.3)
NEUTROPHILS NFR BLD AUTO: 67 %
NRBC # BLD AUTO: 0 10E3/UL
NRBC BLD AUTO-RTO: 0 /100
PLATELET # BLD AUTO: 210 10E3/UL (ref 150–450)
POTASSIUM BLD-SCNC: 4.3 MMOL/L (ref 3.4–5.3)
RBC # BLD AUTO: 4.71 10E6/UL (ref 3.8–5.2)
SODIUM SERPL-SCNC: 137 MMOL/L (ref 133–144)
TSH SERPL DL<=0.005 MIU/L-ACNC: 2.31 MU/L (ref 0.4–4)
WBC # BLD AUTO: 6.4 10E3/UL (ref 4–11)

## 2021-09-03 PROCEDURE — 36415 COLL VENOUS BLD VENIPUNCTURE: CPT

## 2021-09-03 PROCEDURE — 84443 ASSAY THYROID STIM HORMONE: CPT

## 2021-09-03 PROCEDURE — 93246 EXT ECG>7D<15D RECORDING: CPT

## 2021-09-03 PROCEDURE — 80178 ASSAY OF LITHIUM: CPT

## 2021-09-03 PROCEDURE — 85025 COMPLETE CBC W/AUTO DIFF WBC: CPT

## 2021-09-03 PROCEDURE — 93248 EXT ECG>7D<15D REV&INTERPJ: CPT | Performed by: INTERNAL MEDICINE

## 2021-09-03 PROCEDURE — 80048 BASIC METABOLIC PNL TOTAL CA: CPT

## 2021-09-03 NOTE — PROGRESS NOTES
Per Aishwarya Estrada, patient to have 14 day Zio patch monitor placed.  Diagnosis: Palpitations  Monitor placed: Yes  Patient Instructed: Yes  Patient verbalized understanding: Yes  Holter # [id number]    Placed by Bakari Bergeron

## 2021-09-08 ENCOUNTER — HOSPITAL ENCOUNTER (OUTPATIENT)
Dept: NUTRITION | Facility: CLINIC | Age: 56
Discharge: HOME | End: 2021-09-08
Attending: DIETITIAN, REGISTERED | Admitting: DIETITIAN, REGISTERED
Payer: COMMERCIAL

## 2021-09-08 PROCEDURE — 97803 MED NUTRITION INDIV SUBSEQ: CPT | Mod: GT | Performed by: DIETITIAN, REGISTERED

## 2021-09-08 NOTE — PROGRESS NOTES
Video-Visit Details    Type of service:  Video Visit    Video Start Time (time video started): 9:00    Video End Time (time video stopped): 9:35    Originating Location (pt. Location): Home    Distant Location (provider location):  [facility] -remote     Mode of Communication:  Video Conference via [facility]    NUTRITION REASSESSMENT NOTE (TELEPHONE VISIT DUE TO COVID-19)     REASON FOR ASSESSMENT   Angy QUINTANA Eduardodeann referred by Dr. Odell for MNT related to  Obesity (BMI 30-39.9) [E66.9]  - Primary       Benign essential hypertension [I10]       Patient accompanied by self (via telephone)     NUTRITION HISTORY  Patient continues with multiple life changes and stressors.  Patient aware that she likes to self soothe with ice cream which she has recently increased.  Patient is aware that social cues trigger her to eat. Patient's spouse recently has weight loss surgery so there is adjust with eating in the home.      5/19 Patient walks 30 minutes most days + weekend 60 minute exercise (biking or hiking); lower back pain -foot going numb when walking and pain at night; dessert (3 days no dessert); asking self if hungry.  Patient made Malt o Meal as substitute for dessert.  Patient limiting dessert if eating dinner later or brushing teeth right after dinner and tracking dessert on calendar.  Patient had 4 day celebration for her birthday and felt was able to make wise choices.  Patient made cream cheese brownies for birthday dessert and froze remaining dessert.  Patient continues to navigate new eating regimen with spouses's weight loss surgery.      7/14/2021 Success-taking walking routes with hills.   Evening meals consisting of grilled chicken/pork loin, grilled sweet potatoes and 2 vegetables   Patient having ice cream thoughts in afternoon-to self soothe.  Patient practicing distractions to avoid eating ice cream at nigt (brushing teeth, asking self if hungry, substituted Malt 0  "Meal, mineral water, too tired).      9/8/2021 Patient walking daily + 60 minutes 1 time per week.  Did well at [facility]. Hiking 1-4.6 miles. Weight loss during trip-308 lbs. Today 310 lbs. Grilled meat and vegetable + fruit. 1-5 days per week with no desserts.   Breakfast: yogurt + 1/2 cup granola + fruit   Lunch: leftovers (meat + vegetables) or cheese and Triscuits + vegetables + fruit   Dinner: BLT (3 slices sierra) + 2 corn on the cob + watermelon + tomatoes; tacos + watermelon/strawberries + cucumbers      DIET RECALL   Breakfast: cereal and banana; yogurt + granola (measures), fruit + coffee; 2 slices toast with butter + 1/2 cup cottage cheese    Lunch: leftovers (includes fruit and vegetable daily)  Snack: fruit or cheese with lower salt nut thin crackers   Dinner: Tuna steak, Asian marinade, stir gomez, baked vegetable + brown rice (3/4 cup) + canned peaches; chicken thighs with beans and rice; sheet pan dinner with chicken and vegetables or grilled salmon and grilled vegetables; taco; protein + fruit and vegetable  Snacks: fruit, string cheese, Ritz crackers or yogurt; dessert in the evening (ice cream recently)  Beverages: mainly water, coffee, mineral water  Dining out: none currently  Exercise: Patient exercising 5-6 times per week. (cross country skiing, snow shoeing, walking and walking dog).  Patient exercises 1 hour on weekends (hiking or walking).     MEDICATIONS:  Reviewed      ANTHROPOMETRICS  Height: 5'8\" (note height change in chart previously 5'9\")  Weight: 310 lbs   BMI (kg/m2): 47.1 kg/m2   %IBW: 221%  Weight History:   Wt Readings from Last 10 Encounters:   08/27/21 139.3 kg (307 lb)   08/27/21 141.5 kg (312 lb)   06/02/21 141.9 kg (312 lb 12.8 oz)   04/23/21 142.1 kg (313 lb 3.2 oz)   01/06/21 143.8 kg (317 lb)   10/09/20 141.2 kg (311 lb 3.2 oz)   09/23/20 143.1 kg (315 lb 6.4 oz)   09/16/20 141.1 kg (311 lb)   07/29/20 139.7 kg (308 lb)   03/11/20 138.6 kg (305 lb 8 oz)        ASSESSED " NUTRITION NEEDS  Estimated Energy Needs: 4301-0916 kcals (15-20 kcals/kg) for gradual weight loss   Estimated Protein Needs: 66-79 (1-1.2 gm/kg IBW) for repletion with exercise  RMR: 1440     EVALUATION/PROGRESS TOWARDS GOALS   Previous goals:  Use treadmill 1 time per week-not met   Aim for no desserts 4 times per week -improving   45-60 grams CHO at dinner 2 times per week -improving     Previous Nutrition Diagnosis: Obesity related to excess energy intake as evidenced by BMI of 47.3 kg/m2-no change     Current Nutrition Diagnosis:Obesity related to excess energy intake as evidenced by BMI of 47.1 kg/m2     INTERVENTIONS   Nutrition Prescription   Recommend exercise 5 times per week; track intake if eating mindlessly; increase fiber to 25-30 grams per day      Implementation:   Meals and Snacks: Continue eating 3 meals + deliberate snacking (if hungry)   Nutrition Education (Content):               a)  Discussed progress towards goals.  Congratulated patient for tracking dessert intake.  Patient with increased stress eating.  Reviewed current food and eating behavior challenges.  Discussed carbohydrate intake and patient feels she would be able to increase awareness of carbohydrate intake.  Supported patient in her struggle with food and weight.    Nutrition Education (Application):               a) Determined alternatives to emotional eating.                         b) Patient verbalized understanding of diet by stating will reduce desserts to 4 times per week.        Expected patient engagement: good     CURRENT GOALS  Use treadmill 1 time per week   Aim for no desserts 4 times per week   45-60 grams CHO at dinner 2 times per week     FOLLOW UP/MONITORING    Patient to follow up in 8 weeks  Patient has RD contact information      Time spent with patient: 35 minutes     Mattie Amaya, RD, LD  [facility] Outpatient Dietitian  [phone] (office phone)

## 2021-09-25 NOTE — PROGRESS NOTES
"NUTRITION REASSESSMENT NOTE (TELEPHONE VISIT DUE TO COVID-19)     REASON FOR ASSESSMENT   Angy QUINTANA Eduardodeann referred by Dr. Odell for MNT related to  Obesity (BMI 30-39.9) [E66.9]  - Primary       Benign essential hypertension [I10]       Patient accompanied by self      NUTRITION HISTORY  Patient continues with multiple life changes and stressors.  Patient struggling with evening eating as feels want to self soothe with food.  Patient is stress eating with due to COVID-19 and schedule changes.  Patient preparing and planning  meals for family.     DIET RECALL   Breakfast: cereal and banana; yogurt, granola (measures), fruit + coffee; 2 slices toast with butter + 1/2 cup cottage cheese    Lunch: leftovers (includes fruit and vegetable daily)  Dinner: Tuscan white bean soup; roasted chicken; taco; protein + fruit and vegetable; daughter continues to follow vegetarian diet     Snacks: ice cream or brownies   Beverages: mainly water, coffee, mineral water  Dining out: none currently  Exercise: Patient exercising 3-4 times per week (walking, and walking dog).          MEDICATIONS:  Reviewed      ANTHROPOMETRICS  Height: 5'8\" (note height change in chart previously 5'9\")  Weight: 305 lbs   BMI (kg/m2): 46.3 kg/m2   %IBW: 217%  Weight History:   Wt Readings from Last 10 Encounters:   03/11/20 138.6 kg (305 lb 8 oz)   01/24/20 137 kg (302 lb)   12/12/19 133.8 kg (295 lb)   12/04/19 133.8 kg (295 lb)   11/07/19 133.4 kg (294 lb)   06/26/19 131.5 kg (290 lb)   05/03/19 129.3 kg (285 lb)   02/04/19 124.7 kg (275 lb)   12/13/18 120.3 kg (265 lb 4.8 oz)   12/06/18 120.5 kg (265 lb 9.6 oz)      ASSESSED NUTRITION NEEDS  Estimated Energy Needs: 3243-4691 kcals (15-20 kcals/kg) for gradual weight loss   Estimated Protein Needs: 66-79 (1-1.2 gm/kg IBW) for repletion with exercise  RMR: 1440     EVALUATION/PROGRESS TOWARDS GOALS   Previous goals:  Bring non sugar foods to holiday events-met  Plan days for exercise 3 days per " week-met  Give additional dessert portions away when hosting events-not met      Previous Nutrition Diagnosis: Obesity related to excess energy intake as evidenced by BMI of 41.9 kg/m2-no change     Current Nutrition Diagnosis:Obesity related to excess energy intake as evidenced by BMI of 46.3 kg/m2     INTERVENTIONS   Nutrition Prescription   Recommend exercise 3-5 times per week; track intake if eating mindlessly; increase fiber to 25-30 grams per day      Implementation:   Meals and Snacks: Continue eating 3 meals + deliberate snacking (if hungry)   Nutrition Education (Content):               a)  Discussed progress towards goals.  Congratulated patient on tracking exercise and desserts in planner.  Reviewed struggles with evening eating as patient has increased dessert intake due to stress.  Supported patient in her struggle with food and mental health issues.     Nutrition Education (Application):               a) Determined alternative options to stress eating.                             b) Patient verbalized understanding of diet by stating will practice other relaxation techniques besides stress eating 2 times per week.                      Anticipate fair-good compliance     CURRENT GOALS   Exercise 5 times per week   Determine relaxation options besides stress eating 2 times per week    FOLLOW UP/MONITORING    Patient to follow up in 4 weeks  Patient has RD contact information      Time spent with patient: 20 minutes     Mattie Amaya, RD, LD  [facility] Outpatient Dietitian  [phone] (office phone)            Patent

## 2021-10-05 ENCOUNTER — ANCILLARY PROCEDURE (OUTPATIENT)
Dept: MRI IMAGING | Facility: CLINIC | Age: 56
End: 2021-10-05
Attending: FAMILY MEDICINE
Payer: COMMERCIAL

## 2021-10-05 DIAGNOSIS — M54.50 LUMBAR PAIN WITH RADIATION DOWN RIGHT LEG: ICD-10-CM

## 2021-10-05 DIAGNOSIS — M79.604 LUMBAR PAIN WITH RADIATION DOWN RIGHT LEG: ICD-10-CM

## 2021-10-05 PROCEDURE — 72148 MRI LUMBAR SPINE W/O DYE: CPT | Performed by: STUDENT IN AN ORGANIZED HEALTH CARE EDUCATION/TRAINING PROGRAM

## 2021-10-08 ENCOUNTER — OFFICE VISIT (OUTPATIENT)
Dept: OBGYN | Facility: CLINIC | Age: 56
End: 2021-10-08
Payer: COMMERCIAL

## 2021-10-08 ENCOUNTER — OFFICE VISIT (OUTPATIENT)
Dept: FAMILY MEDICINE | Facility: CLINIC | Age: 56
End: 2021-10-08
Payer: COMMERCIAL

## 2021-10-08 VITALS
SYSTOLIC BLOOD PRESSURE: 127 MMHG | BODY MASS INDEX: 44.41 KG/M2 | DIASTOLIC BLOOD PRESSURE: 70 MMHG | WEIGHT: 293 LBS | TEMPERATURE: 98 F | HEART RATE: 58 BPM | OXYGEN SATURATION: 98 % | HEIGHT: 68 IN

## 2021-10-08 VITALS
HEART RATE: 72 BPM | WEIGHT: 293 LBS | DIASTOLIC BLOOD PRESSURE: 66 MMHG | SYSTOLIC BLOOD PRESSURE: 127 MMHG | BODY MASS INDEX: 46.83 KG/M2 | TEMPERATURE: 97.8 F

## 2021-10-08 DIAGNOSIS — Z01.419 ENCOUNTER FOR GYNECOLOGICAL EXAMINATION WITHOUT ABNORMAL FINDING: Primary | ICD-10-CM

## 2021-10-08 DIAGNOSIS — I47.10 PAROXYSMAL SUPRAVENTRICULAR TACHYCARDIA (H): Primary | ICD-10-CM

## 2021-10-08 DIAGNOSIS — F31.9 BIPOLAR I DISORDER (H): ICD-10-CM

## 2021-10-08 DIAGNOSIS — N95.1 PERIMENOPAUSAL VASOMOTOR SYMPTOMS: ICD-10-CM

## 2021-10-08 DIAGNOSIS — Z12.31 ENCOUNTER FOR SCREENING MAMMOGRAM FOR BREAST CANCER: ICD-10-CM

## 2021-10-08 DIAGNOSIS — N95.1 VAGINAL DRYNESS, MENOPAUSAL: ICD-10-CM

## 2021-10-08 PROCEDURE — 99214 OFFICE O/P EST MOD 30 MIN: CPT | Mod: GC | Performed by: STUDENT IN AN ORGANIZED HEALTH CARE EDUCATION/TRAINING PROGRAM

## 2021-10-08 PROCEDURE — 99396 PREV VISIT EST AGE 40-64: CPT | Performed by: OBSTETRICS & GYNECOLOGY

## 2021-10-08 RX ORDER — ESTRADIOL 10 UG/1
10 INSERT VAGINAL
Qty: 25 TABLET | Refills: 3 | Status: SHIPPED | OUTPATIENT
Start: 2021-10-11 | End: 2022-09-06

## 2021-10-08 ASSESSMENT — ANXIETY QUESTIONNAIRES
IF YOU CHECKED OFF ANY PROBLEMS ON THIS QUESTIONNAIRE, HOW DIFFICULT HAVE THESE PROBLEMS MADE IT FOR YOU TO DO YOUR WORK, TAKE CARE OF THINGS AT HOME, OR GET ALONG WITH OTHER PEOPLE: SOMEWHAT DIFFICULT
2. NOT BEING ABLE TO STOP OR CONTROL WORRYING: MORE THAN HALF THE DAYS
6. BECOMING EASILY ANNOYED OR IRRITABLE: NEARLY EVERY DAY
GAD7 TOTAL SCORE: 13
1. FEELING NERVOUS, ANXIOUS, OR ON EDGE: MORE THAN HALF THE DAYS
5. BEING SO RESTLESS THAT IT IS HARD TO SIT STILL: NOT AT ALL
3. WORRYING TOO MUCH ABOUT DIFFERENT THINGS: MORE THAN HALF THE DAYS
7. FEELING AFRAID AS IF SOMETHING AWFUL MIGHT HAPPEN: MORE THAN HALF THE DAYS

## 2021-10-08 ASSESSMENT — MIFFLIN-ST. JEOR: SCORE: 2047.39

## 2021-10-08 ASSESSMENT — PATIENT HEALTH QUESTIONNAIRE - PHQ9
SUM OF ALL RESPONSES TO PHQ QUESTIONS 1-9: 9
5. POOR APPETITE OR OVEREATING: MORE THAN HALF THE DAYS

## 2021-10-08 NOTE — PATIENT INSTRUCTIONS
Patient Education   Here is the plan from today's visit    1. Paroxysmal supraventricular tachycardia (H)  - See documents below  - Anticipate phone call from cardiology scheduling office early next week. If you haven't heard anything by Wednesday afternoon, call [phone]..   - Go to ED if you feel heart fluttering and is associated with significant lightheadedness/dizziness or shortness of breath that doesn't improve with rest.   - I will reach out to your psychiatrist asking about whether or not this is associated with lithium increase    Follow-up with me in 4 weeks if you haven't been able to see cardiology yet. (You're going to see the Cardiology Specialist called an Electrophysiologist.)    Thank you for coming to [facility] today.  COVID-19 Vaccine:  [facility] has walk-in appointments for COVID-19 vaccines. No appointment needed!   You also have the option of receiving Pfizer vaccine during your physician appointment. Please ask your care team for more information!  Medication Refills:  If you need any refills please call your pharmacy and they will contact us.   If you need to  your refill at a new pharmacy, please contact the new pharmacy directly. The new pharmacy will help you get your medications transferred faster.   Scheduling:  If you have any concerns about today's visit or wish to schedule another appointment please call our office during normal business hours [phone] (8-5:00 M-F)  If a referral was made to a [facility] and you don't get a call from central scheduling please call [phone].  If a Mammogram was ordered for you at [facility] call [phone] to schedule or change your appointment.  If you had an EKG/XRay/CT/Ultrasound/MRI ordered the number is [phone] to schedule or change your radiology appointment.   Medical Concerns:  If you have urgent medical concerns please call [phone] at any time of the  day.    Magali Lira MD       Patient Education     Supraventricular Tachycardia  What is supraventricular tachycardia?  Supraventricular tachycardias (SVT) are a group of abnormally fast heart rhythms (heartbeats). It's a problem in the electrical system of the heart. The word supraventricular means above the ventricles. With SVT, the abnormal rhythm starts in the upper heart chambers (atria). Also, known as paroxysmal supraventricular tachycardia as these fast heart rhythms may start and stop abruptly and can occur with intervals of normal heart rhythm.   Normally, a special group of cells begin the electrical signal to start your heartbeat. These cells are in the sinoatrial (SA) node. In an adult, the sinus node sends out a regular electrical pulse 60 to 100 times per minute at rest. This node is in the right atrium, the upper right chamber of your heart. The signal quickly travels down your heart s conducting system to the ventricles, the two lower chambers of your heart. Along the way, the signal moves through the atrioventricular (AV) node, a special group of cells between your atria and your ventricles. From there, the signal travels to your left and right ventricle. As it travels, the signal triggers nearby parts of your heart to contract. This helps your heart pump in a coordinated way.   In SVT, the signal to start your heartbeat doesn t come from the SA node. Instead, it comes from another part of the left or right atrium, or from the AV node. An area outside the SA node begins to fire quickly, causing a rapid heartbeat of over 100 beats per minute. This shortens the time your ventricles have to fill. If your heartbeat is fast enough, your heart may not be able to pump enough blood forward to the rest of your body. The abnormal heart rhythm may last for a few seconds to a few hours before your heart returns to its normal rhythm.   There are several types of SVTs:     The most common type in adults is  atrioventricular katherine reentrant tachycardia (AVNRT). This occurs when you have two channels through the AV node, instead of just one. The electricity can get into a looping circuit with signals going down one channel and up the other. It can occur at any age, but it most often starts in young adulthood. It's slightly more common in women.    Another common type of SVT is atrioventricular reciprocating tachycardia (AVRT). In this condition, you are born with an extra electrical connection between the atrium and the ventricle (known as an accessory pathway) that can conduct electricity. This condition allows your heart to get caught up in a looping electrical circuit. The electricity either goes down the AV node and returns back to the atrium through the accessory pathway. Or the reverse occurs with the signal traveling down the accessory pathway and returning through the AV node. This circuit continues until it's interrupted and the tachycardia stops. This type of SVT is slightly more common in younger women and children.    Atrial tachycardia is another common type of SVT. In this case, a small group of cells in the atria begin to fire abnormally, triggering the fast heartbeat. Multifocal atrial tachycardia is a related type. In this case, multiple groups of cells in your atria fire abnormally. These types of SVT happen more often in middle-aged people. Multifocal atrial tachycardia is more common in people with heart failure or other heart or lung diseases.  In general, SVTs are somewhat uncommon. But they are not rare. Atrial fibrillation and atrial flutter are also technically types of SVT but these are usually  into their own category because they are associated with other risks, can last for days or even years, and have a different mechanism   What causes supraventricular tachycardia?  SVT is usually a result of faulty electrical signaling in your heart. It's commonly brought on by premature beats.  Some types of SVT run in families, so genes may play a role. Other types may be caused by lung problems. It can also be linked to a number of lifestyle habits or medical problems. Some of these include:     Excess caffeine or alcohol    Heavy smoking    Certain medicines    Heart attack    Mitral valve disease  What are the symptoms of supraventricular tachycardia?  You may not have any symptoms if you have SVT. Symptoms may vary based on how long the tachycardia lasts and how fast the heart rate is. Common symptoms include:     Chest discomfort    Shortness of breath    Fatigue    Lightheadedness or dizziness    Pulsations in the neck    Unpleasant awareness of the heartbeat (palpitations)  Fainting, more severe chest pain, and nausea are less common symptoms. Very rarely can SVT cause sudden death.   How is supraventricular tachycardia diagnosed?  Diagnosis starts with a medical history and physical exam. Your healthcare provider will also use tests to help diagnose SVT. These tests will help your provider identify the type of SVT you have. They also help your provider check for possible underlying causes and complications. Tests might include:     Electrocardiogram (ECG), the most important initial test to analyze the abnormal rhythm    Continuous electrocardiogram, to watch your heart rhythm over a longer period    Blood work, to test for various causes    Chest X-ray, to check for lung problems and examine the size of your heart    Exercise stress test, to see how your heart works during exercise    Echocardiography, to check your heart structure and function    Electrophysiologic study (EPS), to evaluate the electrical activity and pathways in your heart  Your primary healthcare provider might first diagnose your SVT. But they will likely send you to a heart doctor (cardiologist).   How is supraventricular tachycardia treated?  SVT needs short-term and long-term treatment. Options for short-term treatment  include:     Maneuvers to stop SVT    Medicines to stop SVT, like calcium channel blockers, beta blockers or adenosine    Electrocardioversion. This sends a shock to the heart to get it back to a normal rhythm.    Catheter ablation  Maneuvers are usually the first treatment unless you have severe symptoms. These attempt to activate a nerve called the vagus nerve. Activating this nerve can cause a brief slowing of your heartbeat in attempt to break the abnormal circuit. Your healthcare provider might have you do a Valsalva maneuver (you bear down with your stomach muscles, as though you were trying to have a bowel movement). Your provider might also try massaging the carotid artery in your neck, having you blow in a straw, or cough hard. Each of these techniques can sometimes bring you out of SVT. If they don t, your provider might give you medicines. If your symptoms are severe or your condition is unstable, you will usually have electrocardioversion as the first treatment. .   Long-term treatment depends on the type of SVT and the intensity of symptoms. You may not need any treatment for SVT if you have only had one episode or the episodes are very rare, especially if SVT went away with maneuvers alone. In some cases, your healthcare provider may prescribe medicines to stop SVT that you will need to take only as needed. Beta-blockers or calcium channel blockers are common choices. This may be an option for you if you have fewer than 3 episodes of SVT per year. But the medicines may often take 15 to 30 minutes to take effect. If your SVT is more frequent, you may need to take medicine every day. Some people may need to take several medicines to prevent episodes of SVT.   Catheter ablation is now often a suggested treatment for recurring SVT. In some cases, it may be the initial recommended treatment. Ablation can often cure SVT. The procedure involves placing a small catheter through a blood vessel in the groin and  threaded into your heart. Your healthcare provider then performs a small burn or small freeze on the abnormal area of your heart that is causing the fast heart rhythm. Ask your healthcare provider about what treatment strategy is right for you.   How is supraventricular tachycardia managed?  Your healthcare provider might make other recommendations to manage your SVT. These might include:     Cutting back on alcohol and caffeine    Not smoking    Reducing stress    Eating a heart-healthy diet  When should I call my healthcare provider?  Call your healthcare provider if you have severe symptoms like palpitations, lightheadedness, chest pain, or sudden shortness of breath. If your symptoms are increasing in severity or frequency, plan to see your healthcare provider as soon as possible.   Key points about supraventricular tachycardia    SVT is a type of abnormal heart rhythm. Something signals an area outside of the SA node to fire much faster than it should or something triggers the signal to follow a looping circuit. This results in a fast heartbeat that can last anywhere from a few seconds to several hours.    There are several subtypes of SVT. Your treatment options may vary based on what subtype you have.    Very rarely, SVT can cause sudden death.    You might need a shock to the heart if you are having severe symptoms from SVT.    Some people with SVT need to take medicines only when an episode of SVT happens. Others need to take medicine all the time. Ablation is often a good option for many people.    It is important to follow your healthcare provider s instructions about medicine and lifestyle management.    Next steps  Tips to help you get the most from a visit to your healthcare provider:     Know the reason for your visit and what you want to happen.    Before your visit, write down questions you want answered.    Bring someone with you to help you ask questions and remember what your provider tells  you.    At the visit, write down the name of a new diagnosis, and any new medicines, treatments, or tests. Also write down any new instructions your provider gives you.    Know why a new medicine or treatment is prescribed, and how it will help you. Also know what the side effects are.    Ask if your condition can be treated in other ways.    Know why a test or procedure is recommended and what the results could mean.    Know what to expect if you do not take the medicine or have the test or procedure.    If you have a follow-up appointment, write down the date, time, and purpose for that visit.    Know how you can contact your provider if you have questions.  [employer] last reviewed this educational content on 5/1/2019 2000-2021 [employer]. All rights reserved. This information is not intended as a substitute for professional medical care. Always follow your healthcare professional's instructions.           Patient Education     Treatment for Supraventricular Tachycardia  Supraventricular tachycardia (SVT) is a class of abnormally fast heart rhythms that originate from the top chambers of the heart called the atria. The normal heart rhythm is generated by your sinus node and the electricity is conducted through the heart over specialized nerves.  When the electricity meets the muscle cells this results in regular and coordinated heart beats. During SVT, the heart rhythm may be generated by an abnormal area of excited heart muscle in the atria or by an abnormal electric circuit that has formed in the heart leading to rapid electrical activity. This can results in symptoms of palpitations, shortness of breath, chest pain, dizziness, or fainting.  Types of treatment  You may not need treatment for SVT if you have only rare episodes. If you do need treatment, there are several kinds. They include:    Valsalva maneuver. This is a way to increase pressure in the abdomen and chest. It can correct your  heart rhythm right away. To do it, you bear down with your stomach muscles, as though you are trying to have a bowel movement.    Carotid massage. Your healthcare provider may rub the carotid artery in your neck. This produces a slowing heart rate reflex in the heart and can sometimes stop the arrhythmia.    Medicine. There are various kinds you can take. Calcium channel or beta blockers can help correct heart rhythm. If you have SVT only 1 or 2 times a year, you may take beta-blockers or calcium channel medicines by mouth (orally) as needed. If your SVT is more frequent, you may need to take medicine every day. Some people may need to take several medicines to prevent episodes of SVT. For emergent cases, calcium channel or beta blockers can be given through IV (intravenously) for more rapid correction of the heart rhythm. Adenosine is another medicince that can be given through IV as well that can work in a matter of seconds. It is not available in an oral form.    Electrocardioversion. This is a shock to the heart to restart a normal rhythm right away. This may be done if you have a severe episode of SVT.    Catheter ablation. This can help cure SVT. Your healthcare provider puts a thin, flexible tube (catheter) into a blood vessel in the groin. He or she then gently pushes it up into your heart. The area of your heart that causes your SVT is then either cauterized with heat or scarred with freezing energy. This prevents that area from starting a signal that causes SVT. An ablation may need to be repeated if a new excited nerve area in the atria develops and the SVT returns.  Lifestyle changes to help prevent SVT episodes  Your healthcare provider might suggest other ways to help prevent SVT, such as the following:    Have less alcohol and caffeine    Don't smoke    Lower your stress    Eat foods that are healthy for your heart    Don't take recreational drugs, especially stimulants that can over-excite the heart  muscle. Some herbs and supplements can have this same effect. Always check with your healthcare team before you take any non-prescribed medicines.    Stay well hydrated and get enough sleep  Call 911  Call 911 right away if you have:    Sudden shortness of breath  When to call your healthcare provider  Call your healthcare provider if you have any of the following:    Severe palpitations    Severe dizziness or fainting    Severe chest pain    Symptoms that are happening more often  [employer] last reviewed this educational content on 4/1/2018 2000-2021 [employer]. All rights reserved. This information is not intended as a substitute for professional medical care. Always follow your healthcare professional's instructions.           Patient Education     Understanding Supraventricular Tachycardia  Supraventricular tachycardia (SVT) is a type of abnormal heart rhythm that results in a fast heartbeat. It is caused by a problem in the electrical system of the heart The word supraventricular means above the ventricles. With SVT, the abnormal rhythm starts in the upper heart chambers (atria).  The heart normally beats roughly 60 to 100 beats per minute while you are at rest and awake. With SVT, the heart beats more than 100 times a minute. It may even beat over 200 times a minute. Because the heart is beating so fast, the chambers of the heart don't have enough time to fully fill. So less blood is pumped through the heart.  How the heart beats  A heartbeat is the rhythm of the heart as it contracts to squeeze blood through the body. It is driven by electrical signals in the heart. A beat normally starts when a special group of cells give off an electrical signal. These cells are in the upper right chamber of your heart (right atrium). They are called the sinoatrial (SA) node. The signal from the SA node travels down the atria to the atrioventricular (AV) node. This is another special group of cells between  "the atria and ventricles. The AV node serves as a  for signals passing through the heart to your lower chambers. From the AV node, the signal travels to your left and right ventricles. As the electrical signal travels along this pathway, it tells nearby parts of your heart to contract. This causes your heart to pump in a set way.  What is SVT?  When you have SVT, the signal to start your heartbeat doesn t come from the SA node. Instead it comes from another part of the left or right atrium. Or it may come from the AV node. In SVT, the signal begins to fire quickly. This causes a rapid heartbeat of over 100 beats per minute. In some cases, the electrical signals are caught up in a looping circuit. This rapid beating shortens the time your ventricles have to fill. If your heartbeat is fast enough, your heart may not be able to pump enough blood to the rest of your body. As a result, you may feel many symptoms linked to not getting enough blood flow. The problem heartbeat may last for a few seconds to a few hours. Then your heart returns to its normal rhythm. Some SVT rhythms can last for days or weeks. Some even become life long (permanent).  Types of SVT  There are several types of SVT. They include:    Atrial fibrillation (AFib). This is the most common type of SVT. The upper chambers of the heart quiver very fast instead of pumping. This is caused by electrical problems in the atria. The contractions of the heart are very irregular.    Atrial flutter. This type of SVT is a milder form of AFib. The upper chambers of the heart flutter instead of pumping normally. This may cause a regular or irregular pulse.    Atrioventricular katherine re-entrant tachycardia. It occurs when you have two channels instead of one through the AV node. The signal goes down one channel and up the other. So the signal \"re-enters\" the atria. This causes extra contractions. This causes fast heart rate.    Atrioventricular reciprocating " tachycardia. With this condition, there is an extra connection of muscle between the atrium and the ventricle. This extra connection is known as an accessory pathway. It can conduct electricity both upward and downward. The signal starts down the normal pathway through the AV node. But then it goes back up to the atrium through the accessory pathway. The signal then goes down the AV node again. This circular pattern leads to an abnormal heart rhythm. This type of SVT is generally not serious (benign). In rare cases, it may lead to an abnormal heart rhythm that may cause sudden death. This type of SVT is from a problem you were born with (congenital).    Atrial tachycardia. This is another common type of SVT. A small group of cells in the atria begin to fire abnormally and compete with the sinus node. This starts an abnormally fast heartbeat.    Multifocal atrial tachycardia. With this type of SVT, several groups of cells in your atria fire abnormally and trigger a fast heartbeat.  What causes SVT?  Some types of SVT run in families. Some people have heart problems from birth that cause SVT. High blood pressure, heart failure, mitral valve disease, sleep apnea, thyroid problems, and heart attacks can also cause SVT. Smoking, excess caffeine or alcohol, and some medicines can raise your risk for SVT.  Symptoms of SVT  When SVT happens, you may feel no symptoms. Or you may have:    Fluttering feelings in your chest (palpitations)    A tight feeling or pain in your chest    A pulsing feeling in your neck    Dizziness    Shortness of breath    Tiredness    Fainting    Nausea  In very rare cases, SVT can cause sudden death.  Diagnosing SVT  Your main healthcare provider may diagnose you. Or you may see a healthcare provider who specializes in heart conditions (cardiologist). The provider will ask about your health history. They will also give you a physical exam and do one or more tests. These tests can help show what  kind of SVT you have, and what may be causing it. They also help check for other problems. The tests may include:    Electrocardiogram (ECG), to look at the abnormal rhythm    Continuous heart monitors such as a Holter monitor or an event recorder. This is to watch your heart rhythm over a longer period of time to catch the SVT rhythm.    Blood tests, to look for causes such as thyroid problems or electrolyte problems    Chest X-ray, to check for lung problems and look at the size of your heart    Exercise stress test, to see how well your heart works under stress    Echocardiography (echo), to check your heart structure and function    Electrophysiology studies (EPS). This is an invasive procedure using a thin tube (catheter) put into a blood vessel to the heart. This test checks the heart's electrical signals and diagnoses the SVT.  Treating SVT  Treatment for SVT will depend on the length of time you have had it. It will also depend on how often you have it and how serious it is. Treatments may include:    Vagal maneuvers    Medicines to slow your heart rate    Electrical shock to get your heart back into rhythm (cardioversion)    Catheter ablation    Blood thinners to prevent stroke. This depends on the type of SVT you have.  If you develop SVT, talk with your healthcare provider about the best treatment options for you.  [employer] last reviewed this educational content on 6/1/2019 2000-2021 [employer]. All rights reserved. This information is not intended as a substitute for professional medical care. Always follow your healthcare professional's instructions.

## 2021-10-08 NOTE — PROGRESS NOTES
[facility] Note  [facility]     Assessment and Plan   Angy Haji is a 56 year old who presented to clinic today for f/u of Ziopatch results.     Paroxysmal SVT  Ziopatch showed several runs of SVT and patient is symptomatic (palpitations, tired). Onset in early August, shortly after increasing Lithium from 1050mg daily to 1200mg daily. Patient currently drinking 24oz caffeine daily and will attempt to cut back. Minimal alcohol use. Discussed trial of beta blocker vs referral to EP. I was reluctant to initiate beta blocker because patient's resting heart rate can be as low as low 40s, so we decided on referral to EP to consider ablation. I also reached out to her psychiatrist asking whether recent lithium increase may be related. TSH normal 9/3/21.   - Cardiology EP referral  - Go to ED if you feel heart fluttering and is associated with significant lightheadedness/dizziness or shortness of breath that doesn't improve with rest.   - Follow-up w/ psych whether SVT is r/t lithium increase     Bipolar 1 disorder  Fairly stable on current dose lithium, though does notice more depressed mood. Has follow-up with psych within next couple of weeks.     Follow-up with me within one month (mehran if EP appointment is scheduled out farther than one month from now)          HPI       Angy Haji is a 56 year old who presents for Follow Up (Results on zio Patch heart monitor )    Refer to EP  Heart palpitations several times per day since early August.   Prior to that, would feel heart flutters occasionally, but not multiple times per day.   Poor endurance this summer with biking. Fatiguing way earlier than normal.   Lithium 900mg for a couple of years. Increased to 1050mg daily d/t depressive symptoms in Dec 2020. Then in July or August, increased to 1200mg daily. Trying to be cautious d/t h/o severe manic episode w/ past antipsychotic trials; tremor, etc. Hesitant to adjust meds too much d/t side  "effects in the past.   Drinking ~24 oz coffee per day since spring. Will try to cut back a bit.   Minimal alcohol-- 1 beer per week. (That is more for her than normal-- usually only has one drink per month.)  Mostly notices symptoms when sitting. Not so much when physically active or anxious.   Some lower extremity swelling.   No SOB.     Discussed recent trip to [place] in [place]        Physical Exam:   /70   Pulse 58   Temp 98  F (36.7  C) (Oral)   Ht 1.727 m (5' 8\")   Wt 139.9 kg (308 lb 6.4 oz)   SpO2 98%   BMI 46.89 kg/m    Heart: RRR ( no palpitations during our encounter), Rate was 74 when I listened (vs 58 when PCS took vitals), no LE edema  Lungs: CTAB. Breathing comfortably on room air      Results:     No results found for any visits on 10/08/21.  Reviewed ZioPatch results    Options for treatment and follow-up care were reviewed with the patient. Angy Haji engaged in the decision making process and verbalized understanding of the options discussed and agreed with the final plan.    Magali Lira MD, she/her, PGY-3, [facility]          "

## 2021-10-08 NOTE — PATIENT INSTRUCTIONS
ATROPHIC VAGINITIS    Atrophic vaginitis is shrinkage, irritation and dryness of the vagina caused by a change in vaginal tissue.  This change happens when the level of estrogen in your body decreases.  The change is called vaginal atrophy.    Atrophic vaginitis may occur:    During or after menopause    While you are producing milk for breast-feeding    After removal of both ovaries    After prolonged exposure of your pelvis to X-rays or chemotherapy    After radiation therapy to your abdomen    Prior to menopause an abundance of estrogen is produced by the ovaries and circulates through the female body, including the genital area.  Estrogen production slowing begins to decline after childbearing years are over.  One effect is that the vagina becomes less elastic and the vaginal muscles less toned.  The tissues in and around the vagina become thinner and produce fewer secretions.  Tissues surrounding the bladder can also be affected.    Symptoms can include:     Vaginal dryness    Vaginal itching & irritation of the vulva (the area around the vaginal opening)    Vaginal discharge, often gray-colored with an odor    Difficulty in sexual arousal    Increase in vaginal infections due to imbalance in the vagina s PH level    Pain with intercourse which can cause bleeding and can lead to decreased desire for sex     Vaginal and urinary symptoms do not resolve over time and generally worsen with aging.  Treatment includes recognizing and eliminating all reversible causes.     Non-Pharmacological Therapies    Sexual activity is associated with maintaining vaginal health.  Women who have intercourse 3 times/month have less atrophy than sexually inactive women.    Masturbation may help maintain vaginal secretions and elasticity.    Stress reduction therapy and psychological counseling may help.    Lubricants can be used to relieve vaginal dryness during intercourse. There are water based lubricants and silicone based  lubricants.     Water based lubricants (KyJelly, Liquidsilk) are designed to mimic the feel of the body s own natural lubrication. These lubes are compatible with all safer sex supplies and sex toys. They may also be used for all sexual activities.     Silicone based lubricants (uberlube, pjur) may last longer and provide more cushion than water based products, will not soak into the skin, are slippery and need little reapplication. Soap and water is often needed for cleanup. Silicone based products are compatible with all condoms and safer-sex barriers but read product labels since some silicone toys will not be compatible with silicone lubricant.     There are other types of non-hormonal lubricants available.     Solid shortening, olive oil and Bag balm are non alcohol based products that work well for irritation or lubrication.    Vaginal moisturizers like Replens (http://www.replens.com/) can help maintain natural secretions and intercourse comfort.  These are non-hormonal products that are inserted into the vagina regularly, several times /week regardless of sexual activity.  They provide long-term relief of vaginal dryness rather than being just sexual aids.    Vitamin E suppositories or capsules in a soothing base with no petroleum or mineral oil may provide gentle nourishment for managing vaginal dryness. Capsules can be opened and vitamin E applied directly to tissues.  One online source for suppositories is Clipcopia www.GiveLoop     Kegel exercises can help improve both sexual sensation and urinary functioning.  These exercises work by tightening and releasing the pelvic muscles.  Your provider can give you simple instructions or give you a referral to a pelvic floor physical therapist who can help with these exercises.    Pharmacological Therapies    Local estrogen replacement therapy is the treatment of choice for postmenopausal women with vaginal dryness.  These products all  require a prescription from your health care provider.    Vaginal estrogen primarily works locally and has minimal systemic absorption.    Vaginal estrogen is available in the form of creams, tablets, suppositories and rings. All of these products are equally effective.  The tablet and ring have less systemic absorption, and therefore less likely to have side effects (breast pain, or uterine bleeding).    Vaginal tablets called Vagi-Fem require one tablet to be inserted daily for 2 weeks followed by one tablet two to three times/week.    The vaginal ring called the Estring is inserted into your vagina and slowly releases estrogen over 3 months.  The ring is changed every 90 days.    Estradiol vaginal cream is inserted into the vagina nightly for two weeks and then at least 2-3 times/week.    Vaginal suppositories, cream, gel, and clear capsules may be available through compounding pharmacies.    Vaginal estrogen restores vaginal changes usually within one month.    PREVENTION:    Avoid panty liners, tight clothes and synthetic fabrics.    Wear loose fitting, cotton underwear.  Change your underwear every day.  Do not wear underwear in bed at night.    If you still have periods or spotting, use unscented sanitary pads instead of tampons.    Bathe as usual but do not use soap in the vaginal area.    If you have itching, avoid wearing pantyhose until the itching stops.    Stay hydrated.    Avoid antihistamines; drying effect.    Avoid chemical irritants such as douches, sprays, and bubble bath.    Don t use powders, perfume, deodorant, or scented soap in vaginal area.     Quit smoking; vaginal changes are more severe in women who smoke.    Wipe front to back after urinating or having a bowel movement.    If spermicidal foams, gels or creams irritate your vagina, avoid using them. However make sure you practice good birth control and protection against sexually transmitted disease.

## 2021-10-08 NOTE — Clinical Note
Hi there. I saw Angy today. She has a new diagnosis of paroxysmal SVT, which seemed to happen shortly after increasing lithium from 1050 to 1200. I'm wondering if there's any association with lithium and pSVT? She'll see you in about a week in person anyway, and I've referred her to electrophysiology as well.   Thanks!  -Magali

## 2021-10-08 NOTE — PROGRESS NOTES
GYN CLINIC VISIT  10/8/2021  CC: annual exam    HPI:  Angy is a 56 year old  who presents for annual exam.   Postmenopausal since 2020.  She is having vaginal dryness, decreased libido and irritability. No vaginal bleeding noted.     Besides routine health maintenance,  she would like to discuss medication follow up.  Working with psychiatry for bipolar. Increasing lithium. Complains of irritable mood. Depressed and not enjoying things. Feels safe, no suicidal ideation. No anthony feelings.  PHQ 5/3/2019 2021 10/8/2021   PHQ-9 Total Score 7 6 9   Q9: Thoughts of better off dead/self-harm past 2 weeks Not at all Not at all Not at all   Some encounter information is confidential and restricted. Go to Review Flowsheets activity to see all data.     JOANNA-7 SCORE 5/3/2019 2021 10/8/2021   Total Score 4 4 13     Heart palpitations - working with [employer]. Wore a Zio patch.   Working from home which is not her preference. Child came out as transgender, Linda>> Tyler. Wife has gastric sleeve surgery in March.   Taking HRT for hot flushes, really helpful. Only 1-2 hot flushes per week, not even full hot flushes.   GYNECOLOGIC HISTORY:  Menarche: 14   She is sexually active with 1female partner(s) and she is currently in monogamous relationship .    History sexually transmitted infections:No STD history  STI testing offered?  Declined  Estrogen replacement therapy: Yes -  Oral  RONALD exposure: No    History of abnormal Pap smear: NO - age 30- 65 PAP every 3 years recommended  Family history of breast CA: yes - aunt  Family history of uterine/ovarian CA: Yes (Please explain): PGMO  Family history of colon CA: No    HEALTH MAINTENANCE:  Cholesterol: (No components found for: CHOL2 ) History of abnormal lipids: No  Mammo: 2020 . History of abnormal Mammo: No  Regular Self Breast Exams: Yes  Colonoscopy: 19 History of abnormal Colonoscopy: No  Dexa: na History of abnormal Dexa: No  Calcium/Vitamin D  intake: source:  dairy, dietary supplement(s) Adequate? Yes  TSH: (No components found for: TSH1 )  Pap; (  Lab Results   Component Value Date    PAP NIL 2020    PAP OTHER-NIL, See Result 2015    PAP NIL 2012    )    HISTORY:  OB History    Para Term  AB Living   0 0 0 0 0 0   SAB TAB Ectopic Multiple Live Births   0 0 0 0 0     Past Medical History:   Diagnosis Date     Disorders of bursae and tendons in shoulder region, unspecified      Female infertility of unspecified origin      Gallbladder sludge 2013     Mild sleep apnea 2014     Obesity, unspecified      Oral aphthae      Other acne      Other specified hypoglycemia      PONV (postoperative nausea and vomiting)      Past Surgical History:   Procedure Laterality Date     HC TOOTH EXTRACTION W/FORCEP      [place] [facility]     IR ENDOVENOUS ABLATION VARICOSE VEINS  2019     LAPAROSCOPIC CHOLECYSTECTOMY  2013    Procedure: LAPAROSCOPIC CHOLECYSTECTOMY;  Laparoscopic Cholecystectomy;  Surgeon: Fabio Johnson MD;  Location: [facility]     LAPAROSCOPIC OOPHORECTOMY Left 2016    Procedure: LAPAROSCOPIC OOPHORECTOMY;  Surgeon: Kayley Donnelly MD;  Location: [facility]     LAPAROSCOPIC SALPINGECTOMY Bilateral 2016    Procedure: LAPAROSCOPIC SALPINGECTOMY;  Surgeon: Kayley Donnelly MD;  Location: [facility] OR     Family History   Problem Relation Age of Onset     Cardiovascular Paternal Grandfather         anyerism     Obesity Paternal Grandfather      Respiratory Paternal Grandfather         pulmonary (air sacs hardening)     Allergies Father         hayfever     Lipids Father         diet therapy     Gallbladder Disease Father         cholecystectomy      Eye Disorder Father         retinal tear x 2      Prostate Cancer Father      Alzheimer Disease Maternal Grandmother         ? poor STM     Arthritis Maternal Grandmother      Heart Disease Maternal Grandmother         Arrythmia-got a pacemaker  at age 89     Arthritis Mother      Eye Disorder Mother         Cataract/macular tear in one eye     LUNG DISEASE Mother         bronchiectasis from pneumonia     Atrial fibrillation Mother      Cancer Paternal Grandmother         Uterine CA- as well as her sister     Gynecology Paternal Grandmother         prolapsed uterus- had 10 kids     Obesity Paternal Grandmother      Breast Cancer Maternal Aunt      Other Cancer Paternal Aunt 80        endo. cancer      Social History     Socioeconomic History     Marital status:      Spouse name: Danielle Zamarripa     Number of children: 1     Years of education: Masters     Highest education level: None   Occupational History     Occupation: Psychotherapist     Employer: [employer]   Tobacco Use     Smoking status: Never Smoker     Smokeless tobacco: Never Used     Tobacco comment: non smoke home   Substance and Sexual Activity     Alcohol use: Yes     Comment: 2 Drinks Per Month     Drug use: No     Sexual activity: Yes     Partners: Female     Birth control/protection: None   Other Topics Concern     Parent/sibling w/ CABG, MI or angioplasty before 65F 55M? No   Social History Narrative    8/18/09    Balanced Diet - Yes    Osteoporosis Prevention Measures - Dairy servings per day: 2 and Medication/Supplements (See current meds)    Regular Exercise -  Yes Describe Walking, swimming, biking.     Dental Exam - YES - Date: 5/2009    Eye Exam - YES - Date: 8/2009    Self Breast Exam - No    Abuse: Current or Past (Physical, Sexual or Emotional)- No    Do you feel safe in your environment - Yes    Guns stored in the home - No    Sunscreen used - Yes    Seatbelts used - Yes    Lipids -  YES - Date: 8/10/09    Glucose -  YES - Date: 8/10/09    Colon Cancer Screening - No    Hemoccults - NO    Pap Test -  YES - Date: 7/6/06, no Hx of abnormal paps.    Do you have any concerns about STD's -  No    Mammography - YES - Date: 8/5/09    DEXA - NO     Immunizations reviewed and up to date - Yes, Tdap given 1/22/08    Amara Hall MA                         Social Determinants of Health     Financial Resource Strain:      Difficulty of Paying Living Expenses:    Food Insecurity:      Worried About Running Out of Food in the Last Year:      Ran Out of Food in the Last Year:    Transportation Needs:      Lack of Transportation (Medical):      Lack of Transportation (Non-Medical):    Physical Activity:      Days of Exercise per Week:      Minutes of Exercise per Session:    Stress:      Feeling of Stress :    Social Connections:      Frequency of Communication with Friends and Family:      Frequency of Social Gatherings with Friends and Family:      Attends [employer] Services:      Active Member of Clubs or Organizations:      Attends Club or Organization Meetings:      Marital Status:    Intimate Partner Violence: Not At Risk     Fear of Current or Ex-Partner: No     Emotionally Abused: No     Physically Abused: No     Sexually Abused: No       Current Outpatient Medications:      Carboxymethylcellulose Sod PF (REFRESH PLUS) 0.5 % SOLN ophthalmic solution, Place 1 drop into both eyes 3 times daily as needed for dry eyes, Disp: , Rfl:      [START ON 10/11/2021] estradiol (VAGIFEM) 10 MCG TABS vaginal tablet, Place 1 tablet (10 mcg) vaginally twice a week, Disp: 25 tablet, Rfl: 3     estrogen conj-medroxyPROGESTERone (PREMPRO) 0.625-2.5 MG tablet, Take 1 tablet by mouth daily, Disp: 90 tablet, Rfl: 3     lidocaine (LIDODERM) 5 % patch, Place 1-2 patches onto the skin every 24 hours To prevent lidocaine toxicity, patient should be patch free for 12 hrs daily., Disp: 30 patch, Rfl: 1     lisinopril (ZESTRIL) 10 MG tablet, Take 1 tablet (10 mg) by mouth daily, Disp: 90 tablet, Rfl: 3     lithium 300 MG capsule, Take 4 capsules (1,200 mg) by mouth At Bedtime, Disp: 120 capsule, Rfl: 1     Loratadine 10 MG capsule, Take 10 mg by mouth daily as needed., Disp: , Rfl:       metroNIDAZOLE (METROGEL) 1 % external gel, Apply topically daily, Disp: 60 g, Rfl: 9     ORDER FOR DME, Use your CPAP device as directed by your provider., Disp: , Rfl:      VITAMIN D, CHOLECALCIFEROL, PO, Take 2,000 Units by mouth daily, Disp: , Rfl:      Allergies   Allergen Reactions     Seroquel [Quetiapine] Nausea and Vomiting     Had constant vomiting     Olanzapine      Tongue welling, bad EPSE (Parkinsonian)     Penicillins Rash     Risperidone      Tongue welling, bad EPSE (Parkinsonian)       Thimerosal Other (See Comments)     blisters on inside of eyelid       Past medical, surgical, social and family history were reviewed and updated in EPIC.    ROS:   C:       NEGATIVE for fever, chills, change in weight  I:         NEGATIVE for worrisome rashes, moles or lesions  E:       NEGATIVE for vision changes or irritation  E/M:   NEGATIVE for ear, mouth and throat problems  R:       NEGATIVE for significant cough or SOB  CV:     NEGATIVE for chest pain, palpitations or peripheral edema  GI:      NEGATIVE for nausea, abdominal pain, heartburn, or change in bowel habits  :    NEGATIVE for frequency, dysuria, hematuria, vaginal discharge, or bleeding  M:       NEGATIVE for significant arthralgias or myalgia  N:       NEGATIVE for weakness, dizziness or paresthesias  E:       NEGATIVE for temperature intolerance, skin/hair changes  P:       NEGATIVE for changes in mood or affect    EXAM:  /66   Pulse 72   Temp 97.8  F (36.6  C)   Wt 139.7 kg (308 lb)   BMI 46.83 kg/m     BMI: Body mass index is 46.83 kg/m .  Constitutional: healthy, alert and no distress  Head: Normocephalic. No masses, lesions, tenderness or abnormalities  Neck: Neck supple. Trachea midline. No adenopathy. Thyroid symmetric, normal size.   Cardiovascular: RRR.   Respiratory: clear bilaterally.   Breast: No nodularity, asymmetry or nipple discharge bilaterally.  Gastrointestinal: Abdomen soft, non-tender, non-distended. No masses,  organomegaly  :  Vulva:  No external lesions, normal female hair distribution, no inguinal adenopathy.    Urethra:  Midline, non-tender, well supported, no discharge  Vagina:  Atrophic, no abnormal discharge, no lesions  Uterus:  Normal size, non-tender, freely mobile  Ovaries:  No masses appreciated, non-tender, mobile exam limited by body habitus  Rectal Exam: deferred  Musculoskeletal: extremities normal  Skin: no suspicious lesions or rashes  Psychiatric: Affect appropriate, cooperative,mentation appears normal.     COUNSELING:   Reviewed preventive health counseling, as reflected in patient instructions       Regular exercise       Healthy diet/nutrition       Colon cancer screening       (Breonna)menopause management   reports that she has never smoked. She has never used smokeless tobacco.    Body mass index is 46.83 kg/m .  Weight management plan: Discussed healthy diet and exercise guidelines    FRAX Risk Assessment  ASSESSMENT:  56 year old  with satisfactory annual exam    1. Encounter for gynecological examination without abnormal finding      2. Perimenopausal vasomotor symptoms  Discussed risks of HRT including thromboembolic disease and breast cancer. Discussed importance of progesterone component to protect uterine lining. Discussed recommendation of using lowest dose for shortest time needed to relieve symptoms. Plan to re-evaluate yearly.   This is not a good time to make a change (taper). Will wait until psych meds are stable. Maybe spring or summer.   - estrogen conj-medroxyPROGESTERone (PREMPRO) 0.625-2.5 MG tablet; Take 1 tablet by mouth daily  Dispense: 90 tablet; Refill: 3    3. Encounter for screening mammogram for breast cancer    - *MA Screening Digital Bilateral; Future    4. Vaginal dryness, menopausal    - estradiol (VAGIFEM) 10 MCG TABS vaginal tablet; Place 1 tablet (10 mcg) vaginally twice a week  Dispense: 25 tablet; Refill: 3      Romy Min MD

## 2021-10-08 NOTE — PROGRESS NOTES
[facility]  Psychiatry Clinic  PSYCHIATRY PROGRESS NOTE     CARE TEAM:  PCP- Magali Lira, Psychotherapist- [facility] with Verna Chan  Specialty Providers- OB/GYN, ENT, PT, Nutrition      Wife- Rachael Haji is a 56 year old who prefers the name Angy and uses pronouns she, her, hers.      DIAGNOSIS     Bipolar 1 disorder, current depressive episode, moderate   H/o EPSE secondary to neuroleptic use  H/o Thrombocytopenia secondary to Depakote  H/o lithium toxicity, resolved     ASSESSMENT   Today, Angy reports low mood, irritability, anhedonia, and excessive worry. She hasn't noticed much benefit with the Lithium increase. We spent some time talking about her recent ziopatch readings. Patient had a ziopatch because she was experiencing palpitations and fatigue. This had been goning on for months before she told her provider about it. Ziopatch showed paroxysmal supraventricular tachycardia. She has an upcoming appointment with cardiology on 10/25 and preferred to reduce Lithium back to 1050 mg. We talked about articles that showed association of Lithium with ventricular tachyarrhythmias but not commonly pSVTs. One study showed that chronic Lithium toxicity was associated with pSVTS. Current serum level of Lithium is 0.8. We will reduce Lithium to 1050mg today and consider adding Lamictal or another mood stabilizer to target patient's mood and symptoms.SI, HI, or acute concerns.    Also reached out to cardiologist to see if they had other recommendations before patient sees them on 10/25.  They also thought reducing dose to 1050 mg was a good plan.    [place] review was not needed today. Patient is not taking any controlled substances.     PLAN                                                                                                                1) Meds-  - Decrease Lithium to 1050mg at bedtime    2) Psychotherapy-Continue individual therapy    3) Next  "due-  Labs- PRN  EKG- PRN  Rating scales- N/A    4) Referrals-  None    5) Dispo- RTC in 1 week       PERTINENT BACKGROUND                           [most recent eval 08/22/21]    Patient has not had significant mental health history until 2018, when she developed perimenopausal mood and anxiety symptoms.  She was subsequently referred to our clinic following a hospitalization for anthony in November 2018.  Her care has been complicated by sensitivity to antipsychotics (EPSE including difficulty swallowing), thrombocytopenia secondary to Depakote, lithium toxicity in setting of concurrent Depakote use).    Psych pertinent item history includes Manic episode(h/o one manic episode Nov/Dec 2018), psychiatric hospitalization in November 2018.     SUBJECTIVE   Patient presented for a follow up.    Since last visit:  Had pSVTs show up on a ziopatch. Angy reports that she has been experiencing racing heart beats for months now and thinks it might be ongoing before the Lithium increase.    -Reports that she's still feeling \"blah\" and not really enjoying things. Though she states that she's crying less and has been enjoying theatre more than before. She reports that her wife has told her that she has a flat affect and still not back to her baseline  -Reports that she's still feeling excessive worry, irritable mood, and fatigue. Expresses feeling really tired after work and needs to push herself to \"keep going\".     RECENT PSYCH ROS:   Depression:  depressed mood,irritability, anhedonia  Elevated:  none  Psychosis:  none  Anxiety:  excessive worry, feeling fearful and ruminations  Trauma Related:  none  Sleep: Interrupted by getting up at night to urinate or takes longer to fall asleep on days she's feeling distressed  Other: N/A    Adverse Effects: Excessive tiredness from Lithium   Pertinent Negative Symptoms: No suicidal ideation, self-injurious behavior/urges or violent ideation  Recent Substance Use:   Alcohol- 1 drink " (beer or wine) per week     FAMILY and SOCIAL HISTORY                                 pt reported     Family Hx: Family history positive for anxiety disorders    Social Hx:  Financial/ Work-  Restarted at 0.6 time in March, then back to 0.8 in 2019, 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   to wife Freddy, have a teenage son..     PSYCH and SUBSTANCE USE Critical Summary Points since July 2021 08/26/2021: Resident transfer visit. Increased Lithium to 1200mg.  10/15/2021: decrease Lithium back to 1050mg     PAST MED TRIALS      Medication Max Dose (mg) Dates / Duration Helpful? DC Reason / Adverse Effects?   Sertraline       Depakote    Tremors   Olanzapine    EPSE   Seroquel    EPSE   Lithium 1050 current          MEDICAL HISTORY and ALLERGY     ALLERGIES: Seroquel [quetiapine], Olanzapine, Penicillins, Risperidone, and Thimerosal    Patient Active Problem List   Diagnosis     Oral aphthae     CARDIOVASCULAR SCREENING; LDL GOAL LESS THAN 160     Rosacea     Raynaud disease     Vitamin D deficiency disease     Mild sleep apnea     Atopic rhinitis     Chronic bilateral low back pain without sciatica     Obesity, Class III, BMI 40-49.9 (morbid obesity) (H)     Muscle tension dysphonia     Palpitations     Dysphagia, unspecified type     Anxiety     Benign essential hypertension     Callus of foot     Varicose veins of right lower extremity with edema     Bipolar I disorder (H)     Dribbling of urine     Hip pain, right     It band syndrome, right     Right-sided low back pain with right-sided sciatica        MEDICAL REVIEW OF  SYSTEMS   Contraception- Not assessed     MEDICATIONS     Current Outpatient Medications   Medication Sig Dispense Refill     Carboxymethylcellulose Sod PF (REFRESH PLUS) 0.5 % SOLN ophthalmic solution Place 1 drop into both eyes 3 times daily as needed for dry eyes       [START ON 10/11/2021] estradiol (VAGIFEM) 10 MCG TABS vaginal tablet Place 1 tablet (10 mcg) vaginally twice a week 25 tablet 3     estrogen conj-medroxyPROGESTERone (PREMPRO) 0.625-2.5 MG tablet Take 1 tablet by mouth daily 90 tablet 3     lidocaine (LIDODERM) 5 % patch Place 1-2 patches onto the skin every 24 hours To prevent lidocaine toxicity, patient should be patch free for 12 hrs daily. 30 patch 1     lisinopril (ZESTRIL) 10 MG tablet Take 1 tablet (10 mg) by mouth daily 90 tablet 3     lithium 300 MG capsule Take 4 capsules (1,200 mg) by mouth At Bedtime 120 capsule 1     Loratadine 10 MG capsule Take 10 mg by mouth daily as needed.       metroNIDAZOLE (METROGEL) 1 % external gel Apply topically daily 60 g 9     ORDER FOR DME Use your CPAP device as directed by your provider.       VITAMIN D, CHOLECALCIFEROL, PO Take 2,000 Units by mouth daily        VITALS   There were no vitals taken for this visit.    MENTAL STATUS EXAM     Alertness: alert   Appearance: well groomed  Behavior/Demeanor: cooperative, with good  eye contact   Speech: regular rate and rhythm  Language: intact  Psychomotor: normal or unremarkable  Mood: depressed  Affect: restricted; congruent to: mood- yes, content- yes  Thought Process/Associations: linear and logical  Thought Content:  Reports none;  Denies suicidal ideation and violent ideation  Perception:  Reports none;  Denies auditory hallucinations and visual hallucinations  Insight: good  Judgment: good  Cognition: does  appear grossly intact; formal cognitive testing was not done  Gait and Station: unremarkable     LABS and DATA     PHQ9 TODAY = 10  PHQ 6/14/2021 8/27/2021 10/8/2021   PHQ-9 Total Score 6 12 9    Q9: Thoughts of better off dead/self-harm past 2 weeks Not at all Not at all Not at all         Recent Labs   Lab Test 02/03/21  1019 07/15/20  1617 10/15/19  1037   AST 13 12 13   ALT 17 18 18   ALKPHOS 88 84 61     Recent Labs   Lab Test 09/03/21  1007 05/05/21  0853 02/03/21  1019   LITHIUM 0.8 0.78 0.67     Recent Labs   Lab Test 09/03/21  1007 05/05/21  0853   CR 0.61 0.56   GFRESTIMATED >90 >90    138   POTASSIUM 4.3 4.3   SHERRIE 9.4 9.6     Recent Labs   Lab Test 02/03/21  1011 07/15/20  1633   SG 1.011 1.008     Recent Labs   Lab Test 09/03/21  1007 05/05/21  0853   TSH 2.31 2.61     Recent Labs   Lab Test 09/03/21  1007 02/03/21  1019 07/15/20  1617 07/15/20  1617   WBC 6.4 7.7   < > 8.3   ANEU  --  5.1  --  5.6    < > = values in this interval not displayed.       ECG 08/27/2021 QTc 437ms     PSYCHOTROPIC DRUG INTERACTIONS     Concurrent use of LITHIUM and ACE INHIBITORS may result in increased risk of lithium toxicity.    MANAGEMENT:  Monitoring for adverse effects, routine labs, periodic EKGs and patient is aware of risks     RISK STATEMENT for SAFETY     Angy Haji did not appear to be an imminent safety risk to self or others.    TREATMENT RISK STATEMENT: The risks, benefits, alternatives and potential adverse effects have been discussed and are understood by the pt. The pt understands the risks of using street drugs or alcohol. There are no medical contraindications, the pt agrees to treatment with the ability to do so. The pt knows to call the clinic for any problems or to access emergency care if needed.  Medical and substance use concerns are documented above.  Psychotropic drug interaction check was done, including changes made today.     PROVIDER: Tolulope Oluwadamilola Odebunmi, MD    Patient not staffed in clinic.  Note will be reviewed and signed by supervisor Dr. Liu.      Attestation:  I did not see Angy Haji directly. I have reviewed the documentation and I agree with the  resident's plan of care.   Chai Liu MD

## 2021-10-09 ASSESSMENT — ANXIETY QUESTIONNAIRES: GAD7 TOTAL SCORE: 13

## 2021-10-15 ENCOUNTER — OFFICE VISIT (OUTPATIENT)
Dept: PSYCHIATRY | Facility: CLINIC | Age: 56
End: 2021-10-15
Attending: PSYCHIATRY & NEUROLOGY
Payer: COMMERCIAL

## 2021-10-15 DIAGNOSIS — F31.9 BIPOLAR I DISORDER (H): ICD-10-CM

## 2021-10-15 PROCEDURE — 99214 OFFICE O/P EST MOD 30 MIN: CPT | Mod: HN | Performed by: STUDENT IN AN ORGANIZED HEALTH CARE EDUCATION/TRAINING PROGRAM

## 2021-10-15 RX ORDER — LITHIUM CARBONATE 300 MG/1
900 CAPSULE ORAL AT BEDTIME
Qty: 90 CAPSULE | Refills: 0 | Status: SHIPPED | OUTPATIENT
Start: 2021-10-15 | End: 2021-11-02

## 2021-10-15 RX ORDER — LITHIUM CARBONATE 150 MG/1
150 CAPSULE ORAL AT BEDTIME
Qty: 30 CAPSULE | Refills: 0 | Status: SHIPPED | OUTPATIENT
Start: 2021-10-15 | End: 2021-11-02

## 2021-10-15 NOTE — PATIENT INSTRUCTIONS
Treatment Plan Today:     1) Medications-  Decrease Lithium to 1050 mg at bedtime    2) Follow-up appt with Dr Sims on 10/26 for 1 hour    3) Crisis numbers are below and clinic after hours number is [phone]      ------------------------------------------------------------------------    Thank you for coming to the [facility]    Lab Testing:  If you had lab testing today and your results are reassuring or normal they will be mailed to you or sent through [employer] within 7 days. If the lab tests need quick action we will call you with the results. The phone number we will call with results is # [phone] (home) [phone] (work). If this is not the best number please call our clinic and change the number.    Medication Refills:  If you need any refills please call your pharmacy and they will contact us. Our fax number for refills is [phone]. Please allow three business for refill processing. If you need to  your refill at a new pharmacy, please contact the new pharmacy directly. The new pharmacy will help you get your medications transferred.     Scheduling:  If you have any concerns about today's visit or wish to schedule another appointment please call our office during normal business hours [phone] (8-5:00 M-F)    Contact Us:  Please call [phone] during business hours (8-5:00 M-F).  If after clinic hours, or on the weekend, please call  [phone].    Financial Assistance [phone]  [facility] Billing [phone]  Central Billing Office, [employer]: [phone]  [facility] Billing [phone]  Medical Records [phone]  [facility] Patient Bill of Rights [email or web address]       MENTAL HEALTH CRISIS NUMBERS:  For a medical emergency please call  911 or go to the nearest ER.     [facility] ER  [phone]    [facility]:   [facility] -[phone]    Crisis Residence [facility] -[phone]   Walk-In Counseling Center [facility] -[phone]   COPE 24/7 [facility] Mobile Team -[phone] (adults)/[phone] (child)  CHILD: [facility] needs assessment team - [phone]      [facility]:   [facility] - [phone]   Walk-in counseling [facility] - [phone]   Walk-in counseling [facility] - [phone]   Crisis Residence [facility] - [phone]  Urgent Care Adult Mental [facility]-[phone] mobile unit/ 24/7 crisis line    National Crisis Numbers:   National Suicide Prevention Lifeline: 6-684-232-TALK (102-839-9322)  Poison Control Center - 1-546.305.5134  [employer]/resources for a list of additional resources (SOS)  Trans Lifeline a hotline for transgender people 1-831.555.4560  The [employer] a hotline for LGBT youth 7-115-404-7978  Crisis Text Line: For any crisis 24/7   To: 919540  see www.crisistextline.org  - IF MAKING A CALL FEELS TOO HARD, send a text!       Again thank you for choosing [facility] and please let us know how we can best partner with you to improve you and your family's health.    You may be receiving a survey regarding this appointment. We would love to have your feedback, both positive and negative. The survey is done by an external company, so your answers are anonymous.

## 2021-10-22 ENCOUNTER — OFFICE VISIT (OUTPATIENT)
Dept: ORTHOPEDICS | Facility: CLINIC | Age: 56
End: 2021-10-22
Payer: COMMERCIAL

## 2021-10-22 VITALS — BODY MASS INDEX: 44.41 KG/M2 | WEIGHT: 293 LBS | HEIGHT: 68 IN

## 2021-10-22 DIAGNOSIS — M54.50 LUMBAR PAIN WITH RADIATION DOWN RIGHT LEG: Primary | ICD-10-CM

## 2021-10-22 DIAGNOSIS — M47.816 FACET ARTHROPATHY, LUMBAR: ICD-10-CM

## 2021-10-22 DIAGNOSIS — M48.061 SPINAL STENOSIS AT L4-L5 LEVEL: ICD-10-CM

## 2021-10-22 DIAGNOSIS — M79.604 LUMBAR PAIN WITH RADIATION DOWN RIGHT LEG: Primary | ICD-10-CM

## 2021-10-22 PROCEDURE — 99213 OFFICE O/P EST LOW 20 MIN: CPT | Performed by: FAMILY MEDICINE

## 2021-10-22 RX ORDER — CYCLOBENZAPRINE HCL 10 MG
10 TABLET ORAL
Qty: 10 TABLET | Refills: 0 | Status: SHIPPED | OUTPATIENT
Start: 2021-10-22 | End: 2022-10-04

## 2021-10-22 ASSESSMENT — ENCOUNTER SYMPTOMS
HEMATURIA: 0
JOINT SWELLING: 0
DIFFICULTY URINATING: 0
FLANK PAIN: 0
EXERCISE INTOLERANCE: 1
DIZZINESS: 0
ORTHOPNEA: 0
LOSS OF CONSCIOUSNESS: 0
SPEECH CHANGE: 0
INSOMNIA: 1
HYPOTENSION: 0
ARTHRALGIAS: 0
PANIC: 0
MEMORY LOSS: 0
STIFFNESS: 0
NECK PAIN: 0
MUSCLE CRAMPS: 1
PALPITATIONS: 1
DEPRESSION: 1
NUMBNESS: 1
LIGHT-HEADEDNESS: 0
TREMORS: 0
SYNCOPE: 0
TINGLING: 0
SEIZURES: 0
WEAKNESS: 0
MUSCLE WEAKNESS: 1
HYPERTENSION: 1
HEADACHES: 0
MYALGIAS: 0
DECREASED CONCENTRATION: 0
BACK PAIN: 1
LEG PAIN: 0
NERVOUS/ANXIOUS: 1
DYSURIA: 0
PARALYSIS: 0
DISTURBANCES IN COORDINATION: 0
SLEEP DISTURBANCES DUE TO BREATHING: 0

## 2021-10-22 ASSESSMENT — MIFFLIN-ST. JEOR: SCORE: 2045.58

## 2021-10-22 NOTE — PROGRESS NOTES
Subjective:   Angy Haji is a 56 year old female who presents in clinic for follow up after lumbar MRI. She reports she is having more radicular pain and numbness into her right leg. She also describes feeling weakness of right leg. She is having cramps at night of right foot.   Fluttering of her heart, long assessment.  zioPatch and seeing Cardiology  Some hiking and back was set off a bit.  Pain down right leg and toe numbness  LBp- is ok, not so bad  Weakness with going upstairs, usually worse with starting to walk, less stairs to do  Parking ramp, old house 2 story, single level, up and downstairs once a day  Tylenol only, on lithium    Foot cramping on right side   PT appt with Pool  Insurance and pool people, [facility] doesn't have pools  Nov 9 to start pool therapy  Muscle relaxers help knock her out to sleep    Date of injury: Gradual Onset  Date last seen: 8/27/2021  Following Therapeutic Plan: Yes, she will attending pool therapy in November 2021 in [place].   Pain: Unchanged  Function: Unchanged  Interval History: Change in Lithium recently due to possible cardiac issue, will be evaluated for this next week.     PAST MEDICAL, SOCIAL, SURGICAL AND FAMILY HISTORY: She  has a past medical history of Disorders of bursae and tendons in shoulder region, unspecified, Female infertility of unspecified origin, Gallbladder sludge (5/2013), Mild sleep apnea (5/13/2014), Obesity, unspecified, Oral aphthae, Other acne, Other specified hypoglycemia, and PONV (postoperative nausea and vomiting). She also has no past medical history of Chronic infection.  She  has a past surgical history that includes TOOTH EXTRACTION W/FORCEP (1984/85); Laparoscopic cholecystectomy (7/29/2013); Laparoscopic oophorectomy (Left, 6/23/2016); Laparoscopic salpingectomy (Bilateral, 6/23/2016); and IR Endovenous Ablation Varicose Veins (12/12/2019).  Her family history includes Allergies in her father; Alzheimer Disease in her maternal  "grandmother; Arthritis in her maternal grandmother and mother; Atrial fibrillation in her mother; Breast Cancer in her maternal aunt; Cancer in her paternal grandmother; Cardiovascular in her paternal grandfather; Eye Disorder in her father and mother; Gallbladder Disease in her father; Gynecology in her paternal grandmother; Heart Disease in her maternal grandmother; LUNG DISEASE in her mother; Lipids in her father; Obesity in her paternal grandfather and paternal grandmother; Other Cancer (age of onset: 80) in her paternal aunt; Prostate Cancer in her father; Respiratory in her paternal grandfather.  She reports that she has never smoked. She has never used smokeless tobacco. She reports current alcohol use. She reports that she does not use drugs.    ALLERGIES: She is allergic to seroquel [quetiapine], olanzapine, penicillins, risperidone, and thimerosal.    CURRENT MEDICATIONS: She has a current medication list which includes the following prescription(s): carboxymethylcellulose pf, estradiol, estrogen conj-medroxyprogesterone, lidocaine, lisinopril, lithium, lithium, loratadine, metronidazole, order for dme, and cholecalciferol.     REVIEW OF SYSTEMS: 10 point review of systems is negative except as noted above.     Exam:   Ht 1.727 m (5' 8\")   Wt 139.7 kg (308 lb)   BMI 46.83 kg/m             CONSTITUTIONAL: alert, moderate distress, cooperative and obese  HEAD: Normocephalic. No masses, lesions, tenderness or abnormalities  SKIN: no suspicious lesions or rashes  GAIT: normal  NEUROLOGIC: Non-focal  PSYCHIATRIC: affect normal/bright and mentation appears normal.    MUSCULOSKELETAL: LBP  Tender:  Right leg, weakness, decreased patella reflex and Achilles reflex  Non-tender:  thoracic spinous processes, left parathoracic muscles, right parathoracic muscles, lumbar spinous processes  Range of Motion:  lumbar flexion  decreased, lumbar extension  decreased  Strength:  able to heel walk, unable to toe " walk  Special tests:  positive right straight leg raise    Hip Exam: Hip ROM full         Assessment/Plan:   Pt is a 57 yo obese female with PMhx of bipolar 1, vit D def, right hip pain presenting to f/u on MRI results  1. Lumbar spinal stenosis-  L4-5  Facet arthropathy  Discussed the possibility of injections to reduce inflammation  Pt just able to schedule Pool therapy and would like to pursue this conservative approach prior to injections  Patient asked to watch for foot drop or continued weakness into the leg or issues with bladder or bowels  Any red flags should lead to the patient calling the clinic back    Patient will have the ability to MyChart us as well if not improving  Patient would also like to talk to his boss about possible treatment options    RTC 6 weeks  X-RAY INTERPRETATION:   MRI lumbar  Impression:  Anterolisthesis with degenerative facet arthropathy at L4-5 with  resultant mild to moderate spinal canal and bilateral mild neural  foraminal narrowing.

## 2021-10-22 NOTE — LETTER
10/22/2021      RE: Angy Haji  [address]  [place] [address]        Subjective:   Angy Haji is a 56 year old female who presents in clinic for follow up after lumbar MRI. She reports she is having more radicular pain and numbness into her right leg. She also describes feeling weakness of right leg. She is having cramps at night of right foot.   Fluttering of her heart, long assessment.  zioPatch and seeing Cardiology  Some hiking and back was set off a bit.  Pain down right leg and toe numbness  LBp- is ok, not so bad  Weakness with going upstairs, usually worse with starting to walk, less stairs to do  Parking ramp, old house 2 story, single level, up and downstairs once a day  Tylenol only, on lithium    Foot cramping on right side   PT appt with Pool  Insurance and pool people, [facility]'t have pools  Nov 9 to start pool therapy  Muscle relaxers help knock her out to sleep    Date of injury: Gradual Onset  Date last seen: 8/27/2021  Following Therapeutic Plan: Yes, she will attending pool therapy in November 2021 in [place].   Pain: Unchanged  Function: Unchanged  Interval History: Change in Lithium recently due to possible cardiac issue, will be evaluated for this next week.     PAST MEDICAL, SOCIAL, SURGICAL AND FAMILY HISTORY: She  has a past medical history of Disorders of bursae and tendons in shoulder region, unspecified, Female infertility of unspecified origin, Gallbladder sludge (5/2013), Mild sleep apnea (5/13/2014), Obesity, unspecified, Oral aphthae, Other acne, Other specified hypoglycemia, and PONV (postoperative nausea and vomiting). She also has no past medical history of Chronic infection.  She  has a past surgical history that includes TOOTH EXTRACTION W/FORCEP (1984/85); Laparoscopic cholecystectomy (7/29/2013); Laparoscopic oophorectomy (Left, 6/23/2016); Laparoscopic salpingectomy (Bilateral, 6/23/2016); and IR Endovenous Ablation Varicose Veins (12/12/2019).  Her family  "history includes Allergies in her father; Alzheimer Disease in her maternal grandmother; Arthritis in her maternal grandmother and mother; Atrial fibrillation in her mother; Breast Cancer in her maternal aunt; Cancer in her paternal grandmother; Cardiovascular in her paternal grandfather; Eye Disorder in her father and mother; Gallbladder Disease in her father; Gynecology in her paternal grandmother; Heart Disease in her maternal grandmother; LUNG DISEASE in her mother; Lipids in her father; Obesity in her paternal grandfather and paternal grandmother; Other Cancer (age of onset: 80) in her paternal aunt; Prostate Cancer in her father; Respiratory in her paternal grandfather.  She reports that she has never smoked. She has never used smokeless tobacco. She reports current alcohol use. She reports that she does not use drugs.    ALLERGIES: She is allergic to seroquel [quetiapine], olanzapine, penicillins, risperidone, and thimerosal.    CURRENT MEDICATIONS: She has a current medication list which includes the following prescription(s): carboxymethylcellulose pf, estradiol, estrogen conj-medroxyprogesterone, lidocaine, lisinopril, lithium, lithium, loratadine, metronidazole, order for dme, and cholecalciferol.     REVIEW OF SYSTEMS: 10 point review of systems is negative except as noted above.     Exam:   Ht 1.727 m (5' 8\")   Wt 139.7 kg (308 lb)   BMI 46.83 kg/m             CONSTITUTIONAL: alert, moderate distress, cooperative and obese  HEAD: Normocephalic. No masses, lesions, tenderness or abnormalities  SKIN: no suspicious lesions or rashes  GAIT: normal  NEUROLOGIC: Non-focal  PSYCHIATRIC: affect normal/bright and mentation appears normal.    MUSCULOSKELETAL: LBP  Tender:  Right leg, weakness, decreased patella reflex and Achilles reflex  Non-tender:  thoracic spinous processes, left parathoracic muscles, right parathoracic muscles, lumbar spinous processes  Range of Motion:  lumbar flexion  decreased, lumbar " extension  decreased  Strength:  able to heel walk, unable to toe walk  Special tests:  positive right straight leg raise    Hip Exam: Hip ROM full         Assessment/Plan:   Pt is a 57 yo obese female with PMhx of bipolar 1, vit D def, right hip pain presenting to f/u on MRI results  1. Lumbar spinal stenosis-  L4-5  Facet arthropathy  Discussed the possibility of injections to reduce inflammation  Pt just able to schedule Pool therapy and would like to pursue this conservative approach prior to injections  Patient asked to watch for foot drop or continued weakness into the leg or issues with bladder or bowels  Any red flags should lead to the patient calling the clinic back    Patient will have the ability to MyChart us as well if not improving  Patient would also like to talk to his boss about possible treatment options    RTC 6 weeks  X-RAY INTERPRETATION:   MRI lumbar  Impression:  Anterolisthesis with degenerative facet arthropathy at L4-5 with  resultant mild to moderate spinal canal and bilateral mild neural  foraminal narrowing.      Danielle Valadez MD

## 2021-10-24 PROBLEM — M54.41 RIGHT-SIDED LOW BACK PAIN WITH RIGHT-SIDED SCIATICA: Status: RESOLVED | Noted: 2021-06-09 | Resolved: 2021-09-01

## 2021-10-25 ENCOUNTER — OFFICE VISIT (OUTPATIENT)
Dept: CARDIOLOGY | Facility: CLINIC | Age: 56
End: 2021-10-25
Attending: INTERNAL MEDICINE
Payer: COMMERCIAL

## 2021-10-25 VITALS
HEIGHT: 68 IN | DIASTOLIC BLOOD PRESSURE: 69 MMHG | HEART RATE: 70 BPM | BODY MASS INDEX: 44.41 KG/M2 | OXYGEN SATURATION: 99 % | SYSTOLIC BLOOD PRESSURE: 108 MMHG | WEIGHT: 293 LBS

## 2021-10-25 DIAGNOSIS — I20.89 EXERTIONAL ANGINA (H): ICD-10-CM

## 2021-10-25 DIAGNOSIS — I47.10 PAROXYSMAL SUPRAVENTRICULAR TACHYCARDIA (H): Primary | ICD-10-CM

## 2021-10-25 DIAGNOSIS — R06.09 DYSPNEA ON EXERTION: ICD-10-CM

## 2021-10-25 PROCEDURE — 93005 ELECTROCARDIOGRAM TRACING: CPT

## 2021-10-25 PROCEDURE — G0463 HOSPITAL OUTPT CLINIC VISIT: HCPCS | Mod: 25

## 2021-10-25 PROCEDURE — 99204 OFFICE O/P NEW MOD 45 MIN: CPT | Performed by: INTERNAL MEDICINE

## 2021-10-25 ASSESSMENT — MIFFLIN-ST. JEOR: SCORE: 2026.01

## 2021-10-25 ASSESSMENT — PAIN SCALES - GENERAL: PAINLEVEL: NO PAIN (0)

## 2021-10-25 NOTE — NURSING NOTE
Medication Reconciliation:  Rooming staff reviewed and verified all current medications with patient. An updated med list was included in the AVS.    Test Results Reviewed:  EKG results were reviewed by provider with patient today.     Cardiac Monitor:   The monitor was placed on the patient by rooming staff today. Patient was instructed by rooming staff regarding the function, care, and prompt return of the heart monitor. An overview of how to document in the diary was provided by rooming staff.     Cardiac Testing:  Patient was given instructions regarding Lexiscan nuclear med stress test. Discussed purpose, preparation, procedure, and when to expect results. Patient verbalized understanding and agreed to call with further questions or concerns.     Return appointment:   Patient given instructions regarding scheduling next clinic visit. Patient verbalized understanding.       Xi Paulson RN on 10/25/2021 at 4:30 PM

## 2021-10-25 NOTE — PROGRESS NOTES
HPI:  Angy Haji is a 55 yo Female with a PMH of bipolar disorder, morbid obesity, and symptomatic SVTs.  She was referred for a cardiology evaluation.  She started having palpitation in 7/2021.  She wore a Zio patch in 9/2021 which revealed bunch of runs of PACs.  She stated that stress might be a trigger of her palpitation.  She was adjusting the dose of Lithium around that time, and is now on Lithium 1050 mg daily.  Since then her depression and palpitation have not changed.  She complained of chest pain and SOB on Ex that have been consistent for these days.    PAST MEDICAL HISTORY:  Past Medical History:   Diagnosis Date     Disorders of bursae and tendons in shoulder region, unspecified      Female infertility of unspecified origin      Gallbladder sludge 5/2013     Mild sleep apnea 5/13/2014     Obesity, unspecified      Oral aphthae      Other acne      Other specified hypoglycemia      PONV (postoperative nausea and vomiting)        CURRENT MEDICATIONS:  Current Outpatient Medications   Medication Sig Dispense Refill     Carboxymethylcellulose Sod PF (REFRESH PLUS) 0.5 % SOLN ophthalmic solution Place 1 drop into both eyes 3 times daily as needed for dry eyes       cyclobenzaprine (FLEXERIL) 10 MG tablet Take 1 tablet (10 mg) by mouth nightly as needed for muscle spasms 10 tablet 0     estradiol (VAGIFEM) 10 MCG TABS vaginal tablet Place 1 tablet (10 mcg) vaginally twice a week 25 tablet 3     estrogen conj-medroxyPROGESTERone (PREMPRO) 0.625-2.5 MG tablet Take 1 tablet by mouth daily 90 tablet 3     lidocaine (LIDODERM) 5 % patch Place 1-2 patches onto the skin every 24 hours To prevent lidocaine toxicity, patient should be patch free for 12 hrs daily. 30 patch 1     lisinopril (ZESTRIL) 10 MG tablet Take 1 tablet (10 mg) by mouth daily 90 tablet 3     lithium (ESKALITH) 150 MG capsule Take 1 capsule (150 mg) by mouth At Bedtime 30 capsule 0     lithium 300 MG capsule Take 3 capsules (900 mg) by mouth  At Bedtime 90 capsule 0     Loratadine 10 MG capsule Take 10 mg by mouth daily as needed.       metroNIDAZOLE (METROGEL) 1 % external gel Apply topically daily 60 g 9     ORDER FOR DME Use your CPAP device as directed by your provider.       VITAMIN D, CHOLECALCIFEROL, PO Take 2,000 Units by mouth daily         PAST SURGICAL HISTORY:  Past Surgical History:   Procedure Laterality Date     HC TOOTH EXTRACTION W/FORCEP  1984/85    [place] Teeth     IR ENDOVENOUS ABLATION VARICOSE VEINS  12/12/2019     LAPAROSCOPIC CHOLECYSTECTOMY  7/29/2013    Procedure: LAPAROSCOPIC CHOLECYSTECTOMY;  Laparoscopic Cholecystectomy;  Surgeon: Fabio Johnson MD;  [facility]     LAPAROSCOPIC OOPHORECTOMY Left 6/23/2016    Procedure: LAPAROSCOPIC OOPHORECTOMY;  Surgeon: Kayley Donnelly MD;  [facility]     LAPAROSCOPIC SALPINGECTOMY Bilateral 6/23/2016    Procedure: LAPAROSCOPIC SALPINGECTOMY;  Surgeon: Kayley Donnelly MD;  [facility]       ALLERGIES:     Allergies   Allergen Reactions     Seroquel [Quetiapine] Nausea and Vomiting     Had constant vomiting     Olanzapine      Tongue welling, bad EPSE (Parkinsonian)     Penicillins Rash     Risperidone      Tongue welling, bad EPSE (Parkinsonian)       Thimerosal Other (See Comments)     blisters on inside of eyelid       FAMILY HISTORY:  - Premature coronary artery disease  - Atrial fibrillation  - Sudden cardiac death     SOCIAL HISTORY:  Social History     Tobacco Use     Smoking status: Never Smoker     Smokeless tobacco: Never Used     Tobacco comment: non smoke home   Substance Use Topics     Alcohol use: Yes     Comment: 2 Drinks Per Month     Drug use: No       ROS:   Answers for HPI/ROS submitted by the patient on 10/22/2021 were reviewed.  Constitutional: No fever, chills, or sweats. Weight stable.   ENT: No visual disturbance, ear ache, epistaxis, sore throat.   Cardiovascular: As per HPI.   Respiratory: No cough, hemoptysis.    GI: No  "nausea, vomiting, hematemesis, melena, or hematochezia.   : No hematuria.   Integument: Negative.   Psychiatric: Negative.   Hematologic:  Easy bruising, no easy bleeding.  Neuro: Negative.   Endocrinology: No significant heat or cold intolerance   Musculoskeletal: No myalgia.    Exam:  /69 (BP Location: Left arm, Patient Position: Chair, Cuff Size: Adult Large)   Pulse 70   Ht 1.73 m (5' 8.11\")   Wt 137.6 kg (303 lb 4.8 oz)   SpO2 99%   BMI 45.97 kg/m    GENERAL APPEARANCE: healthy, alert and no distress  HEENT: no icterus, no xanthelasmas, normal pupil size and reaction, normal palate, mucosa moist, no central cyanosis  NECK: no adenopathy, no asymmetry, masses, or scars, thyroid normal to palpation and no bruits, JVP not elevated  RESPIRATORY: lungs clear to auscultation - no rales, rhonchi or wheezes, no use of accessory muscles, no retractions, respirations are unlabored, normal respiratory rate  CARDIOVASCULAR: regular rhythm, normal S1 with physiologic split S2, no S3 or S4 and no murmur, click or rub, precordium quiet with normal PMI.  ABDOMEN: soft, non tender, without hepatosplenomegaly, no masses palpable, bowel sounds normal, aorta not enlarged by palpation, no abdominal bruits  EXTREMITIES: peripheral pulses normal, no edema, no bruits  NEURO: alert and oriented to person/place/time, normal speech, gait and affect  VASC: Radial, femoral, dorsalis pedis and posterior tibialis pulses are normal in volumes and symmetric bilaterally. No bruits are heard.  SKIN: no ecchymoses, no rashes    Labs:  CBC RESULTS:   Lab Results   Component Value Date    WBC 6.4 09/03/2021    WBC 7.7 02/03/2021    RBC 4.71 09/03/2021    RBC 5.28 (H) 02/03/2021    HGB 13.3 09/03/2021    HGB 15.0 02/03/2021    HCT 41.6 09/03/2021    HCT 47.5 (H) 02/03/2021    MCV 88 09/03/2021    MCV 90 02/03/2021    MCH 28.2 09/03/2021    MCH 28.4 02/03/2021    MCHC 32.0 09/03/2021    MCHC 31.6 02/03/2021    RDW 12.6 09/03/2021    RDW " 12.6 02/03/2021     09/03/2021     02/03/2021       BMP RESULTS:  Lab Results   Component Value Date     09/03/2021     05/05/2021    POTASSIUM 4.3 09/03/2021    POTASSIUM 4.3 05/05/2021    CHLORIDE 107 09/03/2021    CHLORIDE 109 05/05/2021    CO2 27 09/03/2021    CO2 25 05/05/2021    ANIONGAP 3 09/03/2021    ANIONGAP 5 05/05/2021    GLC 78 09/03/2021     (H) 05/05/2021    BUN 14 09/03/2021    BUN 10 05/05/2021    CR 0.61 09/03/2021    CR 0.56 05/05/2021    GFRESTIMATED >90 09/03/2021    GFRESTIMATED >90 05/05/2021    GFRESTBLACK >90 05/05/2021    SHERRIE 9.4 09/03/2021    SHERRIE 9.6 05/05/2021        INR RESULTS:  No results found for: INR    Procedures:  ECG on 10/25/2021: Reviewed.  WNL.    Zio patch in 9/2021: Reviewed.        Assessment and Plan:  # Bipolar disorder  # Morbid obesity  # Symptomatic SVTs -> runs of PACs per Zio patch in 9/2021.  She was adjusting the dose of Lithium around that time, and is now on Lithium 1050 mg daily.  Since then her depression and palpitation have not changed.  Therefore, Lithium is less likely to be a trigger of her PACs.  I explained to her that PACs are benign and we will leave it alone at this time.  # Chest pain and SOB on Ex.  I will schedule her to take NM stress test to rule out ischemia.    I will review the results of stress test and make a follow up plan.    I spent a total of 45 min today to review the records, see the patient, and complete the documents.    CC  Patient Care Team:  Magali Lira MD as PCP - General  Jonny Roberts MD as MD (Neurology)  Chai Liu MD as MD (Psychiatry)  Ricci Villa MD as Resident (Student in organized health care education/training program)  Jose Angel Solorio MD as MD (Radiology)  Cristel Vigil, RN as Specialty Care Coordinator (Vascular and Interventional Radiology)  Romy Min MD as Assigned OBGYN Provider  Nellie Calloway MD as Assigned Pulmonology  Provider  Ricci Salazar MD as Resident (Psychiatry)  Dacia Tapia MD as MD (Psychiatry)  Alex Mckeon MD as Assigned PCP  Odebunmi, Tolulope Oluwadamilola, MD as Resident (Psychiatry)  Xi Paulson, RN as Specialty Care Coordinator (Cardiology)  Elida Barros MD (Cardiovascular Disease)  Xi Paulson, RN as Specialty Care Coordinator (Cardiology)  Elida Barros MD (Cardiovascular Disease)  ALEX MCKEON    Answers for HPI/ROS submitted by the patient on 10/22/2021  General Symptoms: No  Skin Symptoms: No  HENT Symptoms: No  EYE SYMPTOMS: No  HEART SYMPTOMS: Yes  LUNG SYMPTOMS: No  INTESTINAL SYMPTOMS: No  URINARY SYMPTOMS: Yes  GYNECOLOGIC SYMPTOMS: No  BREAST SYMPTOMS: No  SKELETAL SYMPTOMS: Yes  BLOOD SYMPTOMS: No  NERVOUS SYSTEM SYMPTOMS: Yes  MENTAL HEALTH SYMPTOMS: Yes  Chest pain or pressure: No  Fast or irregular heartbeat: Yes  Pain in legs with walking: No  Trouble breathing while lying down: No  Fingers or toes appear blue: No  High blood pressure: Yes  Low blood pressure: No  Fainting: No  Murmurs: No  Pacemaker: No  Varicose veins: Yes  Edema or swelling: Yes  Wake up at night with shortness of breath: No  Light-headedness: No  Exercise intolerance: Yes  Trouble holding urine or incontinence: Yes  Pain or burning: No  Trouble starting or stopping: No  Increased frequency of urination: No  Blood in urine: No  Decreased frequency of urination: No  Frequent nighttime urination: Yes  Flank pain: No  Difficulty emptying bladder: No  Back pain: Yes  Muscle aches: No  Neck pain: No  Swollen joints: No  Joint pain: No  Bone pain: No  Muscle cramps: Yes  Muscle weakness: Yes  Joint stiffness: No  Bone fracture: No  Trouble with coordination: No  Dizziness or trouble with balance: No  Fainting or black-out spells: No  Memory loss: No  Headache: No  Seizures: No  Speech problems: No  Tingling: No  Tremor: No  Weakness: No  Difficulty walking: No  Paralysis: No  Numbness: Yes  Nervous  or Anxious: Yes  Depression: Yes  Trouble sleeping: Yes  Trouble thinking or concentrating: No  Mood changes: No  Panic attacks: No

## 2021-10-25 NOTE — PATIENT INSTRUCTIONS
Plan:     Lexiscan nuclear med stress test    Follow-up based on results      Prep for jonathan-nuc stress test: Report to the  [facility] Room at the [facility]. The hospital is located at [address] [place].    This test can take up to 3 hours.    NPO 3 hours prior, Water is ok and encouraged  NO alcohol, smoking, caffeine, or decaf products 12 hours prior  TAKE ALL medications as prescribed.        If you have further questions, please utilize [facility] to contact us.   If your question concerns the above instructions, contact:  Xi Paulson RN   Electrophysiology Nurse Coordinator.  [phone]    If your question concerns the schedule/appointment times, contact:  [facility]   [phone]

## 2021-10-25 NOTE — LETTER
10/25/2021      RE: Angy Haji  [address]  [facility] [address]       Dear Colleague,    Thank you for the opportunity to participate in the care of your patient, Angy Haji, at the [facility] [place] [facility]. Please see a copy of my visit note below.    HPI:  Angy Haji is a 55 yo Female with a PMH of bipolar disorder, morbid obesity, and symptomatic SVTs.  She was referred for a cardiology evaluation.  She started having palpitation in 7/2021.  She wore a Zio patch in 9/2021 which revealed bunch of runs of PACs.  She stated that stress might be a trigger of her palpitation.  She was adjusting the dose of Lithium around that time, and is now on Lithium 1050 mg daily.  Since then her depression and palpitation have not changed.  She complained of chest pain and SOB on Ex that have been consistent for these days.    PAST MEDICAL HISTORY:  Past Medical History:   Diagnosis Date     Disorders of bursae and tendons in shoulder region, unspecified      Female infertility of unspecified origin      Gallbladder sludge 5/2013     Mild sleep apnea 5/13/2014     Obesity, unspecified      Oral aphthae      Other acne      Other specified hypoglycemia      PONV (postoperative nausea and vomiting)        CURRENT MEDICATIONS:  Current Outpatient Medications   Medication Sig Dispense Refill     Carboxymethylcellulose Sod PF (REFRESH PLUS) 0.5 % SOLN ophthalmic solution Place 1 drop into both eyes 3 times daily as needed for dry eyes       cyclobenzaprine (FLEXERIL) 10 MG tablet Take 1 tablet (10 mg) by mouth nightly as needed for muscle spasms 10 tablet 0     estradiol (VAGIFEM) 10 MCG TABS vaginal tablet Place 1 tablet (10 mcg) vaginally twice a week 25 tablet 3     estrogen conj-medroxyPROGESTERone (PREMPRO) 0.625-2.5 MG tablet Take 1 tablet by mouth daily 90 tablet 3     lidocaine (LIDODERM) 5 % patch Place 1-2 patches onto the skin  every 24 hours To prevent lidocaine toxicity, patient should be patch free for 12 hrs daily. 30 patch 1     lisinopril (ZESTRIL) 10 MG tablet Take 1 tablet (10 mg) by mouth daily 90 tablet 3     lithium (ESKALITH) 150 MG capsule Take 1 capsule (150 mg) by mouth At Bedtime 30 capsule 0     lithium 300 MG capsule Take 3 capsules (900 mg) by mouth At Bedtime 90 capsule 0     Loratadine 10 MG capsule Take 10 mg by mouth daily as needed.       metroNIDAZOLE (METROGEL) 1 % external gel Apply topically daily 60 g 9     ORDER FOR DME Use your CPAP device as directed by your provider.       VITAMIN D, CHOLECALCIFEROL, PO Take 2,000 Units by mouth daily         PAST SURGICAL HISTORY:  Past Surgical History:   Procedure Laterality Date     HC TOOTH EXTRACTION W/FORCEP  1984/85    [place] Teeth     IR ENDOVENOUS ABLATION VARICOSE VEINS  12/12/2019     LAPAROSCOPIC CHOLECYSTECTOMY  7/29/2013    Procedure: LAPAROSCOPIC CHOLECYSTECTOMY;  Laparoscopic Cholecystectomy;  Surgeon: Fabio Johnson MD;  [facility]     LAPAROSCOPIC OOPHORECTOMY Left 6/23/2016    Procedure: LAPAROSCOPIC OOPHORECTOMY;  Surgeon: Kayley Donnelly MD;  [facility]     LAPAROSCOPIC SALPINGECTOMY Bilateral 6/23/2016    Procedure: LAPAROSCOPIC SALPINGECTOMY;  Surgeon: Kayley Donnelly MD;  [facility]       ALLERGIES:     Allergies   Allergen Reactions     Seroquel [Quetiapine] Nausea and Vomiting     Had constant vomiting     Olanzapine      Tongue welling, bad EPSE (Parkinsonian)     Penicillins Rash     Risperidone      Tongue welling, bad EPSE (Parkinsonian)       Thimerosal Other (See Comments)     blisters on inside of eyelid       FAMILY HISTORY:  - Premature coronary artery disease  - Atrial fibrillation  - Sudden cardiac death     SOCIAL HISTORY:  Social History     Tobacco Use     Smoking status: Never Smoker     Smokeless tobacco: Never Used     Tobacco comment: non smoke home   Substance Use Topics     Alcohol use:  "Yes     Comment: 2 Drinks Per Month     Drug use: No       ROS:   Answers for HPI/ROS submitted by the patient on 10/22/2021 were reviewed.  Constitutional: No fever, chills, or sweats. Weight stable.   ENT: No visual disturbance, ear ache, epistaxis, sore throat.   Cardiovascular: As per HPI.   Respiratory: No cough, hemoptysis.    GI: No nausea, vomiting, hematemesis, melena, or hematochezia.   : No hematuria.   Integument: Negative.   Psychiatric: Negative.   Hematologic:  Easy bruising, no easy bleeding.  Neuro: Negative.   Endocrinology: No significant heat or cold intolerance   Musculoskeletal: No myalgia.    Exam:  /69 (BP Location: Left arm, Patient Position: Chair, Cuff Size: Adult Large)   Pulse 70   Ht 1.73 m (5' 8.11\")   Wt 137.6 kg (303 lb 4.8 oz)   SpO2 99%   BMI 45.97 kg/m    GENERAL APPEARANCE: healthy, alert and no distress  HEENT: no icterus, no xanthelasmas, normal pupil size and reaction, normal palate, mucosa moist, no central cyanosis  NECK: no adenopathy, no asymmetry, masses, or scars, thyroid normal to palpation and no bruits, JVP not elevated  RESPIRATORY: lungs clear to auscultation - no rales, rhonchi or wheezes, no use of accessory muscles, no retractions, respirations are unlabored, normal respiratory rate  CARDIOVASCULAR: regular rhythm, normal S1 with physiologic split S2, no S3 or S4 and no murmur, click or rub, precordium quiet with normal PMI.  ABDOMEN: soft, non tender, without hepatosplenomegaly, no masses palpable, bowel sounds normal, aorta not enlarged by palpation, no abdominal bruits  EXTREMITIES: peripheral pulses normal, no edema, no bruits  NEURO: alert and oriented to person/place/time, normal speech, gait and affect  VASC: Radial, femoral, dorsalis pedis and posterior tibialis pulses are normal in volumes and symmetric bilaterally. No bruits are heard.  SKIN: no ecchymoses, no rashes    Labs:  CBC RESULTS:   Lab Results   Component Value Date    WBC 6.4 " 09/03/2021    WBC 7.7 02/03/2021    RBC 4.71 09/03/2021    RBC 5.28 (H) 02/03/2021    HGB 13.3 09/03/2021    HGB 15.0 02/03/2021    HCT 41.6 09/03/2021    HCT 47.5 (H) 02/03/2021    MCV 88 09/03/2021    MCV 90 02/03/2021    MCH 28.2 09/03/2021    MCH 28.4 02/03/2021    MCHC 32.0 09/03/2021    MCHC 31.6 02/03/2021    RDW 12.6 09/03/2021    RDW 12.6 02/03/2021     09/03/2021     02/03/2021       BMP RESULTS:  Lab Results   Component Value Date     09/03/2021     05/05/2021    POTASSIUM 4.3 09/03/2021    POTASSIUM 4.3 05/05/2021    CHLORIDE 107 09/03/2021    CHLORIDE 109 05/05/2021    CO2 27 09/03/2021    CO2 25 05/05/2021    ANIONGAP 3 09/03/2021    ANIONGAP 5 05/05/2021    GLC 78 09/03/2021     (H) 05/05/2021    BUN 14 09/03/2021    BUN 10 05/05/2021    CR 0.61 09/03/2021    CR 0.56 05/05/2021    GFRESTIMATED >90 09/03/2021    GFRESTIMATED >90 05/05/2021    GFRESTBLACK >90 05/05/2021    SHERRIE 9.4 09/03/2021    SHERRIE 9.6 05/05/2021        INR RESULTS:  No results found for: INR    Procedures:  ECG on 10/25/2021: Reviewed.  WNL.    Zio patch in 9/2021: Reviewed.        Assessment and Plan:  # Bipolar disorder  # Morbid obesity  # Symptomatic SVTs -> runs of PACs per Zio patch in 9/2021.  She was adjusting the dose of Lithium around that time, and is now on Lithium 1050 mg daily.  Since then her depression and palpitation have not changed.  Therefore, Lithium is less likely to be a trigger of her PACs.  I explained to her that PACs are benign and we will leave it alone at this time.  # Chest pain and SOB on Ex.  I will schedule her to take NM stress test to rule out ischemia.    I will review the results of stress test and make a follow up plan.    I spent a total of 45 min today to review the records, see the patient, and complete the documents.    Answers for HPI/ROS submitted by the patient on 10/22/2021  General Symptoms: No  Skin Symptoms: No  HENT Symptoms: No  EYE SYMPTOMS: No  HEART  SYMPTOMS: Yes  LUNG SYMPTOMS: No  INTESTINAL SYMPTOMS: No  URINARY SYMPTOMS: Yes  GYNECOLOGIC SYMPTOMS: No  BREAST SYMPTOMS: No  SKELETAL SYMPTOMS: Yes  BLOOD SYMPTOMS: No  NERVOUS SYSTEM SYMPTOMS: Yes  MENTAL HEALTH SYMPTOMS: Yes  Chest pain or pressure: No  Fast or irregular heartbeat: Yes  Pain in legs with walking: No  Trouble breathing while lying down: No  Fingers or toes appear blue: No  High blood pressure: Yes  Low blood pressure: No  Fainting: No  Murmurs: No  Pacemaker: No  Varicose veins: Yes  Edema or swelling: Yes  Wake up at night with shortness of breath: No  Light-headedness: No  Exercise intolerance: Yes  Trouble holding urine or incontinence: Yes  Pain or burning: No  Trouble starting or stopping: No  Increased frequency of urination: No  Blood in urine: No  Decreased frequency of urination: No  Frequent nighttime urination: Yes  Flank pain: No  Difficulty emptying bladder: No  Back pain: Yes  Muscle aches: No  Neck pain: No  Swollen joints: No  Joint pain: No  Bone pain: No  Muscle cramps: Yes  Muscle weakness: Yes  Joint stiffness: No  Bone fracture: No  Trouble with coordination: No  Dizziness or trouble with balance: No  Fainting or black-out spells: No  Memory loss: No  Headache: No  Seizures: No  Speech problems: No  Tingling: No  Tremor: No  Weakness: No  Difficulty walking: No  Paralysis: No  Numbness: Yes  Nervous or Anxious: Yes  Depression: Yes  Trouble sleeping: Yes  Trouble thinking or concentrating: No  Mood changes: No  Panic attacks: No          Please do not hesitate to contact me if you have any questions/concerns.     Sincerely,     Elida Barros MD

## 2021-10-25 NOTE — NURSING NOTE
Chief Complaint   Patient presents with     New Patient     NEW- Paroxysmal SVT referred from Dr Lira PCP      Vitals were taken, medications reconciled, and EKG was performed.    JAYCOB Lopez  3:25 PM

## 2021-10-26 LAB
ATRIAL RATE - MUSE: 66 BPM
DIASTOLIC BLOOD PRESSURE - MUSE: NORMAL MMHG
INTERPRETATION ECG - MUSE: NORMAL
P AXIS - MUSE: 40 DEGREES
PR INTERVAL - MUSE: 128 MS
QRS DURATION - MUSE: 94 MS
QT - MUSE: 422 MS
QTC - MUSE: 442 MS
R AXIS - MUSE: 25 DEGREES
SYSTOLIC BLOOD PRESSURE - MUSE: NORMAL MMHG
T AXIS - MUSE: 37 DEGREES
VENTRICULAR RATE- MUSE: 66 BPM

## 2021-10-27 NOTE — PROGRESS NOTES
Video- Visit Details  Type of service:  video visit for medication management  Time of service:    Date:  11/02/2021    Video Start Time:  8:00 AM        Video End Time:  8:44AM    Reason for video visit:  Patient has requested telehealth visit  Originating Site (patient location):  [facility] [place]   Location- Patient's home  Distant Site (provider location):  [facility]  Mode of Communication:  Video Conference via AmWell  Consent:  Patient has given verbal consent for video visit?: Yes          [facility]  Psychiatry Clinic  PSYCHIATRY PROGRESS NOTE     CARE TEAM:  PCP- Magali Lira, Psychotherapist- [facility] Part with Verna Chan  Specialty Providers- OB/GYN, ENT, PT, Nutrition      Wife- Rachael Haji is a 54 year old who prefers the name Angy and uses pronouns she, her, hers.      DIAGNOSIS     Bipolar 1 disorder, current depressive episode, moderate   H/o EPSE secondary to neuroleptic use  H/o Thrombocytopenia secondary to Depakote  H/o lithium toxicity, resolved     ASSESSMENT   Last week, Angy saw the Cardiologist who thought that Lithium was an unlikely cause of the pSVTs but is recommending an additional stress-test due to exercise induced fatigue and shortness of breath that she's been experiencing. Angy reports that she feels anxious and overwhelmed about the upcoming test and other medical appointments. She reports that she feels better though not all the way: anhedonia and irritability are less. She feels anxious about making a medication change but due to the chronicity of the depression episodes she has been having and their effect on her relationship with partner she's willing to trial Lamictal. We talked through the side effects of Lamictal especially skin rash, though rare should be looked out for. Patient will start the titration schedule of Lamictal with a target dose of 200mg. No.SI, HI, or acute concerns.    [place] review was not needed  today. Patient is not taking any controlled substances.     PLAN                                                                                                                1) Meds-  - Continue Lithium 1050mg at bedtime  - Start Lamictal 25 mg daily for 2 weeks, then increase to 50 mg for 14 days, then increase to 100 mg for 7days, then increase to 200mg daily    2) Psychotherapy-Continue individual therapy    3) Next due-  Labs- PRN  EKG- PRN  Rating scales- N/A    4) Referrals-  None    5) Dispo- RTC in 3-4 weeks       PERTINENT BACKGROUND                           [most recent eval 08/22/21]    Patient has not had significant mental health history until 2018, when she developed perimenopausal mood and anxiety symptoms.  She was subsequently referred to our clinic following a hospitalization for anthony in November 2018.  Her care has been complicated by sensitivity to antipsychotics (EPSE including difficulty swallowing), thrombocytopenia secondary to Depakote, lithium toxicity in setting of concurrent Depakote use).    Psych pertinent item history includes Manic episode(h/o one manic episode Nov/Dec 2018), psychiatric hospitalization in November 2018.     SUBJECTIVE   Patient presented for a follow up.    Since last visit:  -She has a visit with the Cardiologist last week. She reports that he wasn't too worried about the pSVTs. He would like her to have a stress test because of shortness of breath during exercise.  -She reports that she feels worried about the upcoming stress test on Friday and relieved that she heard that the pSVTs were benign.  -She states that work is at an 0.8 and she started supervising in September.  -She reports that she has been able to enjoy some activities with family like going to watch the new [employer] movie. She feels slightly better but overwhelmed with all the medical appointments (in addition to psych and Cardiology, she's also seeing the physical therapist and  Orthopedics).  -She reports that irritability is slightly less than it used to be. Notable symptoms have been : lack of enjoyment, irritability, overwhelmed, hopelessness around the edge and a little bit of a depressed mood.  -Reports that anxiety is still bothersome and she still ruminates and worries about things. She referenced a disagreement she had ruminations on and worries about domestic issues and upcoming cardiac exam coming up.    RECENT PSYCH ROS:   Depression: lack of enjoyment, irritability, overwhelmed, hopelessness around the edge  Elevated:  none  Psychosis:  none  Anxiety:  excessive worry, feeling fearful and ruminations  Trauma Related:  none  Sleep: Reports that she's sleeping okay and only needs to get up twice to get to the bathroom.  Other: N/A    Adverse Effects: Excessive tiredness from Lithium   Pertinent Negative Symptoms: No suicidal ideation, self-injurious behavior/urges or violent ideation  Recent Substance Use:   Alcohol- 1 drink (beer or wine) per week or 2 weeks.     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   to Rachael enriquez. They have a teenage son.     PSYCH and SUBSTANCE USE Critical Summary Points since July 2021 08/26/2021: Resident transfer visit. Increased Lithium to 1200mg.  10/15/2021: decrease Lithium back to 1050 mg  11/02/2021: Lamictal titration plan started.      PAST MED TRIALS      Medication Max Dose (mg) Dates / Duration Helpful? DC Reason / Adverse Effects?   Sertraline       Depakote    Tremors   Olanzapine    EPSE   Seroquel    EPSE   Lithium 1050 current          MEDICAL HISTORY and ALLERGY     ALLERGIES: Seroquel [quetiapine], Olanzapine, Penicillins, Risperidone, and Thimerosal    Patient Active Problem List   Diagnosis     Oral aphthae     CARDIOVASCULAR SCREENING; LDL GOAL LESS THAN 160     Rosacea     Raynaud disease     Vitamin D deficiency disease     Mild sleep apnea     Atopic rhinitis     Chronic bilateral low back pain without sciatica     Obesity, Class III, BMI 40-49.9 (morbid obesity) (H)     Muscle tension dysphonia     Palpitations     Dysphagia, unspecified type     Anxiety     Benign essential hypertension     Callus of foot     Varicose veins of right lower extremity with edema     Bipolar I disorder (H)     Dribbling of urine     Hip pain, right     It band syndrome, right        MEDICAL REVIEW OF SYSTEMS   Contraception- Not assessed     MEDICATIONS     Current Outpatient Medications   Medication Sig Dispense Refill     Carboxymethylcellulose Sod PF (REFRESH PLUS) 0.5 % SOLN ophthalmic solution Place 1 drop into both eyes 3 times daily as needed for dry eyes       cyclobenzaprine (FLEXERIL) 10 MG tablet Take 1 tablet (10 mg) by mouth nightly as needed for muscle spasms 10 tablet 0     estradiol (VAGIFEM) 10 MCG TABS vaginal tablet Place 1 tablet (10 mcg) vaginally twice a week 25 tablet 3     estrogen conj-medroxyPROGESTERone (PREMPRO) 0.625-2.5 MG tablet Take 1 tablet by mouth daily 90 tablet 3     lidocaine (LIDODERM) 5 % patch Place 1-2 patches onto the skin every 24 hours To prevent lidocaine toxicity, patient should be patch free for 12 hrs daily. 30 patch 1     lisinopril (ZESTRIL) 10 MG tablet Take 1 tablet (10 mg) by mouth daily 90 tablet 3     lithium (ESKALITH) 150 MG capsule Take 1 capsule (150 mg) by mouth At Bedtime 30 capsule 0      "lithium 300 MG capsule Take 3 capsules (900 mg) by mouth At Bedtime 90 capsule 0     Loratadine 10 MG capsule Take 10 mg by mouth daily as needed.       metroNIDAZOLE (METROGEL) 1 % external gel Apply topically daily 60 g 9     ORDER FOR DME Use your CPAP device as directed by your provider.       VITAMIN D, CHOLECALCIFEROL, PO Take 2,000 Units by mouth daily        VITALS   There were no vitals taken for this visit.    MENTAL STATUS EXAM     Alertness: alert   Appearance: well groomed  Behavior/Demeanor: cooperative, with good  eye contact   Speech: regular rate and rhythm  Language: intact  Psychomotor: normal or unremarkable  Mood: \"okay\"  Affect: full range, brighter lori previous visits; congruent to: mood- yes, content- yes  Thought Process/Associations: linear and logical  Thought Content:  Reports none;  Denies suicidal ideation and violent ideation  Perception:  Reports none;  Denies auditory hallucinations and visual hallucinations  Insight: good  Judgment: good  Cognition: does  appear grossly intact; formal cognitive testing was not done  Gait and Station: N/A (Telehealth)     LABS and DATA     PHQ9 TODAY = N/A  PHQ 6/14/2021 8/27/2021 10/8/2021   PHQ-9 Total Score 6 12 9   Q9: Thoughts of better off dead/self-harm past 2 weeks Not at all Not at all Not at all       Recent Labs   Lab Test 09/03/21  1007 05/05/21  0853 02/03/21  1019   CR 0.61 0.56 0.58   GFRESTIMATED >90 >90 >90     Recent Labs   Lab Test 02/03/21  1019 07/15/20  1617 10/15/19  1037   AST 13 12 13   ALT 17 18 18   ALKPHOS 88 84 61     Recent Labs   Lab Test 09/03/21  1007 05/05/21  0853 02/03/21  1019   LITHIUM 0.8 0.78 0.67     Recent Labs   Lab Test 09/03/21  1007 05/05/21  0853   CR 0.61 0.56   GFRESTIMATED >90 >90    138   POTASSIUM 4.3 4.3   SHERRIE 9.4 9.6     Recent Labs   Lab Test 02/03/21  1011 07/15/20  1633   SG 1.011 1.008     Recent Labs   Lab Test 09/03/21  1007 05/05/21  0853   TSH 2.31 2.61     Recent Labs   Lab Test " 09/03/21  1007 02/03/21  1019 07/15/20  1617 07/15/20  1617   WBC 6.4 7.7   < > 8.3   ANEU  --  5.1  --  5.6    < > = values in this interval not displayed.       ECG :10/25/2021 QTc 442ms     PSYCHOTROPIC DRUG INTERACTIONS     Concurrent use of LITHIUM and ACE INHIBITORS may result in increased risk of lithium toxicity.    MANAGEMENT:  Monitoring for adverse effects, routine labs, periodic EKGs and patient is aware of risks     RISK STATEMENT for SAFETY     Angy Haji did not appear to be an imminent safety risk to self or others.    TREATMENT RISK STATEMENT: The risks, benefits, alternatives and potential adverse effects have been discussed and are understood by the pt. The pt understands the risks of using street drugs or alcohol. There are no medical contraindications, the pt agrees to treatment with the ability to do so. The pt knows to call the clinic for any problems or to access emergency care if needed.  Medical and substance use concerns are documented above.  Psychotropic drug interaction check was done, including changes made today.     PROVIDER: Tolulope Oluwadamilola Odebunmi, MD    Patient not staffed in clinic.  Note will be reviewed and signed by supervisor Dr. Liu.      Attestation:  I did not see Angy Haji directly. I have reviewed the documentation and I agree with the resident's plan of care.   Chai Liu MD     MEDICAL DECISION MAKING        (SmartPhrase .[id number])     Problems addressed: - At least 1 chronic problem that is not stable  Amount / complexity of data reviewed / analyzedLevel 4 (moderate complexity)    Element 3 - Risk:[id number]}  - Moderate due to: Engaged in prescription drug management during visit (discussed any medication benefits, side effects, alternatives, etc.)

## 2021-11-02 ENCOUNTER — VIRTUAL VISIT (OUTPATIENT)
Dept: PSYCHIATRY | Facility: CLINIC | Age: 56
End: 2021-11-02
Attending: PSYCHIATRY & NEUROLOGY
Payer: COMMERCIAL

## 2021-11-02 DIAGNOSIS — F31.9 BIPOLAR I DISORDER (H): ICD-10-CM

## 2021-11-02 PROBLEM — M25.551 HIP PAIN, RIGHT: Status: RESOLVED | Noted: 2020-11-06 | Resolved: 2021-11-02

## 2021-11-02 PROCEDURE — 99214 OFFICE O/P EST MOD 30 MIN: CPT | Mod: 95 | Performed by: STUDENT IN AN ORGANIZED HEALTH CARE EDUCATION/TRAINING PROGRAM

## 2021-11-02 RX ORDER — LAMOTRIGINE 25 MG/1
25 TABLET ORAL DAILY
Qty: 14 TABLET | Refills: 0 | Status: SHIPPED | OUTPATIENT
Start: 2021-11-02 | End: 2021-12-14

## 2021-11-02 RX ORDER — LAMOTRIGINE 25 MG/1
50 TABLET ORAL DAILY
Qty: 28 TABLET | Refills: 0 | Status: SHIPPED | OUTPATIENT
Start: 2021-11-02 | End: 2021-12-14

## 2021-11-02 RX ORDER — LITHIUM CARBONATE 150 MG/1
150 CAPSULE ORAL AT BEDTIME
Qty: 30 CAPSULE | Refills: 1 | Status: SHIPPED | OUTPATIENT
Start: 2021-11-02 | End: 2021-11-29

## 2021-11-02 RX ORDER — LITHIUM CARBONATE 300 MG/1
900 CAPSULE ORAL AT BEDTIME
Qty: 90 CAPSULE | Refills: 1 | Status: SHIPPED | OUTPATIENT
Start: 2021-11-02 | End: 2021-11-29

## 2021-11-02 ASSESSMENT — PAIN SCALES - GENERAL: PAINLEVEL: NO PAIN (0)

## 2021-11-02 NOTE — PROGRESS NOTES
Discharge Note    Progress reporting period is from last progress note on   to Apr 16, 2021.    Angy failed to follow up and current status is unknown.  Please see information below for last relevant information on current status.  Patient seen for 14 visits.    SUBJECTIVE  Subjective changes noted by patient:  Angy mentions she had a flare up on her right low back adn hip pain and was unable to sleep on her right side, she tried sleeping on her left side which was still painful. It started from her right back running to right knee. She also noticed some tingling in ehr right leg while walking which settled when she was off her foot.   .  Current pain level is 4/10.     Previous pain level was   .   Changes in function:  Yes (See Goal flowsheet attached for changes in current functional level)  Adverse reaction to treatment or activity: None    OBJECTIVE  Changes noted in objective findings: Advsied patient to consult with ehr provider regading the tingling pain down her leg to see if she needed further evaluation. Patient wanted to stick with same exercises, DC side stepping with band adn hip abduction with band exercise, Step - 4-5 inch advsied.      ASSESSMENT/PLAN  Diagnosis: R hip pain- IT band tightness, bursitis   Updated problem list and treatment plan:     STG/LTGs have been met or progress has been made towards goals:  Yes, please see goal flowsheet for most current information  Assessment of Progress: current status is unknown.    Last current status:     Self Management Plans:  HEP  I have re-evaluated this patient and find that the nature, scope, duration and intensity of the therapy is appropriate for the medical condition of the patient.  Angy continues to require the following intervention to meet STG and LTG's:  HEP.    Recommendations:  Discharge with current home program.  Patient to follow up with MD as needed.    Please refer to the daily flowsheet for treatment today, total treatment time  and time spent performing 1:1 timed codes.

## 2021-11-02 NOTE — PROGRESS NOTES
"VIDEO VISIT  Angy Haji is a 56 year old patient that has consented to receive services via billable video visit.      The patient has been notified of following:   \"This video visit will be conducted via a call between you and your physician/provider. We have found that certain health care needs can be provided without the need for an in-person physical exam. This service lets us provide the care you need with a video conversation. If a prescription is necessary we can send it directly to your pharmacy. If lab work is needed we can place an order for that and you can then stop by our lab to have the test done at a later time. Insurers are generally covering virtual visits as they would in-office visits so billing should not be different than normal.  If for some reason you do get billed incorrectly, you should contact the billing office to correct it and that number is in the AVS .    Patient will join video visit via:  [employer]ova (Patient / guardian confirmed to join via AtheroNova)    If patient attempts to join the video via AtheroNova at appointment start time, but is unable to, they would prefer that the provider send them a video invitation via:   Send to preferred e-mail: [email or web address]      How would patient like to obtain AVS?:  MyChart    "

## 2021-11-02 NOTE — PATIENT INSTRUCTIONS
Treatment plan   Continue Lithium 1050mg at bedtime  Start Lamictal 25 mg daily for 2 weeks, then increase to 50 mg for 14 days, then increase to 100 mg for 7days, then increase to 200mg daily            **For crisis resources, please see the information at the end of this document**     Patient Education      Thank you for coming to the [facility].    Lab Testing:  If you had lab testing today and your results are reassuring or normal they will be mailed to you or sent through Zouxiu within 7 days. If the lab tests need quick action we will call you with the results. The phone number we will call with results is # [phone] (home) [phone] (work). If this is not the best number please call our clinic and change the number.    Medication Refills:  If you need any refills please call your pharmacy and they will contact us. Our fax number for refills is [phone]. Please allow three business for refill processing. If you need to  your refill at a new pharmacy, please contact the new pharmacy directly. The new pharmacy will help you get your medications transferred.     Scheduling:  If you have any concerns about today's visit or wish to schedule another appointment please call our office during normal business hours [phone] (8-5:00 M-F)    Contact Us:  Please call [phone] during business hours (8-5:00 M-F).  If after clinic hours, or on the weekend, please call  [phone].    Financial Assistance [phone]  [facility] Billing [phone]  Central Billing Office, [facility]: [phone]  [facility] Billing [phone]  Medical Records [phone]  [facility] Patient Bill of Rights [email or web address]       MENTAL HEALTH CRISIS NUMBERS:  For a medical emergency please call  911 or go to the nearest ER.     [facility]:   [facility] -[phone]    Crisis Residence [facility] -[phone]   Walk-In Counseling Center [facility] -[phone]   COPE 24/7 [facility] Mobile Team -[phone] (adults)/[phone] (child)  CHILD: [facility] needs assessment team - [phone]      [facility]:   [facility] - [phone]   Walk-in counseling [facility] - [phone]   Walk-in counseling [facility] - [phone]   Crisis Residence [facility] - [phone]  Urgent Care Adult Mental [facility]-[phone] mobile unit/ 24/7 crisis line    National Crisis Numbers:   National Suicide Prevention Lifeline: 0-330-858-TALK (825-403-7886)  Poison Control Center - 1-380.579.3973  [employer]/resources for a list of additional resources (SOS)  Trans Lifeline a hotline for transgender people 1-282.161.4779  The [employer] Project a hotline for LGBT youth 7-713-597-8266  Crisis Text Line: For any crisis 24/7   To: 663665  see www.crisistextline.org  - IF MAKING A CALL FEELS TOO HARD, send a text!         Again thank you for choosing [facility] and please let us know how we can best partner with you to improve you and your family's health.    You may be receiving a survey regarding this appointment. We would love to have your feedback, both positive and negative. The survey is done by an external company, so your answers are anonymous.

## 2021-11-05 ENCOUNTER — HOSPITAL ENCOUNTER (OUTPATIENT)
Dept: NUCLEAR MEDICINE | Facility: CLINIC | Age: 56
Setting detail: NUCLEAR MEDICINE
End: 2021-11-05
Attending: INTERNAL MEDICINE
Payer: COMMERCIAL

## 2021-11-05 ENCOUNTER — HOSPITAL ENCOUNTER (OUTPATIENT)
Dept: CARDIOLOGY | Facility: CLINIC | Age: 56
End: 2021-11-05
Attending: INTERNAL MEDICINE
Payer: COMMERCIAL

## 2021-11-05 DIAGNOSIS — I20.89 EXERTIONAL ANGINA (H): ICD-10-CM

## 2021-11-05 DIAGNOSIS — R06.09 DYSPNEA ON EXERTION: ICD-10-CM

## 2021-11-05 LAB
CV STRESS MAX HR HE: 84
RATE PRESSURE PRODUCT: NORMAL
STRESS ECHO BASELINE DIASTOLIC HE: 62
STRESS ECHO BASELINE HR: 67 BPM
STRESS ECHO BASELINE SYSTOLIC BP: 128
STRESS ECHO CALCULATED PERCENT HR: 51 %
STRESS ECHO LAST STRESS DIASTOLIC BP: 94
STRESS ECHO LAST STRESS SYSTOLIC BP: 164
STRESS ECHO TARGET HR: 166

## 2021-11-05 PROCEDURE — 78452 HT MUSCLE IMAGE SPECT MULT: CPT | Mod: 26

## 2021-11-05 PROCEDURE — 343N000001 HC RX 343: Performed by: INTERNAL MEDICINE

## 2021-11-05 PROCEDURE — 250N000011 HC RX IP 250 OP 636: Performed by: INTERNAL MEDICINE

## 2021-11-05 PROCEDURE — 93018 CV STRESS TEST I&R ONLY: CPT | Performed by: INTERNAL MEDICINE

## 2021-11-05 PROCEDURE — A9502 TC99M TETROFOSMIN: HCPCS | Performed by: INTERNAL MEDICINE

## 2021-11-05 PROCEDURE — 78452 HT MUSCLE IMAGE SPECT MULT: CPT

## 2021-11-05 RX ORDER — AMINOPHYLLINE 25 MG/ML
50-100 INJECTION, SOLUTION INTRAVENOUS
Status: DISCONTINUED | OUTPATIENT
Start: 2021-11-05 | End: 2021-11-06 | Stop reason: HOSPADM

## 2021-11-05 RX ORDER — REGADENOSON 0.08 MG/ML
0.4 INJECTION, SOLUTION INTRAVENOUS ONCE
Status: COMPLETED | OUTPATIENT
Start: 2021-11-05 | End: 2021-11-05

## 2021-11-05 RX ORDER — CAFFEINE CITRATE 20 MG/ML
60 SOLUTION INTRAVENOUS
Status: DISCONTINUED | OUTPATIENT
Start: 2021-11-05 | End: 2021-11-06 | Stop reason: HOSPADM

## 2021-11-05 RX ORDER — ALBUTEROL SULFATE 90 UG/1
2 AEROSOL, METERED RESPIRATORY (INHALATION) EVERY 5 MIN PRN
Status: DISCONTINUED | OUTPATIENT
Start: 2021-11-05 | End: 2021-11-06 | Stop reason: HOSPADM

## 2021-11-05 RX ORDER — ACYCLOVIR 200 MG/1
0-1 CAPSULE ORAL
Status: DISCONTINUED | OUTPATIENT
Start: 2021-11-05 | End: 2021-11-06 | Stop reason: HOSPADM

## 2021-11-05 RX ADMIN — TETROFOSMIN 40.6 MCI.: 1.38 INJECTION, POWDER, LYOPHILIZED, FOR SOLUTION INTRAVENOUS at 10:39

## 2021-11-05 RX ADMIN — TETROFOSMIN 10.5 MCI.: 1.38 INJECTION, POWDER, LYOPHILIZED, FOR SOLUTION INTRAVENOUS at 09:22

## 2021-11-05 RX ADMIN — REGADENOSON 0.4 MG: 0.08 INJECTION, SOLUTION INTRAVENOUS at 10:51

## 2021-11-05 NOTE — PROGRESS NOTES
Pt here for Lexiscan nuclear stress test.  Medication and side effects reviewed with patient. Lung sounds clear to auscultation bilaterally. Denied caffeine use. Patient tolerated Lexiscan dose with complaints of SOB that slowly dissipated. VSS. Monitored post injection and then taken to nuclear medicine for follow up imaging.

## 2021-11-15 NOTE — PROGRESS NOTES
[facility]  Psychiatry Clinic  PSYCHIATRY PROGRESS NOTE     CARE TEAM:  PCP- Magali Lira, Psychotherapist- [facility] with Verna Chan  Specialty Providers- OB/GYN, ENT, PT, Nutrition      Wife- Rachael Haji is a 56 year old who prefers the name Angy and uses pronouns she, her, hers.      DIAGNOSIS     Bipolar 1 disorder, current depressive episode, moderate   H/o EPSE secondary to neuroleptic use  H/o Thrombocytopenia secondary to Depakote  H/o lithium toxicity, resolved     ASSESSMENT   Angy is currently on a Lamictal titration schedule and is at week 4 at 50 mg daily with no reported side effects. She reports slight improvement in mood though not significant. She has been able to enjoy a few activities like watching movies and hosting thanks[employer] Technologiesving. We will continue titration schedule-increase to 100 mg for 7 days and then increase to 150mg. Angy prefers trying out 150 mg and seeing how she does before increasing to 200 mg. She recently started couples therapy with her wife trey she reports has been beneficial. Current stressors include upcoming epidural steroid injection for back pain which patient reports feeling anxious about.No.SI, HI, or acute concerns.    [place] review was not needed today. Patient is not taking any controlled substances.     PLAN                                                                                                                1) Meds-  - Continue Lithium 1050mg at bedtime  - Continue Lamictal 100mg for 7 days and then increase to 150mg    2) Psychotherapy-Continue individual therapy    3) Next due-  Labs- PRN  EKG- PRN  Rating scales- N/A    4) Referrals-  None    5) Dispo- RTC in 2 weeks       PERTINENT BACKGROUND                           [most recent eval 08/22/21]    Patient has not had significant mental health history until 2018, when she developed perimenopausal mood and anxiety symptoms.  She was subsequently  referred to our clinic following a hospitalization for anthony in November 2018.  Her care has been complicated by sensitivity to antipsychotics (EPSE including difficulty swallowing), thrombocytopenia secondary to Depakote, lithium toxicity in setting of concurrent Depakote use).    Psych pertinent item history includes Manic episode(h/o one manic episode Nov/Dec 2018), psychiatric hospitalization in November 2018.     SUBJECTIVE   Patient presented for a follow up with wife, Rachael.    Since last visit:  -Angy reports still experiencing low mood, irritability, and lack of enjoyment. She states that it's slightly less intense than it used to be. She reports that sleep is good though back pain is still ongoing.She's having an epidural steroid injection in 2 weeks for the pain.  -She reports that things are better with family and she started couples therapy which has been going well and has found benefit. Rachael reports that she really likes the therapist's style and thinks that it's been helpful.  -Angy and her wife hosted thanksgiving which felt like a lot with all the food preparations they had to do. They report that it went well and they enjoyed it. They also stayed at a friend's cabin during the holiday which Angy reports was a great time.    RECENT PSYCH ROS:   Depression: lack of enjoyment, irritability, low mood, tiredness  Elevated:  none  Psychosis:  none  Anxiety:  excessive worry  Trauma Related:  none  Sleep: Reports that she's sleeping okay and only needs to get up twice to get to the bathroom.  Other: N/A    Adverse Effects: None reported from amictal  Pertinent Negative Symptoms: No suicidal ideation, self-injurious behavior/urges or violent ideation  Recent Substance Use:   Alcohol- 1 drink (beer or wine) per week or 2 weeks.     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   to wife, Rachael. They have a teenage son.     PSYCH and SUBSTANCE USE Critical Summary Points since July 2021 08/26/2021: Resident transfer visit. Increased Lithium to 1200mg.  10/15/2021: decrease Lithium back to 1050 mg  11/02/2021: Lamictal titration plan started  11/29/2021: Increase Lamictal to 100 mg for 7 days and then increase to 150 mg     PAST MED TRIALS      Medication Max Dose (mg) Dates / Duration Helpful? DC Reason / Adverse Effects?   Sertraline       Depakote    Tremors   Olanzapine    EPSE   Seroquel    EPSE   Lithium 1050 current          MEDICAL HISTORY and ALLERGY     ALLERGIES: Seroquel [quetiapine], Olanzapine, Penicillins, Risperidone, and Thimerosal    Patient Active Problem List   Diagnosis     Oral aphthae     CARDIOVASCULAR SCREENING; LDL GOAL LESS THAN 160     Rosacea     Raynaud disease     Vitamin D deficiency disease     Mild sleep apnea     Atopic rhinitis     Chronic bilateral low back pain without sciatica     Obesity, Class III, BMI 40-49.9 (morbid obesity) (H)     Muscle tension dysphonia     Palpitations     Dysphagia, unspecified type     Anxiety     Benign essential hypertension     Callus of foot     Varicose veins of right lower extremity with edema     Bipolar I disorder (H)     Dribbling of urine     It band syndrome, right        MEDICAL REVIEW OF SYSTEMS   Contraception- Not assessed     MEDICATIONS     Current Outpatient Medications   Medication Sig Dispense Refill     Carboxymethylcellulose Sod PF (REFRESH  "PLUS) 0.5 % SOLN ophthalmic solution Place 1 drop into both eyes 3 times daily as needed for dry eyes       cyclobenzaprine (FLEXERIL) 10 MG tablet Take 1 tablet (10 mg) by mouth nightly as needed for muscle spasms 10 tablet 0     estradiol (VAGIFEM) 10 MCG TABS vaginal tablet Place 1 tablet (10 mcg) vaginally twice a week 25 tablet 3     estrogen conj-medroxyPROGESTERone (PREMPRO) 0.625-2.5 MG tablet Take 1 tablet by mouth daily 90 tablet 3     lamoTRIgine (LAMICTAL) 25 MG tablet Take 1 tablet (25 mg) by mouth daily For 14 days and then increase to 50mg. 14 tablet 0     lamoTRIgine (LAMICTAL) 25 MG tablet Take 2 tablets (50 mg) by mouth daily 28 tablet 0     lidocaine (LIDODERM) 5 % patch Place 1-2 patches onto the skin every 24 hours To prevent lidocaine toxicity, patient should be patch free for 12 hrs daily. 30 patch 1     lisinopril (ZESTRIL) 10 MG tablet Take 1 tablet (10 mg) by mouth daily 90 tablet 3     lithium (ESKALITH) 150 MG capsule Take 1 capsule (150 mg) by mouth At Bedtime 30 capsule 1     lithium 300 MG capsule Take 3 capsules (900 mg) by mouth At Bedtime 90 capsule 1     Loratadine 10 MG capsule Take 10 mg by mouth daily as needed.       metroNIDAZOLE (METROGEL) 1 % external gel Apply topically daily 60 g 9     ORDER FOR DME Use your CPAP device as directed by your provider.       VITAMIN D, CHOLECALCIFEROL, PO Take 2,000 Units by mouth daily        VITALS   /76 (BP Location: Left arm, Patient Position: Sitting, Cuff Size: Adult Large)   Pulse 70   Wt 141.7 kg (312 lb 6.4 oz)   BMI 47.35 kg/m      MENTAL STATUS EXAM     Alertness: alert and oriented  Appearance: well groomed  Behavior/Demeanor: cooperative, with good  eye contact   Speech: regular rate and rhythm  Language: intact  Psychomotor: normal or unremarkable  Mood: \"okay\"  Affect: appropriate; congruent to: mood- yes, content- yes  Thought Process/Associations: linear and logical  Thought Content:  Reports none;  Denies suicidal " ideation and violent ideation  Perception:  Reports none;  Denies auditory hallucinations and visual hallucinations  Insight: good  Judgment: good  Cognition: does  appear grossly intact; formal cognitive testing was not done  Gait and Station: Unremarkable     LABS and DATA     PHQ9 TODAY = 10  PHQ 6/14/2021 8/27/2021 10/8/2021   PHQ-9 Total Score 6 12 9   Q9: Thoughts of better off dead/self-harm past 2 weeks Not at all Not at all Not at all       Recent Labs   Lab Test 09/03/21  1007 05/05/21  0853 02/03/21  1019   CR 0.61 0.56 0.58   GFRESTIMATED >90 >90 >90     Recent Labs   Lab Test 02/03/21  1019 07/15/20  1617 10/15/19  1037   AST 13 12 13   ALT 17 18 18   ALKPHOS 88 84 61     Recent Labs   Lab Test 09/03/21  1007 05/05/21  0853 02/03/21  1019   LITHIUM 0.8 0.78 0.67     Recent Labs   Lab Test 09/03/21  1007 05/05/21  0853   CR 0.61 0.56   GFRESTIMATED >90 >90    138   POTASSIUM 4.3 4.3   SHERRIE 9.4 9.6     Recent Labs   Lab Test 02/03/21  1011 07/15/20  1633   SG 1.011 1.008     Recent Labs   Lab Test 09/03/21  1007 05/05/21  0853   TSH 2.31 2.61     Recent Labs   Lab Test 09/03/21  1007 02/03/21  1019 07/15/20  1617   WBC 6.4 7.7 8.3   ANEU  --  5.1 5.6       ECG :10/25/2021 QTc 442ms     PSYCHOTROPIC DRUG INTERACTIONS     Concurrent use of LITHIUM and ACE INHIBITORS may result in increased risk of lithium toxicity.    MANAGEMENT:  Monitoring for adverse effects, routine labs, periodic EKGs and patient is aware of risks     RISK STATEMENT for SAFETY     Angy Haji did not appear to be an imminent safety risk to self or others.    TREATMENT RISK STATEMENT: The risks, benefits, alternatives and potential adverse effects have been discussed and are understood by the pt. The pt understands the risks of using street drugs or alcohol. There are no medical contraindications, the pt agrees to treatment with the ability to do so. The pt knows to call the clinic for any problems or to access emergency care if  needed.  Medical and substance use concerns are documented above.  Psychotropic drug interaction check was done, including changes made today.     PROVIDER: Tolulope Oluwadamilola Odebunmi, MD    Patient not staffed in clinic.  Note will be reviewed and signed by supervisor Dr. Liu.      Level of Medical Decision Making:   - At least 1 chronic problem that is not stable  - Engaged in prescription drug management during visit (discussed any medication benefits, side effects, alternatives, etc.)       956}  :[id number]}

## 2021-11-23 ENCOUNTER — TELEPHONE (OUTPATIENT)
Dept: ORTHOPEDICS | Facility: CLINIC | Age: 56
End: 2021-11-23
Payer: COMMERCIAL

## 2021-11-23 DIAGNOSIS — M47.816 FACET ARTHROPATHY, LUMBAR: Primary | ICD-10-CM

## 2021-11-23 NOTE — TELEPHONE ENCOUNTER
LVM for Pt letting her know Dr. Valadez placed the AUBREY order.  She can call imaging scheduling to get it started and then call sports scheduling to follow up two weeks after the injection.     - Ger Lauren, ATC

## 2021-11-23 NOTE — TELEPHONE ENCOUNTER
[facility] Call Center    Phone Message    May a detailed message be left on voicemail: yes     Reason for Call: Other: Patient would like to discuss going forward with injection in the lower back - last seen 10/22/21     Action Taken: Message routed to:  [facility] ortho    Travel Screening: Not Applicable

## 2021-11-24 DIAGNOSIS — Z11.59 ENCOUNTER FOR SCREENING FOR OTHER VIRAL DISEASES: ICD-10-CM

## 2021-11-29 ENCOUNTER — OFFICE VISIT (OUTPATIENT)
Dept: PSYCHIATRY | Facility: CLINIC | Age: 56
End: 2021-11-29
Attending: PSYCHIATRY & NEUROLOGY
Payer: COMMERCIAL

## 2021-11-29 VITALS
HEART RATE: 70 BPM | SYSTOLIC BLOOD PRESSURE: 120 MMHG | WEIGHT: 293 LBS | DIASTOLIC BLOOD PRESSURE: 76 MMHG | BODY MASS INDEX: 47.35 KG/M2

## 2021-11-29 DIAGNOSIS — F31.9 BIPOLAR I DISORDER (H): ICD-10-CM

## 2021-11-29 PROCEDURE — G0463 HOSPITAL OUTPT CLINIC VISIT: HCPCS

## 2021-11-29 PROCEDURE — 99214 OFFICE O/P EST MOD 30 MIN: CPT | Mod: HN | Performed by: STUDENT IN AN ORGANIZED HEALTH CARE EDUCATION/TRAINING PROGRAM

## 2021-11-29 RX ORDER — LITHIUM CARBONATE 300 MG/1
900 CAPSULE ORAL AT BEDTIME
Qty: 90 CAPSULE | Refills: 1 | Status: SHIPPED | OUTPATIENT
Start: 2021-11-29 | End: 2021-12-14

## 2021-11-29 RX ORDER — LAMOTRIGINE 100 MG/1
100 TABLET ORAL DAILY
Qty: 30 TABLET | Refills: 0 | Status: SHIPPED | OUTPATIENT
Start: 2021-11-29 | End: 2021-12-14

## 2021-11-29 RX ORDER — LITHIUM CARBONATE 150 MG/1
150 CAPSULE ORAL AT BEDTIME
Qty: 30 CAPSULE | Refills: 1 | Status: SHIPPED | OUTPATIENT
Start: 2021-11-29 | End: 2021-12-14

## 2021-11-29 ASSESSMENT — PAIN SCALES - GENERAL: PAINLEVEL: NO PAIN (0)

## 2021-11-29 NOTE — PATIENT INSTRUCTIONS
Treatment Plan  - Continue Lithium 1050mg at bedtime  - Continue Lamictal 100mg for 7 days and then increase to 150mg daily    RTC in 2 weeks        **For crisis resources, please see the information at the end of this document**     Patient Education      Thank you for coming to the [facility] [place] [facility].    Lab Testing:  If you had lab testing today and your results are reassuring or normal they will be mailed to you or sent through [employer] within 7 days. If the lab tests need quick action we will call you with the results. The phone number we will call with results is # [phone] (home) [phone] (work). If this is not the best number please call our clinic and change the number.    Medication Refills:  If you need any refills please call your pharmacy and they will contact us. Our fax number for refills is [phone]. Please allow three business for refill processing. If you need to  your refill at a new pharmacy, please contact the new pharmacy directly. The new pharmacy will help you get your medications transferred.     Scheduling:  If you have any concerns about today's visit or wish to schedule another appointment please call our office during normal business hours [phone] (8-5:00 M-F)    Contact Us:  Please call [phone] during business hours (8-5:00 M-F).  If after clinic hours, or on the weekend, please call  [phone].    Financial Assistance [phone]  [facility] Billing [phone]  Central Billing Office, [facility]: [phone]  [facility] Billing [phone]  Medical Records [phone]  [facility] Patient Bill of Rights [email or web address]       MENTAL HEALTH CRISIS NUMBERS:  For a medical emergency please call  911 or go to the nearest ER.     [facility]:   [facility] -[phone]   Crisis Residence [facility] -[phone]    Walk-In Counseling Center [facility] -[phone]   COPE 24/7 [facility] Mobile Team -[phone] (adults)/[phone] (child)  CHILD: [facility] needs assessment team - [phone]      [facility]:   [facility] - [phone]   Walk-in counseling [facility] - [phone]   Walk-in counseling [facility] - [phone]   Crisis Residence [facility] - [phone]  Urgent Care Adult Mental Qgfybq-[phone] mobile unit/ 24/7 crisis line    National Crisis Numbers:   National Suicide Prevention Lifeline: 8-030-247-TALK (756-070-0602)  Poison Control Center - 1-431.226.3601  [employer]/resources for a list of additional resources (SOS)  Trans Lifeline a hotline for transgender people 1-493.802.8455  The [place] Project a hotline for LGBT youth 1-175.119.8987  Crisis Text Line: For any crisis 24/7   To: 820706  see www.crisistextline.org  - IF MAKING A CALL FEELS TOO HARD, send a text!         Again thank you for choosing [facility] and please let us know how we can best partner with you to improve you and your family's health.    You may be receiving a survey regarding this appointment. We would love to have your feedback, both positive and negative. The survey is done by an external company, so your answers are anonymous.

## 2021-11-30 ASSESSMENT — PATIENT HEALTH QUESTIONNAIRE - PHQ9: SUM OF ALL RESPONSES TO PHQ QUESTIONS 1-9: 10

## 2021-12-02 ENCOUNTER — TELEPHONE (OUTPATIENT)
Dept: SLEEP MEDICINE | Facility: CLINIC | Age: 56
End: 2021-12-02
Payer: COMMERCIAL

## 2021-12-02 NOTE — TELEPHONE ENCOUNTER
CALLED PT AND WENT OVER GUIDELINES OF WHEN TO REPLACE CPAP SUPPLIES. LET THE PT KNOW INSURANCE HAS CERTAIN GUIDELINES THAT WILL ALLOW YOU TO PUT SUPPLIES THROUGH INSURANCE AND THAT SOMETIMES ACCORDING TO USAGE AND CARE OF THE SUPPLIES ARE NOT ALWAYS NEEDED. LET THE PT KNOW TO HAVE A BACK UP OF REPLACEMENT SUPPLIES FOR WHEN THEY ARE ACTUALLY NEEDED. ALSO LET THE PT KNOW I WILL PUT THE HANDOUT REGARDING THE CPAP RSPLY SCHEDULED IN THE SNAIL MAIL TO THE PT. PT HAD  NO OTHER QUESTIONS OR CONCERNS AT THIS TIME.

## 2021-12-03 ENCOUNTER — LAB (OUTPATIENT)
Dept: URGENT CARE | Facility: URGENT CARE | Age: 56
End: 2021-12-03
Payer: COMMERCIAL

## 2021-12-03 DIAGNOSIS — Z11.59 ENCOUNTER FOR SCREENING FOR OTHER VIRAL DISEASES: ICD-10-CM

## 2021-12-03 PROCEDURE — U0003 INFECTIOUS AGENT DETECTION BY NUCLEIC ACID (DNA OR RNA); SEVERE ACUTE RESPIRATORY SYNDROME CORONAVIRUS 2 (SARS-COV-2) (CORONAVIRUS DISEASE [COVID-19]), AMPLIFIED PROBE TECHNIQUE, MAKING USE OF HIGH THROUGHPUT TECHNOLOGIES AS DESCRIBED BY CMS-2020-01-R: HCPCS

## 2021-12-03 PROCEDURE — U0005 INFEC AGEN DETEC AMPLI PROBE: HCPCS

## 2021-12-04 LAB — SARS-COV-2 RNA RESP QL NAA+PROBE: NEGATIVE

## 2021-12-06 ENCOUNTER — TELEPHONE (OUTPATIENT)
Dept: MEDSURG UNIT | Facility: CLINIC | Age: 56
End: 2021-12-06
Payer: COMMERCIAL

## 2021-12-06 NOTE — TELEPHONE ENCOUNTER
Pre-Procedure Negative COVID Test Results    Results Reviewed  The patient has a negative COVID test result within the required timeframe for the scheduled procedure.     No COVID pre-call needed.     Danielle Howard RN

## 2021-12-07 ENCOUNTER — HOSPITAL ENCOUNTER (OUTPATIENT)
Facility: CLINIC | Age: 56
Discharge: HOME | End: 2021-12-07
Admitting: PHYSICIAN ASSISTANT
Payer: COMMERCIAL

## 2021-12-07 ENCOUNTER — HOSPITAL ENCOUNTER (OUTPATIENT)
Dept: GENERAL RADIOLOGY | Facility: CLINIC | Age: 56
End: 2021-12-07
Attending: FAMILY MEDICINE
Payer: COMMERCIAL

## 2021-12-07 VITALS — HEART RATE: 64 BPM | SYSTOLIC BLOOD PRESSURE: 146 MMHG | OXYGEN SATURATION: 100 % | DIASTOLIC BLOOD PRESSURE: 82 MMHG

## 2021-12-07 VITALS — OXYGEN SATURATION: 100 % | SYSTOLIC BLOOD PRESSURE: 150 MMHG | DIASTOLIC BLOOD PRESSURE: 71 MMHG | HEART RATE: 68 BPM

## 2021-12-07 DIAGNOSIS — M47.816 FACET ARTHROPATHY, LUMBAR: ICD-10-CM

## 2021-12-07 DIAGNOSIS — I10 BENIGN ESSENTIAL HYPERTENSION: ICD-10-CM

## 2021-12-07 PROCEDURE — 999N000154 HC STATISTIC RADIOLOGY XRAY, US, CT, MAR, NM

## 2021-12-07 PROCEDURE — 250N000009 HC RX 250: Performed by: FAMILY MEDICINE

## 2021-12-07 PROCEDURE — 255N000002 HC RX 255 OP 636: Performed by: FAMILY MEDICINE

## 2021-12-07 PROCEDURE — 64493 INJ PARAVERT F JNT L/S 1 LEV: CPT

## 2021-12-07 PROCEDURE — 250N000011 HC RX IP 250 OP 636: Performed by: FAMILY MEDICINE

## 2021-12-07 RX ORDER — LIDOCAINE HYDROCHLORIDE 10 MG/ML
30 INJECTION, SOLUTION EPIDURAL; INFILTRATION; INTRACAUDAL; PERINEURAL ONCE
Status: COMPLETED | OUTPATIENT
Start: 2021-12-07 | End: 2021-12-07

## 2021-12-07 RX ORDER — IOPAMIDOL 408 MG/ML
10 INJECTION, SOLUTION INTRATHECAL ONCE
Status: COMPLETED | OUTPATIENT
Start: 2021-12-07 | End: 2021-12-07

## 2021-12-07 RX ORDER — TRIAMCINOLONE ACETONIDE 40 MG/ML
40 INJECTION, SUSPENSION INTRA-ARTICULAR; INTRAMUSCULAR ONCE
Status: COMPLETED | OUTPATIENT
Start: 2021-12-07 | End: 2021-12-07

## 2021-12-07 RX ADMIN — IOPAMIDOL 3 ML: 408 INJECTION, SOLUTION INTRATHECAL at 09:36

## 2021-12-07 RX ADMIN — TRIAMCINOLONE ACETONIDE 40 MG: 40 INJECTION, SUSPENSION INTRA-ARTICULAR; INTRAMUSCULAR at 09:39

## 2021-12-07 RX ADMIN — LIDOCAINE HYDROCHLORIDE 10 ML: 10 INJECTION, SOLUTION EPIDURAL; INFILTRATION; INTRACAUDAL; PERINEURAL at 09:39

## 2021-12-07 NOTE — PROGRESS NOTES
Care Suit[facility] Discharge Nursing Note    Patient Information  Name: Angy Haji  Age: 56 year old    Discharge Education:  Discharge instructions reviewed: Yes  Additional education/resources provided:   Patient/patient representative verbalizes understanding: Yes  Patient discharging on new medications: N/A  Medication education completed: N/A    Discharge Plans:   Discharge location: home  Discharge ride contacted: Yes  Approximate discharge time: 1020    Discharge Criteria:  Discharge criteria met and vital signs stable: Yes    Patient Belongs:  Patient belongings returned to patient: Yes    Derian Ocampo RN

## 2021-12-07 NOTE — PROCEDURES
[facility]    Procedure: Bilateral L4-5 facet joint injections    Date/Time: 12/7/2021 9:56 AM  Performed by: Kim Sow PA-C  Authorized by: Kim Sow PA-C       UNIVERSAL PROTOCOL   Site Marked: Yes  Prior Images Obtained and Reviewed:  Yes  Required items: Required blood products, implants, devices and special equipment available    Patient identity confirmed:  Verbally with patient  NA - No sedation, light sedation, or local anesthesia  Confirmation Checklist:  Patient's identity using two indicators, relevant allergies, procedure was appropriate and matched the consent or emergent situation and correct equipment/implants were available  Time out: Immediately prior to the procedure a time out was called    Universal Protocol: the Joint Commission Universal Protocol was followed    Preparation: Patient was prepped and draped in usual sterile fashion       ANESTHESIA    Anesthesia: Local infiltration  Local Anesthetic:  Lidocaine 1% without epinephrine      SEDATION    Patient Sedated: No    See dictated procedure note for full details.    PROCEDURE    Patient Tolerance:  Patient tolerated the procedure well with no immediate complications  Length of time physician/provider present for 1:1 monitoring during sedation: 0

## 2021-12-07 NOTE — PROGRESS NOTES
Care Suites Post Procedure Note    Patient Information  Name: Angy Haji  Age: 56 year old    Post Procedure  Time patient returned to Care Suites: 1000  Concerns/abnormal assessment: none at this time  If abnormal assessment, provider notified: N/A  Plan/Other: VSS, EPI sites CDI, pt given courtesy meal and juice.    Derian Ocampo RN

## 2021-12-07 NOTE — DISCHARGE INSTRUCTIONS

## 2021-12-08 ENCOUNTER — ANCILLARY PROCEDURE (OUTPATIENT)
Dept: MAMMOGRAPHY | Facility: CLINIC | Age: 56
End: 2021-12-08
Attending: OBSTETRICS & GYNECOLOGY
Payer: COMMERCIAL

## 2021-12-08 DIAGNOSIS — Z12.31 ENCOUNTER FOR SCREENING MAMMOGRAM FOR BREAST CANCER: ICD-10-CM

## 2021-12-08 PROCEDURE — 77067 SCR MAMMO BI INCL CAD: CPT | Mod: GC | Performed by: RADIOLOGY

## 2021-12-08 PROCEDURE — 77063 BREAST TOMOSYNTHESIS BI: CPT | Mod: GC | Performed by: RADIOLOGY

## 2021-12-09 RX ORDER — LISINOPRIL 10 MG/1
10 TABLET ORAL DAILY
Qty: 90 TABLET | Refills: 3 | Status: SHIPPED | OUTPATIENT
Start: 2021-12-09 | End: 2021-12-12

## 2021-12-09 NOTE — PROGRESS NOTES
Video- Visit Details  Type of service:  video visit for medication management  Time of service:    Date:  12/14/2021    Video Start Time:  8:35 AM        Video End Time:  8:56 AM    Reason for video visit:  Patient has requested telehealth visit  Originating Site (patient location):  [facility] [place]   Location- Patient's home  Distant Site (provider location):  [facility]  Mode of Communication:  Video Conference via [employer]  Consent:  Patient has given verbal consent for video visit?: Yes        [facility]  Psychiatry Clinic  PSYCHIATRY PROGRESS NOTE     CARE TEAM:  PCP- Magali Lira, Psychotherapist- [facility] Part with Verna Chan  Specialty Providers- OB/GYN, ENT, PT, Nutrition      Wife- Rachael Haji is a 56 year old who prefers the name Angy and uses pronouns she, her, hers.      DIAGNOSIS     Bipolar 1 disorder, current depressive episode, moderate   H/o EPSE secondary to neuroleptic use  H/o Thrombocytopenia secondary to Depakote  H/o lithium toxicity, resolved     ASSESSMENT   Angy reports no side effects on Lamictal increase and reports improvement in anhedonia. She continues to experience low mood and low energy. We will further increase Lamictal dose to 200mg and continue to monitor. Of note, patient is also on Prempro which reduces Serum concentration of Lamictal so it's possible that expected serum concentration of Lamictal is lower. No SI, HI, or acute concerns.     [place] review was not needed today. Patient is not taking any controlled substances.     PLAN                                                                                                                1) Meds-  - Continue Lithium 1050mg at bedtime  - Increase Lamictal to 200mg     2) Psychotherapy-Continue individual therapy    3) Next due-  Labs- Li labs due 03/2022  EKG- PRN  Rating scales- N/A    4) Referrals-  None    5) Dispo- RTC in 4 weeks       PERTINENT BACKGROUND                            [most recent eval 08/22/21]    Patient has not had significant mental health history until 2018, when she developed perimenopausal mood and anxiety symptoms.  She was subsequently referred to our clinic following a hospitalization for anthony in November 2018.  Her care has been complicated by sensitivity to antipsychotics (EPSE including difficulty swallowing), thrombocytopenia secondary to Depakote, lithium toxicity in setting of concurrent Depakote use).    Psych pertinent item history includes Manic episode(h/o one manic episode Nov/Dec 2018), psychiatric hospitalization in November 2018.     SUBJECTIVE   Patient presented for a follow up with wife, Rachael.    Since last visit:  -Reports no side effects to the Lamictal increase but has noticed an increase in vivid dreams. She's had vivid dreams in the past so it's not new for her but hasn't had them for a while  -States that the depression feels better because she has started to experience more enjoyment in activities. She adds that mood and tiredness still feel the same and unchanged.   -Reports that irritability has been on and off and thinks it might be due to life stressors.  -Reports that she had a steroid injection last week for her lower back pain. She thinks that she's experienced a 10% reduction in pain but is waiting for the 2 week jamal that's indicated to see full benefit.    RECENT PSYCH ROS:   Depression: lack of enjoyment, irritability, low mood, tiredness  Elevated:  none  Psychosis:  none  Anxiety:  excessive worry  Trauma Related:  none  Sleep: Reports that she's sleeping okay and only needs to get up twice to get to the bathroom.  Other: N/A    Adverse Effects: Increase in vivid dreams  Pertinent Negative Symptoms: No suicidal ideation, self-injurious behavior/urges or violent ideation  Recent Substance Use:   Alcohol- 1 drink (beer or wine) per week or 2 weeks.     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   to wifeRachael. They have a teenage son.     PSYCH and SUBSTANCE USE Critical Summary Points since July 2021 08/26/2021: Resident transfer visit. Increased Lithium to 1200mg.  10/15/2021: decrease Lithium back to 1050 mg  11/02/2021: Lamictal titration plan started  11/29/2021: Increase Lamictal to 100 mg for 7 days and then increase to 150 mg  12/14/2021: Increase Lamictal to 200mg     PAST MED TRIALS      Medication Max Dose (mg) Dates / Duration Helpful? DC Reason / Adverse Effects?   Sertraline       Depakote    Tremors   Olanzapine    EPSE   Seroquel    EPSE   Lithium 1050 current          MEDICAL HISTORY and ALLERGY     ALLERGIES: Seroquel [quetiapine], Olanzapine, Penicillins, Risperidone, and Thimerosal    Patient Active Problem List   Diagnosis     Oral aphthae     CARDIOVASCULAR SCREENING; LDL GOAL LESS THAN 160     Rosacea     Raynaud disease     Vitamin D deficiency disease     Mild sleep apnea     Atopic rhinitis     Chronic bilateral low back pain without sciatica     Obesity, Class III, BMI 40-49.9 (morbid obesity) (H)     Muscle tension dysphonia     Palpitations     Dysphagia, unspecified type     Anxiety     Benign essential hypertension     Callus of foot     Varicose veins of right lower extremity with edema     Bipolar I disorder (H)     Dribbling of urine     It band syndrome, right        MEDICAL REVIEW OF SYSTEMS    Contraception- Not assessed. Patient is on Prempro     MEDICATIONS     Current Outpatient Medications   Medication Sig Dispense Refill     Carboxymethylcellulose Sod PF (REFRESH PLUS) 0.5 % SOLN ophthalmic solution Place 1 drop into both eyes 3 times daily as needed for dry eyes       cyclobenzaprine (FLEXERIL) 10 MG tablet Take 1 tablet (10 mg) by mouth nightly as needed for muscle spasms 10 tablet 0     estradiol (VAGIFEM) 10 MCG TABS vaginal tablet Place 1 tablet (10 mcg) vaginally twice a week 25 tablet 3     estrogen conj-medroxyPROGESTERone (PREMPRO) 0.625-2.5 MG tablet Take 1 tablet by mouth daily 90 tablet 3     lamoTRIgine (LAMICTAL) 100 MG tablet Take 1 tablet (100 mg) by mouth daily For 7 days and then increase to 150 mg. 30 tablet 0     lamoTRIgine (LAMICTAL) 25 MG tablet Take 1 tablet (25 mg) by mouth daily For 14 days and then increase to 50mg. 14 tablet 0     lamoTRIgine (LAMICTAL) 25 MG tablet Take 2 tablets (50 mg) by mouth daily 28 tablet 0     lidocaine (LIDODERM) 5 % patch Place 1-2 patches onto the skin every 24 hours To prevent lidocaine toxicity, patient should be patch free for 12 hrs daily. 30 patch 1     lisinopril (ZESTRIL) 10 MG tablet Take 1 tablet (10 mg) by mouth daily 90 tablet 3     lithium (ESKALITH) 150 MG capsule Take 1 capsule (150 mg) by mouth At Bedtime 30 capsule 1     lithium 300 MG capsule Take 3 capsules (900 mg) by mouth At Bedtime 90 capsule 1     Loratadine 10 MG capsule Take 10 mg by mouth daily as needed.       metroNIDAZOLE (METROGEL) 1 % external gel Apply topically daily 60 g 9     ORDER FOR DME Use your CPAP device as directed by your provider.       VITAMIN D, CHOLECALCIFEROL, PO Take 2,000 Units by mouth daily        VITALS   There were no vitals taken for this visit.    MENTAL STATUS EXAM     Alertness: alert and oriented  Appearance: well groomed  Behavior/Demeanor: cooperative, with good  eye contact   Speech: regular rate and rhythm  Language:  "intact  Psychomotor: normal or unremarkable  Mood: \"okay\"  Affect: appropriate; congruent to: mood- yes, content- yes  Thought Process/Associations: linear and logical  Thought Content:  Reports none;  Denies suicidal ideation and violent ideation  Perception:  Reports none;  Denies auditory hallucinations and visual hallucinations  Insight: good  Judgment: good  Cognition: does  appear grossly intact; formal cognitive testing was not done  Gait and Station: N/A Telehealth     LABS and DATA     PHQ9 TODAY = N/A  PHQ 8/27/2021 10/8/2021 11/29/2021   PHQ-9 Total Score 12 9 10   Q9: Thoughts of better off dead/self-harm past 2 weeks Not at all Not at all Not at all       Recent Labs   Lab Test 09/03/21  1007 05/05/21  0853 02/03/21  1019   CR 0.61 0.56 0.58   GFRESTIMATED >90 >90 >90     Recent Labs   Lab Test 02/03/21  1019 07/15/20  1617 10/15/19  1037   AST 13 12 13   ALT 17 18 18   ALKPHOS 88 84 61     Recent Labs   Lab Test 09/03/21  1007 05/05/21  0853 02/03/21  1019   LITHIUM 0.8 0.78 0.67     Recent Labs   Lab Test 09/03/21  1007 05/05/21  0853   CR 0.61 0.56   GFRESTIMATED >90 >90    138   POTASSIUM 4.3 4.3   SHERRIE 9.4 9.6     Recent Labs   Lab Test 02/03/21  1011 07/15/20  1633   SG 1.011 1.008     Recent Labs   Lab Test 09/03/21  1007 05/05/21  0853   TSH 2.31 2.61     Recent Labs   Lab Test 09/03/21  1007 02/03/21  1019 07/15/20  1617   WBC 6.4 7.7 8.3   ANEU  --  5.1 5.6       ECG :10/25/2021 QTc 442ms     PSYCHOTROPIC DRUG INTERACTIONS     Concurrent use of LITHIUM and ACE INHIBITORS may result in increased risk of lithium toxicity.     Estrogen Derivatives may decrease the serum concentration of LamoTRIgine       MANAGEMENT:  Monitoring for adverse effects, routine labs, periodic EKGs and patient is aware of risks     RISK STATEMENT for SAFETY     Angy LILIAN Haji did not appear to be an imminent safety risk to self or others.    TREATMENT RISK STATEMENT: The risks, benefits, alternatives and potential " adverse effects have been discussed and are understood by the pt. The pt understands the risks of using street drugs or alcohol. There are no medical contraindications, the pt agrees to treatment with the ability to do so. The pt knows to call the clinic for any problems or to access emergency care if needed.  Medical and substance use concerns are documented above.  Psychotropic drug interaction check was done, including changes made today.     PROVIDER: Tolulope Oluwadamilola Odebunmi, MD    Patient not staffed in clinic.  Note will be reviewed and signed by supervisor Dr. Liu.      Level of Medical Decision Making:   - At least 1 chronic problem that is not stable  - Engaged in prescription drug management during visit (discussed any medication benefits, side effects, alternatives, etc.)        Must meet 2 out of 3 of the above MDM elements to bill at the specified level     For help more info about elements / criteria, click here-> MDM Help Grid     *This blue text will disappear automatically when note is signed, no need to delete*:[id number]}  [id number]}  :[id number]}

## 2021-12-10 DIAGNOSIS — L71.0 PERIORAL DERMATITIS: ICD-10-CM

## 2021-12-10 DIAGNOSIS — I10 BENIGN ESSENTIAL HYPERTENSION: ICD-10-CM

## 2021-12-10 DIAGNOSIS — L71.9 ROSACEA: ICD-10-CM

## 2021-12-12 RX ORDER — METRONIDAZOLE 10 MG/G
GEL TOPICAL DAILY
Qty: 60 G | Refills: 9 | Status: SHIPPED | OUTPATIENT
Start: 2021-12-12 | End: 2024-01-03

## 2021-12-12 RX ORDER — LISINOPRIL 10 MG/1
10 TABLET ORAL DAILY
Qty: 90 TABLET | Refills: 3 | Status: SHIPPED | OUTPATIENT
Start: 2021-12-12 | End: 2023-03-01

## 2021-12-13 ENCOUNTER — HOSPITAL ENCOUNTER (OUTPATIENT)
Dept: NUTRITION | Facility: CLINIC | Age: 56
Discharge: HOME | End: 2021-12-13
Attending: DIETITIAN, REGISTERED | Admitting: DIETITIAN, REGISTERED
Payer: COMMERCIAL

## 2021-12-13 PROCEDURE — 97803 MED NUTRITION INDIV SUBSEQ: CPT | Mod: GT,95 | Performed by: DIETITIAN, REGISTERED

## 2021-12-13 NOTE — PROGRESS NOTES
Video-Visit Details    Type of service:  Video Visit    Video Start Time (time video started): 3:30    Video End Time (time video stopped): 3:56    Originating Location (pt. Location): Home    Distant Location (provider location):  [facility]     Mode of Communication:  Video Conference via [facility]    NUTRITION REASSESSMENT NOTE (TELEPHONE VISIT DUE TO COVID-19)     REASON FOR ASSESSMENT   Angy LILIAN Haji referred by Dr. Odell for MNT related to  Obesity (BMI 30-39.9) [E66.9]  - Primary       Benign essential hypertension [I10]       Patient accompanied by self (via telephone)     NUTRITION HISTORY  Patient continues with multiple life changes and stressors.  Patient aware that she likes to self soothe with ice cream which she has recently increased.  Patient is aware that social cues trigger her to eat. Patient's spouse recently has weight loss surgery so there is adjust with eating in the home.      5/19 Patient walks 30 minutes most days + weekend 60 minute exercise (biking or hiking); lower back pain -foot going numb when walking and pain at night; dessert (3 days no dessert); asking self if hungry.  Patient made Malt o Meal as substitute for dessert.  Patient limiting dessert if eating dinner later or brushing teeth right after dinner and tracking dessert on calendar.  Patient had 4 day celebration for her birthday and felt was able to make wise choices.  Patient made cream cheese brownies for birthday dessert and froze remaining dessert.  Patient continues to navigate new eating regimen with spouses's weight loss surgery.      7/14/2021 Success-taking walking routes with hills.   Evening meals consisting of grilled chicken/pork loin, grilled sweet potatoes and 2 vegetables   Patient having ice cream thoughts in afternoon-to self soothe.  Patient practicing distractions to avoid eating ice cream at nigt (brushing teeth, asking self if hungry, substituted Malt 0 Meal,  "mineral water, too tired).       9/8/2021 Patient walking daily + 60 minutes 1 time per week.  Did well at [facility]. Hiking 1-4.6 miles. Weight loss during trip-308 lbs. Today 310 lbs. Grilled meat and vegetable + fruit. 1-5 days per week with no desserts.   Breakfast: yogurt + 1/2 cup granola + fruit   Lunch: leftovers (meat + vegetables) or cheese and Triscuits + vegetables + fruit   Dinner: BLT (3 slices sierra) + 2 corn on the cob + watermelon + tomatoes; tacos + watermelon/strawberries + cucumbers     12/13/2021 No desserts 4 times per week -met; emotional eating and Thanksgiving desserts where times when increased desserts.  Patient baked cookies-peanut butter blossom 1 batch (2 servings raw dough + 1 cookie) and froze them.  Patient walking outside and would like to walk indoors on treadmill.  Patient and spouse alter meal planning.       DIET RECALL   Breakfast: cereal and banana; yogurt + granola (measures), fruit + coffee; 2 slices toast with butter + 1/2 cup cottage cheese    Lunch: leftovers (includes fruit and vegetable daily)  Snack: fruit or cheese with lower salt nut thin crackers   Dinner: Tuna steak, Asian marinade, stir gomez, baked vegetable + brown rice (3/4 cup) + canned peaches; chicken thighs with beans and rice; sheet pan dinner with chicken and vegetables or grilled salmon and grilled vegetables; taco; protein + fruit and vegetable  Snacks: fruit, string cheese, Ritz crackers or yogurt; dessert in the evening (ice cream recently)  Beverages: mainly water, coffee, mineral water  Dining out: none currently  Exercise: Patient exercising 5-6 times per week. (cross country skiing, snow shoeing, walking and walking dog).  Patient exercises 1 hour on weekends (hiking or walking).     MEDICATIONS:  Reviewed      ANTHROPOMETRICS  Height: 5'8\" (note height change in chart previously 5'9\")  Weight: 308 per patient   BMI (kg/m2): 46.8 kg/m2   %IBW: 220%  Weight History:   Wt Readings from Last 10 " Encounters:   10/25/21 137.6 kg (303 lb 4.8 oz)   10/22/21 139.7 kg (308 lb)   10/08/21 139.9 kg (308 lb 6.4 oz)   10/08/21 139.7 kg (308 lb)   08/27/21 139.3 kg (307 lb)   08/27/21 141.5 kg (312 lb)   06/02/21 141.9 kg (312 lb 12.8 oz)   04/23/21 142.1 kg (313 lb 3.2 oz)   01/06/21 143.8 kg (317 lb)   10/09/20 141.2 kg (311 lb 3.2 oz)        ASSESSED NUTRITION NEEDS  Estimated Energy Needs: 5693-3561 kcals (15-20 kcals/kg) for gradual weight loss   Estimated Protein Needs: 66-79 (1-1.2 gm/kg IBW) for repletion with exercise  RMR: 1440     EVALUATION/PROGRESS TOWARDS GOALS   Previous goals:  Use treadmill 1 time per week -not met   Aim for no desserts 4 times per week -met  45-60 grams CHO at dinner 2 times per week -not met      Previous Nutrition Diagnosis: Obesity related to excess energy intake as evidenced by BMI of 47.3 kg/m2-no change     Current Nutrition Diagnosis:Obesity related to excess energy intake as evidenced by BMI of 46.8 kg/m2     INTERVENTIONS   Nutrition Prescription   Recommend exercise 5 times per week; track intake if eating mindlessly; increase fiber to 25-30 grams per day      Implementation:   Meals and Snacks: Continue eating 3 meals + deliberate snacking (if hungry)   Nutrition Education (Content):               a)  Discussed progress towards goals.  Congratulated patient for tracking dessert intake.  Patient with increased stress eating.  Reviewed current food and eating behavior challenges.  Discussed carbohydrate intake and patient feels she would be able to increase awareness of carbohydrate intake.  Supported patient in her struggle with food and weight.    Nutrition Education (Application):               a) Discussed holiday dessert plan.                           b) Patient verbalized understanding of diet by stating will reduce desserts to 3 times per week for the next 2 weeks.   Expected patient engagement: good     CURRENT GOALS  No desserts 3 days per week (1-2 cookie on other  days); post holidays resume no desserts 4 times per week   Walk 30 minutes per day 5 days per week   Walk on treadmill 1 day per week     FOLLOW UP/MONITORING    Patient to follow up in 6 weeks  Patient has RD contact information      Time spent with patient: 26 minutes     Mattie Amaya, RD, LD  [facility] Outpatient Dietitian  [phone] (office phone)

## 2021-12-14 ENCOUNTER — VIRTUAL VISIT (OUTPATIENT)
Dept: PSYCHIATRY | Facility: CLINIC | Age: 56
End: 2021-12-14
Attending: PSYCHIATRY & NEUROLOGY
Payer: COMMERCIAL

## 2021-12-14 DIAGNOSIS — F31.9 BIPOLAR I DISORDER (H): ICD-10-CM

## 2021-12-14 PROCEDURE — 99214 OFFICE O/P EST MOD 30 MIN: CPT | Mod: 95 | Performed by: STUDENT IN AN ORGANIZED HEALTH CARE EDUCATION/TRAINING PROGRAM

## 2021-12-14 RX ORDER — LITHIUM CARBONATE 150 MG/1
150 CAPSULE ORAL AT BEDTIME
Qty: 30 CAPSULE | Refills: 1 | Status: SHIPPED | OUTPATIENT
Start: 2021-12-14 | End: 2022-01-25

## 2021-12-14 RX ORDER — LITHIUM CARBONATE 300 MG/1
900 CAPSULE ORAL AT BEDTIME
Qty: 90 CAPSULE | Refills: 1 | Status: SHIPPED | OUTPATIENT
Start: 2021-12-14 | End: 2022-01-25

## 2021-12-14 RX ORDER — LAMOTRIGINE 200 MG/1
200 TABLET ORAL DAILY
Qty: 30 TABLET | Refills: 1 | Status: SHIPPED | OUTPATIENT
Start: 2021-12-14 | End: 2022-01-25

## 2021-12-14 ASSESSMENT — PAIN SCALES - GENERAL: PAINLEVEL: NO PAIN (0)

## 2021-12-14 NOTE — PROGRESS NOTES
"VIDEO VISIT  Angy Haji is a 56 year old patient that has consented to receive services via billable video visit.      The patient has been notified of following:   \"This video visit will be conducted via a call between you and your physician/provider. We have found that certain health care needs can be provided without the need for an in-person physical exam. This service lets us provide the care you need with a video conversation. If a prescription is necessary we can send it directly to your pharmacy. If lab work is needed we can place an order for that and you can then stop by our lab to have the test done at a later time. Insurers are generally covering virtual visits as they would in-office visits so billing should not be different than normal.  If for some reason you do get billed incorrectly, you should contact the billing office to correct it and that number is in the AVS .    Patient will join video visit via:  [facility] [employer] (Patient / guardian confirmed to join via [employer])    If patient attempts to join the video via [employer] at appointment start time, but is unable to, they would prefer that the provider send them a video invitation via:   Send to preferred e-mail: [email or web address]      How would patient like to obtain AVS?:  MyChart    "

## 2021-12-14 NOTE — PATIENT INSTRUCTIONS
Treatment Plan  - Continue Lithium 1050mg at bedtime  - Increase Lamictal to 200mg     RTC in 4 weeks      **For crisis resources, please see the information at the end of this document**   Patient Education    Thank you for coming to the [facility] MENTAL HEALTH & [facility]DI[facility] [place] [facility].    Lab Testing:  If you had lab testing today and your results are reassuring or normal they will be mailed to you or sent through [employer] within 7 days. If the lab tests need quick action we will call you with the results. The phone number we will call with results is # [phone] (home) [phone] (work). If this is not the best number please call our clinic and change the number.    Medication Refills:  If you need any refills please call your pharmacy and they will contact us. Our fax number for refills is [phone]. Please allow three business for refill processing. If you need to  your refill at a new pharmacy, please contact the new pharmacy directly. The new pharmacy will help you get your medications transferred.     Scheduling:  If you have any concerns about today's visit or wish to schedule another appointment please call our office during normal business hours [phone] (8-5:00 M-F)    Contact Us:  Please call [phone] during business hours (8-5:00 M-F).  If after clinic hours, or on the weekend, please call  [phone].    Financial Assistance [phone]  [facility] Billing [phone]  Central Billing Office, ealth: [phone]  [facility] Billing [phone]  Medical Records [phone]  [facility] Patient Bill of Rights [email or web address]       MENTAL HEALTH CRISIS NUMBERS:  For a medical emergency please call  911 or go to the nearest ER.     [facility]:   [facility] -[phone]   Crisis Residence [facility] -[phone]   Walk-In Counseling Center [facility] -[phone]    COPE 24/7 [facility] Mobile Team -[phone] (adults)/[phone] (child)  CHILD: [facility] needs assessment team - [phone]      [facility]:   [facility] - [phone]   Walk-in counseling [facility] - [phone]   Walk-in counseling [facility] - [phone]   Crisis Residence [facility] - [phone]  Urgent Care Adult Mental [facility]-[phone] mobile unit/ 24/7 crisis line    National Crisis Numbers:   National Suicide Prevention Lifeline: 4-187-533-TALK (621-960-5237)  Poison Control Center - 1-731.687.1047  [employer]/resources for a list of additional resources (SOS)  Trans Lifeline a hotline for transgender people 6-190-838-9743  The [employer] a hotline for LGBT youth 1-556.793.4518  Crisis Text Line: For any crisis 24/7   To: 732349  see www.crisistextline.org  - IF MAKING A CALL FEELS TOO HARD, send a text!         Again thank you for choosing [facility] M[facility]AL HEALTH & ADDI[facility] [place] [facility] and please let us know how we can best partner with you to improve you and your family's health.    You may be receiving a survey regarding this appointment. We would love to have your feedback, both positive and negative. The survey is done by an external company, so your answers are anonymous.

## 2021-12-22 ENCOUNTER — OFFICE VISIT (OUTPATIENT)
Dept: ORTHOPEDICS | Facility: CLINIC | Age: 56
End: 2021-12-22
Payer: COMMERCIAL

## 2021-12-22 VITALS — WEIGHT: 293 LBS | BODY MASS INDEX: 44.41 KG/M2 | HEIGHT: 68 IN

## 2021-12-22 DIAGNOSIS — M47.816 FACET ARTHROPATHY, LUMBAR: Primary | ICD-10-CM

## 2021-12-22 DIAGNOSIS — M48.061 SPINAL STENOSIS AT L4-L5 LEVEL: ICD-10-CM

## 2021-12-22 PROCEDURE — 99213 OFFICE O/P EST LOW 20 MIN: CPT | Performed by: FAMILY MEDICINE

## 2021-12-22 ASSESSMENT — MIFFLIN-ST. JEOR: SCORE: 2022.9

## 2021-12-22 NOTE — PROGRESS NOTES
Subjective:   Angy Haji is a 56 year old female who presents to clinic for back pain after sacral facet injection on 12/7/21. Today, the patient reports some pain relief from her low back and radicular symptoms. She does continue to have numbness/tingling in her toes without change.  2 weeks ago, some improvement in low back, less shooting pains, numbness into toes is the same  Gave up on pool therapy because unable to find a site  Open to land therapy  No foot drop, no bowel or bladder changes  Some bladder leakage if not paying attention, done pelvic floor PT in the past  Walking, last 2 toes, numbness, no shoes on making [place] cookies  Back pain- aches most of the time, leg pain once a day or QOD  Numbness on toes with every time she walks, walks the dogs  Mostly right leg, weaker going up stairs and numbness into 4th and 5th toes  Takes Tylenol, no NSAIDs due to Lithium  Days she needs to lift uses Tylenol  Using ice pack and heating pad as well  Cross country skiing, bailed due to concern for falling    Date of injury: Chronic  Date last seen: 11/24/2021  Following Therapeutic Plan: Yes  Pain: Improving  Function: Improving  Interval History: See above.     PAST MEDICAL, SOCIAL, SURGICAL AND FAMILY HISTORY: She  has a past medical history of Disorders of bursae and tendons in shoulder region, unspecified, Female infertility of unspecified origin, Gallbladder sludge (5/2013), Mild sleep apnea (5/13/2014), Obesity, unspecified, Oral aphthae, Other acne, Other specified hypoglycemia, and PONV (postoperative nausea and vomiting).She has no past medical history of Chronic infection.  She  has a past surgical history that includes TOOTH EXTRACTION W/FORCEP (1984/85); Laparoscopic cholecystectomy (7/29/2013); Laparoscopic oophorectomy (Left, 6/23/2016); Laparoscopic salpingectomy (Bilateral, 6/23/2016); and IR Endovenous Ablation Varicose Veins (12/12/2019).  Her family history includes Allergies in her  "father; Alzheimer Disease in her maternal grandmother; Arthritis in her maternal grandmother and mother; Atrial fibrillation in her mother; Breast Cancer in her maternal aunt; Cancer in her paternal grandmother; Cardiovascular in her paternal grandfather; Eye Disorder in her father and mother; Gallbladder Disease in her father; Gynecology in her paternal grandmother; Heart Disease in her maternal grandmother; LUNG DISEASE in her mother; Lipids in her father; Obesity in her paternal grandfather and paternal grandmother; Other Cancer (age of onset: 80) in her paternal aunt; Prostate Cancer in her father; Respiratory in her paternal grandfather.  She reports that she has never smoked. She has never used smokeless tobacco. She reports current alcohol use. She reports that she does not use drugs.    ALLERGIES: She is allergic to seroquel [quetiapine], olanzapine, penicillins, risperidone, and thimerosal.    CURRENT MEDICATIONS: She has a current medication list which includes the following prescription(s): carboxymethylcellulose pf, cyclobenzaprine, estradiol, estrogen conj-medroxyprogesterone, lamotrigine, lidocaine, lisinopril, lithium, lithium, loratadine, metronidazole, order for dme, and cholecalciferol.     REVIEW OF SYSTEMS: 10 point review of systems is negative except as noted above.     Exam:   Ht 1.727 m (5' 8\")   Wt 137.4 kg (303 lb)   BMI 46.07 kg/m             CONSTITUTIONAL: alert and mild distress  HEAD: Normocephalic. No masses, lesions, tenderness or abnormalities  SKIN: no suspicious lesions or rashes  GAIT: obese pattern, valgus  NEUROLOGIC: Non-focal  PSYCHIATRIC: affect normal/bright and mentation appears normal.    MUSCULOSKELETAL: Lumbar L4-5  Tender:  right para lumbar muscles, L4-5 facet, right PSIS, right SI joint  Non-tender:  thoracic spinous processes, left parathoracic muscles, right parathoracic muscles, lumbar spinous processes  Range of Motion:  lumbar flexion  decreased, painful, " lumbar extension  decreased  Strength:  able to heel walk, able to toe walk  Special tests:  negative straight leg raises    Hip Exam: Hip ROM full         Assessment/Plan:   Patient is a 56-year-old female with past medical history of vitamin D deficiency, hypertension  presenting to follow-up after low back injection  1.  Patient with spinal stenosis and L4-5 facet arthropathy-  Some improvement with the injection but still noting some numbness into the lateral foot S1 area, L5-S1 without spinal canal narrowing on lumbar MRI  Patient using Tylenol and unable to use NSAIDs due to her lithium  I am hesitant to use gabapentin given her other psychiatric medications  Patient has not engaged in pool therapy because she was unable to find facility and an appointment available so we will change to land therapy    Return to clinic in 6-week  X-RAY INTERPRETATION:   None today  Reviewed previous MRI

## 2021-12-22 NOTE — LETTER
12/22/2021      RE: Angy Haji  [address]  [facility] [address]        Subjective:   Angy Haji is a 56 year old female who presents to clinic for back pain after sacral facet injection on 12/7/21. Today, the patient reports some pain relief from her low back and radicular symptoms. She does continue to have numbness/tingling in her toes without change.  2 weeks ago, some improvement in low back, less shooting pains, numbness into toes is the same  Gave up on pool therapy because unable to find a site  Open to land therapy  No foot drop, no bowel or bladder changes  Some bladder leakage if not paying attention, done pelvic floor PT in the past  Walking, last 2 toes, numbness, no shoes on making [place] cookies  Back pain- aches most of the time, leg pain once a day or QOD  Numbness on toes with every time she walks, walks the dogs  Mostly right leg, weaker going up stairs and numbness into 4th and 5th toes  Takes Tylenol, no NSAIDs due to Lithium  Days she needs to lift uses Tylenol  Using ice pack and heating pad as well  Cross country skiing, bailed due to concern for falling    Date of injury: Chronic  Date last seen: 11/24/2021  Following Therapeutic Plan: Yes  Pain: Improving  Function: Improving  Interval History: See above.     PAST MEDICAL, SOCIAL, SURGICAL AND FAMILY HISTORY: She  has a past medical history of Disorders of bursae and tendons in shoulder region, unspecified, Female infertility of unspecified origin, Gallbladder sludge (5/2013), Mild sleep apnea (5/13/2014), Obesity, unspecified, Oral aphthae, Other acne, Other specified hypoglycemia, and PONV (postoperative nausea and vomiting).She has no past medical history of Chronic infection.  She  has a past surgical history that includes TOOTH EXTRACTION W/FORCEP (1984/85); Laparoscopic cholecystectomy (7/29/2013); Laparoscopic oophorectomy (Left, 6/23/2016); Laparoscopic salpingectomy (Bilateral, 6/23/2016); and IR Endovenous  "Ablation Varicose Veins (12/12/2019).  Her family history includes Allergies in her father; Alzheimer Disease in her maternal grandmother; Arthritis in her maternal grandmother and mother; Atrial fibrillation in her mother; Breast Cancer in her maternal aunt; Cancer in her paternal grandmother; Cardiovascular in her paternal grandfather; Eye Disorder in her father and mother; Gallbladder Disease in her father; Gynecology in her paternal grandmother; Heart Disease in her maternal grandmother; LUNG DISEASE in her mother; Lipids in her father; Obesity in her paternal grandfather and paternal grandmother; Other Cancer (age of onset: 80) in her paternal aunt; Prostate Cancer in her father; Respiratory in her paternal grandfather.  She reports that she has never smoked. She has never used smokeless tobacco. She reports current alcohol use. She reports that she does not use drugs.    ALLERGIES: She is allergic to seroquel [quetiapine], olanzapine, penicillins, risperidone, and thimerosal.    CURRENT MEDICATIONS: She has a current medication list which includes the following prescription(s): carboxymethylcellulose pf, cyclobenzaprine, estradiol, estrogen conj-medroxyprogesterone, lamotrigine, lidocaine, lisinopril, lithium, lithium, loratadine, metronidazole, order for dme, and cholecalciferol.     REVIEW OF SYSTEMS: 10 point review of systems is negative except as noted above.     Exam:   Ht 1.727 m (5' 8\")   Wt 137.4 kg (303 lb)   BMI 46.07 kg/m             CONSTITUTIONAL: alert and mild distress  HEAD: Normocephalic. No masses, lesions, tenderness or abnormalities  SKIN: no suspicious lesions or rashes  GAIT: obese pattern, valgus  NEUROLOGIC: Non-focal  PSYCHIATRIC: affect normal/bright and mentation appears normal.    MUSCULOSKELETAL: Lumbar L4-5  Tender:  right para lumbar muscles, L4-5 facet, right PSIS, right SI joint  Non-tender:  thoracic spinous processes, left parathoracic muscles, right parathoracic muscles, " lumbar spinous processes  Range of Motion:  lumbar flexion  decreased, painful, lumbar extension  decreased  Strength:  able to heel walk, able to toe walk  Special tests:  negative straight leg raises    Hip Exam: Hip ROM full         Assessment/Plan:   Patient is a 56-year-old female with past medical history of vitamin D deficiency, hypertension  presenting to follow-up after low back injection  1.  Patient with spinal stenosis and L4-5 facet arthropathy-  Some improvement with the injection but still noting some numbness into the lateral foot S1 area, L5-S1 without spinal canal narrowing on lumbar MRI  Patient using Tylenol and unable to use NSAIDs due to her lithium  I am hesitant to use gabapentin given her other psychiatric medications  Patient has not engaged in pool therapy because she was unable to find facility and an appointment available so we will change to land therapy    Return to clinic in 6-week  X-RAY INTERPRETATION:   None today  Reviewed previous MRI      Danielle Valadez MD

## 2022-01-07 ENCOUNTER — THERAPY VISIT (OUTPATIENT)
Dept: PHYSICAL THERAPY | Facility: CLINIC | Age: 57
End: 2022-01-07
Payer: COMMERCIAL

## 2022-01-07 DIAGNOSIS — M48.061 SPINAL STENOSIS AT L4-L5 LEVEL: ICD-10-CM

## 2022-01-07 DIAGNOSIS — M47.816 FACET ARTHROPATHY, LUMBAR: ICD-10-CM

## 2022-01-07 PROCEDURE — 97161 PT EVAL LOW COMPLEX 20 MIN: CPT | Mod: GP | Performed by: PHYSICAL THERAPIST

## 2022-01-07 PROCEDURE — 97112 NEUROMUSCULAR REEDUCATION: CPT | Mod: GP | Performed by: PHYSICAL THERAPIST

## 2022-01-07 PROCEDURE — 97110 THERAPEUTIC EXERCISES: CPT | Mod: GP | Performed by: PHYSICAL THERAPIST

## 2022-01-07 NOTE — PROGRESS NOTES
Physical Therapy Initial Evaluation  Subjective:    Patient Health History  Angy Haji being seen for Low back pain, leg pain, toe numbness.  Right side..     Problem began: 6/1/2021.   Problem occurred: Gradual onset over time   Pain is reported as 5/10 on pain scale.  General health as reported by patient is good.  Pertinent medical history includes: changes in bowel/bladder, depression, menopausal, numbness/tingling, overweight, pain at night/rest, sleep disorder/apnea and other. Other medical history details: Heart palpitations.     Medical allergies: other. Other medical allergies details: Penicillin, thimerasol, medication intolerance.   Surgeries include:  Other. Other surgery history details: Gallbladder removal, ovary removal, vein proceedure.    Current medications:  High blood pressure medication, hormone replacement therapy and other. Other medications details: Mood stabilizets.    Current occupation is Psychotherapist.   Primary job tasks include:  Computer work and prolonged sitting.                  Therapist Generated HPI Evaluation  Problem details: MD order 12/22/21.     Angy continues have weakness going up and down the stairs. Summer she had numbness last 3 toes in her right foot- with longer walking gets worse. Some shooting pain down the right side while sleeping, soreness in her lower back. She did keep up with her exercises for a while but slipped away.     .         Type of problem:  Lumbar.    This is a chronic condition.  Condition occurred with:  Degenerative joint disease.    Patient reports pain:  Lumbar spine right and lower lumbar spine.  Pain is described as shooting and aching and is intermittent.  Pain radiates to:  Gluteals right, thigh right, knee right, lower leg right and foot right. Pain is worse during the day and worse during the night.  Since onset symptoms are gradually improving.  Associated symptoms:  Loss of motion/stiffness, numbness, tingling and loss of  strength. Symptoms are exacerbated by walking and lying down (stairs)  Relieved by: right now nothing. was given muscle relaxant.  Special tests included:  MRI.  Previous treatment includes other (corstisone shot).   Restrictions due to condition include:  Working in normal job without restrictions.  Barriers include:  None as reported by patient.      Oswestry Score: 18 %                 Objective:  System         Lumbar/SI Evaluation  ROM:    AROM Lumbar:   Flexion:            Till ankle no pain - stretching in her lower back mentioned  Ext:                    50% limited pain level 3-4/10   Side Bend:        Left:  WFL no pain    Right:  WFL no pain  Rotation:           Left:  WFL no pain    Right:  WFL no pain  Side Glide:        Left:     Right:         Strength: Abdominal MMT 2/5, Poor core stabilization demonstrted  Lumbar Myotomes:  normal            Lumbar DTR's:  normal      Cord Signs:  normal    Lumbar Dermtomes:  normal                Neural Tension/Mobility:  Lumbar:  Normal        Lumbar Palpation:      Tenderness present at Right: Erector Spinae and Piriformis    Lumbar Provocation:      Right positive with: [place] w/ext                                      Hip Evaluation    Hip Strength:    Flexion:   Left: 5/5   Pain:  Right: 4+/5   Pain:                    Extension:  Left: 5-/5  Pain:Right: 4+/5    Pain:    Abduction:  Left: 4+/5     Pain:Right: 4/5    Pain:  Adduction:  Left: 5/5    Pain:Right: 5/5   Pain:  Internal Rotation:  Left: 5/5    Pain:Right: 5/5   Pain:  External Rotation:  Left: 5/5   Pain:  Right: 5/5   Pain:  Knee Flexion:  Left: 5/5   Pain:Right: 5/5   Pain:  Knee Extension:  Left: 5/5   Pain:Right: 5/5    Pain:                         Kenny Lumbar Evaluation    Posture:  Sitting: fair  Standing: fair  Lordosis: Accentuated  Lateral Shift: no  Correction of Posture: better    Movement Loss:  Flexion (Flex): nil  Extension (EXT): mod and pain  Side [place] R (SG R): nil  Side [place]  L (SG L): nil  Test Movements:  FIS: During: no effect  After: no effect    Repeat FIS: During: abolishes  After: better    EIS: During: increases  After: no worse    Repeat EIS: During: increases  After: worse              Conclusion: posture and derangement  Principle of Treatment:  Posture Correction: sitting with back support, ear shoulder alignment edcuated  Flexion: encouraged- no pain  Extension: avoided- increased pain level                                           ROS    Assessment/Plan:    Patient is a 56 year old female with lumbar complaints.    Patient has the following significant findings with corresponding treatment plan.                Diagnosis 1:  Low back pain- Facet arthropathy, L4-5 stenosis/ radiculopathy  Pain -  hot/cold therapy, manual therapy, STS, self management, education, directional preference exercise and home program  Decreased ROM/flexibility - manual therapy, therapeutic exercise, therapeutic activity and home program  Decreased strength - therapeutic exercise, therapeutic activities and home program  Inflammation - cold therapy and self management/home program  Impaired gait - gait training and home program  Decreased function - therapeutic activities and home program    Therapy Evaluation Codes:   1) History comprised of:   Personal factors that impact the plan of care:      Time since onset of symptoms.    Comorbidity factors that impact the plan of care are:      check HPI.     Medications impacting care: check HPI.  2) Examination of Body Systems comprised of:   Body structures and functions that impact the plan of care:      Lumbar spine.   Activity limitations that impact the plan of care are:      Stairs, Walking and Sleeping.  3) Clinical presentation characteristics are:   Stable/Uncomplicated.  4) Decision-Making    Low complexity using standardized patient assessment instrument and/or measureable assessment of functional outcome.  Cumulative Therapy Evaluation is: Low  complexity.    Previous and current functional limitations:  (See Goal Flow Sheet for this information)    Short term and Long term goals: (See Goal Flow Sheet for this information)     Communication ability:  Patient appears to be able to clearly communicate and understand verbal and written communication and follow directions correctly.  Treatment Explanation - The following has been discussed with the patient:   RX ordered/plan of care  Anticipated outcomes  Possible risks and side effects  This patient would benefit from PT intervention to resume normal activities.   Rehab potential is good.    Frequency:  1 X week, once daily  Duration:  for 6 weeks  Discharge Plan:  Achieve all LTG.  Independent in home treatment program.  Reach maximal therapeutic benefit.    Please refer to the daily flowsheet for treatment today, total treatment time and time spent performing 1:1 timed codes.

## 2022-01-12 ENCOUNTER — VIRTUAL VISIT (OUTPATIENT)
Dept: SLEEP MEDICINE | Facility: CLINIC | Age: 57
End: 2022-01-12
Payer: COMMERCIAL

## 2022-01-12 VITALS — BODY MASS INDEX: 43.4 KG/M2 | HEIGHT: 69 IN | WEIGHT: 293 LBS

## 2022-01-12 DIAGNOSIS — G47.33 OBSTRUCTIVE SLEEP APNEA (ADULT) (PEDIATRIC): Primary | ICD-10-CM

## 2022-01-12 PROCEDURE — 99213 OFFICE O/P EST LOW 20 MIN: CPT | Mod: 95 | Performed by: INTERNAL MEDICINE

## 2022-01-12 ASSESSMENT — SLEEP AND FATIGUE QUESTIONNAIRES
HOW LIKELY ARE YOU TO NOD OFF OR FALL ASLEEP WHILE SITTING QUIETLY AFTER LUNCH WITHOUT ALCOHOL: WOULD NEVER DOZE
HOW LIKELY ARE YOU TO NOD OFF OR FALL ASLEEP WHILE LYING DOWN TO REST IN THE AFTERNOON WHEN CIRCUMSTANCES PERMIT: WOULD NEVER DOZE
HOW LIKELY ARE YOU TO NOD OFF OR FALL ASLEEP WHILE WATCHING TV: WOULD NEVER DOZE
HOW LIKELY ARE YOU TO NOD OFF OR FALL ASLEEP WHILE SITTING AND TALKING TO SOMEONE: WOULD NEVER DOZE
HOW LIKELY ARE YOU TO NOD OFF OR FALL ASLEEP WHEN YOU ARE A PASSENGER IN A CAR FOR AN HOUR WITHOUT A BREAK: WOULD NEVER DOZE
HOW LIKELY ARE YOU TO NOD OFF OR FALL ASLEEP WHILE SITTING INACTIVE IN A PUBLIC PLACE: WOULD NEVER DOZE
HOW LIKELY ARE YOU TO NOD OFF OR FALL ASLEEP WHILE SITTING AND READING: WOULD NEVER DOZE
HOW LIKELY ARE YOU TO NOD OFF OR FALL ASLEEP IN A CAR, WHILE STOPPED FOR A FEW MINUTES IN TRAFFIC: WOULD NEVER DOZE

## 2022-01-12 ASSESSMENT — ANXIETY QUESTIONNAIRES
2. NOT BEING ABLE TO STOP OR CONTROL WORRYING: MORE THAN HALF THE DAYS
5. BEING SO RESTLESS THAT IT IS HARD TO SIT STILL: SEVERAL DAYS
6. BECOMING EASILY ANNOYED OR IRRITABLE: SEVERAL DAYS
3. WORRYING TOO MUCH ABOUT DIFFERENT THINGS: MORE THAN HALF THE DAYS
1. FEELING NERVOUS, ANXIOUS, OR ON EDGE: MORE THAN HALF THE DAYS
GAD7 TOTAL SCORE: 10
7. FEELING AFRAID AS IF SOMETHING AWFUL MIGHT HAPPEN: SEVERAL DAYS
4. TROUBLE RELAXING: SEVERAL DAYS

## 2022-01-12 ASSESSMENT — MIFFLIN-ST. JEOR: SCORE: 2053.52

## 2022-01-12 NOTE — PROGRESS NOTES
Angy is a 56 year old who is being evaluated via a billable video visit.      How would you like to obtain your AVS? MyChart  If the video visit is dropped, the invitation should be resent by: Send to e-mail at: [email or web address]  Will anyone else be joining your video visit? No      Video Start Time: 3:02 PM  Video-Visit Details    Type of service:  Video Visit    Video End Time:3:18 PM    Originating Location (pt. Location): Home    Distant Location (provider location):  [facility]     Platform used for Video Visit: "[employer]     Chief complaint: Follow-up obstructive sleep apnea    History of Present Illness: 56-year-old female with past medical history notable for obstructive sleep apnea with daytime sleepiness, bipolar disorder, obesity.  Since her last visit she has had more problems with her back and has been having to sleep more on her back.  She also had her machine recalled by the .  She has already received a replacement machine.  She notes that it no longer seems to be having the auto on feature.  She is wondering if she can get that engaged.  She feels that CPAP continues to work well for her.  She uses it nightly.  Denies any problems with mask fit.  She continues to get up about twice a night to go to the bathroom.  Usually she can return to sleep okay.  She typically uses a light blocking mask at home which is helpful.  Bedtime is around 10-10 30 with rise time around 630.  On occasion she will have difficulty initiating sleep or early morning awakenings.  She does not take naps.  She denies new sleep concerns.      Boring Sleepiness Scale  Total score - Boring: 0 (1/12/2022  2:59 PM)   (Less than 10 normal)    Insomnia Severity Scale  MICHAEL Total Score: 6  (normal 0-7, mild 8-14, moderate 15-21, severe 22-28)    Past Medical History:   Diagnosis Date     Disorders of bursae and tendons in shoulder region, unspecified      Female infertility of unspecified  origin      Gallbladder sludge 5/2013     Mild sleep apnea 5/13/2014     Obesity, unspecified      Oral aphthae      Other acne      Other specified hypoglycemia      PONV (postoperative nausea and vomiting)        Allergies   Allergen Reactions     Seroquel [Quetiapine] Nausea and Vomiting     Had constant vomiting     Olanzapine      Tongue welling, bad EPSE (Parkinsonian)     Penicillins Rash     Risperidone      Tongue welling, bad EPSE (Parkinsonian)       Thimerosal Other (See Comments)     blisters on inside of eyelid       Current Outpatient Medications   Medication     Carboxymethylcellulose Sod PF (REFRESH PLUS) 0.5 % SOLN ophthalmic solution     cyclobenzaprine (FLEXERIL) 10 MG tablet     estradiol (VAGIFEM) 10 MCG TABS vaginal tablet     estrogen conj-medroxyPROGESTERone (PREMPRO) 0.625-2.5 MG tablet     lamoTRIgine (LAMICTAL) 200 MG tablet     lidocaine (LIDODERM) 5 % patch     lisinopril (ZESTRIL) 10 MG tablet     lithium (ESKALITH) 150 MG capsule     lithium 300 MG capsule     Loratadine 10 MG capsule     metroNIDAZOLE (METROGEL) 1 % external gel     ORDER FOR DME     VITAMIN D, CHOLECALCIFEROL, PO     No current facility-administered medications for this visit.       Social History     Socioeconomic History     Marital status:      Spouse name: Danielle Zamarripa     Number of children: 1     Years of education: Masters     Highest education level: Not on file   Occupational History     Occupation: Psychotherapist     Employer: [employer]   Tobacco Use     Smoking status: Never Smoker     Smokeless tobacco: Never Used     Tobacco comment: non smoke home   Substance and Sexual Activity     Alcohol use: Yes     Comment: 2 Drinks Per Month     Drug use: No     Sexual activity: Yes     Partners: Female     Birth control/protection: None   Other Topics Concern     Parent/sibling w/ CABG, MI or angioplasty before 65F 55M? No   Social History Narrative    8/18/09    Balanced  Diet - Yes    Osteoporosis Prevention Measures - Dairy servings per day: 2 and Medication/Supplements (See current meds)    Regular Exercise -  Yes Describe Walking, swimming, biking.     Dental Exam - YES - Date: 5/2009    Eye Exam - YES - Date: 8/2009    Self Breast Exam - No    Abuse: Current or Past (Physical, Sexual or Emotional)- No    Do you feel safe in your environment - Yes    Guns stored in the home - No    Sunscreen used - Yes    Seatbelts used - Yes    Lipids -  YES - Date: 8/10/09    Glucose -  YES - Date: 8/10/09    Colon Cancer Screening - No    Hemoccults - NO    Pap Test -  YES - Date: 7/6/06, no Hx of abnormal paps.    Do you have any concerns about STD's -  No    Mammography - YES - Date: 8/5/09    DEXA - NO    Immunizations reviewed and up to date - Yes, Tdap given 1/22/08    Amara Hall MA                         Social Determinants of Health     Financial Resource Strain: Not on file   Food Insecurity: Not on file   Transportation Needs: Not on file   Physical Activity: Not on file   Stress: Not on file   Social Connections: Not on file   Intimate Partner Violence: Not At Risk     Fear of Current or Ex-Partner: No     Emotionally Abused: No     Physically Abused: No     Sexually Abused: No   Housing Stability: Not on file       Family History   Problem Relation Age of Onset     Cardiovascular Paternal Grandfather         anyerism     Obesity Paternal Grandfather      Respiratory Paternal Grandfather         pulmonary (air sacs hardening)     Allergies Father         hayfever     Lipids Father         diet therapy     Gallbladder Disease Father         cholecystectomy      Eye Disorder Father         retinal tear x 2      Prostate Cancer Father      Alzheimer Disease Maternal Grandmother         ? poor STM     Arthritis Maternal Grandmother      Heart Disease Maternal Grandmother         Arrythmia-got a pacemaker at age 89     Arthritis Mother      Eye Disorder Mother          "Cataract/macular tear in one eye     LUNG DISEASE Mother         bronchiectasis from pneumonia     Atrial fibrillation Mother      Cancer Paternal Grandmother         Uterine CA- as well as her sister     Gynecology Paternal Grandmother         prolapsed uterus- had 10 kids     Obesity Paternal Grandmother      Breast Cancer Maternal Aunt      Other Cancer Paternal Aunt 80        endo. cancer            EXAM:  Ht 1.74 m (5' 8.5\")   Wt 139.7 kg (308 lb)   BMI 46.15 kg/m    GENERAL: Alert and no distress  EYES: Eyes grossly normal to inspection.  No discharge or erythema, or obvious scleral/conjunctival abnormalities.  RESP: No audible wheeze, cough, or visible cyanosis.  No visible retractions or increased work of breathing.    SKIN: Visible skin clear. No significant rash, abnormal pigmentation or lesions.  NEURO: Cranial nerves grossly intact.  Mentation and speech appropriate for age.  PSYCH: Mentation appears normal, affect normal, judgement and insight intact, normal speech and appearance well-groomed.       PSG 3/2/2011  Wt 241 lbs  AHI 5.1, Supine AHI 14.5, non supine AHI 1    PAP download from 12/11/2021 to 1/9/2022 reviewed:  Per cent of days used greater than 4 Hours 100% (minimum goal greater than 70%)  Average use on days used: 7 hours 50 min  Settings: Min EPAP 9 cmH2O    Max EPAP 12 cmH2O  Pressures delivered 90/95th percentile for pressure 11.9 cmH2O  Average AHI 1 events per hour (goal less than 5)  Leak acceptable    ASSESSMENT:  56-year-old female with history of overall mild sleep apnea and daytime sleepiness.  She is getting excellent clinical benefit and meeting compliance goals with CPAP.  She would like to have auto on feature engaged on her replacement machine.  Sleep apnea is adequately treated despite sleeping on her back.    PLAN:  I went into Care  and made changes to her auto on feature.  Patient to try it tonight and tomorrow night.  If it does not seem to engage she should " contact [facility].  I also put in orders for replacement supplies as needed.  Patient is encouraged to continue to use CPAP all night every night.  Continue her efforts at weight management.  Contact me should any of her insomnia issues progress or any new sleep issues arise.  Otherwise follow-up in 1 year.      26 minutes spent on the date of the encounter doing chart review, history and exam, documentation and further activities per the note    Nellie Calloway M.D.  Pulmonary/Critical Care/Sleep Medicine    [facility]   Floor 1, [address]   [address] [place] [address]. [place] [address]   Appointments: [phone]    The above note was dictated using voice recognition software and may include typographical errors. Please contact the author for any clarifications.

## 2022-01-12 NOTE — PATIENT INSTRUCTIONS
For general sleep health questions:   http://sleepeducation.org    For tips about PAP and COVID-19:  https://www.[facility].org/patients/patient-resources/resources/covid-19-and-home-pap-therapy.pdf    For general info about COVID-19 including vaccines:  https://[employer].org/covid19        Continue PAP therapy every night, for all hours that you are sleeping (including naps.)  As always, try to get at least 8 hours of sleep or more each day, keep a regular sleep schedule, and avoid sleep deprivation. Avoid alcohol.    Reasons that you might need a change to your pressure therapy would be weight gain or loss, waking having inadvertently removed your PAP overnight, having previously felt refreshed by sleep with CPAP use and now waking un-refreshed, and return of daytime sleepiness. Also, the development of new medical problems  (such as heart failure, stroke, medications such as narcotics) can sometimes affect breathing at night and change your PAP therapy needs.    Please bring PAP with you if you are hospitalized.  If anticipating surgery be sure to discuss with your surgeon that you have sleep apnea and use PAP therapy.      Maintain your equipment as recommended which includes routine cleaning and replacement of supplies.      Call DME for any questions regarding supplies or maintenance.    [facility], [facility] [phone]      Do not drive on engage in potentially dangerous activities if feeling sleepy.    Please follow up in sleep clinic again in 12 months.        Tips for your PAP use-    Mask fitting tips  Mask fitting exercise:    To improve your mask seal and your mobility at night, put mask on and secure in place.  Lie down in bed with full pressure and roll to one side, adjust headgear while in that position to eliminate any leaks. Repeat process rolling to other side.     The mask seal does not have to be perfect:   CPAP machines are designed to make up  for small leaks. However, you will not tolerate leaks blowing in your eyes so you will need to adjust.   Any leak should only be near or at the bottom of the mask.  We expect your mask to leak slightly at night.    Do not over-tighten the headgear straps, tighter IS NOT better, we expect minimal leak.    First try re-positioning the mask or headgear before tightening the headgear straps.  Mask leaks are expected due to changing sleeping positions. Try pulling the mask away from your skin allowing the cushion to re-inflate will minimize the leak.  If you struggle for a good fit, try turning the CPAP off and then readjust the mask by pulling it away from your face and then turning back on the CPAP.        Humidifier tips  Humidifiers can be adjusted to increase or decrease the amount of moisture according to your comfort level. You may need to adjust this frequently at first, but then might only change it with seasonal weather changes.     Try INCREASING the humidity if:  You experience a dry, irritated nasal passage or throat.  You have a runny, drippy nose or sneezing fits after using CPAP.  You experience nasal congestion during or after CPAP use.    Try DECREASING the humidity if:  You have excessive condensation or  rain out  in the tubing or mask.  Otherwise keep the tubing warm during the night by running it underneath the blankets or pillow.      Clinic visit after initial PAP set-up   Bring your equipment with you to your 5-8 week follow up clinic visit.  We will be extracting your data from the machine if not available from the cloud based modem.        Travel  Always take your equipment with you when you travel.  If you fly with your equipment bring it on with you as a carry on.  Medical equipment does not count as a carry on.    If you travel international the machines take 110-240v.  The only adapter needed is the adapter that will fit into the receptacle (outlet).    You may also want to bring an  extension cord as many hotel rooms have limited outlets at the bedside.  Do not travel with water in your humidifier chamber.     Cleaning and Maintenance Guidelines    Equipment Frequency Cleaning Method   Mask First Day    Daily      Weekly Soak mask in hot soapy water for 30 minutes, rinse and air dry.  Wipe nasal cushion with a hot soapy (Ivory, baby shampoo) cloth and rinse.  Baby wipes may also be used.  Do not use anti-bacterial soaps,Reina  liquid soap, rubbing alcohol, bleach or ammonia.  Wash frame in hot soapy water (Ivory, baby shampoo) rinse and let air dry   Headgear Biweekly Wash in hot soapy water, rinse and air dry   Reusable Gray Filter Weekly Wash in hot soapy water, rinse, put in towel squeeze moisture out, let air dry   Disposable White Filter Check Weekly Replace when brown or gray in color; at least every 2 to 3 months   Humidifier Chamber Daily    Weekly Empty distilled water from humidifier and let air dry    Hand wash in hot soapy water, rinse and air dry   Tubing Weekly Wash in hot soapy water, rinse and let air dry   Mask, Tubing and Humidifier Chamber As needed Disinfect: Soak in 1 part distilled white vinegar to 3 parts hot water for 30 minutes, rinse well and air dry  Not the material headgear        MASK AND SUPPLY REORDERING and EQUIPMENT NEEDS through your DME and per your insurance  Reminder: Most insurance companies will allow for a new mask, headgear, tubing, and reusable gray filter every six months.  Disposable white ultra-fine filters are covered monthly.      HOME AND SAFETY INSTRUCTIONS    Do not use frayed or cracked electrical cords, multi plug adaptors, or switched receptacles    Do not immerse electrical equipment into water    Assure that electrical cords do not become a tripping hazard            Your BMI is Body mass index is 46.15 kg/m .  Weight management is a personal decision.  If you are interested in exploring weight loss strategies, the following discussion  covers the approaches that may be successful. Body mass index (BMI) is one way to tell whether you are at a healthy weight, overweight, or obese. It measures your weight in relation to your height.  A BMI of 18.5 to 24.9 is in the healthy range. A person with a BMI of 25 to 29.9 is considered overweight, and someone with a BMI of 30 or greater is considered obese. More than two-thirds of American adults are considered overweight or obese.  Being overweight or obese increases the risk for further weight gain. Excess weight may lead to heart disease and diabetes.  Creating and following plans for healthy eating and physical activity may help you improve your health.  Weight control is part of healthy lifestyle and includes exercise, emotional health, and healthy eating habits. Careful eating habits lifelong are the mainstay of weight control. Though there are significant health benefits from weight loss, long-term weight loss with diet alone may be very difficult to achieve- studies show long-term success with dietary management in less than 10% of people. Attaining a healthy weight may be especially difficult to achieve in those with severe obesity. In some cases, medications, devices and surgical management might be considered.  What can you do?  If you are overweight or obese and are interested in methods for weight loss, you should discuss this with your provider.     Consider reducing daily calorie intake by 500 calories.     Keep a food journal.     Avoiding skipping meals, consider cutting portions instead.    Diet combined with exercise helps maintain muscle while optimizing fat loss. Strength training is particularly important for building and maintaining muscle mass. Exercise helps reduce stress, increase energy, and improves fitness. Increasing exercise without diet control, however, may not burn enough calories to loose weight.       Start walking three days a week 10-20 minutes at a time    Work towards  walking thirty minutes five days a week     Eventually, increase the speed of your walking for 1-2 minutes at time    And look into health and wellness programs that may be available through your health insurance provider, employer, local community center, or [employer]ol[employer] club.    Weight management plan: Patient was referred to their PCP to discuss a diet and exercise plan.

## 2022-01-14 ENCOUNTER — THERAPY VISIT (OUTPATIENT)
Dept: PHYSICAL THERAPY | Facility: CLINIC | Age: 57
End: 2022-01-14
Payer: COMMERCIAL

## 2022-01-14 DIAGNOSIS — M48.061 SPINAL STENOSIS AT L4-L5 LEVEL: Primary | ICD-10-CM

## 2022-01-14 PROCEDURE — 97110 THERAPEUTIC EXERCISES: CPT | Mod: GP | Performed by: PHYSICAL THERAPIST

## 2022-01-14 PROCEDURE — 97112 NEUROMUSCULAR REEDUCATION: CPT | Mod: GP | Performed by: PHYSICAL THERAPIST

## 2022-01-17 ASSESSMENT — PATIENT HEALTH QUESTIONNAIRE - PHQ9: SUM OF ALL RESPONSES TO PHQ QUESTIONS 1-9: 6

## 2022-01-18 NOTE — PROGRESS NOTES
Video- Visit Details  Type of service:  video visit for medication management  Time of service:    Date:  01/25/2022    Video Start Time:  8:33 AM        Video End Time:  9:00 AM    Reason for video visit:  Patient has requested telehealth visit  Originating Site (patient location):  [facility] [place]   Location- Patient's home  Distant Site (provider location):  [facility]  Mode of Communication:  Video Conference via AmWell  Consent:  Patient has given verbal consent for video visit?: Yes        [facility]  Psychiatry Clinic  PSYCHIATRY PROGRESS NOTE     CARE TEAM:  PCP- Magali Lira, Psychotherapist- [facility] Part with Verna Chan  Specialty Providers- OB/GYN, ENT, PT, Nutrition      Wife- Rachael Haji is a 56 year old who prefers the name Angy and uses pronouns she, her, hers.      DIAGNOSIS     Bipolar 1 disorder, current depressive episode, moderate   H/o EPSE secondary to neuroleptic use  H/o Thrombocytopenia secondary to Depakote  H/o lithium toxicity, resolved     ASSESSMENT   Today, Angy reports improved mood and enjoyment in little daily activities. She states that the Lamictal increase to 200mg has been very helpful and beneficial. She also reports that she's been utilizing more stress reduction skills to cope with her workload and things are going well at couples counseling. Due to benefit, we will keep Lamictal at current dose. Patient is still experiencing intermittent shortness of breath and racing heart beat. Her cardiologist recommended ignoring them since work up was negative. He also recommended incresae in activity level. No SI, HI, or acute concerns.     Future considerations might be to taper and discontinue Lithium if well tolerated by patient and to reduce the burden on the kidney (patient is also on an ACEI- lisinopril). Last renal function test was wnl (09/2021). Next [place] labs due 03/2022    [place] review was not needed today.  Patient is not taking any controlled substances.     PLAN                                                                                                                1) Meds-  - Continue Lithium 1050mg at bedtime  - Continue Lamictal 200mg     2) Psychotherapy-Continue individual therapy    3) Next due-  Labs- Li labs due 03/2022  EKG- PRN  Rating scales- N/A    4) Referrals-  None    5) Dispo- RTC in 6-8 weeks       PERTINENT BACKGROUND                           [most recent eval 08/22/21]    Patient has not had significant mental health history until 2018, when she developed perimenopausal mood and anxiety symptoms.  She was subsequently referred to our clinic following a hospitalization for anthony in November 2018.  Her care has been complicated by sensitivity to antipsychotics (EPSE including difficulty swallowing), thrombocytopenia secondary to Depakote, lithium toxicity in setting of concurrent Depakote use).    Psych pertinent item history includes Manic episode(h/o one manic episode Nov/Dec 2018), psychiatric hospitalization in November 2018.     SUBJECTIVE   Patient presented for a follow up.    Since last visit:  -Reports that she's experiencing improved mood and enjoying doing smaller day to day activities.  -She states that she's still experiencing dread about doing some things like going on a camper cabin trip.  -Reports that she and wife are doing better and have been been attending couples counseling.  -She's been exploring negative self-talk with her therapist and has made new goals. Reports that excessive worry ebbs and flows. She has also been employing skills to help with stress management.  -She's been intermittently experiencing shortness of breath which Cardiologist recommends to ignore and increase level of activity.  -She reports that she's planning to taper Prempro but will be replacing it with something else to reduce estrogen exposure over time.    RECENT PSYCH ROS:   Depression:  improving mood, less anhedonia, and more energy.  Elevated:  none  Psychosis:  none  Anxiety:  excessive worry  Trauma Related:  none  Sleep: Reports that she's sleeping okay and only needs to get up twice to get to the bathroom. She's able to go right back to sleep.  Other: N/A    Adverse Effects: Vivid dreams have reduced in frequency (one/ week).   Pertinent Negative Symptoms: No suicidal ideation, self-injurious behavior/urges or violent ideation  Recent Substance Use:   Alcohol- 1 drink (beer or wine) per week or 2 weeks.     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   to wifeRachael. They have a teenage son.     PSYCH and SUBSTANCE USE Critical Summary Points since July 2021 08/26/2021: Resident transfer visit. Increased Lithium to 1200mg.  10/15/2021: decrease Lithium back to 1050 mg  11/02/2021: Lamictal titration plan started  11/29/2021: Increase Lamictal to 100 mg for 7 days and then increase to 150 mg  12/14/2021: Increase Lamictal to 200mg  01/24/2022: No medication changes     PAST MED TRIALS      Medication Max Dose (mg) Dates / Duration Helpful? DC Reason / Adverse Effects?   Sertraline       Depakote    Tremors   Olanzapine    EPSE   Seroquel    EPSE   Lithium 1050 current          MEDICAL HISTORY and ALLERGY     ALLERGIES: Seroquel [quetiapine], Olanzapine, Penicillins, Risperidone, and  Thimerosal    Patient Active Problem List   Diagnosis     Oral aphthae     CARDIOVASCULAR SCREENING; LDL GOAL LESS THAN 160     Rosacea     Raynaud disease     Vitamin D deficiency disease     Mild sleep apnea     Atopic rhinitis     Chronic bilateral low back pain without sciatica     Obesity, Class III, BMI 40-49.9 (morbid obesity) (H)     Muscle tension dysphonia     Palpitations     Dysphagia, unspecified type     Anxiety     Benign essential hypertension     Callus of foot     Varicose veins of right lower extremity with edema     Bipolar I disorder (H)     Dribbling of urine     It band syndrome, right     Spinal stenosis at L4-L5 level     Facet arthropathy, lumbar        MEDICAL REVIEW OF SYSTEMS   Contraception- Not assessed. Patient is on Prempro     MEDICATIONS     Current Outpatient Medications   Medication Sig Dispense Refill     Carboxymethylcellulose Sod PF (REFRESH PLUS) 0.5 % SOLN ophthalmic solution Place 1 drop into both eyes 3 times daily as needed for dry eyes       cyclobenzaprine (FLEXERIL) 10 MG tablet Take 1 tablet (10 mg) by mouth nightly as needed for muscle spasms 10 tablet 0     estradiol (VAGIFEM) 10 MCG TABS vaginal tablet Place 1 tablet (10 mcg) vaginally twice a week 25 tablet 3     estrogen conj-medroxyPROGESTERone (PREMPRO) 0.625-2.5 MG tablet Take 1 tablet by mouth daily 90 tablet 3     lamoTRIgine (LAMICTAL) 200 MG tablet Take 1 tablet (200 mg) by mouth daily 30 tablet 1     lidocaine (LIDODERM) 5 % patch Place 1-2 patches onto the skin every 24 hours To prevent lidocaine toxicity, patient should be patch free for 12 hrs daily. 30 patch 1     lisinopril (ZESTRIL) 10 MG tablet Take 1 tablet (10 mg) by mouth daily 90 tablet 3     lithium (ESKALITH) 150 MG capsule Take 1 capsule (150 mg) by mouth At Bedtime 30 capsule 1     lithium 300 MG capsule Take 3 capsules (900 mg) by mouth At Bedtime 90 capsule 1     Loratadine 10 MG capsule Take 10 mg by mouth daily as needed.        "metroNIDAZOLE (METROGEL) 1 % external gel Apply topically daily 60 g 9     ORDER FOR DME Use your CPAP device as directed by your provider.       VITAMIN D, CHOLECALCIFEROL, PO Take 2,000 Units by mouth daily        VITALS   There were no vitals taken for this visit.    MENTAL STATUS EXAM     Alertness: alert and oriented  Appearance: well groomed  Behavior/Demeanor: cooperative, with good  eye contact   Speech: regular rate and rhythm  Language: intact  Psychomotor: normal or unremarkable  Mood: \"okay\"  Affect: appropriate; congruent to: mood- yes, content- yes  Thought Process/Associations: linear and logical  Thought Content:  Reports none;  Denies suicidal ideation and violent ideation  Perception:  Reports none;  Denies auditory hallucinations and visual hallucinations  Insight: good  Judgment: good  Cognition: does  appear grossly intact; formal cognitive testing was not done  Gait and Station: N/A Telehealth     LABS and DATA     PHQ9 TODAY = 6  PHQ 1/17/2022 1/17/2022 1/23/2022   PHQ-9 Total Score 6 6 6   Q9: Thoughts of better off dead/self-harm past 2 weeks Not at all Not at all Not at all       Recent Labs   Lab Test 09/03/21  1007 05/05/21  0853 02/03/21  1019   CR 0.61 0.56 0.58   GFRESTIMATED >90 >90 >90     Recent Labs   Lab Test 02/03/21  1019 07/15/20  1617 10/15/19  1037   AST 13 12 13   ALT 17 18 18   ALKPHOS 88 84 61     Recent Labs   Lab Test 09/03/21  1007 05/05/21  0853 02/03/21  1019   LITHIUM 0.8 0.78 0.67     Recent Labs   Lab Test 09/03/21  1007 05/05/21  0853   CR 0.61 0.56   GFRESTIMATED >90 >90    138   POTASSIUM 4.3 4.3   SHERRIE 9.4 9.6     Recent Labs   Lab Test 02/03/21  1011 07/15/20  1633   SG 1.011 1.008     Recent Labs   Lab Test 09/03/21  1007 05/05/21  0853   TSH 2.31 2.61     Recent Labs   Lab Test 09/03/21  1007 02/03/21  1019 07/15/20  1617   WBC 6.4 7.7 8.3   ANEU  --  5.1 5.6       ECG :10/25/2021 QTc 442ms     PSYCHOTROPIC DRUG INTERACTIONS     Concurrent use of " LITHIUM and ACE INHIBITORS may result in increased risk of lithium toxicity.     Estrogen Derivatives may decrease the serum concentration of LamoTRIgine       MANAGEMENT:  Monitoring for adverse effects, routine labs, periodic EKGs and patient is aware of risks     RISK STATEMENT for SAFETY     Angy Haji did not appear to be an imminent safety risk to self or others.    TREATMENT RISK STATEMENT: The risks, benefits, alternatives and potential adverse effects have been discussed and are understood by the pt. The pt understands the risks of using street drugs or alcohol. There are no medical contraindications, the pt agrees to treatment with the ability to do so. The pt knows to call the clinic for any problems or to access emergency care if needed.  Medical and substance use concerns are documented above.  Psychotropic drug interaction check was done, including changes made today.     PROVIDER: Tolulope Oluwadamilola Odebunmi, MD    Patient not staffed in clinic.  Note will be reviewed and signed by supervisor Dr. Liu.      Level of Medical Decision Making:   - At least 1 chronic problem that is not stable  - Engaged in prescription drug management during visit (discussed any medication benefits, side effects, alternatives, etc.)        Must meet 2 out of 3 of the above MDM elements to bill at the specified level     For help more info about elements / criteria, click here-> MDM Help Grid     *This blue text will disappear automatically when note is signed, no need to delete*:[id number]}  [id number]}  :[id number]}

## 2022-01-19 ASSESSMENT — PATIENT HEALTH QUESTIONNAIRE - PHQ9: SUM OF ALL RESPONSES TO PHQ QUESTIONS 1-9: 6

## 2022-01-19 ASSESSMENT — ANXIETY QUESTIONNAIRES: GAD7 TOTAL SCORE: 10

## 2022-01-23 ASSESSMENT — ANXIETY QUESTIONNAIRES
GAD7 TOTAL SCORE: 10
GAD7 TOTAL SCORE: 10
4. TROUBLE RELAXING: SEVERAL DAYS
6. BECOMING EASILY ANNOYED OR IRRITABLE: SEVERAL DAYS
GAD7 TOTAL SCORE: 10
3. WORRYING TOO MUCH ABOUT DIFFERENT THINGS: MORE THAN HALF THE DAYS
7. FEELING AFRAID AS IF SOMETHING AWFUL MIGHT HAPPEN: SEVERAL DAYS
2. NOT BEING ABLE TO STOP OR CONTROL WORRYING: MORE THAN HALF THE DAYS
5. BEING SO RESTLESS THAT IT IS HARD TO SIT STILL: SEVERAL DAYS
1. FEELING NERVOUS, ANXIOUS, OR ON EDGE: MORE THAN HALF THE DAYS
7. FEELING AFRAID AS IF SOMETHING AWFUL MIGHT HAPPEN: SEVERAL DAYS

## 2022-01-23 ASSESSMENT — PATIENT HEALTH QUESTIONNAIRE - PHQ9
SUM OF ALL RESPONSES TO PHQ QUESTIONS 1-9: 6
SUM OF ALL RESPONSES TO PHQ QUESTIONS 1-9: 6
10. IF YOU CHECKED OFF ANY PROBLEMS, HOW DIFFICULT HAVE THESE PROBLEMS MADE IT FOR YOU TO DO YOUR WORK, TAKE CARE OF THINGS AT HOME, OR GET ALONG WITH OTHER PEOPLE: SOMEWHAT DIFFICULT

## 2022-01-24 ENCOUNTER — HOSPITAL ENCOUNTER (OUTPATIENT)
Dept: NUTRITION | Facility: CLINIC | Age: 57
Discharge: HOME | End: 2022-01-24
Attending: DIETITIAN, REGISTERED | Admitting: STUDENT IN AN ORGANIZED HEALTH CARE EDUCATION/TRAINING PROGRAM
Payer: COMMERCIAL

## 2022-01-24 PROCEDURE — 97803 MED NUTRITION INDIV SUBSEQ: CPT | Mod: GT,95 | Performed by: DIETITIAN, REGISTERED

## 2022-01-24 NOTE — PROGRESS NOTES
Video-Visit Details    Type of service:  Video Visit    Video Start Time (time video started): 4:33    Video End Time (time video stopped): 5:00    Originating Location (pt. Location): Home    Distant Location (provider location):  [facility] NUTRITION SERVICES     Mode of Communication:  Video Conference via [facility]    NUTRITION REASSESSMENT NOTE (TELEPHONE VISIT DUE TO COVID-19)     REASON FOR ASSESSMENT   Angy LILIAN Haji referred by Dr. Odell for MNT related to  Obesity (BMI 30-39.9) [E66.9]  - Primary       Benign essential hypertension [I10]       Patient accompanied by self (via telephone)     NUTRITION HISTORY  Patient continues with multiple life changes and stressors.  Patient aware that she likes to self soothe with ice cream which she has recently increased.  Patient is aware that social cues trigger her to eat. Patient's spouse recently has weight loss surgery so there is adjust with eating in the home.      5/19 Patient walks 30 minutes most days + weekend 60 minute exercise (biking or hiking); lower back pain -foot going numb when walking and pain at night; dessert (3 days no dessert); asking self if hungry.  Patient made Malt o Meal as substitute for dessert.  Patient limiting dessert if eating dinner later or brushing teeth right after dinner and tracking dessert on calendar.  Patient had 4 day celebration for her birthday and felt was able to make wise choices.  Patient made cream cheese brownies for birthday dessert and froze remaining dessert.  Patient continues to navigate new eating regimen with spouses's weight loss surgery.      7/14/2021 Success-taking walking routes with hills.   Evening meals consisting of grilled chicken/pork loin, grilled sweet potatoes and 2 vegetables   Patient having ice cream thoughts in afternoon-to self soothe.  Patient practicing distractions to avoid eating ice cream at nigt (brushing teeth, asking self if hungry, substituted Malt 0 Meal,  mineral water, too tired).       9/8/2021 Patient walking daily + 60 minutes 1 time per week.  Did well at [facility]. Hiking 1-4.6 miles. Weight loss during trip-308 lbs. Today 310 lbs. Grilled meat and vegetable + fruit. 1-5 days per week with no desserts.   Breakfast: yogurt + 1/2 cup granola + fruit   Lunch: leftovers (meat + vegetables) or cheese and Triscuits + vegetables + fruit   Dinner: BLT (3 slices sierra) + 2 corn on the cob + watermelon + tomatoes; tacos + watermelon/strawberries + cucumbers      12/13/2021 No desserts 4 times per week -met; emotional eating and Thanksgiving desserts where times when increased desserts.  Patient baked cookies-peanut butter blossom 1 batch (2 servings raw dough + 1 cookie) and froze them.  Patient walking outside and would like to walk indoors on treadmill.  Patient and spouse alter meal planning.      1/24/2021 Dessert (4 days) past 2 weeks; walking regularly-movement in the house if not able to walk outside; Weight-1/17 307.2 lbs  Patient continues to work remote.  Patient tracking dessert intake.      DIET RECALL   Breakfast: cereal and banana; yogurt + granola (measures), fruit + coffee; 2 slices toast with butter + 1/2 cup cottage cheese    Lunch: leftovers (includes fruit and vegetable daily)  Snack: fruit or cheese with lower salt nut thin crackers   Dinner: Tuna steak, Asian marinade, stir gomez, baked vegetable + brown rice (3/4 cup) + canned peaches; chicken thighs with beans and rice; sheet pan dinner with chicken and vegetables or grilled salmon and grilled vegetables; taco; protein + fruit and vegetable  Snacks: fruit, string cheese, Ritz crackers or yogurt; dessert in the evening (ice cream recently)  Beverages: mainly water, coffee, mineral water  Dining out: none currently  Exercise: Patient exercising 5-6 times per week. (cross country skiing, snow shoeing, walking and walking dog).  Patient exercises 1 hour on weekends (hiking or  "walking).     MEDICATIONS:  Reviewed      ANTHROPOMETRICS  Height: 5'8\" (note height change in chart previously 5'9\")  Weight: 307 lbs per patient   BMI (kg/m2): 46.8 kg/m2   %IBW: 220%  Weight History:   Wt Readings from Last 10 Encounters:   01/12/22 139.7 kg (308 lb)   12/22/21 137.4 kg (303 lb)   10/25/21 137.6 kg (303 lb 4.8 oz)   10/22/21 139.7 kg (308 lb)   10/08/21 139.9 kg (308 lb 6.4 oz)   10/08/21 139.7 kg (308 lb)   08/27/21 139.3 kg (307 lb)   08/27/21 141.5 kg (312 lb)   06/02/21 141.9 kg (312 lb 12.8 oz)   04/23/21 142.1 kg (313 lb 3.2 oz)     ASSESSED NUTRITION NEEDS  Estimated Energy Needs: 6590-7230 kcals (15-20 kcals/kg) for gradual weight loss   Estimated Protein Needs: 66-79 (1-1.2 gm/kg IBW) for repletion with exercise  RMR: 1440     EVALUATION/PROGRESS TOWARDS GOALS   Previous goals:  No desserts 3 days per week (1-2 cookie on other days); post holidays resume no desserts 4 times per week -improving   Walk 30 minutes per day 5 days per week -met  Walk on treadmill 1 day per week -not met      Previous Nutrition Diagnosis: Obesity related to excess energy intake as evidenced by BMI of 46.8 kg/m2-no change     Current Nutrition Diagnosis:Obesity related to excess energy intake as evidenced by BMI of 46.8 kg/m2     INTERVENTIONS   Nutrition Prescription   Recommend exercise 5 times per week; track intake if eating mindlessly; increase fiber to 25-30 grams per day      Implementation:   Meals and Snacks: Continue eating 3 meals + deliberate snacking (if hungry)   Nutrition Education (Content):               a)  Discussed progress towards goals.  Congratulated patient for tracking dessert intake.  Reviewed current food and eating behavior challenges.  Supported patient in her struggle with food and weight.    Nutrition Education (Application):               a) Discussed afternoon snack options.                          b) Patient verbalized understanding of diet by stating will reduce desserts to 4 " times per week.   Expected patient engagement: good     CURRENT GOALS  Try video exercises by next appointment   Treadmill 1 time per week  Try new snacks options at 2:30-3:00     FOLLOW UP/MONITORING    Patient to follow up in 6 weeks  Patient has RD contact information      Time spent with patient: 27 minutes     Mattie Amaya, RD, LD  [facility] Outpatient Dietitian  [phone] (office phone)

## 2022-01-25 ENCOUNTER — TELEPHONE (OUTPATIENT)
Dept: PSYCHIATRY | Facility: CLINIC | Age: 57
End: 2022-01-25
Payer: COMMERCIAL

## 2022-01-25 ENCOUNTER — VIRTUAL VISIT (OUTPATIENT)
Dept: PSYCHIATRY | Facility: CLINIC | Age: 57
End: 2022-01-25
Attending: PSYCHIATRY & NEUROLOGY
Payer: COMMERCIAL

## 2022-01-25 DIAGNOSIS — F31.9 BIPOLAR I DISORDER (H): ICD-10-CM

## 2022-01-25 PROCEDURE — 99214 OFFICE O/P EST MOD 30 MIN: CPT | Mod: HN | Performed by: STUDENT IN AN ORGANIZED HEALTH CARE EDUCATION/TRAINING PROGRAM

## 2022-01-25 RX ORDER — LITHIUM CARBONATE 150 MG/1
150 CAPSULE ORAL AT BEDTIME
Qty: 30 CAPSULE | Refills: 1 | Status: SHIPPED | OUTPATIENT
Start: 2022-01-25 | End: 2022-03-18

## 2022-01-25 RX ORDER — LITHIUM CARBONATE 300 MG/1
900 CAPSULE ORAL AT BEDTIME
Qty: 90 CAPSULE | Refills: 1 | Status: SHIPPED | OUTPATIENT
Start: 2022-01-25 | End: 2022-03-18

## 2022-01-25 RX ORDER — LAMOTRIGINE 200 MG/1
200 TABLET ORAL DAILY
Qty: 30 TABLET | Refills: 1 | Status: SHIPPED | OUTPATIENT
Start: 2022-01-25 | End: 2022-03-18

## 2022-01-25 ASSESSMENT — PAIN SCALES - GENERAL: PAINLEVEL: NO PAIN (0)

## 2022-01-25 NOTE — TELEPHONE ENCOUNTER
On January 25, 2022, at 7:45 AM, writer called patient at mobile to confirm Virtual Visit. Writer unable to make contact with patient. Writer left detailed voice message for call back. [phone] left as call back number. Barrington Almaraz, EMT

## 2022-01-25 NOTE — PATIENT INSTRUCTIONS
Treatment Plan  -Continue all medications    RTC in 6-8 weeks      **For crisis resources, please see the information at the end of this document**   Patient Education    Thank you for coming to the [facility] MENTAL HEALTH & ADDI[facility] [place] [facility].    Lab Testing:  If you had lab testing today and your results are reassuring or normal they will be mailed to you or sent through [employer] within 7 days. If the lab tests need quick action we will call you with the results. The phone number we will call with results is # [phone] (home) [phone] (work). If this is not the best number please call our clinic and change the number.    Medication Refills:  If you need any refills please call your pharmacy and they will contact us. Our fax number for refills is [phone]. Please allow three business for refill processing. If you need to  your refill at a new pharmacy, please contact the new pharmacy directly. The new pharmacy will help you get your medications transferred.     Scheduling:  If you have any concerns about today's visit or wish to schedule another appointment please call our office during normal business hours [phone] (8-5:00 M-F)    Contact Us:  Please call [phone] during business hours (8-5:00 M-F).  If after clinic hours, or on the weekend, please call  [phone].    Financial Assistance [phone]  [facility] Billing [phone]  Central Billing Office, [facility]: [phone]  [facility] Billing [phone]  Medical Records [phone]  [facility] Patient Bill of Rights [email or web address]       MENTAL HEALTH CRISIS NUMBERS:  For a medical emergency please call  911 or go to the nearest ER.     [facility]:   [facility] -[phone]   Crisis Residence [facility] -[phone]   Walk-In Counseling Center [facility] [place] -[phone]   COPE 24/7 [facility] Team  -[phone] (adults)/[phone] (child)  [facility]: [facility] - [phone]      [facility]:   [facility] - [phone]   Walk-in counseling [facility] - [phone]   Walk-in counseling [facility] - [phone]   Crisis Residence [facility] - [phone]  Urgent Care Adult Mental Oqssdr-[phone] mobile unit/ 24/7 crisis line    National Crisis Numbers:   National Suicide Prevention Lifeline: 3-851-611-TALK (717-724-5717)  Poison Control Center - 1-212.100.4467  [employer]My[employer]/resources for a list of additional resources (SOS)  Trans Lifeline a hotline for transgender people 0-907-173-1512  The [place] Project a hotline for LGBT youth 0-560-736-6798  Crisis Text Line: For any crisis 24/7   To: 525179  see www.crisistextline.org  - IF MAKING A CALL FEELS TOO HARD, send a text!         Again thank you for choosing [facility] M[facility] HE[facility] & [facility] [place] [facility] and please let us know how we can best partner with you to improve you and your family's health.    You may be receiving a survey regarding this appointment. We would love to have your feedback, both positive and negative. The survey is done by an external company, so your answers are anonymous.

## 2022-01-25 NOTE — PROGRESS NOTES
"Answers for HPI/ROS submitted by the patient on 1/23/2022  If you checked off any problems, how difficult have these problems made it for you to do your work, take care of things at home, or get along with other people?: Somewhat difficult  PHQ9 TOTAL SCORE: 6  JOANNA 7 TOTAL SCORE: 10    VIDEO VISIT  Angy Haji is a 56 year old patient that has consented to receive services via billable video visit.      The patient has been notified of following:   \"This video visit will be conducted via a call between you and your physician/provider. We have found that certain health care needs can be provided without the need for an in-person physical exam. This service lets us provide the care you need with a video conversation. If a prescription is necessary we can send it directly to your pharmacy. If lab work is needed we can place an order for that and you can then stop by our lab to have the test done at a later time. Insurers are generally covering virtual visits as they would in-office visits so billing should not be different than normal.  If for some reason you do get billed incorrectly, you should contact the billing office to correct it and that number is in the AVS .    Patient will join video visit via:  [employer]ou (Patient / guardian confirmed to join via Tudou)    If patient attempts to join the video via [employer]ou at appointment start time, but is unable to, they would prefer that the provider send them a video invitation via:   Send to preferred e-mail: [email or web address]      How would patient like to obtain AVS?:  MyChart    "

## 2022-01-31 ENCOUNTER — TELEPHONE (OUTPATIENT)
Dept: OBGYN | Facility: CLINIC | Age: 57
End: 2022-01-31
Payer: COMMERCIAL

## 2022-01-31 DIAGNOSIS — N95.1 PERIMENOPAUSAL VASOMOTOR SYMPTOMS: Primary | ICD-10-CM

## 2022-01-31 NOTE — TELEPHONE ENCOUNTER
Prior Authorization Retail Medication Request    Medication/Dose: premarin  ICD code (if different than what is on RX):  N951  Previously Tried and Failed:  unknown  Rationale:  unknown    Insurance Name:  [employer]  Insurance ID:  [id number]      Pharmacy Information (if different than what is on RX)  Name:  [facility]  Phone:  [phone]

## 2022-02-02 NOTE — TELEPHONE ENCOUNTER
Can you find out if I need to do anything to help this patient get her HRT?  Angy Haji is a 56 year old  postmenopausal female with bipolar disorder who has significant vasomotor symptoms of menopause which was negatively affecting her mood. They have previously been well controlled on antonino-pro but I understand the formulary changed. Thus I tried to prescribe the estrogen and progesterone as separate components at the same dose she was previously taking. This is NOT a new medication for her. I don't have a list so I don't know what is now on the formulary. This process if frustrating as I feel like I am just guessing as to what may be covered.   Romy Min MD

## 2022-02-04 ENCOUNTER — THERAPY VISIT (OUTPATIENT)
Dept: PHYSICAL THERAPY | Facility: CLINIC | Age: 57
End: 2022-02-04
Payer: COMMERCIAL

## 2022-02-04 DIAGNOSIS — M48.061 SPINAL STENOSIS AT L4-L5 LEVEL: Primary | ICD-10-CM

## 2022-02-04 PROCEDURE — 97110 THERAPEUTIC EXERCISES: CPT | Mod: GP | Performed by: PHYSICAL THERAPIST

## 2022-02-04 PROCEDURE — 97112 NEUROMUSCULAR REEDUCATION: CPT | Mod: GP | Performed by: PHYSICAL THERAPIST

## 2022-02-04 NOTE — TELEPHONE ENCOUNTER
Central Prior Authorization Team   Phone: [phone]      PA Initiation    Medication: premarin  Insurance Company: [employer] - Phone [phone] Fax [phone]  Pharmacy Filling the Rx: [place], [place] - [address]  Filling Pharmacy Phone: [phone]  Filling Pharmacy Fax:    Start Date: 2/4/2022    Called and spoke with Don at [employer], completed PA via phone.  Case [id number]

## 2022-02-07 RX ORDER — ESTRADIOL 1 MG/1
1 TABLET ORAL DAILY
Qty: 90 TABLET | Refills: 3 | Status: SHIPPED | OUTPATIENT
Start: 2022-02-07 | End: 2022-10-04

## 2022-02-07 NOTE — TELEPHONE ENCOUNTER
PRIOR AUTHORIZATION DENIED    Medication: premarin-DENIED    Denial Date: 2/4/2022    Denial Rational: Excluded medication. Alternatives: estradiol tablets      Appeal Information:

## 2022-02-07 NOTE — TELEPHONE ENCOUNTER
PA was sent and denied.  Do you want to so an appeal? A letter of medical necessity will need to be written and placed in the patient's chart.  Jeannette Salazar RN

## 2022-03-04 ENCOUNTER — THERAPY VISIT (OUTPATIENT)
Dept: PHYSICAL THERAPY | Facility: CLINIC | Age: 57
End: 2022-03-04
Payer: COMMERCIAL

## 2022-03-04 DIAGNOSIS — M48.061 SPINAL STENOSIS AT L4-L5 LEVEL: Primary | ICD-10-CM

## 2022-03-04 PROCEDURE — 97112 NEUROMUSCULAR REEDUCATION: CPT | Mod: GP | Performed by: PHYSICAL THERAPIST

## 2022-03-04 PROCEDURE — 97110 THERAPEUTIC EXERCISES: CPT | Mod: GP | Performed by: PHYSICAL THERAPIST

## 2022-03-04 NOTE — PROGRESS NOTES
Subjective:  HPI  Physical Exam                    Objective:  System    Physical Exam    General     ROS    Assessment/Plan:    PROGRESS  REPORT    Progress reporting period is from 1/7/22 to 3/4/22.       SUBJECTIVE  Subjective changes noted by patient:  Subjective: Angy mentions the numbness in her right lateral two toes is the same no improvement.     Current pain level is  Current Pain level: 5/10.     Previous pain level was    .   Changes in function:  Yes, but plateaued  Adverse reaction to treatment or activity: None    OBJECTIVE  Changes noted in objective findings:  Yes, but plateaued  Objective: Radicular symptom down the right side still present. L/S AROM is WFL, feels better with double knee flexion, reviewed her exercises today but advsied to see her proivder to perform further evaluation as the radicular symptom are not improving     ASSESSMENT/PLAN  Updated problem list and treatment plan: Diagnosis 1:  LBP radiating to right foot  Decreased strength - therapeutic exercise, therapeutic activities and home program  Decreased function - therapeutic activities and home program  Radicular symptoms, NMR, TE, HEP  STG/LTGs have been met or progress has been made towards goals:  Yes, with no improvement with radicular symptoms  Assessment of Progress: The patient's condition is improving.  The patient's condition has potential to improve.  The patient's progress has plateaued.  Self Management Plans:  Patient has been instructed in a home treatment program.  Patient  has been instructed in self management of symptoms.  I have re-evaluated this patient and find that the nature, scope, duration and intensity of the therapy is appropriate for the medical condition of the patient.  Angy continues to require the following intervention to meet STG and LTG's:  PT intervention is no longer required to meet STG/LTG.    Recommendations:  This patient would benefit from further evaluation.    Please refer to the  daily flowsheet for treatment today, total treatment time and time spent performing 1:1 timed codes.

## 2022-03-07 ENCOUNTER — HOSPITAL ENCOUNTER (OUTPATIENT)
Dept: NUTRITION | Facility: CLINIC | Age: 57
Discharge: HOME | End: 2022-03-07
Attending: DIETITIAN, REGISTERED | Admitting: DIETITIAN, REGISTERED
Payer: COMMERCIAL

## 2022-03-07 PROCEDURE — 97803 MED NUTRITION INDIV SUBSEQ: CPT | Mod: GT,95 | Performed by: DIETITIAN, REGISTERED

## 2022-03-07 NOTE — PROGRESS NOTES
Video-Visit Details    Type of service:  Video Visit    Video Start Time (time video started): 3:02    Video End Time (time video stopped): 3:25    Originating Location (pt. Location): Home    Distant Location (provider location):  [facility] NUTRITION SERVICES     Mode of Communication:  Video Conference via [facility]    NUTRITION REASSESSMENT NOTE (TELEPHONE VISIT DUE TO COVID-19)     REASON FOR ASSESSMENT   Angy LILIAN Haji referred by Dr. Odell for MNT related to  Obesity (BMI 30-39.9) [E66.9]  - Primary       Benign essential hypertension [I10]       Patient accompanied by self (via telephone)     NUTRITION HISTORY  Patient continues with multiple life changes and stressors.  Patient aware that she likes to self soothe with ice cream which she has recently increased.  Patient is aware that social cues trigger her to eat. Patient's spouse recently has weight loss surgery so there is adjust with eating in the home.      5/19 Patient walks 30 minutes most days + weekend 60 minute exercise (biking or hiking); lower back pain -foot going numb when walking and pain at night; dessert (3 days no dessert); asking self if hungry.  Patient made Malt o Meal as substitute for dessert.  Patient limiting dessert if eating dinner later or brushing teeth right after dinner and tracking dessert on calendar.  Patient had 4 day celebration for her birthday and felt was able to make wise choices.  Patient made cream cheese brownies for birthday dessert and froze remaining dessert.  Patient continues to navigate new eating regimen with spouses's weight loss surgery.      7/14/2021 Success-taking walking routes with hills.   Evening meals consisting of grilled chicken/pork loin, grilled sweet potatoes and 2 vegetables   Patient having ice cream thoughts in afternoon-to self soothe.  Patient practicing distractions to avoid eating ice cream at nigt (brushing teeth, asking self if hungry, substituted Malt 0 Meal,  mineral water, too tired).       9/8/2021 Patient walking daily + 60 minutes 1 time per week.  Did well at [facility]. Hiking 1-4.6 miles. Weight loss during trip-308 lbs. Today 310 lbs. Grilled meat and vegetable + fruit. 1-5 days per week with no desserts.   Breakfast: yogurt + 1/2 cup granola + fruit   Lunch: leftovers (meat + vegetables) or cheese and Triscuits + vegetables + fruit   Dinner: BLT (3 slices sierra) + 2 corn on the cob + watermelon + tomatoes; tacos + watermelon/strawberries + cucumbers      12/13/2021 No desserts 4 times per week -met; emotional eating and Thanksgiving desserts where times when increased desserts.  Patient baked cookies-peanut butter blossom 1 batch (2 servings raw dough + 1 cookie) and froze them.  Patient walking outside and would like to walk indoors on treadmill.  Patient and spouse alter meal planning.       1/24/2021 Dessert (4 days) past 2 weeks; walking regularly-movement in the house if not able to walk outside; Weight-1/17 307.2 lbs  Patient continues to work remote.  Patient tracking dessert intake.     3/7/2022 Self soothe in evening snack-made tea or tells self ate late dinner and does not need snack.  Patient asking self if  physically hungry.  Patient drinking fizzy water or malt o meal as evening snack.  Patient aware she has been stress eating.  Tracking when not having dessert -identifying why eating  Exercise-cross country skiing; mall walk; snow shoeing; treadmill 2 times   Sharing treadmill   Yogurt; cheese and crackers; cereal     Patient wants to increase exercise time as will be going to [place] weekend ([place]) [facility]      DIET RECALL   Breakfast: cereal and banana; yogurt + granola (measures), fruit + coffee; 2 slices toast with butter + 1/2 cup cottage cheese    Lunch: leftovers (includes fruit and vegetable daily)  Snack: fruit or cheese with lower salt nut thin crackers   Dinner: Tuna steak, Asian marinade, stir gomez, baked vegetable +  "brown rice (3/4 cup) + canned peaches; chicken thighs with beans and rice; sheet pan dinner with chicken and vegetables or grilled salmon and grilled vegetables; taco; protein + fruit and vegetable  Snacks: fruit, string cheese, Ritz crackers or yogurt; dessert in the evening (ice cream recently)  Beverages: mainly water, coffee, mineral water  Dining out: none currently  Exercise: Patient exercising 5-6 times per week. (cross country skiing, snow shoeing, walking and walking dog).  Patient exercises 1 hour on weekends (hiking or walking).     MEDICATIONS:  Reviewed      ANTHROPOMETRICS  Height: 5'8\" (note height change in chart previously 5'9\")  Weight: 307 lbs per patient   BMI (kg/m2): 46.8 kg/m2   %IBW: 220%  Weight History:   Wt Readings from Last 10 Encounters:   01/12/22 139.7 kg (308 lb)   12/22/21 137.4 kg (303 lb)   10/25/21 137.6 kg (303 lb 4.8 oz)   10/22/21 139.7 kg (308 lb)   10/08/21 139.9 kg (308 lb 6.4 oz)   10/08/21 139.7 kg (308 lb)   08/27/21 139.3 kg (307 lb)   08/27/21 141.5 kg (312 lb)   06/02/21 141.9 kg (312 lb 12.8 oz)   04/23/21 142.1 kg (313 lb 3.2 oz)      ASSESSED NUTRITION NEEDS  Estimated Energy Needs: 6142-8383 kcals (15-20 kcals/kg) for gradual weight loss   Estimated Protein Needs: 66-79 (1-1.2 gm/kg IBW) for repletion with exercise  RMR: 1440     EVALUATION/PROGRESS TOWARDS GOALS   Previous goals:  Try video exercises by next appointment -not met  Treadmill 1 time per week-improving   Try new snacks options at 2:30-3:00-improving      Previous Nutrition Diagnosis: Obesity related to excess energy intake as evidenced by BMI of 46.8 kg/m2-no change     Current Nutrition Diagnosis:Obesity related to excess energy intake as evidenced by BMI of 46.8 kg/m2     INTERVENTIONS   Nutrition Prescription   Recommend exercise 5 times per week; track intake if eating mindlessly; increase fiber to 25-30 grams per day      Implementation:   Meals and Snacks: Continue eating 3 meals + deliberate " snacking (if hungry)   Nutrition Education (Content):               a)  Discussed progress towards goals.  Congratulated patient for tracking dessert intake.  Reviewed current food and eating behavior challenges.  Supported patient in her struggle with food and weight.    Nutrition Education (Application):               a) Discussed afternoon snack options.                          b) Patient verbalized understanding of diet by stating will plan snacks.  Expected patient engagement: good     CURRENT GOALS  Try video exercises by next appointment   Treadmill 1 time per week  Try new snacks options at 2:30-3:00   Interval walking     FOLLOW UP/MONITORING    Patient to follow up in 6 weeks  Patient has RD contact information      Time spent with patient: 23 minutes     Mattie Amaya, RD, LD  [facility] Outpatient Dietitian  [phone] (office phone)

## 2022-03-16 ASSESSMENT — ANXIETY QUESTIONNAIRES
2. NOT BEING ABLE TO STOP OR CONTROL WORRYING: MORE THAN HALF THE DAYS
7. FEELING AFRAID AS IF SOMETHING AWFUL MIGHT HAPPEN: NOT AT ALL
3. WORRYING TOO MUCH ABOUT DIFFERENT THINGS: MORE THAN HALF THE DAYS
GAD7 TOTAL SCORE: 8
6. BECOMING EASILY ANNOYED OR IRRITABLE: SEVERAL DAYS
5. BEING SO RESTLESS THAT IT IS HARD TO SIT STILL: NOT AT ALL
4. TROUBLE RELAXING: SEVERAL DAYS
GAD7 TOTAL SCORE: 8
7. FEELING AFRAID AS IF SOMETHING AWFUL MIGHT HAPPEN: NOT AT ALL
GAD7 TOTAL SCORE: 8
1. FEELING NERVOUS, ANXIOUS, OR ON EDGE: MORE THAN HALF THE DAYS

## 2022-03-16 NOTE — PROGRESS NOTES
Video- Visit Details  Type of service:  video visit for medication management  Time of service:    Date:  03/18/2022    Video Start Time:  9:11 AM        Video End Time:  9:36 AM    Reason for video visit:  Patient has requested telehealth visit  Originating Site (patient location):  [facility] [place]   Location- Patient's home  Distant Site (provider location):  [facility]  Mode of Communication:  Video Conference via [employer]  Consent:  Patient has given verbal consent for video visit?: Yes        [facility]  Psychiatry Clinic  PSYCHIATRY PROGRESS NOTE     CARE TEAM:  PCP- Magali Lira, Psychotherapist- [facility] Part with Verna Chan  Specialty Providers- OB/GYN, ENT, PT, Nutrition      Wife- Rachael Haji is a 54 year old who prefers the name Angy and uses pronouns she, her, hers.      DIAGNOSIS     Bipolar 1 disorder, current depressive episode, moderate   H/o EPSE secondary to neuroleptic use  H/o Thrombocytopenia secondary to Depakote  H/o lithium toxicity, resolved     ASSESSMENT      Angy reports that depression continues to improve with increase in energy and enjoyment from doing things. Though she reports increasing irritability and pronounced difficulty with recall of past events without prompts. Patient endorses that stressors have worsened and might beb contributing to irritability and that forgetting is something she has dealt with year after year and has been tracking her cognitive function with yearly MoCA assessments. MMSE today was 27 because we left out some parts due to difficulty of completing them over a virtual platform and time constraints. The plan is to do a full MoCA assessment at her next appointment in 6 weeks. It's unclear if irritability is a side effect of medication regimen or current stressors. We will keep medication doses as they are because of benefit that patient is experiencing and continue to monitor irritability trend. No SI,  HI, or acute concerns.     Future considerations might be to taper and discontinue Lithium if well tolerated by patient and to reduce the burden on the kidney (patient is also on an ACEI- lisinopril). Last renal function test was wnl (09/2021). Next Lithium labs due this month.    [place] review was not needed today. Patient is not taking any controlled substances.     PLAN                                                                                                                1) Meds-  - Continue Lithium 1050mg at bedtime  - Continue Lamictal 200mg     2) Psychotherapy-Continue individual therapy    3) Next due-  Labs- Li labs due 03/2022  EKG- PRN  Rating scales- N/A    4) Referrals-  None    5) Dispo- RTC in 6 weeks       PERTINENT BACKGROUND                           [most recent eval 08/22/21]    Patient has not had significant mental health history until 2018, when she developed perimenopausal mood and anxiety symptoms.  She was subsequently referred to our clinic following a hospitalization for anthony in November 2018.  Her care has been complicated by sensitivity to antipsychotics (EPSE including difficulty swallowing), thrombocytopenia secondary to Depakote, lithium toxicity in setting of concurrent Depakote use).    Psych pertinent item history includes Manic episode(h/o one manic episode Nov/Dec 2018), psychiatric hospitalization in November 2018.     SUBJECTIVE   Patient presented for a follow up.    Since last visit:  -States that depression is better, doing more activities like cooking more and doing dishes. She's noticed that she's been experiencing increased irritability which might be due to increasing stressors and some trouble remembering some remote activities without prompts.She adds that it seems to be worsening.  -She's still able to enjoy social activities with friends and personal activities like watching TV.  -She's going to start tapering her hormone replacement from next month. She'll  be using topical estrogen as an alternative.    RECENT PSYCH ROS:   Depression: improving mood, less anhedonia, and more energy.  Elevated:  none  Psychosis:  none  Anxiety:  excessive worry  Trauma Related:  none  Sleep: Reports that she's sleeping okay  Other: N/A    Adverse Effects: Vivid dreams have reduced in frequency (one/ week).   Pertinent Negative Symptoms: No suicidal ideation, self-injurious behavior/urges or violent ideation  Recent Substance Use:   Alcohol- 1 drink (beer or wine) per week or 2 weeks.     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   to wifeRachael. They have a teenage son.     PSYCH and SUBSTANCE USE Critical Summary Points since July 2021 08/26/2021: Resident transfer visit. Increased Lithium to 1200mg.  10/15/2021: decrease Lithium back to 1050 mg  11/02/2021: Lamictal titration plan started  11/29/2021: Increase Lamictal to 100 mg for 7 days and then increase to 150 mg  12/14/2021: Increase Lamictal to 200mg  01/24/2022: No medication changes  03/18/2022: No medication changes     PAST MED TRIALS      Medication Max Dose (mg) Dates / Duration Helpful? DC Reason / Adverse Effects?   Sertraline       Depakote    Tremors   Olanzapine    EPSE   Seroquel    EPSE   Lithium 1050 current          MEDICAL HISTORY and ALLERGY     ALLERGIES: Seroquel [quetiapine], Olanzapine, Penicillins,  Risperidone, and Thimerosal    Patient Active Problem List   Diagnosis     Oral aphthae     CARDIOVASCULAR SCREENING; LDL GOAL LESS THAN 160     Rosacea     Raynaud disease     Vitamin D deficiency disease     Mild sleep apnea     Atopic rhinitis     Chronic bilateral low back pain without sciatica     Obesity, Class III, BMI 40-49.9 (morbid obesity) (H)     Muscle tension dysphonia     Palpitations     Dysphagia, unspecified type     Anxiety     Benign essential hypertension     Callus of foot     Varicose veins of right lower extremity with edema     Bipolar I disorder (H)     Dribbling of urine     It band syndrome, right     Spinal stenosis at L4-L5 level     Facet arthropathy, lumbar        MEDICAL REVIEW OF SYSTEMS   Contraception- Not assessed. Patient is on Prempro     MEDICATIONS     Current Outpatient Medications   Medication Sig Dispense Refill     Carboxymethylcellulose Sod PF (REFRESH PLUS) 0.5 % SOLN ophthalmic solution Place 1 drop into both eyes 3 times daily as needed for dry eyes       cyclobenzaprine (FLEXERIL) 10 MG tablet Take 1 tablet (10 mg) by mouth nightly as needed for muscle spasms 10 tablet 0     estradiol (ESTRACE) 1 MG tablet Take 1 tablet (1 mg) by mouth daily 90 tablet 3     estradiol (VAGIFEM) 10 MCG TABS vaginal tablet Place 1 tablet (10 mcg) vaginally twice a week 25 tablet 3     estrogen conj (PREMARIN) 0.625 MG tablet Take 1 tablet (0.625 mg) by mouth daily 90 tablet 3     estrogen conj-medroxyPROGESTERone (PREMPRO) 0.625-2.5 MG tablet Take 1 tablet by mouth daily 90 tablet 3     lamoTRIgine (LAMICTAL) 200 MG tablet Take 1 tablet (200 mg) by mouth daily 30 tablet 1     lidocaine (LIDODERM) 5 % patch Place 1-2 patches onto the skin every 24 hours To prevent lidocaine toxicity, patient should be patch free for 12 hrs daily. 30 patch 1     lisinopril (ZESTRIL) 10 MG tablet Take 1 tablet (10 mg) by mouth daily 90 tablet 3     lithium (ESKALITH) 150 MG capsule Take 1 capsule (150 mg)  "by mouth At Bedtime 30 capsule 1     lithium 300 MG capsule Take 3 capsules (900 mg) by mouth At Bedtime 90 capsule 1     Loratadine 10 MG capsule Take 10 mg by mouth daily as needed.       medroxyPROGESTERone (PROVERA) 2.5 MG tablet Take 1 tablet (2.5 mg) by mouth daily 90 tablet 3     metroNIDAZOLE (METROGEL) 1 % external gel Apply topically daily 60 g 9     ORDER FOR DME Use your CPAP device as directed by your provider.       VITAMIN D, CHOLECALCIFEROL, PO Take 2,000 Units by mouth daily        VITALS   There were no vitals taken for this visit.    MENTAL STATUS EXAM     Alertness: alert and oriented  Appearance: well groomed  Behavior/Demeanor: cooperative, with good  eye contact   Speech: regular rate and rhythm  Language: intact  Psychomotor: normal or unremarkable  Mood: \"okay\"  Affect: appropriate; congruent to: mood- yes, content- yes  Thought Process/Associations: linear and logical  Thought Content:  Reports none;  Denies suicidal ideation and violent ideation  Perception:  Reports none;  Denies auditory hallucinations and visual hallucinations  Insight: good  Judgment: good  Cognition: does  appear grossly intact; formal cognitive testing was not done  Gait and Station: N/A Telehealth     LABS and DATA     PHQ9 TODAY = N/A  JOANNA= 8  PHQ 1/17/2022 1/17/2022 1/23/2022   PHQ-9 Total Score 6 6 6   Q9: Thoughts of better off dead/self-harm past 2 weeks Not at all Not at all Not at all       Recent Labs   Lab Test 09/03/21  1007 05/05/21  0853 02/03/21  1019   CR 0.61 0.56 0.58   GFRESTIMATED >90 >90 >90     Recent Labs   Lab Test 02/03/21  1019 07/15/20  1617 10/15/19  1037   AST 13 12 13   ALT 17 18 18   ALKPHOS 88 84 61     Recent Labs   Lab Test 09/03/21  1007 05/05/21  0853 02/03/21  1019   LITHIUM 0.8 0.78 0.67     Recent Labs   Lab Test 09/03/21  1007 05/05/21  0853   CR 0.61 0.56   GFRESTIMATED >90 >90    138   POTASSIUM 4.3 4.3   SHERRIE 9.4 9.6     Recent Labs   Lab Test 02/03/21  1011 " 07/15/20  1633   SG 1.011 1.008     Recent Labs   Lab Test 09/03/21  1007 05/05/21  0853   TSH 2.31 2.61     Recent Labs   Lab Test 09/03/21  1007 02/03/21  1019 07/15/20  1617   WBC 6.4 7.7 8.3   ANEU  --  5.1 5.6       ECG :10/25/2021 QTc 442ms     PSYCHOTROPIC DRUG INTERACTIONS     Concurrent use of LITHIUM and ACE INHIBITORS may result in increased risk of lithium toxicity.     Estrogen Derivatives may decrease the serum concentration of LamoTRIgine       MANAGEMENT:  Monitoring for adverse effects, routine labs, periodic EKGs and patient is aware of risks     RISK STATEMENT for SAFETY     Angy Haji did not appear to be an imminent safety risk to self or others.    TREATMENT RISK STATEMENT: The risks, benefits, alternatives and potential adverse effects have been discussed and are understood by the pt. The pt understands the risks of using street drugs or alcohol. There are no medical contraindications, the pt agrees to treatment with the ability to do so. The pt knows to call the clinic for any problems or to access emergency care if needed.  Medical and substance use concerns are documented above.  Psychotropic drug interaction check was done, including changes made today.     PROVIDER: Tolulope Oluwadamilola Odebunmi, MD    Patient not staffed in clinic.  Note will be reviewed and signed by supervisor Dr. Liu.      Level of Medical Decision Making:   - At least 1 chronic problem that is not stable  - Engaged in prescription drug management during visit (discussed any medication benefits, side effects, alternatives, etc.)        Must meet 2 out of 3 of the above MDM elements to bill at the specified level     For help more info about elements / criteria, click here-> MDM Help Grid     *This blue text will disappear automatically when note is signed, no need to delete*:[id number]}  [id number]}  :[id number]}

## 2022-03-17 ASSESSMENT — ANXIETY QUESTIONNAIRES: GAD7 TOTAL SCORE: 8

## 2022-03-18 ENCOUNTER — VIRTUAL VISIT (OUTPATIENT)
Dept: PSYCHIATRY | Facility: CLINIC | Age: 57
End: 2022-03-18
Attending: PSYCHIATRY & NEUROLOGY
Payer: COMMERCIAL

## 2022-03-18 DIAGNOSIS — F31.9 BIPOLAR I DISORDER (H): ICD-10-CM

## 2022-03-18 PROCEDURE — 99214 OFFICE O/P EST MOD 30 MIN: CPT | Mod: HN | Performed by: STUDENT IN AN ORGANIZED HEALTH CARE EDUCATION/TRAINING PROGRAM

## 2022-03-18 RX ORDER — LAMOTRIGINE 200 MG/1
200 TABLET ORAL DAILY
Qty: 30 TABLET | Refills: 1 | Status: SHIPPED | OUTPATIENT
Start: 2022-03-18 | End: 2022-04-29

## 2022-03-18 RX ORDER — LITHIUM CARBONATE 150 MG/1
150 CAPSULE ORAL AT BEDTIME
Qty: 30 CAPSULE | Refills: 1 | Status: SHIPPED | OUTPATIENT
Start: 2022-03-18 | End: 2022-04-29

## 2022-03-18 RX ORDER — LITHIUM CARBONATE 300 MG/1
900 CAPSULE ORAL AT BEDTIME
Qty: 90 CAPSULE | Refills: 1 | Status: SHIPPED | OUTPATIENT
Start: 2022-03-18 | End: 2022-04-29

## 2022-03-18 NOTE — PROGRESS NOTES
Angy Haji is a 56 year old who has consented to receive services via billable video visit.      Pt will join video visit via: [employer]  If there are problems joining the visit, send backup video invite via: Send to preferred e-mail: [email or web address]      Originating Location (patient location): Patient's home  Distant Location (provider location): [facility] MENTAL [facility] & ADDICTION [facility]    Will anyone else be joining the video visit? No    How would you prefer to obtain AVS?: Lemko  Answers for HPI/ROS submitted by the patient on 3/16/2022  JOANNA 7 TOTAL SCORE: 8

## 2022-03-18 NOTE — PATIENT INSTRUCTIONS
Treatment Plan  Continue all medications    RTC in 6 weeks    **For crisis resources, please see the information at the end of this document**   Patient Education    Thank you for coming to the [facility] [place] [facility].    Lab Testing:  If you had lab testing today and your results are reassuring or normal they will be mailed to you or sent through [employer] within 7 days. If the lab tests need quick action we will call you with the results. The phone number we will call with results is # [phone]. If this is not the best number please call our clinic and change the number.     Medication Refills:  If you need any refills please call your pharmacy and they will contact us. Our fax number for refills is [phone]. Please allow three business days for refill processing.   If you need to change to a different pharmacy, please contact the new pharmacy directly. The new pharmacy will help you get your medications transferred.     Contact Us:  Please call [phone] during business hours (8-5:00 M-F).  If you have medication related questions after clinic hours, or on the weekend, please call [phone].    Financial Assistance [phone]  Medical Records [phone]       MENTAL HEALTH CRISIS RESOURCES:  For a emergency help, please call 911 or go to the nearest Emergency Department.     Emergency Walk-In Options:   Em[facility] Unit @ [facility] ([place]): [phone] - Specialized mental health emergency area designed to be calming  [facility] ([place]): [phone]  [facility] ([place]): [phone]  [facility]): [phone]    County Crisis Information:   [place]: [phone]  [place]: [phone]  [place] ([place]) - Adult: [phone]     Child: [phone]  [place] - Adult: [phone]     Child: [phone]  [place]: [phone]  List of all [facility] resources:    https://[place].gov/dhs/people-we-serve/adults/health-care/mental-health/resources/crisis-contacts.jsp    National Crisis Information:   Crisis Text Line: Text  [place]  to 207722  National Suicide Prevention Lifeline: 8-751-026-TALK (1-109.294.4641)       For online chat options, visit https://suicidepreventionlifeline.org/chat/  Poison Control Center: 7-799-254-7915  Trans Lifeline: 0-022-927-9489 - Hotline for transgender people of all ages  The [place] [employer]: 4-563-007-2231 - Hotline for LGBT youth     For Non-Emergency Support:   Fast Tracker: Mental Health & Substance Use Disorder Resources -   https://www.Flexenclosuren.org/

## 2022-04-25 ENCOUNTER — HOSPITAL ENCOUNTER (OUTPATIENT)
Dept: NUTRITION | Facility: CLINIC | Age: 57
Discharge: HOME | End: 2022-04-25
Attending: DIETITIAN, REGISTERED | Admitting: DIETITIAN, REGISTERED
Payer: COMMERCIAL

## 2022-04-25 PROCEDURE — 97803 MED NUTRITION INDIV SUBSEQ: CPT | Mod: GT,95 | Performed by: DIETITIAN, REGISTERED

## 2022-04-25 NOTE — PROGRESS NOTES
Video-Visit Details    Type of service:  Video Visit    Video Start Time (time video started): 3:00    Video End Time (time video stopped): 3:30    Originating Location (pt. Location): Home    Distant Location (provider location):  [facility] NUTRITION SERVICES     Mode of Communication:  Video Conference via [facility]    NUTRITION REASSESSMENT NOTE (TELEPHONE VISIT DUE TO COVID-19)     REASON FOR ASSESSMENT   Angy LILIAN Haji referred by Dr. Odell for MNT related to  Obesity (BMI 30-39.9) [E66.9]  - Primary       Benign essential hypertension [I10]       Patient accompanied by self (via telephone)     NUTRITION HISTORY  Patient continues with multiple life changes and stressors.  Patient aware that she likes to self soothe with ice cream which she has recently increased.  Patient is aware that social cues trigger her to eat. Patient's spouse recently has weight loss surgery so there is adjust with eating in the home.      5/19 Patient walks 30 minutes most days + weekend 60 minute exercise (biking or hiking); lower back pain -foot going numb when walking and pain at night; dessert (3 days no dessert); asking self if hungry.  Patient made Malt o Meal as substitute for dessert.  Patient limiting dessert if eating dinner later or brushing teeth right after dinner and tracking dessert on calendar.  Patient had 4 day celebration for her birthday and felt was able to make wise choices.  Patient made cream cheese brownies for birthday dessert and froze remaining dessert.  Patient continues to navigate new eating regimen with spouses's weight loss surgery.      7/14/2021 Success-taking walking routes with hills.   Evening meals consisting of grilled chicken/pork loin, grilled sweet potatoes and 2 vegetables   Patient having ice cream thoughts in afternoon-to self soothe.  Patient practicing distractions to avoid eating ice cream at nigt (brushing teeth, asking self if hungry, substituted Malt 0 Meal,  mineral water, too tired).       9/8/2021 Patient walking daily + 60 minutes 1 time per week.  Did well at [facility]. Hiking 1-4.6 miles. Weight loss during trip-308 lbs. Today 310 lbs. Grilled meat and vegetable + fruit. 1-5 days per week with no desserts.   Breakfast: yogurt + 1/2 cup granola + fruit   Lunch: leftovers (meat + vegetables) or cheese and Triscuits + vegetables + fruit   Dinner: BLT (3 slices sierra) + 2 corn on the cob + watermelon + tomatoes; tacos + watermelon/strawberries + cucumbers      12/13/2021 No desserts 4 times per week -met; emotional eating and Thanksgiving desserts where times when increased desserts.  Patient baked cookies-peanut butter blossom 1 batch (2 servings raw dough + 1 cookie) and froze them.  Patient walking outside and would like to walk indoors on treadmill.  Patient and spouse alter meal planning.       1/24/2021 Dessert (4 days) past 2 weeks; walking regularly-movement in the house if not able to walk outside; Weight-1/17 307.2 lbs  Patient continues to work remote.  Patient tracking dessert intake.      3/7/2022 Self soothe in evening snack-made tea or tells self ate late dinner and does not need snack.  Patient asking self if  physically hungry.  Patient drinking fizzy water or malt o meal as evening snack.  Patient aware she has been stress eating.  Tracking when not having dessert -identifying why eating  Exercise-cross country skiing; mall walk; snow shoeing; treadmill 2 times   Sharing treadmill   Yogurt; cheese and crackers; cereal      Patient wants to increase exercise time as will be going to [place] weekend ([place]) [facility]     4/25/2002 Patient able to get 45-60 minutes exercise on weekends and 20-30 minutes of exercise daily.  Patient wants to lose 5 lbs prior to trip to [place].  Patient was able to have 3 days no desserts and then 1 week no dessert. Hosted [employer] (2 desserts-cookies, lemon bars) so has increased sweet intake.  " Patient determined ways she could lose weight:   Dessert 2 times per week  30 minutes walking  Add treadmill  Eliminate morning snack  1 snack at 3 PM   Alcohol 1 time per week  No caramel lattes  Food is fuel  It's late too be hungry  Spouse bought sweet treats without asking   Tea/bath/showers       DIET RECALL   Breakfast: cereal and banana; yogurt + granola (measures), fruit + coffee; 2 slices toast with butter + 1/2 cup cottage cheese    Lunch: leftovers (includes fruit and vegetable daily)  Snack: fruit or cheese with lower salt nut thin crackers   Dinner: Tuna steak, Asian marinade, stir gomez, baked vegetable + brown rice (3/4 cup) + canned peaches; chicken thighs with beans and rice; sheet pan dinner with chicken and vegetables or grilled salmon and grilled vegetables; taco; protein + fruit and vegetable  Snacks: fruit, string cheese, Ritz crackers or yogurt; dessert in the evening (ice cream recently)  Beverages: mainly water, coffee, mineral water  Dining out: none currently  Exercise: Patient exercising 5-6 times per week. (cross country skiing, snow shoeing, walking and walking dog).  Patient exercises 1 hour on weekends (hiking or walking).     MEDICATIONS:  Reviewed      ANTHROPOMETRICS  Height: 5'8\" (note height change in chart previously 5'9\")  Weight: 307 lbs per patient   BMI (kg/m2): 46.8 kg/m2   %IBW: 220%  Weight History:      ASSESSED NUTRITION NEEDS  Estimated Energy Needs: 4163-0280 kcals (15-20 kcals/kg) for gradual weight loss   Estimated Protein Needs: 66-79 (1-1.2 gm/kg IBW) for repletion with exercise  RMR: 1440     EVALUATION/PROGRESS TOWARDS GOALS   Previous goals:  Try video exercises by next appointment -not met   Treadmill 1 time per week-not met   Try new snacks options at 2:30-3:00-improving   Interval walking -not met     Previous Nutrition Diagnosis: Obesity related to excess energy intake as evidenced by BMI of 46.8 kg/m2-no change     Current Nutrition Diagnosis:Obesity " related to excess energy intake as evidenced by BMI of 46.8 kg/m2     INTERVENTIONS   Nutrition Prescription   Recommend exercise 5 times per week; track intake if eating mindlessly; increase fiber to 25-30 grams per day      Implementation:   Meals and Snacks: Continue eating 3 meals + deliberate snacking (if hungry)   Nutrition Education (Content):               a)  Discussed progress towards goals.  Congratulated patient for tracking dessert intake.  Reviewed current food and eating behavior challenges.  Supported patient in her struggle with food and weight.    Nutrition Education (Application):               a) Discussed weight loss options for the next 4 weeks.              b) Patient verbalized understanding of diet by stating will plan snacks.  Expected patient engagement: good     CURRENT GOALS  Track 2 days po intake on the weekday and 1 day on the weekend   Limit desserts to 2-3 times per week   Add 45-60 minutes exercise     FOLLOW UP/MONITORING    Patient to follow up in 6 weeks  Patient has RD contact information      Time spent with patient: 30 minutes     Mattie Amaya, RD, LD  [facility] Outpatient Dietitian  [phone] (office phone)

## 2022-04-27 ASSESSMENT — ANXIETY QUESTIONNAIRES
GAD7 TOTAL SCORE: 9
6. BECOMING EASILY ANNOYED OR IRRITABLE: MORE THAN HALF THE DAYS
5. BEING SO RESTLESS THAT IT IS HARD TO SIT STILL: SEVERAL DAYS
GAD7 TOTAL SCORE: 9
3. WORRYING TOO MUCH ABOUT DIFFERENT THINGS: SEVERAL DAYS
1. FEELING NERVOUS, ANXIOUS, OR ON EDGE: MORE THAN HALF THE DAYS
7. FEELING AFRAID AS IF SOMETHING AWFUL MIGHT HAPPEN: SEVERAL DAYS
2. NOT BEING ABLE TO STOP OR CONTROL WORRYING: SEVERAL DAYS
4. TROUBLE RELAXING: SEVERAL DAYS
7. FEELING AFRAID AS IF SOMETHING AWFUL MIGHT HAPPEN: SEVERAL DAYS
GAD7 TOTAL SCORE: 9

## 2022-04-28 ASSESSMENT — ANXIETY QUESTIONNAIRES: GAD7 TOTAL SCORE: 9

## 2022-04-29 ENCOUNTER — OFFICE VISIT (OUTPATIENT)
Dept: PSYCHIATRY | Facility: CLINIC | Age: 57
End: 2022-04-29
Attending: PSYCHIATRY & NEUROLOGY
Payer: COMMERCIAL

## 2022-04-29 VITALS
HEART RATE: 71 BPM | SYSTOLIC BLOOD PRESSURE: 140 MMHG | DIASTOLIC BLOOD PRESSURE: 80 MMHG | WEIGHT: 293 LBS | BODY MASS INDEX: 46.99 KG/M2

## 2022-04-29 DIAGNOSIS — F31.9 BIPOLAR I DISORDER (H): ICD-10-CM

## 2022-04-29 PROCEDURE — 99214 OFFICE O/P EST MOD 30 MIN: CPT | Mod: HN | Performed by: STUDENT IN AN ORGANIZED HEALTH CARE EDUCATION/TRAINING PROGRAM

## 2022-04-29 PROCEDURE — G0463 HOSPITAL OUTPT CLINIC VISIT: HCPCS

## 2022-04-29 RX ORDER — LITHIUM CARBONATE 150 MG/1
150 CAPSULE ORAL AT BEDTIME
Qty: 30 CAPSULE | Refills: 1 | Status: SHIPPED | OUTPATIENT
Start: 2022-04-29 | End: 2022-07-12

## 2022-04-29 RX ORDER — LAMOTRIGINE 200 MG/1
200 TABLET ORAL DAILY
Qty: 30 TABLET | Refills: 1 | Status: SHIPPED | OUTPATIENT
Start: 2022-04-29 | End: 2022-07-12

## 2022-04-29 RX ORDER — LITHIUM CARBONATE 300 MG/1
900 CAPSULE ORAL AT BEDTIME
Qty: 90 CAPSULE | Refills: 1 | Status: SHIPPED | OUTPATIENT
Start: 2022-04-29 | End: 2022-07-12

## 2022-04-29 ASSESSMENT — PAIN SCALES - GENERAL: PAINLEVEL: MILD PAIN (2)

## 2022-04-29 NOTE — PROGRESS NOTES
[facility]  Psychiatry Clinic  PSYCHIATRY PROGRESS NOTE     CARE TEAM:  PCP- Magali Lira, Psychotherapist- [facility] with Verna Chan  Specialty Providers- OB/GYN, ENT, PT, Nutrition      Wife- Rachael Haji is a 54 year old who prefers the name Angy and uses pronouns she, her, hers.      DIAGNOSIS     Bipolar 1 disorder, current depressive episode, moderate   H/o EPSE secondary to neuroleptic use  H/o Thrombocytopenia secondary to Depakote  H/o lithium toxicity, resolved     ASSESSMENT    Angy reports that she has some new life stressors and has noticed more irritability and tiredness. She adds that she's been able to find enjoyment in activities like their last vacation but it's still not optimum. Two weeks ago, patient reports having a panic attack incident where she experienced chest pain and tightness with difficulty to breathe while driving. This lasted for 10-15minutes. She does not endorse pain radiating to her arms, excessive sweatiness, or tremors. The plan today was to optimize dose of Lamictal to 250mg but we will hold off until patient has a follow up with her Cardiologist to make sure that there are no underlying abnormalities contraindicating Lamictal increase or use. Lamictal has antiarrhytmic effects especially with underlying structural heart abnormalities.    Based on reports of word finding difficulty and impaired recall of past events, we did a MoCA today which was 27/30 (with less than full points in cube-copying, naming >>11 words starting with F, and recalling 1 word with a category cue). This value isn't concerning to me. Patient was presented with options to continue to monitor vs following up for further testing which she plans to think more about.No SI, HI, or acute concerns.     Next Lithium labs due this month.    [place] review was not needed today. Patient is not taking any controlled substances.     PLAN                                                                                                                 1) Meds-  - Continue Lithium 1050mg at bedtime  - Continue Lamictal 200mg     2) Psychotherapy-Continue individual therapy    3) Next due-  Labs- Li labs due 06/2022  EKG- PRN  Rating scales- N/A    4) Referrals-  None    5) Dispo- RTC in 4-6 weeks       PERTINENT BACKGROUND                           [most recent eval 08/22/21]    Patient has not had significant mental health history until 2018, when she developed perimenopausal mood and anxiety symptoms.  She was subsequently referred to our clinic following a hospitalization for anthony in November 2018.  Her care has been complicated by sensitivity to antipsychotics (EPSE including difficulty swallowing), thrombocytopenia secondary to Depakote, lithium toxicity in setting of concurrent Depakote use).    Psych pertinent item history includes Manic episode(h/o one manic episode Nov/Dec 2018), psychiatric hospitalization in November 2018.     SUBJECTIVE   Patient presented for a follow up.    Since last visit:  -She reports that she's feeling more distress and irritability a few days a week with tiredness both mentally and physically.  -She reports trouble recalling distant past events and difficulty finding words.  -Reports that sleep is 7-8 hours with less awakening at night and has been overeating mostly in the evenings which she describes as stress-eating.  -She continues to experience shortness of breath while walking though her cardiac check was benign. She reports that she had a panic attack 2 weeks ago while driving. She had a sharp chest pain with tightness on the high-way. She describes the day as being very stressful, with pain gradually starting on one side of the mid chest to he opposite side with heaviness. She does not report the pain radiating to her arms. It started and then got more intense before it improved. It lasted for 10-15 minutes.   -Reports that level of  enjoyment is improving, She was able to enjoy her last vacation. She states that her sex drive is still low.    RECENT PSYCH ROS:   Depression: improving mood, less anhedonia, and more energy.  Elevated:  none  Psychosis:  none  Anxiety:  excessive worry  Trauma Related:  none  Sleep: Reports that she's sleeping okay  Other: N/A    Adverse Effects: Vivid dreams have reduced in frequency  Pertinent Negative Symptoms: No suicidal ideation, self-injurious behavior/urges or violent ideation  Recent Substance Use:   Alcohol- 1 drink (beer or wine) per week or 2 weeks.     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   to Rachael enriquez. They have a teenage son.     PSYCH and SUBSTANCE USE Critical Summary Points since July 2021 08/26/2021: Resident transfer visit. Increased Lithium to 1200mg.  10/15/2021: decrease Lithium back to 1050 mg  11/02/2021: Lamictal titration plan started  11/29/2021: Increase Lamictal to 100 mg for 7 days and then increase to 150 mg  12/14/2021: Increase Lamictal to 200mg  01/24/2022: No medication changes  03/18/2022: No medication changes  04/29/2022: No medication changes     PAST MED TRIALS      Medication Max Dose (mg) Dates / Duration Helpful? DC Reason / Adverse Effects?   Sertraline       Depakote    Tremors   Olanzapine    EPSE    Seroquel    EPSE   Lithium 1050 current          MEDICAL HISTORY and ALLERGY     ALLERGIES: Seroquel [quetiapine], Olanzapine, Penicillins, Risperidone, and Thimerosal    Patient Active Problem List   Diagnosis     Oral aphthae     CARDIOVASCULAR SCREENING; LDL GOAL LESS THAN 160     Rosacea     Raynaud disease     Vitamin D deficiency disease     Mild sleep apnea     Atopic rhinitis     Chronic bilateral low back pain without sciatica     Obesity, Class III, BMI 40-49.9 (morbid obesity) (H)     Muscle tension dysphonia     Palpitations     Dysphagia, unspecified type     Anxiety     Benign essential hypertension     Callus of foot     Varicose veins of right lower extremity with edema     Bipolar I disorder (H)     Dribbling of urine     It band syndrome, right     Spinal stenosis at L4-L5 level     Facet arthropathy, lumbar        MEDICAL REVIEW OF SYSTEMS   Contraception- Not assessed. Patient is on Prempro     MEDICATIONS     Current Outpatient Medications   Medication Sig Dispense Refill     Carboxymethylcellulose Sod PF (REFRESH PLUS) 0.5 % SOLN ophthalmic solution Place 1 drop into both eyes 3 times daily as needed for dry eyes       cyclobenzaprine (FLEXERIL) 10 MG tablet Take 1 tablet (10 mg) by mouth nightly as needed for muscle spasms 10 tablet 0     estradiol (ESTRACE) 1 MG tablet Take 1 tablet (1 mg) by mouth daily 90 tablet 3     estradiol (VAGIFEM) 10 MCG TABS vaginal tablet Place 1 tablet (10 mcg) vaginally twice a week 25 tablet 3     estrogen conj (PREMARIN) 0.625 MG tablet Take 1 tablet (0.625 mg) by mouth daily 90 tablet 3     estrogen conj-medroxyPROGESTERone (PREMPRO) 0.625-2.5 MG tablet Take 1 tablet by mouth daily 90 tablet 3     lamoTRIgine (LAMICTAL) 200 MG tablet Take 1 tablet (200 mg) by mouth daily 30 tablet 1     lidocaine (LIDODERM) 5 % patch Place 1-2 patches onto the skin every 24 hours To prevent lidocaine toxicity, patient should be patch free for 12 hrs daily. 30 patch 1      "lisinopril (ZESTRIL) 10 MG tablet Take 1 tablet (10 mg) by mouth daily 90 tablet 3     lithium (ESKALITH) 150 MG capsule Take 1 capsule (150 mg) by mouth At Bedtime 30 capsule 1     lithium 300 MG capsule Take 3 capsules (900 mg) by mouth At Bedtime 90 capsule 1     Loratadine 10 MG capsule Take 10 mg by mouth daily as needed.       medroxyPROGESTERone (PROVERA) 2.5 MG tablet Take 1 tablet (2.5 mg) by mouth daily 90 tablet 3     metroNIDAZOLE (METROGEL) 1 % external gel Apply topically daily 60 g 9     ORDER FOR DME Use your CPAP device as directed by your provider.       VITAMIN D, CHOLECALCIFEROL, PO Take 2,000 Units by mouth daily        VITALS   BP (!) 140/80 (BP Location: Left arm, Patient Position: Sitting, Cuff Size: Adult Regular)   Pulse 71   Wt 142.2 kg (313 lb 9.6 oz)   BMI 46.99 kg/m      MENTAL STATUS EXAM     Alertness: alert and oriented  Appearance: well groomed  Behavior/Demeanor: cooperative, with good  eye contact   Speech: regular rate and rhythm  Language: intact  Psychomotor: normal or unremarkable  Mood: \"okay\"  Affect: appropriate; congruent to: mood- yes, content- yes  Thought Process/Associations: linear and logical  Thought Content:  Reports none;  Denies suicidal ideation and violent ideation  Perception:  Reports none;  Denies auditory hallucinations and visual hallucinations  Insight: good  Judgment: good  Cognition: does  appear grossly intact; formal cognitive testing was not done  Gait and Station: Normal strides with bilateral arm swings     LABS and DATA     PHQ9 TODAY = N/A  JOANNA= 9  PHQ 1/17/2022 1/17/2022 1/23/2022   PHQ-9 Total Score 6 6 6   Q9: Thoughts of better off dead/self-harm past 2 weeks Not at all Not at all Not at all       Recent Labs   Lab Test 09/03/21  1007 05/05/21  0853 02/03/21  1019   CR 0.61 0.56 0.58   GFRESTIMATED >90 >90 >90     Recent Labs   Lab Test 02/03/21  1019 07/15/20  1617 10/15/19  1037   AST 13 12 13   ALT 17 18 18   ALKPHOS 88 84 61     Recent " Labs   Lab Test 09/03/21  1007 05/05/21  0853 02/03/21  1019   LITHIUM 0.8 0.78 0.67     Recent Labs   Lab Test 09/03/21  1007 05/05/21  0853   CR 0.61 0.56   GFRESTIMATED >90 >90    138   POTASSIUM 4.3 4.3   SHERRIE 9.4 9.6     Recent Labs   Lab Test 02/03/21  1011 07/15/20  1633   SG 1.011 1.008     Recent Labs   Lab Test 09/03/21  1007 05/05/21  0853   TSH 2.31 2.61     Recent Labs   Lab Test 09/03/21  1007 02/03/21  1019 07/15/20  1617   WBC 6.4 7.7 8.3   ANEU  --  5.1 5.6       ECG :10/25/2021 QTc 442ms     PSYCHOTROPIC DRUG INTERACTIONS     Concurrent use of LITHIUM and ACE INHIBITORS may result in increased risk of lithium toxicity.     Estrogen Derivatives may decrease the serum concentration of LamoTRIgine       MANAGEMENT:  Monitoring for adverse effects, routine labs, periodic EKGs and patient is aware of risks     RISK STATEMENT for SAFETY     Angy Haji did not appear to be an imminent safety risk to self or others.    TREATMENT RISK STATEMENT: The risks, benefits, alternatives and potential adverse effects have been discussed and are understood by the pt. The pt understands the risks of using street drugs or alcohol. There are no medical contraindications, the pt agrees to treatment with the ability to do so. The pt knows to call the clinic for any problems or to access emergency care if needed.  Medical and substance use concerns are documented above.  Psychotropic drug interaction check was done, including changes made today.     PROVIDER: Tolulope Oluwadamilola Odebunmi, MD    Patient not staffed in clinic.  Note will be reviewed and signed by supervisor Dr. Liu.      Level of Medical Decision Making:   - At least 1 chronic problem that is not stable  - Engaged in prescription drug management during visit (discussed any medication benefits, side effects, alternatives, etc.)        Must meet 2 out of 3 of the above MDM elements to bill at the specified level     For help more info about  elements / criteria, click here-> MDM Help Grid     *This blue text will disappear automatically when note is signed, no need to delete*:[id number]}  [id number]}  :[id number]}

## 2022-04-29 NOTE — PATIENT INSTRUCTIONS
Treatment Plan    Continue your medications    Continue therapy    RTC in 4-6 weeks    **For crisis resources, please see the information at the end of this document**   Patient Education    Thank you for coming to the [facility] [place] [facility].    Lab Testing:  If you had lab testing today and your results are reassuring or normal they will be mailed to you or sent through [employer] within 7 days. If the lab tests need quick action we will call you with the results. The phone number we will call with results is # [phone]. If this is not the best number please call our clinic and change the number.     Medication Refills:  If you need any refills please call your pharmacy and they will contact us. Our fax number for refills is [phone]. Please allow three business days for refill processing.   If you need to change to a different pharmacy, please contact the new pharmacy directly. The new pharmacy will help you get your medications transferred.     Contact Us:  Please call [phone] during business hours (8-5:00 M-F).  If you have medication related questions after clinic hours, or on the weekend, please call [phone].    Financial Assistance [phone]  Medical Records [phone]       MENTAL HEALTH CRISIS RESOURCES:  For a emergency help, please call 911 or go to the nearest Emergency Department.     Emergency Walk-In Options:   EmPATH Unit @ [facility] ([place]): [phone] - Specialized mental health emergency area designed to be calming  [facility] ([place]): [phone]  [facility] ([place]): [phone]  [facility] ([place]): [phone]    County Crisis Information:   [place]: [phone]  [place]: [phone]  [place] ([place]) - Adult: [phone]     Child: [phone]  [place] - Adult: [phone]     Child: [phone]  [place]: [phone]  List of all [facility]  resources:   https://[place].gov/dhs/people-we-serve/adults/health-care/mental-health/resources/crisis-contacts.jsp    National Crisis Information:   Crisis Text Line: Text  [place]  to 516468  National Suicide Prevention Lifeline: 7-006-067-TALK (1-656.573.4179)       For online chat options, visit https://suicidepreventionlifeline.org/chat/  Poison Control Center: 6-175-677-5855  Trans Lifeline: 5-114-301-6501 - Hotline for transgender people of all ages  The [employer] Project: 4-682-371-8070 - Hotline for LGBT youth     For Non-Emergency Support:   Fast Tracker: Mental Health & Substance Use Disorder Resources -   https://www.[employer].org/

## 2022-05-02 ENCOUNTER — MYC MEDICAL ADVICE (OUTPATIENT)
Dept: CARDIOLOGY | Facility: CLINIC | Age: 57
End: 2022-05-02
Payer: COMMERCIAL

## 2022-05-04 ENCOUNTER — LAB (OUTPATIENT)
Dept: LAB | Facility: CLINIC | Age: 57
End: 2022-05-04
Payer: COMMERCIAL

## 2022-05-04 ENCOUNTER — OFFICE VISIT (OUTPATIENT)
Dept: ORTHOPEDICS | Facility: CLINIC | Age: 57
End: 2022-05-04
Payer: COMMERCIAL

## 2022-05-04 DIAGNOSIS — M54.16 LUMBAR RADICULOPATHY: Primary | ICD-10-CM

## 2022-05-04 DIAGNOSIS — F31.9 BIPOLAR I DISORDER (H): ICD-10-CM

## 2022-05-04 DIAGNOSIS — E55.9 VITAMIN D DEFICIENCY DISEASE: ICD-10-CM

## 2022-05-04 LAB
ALBUMIN SERPL-MCNC: 3.6 G/DL (ref 3.4–5)
ALP SERPL-CCNC: 80 U/L (ref 40–150)
ALT SERPL W P-5'-P-CCNC: 22 U/L (ref 0–50)
ANION GAP SERPL CALCULATED.3IONS-SCNC: 6 MMOL/L (ref 3–14)
AST SERPL W P-5'-P-CCNC: 13 U/L (ref 0–45)
BASOPHILS # BLD AUTO: 0 10E3/UL (ref 0–0.2)
BASOPHILS NFR BLD AUTO: 1 %
BILIRUB SERPL-MCNC: 0.5 MG/DL (ref 0.2–1.3)
BUN SERPL-MCNC: 12 MG/DL (ref 7–30)
CALCIUM SERPL-MCNC: 9.2 MG/DL (ref 8.5–10.1)
CHLORIDE BLD-SCNC: 108 MMOL/L (ref 94–109)
CO2 SERPL-SCNC: 24 MMOL/L (ref 20–32)
CREAT SERPL-MCNC: 0.56 MG/DL (ref 0.52–1.04)
DEPRECATED CALCIDIOL+CALCIFEROL SERPL-MC: 29 UG/L (ref 20–75)
EOSINOPHIL # BLD AUTO: 0.2 10E3/UL (ref 0–0.7)
EOSINOPHIL NFR BLD AUTO: 2 %
ERYTHROCYTE [DISTWIDTH] IN BLOOD BY AUTOMATED COUNT: 12.8 % (ref 10–15)
GFR SERPL CREATININE-BSD FRML MDRD: >90 ML/MIN/1.73M2
GLUCOSE BLD-MCNC: 103 MG/DL (ref 70–99)
HCT VFR BLD AUTO: 44.7 % (ref 35–47)
HGB BLD-MCNC: 14 G/DL (ref 11.7–15.7)
IMM GRANULOCYTES # BLD: 0 10E3/UL
IMM GRANULOCYTES NFR BLD: 1 %
LITHIUM SERPL-SCNC: 0.8 MMOL/L
LYMPHOCYTES # BLD AUTO: 1.5 10E3/UL (ref 0.8–5.3)
LYMPHOCYTES NFR BLD AUTO: 20 %
MCH RBC QN AUTO: 28.3 PG (ref 26.5–33)
MCHC RBC AUTO-ENTMCNC: 31.3 G/DL (ref 31.5–36.5)
MCV RBC AUTO: 91 FL (ref 78–100)
MONOCYTES # BLD AUTO: 0.4 10E3/UL (ref 0–1.3)
MONOCYTES NFR BLD AUTO: 6 %
NEUTROPHILS # BLD AUTO: 5.2 10E3/UL (ref 1.6–8.3)
NEUTROPHILS NFR BLD AUTO: 70 %
NRBC # BLD AUTO: 0 10E3/UL
NRBC BLD AUTO-RTO: 0 /100
PLATELET # BLD AUTO: 200 10E3/UL (ref 150–450)
POTASSIUM BLD-SCNC: 4 MMOL/L (ref 3.4–5.3)
PROT SERPL-MCNC: 7.4 G/DL (ref 6.8–8.8)
RBC # BLD AUTO: 4.94 10E6/UL (ref 3.8–5.2)
SODIUM SERPL-SCNC: 138 MMOL/L (ref 133–144)
TSH SERPL DL<=0.005 MIU/L-ACNC: 2.03 MU/L (ref 0.4–4)
WBC # BLD AUTO: 7.4 10E3/UL (ref 4–11)

## 2022-05-04 PROCEDURE — 82306 VITAMIN D 25 HYDROXY: CPT | Mod: 90 | Performed by: PATHOLOGY

## 2022-05-04 PROCEDURE — 36415 COLL VENOUS BLD VENIPUNCTURE: CPT | Performed by: PATHOLOGY

## 2022-05-04 PROCEDURE — 80050 GENERAL HEALTH PANEL: CPT | Performed by: PATHOLOGY

## 2022-05-04 PROCEDURE — 80178 ASSAY OF LITHIUM: CPT | Mod: 90 | Performed by: PATHOLOGY

## 2022-05-04 PROCEDURE — 99214 OFFICE O/P EST MOD 30 MIN: CPT | Performed by: FAMILY MEDICINE

## 2022-05-04 PROCEDURE — 99000 SPECIMEN HANDLING OFFICE-LAB: CPT | Performed by: PATHOLOGY

## 2022-05-04 RX ORDER — ERGOCALCIFEROL 1.25 MG/1
50000 CAPSULE, LIQUID FILLED ORAL WEEKLY
Qty: 8 CAPSULE | Refills: 0 | Status: SHIPPED | OUTPATIENT
Start: 2022-05-04 | End: 2022-10-04

## 2022-05-04 NOTE — PROGRESS NOTES
Subjective:   Angy Haji is a 56 year old female who presents in clinic for follow up of low back pain.  Pt with LBP persists.  Worse with sleeping.  Can't sleep on right side for long.  Radiating right leg and after injection it was effective, much easier to go downstairs.  That is a lot better.  Still some weakness in leg.  Exercises done in PT, good for a while and needs to restart PT exercises.  Still numbness 3-5 toes- numbness or tingling.  No bowel issues.  Normal stools, miralax if needed  Bladder leakage- some drip, did do PT for pelvic floor  Problems with twisting, doesn't know if she's paid attention to twisting.  On lithium and lamictal  Never taken gabapentin  SYmptoms improving with exercises  Can feel her foot, unstable with carrying things  Water softener salt, hurts and flares right side of her back  Gardening coming up  Can't squat, but wants to be able to bend    Date of injury: Chronic  Date last seen: 12/22/2021  Following Therapeutic Plan: No Needs to restart exercises  Pain: Improving, 2/10  Function: Unchanged  Interval History: Needs to restart PT exercises, further weakness in the right leg     PAST MEDICAL, SOCIAL, SURGICAL AND FAMILY HISTORY: She  has a past medical history of Disorders of bursae and tendons in shoulder region, unspecified, Female infertility of unspecified origin, Gallbladder sludge (5/2013), Mild sleep apnea (5/13/2014), Obesity, unspecified, Oral aphthae, Other acne, Other specified hypoglycemia, and PONV (postoperative nausea and vomiting).    She has no past medical history of Chronic infection.  She  has a past surgical history that includes TOOTH EXTRACTION W/FORCEP (1984/85); Laparoscopic cholecystectomy (7/29/2013); Laparoscopic oophorectomy (Left, 6/23/2016); Laparoscopic salpingectomy (Bilateral, 6/23/2016); and IR Endovenous Ablation Varicose Veins (12/12/2019).  Her family history includes Allergies in her father; Alzheimer Disease in her maternal  grandmother; Arthritis in her maternal grandmother and mother; Atrial fibrillation in her mother; Breast Cancer in her maternal aunt; Cancer in her paternal grandmother; Cardiovascular in her paternal grandfather; Eye Disorder in her father and mother; Gallbladder Disease in her father; Gynecology in her paternal grandmother; Heart Disease in her maternal grandmother; LUNG DISEASE in her mother; Lipids in her father; Obesity in her paternal grandfather and paternal grandmother; Other Cancer (age of onset: 80) in her paternal aunt; Prostate Cancer in her father; Respiratory in her paternal grandfather.  She reports that she has never smoked. She has never used smokeless tobacco. She reports current alcohol use. She reports that she does not use drugs.    ALLERGIES: She is allergic to seroquel [quetiapine], olanzapine, penicillins, risperidone, and thimerosal.    CURRENT MEDICATIONS: She has a current medication list which includes the following prescription(s): carboxymethylcellulose pf, cyclobenzaprine, estradiol, estradiol, estrogen conj, estrogen conj-medroxyprogesterone, lamotrigine, lidocaine, lisinopril, lithium, lithium, loratadine, medroxyprogesterone, metronidazole, order for dme, and cholecalciferol.     REVIEW OF SYSTEMS: 10 point review of systems is negative except as noted above.     Exam:   There were no vitals taken for this visit.           CONSTITUTIONAL: alert, mild distress, cooperative and obese  HEAD: Normocephalic. No masses, lesions, tenderness or abnormalities  SKIN: no suspicious lesions or rashes  GAIT: antalgic and obese pattern  NEUROLOGIC: positive findings: right toes numb  PSYCHIATRIC: affect normal/bright and mentation appears normal.    MUSCULOSKELETAL: right LBP with right leg  Tender:  right para lumbar muscles  Non-tender:  thoracic spinous processes, left parathoracic muscles, right parathoracic muscles, lumbar spinous processes, left para lumbar muscles  Range of Motion:  lumbar  flexion  full, lumbar extension  full  Strength:  able to heel walk, able to toe walk  Special tests:  negative straight leg raises    Hip Exam: Hip ROM full         Assessment/Plan:   Pt is a 57 yo female with PMHx of depression, obesity, vit D def presenting with right sided LBP with right leg numbness    1. Right sided LBP with right leg numbness-  Facet injection 5/10-->2/10  Will restart PT, does exercises with PT but this has stopped now no longer working with PT  Will not consider gabapentin at this time  Consideration of right sided L5-S1 transforaminal AUBREY  Discussed with Dr. Domínguez  2. Vit D low- goal is 40  Vit D 62729 international unit(s) qweek    RTC after return to PT exercises  X-RAY INTERPRETATION:   2021

## 2022-05-04 NOTE — LETTER
5/4/2022      RE: Angy Haji  [address]  [place] [address]     Dear Colleague,    Thank you for referring your patient, Angy Haji, to the [facility] [place]. Please see a copy of my visit note below.     Subjective:   Angy Haji is a 56 year old female who presents in clinic for follow up of low back pain.  Pt with LBP persists.  Worse with sleeping.  Can't sleep on right side for long.  Radiating right leg and after injection it was effective, much easier to go downstairs.  That is a lot better.  Still some weakness in leg.  Exercises done in PT, good for a while and needs to restart PT exercises.  Still numbness 3-5 toes- numbness or tingling.  No bowel issues.  Normal stools, miralax if needed  Bladder leakage- some drip, did do PT for pelvic floor  Problems with twisting, doesn't know if she's paid attention to twisting.  On lithium and lamictal  Never taken gabapentin  SYmptoms improving with exercises  Can feel her foot, unstable with carrying things  Water softener salt, hurts and flares right side of her back  Gardening coming up  Can't squat, but wants to be able to bend    Date of injury: Chronic  Date last seen: 12/22/2021  Following Therapeutic Plan: No Needs to restart exercises  Pain: Improving, 2/10  Function: Unchanged  Interval History: Needs to restart PT exercises, further weakness in the right leg     PAST MEDICAL, SOCIAL, SURGICAL AND FAMILY HISTORY: She  has a past medical history of Disorders of bursae and tendons in shoulder region, unspecified, Female infertility of unspecified origin, Gallbladder sludge (5/2013), Mild sleep apnea (5/13/2014), Obesity, unspecified, Oral aphthae, Other acne, Other specified hypoglycemia, and PONV (postoperative nausea and vomiting).    She has no past medical history of Chronic infection.  She  has a past surgical history that includes TOOTH EXTRACTION W/FORCEP (1984/85); Laparoscopic cholecystectomy  (7/29/2013); Laparoscopic oophorectomy (Left, 6/23/2016); Laparoscopic salpingectomy (Bilateral, 6/23/2016); and IR Endovenous Ablation Varicose Veins (12/12/2019).  Her family history includes Allergies in her father; Alzheimer Disease in her maternal grandmother; Arthritis in her maternal grandmother and mother; Atrial fibrillation in her mother; Breast Cancer in her maternal aunt; Cancer in her paternal grandmother; Cardiovascular in her paternal grandfather; Eye Disorder in her father and mother; Gallbladder Disease in her father; Gynecology in her paternal grandmother; Heart Disease in her maternal grandmother; LUNG DISEASE in her mother; Lipids in her father; Obesity in her paternal grandfather and paternal grandmother; Other Cancer (age of onset: 80) in her paternal aunt; Prostate Cancer in her father; Respiratory in her paternal grandfather.  She reports that she has never smoked. She has never used smokeless tobacco. She reports current alcohol use. She reports that she does not use drugs.    ALLERGIES: She is allergic to seroquel [quetiapine], olanzapine, penicillins, risperidone, and thimerosal.    CURRENT MEDICATIONS: She has a current medication list which includes the following prescription(s): carboxymethylcellulose pf, cyclobenzaprine, estradiol, estradiol, estrogen conj, estrogen conj-medroxyprogesterone, lamotrigine, lidocaine, lisinopril, lithium, lithium, loratadine, medroxyprogesterone, metronidazole, order for dme, and cholecalciferol.     REVIEW OF SYSTEMS: 10 point review of systems is negative except as noted above.     Exam:   There were no vitals taken for this visit.           CONSTITUTIONAL: alert, mild distress, cooperative and obese  HEAD: Normocephalic. No masses, lesions, tenderness or abnormalities  SKIN: no suspicious lesions or rashes  GAIT: antalgic and obese pattern  NEUROLOGIC: positive findings: right toes numb  PSYCHIATRIC: affect normal/bright and mentation appears  normal.    MUSCULOSKELETAL: right LBP with right leg  Tender:  right para lumbar muscles  Non-tender:  thoracic spinous processes, left parathoracic muscles, right parathoracic muscles, lumbar spinous processes, left para lumbar muscles  Range of Motion:  lumbar flexion  full, lumbar extension  full  Strength:  able to heel walk, able to toe walk  Special tests:  negative straight leg raises    Hip Exam: Hip ROM full         Assessment/Plan:   Pt is a 55 yo female with PMHx of depression, obesity, vit D def presenting with right sided LBP with right leg numbness    1. Right sided LBP with right leg numbness-  Facet injection 5/10-->2/10  Will restart PT, does exercises with PT but this has stopped now no longer working with PT  Will not consider gabapentin at this time  Consideration of right sided L5-S1 transforaminal AUBREY  Discussed with Dr. Domínguez  2. Vit D low- goal is 40  Vit D 14497 international unit(s) qweek    RTC after return to PT exercises  X-RAY INTERPRETATION:   2021      Again, thank you for allowing me to participate in the care of your patient.      Sincerely,    Danielle Valadez MD

## 2022-05-05 ENCOUNTER — TELEPHONE (OUTPATIENT)
Dept: ORTHOPEDICS | Facility: CLINIC | Age: 57
End: 2022-05-05
Payer: COMMERCIAL

## 2022-05-06 ENCOUNTER — MYC REFILL (OUTPATIENT)
Dept: PSYCHIATRY | Facility: CLINIC | Age: 57
End: 2022-05-06
Payer: COMMERCIAL

## 2022-05-06 ENCOUNTER — TELEPHONE (OUTPATIENT)
Dept: PSYCHIATRY | Facility: CLINIC | Age: 57
End: 2022-05-06
Payer: COMMERCIAL

## 2022-05-06 DIAGNOSIS — F31.9 BIPOLAR I DISORDER (H): Primary | ICD-10-CM

## 2022-05-06 RX ORDER — LAMOTRIGINE 100 MG/1
TABLET ORAL
Qty: 15 TABLET | Refills: 0 | Status: SHIPPED | OUTPATIENT
Start: 2022-05-06 | End: 2022-05-26

## 2022-05-06 NOTE — TELEPHONE ENCOUNTER
Patient reached out to provider that she would like to go up by 50 mg to 250 mg of Lamictal as discussed in her last visit. I saw that this was oK by her cardiologist as she has a hx of arrhythmias.    P; Lamictal 50 mg daily to increase total dose to 250 mg sent to pharmacy  - review as pascual

## 2022-05-06 NOTE — TELEPHONE ENCOUNTER
Angy Richardson!     The lamoTRIgine (LAMICTAL) 100 MG tablet order has been sent to your preferred pharmacy. Please give them a call to coordinate a  time. Hope you have a great weekend!    Best Regards,   UBALDO Farr

## 2022-05-16 ENCOUNTER — OFFICE VISIT (OUTPATIENT)
Dept: FAMILY MEDICINE | Facility: CLINIC | Age: 57
End: 2022-05-16
Payer: COMMERCIAL

## 2022-05-16 VITALS
WEIGHT: 293 LBS | RESPIRATION RATE: 16 BRPM | OXYGEN SATURATION: 97 % | HEIGHT: 68 IN | BODY MASS INDEX: 44.41 KG/M2 | HEART RATE: 67 BPM | TEMPERATURE: 98.4 F

## 2022-05-16 DIAGNOSIS — R00.2 PALPITATIONS: ICD-10-CM

## 2022-05-16 DIAGNOSIS — I49.1 PAC (PREMATURE ATRIAL CONTRACTION): ICD-10-CM

## 2022-05-16 DIAGNOSIS — Z92.89 H/O AMBULATORY BLOOD PRESSURE MONITORING: Primary | ICD-10-CM

## 2022-05-16 DIAGNOSIS — F31.9 BIPOLAR I DISORDER (H): ICD-10-CM

## 2022-05-16 DIAGNOSIS — Z23 HIGH PRIORITY FOR 2019-NCOV VACCINE: ICD-10-CM

## 2022-05-16 PROCEDURE — 0054A COVID-19,PF,PFIZER (12+ YRS): CPT | Performed by: STUDENT IN AN ORGANIZED HEALTH CARE EDUCATION/TRAINING PROGRAM

## 2022-05-16 PROCEDURE — 99213 OFFICE O/P EST LOW 20 MIN: CPT | Mod: 25 | Performed by: STUDENT IN AN ORGANIZED HEALTH CARE EDUCATION/TRAINING PROGRAM

## 2022-05-16 PROCEDURE — 91305 COVID-19,PF,PFIZER (12+ YRS): CPT | Performed by: STUDENT IN AN ORGANIZED HEALTH CARE EDUCATION/TRAINING PROGRAM

## 2022-05-16 NOTE — PROGRESS NOTES
Assessment & Plan     Dyspnea on exertion  Has had thorough cardiac workup. ZioPatch showed many PACs, but otherwise was nl. Stress test nl. Has seen cardiology and they told her no management is needed. However, given persistent dyspnea on exertion, I encouraged her to reach out to cards and ask whether b-blocker or other med mgmt may help w/ PACs.     Patient is concerned that weight gain also contributes to SOLOMON. (Though on chart review, it appears weight is stable over the past year.)  She is seeing dietitian and exercising regularly. TSH normal within past couple of months. Discussed some HAES/weight-neutral principles with her.     No cough. No family history of rheum/autoimmune diseases.     If SOLOMON persists, could pursue some pulm workup. Notably-- it has been relatively stable and it is not worsening.    Hypertension  Currently on lisinopril 10mg daily. Last several clinic pressures not at goal.   Patient will do ambulatory blood pressure monitoring (2-3x at a time in AM and PM for at least one week) and report back values. If consistently elevated, would likely intensify antihypertensives. (Is already physically active, drinks minimal alcohol, tries to eat well-balanced diet. Can continue to address these lifestyle recs as well.)  - Ordered BP cuff    High priority for 2019-nCoV vaccine  - COVID-19,PF,PFIZER (12+ Yrs GRAY LABEL), booster #4      Magali Lira MD  [facility] STEPHY Travis is a 57 year old who presents for the following health issues    30min walking daily (even if broken up) . 1 hour on at least one weekend day.   Sob happens most days on exertion.   Going to [place] in next couple of weeks and excited for that.   Frustrated with her weight gain. She's cycled a lot over past decade or so. Discussed some weight neutral principles. She's most bothered that weight gain seems to her to be associated with more shortness of breath on exertion.   Palpitation sstill  "bothersome.  No cough  No lightheadedness/dizziness.       Objective    Pulse 67   Temp 98.4  F (36.9  C) (Oral)   Resp 16   Ht 1.727 m (5' 8\")   Wt 142.1 kg (313 lb 3.2 oz)   SpO2 97%   BMI 47.62 kg/m    Body mass index is 47.62 kg/m .  Physical Exam   Breathing comfortably on room air without increased effort.   Gait appears normal.   Oriented. Engaged. Pleasant. Thought process intact. Speech normal in volume and pace.     ----- Service Performed and Documented by Resident or Fellow ------          "

## 2022-05-16 NOTE — PATIENT INSTRUCTIONS
Check BP AM and PM every day for 1-2 weeks.   Take it 2-3 times each time you check.   Make sure you're seated with feet flat on the floor.   Keep track and send me a picture if able.   If consistently >130 on top number, I'll want to see you to discuss options.

## 2022-05-16 NOTE — PROGRESS NOTES
Preceptor Attestation:    I discussed the patient with the resident and evaluated the patient in person. I have verified the content of the note, which accurately reflects my assessment of the patient and the plan of care.   Supervising Physician:  Magda Blackburn MD.

## 2022-05-26 ENCOUNTER — TELEPHONE (OUTPATIENT)
Dept: PSYCHIATRY | Facility: CLINIC | Age: 57
End: 2022-05-26
Payer: COMMERCIAL

## 2022-05-26 DIAGNOSIS — F31.9 BIPOLAR I DISORDER (H): ICD-10-CM

## 2022-05-26 RX ORDER — LAMOTRIGINE 100 MG/1
TABLET ORAL
Qty: 30 TABLET | Refills: 1 | Status: SHIPPED | OUTPATIENT
Start: 2022-05-26 | End: 2022-08-29 | Stop reason: ALTCHOICE

## 2022-05-26 NOTE — TELEPHONE ENCOUNTER
lamoTRIgine (LAMICTAL) 100 MG tablet   Last Written Prescription Date:   5/6/2022  Last Fill Quantity: 15,   # refills: 0  Last Office Visit :  5/16/2022  Future Office visit: None    Routing refill request to provider for review/approval because:  Which Provider manages medication refills and care for this Pt?  [facility]? Or Psychic clinic.  Please advise       Maryjane Garland RN  Central Triage Red Flags/Med Refills

## 2022-05-26 NOTE — TELEPHONE ENCOUNTER
[facility] Call Center    Phone Message    May a detailed message be left on voicemail: yes     Reason for Call: Medication Refill Request    Has the patient contacted the pharmacy for the refill? Yes   Name of medication being requested: 100mg of lamotrigene, needs more than a 30 day supply. Last script that was sent is only for 30 days, and she will run our while out of town.   Provider who prescribed the medication:   Pharmacy:  Pharmacy at [place]  Date medication is needed: Pt is leaving on vacation tomorrow and will be out gone until the 5th of June, and she will run out June 2nd.         Action Taken: Other: [facility] psych pool    Travel Screening: Not Applicable

## 2022-05-26 NOTE — TELEPHONE ENCOUNTER
Pt is going out of town and needs a new rx. Please send ASAP      Thank you,  Sofia Alberto, [facility]  [phone]

## 2022-06-10 ENCOUNTER — VIRTUAL VISIT (OUTPATIENT)
Dept: PSYCHIATRY | Facility: CLINIC | Age: 57
End: 2022-06-10
Attending: PSYCHIATRY & NEUROLOGY
Payer: COMMERCIAL

## 2022-06-10 ENCOUNTER — OFFICE VISIT (OUTPATIENT)
Dept: FAMILY MEDICINE | Facility: CLINIC | Age: 57
End: 2022-06-10
Payer: COMMERCIAL

## 2022-06-10 VITALS
OXYGEN SATURATION: 97 % | DIASTOLIC BLOOD PRESSURE: 74 MMHG | HEART RATE: 77 BPM | RESPIRATION RATE: 16 BRPM | TEMPERATURE: 98.1 F | SYSTOLIC BLOOD PRESSURE: 127 MMHG

## 2022-06-10 DIAGNOSIS — R21 RASH AND NONSPECIFIC SKIN ERUPTION: Primary | ICD-10-CM

## 2022-06-10 DIAGNOSIS — F31.9 BIPOLAR I DISORDER (H): Primary | ICD-10-CM

## 2022-06-10 PROCEDURE — 99214 OFFICE O/P EST MOD 30 MIN: CPT | Mod: 95 | Performed by: STUDENT IN AN ORGANIZED HEALTH CARE EDUCATION/TRAINING PROGRAM

## 2022-06-10 PROCEDURE — 99213 OFFICE O/P EST LOW 20 MIN: CPT | Performed by: FAMILY MEDICINE

## 2022-06-10 ASSESSMENT — ANXIETY QUESTIONNAIRES
7. FEELING AFRAID AS IF SOMETHING AWFUL MIGHT HAPPEN: SEVERAL DAYS
3. WORRYING TOO MUCH ABOUT DIFFERENT THINGS: MORE THAN HALF THE DAYS
6. BECOMING EASILY ANNOYED OR IRRITABLE: SEVERAL DAYS
7. FEELING AFRAID AS IF SOMETHING AWFUL MIGHT HAPPEN: SEVERAL DAYS
2. NOT BEING ABLE TO STOP OR CONTROL WORRYING: MORE THAN HALF THE DAYS
GAD7 TOTAL SCORE: 11
4. TROUBLE RELAXING: MORE THAN HALF THE DAYS
5. BEING SO RESTLESS THAT IT IS HARD TO SIT STILL: SEVERAL DAYS
GAD7 TOTAL SCORE: 11
8. IF YOU CHECKED OFF ANY PROBLEMS, HOW DIFFICULT HAVE THESE MADE IT FOR YOU TO DO YOUR WORK, TAKE CARE OF THINGS AT HOME, OR GET ALONG WITH OTHER PEOPLE?: SOMEWHAT DIFFICULT
1. FEELING NERVOUS, ANXIOUS, OR ON EDGE: MORE THAN HALF THE DAYS
GAD7 TOTAL SCORE: 11

## 2022-06-10 ASSESSMENT — PATIENT HEALTH QUESTIONNAIRE - PHQ9
SUM OF ALL RESPONSES TO PHQ QUESTIONS 1-9: 6
10. IF YOU CHECKED OFF ANY PROBLEMS, HOW DIFFICULT HAVE THESE PROBLEMS MADE IT FOR YOU TO DO YOUR WORK, TAKE CARE OF THINGS AT HOME, OR GET ALONG WITH OTHER PEOPLE: SOMEWHAT DIFFICULT
SUM OF ALL RESPONSES TO PHQ QUESTIONS 1-9: 6

## 2022-06-10 NOTE — PATIENT INSTRUCTIONS
Treatment Plan    Continue all medications    Please go in-person to see a doctor about the skin bump you reported.    RTC in 4 weeks    **For crisis resources, please see the information at the end of this document**   Patient Education    Thank you for coming to the [facility]ENT[facility] HEALTH & ADDI[facility] [place] [facility].     Lab Testing:  If you had lab testing today and your results are reassuring or normal they will be mailed to you or sent through [employer] within 7 days. If the lab tests need quick action we will call you with the results. The phone number we will call with results is # [phone]. If this is not the best number please call our clinic and change the number.     Medication Refills:  If you need any refills please call your pharmacy and they will contact us. Our fax number for refills is [phone].   Three business days of notice are needed for general medication refill requests.   Five business days of notice are needed for controlled substance refill requests.   If you need to change to a different pharmacy, please contact the new pharmacy directly. The new pharmacy will help you get your medications transferred.     Contact Us:  Please call [phone] during business hours (8-5:00 M-F).   If you have medication related questions after clinic hours, or on the weekend, please call [phone].     Financial Assistance [phone]   Medical Records [phone]       MENTAL HEALTH CRISIS RESOURCES:  For a emergency help, please call 911 or go to the nearest Emergency Department.     Emergency Walk-In Options:   EmPATH Unit @ [facility] ([place]): [phone] - Specialized mental health emergency area designed to be calming  [facility] ([place]): [phone]  [facility] Acute Psychiatry Services ([place]): [phone]  [facility]): [phone]    [facility] Crisis Information:   [place]: [phone]  [place]: [phone]  [place]  ([place]) - Adult: [phone]     Child: [phone]  [place] - Adult: [phone]     Child: [phone]  [place]: [phone]  List of all [facility] resources:   https://[place].gov/dhs/people-we-serve/adults/health-care/mental-health/resources/crisis-contacts.jsp    National Crisis Information:   Crisis Text Line: Text  [place]  to 419946  National Suicide Prevention Lifeline: 1-178-193-NCUN (1-744.705.1221)       For online chat options, visit https://suicidepreventionlifeline.org/chat/  Poison Control Center: 1-357.258.6905  Trans Lifeline: 1-788.898.4857 - Hotline for transgender people of all ages  The [employer] Project: 1-985-648-3652 - Hotline for LGBT youth     For Non-Emergency Support:   Fast Tracker: Mental Health & Substance Use Disorder Resources -   https://www.[employer].org/

## 2022-06-10 NOTE — PROGRESS NOTES
Assessment & Plan     Rash and nonspecific skin eruption  Doubt lamictal rash. Certainly not Garg Lei or Toxic epidermal necrolysis. Reviewed images with patient    HC1% cream BID x 1 week  Sunscreen  Monitor                 No follow-ups on file.    Tanner Martino MD  [facility] STEPHY Travis is a 57 year old who presents for the following health issues     HPI   1. Rash - L arm  Pt here due to rash on L forearms, started 5/17/22. Noticed it spreading up arms a couple weeks ago after sun exposure. Intermittent itching, no pain. Thinks it is related to lamotrigine dosage increase a month ago.   Lamictal dose increased 5/4/22            Review of Systems         Objective    /74   Pulse 77   Temp 98.1  F (36.7  C) (Oral)   Resp 16   SpO2 97%   There is no height or weight on file to calculate BMI.     Physical Exam   GENERAL: healthy, alert and no distress  SKIN: Slightly bumpy area on L arm by elbow and above. Approx 10 x 15cm. Not erythematous. No scaling, no bullae, no peeling.   PSYCH: mentation appears normal, affect normal/bright

## 2022-06-10 NOTE — PROGRESS NOTES
Angy Haji is a 57 year old who has consented to receive services via billable video visit.      Pt will join video visit via: [employer]  If there are problems joining the visit, send backup video invite via: Send to preferred e-mail: [email or web address]      Originating Location (patient location): Patient's home  Distant Location (provider location): [facility] [place] [facility]    Will anyone else be joining the video visit? No    How would you prefer to obtain AVS?: Karaz  Answers for HPI/ROS submitted by the patient on 6/10/2022  If you checked off any problems, how difficult have these problems made it for you to do your work, take care of things at home, or get along with other people?: Somewhat difficult  PHQ9 TOTAL SCORE: 6  JOANNA 7 TOTAL SCORE: 11

## 2022-06-15 ENCOUNTER — HOSPITAL ENCOUNTER (OUTPATIENT)
Dept: NUTRITION | Facility: CLINIC | Age: 57
Discharge: HOME | End: 2022-06-15
Attending: DIETITIAN, REGISTERED | Admitting: DIETITIAN, REGISTERED
Payer: COMMERCIAL

## 2022-06-15 PROCEDURE — 97803 MED NUTRITION INDIV SUBSEQ: CPT | Mod: GT,95 | Performed by: DIETITIAN, REGISTERED

## 2022-06-15 NOTE — PROGRESS NOTES
Video-Visit Details    Type of service:  Video Visit    Video Start Time (time video started): 3:05    Video End Time (time video stopped): 3:30    Originating Location (pt. Location): Home    Distant Location (provider location):  [facility] NUTRITION SERVICES     Mode of Communication:  Video Conference via [facility]    NUTRITION REASSESSMENT NOTE (TELEPHONE VISIT DUE TO COVID-19)     REASON FOR ASSESSMENT   Angy LILIAN Haji referred by Dr. Odell for MNT related to  Obesity (BMI 30-39.9) [E66.9]  - Primary       Benign essential hypertension [I10]       Patient accompanied by self (via telephone)     NUTRITION HISTORY  Patient continues with multiple life changes and stressors.  Patient aware that she likes to self soothe with ice cream which she has recently increased.  Patient is aware that social cues trigger her to eat. Patient's spouse recently has weight loss surgery so there is adjust with eating in the home.      5/19 Patient walks 30 minutes most days + weekend 60 minute exercise (biking or hiking); lower back pain -foot going numb when walking and pain at night; dessert (3 days no dessert); asking self if hungry.  Patient made Malt o Meal as substitute for dessert.  Patient limiting dessert if eating dinner later or brushing teeth right after dinner and tracking dessert on calendar.  Patient had 4 day celebration for her birthday and felt was able to make wise choices.  Patient made cream cheese brownies for birthday dessert and froze remaining dessert.  Patient continues to navigate new eating regimen with spouses's weight loss surgery.      7/14/2021 Success-taking walking routes with hills.   Evening meals consisting of grilled chicken/pork loin, grilled sweet potatoes and 2 vegetables   Patient having ice cream thoughts in afternoon-to self soothe.  Patient practicing distractions to avoid eating ice cream at nigt (brushing teeth, asking self if hungry, substituted Malt 0 Meal,  mineral water, too tired).       9/8/2021 Patient walking daily + 60 minutes 1 time per week.  Did well at [facility]. Hiking 1-4.6 miles. Weight loss during trip-308 lbs. Today 310 lbs. Grilled meat and vegetable + fruit. 1-5 days per week with no desserts.   Breakfast: yogurt + 1/2 cup granola + fruit   Lunch: leftovers (meat + vegetables) or cheese and Triscuits + vegetables + fruit   Dinner: BLT (3 slices sierra) + 2 corn on the cob + watermelon + tomatoes; tacos + watermelon/strawberries + cucumbers      12/13/2021 No desserts 4 times per week -met; emotional eating and Thanksgiving desserts where times when increased desserts.  Patient baked cookies-peanut butter blossom 1 batch (2 servings raw dough + 1 cookie) and froze them.  Patient walking outside and would like to walk indoors on treadmill.  Patient and spouse alter meal planning.       1/24/2021 Dessert (4 days) past 2 weeks; walking regularly-movement in the house if not able to walk outside; Weight-1/17 307.2 lbs  Patient continues to work remote.  Patient tracking dessert intake.      3/7/2022 Self soothe in evening snack-made tea or tells self ate late dinner and does not need snack.  Patient asking self if  physically hungry.  Patient drinking fizzy water or malt o meal as evening snack.  Patient aware she has been stress eating.  Tracking when not having dessert -identifying why eating  Exercise-cross country skiing; mall walk; snow shoeing; treadmill 2 times   Sharing treadmill   Yogurt; cheese and crackers; cereal      Patient wants to increase exercise time as will be going to [place] weekend ([place]) [facility]      4/25/2002 Patient able to get 45-60 minutes exercise on weekends and 20-30 minutes of exercise daily.  Patient wants to lose 5 lbs prior to trip to [place].  Patient was able to have 3 days no desserts and then 1 week no dessert. 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  Patient determined ways she could lose weight:   Dessert 2 times per week  30 minutes walking  Add treadmill  Eliminate morning snack  1 snack at 3 PM   Alcohol 1 time per week  No caramel lattes  Food is fuel  It's late too be hungry  Spouse bought sweet treats without asking   Tea/bath/showers      6/15/2022 Patient skips dessert if eats late dinner.  Patient felt discouraged with weight loss as wanted to lose more weight (down 3.5 lbs).  Patient wants weight loss for functional reasons.  Patient signed up for the tour of the [place] bike tour.  Patient has increased cravings and thoughts about ice cream.  Shift in relationship with spouse after spouse's weight loss surgery.      DIET RECALL   Breakfast: cereal and banana; yogurt + granola (measures), fruit + coffee; 2 slices toast with butter + 1/2 cup cottage cheese    Lunch: leftovers (includes fruit and vegetable daily)  Snack: fruit or cheese with lower salt nut thin crackers   Dinner: Tuna steak, Asian marinade, stir gomez, baked vegetable + brown rice (3/4 cup) + canned peaches; chicken thighs with beans and rice; sheet pan dinner with chicken and vegetables or grilled salmon and grilled vegetables; taco; protein + fruit and vegetable  Snacks: fruit, string cheese, Ritz crackers or yogurt; dessert in the evening (ice cream recently)  Beverages: mainly water, coffee, mineral water  Dining out: none currently  Exercise: Patient exercising 5-6 times per week. (cross country skiing, snow shoeing, walking and walking dog).  Patient exercises 1 hour on weekends (hiking or walking).     MEDICATIONS:  Reviewed      ANTHROPOMETRICS  Height: 5'8\" (note height change in chart previously 5'9\")  Weight: 307 lbs per patient   BMI (kg/m2): 46.8 kg/m2   %IBW: 220%  Weight History:   Wt Readings from Last 10 Encounters:   05/16/22 142.1 kg (313 lb 3.2 oz)   01/12/22 139.7 kg (308 lb)   12/22/21 137.4 kg (303 lb)   10/25/21 137.6 kg (303 lb 4.8 oz)   10/22/21 139.7 kg " (308 lb)   10/08/21 139.9 kg (308 lb 6.4 oz)   10/08/21 139.7 kg (308 lb)   08/27/21 139.3 kg (307 lb)   08/27/21 141.5 kg (312 lb)   06/02/21 141.9 kg (312 lb 12.8 oz)      ASSESSED NUTRITION NEEDS  Estimated Energy Needs: 4060-8482 kcals (15-20 kcals/kg) for gradual weight loss   Estimated Protein Needs: 66-79 (1-1.2 gm/kg IBW) for repletion with exercise  RMR: 1440     EVALUATION/PROGRESS TOWARDS GOALS   Previous goals:  Track 2 days po intake on the weekday and 1 day on the weekend -improving   Limit desserts to 2-3 times per week -improving   Add 45-60 minutes exercise -improving (1 hour hike X 2; 60 minute walk in [place]; 2 -30 minute walks x 2; 90 minute walk)     Previous Nutrition Diagnosis: Obesity related to excess energy intake as evidenced by BMI of 46.8 kg/m2-no change     Current Nutrition Diagnosis:Obesity related to excess energy intake as evidenced by BMI of 46.8 kg/m2     INTERVENTIONS   Nutrition Prescription   Recommend exercise 5 times per week; track intake if eating mindlessly; increase fiber to 25-30 grams per day      Implementation:   Meals and Snacks: Continue eating 3 meals + deliberate snacking (if hungry)   Nutrition Education (Content):               a)  Discussed progress towards goals.  Congratulated patient for effort in tracking movement and for increasing movement.  Reviewed current food and eating behavior challenges.  Supported patient in her struggle with food and weight.    Nutrition Education (Application):               a) Discussed emotional eating in the evening.  Reviewed alternatives to emotional eating that patient determined previously.               b) Patient verbalized understanding of diet by stating will plan snacks.  Expected patient engagement: good     CURRENT GOALS  Start biking 3 days per week (varying time)  Limit desserts to 2-3 times per week  Track 2 days po intake on the weekday and 1 day on the weekend     FOLLOW UP/MONITORING    Patient to follow up  in 6 weeks  Patient has RD contact information      Time spent with patient: 25 minutes     Mattie Amaya, RD, LD  [facility] Outpatient Dietitian  [phone] (office phone)

## 2022-07-05 ENCOUNTER — TELEPHONE (OUTPATIENT)
Dept: SLEEP MEDICINE | Facility: CLINIC | Age: 57
End: 2022-07-05

## 2022-07-05 NOTE — TELEPHONE ENCOUNTER
LVM LETTING HER KNOW WE DO NOT HAVE ALOT OF INFORMATION ON THE BATTERIES BUT SHE WOULD WANT TO MAKE SURE SHE HAD THE RIGHT ADAPTER TO PLUG HER MACHINE IN TO THE BATTERY

## 2022-07-07 ENCOUNTER — TELEPHONE (OUTPATIENT)
Dept: PSYCHIATRY | Facility: CLINIC | Age: 57
End: 2022-07-07

## 2022-07-07 NOTE — TELEPHONE ENCOUNTER
[facility] Call Center    Phone Message    May a detailed message be left on voicemail: yes     Reason for Call: Medication Refill Request    Has the patient contacted the pharmacy for the refill? Yes   Name of medication being requested: lamoTRIgine (LAMICTAL) 100 MG tablet    lamoTRIgine (LAMICTAL) 200 MG tablet    lithium (ESKALITH) 150 MG capsule    lithium 300 MG capsule  Provider who prescribed the medication: [facility]  Pharmacy: [facility] [address]  Date medication is needed: 5 days   Patient aware of 72 hr turnaround time. Patient said she called pharmacy and was told they didn't have anymore refills left on file and directed to contact clinic.      Action Taken: Other: P PSYCHIATRY NURSE-[facility]    Travel Screening: Not Applicable

## 2022-07-12 DIAGNOSIS — F31.9 BIPOLAR I DISORDER (H): ICD-10-CM

## 2022-07-12 RX ORDER — LITHIUM CARBONATE 300 MG/1
900 CAPSULE ORAL AT BEDTIME
Qty: 90 CAPSULE | Refills: 1 | Status: SHIPPED | OUTPATIENT
Start: 2022-07-12 | End: 2022-08-29

## 2022-07-12 RX ORDER — LITHIUM CARBONATE 150 MG/1
150 CAPSULE ORAL AT BEDTIME
Qty: 30 CAPSULE | Refills: 1 | Status: SHIPPED | OUTPATIENT
Start: 2022-07-12 | End: 2022-08-29

## 2022-07-12 RX ORDER — LAMOTRIGINE 200 MG/1
200 TABLET ORAL DAILY
Qty: 30 TABLET | Refills: 1 | Status: SHIPPED | OUTPATIENT
Start: 2022-07-12 | End: 2022-08-29

## 2022-07-12 NOTE — TELEPHONE ENCOUNTER
Patient will be out of lithium tonight. Error in pharmacy system did not send refill request automatically. Please send urgent refill!    Thanks!    Blaine Rehman  Retail Pharm Tech II   [facility]   [address] [place] [address]   [email or web address]  [email or web address]   Office: [phone]  Fax [phone]

## 2022-07-15 ENCOUNTER — THERAPY VISIT (OUTPATIENT)
Dept: PHYSICAL THERAPY | Facility: CLINIC | Age: 57
End: 2022-07-15
Attending: FAMILY MEDICINE
Payer: COMMERCIAL

## 2022-07-15 DIAGNOSIS — M25.551 LATERAL PAIN OF RIGHT HIP: Primary | ICD-10-CM

## 2022-07-15 DIAGNOSIS — M54.16 LUMBAR RADICULOPATHY: ICD-10-CM

## 2022-07-15 PROCEDURE — 97161 PT EVAL LOW COMPLEX 20 MIN: CPT | Mod: GP | Performed by: PHYSICAL THERAPIST

## 2022-07-15 PROCEDURE — 97110 THERAPEUTIC EXERCISES: CPT | Mod: GP | Performed by: PHYSICAL THERAPIST

## 2022-07-15 NOTE — PROGRESS NOTES
Physical Therapy Initial Evaluation  Subjective:    Patient Health History             Pertinent medical history includes: depression, high blood pressure, incontinence, menopausal, numbness/tingling, overweight and sleep disorder/apnea.   Red flags:  Pain at rest/night.  Medical allergies: Penicillin, thimerosol contact lens pres.       Current medications:  High blood pressure medication and hormone replacement therapy ([facility]).    Current occupation is Psychotherapist.   Primary job tasks include:  Prolonged sitting.                                    Objective:  System    Physical Exam    General     ROS    Assessment/Plan:

## 2022-07-15 NOTE — PROGRESS NOTES
Physical Therapy Initial Evaluation  Subjective:  Patient presents to PT for treatment of lumbar radiculopathy with referral dated 5/4/2022.  She reports onset of symptoms about 10 months ago with lateral right hip pain which interfered with sleep due to pain in either sidelying.  She was referred to Dr. Valadez for injections but bursa were not inflamed. Patient was noticing weakness and difficulty with stairs, numbness at lateral right toes. Subsequent MRI, PT and sacral facet injections with relief over the next month. Stairs got easier after injection.  Patient returned to Dr. Valadez who advised further PT and possibly another injection.  Currently patient complains of mild ache at R LB, lateral right hip and thigh pain and ongoing numbness at lateral right foot.  Symptoms are worse lying on either side, with walking and occasionally with sitting.    Symptoms are relieved by stopping walking or changing position. Patient works as clinical  with prolonged sitting.                                                MR Impression:  Anterolisthesis with degenerative facet arthropathy at L4-5 with  resultant mild to moderate spinal canal and bilateral mild neural  foraminal narrowing.         The history is provided by the patient.   Patient Health History  Angy Haji being seen for  right leg pain, mobility change,foot numbness.     Date of Onset: fall of 2020.   Problem occurred: unknown, gradual onset   Pain is reported as 7/10 on pain scale.  General health as reported by patient is fair.  Pertinent medical history includes: depression, high blood pressure, incontinence, menopausal, numbness/tingling, overweight and sleep disorder/apnea. Other medical history details: bipolar.   Red flags:  Pain at rest/night.   Other medical allergies details: penicilin.    Other surgery history details: removal of ovary, gall bladder.    Current medications:  High blood pressure medication and hormone  replacement therapy.    Current occupation is psychotherapist.   Primary job tasks include:  Prolonged sitting.                                    Objective:  Standing Alignment:              Knee:  Genu valgus R    General deviations alignment: shoulders right, hips left.  Gait:    Gait Type:  Normal   Assistive Devices:  None  Deviations:  General Deviations:  Toe out R               Lumbar/SI Evaluation  ROM:    AROM Lumbar:   Flexion:          Fingertips to toes without complaint, no complaint with knee to chest  Ext:                    Min loss with pinch pain at LB, no worse with reps   Side Bend:        Left:     Right:   Rotation:           Left:     Right:   Side Glide:        Left:  No complaint    Right:  R LB/hip complaint          Lumbar Myotomes:  normal            Lumbar DTR's:  normal          Neural Tension/Mobility:  Neural tension wnl lumbar: inconsistant LBP with right d/s.      Left side:SLR w/DF  negative.     Right side:   SLR w/DF  negative.                                             Hip Evaluation  HIP AROM:  AROM:   Left Hip:     Normal    Right Hip:                    Hip PROM:  Hip PROM:  Left Hip:   Normal  Right Hip:                           Hip Strength:        Abduction:  Right: 4+/5   -   Pain:  Adduction:  Left: 3+/5   +++   Pain:                Hip Special Testing:   Special tests hip not assessed: mild lateral R hip complaint with dipesh.    Right hip negative for the following special tests:  Fadir/Labrum    Hip Palpation:      Right hip tenderness present at:  Greater Trachanter and Gluteus Medius  Functional Testing:  Functional test hip: reproduction of lateral right hip pain with single leg stance on right.                       General     ROS    Assessment/Plan:    Patient is a 57 year old female with sacral and right side hip complaints.    Patient has the following significant findings with corresponding treatment plan.                Diagnosis 1:  Right lumbar  radiculopathy, right lateral hip pain  Pain -  manual therapy, self management, education and home program  Decreased ROM/flexibility - manual therapy and therapeutic exercise  Decreased strength - therapeutic exercise and therapeutic activities  Decreased function - therapeutic activities    Therapy Evaluation Codes:   1) History comprised of:   Personal factors that impact the plan of care:      None.    Comorbidity factors that impact the plan of care are:      None.     Medications impacting care: None.  2) Examination of Body Systems comprised of:   Body structures and functions that impact the plan of care:      Hip and Lumbar spine.   Activity limitations that impact the plan of care are:      Walking.  3) Clinical presentation characteristics are:   Stable/Uncomplicated.  4) Decision-Making    Low complexity using standardized patient assessment instrument and/or measureable assessment of functional outcome.  Cumulative Therapy Evaluation is: Low complexity.    Previous and current functional limitations:  (See Goal Flow Sheet for this information)    Short term and Long term goals: (See Goal Flow Sheet for this information)     Communication ability:  Patient appears to be able to clearly communicate and understand verbal and written communication and follow directions correctly.  Treatment Explanation - The following has been discussed with the patient:   RX ordered/plan of care  Anticipated outcomes  Possible risks and side effects  This patient would benefit from PT intervention to resume normal activities.   Rehab potential is good.    Frequency:  1 X week, once daily  Duration:  for 8 visits  Discharge Plan:  Achieve all LTG.  Independent in home treatment program.  Reach maximal therapeutic benefit.    Please refer to the daily flowsheet for treatment today, total treatment time and time spent performing 1:1 timed codes.

## 2022-07-27 ENCOUNTER — HOSPITAL ENCOUNTER (OUTPATIENT)
Dept: NUTRITION | Facility: CLINIC | Age: 57
Discharge: HOME | End: 2022-07-27
Attending: DIETITIAN, REGISTERED | Admitting: DIETITIAN, REGISTERED
Payer: COMMERCIAL

## 2022-07-27 VITALS — BODY MASS INDEX: 46.83 KG/M2 | WEIGHT: 293 LBS

## 2022-07-27 DIAGNOSIS — E66.01 OBESITY, CLASS III, BMI 40-49.9 (MORBID OBESITY) (H): Primary | ICD-10-CM

## 2022-07-27 PROCEDURE — 97803 MED NUTRITION INDIV SUBSEQ: CPT | Mod: GT,95 | Performed by: DIETITIAN, REGISTERED

## 2022-07-27 NOTE — PROGRESS NOTES
Video-Visit Details    Type of service:  Video Visit    Video Start Time (time video started): 2:58    Video End Time (time video stopped): 3:30    Originating Location (pt. Location): Home    Distant Location (provider location):  [facility] NUTRITION SERVICES     Mode of Communication:  Video Conference via [facility]    NUTRITION REASSESSMENT NOTE (TELEPHONE VISIT DUE TO COVID-19)     REASON FOR ASSESSMENT   Angy LILIAN Haji referred by Dr. Odell for MNT related to  Obesity (BMI 30-39.9) [E66.9]  - Primary       Benign essential hypertension [I10]       Patient accompanied by self (via telephone)     NUTRITION HISTORY  Patient continues with multiple life changes and stressors.  Patient aware that she likes to self soothe with ice cream which she has recently increased.  Patient is aware that social cues trigger her to eat. Patient's spouse recently has weight loss surgery so there is adjust with eating in the home.      5/19 Patient walks 30 minutes most days + weekend 60 minute exercise (biking or hiking); lower back pain -foot going numb when walking and pain at night; dessert (3 days no dessert); asking self if hungry.  Patient made Malt o Meal as substitute for dessert.  Patient limiting dessert if eating dinner later or brushing teeth right after dinner and tracking dessert on calendar.  Patient had 4 day celebration for her birthday and felt was able to make wise choices.  Patient made cream cheese brownies for birthday dessert and froze remaining dessert.  Patient continues to navigate new eating regimen with spouses's weight loss surgery.      7/14/2021 Success-taking walking routes with hills.   Evening meals consisting of grilled chicken/pork loin, grilled sweet potatoes and 2 vegetables   Patient having ice cream thoughts in afternoon-to self soothe.  Patient practicing distractions to avoid eating ice cream at nigt (brushing teeth, asking self if hungry, substituted Malt 0 Meal,  mineral water, too tired).       9/8/2021 Patient walking daily + 60 minutes 1 time per week.  Did well at [facility]. Hiking 1-4.6 miles. Weight loss during trip-308 lbs. Today 310 lbs. Grilled meat and vegetable + fruit. 1-5 days per week with no desserts.   Breakfast: yogurt + 1/2 cup granola + fruit   Lunch: leftovers (meat + vegetables) or cheese and Triscuits + vegetables + fruit   Dinner: BLT (3 slices sierra) + 2 corn on the cob + watermelon + tomatoes; tacos + watermelon/strawberries + cucumbers      12/13/2021 No desserts 4 times per week -met; emotional eating and Thanksgiving desserts where times when increased desserts.  Patient baked cookies-peanut butter blossom 1 batch (2 servings raw dough + 1 cookie) and froze them.  Patient walking outside and would like to walk indoors on treadmill.  Patient and spouse alter meal planning.       1/24/2021 Dessert (4 days) past 2 weeks; walking regularly-movement in the house if not able to walk outside; Weight-1/17 307.2 lbs  Patient continues to work remote. Patient tracking dessert intake.      3/7/2022 Self soothe in evening snack-made tea or tells self ate late dinner and does not need snack.  Patient asking self if  physically hungry.  Patient drinking fizzy water or malt o meal as evening snack.  Patient aware she has been stress eating.  Tracking when not having dessert -identifying why eating  Exercise-cross country skiing; mall walk; snow shoeing; treadmill 2 times   Sharing treadmill   Yogurt; cheese and crackers; cereal      Patient wants to increase exercise time as will be going to [place] weekend ([place]) [facility]      4/25/2002 Patient able to get 45-60 minutes exercise on weekends and 20-30 minutes of exercise daily.  Patient wants to lose 5 lbs prior to trip to [place].  Patient was able to have 3 days no desserts and then 1 week no dessert. Hosted [place] [employer] (2 desserts-cookies, lemon bars) so has increased sweet intake.   Patient determined ways she could lose weight:   Dessert 2 times per week  30 minutes walking  Add treadmill  Eliminate morning snack  1 snack at 3 PM   Alcohol 1 time per week  No caramel lattes  Food is fuel  It's late too be hungry  Spouse bought sweet treats without asking   Tea/bath/showers       6/15/2022 Patient skips dessert if eats late dinner.  Patient felt discouraged with weight loss as wanted to lose more weight (down 3.5 lbs).  Patient wants weight loss for functional reasons.  Patient signed up for the [place] bike tour.  Patient has increased cravings and thoughts about ice cream.  Shift in relationship with spouse after spouse's weight loss surgery.      7/27/2022 Patient increased exercise-[place] with [employer]; started the last week in June for bike rides (4 rides different times in 2 weeks-9 miles).  Rode in 18 mile race.  [place].    No dessert (hit or miss) 50% 3 days no dessert; ice cream and s'mores on vacation    Typical intake:  Greek yogurt + 1/4 cup granola + fruit + 1/2 and 1/2 with coffee or english muffin with peanut butter/butter and strawberry jam or eggs scrambled or hard boiled  Leftovers -1 cup cherries; cherry tomatoes; 1/2 cup pea pods + 3 oz pork tenderloin (teryaki)   Watermelon, pork tenderloin, 1 cup rice + tomatoes/pea pods; cheeseburger with whole wheat bun + vegetables + watermelon   Snacks: croissant (Rustica) Religion oat squares (1 cup dry) (3:00 PM) cheese and crackers    Beverages: coffee; water; fizzy water     Emotional eating at night-ice cream     DIET RECALL   Breakfast: cereal and banana; yogurt + granola (measures), fruit + coffee; 2 slices toast with butter + 1/2 cup cottage cheese    Lunch: leftovers (includes fruit and vegetable daily)  Snack: fruit or cheese with lower salt nut thin crackers   Dinner: Tuna steak, Asian marinade, stir gomez, baked vegetable + brown rice (3/4 cup) + canned  "peaches; chicken thighs with beans and rice; sheet pan dinner with chicken and vegetables or grilled salmon and grilled vegetables; taco; protein + fruit and vegetable  Snacks: fruit, string cheese, Ritz crackers or yogurt; dessert in the evening (ice cream recently)  Beverages: mainly water, coffee, mineral water  Dining out: none currently  Exercise: Patient exercising 5-6 times per week. (cross country skiing, snow shoeing, walking and walking dog).  Patient exercises 1 hour on weekends (hiking or walking).     MEDICATIONS:  Reviewed      ANTHROPOMETRICS  Height: 5'8\" (note height change in chart previously 5'9\")  Weight: 308 lbs per patient   BMI (kg/m2): 46.8 kg/m2   %IBW: 220%  Weight History:   Wt Readings from Last 10 Encounters:   07/27/22 139.7 kg (308 lb)   05/16/22 142.1 kg (313 lb 3.2 oz)   01/12/22 139.7 kg (308 lb)   12/22/21 137.4 kg (303 lb)   10/25/21 137.6 kg (303 lb 4.8 oz)   10/22/21 139.7 kg (308 lb)   10/08/21 139.9 kg (308 lb 6.4 oz)   10/08/21 139.7 kg (308 lb)   08/27/21 139.3 kg (307 lb)   08/27/21 141.5 kg (312 lb)     ASSESSED NUTRITION NEEDS  Estimated Energy Needs: 4898-6894 kcals (15-20 kcals/kg) for gradual weight loss   Estimated Protein Needs: 66-79 (1-1.2 gm/kg IBW) for repletion with exercise  RMR: 1440     EVALUATION/PROGRESS TOWARDS GOALS   Previous goals:  Start biking 3 days per week (varying time)-improving   Limit desserts to 2-3 times per week-improving   Track 2 days po intake on the weekday and 1 day on the weekend -not met      Previous Nutrition Diagnosis: Obesity related to excess energy intake as evidenced by BMI of 46.8 kg/m2-no change     Current Nutrition Diagnosis:Obesity related to excess energy intake as evidenced by BMI of 46.8 kg/m2     INTERVENTIONS   Nutrition Prescription   Recommend exercise 5 times per week; track intake if eating mindlessly; increase fiber to 25-30 grams per day      Implementation:   Meals and Snacks: Continue eating 3 meals " + deliberate snacking (if hungry)   Nutrition Education (Content):               a)  Discussed progress towards goals.  Congratulated patient for discussing ice cream with wife as recently came home with 8 pints of ice cream due to on sale.  Reviewed current food and eating behavior challenges.  Discussed tracking for accountability with portions.  Supported patient in her struggle with food and weight.    Nutrition Education (Application):               a) Discussed emotional eating in the evening.  Reviewed alternatives to emotional eating that patient determined previously.               b) Patient verbalized understanding of diet by stating will track dinner and snacks.  Expected patient engagement: good     CURRENT GOALS  Keep biking 2 or more days per week (10 miles easy ride and more distance)   Track dinner/snacks/dessert 2 times during the week and 1 time during the weekend    FOLLOW UP/MONITORING    Patient to follow up in 8 weeks  Patient has RD contact information      Time spent with patient: 32 minutes     Mattie Amaya, RD, LD  [facility] Outpatient Dietitian  [phone] (office phone)

## 2022-07-28 NOTE — PROGRESS NOTES
7/28/22 Patient had virtual appointment yesterday, 7/27/22 ([facility] location)     Khushi Marquez  Care Coordinator  [facility]  [phone]

## 2022-08-10 ENCOUNTER — MYC MEDICAL ADVICE (OUTPATIENT)
Dept: OBGYN | Facility: CLINIC | Age: 57
End: 2022-08-10

## 2022-08-10 DIAGNOSIS — N95.1 PERIMENOPAUSAL VASOMOTOR SYMPTOMS: Primary | ICD-10-CM

## 2022-08-11 RX ORDER — ESTRADIOL 0.5 MG/1
0.75 TABLET ORAL DAILY
Qty: 48 TABLET | Refills: 2 | Status: SHIPPED | OUTPATIENT
Start: 2022-08-11 | End: 2022-10-05

## 2022-08-12 ENCOUNTER — THERAPY VISIT (OUTPATIENT)
Dept: PHYSICAL THERAPY | Facility: CLINIC | Age: 57
End: 2022-08-12
Payer: COMMERCIAL

## 2022-08-12 DIAGNOSIS — M54.16 LUMBAR RADICULOPATHY: Primary | ICD-10-CM

## 2022-08-12 DIAGNOSIS — M25.551 LATERAL PAIN OF RIGHT HIP: ICD-10-CM

## 2022-08-12 PROCEDURE — 97140 MANUAL THERAPY 1/> REGIONS: CPT | Mod: GP | Performed by: PHYSICAL THERAPIST

## 2022-08-12 PROCEDURE — 97110 THERAPEUTIC EXERCISES: CPT | Mod: GP | Performed by: PHYSICAL THERAPIST

## 2022-08-19 ENCOUNTER — THERAPY VISIT (OUTPATIENT)
Dept: PHYSICAL THERAPY | Facility: CLINIC | Age: 57
End: 2022-08-19
Payer: COMMERCIAL

## 2022-08-19 DIAGNOSIS — M25.551 LATERAL PAIN OF RIGHT HIP: ICD-10-CM

## 2022-08-19 DIAGNOSIS — M54.16 LUMBAR RADICULOPATHY: Primary | ICD-10-CM

## 2022-08-19 PROCEDURE — 97110 THERAPEUTIC EXERCISES: CPT | Mod: GP | Performed by: PHYSICAL THERAPIST

## 2022-08-19 PROCEDURE — 97140 MANUAL THERAPY 1/> REGIONS: CPT | Mod: GP | Performed by: PHYSICAL THERAPIST

## 2022-08-22 NOTE — PROGRESS NOTES
Angy Haji is a 57 year old who has consented to receive services via billable video visit.      Pt will join video visit via: [employer]  If there are problems joining the visit, send backup video invite via: Text to preferred phone: [phone]      Originating Location (patient location): Patient's home  Distant Location (provider location): [facility] MENTAL [facility] & ADDICTION [facility]    Will anyone else be joining the video visit? No  Telehealth Details  Type of service:  video visit for medication management  Time of service:    Start Time:  3:35 PM    End Time:   4:09 PM    Originating Site (patient location):  [facility]   Location- Patient's home  Distant Site (provider location):  [facility]  Mode of Communication:  Video conference via [employer]           [facility]  Psychiatry Clinic  PSYCHIATRY PROGRESS NOTE     CARE TEAM:  PCP- Magali Lira, Psychotherapist- [facility] with Verna Chan  Specialty Providers- OB/GYN, ENT, PT, Nutrition      Wife- Rachael Haji is a 55 year old who prefers the name Angy and uses pronouns she, her, hers.      DIAGNOSIS     Bipolar 1 disorder, current depressive episode, moderate   H/o EPSE secondary to neuroleptic use  H/o Thrombocytopenia secondary to Depakote  H/o lithium toxicity, resolved     ASSESSMENT    Angy reports that her mood is improving though she still experiences intermittent mood changes from feeling neutral to low and irritable. She adds that the changes might be driven by external stressors so the plan is to closely monitor the mood changes she's experiencing and what's driving them. She reports low sexual drive and we spent some time talking about where this might stem from-medications vs mood.  Patient states that she has no problems with sexual function but has found sexual activities to be more of a chore than something to look forward to. With patient's current medication  regimen, there isn't a strong evidence to suggest that these medications would cause low sexual drive though there have been cases that they resulted in low libido in men. Patient has also obtained benefit form these medications and has to consider risks vs benefits of these medications. We increased Lamictal to 300mg today to further target depression. Of note, patient is currently tapering her estrogen pills with her OB doctor which will result in less induction of the metabolism of Lamictal. We will continue to monitor that patient doesn't experience any side effects with the current ongoing changes. No SI or HI today.    [place] review was not needed today. Patient is not taking any controlled substances.     PLAN                                                                                                                1) Meds-  - Continue Lithium 1050mg at bedtime  - Increase Lamictal to 300mg daily    2) Psychotherapy-Continue individual therapy    3) Next due-  Labs- Li labs due 11/2022  EKG- PRN  Rating scales- N/A    4) Referrals-  None    5) Dispo- RTC in 4-8 weeks       PERTINENT BACKGROUND                           [most recent eval 08/22/21]    Patient has not had significant mental health history until 2018, when she developed perimenopausal mood and anxiety symptoms.  She was subsequently referred to our clinic following a hospitalization for anthony in November 2018.  Her care has been complicated by sensitivity to antipsychotics (EPSE including difficulty swallowing), thrombocytopenia secondary to Depakote, lithium toxicity in setting of concurrent Depakote use).    Psych pertinent item history includes Manic episode(h/o one manic episode Nov/Dec 2018), psychiatric hospitalization in November 2018.     SUBJECTIVE   Patient presented for a follow up.    Since last visit:  -Angy reports that overall things are going okay with stable sleep. Though a couple nights she noticed that she found it hard to  fall asleep.  -Patient reports that she has started to taper the estrogen. She started for about 2 weeks. She hasn't noticed any differences with side effects or worsening of menopausal symptoms.  -Angy reports that her mood has been good, low, and irritable, She reports feeling motivated by regular daily activities apart from exercise which she thinks might be due to pain. She adds that she's still having a lack of sex drive. We talked about what might be contributing to the reduced sex drive-her mood vs the medications. Patient was able to differentiate that sexual function isn't affected but she finds it a chore and not enjoyable.  -She reports that she has enjoyed some activities with her family -musicals and movies. She adds that social activities have some dread involved but after it got going, she was able to enjoy it.    RECENT PSYCH ROS:   Depression: low mood and irritability that occurs intermittently. Anhedonia-improving  Elevated:  none  Psychosis:  none  Anxiety:  excessive worry  Trauma Related:  none  Sleep: Reports that she's sleeping okay  Other: N/A    Adverse Effects: Vivid dreams have reduced in frequency  Pertinent Negative Symptoms: No suicidal ideation, self-injurious behavior/urges or violent ideation  Recent Substance Use:   Alcohol- 1 drink (beer or wine) per week or 2 weeks.     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   to wife, Rachael. They have a teenage son.     PSYCH and SUBSTANCE USE Critical Summary Points since July 2021 08/26/2021: Resident transfer visit. Increased Lithium to 1200mg.  10/15/2021: decrease Lithium back to 1050 mg  11/02/2021: Lamictal titration plan started  11/29/2021: Increase Lamictal to 100 mg for 7 days and then increase to 150 mg  12/14/2021: Increase Lamictal to 200mg  01/24/2022: No medication changes  03/18/2022: No medication changes  04/29/2022: No medication changes  05/05/2022: Increased Lamictal to 250 mg (inbtwn visits)  06/10/2022: No medication changes  08/29/2022: Increase Lamictal to 300mg     PAST MED TRIALS      Medication Max Dose (mg) Dates / Duration Helpful? DC Reason / Adverse Effects?   Sertraline       Depakote    Tremors   Olanzapine    EPSE   Seroquel    EPSE   Lithium 1050 current          MEDICAL HISTORY and ALLERGY     ALLERGIES: Seroquel [quetiapine], Olanzapine, Penicillins, Risperidone, and Thimerosal    Patient Active Problem List   Diagnosis     Oral aphthae     Lateral pain of right hip     CARDIOVASCULAR SCREENING; LDL GOAL LESS THAN 160     Rosacea     Raynaud disease     Vitamin D deficiency disease     Mild sleep apnea     Atopic rhinitis     Chronic bilateral low back pain without sciatica     Obesity, Class III, BMI 40-49.9 (morbid obesity) (H)     Muscle tension dysphonia     Palpitations     Dysphagia, unspecified type     Anxiety     Benign essential hypertension     Callus of foot     Varicose veins of right lower extremity with edema     Bipolar I disorder (H)     Dribbling of urine     It band syndrome, right     Spinal stenosis at L4-L5 level     Facet arthropathy, lumbar     Lumbar radiculopathy        MEDICAL REVIEW OF SYSTEMS     Patient is on Prempro     MEDICATIONS     Current Outpatient Medications   Medication Sig Dispense Refill     Carboxymethylcellulose  Sod PF (REFRESH PLUS) 0.5 % SOLN ophthalmic solution Place 1 drop into both eyes 3 times daily as needed for dry eyes       cyclobenzaprine (FLEXERIL) 10 MG tablet Take 1 tablet (10 mg) by mouth nightly as needed for muscle spasms 10 tablet 0     estradiol (ESTRACE) 0.5 MG tablet Take 1.5 tablets (0.75 mg) by mouth daily 48 tablet 2     estradiol (ESTRACE) 1 MG tablet Take 1 tablet (1 mg) by mouth daily 90 tablet 3     estradiol (VAGIFEM) 10 MCG TABS vaginal tablet Place 1 tablet (10 mcg) vaginally twice a week 25 tablet 3     estrogen conj (PREMARIN) 0.625 MG tablet Take 1 tablet (0.625 mg) by mouth daily 90 tablet 3     estrogen conj-medroxyPROGESTERone (PREMPRO) 0.625-2.5 MG tablet Take 1 tablet by mouth daily 90 tablet 3     lamoTRIgine (LAMICTAL) 100 MG tablet Take half of a tablet (50 mg ) by mouth daily in addition to one 200 mg tablet 30 tablet 1     lamoTRIgine (LAMICTAL) 200 MG tablet Take 1 tablet (200 mg) by mouth daily 30 tablet 1     lidocaine (LIDODERM) 5 % patch Place 1-2 patches onto the skin every 24 hours To prevent lidocaine toxicity, patient should be patch free for 12 hrs daily. 30 patch 1     lisinopril (ZESTRIL) 10 MG tablet Take 1 tablet (10 mg) by mouth daily 90 tablet 3     lithium (ESKALITH) 150 MG capsule Take 1 capsule (150 mg) by mouth At Bedtime 30 capsule 1     lithium 300 MG capsule Take 3 capsules (900 mg) by mouth At Bedtime 90 capsule 1     Loratadine 10 MG capsule Take 10 mg by mouth daily as needed.       medroxyPROGESTERone (PROVERA) 2.5 MG tablet Take 1 tablet (2.5 mg) by mouth daily 90 tablet 3     metroNIDAZOLE (METROGEL) 1 % external gel Apply topically daily 60 g 9     ORDER FOR DME Use your CPAP device as directed by your provider.       VITAMIN D, CHOLECALCIFEROL, PO Take 2,000 Units by mouth daily       vitamin D2 (ERGOCALCIFEROL) 11838 units (1250 mcg) capsule Take 1 capsule (50,000 Units) by mouth once a week 8 capsule 0      VITALS   There were no vitals taken for  "this visit.    MENTAL STATUS EXAM     Alertness: alert and oriented  Appearance: well groomed  Behavior/Demeanor: cooperative, with good  eye contact   Speech: regular rate and rhythm  Language: intact  Psychomotor: normal or unremarkable  Mood: \"okay\"  Affect: appropriate; congruent to: mood- yes, content- yes  Thought Process/Associations: linear and logical  Thought Content:  Reports none;  Denies suicidal ideation and violent ideation  Perception:  Reports none;  Denies auditory hallucinations and visual hallucinations  Insight: good  Judgment: good  Cognition: does  appear grossly intact; formal cognitive testing was not done  Gait and Station: Normal strides with bilateral arm swings     LABS and DATA     PHQ9 TODAY = 6  PHQ 1/23/2022 6/10/2022 8/29/2022   PHQ-9 Total Score 6 6 6   Q9: Thoughts of better off dead/self-harm past 2 weeks Not at all Not at all Not at all       Recent Labs   Lab Test 05/04/22 0928 09/03/21  1007 05/05/21  0853   CR 0.56 0.61 0.56   GFRESTIMATED >90 >90 >90     Recent Labs   Lab Test 05/04/22 0928 02/03/21  1019 07/15/20  1617   AST 13 13 12   ALT 22 17 18   ALKPHOS 80 88 84     Recent Labs   Lab Test 05/04/22 0928 09/03/21  1007 05/05/21  0853   LITHIUM 0.8 0.8 0.78     Recent Labs   Lab Test 05/04/22  0928 09/03/21  1007   CR 0.56 0.61   GFRESTIMATED >90 >90    137   POTASSIUM 4.0 4.3   SHERRIE 9.2 9.4     Recent Labs   Lab Test 02/03/21  1011 07/15/20  1633   SG 1.011 1.008     Recent Labs   Lab Test 05/04/22  0928 09/03/21  1007   TSH 2.03 2.31     Recent Labs   Lab Test 05/04/22 0928 09/03/21  1007 02/03/21  1019 07/15/20  1617   WBC 7.4 6.4 7.7 8.3   ANEU  --   --  5.1 5.6       ECG :10/25/2021 QTc 442ms     PSYCHOTROPIC DRUG INTERACTIONS     Concurrent use of LITHIUM and ACE INHIBITORS may result in increased risk of lithium toxicity.     Estrogen Derivatives may decrease the serum concentration of LamoTRIgine       MANAGEMENT:  Monitoring for adverse effects, routine " labs, periodic EKGs and patient is aware of risks     RISK STATEMENT for SAFETY     Angy Haji did not appear to be an imminent safety risk to self or others.    TREATMENT RISK STATEMENT: The risks, benefits, alternatives and potential adverse effects have been discussed and are understood by the pt. The pt understands the risks of using street drugs or alcohol. There are no medical contraindications, the pt agrees to treatment with the ability to do so. The pt knows to call the clinic for any problems or to access emergency care if needed.  Medical and substance use concerns are documented above.  Psychotropic drug interaction check was done, including changes made today.     PROVIDER: Tolulope Oluwadamilola Odebunmi, MD    Patient not staffed in clinic.  Note will be reviewed and signed by supervisor Dr. De Leon.      Level of Medical Decision Making:   - At least 1 chronic problem that is not stable  - Engaged in prescription drug management during visit (discussed any medication benefits, side effects, alternatives, etc.)        Must meet 2 out of 3 of the above MDM elements to bill at the specified level     For help more info about elements / criteria, click here-> MDM Help Grid     *This blue text will disappear automatically when note is signed, no need to delete*:[id number]}  [id number]}  :[id number]}

## 2022-08-29 ENCOUNTER — VIRTUAL VISIT (OUTPATIENT)
Dept: PSYCHIATRY | Facility: CLINIC | Age: 57
End: 2022-08-29
Attending: PSYCHIATRY & NEUROLOGY
Payer: COMMERCIAL

## 2022-08-29 DIAGNOSIS — F31.9 BIPOLAR I DISORDER (H): ICD-10-CM

## 2022-08-29 PROCEDURE — 99214 OFFICE O/P EST MOD 30 MIN: CPT | Mod: HN | Performed by: STUDENT IN AN ORGANIZED HEALTH CARE EDUCATION/TRAINING PROGRAM

## 2022-08-29 RX ORDER — LAMOTRIGINE 150 MG/1
300 TABLET ORAL DAILY
Qty: 60 TABLET | Refills: 1 | Status: SHIPPED | OUTPATIENT
Start: 2022-08-29 | End: 2022-10-24

## 2022-08-29 RX ORDER — LITHIUM CARBONATE 150 MG/1
150 CAPSULE ORAL AT BEDTIME
Qty: 30 CAPSULE | Refills: 1 | Status: SHIPPED | OUTPATIENT
Start: 2022-08-29 | End: 2022-10-24

## 2022-08-29 RX ORDER — LITHIUM CARBONATE 300 MG/1
900 CAPSULE ORAL AT BEDTIME
Qty: 90 CAPSULE | Refills: 1 | Status: SHIPPED | OUTPATIENT
Start: 2022-08-29 | End: 2022-10-24

## 2022-08-29 NOTE — PATIENT INSTRUCTIONS
Treatment Plan  - Continue Lithium 1050mg at bedtime  - Increase Lamictal to 300mg daily    Continue therapy    RTC in 4-8 weeks    **For crisis resources, please see the information at the end of this document**   Patient Education    Thank you for coming to the [facility] [place] [facility].     Lab Testing:  If you had lab testing today and your results are reassuring or normal they will be mailed to you or sent through [employer] within 7 days. If the lab tests need quick action we will call you with the results. The phone number we will call with results is # [phone]. If this is not the best number please call our clinic and change the number.     Medication Refills:  If you need any refills please call your pharmacy and they will contact us. Our fax number for refills is [phone].   Three business days of notice are needed for general medication refill requests.   Five business days of notice are needed for controlled substance refill requests.   If you need to change to a different pharmacy, please contact the new pharmacy directly. The new pharmacy will help you get your medications transferred.     Contact Us:  Please call [phone] during business hours (8-5:00 M-F).   If you have medication related questions after clinic hours, or on the weekend, please call [phone].     Financial Assistance [phone]   Medical Records [phone]       MENTAL HEALTH CRISIS RESOURCES:  For a emergency help, please call 911 or go to the nearest Emergency Department.     Emergency Walk-In Options:   EmPATH Unit @ [facility] ([place]): [phone] - Specialized mental health emergency area designed to be calming  [facility] ([place]): [phone]  [facility] ([place]): [phone]  [facility]): [phone]    [facility] Crisis Information:   [place]: [phone]  [place]: [phone]  [place]  (COPE) - Adult: [phone]     Child: [phone]  [place] - Adult: [phone]     Child: [phone]  [place]: [phone]  List of all [facility] resources:   [email or web address]://[place].[facility]/dhs/people-we-serve/adults/health-care/mental-health/resources/crisis-contacts.jsp    National Crisis Information:   Crisis Text Line: Text  [place]  to 705833  Suicide & Crisis Lifeline: 988  National Suicide Prevention Lifeline: 2-935-043-TALK (1-138.222.7582)       For online chat options, visit https://suicidepreventionlifeline.org/chat/  Poison Control Center: 1-919.508.4178  Trans Lifeline: 1-684.672.3542 - Hotline for transgender people of all ages  The [employer]: 7-340-301-0875 - Hotline for LGBT youth     For Non-Emergency Support:   Fast Tracker: Mental Health & Substance Use Disorder Resources -   https://www.Foodilytrack[employer]an.org/

## 2022-09-03 ENCOUNTER — TELEPHONE (OUTPATIENT)
Dept: OBGYN | Facility: CLINIC | Age: 57
End: 2022-09-03

## 2022-09-03 NOTE — TELEPHONE ENCOUNTER
Dr. Min is out of the office until next week.  I don't see anything in her recent notes about changing the vaginal tablets.  I would continue for now and if she wants you to stop taking them,  will be in touch next week.      Thanks,  LAKEISHA BETANCOURT MD

## 2022-09-03 NOTE — TELEPHONE ENCOUNTER
Patient inquired about whether she is to use both vaginal estradiol tabs and oral estradiol tabs. Notes indicate an oral taper but does not mention if she is to continue/stop vaginal tabs.     Please advise.    Thanks,  Betty Park, PhyllisD  [facility]  Phone: [phone]

## 2022-09-09 ENCOUNTER — THERAPY VISIT (OUTPATIENT)
Dept: PHYSICAL THERAPY | Facility: CLINIC | Age: 57
End: 2022-09-09
Payer: COMMERCIAL

## 2022-09-09 DIAGNOSIS — M54.16 LUMBAR RADICULOPATHY: Primary | ICD-10-CM

## 2022-09-09 DIAGNOSIS — M25.551 LATERAL PAIN OF RIGHT HIP: ICD-10-CM

## 2022-09-09 PROCEDURE — 97110 THERAPEUTIC EXERCISES: CPT | Mod: GP | Performed by: PHYSICAL THERAPIST

## 2022-09-09 PROCEDURE — 97140 MANUAL THERAPY 1/> REGIONS: CPT | Mod: GP | Performed by: PHYSICAL THERAPIST

## 2022-09-16 ENCOUNTER — TELEPHONE (OUTPATIENT)
Dept: FAMILY MEDICINE | Facility: CLINIC | Age: 57
End: 2022-09-16

## 2022-09-16 NOTE — TELEPHONE ENCOUNTER
Family Medicine COVID treatment RN note  09/16/22    Ask patient the following questions  What was the date of your positive COVID test?  9/14/22  Where were you tested?  home    Do you have any of the following conditions that place you at risk of being very sick from COVID-19? overweight (BMI>25)    Do you currently have symptoms?  Yes  Current symptoms: congestion, cough, fatigue, loss of sense of smell, chest   When did your symptoms begin? 9/13/22  Was the onset of symptoms within the last 5 days? Yes      Eligibility for treatment   Type of treatment Eligibility criteria   Oral antivirals Currently symptomatic, symptoms began within last 5 days, and part of a high risk condition population   Monoclonal antibodies Currently symptomatic, symptoms began within last 7 days, and part of a high risk condition population     Is patient eligible for treatment? Yes, patient has a high risk condition and symptoms within last 5 days.  READ TO PATIENT: There are now oral medications available for the treatment of COVID-19.  Taking one of these medications within the first five days of symptoms (when people may not yet feel severely ill) has been shown to make people feel better, prevent them from getting sicker, and preventing hospitalization and death.  May I assist you in scheduling a virtual (video or telephone) visit with a provider to discuss treatment options?  Yes, scheduled virtual visit for patient.      Reviewed following information with patient  How can I protect others?    These guidelines are for isolating before returning to work, school or .    If you DO have symptoms    Stay home and away from others     For at least 5 days after your symptoms started, AND    You are fever free for 24 hours (with no medicine that reduces fever), AND    Your other symptoms are better    Wear a mask for 10 full days anytime you are around others    If you DON'T have symptoms    Stay home and away from others for at  least 5 days after your positive test    Wear a mask for 10 full days anytime you are around others    There may be different guidelines for healthcare facilities.  Please check with the specific sites before arriving.    If you have been told by a doctor that you were severely ill with COVID-19 or are immunocompromised, you should isolate for at least 10 days.    Patient will schedule on My Chart for a virtual appt as we have no available appointments.     Agnes Rodgers RN

## 2022-09-16 NOTE — TELEPHONE ENCOUNTER
[facility] phone call message- general phone call:    Reason for call: Pt is COVID+ and is in the high risk category. Pt would like to get a medication treatment to combat symptoms which are nasal congestion, nasal drip, fatigue and loss of smell. Pt is open to a virtual call with provider .    Return call needed: Yes    OK to leave a message on voice mail? Yes    Primary language: English      needed? No    Call taken on September 16, 2022 at 2:42 PM by Kyree Navarro

## 2022-09-17 ENCOUNTER — VIRTUAL VISIT (OUTPATIENT)
Dept: URGENT CARE | Facility: CLINIC | Age: 57
End: 2022-09-17
Payer: COMMERCIAL

## 2022-09-17 DIAGNOSIS — U07.1 CLINICAL DIAGNOSIS OF COVID-19: Primary | ICD-10-CM

## 2022-09-17 PROCEDURE — 99213 OFFICE O/P EST LOW 20 MIN: CPT | Mod: CS

## 2022-09-17 NOTE — PROGRESS NOTES
"Angy is a 57 year old who is being evaluated via a billable phone visit.      Tested + on Wednesday.  Teenager tested + day before.  Sx started on Tuesday.  COVID vaccinated and boosted.  ST, cough, chills.    Assessment & Plan     Clinical diagnosis of COVID-19    - nirmatrelvir and ritonavir (PAXLOVID) therapy pack; Take 3 tablets by mouth 2 times daily for 5 days (Take 2 Nirmatrelvir tablets and 1 Ritonavir tablet twice daily for 5 days)    COVID-19 positive patient.  Encounter for consideration of medication intervention. Patient does qualify for a prescription. Full discussion with patient including medication options, risks and benefits. Potential drug interactions reviewed with patient.     Treatment Planned Paxlovid sent to [facility]    Temporary change to home medications:  None   Discussed potential reduced potency of the HRT while on Paxlovid.    Estimated body mass index is 46.83 kg/m  as calculated from the following:    Height as of 5/16/22: 1.727 m (5' 8\").    Weight as of 7/27/22: 139.7 kg (308 lb).  GFR Estimate   Date Value Ref Range Status   05/04/2022 >90 >60 mL/min/1.73m2 Final     Comment:     Effective December 21, 2021 eGFRcr in adults is calculated using the 2021 CKD-EPI creatinine equation which includes age and gender (Diane et al., NE, DOI: 10.1056/JYKLmd0959046)   05/05/2021 >90 >60 mL/min/[1.73_m2] Final     Comment:     Non  GFR Calc  Starting 12/18/2018, serum creatinine based estimated GFR (eGFR) will be   calculated using the Chronic Kidney Disease Epidemiology Collaboration   (CKD-EPI) equation.       Madelaine Kendrick MD  Virtual Urgent Care  [facility] VIRTUAL URGENT CARE    Subjective   Angy is a 57 year old, presenting for the following health issues:  No chief complaint on file.      HPI   Tested + on Wednesday.  Teenager tested + day before.  Sx started on Tuesday.  COVID vaccinated and boosted.  ST, cough, chills.        Review of Systems "   Constitutional, HEENT, cardiovascular, pulmonary, GI, , musculoskeletal, neuro, skin, endocrine and psych systems are negative, except as otherwise noted.      Objective           Vitals:  No vitals were obtained today due to virtual visit.    Physical Exam   GENERAL: Healthy, alert and no distress  PSYCH: mentation appears normal and affect normal/bright    Phone call duration # 10 minutes.

## 2022-09-21 ENCOUNTER — HOSPITAL ENCOUNTER (OUTPATIENT)
Dept: NUTRITION | Facility: CLINIC | Age: 57
Discharge: HOME | End: 2022-09-21
Attending: DIETITIAN, REGISTERED | Admitting: DIETITIAN, REGISTERED
Payer: COMMERCIAL

## 2022-09-21 DIAGNOSIS — E66.01 OBESITY, CLASS III, BMI 40-49.9 (MORBID OBESITY) (H): ICD-10-CM

## 2022-09-21 PROCEDURE — 97803 MED NUTRITION INDIV SUBSEQ: CPT | Mod: GT,95 | Performed by: DIETITIAN, REGISTERED

## 2022-09-21 NOTE — PROGRESS NOTES
Video-Visit Details    Type of service:  Video Visit    Video Start Time (time video started): 3:05    Video End Time (time video stopped): 3:29    Originating Location (pt. Location): Other work setting     Distant Location (provider location):  [facility]     Mode of Communication:  Video Conference via [facility]    NUTRITION REASSESSMENT NOTE (VIDEO VISIT DUE TO COVID-19)  REASON FOR ASSESSMENT  Angy QUINTANA Raissa referred by Dr. Lira for MNT related to  Obesity, Class III, BMI 40-49.9 (morbid obesity) (H) [E66.01]         Patient accompanied by self     NUTRITION HISTORY  Patient continues with multiple life changes and stressors.  Patient aware that she likes to self soothe with ice cream which she has recently increased.  Patient is aware that social cues trigger her to eat. Patient's spouse recently has weight loss surgery so there is adjust with eating in the home.     5/19 Patient walks 30 minutes most days + weekend 60 minute exercise (biking or hiking); lower back pain -foot going numb when walking and pain at night; dessert (3 days no dessert); asking self if hungry.  Patient made Malt o Meal as substitute for dessert.  Patient limiting dessert if eating dinner later or brushing teeth right after dinner and tracking dessert on calendar.  Patient had 4 day celebration for her birthday and felt was able to make wise choices.  Patient made cream cheese brownies for birthday dessert and froze remaining dessert. Patient continues to navigate new eating regimen with spouses's weight loss surgery.     7/14/2021 Success-taking walking routes with hills.   Evening meals consisting of grilled chicken/pork loin, grilled sweet potatoes and 2 vegetables   Patient having ice cream thoughts in afternoon-to self soothe.  Patient practicing distractions to avoid eating ice cream at nigt (brushing teeth, asking self if hungry, substituted Malt 0 Meal, mineral water, too  tired).      9/8/2021 Patient walking daily + 60 minutes 1 time per week.  Did well at [facility]. Hiking 1-4.6 miles. Weight loss during trip-308 lbs. Today 310 lbs. Grilled meat and vegetable + fruit. 1-5 days per week with no desserts.   Breakfast: yogurt + 1/2 cup granola + fruit   Lunch: leftovers (meat + vegetables) or cheese and Triscuits + vegetables + fruit   Dinner: BLT (3 slices sierra) + 2 corn on the cob + watermelon + tomatoes; tacos + watermelon/strawberries + cucumbers     12/13/2021 No desserts 4 times per week -met; emotional eating and Thanksgiving desserts where times when increased desserts. Patient baked cookies-peanut butter blossom 1 batch (2 servings raw dough + 1 cookie) and froze them.  Patient walking outside and would like to walk indoors on treadmill.  Patient and spouse alter meal planning.      1/24/2021 Dessert (4 days) past 2 weeks; walking regularly-movement in the house if not able to walk outside; Weight-1/17 307.2 lbs  Patient continues to work remote. Patient tracking dessert intake.     3/7/2022 Self soothe in evening snack-made tea or tells self ate late dinner and does not need snack.  Patient asking self if physically hungry.  Patient drinking fizzy water or malt o meal as evening snack.  Patient aware she has been stress eating.  Tracking when not having dessert -identifying why eating  Exercise-cross country skiing; mall walk; snow shoeing; treadmill 2 times haring treadmill   Yogurt; cheese and crackers; cereal     Patient wants to increase exercise time as will be going to [place] weekend ([place]) [facility]     4/[birth year]/2002 Patient able to get 45-60 minutes exercise on weekends and 20-30 minutes of exercise daily.  Patient wants to lose 5 lbs prior to trip to [place].  Patient was able to have 3 days no desserts and then 1 week no dessert. 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  Patient determined ways she  could lose weight: Dessert 2 times per week, add treadmill, eliminate morning snack, 1 snack at 3 PM. Alcohol 1 time per week, no caramel lattes, food is fuel, it's too late to be hungry, spouse brings sweet treats without asking, tea/bath/shower     6/15/2022 Patient skips dessert if eats late dinner.  Patient felt discouraged with weight loss as wanted to lose more weight (down 3.5 lbs).  Patient wants weight loss for functional reasons.  Patient signed [place] for the [place] bike tour.  Patient has increased cravings and thoughts about ice cream.  Shift in relationship with spouse after spouse's weight loss surgery.     7/27/2022 Patient increased exercise-[place] with [employer]; started the last week in June for bike rides (4 rides different times in 2 weeks-9 miles).  Rode in 18 mile race.  [place]    No dessert (hit or miss) 50% 3 days no dessert; ice cream and s'mores on vacation    Typical intake:  Greek yogurt + 1/4 cup granola + fruit + 1/2 and 1/2 with coffee or english muffin with peanut butter/butter and strawberry jam or eggs scrambled or hard boiled  Leftovers -1 cup cherries; cherry tomatoes; 1/2 cup pea pods + 3 oz pork tenderloin (teryaki)   Watermelon, pork tenderloin, 1 cup rice + tomatoes/pea pods; cheeseburger with whole wheat bun + vegetables + watermelon   Snacks: croissant ([place]) Presybeterian oat squares (1 cup dry) (3:00 PM) cheese and crackers    Beverages: coffee; water; fizzy water     Emotional eating at night-ice cream     9/21/2022 Patient has been able to bike 2 times per week.  Patient's weight has been stable.  Patient has not been able to track dinner and snacks.  Stress eating ebbs and flows.  Trying to save dessert for the weekend or may self soothe during th week with ice cream.  Patient stress eating in the evening and may boredom eat prior to lunch (cereal).    Weight: 308.4 lbs      DIET RECALL   Breakfast: cereal and  "banana; yogurt + granola (measures), fruit + coffee; 2 slices toast with butter + 1/2 cup cottage cheese    Lunch: leftovers (includes fruit and vegetable daily)  Snack: fruit or cheese with lower salt nut thin crackers   Dinner: Tuna steak, Asian marinade, stir gomez, baked vegetable + brown rice (3/4 cup) + canned peaches; chicken thighs with beans and rice; sheet pan dinner with chicken and vegetables or grilled salmon and grilled vegetables; taco; protein + fruit and vegetable  Snacks: fruit, string cheese, Ritz crackers or yogurt; dessert in the evening (ice cream recently)  Beverages: mainly water, coffee, mineral water  Dining out: none currently  Exercise: Patient exercising 5-6 times per week. (cross country skiing, snow shoeing, walking and walking dog).  Patient exercises 1 hour on weekends (hiking or walking).    MEDICATIONS:  Reviewed     ANTHROPOMETRICS  Height: 5'8\" (note height change in chart previously 5'9\")  Weight: 308.4 lbs per patient   BMI (kg/m2): 46.8 kg/m2   %IBW: 220%  Weight History:   Wt Readings from Last 10 Encounters:   07/27/22 139.7 kg (308 lb)   05/16/22 142.1 kg (313 lb 3.2 oz)   01/12/22 139.7 kg (308 lb)   12/22/21 137.4 kg (303 lb)   10/25/21 137.6 kg (303 lb 4.8 oz)   10/22/21 139.7 kg (308 lb)   10/08/21 139.9 kg (308 lb 6.4 oz)   10/08/21 139.7 kg (308 lb)   08/27/21 139.3 kg (307 lb)   08/27/21 141.5 kg (312 lb)       ASSESSED NUTRITION NEEDS  Estimated Energy Needs: 9663-7862 kcals (15-20 kcals/kg) for gradual weight loss   Estimated Protein Needs: 66-79 (1-1.2 gm/kg IBW) for repletion with exercise  RMR: 1440    EVALUATION/PROGRESS TOWARDS GOALS   Previous goals:  Keep biking 2 or more days per week (10 miles easy ride and more distance) -met  Track dinner/snacks/dessert 2 times during the week and 1 time during the weekend-not met     Previous Nutrition Diagnosis: Obesity related to excess energy intake as evidenced by BMI of 46.8 kg/m2-no change    Current Nutrition " Diagnosis:Obesity related to excess energy intake as evidenced by BMI of 46.8 kg/m2    INTERVENTIONS  Nutrition Prescription   Recommend exercise 5 times per week; track intake if eating mindlessly; increase fiber to 25-30 grams per day     Implementation:   Meals and Snacks: Continue eating 3 meals + deliberate snacking (if hungry)   Nutrition Education (Content):   a)  Discussed progress towards goals.  Congratulated patient for awareness of emotional eating.  Reviewed current food and eating behavior challenges.  Patient feels reframing intake of desserts in positive quantity will work better for patient.  Patient with barrier for tracking intake.  Suggest revisit tracking at later time when feels manageable.  Supported patient in her struggle with food and weight.    Nutrition Education (Application):   a) Discussed emotional eating in the evening.  Reviewed alternatives to emotional eating that patient determined previously.   b) Patient verbalized understanding of diet by stating will have desserts 3 times per week.   Expected patient engagement: good    CURRENT GOALS  Consume special desserts 3 times per week   Bike 2 times per week     FOLLOW UP/MONITORING    Patient to follow up in 6 weeks  Patient has RD contact information     Time spent with patient: 24 minutes    Mattie Amaya, RD, LD  [facility] Outpatient Dietitian  [phone] (office phone)

## 2022-10-04 ENCOUNTER — OFFICE VISIT (OUTPATIENT)
Dept: FAMILY MEDICINE | Facility: CLINIC | Age: 57
End: 2022-10-04
Payer: COMMERCIAL

## 2022-10-04 VITALS
WEIGHT: 293 LBS | HEART RATE: 63 BPM | HEIGHT: 67 IN | BODY MASS INDEX: 45.99 KG/M2 | OXYGEN SATURATION: 100 % | DIASTOLIC BLOOD PRESSURE: 70 MMHG | SYSTOLIC BLOOD PRESSURE: 119 MMHG

## 2022-10-04 DIAGNOSIS — M84.374A STRESS FRACTURE OF RIGHT FOOT, INITIAL ENCOUNTER: ICD-10-CM

## 2022-10-04 DIAGNOSIS — E66.01 OBESITY, CLASS III, BMI 40-49.9 (MORBID OBESITY) (H): ICD-10-CM

## 2022-10-04 DIAGNOSIS — M75.41 IMPINGEMENT SYNDROME OF SHOULDER REGION, RIGHT: ICD-10-CM

## 2022-10-04 DIAGNOSIS — I87.2 VENOUS (PERIPHERAL) INSUFFICIENCY: ICD-10-CM

## 2022-10-04 DIAGNOSIS — Z23 NEED FOR PROPHYLACTIC VACCINATION AND INOCULATION AGAINST INFLUENZA: ICD-10-CM

## 2022-10-04 DIAGNOSIS — M79.671 RIGHT FOOT PAIN: Primary | ICD-10-CM

## 2022-10-04 LAB — HBA1C MFR BLD: 5.3 % (ref 0–5.6)

## 2022-10-04 PROCEDURE — 83036 HEMOGLOBIN GLYCOSYLATED A1C: CPT | Performed by: STUDENT IN AN ORGANIZED HEALTH CARE EDUCATION/TRAINING PROGRAM

## 2022-10-04 PROCEDURE — 90682 RIV4 VACC RECOMBINANT DNA IM: CPT | Performed by: STUDENT IN AN ORGANIZED HEALTH CARE EDUCATION/TRAINING PROGRAM

## 2022-10-04 PROCEDURE — 36416 COLLJ CAPILLARY BLOOD SPEC: CPT | Performed by: STUDENT IN AN ORGANIZED HEALTH CARE EDUCATION/TRAINING PROGRAM

## 2022-10-04 PROCEDURE — 99213 OFFICE O/P EST LOW 20 MIN: CPT | Mod: 25 | Performed by: STUDENT IN AN ORGANIZED HEALTH CARE EDUCATION/TRAINING PROGRAM

## 2022-10-04 PROCEDURE — 90471 IMMUNIZATION ADMIN: CPT | Performed by: STUDENT IN AN ORGANIZED HEALTH CARE EDUCATION/TRAINING PROGRAM

## 2022-10-04 NOTE — Clinical Note
We gave her an aircast for a stress fracture in her foot. I put the home medical equipment purchase agreement in the scan basket. Neither [employer] nor I were sure if I need to do anything else like place an order in the emr or not?

## 2022-10-04 NOTE — PROGRESS NOTES
"    Assessment & Plan     Right foot pain  Likely stress fracture 2nd distal metatarsal, right foot, initial encounter  Angy has an 8 week history of swelling and pain of her right foot that worsened last week in the context of walking 7+ miles per day in [place]. On exam, she has pinpoint pain to palpation over her R distal 2nd metatarsal. Most likely diagnosis is a stress fracture.  - XR Foot Right G/E 3 Views  - Given boot for stabilization to wear for 1-2 weeks (signed home medical equipment purchase agreement)   - Guidance on rest, ice, elevation (at least 1 week of fairly strict rest)  - No NSAIDS d/t on lithium  - If no improvement at all in 1-2 weeks, should follow-up with sports medicine  - Anticipate 6-8 weeks for full healing; should follow-up mid-way through for possible PT referral    Possible [place]  Angy feels as though she is \"stepping on a pea\" when she walks. This sensation is located on the plantar surface of her foot over her second metatarsal area. Some callus on bottom of foot. Otherwise no lesion or palpable deformity.   - Shaved down callus on ball of foot  - Recommend Eucerin cream, pummus stone, and corn pads    \"BMI 40-49.9\" (morbid obesity) (H)  Monitoring for onset of pre-diabetes/diabetes. Recent A1c nl in May 2021,but patient inquiring about repeat test, mehran in context of significant weight gain.   - Hemoglobin A1c today    Venous (peripheral) insufficiency  Compression stockings ordered.   - [facility] Compression; Leg; Knee; Bilateral; 20/30 mmHg; 4 Pair    Need for prophylactic vaccination and inoculation against influenza  - INFLUENZA QUAD, RECOMBINANT, P-FREE (RIV4) (FLUBLOK) given today    H/o covid infection  Tested positive 9/14/22. Took paxlovid. Wondering if she should get bivalent booster. I'm reaching out to some colleagues to find guidance on this and will return to patient once I know better.     Impingement syndrome of right shoulder  Provided some " "AAOS shoulder exercises. Encouraged PT. She'd prefer to start with exercises at home and will return for PT referral if symptoms fail to improve.     Magali Lira MD  [facility] STEPHY Travis is a 57 year old presenting for the following health issues:  Pain (Pt is present today for swelling and pain to right foot an toes), Consult (Pt also request an RX for support stockings), Imm/Inj (Flu Shot), and Imm/Inj (COVID-19 VACCINE)      8 weeks ago, Angy was on a camping trip and began to have pain and swelling in her R foot. She describes the pain as aching and a swollen feeling. The swelling is from the middle of the dorsal side of her foot down to her toes. She says the swelling has been the worst in her second toe. She has not had this type of pain on the plantar surface of her foot. The pain is better with rest/elevation and worse with pressure. It is also worse at the end of the day after being on her feet. She has not noticed any discoloration of her foot. During the camping trip she was walking somewhat more than usual. She has also been biking more recently. Last week, she took a trip to [place] and was walking about 7 miles per day. During this time, her pain and swelling became worse.    Angy has also been experiencing a sensation that feels as though \"she is walking on a pea.\" When she puts pressure on her R foot she feels a small pea shaped pressure in the ball of her foot in the area over her distal second metatarsal. This sensation is limited to when there is pressure on her foot.     She also has had a burning sensation between her first and second toes. During her camping trip, she did not have her shower shoes and she was concerned she had athlete's foot. She treated herself with Lotrimin for 2 weeks. Since then, the burning has been intermittent. She was not able to see the bottom of her foot well, but she thinks her skin may have been more white.    She reports " "a history of venous insufficiency with valve repair and says that this may be related to her foot pain and swelling. She requests new compression stockings as hers are .    She also endorses pain in her R shoulder. When she lifts her arms she feels as though her shoulder \"gets stuck.\" She first noticed this while regularly scratching her back (due to a previous growth on her back). She is not aware of any inciting event. She has not experienced any sensation of her shoulder popping.    Angy reports recent weight gain (following initial weight loss). She has been monitored for diabetes in the past and requested to check her A1C. She had a positive COVID test on , for which she received Paxlovid. She is wondering when she would qualify for her COVID booster given this information. She has recovered from her COVID, but still has a small cough which she thinks may be due to allergies. She requested her flu shot today.      REVIEW OF SYSTEMS:  ROS: 10 point ROS neg other than the symptoms noted above in the HPI.          Objective    /70   Pulse 63   Ht 1.702 m (5' 7\")   Wt 138.1 kg (304 lb 6.4 oz)   SpO2 100%   BMI 47.68 kg/m    Body mass index is 47.68 kg/m .  Physical Exam     EXAM:  GENERAL APPEARANCE: healthy, alert and no distress   GAIT: NORMAL  SKIN: varicosities - bilateral LE   NEURO: Normal strength and tone, mentation intact and speech normal  PSYCH:  mentation appears normal and affect normal/bright    MUSCULOSKELETAL:  RIGHT ANKLE :   Knee:not done  Lower leg:normal on palpation, ecchymosis and varicosities on bilateral LE    ANKLE  Inspection:normal  Non-tender:ATFL, CFL, PTFL, lateral malleolus, medial malleolus, deltoid ligament, anterior tib-fib ligament, achilles tendon  Range of Motion:dorsiflexion:  full, plantarflexion:  full, inversion:  full, eversion:  full  Strength: grossly intact      FOOT  foot exam : Inspection Palpation:   Swelling: dorsum swelling (mild, R " foot)  Tender: R distal second metatarsal or MTP joint  Range of Motion:flexion of toes:  full, extension of toes  full    Right shoulder- pain w/ bower and neers.     Results for orders placed or performed in visit on 10/04/22 (from the past 24 hour(s))   Hemoglobin A1c   Result Value Ref Range    Hemoglobin A1C 5.3 0.0 - 5.6 %     *Note: Due to a large number of results and/or encounters for the requested time period, some results have not been displayed. A complete set of results can be found in Results Review.       X-RAY INTERPRETATION  Foot x-ray ordered. Imaging closed so she will schedule outside.

## 2022-10-04 NOTE — PROGRESS NOTES
Preceptor Attestation:   Patient seen, evaluated and discussed with the resident. I have verified the content of the note, which accurately reflects my assessment of the patient and the plan of care.   Supervising Physician:  Danielle Valadez MD

## 2022-10-04 NOTE — PATIENT INSTRUCTIONS
Patient Education   Here is the plan from today's visit    1. Need for prophylactic vaccination and inoculation against influenza  - INFLUENZA QUAD, RECOMBINANT, P-FREE (RIV4) (FLUBLOK)    2. Covid booster  Dr Lira needs to look into it given recent paxlovid. Will reach out to you via [employer]. Had Covid, test positive 9/14.    3. Obesity, Class III, BMI 40-49.9  (H)  - Hemoglobin A1c    4. Right foot pain  Suspect this is stress fracture. Ordered x-ray. Please call to schedule. Wear boot for 1-2 weeks, then can remove if you are having less pain. If pain intensity remains the same, then please call [facility] for [facility] Med appointment to follow-up on it further. Please see guidance below as well for metatarsal stress fracture. (Yours is second distal metatarsal instead of fifth.)     5. Corn?  Use corn pads, eucerin, and pummus stone to file down calluses and possible corn on bottom of right foot.    6. Varicose veins, venous insufficiency   Ordered new compression socks         Please call or return to clinic if your symptoms don't go away.    Follow up plan  No follow-ups on file.    Thank you for coming to [facility] today.  Lab Testing:  **If you had lab testing today and your results are reassuring or normal they will be mailed to you or sent through [employer] within 7 days.   **If the lab tests need quick action we will call you with the results.  **If you are having labs done on a different day, please call [phone] to schedule at [facility] or [phone] for other [facility] locations. Labs do not offer walk-in appointments.  The phone number we will call with results is # [phone] (home) [phone] (work). If this is not the best number please call our clinic and change the number.  Medication Refills:  If you need any refills please call your pharmacy and they will contact us.   If you need to  your refill at a new pharmacy, please contact the new pharmacy directly.  The new pharmacy will help you get your medications transferred faster.   Scheduling:  If you have any concerns about today's visit or wish to schedule another appointment please call our office during normal business hours [phone] (8-5:00 M-F)  If a referral was made to an [facility] specialty provider and you do not get a call from central scheduling, please refer to directions on your visit summary or call our office during normal business hours for assistance.   If a Mammogram was ordered for you at the Breast Center call [phone] to schedule or change your appointment.  If you had an XRay/CT/Ultrasound/MRI ordered the number is [phone] to schedule or change your radiology appointment.   [facility] has limited ultrasound appointments available on Wednesdays, if you would like your ultrasound at [facility], please call [phone] to schedule.   Medical Concerns:  If you have urgent medical concerns please call [phone] at any time of the day.    Magali Lira MD

## 2022-10-05 ENCOUNTER — VIRTUAL VISIT (OUTPATIENT)
Dept: OBGYN | Facility: CLINIC | Age: 57
End: 2022-10-05
Payer: COMMERCIAL

## 2022-10-05 ENCOUNTER — ANCILLARY PROCEDURE (OUTPATIENT)
Dept: GENERAL RADIOLOGY | Facility: CLINIC | Age: 57
End: 2022-10-05
Attending: STUDENT IN AN ORGANIZED HEALTH CARE EDUCATION/TRAINING PROGRAM
Payer: COMMERCIAL

## 2022-10-05 DIAGNOSIS — N95.1 PERIMENOPAUSAL VASOMOTOR SYMPTOMS: Primary | ICD-10-CM

## 2022-10-05 DIAGNOSIS — M79.671 RIGHT FOOT PAIN: ICD-10-CM

## 2022-10-05 DIAGNOSIS — Z12.31 ENCOUNTER FOR SCREENING MAMMOGRAM FOR BREAST CANCER: ICD-10-CM

## 2022-10-05 PROCEDURE — 99213 OFFICE O/P EST LOW 20 MIN: CPT | Mod: TEL | Performed by: OBSTETRICS & GYNECOLOGY

## 2022-10-05 PROCEDURE — 73630 X-RAY EXAM OF FOOT: CPT | Mod: RT

## 2022-10-05 RX ORDER — ESTRADIOL 0.5 MG/1
0.75 TABLET ORAL DAILY
Qty: 140 TABLET | Refills: 3 | Status: SHIPPED | OUTPATIENT
Start: 2022-10-05 | End: 2023-09-08

## 2022-10-05 RX ORDER — MEDROXYPROGESTERONE ACETATE 2.5 MG/1
2.5 TABLET ORAL DAILY
Qty: 90 TABLET | Refills: 3 | Status: SHIPPED | OUTPATIENT
Start: 2022-10-05 | End: 2023-09-08

## 2022-10-05 ASSESSMENT — ANXIETY QUESTIONNAIRES
5. BEING SO RESTLESS THAT IT IS HARD TO SIT STILL: NOT AT ALL
6. BECOMING EASILY ANNOYED OR IRRITABLE: SEVERAL DAYS
GAD7 TOTAL SCORE: 5
2. NOT BEING ABLE TO STOP OR CONTROL WORRYING: SEVERAL DAYS
1. FEELING NERVOUS, ANXIOUS, OR ON EDGE: SEVERAL DAYS
GAD7 TOTAL SCORE: 5
7. FEELING AFRAID AS IF SOMETHING AWFUL MIGHT HAPPEN: NOT AT ALL
3. WORRYING TOO MUCH ABOUT DIFFERENT THINGS: SEVERAL DAYS

## 2022-10-05 ASSESSMENT — PATIENT HEALTH QUESTIONNAIRE - PHQ9
5. POOR APPETITE OR OVEREATING: SEVERAL DAYS
SUM OF ALL RESPONSES TO PHQ QUESTIONS 1-9: 5

## 2022-10-05 NOTE — PROGRESS NOTES
{PROVIDER CHARTING PREFERENCE:318915}    Subjective   Angy is a 57 year old{ACCOMPANIED BY STATEMENT (Optional):376233}, presenting for the following health issues:  Consult      HPI     {SUPERLIST (Optional):411198}  {additonal problems for provider to add (Optional):361480}    Review of Systems   {ROS COMP (Optional):131181}      Objective    There were no vitals taken for this visit.  There is no height or weight on file to calculate BMI.  Physical Exam   {Exam List (Optional):931595}    {Diagnostic Test Results (Optional):353665}    {AMBULATORY ATTESTATION (Optional):752506}

## 2022-10-05 NOTE — PROGRESS NOTES
"  Assessment & Plan     Perimenopausal vasomotor symptoms  .Discussed risks of HRT including thromboembolic disease and breast cancer. Discussed importance of progesterone component to protect uterine lining. Discussed recommendation of using lowest dose for shortest time needed to relieve symptoms. Plan to re-evaluate yearly.     - medroxyPROGESTERone (PROVERA) 2.5 MG tablet; Take 1 tablet (2.5 mg) by mouth daily  - estradiol (ESTRACE) 0.5 MG tablet; Take 1.5 tablets (0.75 mg) by mouth daily    Encounter for screening mammogram for breast cancer  - MA Screen Bilateral w/Charles; Future      BMI:   Estimated body mass index is 47.68 kg/m  as calculated from the following:    Height as of 10/4/22: 1.702 m (5' 7\").    Weight as of 10/4/22: 138.1 kg (304 lb 6.4 oz).       Phone call duration: 13 min    Romy Min MD  [facility]        Ruby Travis is a 57 year old, presenting for the following health issues: perimenopause    HPI   Decreased HRT dose in early August - has noticed some increased sweating but not full night sweats. Not everyday. Maybe 1-2 times per week, manageable. Some increase in anxiety but lots of stressors. No bleeding. Using vaginal estrogen for dryness. Some trouble falling asleep.     Working with psychiatrist for bipolar.   Added lamictal earlier this year, gradually increasing dose.    Child came out as transgender, now using He/him pronouns.     PHQ 6/14/2021 10/8/2021 10/5/2022   PHQ-9 Total Score 6 9 5   Q9: Thoughts of better off dead/self-harm past 2 weeks Not at all Not at all Not at all   Some encounter information is confidential and restricted. Go to Review Flowsheets activity to see all data.     JOANNA-7 SCORE 4/27/2022 6/10/2022 10/5/2022   Total Score 9 (mild anxiety) 11 (moderate anxiety) -   Total Score - - 5   Some encounter information is confidential and restricted. Go to Review Flowsheets activity to see all data.     Current Outpatient " Medications   Medication     Carboxymethylcellulose Sod PF (REFRESH PLUS) 0.5 % SOLN ophthalmic solution     estradiol (ESTRACE) 0.5 MG tablet     estradiol (VAGIFEM) 10 MCG TABS vaginal tablet     lamoTRIgine (LAMICTAL) 150 MG tablet     lidocaine (LIDODERM) 5 % patch     lisinopril (ZESTRIL) 10 MG tablet     lithium (ESKALITH) 150 MG capsule     lithium 300 MG capsule     Loratadine 10 MG capsule     medroxyPROGESTERone (PROVERA) 2.5 MG tablet     metroNIDAZOLE (METROGEL) 1 % external gel     ORDER FOR DME     VITAMIN D, CHOLECALCIFEROL, PO     No current facility-administered medications for this visit.         Review of Systems   CONSTITUTIONAL: NEGATIVE for fever, chills, change in weight  ENT/MOUTH: NEGATIVE for ear, mouth and throat problems  RESP: NEGATIVE for significant cough or SOB  CV: NEGATIVE for chest pain, palpitations or peripheral edema      Objective    There were no vitals taken for this visit.  There is no height or weight on file to calculate BMI.  Physical Exam   GENERAL: healthy, alert and no distress  RESP: normal respiratory effort  PSYCH: mentation appears normal, affect normal/bright

## 2022-10-18 ENCOUNTER — THERAPY VISIT (OUTPATIENT)
Dept: PHYSICAL THERAPY | Facility: CLINIC | Age: 57
End: 2022-10-18
Payer: COMMERCIAL

## 2022-10-18 DIAGNOSIS — M54.16 LUMBAR RADICULOPATHY: Primary | ICD-10-CM

## 2022-10-18 DIAGNOSIS — M25.551 LATERAL PAIN OF RIGHT HIP: ICD-10-CM

## 2022-10-18 PROCEDURE — 97140 MANUAL THERAPY 1/> REGIONS: CPT | Mod: GP | Performed by: PHYSICAL THERAPIST

## 2022-10-18 PROCEDURE — 97110 THERAPEUTIC EXERCISES: CPT | Mod: GP | Performed by: PHYSICAL THERAPIST

## 2022-10-19 ENCOUNTER — ANCILLARY PROCEDURE (OUTPATIENT)
Dept: GENERAL RADIOLOGY | Facility: CLINIC | Age: 57
End: 2022-10-19
Attending: PODIATRIST
Payer: COMMERCIAL

## 2022-10-19 ENCOUNTER — OFFICE VISIT (OUTPATIENT)
Dept: PODIATRY | Facility: CLINIC | Age: 57
End: 2022-10-19
Attending: STUDENT IN AN ORGANIZED HEALTH CARE EDUCATION/TRAINING PROGRAM
Payer: COMMERCIAL

## 2022-10-19 DIAGNOSIS — M20.41 HAMMERTOE OF RIGHT FOOT: ICD-10-CM

## 2022-10-19 DIAGNOSIS — M77.8 CAPSULITIS OF RIGHT FOOT: Primary | ICD-10-CM

## 2022-10-19 DIAGNOSIS — M79.671 RIGHT FOOT PAIN: ICD-10-CM

## 2022-10-19 DIAGNOSIS — M84.374A STRESS FRACTURE OF RIGHT FOOT, INITIAL ENCOUNTER: ICD-10-CM

## 2022-10-19 PROCEDURE — 99203 OFFICE O/P NEW LOW 30 MIN: CPT | Performed by: PODIATRIST

## 2022-10-19 PROCEDURE — 73630 X-RAY EXAM OF FOOT: CPT | Mod: RT | Performed by: RADIOLOGY

## 2022-10-19 NOTE — LETTER
10/19/2022         RE: Angy Haji  [address]  [place] [address]        Dear Colleague,    Thank you for referring your patient, Angy Haji, to the [facility]. Please see a copy of my visit note below.    Past Medical History:   Diagnosis Date     Disorders of bursae and tendons in shoulder region, unspecified      Female infertility of unspecified origin      Gallbladder sludge 5/2013     Mild sleep apnea 5/13/2014     Obesity, unspecified      Oral aphthae      Other acne      Other specified hypoglycemia      PONV (postoperative nausea and vomiting)      Patient Active Problem List   Diagnosis     Oral aphthae     Lateral pain of right hip     CARDIOVASCULAR SCREENING; LDL GOAL LESS THAN 160     Rosacea     Raynaud disease     Vitamin D deficiency disease     Mild sleep apnea     Atopic rhinitis     Chronic bilateral low back pain without sciatica     Obesity, Class III, BMI 40-49.9 (morbid obesity) (H)     Muscle tension dysphonia     Palpitations     Dysphagia, unspecified type     Anxiety     Benign essential hypertension     Callus of foot     Varicose veins of right lower extremity with edema     Bipolar I disorder (H)     Dribbling of urine     It band syndrome, right     Spinal stenosis at L4-L5 level     Facet arthropathy, lumbar     Lumbar radiculopathy     Past Surgical History:   Procedure Laterality Date     HC TOOTH EXTRACTION W/FORCEP  1984/85    [place] [facility]     IR ENDOVENOUS ABLATION VARICOSE VEINS  12/12/2019     LAPAROSCOPIC CHOLECYSTECTOMY  7/29/2013    Procedure: LAPAROSCOPIC CHOLECYSTECTOMY;  Laparoscopic Cholecystectomy;  Surgeon: Fabio Johnson MD;  Location: [facility] OR     LAPAROSCOPIC OOPHORECTOMY Left 6/23/2016    Procedure: LAPAROSCOPIC OOPHORECTOMY;  Surgeon: Kayley Donnelly MD;  Location: [facility] OR     LAPAROSCOPIC SALPINGECTOMY Bilateral 6/23/2016    Procedure: LAPAROSCOPIC SALPINGECTOMY;  Surgeon: Kayley Donnelly MD;   Location:  OR     Social History     Socioeconomic History     Marital status:      Spouse name: Danielle Zamarripa     Number of children: 1     Years of education: Masters     Highest education level: Not on file   Occupational History     Occupation: Psychotherapist     Employer: [employer]   Tobacco Use     Smoking status: Never     Smokeless tobacco: Never     Tobacco comments:     non smoke home   Substance and Sexual Activity     Alcohol use: Yes     Comment: 2 Drinks Per Month     Drug use: No     Sexual activity: Yes     Partners: Female     Birth control/protection: None   Other Topics Concern     Parent/sibling w/ CABG, MI or angioplasty before 65F 55M? No   Social History Narrative    8/18/09    Balanced Diet - Yes    Osteoporosis Prevention Measures - Dairy servings per day: 2 and Medication/Supplements (See current meds)    Regular Exercise -  Yes Describe Walking, swimming, biking.     Dental Exam - YES - Date: 5/2009    Eye Exam - YES - Date: 8/2009    Self Breast Exam - No    Abuse: Current or Past (Physical, Sexual or Emotional)- No    Do you feel safe in your environment - Yes    Guns stored in the home - No    Sunscreen used - Yes    Seatbelts used - Yes    Lipids -  YES - Date: 8/10/09    Glucose -  YES - Date: 8/10/09    Colon Cancer Screening - No    Hemoccults - NO    Pap Test -  YES - Date: 7/6/06, no Hx of abnormal paps.    Do you have any concerns about STD's -  No    Mammography - YES - Date: 8/5/09    DEXA - NO    Immunizations reviewed and up to date - Yes, Tdap given 1/22/08    Amara Hall MA                         Social Determinants of Health     Financial Resource Strain: Not on file   Food Insecurity: Not on file   Transportation Needs: Not on file   Physical Activity: Not on file   Stress: Not on file   Social Connections: Not on file   Intimate Partner Violence: Not At Risk     Fear of Current or Ex-Partner: No     Emotionally Abused: No      Physically Abused: No     Sexually Abused: No   Housing Stability: Not on file     Family History   Problem Relation Age of Onset     Cardiovascular Paternal Grandfather         anyerism     Obesity Paternal Grandfather      Respiratory Paternal Grandfather         pulmonary (air sacs hardening)     Allergies Father         hayfever     Lipids Father         diet therapy     Gallbladder Disease Father         cholecystectomy      Eye Disorder Father         retinal tear x 2      Prostate Cancer Father      Alzheimer Disease Maternal Grandmother         ? poor STM     Arthritis Maternal Grandmother      Heart Disease Maternal Grandmother         Arrythmia-got a pacemaker at age 89     Arthritis Mother      Eye Disorder Mother         Cataract/macular tear in one eye     LUNG DISEASE Mother         bronchiectasis from pneumonia     Atrial fibrillation Mother      Cancer Paternal Grandmother         Uterine CA- as well as her sister     Gynecology Paternal Grandmother         prolapsed uterus- had 10 kids     Obesity Paternal Grandmother      Breast Cancer Maternal Aunt      Other Cancer Paternal Aunt 80        endo. cancer      Lab Results   Component Value Date    A1C 5.3 10/04/2022    A1C 5.4 05/05/2021    A1C 4.9 01/24/2020    A1C 5.2 09/24/2015    A1C 5.1 09/16/2013    A1C 5.2 08/22/2012     SUBJECTIVE FINDINGS:  A 57 year old presents from Dr. Lira for right foot pain and stress fracture.  She relates about 8-10 weeks ago, her foot started swelling and hurting.  It kinds of hurt on the 2nd toe and MPJ.  She is getting some burning pain.  She relates she was walking quite a bit around that time, but it started before she went on her trip.  She relates she went camping.  She was not sure if she got athlete's foot or not, but she treated it with Lotrimin cream.  She kind of felt like she was walking on a pebble, and they trimmed that down.  She relates there is no injury.  She relates she did get a boot.  She wore  that about half the time because she just does not tolerate it well.  She feels it has kind of stayed the same.    OBJECTIVE FINDINGS:  DP and PT are 2/4, right.  She has dorsally contracted digits 1-5 bilaterally.  She has pain on palpation of the 2nd MPJ dorsally and plantarly, right foot.  There are no gross tendon voids.  There is no erythema, no drainage, no odor, no calor, no edema.  She has no gross tendon voids.  There is no palpable click or shooting pain in the interspaces.    IMAGING:  X-rays reviewed with the patient in clinic today.  There is no fracture.  Joint and cortical margins are intact.  She has a bony fragment on the anterior ankle.  She has plantar and posterior calcaneal spurring, dorsally contracted digits, some joint space narrowing, subchondral sclerosis in the mid foot joints, an ossicle at the 5th MPJ.  There is some spurring on the lateral sesamoid.  Decreased calcaneal inclination noted.  X-rays compared to 10/05/2022 x-rays of the right foot.    ASSESSMENT AND PLAN:  Capsulitis, right 2nd MPJ. Hammertoes. Rule out stress fracture.  Diagnosis and treatment options discussed with her.  I advised her on stretching.  Metatarsal pads dispensed and use discussed with her.  I gave her a prescription for custom foot orthotics with metatarsal pads that she will decide if she wants to get these or not, and she is given the phone number and address to [facility] to pick those up.  Return to clinic and see me as needed.  Previous notes reviewed.          Again, thank you for allowing me to participate in the care of your patient.        Sincerely,        Ian Newell DPM

## 2022-10-19 NOTE — PROGRESS NOTES
Past Medical History:   Diagnosis Date     Disorders of bursae and tendons in shoulder region, unspecified      Female infertility of unspecified origin      Gallbladder sludge 5/2013     Mild sleep apnea 5/13/2014     Obesity, unspecified      Oral aphthae      Other acne      Other specified hypoglycemia      PONV (postoperative nausea and vomiting)      Patient Active Problem List   Diagnosis     Oral aphthae     Lateral pain of right hip     CARDIOVASCULAR SCREENING; LDL GOAL LESS THAN 160     Rosacea     Raynaud disease     Vitamin D deficiency disease     Mild sleep apnea     Atopic rhinitis     Chronic bilateral low back pain without sciatica     Obesity, Class III, BMI 40-49.9 (morbid obesity) (H)     Muscle tension dysphonia     Palpitations     Dysphagia, unspecified type     Anxiety     Benign essential hypertension     Callus of foot     Varicose veins of right lower extremity with edema     Bipolar I disorder (H)     Dribbling of urine     It band syndrome, right     Spinal stenosis at L4-L5 level     Facet arthropathy, lumbar     Lumbar radiculopathy     Past Surgical History:   Procedure Laterality Date     HC TOOTH EXTRACTION W/FORCEP  1984/85    [facility]     IR ENDOVENOUS ABLATION VARICOSE VEINS  12/12/2019     LAPAROSCOPIC CHOLECYSTECTOMY  7/29/2013    Procedure: LAPAROSCOPIC CHOLECYSTECTOMY;  Laparoscopic Cholecystectomy;  Surgeon: Fabio Johnson MD;  [facility]     LAPAROSCOPIC OOPHORECTOMY Left 6/23/2016    Procedure: LAPAROSCOPIC OOPHORECTOMY;  Surgeon: Kayley Donnelly MD;  [facility]     LAPAROSCOPIC SALPINGECTOMY Bilateral 6/23/2016    Procedure: LAPAROSCOPIC SALPINGECTOMY;  Surgeon: Kayley Donnelly MD;  [facility]     Social History     Socioeconomic History     Marital status:      Spouse name: Danielle Zamarripa     Number of children: 1     Years of education: Masters     Highest education level: Not on file   Occupational History      Occupation: Psychotherapist     Employer: [employer]   Tobacco Use     Smoking status: Never     Smokeless tobacco: Never     Tobacco comments:     non smoke home   Substance and Sexual Activity     Alcohol use: Yes     Comment: 2 Drinks Per Month     Drug use: No     Sexual activity: Yes     Partners: Female     Birth control/protection: None   Other Topics Concern     Parent/sibling w/ CABG, MI or angioplasty before 65F 55M? No   Social History Narrative    8/18/09    Balanced Diet - Yes    Osteoporosis Prevention Measures - Dairy servings per day: 2 and Medication/Supplements (See current meds)    Regular Exercise -  Yes Describe Walking, swimming, biking.     Dental Exam - YES - Date: 5/2009    Eye Exam - YES - Date: 8/2009    Self Breast Exam - No    Abuse: Current or Past (Physical, Sexual or Emotional)- No    Do you feel safe in your environment - Yes    Guns stored in the home - No    Sunscreen used - Yes    Seatbelts used - Yes    Lipids -  YES - Date: 8/10/09    Glucose -  YES - Date: 8/10/09    Colon Cancer Screening - No    Hemoccults - NO    Pap Test -  YES - Date: 7/6/06, no Hx of abnormal paps.    Do you have any concerns about STD's -  No    Mammography - YES - Date: 8/5/09    DEXA - NO    Immunizations reviewed and up to date - Yes, Tdap given 1/22/08    Amara Hall MA                         Social Determinants of Health     Financial Resource Strain: Not on file   Food Insecurity: Not on file   Transportation Needs: Not on file   Physical Activity: Not on file   Stress: Not on file   Social Connections: Not on file   Intimate Partner Violence: Not At Risk     Fear of Current or Ex-Partner: No     Emotionally Abused: No     Physically Abused: No     Sexually Abused: No   Housing Stability: Not on file     Family History   Problem Relation Age of Onset     Cardiovascular Paternal Grandfather         anyerism     Obesity Paternal Grandfather      Respiratory Paternal  Grandfather         pulmonary (air sacs hardening)     Allergies Father         hayfever     Lipids Father         diet therapy     Gallbladder Disease Father         cholecystectomy      Eye Disorder Father         retinal tear x 2      Prostate Cancer Father      Alzheimer Disease Maternal Grandmother         ? poor STM     Arthritis Maternal Grandmother      Heart Disease Maternal Grandmother         Arrythmia-got a pacemaker at age 89     Arthritis Mother      Eye Disorder Mother         Cataract/macular tear in one eye     LUNG DISEASE Mother         bronchiectasis from pneumonia     Atrial fibrillation Mother      Cancer Paternal Grandmother         Uterine CA- as well as her sister     Gynecology Paternal Grandmother         prolapsed uterus- had 10 kids     Obesity Paternal Grandmother      Breast Cancer Maternal Aunt      Other Cancer Paternal Aunt 80        endo. cancer      Lab Results   Component Value Date    A1C 5.3 10/04/2022    A1C 5.4 05/05/2021    A1C 4.9 01/24/2020    A1C 5.2 09/24/2015    A1C 5.1 09/16/2013    A1C 5.2 08/22/2012     SUBJECTIVE FINDINGS:  A 57 year old presents from Dr. Lira for right foot pain and stress fracture.  She relates about 8-10 weeks ago, her foot started swelling and hurting.  It kinds of hurt on the 2nd toe and MPJ.  She is getting some burning pain.  She relates she was walking quite a bit around that time, but it started before she went on her trip.  She relates she went camping.  She was not sure if she got athlete's foot or not, but she treated it with Lotrimin cream.  She kind of felt like she was walking on a pebble, and they trimmed that down.  She relates there is no injury.  She relates she did get a boot.  She wore that about half the time because she just does not tolerate it well.  She feels it has kind of stayed the same.    OBJECTIVE FINDINGS:  DP and PT are 2/4, right.  She has dorsally contracted digits 1-5 bilaterally.  She has pain on palpation of  the 2nd MPJ dorsally and plantarly, right foot.  There are no gross tendon voids.  There is no erythema, no drainage, no odor, no calor, no edema.  She has no gross tendon voids.  There is no palpable click or shooting pain in the interspaces.    IMAGING:  X-rays reviewed with the patient in clinic today.  There is no fracture.  Joint and cortical margins are intact.  She has a bony fragment on the anterior ankle.  She has plantar and posterior calcaneal spurring, dorsally contracted digits, some joint space narrowing, subchondral sclerosis in the mid foot joints, an ossicle at the 5th MPJ.  There is some spurring on the lateral sesamoid.  Decreased calcaneal inclination noted.  X-rays compared to 10/05/2022 x-rays of the right foot.    ASSESSMENT AND PLAN:  Capsulitis, right 2nd MPJ. Hammertoes. Rule out stress fracture.  Diagnosis and treatment options discussed with her.  I advised her on stretching.  Metatarsal pads dispensed and use discussed with her.  I gave her a prescription for custom foot orthotics with metatarsal pads that she will decide if she wants to get these or not, and she is given the phone number and address to [facility] to pick those up.  Return to clinic and see me as needed.  Previous notes reviewed.

## 2022-10-19 NOTE — NURSING NOTE
Angy Haji's chief complaint for this visit includes:  Chief Complaint   Patient presents with     Consult     Right foot pain - Possible foot fracture second distal metatarsal     PCP: Magali Lira    Referring Provider:  Magali Lira MD  [address] [place] [address]    There were no vitals taken for this visit.  Data Unavailable        Allergies   Allergen Reactions     Seroquel [Quetiapine] Nausea and Vomiting     Had constant vomiting     Olanzapine      Tongue welling, bad EPSE (Parkinsonian)     Penicillins Rash     Risperidone      Tongue welling, bad EPSE (Parkinsonian)       Thimerosal Other (See Comments)     blisters on inside of eyelid         Do you need any medication refills at today's visit?

## 2022-10-24 ENCOUNTER — VIRTUAL VISIT (OUTPATIENT)
Dept: PSYCHIATRY | Facility: CLINIC | Age: 57
End: 2022-10-24
Attending: PSYCHIATRY & NEUROLOGY
Payer: COMMERCIAL

## 2022-10-24 DIAGNOSIS — F31.9 BIPOLAR I DISORDER (H): Primary | ICD-10-CM

## 2022-10-24 PROCEDURE — 99214 OFFICE O/P EST MOD 30 MIN: CPT | Mod: 95 | Performed by: STUDENT IN AN ORGANIZED HEALTH CARE EDUCATION/TRAINING PROGRAM

## 2022-10-24 RX ORDER — LITHIUM CARBONATE 150 MG/1
150 CAPSULE ORAL AT BEDTIME
Qty: 30 CAPSULE | Refills: 1 | Status: SHIPPED | OUTPATIENT
Start: 2022-10-24 | End: 2023-01-10

## 2022-10-24 RX ORDER — LAMOTRIGINE 150 MG/1
300 TABLET ORAL DAILY
Qty: 60 TABLET | Refills: 1 | Status: SHIPPED | OUTPATIENT
Start: 2022-10-24 | End: 2022-12-28

## 2022-10-24 RX ORDER — LITHIUM CARBONATE 300 MG/1
900 CAPSULE ORAL AT BEDTIME
Qty: 90 CAPSULE | Refills: 1 | Status: SHIPPED | OUTPATIENT
Start: 2022-10-24 | End: 2023-01-10

## 2022-10-24 ASSESSMENT — PATIENT HEALTH QUESTIONNAIRE - PHQ9
SUM OF ALL RESPONSES TO PHQ QUESTIONS 1-9: 5
SUM OF ALL RESPONSES TO PHQ QUESTIONS 1-9: 5
10. IF YOU CHECKED OFF ANY PROBLEMS, HOW DIFFICULT HAVE THESE PROBLEMS MADE IT FOR YOU TO DO YOUR WORK, TAKE CARE OF THINGS AT HOME, OR GET ALONG WITH OTHER PEOPLE: SOMEWHAT DIFFICULT

## 2022-10-24 NOTE — PROGRESS NOTES
Telehealth Details  Type of service:  video visit for medication management  Time of service:    Start Time:  3:32 PM    End Time:   4:05 PM    Originating Site (patient location):  State- [place]   Location- Patient's home  Distant Site (provider location):  [facility]  Mode of Communication:  Video conference via [employer]       [facility]  Psychiatry Clinic  PSYCHIATRY PROGRESS NOTE     CARE TEAM:  PCP- Magali Lira, Psychotherapist- [facility] with Verna Chan  Specialty Providers- OB/GYN, ENT, PT, Nutrition      Wife- Rachael Haji is a 57 year old who prefers the name Angy and uses pronouns she, her, hers.      DIAGNOSIS     Bipolar 1 disorder, current depressive episode, moderate   H/o EPSE secondary to neuroleptic use  H/o Thrombocytopenia secondary to Depakote  H/o lithium toxicity, resolved     ASSESSMENT    Angy reports improved mood and anhedonia with increase in Lamictal dose. She is able to enjoy more things and her wife confirmed that she's observed the same thing too. Her wife also adds that Angy has become more involved in managing their finances which is an marked improvement. We will keep patient's regimen at it's current dose. Angy reports intermittent episodes of diarrhea, some mild tremors and 1 episode of increased thirst. We talked more about symptoms to look out for with Lithium toxicity like confusion, polydipsia, polyuria, nausea, diarrhea, and tremors. Angy reports that what she's experiencing feels different from her past experience of Lithium toxicity. Lithium labs were ordered today as patient is due for them to check her Lithium serum level. Of note, patient hasn't changed her Lithium dose, reports no new medication, reports no NSAID use, and reports adequate hydration. It's unlikely that the symptoms reported by patient are from lithium toxicity but we will verify with serum level. No SI or HI today.    [facility]  [facility]. Patient is not taking any controlled substances.     PLAN                                                                                                                1) Meds-  - Continue Lithium 1050mg at bedtime  - Continue Lamictal 300mg daily    2) Psychotherapy-Continue individual therapy    3) Next due-  Labs- Li labs due 11/2022  EKG- PRN  Rating scales- N/A    4) Referrals-  None    5) Dispo- RTC in 8 weeks     PERTINENT BACKGROUND                           [most recent eval 08/22/21]    Patient has not had significant mental health history until 2018, when she developed perimenopausal mood and anxiety symptoms.  She was subsequently referred to our clinic following a hospitalization for anthony in November 2018.  Her care has been complicated by sensitivity to antipsychotics (EPSE including difficulty swallowing), thrombocytopenia secondary to Depakote, lithium toxicity in setting of concurrent Depakote use).    Psych pertinent item history includes Manic episode(h/o one manic episode Nov/Dec 2018), psychiatric hospitalization in November 2018.     SUBJECTIVE   Patient presented for a follow up.    Since last visit:  -Reports that depressed mood and anhedonia is improved. She went on vacation for her 20 years anniversary and found it enjoyable.She states that she continues to feel tired at the end of the day. She thinks that her short term memory might be iqggr-[id number]-[id number] difficulties and forgetting things from a week ago. She reports that she doesn't have any difficulty with daily activities.  -Has been experiencing changes in bowel movements. There's has been alternating episodes of diarrhea and constipation.Patient does not endorse tremors or nausea and other symptoms of lithium toxicity. She experienced an episode of increased thirst over the weekend. Reports mild tremors. We discussed getting a repeat Lithium level check.  -Reports that's sexual drive is still low.  -Angy's wife joined  us at the end of the visit and confirmed that she has noticed that Angy is doing better and her functionality is improved too.    RECENT PSYCH ROS:   Depression: low mood and anhedonia-improving  Elevated:  none  Psychosis:  none  Anxiety:  excessive worry  Trauma Related:  none  Sleep: Reports that she's sleeping okay  Other: N/A    Adverse Effects: Vivid dreams have reduced in frequency  Pertinent Negative Symptoms: No suicidal ideation, self-injurious behavior/urges or violent ideation  Recent Substance Use:   Alcohol- 1 drink (beer or wine) per week or 2 weeks.     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   to wifeRachael. They have a teenage son.     PSYCH and SUBSTANCE USE Critical Summary Points since July 2021 08/26/2021: Resident transfer visit. Increased Lithium to 1200mg.  10/15/2021: decrease Lithium back to 1050 mg  11/02/2021: Lamictal titration plan started  11/29/2021: Increase Lamictal to 100 mg for 7 days and then increase to 150 mg  12/14/2021: Increase Lamictal to 200mg  01/24/2022: No medication changes  03/18/2022: No medication changes  04/29/2022: No medication changes  05/05/2022: Increased Lamictal to 250 mg (inbtwn visits)  06/10/2022: No medication changes  08/29/2022: Increase Lamictal to 300mg  10/24/2022: No medication  change     PAST MED TRIALS      Medication Max Dose (mg) Dates / Duration Helpful? DC Reason / Adverse Effects?   Sertraline       Depakote    Tremors   Olanzapine    EPSE   Seroquel    EPSE   Lithium 1050 current        MEDICAL HISTORY and ALLERGY     ALLERGIES: Seroquel [quetiapine], Olanzapine, Penicillins, Risperidone, and Thimerosal    Patient Active Problem List   Diagnosis     Oral aphthae     Lateral pain of right hip     CARDIOVASCULAR SCREENING; LDL GOAL LESS THAN 160     Rosacea     Raynaud disease     Vitamin D deficiency disease     Mild sleep apnea     Atopic rhinitis     Chronic bilateral low back pain without sciatica     Obesity, Class III, BMI 40-49.9 (morbid obesity) (H)     Muscle tension dysphonia     Palpitations     Dysphagia, unspecified type     Anxiety     Benign essential hypertension     Callus of foot     Varicose veins of right lower extremity with edema     Bipolar I disorder (H)     Dribbling of urine     It band syndrome, right     Spinal stenosis at L4-L5 level     Facet arthropathy, lumbar     Lumbar radiculopathy      MEDICAL REVIEW OF SYSTEMS     Patient is on Prempro     MEDICATIONS     Current Outpatient Medications   Medication Sig Dispense Refill     Carboxymethylcellulose Sod PF (REFRESH PLUS) 0.5 % SOLN ophthalmic solution Place 1 drop into both eyes 3 times daily as needed for dry eyes       estradiol (ESTRACE) 0.5 MG tablet Take 1.5 tablets (0.75 mg) by mouth daily 140 tablet 3     estradiol (VAGIFEM) 10 MCG TABS vaginal tablet Place 1 tablet (10 mcg) vaginally twice a week 26 tablet 3     lamoTRIgine (LAMICTAL) 150 MG tablet Take 2 tablets (300 mg) by mouth daily 60 tablet 1     lidocaine (LIDODERM) 5 % patch Place 1-2 patches onto the skin every 24 hours To prevent lidocaine toxicity, patient should be patch free for 12 hrs daily. 30 patch 1     lisinopril (ZESTRIL) 10 MG tablet Take 1 tablet (10 mg) by mouth daily 90 tablet 3     lithium (ESKALITH) 150 MG capsule Take  "1 capsule (150 mg) by mouth At Bedtime 30 capsule 1     lithium 300 MG capsule Take 3 capsules (900 mg) by mouth At Bedtime 90 capsule 1     Loratadine 10 MG capsule Take 10 mg by mouth daily as needed.       medroxyPROGESTERone (PROVERA) 2.5 MG tablet Take 1 tablet (2.5 mg) by mouth daily 90 tablet 3     metroNIDAZOLE (METROGEL) 1 % external gel Apply topically daily 60 g 9     ORDER FOR DME Use your CPAP device as directed by your provider.       VITAMIN D, CHOLECALCIFEROL, PO Take 2,000 Units by mouth daily        VITALS   There were no vitals taken for this visit.    MENTAL STATUS EXAM     Alertness: alert and oriented  Appearance: well groomed  Behavior/Demeanor: cooperative, with good  eye contact   Speech: regular rate and rhythm  Language: intact  Psychomotor: normal or unremarkable  Mood: \"okay\"  Affect: appropriate; congruent to: mood- yes, content- yes  Thought Process/Associations: linear and logical  Thought Content:  Reports none;  Denies suicidal ideation and violent ideation  Perception:  Reports none;  Denies auditory hallucinations and visual hallucinations  Insight: good  Judgment: good  Cognition: does  appear grossly intact; formal cognitive testing was not done  Gait and Station: Normal strides with bilateral arm swings     LABS and DATA     PHQ9 TODAY = 5  PHQ 8/29/2022 10/5/2022 10/24/2022   PHQ-9 Total Score 6 5 5   Q9: Thoughts of better off dead/self-harm past 2 weeks Not at all Not at all Not at all     Recent Labs   Lab Test 05/04/22 0928 09/03/21  1007 05/05/21  0853   CR 0.56 0.61 0.56   GFRESTIMATED >90 >90 >90     Recent Labs   Lab Test 05/04/22 0928 02/03/21  1019 07/15/20  1617   AST 13 13 12   ALT 22 17 18   ALKPHOS 80 88 84     Recent Labs   Lab Test 05/04/22 0928 09/03/21  1007 05/05/21  0853   LITHIUM 0.8 0.8 0.78     Recent Labs   Lab Test 05/04/22 0928 09/03/21  1007   CR 0.56 0.61   GFRESTIMATED >90 >90    137   POTASSIUM 4.0 4.3   SHERRIE 9.2 9.4     Recent Labs   Lab " Test 02/03/21  1011 07/15/20  1633   SG 1.011 1.008     Recent Labs   Lab Test 05/04/22  0928 09/03/21  1007   TSH 2.03 2.31     Recent Labs   Lab Test 05/04/22  0928 09/03/21  1007 02/03/21  1019 07/15/20  1617   WBC 7.4 6.4 7.7 8.3   ANEU  --   --  5.1 5.6       ECG :10/25/2021 QTc 442ms     PSYCHOTROPIC DRUG INTERACTIONS     Concurrent use of LITHIUM and ACE INHIBITORS may result in increased risk of lithium toxicity.     Estrogen Derivatives may decrease the serum concentration of LamoTRIgine     MANAGEMENT:  Monitoring for adverse effects, routine labs, periodic EKGs and patient is aware of risks     RISK STATEMENT for SAFETY     Angy Haji did not appear to be an imminent safety risk to self or others.    TREATMENT RISK STATEMENT: The risks, benefits, alternatives and potential adverse effects have been discussed and are understood by the pt. The pt understands the risks of using street drugs or alcohol. There are no medical contraindications, the pt agrees to treatment with the ability to do so. The pt knows to call the clinic for any problems or to access emergency care if needed.  Medical and substance use concerns are documented above.  Psychotropic drug interaction check was done, including changes made today.     PROVIDER: Tolulope Oluwadamilola Odebunmi, MD    Patient not staffed in clinic.  Note will be reviewed and signed by supervisor Dr. Tapia.      Level of Medical Decision Making:   - At least 1 chronic problem that is not stable  - Engaged in prescription drug management during visit (discussed any medication benefits, side effects, alternatives, etc.)        Must meet 2 out of 3 of the above MDM elements to bill at the specified level     For help more info about elements / criteria, click here-> MDM Help Grid     *This blue text will disappear automatically when note is signed, no need to delete*:[id number]}  [id number]}  :[id number]}

## 2022-10-24 NOTE — PROGRESS NOTES
Angy Haji is a 57 year old who is being evaluated via a billable video visit.      Pt will join video visit via: [employer]  If there are problems joining the visit, send backup video invite via: Send to preferred e-mail: [email or web address]    Reason for telehealth visit: Patient has requested telehealth visit    Originating location (patient location): Patient's home    Will anyone else be joining the visit? No      Answers for HPI/ROS submitted by the patient on 10/24/2022  If you checked off any problems, how difficult have these problems made it for you to do your work, take care of things at home, or get along with other people?: Somewhat difficult  PHQ9 TOTAL SCORE: 5

## 2022-10-24 NOTE — PATIENT INSTRUCTIONS
Treatment Plan  Continue medications    RTC in 8 weeks    **For crisis resources, please see the information at the end of this document**   Patient Education    Thank you for coming to the [facility] [place] [facility].     Lab Testing:  If you had lab testing today and your results are reassuring or normal they will be mailed to you or sent through [employer] within 7 days. If the lab tests need quick action we will call you with the results. The phone number we will call with results is # [phone]. If this is not the best number please call our clinic and change the number.     Medication Refills:  If you need any refills please call your pharmacy and they will contact us. Our fax number for refills is [phone].   Three business days of notice are needed for general medication refill requests.   Five business days of notice are needed for controlled substance refill requests.   If you need to change to a different pharmacy, please contact the new pharmacy directly. The new pharmacy will help you get your medications transferred.     Contact Us:  Please call [phone] during business hours (8-5:00 M-F).   If you have medication related questions after clinic hours, or on the weekend, please call [phone].     Financial Assistance [phone]   Medical Records [phone]       MENTAL HEALTH CRISIS RESOURCES:  For a emergency help, please call 841 or go to the nearest Emergency Department.     Emergency Walk-In Options:   Em[facility] Unit @ [facility] ([place]): [phone] - Specialized mental health emergency area designed to be calming  [facility] ([place]): [phone]  [facility] ([place]): [phone]  [facility]): [phone]    [facility] Crisis Information:   [place]: [phone]  [place]: [phone]  [place] ([place]) - Adult: [phone]     Child: [phone]  [place] - Adult:  [phone]     Child: [phone]  [place]: [phone]  List of all [facility] resources:   https://[place].gov/dhs/people-we-serve/adults/health-care/mental-health/resources/crisis-contacts.jsp    National Crisis Information:   Crisis Text Line: Text  [place]  to 014549  Suicide & Crisis Lifeline: 988  National Suicide Prevention Lifeline: 7-583-014-TALK (1-289.356.6635)       For online chat options, visit https://suicidepreventionlifeline.org/chat/  Poison Control Center: 3-019-935-9785  Trans Lifeline: 8-405-391-9603 - Hotline for transgender people of all ages  The [employer] Project: 8-207-530-4685 - Hotline for LGBT youth     For Non-Emergency Support:   Fast Tracker: Mental Health & Substance Use Disorder Resources -   https://www.BankFaciltrackiRewindn.org/

## 2022-11-01 ENCOUNTER — MYC MEDICAL ADVICE (OUTPATIENT)
Dept: FAMILY MEDICINE | Facility: CLINIC | Age: 57
End: 2022-11-01

## 2022-11-01 ENCOUNTER — THERAPY VISIT (OUTPATIENT)
Dept: PHYSICAL THERAPY | Facility: CLINIC | Age: 57
End: 2022-11-01
Payer: COMMERCIAL

## 2022-11-01 DIAGNOSIS — M54.16 LUMBAR RADICULOPATHY: Primary | ICD-10-CM

## 2022-11-01 DIAGNOSIS — M75.41 IMPINGEMENT SYNDROME OF SHOULDER REGION, RIGHT: Primary | ICD-10-CM

## 2022-11-01 DIAGNOSIS — M25.551 LATERAL PAIN OF RIGHT HIP: ICD-10-CM

## 2022-11-01 PROCEDURE — 97110 THERAPEUTIC EXERCISES: CPT | Mod: GP | Performed by: PHYSICAL THERAPIST

## 2022-11-01 PROCEDURE — 97140 MANUAL THERAPY 1/> REGIONS: CPT | Mod: GP | Performed by: PHYSICAL THERAPIST

## 2022-11-11 ENCOUNTER — THERAPY VISIT (OUTPATIENT)
Dept: PHYSICAL THERAPY | Facility: CLINIC | Age: 57
End: 2022-11-11
Payer: COMMERCIAL

## 2022-11-11 DIAGNOSIS — M25.551 LATERAL PAIN OF RIGHT HIP: ICD-10-CM

## 2022-11-11 DIAGNOSIS — M54.16 LUMBAR RADICULOPATHY: Primary | ICD-10-CM

## 2022-11-11 PROCEDURE — 97110 THERAPEUTIC EXERCISES: CPT | Mod: GP | Performed by: PHYSICAL THERAPIST

## 2022-11-11 PROCEDURE — 97140 MANUAL THERAPY 1/> REGIONS: CPT | Mod: GP | Performed by: PHYSICAL THERAPIST

## 2022-11-16 ENCOUNTER — HOSPITAL ENCOUNTER (OUTPATIENT)
Dept: NUTRITION | Facility: CLINIC | Age: 57
Discharge: HOME | End: 2022-11-16
Attending: DIETITIAN, REGISTERED | Admitting: DIETITIAN, REGISTERED
Payer: COMMERCIAL

## 2022-11-16 PROCEDURE — 97803 MED NUTRITION INDIV SUBSEQ: CPT | Mod: GT,95 | Performed by: DIETITIAN, REGISTERED

## 2022-11-18 ENCOUNTER — THERAPY VISIT (OUTPATIENT)
Dept: PHYSICAL THERAPY | Facility: CLINIC | Age: 57
End: 2022-11-18
Payer: COMMERCIAL

## 2022-11-18 DIAGNOSIS — M54.16 LUMBAR RADICULOPATHY: Primary | ICD-10-CM

## 2022-11-18 DIAGNOSIS — M25.551 LATERAL PAIN OF RIGHT HIP: ICD-10-CM

## 2022-11-18 PROCEDURE — 97110 THERAPEUTIC EXERCISES: CPT | Mod: GP | Performed by: PHYSICAL THERAPIST

## 2022-11-18 PROCEDURE — 97140 MANUAL THERAPY 1/> REGIONS: CPT | Mod: GP | Performed by: PHYSICAL THERAPIST

## 2022-11-22 ENCOUNTER — THERAPY VISIT (OUTPATIENT)
Dept: PHYSICAL THERAPY | Facility: CLINIC | Age: 57
End: 2022-11-22
Payer: COMMERCIAL

## 2022-11-22 DIAGNOSIS — M54.16 LUMBAR RADICULOPATHY: Primary | ICD-10-CM

## 2022-11-22 DIAGNOSIS — M25.551 LATERAL PAIN OF RIGHT HIP: ICD-10-CM

## 2022-11-22 PROCEDURE — 97140 MANUAL THERAPY 1/> REGIONS: CPT | Mod: GP | Performed by: PHYSICAL THERAPIST

## 2022-11-22 PROCEDURE — 97110 THERAPEUTIC EXERCISES: CPT | Mod: GP | Performed by: PHYSICAL THERAPIST

## 2022-11-22 NOTE — PROGRESS NOTES
"DISCHARGE REPORT    Progress reporting period is from 7/15/2022 to 11/22/2022.   Angy has been seen in PT 9 times for treatment of lumbar radiculopathy and right lateral hip pain.  Treatment has included manual therapy and hip and core exercises.    SUBJECTIVE    Subjective: Overall much better but does still have lateral right hip pain lying on either side and occasional right distal LE T/N- usually starts a few minutes into walking      Current Pain level:4- 5/10.        Initial Pain level: 7/10.   Changes in function:  Yes (See Goal flowsheet attached for changes in current functional level)  Adverse reaction to treatment or activity: None    OBJECTIVE  Changes noted in objective findings:    Objective: Lumbar ROM: flexion fingertips to mid distal LE's with B pulling complaint,  EIS min loss with right LB \"pinch\".  R LB stiffness complaint with SG R.  SG L neg.  Single leg stance on right no longer reproduces lateral hip pain.     ASSESSMENT/PLAN    STG/LTGs have been met or progress has been made towards goals:  Yes (See Goal flow sheet completed today.)  Assessment of Progress: The patient's progress has plateaued.  Self Management Plans:  Patient is independent in a home treatment program.    Angy continues to require the following intervention to meet STG and LTG's:  PT intervention is no longer required to meet STG/LTG.    Recommendations:  This patient is ready to be discharged from therapy and continue their home treatment program.    Please refer to the daily flowsheet for treatment today, total treatment time and time spent performing 1:1 timed codes.        "

## 2022-12-03 ENCOUNTER — HEALTH MAINTENANCE LETTER (OUTPATIENT)
Age: 57
End: 2022-12-03

## 2022-12-07 ENCOUNTER — LAB (OUTPATIENT)
Dept: LAB | Facility: CLINIC | Age: 57
End: 2022-12-07
Payer: COMMERCIAL

## 2022-12-07 DIAGNOSIS — F31.9 BIPOLAR I DISORDER (H): ICD-10-CM

## 2022-12-07 LAB
ALBUMIN SERPL-MCNC: 3.8 G/DL (ref 3.4–5)
ALBUMIN UR-MCNC: NEGATIVE MG/DL
ALP SERPL-CCNC: 88 U/L (ref 40–150)
ALT SERPL W P-5'-P-CCNC: 23 U/L (ref 0–50)
ANION GAP SERPL CALCULATED.3IONS-SCNC: 7 MMOL/L (ref 3–14)
APPEARANCE UR: CLEAR
AST SERPL W P-5'-P-CCNC: 10 U/L (ref 0–45)
BACTERIA #/AREA URNS HPF: ABNORMAL /HPF
BASOPHILS # BLD AUTO: 0 10E3/UL (ref 0–0.2)
BASOPHILS NFR BLD AUTO: 0 %
BILIRUB SERPL-MCNC: 0.4 MG/DL (ref 0.2–1.3)
BILIRUB UR QL STRIP: NEGATIVE
BUN SERPL-MCNC: 12 MG/DL (ref 7–30)
CALCIUM SERPL-MCNC: 9.3 MG/DL (ref 8.5–10.1)
CHLORIDE BLD-SCNC: 105 MMOL/L (ref 94–109)
CO2 SERPL-SCNC: 26 MMOL/L (ref 20–32)
COLOR UR AUTO: ABNORMAL
CREAT SERPL-MCNC: 0.55 MG/DL (ref 0.52–1.04)
EOSINOPHIL # BLD AUTO: 0.2 10E3/UL (ref 0–0.7)
EOSINOPHIL NFR BLD AUTO: 3 %
ERYTHROCYTE [DISTWIDTH] IN BLOOD BY AUTOMATED COUNT: 13 % (ref 10–15)
GFR SERPL CREATININE-BSD FRML MDRD: >90 ML/MIN/1.73M2
GLUCOSE BLD-MCNC: 96 MG/DL (ref 70–99)
GLUCOSE UR STRIP-MCNC: NEGATIVE MG/DL
HCT VFR BLD AUTO: 44 % (ref 35–47)
HGB BLD-MCNC: 14.2 G/DL (ref 11.7–15.7)
HGB UR QL STRIP: NEGATIVE
IMM GRANULOCYTES # BLD: 0 10E3/UL
IMM GRANULOCYTES NFR BLD: 1 %
KETONES UR STRIP-MCNC: NEGATIVE MG/DL
LEUKOCYTE ESTERASE UR QL STRIP: NEGATIVE
LITHIUM SERPL-SCNC: 0.6 MMOL/L
LYMPHOCYTES # BLD AUTO: 1.5 10E3/UL (ref 0.8–5.3)
LYMPHOCYTES NFR BLD AUTO: 20 %
MCH RBC QN AUTO: 28.4 PG (ref 26.5–33)
MCHC RBC AUTO-ENTMCNC: 32.3 G/DL (ref 31.5–36.5)
MCV RBC AUTO: 88 FL (ref 78–100)
MONOCYTES # BLD AUTO: 0.5 10E3/UL (ref 0–1.3)
MONOCYTES NFR BLD AUTO: 7 %
MUCOUS THREADS #/AREA URNS LPF: PRESENT /LPF
NEUTROPHILS # BLD AUTO: 5.1 10E3/UL (ref 1.6–8.3)
NEUTROPHILS NFR BLD AUTO: 69 %
NITRATE UR QL: NEGATIVE
NRBC # BLD AUTO: 0 10E3/UL
NRBC BLD AUTO-RTO: 0 /100
PH UR STRIP: 7.5 [PH] (ref 5–7)
PLATELET # BLD AUTO: 242 10E3/UL (ref 150–450)
POTASSIUM BLD-SCNC: 4.4 MMOL/L (ref 3.4–5.3)
PROT SERPL-MCNC: 7.4 G/DL (ref 6.8–8.8)
RBC # BLD AUTO: 5 10E6/UL (ref 3.8–5.2)
RBC URINE: 1 /HPF
SODIUM SERPL-SCNC: 138 MMOL/L (ref 133–144)
SP GR UR STRIP: 1.01 (ref 1–1.03)
SQUAMOUS EPITHELIAL: <1 /HPF
TSH SERPL DL<=0.005 MIU/L-ACNC: 2.13 MU/L (ref 0.4–4)
UROBILINOGEN UR STRIP-MCNC: NORMAL MG/DL
WBC # BLD AUTO: 7.3 10E3/UL (ref 4–11)
WBC URINE: 1 /HPF

## 2022-12-07 PROCEDURE — 80178 ASSAY OF LITHIUM: CPT

## 2022-12-07 PROCEDURE — 84443 ASSAY THYROID STIM HORMONE: CPT

## 2022-12-07 PROCEDURE — 82040 ASSAY OF SERUM ALBUMIN: CPT

## 2022-12-07 PROCEDURE — 85025 COMPLETE CBC W/AUTO DIFF WBC: CPT

## 2022-12-07 PROCEDURE — 81003 URINALYSIS AUTO W/O SCOPE: CPT | Mod: 59

## 2022-12-07 PROCEDURE — 36415 COLL VENOUS BLD VENIPUNCTURE: CPT

## 2022-12-07 PROCEDURE — 80053 COMPREHEN METABOLIC PANEL: CPT

## 2022-12-07 PROCEDURE — 81001 URINALYSIS AUTO W/SCOPE: CPT

## 2022-12-08 LAB — SP GR UR STRIP: 1.01 (ref 1–1.03)

## 2022-12-16 ENCOUNTER — THERAPY VISIT (OUTPATIENT)
Dept: PHYSICAL THERAPY | Facility: CLINIC | Age: 57
End: 2022-12-16
Payer: COMMERCIAL

## 2022-12-16 DIAGNOSIS — M25.511 SHOULDER PAIN, RIGHT: ICD-10-CM

## 2022-12-16 DIAGNOSIS — M75.41 IMPINGEMENT SYNDROME OF SHOULDER REGION, RIGHT: ICD-10-CM

## 2022-12-16 PROCEDURE — 97161 PT EVAL LOW COMPLEX 20 MIN: CPT | Mod: GP | Performed by: PHYSICAL THERAPIST

## 2022-12-16 PROCEDURE — 97110 THERAPEUTIC EXERCISES: CPT | Mod: GP | Performed by: PHYSICAL THERAPIST

## 2022-12-16 NOTE — PROGRESS NOTES
Physical Therapy Initial Evaluation  Subjective:  Angy presents to PT for treatment of right shoulder pain with referral dated 11/2/2022 .  She reports onset of symptoms in early August of 2022 without precipitating event.  Patient complains of stabbing pain at right shoulder with movements including raising right UE forward, and out to the side at shoulder level or above, and reaching behind back.  Symptoms are relieved resting arm at side.  No radiating symptoms or tingling/numbness.  Patient has some neck pain right greater than left, which she relates to her desk set up for virtual patient care.  Patient reports history of a rotator cuff problem about 20 years ago, she unsure of what shoulder.  Patient works as a psychotherapist.    The history is provided by the patient.   Patient Health History  Angy Haji being seen for Shoulder pain.     Problem began: 8/1/2022.   Problem occurred: It just started.  Maybe overreaching with right arm   Pain is reported as 5/10 on pain scale.  General health as reported by patient is good.  Pertinent medical history includes: high blood pressure, menopausal, overweight, sleep disorder/apnea and other. Other medical history details: Bipolar Disorder.     Medical allergies: other. Other medical allergies details: Penicillin.   Surgeries include:  Other. Other surgery history details: Ovary removal.    Current medications:  High blood pressure medication, hormone replacement therapy and other. Other medications details: Mood stabilizers.       Primary job tasks include:  Prolonged sitting.                                    Objective:  Standing Alignment:    Cervical/Thoracic:  Forward head and thoracic kyphosis increased  Shoulder/UE:  Depressed scapula R and elevated scapula L                                       Shoulder Evaluation:  ROM:  AROM:  normal                            PROM:  normal                                Strength:    Flexion: Left:5/5    Pain: -     Right: 5/5      Pain:  -  Extension:  Left: 5/5     Pain:-    Right: 5/5     Pain:-  Abduction:  Left: 4+/5   Pain:++    Right: 5-/5      Pain:-    Internal Rotation:  Left:5/5      Pain:-    Right: 5/5      Pain:-  External Rotation:   Left:5-/5      Pain:-   Right:4/5      Pain:++            Stability Testing:  normal      Special Tests:        Right shoulder negative for the following special tests:Impingement                                       General     ROS    Assessment/Plan:    Patient is a 57 year old female with right side shoulder complaints.    Patient has the following significant findings with corresponding treatment plan.                Diagnosis 1:  Right shoulder pain   Pain -  self management, education and home program  Decreased strength - therapeutic exercise and therapeutic activities  Decreased function - therapeutic activities  Impaired posture - neuro re-education    Therapy Evaluation Codes:   1) History comprised of:   Personal factors that impact the plan of care:      None.    Comorbidity factors that impact the plan of care are:      None.     Medications impacting care: None.  2) Examination of Body Systems comprised of:   Body structures and functions that impact the plan of care:      Shoulder.   Activity limitations that impact the plan of care are:      Dressing and Lifting.  3) Clinical presentation characteristics are:   Stable/Uncomplicated.  4) Decision-Making    Low complexity using standardized patient assessment instrument and/or measureable assessment of functional outcome.  Cumulative Therapy Evaluation is: Low complexity.    Previous and current functional limitations:  (See Goal Flow Sheet for this information)    Short term and Long term goals: (See Goal Flow Sheet for this information)     Communication ability:  Patient appears to be able to clearly communicate and understand verbal and written communication and follow directions correctly.  Treatment Explanation -  The following has been discussed with the patient:   RX ordered/plan of care  Anticipated outcomes  Possible risks and side effects  This patient would benefit from PT intervention to resume normal activities.   Rehab potential is good.    Frequency:  1 X week, once daily  Duration:  for 6-8 visits  Discharge Plan:  Achieve all LTG.  Independent in home treatment program.  Reach maximal therapeutic benefit.    Please refer to the daily flowsheet for treatment today, total treatment time and time spent performing 1:1 timed codes.

## 2022-12-19 ENCOUNTER — VIRTUAL VISIT (OUTPATIENT)
Dept: PSYCHIATRY | Facility: CLINIC | Age: 57
End: 2022-12-19
Attending: PSYCHIATRY & NEUROLOGY
Payer: COMMERCIAL

## 2022-12-19 DIAGNOSIS — F31.9 BIPOLAR I DISORDER (H): Primary | ICD-10-CM

## 2022-12-19 PROCEDURE — 99214 OFFICE O/P EST MOD 30 MIN: CPT | Mod: 95 | Performed by: STUDENT IN AN ORGANIZED HEALTH CARE EDUCATION/TRAINING PROGRAM

## 2022-12-19 NOTE — PATIENT INSTRUCTIONS
Treatment Plan    - Continue Lithium 1050mg at bedtime  - Continue Lamictal 300mg daily    RTC in 6 weeks      **For crisis resources, please see the information at the end of this document**   Patient Education    Thank you for coming to the [facility] [place] [facility].     Lab Testing:  If you had lab testing today and your results are reassuring or normal they will be mailed to you or sent through [employer] within 7 days. If the lab tests need quick action we will call you with the results. The phone number we will call with results is # [phone]. If this is not the best number please call our clinic and change the number.     Medication Refills:  If you need any refills please call your pharmacy and they will contact us. Our fax number for refills is [phone].   Three business days of notice are needed for general medication refill requests.   Five business days of notice are needed for controlled substance refill requests.   If you need to change to a different pharmacy, please contact the new pharmacy directly. The new pharmacy will help you get your medications transferred.     Contact Us:  Please call [phone] during business hours (8-5:00 M-F).   If you have medication related questions after clinic hours, or on the weekend, please call [phone].     Financial Assistance [phone]   Medical Records [phone]       MENTAL HEALTH CRISIS RESOURCES:  For a emergency help, please call 911 or go to the nearest Emergency Department.     Emergency Walk-In Options:   EmPATH Unit @ [facility] ([place]): [phone] - Specialized mental health emergency area designed to be calming  [facility] ([place]): [phone]  [facility] ([place]): [phone]  [facility]): [phone]    [facility] Crisis Information:   [place]: [phone]  [place]: [phone]  [place] ([place]) - Adult:  [phone]     Child: [phone]  [place] - Adult: [phone]     Child: [phone]  [place]: [phone]  List of all [facility] resources:   https://[place].gov/dhs/people-we-serve/adults/health-care/mental-health/resources/crisis-contacts.jsp    National Crisis Information:   Crisis Text Line: Text  [place]  to 739087  Suicide & Crisis Lifeline: 988  National Suicide Prevention Lifeline: 3-641-311-TALK (1-261.935.5314)       For online chat options, visit https://suicidepreventionlifeline.org/chat/  Poison Control Center: 1-340.146.5847  Trans Lifeline: 1-753.486.3685 - Hotline for transgender people of all ages  The [place]: 3-160-088-3830 - Hotline for LGBT youth     For Non-Emergency Support:   Fast Tracker: Mental Health & Substance Use Disorder Resources -   https://www.[employer] PharmackTheRanking.comn.org/

## 2022-12-19 NOTE — PROGRESS NOTES
Angy Haij is a 57 year old who is being evaluated via a billable video visit.      Pt will join video visit via: Practice Management e-Tools  If there are problems joining the visit, send backup video invite via: Send to preferred e-mail: [email or web address]    Reason for telehealth visit: Patient has requested telehealth visit    Originating location (patient location): Patient's home    Will anyone else be joining the visit? No        Telehealth Details  Type of service:  video visit for medication management  Time of service:    Start Time:  3:40 PM    End Time:   4:08 PM    Originating Site (patient location):  [facility]   Location- Patient's home  Distant Site (provider location):  [facility]  Mode of Communication:  Video conference via Practice Management e-Tools       [facility]  Psychiatry Clinic  PSYCHIATRY PROGRESS NOTE     CARE TEAM:  PCP- Milly Smalls, Psychotherapist- [facility] with Verna Chan  Specialty Providers- OB/GYN, ENT, PT, Nutrition      Wife- Rachael Haji is a 55 year old who prefers the name Angy and uses pronouns she, her, hers.      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 Most recent serum Lithium level is 0.6. The last time Lithium was increased from 1050mg, Angy experienced SVTs and was recommended by the Cardiologist to reduce dose. We discussed potentially making some medication changes like increasing Lamictal which she was hesitant to do. Instead, she would like to give herself some time to utilize coping strategies for the holidays like working with her therapist to create a coping ahead plan. In the short-term,  I offered her short-term options to help with anxiety like hydroxyzine and Gabapentin. Seroquel wasn't offered due to history of EPSE. She plans to be in touch if she needs to use anything in the short term for anxiety. No SI or HI.    [place] review was not needed today. Patient is not taking any controlled substances.     PLAN                                                                                                                1) Meds-  - Continue Lithium 1050mg at bedtime  - Continue Lamictal 300mg daily    2) Psychotherapy-Continue individual therapy    3) Next due-  Labs- Next Li labs due 06/2023  EKG- PRN  Rating scales- N/A    4) Referrals-  None    5) Dispo- RTC in 8 weeks     PERTINENT BACKGROUND                           [most recent eval 08/22/21]    Patient has not had significant mental health history until 2018, when she developed perimenopausal mood and anxiety symptoms.  She was subsequently referred to our clinic following a hospitalization for anthony in November 2018.  Her care has been complicated by sensitivity to antipsychotics (EPSE including difficulty swallowing), thrombocytopenia secondary to Depakote, lithium toxicity in setting of concurrent Depakote use).    Psych pertinent item history includes Manic episode(h/o one manic episode Nov/Dec 2018), psychiatric hospitalization in November 2018.     SUBJECTIVE   Patient presented for a follow up.    Since last visit:  - Reports that with the holidays setting in, she has felt more stressed which has resulted in a lot of anxiety and irritability. She has noticed that it's hard to access the gains that she previously gained . She has noticed that it's been hard to enjoy activities like she used to.  -Has a friend who was recently diagnosed with terminal cancer and had to do some activities with them.  - Plans to spend the holiday with her side of the family, She feels stressed about the journey transition since they are driving hours to get  there. She's looking forward to seeing her family and meeting a best friend from college.     RECENT PSYCH ROS:   Depression: low mood and anhedonia-improving  Elevated:  none  Psychosis:  none  Anxiety:  excessive worry  Trauma Related:  none  Sleep: Reports that she's sleeping okay mostly but has experienced some interruptions due to feeling hot/night sweats (gets up 2-4 times). She's able to get back to sleep within minutes.  Other: N/A    Adverse Effects: Vivid dreams have reduced in frequency  Pertinent Negative Symptoms: No suicidal ideation, self-injurious behavior/urges or violent ideation  Recent Substance Use:   Alcohol- 1 drink (beer or wine) per week or 2 weeks.     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   to wifeRachael. They have a teenage son.     PSYCH and SUBSTANCE USE Critical Summary Points since July 2021 08/26/2021: Resident transfer visit. Increased Lithium to 1200mg.  10/15/2021: decrease Lithium back to 1050 mg  11/02/2021: Lamictal titration plan started  11/29/2021: Increase Lamictal to 100 mg for 7 days and then increase to 150 mg  12/14/2021: Increase Lamictal to 200mg  01/24/2022: No medication changes  03/18/2022: No medication changes  04/29/2022: No medication changes  05/05/2022: Increased Lamictal to 250 mg (inbtwn  visits)  06/10/2022: No medication changes  08/29/2022: Increase Lamictal to 300mg  10/24/2022: No medication change  12/7/2022: Li serum level is 0.6  12/19/2022: No medication changes     PAST MED TRIALS      Medication Max Dose (mg) Dates / Duration Helpful? DC Reason / Adverse Effects?   Sertraline       Depakote    Tremors   Olanzapine    EPSE   Seroquel    EPSE   Lithium 1050 current        MEDICAL HISTORY and ALLERGY     ALLERGIES: Seroquel [quetiapine], Olanzapine, Penicillins, Risperidone, and Thimerosal    Patient Active Problem List   Diagnosis     Oral aphthae     CARDIOVASCULAR SCREENING; LDL GOAL LESS THAN 160     Rosacea     Raynaud disease     Vitamin D deficiency disease     Mild sleep apnea     Atopic rhinitis     Chronic bilateral low back pain without sciatica     Obesity, Class III, BMI 40-49.9 (morbid obesity) (H)     Muscle tension dysphonia     Palpitations     Dysphagia, unspecified type     Anxiety     Benign essential hypertension     Callus of foot     Varicose veins of right lower extremity with edema     Bipolar I disorder (H)     Dribbling of urine     It band syndrome, right     Spinal stenosis at L4-L5 level     Facet arthropathy, lumbar     Shoulder pain, right      MEDICAL REVIEW OF SYSTEMS     Patient is on Prempro     MEDICATIONS     Current Outpatient Medications   Medication Sig Dispense Refill     Carboxymethylcellulose Sod PF (REFRESH PLUS) 0.5 % SOLN ophthalmic solution Place 1 drop into both eyes 3 times daily as needed for dry eyes       estradiol (ESTRACE) 0.5 MG tablet Take 1.5 tablets (0.75 mg) by mouth daily 140 tablet 3     estradiol (VAGIFEM) 10 MCG TABS vaginal tablet Place 1 tablet (10 mcg) vaginally twice a week 26 tablet 3     lamoTRIgine (LAMICTAL) 150 MG tablet Take 2 tablets (300 mg) by mouth daily 60 tablet 1     lidocaine (LIDODERM) 5 % patch Place 1-2 patches onto the skin every 24 hours To prevent lidocaine toxicity, patient should be patch free for 12 hrs  "daily. 30 patch 1     lisinopril (ZESTRIL) 10 MG tablet Take 1 tablet (10 mg) by mouth daily 90 tablet 3     lithium (ESKALITH) 150 MG capsule Take 1 capsule (150 mg) by mouth At Bedtime 30 capsule 1     lithium 300 MG capsule Take 3 capsules (900 mg) by mouth At Bedtime 90 capsule 1     Loratadine 10 MG capsule Take 10 mg by mouth daily as needed.       medroxyPROGESTERone (PROVERA) 2.5 MG tablet Take 1 tablet (2.5 mg) by mouth daily 90 tablet 3     metroNIDAZOLE (METROGEL) 1 % external gel Apply topically daily 60 g 9     ORDER FOR DME Use your CPAP device as directed by your provider.       VITAMIN D, CHOLECALCIFEROL, PO Take 2,000 Units by mouth daily        VITALS   There were no vitals taken for this visit.    MENTAL STATUS EXAM     Alertness: alert and oriented  Appearance: well groomed  Behavior/Demeanor: cooperative, with good  eye contact   Speech: regular rate and rhythm  Language: intact  Psychomotor: normal or unremarkable  Mood: \"okay\"  Affect: appropriate; congruent to: mood- yes, content- yes  Thought Process/Associations: linear and logical  Thought Content:  Reports none;  Denies suicidal ideation and violent ideation  Perception:  Reports none;  Denies auditory hallucinations and visual hallucinations  Insight: good  Judgment: good  Cognition: does  appear grossly intact; formal cognitive testing was not done  Gait and Station: Normal strides with bilateral arm swings     LABS and DATA     PHQ9 TODAY = N/A  PHQ 10/5/2022 10/24/2022 12/18/2022   PHQ-9 Total Score 5 5 6   Q9: Thoughts of better off dead/self-harm past 2 weeks Not at all Not at all Not at all     Recent Labs   Lab Test 12/07/22  1111 05/04/22  0928 09/03/21  1007   CR 0.55 0.56 0.61   GFRESTIMATED >90 >90 >90     Recent Labs   Lab Test 12/07/22  1111 05/04/22  0928 02/03/21  1019   AST 10 13 13   ALT 23 22 17   ALKPHOS 88 80 88     Recent Labs   Lab Test 12/07/22  1111 05/04/22  0928 09/03/21  1007   LITHIUM 0.6 0.8 0.8     Recent " Labs   Lab Test 12/07/22  1111 05/04/22  0928   CR 0.55 0.56   GFRESTIMATED >90 >90    138   POTASSIUM 4.4 4.0   SHERRIE 9.3 9.2     Recent Labs   Lab Test 12/07/22  1111 02/03/21  1011   SG 1.015  1.015 1.011     Recent Labs   Lab Test 12/07/22  1111 05/04/22  0928   TSH 2.13 2.03     Recent Labs   Lab Test 12/07/22  1111 05/04/22  0928 09/03/21  1007 02/03/21  1019 07/15/20  1617   WBC 7.3 7.4   < > 7.7 8.3   ANEU  --   --   --  5.1 5.6    < > = values in this interval not displayed.       ECG :10/25/2021 QTc 442ms     PSYCHOTROPIC DRUG INTERACTIONS     Concurrent use of LITHIUM and ACE INHIBITORS may result in increased risk of lithium toxicity.     Estrogen Derivatives may decrease the serum concentration of LamoTRIgine     MANAGEMENT:  Monitoring for adverse effects, routine labs, periodic EKGs and patient is aware of risks     RISK STATEMENT for SAFETY     Angy Haji did not appear to be an imminent safety risk to self or others.    TREATMENT RISK STATEMENT: The risks, benefits, alternatives and potential adverse effects have been discussed and are understood by the pt. The pt understands the risks of using street drugs or alcohol. There are no medical contraindications, the pt agrees to treatment with the ability to do so. The pt knows to call the clinic for any problems or to access emergency care if needed.  Medical and substance use concerns are documented above.  Psychotropic drug interaction check was done, including changes made today.     PROVIDER: Tolulope Oluwadamilola Odebunmi, MD    Patient not staffed in clinic.  Note will be reviewed and signed by supervisor Dr. De Leon.      Level of Medical Decision Making:   - At least 1 chronic problem that is not stable  - Engaged in prescription drug management during visit (discussed any medication benefits, side effects, alternatives, etc.)        Must meet 2 out of 3 of the above MDM elements to bill at the specified level     For help more info  about elements / criteria, click here-> MDM Help Grid     *This blue text will disappear automatically when note is signed, no need to delete*:[id number]}  [id number]}  :[id number]}

## 2022-12-20 ENCOUNTER — THERAPY VISIT (OUTPATIENT)
Dept: PHYSICAL THERAPY | Facility: CLINIC | Age: 57
End: 2022-12-20
Payer: COMMERCIAL

## 2022-12-20 DIAGNOSIS — M25.511 SHOULDER PAIN, RIGHT: Primary | ICD-10-CM

## 2022-12-20 PROCEDURE — 97110 THERAPEUTIC EXERCISES: CPT | Mod: GP | Performed by: PHYSICAL THERAPIST

## 2022-12-28 DIAGNOSIS — F31.9 BIPOLAR I DISORDER (H): ICD-10-CM

## 2022-12-28 RX ORDER — LAMOTRIGINE 150 MG/1
300 TABLET ORAL DAILY
Qty: 60 TABLET | Refills: 1 | Status: SHIPPED | OUTPATIENT
Start: 2022-12-28 | End: 2023-01-30

## 2022-12-28 NOTE — TELEPHONE ENCOUNTER
Last seen: 12/19/22  RTC: 8 weeks  Cancel: none  No-show: none  Next appt: 1/30/23     Incoming refill from Patient via Phone    Medication requested:   Pending Prescriptions:                       Disp   Refills    lamoTRIgine (LAMICTAL) 150 MG tablet      60 tab*1            Sig: Take 2 tablets (300 mg) by mouth daily      From chart note:   - Continue Lamictal 300mg daily     Medication refill approved per refill protocol.

## 2022-12-28 NOTE — TELEPHONE ENCOUNTER
[facility] Call Center    Phone Message    May a detailed message be left on voicemail: yes     Reason for Call: Medication Refill Request    Has the patient contacted the pharmacy for the refill? Yes   Name of medication being requested: lamictal  Provider who prescribed the medication: Levi  Pharmacy:    [place], [place] - [address]      Date medication is needed: pt will run out by Friday night      Action Taken: Message routed to:  Other: nursing pool    Travel Screening: Not Applicable

## 2023-01-09 ENCOUNTER — ANCILLARY PROCEDURE (OUTPATIENT)
Dept: MAMMOGRAPHY | Facility: CLINIC | Age: 58
End: 2023-01-09
Attending: OBSTETRICS & GYNECOLOGY
Payer: COMMERCIAL

## 2023-01-09 DIAGNOSIS — Z12.31 ENCOUNTER FOR SCREENING MAMMOGRAM FOR BREAST CANCER: ICD-10-CM

## 2023-01-09 PROCEDURE — 77063 BREAST TOMOSYNTHESIS BI: CPT | Mod: GC

## 2023-01-09 PROCEDURE — 77067 SCR MAMMO BI INCL CAD: CPT | Mod: GC

## 2023-01-10 DIAGNOSIS — F31.9 BIPOLAR I DISORDER (H): ICD-10-CM

## 2023-01-10 RX ORDER — LITHIUM CARBONATE 300 MG/1
900 CAPSULE ORAL AT BEDTIME
Qty: 90 CAPSULE | Refills: 1 | Status: SHIPPED | OUTPATIENT
Start: 2023-01-10 | End: 2023-01-30

## 2023-01-10 RX ORDER — LITHIUM CARBONATE 150 MG/1
150 CAPSULE ORAL AT BEDTIME
Qty: 30 CAPSULE | Refills: 1 | Status: SHIPPED | OUTPATIENT
Start: 2023-01-10 | End: 2023-01-30

## 2023-01-10 ASSESSMENT — SLEEP AND FATIGUE QUESTIONNAIRES
HOW LIKELY ARE YOU TO NOD OFF OR FALL ASLEEP WHEN YOU ARE A PASSENGER IN A CAR FOR AN HOUR WITHOUT A BREAK: WOULD NEVER DOZE
HOW LIKELY ARE YOU TO NOD OFF OR FALL ASLEEP IN A CAR, WHILE STOPPED FOR A FEW MINUTES IN TRAFFIC: WOULD NEVER DOZE
HOW LIKELY ARE YOU TO NOD OFF OR FALL ASLEEP WHILE SITTING QUIETLY AFTER LUNCH WITHOUT ALCOHOL: WOULD NEVER DOZE
HOW LIKELY ARE YOU TO NOD OFF OR FALL ASLEEP WHILE WATCHING TV: WOULD NEVER DOZE
HOW LIKELY ARE YOU TO NOD OFF OR FALL ASLEEP WHILE LYING DOWN TO REST IN THE AFTERNOON WHEN CIRCUMSTANCES PERMIT: WOULD NEVER DOZE
HOW LIKELY ARE YOU TO NOD OFF OR FALL ASLEEP WHILE SITTING AND TALKING TO SOMEONE: WOULD NEVER DOZE
HOW LIKELY ARE YOU TO NOD OFF OR FALL ASLEEP WHILE SITTING AND READING: WOULD NEVER DOZE
HOW LIKELY ARE YOU TO NOD OFF OR FALL ASLEEP WHILE SITTING INACTIVE IN A PUBLIC PLACE: WOULD NEVER DOZE

## 2023-01-10 NOTE — TELEPHONE ENCOUNTER
Last Seen: 12-  RTC: Dispo- RTC in 8 weeks  Cancel: none   No-Show: none   Next Appt: 1-    Incoming Refill From pharmacy ( [facility]) via faxed     Medication Requested: lithium 300 MG capsule  Directions: Sig - Route: Take 3 capsules (900 mg) by mouth At Bedtime - Oral  Qty: 90  Last Refill: 12-4-2022    Medication Requested:lithium (ESKALITH) 150 MG capsule  Directions:  Sig - Route: Take 1 capsule (150 mg) by mouth At Bedtime   Qty: 30  Last Refill: 11-      Medication Refill pended  Per Refill Protocol    Lindsey Reveles on 1/10/2023 at 2:38 PM

## 2023-01-13 ENCOUNTER — OFFICE VISIT (OUTPATIENT)
Dept: SLEEP MEDICINE | Facility: CLINIC | Age: 58
End: 2023-01-13
Payer: COMMERCIAL

## 2023-01-13 VITALS
WEIGHT: 293 LBS | DIASTOLIC BLOOD PRESSURE: 68 MMHG | BODY MASS INDEX: 45.99 KG/M2 | HEART RATE: 66 BPM | RESPIRATION RATE: 18 BRPM | SYSTOLIC BLOOD PRESSURE: 130 MMHG | HEIGHT: 67 IN | OXYGEN SATURATION: 100 %

## 2023-01-13 DIAGNOSIS — G47.33 OSA ON CPAP: Primary | ICD-10-CM

## 2023-01-13 PROCEDURE — 99215 OFFICE O/P EST HI 40 MIN: CPT | Performed by: INTERNAL MEDICINE

## 2023-01-13 NOTE — NURSING NOTE
1 year reminder sent to pt via [employer]. Marked to send 1 month before follow up due.    DANNIELLE Yuen

## 2023-01-13 NOTE — PROGRESS NOTES
Chief complaint: Follow-up obstructive sleep apnea and CPAP    History of Present Illness: 57-year-old female with history of excessive daytime sleepiness, bipolar disorder, obesity and obstructive sleep apnea.  She has been getting good clinical benefit with the use of CPAP.  She has been having more struggles with sleep initiation of late.  She reports active thinking.  She denies any manic symptoms.  She has been on medication now for years.  She typically will get into bed around 10-10 30 PM  She is using a light blocking mask.  She uses CPAP nightly.  She is not taking any sleep aids at this time.  In the evenings specifically watch TV, she admits to getting bored.  She is no longer meditating regularly which has been helpful prior to sleep.    She has a replacement CPAP machine after the Respironics recall.  She is liking the settings in auto mode.  She has to sleep mostly on her back due to issues with low back pain.  She had some benefit with injections but not complete.    [place] Sleepiness Scale  Total score - [place]: 0 (1/10/2023  7:20 AM)   (Less than 10 normal)    Insomnia Severity Scale  MICHAEL Total Score: 6  (normal 0-7, mild 8-14, moderate 15-21, severe 22-28)    Past Medical History:   Diagnosis Date     Disorders of bursae and tendons in shoulder region, unspecified      Female infertility of unspecified origin      Gallbladder sludge 5/2013     Mild sleep apnea 5/13/2014     Obesity, unspecified      Oral aphthae      Other acne      Other specified hypoglycemia      PONV (postoperative nausea and vomiting)        Allergies   Allergen Reactions     Seroquel [Quetiapine] Nausea and Vomiting     Had constant vomiting     Olanzapine      Tongue welling, bad EPSE (Parkinsonian)     Penicillins Rash     Risperidone      Tongue welling, bad EPSE (Parkinsonian)       Thimerosal Other (See Comments)     blisters on inside of eyelid       Current Outpatient Medications   Medication      Carboxymethylcellulose Sod PF (REFRESH PLUS) 0.5 % SOLN ophthalmic solution     estradiol (ESTRACE) 0.5 MG tablet     estradiol (VAGIFEM) 10 MCG TABS vaginal tablet     lamoTRIgine (LAMICTAL) 150 MG tablet     lidocaine (LIDODERM) 5 % patch     lisinopril (ZESTRIL) 10 MG tablet     lithium (ESKALITH) 150 MG capsule     lithium 300 MG capsule     Loratadine 10 MG capsule     medroxyPROGESTERone (PROVERA) 2.5 MG tablet     metroNIDAZOLE (METROGEL) 1 % external gel     ORDER FOR DME     VITAMIN D, CHOLECALCIFEROL, PO     No current facility-administered medications for this visit.       Social History     Socioeconomic History     Marital status:      Spouse name: Danielle Zamarripa     Number of children: 1     Years of education: Masters     Highest education level: Not on file   Occupational History     Occupation: Psychotherapist     Employer: [employer]   Tobacco Use     Smoking status: Never     Smokeless tobacco: Never     Tobacco comments:     non smoke home   Substance and Sexual Activity     Alcohol use: Yes     Comment: 2 Drinks Per Month     Drug use: No     Sexual activity: Yes     Partners: Female     Birth control/protection: None   Other Topics Concern     Parent/sibling w/ CABG, MI or angioplasty before 65F 55M? No   Social History Narrative    8/18/09    Balanced Diet - Yes    Osteoporosis Prevention Measures - Dairy servings per day: 2 and Medication/Supplements (See current meds)    Regular Exercise -  Yes Describe Walking, swimming, biking.     Dental Exam - YES - Date: 5/2009    Eye Exam - YES - Date: 8/2009    Self Breast Exam - No    Abuse: Current or Past (Physical, Sexual or Emotional)- No    Do you feel safe in your environment - Yes    Guns stored in the home - No    Sunscreen used - Yes    Seatbelts used - Yes    Lipids -  YES - Date: 8/10/09    Glucose -  YES - Date: 8/10/09    Colon Cancer Screening - No    Hemoccults - NO    Pap Test -  YES - Date: 7/6/06, no  "Hx of abnormal paps.    Do you have any concerns about STD's -  No    Mammography - YES - Date: 8/5/09    DEXA - NO    Immunizations reviewed and up to date - Yes, Tdap given 1/22/08    Amara Hall MA                         Social Determinants of Health     Financial Resource Strain: Not on file   Food Insecurity: Not on file   Transportation Needs: Not on file   Physical Activity: Not on file   Stress: Not on file   Social Connections: Not on file   Intimate Partner Violence: Not At Risk     Fear of Current or Ex-Partner: No     Emotionally Abused: No     Physically Abused: No     Sexually Abused: No   Housing Stability: Not on file       Family History   Problem Relation Age of Onset     Cardiovascular Paternal Grandfather         anyerism     Obesity Paternal Grandfather      Respiratory Paternal Grandfather         pulmonary (air sacs hardening)     Allergies Father         hayfever     Lipids Father         diet therapy     Gallbladder Disease Father         cholecystectomy      Eye Disorder Father         retinal tear x 2      Prostate Cancer Father      Alzheimer Disease Maternal Grandmother         ? poor STM     Arthritis Maternal Grandmother      Heart Disease Maternal Grandmother         Arrythmia-got a pacemaker at age 89     Arthritis Mother      Eye Disorder Mother         Cataract/macular tear in one eye     LUNG DISEASE Mother         bronchiectasis from pneumonia     Atrial fibrillation Mother      Cancer Paternal Grandmother         Uterine CA- as well as her sister     Gynecology Paternal Grandmother         prolapsed uterus- had 10 kids     Obesity Paternal Grandmother      Breast Cancer Maternal Aunt      Other Cancer Paternal Aunt 80        endo. cancer            EXAM:  /68   Pulse 66   Resp 18   Ht 1.702 m (5' 7\")   Wt 144.2 kg (318 lb)   SpO2 100%   BMI 49.81 kg/m    GENERAL: Alert and no distress  EYES: Eyes grossly normal to inspection.  No discharge or erythema, or " obvious scleral/conjunctival abnormalities.  RESP: No audible wheeze, cough, or visible cyanosis.  No visible retractions or increased work of breathing.    SKIN: Visible skin clear. No significant rash, abnormal pigmentation or lesions.  NEURO: Cranial nerves grossly intact.  Mentation and speech appropriate for age.  PSYCH: Mentation appears normal, affect normal, judgement and insight intact, normal speech and appearance well-groomed.       PSG 3/2/2011  Wt 241 lbs  AHI 5.1, Supine AHI 14.5, non supine AHI 1    [employer]pir[employer]ias dream station to auto PAP download from 12/6/2022 to 1/4/2023 reviewed:  Per cent of days used greater than 4 Hours 100% (minimum goal greater than 70%)  Average use on days used: 8 hours 2 min  Settings: Min EPAP 9 cmH2O    Max EPAP 12 cmH2O  Pressures delivered 90/95th percentile for pressure 12 cmH2O  Average AHI 1 events per hour (goal less than 5)  Leak acceptable    TSH   Date Value Ref Range Status   12/07/2022 2.13 0.40 - 4.00 mU/L Final   05/05/2021 2.61 0.40 - 4.00 mU/L Final         ASSESSMENT:  57-year-old female with history of obesity, overall mild obstructive sleep apnea with supine predominance.  She is getting good clinical benefit meeting compliance goals.  Ongoing treatment of obstructive sleep apnea is medically indicated.  I suspect that underlying severity of the sleep apnea has progressed given weight gain since sleep study.    PLAN:  Orders generated to keep her CPAP supplies up-to-date.  Answered her questions regarding eyedrops.  Recommended initiating reading or meditation or playing piano before bed as a way to clear her thoughts from work-related distractions.  This may help her fall asleep easier.  Consider referral back to for CBT-I if needed.  Last visit 2018.      40 minutes spent on the date of the encounter doing chart review, history and exam, documentation and further activities per the note    Nellie Calloway M.D.  Pulmonary/Critical Care/Sleep  [facility]   Floor [address], [address]   [address] [place] [address]   Appointments: [phone]    The above note was dictated using voice recognition software and may include typographical errors. Please contact the author for any clarifications.

## 2023-01-13 NOTE — PATIENT INSTRUCTIONS
For general sleep health questions:   http://sleepeducation.org    For tips about PAP and COVID-19:  https://www.[email or web address].org/patients/patient-resources/resources/covid-19-and-home-pap-therapy.pdf    For general info about COVID-19 including vaccines:  https://AthleteNetwork.org/covid19      Continue PAP therapy every night, for all hours that you are sleeping (including naps.)  As always, try to get at least 8 hours of sleep or more each day, keep a regular sleep schedule, and avoid sleep deprivation. Avoid alcohol.  Reasons that you might need a change to your pressure therapy would be weight gain or loss, waking having inadvertently removed your PAP overnight, having previously felt refreshed by sleep with CPAP use and now waking un-refreshed, and return of daytime sleepiness. Also, the development of new medical problems  (such as heart failure, stroke, medications such as narcotics) can sometimes affect breathing at night and change your PAP therapy needs.  Please bring PAP with you if you are hospitalized.  If anticipating surgery be sure to discuss with your surgeon that you have sleep apnea and use PAP therapy.    Maintain your equipment as recommended which includes routine cleaning and replacement of supplies.      Call DME for any questions regarding supplies or maintenance.    [facility], [facility] [phone]    Do not drive on engage in potentially dangerous activities if feeling sleepy.  Please follow up in sleep clinic again in 12 months.        Tips for your PAP use-    Mask fitting tips  Mask fitting exercise:    To improve your mask seal and your mobility at night, put mask on and secure in place.  Lie down in bed with full pressure and roll to one side, adjust headgear while in that position to eliminate any leaks. Repeat process rolling to other side.     The mask seal does not have to be perfect:   CPAP machines are designed to make up for small  leaks. However, you will not tolerate leaks blowing in your eyes so you will need to adjust.   Any leak should only be near or at the bottom of the mask.  We expect your mask to leak slightly at night.    Do not over-tighten the headgear straps, tighter IS NOT better, we expect minimal leak.    First try re-positioning the mask or headgear before tightening the headgear straps.  Mask leaks are expected due to changing sleeping positions. Try pulling the mask away from your skin allowing the cushion to re-inflate will minimize the leak.  If you struggle for a good fit, try turning the CPAP off and then readjust the mask by pulling it away from your face and then turning back on the CPAP.        Humidifier tips  Humidifiers can be adjusted to increase or decrease the amount of moisture according to your comfort level. You may need to adjust this frequently at first, but then might only change it with seasonal weather changes.     Try INCREASING the humidity if:  You experience a dry, irritated nasal passage or throat.  You have a runny, drippy nose or sneezing fits after using CPAP.  You experience nasal congestion during or after CPAP use.    Try DECREASING the humidity if:  You have excessive condensation or  rain out  in the tubing or mask.  Otherwise keep the tubing warm during the night by running it underneath the blankets or pillow.      Clinic visit after initial PAP set-up   Bring your equipment with you to your 5-8 week follow up clinic visit.  We will be extracting your data from the machine if not available from the cloud based modem.        Travel  Always take your equipment with you when you travel.  If you fly with your equipment bring it on with you as a carry on.  Medical equipment does not count as a carry on.    If you travel international the machines take 110-240v.  The only adapter needed is the adapter that will fit into the receptacle (outlet).    You may also want to bring an extension cord as  many hotel rooms have limited outlets at the bedside.  Do not travel with water in your humidifier chamber.     Cleaning and Maintenance Guidelines    Equipment Frequency Cleaning Method   Mask First Day    Daily      Weekly Soak mask in hot soapy water for 30 minutes, rinse and air dry.  Wipe nasal cushion with a hot soapy (Ivory, baby shampoo) cloth and rinse.  Baby wipes may also be used.  Do not use anti-bacterial soaps,Reina  liquid soap, rubbing alcohol, bleach or ammonia.  Wash frame in hot soapy water (Ivory, baby shampoo) rinse and let air dry   Headgear Biweekly Wash in hot soapy water, rinse and air dry   Reusable Gray Filter Weekly Wash in hot soapy water, rinse, put in towel squeeze moisture out, let air dry   Disposable White Filter Check Weekly Replace when brown or gray in color; at least every 2 to 3 months   Humidifier Chamber Daily    Weekly Empty distilled water from humidifier and let air dry    Hand wash in hot soapy water, rinse and air dry   Tubing Weekly Wash in hot soapy water, rinse and let air dry   Mask, Tubing and Humidifier Chamber As needed Disinfect: Soak in 1 part distilled white vinegar to 3 parts hot water for 30 minutes, rinse well and air dry  Not the material headgear        MASK AND SUPPLY REORDERING and EQUIPMENT NEEDS through your DME and per your insurance  Reminder: Most insurance companies will allow for a new mask, headgear, tubing, and reusable gray filter every six months.  Disposable white ultra-fine filters are covered monthly.      HOME AND SAFETY INSTRUCTIONS  Do not use frayed or cracked electrical cords, multi plug adaptors, or switched receptacles  Do not immerse electrical equipment into water  Assure that electrical cords do not become a tripping hazard

## 2023-01-16 ENCOUNTER — THERAPY VISIT (OUTPATIENT)
Dept: PHYSICAL THERAPY | Facility: CLINIC | Age: 58
End: 2023-01-16
Payer: COMMERCIAL

## 2023-01-16 DIAGNOSIS — M25.511 SHOULDER PAIN, RIGHT: Primary | ICD-10-CM

## 2023-01-16 PROCEDURE — 97140 MANUAL THERAPY 1/> REGIONS: CPT | Mod: GP | Performed by: PHYSICAL THERAPIST

## 2023-01-16 PROCEDURE — 97110 THERAPEUTIC EXERCISES: CPT | Mod: GP | Performed by: PHYSICAL THERAPIST

## 2023-01-23 ENCOUNTER — HOSPITAL ENCOUNTER (OUTPATIENT)
Dept: NUTRITION | Facility: CLINIC | Age: 58
Discharge: HOME | End: 2023-01-23
Attending: DIETITIAN, REGISTERED | Admitting: DIETITIAN, REGISTERED
Payer: COMMERCIAL

## 2023-01-23 PROCEDURE — 97803 MED NUTRITION INDIV SUBSEQ: CPT | Mod: GT,95 | Performed by: DIETITIAN, REGISTERED

## 2023-01-23 NOTE — PROGRESS NOTES
Video-Visit Details    Type of service:  Video Visit    Video Start Time (time video started): 3:02    Video End Time (time video stopped): 3:30    Originating Location (pt. Location): Home        Distant Location (provider location):  On-site    Mode of Communication:  Video Conference via [facility]    NUTRITION REASSESSMENT NOTE (VIDEO VISIT DUE TO COVID-19)  REASON FOR ASSESSMENT  Angy Haji referred by Dr. Lira for MNT related to  Obesity, Class III, BMI 40-49.9 (morbid obesity) (H) [E66.01]          Patient accompanied by self      NUTRITION HISTORY  Patient continues with multiple life changes and stressors.  Patient aware that she likes to self soothe with ice cream which she has recently increased.  Patient is aware that social cues trigger her to eat. Patient's spouse recently had weight loss surgery so there is adjust with eating in the home.      3/7/2022 Self soothe in evening snack-made tea or tells self ate late dinner and does not need snack.  Patient asking self if physically hungry.  Patient drinking fizzy water or malt o meal as evening snack.  Patient aware she has been stress eating.  Tracking when not having dessert -identifying why eating  Exercise-cross country skiing; mall walk; snow shoeing; treadmill 2 times haring treadmill   Yogurt; cheese and crackers; cereal      Patient wants to increase exercise time as will be going to [facility] weekend ([place]) [facility]      4/25/2002 Patient able to get 45-60 minutes exercise on weekends and 20-30 minutes of exercise daily.  Patient wants to lose 5 lbs prior to trip to [place].  Patient was able to have 3 days no desserts and then 1 week no dessert. 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  Patient determined ways she could lose weight: Dessert 2 times per week, add treadmill, eliminate morning snack, 1 snack at 3 PM. Alcohol 1 time per week, no caramel lattes, food is fuel, it's  too late to be hungry, spouse brings sweet treats without asking, tea/bath/shower      6/15/2022 Patient skips dessert if eats late dinner.  Patient felt discouraged with weight loss as wanted to lose more weight (down 3.5 lbs). Patient wants weight loss for functional reasons.  Patient signed up for the [place] bike tour.  Patient has increased cravings and thoughts about ice cream.  Shift in relationship with spouse after spouse's weight loss surgery.      7/27/2022 Patient increased exercise-[place] with [employer]; started the last week in June for bike rides (4 rides different times in 2 weeks-9 miles).  Rode in 18 mile race.  Went hiking [place].     No dessert (hit or miss) 50% 3 days no dessert; ice cream and s'mores on vacation     Typical intake:  Greek yogurt + 1/4 cup granola + fruit + 1/2 and 1/2 with coffee or english muffin with peanut butter/butter and strawberry jam or eggs scrambled or hard boiled  Leftovers -1 cup cherries; cherry tomatoes; 1/2 cup pea pods + 3 oz pork tenderloin (teryaki)   Watermelon, pork tenderloin, 1 cup rice + tomatoes/pea pods; cheeseburger with whole wheat bun + vegetables + watermelon   Snacks: croissant ([place]) Tenriism oat squares (1 cup dry) (3:00 PM) cheese and crackers    Beverages: coffee; water; fizzy water      Emotional eating at night-ice cream      9/21/2022 Patient has been able to bike 2 times per week.  Patient's weight has been stable.  Patient has not been able to track dinner and snacks.  Stress eating ebbs and flows.  Trying to save dessert for the weekend or may self soothe during th week with ice cream.  Patient stress eating in the evening and may boredom eat prior to lunch (cereal).    Weight: 308.4 lbs       11/16/2022 Biking 2 times per week. Tour biking race: [employer] 14 miles (rode by herself). [employer] 12 mile [place] bike ride. (rode with friend).  Patient has consistently  "walked dog and completes PT exercises.  Patient determining plan for winter exercise.    314.4 lbs (food and sleep changes) Patient was eating more sweets as patient's wife baked sweets this fall (3-4 weeks with increase of sweets).  Holiday baking plan consists of 5 cookies and 1 candy (peanut brittle).  Ice cream consumption has increased recently.     1/23/2023 Patient was able to bake holiday cookies and freeze them.  Patient has been eating desserts 5 days a week.  Patient has been substituting greek yogurt (flavored or vanilla) or cream of wheat for dessert.  Patient was able to snow shoe.  Patient having barrier with getting on treadmill.  Patient's weight has fluctuated 325.8 and now 316 lbs.  Patient has been working on sleep routine (classical music; reading on paper vs ipad).      DIET RECALL   Breakfast: cereal and banana; yogurt + granola (measures), fruit + coffee; 2 slices toast with butter + 1/2 cup cottage cheese    Lunch: leftovers (includes fruit and vegetable daily)  Snack: fruit or cheese with lower salt nut thin crackers   Dinner: Tuna steak, Asian marinade, stir gomez, baked vegetable + brown rice (3/4 cup) + canned peaches; chicken thighs with beans and rice; sheet pan dinner with chicken and vegetables or grilled salmon and grilled vegetables; taco; protein + fruit and vegetable  Snacks: fruit, string cheese, Ritz crackers or yogurt; dessert in the evening (ice cream recently)  Beverages: mainly water, coffee, mineral water  Dining out: none currently  Exercise: Patient exercising 5-6 times per week. (cross country skiing, snow shoeing, walking and walking dog).  Patient exercises 1 hour on weekends (hiking or walking).     MEDICATIONS:  Reviewed      ANTHROPOMETRICS  Height: 5'8\" (note height change in chart previously 5'9\")  Weight: 316 lbs per patient   BMI (kg/m2): 48 kg/m2   %IBW: 225%  Weight History:   Wt Readings from Last 10 Encounters:   01/13/23 144.2 kg (318 lb)   10/04/22 138.1 kg " (304 lb 6.4 oz)   07/27/22 139.7 kg (308 lb)   05/16/22 142.1 kg (313 lb 3.2 oz)   01/12/22 139.7 kg (308 lb)   12/22/21 137.4 kg (303 lb)   10/25/21 137.6 kg (303 lb 4.8 oz)   10/22/21 139.7 kg (308 lb)   10/08/21 139.9 kg (308 lb 6.4 oz)   10/08/21 139.7 kg (308 lb)      ASSESSED NUTRITION NEEDS  Estimated Energy Needs: 6891-9731 kcals (15-20 kcals/kg) for gradual weight loss   Estimated Protein Needs: 66-79 (1-1.2 gm/kg IBW) for repletion with exercise  RMR: 1440     EVALUATION/PROGRESS TOWARDS GOALS   Previous goals:  Exercise 2 times per week on indoor equipment -not met   Freeze holiday cookies (Allow 2 days of fresh cookies day 1 and 2nd day)-met      Previous Nutrition Diagnosis: Obesity related to excess energy intake as evidenced by BMI of 47.7 kg/m2-no change     Current Nutrition Diagnosis:Obesity related to excess energy intake as evidenced by BMI of 48 kg/m2     INTERVENTIONS  Nutrition Prescription   Recommend exercise 5 times per week; track intake if eating mindlessly; increase fiber to 25-30 grams per day      Implementation:   Meals and Snacks: Continue eating 3 meals + deliberate snacking (if hungry)   Nutrition Education (Content):              a)  Discussed progress towards goals.  Congratulated patient for awareness of emotional eating.  Reviewed current food and eating behavior challenges.  Patient continues with life stressors which can affect evening eating.  Discussed self care as to impact emotional eating.  Supported patient in her struggle with food and weight.    Nutrition Education (Application):              a) Discussed emotional eating in the evening.  Reviewed alternatives to emotional eating that patient determined previously.              b) Patient verbalized understanding of diet by stating will have freeze holiday cookies.   Expected patient engagement: good     CURRENT GOALS   Add soothing activity in the evening (7:00 PM) 5 of 7 days  Aim for 4 desserts per week (1-2 per  week and 2 on weekend)   Movement daily      FOLLOW UP/MONITORING    Patient to follow up in 8 weeks  Patient has RD contact information      Time spent with patient: 28 minutes     Mattie Amaya, RD, LD  [facility] Outpatient Dietitian  [phone] (office phone)

## 2023-01-24 ENCOUNTER — THERAPY VISIT (OUTPATIENT)
Dept: PHYSICAL THERAPY | Facility: CLINIC | Age: 58
End: 2023-01-24
Payer: COMMERCIAL

## 2023-01-24 DIAGNOSIS — M25.511 SHOULDER PAIN, RIGHT: Primary | ICD-10-CM

## 2023-01-24 PROCEDURE — 97110 THERAPEUTIC EXERCISES: CPT | Mod: GP | Performed by: PHYSICAL THERAPIST

## 2023-01-24 PROCEDURE — 97140 MANUAL THERAPY 1/> REGIONS: CPT | Mod: GP | Performed by: PHYSICAL THERAPIST

## 2023-01-29 ASSESSMENT — PATIENT HEALTH QUESTIONNAIRE - PHQ9
SUM OF ALL RESPONSES TO PHQ QUESTIONS 1-9: 7
10. IF YOU CHECKED OFF ANY PROBLEMS, HOW DIFFICULT HAVE THESE PROBLEMS MADE IT FOR YOU TO DO YOUR WORK, TAKE CARE OF THINGS AT HOME, OR GET ALONG WITH OTHER PEOPLE: SOMEWHAT DIFFICULT
SUM OF ALL RESPONSES TO PHQ QUESTIONS 1-9: 7

## 2023-01-30 ENCOUNTER — VIRTUAL VISIT (OUTPATIENT)
Dept: PSYCHIATRY | Facility: CLINIC | Age: 58
End: 2023-01-30
Attending: PSYCHIATRY & NEUROLOGY
Payer: COMMERCIAL

## 2023-01-30 DIAGNOSIS — F31.9 BIPOLAR I DISORDER (H): ICD-10-CM

## 2023-01-30 PROCEDURE — 99214 OFFICE O/P EST MOD 30 MIN: CPT | Mod: VID | Performed by: STUDENT IN AN ORGANIZED HEALTH CARE EDUCATION/TRAINING PROGRAM

## 2023-01-30 RX ORDER — LAMOTRIGINE 150 MG/1
150 TABLET ORAL DAILY
Qty: 30 TABLET | Refills: 1 | Status: SHIPPED | OUTPATIENT
Start: 2023-01-30 | End: 2023-03-13

## 2023-01-30 RX ORDER — LITHIUM CARBONATE 300 MG/1
900 CAPSULE ORAL AT BEDTIME
Qty: 90 CAPSULE | Refills: 1 | Status: SHIPPED | OUTPATIENT
Start: 2023-01-30 | End: 2023-03-13

## 2023-01-30 RX ORDER — LITHIUM CARBONATE 150 MG/1
150 CAPSULE ORAL AT BEDTIME
Qty: 30 CAPSULE | Refills: 1 | Status: SHIPPED | OUTPATIENT
Start: 2023-01-30 | End: 2023-03-13

## 2023-01-30 RX ORDER — LAMOTRIGINE 200 MG/1
200 TABLET ORAL DAILY
Qty: 30 TABLET | Refills: 1 | Status: SHIPPED | OUTPATIENT
Start: 2023-01-30 | End: 2023-03-13

## 2023-01-30 NOTE — PATIENT INSTRUCTIONS
Treatment Plan  - Continue Lithium 1050mg at bedtime  - Increase Lamictal to 350mg daily    Continue therapy    MTM referral made today    RTC in 4-6 weeks      **For crisis resources, please see the information at the end of this document**   Patient Education    Thank you for coming to the [facility] [place] [facility].     Lab Testing:  If you had lab testing today and your results are reassuring or normal they will be mailed to you or sent through [employer] within 7 days. If the lab tests need quick action we will call you with the results. The phone number we will call with results is # [phone]. If this is not the best number please call our clinic and change the number.     Medication Refills:  If you need any refills please call your pharmacy and they will contact us. Our fax number for refills is [phone].   Three business days of notice are needed for general medication refill requests.   Five business days of notice are needed for controlled substance refill requests.   If you need to change to a different pharmacy, please contact the new pharmacy directly. The new pharmacy will help you get your medications transferred.     Contact Us:  Please call [phone] during business hours (8-5:00 M-F).   If you have medication related questions after clinic hours, or on the weekend, please call [phone].     Financial Assistance [phone]   Medical Records [phone]       MENTAL HEALTH CRISIS RESOURCES:  For a emergency help, please call 911 or go to the nearest Emergency Department.     Emergency Walk-In Options:   EmPATH Unit @ [facility] ([place]): [phone] - Specialized mental health emergency area designed to be calming  [facility] ([place]): [phone]  [facility] ([place]): [phone]  [facility]): [phone]    [facility] Crisis Information:   [place]: [phone]  [place]:  [phone]  [place] ([place]) - Adult: [phone]     Child: [phone]  [place] - Adult: [phone]     Child: [phone]  [place]: [phone]  List of all [facility] resources:   https://[place].gov/dhs/people-we-serve/adults/health-care/mental-health/resources/crisis-contacts.jsp    National Crisis Information:   Crisis Text Line: Text  [place]  to 372587  Suicide & Crisis Lifeline: 988  National Suicide Prevention Lifeline: 9-123-962-TALK (1-565.512.7962)       For online chat options, visit https://suicidepreventionlifeline.org/chat/  Poison Control Center: 1-300.512.2158  Trans Lifeline: 1-273.553.6484 - Hotline for transgender people of all ages  [employer]: 6-802-757-2246 - Hotline for LGBT youth     For Non-Emergency Support:   Fast Tracker: Mental Health & Substance Use Disorder Resources -   https://www.[employer].org/

## 2023-01-30 NOTE — PROGRESS NOTES
Angy Haji is a 57 year old who is being evaluated via a billable video visit.      Pt will join video visit via: [employer]  If there are problems joining the visit, send backup video invite via: Send to preferred e-mail: [email or web address]    Reason for telehealth visit: Patient has requested telehealth visit    Originating location (patient location): Patient's home    Will anyone else be joining the visit? No      Telehealth Details  Type of service:  video visit for medication management  Time of service:    Start Time:  3:37 PM    End Time:   4:08 PM    Originating Site (patient location):  [facility]   Location- Patient's home  Distant Site (provider location):  [facility]  Mode of Communication:  Video conference via [employer]       [facility]  Psychiatry Clinic  PSYCHIATRY PROGRESS NOTE     CARE TEAM:  PCP- Milly Jenkins, Psychotherapist- [facility] with Verna Chan  Specialty Providers- OB/GYN, ENT, PT, Nutrition      Wife- Rachael Haji is a 55 year old who prefers the name Angy and uses pronouns she, her, hers.      DIAGNOSIS     Bipolar 1 disorder, current depressive episode, moderate   H/o EPSE secondary to neuroleptic use  H/o Thrombocytopenia secondary to Depakote  H/o lithium toxicity, resolved     ASSESSMENT   Angy reports that she hasn't noticed any more improvements in her mood. She reports intermittent feelings of hopelessness due to stressors in her life. She and her wife have been navigating through some financial stressors for some months. We discussed increasing Lamictal to target mood which Angy agreed to. No SI or HI.    [place] review was not needed today. Patient is not taking any controlled substances.     PLAN                                                                                                                1) Meds-  - Continue Lithium 1050mg at bedtime  - Increase Lamictal to 350mg daily    2) Psychotherapy-Continue  individual therapy every other week & couples counseling    3) Next due-  Labs- Next Li labs due 06/2023  EKG- PRN  Rating scales- N/A    4) Referrals- MTM    5) Dispo- RTC in 4-6 weeks     PERTINENT BACKGROUND                           [most recent eval 08/22/21]    Patient has not had significant mental health history until 2018, when she developed perimenopausal mood and anxiety symptoms.  She was subsequently referred to our clinic following a hospitalization for anthony in November 2018.  Her care has been complicated by sensitivity to antipsychotics (EPSE including difficulty swallowing), thrombocytopenia secondary to Depakote, lithium toxicity in setting of concurrent Depakote use).    Psych pertinent item history includes Manic episode(h/o one manic episode Nov/Dec 2018), psychiatric hospitalization in November 2018.     SUBJECTIVE   Patient presented for a follow up.    Since last visit:  - Reports that the holidays were stressful due to the snow blizzards. The resulting road closures led to having to drive through a longer route to [place] for their holiday. Since the holidays passed, she has been able to enjoy planned weekend activities with family and friends.  -She continues to feel extremely tired after dinner at 6/7pm. She finds it hard to concentrate or do any work after dinner. This is something she has experienced for a while and isn't sure if it's related to her mental health.  -Reports that at times she feels hopeless about the stressors in her life like the financial stressor she and her wife have been dealing with. She does not endorse suicidal thoughts or ideations.  -Reports that sleep is going okay with less anxiety around her breathing with CPAP use. She got some new tips for improving sleep hygiene form her sleep doctor. She was encouraged to listen to music more than the news close to bedtime and reading a hard copy book instead of an electronic book  -She hasn't made any changes to her HRT  since the fall. She had a period in January and will be seeing her Gynecologist to follow up with an ultrasound to check her uterine lining.    RECENT PSYCH ROS:   Depression: low mood and feelings of hopelessness. Though anhedonia is improved  Elevated:  none  Psychosis:  none  Anxiety:  Worry and ruminating thoughts  Trauma Related:  none  Sleep: Reports that sleep is back to baseline with some sleep hygiene tips that were recommended by her sleep medicine doctor. Prior to her appointment, she reports a lot of sleep-related anxiety which is improved.  Other: N/A    Adverse Effects: Vivid dreams have reduced in frequency  Pertinent Negative Symptoms: No suicidal ideation, self-injurious behavior/urges or violent ideation  Recent Substance Use:   Alcohol- 1 drink (beer or wine) per week or 2 weeks.     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   to wifeRachael. They have a teenage son.     PSYCH and SUBSTANCE USE Critical Summary Points since July 2021 08/26/2021: Resident transfer visit. Increased Lithium to 1200mg.  10/15/2021: decrease Lithium back to 1050 mg  11/02/2021: Lamictal titration plan started  11/29/2021: Increase Lamictal to 100 mg for 7 days and then increase to 150 mg  12/14/2021: Increase Lamictal to 200mg  01/24/2022: No medication  changes  03/18/2022: No medication changes  04/29/2022: No medication changes  05/05/2022: Increased Lamictal to 250 mg (inbtwn visits)  06/10/2022: No medication changes  08/29/2022: Increase Lamictal to 300mg  10/24/2022: No medication change  12/7/2022: Li serum level is 0.6  12/19/2022: No medication changes  01/30/2023: Increase Lamictal to 350mg     PAST MED TRIALS      Medication Max Dose (mg) Dates / Duration Helpful? DC Reason / Adverse Effects?   Sertraline       Depakote    Tremors   Olanzapine    EPSE   Seroquel    EPSE   Lithium 1050 current        MEDICAL HISTORY and ALLERGY     ALLERGIES: Seroquel [quetiapine], Olanzapine, Penicillins, Risperidone, and Thimerosal    Patient Active Problem List   Diagnosis     Oral aphthae     CARDIOVASCULAR SCREENING; LDL GOAL LESS THAN 160     Rosacea     Raynaud disease     Vitamin D deficiency disease     MARLA (obstructive sleep apnea)     Atopic rhinitis     Chronic bilateral low back pain without sciatica     Obesity, Class III, BMI 40-49.9 (morbid obesity) (H)     Muscle tension dysphonia     Palpitations     Dysphagia, unspecified type     Anxiety     Benign essential hypertension     Callus of foot     Varicose veins of right lower extremity with edema     Bipolar I disorder (H)     Dribbling of urine     It band syndrome, right     Spinal stenosis at L4-L5 level     Facet arthropathy, lumbar     Shoulder pain, right      MEDICAL REVIEW OF SYSTEMS     Patient is on Estradiol.     MEDICATIONS     Current Outpatient Medications   Medication Sig Dispense Refill     Carboxymethylcellulose Sod PF (REFRESH PLUS) 0.5 % SOLN ophthalmic solution Place 1 drop into both eyes 3 times daily as needed for dry eyes       estradiol (ESTRACE) 0.5 MG tablet Take 1.5 tablets (0.75 mg) by mouth daily 140 tablet 3     estradiol (VAGIFEM) 10 MCG TABS vaginal tablet Place 1 tablet (10 mcg) vaginally twice a week 26 tablet 3     lamoTRIgine (LAMICTAL) 150 MG tablet Take 2 tablets (300  "mg) by mouth daily 60 tablet 1     lidocaine (LIDODERM) 5 % patch Place 1-2 patches onto the skin every 24 hours To prevent lidocaine toxicity, patient should be patch free for 12 hrs daily. 30 patch 1     lisinopril (ZESTRIL) 10 MG tablet Take 1 tablet (10 mg) by mouth daily 90 tablet 3     lithium (ESKALITH) 150 MG capsule Take 1 capsule (150 mg) by mouth At Bedtime 30 capsule 1     lithium 300 MG capsule Take 3 capsules (900 mg) by mouth At Bedtime 90 capsule 1     Loratadine 10 MG capsule Take 10 mg by mouth daily as needed.       medroxyPROGESTERone (PROVERA) 2.5 MG tablet Take 1 tablet (2.5 mg) by mouth daily 90 tablet 3     metroNIDAZOLE (METROGEL) 1 % external gel Apply topically daily 60 g 9     ORDER FOR DME Use your CPAP device as directed by your provider.       VITAMIN D, CHOLECALCIFEROL, PO Take 2,000 Units by mouth daily        VITALS   There were no vitals taken for this visit.    MENTAL STATUS EXAM     Alertness: alert and oriented  Appearance: well groomed  Behavior/Demeanor: cooperative, with good  eye contact   Speech: regular rate and rhythm  Language: intact  Psychomotor: normal or unremarkable  Mood: \"okay\"  Affect: appropriate; congruent to: mood- yes, content- yes  Thought Process/Associations: linear and logical  Thought Content:  Reports none;  Denies suicidal ideation and violent ideation  Perception:  Reports none;  Denies auditory hallucinations and visual hallucinations  Insight: good  Judgment: good  Cognition: does  appear grossly intact; formal cognitive testing was not done  Gait and Station: N/A (Telehealth)     LABS and DATA     PHQ9 (1/29/23) = 7  PHQ 10/24/2022 12/18/2022 1/29/2023   PHQ-9 Total Score 5 6 7   Q9: Thoughts of better off dead/self-harm past 2 weeks Not at all Not at all Not at all     Recent Labs   Lab Test 12/07/22  1111 05/04/22  0928 09/03/21  1007   CR 0.55 0.56 0.61   GFRESTIMATED >90 >90 >90     Recent Labs   Lab Test 12/07/22  1111 05/04/22  0928 " 02/03/21  1019   AST 10 13 13   ALT 23 22 17   ALKPHOS 88 80 88     Recent Labs   Lab Test 12/07/22  1111 05/04/22  0928 09/03/21  1007   LITHIUM 0.6 0.8 0.8     Recent Labs   Lab Test 12/07/22  1111 05/04/22  0928   CR 0.55 0.56   GFRESTIMATED >90 >90    138   POTASSIUM 4.4 4.0   SHERRIE 9.3 9.2     Recent Labs   Lab Test 12/07/22  1111 02/03/21  1011   SG 1.015  1.015 1.011     Recent Labs   Lab Test 12/07/22  1111 05/04/22  0928   TSH 2.13 2.03     Recent Labs   Lab Test 12/07/22  1111 05/04/22  0928 09/03/21  1007 02/03/21  1019 07/15/20  1617   WBC 7.3 7.4   < > 7.7 8.3   ANEU  --   --   --  5.1 5.6    < > = values in this interval not displayed.       ECG :10/25/2021 QTc 442ms     PSYCHOTROPIC DRUG INTERACTIONS     Concurrent use of LITHIUM and ACE INHIBITORS may result in increased risk of lithium toxicity.     Estrogen Derivatives may decrease the serum concentration of LamoTRIgine     MANAGEMENT:  Monitoring for adverse effects, routine labs, periodic EKGs and patient is aware of risks     RISK STATEMENT for SAFETY     Angy Haji did not appear to be an imminent safety risk to self or others.    TREATMENT RISK STATEMENT: The risks, benefits, alternatives and potential adverse effects have been discussed and are understood by the pt. The pt understands the risks of using street drugs or alcohol. There are no medical contraindications, the pt agrees to treatment with the ability to do so. The pt knows to call the clinic for any problems or to access emergency care if needed.  Medical and substance use concerns are documented above.  Psychotropic drug interaction check was done, including changes made today.     PROVIDER: Tolulope Oluwadamilola Odebunmi, MD    Patient not staffed in clinic.  Note will be reviewed and signed by supervisor Dr. De Leon.      Level of Medical Decision Making:   - At least 1 chronic problem that is not stable  - Engaged in prescription drug management during visit (discussed  any medication benefits, side effects, alternatives, etc.)        Must meet 2 out of 3 of the above MDM elements to bill at the specified level     For help more info about elements / criteria, click here-> MDM Help Grid     *This blue text will disappear automatically when note is signed, no need to delete*:[id number]}  [id number]}  :[id number]}

## 2023-01-31 ENCOUNTER — THERAPY VISIT (OUTPATIENT)
Dept: PHYSICAL THERAPY | Facility: CLINIC | Age: 58
End: 2023-01-31
Payer: COMMERCIAL

## 2023-01-31 DIAGNOSIS — M25.511 SHOULDER PAIN, RIGHT: Primary | ICD-10-CM

## 2023-01-31 PROCEDURE — 97140 MANUAL THERAPY 1/> REGIONS: CPT | Mod: GP | Performed by: PHYSICAL THERAPIST

## 2023-01-31 PROCEDURE — 97110 THERAPEUTIC EXERCISES: CPT | Mod: GP | Performed by: PHYSICAL THERAPIST

## 2023-02-07 ENCOUNTER — THERAPY VISIT (OUTPATIENT)
Dept: PHYSICAL THERAPY | Facility: CLINIC | Age: 58
End: 2023-02-07
Payer: COMMERCIAL

## 2023-02-07 DIAGNOSIS — M25.511 SHOULDER PAIN, RIGHT: Primary | ICD-10-CM

## 2023-02-07 PROCEDURE — 97110 THERAPEUTIC EXERCISES: CPT | Mod: GP | Performed by: PHYSICAL THERAPIST

## 2023-02-07 PROCEDURE — 97140 MANUAL THERAPY 1/> REGIONS: CPT | Mod: GP | Performed by: PHYSICAL THERAPIST

## 2023-02-10 ENCOUNTER — TELEPHONE (OUTPATIENT)
Dept: ORTHOPEDICS | Facility: CLINIC | Age: 58
End: 2023-02-10
Payer: COMMERCIAL

## 2023-02-10 NOTE — TELEPHONE ENCOUNTER
ATC received return call from patient regarding progressing right leg symptoms. Patient reports one month ago she began experiencing a gradual increase of right leg symptoms (pain, stiffness and numbness). Within the last week she reports symptoms have significantly changed after a trip to [place], where she was in the car for an extended period of time.     Treatments have included gentle stretches, rest and a few doses of Tylenol.     ATC recommended that that patient try a regimen of Tylenol for her pain ( 1,000 mg of Tylenol, three times daily with food), re-start her HEP that was prescribed at physical therapy, apply a cold pack or warm pack or try a warm epsom salt bath.     Patient is currently scheduled for a follow up appointment with Dr. Valadez on 2/22/2023, [facility] did explain that it would be appropriate to keep that appointment at this time but ATC will notify Dr. Valadez of patient's phone call in the event the patient would need to be seen earlier.    Patient was appreciative of return call and had no further questions.    ATC did inform patient to reach back out to clinic if symptoms worsen or if this occurs over the weekend, she should be seen at an ED.    PAMELA Pool

## 2023-02-10 NOTE — TELEPHONE ENCOUNTER
PAMELA LVM for patient in regards to her message from earlier today. ATC recommended for her to return call to clinic at [phone] to discuss her current right leg symptoms.    PAMELA did inform patient that her appointment on 2/22/2023 is Dr. Valadez's next available appointment.    PAMELA Pool

## 2023-02-10 NOTE — TELEPHONE ENCOUNTER
Pt is having trouble with her right leg, complains of leg being stiff after sleeping on her back, she is unable to bend her leg when extended for a period of time. Its causing her to limp and unable to stand for long periods. She is schedule for 2/22 with Dr. Valadez. Pt wants to know if she should get in sooner to be seen or if its okay to wait until 2/22. Please call pt back.

## 2023-02-14 ENCOUNTER — THERAPY VISIT (OUTPATIENT)
Dept: PHYSICAL THERAPY | Facility: CLINIC | Age: 58
End: 2023-02-14
Payer: COMMERCIAL

## 2023-02-14 DIAGNOSIS — M25.511 SHOULDER PAIN, RIGHT: Primary | ICD-10-CM

## 2023-02-22 ENCOUNTER — OFFICE VISIT (OUTPATIENT)
Dept: ORTHOPEDICS | Facility: CLINIC | Age: 58
End: 2023-02-22
Payer: COMMERCIAL

## 2023-02-22 ENCOUNTER — ANCILLARY PROCEDURE (OUTPATIENT)
Dept: GENERAL RADIOLOGY | Facility: CLINIC | Age: 58
End: 2023-02-22
Attending: FAMILY MEDICINE
Payer: COMMERCIAL

## 2023-02-22 DIAGNOSIS — M25.561 ACUTE PAIN OF RIGHT KNEE: Primary | ICD-10-CM

## 2023-02-22 DIAGNOSIS — M25.561 ACUTE PAIN OF RIGHT KNEE: ICD-10-CM

## 2023-02-22 DIAGNOSIS — M17.11 PRIMARY OSTEOARTHRITIS OF RIGHT KNEE: ICD-10-CM

## 2023-02-22 PROCEDURE — 99213 OFFICE O/P EST LOW 20 MIN: CPT | Mod: 25 | Performed by: FAMILY MEDICINE

## 2023-02-22 PROCEDURE — 73562 X-RAY EXAM OF KNEE 3: CPT | Mod: RT | Performed by: RADIOLOGY

## 2023-02-22 PROCEDURE — 20610 DRAIN/INJ JOINT/BURSA W/O US: CPT | Mod: RT | Performed by: FAMILY MEDICINE

## 2023-02-22 RX ORDER — TRIAMCINOLONE ACETONIDE 40 MG/ML
40 INJECTION, SUSPENSION INTRA-ARTICULAR; INTRAMUSCULAR
Status: DISCONTINUED | OUTPATIENT
Start: 2023-02-22 | End: 2024-04-18

## 2023-02-22 RX ORDER — LIDOCAINE HYDROCHLORIDE 10 MG/ML
4 INJECTION, SOLUTION EPIDURAL; INFILTRATION; INTRACAUDAL; PERINEURAL
Status: DISCONTINUED | OUTPATIENT
Start: 2023-02-22 | End: 2024-04-18

## 2023-02-22 RX ADMIN — TRIAMCINOLONE ACETONIDE 40 MG: 40 INJECTION, SUSPENSION INTRA-ARTICULAR; INTRAMUSCULAR at 11:17

## 2023-02-22 RX ADMIN — LIDOCAINE HYDROCHLORIDE 4 ML: 10 INJECTION, SOLUTION EPIDURAL; INFILTRATION; INTRACAUDAL; PERINEURAL at 11:17

## 2023-02-22 NOTE — LETTER
2/22/2023      RE: Angy Haji  [address]  [facility] [address]       ASSESSMENT/PLAN:  Patient with Right knee pain increasing since January. Exam as detailed below. Knee XR today demonstrating mild joint space loss bilaterally.  -Referral to PT  -Voltaren gel  -Right knee corticosteroid injection performed today      Pt is a 57 year old female here today for:     Right knee pain.    Here with c/o Right leg pain mostly in posterior knee extending above and below knee(about 1 hand width). Started in January of this year, progressively more severe especially following [place] trip on 2/03(drove for ~4 hours). Difficulty with walking, putting on socks. Limping. Knee stiffness when sleeping, nighttime awakenings from knee pain. She has tried sleeping in different positions and using pillows behind knee as support. Sharp pain when trying to move it. Discomfort is on posterior knee only. Improves with activity following a few minutes of activity. Has been seeing PT, last visit 11/2022.  Has been doing some exercises at home.   Has been avoiding stairs.   No complaints of left knee  Has joint line tenderness of R knee Lateral>Medial  Attempted 1 step in clinic but limited 2/2 pain in right posterior lateral side.   Taking tylenol 500mg TID for last 10 days without relief.   Heating pad offers temporary relief.     ESTABLISHED PATIENT FOLLOW-UP:  Follow Up of the Right Knee       HISTORY OF PRESENT ILLNESS  Ms. Haji is a pleasant 57 year old year old female who presents to clinic today for Right knee pain since January.    Date of injury: Pain since january  Date last seen: 10/04/2022 for R foot pain  Following Therapeutic Plan: Taking 500mg tylenol TID for past 10 days  Pain: Right posterior knee  Function: Limited 2/2 pain, unable to perform ADLs such as putting on socks     Additional medical/Social/Surgical histories reviewed in [facility] and updated as appropriate.    REVIEW OF SYSTEMS  (2/22/2023)  CONSTITUTIONAL: Denies fever and weight loss  MUSCULOSKELETAL: See HPI  SKIN: Denies any recent rash or lesion  NEUROLOGICAL: Denies numbness or focal weakness    EXAM:   right Knee:   ROM: 0-130; Crepitus: neg   Effusion: negative ; Swelling: negative   Strength: Full in flexion/ extension   Tenderness: Patella - negative Medial joint line - tenderness; Lateral joint line - tenderness; Quad tendon - negative; Patellar tendon- negative; Hamstring - negative.   Cruciates: anterior drawer - neg/posterior drawer -neg. Lachman - neg   Collaterals: varus -neg/valgus -neg.   Patella: patellar compression - neg; single leg bend- neg   Meniscus: Tom - positive; Thessaly - positive   Maneuvers: Leslie - neg   Unable to complete 1 step up on ledge 2/2 pain in right posterior knee.      Past Medical History:   Diagnosis Date     Disorders of bursae and tendons in shoulder region, unspecified      Female infertility of unspecified origin      Gallbladder sludge 5/2013     Mild sleep apnea 5/13/2014     Obesity, unspecified      Oral aphthae      Other acne      Other specified hypoglycemia      PONV (postoperative nausea and vomiting)       Past Surgical History:   Procedure Laterality Date     HC TOOTH EXTRACTION W/FORCEP  1984/85    [place] Teeth     IR ENDOVENOUS ABLATION VARICOSE VEINS  12/12/2019     LAPAROSCOPIC CHOLECYSTECTOMY  7/29/2013    Procedure: LAPAROSCOPIC CHOLECYSTECTOMY;  Laparoscopic Cholecystectomy;  Surgeon: Fabio Johnson MD;  [facility]     LAPAROSCOPIC OOPHORECTOMY Left 6/23/2016    Procedure: LAPAROSCOPIC OOPHORECTOMY;  Surgeon: Kayley Donnelly MD;  [facility]     LAPAROSCOPIC SALPINGECTOMY Bilateral 6/23/2016    Procedure: LAPAROSCOPIC SALPINGECTOMY;  Surgeon: Kayley Donnelly MD;  [facility]      Current Outpatient Medications   Medication Sig Dispense Refill     Carboxymethylcellulose Sod PF (REFRESH PLUS) 0.5 % SOLN ophthalmic solution Place 1  drop into both eyes 3 times daily as needed for dry eyes       diclofenac (VOLTAREN) 1 % topical gel Apply 4 g topically 4 times daily 150 g 1     estradiol (ESTRACE) 0.5 MG tablet Take 1.5 tablets (0.75 mg) by mouth daily 140 tablet 3     estradiol (VAGIFEM) 10 MCG TABS vaginal tablet Place 1 tablet (10 mcg) vaginally twice a week 26 tablet 3     lamoTRIgine (LAMICTAL) 150 MG tablet Take 1 tablet (150 mg) by mouth daily 30 tablet 1     lamoTRIgine (LAMICTAL) 200 MG tablet Take 1 tablet (200 mg) by mouth daily 30 tablet 1     lidocaine (LIDODERM) 5 % patch Place 1-2 patches onto the skin every 24 hours To prevent lidocaine toxicity, patient should be patch free for 12 hrs daily. 30 patch 1     lisinopril (ZESTRIL) 10 MG tablet Take 1 tablet (10 mg) by mouth daily 90 tablet 3     lithium (ESKALITH) 150 MG capsule Take 1 capsule (150 mg) by mouth At Bedtime 30 capsule 1     lithium 300 MG capsule Take 3 capsules (900 mg) by mouth At Bedtime 90 capsule 1     Loratadine 10 MG capsule Take 10 mg by mouth daily as needed.       medroxyPROGESTERone (PROVERA) 2.5 MG tablet Take 1 tablet (2.5 mg) by mouth daily 90 tablet 3     metroNIDAZOLE (METROGEL) 1 % external gel Apply topically daily 60 g 9     ORDER FOR DME Use your CPAP device as directed by your provider.       VITAMIN D, CHOLECALCIFEROL, PO Take 2,000 Units by mouth daily        Allergies   Allergen Reactions     Seroquel [Quetiapine] Nausea and Vomiting     Had constant vomiting     Olanzapine      Tongue welling, bad EPSE (Parkinsonian)     Penicillins Rash     Risperidone      Tongue welling, bad EPSE (Parkinsonian)       Thimerosal Other (See Comments)     blisters on inside of eyelid      ROS:   Gen- no fevers/chills   Rheum - no morning stiffness   Derm - no rash/ redness   Neuro - no numbness, no tingling   Remainder of ROS negative.     There were no vitals taken for this visit.     [place] [facility] [place]  reviewed with patient at time of visit     My impression: R knee with joint space narrowing especially medially. Osteophytes present. Consistent with OA.    Chandrakant Cantu MD PGY2       Patient seen, evaluated and discussed with the resident. I have verified the content of the note, which accurately reflects my assessment of the patient and the plan of care.   Supervising Physician:  Danielle Valadez MD       Large Joint Injection/Arthocentesis: R knee joint    Date/Time: 2/22/2023 11:17 AM  Performed by: Danielle Valadez MD  Authorized by: Danielle Valadez MD     Indications:  Pain  Needle Size:  22 G  Guidance: landmark guided    Approach:  Anterolateral  Location:  Knee      Medications:  40 mg triamcinolone 40 MG/ML; 4 mL lidocaine (PF) 1 %  Outcome:  Tolerated well, no immediate complications  Procedure discussed: discussed risks, benefits, and alternatives    Consent Given by:  Patient  Timeout: timeout called immediately prior to procedure    Prep: patient was prepped and draped in usual sterile fashion        I agree with the following injection documentation.    ELIAN Valadez MD

## 2023-02-22 NOTE — PROGRESS NOTES
ASSESSMENT/PLAN:  Patient with Right knee pain increasing since January. Exam as detailed below. Knee XR today demonstrating mild joint space loss bilaterally.  -Referral to PT  -Voltaren gel  -Right knee corticosteroid injection performed today      Pt is a 57 year old female here today for:     Right knee pain.    Here with c/o Right leg pain mostly in posterior knee extending above and below knee(about 1 hand width). Started in January of this year, progressively more severe especially following [place] trip on 2/03(drove for ~4 hours). Difficulty with walking, putting on socks. Limping. Knee stiffness when sleeping, nighttime awakenings from knee pain. She has tried sleeping in different positions and using pillows behind knee as support. Sharp pain when trying to move it. Discomfort is on posterior knee only. Improves with activity following a few minutes of activity. Has been seeing PT, last visit 11/2022.  Has been doing some exercises at home.   Has been avoiding stairs.   No complaints of left knee  Has joint line tenderness of R knee Lateral>Medial  Attempted 1 step in clinic but limited 2/2 pain in right posterior lateral side.   Taking tylenol 500mg TID for last 10 days without relief.   Heating pad offers temporary relief.     ESTABLISHED PATIENT FOLLOW-UP:  Follow Up of the Right Knee       HISTORY OF PRESENT ILLNESS  Ms. Haji is a pleasant 57 year old year old female who presents to clinic today for Right knee pain since January.    Date of injury: Pain since january  Date last seen: 10/04/2022 for R foot pain  Following Therapeutic Plan: Taking 500mg tylenol TID for past 10 days  Pain: Right posterior knee  Function: Limited 2/2 pain, unable to perform ADLs such as putting on socks     Additional medical/Social/Surgical histories reviewed in [facility] and updated as appropriate.    REVIEW OF SYSTEMS (2/22/2023)  CONSTITUTIONAL: Denies fever and weight loss  MUSCULOSKELETAL: See HPI  SKIN: Denies any recent  rash or lesion  NEUROLOGICAL: Denies numbness or focal weakness    EXAM:   right Knee:   ROM: 0-130; Crepitus: neg   Effusion: negative ; Swelling: negative   Strength: Full in flexion/ extension   Tenderness: Patella - negative Medial joint line - tenderness; Lateral joint line - tenderness; Quad tendon - negative; Patellar tendon- negative; Hamstring - negative.   Cruciates: anterior drawer - neg/posterior drawer -neg. Lachman - neg   Collaterals: varus -neg/valgus -neg.   Patella: patellar compression - neg; single leg bend- neg   Meniscus: Tom - positive; Thessaly - positive   Maneuvers: Leslie - neg   Unable to complete 1 step up on ledge 2/2 pain in right posterior knee.      Past Medical History:   Diagnosis Date     Disorders of bursae and tendons in shoulder region, unspecified      Female infertility of unspecified origin      Gallbladder sludge 5/2013     Mild sleep apnea 5/13/2014     Obesity, unspecified      Oral aphthae      Other acne      Other specified hypoglycemia      PONV (postoperative nausea and vomiting)       Past Surgical History:   Procedure Laterality Date     HC TOOTH EXTRACTION W/FORCEP  1984/85    [place] Teeth     IR ENDOVENOUS ABLATION VARICOSE VEINS  12/12/2019     LAPAROSCOPIC CHOLECYSTECTOMY  7/29/2013    Procedure: LAPAROSCOPIC CHOLECYSTECTOMY;  Laparoscopic Cholecystectomy;  Surgeon: Fabio Johnson MD;  [facility]     LAPAROSCOPIC OOPHORECTOMY Left 6/23/2016    Procedure: LAPAROSCOPIC OOPHORECTOMY;  Surgeon: Kayley Donnelly MD;  [facility]     LAPAROSCOPIC SALPINGECTOMY Bilateral 6/23/2016    Procedure: LAPAROSCOPIC SALPINGECTOMY;  Surgeon: Kayley Donnelly MD;  [facility]      Current Outpatient Medications   Medication Sig Dispense Refill     Carboxymethylcellulose Sod PF (REFRESH PLUS) 0.5 % SOLN ophthalmic solution Place 1 drop into both eyes 3 times daily as needed for dry eyes       diclofenac (VOLTAREN) 1 % topical gel Apply 4 g  topically 4 times daily 150 g 1     estradiol (ESTRACE) 0.5 MG tablet Take 1.5 tablets (0.75 mg) by mouth daily 140 tablet 3     estradiol (VAGIFEM) 10 MCG TABS vaginal tablet Place 1 tablet (10 mcg) vaginally twice a week 26 tablet 3     lamoTRIgine (LAMICTAL) 150 MG tablet Take 1 tablet (150 mg) by mouth daily 30 tablet 1     lamoTRIgine (LAMICTAL) 200 MG tablet Take 1 tablet (200 mg) by mouth daily 30 tablet 1     lidocaine (LIDODERM) 5 % patch Place 1-2 patches onto the skin every 24 hours To prevent lidocaine toxicity, patient should be patch free for 12 hrs daily. 30 patch 1     lisinopril (ZESTRIL) 10 MG tablet Take 1 tablet (10 mg) by mouth daily 90 tablet 3     lithium (ESKALITH) 150 MG capsule Take 1 capsule (150 mg) by mouth At Bedtime 30 capsule 1     lithium 300 MG capsule Take 3 capsules (900 mg) by mouth At Bedtime 90 capsule 1     Loratadine 10 MG capsule Take 10 mg by mouth daily as needed.       medroxyPROGESTERone (PROVERA) 2.5 MG tablet Take 1 tablet (2.5 mg) by mouth daily 90 tablet 3     metroNIDAZOLE (METROGEL) 1 % external gel Apply topically daily 60 g 9     ORDER FOR DME Use your CPAP device as directed by your provider.       VITAMIN D, CHOLECALCIFEROL, PO Take 2,000 Units by mouth daily        Allergies   Allergen Reactions     Seroquel [Quetiapine] Nausea and Vomiting     Had constant vomiting     Olanzapine      Tongue welling, bad EPSE (Parkinsonian)     Penicillins Rash     Risperidone      Tongue welling, bad EPSE (Parkinsonian)       Thimerosal Other (See Comments)     blisters on inside of eyelid      ROS:   Gen- no fevers/chills   Rheum - no morning stiffness   Derm - no rash/ redness   Neuro - no numbness, no tingling   Remainder of ROS negative.     There were no vitals taken for this visit.     Xray of R knee on February 22, 2023 at [facility] [place] - films personally reviewed with patient at time of visit     My impression: R knee with joint space narrowing especially medially.  Osteophytes present. Consistent with OA.    Chandrakant Cantu MD PGY2       Patient seen, evaluated and discussed with the resident. I have verified the content of the note, which accurately reflects my assessment of the patient and the plan of care.   Supervising Physician:  Danielle Valadez MD       Large Joint Injection/Arthocentesis: R knee joint    Date/Time: 2/22/2023 11:17 AM  Performed by: Danielle Valadez MD  Authorized by: Danielle Valadez MD     Indications:  Pain  Needle Size:  22 G  Guidance: landmark guided    Approach:  Anterolateral  Location:  Knee      Medications:  40 mg triamcinolone 40 MG/ML; 4 mL lidocaine (PF) 1 %  Outcome:  Tolerated well, no immediate complications  Procedure discussed: discussed risks, benefits, and alternatives    Consent Given by:  Patient  Timeout: timeout called immediately prior to procedure    Prep: patient was prepped and draped in usual sterile fashion        I agree with the following injection documentation.    ELIAN Valadez MD

## 2023-02-22 NOTE — LETTER
2/22/2023      RE: Angy Haji  [address]  [facility] [address]     Dear Colleague,    Thank you for referring your patient, Angy Haji, to the [facility] [place]. Please see a copy of my visit note below.    ASSESSMENT/PLAN:  Patient with Right knee pain increasing since January. Exam as detailed below. Knee XR today demonstrating mild joint space loss bilaterally.  -Referral to PT  -Voltaren gel  -Right knee corticosteroid injection performed today      Pt is a 57 year old female here today for:     Right knee pain.    Here with c/o Right leg pain mostly in posterior knee extending above and below knee(about 1 hand width). Started in January of this year, progressively more severe especially following [place] trip on 2/03(drove for ~4 hours). Difficulty with walking, putting on socks. Limping. Knee stiffness when sleeping, nighttime awakenings from knee pain. She has tried sleeping in different positions and using pillows behind knee as support. Sharp pain when trying to move it. Discomfort is on posterior knee only. Improves with activity following a few minutes of activity. Has been seeing PT, last visit 11/2022.  Has been doing some exercises at home.   Has been avoiding stairs.   No complaints of left knee  Has joint line tenderness of R knee Lateral>Medial  Attempted 1 step in clinic but limited 2/2 pain in right posterior lateral side.   Taking tylenol 500mg TID for last 10 days without relief.   Heating pad offers temporary relief.     ESTABLISHED PATIENT FOLLOW-UP:  Follow Up of the Right Knee       HISTORY OF PRESENT ILLNESS  Ms. Haji is a pleasant 57 year old year old female who presents to clinic today for Right knee pain since January.    Date of injury: Pain since january  Date last seen: 10/04/2022 for R foot pain  Following Therapeutic Plan: Taking 500mg tylenol TID for past 10 days  Pain: Right posterior knee  Function: Limited 2/2 pain, unable to  perform ADLs such as putting on socks     Additional medical/Social/Surgical histories reviewed in [facility] and updated as appropriate.    REVIEW OF SYSTEMS (2/22/2023)  CONSTITUTIONAL: Denies fever and weight loss  MUSCULOSKELETAL: See HPI  SKIN: Denies any recent rash or lesion  NEUROLOGICAL: Denies numbness or focal weakness    EXAM:   right Knee:   ROM: 0-130; Crepitus: neg   Effusion: negative ; Swelling: negative   Strength: Full in flexion/ extension   Tenderness: Patella - negative Medial joint line - tenderness; Lateral joint line - tenderness; Quad tendon - negative; Patellar tendon- negative; Hamstring - negative.   Cruciates: anterior drawer - neg/posterior drawer -neg. Lachman - neg   Collaterals: varus -neg/valgus -neg.   Patella: patellar compression - neg; single leg bend- neg   Meniscus: Tom - positive; Thessaly - positive   Maneuvers: Leslie - neg   Unable to complete 1 step up on ledge 2/2 pain in right posterior knee.      Past Medical History:   Diagnosis Date     Disorders of bursae and tendons in shoulder region, unspecified      Female infertility of unspecified origin      Gallbladder sludge 5/2013     Mild sleep apnea 5/13/2014     Obesity, unspecified      Oral aphthae      Other acne      Other specified hypoglycemia      PONV (postoperative nausea and vomiting)       Past Surgical History:   Procedure Laterality Date     HC TOOTH EXTRACTION W/FORCEP  1984/85    [place] Teeth     IR ENDOVENOUS ABLATION VARICOSE VEINS  12/12/2019     LAPAROSCOPIC CHOLECYSTECTOMY  7/29/2013    Procedure: LAPAROSCOPIC CHOLECYSTECTOMY;  Laparoscopic Cholecystectomy;  Surgeon: Fabio Johnson MD;  [facility]     LAPAROSCOPIC OOPHORECTOMY Left 6/23/2016    Procedure: LAPAROSCOPIC OOPHORECTOMY;  Surgeon: Kayley Donnelly MD;  [facility]     LAPAROSCOPIC SALPINGECTOMY Bilateral 6/23/2016    Procedure: LAPAROSCOPIC SALPINGECTOMY;  Surgeon: Kayley Donnelly MD;  [facility]       Current Outpatient Medications   Medication Sig Dispense Refill     Carboxymethylcellulose Sod PF (REFRESH PLUS) 0.5 % SOLN ophthalmic solution Place 1 drop into both eyes 3 times daily as needed for dry eyes       diclofenac (VOLTAREN) 1 % topical gel Apply 4 g topically 4 times daily 150 g 1     estradiol (ESTRACE) 0.5 MG tablet Take 1.5 tablets (0.75 mg) by mouth daily 140 tablet 3     estradiol (VAGIFEM) 10 MCG TABS vaginal tablet Place 1 tablet (10 mcg) vaginally twice a week 26 tablet 3     lamoTRIgine (LAMICTAL) 150 MG tablet Take 1 tablet (150 mg) by mouth daily 30 tablet 1     lamoTRIgine (LAMICTAL) 200 MG tablet Take 1 tablet (200 mg) by mouth daily 30 tablet 1     lidocaine (LIDODERM) 5 % patch Place 1-2 patches onto the skin every 24 hours To prevent lidocaine toxicity, patient should be patch free for 12 hrs daily. 30 patch 1     lisinopril (ZESTRIL) 10 MG tablet Take 1 tablet (10 mg) by mouth daily 90 tablet 3     lithium (ESKALITH) 150 MG capsule Take 1 capsule (150 mg) by mouth At Bedtime 30 capsule 1     lithium 300 MG capsule Take 3 capsules (900 mg) by mouth At Bedtime 90 capsule 1     Loratadine 10 MG capsule Take 10 mg by mouth daily as needed.       medroxyPROGESTERone (PROVERA) 2.5 MG tablet Take 1 tablet (2.5 mg) by mouth daily 90 tablet 3     metroNIDAZOLE (METROGEL) 1 % external gel Apply topically daily 60 g 9     ORDER FOR DME Use your CPAP device as directed by your provider.       VITAMIN D, CHOLECALCIFEROL, PO Take 2,000 Units by mouth daily        Allergies   Allergen Reactions     Seroquel [Quetiapine] Nausea and Vomiting     Had constant vomiting     Olanzapine      Tongue welling, bad EPSE (Parkinsonian)     Penicillins Rash     Risperidone      Tongue welling, bad EPSE (Parkinsonian)       Thimerosal Other (See Comments)     blisters on inside of eyelid      ROS:   Gen- no fevers/chills   Rheum - no morning stiffness   Derm - no rash/ redness   Neuro - no numbness, no tingling    Remainder of ROS negative.     There were no vitals taken for this visit.     Xray of R knee on February 22, 2023 at [facility] [place] - films personally reviewed with patient at time of visit     My impression: R knee with joint space narrowing especially medially. Osteophytes present. Consistent with OA.    Chandrakant Cantu MD PGY2       Patient seen, evaluated and discussed with the resident. I have verified the content of the note, which accurately reflects my assessment of the patient and the plan of care.   Supervising Physician:  Danielle Valadez MD       Large Joint Injection/Arthocentesis: R knee joint    Date/Time: 2/22/2023 11:17 AM  Performed by: Danielle Valadez MD  Authorized by: Danielle Valadez MD     Indications:  Pain  Needle Size:  22 G  Guidance: landmark guided    Approach:  Anterolateral  Location:  Knee      Medications:  40 mg triamcinolone 40 MG/ML; 4 mL lidocaine (PF) 1 %  Outcome:  Tolerated well, no immediate complications  Procedure discussed: discussed risks, benefits, and alternatives    Consent Given by:  Patient  Timeout: timeout called immediately prior to procedure    Prep: patient was prepped and draped in usual sterile fashion        I agree with the following injection documentation.    ELIAN Valadez MD        Again, thank you for allowing me to participate in the care of your patient.      Sincerely,    Danielle Valadez MD

## 2023-02-22 NOTE — NURSING NOTE
[address] [address]  Dept: [phone]  ______________________________________________________________________________    Patient: Angy Haji   : 1965   MRN: [id number]   2023    INVASIVE PROCEDURE SAFETY CHECKLIST    Date: 2023   Procedure: Right knee cortisone injection   Patient Name: Angy Haji  MRN: [id number]  YOB: 1965    Action: Complete sections as appropriate. Any discrepancy results in a HARD COPY until resolved.     PRE PROCEDURE:  Patient ID verified with 2 identifiers (name and  or MRN): Yes  Procedure and site verified with patient/designee (when able): Yes  Accurate consent documentation in medical record: Yes  H&P (or appropriate assessment) documented in medical record: Yes  H&P must be up to 20 days prior to procedure and updates within 24 hours of procedure as applicable: NA  Relevant diagnostic and radiology test results appropriately labeled and displayed as applicable: Yes  Procedure site(s) marked with provider initials: NA    TIMEOUT:  Time-Out performed immediately prior to starting procedure, including verbal and active participation of all team members addressing the following:Yes  * Correct patient identify  * Confirmed that the correct side and site are marked  * An accurate procedure consent form  * Agreement on the procedure to be done  * Correct patient position  * Relevant images and results are properly labeled and appropriately displayed  * The need to administer antibiotics or fluids for irrigation purposes during the procedure as applicable   * Safety precautions based on patient history or medication use    DURING PROCEDURE: Verification of correct person, site, and procedures any time the responsibility for care of the patient is transferred to another member of the care team.       Prior to injection, verified patient identity using patient's name and  date of birth.  Due to injection administration, patient instructed to remain in clinic for 15 minutes  afterwards, and to report any adverse reaction to me immediately.    Joint injection was performed.      Drug Amount Wasted:  Yes: 1 mg/ml   Vial/Syringe: Single dose vial  Expiration Date:  08/26      Linda [facility]  February 22, 2023

## 2023-02-27 ENCOUNTER — THERAPY VISIT (OUTPATIENT)
Dept: PHYSICAL THERAPY | Facility: CLINIC | Age: 58
End: 2023-02-27
Payer: COMMERCIAL

## 2023-02-27 DIAGNOSIS — M25.511 SHOULDER PAIN, RIGHT: Primary | ICD-10-CM

## 2023-02-27 PROCEDURE — 97110 THERAPEUTIC EXERCISES: CPT | Mod: GP | Performed by: PHYSICAL THERAPIST

## 2023-02-27 NOTE — PROGRESS NOTES
DISCHARGE REPORT    Progress reporting period is from 12/16/2022 to 2/27/2023.   Angy has been seen in PT 8 times for treatment of right shoulder pain.  Treatment has included manual therapy and RC and scapular strengthening.  SUBJECTIVE   Subjective: continues to improve, easier to reach overhead.      Current Pain level: 2/10.       Initial Pain level: 6/10.   Changes in function:  Yes (See Goal flowsheet attached for changes in current functional level)  Adverse reaction to treatment or activity: None    OBJECTIVE  Changes noted in objective findings:    Objective: Full/symmetrical UE reach overhead in flexion and abduction with minimal painful arc on the right. Resisted right shoulder movements 5/5 in all planes including ER now.     ASSESSMENT/PLAN    STG/LTGs have been met or progress has been made towards goals:  Yes (See Goal flow sheet completed today.)  Assessment of Progress: The patient's condition is improving.  Self Management Plans:  Patient is independent in a home treatment program.    Angy continues to require the following intervention to meet STG and LTG's:  PT intervention is no longer required to meet STG/LTG.    Recommendations:  This patient is ready to be discharged from therapy and continue their home treatment program.    Please refer to the daily flowsheet for treatment today, total treatment time and time spent performing 1:1 timed codes.

## 2023-02-28 NOTE — PROGRESS NOTES
"Medication Therapy Management (MTM) Encounter    ASSESSMENT:                            Medication Adherence/Access: No issues identified    Bipolar I Disorder: Angy is experiencing ongoing depression symptoms. Lithium level is at low end of therapeutic range (0.6mmol/L).  Writer reviewed details of past medication trials with patient today and also reviewed available chart notes from current/previous psychiatry providers.  We discussed the following potential medication considerations and risk versus benefit of these options:    1.  Increase lithium dose to 1200 mg daily (150mg increase).  Lithium has been associated with cardiac arrhythmias including bradycardia, abnormal T waves on EKG, and ST segment depression.  Supraventricular tachycardia is not a commonly reported side effect of lithium therapy.  Writer reviewed notes around the time patient reported SVTs and saw cardiology.  Cardiology input was that patient's symptoms were unlikely related to lithium.  Given this and lithium level on the low end of therapeutic range, could possibly consider another trial at lithium 1200 mg daily with close monitoring for any cardiac symptoms.  Higher lithium dose may result in improved depression control.  Regarding notes that indicate \"lithium toxicity in the setting of concurrent Depakote use\" I do not see history of any lithium levels greater than 0.8.    2.  Increase lithium as above, add Wellbutrin.  Appears patient has never been on an antidepressant with a mood stabilizer on board.  We discussed risk of antidepressants inducing anthony and bipolar disorder.  Risk is thought to be lower with Wellbutrin compared to other antidepressants and the presence of a therapeutic mood stablizer would help reduce risk.  I would be somewhat hesitant to add on Wellbutrin with the current lithium level of only 0.6.  We also discussed that Wellbutrin could possibly help with sexual side effects.    3.  Add an antipsychotic such as " Latuda for bipolar depression.  Patient has experienced EPSE with olanzapine and risperidone in the past which included tongue heaviness/tongue swelling so would need to monitor very closely for any emergence of EPSE symptoms.  Angy is also concerned about weight gain with antipsychotics.  We discussed that generally Latuda is associated with lower risk weight gain compared to olanzapine - additional information sent via [employer] per patient's request.    History of SVTs: Not currently problematic - see above for discussion regarding SVTs and lithium dose.     Hypertension: Stable.    Allergic Rhinitis: Somewhat controlled - discussed could try an alternate OTC agent such as Zyrtec (cetirazine) to see if this is more effective.     Hormone Replacement Therapy: Stable    Pain: Improving. Discussed max recommended tylenol dose of 4g/day (500-1000mg four times daily)    Rosacae: Stable    Dry Eye: Stable    Vitamin D Deficiency: Stable    PLAN:                            1.  We discussed several potential medication considerations including increasing lithium, adding Wellbutrin, adding Latuda.  No changes are being made today and recommend discussing further with Dr. Sims.  2.  Information sent via [employer] about weight gain with Latuda.  3. You could consider Biotene saliva replacement for your dry mouth if you are interested.   4. You could try an alternate OTC agent such as Zyrtec (cetirazine) to see if this is more effective for your allergies.     Follow-up: 3/13/23 with Dr. Sims, [facility] 3-4 months or sooner as requested    SUBJECTIVE/OBJECTIVE:                          Angy Haji is a 57 year old female contacted via secure video for an initial visit. She was referred to me from psychiatry.      Reason for visit: Recommendations for medications to consider if Lamictal is unhelpful for bipolar depression in the context of EPSE with SGA use and a subtherapeutic serum level of Lithium. In the past, increasing  "Lithium beyond 1050mg resulted in SVTs.    Allergies/ADRs: Reviewed in chart  Past Medical History: Reviewed in chart  Tobacco: She reports that she has never smoked. She has never used smokeless tobacco.  Alcohol: ocassional      Medication Adherence/Access: no issues reported    Bipolar I Disorder:   Current medications:   Lamotrigine 350mg daily   Lithium 1050mg daily    Angy is seen at [facility] by Oscar Sims MD. Most recent visit was 1/30/23 at which time lamotrigine was increased from 300mg to 350mg daily. Pertinent background per psychiatry notes \"Patient had not had significant mental health history until 2018, when she developed perimenopausal mood and anxiety symptoms. She was subsequently referred to our clinic following a hospitalization for anthony in November 2018.  Her care has been complicated by sensitivity to antipsychotics (EPSE including difficulty swallowing), thrombocytopenia secondary to Depakote, lithium toxicity (in setting of concurrent Depakote use).\" Please see note by pt's provider for additional details regarding symptoms and history.     Today, patient reports minimal improvement in depression with increased dose of lamotrigine.  Although she does feel lamotrigine has been helpful overall for her depression symptoms, reporting improved anhedonia, reduced negative self talk, and improved energy since starting lamotrigine in November 2021.  In terms of current depression symptoms she reports fatigue at the end of the day, feeling 'blah', and some decreased enjoyment from pleasurable activities, although she has been able to enjoy going to the theater, movies since COVID restrictions have relaxed.  She has not had a manic episode since 2018, which was her first and only episode of anthony.  In terms of possible medication side effects she endorses dry mouth, which her dentist recently commented on recommended fluoride toothpaste.  She also endorses some decreased " "sex drive but is not sure if it is due to medications, menopause, hormone replacement.     See below for details of past medication trials which were updated by writer today. She expresses concern for EPSE and weight gain from antipsychotics.      PSYCH and SUBSTANCE USE Critical Summary Points since July 2021   (per psychiatry notes)   08/26/2021: Resident transfer visit. Increased Lithium to 1200mg.  10/15/2021: decrease Lithium back to 1050 mg  11/02/2021: Lamictal titration plan started  11/29/2021: Increase Lamictal to 100 mg for 7 days and then increase to 150 mg  12/14/2021: Increase Lamictal to 200mg  01/24/2022: No medication changes  03/18/2022: No medication changes  04/29/2022: No medication changes  05/05/2022: Increased Lamictal to 250 mg (inbtwn visits)  06/10/2022: No medication changes  08/29/2022: Increase Lamictal to 300mg  10/24/2022: No medication change  12/7/2022: Li serum level is 0.6  12/19/2022: No medication changes  01/30/2023: Increase Lamictal to 350mg      PAST MED TRIALS updated by writer today       Medication Max Dose (mg) Dates / Duration Helpful? DC Reason / Adverse Effects?   Sertraline 150  4-11/2018     possibly induced anthony - hospitalized Nov 2018   Depakote 1000mg bid  11/2018- 04/2020   \"borderline\" thrombocytopenia in notes   Olanzapine  10mg twice daily  11/9-11/15/2018    \"tongue heaviness\" per hospital notes, shuffling gate   Seroquel  100mg bid  11/15-11/20/2018   vomiting and sedation per notes   risperidone 0.5mg bid 12/5-12/14/2018  Tongue swelling + slurred speech, sialorrhea    Lithium Up to 1200mg (current 1050mg) 12/2018- current Y  SVTs at 1200mg?    lamotrigine 350mg Nov 2021 - current Y      Lab Results   Component Value Date    LITHIUM 0.6 12/07/2022    LITHIUM 0.8 05/04/2022    LITHIUM 0.8 09/03/2021         History of SVTs: Patient reports a history of palpitations/tachycardia. She reports they are \"pretty much gone, do not happen often anymore\".  Saw " cardiology in 2021 and they thought it was unlikely due to lithium. Per notes, it appears Angy had been experiencing palpitations for a couple months prior to lithium dose increase to 1200mg, but hadn't discussed it with her providers. No intervention was recommended by cardiology.     From cardiology note 10/25/21:   Assessment and Plan:  # Bipolar disorder  # Morbid obesity  # Symptomatic SVTs -> runs of PACs per Zio patch in 9/2021.  She was adjusting the dose of Lithium around that time, and is now on Lithium 1050 mg daily.  Since then her depression and palpitation have not changed.  Therefore, Lithium is less likely to be a trigger of her PACs.  I explained to her that PACs are benign and we will leave it alone at this time.  # Chest pain and SOB on Ex.  I will schedule her to take NM stress test to rule out ischemia    BP Readings from Last 3 Encounters:   01/13/23 130/68   10/04/22 119/70   06/10/22 127/74     Pulse Readings from Last 3 Encounters:   01/13/23 66   10/04/22 63   06/10/22 77       Hypertension: Current medications include lisinopril 10mg daily.  Patient reports no current medication side effects.  BP Readings from Last 3 Encounters:   01/13/23 130/68   10/04/22 119/70   06/10/22 127/74     Allergic Rhinitis: Current medications include loratadine 10mg once daily - getting OTC. Patient feels that current therapy is somewhat effective.     Hormone Replacement Therapy: Current medications: Provera 2.5mg daily, estradiol 0.75 daily, estradiol 10mg vaginally 2 days weekly. Angy reports her provider is working on a very slow estrogen taper plan due to concern depression symptoms could worsen. Last dose decrease was Fall 2022, next step down will be Spring 2023.     Pain: Patient reports knee pain. Recently got first cortisone injection which was very helpful. Takes tylenol 500mg three times daily as needed - wonders about max recommended dose. Avoiding NSAIDs due to risk of increasing lithium  level.    Rosacae: Metrogel 1% daily. Helpful, no issues.     Dry Eye: Refresh eye drop as needed.    Vitamin D Deficiency: Takes 2000units daily  Lab Results   Component Value Date    VITDT 29 05/04/2022    VITDT 25 10/02/2017    VITDT 41 11/25/2014    VITDT 21 (L) 09/15/2014    VITDT 25 (L) 09/16/2013       ----------------      I spent 68 minutes with this patient today. A copy of the visit note was provided to the patient's provider(s).    A summary of these recommendations was sent via [employer].    Anusha Nieto, PharmD, BCPP  Medication Therapy Management Pharmacist  [facility]    Telemedicine Visit Details  Type of service:  Video Conference via [employer]  Start Time: 10:03 AM  End Time: 11:12 AM     Medication Therapy Recommendations  Seasonal allergic rhinitis    Current Medication: Loratadine 10 MG capsule   Rationale: Condition refractory to medication - Ineffective medication - Effectiveness   Recommendation: Change Medication - ZYRTEC ALLERGY PO   Status: Patient Agreed - Adherence/Education

## 2023-03-01 ENCOUNTER — VIRTUAL VISIT (OUTPATIENT)
Dept: PHARMACY | Facility: CLINIC | Age: 58
End: 2023-03-01
Payer: COMMERCIAL

## 2023-03-01 DIAGNOSIS — I47.10 SVT (SUPRAVENTRICULAR TACHYCARDIA) (H): ICD-10-CM

## 2023-03-01 DIAGNOSIS — Z79.890 HORMONE REPLACEMENT THERAPY: ICD-10-CM

## 2023-03-01 DIAGNOSIS — M25.569 KNEE PAIN: ICD-10-CM

## 2023-03-01 DIAGNOSIS — L71.9 ROSACEA: ICD-10-CM

## 2023-03-01 DIAGNOSIS — E55.9 VITAMIN D DEFICIENCY DISEASE: ICD-10-CM

## 2023-03-01 DIAGNOSIS — I10 BENIGN ESSENTIAL HYPERTENSION: ICD-10-CM

## 2023-03-01 DIAGNOSIS — J30.2 SEASONAL ALLERGIC RHINITIS: ICD-10-CM

## 2023-03-01 DIAGNOSIS — H04.123 DRY EYES: ICD-10-CM

## 2023-03-01 DIAGNOSIS — F31.9 BIPOLAR 1 DISORDER (H): Primary | ICD-10-CM

## 2023-03-01 PROCEDURE — 99605 MTMS BY PHARM NP 15 MIN: CPT | Mod: VID | Performed by: PHARMACIST

## 2023-03-01 PROCEDURE — 99607 MTMS BY PHARM ADDL 15 MIN: CPT | Mod: VID | Performed by: PHARMACIST

## 2023-03-01 RX ORDER — LISINOPRIL 10 MG/1
10 TABLET ORAL DAILY
Qty: 90 TABLET | Refills: 3 | Status: SHIPPED | OUTPATIENT
Start: 2023-03-01 | End: 2024-03-04

## 2023-03-01 NOTE — Clinical Note
Hi Hunker,   Thank you for the referral - this is a tricky case. Since our options for next steps are limited, I spent a decent amount of time digging into notes from previous providers. It seems uncertain that her SVTs were related to lithium, so could possibly consider increasing the dose +/- adding Wellbutrin. We also discussed possibly adding Latuda, patient really seems more concerned about weight gain than EPSE. My note has further thoughts about these options. Let me know if you have questions, I will be curious to know what you end up going with.   Thank you!  Anusha Nieto, PharmD, BCPP Medication Therapy Management Pharmacist [facility]

## 2023-03-01 NOTE — TELEPHONE ENCOUNTER
[facility] phone call message- patient requesting a refill:    Full Medication Name: lisinopril (ZESTRIL) 10 MG tablet    Dose: Take 1 tablet (10 mg) by mouth daily - Oral    Pharmacy confirmed as   [facility] [place], [place] - [address]  [address]  [address]  [facility] [address]  Phone: [phone] Fax: [phone] Alternate Fax: [phone], [phone], [phone]    Additional Comments: Has appt with new PCP 4/14     OK to leave a message on voice mail? Yes    Primary language: English      needed? No    Call taken on March 1, 2023 at 12:04 PM by Vignesh Muñoz

## 2023-03-02 RX ORDER — ACETAMINOPHEN 500 MG
1000 TABLET ORAL EVERY 6 HOURS PRN
COMMUNITY
End: 2024-04-18 | Stop reason: ALTCHOICE

## 2023-03-06 NOTE — PATIENT INSTRUCTIONS
"Recommendations from today's MTM visit:                                                      1.  We discussed several potential medication considerations including increasing lithium, adding Wellbutrin, adding Latuda.  No changes are being made today and recommend discussing further with Dr. Sims.  2.  Information sent via [employer] about weight gain with Latuda.  3. You could consider Biotene saliva replacement for your dry mouth if you are interested.   4. You could try an alternate OTC agent such as Zyrtec (cetirazine) to see if this is more effective for your allergies.     Follow-up: 3/13/23 with Dr. Sims, [facility] 3-4 months or sooner as requested    It was great speaking with you today.  I value your experience and would be very thankful for your time in providing feedback in our clinic survey. In the next few days, you may receive an email or text message from [employer] with a link to a survey related to your  clinical pharmacist.\"     To schedule another MTM appointment, please call the clinic directly or you may call the MTM scheduling line at [phone] or toll-free at [phone].     My Clinical Pharmacist's contact information:                                                      Please feel free to contact me with any questions or concerns you have.      Anusha Nieto, PharmD, BCPP  Medication Therapy Management Pharmacist  [facility]     "

## 2023-03-08 ENCOUNTER — ANCILLARY PROCEDURE (OUTPATIENT)
Dept: ULTRASOUND IMAGING | Facility: CLINIC | Age: 58
End: 2023-03-08
Attending: OBSTETRICS & GYNECOLOGY
Payer: COMMERCIAL

## 2023-03-08 ENCOUNTER — OFFICE VISIT (OUTPATIENT)
Dept: OBGYN | Facility: CLINIC | Age: 58
End: 2023-03-08
Attending: OBSTETRICS & GYNECOLOGY
Payer: COMMERCIAL

## 2023-03-08 VITALS — DIASTOLIC BLOOD PRESSURE: 76 MMHG | HEART RATE: 80 BPM | SYSTOLIC BLOOD PRESSURE: 152 MMHG | OXYGEN SATURATION: 97 %

## 2023-03-08 DIAGNOSIS — N95.0 POSTMENOPAUSAL BLEEDING: Primary | ICD-10-CM

## 2023-03-08 DIAGNOSIS — N95.0 POSTMENOPAUSAL BLEEDING: ICD-10-CM

## 2023-03-08 PROCEDURE — 99212 OFFICE O/P EST SF 10 MIN: CPT | Performed by: OBSTETRICS & GYNECOLOGY

## 2023-03-08 PROCEDURE — 76830 TRANSVAGINAL US NON-OB: CPT | Performed by: OBSTETRICS & GYNECOLOGY

## 2023-03-08 NOTE — PROGRESS NOTES
GYN clinic visit  3/8/2023  CC: ultrasound follow up    HPI:   Angy Haji is a 57 year old  who presents to clinic today to discuss ultrasound results.     Had an US today to eval PMB  Episode of bleeding early Freddy  No bleeding since then  No pelvic pain  Doing well on HRT      Gynecological Ultrasonography:   Uterus: anteverted. Contour is smooth/regular.  Size: 5.3 x 3.3 x 2.6 cm  Endometrium: Thickness Total 3.8 mm     Findings:   Right Ovary: 1.5 x 1.1 x 0.8 cm. Wnl, trace free fluid in right adnexal region  Left Ovary: Surgically absent  Cul de Sac Free Fluid: No free fluid     Impression:   The uterus and endometrium appear normal, endometrial thickness measures 3.8mm.  Right ovary visualized and appears within normal limits.  Left ovary surgically absent.  Normal pelvic ultrasound.      Reviewed GYN hx, OB hx, past medical hx, surgical hx and family hx.   Reviewed medications and allergies. Changes made in EPIC.     OBJECTIVE:  Vitals:    23 1310   BP: (!) 152/76   Pulse: 80   SpO2: 97%     There is no height or weight on file to calculate BMI.    Gen: alert, oriented, no distress, cooperative and pleasant      ASSESSMENT:   Angy Haji is a 57 year old  here to discuss ultrasound results.   Isolated episode of postmenopausal bleeding. Endometrial stripe 3.8mm    PLAN:  1. Postmenopausal bleeding  Provided reassurance  Continue HRT  Discussed tapering maybe this summer.      Romy Min MD

## 2023-03-10 ASSESSMENT — ANXIETY QUESTIONNAIRES
3. WORRYING TOO MUCH ABOUT DIFFERENT THINGS: MORE THAN HALF THE DAYS
GAD7 TOTAL SCORE: 9
5. BEING SO RESTLESS THAT IT IS HARD TO SIT STILL: NOT AT ALL
7. FEELING AFRAID AS IF SOMETHING AWFUL MIGHT HAPPEN: SEVERAL DAYS
IF YOU CHECKED OFF ANY PROBLEMS ON THIS QUESTIONNAIRE, HOW DIFFICULT HAVE THESE PROBLEMS MADE IT FOR YOU TO DO YOUR WORK, TAKE CARE OF THINGS AT HOME, OR GET ALONG WITH OTHER PEOPLE: SOMEWHAT DIFFICULT
2. NOT BEING ABLE TO STOP OR CONTROL WORRYING: MORE THAN HALF THE DAYS
4. TROUBLE RELAXING: SEVERAL DAYS
1. FEELING NERVOUS, ANXIOUS, OR ON EDGE: MORE THAN HALF THE DAYS
GAD7 TOTAL SCORE: 9
6. BECOMING EASILY ANNOYED OR IRRITABLE: SEVERAL DAYS
GAD7 TOTAL SCORE: 9
7. FEELING AFRAID AS IF SOMETHING AWFUL MIGHT HAPPEN: SEVERAL DAYS
8. IF YOU CHECKED OFF ANY PROBLEMS, HOW DIFFICULT HAVE THESE MADE IT FOR YOU TO DO YOUR WORK, TAKE CARE OF THINGS AT HOME, OR GET ALONG WITH OTHER PEOPLE?: SOMEWHAT DIFFICULT

## 2023-03-13 ENCOUNTER — VIRTUAL VISIT (OUTPATIENT)
Dept: PSYCHIATRY | Facility: CLINIC | Age: 58
End: 2023-03-13
Attending: PSYCHIATRY & NEUROLOGY
Payer: COMMERCIAL

## 2023-03-13 DIAGNOSIS — F31.9 BIPOLAR I DISORDER (H): ICD-10-CM

## 2023-03-13 PROCEDURE — 99214 OFFICE O/P EST MOD 30 MIN: CPT | Mod: VID | Performed by: STUDENT IN AN ORGANIZED HEALTH CARE EDUCATION/TRAINING PROGRAM

## 2023-03-13 PROCEDURE — G0463 HOSPITAL OUTPT CLINIC VISIT: HCPCS | Mod: PN,GT | Performed by: STUDENT IN AN ORGANIZED HEALTH CARE EDUCATION/TRAINING PROGRAM

## 2023-03-13 RX ORDER — LAMOTRIGINE 200 MG/1
200 TABLET ORAL DAILY
Qty: 30 TABLET | Refills: 1 | Status: SHIPPED | OUTPATIENT
Start: 2023-03-13 | End: 2023-04-17

## 2023-03-13 RX ORDER — LITHIUM CARBONATE 150 MG/1
150 CAPSULE ORAL AT BEDTIME
Qty: 30 CAPSULE | Refills: 1 | Status: SHIPPED | OUTPATIENT
Start: 2023-03-13 | End: 2023-04-17

## 2023-03-13 RX ORDER — LITHIUM CARBONATE 300 MG/1
900 CAPSULE ORAL AT BEDTIME
Qty: 90 CAPSULE | Refills: 1 | Status: SHIPPED | OUTPATIENT
Start: 2023-03-13 | End: 2023-04-17

## 2023-03-13 RX ORDER — LAMOTRIGINE 150 MG/1
150 TABLET ORAL DAILY
Qty: 30 TABLET | Refills: 1 | Status: SHIPPED | OUTPATIENT
Start: 2023-03-13 | End: 2023-04-17

## 2023-03-13 NOTE — PATIENT INSTRUCTIONS
Treatment Plan    Continue medications    RTC in 6 weeks    **For crisis resources, please see the information at the end of this document**   Patient Education    Thank you for coming to the [facility] [place] [facility].     Lab Testing:  If you had lab testing today and your results are reassuring or normal they will be mailed to you or sent through [employer] within 7 days. If the lab tests need quick action we will call you with the results. The phone number we will call with results is # [phone]. If this is not the best number please call our clinic and change the number.     Medication Refills:  If you need any refills please call your pharmacy and they will contact us. Our fax number for refills is [phone].   Three business days of notice are needed for general medication refill requests.   Five business days of notice are needed for controlled substance refill requests.   If you need to change to a different pharmacy, please contact the new pharmacy directly. The new pharmacy will help you get your medications transferred.     Contact Us:  Please call [phone] during business hours (8-5:00 M-F).   If you have medication related questions after clinic hours, or on the weekend, please call [phone].     Financial Assistance [phone]   Medical Records [phone]       MENTAL HEALTH CRISIS RESOURCES:  For a emergency help, please call [phone] or go to the nearest Emergency Department.     Emergency Walk-In Options:   EmPATH Unit @ [facility] ([place]): [phone] - Specialized mental health emergency area designed to be calming  [facility] ([place]): [phone]  [facility] ([place]): [phone]  [facility]): [phone]    [facility] Crisis Information:   [place]: [phone]  [place]: [phone]  [place] ([place]) - Adult: [phone]     Child: [phone]  [place] - Adult:  [phone]     Child: [phone]  [place]: [phone]  List of all [facility] resources:   https://[place].gov/dhs/people-we-serve/adults/health-care/mental-health/resources/crisis-contacts.jsp    National Crisis Information:   Crisis Text Line: Text  [place]  to [address]  Suicide & Crisis Lifeline: 988  National Suicide Prevention Lifeline: 8-107-030-TALK (1-698.787.3919)       For online chat options, visit https://suicidepreventionlifeline.org/chat/  Poison Control Center: 5-739-835-2335  Trans Lifeline: 4-289-542-0776 - Hotline for transgender people of all ages  [employer]: 0-875-675-2041 - Hotline for LGBT youth     For Non-Emergency Support:   Fast Tracker: Mental Health & Substance Use Disorder Resources -   https://www.[employer].org/

## 2023-03-13 NOTE — PROGRESS NOTES
Angy Haji is a 57 year old who is being evaluated via a billable video visit.      Pt will join video visit via: Touchbase  If there are problems joining the visit, send backup video invite via: Send to preferred e-mail: [email or web address]    Reason for telehealth visit: Patient has requested telehealth visit    Originating location (patient location): Patient's home    Will anyone else be joining the visit? No      Telehealth Details  Type of service:  video visit for medication management  Time of service:    Start Time:  3:31 PM    End Time:   4:08 PM    Originating Site (patient location):  [facility]- [place]   Location- Patient's home  Distant Site (provider location):  [facility]  Mode of Communication:  Video conference via Touchbase       [facility]  Psychiatry Clinic  PSYCHIATRY PROGRESS NOTE     CARE TEAM:  PCP- Milly Jenkins, Psychotherapist- [facility] with Verna Chan  Specialty Providers- OB/GYN, ENT, PT, Nutrition      Wife- Rachael Haji is a 57 year old who prefers the name Angy and uses pronouns she, her, hers.      DIAGNOSIS     Bipolar 1 disorder, current depressive episode, moderate   H/o EPSE secondary to neuroleptic use  H/o Thrombocytopenia secondary to Depakote  H/o lithium toxicity, resolved     ASSESSMENT   Angy reports improved mood with increase in Lamictal dose and would like to maintain current regimen. She reports improvement in irritability and anhedonia. She adds that worrisome thoughts have been mostly situational and no longer interfere with sleep. She and her wife, Rachael are feeling supported as they navigate their son's name change process. No SI or HI.    Future considerations: If needed for mood, per [facility] recs, we will consider increasing Lithium to 1200mg or adding on Wellbutrin. Angy prefers trying the Lithium increase first. She is very opposed to an antipsychotic trial.    [place] was not needed today. Patient is not  taking any controlled substances.     PLAN                                                                                                                1) Meds-  - Continue Lithium 1050mg at bedtime  - Continue Lamictal 350mg daily    2) Psychotherapy-Continue individual therapy every other week & couples counseling    3) Next due-  Labs- Next Li labs due 06/2023  EKG- PRN  Rating scales- N/A    4) Referrals- MTM    5) Dispo- RTC in 4-6 weeks     PERTINENT BACKGROUND                           [most recent eval 08/22/21]    Patient has not had significant mental health history until 2018, when she developed perimenopausal mood and anxiety symptoms.  She was subsequently referred to our clinic following a hospitalization for anthony in November 2018.  Her care has been complicated by sensitivity to antipsychotics (EPSE including difficulty swallowing), thrombocytopenia secondary to Depakote, lithium toxicity in setting of concurrent Depakote use).    Psych pertinent item history includes Manic episode(h/o one manic episode Nov/Dec 2018), psychiatric hospitalization in November 2018.     SUBJECTIVE   Patient presented for a follow up.    Since last visit:  -Angy reports that depressed mood seems a little improved with increased enjoyment of social activities. Feelings of hopelessness from pain are improved since pain is better after she received a cortisone injection.  -We discussed the options that were recommended by the MTM visit.Angy would like to hold off on making any changes right now with improvement in mood. If any changes are required, Angy will be open to increasing Lithium or adding on Wellbutrin.  -Reports that anxiety is mostly situational. Her son, Tyler has a court hearing for his name change which entailed a lot of paperwork. She and her wife, Rachael are navigating the grief of the name change and have found extra support with a clergy person they know.    RECENT PSYCH ROS:   Depression: low mood and  irritability are improving  Elevated:  none  Psychosis:  none  Anxiety:  Worrisome thoughts are mostly situational.  Trauma Related:  none  Sleep: Reports that sleep is back to baseline with some sleep hygiene tips that were recommended by her sleep medicine doctor.  Other: N/A    Adverse Effects: Vivid dreams have reduced in frequency.  Pertinent Negative Symptoms: No suicidal ideation, self-injurious behavior/urges or violent ideation  Recent Substance Use:   Alcohol- 1 drink (beer or wine) per week or 2 weeks.     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   to Rachael enriquez. They have a teenage son.     PSYCH and SUBSTANCE USE Critical Summary Points since July 2021 08/26/2021: Resident transfer visit. Increased Lithium to 1200mg.  10/15/2021: decrease Lithium back to 1050 mg  11/02/2021: Lamictal titration plan started  11/29/2021: Increase Lamictal to 100 mg for 7 days and then increase to 150 mg  12/14/2021: Increase Lamictal to 200mg  01/24/2022: No medication changes  03/18/2022: No medication changes  04/29/2022: No medication changes  05/05/2022: Increased Lamictal to 250 mg (inbtwn visits)  06/10/2022: No medication changes  08/29/2022: Increase Lamictal to 300mg  10/24/2022: No medication change  12/7/2022: Li serum level is 0.6  12/19/2022: No  medication changes  01/30/2023: Increase Lamictal to 350mg  03/13/2023: No medication changes     PAST MED TRIALS      Medication Max Dose (mg) Dates / Duration Helpful? DC Reason / Adverse Effects?   Sertraline       Depakote    Tremors   Olanzapine    EPSE   Seroquel    EPSE   Lithium 1050 current        MEDICAL HISTORY and ALLERGY     ALLERGIES: Seroquel [quetiapine], Olanzapine, Penicillins, Risperidone, and Thimerosal    Patient Active Problem List   Diagnosis     Oral aphthae     CARDIOVASCULAR SCREENING; LDL GOAL LESS THAN 160     Rosacea     Raynaud disease     Vitamin D deficiency disease     MARLA (obstructive sleep apnea)     Atopic rhinitis     Chronic bilateral low back pain without sciatica     Obesity, Class III, BMI 40-49.9 (morbid obesity) (H)     Muscle tension dysphonia     Palpitations     Dysphagia, unspecified type     Anxiety     Benign essential hypertension     Callus of foot     Varicose veins of right lower extremity with edema     Bipolar I disorder (H)     Dribbling of urine     It band syndrome, right     Spinal stenosis at L4-L5 level     Facet arthropathy, lumbar      MEDICAL REVIEW OF SYSTEMS     Patient is on Estradiol.     MEDICATIONS     Current Outpatient Medications   Medication Sig Dispense Refill     acetaminophen (TYLENOL) 500 MG tablet Take 500-1,000 mg by mouth every 6 hours as needed for mild pain       Carboxymethylcellulose Sod PF (REFRESH PLUS) 0.5 % SOLN ophthalmic solution Place 1 drop into both eyes 3 times daily as needed for dry eyes       diclofenac (VOLTAREN) 1 % topical gel Apply 4 g topically 4 times daily (Patient not taking: Reported on 3/1/2023) 150 g 1     estradiol (ESTRACE) 0.5 MG tablet Take 1.5 tablets (0.75 mg) by mouth daily 140 tablet 3     estradiol (VAGIFEM) 10 MCG TABS vaginal tablet Place 1 tablet (10 mcg) vaginally twice a week 26 tablet 3     lamoTRIgine (LAMICTAL) 150 MG tablet Take 1 tablet (150 mg) by mouth daily 30 tablet 1     lamoTRIgine  "(LAMICTAL) 200 MG tablet Take 1 tablet (200 mg) by mouth daily 30 tablet 1     lisinopril (ZESTRIL) 10 MG tablet Take 1 tablet (10 mg) by mouth daily 90 tablet 3     lithium (ESKALITH) 150 MG capsule Take 1 capsule (150 mg) by mouth At Bedtime 30 capsule 1     lithium 300 MG capsule Take 3 capsules (900 mg) by mouth At Bedtime 90 capsule 1     Loratadine 10 MG capsule Take 10 mg by mouth daily       medroxyPROGESTERone (PROVERA) 2.5 MG tablet Take 1 tablet (2.5 mg) by mouth daily 90 tablet 3     metroNIDAZOLE (METROGEL) 1 % external gel Apply topically daily 60 g 9     ORDER FOR DME Use your CPAP device as directed by your provider.       VITAMIN D, CHOLECALCIFEROL, PO Take 2,000 Units by mouth daily        VITALS   There were no vitals taken for this visit.    MENTAL STATUS EXAM     Alertness: alert and oriented  Appearance: well groomed  Behavior/Demeanor: cooperative, with good  eye contact   Speech: regular rate and rhythm  Language: intact  Psychomotor: normal or unremarkable  Mood: \"okay\"  Affect: appropriate; congruent to: mood- yes, content- yes  Thought Process/Associations: linear and logical  Thought Content:  Reports none;  Denies suicidal ideation and violent ideation  Perception:  Reports none;  Denies auditory hallucinations and visual hallucinations  Insight: good  Judgment: good  Cognition: does  appear grossly intact; formal cognitive testing was not done  Gait and Station: N/A ([facility])     LABS and DATA     JOANNA-9  PHQ 10/24/2022 12/18/2022 1/29/2023   PHQ-9 Total Score 5 6 7   Q9: Thoughts of better off dead/self-harm past 2 weeks Not at all Not at all Not at all     Recent Labs   Lab Test 12/07/22  1111 05/04/22  0928 09/03/21  1007   CR 0.55 0.56 0.61   GFRESTIMATED >90 >90 >90     Recent Labs   Lab Test 12/07/22  1111 05/04/22  0928 02/03/21  1019   AST 10 13 13   ALT 23 22 17   ALKPHOS 88 80 88     Recent Labs   Lab Test 12/07/22  1111 05/04/22  0928 09/03/21  1007   LITHIUM 0.6 0.8 0.8 "     Recent Labs   Lab Test 12/07/22  1111 05/04/22  0928   CR 0.55 0.56   GFRESTIMATED >90 >90    138   POTASSIUM 4.4 4.0   SHERRIE 9.3 9.2     Recent Labs   Lab Test 12/07/22  1111 02/03/21  1011   SG 1.015  1.015 1.011     Recent Labs   Lab Test 12/07/22  1111 05/04/22  0928   TSH 2.13 2.03     Recent Labs   Lab Test 12/07/22  1111 05/04/22  0928 09/03/21  1007 02/03/21  1019 07/15/20  1617   WBC 7.3 7.4   < > 7.7 8.3   ANEU  --   --   --  5.1 5.6    < > = values in this interval not displayed.       ECG :10/25/2021 QTc 442ms     PSYCHOTROPIC DRUG INTERACTIONS     Concurrent use of LITHIUM and ACE INHIBITORS may result in increased risk of lithium toxicity.     Estrogen Derivatives may decrease the serum concentration of LamoTRIgine     MANAGEMENT:  Monitoring for adverse effects, routine labs, periodic EKGs and patient is aware of risks     RISK STATEMENT for SAFETY     Angy Haji did not appear to be an imminent safety risk to self or others.    TREATMENT RISK STATEMENT: The risks, benefits, alternatives and potential adverse effects have been discussed and are understood by the pt. The pt understands the risks of using street drugs or alcohol. There are no medical contraindications, the pt agrees to treatment with the ability to do so. The pt knows to call the clinic for any problems or to access emergency care if needed.  Medical and substance use concerns are documented above.  Psychotropic drug interaction check was done, including changes made today.     PROVIDER: Tolulope Oluwadamilola Odebunmi, MD    Patient not staffed in clinic.  Note will be reviewed and signed by supervisor Dr. Tapia.      Level of Medical Decision Making:   - At least 1 chronic problem that is not stable  - Engaged in prescription drug management during visit (discussed any medication benefits, side effects, alternatives, etc.)        Must meet 2 out of 3 of the above MDM elements to bill at the specified level     For help  more info about elements / criteria, click here-> MDM Help Grid     *This blue text will disappear automatically when note is signed, no need to delete*:[id number]}  [id number]}  :[id number]}

## 2023-03-13 NOTE — NURSING NOTE
Is the patient currently in the state of [place]? YES    Visit mode:VIDEO    If the visit is dropped, the patient can be reconnected by: VIDEO VISIT: Text to cell phone: [phone]    Will anyone else be joining the visit? NO      How would you like to obtain your AVS? MyChart    Are changes needed to the allergy or medication list? NO    Reason for visit: follow-up

## 2023-03-17 ENCOUNTER — HOSPITAL ENCOUNTER (OUTPATIENT)
Dept: NUTRITION | Facility: CLINIC | Age: 58
Discharge: HOME | End: 2023-03-17
Attending: DIETITIAN, REGISTERED | Admitting: DIETITIAN, REGISTERED
Payer: COMMERCIAL

## 2023-03-17 PROCEDURE — 97803 MED NUTRITION INDIV SUBSEQ: CPT | Mod: GT,95 | Performed by: DIETITIAN, REGISTERED

## 2023-03-17 NOTE — PROGRESS NOTES
Video-Visit Details    Type of service:  Video Visit    Video Start Time (time video started): 2:02    Video End Time (time video stopped): 2:30    Originating Location (pt. Location): Home        Distant Location (provider location):  On-site    Mode of Communication:  Video Conference via [facility]    NUTRITION REASSESSMENT NOTE (VIDEO VISIT DUE TO COVID-19)  REASON FOR ASSESSMENT  Angy Haji referred by Dr. Lira for MNT related to  Obesity, Class III, BMI 40-49.9 (morbid obesity) (H) [E66.01]          Patient accompanied by self      NUTRITION HISTORY  Patient continues with multiple life changes and stressors.  Patient aware that she likes to self soothe with ice cream which she has recently increased.  Patient is aware that social cues trigger her to eat. Patient's spouse recently had weight loss surgery so there is adjust with eating in the home.      3/7/2022 Self soothe in evening snack-made tea or tells self ate late dinner and does not need snack.  Patient asking self if physically hungry.  Patient drinking fizzy water or malt o meal as evening snack.  Patient aware she has been stress eating.  Tracking when not having dessert -identifying why eating  Exercise-cross country skiing; mall walk; snow shoeing; treadmill 2 times [place]aring treadmill   Yogurt; cheese and crackers; cereal      Patient wants to increase exercise time as will be going to [facility] Day weekend ([place]) [facility]      4/25/2002 Patient able to get 45-60 minutes exercise on weekends and 20-30 minutes of exercise daily.  Patient wants to lose 5 lbs prior to trip to [place].  Patient was able to have 3 days no desserts and then 1 week no dessert. 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  Patient determined ways she could lose weight: Dessert 2 times per week, add treadmill, eliminate morning snack, 1 snack at 3 PM. Alcohol 1 time per week, no caramel lattes, food is fuel, it's  too late to be hungry, spouse brings sweet treats without asking, tea/bath/shower      6/15/2022 Patient skips dessert if eats late dinner.  Patient felt discouraged with weight loss as wanted to lose more weight (down 3.5 lbs). Patient wants weight loss for functional reasons.  Patient signed [place].  Patient has increased cravings and thoughts about ice cream.  Shift in relationship with spouse after spouse's weight loss surgery.      7/27/2022 Patient increased exercise-[place] with [employer]; started the last week in June for bike rides (4 rides different times in 2 weeks-9 miles).  Rode in 18 mile race.  Went hiking [place].     No dessert (hit or miss) 50% 3 days no dessert; ice cream and s'mores on vacation     Typical intake:  Greek yogurt + 1/4 cup granola + fruit + 1/2 and 1/2 with coffee or english muffin with peanut butter/butter and strawberry jam or eggs scrambled or hard boiled  Leftovers -1 cup cherries; cherry tomatoes; 1/2 cup pea pods + 3 oz pork tenderloin (teryaki)   Watermelon, pork tenderloin, 1 cup rice + tomatoes/pea pods; cheeseburger with whole wheat bun + vegetables + watermelon   Snacks: croissant (Rustica) Pentecostal oat squares (1 cup dry) (3:00 PM) cheese and crackers    Beverages: coffee; water; fizzy water      Emotional eating at night-ice cream      9/21/2022 Patient has been able to bike 2 times per week.  Patient's weight has been stable.  Patient has not been able to track dinner and snacks.  Stress eating ebbs and flows.  Trying to save dessert for the weekend or may self soothe during th week with ice cream.  Patient stress eating in the evening and may boredom eat prior to lunch (cereal).    Weight: 308.4 lbs       11/16/2022 Biking 2 times per week. Tour biking race: [employer] 14 miles (rode by herself). [employer] 12 mile [place] bike ride. (rode with friend).  Patient has consistently  walked dog and completes PT exercises.  Patient determining plan for winter exercise.    314.4 lbs (food and sleep changes) Patient was eating more sweets as patient's wife baked sweets this fall (3-4 weeks with increase of sweets).  Holiday baking plan consists of 5 cookies and 1 candy (peanut brittle).  Ice cream consumption has increased recently.      1/23/2023 Patient was able to bake holiday cookies and freeze them.  Patient has been eating desserts 5 days a week.  Patient has been substituting greek yogurt (flavored or vanilla) or cream of wheat for dessert.  Patient was able to snow shoe.  Patient having barrier with getting on treadmill.  Patient's weight has fluctuated 325.8 and now 316 lbs.  Patient has been working on sleep routine (classical music; reading on paper vs ipad).     3/17/2023 Patient having knee pain and went to orthopedic MD and got cortizone shot in knee and able to walk know. Walked dog 3 times this week. Swimming -[facility] (6 sameer pool). Tyler wants to swim with goal of swimming 2 times per week.  Patient states ice cream and dessert are hit or miss. Patient shifting mindset to days having dessert vs not having dessert. Patient has not been as mindful with practicing self soothe at night.  Has tried tea and engaging book.     DIET RECALL   Breakfast: cereal and banana; yogurt + granola (measures), fruit + coffee; 2 slices toast with butter + 1/2 cup cottage cheese    Lunch: leftovers (includes fruit and vegetable daily)  Snack: fruit or cheese with lower salt nut thin crackers   Dinner: Tuna steak, Asian marinade, stir gomez, baked vegetable + brown rice (3/4 cup) + canned peaches; chicken thighs with beans and rice; sheet pan dinner with chicken and vegetables or grilled salmon and grilled vegetables; taco; protein + fruit and vegetable  Snacks: fruit, string cheese, Ritz crackers or yogurt; dessert in the evening (ice cream recently)  Beverages: mainly water, coffee,  "mineral water  Dining out: none currently  Exercise: Patient exercising 5-6 times per week. (cross country skiing, snow shoeing, walking and walking dog).  Patient exercises 1 hour on weekends (hiking or walking).     MEDICATIONS:  Reviewed      ANTHROPOMETRICS  Height: 5'8\" (note height change in chart previously 5'9\")  Weight: 316 lbs per patient   BMI (kg/m2): 48 kg/m2   %IBW: 225%  Weight History:   Wt Readings from Last 10 Encounters:   01/13/23 144.2 kg (318 lb)   10/04/22 138.1 kg (304 lb 6.4 oz)   07/27/22 139.7 kg (308 lb)   05/16/22 142.1 kg (313 lb 3.2 oz)   01/12/22 139.7 kg (308 lb)   12/22/21 137.4 kg (303 lb)   10/25/21 137.6 kg (303 lb 4.8 oz)   10/22/21 139.7 kg (308 lb)   10/08/21 139.9 kg (308 lb 6.4 oz)   10/08/21 139.7 kg (308 lb)      ASSESSED NUTRITION NEEDS  Estimated Energy Needs: 7929-3603 kcals (15-20 kcals/kg) for gradual weight loss   Estimated Protein Needs: 66-79 (1-1.2 gm/kg IBW) for repletion with exercise  RMR: 1440     EVALUATION/PROGRESS TOWARDS GOALS   Previous goals:  Add soothing activity in the evening (7:00 PM) 5 of 7 days-improving   Aim for 4 desserts per week (1-2 per week and 2 on weekend) -not met   Movement daily-not met     Previous Nutrition Diagnosis: Obesity related to excess energy intake as evidenced by BMI of 48 kg/m2-no change     Current Nutrition Diagnosis:Obesity related to excess energy intake as evidenced by BMI of 48 kg/m2     INTERVENTIONS  Nutrition Prescription   Recommend exercise 5 times per week; track intake if eating mindlessly; increase fiber to 25-30 grams per day      Implementation:   Meals and Snacks: Continue eating 3 meals + deliberate snacking (if hungry)   Nutrition Education (Content):              a)  Discussed progress towards goals.  Congratulated patient for awareness of emotional eating.  Reviewed current food and eating behavior challenges.  Patient continues with life stressors which can affect evening eating.  Discussed self care " as to impact emotional eating.  Supported patient in her struggle with food and weight.    Nutrition Education (Application):              a) Discussed emotional eating in the evening.  Reviewed alternatives to emotional eating that patient determined previously.              b) Patient verbalized understanding of diet by stating will focus on evening alternatives.   Expected patient engagement: good     CURRENT GOALS   Add soothing activity in the evening (7:00 PM) 5 of 7 days  Aim for 4 desserts per week (1-2 per week and 2 on weekend)   Aim for movement 3 days for 30 minutes (swimming and walking)     FOLLOW UP/MONITORING    Patient to follow up in 7 weeks  Patient has RD contact information      Time spent with patient: 28 minutes     Mattie Amaya, RD, LD  [facility] Outpatient Dietitian  [phone] (office phone)

## 2023-03-20 ENCOUNTER — THERAPY VISIT (OUTPATIENT)
Dept: PHYSICAL THERAPY | Facility: CLINIC | Age: 58
End: 2023-03-20
Attending: FAMILY MEDICINE
Payer: COMMERCIAL

## 2023-03-20 DIAGNOSIS — M25.561 ACUTE PAIN OF RIGHT KNEE: ICD-10-CM

## 2023-03-20 PROCEDURE — 97161 PT EVAL LOW COMPLEX 20 MIN: CPT | Mod: GP | Performed by: PHYSICAL THERAPIST

## 2023-03-20 PROCEDURE — 97110 THERAPEUTIC EXERCISES: CPT | Mod: GP | Performed by: PHYSICAL THERAPIST

## 2023-03-20 ASSESSMENT — ACTIVITIES OF DAILY LIVING (ADL)
HOW_WOULD_YOU_RATE_THE_OVERALL_FUNCTION_OF_YOUR_KNEE_DURING_YOUR_USUAL_DAILY_ACTIVITIES?: ABNORMAL
HOW_WOULD_YOU_RATE_THE_CURRENT_FUNCTION_OF_YOUR_KNEE_DURING_YOUR_USUAL_DAILY_ACTIVITIES_ON_A_SCALE_FROM_0_TO_100_WITH_100_BEING_YOUR_LEVEL_OF_KNEE_FUNCTION_PRIOR_TO_YOUR_INJURY_AND_0_BEING_THE_INABILITY_TO_PERFORM_ANY_OF_YOUR_USUAL_DAILY_ACTIVITIES?: 60
STAND: ACTIVITY IS SOMEWHAT DIFFICULT
WEAKNESS: I HAVE THE SYMPTOM BUT IT DOES NOT AFFECT MY ACTIVITY
PAIN: THE SYMPTOM AFFECTS MY ACTIVITY SLIGHTLY
SIT WITH YOUR KNEE BENT: ACTIVITY IS MINIMALLY DIFFICULT
KNEEL ON THE FRONT OF YOUR KNEE: ACTIVITY IS VERY DIFFICULT
STIFFNESS: THE SYMPTOM AFFECTS MY ACTIVITY SLIGHTLY
AS_A_RESULT_OF_YOUR_KNEE_INJURY,_HOW_WOULD_YOU_RATE_YOUR_CURRENT_LEVEL_OF_DAILY_ACTIVITY?: ABNORMAL
GIVING WAY, BUCKLING OR SHIFTING OF KNEE: I DO NOT HAVE THE SYMPTOM
GO UP STAIRS: ACTIVITY IS FAIRLY DIFFICULT
RISE FROM A CHAIR: ACTIVITY IS FAIRLY DIFFICULT
GO DOWN STAIRS: ACTIVITY IS FAIRLY DIFFICULT
SQUAT: ACTIVITY IS FAIRLY DIFFICULT
LIMPING: THE SYMPTOM AFFECTS MY ACTIVITY SLIGHTLY
WALK: ACTIVITY IS SOMEWHAT DIFFICULT

## 2023-03-20 NOTE — PROGRESS NOTES
"Physical Therapy Initial Evaluation  Subjective:  Patient reports onset of right knee symptoms in December of 2022 without precipitating event.  Pain worsened with drive to [place] in February.  Patient had difficulty with sitting, standing and walking due to pain.  She was limping with \"stuck\" feeling at knee and had difficulty dressing lower body due to difficulty bending knee . Sit to stand was painful.  Knee was injected with significant (50%) relief.  Currently patient complains of posterior knee pain which is worse with bending knee, dressing, crossing leg.  Some pain persists with walking and patient feels weak on stairs - doing one stop at a time.  She thinks she was told she had a meniscal tear several years ago.      XRay:                                                                 Impression:  1. No acute osseous abnormality.  2. Mild medial compartment joint space loss bilaterally.         Patient Health History  Angy Haji being seen for Knee dysfunction and pain.     Problem began: 2/3/2023.   Problem occurred: Too many hours driving ghe car   Pain is reported as 4/10 on pain scale.  General health as reported by patient is good.  Pertinent medical history includes: depression, high blood pressure, menopausal, overweight and sleep disorder/apnea.     Medical allergies: none.   Surgeries include:  Other. Other surgery history details: Overy remivsl, gall bladder removal, vein removal R leg.    Current medications:  High blood pressure medication, hormone replacement therapy and other. Other medications details: Mood stabilizers.    Current occupation is Psychotherapist.   Primary job tasks include:  Prolonged sitting.                                    Objective:  Standing Alignment:                  General deviations alignment: Obesity.  Gait:    Gait Type:  Antalgic   Assistive Devices:  None                                                        Knee Evaluation:  ROM:    AROM    Hyperextension: "  Left:  5    Right: 5  Extension:  Left: 5    Right:  5  Flexion: Left: 112    Right: 108        Strength:     Extension:  Left: 5/5    Pain:-      Right: 5/5    Pain:-  Flexion:  Left: 5/5    Pain:-      Right: 4+/5    Pain:++      Quad Set Right: Good    Pain:  Ligament Testing:  Normal                Special Tests:       Right knee negative for the following special tests:  Meniscal and Patellar Compression  Palpation:      Right knee tenderness present at:  Medial Joint Line  Edema:  Normal      Functional Testing:          Quad:    Single Leg Squat:  Left:     Limited to 45 deg   Right:      Limited to 30 deg   Bilateral Leg Squat:            Poor control with step on on right to 8 in step.  Tender at distal semimembranosis    General     ROS    Assessment/Plan:    Patient is a 57 year old female with right side knee complaints.    Patient has the following significant findings with corresponding treatment plan.                Diagnosis 1:  Right knee pain  Pain -  self management, education and home program  Decreased ROM/flexibility - manual therapy and therapeutic exercise  Decreased strength - therapeutic exercise and therapeutic activities  Impaired gait - gait training  Decreased function - therapeutic activities    Therapy Evaluation Codes:   1) History comprised of:   Personal factors that impact the plan of care:      None.    Comorbidity factors that impact the plan of care are:      None.     Medications impacting care: None.  2) Examination of Body Systems comprised of:   Body structures and functions that impact the plan of care:      Knee.   Activity limitations that impact the plan of care are:      Sitting, Stairs, Standing and Walking.  3) Clinical presentation characteristics are:   Stable/Uncomplicated.  4) Decision-Making    Low complexity using standardized patient assessment instrument and/or measureable assessment of functional outcome.  Cumulative Therapy Evaluation is: Low  complexity.    Previous and current functional limitations:  (See Goal Flow Sheet for this information)    Short term and Long term goals: (See Goal Flow Sheet for this information)     Communication ability:  Patient appears to be able to clearly communicate and understand verbal and written communication and follow directions correctly.  Treatment Explanation - The following has been discussed with the patient:   RX ordered/plan of care  Anticipated outcomes  Possible risks and side effects  This patient would benefit from PT intervention to resume normal activities.   Rehab potential is good.    Frequency:  1 X week, once daily  Duration:  for 8 weeks  Discharge Plan:  Achieve all LTG.  Independent in home treatment program.  Reach maximal therapeutic benefit.    Please refer to the daily flowsheet for treatment today, total treatment time and time spent performing 1:1 timed codes.

## 2023-04-03 ENCOUNTER — THERAPY VISIT (OUTPATIENT)
Dept: PHYSICAL THERAPY | Facility: CLINIC | Age: 58
End: 2023-04-03
Attending: FAMILY MEDICINE
Payer: COMMERCIAL

## 2023-04-03 DIAGNOSIS — M25.561 RIGHT KNEE PAIN: Primary | ICD-10-CM

## 2023-04-03 PROCEDURE — 97140 MANUAL THERAPY 1/> REGIONS: CPT | Mod: GP | Performed by: PHYSICAL THERAPIST

## 2023-04-03 PROCEDURE — 97110 THERAPEUTIC EXERCISES: CPT | Mod: GP | Performed by: PHYSICAL THERAPIST

## 2023-04-11 ENCOUNTER — THERAPY VISIT (OUTPATIENT)
Dept: PHYSICAL THERAPY | Facility: CLINIC | Age: 58
End: 2023-04-11
Payer: COMMERCIAL

## 2023-04-11 DIAGNOSIS — M25.561 RIGHT KNEE PAIN: Primary | ICD-10-CM

## 2023-04-11 PROCEDURE — 97110 THERAPEUTIC EXERCISES: CPT | Mod: GP | Performed by: PHYSICAL THERAPIST

## 2023-04-11 PROCEDURE — 97140 MANUAL THERAPY 1/> REGIONS: CPT | Mod: GP | Performed by: PHYSICAL THERAPIST

## 2023-04-16 ASSESSMENT — PATIENT HEALTH QUESTIONNAIRE - PHQ9
10. IF YOU CHECKED OFF ANY PROBLEMS, HOW DIFFICULT HAVE THESE PROBLEMS MADE IT FOR YOU TO DO YOUR WORK, TAKE CARE OF THINGS AT HOME, OR GET ALONG WITH OTHER PEOPLE: SOMEWHAT DIFFICULT
SUM OF ALL RESPONSES TO PHQ QUESTIONS 1-9: 7
SUM OF ALL RESPONSES TO PHQ QUESTIONS 1-9: 7

## 2023-04-17 ENCOUNTER — VIRTUAL VISIT (OUTPATIENT)
Dept: PSYCHIATRY | Facility: CLINIC | Age: 58
End: 2023-04-17
Attending: PSYCHIATRY & NEUROLOGY
Payer: COMMERCIAL

## 2023-04-17 DIAGNOSIS — F31.9 BIPOLAR I DISORDER (H): ICD-10-CM

## 2023-04-17 PROCEDURE — 99214 OFFICE O/P EST MOD 30 MIN: CPT | Mod: VID | Performed by: STUDENT IN AN ORGANIZED HEALTH CARE EDUCATION/TRAINING PROGRAM

## 2023-04-17 PROCEDURE — G0463 HOSPITAL OUTPT CLINIC VISIT: HCPCS | Mod: PN,GT | Performed by: STUDENT IN AN ORGANIZED HEALTH CARE EDUCATION/TRAINING PROGRAM

## 2023-04-17 RX ORDER — LITHIUM CARBONATE 300 MG/1
1200 CAPSULE ORAL AT BEDTIME
Qty: 120 CAPSULE | Refills: 1
Start: 2023-04-17 | End: 2023-04-26

## 2023-04-17 RX ORDER — LAMOTRIGINE 200 MG/1
200 TABLET ORAL DAILY
Qty: 30 TABLET | Refills: 1 | Status: SHIPPED | OUTPATIENT
Start: 2023-04-17 | End: 2023-06-05

## 2023-04-17 RX ORDER — LAMOTRIGINE 150 MG/1
150 TABLET ORAL DAILY
Qty: 30 TABLET | Refills: 1 | Status: SHIPPED | OUTPATIENT
Start: 2023-04-17 | End: 2023-06-05

## 2023-04-17 NOTE — PROGRESS NOTES
Virtual Visit Details    Type of service:  Video Visit     Originating Location (pt. Location): Home  Distant Location (provider location):  On-site  Platform used for Video Visit: [facility]  Psychiatry Clinic  PSYCHIATRY PROGRESS NOTE     CARE TEAM:  PCP- Milly Jenkins, Psychotherapist- [facility] with Verna Chan  Specialty Providers- OB/GYN, ENT, PT, Nutrition      Wife- Rachael Haji is a 55 year old who prefers the name Angy and uses pronouns she, her, hers.      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 This is in the context of ongoing unresolved stressors in her life. She's working with her therapist and they are considering EMDR later this month to target negative self talk. We discussed potential options to help with mood like increasing Lithium to 1200mg or adding on Wellbutrin. Angy preferred to increase Lithium dose at this time.We also discussed the upcoming transition plan and she prefers to stay in the resident clinic despite the short-term nature of care with residents transitioning out of the clinic. No SI or HI.    Angy has been reporting remote memory impairment and cognitive dulling for a number of years. Last MoCA (April 2022) was 27/30. Angy still continues to report forgetting events that happened many years ago despite prompting from family members. I suggested considering a Neurology referral for a second opinion since MoCA done in our clinic over the years haven't been concerning yet she continues to report some memory impairment. This impairment doesn't affect daily functioning. She would like some time to think about it.    [place] review was not needed today. Patient is not taking any controlled substances.     PLAN                                                                                                                 1) Meds-  - Increase Lithium to 1200mg at bedtime  - Continue Lamictal 350mg daily    2) Psychotherapy-Continue individual therapy every other week & couples counseling. Plans to start EMDR in 2 weeks.    3) Next due-  Labs- Next Li labs due 06/2023 but can be done next month given recent increase in Lithium  EKG- PRN  Rating scales- N/A    4) Referrals- None    5) Dispo- RTC in 4-6 weeks     PERTINENT BACKGROUND                           [most recent eval 08/22/21]    Patient has not had significant mental health history until 2018, when she developed perimenopausal mood and anxiety symptoms.  She was subsequently referred to our clinic following a hospitalization for anthony in November 2018.  Her care has been complicated by sensitivity to antipsychotics (EPSE including difficulty swallowing), thrombocytopenia secondary to Depakote, lithium toxicity in setting of concurrent Depakote use).    Psych pertinent item history includes Manic episode(h/o one manic episode Nov/Dec 2018), psychiatric hospitalization in November 2018.     SUBJECTIVE   Patient presented for a follow up.    Since last visit:  -Angy reports that mood has been intermittently low with negative self-talk. Negative slef-talk has been situational and related to a fear of not doing things well.   -Reports that there have been family stressors which has precipitated more irritability in her mood. She also has to work with her wife on her their financial plan which has felt very stressful and resulted in procrastinating the task.  -Has been walking more outdoors and spending time with her son, Tyler at the [facility] swimming pool.  -Experiencing dry mouth that has been going on for almost a year. She has been trying to manage it with staying hydrated but at her last dentist appointment was told that she had a poor saliva quality. She  was prescribed a high fluoride toothpaste.  -Continues to report some difficulty with remote memory recall. She had forgotten that her son had participated in the children's choir in the past. Even when she was reminded, had no recollection of it.    RECENT PSYCH ROS:   Depression: low mood and irritability  Elevated:  none  Psychosis:  none  Anxiety:  Worrisome thoughts are mostly situational.  Trauma Related:  none  Sleep:Continues to report vivid dreams  Other: N/A    Adverse Effects: Vivid dreams and dry mouth.  Pertinent Negative Symptoms: No suicidal ideation, self-injurious behavior/urges or violent ideation  Recent Substance Use:   Alcohol- 1 drink (beer or wine) per week or 2 weeks.     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   to wifeRachael. They have a teenage son.     PSYCH and SUBSTANCE USE Critical Summary Points since July 2021 08/26/2021: Resident transfer visit. Increased Lithium to 1200mg.  10/15/2021: decrease Lithium back to 1050 mg  11/02/2021: Lamictal titration plan started  11/29/2021: Increase Lamictal to 100 mg for 7 days and then increase to 150 mg  12/14/2021: Increase Lamictal to 200mg  01/24/2022: No medication changes  03/18/2022: No medication changes  04/29/2022: No medication changes  05/05/2022: Increased Lamictal to 250  mg (inbtwn visits)  06/10/2022: No medication changes  08/29/2022: Increase Lamictal to 300mg  10/24/2022: No medication change  12/7/2022: Li serum level is 0.6  12/19/2022: No medication changes  01/30/2023: Increase Lamictal to 350mg  03/13/2023: No medication changes  04/17/2023: Increased Lamictal to 1200mg     PAST MED TRIALS      Medication Max Dose (mg) Dates / Duration Helpful? DC Reason / Adverse Effects?   Sertraline       Depakote    Tremors   Olanzapine    EPSE   Seroquel    EPSE   Lithium 1050 current        MEDICAL HISTORY and ALLERGY     ALLERGIES: Seroquel [quetiapine], Olanzapine, Penicillins, Risperidone, and Thimerosal    Patient Active Problem List   Diagnosis     Oral aphthae     CARDIOVASCULAR SCREENING; LDL GOAL LESS THAN 160     Rosacea     Raynaud disease     Vitamin D deficiency disease     MARLA (obstructive sleep apnea)     Atopic rhinitis     Chronic bilateral low back pain without sciatica     Obesity, Class III, BMI 40-49.9 (morbid obesity) (H)     Muscle tension dysphonia     Palpitations     Dysphagia, unspecified type     Anxiety     Benign essential hypertension     Callus of foot     Varicose veins of right lower extremity with edema     Bipolar I disorder (H)     Dribbling of urine     Right knee pain     Spinal stenosis at L4-L5 level     Facet arthropathy, lumbar      MEDICAL REVIEW OF SYSTEMS     Patient is on Estradiol.     MEDICATIONS     Current Outpatient Medications   Medication Sig Dispense Refill     acetaminophen (TYLENOL) 500 MG tablet Take 500-1,000 mg by mouth every 6 hours as needed for mild pain       Carboxymethylcellulose Sod PF (REFRESH PLUS) 0.5 % SOLN ophthalmic solution Place 1 drop into both eyes 3 times daily as needed for dry eyes       estradiol (ESTRACE) 0.5 MG tablet Take 1.5 tablets (0.75 mg) by mouth daily 140 tablet 3     estradiol (VAGIFEM) 10 MCG TABS vaginal tablet Place 1 tablet (10 mcg) vaginally twice a week 26 tablet 3     lamoTRIgine  "(LAMICTAL) 150 MG tablet Take 1 tablet (150 mg) by mouth daily 30 tablet 1     lamoTRIgine (LAMICTAL) 200 MG tablet Take 1 tablet (200 mg) by mouth daily 30 tablet 1     lisinopril (ZESTRIL) 10 MG tablet Take 1 tablet (10 mg) by mouth daily 90 tablet 3     lithium (ESKALITH) 150 MG capsule Take 1 capsule (150 mg) by mouth At Bedtime 30 capsule 1     lithium 300 MG capsule Take 3 capsules (900 mg) by mouth At Bedtime 90 capsule 1     Loratadine 10 MG capsule Take 10 mg by mouth daily       medroxyPROGESTERone (PROVERA) 2.5 MG tablet Take 1 tablet (2.5 mg) by mouth daily 90 tablet 3     metroNIDAZOLE (METROGEL) 1 % external gel Apply topically daily 60 g 9     ORDER FOR DME Use your CPAP device as directed by your provider.       VITAMIN D, CHOLECALCIFEROL, PO Take 2,000 Units by mouth daily        VITALS   There were no vitals taken for this visit.    MENTAL STATUS EXAM     Alertness: alert and oriented  Appearance: well groomed  Behavior/Demeanor: cooperative, with good  eye contact   Speech: regular rate and rhythm  Language: intact  Psychomotor: normal or unremarkable  Mood: \"okay\"  Affect: appropriate; congruent to: mood- yes, content- yes  Thought Process/Associations: linear and logical  Thought Content:  Reports none;  Denies suicidal ideation and violent ideation  Perception:  Reports none;  Denies auditory hallucinations and visual hallucinations  Insight: good  Judgment: good  Cognition: does  appear grossly intact; formal cognitive testing was not done. Reports some concern with remote memory  Gait and Station: N/A (Telehealth)     LABS and DATA     JOANNA-N/A  PHQ-9: 7      12/18/2022     9:14 PM 1/29/2023     7:54 PM 4/16/2023     3:56 PM   PHQ   PHQ-9 Total Score 6 7 7   Q9: Thoughts of better off dead/self-harm past 2 weeks Not at all Not at all Not at all     Recent Labs   Lab Test 12/07/22  1111 05/04/22  0928 09/03/21  1007   CR 0.55 0.56 0.61   GFRESTIMATED >90 >90 >90     Recent Labs   Lab Test " 12/07/22  1111 05/04/22  0928 02/03/21  1019   AST 10 13 13   ALT 23 22 17   ALKPHOS 88 80 88     Recent Labs   Lab Test 12/07/22  1111 05/04/22  0928 09/03/21  1007   LITHIUM 0.6 0.8 0.8     Recent Labs   Lab Test 12/07/22 1111 05/04/22  0928   CR 0.55 0.56   GFRESTIMATED >90 >90    138   POTASSIUM 4.4 4.0   SHERRIE 9.3 9.2     Recent Labs   Lab Test 12/07/22  1111 02/03/21  1011   SG 1.015  1.015 1.011     Recent Labs   Lab Test 12/07/22  1111 05/04/22  0928   TSH 2.13 2.03     Recent Labs   Lab Test 12/07/22 1111 05/04/22  0928 09/03/21  1007 02/03/21  1019 07/15/20  1617   WBC 7.3 7.4   < > 7.7 8.3   ANEU  --   --   --  5.1 5.6    < > = values in this interval not displayed.       ECG :10/25/2021 QTc 442ms     PSYCHOTROPIC DRUG INTERACTIONS     Lithium tocicity: Lithium & ACEI   Low serum level: Lamotrigine and Estrogen derivatives      MANAGEMENT:  Monitoring for adverse effects, routine labs, periodic EKGs and patient is aware of risks     RISK STATEMENT for SAFETY     Angy Haji did not appear to be an imminent safety risk to self or others.    TREATMENT RISK STATEMENT: The risks, benefits, alternatives and potential adverse effects have been discussed and are understood by the pt. The pt understands the risks of using street drugs or alcohol. There are no medical contraindications, the pt agrees to treatment with the ability to do so. The pt knows to call the clinic for any problems or to access emergency care if needed.  Medical and substance use concerns are documented above.  Psychotropic drug interaction check was done, including changes made today.     PROVIDER: Tolulope Oluwadamilola Odebunmi, MD    Patient not staffed in clinic.  Note will be reviewed and signed by supervisor Dr. Tapia.      Level of Medical Decision Making:   - At least 1 chronic problem that is not stable  - Engaged in prescription drug management during visit (discussed any medication benefits, side effects, alternatives,  etc.)        Must meet 2 out of 3 of the above MDM elements to bill at the specified level     For help more info about elements / criteria, click here-> MDM Help Grid     *This blue text will disappear automatically when note is signed, no need to delete*:150

## 2023-04-17 NOTE — NURSING NOTE
Is the patient currently in the state of [place]? YES    Visit mode:VIDEO    If the visit is dropped, the patient can be reconnected by: VIDEO VISIT: Send to e-mail at: [email or web address]    Will anyone else be joining the visit? NO      How would you like to obtain your AVS? MyChart    Are changes needed to the allergy or medication list? NO    Reason for visit: Video Visit    GARRETT Alexander on 4/17/2023 at 3:20 PM

## 2023-04-18 ENCOUNTER — THERAPY VISIT (OUTPATIENT)
Dept: PHYSICAL THERAPY | Facility: CLINIC | Age: 58
End: 2023-04-18
Payer: COMMERCIAL

## 2023-04-18 DIAGNOSIS — M25.561 RIGHT KNEE PAIN: Primary | ICD-10-CM

## 2023-04-18 PROCEDURE — 97110 THERAPEUTIC EXERCISES: CPT | Mod: GP | Performed by: PHYSICAL THERAPIST

## 2023-04-18 PROCEDURE — 97140 MANUAL THERAPY 1/> REGIONS: CPT | Mod: GP | Performed by: PHYSICAL THERAPIST

## 2023-04-26 DIAGNOSIS — F31.9 BIPOLAR I DISORDER (H): ICD-10-CM

## 2023-04-26 RX ORDER — LITHIUM CARBONATE 600 MG/1
1200 CAPSULE ORAL AT BEDTIME
Qty: 60 CAPSULE | Refills: 1 | Status: SHIPPED | OUTPATIENT
Start: 2023-04-26 | End: 2023-05-12

## 2023-04-26 NOTE — TELEPHONE ENCOUNTER
Health Call Center    Phone Message    May a detailed message be left on voicemail: yes     Reason for Call: Medication Refill Request    Has the patient contacted the pharmacy for the refill? Yes   Name of medication being requested: lithium 300 MG capsule - pt said that her medication was increased to 1200mg , 2 600mg tablets  Provider who prescribed the medication: Dr. Sims  Pharmacy: [facility] [address]  Date medication is needed: asap    Pt requested call back once refill is sent through.      Action Taken: Message routed to:  Other: psychiatry nurse ump    Travel Screening: Not Applicable

## 2023-04-26 NOTE — TELEPHONE ENCOUNTER
Last seen: 4/17/23  RTC: 4-6 weeks  Cancel: none  No-show: none  Next appt: 6/5/23     Incoming refill from Patient via Phone    Medication requested:   Pending Prescriptions:                       Disp   Refills    lithium (ESKALITH) 600 MG capsule         60 cap*1            Sig: Take 2 capsules (1,200 mg) by mouth At Bedtime      From chart note:   Increase Lithium to 1200mg at bedtime       Medication sent to provider for review.

## 2023-05-01 ENCOUNTER — THERAPY VISIT (OUTPATIENT)
Dept: PHYSICAL THERAPY | Facility: CLINIC | Age: 58
End: 2023-05-01
Payer: COMMERCIAL

## 2023-05-01 DIAGNOSIS — M25.561 RIGHT KNEE PAIN: Primary | ICD-10-CM

## 2023-05-01 PROCEDURE — 97110 THERAPEUTIC EXERCISES: CPT | Mod: GP | Performed by: PHYSICAL THERAPIST

## 2023-05-03 ENCOUNTER — HOSPITAL ENCOUNTER (OUTPATIENT)
Dept: NUTRITION | Facility: CLINIC | Age: 58
Discharge: HOME | End: 2023-05-03
Attending: DIETITIAN, REGISTERED | Admitting: DIETITIAN, REGISTERED
Payer: COMMERCIAL

## 2023-05-03 PROCEDURE — 97803 MED NUTRITION INDIV SUBSEQ: CPT | Mod: GT,95 | Performed by: DIETITIAN, REGISTERED

## 2023-05-03 NOTE — PROGRESS NOTES
Telemedicine Visit: The patient's condition can be safely assessed and treated via synchronous audio and visual telemedicine encounter.      Reason for Telemedicine Visit: Services only offered telehealth    Originating Site (Patient Location): Patient's other office setting         Distant Location (provider location):  On-site    Consent:  The patient/guardian has verbally consented to: the potential risks and benefits of telemedicine (video visit) versus in person care; bill my insurance or make self-payment for services provided; and responsibility for payment of non-covered services.     Mode of Communication:  Video Conference via [email or web address]    As the provider I attest to compliance with applicable laws and regulations related to telemedicine.    NUTRITION REASSESSMENT NOTE   REASON FOR ASSESSMENT  Angy Haji referred by Dr. Lira for MNT related to  Obesity, Class III, BMI 40-49.9 (morbid obesity) (H) [E66.01]          Patient accompanied by self      NUTRITION HISTORY  Patient continues with multiple life changes and stressors.  Patient aware that she likes to self soothe with ice cream which she has recently increased.  Patient is aware that social cues trigger her to eat. Patient's spouse recently had weight loss surgery so there is adjust with eating in the home.      3/7/2022 Self soothe in evening snack-made tea or tells self ate late dinner and does not need snack.  Patient asking self if physically hungry.  Patient drinking fizzy water or malt o meal as evening snack.  Patient aware she has been stress eating.  Tracking when not having dessert -identifying why eating  Exercise-cross country skiing; mall walk; snow shoeing; treadmill 2 times haring treadmill   Yogurt; cheese and crackers; cereal      Patient wants to increase exercise time as will be going to [facility] Day weekend ([place]) [place]      4/25/2002 Patient able to get 45-60 minutes exercise on weekends and 20-30 minutes of  exercise daily.  Patient wants to lose 5 lbs prior to trip to [place].  Patient was able to have 3 days no desserts and then 1 week no dessert. 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  Patient determined ways she could lose weight: Dessert 2 times per week, add treadmill, eliminate morning snack, 1 snack at 3 PM. Alcohol 1 time per week, no caramel lattes, food is fuel, it's too late to be hungry, spouse brings sweet treats without asking, tea/bath/shower      6/15/2022 Patient skips dessert if eats late dinner.  Patient felt discouraged with weight loss as wanted to lose more weight (down 3.5 lbs). Patient wants weight loss for functional reasons.  Patient signed [place] for the [place].  Patient has increased cravings and thoughts about ice cream.  Shift in relationship with spouse after spouse's weight loss surgery.      7/27/2022 Patient increased exercise-[place] with [employer]; started the last week in June for bike rides (4 rides different times in 2 weeks-9 miles).  Rode in 18 mile race.  Went hiking [place] vacation[place].     No dessert (hit or miss) 50% 3 days no dessert; ice cream and s'mores on vacation     Typical intake:  Greek yogurt + 1/4 cup granola + fruit + 1/2 and 1/2 with coffee or english muffin with peanut butter/butter and strawberry jam or eggs scrambled or hard boiled  Leftovers -1 cup cherries; cherry tomatoes; 1/2 cup pea pods + 3 oz pork tenderloin (teryaki)   Watermelon, pork tenderloin, 1 cup rice + tomatoes/pea pods; cheeseburger with whole wheat bun + vegetables + watermelon   Snacks: croissant ([place]) Episcopalian oat squares (1 cup dry) (3:00 PM) cheese and crackers    Beverages: coffee; water; fizzy water      Emotional eating at night-ice cream      9/21/2022 Patient has been able to bike 2 times per week.  Patient's weight has been stable.  Patient has not been able to track dinner and  snacks.  Stress eating ebbs and flows.  Trying to save dessert for the weekend or may self soothe during th week with ice cream.  Patient stress eating in the evening and may boredom eat prior to lunch (cereal).    Weight: 308.4 lbs       11/16/2022 Biking 2 times per week. Tour biking race: [employer] 14 miles (rode by herself). [place] 12 mile hilly bike ride. (rode with friend).  Patient has consistently walked dog and completes PT exercises.  Patient determining plan for winter exercise.    314.4 lbs (food and sleep changes) Patient was eating more sweets as patient's wife baked sweets this fall (3-4 weeks with increase of sweets).  Holiday baking plan consists of 5 cookies and 1 candy (peanut brittle).  Ice cream consumption has increased recently.      1/23/2023 Patient was able to bake holiday cookies and freeze them.  Patient has been eating desserts 5 days a week.  Patient has been substituting greek yogurt (flavored or vanilla) or cream of wheat for dessert.  Patient was able to snow shoe.  Patient having barrier with getting on treadmill.  Patient's weight has fluctuated 325.8 and now 316 lbs.  Patient has been working on sleep routine (classical music; reading on paper vs ipad).      3/17/2023 Patient having knee pain and went to orthopedic MD and got cortizone shot in knee and able to walk know. Walked dog 3 times this week. Swimming -[facility] (6 sameer pool). Tyler wants to swim with goal of swimming 2 times per week.  Patient states ice cream and dessert are hit or miss. Patient shifting mindset to days having dessert vs not having dessert. Patient has not been as mindful with practicing self soothe at night.  Has tried tea and engaging book.    5/3/2023 PT exercises for knee; walking 15-30 minutes per day with dog + swimming 1 time per week (9 laps); 5 days per week desserts -patient using self talk to reduce dessert intake. Patient trying to limit desserts.   "Patient's wife brining bread and sweets into home in which patient feels compels to eat as not to waste food.  Patient wants to be aware of foods when dining out and would like to split meal with spouse. Patient using zinger tea to soothe at night.       DIET RECALL   Breakfast: cereal and banana; yogurt + granola (measures), fruit + coffee; 2 slices toast with butter + 1/2 cup cottage cheese    Lunch: leftovers (includes fruit and vegetable daily)  Snack: fruit or cheese with lower salt nut thin crackers   Dinner: Tuna steak, Asian marinade, stir gomez, baked vegetable + brown rice (3/4 cup) + canned peaches; chicken thighs with beans and rice; sheet pan dinner with chicken and vegetables or grilled salmon and grilled vegetables; taco; protein + fruit and vegetable  Snacks: fruit, string cheese, Ritz crackers or yogurt; dessert in the evening (ice cream recently)  Beverages: mainly water, coffee, mineral water  Dining out: none currently  Exercise: Patient exercising 5-6 times per week. (cross country skiing, snow shoeing, walking and walking dog).  Patient exercises 1 hour on weekends (hiking or walking).     MEDICATIONS:  Reviewed      ANTHROPOMETRICS  Height: 5'8\" (note height change in chart previously 5'9\")  Weight: 316 lbs per patient   BMI (kg/m2): 48 kg/m2   %IBW: 225%  Weight History:   Wt Readings from Last 10 Encounters:   01/13/23 144.2 kg (318 lb)   10/04/22 138.1 kg (304 lb 6.4 oz)   07/27/22 139.7 kg (308 lb)   05/16/22 142.1 kg (313 lb 3.2 oz)   01/12/22 139.7 kg (308 lb)   12/22/21 137.4 kg (303 lb)   10/25/21 137.6 kg (303 lb 4.8 oz)   10/22/21 139.7 kg (308 lb)   10/08/21 139.9 kg (308 lb 6.4 oz)   10/08/21 139.7 kg (308 lb)      ASSESSED NUTRITION NEEDS  Estimated Energy Needs: 3937-3105 kcals (15-20 kcals/kg) for gradual weight loss   Estimated Protein Needs: 66-79 (1-1.2 gm/kg IBW) for repletion with exercise  RMR: 1440     EVALUATION/PROGRESS TOWARDS GOALS   Previous goals:  Add soothing " activity in the evening (7:00 PM) 5 of 7 days-improving    Aim for 4 desserts per week (1-2 per week and 2 on weekend) -not met   Aim for movement 3 days for 30 minutes (swimming and walking)-met     Previous Nutrition Diagnosis: Obesity related to excess energy intake as evidenced by BMI of 48 kg/m2-no change     Current Nutrition Diagnosis:Obesity related to excess energy intake as evidenced by BMI of 48 kg/m2     INTERVENTIONS  Nutrition Prescription   Recommend exercise 5 times per week; track intake if eating mindlessly; increase fiber to 25-30 grams per day      Implementation:   Meals and Snacks: Continue eating 3 meals + deliberate snacking (if hungry)   Nutrition Education (Content):              a)  Discussed progress towards goals.  Congratulated patient for awareness of emotional eating and using self talk to reduce dessert intake.  Reviewed current food and eating behavior challenges.  Patient continues with life stressors which can affect evening eating.  Discussed self care as to impact emotional eating.  Supported patient in her struggle with food and weight.    Nutrition Education (Application):              a) Discussed strategies to set boundaries with food.                b) Patient verbalized understanding of diet by stating will focus on soothing evening activities.   Expected patient engagement: good     CURRENT GOALS   Add soothing activity in the evening (7:00 PM) 5 of 7 days  Aim for 4 desserts per week (1-2 per week and 2 on weekend)   Aim for movement 3 days for 30 minutes (swimming and walking)     FOLLOW UP/MONITORING    Patient to follow up in 8 weeks  Patient has RD contact information      Time spent with patient: 30 minutes     Mattie Amaya, RD, LD  [facility] Outpatient Dietitian  [phone] (office phone)

## 2023-05-08 ENCOUNTER — THERAPY VISIT (OUTPATIENT)
Dept: PHYSICAL THERAPY | Facility: CLINIC | Age: 58
End: 2023-05-08
Payer: COMMERCIAL

## 2023-05-08 DIAGNOSIS — M25.561 RIGHT KNEE PAIN: Primary | ICD-10-CM

## 2023-05-08 PROCEDURE — 97110 THERAPEUTIC EXERCISES: CPT | Mod: GP | Performed by: PHYSICAL THERAPIST

## 2023-05-12 DIAGNOSIS — F31.9 BIPOLAR I DISORDER (H): Primary | ICD-10-CM

## 2023-05-12 RX ORDER — LITHIUM CARBONATE 300 MG
900 TABLET ORAL AT BEDTIME
Qty: 30 TABLET | Refills: 1 | Status: SHIPPED | OUTPATIENT
Start: 2023-05-12 | End: 2023-06-05

## 2023-05-12 RX ORDER — LITHIUM CARBONATE 150 MG/1
150 CAPSULE ORAL AT BEDTIME
Qty: 30 CAPSULE | Refills: 1 | Status: SHIPPED | OUTPATIENT
Start: 2023-05-12 | End: 2023-06-05

## 2023-05-12 NOTE — PROGRESS NOTES
Received a [employer]ictor[employer] message that Angy has been experiencing worsening tremors over the past two weeks. We recently increased Lithium to 1200mg (from 1050mg ) in April. She didn't report symptoms of confusion, slowness, slurred speech,loss of balance, or diarrhea.   I tried calling Angy this afternoon to get more information about her symptoms and to ask more about her hydration status or if she started any new medications. Call went to her voice mail.    Sent her the following recommendations through Pictorama:  -Do the lithium labs ordered during the last appointment ASAP  -Decrease Lithium dose back to 1050mg for now. This might warrant further decrease depending on the serum level after testing or emergence of symptoms suggestive for toxicity.      Oscar Sims MD,MPH  PGY-4 Psychiatry Resident

## 2023-05-15 ENCOUNTER — THERAPY VISIT (OUTPATIENT)
Dept: PHYSICAL THERAPY | Facility: CLINIC | Age: 58
End: 2023-05-15
Payer: COMMERCIAL

## 2023-05-15 ENCOUNTER — LAB (OUTPATIENT)
Dept: LAB | Facility: CLINIC | Age: 58
End: 2023-05-15
Payer: COMMERCIAL

## 2023-05-15 DIAGNOSIS — M25.561 RIGHT KNEE PAIN: Primary | ICD-10-CM

## 2023-05-15 DIAGNOSIS — F31.9 BIPOLAR I DISORDER (H): ICD-10-CM

## 2023-05-15 LAB
ALBUMIN SERPL BCG-MCNC: 4.3 G/DL (ref 3.5–5.2)
ALP SERPL-CCNC: 80 U/L (ref 35–104)
ALT SERPL W P-5'-P-CCNC: 16 U/L (ref 10–35)
ANION GAP SERPL CALCULATED.3IONS-SCNC: 9 MMOL/L (ref 7–15)
AST SERPL W P-5'-P-CCNC: 17 U/L (ref 10–35)
BASOPHILS # BLD AUTO: 0 10E3/UL (ref 0–0.2)
BASOPHILS NFR BLD AUTO: 0 %
BILIRUB SERPL-MCNC: 0.3 MG/DL
BUN SERPL-MCNC: 12.8 MG/DL (ref 6–20)
CALCIUM SERPL-MCNC: 10.2 MG/DL (ref 8.6–10)
CHLORIDE SERPL-SCNC: 104 MMOL/L (ref 98–107)
CREAT SERPL-MCNC: 0.58 MG/DL (ref 0.51–0.95)
DEPRECATED HCO3 PLAS-SCNC: 26 MMOL/L (ref 22–29)
EOSINOPHIL # BLD AUTO: 0.2 10E3/UL (ref 0–0.7)
EOSINOPHIL NFR BLD AUTO: 2 %
ERYTHROCYTE [DISTWIDTH] IN BLOOD BY AUTOMATED COUNT: 13 % (ref 10–15)
GFR SERPL CREATININE-BSD FRML MDRD: >90 ML/MIN/1.73M2
GLUCOSE SERPL-MCNC: 90 MG/DL (ref 70–99)
HCT VFR BLD AUTO: 42.2 % (ref 35–47)
HGB BLD-MCNC: 13.6 G/DL (ref 11.7–15.7)
IMM GRANULOCYTES # BLD: 0.1 10E3/UL
IMM GRANULOCYTES NFR BLD: 1 %
LITHIUM SERPL-SCNC: 0.7 MMOL/L (ref 0.6–1.2)
LYMPHOCYTES # BLD AUTO: 1.7 10E3/UL (ref 0.8–5.3)
LYMPHOCYTES NFR BLD AUTO: 20 %
MCH RBC QN AUTO: 28.6 PG (ref 26.5–33)
MCHC RBC AUTO-ENTMCNC: 32.2 G/DL (ref 31.5–36.5)
MCV RBC AUTO: 89 FL (ref 78–100)
MONOCYTES # BLD AUTO: 0.5 10E3/UL (ref 0–1.3)
MONOCYTES NFR BLD AUTO: 5 %
NEUTROPHILS # BLD AUTO: 5.9 10E3/UL (ref 1.6–8.3)
NEUTROPHILS NFR BLD AUTO: 72 %
NRBC # BLD AUTO: 0 10E3/UL
NRBC BLD AUTO-RTO: 0 /100
PLATELET # BLD AUTO: 230 10E3/UL (ref 150–450)
POTASSIUM SERPL-SCNC: 4.2 MMOL/L (ref 3.4–5.3)
PROT SERPL-MCNC: 7.3 G/DL (ref 6.4–8.3)
RBC # BLD AUTO: 4.75 10E6/UL (ref 3.8–5.2)
SODIUM SERPL-SCNC: 139 MMOL/L (ref 136–145)
TSH SERPL DL<=0.005 MIU/L-ACNC: 2.51 UIU/ML (ref 0.3–4.2)
WBC # BLD AUTO: 8.3 10E3/UL (ref 4–11)

## 2023-05-15 PROCEDURE — 80053 COMPREHEN METABOLIC PANEL: CPT

## 2023-05-15 PROCEDURE — 85025 COMPLETE CBC W/AUTO DIFF WBC: CPT

## 2023-05-15 PROCEDURE — 80178 ASSAY OF LITHIUM: CPT

## 2023-05-15 PROCEDURE — 97110 THERAPEUTIC EXERCISES: CPT | Mod: GP | Performed by: PHYSICAL THERAPIST

## 2023-05-15 PROCEDURE — 36415 COLL VENOUS BLD VENIPUNCTURE: CPT

## 2023-05-15 PROCEDURE — 84443 ASSAY THYROID STIM HORMONE: CPT

## 2023-05-26 ASSESSMENT — ENCOUNTER SYMPTOMS
SHORTNESS OF BREATH: 0
HEMATOCHEZIA: 0
BREAST MASS: 0
ARTHRALGIAS: 1
HEMATURIA: 0
NERVOUS/ANXIOUS: 1
FREQUENCY: 0
JOINT SWELLING: 0
DYSURIA: 0
HEARTBURN: 0
WEAKNESS: 0
ABDOMINAL PAIN: 0
SORE THROAT: 0
PALPITATIONS: 0
MYALGIAS: 0
PARESTHESIAS: 0
HEADACHES: 0
CHILLS: 0
CONSTIPATION: 0
COUGH: 0
EYE PAIN: 0
NAUSEA: 0
DIARRHEA: 0
DIZZINESS: 0
FEVER: 0

## 2023-05-30 ENCOUNTER — THERAPY VISIT (OUTPATIENT)
Dept: PHYSICAL THERAPY | Facility: CLINIC | Age: 58
End: 2023-05-30
Payer: COMMERCIAL

## 2023-05-30 DIAGNOSIS — M25.561 RIGHT KNEE PAIN: Primary | ICD-10-CM

## 2023-05-30 PROCEDURE — 97110 THERAPEUTIC EXERCISES: CPT | Mod: GP | Performed by: PHYSICAL THERAPIST

## 2023-05-31 ENCOUNTER — OFFICE VISIT (OUTPATIENT)
Dept: FAMILY MEDICINE | Facility: CLINIC | Age: 58
End: 2023-05-31
Payer: COMMERCIAL

## 2023-05-31 VITALS
TEMPERATURE: 97.5 F | HEIGHT: 67 IN | WEIGHT: 293 LBS | BODY MASS INDEX: 45.99 KG/M2 | DIASTOLIC BLOOD PRESSURE: 67 MMHG | SYSTOLIC BLOOD PRESSURE: 130 MMHG | RESPIRATION RATE: 19 BRPM | HEART RATE: 76 BPM | OXYGEN SATURATION: 100 %

## 2023-05-31 DIAGNOSIS — F31.9 BIPOLAR I DISORDER (H): ICD-10-CM

## 2023-05-31 DIAGNOSIS — E66.01 OBESITY, CLASS III, BMI 40-49.9 (MORBID OBESITY) (H): ICD-10-CM

## 2023-05-31 DIAGNOSIS — Z00.00 HEALTHCARE MAINTENANCE: Primary | ICD-10-CM

## 2023-05-31 LAB
CHOLEST SERPL-MCNC: 205 MG/DL
HDLC SERPL-MCNC: 62 MG/DL
LDLC SERPL CALC-MCNC: 100 MG/DL
NONHDLC SERPL-MCNC: 143 MG/DL
TRIGL SERPL-MCNC: 214 MG/DL

## 2023-05-31 PROCEDURE — 80061 LIPID PANEL: CPT | Performed by: FAMILY MEDICINE

## 2023-05-31 PROCEDURE — 99396 PREV VISIT EST AGE 40-64: CPT | Mod: 25 | Performed by: FAMILY MEDICINE

## 2023-05-31 PROCEDURE — 90471 IMMUNIZATION ADMIN: CPT | Performed by: FAMILY MEDICINE

## 2023-05-31 PROCEDURE — 36415 COLL VENOUS BLD VENIPUNCTURE: CPT | Performed by: FAMILY MEDICINE

## 2023-05-31 PROCEDURE — 90677 PCV20 VACCINE IM: CPT | Performed by: FAMILY MEDICINE

## 2023-05-31 ASSESSMENT — ENCOUNTER SYMPTOMS
DIARRHEA: 0
SHORTNESS OF BREATH: 0
BREAST MASS: 0
EYE PAIN: 0
PALPITATIONS: 0
NAUSEA: 0
FREQUENCY: 0
HEMATURIA: 0
JOINT SWELLING: 0
PARESTHESIAS: 0
CHILLS: 0
ABDOMINAL PAIN: 0
CONSTIPATION: 0
HEADACHES: 0
COUGH: 0
DIZZINESS: 0
HEMATOCHEZIA: 0
ARTHRALGIAS: 1
WEAKNESS: 0
FEVER: 0
HEARTBURN: 0
MYALGIAS: 0
DYSURIA: 0
NERVOUS/ANXIOUS: 1
SORE THROAT: 0

## 2023-05-31 ASSESSMENT — PAIN SCALES - GENERAL: PAINLEVEL: NO PAIN (0)

## 2023-05-31 NOTE — PROGRESS NOTES
SUBJECTIVE:   CC: Angy is an 58 year old who presents for preventive health visit.        View : No data to display.              Patient has been advised of split billing requirements and indicates understanding: Yes  Healthy Habits:    Getting at least 3 servings of Calcium per day:  Yes    Bi-annual eye exam:  Yes    Dental care twice a year:  Yes    Sleep apnea or symptoms of sleep apnea:  Sleep apnea    Diet:  Regular (no restrictions)    Frequency of exercise:  4-5 days/week    Duration of exercise:  15-30 minutes    Taking medications regularly:  Yes    Medication side effects:  None    PHQ-2 Total Score:    Additional concerns today:  Yes            Have you ever done Advance Care Planning? (For example, a Health Directive, POLST, or a discussion with a medical provider or your loved ones about your wishes): No, advance care planning information given to patient to review.  Patient declined advance care planning discussion at this time.    Social History     Tobacco Use     Smoking status: Never     Smokeless tobacco: Never     Tobacco comments:     non smoke home   Vaping Use     Vaping status: Not on file   Substance Use Topics     Alcohol use: Yes     Comment: 2 Drinks Per Month             5/26/2023     5:35 PM   Alcohol Use   Prescreen: >3 drinks/day or >7 drinks/week? No          View : No data to display.              Reviewed orders with patient.  Reviewed health maintenance and updated orders accordingly - Yes  BP Readings from Last 3 Encounters:   05/31/23 130/67   03/08/23 (!) 152/76   01/13/23 130/68    Wt Readings from Last 3 Encounters:   05/31/23 144.2 kg (318 lb)   01/13/23 144.2 kg (318 lb)   10/04/22 138.1 kg (304 lb 6.4 oz)                  Patient Active Problem List   Diagnosis     Oral aphthae     CARDIOVASCULAR SCREENING; LDL GOAL LESS THAN 160     Rosacea     Raynaud disease     Vitamin D deficiency disease     MARLA (obstructive sleep apnea)     Atopic rhinitis     Chronic  bilateral low back pain without sciatica     Obesity, Class III, BMI 40-49.9 (morbid obesity) (H)     Muscle tension dysphonia     Palpitations     Dysphagia, unspecified type     Anxiety     Benign essential hypertension     Callus of foot     Varicose veins of right lower extremity with edema     Bipolar I disorder (H)     Dribbling of urine     Right knee pain     Spinal stenosis at L4-L5 level     Facet arthropathy, lumbar     Past Surgical History:   Procedure Laterality Date     BIOPSY      age 42 breast biopsy     COLONOSCOPY  2018     GYN SURGERY  2016 for ovarian cyst    left ovary, 2 fallopian tubes removed. endometrioma.     HC TOOTH EXTRACTION W/FORCEP  1984/85    [place] Teeth     IR ENDOVENOUS ABLATION VARICOSE VEINS  12/12/2019     LAPAROSCOPIC CHOLECYSTECTOMY  07/29/2013    Procedure: LAPAROSCOPIC CHOLECYSTECTOMY;  Laparoscopic Cholecystectomy;  Surgeon: Fabio Johnson MD;  [facility]     LAPAROSCOPIC OOPHORECTOMY Left 06/23/2016    Procedure: LAPAROSCOPIC OOPHORECTOMY;  Surgeon: Kayley Donnelly MD;  [facility]     LAPAROSCOPIC SALPINGECTOMY Bilateral 06/23/2016    Procedure: LAPAROSCOPIC SALPINGECTOMY;  Surgeon: Kayley Donnelly MD;  [facility]     VASCULAR SURGERY  12/12    varicose vein proceedures       Social History     Tobacco Use     Smoking status: Never     Smokeless tobacco: Never     Tobacco comments:     non smoke home   Vaping Use     Vaping status: Not on file   Substance Use Topics     Alcohol use: Yes     Comment: 2 Drinks Per Month     Family History   Problem Relation Age of Onset     Cardiovascular Paternal Grandfather         anyerism     Obesity Paternal Grandfather      Respiratory Paternal Grandfather         pulmonary (air sacs hardening)     Allergies Father         hayfever     Lipids Father         diet therapy     Gallbladder Disease Father         cholecystectomy      Eye Disorder Father         retinal tear x 2      Prostate Cancer  Father         Treated at age 77     Alzheimer Disease Maternal Grandmother         ? poor STM     Arthritis Maternal Grandmother      Heart Disease Maternal Grandmother         Arrythmia-got a pacemaker at age 89     Arthritis Mother      Eye Disorder Mother         Cataract/macular tear in one eye     LUNG DISEASE Mother         bronchiectasis from pneumonia     Atrial fibrillation Mother      Cancer Paternal Grandmother         Uterine CA- as well as her sister     Gynecology Paternal Grandmother         prolapsed uterus- had 10 kids     Obesity Paternal Grandmother      Breast Cancer Maternal Aunt      Other Cancer Paternal Aunt 80        endo. cancer          Current Outpatient Medications   Medication Sig Dispense Refill     acetaminophen (TYLENOL) 500 MG tablet Take 500-1,000 mg by mouth every 6 hours as needed for mild pain       Carboxymethylcellulose Sod PF (REFRESH PLUS) 0.5 % SOLN ophthalmic solution Place 1 drop into both eyes 3 times daily as needed for dry eyes       estradiol (ESTRACE) 0.5 MG tablet Take 1.5 tablets (0.75 mg) by mouth daily 140 tablet 3     estradiol (VAGIFEM) 10 MCG TABS vaginal tablet Place 1 tablet (10 mcg) vaginally twice a week 26 tablet 3     lamoTRIgine (LAMICTAL) 150 MG tablet Take 1 tablet (150 mg) by mouth daily 30 tablet 1     lamoTRIgine (LAMICTAL) 200 MG tablet Take 1 tablet (200 mg) by mouth daily 30 tablet 1     lisinopril (ZESTRIL) 10 MG tablet Take 1 tablet (10 mg) by mouth daily 90 tablet 3     lithium (ESKALITH) 150 MG capsule Take 1 capsule (150 mg) by mouth At Bedtime 30 capsule 1     lithium (ESKALITH) 300 MG tablet Take 3 tablets (900 mg) by mouth At Bedtime 30 tablet 1     Loratadine 10 MG capsule Take 10 mg by mouth daily       medroxyPROGESTERone (PROVERA) 2.5 MG tablet Take 1 tablet (2.5 mg) by mouth daily 90 tablet 3     metroNIDAZOLE (METROGEL) 1 % external gel Apply topically daily 60 g 9     ORDER FOR DME Use your CPAP device as directed by your  provider.       VITAMIN D, CHOLECALCIFEROL, PO Take 2,000 Units by mouth daily       Allergies   Allergen Reactions     Seroquel [Quetiapine] Nausea and Vomiting     Had constant vomiting     Olanzapine      Tongue welling, bad EPSE (Parkinsonian)     Penicillins Rash     Risperidone      Tongue welling, bad EPSE (Parkinsonian)       Thimerosal Other (See Comments)     blisters on inside of eyelid     Recent Labs   Lab Test 05/15/23  1017 12/07/22  1111 10/04/22  1651 05/04/22  0928 09/03/21  1007 05/05/21  0853 02/03/21  1019 07/15/20  1617 01/24/20  1310 04/06/18  1222 10/02/17  0842   A1C  --   --  5.3  --   --  5.4  --   --  4.9  --   --    LDL  --   --   --   --   --  85  --   --  113  --  49   HDL  --   --   --   --   --  72  --   --  67.2  --  96   TRIG  --   --   --   --   --  123  --   --  192.1*  --  55   ALT 16 23  --  22  --   --  17   < >  --    < >  --    CR 0.58 0.55  --  0.56   < > 0.56 0.58   < >  --    < >  --    GFRESTIMATED >90 >90  --  >90   < > >90 >90   < >  --    < >  --    GFRESTBLACK  --   --   --   --   --  >90 >90   < >  --    < >  --    POTASSIUM 4.2 4.4  --  4.0   < > 4.3 4.2   < >  --    < >  --    TSH 2.51 2.13  --  2.03   < > 2.61 2.84   < >  --    < >  --     < > = values in this interval not displayed.        Breast Cancer Screening:    FHS-7:       12/8/2021     8:25 AM 1/9/2023     7:39 AM 5/26/2023     5:36 PM   Breast CA Risk Assessment (FHS-7)   Did any of your first-degree relatives have breast or ovarian cancer? No No No   Did any of your relatives have bilateral breast cancer? No No No   Did any man in your family have breast cancer? No No No   Did any woman in your family have breast and ovarian cancer? No No No   Did any woman in your family have breast cancer before age 50 y? No No No   Do you have 2 or more relatives with breast and/or ovarian cancer? No No No   Do you have 2 or more relatives with breast and/or bowel cancer? No No No     click delete button to remove  this line now  Mammogram Screening: Recommended mammography every 1-2 years with patient discussion and risk factor consideration  Pertinent mammograms are reviewed under the imaging tab.    History of abnormal Pap smear: NO - age 30-65 PAP every 5 years with negative HPV co-testing recommended      Latest Ref Rng & Units 9/23/2020     9:00 AM 9/23/2020     8:55 AM 9/24/2015     9:14 AM   PAP / HPV   PAP (Historical)   NIL      HPV 16 DNA NEG^Negative Negative    Negative     HPV 18 DNA NEG^Negative Negative    Negative     Other HR HPV NEG^Negative Negative    Negative       Reviewed and updated as needed this visit by clinical staff                  Reviewed and updated as needed this visit by Provider                 Past Medical History:   Diagnosis Date     Depressive disorder     Previous depressive episodes. Diagnosed with Bipolar illness in 11/18 after manic episode.     Disorders of bursae and tendons in shoulder region, unspecified      Female infertility of unspecified origin      Gallbladder sludge 05/2013     Hypertension 10/18     Mild sleep apnea 05/13/2014     Obesity, unspecified      Oral aphthae      Other acne      Other specified hypoglycemia      PONV (postoperative nausea and vomiting)       Past Surgical History:   Procedure Laterality Date     BIOPSY      age 42 breast biopsy     COLONOSCOPY  2018     GYN SURGERY  2016 for ovarian cyst    left ovary, 2 fallopian tubes removed. endometrioma.     HC TOOTH EXTRACTION W/FORCEP  1984/85    [place] Teeth     IR ENDOVENOUS ABLATION VARICOSE VEINS  12/12/2019     LAPAROSCOPIC CHOLECYSTECTOMY  07/29/2013    Procedure: LAPAROSCOPIC CHOLECYSTECTOMY;  Laparoscopic Cholecystectomy;  Surgeon: Fabio Johnson MD;  [facility]     LAPAROSCOPIC OOPHORECTOMY Left 06/23/2016    Procedure: LAPAROSCOPIC OOPHORECTOMY;  Surgeon: Kayley Donnelly MD;  [facility]     LAPAROSCOPIC SALPINGECTOMY Bilateral 06/23/2016    Procedure: LAPAROSCOPIC  "SALPINGECTOMY;  Surgeon: Kayley Donnelly MD;  [facility]     VASCULAR SURGERY      varicose vein proceedures     OB History    Para Term  AB Living   0 0 0 0 0 0   SAB IAB Ectopic Multiple Live Births   0 0 0 0 0       Review of Systems   Constitutional: Negative for chills and fever.   HENT: Negative for congestion, ear pain, hearing loss and sore throat.    Eyes: Negative for pain and visual disturbance.   Respiratory: Negative for cough and shortness of breath.    Cardiovascular: Negative for chest pain, palpitations and peripheral edema.   Gastrointestinal: Negative for abdominal pain, constipation, diarrhea, heartburn, hematochezia and nausea.   Breasts:  Negative for tenderness, breast mass and discharge.   Genitourinary: Negative for dysuria, frequency, genital sores, hematuria, pelvic pain, urgency, vaginal bleeding and vaginal discharge.   Musculoskeletal: Positive for arthralgias. Negative for joint swelling and myalgias.   Skin: Negative for rash.   Neurological: Negative for dizziness, weakness, headaches and paresthesias.   Psychiatric/Behavioral: Negative for mood changes. The patient is nervous/anxious.           OBJECTIVE:   /67   Pulse 76   Temp 97.5  F (36.4  C) (Temporal)   Resp 19   Ht 1.702 m (5' 7\")   Wt 144.2 kg (318 lb)   SpO2 100%   BMI 49.81 kg/m    Physical Exam  GENERAL: healthy, alert and no distress  NECK: no adenopathy, no asymmetry, masses, or scars and thyroid normal to palpation  RESP: lungs clear to auscultation - no rales, rhonchi or wheezes  CV: regular rate and rhythm, normal S1 S2, no S3 or S4, no murmur, click or rub, no peripheral edema and peripheral pulses strong  ABDOMEN: soft, nontender, no hepatosplenomegaly, no masses and bowel sounds normal  MS: no gross musculoskeletal defects noted, no edema        ASSESSMENT/PLAN:       ICD-10-CM    1. Healthcare maintenance  Z00.00 Lipid panel reflex to direct LDL Non-fasting      2. " "Obesity, Class III, BMI 40-49.9 (morbid obesity) (H)  E66.01 Lipid panel reflex to direct LDL Non-fasting      3. Bipolar I disorder (H)  F31.9           Patient has been advised of split billing requirements and indicates understanding: Yes      COUNSELING:  Reviewed preventive health counseling, as reflected in patient instructions      BMI:   Estimated body mass index is 49.81 kg/m  as calculated from the following:    Height as of 1/13/23: 1.702 m (5' 7\").    Weight as of 1/13/23: 144.2 kg (318 lb).   Weight management plan: Discussed healthy diet and exercise guidelines      She reports that she has never smoked. She has never used smokeless tobacco.          Mikey Cortés MD  [facility]  "

## 2023-06-05 ENCOUNTER — VIRTUAL VISIT (OUTPATIENT)
Dept: PSYCHIATRY | Facility: CLINIC | Age: 58
End: 2023-06-05
Attending: PSYCHIATRY & NEUROLOGY
Payer: COMMERCIAL

## 2023-06-05 DIAGNOSIS — F31.9 BIPOLAR I DISORDER (H): ICD-10-CM

## 2023-06-05 PROCEDURE — 99214 OFFICE O/P EST MOD 30 MIN: CPT | Mod: VID | Performed by: STUDENT IN AN ORGANIZED HEALTH CARE EDUCATION/TRAINING PROGRAM

## 2023-06-05 RX ORDER — LAMOTRIGINE 150 MG/1
150 TABLET ORAL DAILY
Qty: 30 TABLET | Refills: 1 | Status: SHIPPED | OUTPATIENT
Start: 2023-06-05 | End: 2023-08-14

## 2023-06-05 RX ORDER — LITHIUM CARBONATE 300 MG
900 TABLET ORAL AT BEDTIME
Qty: 30 TABLET | Refills: 1 | Status: SHIPPED | OUTPATIENT
Start: 2023-06-05 | End: 2023-08-14

## 2023-06-05 RX ORDER — LITHIUM CARBONATE 150 MG/1
150 CAPSULE ORAL AT BEDTIME
Qty: 30 CAPSULE | Refills: 1 | Status: SHIPPED | OUTPATIENT
Start: 2023-06-05 | End: 2023-08-14

## 2023-06-05 RX ORDER — LAMOTRIGINE 200 MG/1
200 TABLET ORAL DAILY
Qty: 30 TABLET | Refills: 1 | Status: SHIPPED | OUTPATIENT
Start: 2023-06-05 | End: 2023-08-14

## 2023-06-05 RX ORDER — LITHIUM CARBONATE 300 MG/1
CAPSULE ORAL
COMMUNITY
Start: 2023-05-12 | End: 2023-08-31

## 2023-06-05 NOTE — PROGRESS NOTES
Virtual Visit Details    Type of service:  Video Visit     Originating Location (pt. Location): Other Patient's car  Distant Location (provider location):  On-site  Platform used for Video Visit: [facility]  Psychiatry Clinic  PSYCHIATRY PROGRESS NOTE     CARE TEAM:  PCP- Milly Jenkins, Psychotherapist- [facility] with Verna Chan  Specialty Providers- OB/GYN, ENT, PT, Nutrition      Wife- Rachael Haji is a 56 year old who prefers the name Angy and uses pronouns she, her, hers.      DIAGNOSIS     Bipolar 1 disorder, current depressive episode, moderate   H/o EPSE secondary to neuroleptic use  H/o Thrombocytopenia secondary to Depakote  H/o lithium toxicity, resolved     ASSESSMENT   Angy continues to report low mood, low energy, irritability, and intermittent feelings of hopelessness.Lithium increase to 1200mg at the last appointment resulted in side effects of hand tremors and worsening mouth dryness (Li serum level was 0.7 + normal renal function). She did not endorse other symptoms suggestive of lithium toxicity,new medication use, or dehydration. Dryness and tremors have improved since reducing Lithium dose three weeks ago. To target mood,we discussed adding on an antidepressant like Wellbutrin which has a low likelihood for causing anthony in comparison to other antidepressants. Angy would like sometime to think about this and speak to her wife, Rachael before starting this medication.She prefers not to try other mood stabilizing options like a second generation antipsychotic due to side effects on previous trials of quetiapine, olanzapine, and risperidone. Depakote caused serious side effects in the past and isn't being considered as a mood stabilizing option for Angy. No SI or HI or acute safety concerns today. There was an MTM visit in March of this year, some recs were listed in that note and can be referred to in the future.    Future considerations:  Start Wellbutrin and titrate slowly with close monitoring. At minimum therapeutic dose + established mood stability for 4-6 months, Lamictal taper could be considered then.      [place] review was not needed today. Patient is not taking any controlled substances.     PLAN                                                                                                                1) Meds-  - Continue Lithium 1050 mg at bedtime  - Continue Lamictal 350mg daily    2) Psychotherapy-Continue individual therapy every other week & couples counseling.    3) Next due-  Labs- Next Li labs due 12/2023  EKG- PRN  Rating scales- N/A    4) Referrals- None    5) Dispo- RTC in July with new resident.                Angy declined transfer to the Nurse practitioner panel which would provide more long-term continuity of care in comparison to yearly changes in the resident clinic.     PERTINENT BACKGROUND                           [most recent eval 08/22/21]    Patient has not had significant mental health history until 2018, when she developed perimenopausal mood and anxiety symptoms.  She was subsequently referred to our clinic following a hospitalization for anthony in November 2018.  Her care has been complicated by sensitivity to antipsychotics (EPSE including difficulty swallowing), thrombocytopenia secondary to Depakote, lithium toxicity in setting of concurrent Depakote use).    Mood episodes: First and only episode of anthony was in 2018. About 2-3 episodes of depression.Current episode of depression has persisted since  04/2022 with no significant changes with medication tweaks.    Psych pertinent item history includes Manic episode(h/o one manic episode Nov/Dec 2018), psychiatric hospitalization in November 2018.     SUBJECTIVE   Patient presented for a follow up.    Since last visit:  -Angy tried the slight increase in Lithium but experienced some side effects of tremors and worsening dryness of mouth about a week after the  increase.She describes the tremors as more evident during activities like clipping her toe nails,writing,  holding her phone, and  the pages of a book. She also noticed that her handwriting seemed worse than it used to be and it seemed more squiggly due to the tremors..As a result the dose had to be reduced. Tremors have improved with Lithium decrease three weeks but she's still notices it during when she needs to separate pages in a book. She does not report tremors in any other part of the body.  -Mouth dryness and her improved since she reduced Lithium dose back to 1050mg.  -Reports that she had 2.5 weeks notice to go back into work which has been an additional stressor for her with the long drive from home.  -Continues to report low energy, low mood, and intermittent feelings of hopelessness. Does not report suicidal or homicidal ideations.    RECENT PSYCH ROS:   Depression: low mood, low energy,irritability, intermittent feelings of hopelessness.  Elevated:  none  Psychosis:  none  Anxiety:  Worrisome thoughts are mostly situational.  Trauma Related:  none  Sleep:reports that quality of sleep is unchanged.  Other: N/A    Adverse Effects: Dry mouth.and tremors (improving)  Pertinent Negative Symptoms: No suicidal ideation, self-injurious behavior/urges or violent ideation  Recent Substance Use:   Alcohol- 1 drink (beer or wine) per week or 2 weeks.     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   to wife, Rachael. They have a teenage son.     PSYCH and SUBSTANCE USE Critical Summary Points since July 2021 08/26/2021: Resident transfer visit. Increased Lithium to 1200mg.  10/15/2021: decrease Lithium back to 1050 mg  11/02/2021: Lamictal titration plan started  11/29/2021: Increase Lamictal to 100 mg for 7 days and then increase to 150 mg  12/14/2021: Increase Lamictal to 200mg  01/24/2022: No medication changes  03/18/2022: No medication changes  04/29/2022: No medication changes  05/05/2022: Increased Lamictal to 250 mg (inbtwn visits)  06/10/2022: No medication changes  08/29/2022: Increase Lamictal to 300mg  10/24/2022: No medication change  12/7/2022: Li serum level is 0.6  12/19/2022: No medication changes  01/30/2023: Increase Lamictal to 350mg  03/13/2023: No medication changes  04/17/2023: Increased Lamictal to 1200mg  05/12/2023: Lithium dose decreased back to 1050mg due to side effects of tremors. Li serum level was 0.7, Renal fx: wnl  06/05/2023: No medication changes made today. Would like some time to think about adding Wellbutrin to current regimen. Continues to report low mood and low energy.     PAST MED TRIALS      Medication Max Dose (mg) Dates / Duration Helpful? DC Reason / Adverse Effects?   Sertraline       Depakote    Tremors   Olanzapine    EPSE   Risperdal       Seroquel    EPSE   Lithium 1200 current  1200mg caused tremors so dose was reduced to 1050   Wellbutrin 300 ~ 15years ago        MEDICAL HISTORY and ALLERGY     ALLERGIES: Seroquel [quetiapine], Olanzapine, Penicillins, Risperidone, and Thimerosal    Patient Active Problem List   Diagnosis     Oral aphthae     CARDIOVASCULAR SCREENING; LDL GOAL LESS THAN 160     Rosacea     Raynaud disease     Vitamin D deficiency disease     MARLA (obstructive sleep apnea)     Atopic rhinitis      Chronic bilateral low back pain without sciatica     Obesity, Class III, BMI 40-49.9 (morbid obesity) (H)     Muscle tension dysphonia     Palpitations     Dysphagia, unspecified type     Anxiety     Benign essential hypertension     Callus of foot     Varicose veins of right lower extremity with edema     Bipolar I disorder (H)     Dribbling of urine     Right knee pain     Spinal stenosis at L4-L5 level     Facet arthropathy, lumbar      MEDICAL REVIEW OF SYSTEMS     Patient is on Estradiol.     MEDICATIONS     Current Outpatient Medications   Medication Sig Dispense Refill     acetaminophen (TYLENOL) 500 MG tablet Take 500-1,000 mg by mouth every 6 hours as needed for mild pain       Carboxymethylcellulose Sod PF (REFRESH PLUS) 0.5 % SOLN ophthalmic solution Place 1 drop into both eyes 3 times daily as needed for dry eyes       estradiol (ESTRACE) 0.5 MG tablet Take 1.5 tablets (0.75 mg) by mouth daily 140 tablet 3     estradiol (VAGIFEM) 10 MCG TABS vaginal tablet Place 1 tablet (10 mcg) vaginally twice a week 26 tablet 3     lamoTRIgine (LAMICTAL) 150 MG tablet Take 1 tablet (150 mg) by mouth daily 30 tablet 1     lamoTRIgine (LAMICTAL) 200 MG tablet Take 1 tablet (200 mg) by mouth daily 30 tablet 1     lisinopril (ZESTRIL) 10 MG tablet Take 1 tablet (10 mg) by mouth daily 90 tablet 3     lithium (ESKALITH) 150 MG capsule Take 1 capsule (150 mg) by mouth At Bedtime 30 capsule 1     lithium (ESKALITH) 300 MG tablet Take 3 tablets (900 mg) by mouth At Bedtime 30 tablet 1     Loratadine 10 MG capsule Take 10 mg by mouth daily       medroxyPROGESTERone (PROVERA) 2.5 MG tablet Take 1 tablet (2.5 mg) by mouth daily 90 tablet 3     metroNIDAZOLE (METROGEL) 1 % external gel Apply topically daily 60 g 9     ORDER FOR DME Use your CPAP device as directed by your provider.       VITAMIN D, CHOLECALCIFEROL, PO Take 2,000 Units by mouth daily        VITALS   There were no vitals taken for this visit.    MENTAL STATUS  "EXAM     Alertness: alert and oriented  Appearance: well groomed  Behavior/Demeanor: cooperative, with good  eye contact   Speech: regular rate and rhythm  Language: intact  Psychomotor: normal or unremarkable. Did not observe tremors during the appointment (outstreched arm and picking up object)  Mood: \"okay\"  Affect: appropriate; congruent to: mood- yes, content- yes  Thought Process/Associations: linear and logical  Thought Content:  Reports none;  Denies suicidal ideation and violent ideation  Perception:  Reports none;  Denies auditory hallucinations and visual hallucinations  Insight: good  Judgment: good  Cognition: does  appear grossly intact; formal cognitive testing was not done. Reports some concern with remote memory  Gait and Station: N/A (Telehealth)     LABS and DATA     JOANNA-N/A  PHQ-9: 6      1/29/2023     7:54 PM 4/16/2023     3:56 PM 6/5/2023     2:53 PM   PHQ   PHQ-9 Total Score 7 7 6   Q9: Thoughts of better off dead/self-harm past 2 weeks Not at all Not at all Not at all     Recent Labs   Lab Test 05/15/23  1017 12/07/22  1111 05/04/22  0928   CR 0.58 0.55 0.56   GFRESTIMATED >90 >90 >90     Recent Labs   Lab Test 05/15/23  1017 12/07/22  1111 05/04/22  0928   AST 17 10 13   ALT 16 23 22   ALKPHOS 80 88 80     Recent Labs   Lab Test 05/15/23  1017 12/07/22  1111 05/04/22  0928   LITHIUM 0.7 0.6 0.8     Recent Labs   Lab Test 05/15/23  1017 12/07/22  1111   CR 0.58 0.55   GFRESTIMATED >90 >90    138   POTASSIUM 4.2 4.4   SHERRIE 10.2* 9.3     Recent Labs   Lab Test 12/07/22  1111 02/03/21  1011   SG 1.015  1.015 1.011     Recent Labs   Lab Test 05/15/23  1017 12/07/22  1111   TSH 2.51 2.13     Recent Labs   Lab Test 05/15/23  1017 12/07/22  1111 09/03/21  1007 02/03/21  1019 07/15/20  1617   WBC 8.3 7.3   < > 7.7 8.3   ANEU  --   --   --  5.1 5.6    < > = values in this interval not displayed.       ECG :10/25/2021 QTc 442ms     PSYCHOTROPIC DRUG INTERACTIONS     Lithium tocicity: Lithium & " ACEI   Low serum level: Lamotrigine and Estrogen derivatives      MANAGEMENT:  Monitoring for adverse effects, routine labs, periodic EKGs and patient is aware of risks     RISK STATEMENT for SAFETY     Angy Haji did not appear to be an imminent safety risk to self or others.    TREATMENT RISK STATEMENT: The risks, benefits, alternatives and potential adverse effects have been discussed and are understood by the pt. The pt understands the risks of using street drugs or alcohol. There are no medical contraindications, the pt agrees to treatment with the ability to do so. The pt knows to call the clinic for any problems or to access emergency care if needed.  Medical and substance use concerns are documented above.  Psychotropic drug interaction check was done, including changes made today.     PROVIDER: Tolulope Oluwadamilola Odebunmi, MD    Patient not staffed in clinic.  Note will be reviewed and signed by supervisor Dr. Tapia.      Level of Medical Decision Making:   - At least 1 chronic problem that is not stable  - Engaged in prescription drug management during visit (discussed any medication benefits, side effects, alternatives, etc.)        Must meet 2 out of 3 of the above MDM elements to bill at the specified level     For help more info about elements / criteria, click here-> MDM Help Grid     *This blue text will disappear automatically when note is signed, no need to delete*:150

## 2023-06-05 NOTE — PATIENT INSTRUCTIONS
Treatment Plan  Continue all medications    RTC in July with new resident. Plans to call the clinic at the end of June to set up an appointment.    **For crisis resources, please see the information at the end of this document**   Patient Education    Thank you for coming to the [facility] [place] [facility].     Lab Testing:  If you had lab testing today and your results are reassuring or normal they will be mailed to you or sent through [employer] within 7 days. If the lab tests need quick action we will call you with the results. The phone number we will call with results is # [phone]. If this is not the best number please call our clinic and change the number.     Medication Refills:  If you need any refills please call your pharmacy and they will contact us. Our fax number for refills is [phone].   Three business days of notice are needed for general medication refill requests.   Five business days of notice are needed for controlled substance refill requests.   If you need to change to a different pharmacy, please contact the new pharmacy directly. The new pharmacy will help you get your medications transferred.     Contact Us:  Please call [phone] during business hours (8-5:00 M-F).   If you have medication related questions after clinic hours, or on the weekend, please call [phone].     Financial Assistance [phone]   Medical Records [phone]       MENTAL HEALTH CRISIS RESOURCES:  For a emergency help, please call 911 or go to the nearest Emergency Department.     Emergency Walk-In Options:   EmPATH Unit @ [facility] ([place]): [phone] - Specialized mental health emergency area designed to be calming  [facility] ([place]): [phone]  [facility] ([place]): [phone]  [facility]): [phone]    [facility] Crisis Information:   [place]: [phone]  [place]:  [phone]  [place] ([place]) - Adult: [phone]     Child: [phone]  [place] - Adult: [phone]     Child: [phone]  [place]: [phone]  List of all [facility] resources:   https://[place].gov/dhs/people-we-serve/adults/health-care/mental-health/resources/crisis-contacts.jsp    National Crisis Information:   Crisis Text Line: Text  [place]  to 472616  Suicide & Crisis Lifeline: 988  National Suicide Prevention Lifeline: 4-524-655-TALK (1-291.548.2154)       For online chat options, visit https://suicidepreventionlifeline.org/chat/  Poison Control Center: 1-423.430.8951  Trans Lifeline: 1-275.687.1098 - Hotline for transgender people of all ages  The [place] [employer]: 3-819-633-1898 - Hotline for LGBT youth     For Non-Emergency Support:   Fast Tracker: Mental Health & Substance Use Disorder Resources -   https://www.[employer]n.org/

## 2023-06-15 ENCOUNTER — THERAPY VISIT (OUTPATIENT)
Dept: PHYSICAL THERAPY | Facility: CLINIC | Age: 58
End: 2023-06-15
Payer: COMMERCIAL

## 2023-06-15 DIAGNOSIS — M25.561 RIGHT KNEE PAIN: Primary | ICD-10-CM

## 2023-06-15 PROCEDURE — 97110 THERAPEUTIC EXERCISES: CPT | Mod: GP | Performed by: PHYSICAL THERAPIST

## 2023-06-26 ENCOUNTER — THERAPY VISIT (OUTPATIENT)
Dept: PHYSICAL THERAPY | Facility: CLINIC | Age: 58
End: 2023-06-26
Payer: COMMERCIAL

## 2023-06-26 DIAGNOSIS — M25.561 RIGHT KNEE PAIN: Primary | ICD-10-CM

## 2023-06-26 PROCEDURE — 97110 THERAPEUTIC EXERCISES: CPT | Mod: GP | Performed by: PHYSICAL THERAPIST

## 2023-06-28 ENCOUNTER — HOSPITAL ENCOUNTER (OUTPATIENT)
Dept: NUTRITION | Facility: CLINIC | Age: 58
Discharge: HOME | End: 2023-06-28
Admitting: DIETITIAN, REGISTERED
Payer: COMMERCIAL

## 2023-06-28 VITALS — BODY MASS INDEX: 49.49 KG/M2 | WEIGHT: 293 LBS

## 2023-06-28 PROCEDURE — 97803 MED NUTRITION INDIV SUBSEQ: CPT | Mod: GT,95 | Performed by: DIETITIAN, REGISTERED

## 2023-06-28 NOTE — PROGRESS NOTES
Virtual Visit Details    Type of service:  Video Visit     Originating Location (pt. Location): Other office setting    Distant Location (provider location):  On-site  Platform used for Video Visit: Well     NUTRITION REASSESSMENT NOTE   REASON FOR ASSESSMENT  Angy LILIAN Haji referred by Dr. Lira for MNT related to  Obesity, Class III, BMI 40-49.9 (morbid obesity) (H) [E66.01]          Patient accompanied by self      NUTRITION HISTORY  Patient continues with multiple life changes and stressors.  Patient aware that she likes to self soothe with ice cream which she has recently increased.  Patient is aware that social cues trigger her to eat. Patient's spouse recently had weight loss surgery so there is adjust with eating in the home.      3/7/2022 Self soothe in evening snack-made tea or tells self ate late dinner and does not need snack.  Patient asking self if physically hungry.  Patient drinking fizzy water or malt o meal as evening snack.  Patient aware she has been stress eating.  Tracking when not having dessert -identifying why eating  Exercise-cross country skiing; mall walk; snow shoeing; treadmill 2 times haring treadmill   Yogurt; cheese and crackers; cereal      Patient wants to increase exercise time as will be going to [place] weekend ([place]) [facility]      4/25/2002 Patient able to get 45-60 minutes exercise on weekends and 20-30 minutes of exercise daily.  Patient wants to lose 5 lbs prior to trip to [place].  Patient was able to have 3 days no desserts and then 1 week no dessert. 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  Patient determined ways she could lose weight: Dessert 2 times per week, add treadmill, eliminate morning snack, 1 snack at 3 PM. Alcohol 1 time per week, no caramel lattes, food is fuel, it's too late to be hungry, spouse brings sweet treats without asking, tea/bath/shower      6/15/2022 Patient skips dessert if eats  late dinner.  Patient felt discouraged with weight loss as wanted to lose more weight (down 3.5 lbs). Patient wants weight loss for functional reasons.  Patient signed [place] for the [place] bike tour.  Patient has increased cravings and thoughts about ice cream.  Shift in relationship with spouse after spouse's weight loss surgery.      7/27/2022 Patient increased exercise-[place] with [employer]; started the last week in June for bike rides (4 rides different times in 2 weeks-9 miles).  Rode in 18 mile race.  Went hiking on vacation[place].     No dessert (hit or miss) 50% 3 days no dessert; ice cream and s'mores on vacation     Typical intake:  Greek yogurt + 1/4 cup granola + fruit + 1/2 and 1/2 with coffee or english muffin with peanut butter/butter and strawberry jam or eggs scrambled or hard boiled  Leftovers -1 cup cherries; cherry tomatoes; 1/2 cup pea pods + 3 oz pork tenderloin (teryaki)   Watermelon, pork tenderloin, 1 cup rice + tomatoes/pea pods; cheeseburger with whole wheat bun + vegetables + watermelon   Snacks: croissant (Rustica) Confucianist oat squares (1 cup dry) (3:00 PM) cheese and crackers    Beverages: coffee; water; fizzy water      Emotional eating at night-ice cream      9/21/2022 Patient has been able to bike 2 times per week.  Patient's weight has been stable.  Patient has not been able to track dinner and snacks.  Stress eating ebbs and flows.  Trying to save dessert for the weekend or may self soothe during th week with ice cream.  Patient stress eating in the evening and may boredom eat prior to lunch (cereal).    Weight: 308.4 lbs       11/16/2022 Biking 2 times per week. Tour biking race: [employer] 14 miles (rode by herself). [facility] 12 mile hilly bike ride. (rode with friend).  Patient has consistently walked dog and completes PT exercises.  Patient determining plan for winter exercise.    314.4 lbs (food and sleep changes) Patient  was eating more sweets as patient's wife baked sweets this fall (3-4 weeks with increase of sweets).  Holiday baking plan consists of 5 cookies and 1 candy (peanut brittle).  Ice cream consumption has increased recently.      1/23/2023 Patient was able to bake holiday cookies and freeze them.  Patient has been eating desserts 5 days a week.  Patient has been substituting greek yogurt (flavored or vanilla) or cream of wheat for dessert.  Patient was able to snow shoe.  Patient having barrier with getting on treadmill.  Patient's weight has fluctuated 325.8 and now 316 lbs.  Patient has been working on sleep routine (classical music; reading on paper vs ipad).      3/17/2023 Patient having knee pain and went to orthopedic MD and got cortizone shot in knee and able to walk know. Walked dog 3 times this week. Swimming -[facility] (6 sameer pool). Tyler wants to swim with goal of swimming 2 times per week.  Patient states ice cream and dessert are hit or miss. Patient shifting mindset to days having dessert vs not having dessert. Patient has not been as mindful with practicing self soothe at night.  Has tried tea and engaging book.     5/3/2023 PT exercises for knee; walking 15-30 minutes per day with dog + swimming 1 time per week (9 laps); 5 days per week desserts -patient using self talk to reduce dessert intake. Patient trying to limit desserts.  Patient's wife brining bread and sweets into home in which patient feels compels to eat as not to waste food.  Patient wants to be aware of foods when dining out and would like to split meal with spouse. Patient using zinger tea to soothe at night.       6/28/2023 No dessert 1-2 days; vacation and party so feels increase in desserts.  Patient getting more movement at work by taking stairs at work and size of building as no longer working remote.  Going to PT; less dog walks due to driving to work; found new lunch place to eat lunch at work (3-4 days per week-20  "minute walk) -reading book and eating lunch; aims for hour lunch -reading for self care     DIET RECALL   Breakfast: cereal and banana; yogurt + granola (measures), fruit + coffee; 2 slices toast with butter + 1/2 cup cottage cheese    Lunch: leftovers (includes fruit and vegetable daily)  Snack: fruit or cheese with lower salt nut thin crackers   Dinner: Tuna steak, Asian marinade, stir gomez, baked vegetable + brown rice (3/4 cup) + canned peaches; chicken thighs with beans and rice; sheet pan dinner with chicken and vegetables or grilled salmon and grilled vegetables; taco; protein + fruit and vegetable  Snacks: fruit, string cheese, Ritz crackers or yogurt; dessert in the evening (ice cream recently)  Beverages: mainly water, coffee, mineral water  Dining out: none currently  Exercise: Patient exercising 5-6 times per week. (cross country skiing, snow shoeing, walking and walking dog).  Patient exercises 1 hour on weekends (hiking or walking).     MEDICATIONS:  Reviewed      ANTHROPOMETRICS  Height: 5'8\" (note height change in chart previously 5'9\")  Weight: 316 lbs per patient   BMI (kg/m2): 48 kg/m2   %IBW: 225%  Weight History:   Wt Readings from Last 10 Encounters:   06/28/23 143.3 kg (316 lb)   05/31/23 144.2 kg (318 lb)   01/13/23 144.2 kg (318 lb)   10/04/22 138.1 kg (304 lb 6.4 oz)   07/27/22 139.7 kg (308 lb)   05/16/22 142.1 kg (313 lb 3.2 oz)   01/12/22 139.7 kg (308 lb)   12/22/21 137.4 kg (303 lb)   10/25/21 137.6 kg (303 lb 4.8 oz)   10/22/21 139.7 kg (308 lb)      ASSESSED NUTRITION NEEDS  Estimated Energy Needs: 2626-8962 kcals (15-20 kcals/kg) for gradual weight loss   Estimated Protein Needs: 66-79 (1-1.2 gm/kg IBW) for repletion with exercise  RMR: 1440     EVALUATION/PROGRESS TOWARDS GOALS   Previous goals:  Add soothing activity in the evening (7:00 PM) 5 of 7 days -improving   Aim for 4 desserts per week (1-2 per week and 2 on weekend) -not met   Aim for movement 3 days for 30 minutes " (swimming and walking)-met      Previous Nutrition Diagnosis: Obesity related to excess energy intake as evidenced by BMI of 48 kg/m2-no change     Current Nutrition Diagnosis:Obesity related to excess energy intake as evidenced by BMI of 48 kg/m2     INTERVENTIONS  Nutrition Prescription   Recommend exercise 5 times per week; track intake if eating mindlessly; increase fiber to 25-30 grams per day      Implementation:   Meals and Snacks: Continue eating 3 meals + deliberate snacking (if hungry)   Nutrition Education (Content):              a)  Discussed progress towards goals.  Congratulated patient for adding self soothe activities at work.  Reviewed current food and eating behavior challenges.  Patient continues with life stressors which can affect evening eating.  Discussed self care as to impact emotional eating.  Reviewed lipid profile due to recent labs.  Discussed soluble fiber foods for cholesterol lowering affects.  Supported patient in her struggle with food and weight.    Nutrition Education (Application):              a) Discussed strategies to set boundaries with food.                b) Patient verbalized understanding of diet by stating will add soluble fiber foods.   Expected patient engagement: good     CURRENT GOALS   Add soothing activity in the evening (7:00 PM) 5 of 7 days  Aim for 4 desserts per week (1-2 per week and 2 on weekend)   Aim for 1 day per week 60 minutes exercise (biking) + current exercise      FOLLOW UP/MONITORING    Patient to follow up in 6 weeks  Patient has RD contact information      Time spent with patient: 30 minutes     Mattie Amaya, RD, LD  [facility] Outpatient Dietitian  [phone] (office phone)

## 2023-07-17 ENCOUNTER — VIRTUAL VISIT (OUTPATIENT)
Dept: FAMILY MEDICINE | Facility: CLINIC | Age: 58
End: 2023-07-17
Payer: COMMERCIAL

## 2023-07-17 DIAGNOSIS — B37.2 CANDIDA INFECTION OF FLEXURAL SKIN: Primary | ICD-10-CM

## 2023-07-17 DIAGNOSIS — E66.01 OBESITY, CLASS III, BMI 40-49.9 (MORBID OBESITY) (H): ICD-10-CM

## 2023-07-17 PROCEDURE — 99213 OFFICE O/P EST LOW 20 MIN: CPT | Mod: VID | Performed by: FAMILY MEDICINE

## 2023-07-17 RX ORDER — CLOTRIMAZOLE AND BETAMETHASONE DIPROPIONATE 10; .64 MG/G; MG/G
CREAM TOPICAL 2 TIMES DAILY
Qty: 45 G | Refills: 1 | Status: SHIPPED | OUTPATIENT
Start: 2023-07-17 | End: 2024-03-06

## 2023-07-17 NOTE — PROGRESS NOTES
Angy is a 58 year old who is being evaluated via a billable video visit.      How would you like to obtain your AVS? MyChart  If the video visit is dropped, the invitation should be resent by: Text to cell phone: [phone]  Will anyone else be joining your video visit? No      2    Assessment & Plan     (B37.2) Candida infection of flexural skin  (primary encounter diagnosis)  Comment:  lower abdominal folds   Plan: clotrimazole-betamethasone (LOTRISONE) 1-0.05 %        external cream        Treatment faxed. Skin cares and symptomatic treatment discussed. Also discussed using OTC nystatin powder as needed to prevent th problem follow up if problem           (E66.01) Obesity, Class III, BMI 40-49.9 (morbid obesity) (H)  Comment:   Plan: Healthy diet and exercise reviewed. Talked about intermittent fasting/ weight watchers, meal planning , reducing carb's and adding more protein etc. Risks of obesity discussed.  Encourage exercise.       Script  sent.Cares and  treatment discussed. follow up if problem   Patient expressed understanding and agreement with treatment plan. All patient's questions were answered, will let me know if has more later.  Medications: Rx's: Reviewed the potential side effects/complications of medications prescribed.           Felicia Alegre MD  [facility]    Ruby Travis is a 58 year old, presenting for the following health issues:  Derm Problem    History of Present Illness       Reason for visit:  Skin breakfown between lower abdomen and thighs  Symptom onset:  3-4 weeks ago  Symptoms include:  Red skin and skin sloughing, several inches long, about an inch wide.  This happens in skin fold.  Symptom intensity:  Moderate  Symptom progression:  Worsening  Had these symptoms before:  Yes  Has tried/received treatment for these symptoms:  Yes  Previous treatment was successful:  Yes  Prior treatment description:  Over the counter powder Zeasorb  What  makes it worse:  Increased mositure in the area  What makes it better:  Dry area with towel.  Use medicated powder    She eats 4 or more servings of fruits and vegetables daily.She consumes 0 sweetened beverage(s) daily.She exercises with enough effort to increase her heart rate 30 to 60 minutes per day.  She exercises with enough effort to increase her heart rate 4 days per week.   She is taking medications regularly.     She thinsk it happens bc of eight gain and mostly skin fold under breast and lower abdominal / groin crease sometimes and when she  was heavy previously.   She had lost approx 110 lbs of  weight and was doing quit well but now since Covid lot of changes happened and she has gradually gained weight so this time she has noticed this rash mostly lower  abdominal wall and groin area,  looks red skin  Starts  peeling and she does not have any blisters drainage etc and over all she feels well otherwise. She is working with her dietician again so has lost 5 lbs since last month now and hoping to loose more         Review of Systems   Constitutional, HEENT, cardiovascular, pulmonary, GI, , musculoskeletal, neuro, skin, endocrine and psych systems are negative, except as otherwise noted.      Objective           Vitals:  No vitals were obtained today due to virtual visit.    Physical Exam   GENERAL: Healthy, alert and no distress  EYES: Eyes grossly normal to inspection.  No discharge or erythema, or obvious scleral/conjunctival abnormalities.  RESP: No audible wheeze, cough, or visible cyanosis.  No visible retractions or increased work of breathing.    SKIN: no suspicious lesions or rashes and erythema - both groins/ lower abdominal fold has erythematous rash loks like candidiasis   NEURO: Cranial nerves grossly intact.  Mentation and speech appropriate for age.  PSYCH: Mentation appears normal, affect normal/bright, judgement and insight intact, normal speech and appearance well-groomed.           Video-Visit Details    Type of service:  Video Visit     Originating Location (pt. Location): Home    Distant Location (provider location):  Off-site  Platform used for Video Visit: Christian

## 2023-07-17 NOTE — PROGRESS NOTES
Social Work Consultation  [facility]      Patient Name:  Angy Haji  /Age:  1965 (53 year old)    Presenting SW Need(s)/ Reason for visit:  Requested by Dr. Sandoval. Patient in need of assistance with short term disability paperwork.    Collaborated With:    -Angy Raissa  Dee, patient's wife  -Dr. Sandoval    Intervention:  Met with patient prior to their psychiatry appt.      -Assessed needs. Angy had an initial manic episode and was hospitalized in November of this year. She is on leave from her employment. Her inpatient psychiatrist is no longer willing to complete paperwork as she is not inpatient and her insurance company is denying her claim without further documentation of her need for leave. Patient reports she will not have a paycheck or have a limited paycheck and that almost half of her spouse's salary will be going to their rent for this month. She is requesting assistance in getting information to her insurance company.  When Angy's spouse joined mid-way through appointment, she was able to provide additional information about the amount of change and stressors the couple has faced this year, including illnesses of parents, death of a close friend, selling their home of many, many years, moving to an apartment to provide access to better education for their child, and work related stressors.    -Provided empathic listening. 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 She expressed concern about others seeing her in the waitroom. SW reviewed that she does not have to provide an explanation or that she could state she was in clinic for a meeting. SW also reviewed the stigma within mental health system and provided positive feedback and encouragement for her ability to seek and follow up with assistance.  -Worked with Angy to identify appropriate documentation for insurance company. Patient had requested her records through  My Chart. Patient accessed her chart while in office and worked with writer to identify which documentation to send to insurance agency.  Angy also completed release of information.    Resources Provided:  -discussed possible supports in the community. Angy is not interested at this time. She and her wife connected with their [employer] and are having a weekly meal within their [employer].    Social Work Assessment:  Angy is a 53 year old who identifies as female. She recently had a first episode of anthony and is now working to get approval for disability insurance. She is aware of local resources and has strong support from her wife. Getting assistance with managing mental health is made more difficult in that she works in the field and expressed some worry about others seeing her in the waiting room.    Plan:  SW will fax documentation to insurance company on Monday morning. SW will My Chart Angy once task is completed for confirmation. Provided patient/family with writer's contact information and availability.      Will route to patient's psychiatric provider(s) as an FYI.    Abby Gonzalez, [facility] Center[facility]    This is a non-billable encounter as it was solely for the purposes of outreach and/or care coordination.    Regular Diet - No restrictions

## 2023-07-24 ENCOUNTER — THERAPY VISIT (OUTPATIENT)
Dept: PHYSICAL THERAPY | Facility: CLINIC | Age: 58
End: 2023-07-24
Payer: COMMERCIAL

## 2023-07-24 DIAGNOSIS — M25.561 RIGHT KNEE PAIN: Primary | ICD-10-CM

## 2023-07-24 PROCEDURE — 97110 THERAPEUTIC EXERCISES: CPT | Mod: GP | Performed by: PHYSICAL THERAPIST

## 2023-08-08 NOTE — PROGRESS NOTES
Virtual Visit Details    Type of service:  Video Visit     Originating Location (pt. Location): Other work setting     Distant Location (provider location):  On-site  Platform used for Video Visit: Well     NUTRITION REASSESSMENT NOTE   REASON FOR ASSESSMENT  Angy LILIAN Haji referred by Dr. Lira for MNT related to  Obesity, Class III, BMI 40-49.9 (morbid obesity) (H) [E66.01]          Patient accompanied by self      NUTRITION HISTORY  Patient continues with multiple life changes and stressors.  Patient aware that she likes to self soothe with ice cream which she has recently increased.  Patient is aware that social cues trigger her to eat. Patient's spouse recently had weight loss surgery so there is adjust with eating in the home.      3/7/2022 Self soothe in evening snack-made tea or tells self ate late dinner and does not need snack.  Patient asking self if physically hungry.  Patient drinking fizzy water or malt o meal as evening snack.  Patient aware she has been stress eating.  Tracking when not having dessert -identifying why eating  Exercise-cross country skiing; mall walk; snow shoeing; treadmill 2 times haring treadmill   Yogurt; cheese and crackers; cereal      Patient wants to increase exercise time as will be going to [place] weekend ([place]) [facility]      4/25/2002 Patient able to get 45-60 minutes exercise on weekends and 20-30 minutes of exercise daily.  Patient wants to lose 5 lbs prior to trip to [place].  Patient was able to have 3 days no desserts and then 1 week no dessert. 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  Patient determined ways she could lose weight: Dessert 2 times per week, add treadmill, eliminate morning snack, 1 snack at 3 PM. Alcohol 1 time per week, no caramel lattes, food is fuel, it's too late to be hungry, spouse brings sweet treats without asking, tea/bath/shower      6/15/2022 Patient skips dessert if eats  late dinner.  Patient felt discouraged with weight loss as wanted to lose more weight (down 3.5 lbs). Patient wants weight loss for functional reasons.  Patient signed up for the [place] bike tour.  Patient has increased cravings and thoughts about ice cream.  Shift in relationship with spouse after spouse's weight loss surgery.      7/27/2022 Patient increased exercise-[place] with bike [employer]; started the last week in June for bike rides (4 rides different times in 2 weeks-9 miles).  Rode in 18 mile race.  Went hiking on vacation[place].     No dessert (hit or miss) 50% 3 days no dessert; ice cream and s'mores on vacation     Typical intake:  Greek yogurt + 1/4 cup granola + fruit + 1/2 and 1/2 with coffee or english muffin with peanut butter/butter and strawberry jam or eggs scrambled or hard boiled  Leftovers -1 cup cherries; cherry tomatoes; 1/2 cup pea pods + 3 oz pork tenderloin (teryaki)   Watermelon, pork tenderloin, 1 cup rice + tomatoes/pea pods; cheeseburger with whole wheat bun + vegetables + watermelon   Snacks: croissant (Rustica) Uatsdin oat squares (1 cup dry) (3:00 PM) cheese and crackers    Beverages: coffee; water; fizzy water      Emotional eating at night-ice cream      9/21/2022 Patient has been able to bike 2 times per week.  Patient's weight has been stable.  Patient has not been able to track dinner and snacks.  Stress eating ebbs and flows.  Trying to save dessert for the weekend or may self soothe during th week with ice cream.  Patient stress eating in the evening and may boredom eat prior to lunch (cereal).    Weight: 308.4 lbs       11/16/2022 Biking 2 times per week. Tour biking race: [employer] 14 miles (rode by herself). [facility] 12 mile hilly bike ride. (rode with friend).  Patient has consistently walked dog and completes PT exercises.  Patient determining plan for winter exercise.    314.4 lbs (food and sleep changes) Patient  was eating more sweets as patient's wife baked sweets this fall (3-4 weeks with increase of sweets).  Holiday baking plan consists of 5 cookies and 1 candy (peanut brittle).  Ice cream consumption has increased recently.      1/23/2023 Patient was able to bake holiday cookies and freeze them.  Patient has been eating desserts 5 days a week.  Patient has been substituting greek yogurt (flavored or vanilla) or cream of wheat for dessert.  Patient was able to snow shoe.  Patient having barrier with getting on treadmill.  Patient's weight has fluctuated 325.8 and now 316 lbs.  Patient has been working on sleep routine (classical music; reading on paper vs ipad).      3/17/2023 Patient having knee pain and went to orthopedic MD and got cortizone shot in knee and able to walk know. Walked dog 3 times this week. Swimming -[facility] (6 sameer pool). Tyler wants to swim with goal of swimming 2 times per week.  Patient states ice cream and dessert are hit or miss. Patient shifting mindset to days having dessert vs not having dessert. Patient has not been as mindful with practicing self soothe at night.  Has tried tea and engaging book.     5/3/2023 PT exercises for knee; walking 15-30 minutes per day with dog + swimming 1 time per week (9 laps); 5 days per week desserts -patient using self talk to reduce dessert intake. Patient trying to limit desserts.  Patient's wife brining bread and sweets into home in which patient feels compels to eat as not to waste food.  Patient wants to be aware of foods when dining out and would like to split meal with spouse. Patient using zinger tea to soothe at night.       6/28/2023 No dessert 1-2 days; vacation and party so feels increase in desserts.  Patient getting more movement at work by taking stairs at work and size of building as no longer working remote.  Going to PT; less dog walks due to driving to work; found new lunch place to eat lunch at work (3-4 days per week-20  "minute walk) -reading book and eating lunch; aims for hour lunch -reading for self care     8/9/2023 Patient is being intentional for choosing desserts.  Skipped ice cream since had late dinner.  Grabbed snack when dinner wasn't ready prior to function and ate later-took cheese and fruit then ate chicken kebab onion, bell pepper, chicken -316 lbs currently.  Was lower weight last week. Patient trying to balance stress in life.      DIET RECALL   Breakfast: cereal and banana; yogurt + granola (measures), fruit + coffee; 2 slices toast with butter + 1/2 cup cottage cheese    Lunch: leftovers (includes fruit and vegetable daily)  Snack: fruit or cheese with lower salt nut thin crackers   Dinner: Tuna steak, Asian marinade, stir gomez, baked vegetable + brown rice (3/4 cup) + canned peaches; chicken thighs with beans and rice; sheet pan dinner with chicken and vegetables or grilled salmon and grilled vegetables; taco; protein + fruit and vegetable  Snacks: fruit, string cheese, Ritz crackers or yogurt; dessert in the evening (ice cream recently)  Beverages: mainly water, coffee, mineral water  Dining out: none currently  Exercise: Patient exercising 5-6 times per week. (cross country skiing, snow shoeing, walking and walking dog).  Patient exercises 1 hour on weekends (hiking or walking).     MEDICATIONS:  Reviewed      ANTHROPOMETRICS  Height: 5'8\" (note height change in chart previously 5'9\")  Weight: 316 lbs per patient   BMI (kg/m2): 48 kg/m2   %IBW: 225%  Weight History:   Wt Readings from Last 10 Encounters:   06/28/23 143.3 kg (316 lb)   05/31/23 144.2 kg (318 lb)   01/13/23 144.2 kg (318 lb)   10/04/22 138.1 kg (304 lb 6.4 oz)   07/27/22 139.7 kg (308 lb)   05/16/22 142.1 kg (313 lb 3.2 oz)   01/12/22 139.7 kg (308 lb)   12/22/21 137.4 kg (303 lb)   10/25/21 137.6 kg (303 lb 4.8 oz)   10/22/21 139.7 kg (308 lb)       ASSESSED NUTRITION NEEDS  Estimated Energy Needs: 5966-5565 kcals (15-20 kcals/kg) for gradual " weight loss   Estimated Protein Needs: 66-79 (1-1.2 gm/kg IBW) for repletion with exercise  RMR: 1440     EVALUATION/PROGRESS TOWARDS GOALS   Previous goals:  Add soothing activity in the evening (7:00 PM) 5 of 7 days-improving  Aim for 4 desserts per week (1-2 per week and 2 on weekend) -improving  Aim for 1 day per week 60 minutes exercise (biking) + current exercise -improving      Previous Nutrition Diagnosis: Obesity related to excess energy intake as evidenced by BMI of 48 kg/m2-no change     Current Nutrition Diagnosis:Obesity related to excess energy intake as evidenced by BMI of 48 kg/m2     INTERVENTIONS  Nutrition Prescription   Recommend exercise 5 times per week; track intake if eating mindlessly; increase fiber to 25-30 grams per day      Implementation:   Meals and Snacks: Continue eating 3 meals + deliberate snacking (if hungry)   Nutrition Education (Content):              a)  Discussed progress towards goals.  Congratulated patient for continuing self soothe activities at work.  Reviewed current food and eating behavior challenges.  Patient continues with life stressors which can affect evening eating.  Discussed self care as way to improve emotional eating.  Discussed changes in eating patterns with spouse.  Supported patient in her struggle with food and weight.    Nutrition Education (Application):              a) Discussed strategies to set boundaries with food.                b) Patient verbalized understanding of diet by stating will ask self if hungry for ice cream.    Expected patient engagement: good     CURRENT GOALS   Add soothing activity in the evening (7:00 PM) 5 of 7 days  Aim for 4 desserts per week (1-2 per week and 2 on weekend)   Aim for 1 day per week 60 minutes exercise (biking) + current exercise      FOLLOW UP/MONITORING    Patient to follow up in 8 weeks  Patient has RD contact information      Time spent with patient: 30 minutes     Mattie Amaya, RD,  MAKEDA  [facility] Outpatient Dietitian  [phone] (office phone)

## 2023-08-09 ENCOUNTER — HOSPITAL ENCOUNTER (OUTPATIENT)
Dept: NUTRITION | Facility: CLINIC | Age: 58
Discharge: HOME | End: 2023-08-09
Admitting: DIETITIAN, REGISTERED
Payer: COMMERCIAL

## 2023-08-09 PROCEDURE — 97803 MED NUTRITION INDIV SUBSEQ: CPT | Mod: GT,95 | Performed by: DIETITIAN, REGISTERED

## 2023-08-14 ENCOUNTER — THERAPY VISIT (OUTPATIENT)
Dept: PHYSICAL THERAPY | Facility: CLINIC | Age: 58
End: 2023-08-14
Payer: COMMERCIAL

## 2023-08-14 DIAGNOSIS — F31.9 BIPOLAR I DISORDER (H): ICD-10-CM

## 2023-08-14 DIAGNOSIS — M25.561 RIGHT KNEE PAIN: Primary | ICD-10-CM

## 2023-08-14 PROCEDURE — 97110 THERAPEUTIC EXERCISES: CPT | Mod: GP | Performed by: PHYSICAL THERAPIST

## 2023-08-14 RX ORDER — LAMOTRIGINE 200 MG/1
200 TABLET ORAL DAILY
Qty: 30 TABLET | Refills: 1 | Status: SHIPPED | OUTPATIENT
Start: 2023-08-14 | End: 2023-08-31

## 2023-08-14 RX ORDER — LITHIUM CARBONATE 300 MG
900 TABLET ORAL AT BEDTIME
Qty: 90 TABLET | Refills: 1 | Status: SHIPPED | OUTPATIENT
Start: 2023-08-14 | End: 2023-11-02

## 2023-08-14 RX ORDER — LITHIUM CARBONATE 150 MG/1
150 CAPSULE ORAL AT BEDTIME
Qty: 30 CAPSULE | Refills: 1 | Status: SHIPPED | OUTPATIENT
Start: 2023-08-14 | End: 2024-01-04

## 2023-08-14 RX ORDER — LAMOTRIGINE 150 MG/1
150 TABLET ORAL DAILY
Qty: 30 TABLET | Refills: 1 | Status: SHIPPED | OUTPATIENT
Start: 2023-08-14 | End: 2023-08-31

## 2023-08-14 NOTE — TELEPHONE ENCOUNTER
[facility] Call Center    Phone Message    May a detailed message be left on voicemail: yes     Reason for Call: Medication Question or concern regarding medication   Prescription Clarification  Name of Medication: Lithium 300mg  Prescribing Provider: Dr. Santillan   Pharmacy: [place], [place] - [address]    What on the order needs clarification? 30 capsules/10 day supply, pharmacy wants to clarify if this is correct      Action Taken: Message routed to:  Other: P PSYCHIATRY NURSE-P    Travel Screening: Not Applicable

## 2023-08-14 NOTE — TELEPHONE ENCOUNTER
Last Seen: 6-5-23  RTC: July with  new resident   Cancel: none   No-Show: none   Next Appt: 8-31-23     Incoming Refill From patient via phone encounter     Medication Requested: lithium (ESKALITH) 150 MG capsule   Directions: Sig - Route: Take 1 capsule (150 mg) by mouth At Bedtime - Ora   Qty: 30   Last Refill: 7-4-23      Writer also called [facility]. Spoke to Angy and she states that patient is good on the Lithium 300 mg . They just got through. For the Lamictal patient will need refills, she last picked up on 7-9-23.    Medication Refill pended  Per Refill Protocol    Lindsey Reveles on 8/14/2023 at 8:27 AM

## 2023-08-14 NOTE — TELEPHONE ENCOUNTER
[facility] Call Center    Phone Message    May a detailed message be left on voicemail: yes     Reason for Call: Medication Refill Request    Has the patient contacted the pharmacy for the refill? Yes   Name of medication being requested: Lithium Carbonate 150mg capsules  Provider who prescribed the medication:   Pharmacy: [facility]     Date medication is needed: Pt will be out on Thursday. Pt's next appt is on 8/31     Action Taken: Other: [facility] psych pool    Travel Screening: Not Applicable

## 2023-08-14 NOTE — TELEPHONE ENCOUNTER
A refill of Lithium 150mg capsules with instructions to take 1 capsule nightly was sent to pharmacy today.     Per 6/5/23 office visit note with Dr. Sims:  1) Meds-  - Continue Lithium 1050 mg at bedtime  - Continue Lamictal 350mg daily    Per refill hx, pt takes 300mg x3 (900mg) PLUS 150mg x1 for TDD dose of 1,050mg.    Per [facility] pharmacy: 300mg formulation pt has been filling is tablet.     Rx for Lithium 300mg tabs (#90), pended and routed to provider to sign.    Leticia Saravia RN on 8/14/2023 at 11:26 AM

## 2023-08-28 ASSESSMENT — ANXIETY QUESTIONNAIRES
1. FEELING NERVOUS, ANXIOUS, OR ON EDGE: MORE THAN HALF THE DAYS
3. WORRYING TOO MUCH ABOUT DIFFERENT THINGS: MORE THAN HALF THE DAYS
2. NOT BEING ABLE TO STOP OR CONTROL WORRYING: MORE THAN HALF THE DAYS
GAD7 TOTAL SCORE: 9
5. BEING SO RESTLESS THAT IT IS HARD TO SIT STILL: NOT AT ALL
GAD7 TOTAL SCORE: 9
4. TROUBLE RELAXING: SEVERAL DAYS
6. BECOMING EASILY ANNOYED OR IRRITABLE: SEVERAL DAYS
7. FEELING AFRAID AS IF SOMETHING AWFUL MIGHT HAPPEN: SEVERAL DAYS
IF YOU CHECKED OFF ANY PROBLEMS ON THIS QUESTIONNAIRE, HOW DIFFICULT HAVE THESE PROBLEMS MADE IT FOR YOU TO DO YOUR WORK, TAKE CARE OF THINGS AT HOME, OR GET ALONG WITH OTHER PEOPLE: SOMEWHAT DIFFICULT

## 2023-08-30 ASSESSMENT — PATIENT HEALTH QUESTIONNAIRE - PHQ9
10. IF YOU CHECKED OFF ANY PROBLEMS, HOW DIFFICULT HAVE THESE PROBLEMS MADE IT FOR YOU TO DO YOUR WORK, TAKE CARE OF THINGS AT HOME, OR GET ALONG WITH OTHER PEOPLE: SOMEWHAT DIFFICULT
SUM OF ALL RESPONSES TO PHQ QUESTIONS 1-9: 9
SUM OF ALL RESPONSES TO PHQ QUESTIONS 1-9: 9

## 2023-08-31 ENCOUNTER — OFFICE VISIT (OUTPATIENT)
Dept: PSYCHIATRY | Facility: CLINIC | Age: 58
End: 2023-08-31
Attending: PSYCHIATRY & NEUROLOGY
Payer: COMMERCIAL

## 2023-08-31 VITALS
BODY MASS INDEX: 49.65 KG/M2 | DIASTOLIC BLOOD PRESSURE: 84 MMHG | SYSTOLIC BLOOD PRESSURE: 149 MMHG | WEIGHT: 293 LBS | HEART RATE: 70 BPM

## 2023-08-31 DIAGNOSIS — F31.9 BIPOLAR I DISORDER (H): ICD-10-CM

## 2023-08-31 PROCEDURE — 90792 PSYCH DIAG EVAL W/MED SRVCS: CPT | Mod: GC | Performed by: STUDENT IN AN ORGANIZED HEALTH CARE EDUCATION/TRAINING PROGRAM

## 2023-08-31 RX ORDER — LITHIUM CARBONATE 150 MG/1
150 CAPSULE ORAL AT BEDTIME
Qty: 30 CAPSULE | Refills: 2 | Status: SHIPPED | OUTPATIENT
Start: 2023-08-31 | End: 2023-11-02

## 2023-08-31 RX ORDER — LAMOTRIGINE 150 MG/1
150 TABLET ORAL DAILY
Qty: 30 TABLET | Refills: 1 | Status: SHIPPED | OUTPATIENT
Start: 2023-08-31 | End: 2023-11-02

## 2023-08-31 RX ORDER — LITHIUM CARBONATE 300 MG/1
CAPSULE ORAL
Qty: 270 CAPSULE | Refills: 2 | Status: SHIPPED | OUTPATIENT
Start: 2023-08-31 | End: 2024-01-04

## 2023-08-31 RX ORDER — LAMOTRIGINE 200 MG/1
200 TABLET ORAL DAILY
Qty: 30 TABLET | Refills: 1 | Status: SHIPPED | OUTPATIENT
Start: 2023-08-31 | End: 2023-11-02

## 2023-08-31 RX ORDER — LITHIUM CARBONATE 300 MG
900 TABLET ORAL AT BEDTIME
Qty: 90 TABLET | Refills: 1 | Status: CANCELLED | OUTPATIENT
Start: 2023-08-31 | End: 2023-10-30

## 2023-08-31 ASSESSMENT — PAIN SCALES - GENERAL: PAINLEVEL: NO PAIN (0)

## 2023-08-31 NOTE — PATIENT INSTRUCTIONS
SEASONAL LIGHT BOX INFORMATION    See [facility] Website  [email or web address]    INSTRUCTIONS FOR LIGHT BOX THERAPY  PLACE THE LIGHT BOX ON A TABLE OR COUNTER WHERE YOU CAN SIT COMFORTABLY.  FOLLOW THE 'S INFORMATION FOR THE DISTANCE TO THE LIGHT BOX.  YOU SHOULD NOT STARE DIRECTLY INTO THE LIGHT. YOU CAN READ OR EAT WHILE USING THE LIGHT. YOUR EYES MUST BE OPEN.  START WITH 30 MINUTES OF LIGHT EXPOSURE AS SOON AS POSSIBLE AFTER WAKING ( BETWEEN 6AM AND 9 AM).  MOST PEOPLE USE LIGHT THERAPY THROUGH THE WINTER UNTIL SPRINGTIME.    WHEN TO EXPECT A RESPONSE  YOU SHOULD NOTICE A RESPONSE IN A FEW DAYS AND BY TWO WEEKS DEFINITE IMPROVEMENT.   WHEN LIGHT THERAPY IS STOPPED IT MAY BE A FEW DAYS BEFORE SYMPTOMS REAPPEAR.    WHAT TO DO IF IMPROVEMENT IS NOT NOTICED  IF NO IMPROVEMENT AFTER 10 TO 14 DAYS, TRY SPENDING UP TO 60 MINUTES PER DAY IN FRONT OF THE LIGHT EVERY MORNING OR DIVIDING THE TIME BETWEEN MORNING AND EVENING. DO NOT USE CLOSE TO BEDTIME.        **For crisis resources, please see the information at the end of this document**   Patient Education    Thank you for coming to the [facility] [place] [facility].     Lab Testing:  If you had lab testing today and your results are reassuring or normal they will be mailed to you or sent through [facility] within 7 days. If the lab tests need quick action we will call you with the results. The phone number we will call with results is # [phone]. If this is not the best number please call our clinic and change the number.     Medication Refills:  If you need any refills please call your pharmacy and they will contact us. Our fax number for refills is [phone].   Three business days of notice are needed for general medication refill requests.   Five business days of notice are needed for controlled substance refill requests.   If you need to change to a different  pharmacy, please contact the new pharmacy directly. The new pharmacy will help you get your medications transferred.     Contact Us:  Please call [phone] during business hours (8-5:00 M-F).   If you have medication related questions after clinic hours, or on the weekend, please call [phone].     Financial Assistance [phone]   Medical Records [phone]       MENTAL HEALTH CRISIS RESOURCES:  For a emergency help, please call 911 or go to the nearest Emergency Department.     Emergency Walk-In Options:   EmPATH Unit @ [facility] ([place]): [phone] - Specialized mental health emergency area designed to be calming  [facility] ([place]): [phone]  [facility] ([place]): [phone]  [facility] ([place]): [phone]    [facility] Crisis Information:   [place]: [phone]  [place]: [phone]  [place] ([place]) - Adult: [phone]     Child: [phone]  [place] - Adult: [phone]     Child: [phone]  [place]: [phone]  List of all [facility] resources:   https://[place].gov/dhs/people-we-serve/adults/health-care/mental-health/resources/crisis-contacts.jsp    National Crisis Information:   Crisis Text Line: Text  [place]  to 972103  Suicide & Crisis Lifeline: 988  National Suicide Prevention Lifeline: 8-121-159-TALK (1-533.606.8586)       For online chat options, visit https://suicidepreventionlifeline.org/chat/  Poison Control Center: 1-173.436.2768  Trans Lifeline: 1-587.256.7129 - Hotline for transgender people of all ages  The [place] Project: 1-581.695.7926 - Hotline for LGBT youth     For Non-Emergency Support:   Fast Tracker: Mental Health & Substance Use Disorder Resources -   https://www.ARKeXn.org/

## 2023-08-31 NOTE — PROGRESS NOTES
"   [facility]  TRANSFER of CARE DIAGNOSTIC ASSESSMENT     CARE TEAM:    PCP- Milly Jenkins  Therapist- couple therapy      Angy is a 58 year old who uses the pronouns she, her, hers.      Chief Concern     \"Follow up appointment\"     Diagnoses     Bipolar 1 disorder, current depressive episode, moderate     H/o EPSE secondary to neuroleptic use  H/o Thrombocytopenia secondary to Depakote  H/o lithium toxicity, resolved     Assessment     Angy reported sleep is disrupted which can also contribute to mood being \"stable to low\".     We discussed about recommendation that previous provider had with Wellbutrin.  She doesn't think that she needs it now. She is concerned about medications side effects. Consequently, we discuss about sleep hygiene. Given that she is not sure if her mood will be disrupted during winter, we also discussed light therapy. F    We also encourage to continue with couple therapy. Given that they are facing some financial stressor. Gaining skills and ongoing discussion between the couple will help facing those issues while protecting their mental health.     We also check their last lithium level. Four month ago, it was 0.7. We need to be careful with ACEi or ARB given they have the potential to raise lithium level. In angy case, we want to prevent that because of h/o lithium toxicity and SVTs. Patient was encouraged to stay hydrated and avoid NSAID.       Per previous provider and also MTM  \"Future considerations: Start Wellbutrin and titrate slowly with close monitoring.   At minimum therapeutic dose + established mood stability for 4-6 months, Lamictal taper could be considered then.\"    Psychotropic Drug Interactions:  [PSYCHCLINICDDI]  Estrogen Derivatives may decrease the serum concentration of LamoTRIgine  Angiotensin-Converting Enzyme Inhibitors may increase the serum concentration of Lithium.     Management: " "routine monitoring    [place]MP was not checked today: not using controlled substances    Risk Statements:   Treatment Risk- Risks, benefits, alternatives and potential adverse effects have been discussed and are understood.   Safety Risk-Angy did not appear to be an imminent safety risk to self or others.     Plan     1) Medications:   - Continue Lithium 1050 mg at bedtime  - Continue Lamictal 350mg daily    2) Psychotherapy: Continue with therapy    3) Next due:  Labs- lithium due in 12/2023   EKG- not need. May have one with PCP. Last one was in 10/2021 with    Rating scales-  PHQ9    4) Referrals: none  N/A    5) Other: none    6) Follow-up: Return to clinic in 2 months     Pertinent Background                                                   [most recent eval 08/31/23]     Angy reported that she didn't have symptoms until November 2018. At which point she was hospitalized for anthony.     This section was copied forward from 08/2021 transfer notes.  \"Patient has not had significant mental health history until 2018, when she developed perimenopausal mood and anxiety symptoms.  She was subsequently referred to our clinic following a hospitalization for anthony in November 2018.  Her care has been complicated by sensitivity to antipsychotics (EPSE including difficulty swallowing), thrombocytopenia secondary to Depakote, lithium toxicity in setting of concurrent Depakote use\"     Pertinent items include: anthony , severe med reaction [described as H/o Thrombocytopenia secondary to Depakote, and H/o lithium toxicity], and psych hosp      Subjective     - Angy reported that mood is okay. She also added, that it is stable to low.  - Anxiety is there all the time.  - Medications are working. Feels they don't need to add anything for now.   - She is adherent to her medications.   - reported that during winter mood can be disrupted.   - Agrees to try light therapy and also have better sleep hygiene  - She is facing some " "financial stressors. Son is about to go to college now. There is ongoing conversation about that. They are doing couple therapy.  - Reported that sleep is interrupted. Use a CPAP. Son comes home after she is in bed, so her sleep is sometimes interrupted.   - Denies racing thought, disorganization, and impulsivity.   - Feels supported by wife, and other friends.  - Mentioned that another couple who is also part of the LGBTQ+ community are supportive to her.       Recent Psych Symptoms:   Depression:   none, reported mood is okay  Elevated:  none  Psychosis:  none  Anxiety:   feeling anxious all the time.   Trauma Related:  none  Insomnia:  Yes: sometimes interrupted when son comes back home late.   Other:  No    Pertinent Substance Use:   Alcohol: Yes: last drink was last weekend. One beer usually once or twice a month.    Cannabis: No  Tobacco: No  Caffeine:  Yes: 32 Oz daily.    Opioids: No   Narcan Kit current: N/A  Other substances: none    Medical Review of Systems:   Lightheadedness/orthostasis: None  Headaches: None  GI: none  Sexual health concerns: has low sex drive. On HRT for menopause.    A comprehensive review of systems was performed and is negative other than noted above.    Contraception: No     Mental Status Exam     Alertness: alert  and oriented  Appearance: adequately groomed  Behavior/Demeanor: cooperative and pleasant, with good  eye contact   Speech: normal and regular rate and rhythm  Language: intact  Psychomotor: normal or unremarkable  Mood:  \"stable to low\"  Affect: full range and appropriate; congruent to: mood- yes, content- yes  Thought Process/Associations: unremarkable  Thought Content:  Reports none;  Denies suicidal & violent ideation and delusions  Perception:  Reports none;  Denies auditory hallucinations and visual hallucinations  Insight: good  Judgment: good  Cognition: does  appear grossly intact; formal cognitive testing was not done  Gait and Station: unremarkable     Social " History                                 pt reported   {    Financial/occupational: works as a therapist. .   Living situation (partner, children, pets, etc): lives with partner and son  Social/spiritual support: partner, son and friends.   Feels safe at home: Yes  Financial: See above for current;    Employment:    Has been working at [employer] for 26 years  Living situation: See above for current;    Relationships:   couple is facing financial stressors in light of son going to college. They are doing couple therapy.   Children:   one  Social/spiritual support: See above for current;    Legal:   none     Family Mental Health History                                 pt reported     Anxiety disorders,   Hx of dementia when she was 89years old      Past Psychiatric History     Self injurious behavior [method, most recent]: No  Suicide attempt [#, most recent, method]: No  Suicidal ideation hx [passive, active]: Yes: passive SI when she was a teenager.     Violence/Aggression Hx:No   Psychosis Hx: No   Eating Disorder Hx: No but h/o overeating and weight gain  Trauma hx: No    Psych Hosp [#, most recent]: Yes: once in 11/2018   Commitment: No   TMS/ECT: No   Outpatient Programs [Day treatment, DBT, eating disorder tx, etc]: No    SUBSTANCE USE HISTORY   Past Use: No  Treatment [#, most recent]: No   Medical Consequences: No   Legal Consequences: No      Past Psych Med Trials        Medication Max Dose (mg) Dates / Duration Helpful? DC Reason / Adverse Effects?   Depakote    Tremors   sertraline       olanzapine    EPSE   Seroquel    EPSE   lithium 1200   Tremor at 1200 mg had to lower the dose   Wellbutrin 300 15 years ago                             Vitals   BP (!) 149/84 (BP Location: Left arm, Patient Position: Sitting, Cuff Size: Adult Regular)   Pulse 70   Wt 143.8 kg (317 lb)   LMP  (LMP Unknown)   BMI 49.65 kg/m    Pulse Readings from Last 3 Encounters:   08/31/23 70   05/31/23 76   03/08/23 80     Wt Readings  from Last 3 Encounters:   08/31/23 143.8 kg (317 lb)   06/28/23 143.3 kg (316 lb)   05/31/23 144.2 kg (318 lb)     BP Readings from Last 3 Encounters:   08/31/23 (!) 149/84   05/31/23 130/67   03/08/23 (!) 152/76        Medical History     ALLERGIES: Seroquel [quetiapine], Olanzapine, Penicillins, Risperidone, and Thimerosal    Patient Active Problem List   Diagnosis    Oral aphthae    CARDIOVASCULAR SCREENING; LDL GOAL LESS THAN 160    Rosacea    Raynaud disease    Vitamin D deficiency disease    MARLA (obstructive sleep apnea)    Atopic rhinitis    Chronic bilateral low back pain without sciatica    Obesity, Class III, BMI 40-49.9 (morbid obesity) (H)    Muscle tension dysphonia    Palpitations    Dysphagia, unspecified type    Anxiety    Benign essential hypertension    Callus of foot    Varicose veins of right lower extremity with edema    Bipolar I disorder (H)    Dribbling of urine    Right knee pain    Spinal stenosis at L4-L5 level    Facet arthropathy, lumbar        Medications     Current Outpatient Medications   Medication Sig Dispense Refill    acetaminophen (TYLENOL) 500 MG tablet Take 500-1,000 mg by mouth every 6 hours as needed for mild pain      Carboxymethylcellulose Sod PF (REFRESH PLUS) 0.5 % SOLN ophthalmic solution Place 1 drop into both eyes 3 times daily as needed for dry eyes      clotrimazole-betamethasone (LOTRISONE) 1-0.05 % external cream Apply topically 2 times daily For 2 weeks or as needed 45 g 1    estradiol (ESTRACE) 0.5 MG tablet Take 1.5 tablets (0.75 mg) by mouth daily 140 tablet 3    estradiol (VAGIFEM) 10 MCG TABS vaginal tablet Place 1 tablet (10 mcg) vaginally twice a week 26 tablet 3    lamoTRIgine (LAMICTAL) 150 MG tablet Take 1 tablet (150 mg) by mouth daily 30 tablet 1    lamoTRIgine (LAMICTAL) 200 MG tablet Take 1 tablet (200 mg) by mouth daily 30 tablet 1    lisinopril (ZESTRIL) 10 MG tablet Take 1 tablet (10 mg) by mouth daily 90 tablet 3    lithium (ESKALITH) 150 MG  capsule Take 1 capsule (150 mg) by mouth At Bedtime 30 capsule 1    lithium (ESKALITH) 300 MG tablet Take 3 tablets (900 mg) by mouth At Bedtime along with a 150mg capsule for a total daily dose of 1,050mg 90 tablet 1    lithium 300 MG capsule       Loratadine 10 MG capsule Take 10 mg by mouth daily      medroxyPROGESTERone (PROVERA) 2.5 MG tablet Take 1 tablet (2.5 mg) by mouth daily 90 tablet 3    metroNIDAZOLE (METROGEL) 1 % external gel Apply topically daily 60 g 9    ORDER FOR DME Use your CPAP device as directed by your provider.      VITAMIN D, CHOLECALCIFEROL, PO Take 2,000 Units by mouth daily          Labs and Data         12/15/2022    12:52 PM 4/11/2023     6:47 PM 8/28/2023     6:28 PM   PROMIS-10 Total Score w/o Sub Scores   PROMIS TOTAL - SUBSCORES 29 27 30          No data to display                  4/16/2023     3:56 PM 6/5/2023     2:53 PM 8/30/2023     4:32 PM   PHQ-9 SCORE   PHQ-9 Total Score MyChart 7 (Mild depression) 6 (Mild depression) 9 (Mild depression)   PHQ-9 Total Score 7 6 9         10/5/2022    10:54 AM 3/10/2023    11:46 AM 8/28/2023     6:27 PM   JOANNA-7 SCORE   Total Score  9 (mild anxiety) 9 (mild anxiety)   Total Score 5 9 9       Liver/Kidney Function, TSH Metabolic Blood counts   Recent Labs   Lab Test 05/15/23  1017 12/07/22  1111   AST 17 10   ALT 16 23   ALKPHOS 80 88   CR 0.58 0.55     Recent Labs   Lab Test 05/15/23  1017   TSH 2.51    Recent Labs   Lab Test 05/31/23  0945   CHOL 205*   TRIG 214*      HDL 62     Recent Labs   Lab Test 10/04/22  1651   A1C 5.3     Recent Labs   Lab Test 05/15/23  1017   GLC 90    Recent Labs   Lab Test 05/15/23  1017   WBC 8.3   HGB 13.6   HCT 42.2   MCV 89                PROVIDER: Antonio Santillan MD MPH    Level of Medical Decision Making:   - At least 1 chronic problem that is not stable  - Engaged in prescription drug management during visit (discussed any medication benefits, side effects, alternatives, etc.)            Patient staffed in clinic with Dr. Vivian Reese who will sign the note.  Supervisor is Dr. Vivian Burnett.

## 2023-08-31 NOTE — NURSING NOTE
Chief Complaint   Patient presents with    Recheck Medication     Bipolar I disorder     - Adriano Gonzalez, Visit Facilitator

## 2023-09-07 ENCOUNTER — OFFICE VISIT (OUTPATIENT)
Dept: FAMILY MEDICINE | Facility: CLINIC | Age: 58
End: 2023-09-07
Payer: COMMERCIAL

## 2023-09-07 VITALS
HEIGHT: 68 IN | DIASTOLIC BLOOD PRESSURE: 64 MMHG | HEART RATE: 72 BPM | BODY MASS INDEX: 44.41 KG/M2 | TEMPERATURE: 98 F | RESPIRATION RATE: 14 BRPM | SYSTOLIC BLOOD PRESSURE: 128 MMHG | OXYGEN SATURATION: 99 % | WEIGHT: 293 LBS

## 2023-09-07 DIAGNOSIS — L03.115 CELLULITIS OF RIGHT LOWER EXTREMITY: Primary | ICD-10-CM

## 2023-09-07 PROCEDURE — 99213 OFFICE O/P EST LOW 20 MIN: CPT | Performed by: FAMILY MEDICINE

## 2023-09-07 RX ORDER — CEPHALEXIN 500 MG/1
500 CAPSULE ORAL 3 TIMES DAILY
Qty: 15 CAPSULE | Refills: 0 | Status: SHIPPED | OUTPATIENT
Start: 2023-09-07 | End: 2024-01-04

## 2023-09-07 ASSESSMENT — PAIN SCALES - GENERAL: PAINLEVEL: NO PAIN (1)

## 2023-09-07 NOTE — PROGRESS NOTES
"  Assessment & Plan     Cellulitis of right lower extremity  Found having cellulitis on right calf and treated with keflex, but unfortunately she started similar sx on left shin, still currently taking keflex, encouraged her to continue with 5 additional days of extension, and work on hygiene  - cephALEXin (KEFLEX) 500 MG capsule; Take 1 capsule (500 mg) by mouth 3 times daily           FUTURE APPOINTMENTS:       - Follow-up visit in 2 weeks if not improving     Mikey Cortés MD  [facility] ANDREY Travis is a 58 year old, presenting for the following health issues:  Derm Problem (Painful rash on right lower leg. )        9/7/2023     9:16 AM   Additional Questions   Roomed by Ritika SCHAFER       History of Present Illness       Reason for visit:  Red area on right calf  Symptom onset:  1-3 days ago    She eats 4 or more servings of fruits and vegetables daily.She consumes 0 sweetened beverage(s) daily.She exercises with enough effort to increase her heart rate 10 to 19 minutes per day.  She exercises with enough effort to increase her heart rate 3 or less days per week.   She is taking medications regularly.       Review of Systems   Constitutional, HEENT, cardiovascular, pulmonary, gi and gu systems are negative, except as otherwise noted.      Objective    /64 (BP Location: Right arm, Patient Position: Sitting, Cuff Size: Adult Regular)   Pulse 72   Temp 98  F (36.7  C) (Tympanic)   Resp 14   Ht 1.722 m (5' 7.8\")   Wt 142.4 kg (314 lb)   LMP  (LMP Unknown)   SpO2 99%   BMI 48.03 kg/m    Body mass index is 48.03 kg/m .  Physical Exam   GENERAL: healthy, alert and no distress  NECK: no adenopathy, no asymmetry, masses, or scars and thyroid normal to palpation  RESP: lungs clear to auscultation - no rales, rhonchi or wheezes  CV: regular rate and rhythm, normal S1 S2, no S3 or S4, no murmur, click or rub, no peripheral edema and peripheral pulses strong  ABDOMEN: soft, " nontender, no hepatosplenomegaly, no masses and bowel sounds normal  MS: no gross musculoskeletal defects noted, no edema

## 2023-09-09 ENCOUNTER — OFFICE VISIT (OUTPATIENT)
Dept: URGENT CARE | Facility: URGENT CARE | Age: 58
End: 2023-09-09
Payer: COMMERCIAL

## 2023-09-09 ENCOUNTER — NURSE TRIAGE (OUTPATIENT)
Dept: NURSING | Facility: CLINIC | Age: 58
End: 2023-09-09
Payer: COMMERCIAL

## 2023-09-09 VITALS
RESPIRATION RATE: 20 BRPM | OXYGEN SATURATION: 100 % | SYSTOLIC BLOOD PRESSURE: 138 MMHG | DIASTOLIC BLOOD PRESSURE: 60 MMHG | TEMPERATURE: 98.8 F | WEIGHT: 293 LBS | BODY MASS INDEX: 48.03 KG/M2 | HEART RATE: 60 BPM

## 2023-09-09 DIAGNOSIS — L03.90 CELLULITIS, UNSPECIFIED CELLULITIS SITE: Primary | ICD-10-CM

## 2023-09-09 PROCEDURE — 96372 THER/PROPH/DIAG INJ SC/IM: CPT | Performed by: FAMILY MEDICINE

## 2023-09-09 PROCEDURE — 99213 OFFICE O/P EST LOW 20 MIN: CPT | Mod: 25 | Performed by: FAMILY MEDICINE

## 2023-09-09 RX ORDER — CEPHALEXIN 500 MG/1
500 CAPSULE ORAL 4 TIMES DAILY
Qty: 28 CAPSULE | Refills: 0 | Status: SHIPPED | OUTPATIENT
Start: 2023-09-09 | End: 2023-09-16

## 2023-09-09 RX ORDER — CEFTRIAXONE SODIUM 1 G
1 VIAL (EA) INJECTION ONCE
Status: COMPLETED | OUTPATIENT
Start: 2023-09-09 | End: 2023-09-09

## 2023-09-09 RX ADMIN — Medication 1 G: at 16:36

## 2023-09-09 NOTE — PATIENT INSTRUCTIONS
Increase to 500 mg 4x daily    Monitor redness, warmth etc     To emergency room if significantly worsening

## 2023-09-09 NOTE — PROGRESS NOTES
Angy Haji is a 58 year old female who comes in today for leg issues, cellulitis    No past history of cellulitiis    On cephalexin for this    No fever/ chills now    Mild fever initially    Chills    No diabetes    Full physical not done     Mentation and affect are fine    No tremor of speech or extremity    Patient has patchy erythema and warmth anterior right lower leg, and medial distal thigh    Sensation and strength are good both legs    She has good dorsalis pedis bilaterally    Mild pitting edema bilat, but symmetric    No resp distress at all    ASSESSMENT / PLAN:  (L03.90) Cellulitis, unspecified cellulitis site  (primary encounter diagnosis)  Comment: gave IM shot here.  She is tolerating po med fine.  Go to 4x daily, extend course.  To emergency room if symptoms worsen; they will keep close track.  If not resolving be seen in clinic.   Plan: cefTRIAXone (ROCEPHIN) in lidocaine 1% (PF) for        IM administration 1 g, cephALEXin (KEFLEX) 500         MG capsule               I reviewed the patient's medications, allergies, medical history, family history, and social history.    Stewart Spain MD

## 2023-09-09 NOTE — TELEPHONE ENCOUNTER
Nurse Triage SBAR    Is this a 2nd Level Triage? NO    Situation: Cellulitis follow up with worsening symptoms.     Background: Diagnosed with cellulitis 6 days ago, started Keflex. Pt seen again 2 days ago, advised to continue the Keflex and added 3 additional days of medication.       Assessment: Today, pt has concerns for worsening symptoms. She has 2 new areas of concern on her R leg. She has a 4x5 inch area on the side of her knee and another area above her ankle. These areas are tender, swollen, red, and warm.     Her original cellulitis areas might be a little better today.     She doesn't feel sick, no fever.     Protocol Recommended Disposition:   See a provider within 24 hours, UC advised. Patient verbalized understanding and had no further questions.      Aishwarya Calderon RN  [facility] Nurse Advisor  9/9/2023 12:01 PM     Reason for Disposition   [1] Taking antibiotic > 24 hours AND [2] cellulitis symptoms are WORSE (e.g., spreading redness, pain, swelling)    Additional Information   Negative: Shock suspected (e.g., cold/pale/clammy skin, too weak to stand, low BP, rapid pulse)   Negative: Sounds like a life-threatening emergency to the triager   Negative: SEVERE pain   Negative: [1] SEVERE pain with bending of finger (or toe) AND [2] cellulitis on hand (or foot)   Negative: [1] Widespread rash AND [2] bright red, sunburn-like   Negative: Fever > 103 F (39.4 C)   Negative: Black (necrotic), dark purple, or blisters develop in area of cellulitis   Negative: Patient sounds very sick or weak to the triager   Negative: [1] Taking antibiotic > 24 hours AND [2] fever persists   Negative: [1] Taking antibiotic > 24 hours AND [2] red streak (or line) runs from area of infection   Negative: [1] Fever > 100.0 F (37.8 C) AND [2] new-onset   Negative: [1] Red streak runs from area of infection AND [2] new-onset   Negative: [1] Caller has URGENT question AND [2] triager unable to answer question    Protocols used:  Cellulitis on Antibiotic Follow-up Call-A-AH

## 2023-10-02 ENCOUNTER — THERAPY VISIT (OUTPATIENT)
Dept: PHYSICAL THERAPY | Facility: CLINIC | Age: 58
End: 2023-10-02
Payer: COMMERCIAL

## 2023-10-02 DIAGNOSIS — M25.561 RIGHT KNEE PAIN: Primary | ICD-10-CM

## 2023-10-02 PROCEDURE — 97110 THERAPEUTIC EXERCISES: CPT | Mod: GP | Performed by: PHYSICAL THERAPIST

## 2023-10-04 ENCOUNTER — HOSPITAL ENCOUNTER (OUTPATIENT)
Dept: NUTRITION | Facility: CLINIC | Age: 58
Discharge: HOME | End: 2023-10-04
Admitting: DIETITIAN, REGISTERED
Payer: COMMERCIAL

## 2023-10-04 PROCEDURE — 97803 MED NUTRITION INDIV SUBSEQ: CPT | Mod: GT,95 | Performed by: DIETITIAN, REGISTERED

## 2023-10-04 NOTE — PROGRESS NOTES
Virtual Visit Details    Type of service:  Video Visit     Originating Location (pt. Location): Other work setting     Distant Location (provider location):  On-site  Platform used for Video Visit: Well     NUTRITION REASSESSMENT NOTE   REASON FOR ASSESSMENT  Angy LILIAN Haji referred by Dr. Lira for MNT related to  Obesity, Class III, BMI 40-49.9 (morbid obesity) (H) [E66.01]          Patient accompanied by self      NUTRITION HISTORY  Patient continues with multiple life changes and stressors.  Patient aware that she likes to self soothe with ice cream which she has recently increased.  Patient is aware that social cues trigger her to eat. Patient's spouse recently had weight loss surgery so there is adjust with eating in the home.      3/7/2022 Self soothe in evening snack-made tea or tells self ate late dinner and does not need snack.  Patient asking self if physically hungry.  Patient drinking fizzy water or malt o meal as evening snack.  Patient aware she has been stress eating.  Tracking when not having dessert -identifying why eating  Exercise-cross country skiing; mall walk; snow shoeing; treadmill 2 times haring treadmill   Yogurt; cheese and crackers; cereal      Patient wants to increase exercise time as will be going to [place] weekend ([place]) [facility]      4/25/2002 Patient able to get 45-60 minutes exercise on weekends and 20-30 minutes of exercise daily.  Patient wants to lose 5 lbs prior to trip to [place].  Patient was able to have 3 days no desserts and then 1 week no dessert. 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  Patient determined ways she could lose weight: Dessert 2 times per week, add treadmill, eliminate morning snack, 1 snack at 3 PM. Alcohol 1 time per week, no caramel lattes, food is fuel, it's too late to be hungry, spouse brings sweet treats without asking, tea/bath/shower      6/15/2022 Patient skips dessert if eats  late dinner.  Patient felt discouraged with weight loss as wanted to lose more weight (down 3.5 lbs). Patient wants weight loss for functional reasons.  Patient signed [place] for the [place].  Patient has increased cravings and thoughts about ice cream.  Shift in relationship with spouse after spouse's weight loss surgery.      7/27/2022 Patient increased exercise-[place] with [employer]; started the last week in June for bike rides (4 rides different times in 2 weeks-9 miles).  Rode in 18 mile race.  Went hiking on [place].     No dessert (hit or miss) 50% 3 days no dessert; ice cream and s'mores on vacation     Typical intake:  Greek yogurt + 1/4 cup granola + fruit + 1/2 and 1/2 with coffee or english muffin with peanut butter/butter and strawberry jam or eggs scrambled or hard boiled  Leftovers -1 cup cherries; cherry tomatoes; 1/2 cup pea pods + 3 oz pork tenderloin (teryaki)   Watermelon, pork tenderloin, 1 cup rice + tomatoes/pea pods; cheeseburger with whole wheat bun + vegetables + watermelon   Snacks: croissant (Rustica) Protestant oat squares (1 cup dry) (3:00 PM) cheese and crackers    Beverages: coffee; water; fizzy water      Emotional eating at night-ice cream      9/21/2022 Patient has been able to bike 2 times per week.  Patient's weight has been stable.  Patient has not been able to track dinner and snacks.  Stress eating ebbs and flows.  Trying to save dessert for the weekend or may self soothe during th week with ice cream.  Patient stress eating in the evening and may boredom eat prior to lunch (cereal).    Weight: 308.4 lbs       11/16/2022 Biking 2 times per week. Tour biking race: [employer] 14 miles (rode by herself). [facility] 12 mile hilly bike ride. (rode with friend).  Patient has consistently walked dog and completes PT exercises.  Patient determining plan for winter exercise.    314.4 lbs (food and sleep changes) Patient  was eating more sweets as patient's wife baked sweets this fall (3-4 weeks with increase of sweets).  Holiday baking plan consists of 5 cookies and 1 candy (peanut brittle).  Ice cream consumption has increased recently.      1/23/2023 Patient was able to bake holiday cookies and freeze them.  Patient has been eating desserts 5 days a week.  Patient has been substituting greek yogurt (flavored or vanilla) or cream of wheat for dessert.  Patient was able to snow shoe.  Patient having barrier with getting on treadmill.  Patient's weight has fluctuated 325.8 and now 316 lbs.  Patient has been working on sleep routine (classical music; reading on paper vs ipad).      3/17/2023 Patient having knee pain and went to orthopedic MD and got cortizone shot in knee and able to walk know. Walked dog 3 times this week. Swimming -[facility] (6 sameer pool). Tyler wants to swim with goal of swimming 2 times per week.  Patient states ice cream and dessert are hit or miss. Patient shifting mindset to days having dessert vs not having dessert. Patient has not been as mindful with practicing self soothe at night.  Has tried tea and engaging book.     5/3/2023 PT exercises for knee; walking 15-30 minutes per day with dog + swimming 1 time per week (9 laps); 5 days per week desserts -patient using self talk to reduce dessert intake. Patient trying to limit desserts.  Patient's wife brining bread and sweets into home in which patient feels compels to eat as not to waste food.  Patient wants to be aware of foods when dining out and would like to split meal with spouse. Patient using zinger tea to soothe at night.       6/28/2023 No dessert 1-2 days; vacation and party so feels increase in desserts.  Patient getting more movement at work by taking stairs at work and size of building as no longer working remote.  Going to PT; less dog walks due to driving to work; found new lunch place to eat lunch at work (3-4 days per week-20  "minute walk) -reading book and eating lunch; aims for hour lunch -reading for self care      8/9/2023 Patient is being intentional for choosing desserts.  Skipped ice cream since had late dinner.  Grabbed snack when dinner wasn't ready prior to function and ate later-took cheese and fruit then ate chicken kebab onion, bell pepper, chicken -316 lbs currently.  Was lower weight last week. Patient trying to balance stress in life.     10/4/2023 Patient having 1 night per week not having dessert.  Patient feels like eating ice cream when alone at home while watching a favorite show and feels more indulgent.  Patient did not participate in [employer] as hoped.  Patient will be participating in [employer] this weekend.  Patient continues to walk dog daily.  Patient also continues with increased life stressors.  Reading in the evening + fizzy water.      DIET RECALL   Breakfast: cereal and banana; yogurt + granola (measures), fruit + coffee; 2 slices toast with butter + 1/2 cup cottage cheese    Lunch: leftovers (includes fruit and vegetable daily)  Snack: fruit or cheese with lower salt nut thin crackers   Dinner: Tuna steak, Asian marinade, stir gomez, baked vegetable + brown rice (3/4 cup) + canned peaches; chicken thighs with beans and rice; sheet pan dinner with chicken and vegetables or grilled salmon and grilled vegetables; taco; protein + fruit and vegetable  Snacks: fruit, string cheese, Ritz crackers or yogurt; dessert in the evening (ice cream recently)  Beverages: mainly water, coffee, mineral water  Dining out: none currently  Exercise: Patient exercising 5-6 times per week. (cross country skiing, snow shoeing, walking and walking dog).  Patient exercises 1 hour on weekends (hiking or walking).     MEDICATIONS:  Reviewed      ANTHROPOMETRICS  Height: 5'8\" (note height change in chart previously 5'9\")  Weight: 316 lbs per patient   BMI (kg/m2): 47.7 kg/m2   %IBW: 225%  Weight History:   Wt " Readings from Last 10 Encounters:   09/09/23 142.4 kg (314 lb)   09/07/23 142.4 kg (314 lb)   06/28/23 143.3 kg (316 lb)   05/31/23 144.2 kg (318 lb)   01/13/23 144.2 kg (318 lb)   10/04/22 138.1 kg (304 lb 6.4 oz)   07/27/22 139.7 kg (308 lb)   05/16/22 142.1 kg (313 lb 3.2 oz)   01/12/22 139.7 kg (308 lb)   12/22/21 137.4 kg (303 lb)        ASSESSED NUTRITION NEEDS  Estimated Energy Needs: 1602-6962 kcals (15-20 kcals/kg) for gradual weight loss   Estimated Protein Needs: 66-79 (1-1.2 gm/kg IBW) for repletion with exercise  RMR: 1440     EVALUATION/PROGRESS TOWARDS GOALS   Previous goals:  Add soothing activity in the evening (7:00 PM) 5 of 7 days-not met   Aim for 4 desserts per week (1-2 per week and 2 on weekend) -improving  Aim for 1 day per week 60 minutes exercise (biking) + current exercise -not met      Previous Nutrition Diagnosis: Obesity related to excess energy intake as evidenced by BMI of 48 kg/m2-no change     Current Nutrition Diagnosis:Obesity related to excess energy intake as evidenced by BMI of 47.7 kg/m2     INTERVENTIONS  Nutrition Prescription   Recommend exercise 5 times per week; track intake if eating mindlessly; increase fiber to 25-30 grams per day      Implementation:   Meals and Snacks: Continue eating 3 meals + deliberate snacking (if hungry)   Nutrition Education (Content):              a)  Discussed progress towards goals.  Congratulated patient for continuing self soothe activities in the evening.  Reviewed current food and eating behavior challenges.  Patient continues with life stressors which can affect evening eating.  Discussed self care as way to improve emotional eating.  Discussed changes in eating and exercise patterns with spouse.  Supported patient in her struggle with food and weight.    Nutrition Education (Application):              a) Discussed strategies to set boundaries with food.                b) Patient verbalized understanding of diet by stating will ask self  if hungry for ice cream.    Expected patient engagement: good     CURRENT GOALS   Add soothing activity in the evening (8:00 PM) 4 of 7 days  Aim for 4 desserts per week (2 per week and 2 on weekend)   Add swimming 1 day per week + current exercise (dog walk)      FOLLOW UP/MONITORING    Patient to follow up in 8 weeks  Patient has RD contact information      Time spent with patient: 30 minutes     Mattie Amaya, RD, LD  [facility] Outpatient Dietitian  [phone] (office phone)

## 2023-10-11 DIAGNOSIS — E66.01 OBESITY, CLASS III, BMI 40-49.9 (MORBID OBESITY) (H): Primary | ICD-10-CM

## 2023-10-23 ENCOUNTER — THERAPY VISIT (OUTPATIENT)
Dept: PHYSICAL THERAPY | Facility: CLINIC | Age: 58
End: 2023-10-23
Payer: COMMERCIAL

## 2023-10-23 DIAGNOSIS — M25.561 RIGHT KNEE PAIN: Primary | ICD-10-CM

## 2023-10-23 PROCEDURE — 97530 THERAPEUTIC ACTIVITIES: CPT | Mod: GP | Performed by: PHYSICAL THERAPIST

## 2023-10-23 PROCEDURE — 97112 NEUROMUSCULAR REEDUCATION: CPT | Mod: GP | Performed by: PHYSICAL THERAPIST

## 2023-10-23 PROCEDURE — 97110 THERAPEUTIC EXERCISES: CPT | Mod: GP | Performed by: PHYSICAL THERAPIST

## 2023-10-23 ASSESSMENT — ACTIVITIES OF DAILY LIVING (ADL)
GIVING WAY, BUCKLING OR SHIFTING OF KNEE: I DO NOT HAVE THE SYMPTOM
AS_A_RESULT_OF_YOUR_KNEE_INJURY,_HOW_WOULD_YOU_RATE_YOUR_CURRENT_LEVEL_OF_DAILY_ACTIVITY?: NEARLY NORMAL
WEAKNESS: I HAVE THE SYMPTOM BUT IT DOES NOT AFFECT MY ACTIVITY
GO DOWN STAIRS: ACTIVITY IS MINIMALLY DIFFICULT
SIT WITH YOUR KNEE BENT: ACTIVITY IS MINIMALLY DIFFICULT
PAIN: I HAVE THE SYMPTOM BUT IT DOES NOT AFFECT MY ACTIVITY
KNEE_ACTIVITY_OF_DAILY_LIVING_SUM: 57
RAW_SCORE: 57
SQUAT: ACTIVITY IS SOMEWHAT DIFFICULT
STIFFNESS: I HAVE THE SYMPTOM BUT IT DOES NOT AFFECT MY ACTIVITY
KNEEL ON THE FRONT OF YOUR KNEE: ACTIVITY IS SOMEWHAT DIFFICULT
LIMPING: I DO NOT HAVE THE SYMPTOM
SWELLING: I DO NOT HAVE THE SYMPTOM
HOW_WOULD_YOU_RATE_THE_OVERALL_FUNCTION_OF_YOUR_KNEE_DURING_YOUR_USUAL_DAILY_ACTIVITIES?: NEARLY NORMAL
WALK: ACTIVITY IS MINIMALLY DIFFICULT
HOW_WOULD_YOU_RATE_THE_CURRENT_FUNCTION_OF_YOUR_KNEE_DURING_YOUR_USUAL_DAILY_ACTIVITIES_ON_A_SCALE_FROM_0_TO_100_WITH_100_BEING_YOUR_LEVEL_OF_KNEE_FUNCTION_PRIOR_TO_YOUR_INJURY_AND_0_BEING_THE_INABILITY_TO_PERFORM_ANY_OF_YOUR_USUAL_DAILY_ACTIVITIES?: 90
STAND: ACTIVITY IS NOT DIFFICULT
GO UP STAIRS: ACTIVITY IS SOMEWHAT DIFFICULT
KNEE_ACTIVITY_OF_DAILY_LIVING_SCORE: 81.43
RISE FROM A CHAIR: ACTIVITY IS MINIMALLY DIFFICULT

## 2023-10-23 NOTE — PROGRESS NOTES
10/23/23 0500   Appointment Info   Signing clinician's name / credentials [facility]   Total/Authorized Visits E+T 8   Visits Used 12   Medical Diagnosis right knee pain   GOALS   PT Goals 2   PT Goal 1   Goal Identifier stairs   Goal Description Ascend/descend stairs;in a normal reciprocal pattern   Rationale to maximize safety and independence within the home;to maximize safety and independence within the community   Goal Progress can ascend and descend stairs in reciprical pattern with 1  railing   Target Date 06/27/23   Date Met 10/23/23   PT Goal 2   Goal Identifier ambulation   Goal Description Hours patient will be able to walk - 1   Rationale to maximize safety and independence within the home;to maximize safety and independence within the community   Goal Progress pt is walking 45 min, ok if even surfice   Target Date 06/27/23   Subjective Report   Subjective Report Patient reports overall improvement.  She has walked alot and done a lot of stairs for recent college visit trips.  Right knee pain is 3/10 at its worst with stairs.  Right knee pain is post/lateral   Objective Measures   Objective Measures Objective Measure 1   Objective Measure 1   Objective Measure can maintain SLS 8 sec on left, 5 sec on right.  Can do partial forward and back lunges B.  ROM is WNL.  Good control with partial double leg squat, unable to do single leg squats. Negotiates stairs in reciprical pattern with 1 railing;, needs to pull on railing to ascend with right leg.   Treatment Interventions (PT)   Interventions Therapeutic Procedure/Exercise;Neuromuscular Re-education;Therapeutic Activity   Therapeutic Procedure/Exercise   Therapeutic Procedures: strength, endurance, ROM, flexibillity minutes (66083) 20   Therapeutic Procedures Ther Proc 2   Ther Proc 1 Nustep 10 min seat at 11, WL 4   Ther Proc 2 Leg Press   Ther Proc 2 - Details sled set at 1,  5.5 pl single leg 20x each leg   PTRx Ther Proc 1 Prone Plank  Modified Knees   PTRx Ther Proc 1 - Details able to maintain 15 sec   PTRx Ther Proc 2 Bridging #2A Weight Shift   PTRx Ther Proc 2 - Details start with 2-3 pair and then finish with bridge #1   PTRx Ther Proc 3 Supine Lumbar Hip Roll   PTRx Ther Proc 3 - Details ok for hip to come up.  Use core/abdominals to return knees to upright [id number] pair   PTRx Ther Proc 4 Lateral Step Down   PTRx Ther Proc 4 - Details 4 in excursion [id number] on left 10x on right   PTRx Ther Proc 5 Side Stepping With Theraband   PTRx Ther Proc 5 - Details to fatigue 2 gym laps in clinic.  with small athletic stance   PTRx Ther Proc 6 Hip AROM Standing Abduction   PTRx Ther Proc 6 - Details 10-15x each leg   PTRx Ther Proc 7 Knee Bends   PTRx Ther Proc 7 - Details 15-20x   PTRx Ther Proc 8 Supine Heel Slides   PTRx Ther Proc 8 - Details 10x   PTRx Ther Proc 9 Prone Knee Flexion   PTRx Ther Proc 9 - Details 10-20x   PTRx Ther Proc 10 Hip Flexion Straight Leg Raise   PTRx Ther Proc 10 - Details 20x each leg   PTRx Ther Proc 11 Clamshell Feet together   PTRx Ther Proc 11 - Details 10-20x   PTRx Ther Proc 12 Bridging #1   PTRx Ther Proc 12 - Details 20x   PTRx Ther Proc 13 Hip Flexion Straight Leg Raise   PTRx Ther Proc 13 - Details 20x each leg   PTRx Ther Proc 14 Clamshell Feet together   PTRx Ther Proc 14 - Details 10-20x   PTRx Ther Proc 15 Bridging #1   PTRx Ther Proc 15 - Details 20x   Therapeutic Activity   PTRx Ther Act 1 Functional Lunge Forward   PTRx Ther Act 1 - Details 5-10 x each leg   Therapeutic Activities: dynamic activities to improve functional performance minutes (98736) 10   PTRx Ther Act 2 Functional Lunge Backward   PTRx Ther Act 2 - Details 10x on left 5 on right with partial excursion   PTRx Ther Act 3 Squat With Chair   PTRx Ther Act 3 - Details able to do 20x from 24 in high table   Neuromuscular Re-education   PTRx Neuro Re-ed 1 Star Exercise   PTRx Neuro Re-ed 1 - Details use paper plate under moving foot3 reps  each ray each leg   Neuromuscular re-ed of mvmt, balance, coord, kinesthetic sense, posture, proprioception minutes (54195) 10   PTRx Neuro Re-ed 2 Balance Single Leg Stance Supported and Unsupported   PTRx Neuro Re-ed 2 - Details work on for 10 sec at a time reaching for support as needed   Plan   Home program per PTRx   Comments   Comments see DC   Total Session Time   Timed Code Treatment Minutes 40   Total Treatment Time (sum of timed and untimed services) 40       DISCHARGE  Reason for Discharge: Patient has met all goals.    Equipment Issued: none    Discharge Plan: Patient to continue home program.    Referring Provider:  Danielle Suárez*

## 2023-10-30 ENCOUNTER — MYC MEDICAL ADVICE (OUTPATIENT)
Dept: FAMILY MEDICINE | Facility: CLINIC | Age: 58
End: 2023-10-30
Payer: COMMERCIAL

## 2023-10-30 DIAGNOSIS — Z12.11 SPECIAL SCREENING FOR MALIGNANT NEOPLASMS, COLON: Primary | ICD-10-CM

## 2023-10-30 NOTE — TELEPHONE ENCOUNTER
Please see Strobet message and advise.  Pt requesting colonoscopy referral.     Milana Marinelli RN

## 2023-10-31 ASSESSMENT — ANXIETY QUESTIONNAIRES
GAD7 TOTAL SCORE: 11
7. FEELING AFRAID AS IF SOMETHING AWFUL MIGHT HAPPEN: MORE THAN HALF THE DAYS
1. FEELING NERVOUS, ANXIOUS, OR ON EDGE: MORE THAN HALF THE DAYS
4. TROUBLE RELAXING: SEVERAL DAYS
3. WORRYING TOO MUCH ABOUT DIFFERENT THINGS: MORE THAN HALF THE DAYS
6. BECOMING EASILY ANNOYED OR IRRITABLE: SEVERAL DAYS
IF YOU CHECKED OFF ANY PROBLEMS ON THIS QUESTIONNAIRE, HOW DIFFICULT HAVE THESE PROBLEMS MADE IT FOR YOU TO DO YOUR WORK, TAKE CARE OF THINGS AT HOME, OR GET ALONG WITH OTHER PEOPLE: SOMEWHAT DIFFICULT
5. BEING SO RESTLESS THAT IT IS HARD TO SIT STILL: SEVERAL DAYS
GAD7 TOTAL SCORE: 11
2. NOT BEING ABLE TO STOP OR CONTROL WORRYING: MORE THAN HALF THE DAYS

## 2023-11-02 ENCOUNTER — OFFICE VISIT (OUTPATIENT)
Dept: PSYCHIATRY | Facility: CLINIC | Age: 58
End: 2023-11-02
Attending: PSYCHIATRY & NEUROLOGY
Payer: COMMERCIAL

## 2023-11-02 VITALS
DIASTOLIC BLOOD PRESSURE: 82 MMHG | WEIGHT: 293 LBS | HEART RATE: 61 BPM | SYSTOLIC BLOOD PRESSURE: 143 MMHG | BODY MASS INDEX: 47.36 KG/M2

## 2023-11-02 DIAGNOSIS — F31.9 BIPOLAR I DISORDER (H): ICD-10-CM

## 2023-11-02 DIAGNOSIS — Z79.899 ENCOUNTER FOR LONG-TERM (CURRENT) USE OF MEDICATIONS: Primary | ICD-10-CM

## 2023-11-02 PROCEDURE — 99213 OFFICE O/P EST LOW 20 MIN: CPT | Mod: HN | Performed by: STUDENT IN AN ORGANIZED HEALTH CARE EDUCATION/TRAINING PROGRAM

## 2023-11-02 PROCEDURE — 99214 OFFICE O/P EST MOD 30 MIN: CPT | Performed by: STUDENT IN AN ORGANIZED HEALTH CARE EDUCATION/TRAINING PROGRAM

## 2023-11-02 RX ORDER — LAMOTRIGINE 150 MG/1
150 TABLET ORAL DAILY
Qty: 30 TABLET | Refills: 1 | Status: SHIPPED | OUTPATIENT
Start: 2023-11-02 | End: 2024-01-04

## 2023-11-02 RX ORDER — LITHIUM CARBONATE 150 MG/1
150 CAPSULE ORAL AT BEDTIME
Qty: 30 CAPSULE | Refills: 1 | Status: CANCELLED | OUTPATIENT
Start: 2023-11-02

## 2023-11-02 RX ORDER — LITHIUM CARBONATE 300 MG/1
CAPSULE ORAL
Qty: 270 CAPSULE | Refills: 2 | Status: CANCELLED | OUTPATIENT
Start: 2023-11-02

## 2023-11-02 ASSESSMENT — PAIN SCALES - GENERAL: PAINLEVEL: NO PAIN (0)

## 2023-11-02 NOTE — PROGRESS NOTES
"   [facility]  MEDICAL PROGRESS NOTE     CARE TEAM:    PCP- Mikey Cortés  Therapist- couple therapy      Angy is a 58 year old who uses the pronouns she, her, hers.     Answers submitted by the patient for this visit:  JOANNA-7 (Submitted on 10/31/2023)  JOANNA 7 TOTAL SCORE: 11     Diagnoses     Bipolar 1 disorder, current depressive episode, moderate     H/o EPSE secondary to neuroleptic use  H/o Thrombocytopenia secondary to Depakote  H/o lithium toxicity, resolved     Assessment     Angy reported her mood was affected at the beginning of October when her partner asked for divorce. They went to couple counseling. She had a chance to listen to her, and taking steps to make it work. Her sleep and mood were affected but they are coming back to normal now.     She reported a slight tremor. She wanted to know if it was due to lithium. She denied nausea, vomiting or cognitive impairment. Writer recommended and she agreed to have a blood lithium level without making medications changes. She has a history of lithium toxicity that led to decrease of lithium. The second time they decrease her lithium was in 10/2021 because she was concerned of her ziopatch result. Her last provider noted \"Ziopatch showed paroxysmal supraventricular tachycardia. She has an upcoming appointment with cardiology on 10/25 and preferred to reduce Lithium back to 1050 mg. We talked about articles that showed association of Lithium with ventricular tachyarrhythmias but not commonly pSVTs.\"     Writer mentioned that new blood lithium level will add clinical data that we can factor in for an appropriate decision. We encourage Angy to stay hydrated, and avoid NSAID. Depending on if she is open to it, we could also start her on low dose propranolol. She has been on it in the past for blood pressure.    .     If depression, see MTM on 03/2023    Psychotropic Drug Interactions:  " "[PSYCHCLINICDDI]  Estrogen Derivatives may decrease the serum concentration of LamoTRIgine  Angiotensin-Converting Enzyme Inhibitors may increase the serum concentration of Lithium.     Management: routine monitoring    [place]MP was not checked today: not using controlled substances    Risk Statements:   Treatment Risk- Risks, benefits, alternatives and potential adverse effects have been discussed and are understood.   Safety Risk-Angy did not appear to be an imminent safety risk to self or others.     Plan     1) Medications:  no medication changes  - Continue Lithium 1050 mg at bedtime  - Continue Lamictal 350mg daily    2) Psychotherapy: Continue with therapy    3) Next due:  Labs- lithium updated   EKG- . Last one was in 10/2021 with    Rating scales-  PHQ9    4) Referrals: none  N/A    5) Other: none    6) Follow-up: Return to clinic in 2 months     Pertinent Background                                                   [most recent eval 08/31/23]     Angy reported that she didn't have symptoms until November 2018. At which point she was hospitalized for anthony.     This section was copied forward from 08/2021 transfer notes.  \"Patient has not had significant mental health history until 2018, when she developed perimenopausal mood and anxiety symptoms.  She was subsequently referred to our clinic following a hospitalization for anthony in November 2018.  Her care has been complicated by sensitivity to antipsychotics (EPSE including difficulty swallowing), thrombocytopenia secondary to Depakote, lithium toxicity in setting of concurrent Depakote use\"     Pertinent items include: anthony , severe med reaction [described as H/o Thrombocytopenia secondary to Depakote, and H/o lithium toxicity], and psych hosp      Subjective     Mood:  - Doing okay  - mood is up and down  - had some difficulty with sleep because of repetitive thoughts.   - that affected her mood, but feel better now.    - Wife wanted to divorce. " They have been together for 25 years.  - they are working on couple counseling  - In addition to that, they have their son who is a Senior in  preparing for college    Anxiety:   - up and down  - Oct 6th, she told her about her desire to divorce.  - On 10/16 symptoms were high.   - She has anxiety around decision making, but she needs to decide/acts to save her marriage.  - she now goes around the house to  project that she her wife wanted her to finish.  - She is working on them to show her commitment.     CPAP, was not changed.     Recent life stressors: money, divorce, intimacy,     Sleep: has been back to normal     Appetite: low, lost a few lbs.     Psychosis/Thuy: busy mind about stress racing thought, impulsivity toward picking up project around the house, but not clear thuy.    Chronic disease (HTA, DM, or Pain): HTA, last visit to PCP was in May 2023.   HRT tapering    Medication side effects: reported dry mouth, trouble swallowing, and slight tremor.  Denied cognitive impairment.     SI/HI: none    Alcohol use: once every other week.     Smoking: none    Other substances (cannabis): none     Medical Review of Systems:   Lightheadedness/orthostasis: None  Headaches: None  GI: none  Sexual health concerns: has low sex drive. On HRT for menopause.    A comprehensive review of systems was performed and is negative other than noted above.    Contraception: No     Mental Status Exam     Alertness: alert  and oriented  Appearance: adequately groomed  Behavior/Demeanor: cooperative and pleasant, with good  eye contact   Speech: normal and regular rate and rhythm  Language: intact  Psychomotor: normal or unremarkable  Mood:  Doing okay  Affect: full range and appropriate; congruent to: mood- yes, content- yes  Thought Process/Associations: unremarkable  Thought Content:  Reports none;  Denies suicidal & violent ideation and delusions  Perception:  Reports none;  Denies auditory hallucinations and visual  hallucinations  Insight: good  Judgment: good  Cognition: does  appear grossly intact; formal cognitive testing was not done  Gait and Station: unremarkable     Past Psych Med Trials        Medication Max Dose (mg) Dates / Duration Helpful? DC Reason / Adverse Effects?   Depakote    Tremors   sertraline       olanzapine    EPSE   Seroquel    EPSE   lithium 1200   Tremor at 1200 mg had to lower the dose   Wellbutrin 300 15 years ago                               Treatment Course and Resendiz Events since  JULY 2023 08/2023 : no medication changes  11/2023 no medication changes     Vitals   BP (!) 143/82 (BP Location: Left arm, Patient Position: Sitting, Cuff Size: Adult Regular)   Pulse 61   Wt 140.4 kg (309 lb 9.6 oz)   BMI 47.36 kg/m    Pulse Readings from Last 3 Encounters:   11/02/23 61   09/09/23 60   09/07/23 72     Wt Readings from Last 3 Encounters:   11/02/23 140.4 kg (309 lb 9.6 oz)   09/09/23 142.4 kg (314 lb)   09/07/23 142.4 kg (314 lb)     BP Readings from Last 3 Encounters:   11/02/23 (!) 143/82   09/09/23 138/60   09/07/23 128/64        Medical History     ALLERGIES: Seroquel [quetiapine], Olanzapine, Penicillins, Risperidone, and Thimerosal    Patient Active Problem List   Diagnosis    Oral aphthae    CARDIOVASCULAR SCREENING; LDL GOAL LESS THAN 160    Rosacea    Raynaud disease    Vitamin D deficiency disease    MARLA (obstructive sleep apnea)    Atopic rhinitis    Chronic bilateral low back pain without sciatica    Obesity, Class III, BMI 40-49.9 (morbid obesity) (H)    Muscle tension dysphonia    Palpitations    Dysphagia, unspecified type    Anxiety    Benign essential hypertension    Callus of foot    Varicose veins of right lower extremity with edema    Bipolar I disorder (H)    Dribbling of urine    Spinal stenosis at L4-L5 level    Facet arthropathy, lumbar        Medications     Current Outpatient Medications   Medication Sig Dispense Refill    acetaminophen (TYLENOL) 500 MG tablet Take  500-1,000 mg by mouth every 6 hours as needed for mild pain      Carboxymethylcellulose Sod PF (REFRESH PLUS) 0.5 % SOLN ophthalmic solution Place 1 drop into both eyes 3 times daily as needed for dry eyes      cephALEXin (KEFLEX) 250 MG capsule TAKE 2 CAPSULES BY MOUTH EVERY 8 HOURS FOR INFECTION FOR 7 DAYS      cephALEXin (KEFLEX) 500 MG capsule Take 1 capsule (500 mg) by mouth 3 times daily 15 capsule 0    clotrimazole-betamethasone (LOTRISONE) 1-0.05 % external cream Apply topically 2 times daily For 2 weeks or as needed 45 g 1    estradiol (ESTRACE) 0.5 MG tablet Take 1 tablet (0.5 mg) by mouth daily 90 tablet 3    estradiol (VAGIFEM) 10 MCG TABS vaginal tablet Place 1 tablet (10 mcg) vaginally twice a week 26 tablet 3    lamoTRIgine (LAMICTAL) 150 MG tablet Take 1 tablet (150 mg) by mouth daily 30 tablet 1    lamoTRIgine (LAMICTAL) 200 MG tablet Take 1 tablet (200 mg) by mouth daily 30 tablet 1    lisinopril (ZESTRIL) 10 MG tablet Take 1 tablet (10 mg) by mouth daily 90 tablet 3    lithium (ESKALITH) 150 MG capsule Take 1 capsule (150 mg) by mouth At Bedtime for 90 days Take 1 tablet (150 mg) and 3 tablet (900 mg) for a total of 1050 mg at bedtime. 30 capsule 2    lithium (ESKALITH) 150 MG capsule Take 1 capsule (150 mg) by mouth At Bedtime 30 capsule 1    lithium (ESKALITH) 300 MG tablet Take 3 tablets (900 mg) by mouth At Bedtime along with a 150mg capsule for a total daily dose of 1,050mg 90 tablet 1    lithium 300 MG capsule Take 3 tablet (900 mg) and one tablet (150 mg) for a total of 1050 mg at bedtime. 270 capsule 2    Loratadine 10 MG capsule Take 10 mg by mouth daily      medroxyPROGESTERone (PROVERA) 2.5 MG tablet Take 1 tablet (2.5 mg) by mouth daily 90 tablet 3    metroNIDAZOLE (METROGEL) 1 % external gel Apply topically daily 60 g 9    ORDER FOR DME Use your CPAP device as directed by your provider.      VITAMIN D, CHOLECALCIFEROL, PO Take 2,000 Units by mouth daily          Labs and Data          12/15/2022    12:52 PM 4/11/2023     6:47 PM 8/28/2023     6:28 PM   PROMIS-10 Total Score w/o Sub Scores   PROMIS TOTAL - SUBSCORES 29 27 30          No data to display                  4/16/2023     3:56 PM 6/5/2023     2:53 PM 8/30/2023     4:32 PM   PHQ-9 SCORE   PHQ-9 Total Score MyChart 7 (Mild depression) 6 (Mild depression) 9 (Mild depression)   PHQ-9 Total Score 7 6 9         3/10/2023    11:46 AM 8/28/2023     6:27 PM 10/31/2023     5:25 PM   JOANNA-7 SCORE   Total Score 9 (mild anxiety) 9 (mild anxiety) 11 (moderate anxiety)   Total Score 9 9 11       Liver/Kidney Function, TSH Metabolic Blood counts   Recent Labs   Lab Test 05/15/23  1017 12/07/22  1111   AST 17 10   ALT 16 23   ALKPHOS 80 88   CR 0.58 0.55     Recent Labs   Lab Test 05/15/23  1017   TSH 2.51    Recent Labs   Lab Test 05/31/23  0945   CHOL 205*   TRIG 214*      HDL 62     Recent Labs   Lab Test 10/04/22  1651   A1C 5.3     Recent Labs   Lab Test 05/15/23  1017   GLC 90    Recent Labs   Lab Test 05/15/23  1017   WBC 8.3   HGB 13.6   HCT 42.2   MCV 89                PROVIDER: Antonio Santillan MD MPH    Level of Medical Decision Making:   - At least 1 chronic problem that is not stable  - Engaged in prescription drug management during visit (discussed any medication benefits, side effects, alternatives, etc.)         Patient was not staffed in clinic.  Supervisor is Dr. Vivian Burnett.

## 2023-11-06 ENCOUNTER — HOSPITAL ENCOUNTER (OUTPATIENT)
Facility: CLINIC | Age: 58
End: 2023-11-06
Attending: COLON & RECTAL SURGERY | Admitting: COLON & RECTAL SURGERY
Payer: COMMERCIAL

## 2023-11-06 ENCOUNTER — TELEPHONE (OUTPATIENT)
Dept: GASTROENTEROLOGY | Facility: CLINIC | Age: 58
End: 2023-11-06
Payer: COMMERCIAL

## 2023-11-06 NOTE — TELEPHONE ENCOUNTER
"Endoscopy Scheduling Screen    Have you had a positive Covid test in the last 14 days?  No    Are you active on MyChart?   Yes    What insurance is in the chart?  Other:  [facility]    Ordering/Referring Provider:     MARIA ESTHER BUENO      (If ordering provider performs procedure, schedule with ordering provider unless otherwise instructed. )    BMI: Estimated body mass index is 47.36 kg/m  as calculated from the following:    Height as of 9/7/23: 1.722 m (5' 7.8\").    Weight as of 11/2/23: 140.4 kg (309 lb 9.6 oz).     Sedation Ordered  moderate sedation.   If patient BMI > 50 do not schedule in [facility].    If patient BMI > 45 do not schedule at [facility].    Are you taking methadone or Suboxone?  No    Are you taking any prescription medications for pain 3 or more times per week?   No    Do you have a history of malignant hyperthermia or adverse reaction to anesthesia?  No    (Females) Are you currently pregnant?        Have you been diagnosed or told you have pulmonary hypertension?   No    Do you have an LVAD?  No    Have you been told you have moderate to severe sleep apnea?  Yes (RN Review required for scheduling unless scheduling in Hospital.)    Have you been told you have COPD, asthma, or any other lung disease?  No    Do you have any heart conditions?  No     Have you ever had an organ transplant?   No    Have you ever had or are you awaiting a heart or lung transplant?   No    Have you had a stroke or transient ischemic attack (TIA aka \"mini stroke\" in the last 6 months?   No    Have you been diagnosed with or been told you have cirrhosis of the liver?   No    Are you currently on dialysis?   No    Do you need assistance transferring?   No    BMI: Estimated body mass index is 47.36 kg/m  as calculated from the following:    Height as of 9/7/23: 1.722 m (5' 7.8\").    Weight as of 11/2/23: 140.4 kg (309 lb 9.6 oz).     Is patients BMI > 40 and scheduling location [facility]?  No    Do you take an injectable medication for weight loss " or diabetes (excluding insulin)?  No    Do you take the medication Naltrexone?  No    Do you take blood thinners?  No       Prep   Are you currently on dialysis or do you have chronic kidney disease?  No    Do you have a diagnosis of diabetes?  No    Do you have a diagnosis of cystic fibrosis (CF)?  No    On a regular basis do you go 3 -5 days between bowel movements?  No    BMI > 40?  Yes (Extended Prep)    Preferred Pharmacy:    [place], [place] - [address]  [address]  [facility] [address]  [facility] [address]  Phone: [phone] Fax: [phone] Alternate Fax: [phone], [phone], [phone]    Final Scheduling Details   Colonoscopy prep sent?  Golytely Extended Prep    Procedure scheduled  Colonoscopy    Surgeon:  ELHAM     Date of procedure:  2/5     Pre-OP / PAC:   No - Not required for this site.    Location  [facility] - Per order.    Sedation   Moderate Sedation - Per order.      Patient Reminders:   You will receive a call from a Nurse to review instructions and health history.  This assessment must be completed prior to your procedure.  Failure to complete the Nurse assessment may result in the procedure being cancelled.      On the day of your procedure, please designate an adult(s) who can drive you home stay with you for the next 24 hours. The medicines used in the exam will make you sleepy. You will not be able to drive.      You cannot take public transportation, ride share services, or non-medical taxi service without a responsible caregiver.  Medical transport services are allowed with the requirement that a responsible caregiver will receive you at your destination.  We require that drivers and caregivers are confirmed prior to your procedure.

## 2023-11-08 ENCOUNTER — LAB (OUTPATIENT)
Dept: LAB | Facility: CLINIC | Age: 58
End: 2023-11-08
Payer: COMMERCIAL

## 2023-11-08 DIAGNOSIS — Z79.899 ENCOUNTER FOR LONG-TERM (CURRENT) USE OF MEDICATIONS: ICD-10-CM

## 2023-11-08 LAB — LITHIUM SERPL-SCNC: 0.69 MMOL/L (ref 0.6–1.2)

## 2023-11-08 PROCEDURE — 80178 ASSAY OF LITHIUM: CPT

## 2023-11-08 PROCEDURE — 36415 COLL VENOUS BLD VENIPUNCTURE: CPT

## 2023-12-13 ENCOUNTER — HOSPITAL ENCOUNTER (OUTPATIENT)
Dept: NUTRITION | Facility: CLINIC | Age: 58
Discharge: HOME | End: 2023-12-13
Attending: DIETITIAN, REGISTERED | Admitting: FAMILY MEDICINE
Payer: COMMERCIAL

## 2023-12-13 PROCEDURE — 97803 MED NUTRITION INDIV SUBSEQ: CPT | Mod: GT,95 | Performed by: DIETITIAN, REGISTERED

## 2023-12-13 NOTE — PROGRESS NOTES
Virtual Visit Details    Type of service:  Video Visit     Originating Location (pt. Location): Other parked car    Distant Location (provider location):  On-site  Platform used for Video Visit: Well     NUTRITION REASSESSMENT NOTE   REASON FOR ASSESSMENT  Angyra Haji referred by Dr. Cortés for MNT related to  Obesity, Class III, BMI 40-49.9 (morbid obesity) (H) [E66.01]          Patient accompanied by self      NUTRITION HISTORY  Patient continues with multiple life changes and stressors.  Patient aware that she likes to self soothe with ice cream which she has recently increased.  Patient is aware that social cues trigger her to eat. Patient's spouse recently had weight loss surgery so there is adjust with eating in the home.      3/7/2022 Self soothe in evening snack-made tea or tells self ate late dinner and does not need snack.  Patient asking self if physically hungry.  Patient drinking fizzy water or malt o meal as evening snack.  Patient aware she has been stress eating.  Tracking when not having dessert -identifying why eating  Exercise-cross country skiing; mall walk; snow shoeing; treadmill 2 times haring treadmill   Yogurt; cheese and crackers; cereal      Patient wants to increase exercise time as will be going to [place] weekend ([place]) [facility]      4/25/2002 Patient able to get 45-60 minutes exercise on weekends and 20-30 minutes of exercise daily.  Patient wants to lose 5 lbs prior to trip to [place].  Patient was able to have 3 days no desserts and then 1 week no dessert. 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  Patient determined ways she could lose weight: Dessert 2 times per week, add treadmill, eliminate morning snack, 1 snack at 3 PM. Alcohol 1 time per week, no caramel lattes, food is fuel, it's too late to be hungry, spouse brings sweet treats without asking, tea/bath/shower      6/15/2022 Patient skips dessert if eats  late dinner.  Patient felt discouraged with weight loss as wanted to lose more weight (down 3.5 lbs). Patient wants weight loss for functional reasons.  Patient signed up for the [place] bike tour.  Patient has increased cravings and thoughts about ice cream.  Shift in relationship with spouse after spouse's weight loss surgery.      7/27/2022 Patient increased exercise-[place] with bike [employer]; started the last week in June for bike rides (4 rides different times in 2 weeks-9 miles).  Rode in 18 mile race.  Went hiking on vacation[place].     No dessert (hit or miss) 50% 3 days no dessert; ice cream and s'mores on vacation     Typical intake:  Greek yogurt + 1/4 cup granola + fruit + 1/2 and 1/2 with coffee or english muffin with peanut butter/butter and strawberry jam or eggs scrambled or hard boiled  Leftovers -1 cup cherries; cherry tomatoes; 1/2 cup pea pods + 3 oz pork tenderloin (teryaki)   Watermelon, pork tenderloin, 1 cup rice + tomatoes/pea pods; cheeseburger with whole wheat bun + vegetables + watermelon   Snacks: croissant (Rustica) Yazidism oat squares (1 cup dry) (3:00 PM) cheese and crackers    Beverages: coffee; water; fizzy water      Emotional eating at night-ice cream      9/21/2022 Patient has been able to bike 2 times per week.  Patient's weight has been stable.  Patient has not been able to track dinner and snacks.  Stress eating ebbs and flows.  Trying to save dessert for the weekend or may self soothe during th week with ice cream.  Patient stress eating in the evening and may boredom eat prior to lunch (cereal).    Weight: 308.4 lbs       11/16/2022 Biking 2 times per week. Tour biking race: [facility] 14 miles (rode by herself). [facility] 12 mile hilly bike ride. (rode with friend).  Patient has consistently walked dog and completes PT exercises.  Patient determining plan for winter exercise.    314.4 lbs (food and sleep changes) Patient  was eating more sweets as patient's wife baked sweets this fall (3-4 weeks with increase of sweets).  Holiday baking plan consists of 5 cookies and 1 candy (peanut brittle).  Ice cream consumption has increased recently.      1/23/2023 Patient was able to bake holiday cookies and freeze them.  Patient has been eating desserts 5 days a week.  Patient has been substituting greek yogurt (flavored or vanilla) or cream of wheat for dessert.  Patient was able to snow shoe.  Patient having barrier with getting on treadmill.  Patient's weight has fluctuated 325.8 and now 316 lbs.  Patient has been working on sleep routine (classical music; reading on paper vs ipad).      3/17/2023 Patient having knee pain and went to orthopedic MD and got cortizone shot in knee and able to walk know. Walked dog 3 times this week. Swimming -[facility] (6 sameer pool). Tyler wants to swim with goal of swimming 2 times per week.  Patient states ice cream and dessert are hit or miss. Patient shifting mindset to days having dessert vs not having dessert. Patient has not been as mindful with practicing self soothe at night.  Has tried tea and engaging book.     5/3/2023 PT exercises for knee; walking 15-30 minutes per day with dog + swimming 1 time per week (9 laps); 5 days per week desserts -patient using self talk to reduce dessert intake. Patient trying to limit desserts.  Patient's wife brining bread and sweets into home in which patient feels compels to eat as not to waste food.  Patient wants to be aware of foods when dining out and would like to split meal with spouse. Patient using zinger tea to soothe at night.       6/28/2023 No dessert 1-2 days; vacation and party so feels increase in desserts.  Patient getting more movement at work by taking stairs at work and size of building as no longer working remote.  Going to PT; less dog walks due to driving to work; found new lunch place to eat lunch at work (3-4 days per week-20  minute walk) -reading book and eating lunch; aims for hour lunch -reading for self care      8/9/2023 Patient is being intentional for choosing desserts.  Skipped ice cream since had late dinner.  Grabbed snack when dinner wasn't ready prior to function and ate later-took cheese and fruit then ate chicken kebab onion, bell pepper, chicken -316 lbs currently.  Was lower weight last week. Patient trying to balance stress in life.      10/4/2023 Patient having 1 night per week not having dessert.  Patient feels like eating ice cream when alone at home while watching a favorite show and feels more indulgent.  Patient did not participate in [employer] as hoped.  Patient will be participating in [employer] this weekend.  Patient continues to walk dog daily.  Patient also continues with increased life stressors.  Reading in the evening + fizzy water.     12/13/2023 Patient with reduction in weight trends: weight 307.4 lbs and 309 lbs.  Patient had stressful family situation which caused drop in appetite (past 2-3 weeks).  Patient states was not using food as coping strategy.  Patient having dessert 1-6 days per week with most desserts  3-4 desserts per week -writes reason why no dessert-sets goals in advance of no dessert; no dessert for today or too late to eat dessert; food trigger from spouse (will see eating popcorn and will then eat it if she is -likes caramel corn); self care-journaling as needed; playing piano for fun 3 times per week; movement -walking dog + biking ([place]) + hour walk      DIET RECALL   Breakfast: cereal and banana; yogurt + granola (measures), fruit + coffee; 2 slices toast with butter + 1/2 cup cottage cheese    Lunch: leftovers (includes fruit and vegetable daily)  Snack: fruit or cheese with lower salt nut thin crackers   Dinner: Tuna steak, Asian marinade, stir gomez, baked vegetable + brown rice (3/4 cup) + canned peaches; chicken thighs with beans and rice; sheet pan  "dinner with chicken and vegetables or grilled salmon and grilled vegetables; taco; protein + fruit and vegetable  Snacks: fruit, string cheese, Ritz crackers or yogurt; dessert in the evening (ice cream recently)  Beverages: mainly water, coffee, mineral water  Dining out: none currently  Exercise: Patient exercising 5-6 times per week. (cross country skiing, snow shoeing, walking and walking dog).  Patient exercises 1 hour on weekends (hiking or walking).     MEDICATIONS:  Reviewed      ANTHROPOMETRICS  Height: 5'8\" (note height change in chart previously 5'9\")  Weight: 309 lbs per patient   BMI (kg/m2): 46.9 kg/m2   %IBW: 220%  Weight History:   Wt Readings from Last 10 Encounters:   09/09/23 142.4 kg (314 lb)   09/07/23 142.4 kg (314 lb)   06/28/23 143.3 kg (316 lb)   05/31/23 144.2 kg (318 lb)   01/13/23 144.2 kg (318 lb)   10/04/22 138.1 kg (304 lb 6.4 oz)   07/27/22 139.7 kg (308 lb)   05/16/22 142.1 kg (313 lb 3.2 oz)   01/12/22 139.7 kg (308 lb)   12/22/21 137.4 kg (303 lb)         ASSESSED NUTRITION NEEDS  Estimated Energy Needs: 5550-3885 kcals (15-20 kcals/kg) for gradual weight loss   Estimated Protein Needs: 66-79 (1-1.2 gm/kg IBW) for repletion with exercise  RMR: 1440     EVALUATION/PROGRESS TOWARDS GOALS   Previous goals:  Add soothing activity in the evening (8:00 PM) 4 of 7 days-met   Aim for 4 desserts per week (2 per week and 2 on weekend) -met   Add swimming 1 day per week + current exercise (dog walk) -not met      Previous Nutrition Diagnosis: Obesity related to excess energy intake as evidenced by BMI of 47.7 kg/m2-no change     Current Nutrition Diagnosis:Obesity related to excess energy intake as evidenced by BMI of 46.9 kg/m2     INTERVENTIONS  Nutrition Prescription   Recommend exercise 5 times per week; track intake if eating mindlessly; increase fiber to 25-30 grams per day      Implementation:   Meals and Snacks: Continue eating 3 meals + deliberate snacking (if hungry)   Nutrition " Education (Content):              a)  Discussed progress towards goals.  Congratulated patient for continuing self soothe activities in the evening.  Reviewed current food and eating behavior challenges.  Patient continues with life stressors which did not affect overall eating pattern the past 2-3 weeks.  Discussed self care as way to improve emotional eating.  Discussed changes in eating and exercise patterns with spouse.  Supported patient in her struggle with food and weight.    Nutrition Education (Application):              a) Discussed strategies to set boundaries with food.                b) Patient verbalized understanding of diet by stating will continue awareness of desserts.   Expected patient engagement: good     CURRENT GOALS   Add soothing activity in the evening (8:00 PM) 4 of 7 days  Aim for 4 desserts per week (2 per week and 2 on weekend)   Add swimming 1 day per week in January + 1 day 45-60 minutes walking + current exercise (dog walk)      FOLLOW UP/MONITORING    Patient to follow up in 6 weeks  Patient has RD contact information      Time spent with patient: 35 minutes     Mattie Amaya, RD, LD  [facility] Outpatient Dietitian  [phone] (office phone)

## 2023-12-16 ENCOUNTER — NURSE TRIAGE (OUTPATIENT)
Dept: NURSING | Facility: CLINIC | Age: 58
End: 2023-12-16
Payer: COMMERCIAL

## 2023-12-16 DIAGNOSIS — U07.1 INFECTION DUE TO 2019 NOVEL CORONAVIRUS: Primary | ICD-10-CM

## 2023-12-16 NOTE — TELEPHONE ENCOUNTER
RN COVID TREATMENT VISIT  12/16/23      The patient has been triaged and does not require a higher level of care.    Angy Haji  58 year old  Current weight? 308    Has the patient been seen by a primary care provider at an [facility] or [facility] within the past two years? Yes.   Have you been in close proximity to/do you have a known exposure to a person with a confirmed case of influenza? No.     General treatment eligibility:  Date of positive COVID test (PCR or at home)?  12/16/2023    Are you or have you been hospitalized for this COVID-19 infection? No.   Have you received monoclonal antibodies or antiviral treatment for COVID-19 since this positive test? No.   Do you have any of the following conditions that place you at risk of being very sick from COVID-19?   - Age 50 years or older  - Overweight or Obesity (BMI >85th percentile or BMI 25 or higher)  Yes, patient has at least one high risk condition as noted above.     Current COVID symptoms:   - cough  - fatigue  - headache  - congestion or runny nose  Yes. Patient has at least one symptom as selected.     How many days since symptoms started? 5 days or less. Established patient, 12 years or older weighing at least 88.2 lbs, who has symptoms that started in the past 5 days, has not been hospitalized nor received treatment already, and is at risk for being very sick from COVID-19.     Treatment eligibility by RN:  Are you currently pregnant or nursing? No  Do you have a clinically significant hypersensitivity to nirmatrelvir or ritonavir, or toxic epidermal necrolysis (TEN) or Garg-Lei Syndrome? No  Do you have a history of hepatitis, any hepatic impairment on the Problem List (such as Child-Goldstein Class C, cirrhosis, fatty liver disease, alcoholic liver disease), or was the last liver lab (hepatic panel, ALT, AST, ALK Phos, bilirubin) elevated in the past 6 months? No  Do you have any history of severe renal  impairment (eGFR < 30mL/min)? No    Is patient eligible to continue? Yes, patient meets all eligibility requirements for the RN COVID treatment (as denoted by all no responses above).     Current Outpatient Medications   Medication Sig Dispense Refill    acetaminophen (TYLENOL) 500 MG tablet Take 500-1,000 mg by mouth every 6 hours as needed for mild pain      Carboxymethylcellulose Sod PF (REFRESH PLUS) 0.5 % SOLN ophthalmic solution Place 1 drop into both eyes 3 times daily as needed for dry eyes      cephALEXin (KEFLEX) 250 MG capsule TAKE 2 CAPSULES BY MOUTH EVERY 8 HOURS FOR INFECTION FOR 7 DAYS      cephALEXin (KEFLEX) 500 MG capsule Take 1 capsule (500 mg) by mouth 3 times daily 15 capsule 0    clotrimazole-betamethasone (LOTRISONE) 1-0.05 % external cream Apply topically 2 times daily For 2 weeks or as needed 45 g 1    estradiol (ESTRACE) 0.5 MG tablet Take 1 tablet (0.5 mg) by mouth daily 90 tablet 3    estradiol (VAGIFEM) 10 MCG TABS vaginal tablet Place 1 tablet (10 mcg) vaginally twice a week 26 tablet 3    lamoTRIgine (LAMICTAL) 150 MG tablet Take 1 tablet (150 mg) by mouth daily 30 tablet 1    lisinopril (ZESTRIL) 10 MG tablet Take 1 tablet (10 mg) by mouth daily 90 tablet 3    lithium (ESKALITH) 150 MG capsule Take 1 capsule (150 mg) by mouth At Bedtime 30 capsule 1    lithium 300 MG capsule Take 3 tablet (900 mg) and one tablet (150 mg) for a total of 1050 mg at bedtime. 270 capsule 2    Loratadine 10 MG capsule Take 10 mg by mouth daily      medroxyPROGESTERone (PROVERA) 2.5 MG tablet Take 1 tablet (2.5 mg) by mouth daily 90 tablet 3    metroNIDAZOLE (METROGEL) 1 % external gel Apply topically daily 60 g 9    ORDER FOR DME Use your CPAP device as directed by your provider.      VITAMIN D, CHOLECALCIFEROL, PO Take 2,000 Units by mouth daily         Medications from List 1 of the standing order (on medications that exclude the use of Paxlovid) that patient is taking:  Is patient taking Sunita's  Wort? No  Is patient taking Sunita's Wort or any meds from List 1? No.   Medications from List 2 of the standing order (on meds that provider needs to adjust) that patient is taking: NONE. Is patient on any of the meds from List 2? No.   Medications from List 3 of standing order (on meds that a RN needs to adjust) that patient is taking: NONE. Is patient on any meds from List 3? No.     Paxlovid has an approximate 90% reduction in hospitalization. Paxlovid can possibly cause altered sense of taste, diarrhea (loose, watery stools), high blood pressure, muscle aches.     Would patient like a Paxlovid prescription?   Yes.   Lab Results   Component Value Date    GFRESTIMATED >90 05/15/2023       Was last eGFR reduced? No, eGFR 60 or greater/ No Result on record. Patient can receive the normal renal function dose. Paxlovid Rx sent to [place] [facility]   [facility]   [phone]  [address] [place] [address]    Hours:  Mon-Fri: 8:30a - 6:00p  Sat-Sun: 8:30a - 12:30p    Curbside Delivery: Patient to call [phone] to set up and  at door 2 of [facility]    Temporary change to home medications: None    All medication adjustments (holds, etc) were discussed with the patient and patient was asked to repeat back (teachback) their med adjustment.  Did patient understand med adjustment? No medication adjustments needed.         Reviewed the following instructions with the patient:    Paxlovid (nimatrelvir and ritonavir)    How it works  Two medicines (nirmatrelvir and ritonavir) are taken together. They stop the virus from growing. Less amount of virus is easier for your body to fight.    How to take  Medicine comes in a daily container with both medicine tablets. Take by mouth twice daily (once in the morning, once at night) for 5 days.  The number of tablets to take varies by patient.  Don't chew or break capsules. Swallow whole.    When to take  Take as soon as possible after positive COVID-19  test result, and within 5 days of your first symptoms.    Possible side effects  Can cause altered sense of taste, diarrhea (loose, watery stools), high blood pressure, muscle aches.    NEREIDA MATUTE RN

## 2023-12-16 NOTE — TELEPHONE ENCOUNTER
"  COVID Positive/Requesting COVID treatment    Patient is positive for COVID and requesting treatment options.    Date of positive COVID test (PCR or at home)? Home test positive on 12/16/23  Current COVID symptoms: cough, fatigue, headache, new loss of taste or smell, sore throat, and congestion or runny nose  Date COVID symptoms began: 12/13/23    Message should be routed to clinic RN pool. Best phone number to use for call back: [phone] (a detailed msg may be left on )     Began on 12/13/23 with;  Sore throat  Nasal congestion  Headache  Fatigue  \"Foggy brain\"    Pt has been triaged as high risk but denies need for higher level of care at time of this call.  Care advise given/when to call back.  Pt verbalized understanding and agrees with this plan of care.    Zahra Kellogg RN, Nurse Advisor 1:49 PM 12/16/2023  Reason for Disposition   [1] HIGH RISK patient (e.g., weak immune system, age > 64 years, obesity with BMI 30 or higher, pregnant, chronic lung disease or other chronic medical condition) AND [2] COVID symptoms (e.g., cough, fever)  (Exceptions: Already seen by PCP and no new or worsening symptoms.)    Additional Information   Negative: SEVERE difficulty breathing (e.g., struggling for each breath, speaks in single words)   Negative: Difficult to awaken or acting confused (e.g., disoriented, slurred speech)   Negative: Bluish (or gray) lips or face now   Negative: Shock suspected (e.g., cold/pale/clammy skin, too weak to stand, low BP, rapid pulse)   Negative: Sounds like a life-threatening emergency to the triager   Negative: [1] Diagnosed or suspected COVID-19 AND [2] symptoms lasting 3 or more weeks   Negative: [1] COVID-19 exposure AND [2] no symptoms   Negative: COVID-19 vaccine reaction suspected (e.g., fever, headache, muscle aches) occurring 1 to 3 days after getting vaccine   Negative: COVID-19 vaccine, questions about   Negative: [1] Lives with someone known to have influenza (flu test " positive) AND [2] flu-like symptoms (e.g., cough, runny nose, sore throat, SOB; with or without fever)   Negative: [1] Possible COVID-19 symptoms AND [2] triager concerned about severity of symptoms or other causes   Negative: COVID-19 and breastfeeding, questions about   Negative: SEVERE or constant chest pain or pressure  (Exception: Mild central chest pain, present only when coughing.)   Negative: MODERATE difficulty breathing (e.g., speaks in phrases, SOB even at rest, pulse 100-120)   Negative: [1] Headache AND [2] stiff neck (can't touch chin to chest)   Negative: Oxygen level (e.g., pulse oximetry) 90 percent or lower   Negative: Chest pain or pressure  (Exception: MILD central chest pain, present only when coughing.)   Negative: [1] Drinking very little AND [2] dehydration suspected (e.g., no urine > 12 hours, very dry mouth, very lightheaded)   Negative: Patient sounds very sick or weak to the triager   Negative: MILD difficulty breathing (e.g., minimal/no SOB at rest, SOB with walking, pulse <100)   Negative: Fever > 103 F (39.4 C)   Negative: [1] Fever > 101 F (38.3 C) AND [2] age > 60 years   Negative: [1] Fever > 100.0 F (37.8 C) AND [2] bedridden (e.g., CVA, chronic illness, recovering from surgery)   Negative: Oxygen level (e.g., pulse oximetry) 91 to 94 percent    Protocols used: Coronavirus (COVID-19) Diagnosed or [id number]

## 2023-12-26 DIAGNOSIS — F31.9 BIPOLAR I DISORDER (H): ICD-10-CM

## 2023-12-26 RX ORDER — LAMOTRIGINE 200 MG/1
200 TABLET ORAL DAILY
Qty: 30 TABLET | Refills: 1 | OUTPATIENT
Start: 2023-12-26

## 2023-12-27 DIAGNOSIS — F31.9 BIPOLAR I DISORDER (H): ICD-10-CM

## 2023-12-27 RX ORDER — LAMOTRIGINE 150 MG/1
150 TABLET ORAL DAILY
Qty: 30 TABLET | Refills: 1 | Status: CANCELLED | OUTPATIENT
Start: 2023-12-27

## 2023-12-27 RX ORDER — LAMOTRIGINE 200 MG/1
200 TABLET ORAL DAILY
Qty: 30 TABLET | Refills: 1 | Status: SHIPPED | OUTPATIENT
Start: 2023-12-27 | End: 2024-01-04

## 2023-12-27 NOTE — TELEPHONE ENCOUNTER
Last Seen: 11-2-2023  RTC: 2 month   Cancel: none   No-Show: none   Next Appt: 1-4-24    Incoming Refill From Patient  via encounter     Medication Requested: lamoTRIgine (LAMICTAL)200 MG tablet   Directions: Sig - Route: Take 1 tablet (200  mg) by mouth daily - Oral   Qty: 30   Last Refill: 11-20-23    Medication Refill pended  Per Refill Protocol        Plan      1) Medications:  no medication changes  - Continue Lithium 1050 mg at bedtime  - Continue Lamictal 350mg daily         Lindsey Reveles on 12/27/2023 at 11:16 AM

## 2023-12-27 NOTE — TELEPHONE ENCOUNTER
[facility] Call Center    Phone Message    May a detailed message be left on voicemail: yes     Reason for Call: Medication Refill Request    Has the patient contacted the pharmacy for the refill? Yes   Name of medication being requested: lamotrigine 200mg  Provider who prescribed the medication: Dr. Santillan  Pharmacy:    [place] [address]  Date medication is needed: Patient will be out of medication by tomorrow (12/28/23) evening. Patient did contact pharmacy.    Action Taken: Message routed to:  Other: [facility] Nurse pool    Travel Screening: Not Applicable

## 2024-01-03 DIAGNOSIS — L71.0 PERIORAL DERMATITIS: ICD-10-CM

## 2024-01-03 DIAGNOSIS — L71.9 ROSACEA: ICD-10-CM

## 2024-01-03 RX ORDER — METRONIDAZOLE 10 MG/G
GEL TOPICAL DAILY
Qty: 60 G | Refills: 9 | Status: SHIPPED | OUTPATIENT
Start: 2024-01-03 | End: 2024-09-27

## 2024-01-03 ASSESSMENT — PATIENT HEALTH QUESTIONNAIRE - PHQ9
10. IF YOU CHECKED OFF ANY PROBLEMS, HOW DIFFICULT HAVE THESE PROBLEMS MADE IT FOR YOU TO DO YOUR WORK, TAKE CARE OF THINGS AT HOME, OR GET ALONG WITH OTHER PEOPLE: SOMEWHAT DIFFICULT
SUM OF ALL RESPONSES TO PHQ QUESTIONS 1-9: 6
SUM OF ALL RESPONSES TO PHQ QUESTIONS 1-9: 6

## 2024-01-03 NOTE — TELEPHONE ENCOUNTER
Medication Question or Refill    DR Lira is not able to fill this, can dr rangel fill this one?        What medication are you calling about (include dose and sig)?:     metroNIDAZOLE (METROGEL) 1 % external gel       Preferred Pharmacy:     [place], [place] - [address]  [address]  [address]  [facility] [address]  Phone: [phone] Fax: [phone] Alternate Fax: [phone], [phone], [phone]      Controlled Substance Agreement on file:   CSA -- Patient Level:    CSA: None found at the patient level.       Who prescribed the medication?: DR Lira     Do you need a refill? Yes    When did you use the medication last? NONE    Patient offered an appointment? No    Do you have any questions or concerns?  No      Could we send this information to you in [facility] or would you prefer to receive a phone call?:   Patient would prefer a phone call   Okay to leave a detailed message?: Yes at Cell number on file:    Telephone Information:   Mobile [phone]

## 2024-01-04 ENCOUNTER — OFFICE VISIT (OUTPATIENT)
Dept: PSYCHIATRY | Facility: CLINIC | Age: 59
End: 2024-01-04
Attending: PSYCHIATRY & NEUROLOGY
Payer: COMMERCIAL

## 2024-01-04 VITALS
HEART RATE: 60 BPM | SYSTOLIC BLOOD PRESSURE: 118 MMHG | BODY MASS INDEX: 47.39 KG/M2 | DIASTOLIC BLOOD PRESSURE: 75 MMHG | WEIGHT: 293 LBS

## 2024-01-04 DIAGNOSIS — F31.9 BIPOLAR I DISORDER (H): ICD-10-CM

## 2024-01-04 DIAGNOSIS — Z79.899 ENCOUNTER FOR LONG-TERM (CURRENT) USE OF MEDICATIONS: Primary | ICD-10-CM

## 2024-01-04 PROCEDURE — 99214 OFFICE O/P EST MOD 30 MIN: CPT | Performed by: STUDENT IN AN ORGANIZED HEALTH CARE EDUCATION/TRAINING PROGRAM

## 2024-01-04 PROCEDURE — 99213 OFFICE O/P EST LOW 20 MIN: CPT | Mod: GC | Performed by: STUDENT IN AN ORGANIZED HEALTH CARE EDUCATION/TRAINING PROGRAM

## 2024-01-04 RX ORDER — LAMOTRIGINE 150 MG/1
150 TABLET ORAL DAILY
Qty: 30 TABLET | Refills: 1 | Status: SHIPPED | OUTPATIENT
Start: 2024-01-04 | End: 2024-03-04

## 2024-01-04 RX ORDER — LITHIUM CARBONATE 150 MG/1
150 CAPSULE ORAL AT BEDTIME
Qty: 30 CAPSULE | Refills: 1 | Status: SHIPPED | OUTPATIENT
Start: 2024-01-04 | End: 2024-03-04

## 2024-01-04 RX ORDER — LAMOTRIGINE 200 MG/1
200 TABLET ORAL DAILY
Qty: 30 TABLET | Refills: 1 | Status: SHIPPED | OUTPATIENT
Start: 2024-01-04 | End: 2024-03-04

## 2024-01-04 RX ORDER — LITHIUM CARBONATE 300 MG/1
CAPSULE ORAL
Qty: 270 CAPSULE | Refills: 2 | Status: SHIPPED | OUTPATIENT
Start: 2024-01-04 | End: 2024-03-04

## 2024-01-04 ASSESSMENT — PAIN SCALES - GENERAL: PAINLEVEL: NO PAIN (0)

## 2024-01-04 NOTE — PROGRESS NOTES
[facility]  MEDICAL PROGRESS NOTE     CARE TEAM:    PCP- Mikey Cortés  Therapist- couple therapy    Angy is a 58 year old who uses the pronouns she, her, hers.      Diagnoses     Bipolar 1 disorder, current depressive episode, moderate     H/o EPSE secondary to neuroleptic use  H/o Thrombocytopenia secondary to Depakote  H/o lithium toxicity, resolved     Assessment     Angy reported that her mood is stable. She experienced a low mood during fall 2023 when her wife brought up divorce. However, they agreed to go to counseling therapy which help them a lot. She mentioned that her anxiety is related to her son going to study in [employer]. The couple is thinking about the financial aspect of their son's tuition. They also also excited for him.    She also reported a fine tremor, mainly action tremor. Lithium was checked last time in November. It was within normal limit (0.69). She also has not reported signs of lithium toxicity including confusion, nausea or vomiting. We encouraged her to stay hydrated, and avoid NSAID.     Some things to keep in mind wrt Lamictal use were discussed and are as follows:  1) LTG level can be decreased when co-administered with OCPs; HRT is currently being reduced thus LTG level could theoretically rise, we reviewed what to watch for  2) Lamictal is associated in less than 1 percent w/ movement adverse effects. Unlikely,   but possibly contributing to tremor    No safety concerns, no recent anthony or psychosis.     We will continue with the same medication regimen and monitoring labs were ordered.    Psychotropic Drug Interactions:  [PSYCHCLINICDDI]  Estrogen Derivatives may decrease the serum concentration of LamoTRIgine  Angiotensin-Converting Enzyme Inhibitors may increase the serum concentration of Lithium.   LamoTRIgine may decrease the serum concentration of Progestins.      Management: routine  "monitoring    [place]MP was not checked today: not using controlled substances    Risk Statements:   Treatment Risk- Risks, benefits, alternatives and potential adverse effects have been discussed and are understood.   Safety Risk-Angy did not appear to be an imminent safety risk to self or others.     Plan     1) Medications:  no medication changes  - Continue Lithium 1050 mg at bedtime  - Continue Lamictal 350mg daily    2) Psychotherapy: Continue with therapy    3) Next due:  Labs- lithium need to be obtained now (before next visit in March), due in Nov 2023  EKG- . Last one was in 10/2021 with    Rating scales-  PHQ9    4) Referrals: none  N/A    5) Other: none    6) Follow-up: Return to clinic in 2 months     Pertinent Background                                                   [most recent eval 08/31/23]     Angy reported that she didn't have symptoms until November 2018. At which point she was hospitalized for anthony.     This section was copied forward from 08/2021 transfer notes.  \"Patient has not had significant mental health history until 2018, when she developed perimenopausal mood and anxiety symptoms.  She was subsequently referred to our clinic following a hospitalization for anthony in November 2018.  Her care has been complicated by sensitivity to antipsychotics (EPSE including difficulty swallowing), thrombocytopenia secondary to Depakote, lithium toxicity in setting of concurrent Depakote use\"     Pertinent items include: anthony , severe med reaction [described as H/o Thrombocytopenia secondary to Depakote, and H/o lithium toxicity], and psych hosp      Subjective     Mood:  - overall mood has been stable  - wife and her go to couple counseling every two to four weeks.    Anxiety:   Common, nothing usual or different.     Sleep:   interrupted sleep   Waking us due to stress   Thinking about son who is going to school in [place].  Needs to go over college loan application.  Doesn't think that " she needs anything for sleep now.     Appetite: low, lost a few lbs.     Psychosis/Thuy: no impulsivity or racing thought or grandiosity.     Chronic disease (HTA, DM, or Pain): HTA, HRT tapering    Medication side effects: reported dry mouth, trouble swallowing, and slight action tremor.  Denied cognitive impairment  Tremor comes and go. Tremor is intermittently bothersome, present during fine movement.  Mostly when she is doing something. Handwritting for example.   Would like to monitor more, taking note about it. Doesn't want to take anything for it now.   Stay hydrated? She gwayrm79 Oz coffee, 16 Oz water, 16 Oz water at dinner and after dinner     SI/HI: none    Alcohol use: once every other week.     Smoking: none    Other substances (cannabis): none     A comprehensive review of systems was performed and is negative other than noted above.     Mental Status Exam     Alertness: alert  and oriented  Appearance: adequately groomed  Behavior/Demeanor: cooperative and pleasant, with good  eye contact   Speech: normal and regular rate and rhythm  Language: intact  Psychomotor: normal or unremarkable  Mood:  Stable  Affect: full range and appropriate; congruent to: mood- yes, content- yes  Thought Process/Associations: unremarkable  Thought Content:  Reports none;  Denies suicidal & violent ideation and delusions  Perception:  Reports none;  Denies auditory hallucinations and visual hallucinations  Insight: good  Judgment: good  Cognition: does  appear grossly intact; formal cognitive testing was not done  Gait and Station: unremarkable     Past Psych Med Trials      Medication Max Dose (mg) Dates / Duration Helpful? DC Reason / Adverse Effects?   Depakote    Tremors   sertraline       olanzapine    EPSE   Seroquel    EPSE   lithium 1200   Tremor at 1200 mg had to lower the dose   Wellbutrin 300 15 years ago               Treatment Course and Resendiz Events since  JULY 2023 08/2023 : no medication changes  11/2023  no medication changes  01/2023 no medication changes     Vitals   /75 (BP Location: Right arm)   Pulse 60   Wt 140.5 kg (309 lb 12.8 oz)   BMI 47.39 kg/m    Pulse Readings from Last 3 Encounters:   01/04/24 60   11/02/23 61   09/09/23 60     Wt Readings from Last 3 Encounters:   01/04/24 140.5 kg (309 lb 12.8 oz)   11/02/23 140.4 kg (309 lb 9.6 oz)   09/09/23 142.4 kg (314 lb)     BP Readings from Last 3 Encounters:   01/04/24 118/75   11/02/23 (!) 143/82   09/09/23 138/60      Medical History     ALLERGIES: Seroquel [quetiapine], Olanzapine, Penicillins, Risperidone, and Thimerosal    Patient Active Problem List   Diagnosis    Oral aphthae    CARDIOVASCULAR SCREENING; LDL GOAL LESS THAN 160    Rosacea    Raynaud disease    Vitamin D deficiency disease    MARLA (obstructive sleep apnea)    Atopic rhinitis    Chronic bilateral low back pain without sciatica    Obesity, Class III, BMI 40-49.9 (morbid obesity) (H)    Muscle tension dysphonia    Palpitations    Dysphagia, unspecified type    Anxiety    Benign essential hypertension    Callus of foot    Varicose veins of right lower extremity with edema    Bipolar I disorder (H)    Dribbling of urine    Spinal stenosis at L4-L5 level    Facet arthropathy, lumbar        Medications     Current Outpatient Medications   Medication Sig Dispense Refill    clotrimazole-betamethasone (LOTRISONE) 1-0.05 % external cream Apply topically 2 times daily For 2 weeks or as needed 45 g 1    estradiol (ESTRACE) 0.5 MG tablet Take 1 tablet (0.5 mg) by mouth daily 90 tablet 3    estradiol (VAGIFEM) 10 MCG TABS vaginal tablet Place 1 tablet (10 mcg) vaginally twice a week 26 tablet 3    lamoTRIgine (LAMICTAL) 150 MG tablet Take 1 tablet (150 mg) by mouth daily 30 tablet 1    lamoTRIgine (LAMICTAL) 200 MG tablet Take 1 tablet (200 mg) by mouth daily 30 tablet 1    lisinopril (ZESTRIL) 10 MG tablet Take 1 tablet (10 mg) by mouth daily 90 tablet 3    lithium (ESKALITH) 150 MG capsule  Take 1 capsule (150 mg) by mouth At Bedtime 30 capsule 1    lithium 300 MG capsule Take 3 tablet (900 mg) and one tablet (150 mg) for a total of 1050 mg at bedtime. 270 capsule 2    Loratadine 10 MG capsule Take 10 mg by mouth daily      medroxyPROGESTERone (PROVERA) 2.5 MG tablet Take 1 tablet (2.5 mg) by mouth daily 90 tablet 3    acetaminophen (TYLENOL) 500 MG tablet Take 500-1,000 mg by mouth every 6 hours as needed for mild pain      Carboxymethylcellulose Sod PF (REFRESH PLUS) 0.5 % SOLN ophthalmic solution Place 1 drop into both eyes 3 times daily as needed for dry eyes      metroNIDAZOLE (METROGEL) 1 % external gel Apply topically daily 60 g 9    ORDER FOR DME Use your CPAP device as directed by your provider.      VITAMIN D, CHOLECALCIFEROL, PO Take 2,000 Units by mouth daily          Labs and Data         4/11/2023     6:47 PM 8/28/2023     6:28 PM 1/3/2024    11:58 AM   PROMIS-10 Total Score w/o Sub Scores   PROMIS TOTAL - SUBSCORES 27 30 28          No data to display                  6/5/2023     2:53 PM 8/30/2023     4:32 PM 1/3/2024    11:57 AM   PHQ-9 SCORE   PHQ-9 Total Score MyChart 6 (Mild depression) 9 (Mild depression) 6 (Mild depression)   PHQ-9 Total Score 6 9 6         3/10/2023    11:46 AM 8/28/2023     6:27 PM 10/31/2023     5:25 PM   JOANNA-7 SCORE   Total Score 9 (mild anxiety) 9 (mild anxiety) 11 (moderate anxiety)   Total Score 9 9 11       Liver/Kidney Function, TSH Metabolic Blood counts   Recent Labs   Lab Test 05/15/23  1017 12/07/22  1111   AST 17 10   ALT 16 23   ALKPHOS 80 88   CR 0.58 0.55     Recent Labs   Lab Test 05/15/23  1017   TSH 2.51    Recent Labs   Lab Test 05/31/23  0945   CHOL 205*   TRIG 214*      HDL 62     Recent Labs   Lab Test 10/04/22  1651   A1C 5.3     Recent Labs   Lab Test 05/15/23  1017   GLC 90    Recent Labs   Lab Test 05/15/23  1017   WBC 8.3   HGB 13.6   HCT 42.2   MCV 89            Recent Labs   Lab Test 11/08/23  1016 05/15/23  1017  12/07/22  1111   LITHIUM 0.69 0.7 0.6        PROVIDER: Antonio Santillan MD MPH    Level of Medical Decision Making:   - At least 1 chronic problem that is not stable  - Engaged in prescription drug management during visit (discussed any medication benefits, side effects, alternatives, etc.)         Patient was staffed in clinic with Dr. MEHDI Tapia.  Supervisor is Dr. Vivian Burnett.        Answers submitted by the patient for this visit:  Patient Health Questionnaire (Submitted on 1/3/2024)  If you checked off any problems, how difficult have these problems made it for you to do your work, take care of things at home, or get along with other people?: Somewhat difficult  PHQ9 TOTAL SCORE: 6

## 2024-01-04 NOTE — PATIENT INSTRUCTIONS
**For crisis resources, please see the information at the end of this document**   Patient Education    Thank you for coming to the [facility].     Lab Testing:  If you had lab testing today and your results are reassuring or normal they will be mailed to you or sent through [employer] within 7 days. If the lab tests need quick action we will call you with the results. The phone number we will call with results is # [phone]. If this is not the best number please call our clinic and change the number.     Medication Refills:  If you need any refills please call your pharmacy and they will contact us. Our fax number for refills is [phone].   Three business days of notice are needed for general medication refill requests.   Five business days of notice are needed for controlled substance refill requests.   If you need to change to a different pharmacy, please contact the new pharmacy directly. The new pharmacy will help you get your medications transferred.     Contact Us:  Please call [phone] during business hours (8-5:00 M-F).   If you have medication related questions after clinic hours, or on the weekend, please call [phone].     Financial Assistance [phone]   Medical Records [phone]       MENTAL HEALTH CRISIS RESOURCES:  For a emergency help, please call 911 or go to the nearest Emergency Department.     Emergency Walk-In Options:   EmPATH Unit @ [facility] ([place]): [phone] - Specialized mental health emergency area designed to be calming  [facility] ([place]): [phone]  [facility] ([place]): [phone]  [facility]): [phone]    [facility] Crisis Information:   [place]: [phone]  [place]: [phone]  [place] ([place]) - Adult: [phone]     Child: [phone]  [place] - Adult: [phone]     Child: [phone]  [place]:  [phone]  List of all [facility] resources:   https://[place].gov/dhs/people-we-serve/adults/health-care/mental-health/resources/crisis-contacts.jsp    National Crisis Information:   Crisis Text Line: Text  [place]  to 950675  Suicide & Crisis Lifeline: 988  National Suicide Prevention Lifeline: 0-038-980-TALK (1-553.925.3659)       For online chat options, visit https://suicidepreventionlifeline.org/chat/  Poison Control Center: 2-694-762-6977  Trans Lifeline: 9-057-671-8360 - Hotline for transgender people of all ages  The [place]: 7-481-149-1566 - Hotline for LGBT youth     For Non-Emergency Support:   Fast Tracker: Mental Health & Substance Use Disorder Resources -   https://www.Jijindou.comckSpinGon.org/

## 2024-01-10 ENCOUNTER — ANCILLARY PROCEDURE (OUTPATIENT)
Dept: MAMMOGRAPHY | Facility: CLINIC | Age: 59
End: 2024-01-10
Attending: FAMILY MEDICINE
Payer: COMMERCIAL

## 2024-01-10 DIAGNOSIS — Z12.31 VISIT FOR SCREENING MAMMOGRAM: ICD-10-CM

## 2024-01-10 PROCEDURE — 77063 BREAST TOMOSYNTHESIS BI: CPT | Mod: GC | Performed by: STUDENT IN AN ORGANIZED HEALTH CARE EDUCATION/TRAINING PROGRAM

## 2024-01-10 PROCEDURE — 77067 SCR MAMMO BI INCL CAD: CPT | Mod: GC | Performed by: STUDENT IN AN ORGANIZED HEALTH CARE EDUCATION/TRAINING PROGRAM

## 2024-01-19 ENCOUNTER — TELEPHONE (OUTPATIENT)
Dept: GASTROENTEROLOGY | Facility: CLINIC | Age: 59
End: 2024-01-19
Payer: COMMERCIAL

## 2024-01-19 NOTE — TELEPHONE ENCOUNTER
Caller: Angy   Reason for Reschedule/Cancellation (please be detailed, any staff messages or encounters to note?): Family Event      Prior to reschedule please review:  Ordering Provider: Mikey Cortés MD  Sedation Determined: mod  Does patient have any ASC Exclusions, please identify?: N      Notes on Cancelled Procedure:  Procedure: Lower Endoscopy [Colonoscopy]   Date: 02/05/2024  Location: [facility]; [address] [place] [address]  Surgeon: Krissy      Rescheduled: Yes  Procedure: Lower Endoscopy [Colonoscopy]   Date: 02/13/2024  Location: [facility]; [address] [place] [address]  Surgeon:   Sedation Level Scheduled  Mod,  Reason for Sedation Level Order  Prep/Instructions updated and sent: y       Send In - basket message to Panc - Jonny Pool if EUS  procedure is canceled or rescheduled: [ N/A, YES or NO] y

## 2024-01-24 ENCOUNTER — HOSPITAL ENCOUNTER (OUTPATIENT)
Dept: NUTRITION | Facility: CLINIC | Age: 59
Discharge: HOME | End: 2024-01-24
Attending: DIETITIAN, REGISTERED | Admitting: DIETITIAN, REGISTERED
Payer: COMMERCIAL

## 2024-01-24 PROCEDURE — 97803 MED NUTRITION INDIV SUBSEQ: CPT | Mod: GT,95 | Performed by: DIETITIAN, REGISTERED

## 2024-01-24 NOTE — PROGRESS NOTES
Virtual Visit Details    Type of service:  Video Visit     Originating Location (pt. Location): Other work setting    Distant Location (provider location):  On-site  Platform used for Video Visit: Well     NUTRITION REASSESSMENT NOTE   REASON FOR ASSESSMENT  Angy LILIAN Haji referred by Dr. Cortés for MNT related to  Obesity, Class III, BMI 40-49.9 (morbid obesity) (H) [E66.01]          Patient accompanied by self      NUTRITION HISTORY  Patient continues with multiple life changes and stressors.  Patient aware that she likes to self soothe with ice cream which she has recently increased.  Patient is aware that social cues trigger her to eat. Patient's spouse recently had weight loss surgery so there is adjust with eating in the home.      3/7/2022 Self soothe in evening snack-made tea or tells self ate late dinner and does not need snack.  Patient asking self if physically hungry.  Patient drinking fizzy water or malt o meal as evening snack.  Patient aware she has been stress eating.  Tracking when not having dessert -identifying why eating  Exercise-cross country skiing; mall walk; snow shoeing; treadmill 2 times haring treadmill   Yogurt; cheese and crackers; cereal      Patient wants to increase exercise time as will be going to [place] weekend ([place]) [place]      4/25/2002 Patient able to get 45-60 minutes exercise on weekends and 20-30 minutes of exercise daily.  Patient wants to lose 5 lbs prior to trip to [place].  Patient was able to have 3 days no desserts and then 1 week no dessert. 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  Patient determined ways she could lose weight: Dessert 2 times per week, add treadmill, eliminate morning snack, 1 snack at 3 PM. Alcohol 1 time per week, no caramel lattes, food is fuel, it's too late to be hungry, spouse brings sweet treats without asking, tea/bath/shower      6/15/2022 Patient skips dessert if eats  late dinner.  Patient felt discouraged with weight loss as wanted to lose more weight (down 3.5 lbs). Patient wants weight loss for functional reasons.  Patient signed [place] for the [place] bike tour.  Patient has increased cravings and thoughts about ice cream.  Shift in relationship with spouse after spouse's weight loss surgery.      7/27/2022 Patient increased exercise-[place] with [employer]; started the last week in June for bike rides (4 rides different times in 2 weeks-9 miles).  Rode in 18 mile race.  Went hiking on vacation[place].     No dessert (hit or miss) 50% 3 days no dessert; ice cream and s'mores on vacation     Typical intake:  Greek yogurt + 1/4 cup granola + fruit + 1/2 and 1/2 with coffee or english muffin with peanut butter/butter and strawberry jam or eggs scrambled or hard boiled  Leftovers -1 cup cherries; cherry tomatoes; 1/2 cup pea pods + 3 oz pork tenderloin (teryaki)   Watermelon, pork tenderloin, 1 cup rice + tomatoes/pea pods; cheeseburger with whole wheat bun + vegetables + watermelon   Snacks: croissant (Rustica) Yazidi oat squares (1 cup dry) (3:00 PM) cheese and crackers    Beverages: coffee; water; fizzy water      Emotional eating at night-ice cream      9/21/2022 Patient has been able to bike 2 times per week.  Patient's weight has been stable.  Patient has not been able to track dinner and snacks.  Stress eating ebbs and flows.  Trying to save dessert for the weekend or may self soothe during th week with ice cream.  Patient stress eating in the evening and may boredom eat prior to lunch (cereal).    Weight: 308.4 lbs       11/16/2022 Biking 2 times per week. Tour biking race: [employer] 14 miles (rode by herself). [facility] 12 mile hilly bike ride. (rode with friend).  Patient has consistently walked dog and completes PT exercises.  Patient determining plan for winter exercise.    314.4 lbs (food and sleep changes) Patient  was eating more sweets as patient's wife baked sweets this fall (3-4 weeks with increase of sweets).  Holiday baking plan consists of 5 cookies and 1 candy (peanut brittle).  Ice cream consumption has increased recently.      1/23/2023 Patient was able to bake holiday cookies and freeze them.  Patient has been eating desserts 5 days a week.  Patient has been substituting greek yogurt (flavored or vanilla) or cream of wheat for dessert.  Patient was able to snow shoe.  Patient having barrier with getting on treadmill.  Patient's weight has fluctuated 325.8 and now 316 lbs.  Patient has been working on sleep routine (classical music; reading on paper vs ipad).      3/17/2023 Patient having knee pain and went to orthopedic MD and got cortizone shot in knee and able to walk know. Walked dog 3 times this week. Swimming -[facility] (6 sameer pool). Tyler wants to swim with goal of swimming 2 times per week.  Patient states ice cream and dessert are hit or miss. Patient shifting mindset to days having dessert vs not having dessert. Patient has not been as mindful with practicing self soothe at night.  Has tried tea and engaging book.     5/3/2023 PT exercises for knee; walking 15-30 minutes per day with dog + swimming 1 time per week (9 laps); 5 days per week desserts -patient using self talk to reduce dessert intake. Patient trying to limit desserts.  Patient's wife brining bread and sweets into home in which patient feels compels to eat as not to waste food.  Patient wants to be aware of foods when dining out and would like to split meal with spouse. Patient using zinger tea to soothe at night.       6/28/2023 No dessert 1-2 days; vacation and party so feels increase in desserts.  Patient getting more movement at work by taking stairs at work and size of building as no longer working remote.  Going to PT; less dog walks due to driving to work; found new lunch place to eat lunch at work (3-4 days per week-20  minute walk) -reading book and eating lunch; aims for hour lunch -reading for self care      8/9/2023 Patient is being intentional for choosing desserts.  Skipped ice cream since had late dinner.  Grabbed snack when dinner wasn't ready prior to function and ate later-took cheese and fruit then ate chicken kebab onion, bell pepper, chicken -316 lbs currently.  Was lower weight last week. Patient trying to balance stress in life.      10/4/2023 Patient having 1 night per week not having dessert.  Patient feels like eating ice cream when alone at home while watching a favorite show and feels more indulgent.  Patient did not participate in [employer] as hoped.  Patient will be participating in [employer] this weekend.  Patient continues to walk dog daily.  Patient also continues with increased life stressors.  Reading in the evening + fizzy water.      12/13/2023 Patient with reduction in weight trends: weight 307.4 lbs and 309 lbs.  Patient had stressful family situation which caused drop in appetite (past 2-3 weeks).  Patient states was not using food as coping strategy.  Patient having dessert 1-6 days per week with most desserts  3-4 desserts per week -writes reason why no dessert-sets goals in advance of no dessert; no dessert for today or too late to eat dessert; food trigger from spouse (will see eating popcorn and will then eat it if she is -likes caramel corn); self care-journaling as needed; playing piano for fun 3 times per week; movement -walking dog + biking ([place]) + hour walk     1/24/2024 Patient working on self care at night- reading, limited TV watching, playing piano in the evening (5-10 minutes); desserts -aiming for 4 per week 3-5-1-1-3. Patient choose Malt-O meal as replacement for dessert; 304-312 lbs; less cookie baking during the holidays; exercise reduced with cold weather (walking outside)-treadmill; stairs; no pool. Patient working on exercise plan.       DIET RECALL  "  Breakfast: cereal and banana; yogurt + granola (measures), fruit + coffee; 2 slices toast with butter + 1/2 cup cottage cheese    Lunch: leftovers (includes fruit and vegetable daily)  Snack: fruit or cheese with lower salt nut thin crackers   Dinner: Tuna steak, Asian marinade, stir gomez, baked vegetable + brown rice (3/4 cup) + canned peaches; chicken thighs with beans and rice; sheet pan dinner with chicken and vegetables or grilled salmon and grilled vegetables; taco; protein + fruit and vegetable  Snacks: fruit, string cheese, Ritz crackers or yogurt; dessert in the evening (ice cream recently)  Beverages: mainly water, coffee, mineral water  Dining out: none currently  Exercise: Patient exercising 3-4 times per week. (walking and walking dog).      MEDICATIONS:  Reviewed      ANTHROPOMETRICS  Height: 5'8\" (note height change in chart previously 5'9\")  Weight: 309 lbs per patient   BMI (kg/m2): 46.9 kg/m2   %IBW: 220%  Weight History:   Wt Readings from Last 10 Encounters:   09/09/23 142.4 kg (314 lb)   09/07/23 142.4 kg (314 lb)   06/28/23 143.3 kg (316 lb)   05/31/23 144.2 kg (318 lb)   01/13/23 144.2 kg (318 lb)   10/04/22 138.1 kg (304 lb 6.4 oz)   07/27/22 139.7 kg (308 lb)   05/16/22 142.1 kg (313 lb 3.2 oz)   01/12/22 139.7 kg (308 lb)   12/22/21 137.4 kg (303 lb)          ASSESSED NUTRITION NEEDS  Estimated Energy Needs: 9636-9535 kcals (15-20 kcals/kg) for gradual weight loss   Estimated Protein Needs: 66-79 (1-1.2 gm/kg IBW) for repletion with exercise  RMR: 1440     EVALUATION/PROGRESS TOWARDS GOALS   Previous goals:  Add soothing activity in the evening (8:00 PM) 4 of 7 days-improving   Aim for 4 desserts per week (2 per week and 2 on weekend) -improving   Add swimming 1 day per week in January + 1 day 45-60 minutes walking + current exercise (dog walk) -not met     Previous Nutrition Diagnosis: Obesity related to excess energy intake as evidenced by BMI of 46. 9 kg/m2-no change     Current " Nutrition Diagnosis:Obesity related to excess energy intake as evidenced by BMI of 46.9 kg/m2     INTERVENTIONS  Nutrition Prescription   Recommend exercise 5 times per week; track intake if eating mindlessly; increase fiber to 25-30 grams per day      Implementation:   Meals and Snacks: Continue eating 3 meals + deliberate snacking (if hungry)   Nutrition Education (Content):              a)  Discussed progress towards goals.  Congratulated patient for continuing self soothe activities in the evening.  Reviewed current food and eating behavior challenges.  Discussed self care as way to improve emotional eating.  Discussed changes in eating and exercise patterns with spouse.  Supported patient in her struggle with food and weight.    Nutrition Education (Application):              a) Discussed strategies to set boundaries with food.                b) Patient verbalized understanding of diet by stating will limit desserts.   Expected patient engagement: good     CURRENT GOALS   Add soothing activity in the evening (8:00 PM) 4 of 7 days  Aim for 4 desserts per week (2 per week and 2 on weekend)   Exercise 3 days per week for 30 minutes + 1-2 day 45-60 minutes   Determine Strength, mobility, balance plan      FOLLOW UP/MONITORING    Patient to follow up in 8 weeks  Patient has RD contact information      Time spent with patient: 30 minutes     Mattie Amaya, RD, LD  [facility] Dietitian  [phone] (office phone)

## 2024-01-31 ENCOUNTER — TELEPHONE (OUTPATIENT)
Dept: GASTROENTEROLOGY | Facility: CLINIC | Age: 59
End: 2024-01-31
Payer: COMMERCIAL

## 2024-01-31 DIAGNOSIS — Z12.11 COLON CANCER SCREENING: Primary | ICD-10-CM

## 2024-01-31 RX ORDER — BISACODYL 5 MG/1
TABLET, DELAYED RELEASE ORAL
Qty: 4 TABLET | Refills: 0 | Status: SHIPPED | OUTPATIENT
Start: 2024-01-31 | End: 2024-03-04

## 2024-01-31 NOTE — TELEPHONE ENCOUNTER
Pre assessment completed for upcoming procedure.    Discussed sedation with patient. Patient is ok to proceed with CS for procedure. Patient reports she has a history of nausea after sedation, encouraged patient to notify staff on the day of procedure.      Procedure details:    Arrival time and facility location reviewed.    Pre op exam needed? N/A    Designated  policy reviewed. Instructed to have someone stay 6 hours post procedure.     COVID policy reviewed.      Medication review:    Medications reviewed. Please see supporting documentation below. Holding recommendations discussed (if applicable).       Prep for procedure:     Reviewed procedure prep instructions.     Patient verbalized understanding and had no questions or concerns at this time.      Cherelle Washburn RN  Endoscopy Procedure Pre Assessment RN  [phone] option 4

## 2024-01-31 NOTE — TELEPHONE ENCOUNTER
Discuss sedation with patient. Patient had a Colonoscopy in 2017 with MAC sedation. Patient has a history of anxiety.    ----------------------------------------------------------------------------------------------------------------------      Pre visit planning completed.      Procedure details:    Patient scheduled for Colonoscopy  on 2/13/2024.     Arrival time: 1300. Procedure time 1345    Pre op exam needed? N/A    Facility location: [facility]; [address] [place] [address] [place] [address]    Sedation type: Conscious sedation     Indication for procedure: Screening       Chart review:     Electronic implanted devices? No    Recent diagnosis of diverticulitis within the last 6 weeks? No    Diabetic? No    Diabetic medication HOLDING recommendations: (if applicable)  Oral diabetic medications: N/A  Diabetic injectables: N/A  Insulin: N/A      Medication review:    Anticoagulants? No    NSAIDS? No NSAID medications per patient's medication list.  RN will verify with pre-assessment call.    Other medication HOLDING recommendations:  N/A      Prep for procedure:     Bowel prep recommendation: Extended prep Golytely    Due to:  BMI > 40.     Prep instructions sent via [employer] Bowel prep script sent to [place], [place] - [address]        Cherelle Washburn RN  Endoscopy Procedure Pre Assessment RN  [phone] option 4

## 2024-02-06 NOTE — TELEPHONE ENCOUNTER
Received incoming call from patient.    Patient wanted to revisit prep instructions and clarify prep. Went through prep in detail including detailed information about low fiber diet. Spent 25 min on phone with patient going through prep. Patient asked about medications and if she could take them. Asked about gatorade as an additional hydration, after seeing patient was on lithium, discussed reaching out to her managing provider regarding gatorade with golytely and lithium levels.    Patient verbalized understanding and had no further questions.    Iris Meeks RN  Endoscopy Procedure Pre Assessment RN  [phone] option 4

## 2024-02-12 NOTE — TELEPHONE ENCOUNTER
Incoming call from patient regarding Colonoscopy prep instructions. Reviewed prep instructions with patient.     Patient verbalized understanding and had no questions or concerns at this time.     Cherelle Washburn RN  Endoscopy Procedure Pre-Assessment RN

## 2024-02-13 ENCOUNTER — HOSPITAL ENCOUNTER (OUTPATIENT)
Facility: CLINIC | Age: 59
Discharge: HOME | End: 2024-02-13
Attending: SURGERY | Admitting: SURGERY
Payer: COMMERCIAL

## 2024-02-13 VITALS
HEART RATE: 57 BPM | SYSTOLIC BLOOD PRESSURE: 133 MMHG | DIASTOLIC BLOOD PRESSURE: 81 MMHG | OXYGEN SATURATION: 98 % | BODY MASS INDEX: 47.27 KG/M2 | WEIGHT: 293 LBS | RESPIRATION RATE: 12 BRPM

## 2024-02-13 LAB — COLONOSCOPY: NORMAL

## 2024-02-13 PROCEDURE — 45385 COLONOSCOPY W/LESION REMOVAL: CPT | Performed by: SURGERY

## 2024-02-13 PROCEDURE — G0500 MOD SEDAT ENDO SERVICE >5YRS: HCPCS | Performed by: SURGERY

## 2024-02-13 PROCEDURE — 272N000104 HC DEVICE CLIP RESOLUTION, EACH: Performed by: SURGERY

## 2024-02-13 PROCEDURE — 250N000011 HC RX IP 250 OP 636: Performed by: SURGERY

## 2024-02-13 PROCEDURE — 88305 TISSUE EXAM BY PATHOLOGIST: CPT | Mod: TC | Performed by: SURGERY

## 2024-02-13 PROCEDURE — 99153 MOD SED SAME PHYS/QHP EA: CPT | Performed by: SURGERY

## 2024-02-13 RX ORDER — FENTANYL CITRATE 50 UG/ML
INJECTION, SOLUTION INTRAMUSCULAR; INTRAVENOUS PRN
Status: DISCONTINUED | OUTPATIENT
Start: 2024-02-13 | End: 2024-02-13 | Stop reason: HOSPADM

## 2024-02-13 RX ORDER — ONDANSETRON 2 MG/ML
INJECTION INTRAMUSCULAR; INTRAVENOUS PRN
Status: DISCONTINUED | OUTPATIENT
Start: 2024-02-13 | End: 2024-02-13 | Stop reason: HOSPADM

## 2024-02-13 ASSESSMENT — ACTIVITIES OF DAILY LIVING (ADL): ADLS_ACUITY_SCORE: 35

## 2024-02-13 NOTE — H&P
Pre-Endoscopy History and Physical     Angy Haji MRN# [id number]   YOB: 1965 Age: 58 year old     Date of Procedure: 2/13/24  Primary care provider: Mikey Cortés  Type of Endoscopy: colonoscopy  Reason for Procedure: personal history of polyps  Type of Anesthesia Anticipated: Moderate Sedation    HPI:    Angy is a 58 year old female who will be undergoing the above procedure.      A history and physical has been performed. The patient's medications and allergies have been reviewed. The risks and benefits of the procedure including the risk of bleeding, perforation, and missed lesions as well as the sedation options and risks were discussed with the patient.  All questions were answered and informed consent was obtained.        Last Colonoscopy: 2017 with Dr. Alan, diverticulosis, 1 poyp  Family History of Colorectal Cancer: CRC paternal uncles  Allergies   Allergen Reactions    Seroquel [Quetiapine] Nausea and Vomiting     Had constant vomiting    Olanzapine      Tongue welling, bad EPSE (Parkinsonian)    Penicillins Rash    Risperidone      Tongue welling, bad EPSE (Parkinsonian)      Thimerosal Other (See Comments)     blisters on inside of eyelid        No current facility-administered medications for this encounter.       Facility-Administered Medications Prior to Admission   Medication Dose Route Frequency Provider Last Rate Last Admin    lidocaine (PF) (XYLOCAINE) 1 % injection 4 mL  4 mL   Danielle Valadez MD   4 mL at 02/22/23 1117    triamcinolone (KENALOG-40) injection 40 mg  40 mg   Danielle Valadez MD   40 mg at 02/22/23 1117     Medications Prior to Admission   Medication Sig Dispense Refill Last Dose    estradiol (ESTRACE) 0.5 MG tablet Take 1 tablet (0.5 mg) by mouth daily 90 tablet 3 2/13/2024    estradiol (VAGIFEM) 10 MCG TABS vaginal tablet Place 1 tablet (10 mcg) vaginally twice a week 26 tablet 3 2/12/2024    lamoTRIgine (LAMICTAL) 150 MG tablet Take 1  tablet (150 mg) by mouth daily Take with one tablet of 200 for a total of 350 mg daily. 30 tablet 1 2/12/2024    lamoTRIgine (LAMICTAL) 200 MG tablet Take 1 tablet (200 mg) by mouth daily Take with one tablet of 150 for a total of 350 mg daily. 30 tablet 1 2/12/2024    lisinopril (ZESTRIL) 10 MG tablet Take 1 tablet (10 mg) by mouth daily 90 tablet 3 2/13/2024    lithium (ESKALITH) 150 MG capsule Take 1 capsule (150 mg) by mouth at bedtime 30 capsule 1 2/12/2024    lithium 300 MG capsule Take 3 tablet (900 mg) and one tablet (150 mg) for a total of 1050 mg at bedtime. 270 capsule 2 2/12/2024    Loratadine 10 MG capsule Take 10 mg by mouth daily   2/13/2024    medroxyPROGESTERone (PROVERA) 2.5 MG tablet Take 1 tablet (2.5 mg) by mouth daily 90 tablet 3 2/13/2024    metroNIDAZOLE (METROGEL) 1 % external gel Apply topically daily 60 g 9 2/12/2024    VITAMIN D, CHOLECALCIFEROL, PO Take 2,000 Units by mouth daily   2/13/2024    acetaminophen (TYLENOL) 500 MG tablet Take 500-1,000 mg by mouth every 6 hours as needed for mild pain   prn    bisacodyl (DULCOLAX) 5 MG EC tablet Two days prior to exam take two (2) tablets at 4pm. One day prior to exam take two (2) tablets at 4pm 4 tablet 0     Carboxymethylcellulose Sod PF (REFRESH PLUS) 0.5 % SOLN ophthalmic solution Place 1 drop into both eyes 3 times daily as needed for dry eyes       clotrimazole-betamethasone (LOTRISONE) 1-0.05 % external cream Apply topically 2 times daily For 2 weeks or as needed 45 g 1 prn    ORDER FOR DME Use your CPAP device as directed by your provider.       polyethylene glycol (GOLYTELY) 236 g suspension Take as directed. Two days before your exam fill the first container with water. Cover and shake until mixed well. At 5:00pm drink one 8oz glass every 10-15 minutes until half (1/2) of the first container is empty. Store the remainder in the refrigerator. One day before your exam at 5:00pm drink the second half of the first container until it is  gone. Before you go to bed mix the second container with water and put in refrigerator. Six hours before your check in time drink one 8oz glass every 10-15 minutes until half of container is empty. Discard the remainder of solution. 8000 mL 0        Patient Active Problem List   Diagnosis    Oral aphthae    CARDIOVASCULAR SCREENING; LDL GOAL LESS THAN 160    Rosacea    Raynaud disease    Vitamin D deficiency disease    MARLA (obstructive sleep apnea)    Atopic rhinitis    Chronic bilateral low back pain without sciatica    Obesity, Class III, BMI 40-49.9 (morbid obesity) (H)    Muscle tension dysphonia    Palpitations    Dysphagia, unspecified type    Anxiety    Benign essential hypertension    Callus of foot    Varicose veins of right lower extremity with edema    Bipolar I disorder (H)    Dribbling of urine    Spinal stenosis at L4-L5 level    Facet arthropathy, lumbar        Past Medical History:   Diagnosis Date    Depressive disorder     Previous depressive episodes. Diagnosed with Bipolar illness in 11/18 after manic episode.    Disorders of bursae and tendons in shoulder region, unspecified     Female infertility of unspecified origin     Gallbladder sludge 05/2013    Hypertension 10/18    Mild sleep apnea 05/13/2014    Obesity, unspecified     Oral aphthae     Other acne     Other specified hypoglycemia     PONV (postoperative nausea and vomiting)         Past Surgical History:   Procedure Laterality Date    BIOPSY      age 42 breast biopsy    COLONOSCOPY  2018    GYN SURGERY  2016 for ovarian cyst    left ovary, 2 fallopian tubes removed. endometrioma.    HC TOOTH EXTRACTION W/FORCEP  1984/85    [place] [facility]    IR ENDOVENOUS ABLATION VARICOSE VEINS  12/12/2019    LAPAROSCOPIC CHOLECYSTECTOMY  07/29/2013    Procedure: LAPAROSCOPIC CHOLECYSTECTOMY;  Laparoscopic Cholecystectomy;  Surgeon: Fabio Johnson MD;  Location: [facility] OR    LAPAROSCOPIC OOPHORECTOMY Left 06/23/2016    Procedure: LAPAROSCOPIC  "OOPHORECTOMY;  Surgeon: Kayley Donnelly MD;  Location: [facility] OR    LAPAROSCOPIC SALPINGECTOMY Bilateral 06/23/2016    Procedure: LAPAROSCOPIC SALPINGECTOMY;  Surgeon: Kayley Donnelly MD;  Location: [facility] OR    VASCULAR SURGERY  12/12    varicose vein proceedures       Social History     Tobacco Use    Smoking status: Never    Smokeless tobacco: Never    Tobacco comments:     non smoke home   Substance Use Topics    Alcohol use: Yes     Comment: 2 Drinks Per Month       Family History   Problem Relation Age of Onset    Cardiovascular Paternal Grandfather         anyerism    Obesity Paternal Grandfather     Respiratory Paternal Grandfather         pulmonary (air sacs hardening)    Allergies Father         hayfever    Lipids Father         diet therapy    Gallbladder Disease Father         cholecystectomy     Eye Disorder Father         retinal tear x 2     Prostate Cancer Father         Treated at age 77    Alzheimer Disease Maternal Grandmother         ? poor STM    Arthritis Maternal Grandmother     Heart Disease Maternal Grandmother         Arrythmia-got a pacemaker at age 89    Arthritis Mother     Eye Disorder Mother         Cataract/macular tear in one eye    LUNG DISEASE Mother         bronchiectasis from pneumonia    Atrial fibrillation Mother     Cancer Paternal Grandmother         Uterine CA- as well as her sister    Gynecology Paternal Grandmother         prolapsed uterus- had 10 kids    Obesity Paternal Grandmother     Breast Cancer Maternal Aunt     Other Cancer Paternal Aunt 80        endo. cancer          PHYSICAL EXAM:   BP (!) 147/69   Resp 18   Wt 309 lb   SpO2 100%   BMI 47.27 kg/m   Estimated body mass index is 47.27 kg/m  as calculated from the following:    Height as of 9/7/23: 5' 7.8\".    Weight as of this encounter: 309 lb.   Mental status - alert and oriented  RESP: lungs clear  CV: RRR  AIRWAY EXAM: Mallampatti Class I (visualization of the soft palate, fauces, uvula, anterior " and posterior pillars)    IMPRESSION   ASA Class 3 - Severe systemic disease, but not incapacitating      Signed Electronically by: Cheryl Loza MD  February 13, 2024    Colorectal Surgery  [phone] (office)  [phone] (pager)  [email or web address]

## 2024-02-15 LAB
PATH REPORT.COMMENTS IMP SPEC: NORMAL
PATH REPORT.COMMENTS IMP SPEC: NORMAL
PATH REPORT.FINAL DX SPEC: NORMAL
PATH REPORT.GROSS SPEC: NORMAL
PATH REPORT.MICROSCOPIC SPEC OTHER STN: NORMAL
PATH REPORT.RELEVANT HX SPEC: NORMAL
PHOTO IMAGE: NORMAL

## 2024-02-15 PROCEDURE — 88305 TISSUE EXAM BY PATHOLOGIST: CPT | Mod: 26 | Performed by: PATHOLOGY

## 2024-02-28 ENCOUNTER — LAB (OUTPATIENT)
Dept: LAB | Facility: CLINIC | Age: 59
End: 2024-02-28
Payer: COMMERCIAL

## 2024-02-28 ENCOUNTER — APPOINTMENT (OUTPATIENT)
Dept: LAB | Facility: CLINIC | Age: 59
End: 2024-02-28
Payer: COMMERCIAL

## 2024-02-28 DIAGNOSIS — Z79.899 ENCOUNTER FOR LONG-TERM (CURRENT) USE OF MEDICATIONS: ICD-10-CM

## 2024-02-28 DIAGNOSIS — E78.2 MIXED HYPERLIPIDEMIA: ICD-10-CM

## 2024-02-28 LAB
ALBUMIN SERPL BCG-MCNC: 4.2 G/DL (ref 3.5–5.2)
ALBUMIN UR-MCNC: NEGATIVE MG/DL
ALP SERPL-CCNC: 91 U/L (ref 40–150)
ALT SERPL W P-5'-P-CCNC: 15 U/L (ref 0–50)
ANION GAP SERPL CALCULATED.3IONS-SCNC: 10 MMOL/L (ref 7–15)
APPEARANCE UR: CLEAR
AST SERPL W P-5'-P-CCNC: 21 U/L (ref 0–45)
BACTERIA #/AREA URNS HPF: ABNORMAL /HPF
BASOPHILS # BLD AUTO: 0 10E3/UL (ref 0–0.2)
BASOPHILS NFR BLD AUTO: 1 %
BILIRUB SERPL-MCNC: 0.3 MG/DL
BILIRUB UR QL STRIP: NEGATIVE
BUN SERPL-MCNC: 12.1 MG/DL (ref 6–20)
CALCIUM SERPL-MCNC: 9.8 MG/DL (ref 8.6–10)
CHLORIDE SERPL-SCNC: 102 MMOL/L (ref 98–107)
CHOLEST SERPL-MCNC: 215 MG/DL
COLOR UR AUTO: ABNORMAL
CREAT SERPL-MCNC: 0.63 MG/DL (ref 0.51–0.95)
DEPRECATED HCO3 PLAS-SCNC: 24 MMOL/L (ref 22–29)
EGFRCR SERPLBLD CKD-EPI 2021: >90 ML/MIN/1.73M2
EOSINOPHIL # BLD AUTO: 0.3 10E3/UL (ref 0–0.7)
EOSINOPHIL NFR BLD AUTO: 4 %
ERYTHROCYTE [DISTWIDTH] IN BLOOD BY AUTOMATED COUNT: 13 % (ref 10–15)
FASTING STATUS PATIENT QL REPORTED: YES
GLUCOSE SERPL-MCNC: 97 MG/DL (ref 70–99)
GLUCOSE UR STRIP-MCNC: NEGATIVE MG/DL
HCT VFR BLD AUTO: 44.9 % (ref 35–47)
HDLC SERPL-MCNC: 61 MG/DL
HGB BLD-MCNC: 14 G/DL (ref 11.7–15.7)
HGB UR QL STRIP: NEGATIVE
IMM GRANULOCYTES # BLD: 0.1 10E3/UL
IMM GRANULOCYTES NFR BLD: 1 %
KETONES UR STRIP-MCNC: NEGATIVE MG/DL
LDLC SERPL CALC-MCNC: 120 MG/DL
LEUKOCYTE ESTERASE UR QL STRIP: NEGATIVE
LYMPHOCYTES # BLD AUTO: 1.7 10E3/UL (ref 0.8–5.3)
LYMPHOCYTES NFR BLD AUTO: 25 %
MCH RBC QN AUTO: 28 PG (ref 26.5–33)
MCHC RBC AUTO-ENTMCNC: 31.2 G/DL (ref 31.5–36.5)
MCV RBC AUTO: 90 FL (ref 78–100)
MONOCYTES # BLD AUTO: 0.3 10E3/UL (ref 0–1.3)
MONOCYTES NFR BLD AUTO: 5 %
MUCOUS THREADS #/AREA URNS LPF: PRESENT /LPF
NEUTROPHILS # BLD AUTO: 4.2 10E3/UL (ref 1.6–8.3)
NEUTROPHILS NFR BLD AUTO: 64 %
NITRATE UR QL: NEGATIVE
NONHDLC SERPL-MCNC: 154 MG/DL
NRBC # BLD AUTO: 0 10E3/UL
NRBC BLD AUTO-RTO: 0 /100
PH UR STRIP: 7.5 [PH] (ref 5–7)
PLATELET # BLD AUTO: 240 10E3/UL (ref 150–450)
POTASSIUM SERPL-SCNC: 4.4 MMOL/L (ref 3.4–5.3)
PROT SERPL-MCNC: 7.6 G/DL (ref 6.4–8.3)
RBC # BLD AUTO: 5 10E6/UL (ref 3.8–5.2)
RBC URINE: 1 /HPF
SODIUM SERPL-SCNC: 136 MMOL/L (ref 135–145)
SP GR UR STRIP: 1.01 (ref 1–1.03)
SQUAMOUS EPITHELIAL: <1 /HPF
TRIGL SERPL-MCNC: 170 MG/DL
TSH SERPL DL<=0.005 MIU/L-ACNC: 2.23 UIU/ML (ref 0.3–4.2)
UROBILINOGEN UR STRIP-MCNC: NORMAL MG/DL
VIT D+METAB SERPL-MCNC: 33 NG/ML (ref 20–50)
WBC # BLD AUTO: 6.6 10E3/UL (ref 4–11)
WBC URINE: <1 /HPF

## 2024-02-28 PROCEDURE — 36415 COLL VENOUS BLD VENIPUNCTURE: CPT

## 2024-02-28 PROCEDURE — 84443 ASSAY THYROID STIM HORMONE: CPT

## 2024-02-28 PROCEDURE — 80061 LIPID PANEL: CPT

## 2024-02-28 PROCEDURE — 81001 URINALYSIS AUTO W/SCOPE: CPT

## 2024-02-28 PROCEDURE — 80053 COMPREHEN METABOLIC PANEL: CPT

## 2024-02-28 PROCEDURE — 85025 COMPLETE CBC W/AUTO DIFF WBC: CPT

## 2024-02-28 PROCEDURE — 82306 VITAMIN D 25 HYDROXY: CPT

## 2024-03-01 DIAGNOSIS — I10 BENIGN ESSENTIAL HYPERTENSION: ICD-10-CM

## 2024-03-04 ENCOUNTER — OFFICE VISIT (OUTPATIENT)
Dept: PSYCHIATRY | Facility: CLINIC | Age: 59
End: 2024-03-04
Attending: PSYCHIATRY & NEUROLOGY
Payer: COMMERCIAL

## 2024-03-04 VITALS
WEIGHT: 293 LBS | HEART RATE: 69 BPM | DIASTOLIC BLOOD PRESSURE: 81 MMHG | SYSTOLIC BLOOD PRESSURE: 147 MMHG | BODY MASS INDEX: 47.36 KG/M2

## 2024-03-04 DIAGNOSIS — F31.9 BIPOLAR I DISORDER (H): ICD-10-CM

## 2024-03-04 PROCEDURE — 99213 OFFICE O/P EST LOW 20 MIN: CPT | Mod: GC | Performed by: STUDENT IN AN ORGANIZED HEALTH CARE EDUCATION/TRAINING PROGRAM

## 2024-03-04 PROCEDURE — 99214 OFFICE O/P EST MOD 30 MIN: CPT | Performed by: STUDENT IN AN ORGANIZED HEALTH CARE EDUCATION/TRAINING PROGRAM

## 2024-03-04 RX ORDER — LITHIUM CARBONATE 300 MG/1
CAPSULE ORAL
Qty: 270 CAPSULE | Refills: 2 | Status: SHIPPED | OUTPATIENT
Start: 2024-03-04 | End: 2024-04-29

## 2024-03-04 RX ORDER — LITHIUM CARBONATE 150 MG/1
150 CAPSULE ORAL AT BEDTIME
Qty: 30 CAPSULE | Refills: 2 | Status: SHIPPED | OUTPATIENT
Start: 2024-03-04 | End: 2024-04-29

## 2024-03-04 RX ORDER — LISINOPRIL 10 MG/1
10 TABLET ORAL DAILY
Qty: 90 TABLET | Refills: 3 | Status: SHIPPED | OUTPATIENT
Start: 2024-03-04 | End: 2024-09-27

## 2024-03-04 RX ORDER — LAMOTRIGINE 150 MG/1
150 TABLET ORAL DAILY
Qty: 30 TABLET | Refills: 2 | Status: SHIPPED | OUTPATIENT
Start: 2024-03-04 | End: 2024-04-29

## 2024-03-04 RX ORDER — LAMOTRIGINE 200 MG/1
200 TABLET ORAL DAILY
Qty: 30 TABLET | Refills: 2 | Status: SHIPPED | OUTPATIENT
Start: 2024-03-04 | End: 2024-04-29

## 2024-03-04 ASSESSMENT — PAIN SCALES - GENERAL: PAINLEVEL: NO PAIN (0)

## 2024-03-04 ASSESSMENT — PATIENT HEALTH QUESTIONNAIRE - PHQ9
SUM OF ALL RESPONSES TO PHQ QUESTIONS 1-9: 5
10. IF YOU CHECKED OFF ANY PROBLEMS, HOW DIFFICULT HAVE THESE PROBLEMS MADE IT FOR YOU TO DO YOUR WORK, TAKE CARE OF THINGS AT HOME, OR GET ALONG WITH OTHER PEOPLE: SOMEWHAT DIFFICULT
SUM OF ALL RESPONSES TO PHQ QUESTIONS 1-9: 5

## 2024-03-04 NOTE — PROGRESS NOTES
"   [facility]  MEDICAL PROGRESS NOTE     CARE TEAM:    PCP- Mikey Cortés  Therapist- couple therapy    Angy is a 58 year old who uses the pronouns she, her, hers.      Diagnoses     Bipolar 1 disorder, current depressive episode, moderate     H/o EPSE secondary to neuroleptic use  H/o Thrombocytopenia secondary to Depakote  H/o lithium toxicity, resolved     Assessment     Angy reported that her mood is \"in the middle\". Her mood is affected by relationship with her wife, and her child upcoming move to [place] for [employer]. She is doing couple therapy which is very helpful. Otherwise, there is no depression, no psychosis, no anthony or suicidal ideation. We went over her lithium monitoring lab together. They are normal.     She has an upcoming appointment with her primary care doctor. One, she is going to have an EKG done. The last one was in 11/2021. Two, to work on blood pressure regimen given that lisinopril can increase blood lithium level. They also workin viv tapering her down from HRT. I reminded her that her blood Lamictal level is probably lower due to HRT. Progestins lower blood Lamictal level. We may need to go up of it once she is off HRT. However, given that Lamictal may also contribute to tremor, we will have to also keep that in mind in the future     No safety concerns. Continue with therapy. No medication changes.    Psychotropic Drug Interactions:  [PSYCHCLINICDDI]  Estrogen Derivatives may decrease the serum concentration of LamoTRIgine  Angiotensin-Converting Enzyme Inhibitors may increase the serum concentration of Lithium.   LamoTRIgine may decrease the serum concentration of Progestins.      Management: routine monitoring    [place] was not checked today: not using controlled substances    Risk Statements:   Treatment Risk- Risks, benefits, alternatives and potential adverse effects have been discussed and are understood. " "  Safety Risk-Angy did not appear to be an imminent safety risk to self or others.     Plan     1) Medications:  no medication changes  - Continue Lithium 1050 mg at bedtime  - Continue Lamictal 350 mg daily    2) Psychotherapy: Continue with therapy    3) Next due:  Labs- 5/2024 for lithium monitoring panel  EKG- . Last one was on 03/2024 with   Rating scales-  PHQ9    4) Referrals: none  N/A    5) Other: none    6) Follow-up: Return to clinic in 2 months     Pertinent Background                                                   [most recent eval 08/31/23]     Angy reported that she didn't have symptoms until November 2018. At which point she was hospitalized for anthony.     This section was copied forward from 08/2021 transfer notes.  \"Patient has not had significant mental health history until 2018, when she developed perimenopausal mood and anxiety symptoms.  She was subsequently referred to our clinic following a hospitalization for anthony in November 2018.  Her care has been complicated by sensitivity to antipsychotics (EPSE including difficulty swallowing), thrombocytopenia secondary to Depakote, lithium toxicity in setting of concurrent Depakote use\"     Pertinent items include: anthony , severe med reaction [described as H/o Thrombocytopenia secondary to Depakote, and H/o lithium toxicity], and psych hosp      Subjective     - she said that her mood is somewhat in middle and calm  - Some day, she is more depressed and more worried.  - She can have some personal conflict that leads to ruminative thoughts and irritable mood,   - She provided the example of her wife  - They have been with for a very long time.  - They gained weight together, but wife had lost 205 lbs, going on walks, be part of running club, and working on marathon.  - She feels that they are not spending time together. Those behaviors are moving them away from each other.  Said that conflict can bring her anxiety    -Sleep is good. She " gets 8 hours at bedtime.    -She has a short Meditation recording on her phone that she plays in case she can't fall asleep.  -Said that it is short, and fells asleep before it is done.     -She is planing to see her primary care doctor soon  -They will work on her BP pills regimen.   -She will also get an EKG.    -She continues to reports fine tremor in hands.   -Said that it has not  worsening. The tremor is not constant, but intermittent.   -It bothers her when it happens.   -Nothing never slip from her hands.      Appetite: low, lost a few lbs.     Psychosis/Thuy: has a history of thuy, but has not had any manic symptoms recently including impulsivity or racing thought or grandiosity.     Chronic disease (HTA, DM, or Pain): HTA, HRT tapering down    Medication side effects:  denied.    SI/HI: none    Alcohol use: one drink a month    Smoking: none    Other substances (cannabis): none      Mental Status Exam     Alertness: alert  and oriented  Appearance: adequately groomed  Behavior/Demeanor: cooperative and pleasant, with good  eye contact   Speech: normal and regular rate and rhythm  Language: intact  Psychomotor: normal or unremarkable  Mood:  in the middle  Affect: full range and appropriate; congruent to: mood- yes, content- yes  Thought Process/Associations: unremarkable  Thought Content:  Reports none;  Denies suicidal & violent ideation and delusions  Perception:  Reports none;  Denies auditory hallucinations and visual hallucinations  Insight: good  Judgment: good  Cognition: does  appear grossly intact; formal cognitive testing was not done  Gait and Station: unremarkable     Past Psych Med Trials      Medication Max Dose (mg) Dates / Duration Helpful? DC Reason / Adverse Effects?   Depakote    Tremors   sertraline       olanzapine    EPSE   Seroquel    EPSE   lithium 1200   Tremor at 1200 mg had to lower the dose   Wellbutrin 300 15 years ago               Treatment Course and Resendiz Events since  JULY  2023 08/2023 : no medication changes  11/2023 no medication changes  01/2023 no medication changes  03/2024 no medication changes     Vitals   BP (!) 147/81   Pulse 69   Wt 140.4 kg (309 lb 9.6 oz)   BMI 47.36 kg/m    Pulse Readings from Last 3 Encounters:   03/04/24 69   02/13/24 57   01/04/24 60     Wt Readings from Last 3 Encounters:   03/04/24 140.4 kg (309 lb 9.6 oz)   02/13/24 140.2 kg (309 lb)   01/04/24 140.5 kg (309 lb 12.8 oz)     BP Readings from Last 3 Encounters:   03/04/24 (!) 147/81   02/13/24 133/81   01/04/24 118/75      Medical History     ALLERGIES: Seroquel [quetiapine], Olanzapine, Penicillins, Risperidone, and Thimerosal    Patient Active Problem List   Diagnosis    Oral aphthae    CARDIOVASCULAR SCREENING; LDL GOAL LESS THAN 160    Rosacea    Raynaud disease    Vitamin D deficiency disease    MARLA (obstructive sleep apnea)    Atopic rhinitis    Chronic bilateral low back pain without sciatica    Obesity, Class III, BMI 40-49.9 (morbid obesity) (H)    Muscle tension dysphonia    Palpitations    Dysphagia, unspecified type    Anxiety    Benign essential hypertension    Callus of foot    Varicose veins of right lower extremity with edema    Bipolar I disorder (H)    Dribbling of urine    Spinal stenosis at L4-L5 level    Facet arthropathy, lumbar        Medications     Current Outpatient Medications   Medication Sig Dispense Refill    acetaminophen (TYLENOL) 500 MG tablet Take 500-1,000 mg by mouth every 6 hours as needed for mild pain      Carboxymethylcellulose Sod PF (REFRESH PLUS) 0.5 % SOLN ophthalmic solution Place 1 drop into both eyes 3 times daily as needed for dry eyes      clotrimazole-betamethasone (LOTRISONE) 1-0.05 % external cream Apply topically 2 times daily For 2 weeks or as needed 45 g 1    estradiol (ESTRACE) 0.5 MG tablet Take 1 tablet (0.5 mg) by mouth daily 90 tablet 3    estradiol (VAGIFEM) 10 MCG TABS vaginal tablet Place 1 tablet (10 mcg) vaginally twice a week 26  tablet 3    lamoTRIgine (LAMICTAL) 150 MG tablet Take 1 tablet (150 mg) by mouth daily Take with one tablet of 200 for a total of 350 mg daily. 30 tablet 1    lamoTRIgine (LAMICTAL) 200 MG tablet Take 1 tablet (200 mg) by mouth daily Take with one tablet of 150 for a total of 350 mg daily. 30 tablet 1    lisinopril (ZESTRIL) 10 MG tablet TAKE ONE TABLET BY MOUTH ONCE DAILY 90 tablet 3    lithium (ESKALITH) 150 MG capsule Take 1 capsule (150 mg) by mouth at bedtime 30 capsule 1    lithium 300 MG capsule Take 3 tablet (900 mg) and one tablet (150 mg) for a total of 1050 mg at bedtime. 270 capsule 2    Loratadine 10 MG capsule Take 10 mg by mouth daily      medroxyPROGESTERone (PROVERA) 2.5 MG tablet Take 1 tablet (2.5 mg) by mouth daily 90 tablet 3    metroNIDAZOLE (METROGEL) 1 % external gel Apply topically daily 60 g 9    ORDER FOR DME Use your CPAP device as directed by your provider.      VITAMIN D, CHOLECALCIFEROL, PO Take 2,000 Units by mouth daily          Labs and Data         4/11/2023     6:47 PM 8/28/2023     6:28 PM 1/3/2024    11:58 AM   PROMIS-10 Total Score w/o Sub Scores   PROMIS TOTAL - SUBSCORES 27 30 28          No data to display                  8/30/2023     4:32 PM 1/3/2024    11:57 AM 3/4/2024     2:58 PM   PHQ-9 SCORE   PHQ-9 Total Score MyChart 9 (Mild depression) 6 (Mild depression) 5 (Mild depression)   PHQ-9 Total Score 9 6 5         3/10/2023    11:46 AM 8/28/2023     6:27 PM 10/31/2023     5:25 PM   JOANNA-7 SCORE   Total Score 9 (mild anxiety) 9 (mild anxiety) 11 (moderate anxiety)   Total Score 9 9 11       Liver/Kidney Function, TSH Metabolic Blood counts   Recent Labs   Lab Test 02/28/24  0845 05/15/23  1017   AST 21 17   ALT 15 16   ALKPHOS 91 80   CR 0.63 0.58     Recent Labs   Lab Test 02/28/24  0845   TSH 2.23    Recent Labs   Lab Test 02/28/24  0845   CHOL 215*   TRIG 170*   *   HDL 61     Recent Labs   Lab Test 10/04/22  1651   A1C 5.3     Recent Labs   Lab Test  02/28/24  0845   GLC 97    Recent Labs   Lab Test 02/28/24  0845   WBC 6.6   HGB 14.0   HCT 44.9   MCV 90            Recent Labs   Lab Test 11/08/23  1016 05/15/23  1017 12/07/22  1111   LITHIUM 0.69 0.7 0.6        PROVIDER: Antonio Santillan MD MPH    Level of Medical Decision Making:   - At least 1 chronic problem that is not stable  - Engaged in prescription drug management during visit (discussed any medication benefits, side effects, alternatives, etc.)         Patient was staffed in clinic with Dr. PAVEL Owens.  Supervisor is Dr. Vivian Burnett.        Answers submitted by the patient for this visit:  Patient Health Questionnaire (Submitted on 3/4/2024)  If you checked off any problems, how difficult have these problems made it for you to do your work, take care of things at home, or get along with other people?: Somewhat difficult  PHQ9 TOTAL SCORE: 5    The longitudinal plan of care for Bipolar disorder was addressed during this visit. Due to the added complexity in care, I will continue to support Anyg in the subsequent management of this condition(s) and with the ongoing continuity of care of this condition(s)

## 2024-03-06 ENCOUNTER — OFFICE VISIT (OUTPATIENT)
Dept: FAMILY MEDICINE | Facility: CLINIC | Age: 59
End: 2024-03-06
Payer: COMMERCIAL

## 2024-03-06 VITALS
WEIGHT: 293 LBS | SYSTOLIC BLOOD PRESSURE: 130 MMHG | OXYGEN SATURATION: 100 % | BODY MASS INDEX: 44.41 KG/M2 | TEMPERATURE: 98.4 F | DIASTOLIC BLOOD PRESSURE: 66 MMHG | RESPIRATION RATE: 20 BRPM | HEIGHT: 68 IN | HEART RATE: 70 BPM

## 2024-03-06 DIAGNOSIS — B35.4 TINEA CORPORIS: ICD-10-CM

## 2024-03-06 DIAGNOSIS — I10 BENIGN ESSENTIAL HYPERTENSION: ICD-10-CM

## 2024-03-06 DIAGNOSIS — F31.61 BIPOLAR DISORDER, CURRENT EPISODE MIXED, MILD (H): Primary | ICD-10-CM

## 2024-03-06 DIAGNOSIS — E78.2 MIXED HYPERLIPIDEMIA: ICD-10-CM

## 2024-03-06 PROCEDURE — 93000 ELECTROCARDIOGRAM COMPLETE: CPT | Performed by: FAMILY MEDICINE

## 2024-03-06 PROCEDURE — 99214 OFFICE O/P EST MOD 30 MIN: CPT | Performed by: FAMILY MEDICINE

## 2024-03-06 RX ORDER — CLOTRIMAZOLE AND BETAMETHASONE DIPROPIONATE 10; .64 MG/G; MG/G
CREAM TOPICAL 2 TIMES DAILY
Qty: 45 G | Refills: 2 | Status: SHIPPED | OUTPATIENT
Start: 2024-03-06

## 2024-03-06 ASSESSMENT — PAIN SCALES - GENERAL: PAINLEVEL: NO PAIN (0)

## 2024-03-06 NOTE — PROGRESS NOTES
"  Assessment & Plan     Bipolar disorder, current episode mixed, mild (H)  Stable, EKG is normal  - EKG 12-lead complete w/read - Clinics  - EKG 12-lead complete w/read - Clinics    Benign essential hypertension  Stable   - Lipid panel reflex to direct LDL Fasting; Future    Mixed hyperlipidemia  Has been slightly elevated  Encouraged her to continue working on life style modification and recheck lab in 2-3 months   - Lipid panel reflex to direct LDL Fasting; Future    Tinea corporis  On right thigh and chest wall  Will have her to try Lotrisone   - clotrimazole-betamethasone (LOTRISONE) 1-0.05 % external cream; Apply topically 2 times daily        BMI  Estimated body mass index is 46.85 kg/m  as calculated from the following:    Height as of this encounter: 1.724 m (5' 7.87\").    Weight as of this encounter: 139.3 kg (307 lb).   Weight management plan: Discussed healthy diet and exercise guidelines      FUTURE APPOINTMENTS:       - Follow-up visit in 2 months for lipid     Subjective   Angy is a 58 year old, presenting for the following health issues:  Derm Problem (Itchy, red , scaly on right thigh ) and Follow Up (Labs from 2/28)      3/6/2024     9:22 AM   Additional Questions   Roomed by Ritika SCHAFER     History of Present Illness       Reason for visit:  EKG annual follow up for Lithium.  Rash/detmatitis on left leg.  Follow up on lipid panel and bacteria in [facility]    She eats 4 or more servings of fruits and vegetables daily.She consumes 0 sweetened beverage(s) daily.She exercises with enough effort to increase her heart rate 20 to 29 minutes per day.  She exercises with enough effort to increase her heart rate 5 days per week.   She is taking medications regularly.         Review of Systems  Constitutional, HEENT, cardiovascular, pulmonary, GI, , musculoskeletal, neuro, skin, endocrine and psych systems are negative, except as otherwise noted.      Objective    /66 (BP Location: Right arm, Patient Position: " "Sitting, Cuff Size: Adult Regular)   Pulse 70   Temp 98.4  F (36.9  C) (Tympanic)   Resp 20   Ht 1.724 m (5' 7.87\")   Wt 139.3 kg (307 lb)   SpO2 100%   BMI 46.85 kg/m    Body mass index is 46.85 kg/m .  Physical Exam   GENERAL: alert and no distress  NECK: no adenopathy, no asymmetry, masses, or scars  RESP: lungs clear to auscultation - no rales, rhonchi or wheezes  CV: regular rate and rhythm, normal S1 S2, no S3 or S4, no murmur, click or rub, no peripheral edema  ABDOMEN: soft, nontender, no hepatosplenomegaly, no masses and bowel sounds normal  MS: no gross musculoskeletal defects noted, no edema            Signed Electronically by: Mikey Cortés MD    "

## 2024-03-20 ENCOUNTER — MYC MEDICAL ADVICE (OUTPATIENT)
Dept: FAMILY MEDICINE | Facility: CLINIC | Age: 59
End: 2024-03-20
Payer: COMMERCIAL

## 2024-03-22 ENCOUNTER — VIRTUAL VISIT (OUTPATIENT)
Dept: FAMILY MEDICINE | Facility: CLINIC | Age: 59
End: 2024-03-22
Payer: COMMERCIAL

## 2024-03-22 DIAGNOSIS — I10 BENIGN ESSENTIAL HYPERTENSION: ICD-10-CM

## 2024-03-22 DIAGNOSIS — G47.33 OSA (OBSTRUCTIVE SLEEP APNEA): ICD-10-CM

## 2024-03-22 DIAGNOSIS — E66.01 CLASS 3 SEVERE OBESITY DUE TO EXCESS CALORIES WITH SERIOUS COMORBIDITY AND BODY MASS INDEX (BMI) OF 45.0 TO 49.9 IN ADULT (H): Primary | ICD-10-CM

## 2024-03-22 DIAGNOSIS — E55.9 VITAMIN D DEFICIENCY DISEASE: ICD-10-CM

## 2024-03-22 DIAGNOSIS — E66.813 CLASS 3 SEVERE OBESITY DUE TO EXCESS CALORIES WITH SERIOUS COMORBIDITY AND BODY MASS INDEX (BMI) OF 45.0 TO 49.9 IN ADULT (H): Primary | ICD-10-CM

## 2024-03-22 DIAGNOSIS — R00.2 PALPITATIONS: ICD-10-CM

## 2024-03-22 PROCEDURE — 99214 OFFICE O/P EST MOD 30 MIN: CPT | Mod: 95 | Performed by: FAMILY MEDICINE

## 2024-03-22 NOTE — PROGRESS NOTES
Angy is a 58 year old who is being evaluated via a billable video visit.    How would you like to obtain your AVS? MyChart  If the video visit is dropped, the invitation should be resent by: Text to cell phone: [phone]  Will anyone else be joining your video visit? No      Assessment & Plan     Class 3 severe obesity due to excess calories with serious comorbidity and body mass index (BMI) of 45.0 to 49.9 in adult (H)  Has been working on life style modification without losing wt over past several years, discussed about medical management of wt loss, will have her to see MTM for employee wt loss management program and will also have her to see wt management program at   - Adult [facility]  Referral; Future  - Med Therapy Management Referral    MARLA (obstructive sleep apnea)  Has underlying issue of MARLA on her obesity, is indicated for wt loss management   - Adult [facility]  Referral; Future  - Med Therapy Management Referral    Vitamin D deficiency disease    - Med Therapy Management Referral    Benign essential hypertension  Mentioned above   - Adult Comprehensive Weight [facility]  Referral; Future  - Med Therapy Management Referral    Palpitations  Mentioned above   - Adult Comprehensive Weight [facility]  Referral; Future  - Med Therapy Management Referral      FUTURE APPOINTMENTS:       - Follow-up visit with MTM    Subjective   Angy is a 58 year old, presenting for the following health issues:  Weight Problem      3/22/2024     7:22 AM   Additional Questions   Roomed by Len QUINTANA     History of Present Illness       Reason for visit:  Discuss weught loss medications and options    She eats 4 or more servings of fruits and vegetables daily.She consumes 0 sweetened beverage(s) daily.She exercises with enough effort to increase her heart rate 20 to 29 minutes per day.  She exercises with enough effort to increase her heart rate 3  or less days per week.   She is taking medications regularly.       Review of Systems  Constitutional, HEENT, cardiovascular, pulmonary, GI, , musculoskeletal, neuro, skin, endocrine and psych systems are negative, except as otherwise noted.      Objective    Vitals - Patient Reported  Weight (Patient Reported): 138.3 kg (305 lb)        Physical Exam   GENERAL: alert and no distress  EYES: Eyes grossly normal to inspection.  No discharge or erythema, or obvious scleral/conjunctival abnormalities.  RESP: No audible wheeze, cough, or visible cyanosis.    SKIN: Visible skin clear. No significant rash, abnormal pigmentation or lesions.  NEURO: Cranial nerves grossly intact.  Mentation and speech appropriate for age.  PSYCH: Appropriate affect, tone, and pace of words          Video-Visit Details    Type of service:  Video Visit   Originating Location (pt. Location): Home    Distant Location (provider location):  On-site  Platform used for Video Visit: Christian  Signed Electronically by: Mikey Cortés MD      Video start:7:35  Video finish: 8:05

## 2024-04-02 ENCOUNTER — MYC MEDICAL ADVICE (OUTPATIENT)
Dept: PSYCHIATRY | Facility: CLINIC | Age: 59
End: 2024-04-02
Payer: COMMERCIAL

## 2024-04-02 DIAGNOSIS — G47.00 INSOMNIA, UNSPECIFIED TYPE: Primary | ICD-10-CM

## 2024-04-02 RX ORDER — HYDROXYZINE HYDROCHLORIDE 10 MG/1
10 TABLET, FILM COATED ORAL
Qty: 30 TABLET | Refills: 0 | Status: SHIPPED | OUTPATIENT
Start: 2024-04-02 | End: 2024-04-29

## 2024-04-12 ASSESSMENT — SLEEP AND FATIGUE QUESTIONNAIRES
HOW LIKELY ARE YOU TO NOD OFF OR FALL ASLEEP WHILE SITTING INACTIVE IN A PUBLIC PLACE: WOULD NEVER DOZE
HOW LIKELY ARE YOU TO NOD OFF OR FALL ASLEEP IN A CAR, WHILE STOPPED FOR A FEW MINUTES IN TRAFFIC: WOULD NEVER DOZE
HOW LIKELY ARE YOU TO NOD OFF OR FALL ASLEEP WHILE SITTING AND READING: WOULD NEVER DOZE
HOW LIKELY ARE YOU TO NOD OFF OR FALL ASLEEP WHILE SITTING AND TALKING TO SOMEONE: WOULD NEVER DOZE
HOW LIKELY ARE YOU TO NOD OFF OR FALL ASLEEP WHILE WATCHING TV: WOULD NEVER DOZE
HOW LIKELY ARE YOU TO NOD OFF OR FALL ASLEEP WHEN YOU ARE A PASSENGER IN A CAR FOR AN HOUR WITHOUT A BREAK: WOULD NEVER DOZE
HOW LIKELY ARE YOU TO NOD OFF OR FALL ASLEEP WHILE LYING DOWN TO REST IN THE AFTERNOON WHEN CIRCUMSTANCES PERMIT: WOULD NEVER DOZE
HOW LIKELY ARE YOU TO NOD OFF OR FALL ASLEEP WHILE SITTING QUIETLY AFTER LUNCH WITHOUT ALCOHOL: WOULD NEVER DOZE

## 2024-04-17 ENCOUNTER — HOSPITAL ENCOUNTER (OUTPATIENT)
Dept: NUTRITION | Facility: CLINIC | Age: 59
Discharge: HOME | End: 2024-04-17
Admitting: DIETITIAN, REGISTERED
Payer: COMMERCIAL

## 2024-04-17 VITALS — BODY MASS INDEX: 45.94 KG/M2 | WEIGHT: 293 LBS

## 2024-04-17 PROCEDURE — 97803 MED NUTRITION INDIV SUBSEQ: CPT | Mod: GT,95 | Performed by: DIETITIAN, REGISTERED

## 2024-04-17 NOTE — PROGRESS NOTES
Virtual Visit Details    Type of service:  Video Visit     Originating Location (pt. Location): Other work setting     Distant Location (provider location):  On-site  Platform used for Video Visit: Well     NUTRITION REASSESSMENT NOTE   REASON FOR ASSESSMENT  Angy LILIAN Haji referred by Dr. Cortés for MNT related to  Obesity, Class III, BMI 40-49.9 (morbid obesity) (H) [E66.01]          Patient accompanied by self      NUTRITION HISTORY  Patient continues with multiple life changes and stressors.  Patient aware that she likes to self soothe with ice cream which she has recently increased.  Patient is aware that social cues trigger her to eat. Patient's spouse recently had weight loss surgery so there is adjust with eating in the home.      3/7/2022 Self soothe in evening snack-made tea or tells self ate late dinner and does not need snack.  Patient asking self if physically hungry.  Patient drinking fizzy water or malt o meal as evening snack.  Patient aware she has been stress eating.  Tracking when not having dessert -identifying why eating  Exercise-cross country skiing; mall walk; snow shoeing; treadmill 2 times [place]aring treadmill   Yogurt; cheese and crackers; cereal      Patient wants to increase exercise time as will be going to [place] weekend ([place]) [facility] [place]      4/25/2002 Patient able to get 45-60 minutes exercise on weekends and 20-30 minutes of exercise daily.  Patient wants to lose 5 lbs prior to trip to [place].  Patient was able to have 3 days no desserts and then 1 week no dessert. 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  Patient determined ways she could lose weight: Dessert 2 times per week, add treadmill, eliminate morning snack, 1 snack at 3 PM. Alcohol 1 time per week, no caramel lattes, food is fuel, it's too late to be hungry, spouse brings sweet treats without asking, tea/bath/shower      6/15/2022 Patient skips dessert if eats  late dinner.  Patient felt discouraged with weight loss as wanted to lose more weight (down 3.5 lbs). Patient wants weight loss for functional reasons.  Patient signed [place] the [place].  Patient has increased cravings and thoughts about ice cream.  Shift in relationship with spouse after spouse's weight loss surgery.      7/27/2022 Patient increased exercise-[place] with [employer]; started the last week in June for bike rides (4 rides different times in 2 weeks-9 miles).  Rode in 18 mile race.  Went [place]     No dessert (hit or miss) 50% 3 days no dessert; ice cream and s'mores on vacation     Typical intake:  Greek yogurt + 1/4 cup granola + fruit + 1/2 and 1/2 with coffee or english muffin with peanut butter/butter and strawberry jam or eggs scrambled or hard boiled  Leftovers -1 cup cherries; cherry tomatoes; 1/2 cup pea pods + 3 oz pork tenderloin (teryaki)   Watermelon, pork tenderloin, 1 cup rice + tomatoes/pea pods; cheeseburger with whole wheat bun + vegetables + watermelon   Snacks: croissant ([place]) Jewish oat squares (1 cup dry) (3:00 PM) cheese and crackers    Beverages: coffee; water; fizzy water      Emotional eating at night-ice cream      9/21/2022 Patient has been able to bike 2 times per week.  Patient's weight has been stable.  Patient has not been able to track dinner and snacks.  Stress eating ebbs and flows.  Trying to save dessert for the weekend or may self soothe during th week with ice cream.  Patient stress eating in the evening and may boredom eat prior to lunch (cereal).    Weight: 308.4 lbs       11/16/2022 Biking 2 times per week. Tour biking race: [employer] 14 miles (rode by herself). [facility] 12 mile hilly bike ride. (rode with friend).  Patient has consistently walked dog and completes PT exercises.  Patient determining plan for winter exercise.    314.4 lbs (food and sleep changes) Patient  was eating more sweets as patient's wife baked sweets this fall (3-4 weeks with increase of sweets).  Holiday baking plan consists of 5 cookies and 1 candy (peanut brittle).  Ice cream consumption has increased recently.      1/23/2023 Patient was able to bake holiday cookies and freeze them.  Patient has been eating desserts 5 days a week.  Patient has been substituting greek yogurt (flavored or vanilla) or cream of wheat for dessert.  Patient was able to snow shoe.  Patient having barrier with getting on treadmill.  Patient's weight has fluctuated 325.8 and now 316 lbs.  Patient has been working on sleep routine (classical music; reading on paper vs ipad).      3/17/2023 Patient having knee pain and went to orthopedic MD and got cortizone shot in knee and able to walk know. Walked dog 3 times this week. Swimming -[facility] (6 sameer pool). Tyler wants to swim with goal of swimming 2 times per week.  Patient states ice cream and dessert are hit or miss. Patient shifting mindset to days having dessert vs not having dessert. Patient has not been as mindful with practicing self soothe at night.  Has tried tea and engaging book.     5/3/2023 PT exercises for knee; walking 15-30 minutes per day with dog + swimming 1 time per week (9 laps); 5 days per week desserts -patient using self talk to reduce dessert intake. Patient trying to limit desserts.  Patient's wife brining bread and sweets into home in which patient feels compels to eat as not to waste food.  Patient wants to be aware of foods when dining out and would like to split meal with spouse. Patient using zinger tea to soothe at night.       6/28/2023 No dessert 1-2 days; vacation and party so feels increase in desserts.  Patient getting more movement at work by taking stairs at work and size of building as no longer working remote.  Going to PT; less dog walks due to driving to work; found new lunch place to eat lunch at work (3-4 days per week-20  minute walk) -reading book and eating lunch; aims for hour lunch -reading for self care      8/9/2023 Patient is being intentional for choosing desserts.  Skipped ice cream since had late dinner.  Grabbed snack when dinner wasn't ready prior to function and ate later-took cheese and fruit then ate chicken kebab onion, bell pepper, chicken -316 lbs currently.  Was lower weight last week. Patient trying to balance stress in life.      10/4/2023 Patient having 1 night per week not having dessert.  Patient feels like eating ice cream when alone at home while watching a favorite show and feels more indulgent.  Patient did not participate in [employer] as hoped.  Patient will be participating in [employer] this weekend.  Patient continues to walk dog daily.  Patient also continues with increased life stressors.  Reading in the evening + fizzy water.      12/13/2023 Patient with reduction in weight trends: weight 307.4 lbs and 309 lbs.  Patient had stressful family situation which caused drop in appetite (past 2-3 weeks).  Patient states was not using food as coping strategy. Patient having dessert 1-6 days per week with most desserts  3-4 desserts per week -writes reason why no dessert-sets goals in advance of no dessert; no dessert for today or too late to eat dessert; food trigger from spouse (will see eating popcorn and will then eat it if she is -likes caramel corn); self care-journaling as needed; playing piano for fun 3 times per week; movement -walking dog + biking ([place]) + hour walk      1/24/2024 Patient working on self care at night- reading, limited TV watching, playing piano in the evening (5-10 minutes); desserts -aiming for 4 per week 3-5-1-1-3. Patient choose Malt-O meal as replacement for dessert; 304-312 lbs; less cookie baking during the holidays; exercise reduced with cold weather (walking outside)-treadmill; stairs; no pool. Patient working on exercise plan.      4/17/2024  "Patient with extra stress in life which has reduced desire to eat.  Patient working on increasing exercise intensity for upcoming trip.Treadmill-working on distance and incline 4 days per week 15-46 minutes + additional walking. Patient working on self soothe options besides food. Patient plans to start weight loss medication and will be working with pharmacist.  Patient also planning to meet with weight loss clinic.      DIET RECALL   Breakfast: cereal and banana; yogurt + granola (measures), fruit + coffee; 2 slices toast with butter + 1/2 cup cottage cheese    Lunch: leftovers (includes fruit and vegetable daily)  Snack: fruit or cheese with lower salt nut thin crackers   Dinner: Tuna steak, Asian marinade, stir gomez, baked vegetable + brown rice (3/4 cup) + canned peaches; chicken thighs with beans and rice; sheet pan dinner with chicken and vegetables or grilled salmon and grilled vegetables; taco; protein + fruit and vegetable  Snacks: fruit, string cheese, Ritz crackers or yogurt; dessert in the evening (ice cream recently)  Beverages: mainly water, coffee, mineral water  Dining out: none currently  Exercise: Patient exercising 3-4 times per week. (walking and walking dog).      MEDICATIONS:  Reviewed      ANTHROPOMETRICS  Height: 5'8\" (note height change in chart previously 5'9\")  Weight: 301 lbs   BMI (kg/m2): 45.9 kg/m2   %IBW: 218%  Weight History:   Wt Readings from Last 10 Encounters:   04/17/24 136.5 kg (301 lb)   03/06/24 139.3 kg (307 lb)   02/13/24 140.2 kg (309 lb)   09/09/23 142.4 kg (314 lb)   09/07/23 142.4 kg (314 lb)   06/28/23 143.3 kg (316 lb)   05/31/23 144.2 kg (318 lb)   01/13/23 144.2 kg (318 lb)   10/04/22 138.1 kg (304 lb 6.4 oz)   07/27/22 139.7 kg (308 lb)             ASSESSED NUTRITION NEEDS  Estimated Energy Needs: 4847-4988 kcals (15-20 kcals/kg) for gradual weight loss   Estimated Protein Needs: 66-79 (1-1.2 gm/kg IBW) for repletion with exercise  RMR: 1440     EVALUATION/PROGRESS " TOWARDS GOALS   Previous goals:  Add soothing activity in the evening (8:00 PM) 4 of 7 days-improving   Aim for 4 desserts per week (2 per week and 2 on weekend)-improving   Determine Strength, mobility, balance plan -met  Exercise 4 days per week-met      Previous Nutrition Diagnosis: Obesity related to excess energy intake as evidenced by BMI of 46. 9 kg/m2-no change     Current Nutrition Diagnosis:Obesity related to excess energy intake as evidenced by BMI of 45.9 kg/m2     INTERVENTIONS  Nutrition Prescription   Recommend exercise 5 times per week; track intake if eating mindlessly; increase fiber to 25-30 grams per day      Implementation:   Meals and Snacks: Continue eating 3 meals + deliberate snacking (if hungry)   Nutrition Education (Content):              a)  Discussed progress towards goals. Congratulated patient for increasing frequency and intensity of exercise. Reviewed current food and eating behavior challenges. Discussed self care as way to improve emotional eating.  Discussed weight loss medication and weight loss clinic with patient. Supported patient in her struggle with food and weight.    Nutrition Education (Application):              a) Discussed strategies to set boundaries with food.                b) Patient verbalized understanding of diet by stating will limit desserts.   Expected patient engagement: good     CURRENT GOALS   Add soothing activity in the evening (8:00 PM) 4 of 7 days  Aim for 3 days of desserts (1 on weekend 2 on weekend) substitute other days with Ch[employer]ani coffee or malto chees  Exercise 5 days per week for 30 minutes (3 minute warm up and 3 minute cool down) + walking additional days     FOLLOW UP/MONITORING    Patient to follow up in 8 weeks  Patient has RD contact information      Time spent with patient: 30 minutes     Mattie Amaya, RD, LD  [facility] Outpatient Dietitian  [phone] (office phone)

## 2024-04-18 ENCOUNTER — TELEPHONE (OUTPATIENT)
Dept: ENDOCRINOLOGY | Facility: CLINIC | Age: 59
End: 2024-04-18
Payer: COMMERCIAL

## 2024-04-18 ENCOUNTER — VIRTUAL VISIT (OUTPATIENT)
Dept: CARDIOLOGY | Facility: CLINIC | Age: 59
End: 2024-04-18
Attending: FAMILY MEDICINE
Payer: COMMERCIAL

## 2024-04-18 VITALS — WEIGHT: 293 LBS | BODY MASS INDEX: 45.81 KG/M2

## 2024-04-18 DIAGNOSIS — F31.9 BIPOLAR I DISORDER (H): ICD-10-CM

## 2024-04-18 DIAGNOSIS — L71.9 ROSACEA: ICD-10-CM

## 2024-04-18 DIAGNOSIS — E66.01 CLASS 3 SEVERE OBESITY WITHOUT SERIOUS COMORBIDITY WITH BODY MASS INDEX (BMI) OF 45.0 TO 49.9 IN ADULT, UNSPECIFIED OBESITY TYPE (H): Primary | ICD-10-CM

## 2024-04-18 DIAGNOSIS — N95.1 MENOPAUSAL SYNDROME (HOT FLASHES): ICD-10-CM

## 2024-04-18 DIAGNOSIS — I47.10 SVT (SUPRAVENTRICULAR TACHYCARDIA) (H): ICD-10-CM

## 2024-04-18 DIAGNOSIS — J30.2 SEASONAL ALLERGIES: ICD-10-CM

## 2024-04-18 DIAGNOSIS — I10 BENIGN ESSENTIAL HYPERTENSION: ICD-10-CM

## 2024-04-18 DIAGNOSIS — E66.813 CLASS 3 SEVERE OBESITY WITHOUT SERIOUS COMORBIDITY WITH BODY MASS INDEX (BMI) OF 45.0 TO 49.9 IN ADULT, UNSPECIFIED OBESITY TYPE (H): Primary | ICD-10-CM

## 2024-04-18 DIAGNOSIS — Z13.820 OSTEOPOROSIS SCREENING: ICD-10-CM

## 2024-04-18 DIAGNOSIS — R52 PAIN: ICD-10-CM

## 2024-04-18 DIAGNOSIS — R21 RASH: ICD-10-CM

## 2024-04-18 RX ORDER — SENNOSIDES 8.6 MG
650 CAPSULE ORAL EVERY 8 HOURS PRN
COMMUNITY
Start: 2024-04-18

## 2024-04-18 RX ORDER — FLUTICASONE PROPIONATE 50 MCG
1 SPRAY, SUSPENSION (ML) NASAL DAILY
COMMUNITY
Start: 2024-04-18

## 2024-04-18 NOTE — LETTER
4/18/2024      RE: Angy Haji  [address]  [facility] [address]       Dear Colleague,    Thank you for the opportunity to participate in the care of your patient, Angy Haji, at the [facility] at [facility]. Please see a copy of my visit note below.    Medication Therapy Management (MTM) Encounter    ASSESSMENT:                            Medication Adherence/Access: See below for considerations      Obesity   Class III Obesity (BMI 40 or more): Patient would benefit from additional pharmacotherapy for weight management. Given class III obesity, recommend GLP1/GIP therapy as data to support most significant weight loss and patient has no contraindications. Patient also likely to benefit from reduction in food noise and increased satiety.     Completed teaching of administration of Wegovy . Discussed mechanism of action, side effects, warnings, and efficacy. Discussed appropriate storage and stability. Answered patient questions.     Given patient questions regarding bariatric surgery, recommend she establish care with [facility]. For patients that are under [employer] insurance coverage, it is mandated by insurance that each qualifying patient meet with hospital based Weight Management Medication Therapy Management pharmacist to continue therapy coverage. The following patient meets the below coverage criteria and can therefore continue GLP-1/GIP agonist therapy for Weight Management:    Adult  BMI >40 with or without comorbidities   OR   BMI >30 + NAFLD*   at time of initiating GLP-1/GIP agonist therapy Approved for 29 weeks  Met Updated Initial Criteria   At least 5% weight loss of baseline body weight  Approved for 12 months          Hypertension/SVT: stable. Blood pressure largely at goal <130/80 mmHg. Expect some further blood pressure reduction secondary to weight loss. Will  continue to monitor with weight management med initiation.      BiPolar Disorder: stable - recommend discussing reduction in lamotrigine which may cause weight gain with psychiatry team. second generation antipsychotics such as aripiprazole, ziprasidone and lurasidone have demonstrated lower weight gain risk. Elevated triglycerides also may be secondary to metabolic syndrome - last lipid panel may not have been fasting, and only slightly elevated, will continue to monitor this (do not expect small elevation to be risk for pancreatitis with starting GLP1/GIP in this patient). Defer to psychiatry team.      Pain: stable - educated on acetaminophen arthritis to help longer acting pain management.      Osteoporosis Prevention: patient exceeding vitamin d recommendation and meeting Ca recommendation per NOF. Education provided today.      Dry Eye/Allergic Rhinitis:recommend intranasal steroid as needed during allergy season to help additional allergic rhinitis symptoms patient experiences intermittently. OK to continue antihistamine and refresh drops year round.    Menopause Symptoms:discussed hormone changes and risk of longterm use of HRT. Patient agreeable to continue to work with OBGYN to taper HRT. Weight management may also help to improve some symptoms such as VMS. Defer to OBGYN.    Rash:improving - defer to dermatology.    Rosacea: stable - may consider with dermatologist discontinuing treatment after menopause as some evidence to suggest hormone changes may impact rosacea. Defer to dermatology    PLAN:                            1. Please call to schedule with a provider at the [facility] for continuation of therapy and prescriptions. This team will also help you learn more about bariatric surgery and answer questions as they pertain to the process.  Phone Number: [phone] (schedules for [facility] and [facility] - providers, dietitians, health coaches).    2.  "[employer] employees with [employer] insurance ([facility]) are restricted to using the [facility] for Saxenda, Wegovy, and Zepbound    [facility]  [place], [place] - [address]  Phone: [phone]    Next steps:  After processing the prescription and saving to your profile, the pharmacy will give you an automated call to inform you that they have your prescription on file. This typically occurs within 1-2 days after the prescription is sent but may take 3-5 business days during high volume times.  You will need to call the pharmacy back to set up the first delivery of every new prescription or new medication dose.   Once you are on a stable dose, you can inquire with the pharmacy about signing up for \"Text to Order through [employer]\", \"Auto Fill\", and/or using the \"My [employer] Rx\" elis.     3. Start Wegovy 0.25 mg injection once weekly. Take this medication on the same day each week for 4 weeks. If tolerating, you may increase to 0.5 mg once weekly on week 5.      As a reminder, here is a great link for how to administer your Wegovy:  www.[employer]/taking-wegovy/how-to-use-the-wegovy-pen.html    If cost is prohibitive, you may try using Wegovy coupon (www.wegovy.com/coverage-and-savings)    4. To help with tolerability of Wegovy :  Eat small meals/snacks throughout the day (about every 2 hours)  Focus on getting protein in first with each meal and snack.   Drink plenty of water - goal 64 oz throughout the day  You may try Metamucil, Benefiber, or Citrucel to help feel more full (less nausea) and have softer, more consistent bowel movements.  To optimize weight management - work on incorporating resistance training/weight lifting to build muscle and improve overall metabolism of adipose tissue.    5. Consider acetaminophen 650 mg ER tabs (arthritis formulation) for pain management. You may take 1-2 tabs every 8 hours for pain. It lasts longer " than extra strength acetaminophen.    6. Try Flonase intranasal spray 50 mcg/actuation - 1 spray each nostril up to twice daily as needed for allergies during worsening allergy season. This is safe to use with loratadine and Refresh eye drops to better manage your symptoms.       Follow-up: 6 weeks with Cristel Recinos [facility] and as soon as able to establish with [facility] (see numbers on this summary for scheduling).    SUBJECTIVE/OBJECTIVE:                          Angy Haji is a 58 year old female contacted via secure video for an initial visit. She was referred to me from [facility] insurance requirements .      Reason for visit: [facility] insurance requirements Medication Therapy Management - GLP1/GIP Management .    Allergies/ADRs: Reviewed in chart  Past Medical History: Reviewed in chart  Tobacco: She reports that she has never smoked. She has never used smokeless tobacco.  Alcohol: Less than 1 beverages / week - socially   Caffeine: 32 oz caffeine daily  Personal Healthcare Goals: goal to manage weight with     Medication Adherence/Access: medication concerns - insurance restrictions.    Obesity  Class III Obesity (BMI 40 or more):   Has tried weight loss programs (weight watchers), diet, exercise, working with dietician. In early 30s, lost 30 lbs and was able to maintain for awhile and then lost over 100 lbs with lots of activity and working with dietician. Kept this weight management for 5 years. Then hit menopause, had significant stress in family and had hospitalization for manic episode - gained it all back. Has been current weight for about 5 years.     History of significant food cravings. Has some emotional eating patterns. Tries to avoid food award. Tries to distract self to avoid this. Currently exploring options to distract with reading, baths, go for walks with dog. Somewhat working -- feeling sad about child moving    Patient reports interest in exploring bariatric  "surgery and/or medication weight management - Mar Xiong. Focuses on protein, vegetables.  Breakfast: fruit, yogurt  Lunch: leftover dinner (protein, veggies, complex carb)  Dinner: (cooks at home mostly): crockpot a lot - protein, veggies, complex carbs    Nutrition/Eating Habits: has lost 8 lbs since Jan; follows with dietician at [facility] weight management.  Water intake  - 60 oz  Exercise/Activity: working with physical therapy friend for safe activity to help with weight management. Walks on treadmill 3-5 days per week 30mins per session. Doesn't have gym membership and is very busy, but trying to incorporate body weight resistance. Has SVT, which somewhat limits activity. Hot and dyspnea (attributes to lithium) when overly active in the summer.    Medication Considerations:  Phentermine: positive hypertension and anxiety (bi-polar/manic episode), SVT  Topiramate: No kidney stones, some fatigue symptoms secondary to bipolar depression. Some word retrieval issues secondary to menopause she notes.  Naltrexone: No liver dysfunction, no chronic opiate use  Bupropion:  no seizure, positive hypertension and anxiety (bi-polar/manic episode), SVT  GLP1: Patient denies personal or family history of MEN Type2, MTC, Pancreatitis. Had gall bladder removed.       Wt Readings from Last 4 Encounters:   04/18/24 136.2 kg (300 lb 3.2 oz)   04/17/24 136.5 kg (301 lb)   03/06/24 139.3 kg (307 lb)   03/04/24 140.4 kg (309 lb 9.6 oz)     Estimated body mass index is 45.81 kg/m  as calculated from the following:    Height as of 3/6/24: 1.724 m (5' 7.87\").    Weight as of this encounter: 136.2 kg (300 lb 3.2 oz).      Lab Results   Component Value Date    A1C 5.3 10/04/2022    GLC 97 02/28/2024     02/28/2024    POTASSIUM 4.4 02/28/2024    SHERRIE 9.8 02/28/2024    CHLORIDE 102 02/28/2024    CO2 24 02/28/2024    BUN 12.1 02/28/2024    CR 0.63 02/28/2024    GFRESTIMATED >90 02/28/2024    CHOL 215 (H) 02/28/2024    "  (H) 02/28/2024    HDL 61 02/28/2024    TRIG 170 (H) 02/28/2024    VITDT 33 02/28/2024    TSH 2.23 02/28/2024       Hypertension/SVT:   Lisinopril 10 mg once daily     Patient reports no current medication side effects.  Patient does not self-monitor blood pressure.      BP Readings from Last 3 Encounters:   03/06/24 130/66   03/04/24 (!) 147/81   02/13/24 133/81         BiPolar Disorder:   Lamotrigine 150 mg tab + 200 mg tab: 350 mg total once daily in the evening  Lithium 150 mg tab + 300 mg tab x 3 (1050 mg total) once daily every night at bedtime   Hydroxyzine 10 mg as needed every night at bedtime     Dr. Hernandez Manages at [facility]  Had a manic episode in 2018 and had not been diagnosed with Bipolar disorder until then. Current regimen works well after trial of several other antipsychotics caused severe side effects.  Recently started hydroxyzine and has been helpful, some dry mouth, grogginess resolved with use. Denies side effects     Component  Ref Range & Units 5 mo ago  (11/8/23) 11 mo ago  (5/15/23) 1 yr ago  (12/7/22) 1 yr ago  (5/4/22) 2 yr ago  (9/3/21) 2 yr ago  (5/5/21) 3 yr ago  (2/3/21)   Lithium  0.60 - 1.20 mmol/L 0.69 0.7 R, CM 0.6 R, CM 0.8 R, CM 0.8 R, CM 0.78 0.67   Comment: Therapeutic: 0.60 - 1.20 mmol/L;  Toxic: >2.00 mmol/L         Lab Results   Component Value Date    A1C 5.3 10/04/2022    GLC 97 02/28/2024     02/28/2024    POTASSIUM 4.4 02/28/2024    SHERRIE 9.8 02/28/2024    CHLORIDE 102 02/28/2024    CO2 24 02/28/2024    BUN 12.1 02/28/2024    CR 0.63 02/28/2024    GFRESTIMATED >90 02/28/2024    CHOL 215 (H) 02/28/2024     (H) 02/28/2024    HDL 61 02/28/2024    TRIG 170 (H) 02/28/2024    VITDT 33 02/28/2024    TSH 2.23 02/28/2024         Pain:   Acetaminophen 500 mg - 2 tabs every 6 hours as needed .    Has knee pain intermittently - managed with cortisone injection and physical therapy. This has worked well for patient, wishes acetaminophen worked a bit better -  knows to avoid nsaids with lithium.  Denies side effects     Liver Function Studies -   Recent Labs   Lab Test 02/28/24  0845   PROTTOTAL 7.6   ALBUMIN 4.2   BILITOTAL 0.3   ALKPHOS 91   AST 21   ALT 15   '      Osteoporosis Prevention:   Vitamin D 2000 international units once daily   Patient is not experiencing side effects.    Patient is getting  3-4 serving(s)/day of calcium in their diet.  Risk factors: post-menopausal      Dry Eye/Allergic Rhinitis:  Refresh plus eye drops - 1 drop each eye three times daily   Loratadine 10 mg once daily     Works well. Ran out of loratadine a while ago and symptoms worsened - as such, finds this effective.  Denies side effects. Despite current treatment, has some breakthrough symptoms (runny nose, post-nasal drip, itchy eyes) occasionally during times of high allergens (seasonal).    Menopause Symptoms:  Estradiol 0.5 mg tab once daily  Vagifem estradiol 10 mcg tabs vaginally twice weekly  Medroxyprogesterone 2.5 mg tab once daily     Dr. Romy Pedroza (OBGYN)  Vagifem helpful with vaginal atrophy and dryness. Medroxyprogesterone and estradiol for vms, night sweats, mood. Tapering off of these slowly with OBGYN. Denies side effects     Rash:  Clotrimazole-betamethasone 1-0.05% external cream - twice daily x 30 days  Lotion daily after completing 30 days treatment with steroids.    Improving. Following with dermatology. Finding steroid helping somewhat - not fully resolved. Does not report thinning or tearing  of skin.     Rosacea:  Metronidazole 1% - topically daily      WOrks well. Denies side effects.     Today's Vitals: Wt 136.2 kg (300 lb 3.2 oz)   BMI 45.81 kg/m    ----------------      I spent 73 minutes with this patient today. All changes were made via collaborative practice agreement with Rosana Washington PA-C. A copy of the visit note was provided to the patient's provider(s).    A summary of these recommendations was sent via fivesquids.co.uk.    Cristel Recinos, Pharm D.,  MP[facility]    Medication Therapy Management Pharmacist   [facility]      Telemedicine Visit Details  Type of service:  Video Conference via [employer]  Start Time:  7:32 AM  End Time:  8:45 AM     Medication Therapy Recommendations  Obesity, Class III, BMI 40-49.9 (morbid obesity) (H)    Current Medication: Semaglutide-Weight Management (WEGOVY) 0.25 MG/0.5ML pen   Rationale: Medication product not available - Adherence - Adherence   Recommendation: Provide Adherence Intervention   Status: Accepted per CPA         Pain    Current Medication: acetaminophen (TYLENOL) 500 MG tablet (Discontinued)   Rationale: More effective medication available - Ineffective medication - Effectiveness   Recommendation: Change Medication Formulation    Status: Patient Agreed - Adherence/Education         Seasonal allergies    Current Medication: Loratadine 10 MG capsule   Rationale: Synergistic therapy - Needs additional medication therapy - Indication   Recommendation: Start Medication   Status: Accepted - no CPA Needed                Please do not hesitate to contact me if you have any questions/concerns.     Sincerely,     Cristel Recinos, [facility]

## 2024-04-18 NOTE — PROGRESS NOTES
Medication Therapy Management (MTM) Encounter    ASSESSMENT:                            Medication Adherence/Access: See below for considerations      Obesity   Class III Obesity (BMI 40 or more): Patient would benefit from additional pharmacotherapy for weight management. Given class III obesity, recommend GLP1/GIP therapy as data to support most significant weight loss and patient has no contraindications. Patient also likely to benefit from reduction in food noise and increased satiety.     Completed teaching of administration of Wegovy . Discussed mechanism of action, side effects, warnings, and efficacy. Discussed appropriate storage and stability. Answered patient questions.     Given patient questions regarding bariatric surgery, recommend she establish care with [facility]. For patients that are under [employer] insurance coverage, it is mandated by insurance that each qualifying patient meet with hospital based Weight Management Medication Therapy Management pharmacist to continue therapy coverage. The following patient meets the below coverage criteria and can therefore continue GLP-1/GIP agonist therapy for Weight Management:    Adult  BMI >40 with or without comorbidities   OR   BMI >30 + NAFLD*   at time of initiating GLP-1/GIP agonist therapy Approved for 29 weeks  Met Updated Initial Criteria   At least 5% weight loss of baseline body weight  Approved for 12 months          Hypertension/SVT: stable. Blood pressure largely at goal <130/80 mmHg. Expect some further blood pressure reduction secondary to weight loss. Will continue to monitor with weight management med initiation.      BiPolar Disorder: stable - recommend discussing reduction in lamotrigine which may cause weight gain with psychiatry team. second generation antipsychotics such as aripiprazole, ziprasidone and lurasidone have demonstrated lower weight gain risk. Elevated triglycerides also may  be secondary to metabolic syndrome - last lipid panel may not have been fasting, and only slightly elevated, will continue to monitor this (do not expect small elevation to be risk for pancreatitis with starting GLP1/GIP in this patient). Defer to psychiatry team.      Pain: stable - educated on acetaminophen arthritis to help longer acting pain management.      Osteoporosis Prevention: patient exceeding vitamin d recommendation and meeting Ca recommendation per NOF. Education provided today.      Dry Eye/Allergic Rhinitis:recommend intranasal steroid as needed during allergy season to help additional allergic rhinitis symptoms patient experiences intermittently. OK to continue antihistamine and refresh drops year round.    Menopause Symptoms:discussed hormone changes and risk of longterm use of HRT. Patient agreeable to continue to work with OBGYN to taper HRT. Weight management may also help to improve some symptoms such as VMS. Defer to OBGYN.    Rash:improving - defer to dermatology.    Rosacea: stable - may consider with dermatologist discontinuing treatment after menopause as some evidence to suggest hormone changes may impact rosacea. Defer to dermatology    PLAN:                            1. Please call to schedule with a provider at the [facility] for continuation of therapy and prescriptions. This team will also help you learn more about bariatric surgery and answer questions as they pertain to the process.  Phone Number: [phone] (schedules for [facility] and [facility] - providers, dietitians, health coaches).    2. [employer] employees with [employer] insurance ([facility]) are restricted to using the [facility] for Saxenda, Wegovy, and Zepbound    [facility]  [place], [place] - [address]   Phone: [phone]    Next steps:  After processing the prescription and saving to your  "profile, the pharmacy will give you an automated call to inform you that they have your prescription on file. This typically occurs within 1-2 days after the prescription is sent but may take 3-5 business days during high volume times.  You will need to call the pharmacy back to set up the first delivery of every new prescription or new medication dose.   Once you are on a stable dose, you can inquire with the pharmacy about signing up for \"Text to Order through [employer]\", \"Auto Fill\", and/or using the \"[employer] [facility]\" elis.     3. Start Wegovy 0.25 mg injection once weekly. Take this medication on the same day each week for 4 weeks. If tolerating, you may increase to 0.5 mg once weekly on week 5.      As a reminder, here is a great link for how to administer your Wegovy:  www.[employer]/taking-wegovy/how-to-use-the-wegovy-pen.html    If cost is prohibitive, you may try using Wegovy coupon (www.wegovy.com/coverage-and-savings)    4. To help with tolerability of Wegovy :  Eat small meals/snacks throughout the day (about every 2 hours)  Focus on getting protein in first with each meal and snack.   Drink plenty of water - goal 64 oz throughout the day  You may try Metamucil, Benefiber, or Citrucel to help feel more full (less nausea) and have softer, more consistent bowel movements.  To optimize weight management - work on incorporating resistance training/weight lifting to build muscle and improve overall metabolism of adipose tissue.    5. Consider acetaminophen 650 mg ER tabs (arthritis formulation) for pain management. You may take 1-2 tabs every 8 hours for pain. It lasts longer than extra strength acetaminophen.    6. Try Flonase intranasal spray 50 mcg/actuation - 1 spray each nostril up to twice daily as needed for allergies during worsening allergy season. This is safe to use with loratadine and Refresh eye drops to better manage your symptoms.       Follow-up: 6 weeks with Cristel Recinos [facility] and as soon as " able to establish with [facility] (see numbers on this summary for scheduling).    SUBJECTIVE/OBJECTIVE:                          Angy Haji is a 58 year old female contacted via secure video for an initial visit. She was referred to me from [facility] insurance requirements .      Reason for visit: R/[facility] insurance requirements Medication Therapy Management - GLP1/GIP Management .    Allergies/ADRs: Reviewed in chart  Past Medical History: Reviewed in chart  Tobacco: She reports that she has never smoked. She has never used smokeless tobacco.  Alcohol: Less than 1 beverages / week - socially   Caffeine: 32 oz caffeine daily  Personal Healthcare Goals: goal to manage weight with     Medication Adherence/Access: medication concerns - insurance restrictions.    Obesity   Class III Obesity (BMI 40 or more):   Has tried weight loss programs (weight watchers), diet, exercise, working with dietician. In early 30s, lost 30 lbs and was able to maintain for awhile and then lost over 100 lbs with lots of activity and working with dietician. Kept this weight management for 5 years. Then hit menopause, had significant stress in family and had hospitalization for manic episode - gained it all back. Has been current weight for about 5 years.     History of significant food cravings. Has some emotional eating patterns. Tries to avoid food award. Tries to distract self to avoid this. Currently exploring options to distract with reading, baths, go for walks with dog. Somewhat working -- feeling sad about child moving    Patient reports interest in exploring bariatric surgery and/or medication weight management - Mar Xiong. Focuses on protein, vegetables.  Breakfast: fruit, yogurt  Lunch: leftover dinner (protein, veggies, complex carb)  Dinner: (cooks at home mostly): crockpot a lot - protein, veggies, complex carbs    Nutrition/Eating Habits: has lost 8 lbs since Jan; follows with  "dietician at [facility] weight management.  Water intake  - 60 oz  Exercise/Activity: working with physical therapy friend for safe activity to help with weight management. Walks on treadmill 3-5 days per week 30mins per session. Doesn't have gym membership and is very busy, but trying to incorporate body weight resistance. Has SVT, which somewhat limits activity. Hot and dyspnea (attributes to lithium) when overly active in the summer.    Medication Considerations:  Phentermine: positive hypertension and anxiety (bi-polar/manic episode), SVT  Topiramate: No kidney stones, some fatigue symptoms secondary to bipolar depression. Some word retrieval issues secondary to menopause she notes.  Naltrexone: No liver dysfunction, no chronic opiate use  Bupropion:  no seizure, positive hypertension and anxiety (bi-polar/manic episode), SVT  GLP1: Patient denies personal or family history of MEN Type2, MTC, Pancreatitis. Had gall bladder removed.       Wt Readings from Last 4 Encounters:   04/18/24 136.2 kg (300 lb 3.2 oz)   04/17/24 136.5 kg (301 lb)   03/06/24 139.3 kg (307 lb)   03/04/24 140.4 kg (309 lb 9.6 oz)     Estimated body mass index is 45.81 kg/m  as calculated from the following:    Height as of 3/6/24: 1.724 m (5' 7.87\").    Weight as of this encounter: 136.2 kg (300 lb 3.2 oz).      Lab Results   Component Value Date    A1C 5.3 10/04/2022    GLC 97 02/28/2024     02/28/2024    POTASSIUM 4.4 02/28/2024    SHERRIE 9.8 02/28/2024    CHLORIDE 102 02/28/2024    CO2 24 02/28/2024    BUN 12.1 02/28/2024    CR 0.63 02/28/2024    GFRESTIMATED >90 02/28/2024    CHOL 215 (H) 02/28/2024     (H) 02/28/2024    HDL 61 02/28/2024    TRIG 170 (H) 02/28/2024    VITDT 33 02/28/2024    TSH 2.23 02/28/2024       Hypertension/SVT:   Lisinopril 10 mg once daily     Patient reports no current medication side effects.  Patient does not self-monitor blood pressure.      BP Readings from Last 3 Encounters:   03/06/24 130/66 "   03/04/24 (!) 147/81   02/13/24 133/81         BiPolar Disorder:   Lamotrigine 150 mg tab + 200 mg tab: 350 mg total once daily in the evening  Lithium 150 mg tab + 300 mg tab x 3 (1050 mg total) once daily every night at bedtime   Hydroxyzine 10 mg as needed every night at bedtime     Dr. Hernandez Manages at [facility]  Had a manic episode in 2018 and had not been diagnosed with Bipolar disorder until then. Current regimen works well after trial of several other antipsychotics caused severe side effects.  Recently started hydroxyzine and has been helpful, some dry mouth, grogginess resolved with use. Denies side effects     Component  Ref Range & Units 5 mo ago  (11/8/23) 11 mo ago  (5/15/23) 1 yr ago  (12/7/22) 1 yr ago  (5/4/22) 2 yr ago  (9/3/21) 2 yr ago  (5/5/21) 3 yr ago  (2/3/21)   Lithium  0.60 - 1.20 mmol/L 0.69 0.7 R, CM 0.6 R, CM 0.8 R, CM 0.8 R, CM 0.78 0.67   Comment: Therapeutic: 0.60 - 1.20 mmol/L;  Toxic: >2.00 mmol/L         Lab Results   Component Value Date    A1C 5.3 10/04/2022    GLC 97 02/28/2024     02/28/2024    POTASSIUM 4.4 02/28/2024    SHERRIE 9.8 02/28/2024    CHLORIDE 102 02/28/2024    CO2 24 02/28/2024    BUN 12.1 02/28/2024    CR 0.63 02/28/2024    GFRESTIMATED >90 02/28/2024    CHOL 215 (H) 02/28/2024     (H) 02/28/2024    HDL 61 02/28/2024    TRIG 170 (H) 02/28/2024    VITDT 33 02/28/2024    TSH 2.23 02/28/2024         Pain:   Acetaminophen 500 mg - 2 tabs every 6 hours as needed .    Has knee pain intermittently - managed with cortisone injection and physical therapy. This has worked well for patient, wishes acetaminophen worked a bit better - knows to avoid nsaids with lithium.  Denies side effects     Liver Function Studies -   Recent Labs   Lab Test 02/28/24  0845   PROTTOTAL 7.6   ALBUMIN 4.2   BILITOTAL 0.3   ALKPHOS 91   AST 21   ALT 15   '      Osteoporosis Prevention:   Vitamin D 2000 international units once daily   Patient is not experiencing side  effects.    Patient is getting  3-4 serving(s)/day of calcium in their diet.  Risk factors: post-menopausal      Dry Eye/Allergic Rhinitis:  Refresh plus eye drops - 1 drop each eye three times daily   Loratadine 10 mg once daily     Works well. Ran out of loratadine a while ago and symptoms worsened - as such, finds this effective.  Denies side effects. Despite current treatment, has some breakthrough symptoms (runny nose, post-nasal drip, itchy eyes) occasionally during times of high allergens (seasonal).    Menopause Symptoms:  Estradiol 0.5 mg tab once daily  Vagifem estradiol 10 mcg tabs vaginally twice weekly  Medroxyprogesterone 2.5 mg tab once daily     Dr. Romy Pedroza (OBGYN)  Vagifem helpful with vaginal atrophy and dryness. Medroxyprogesterone and estradiol for vms, night sweats, mood. Tapering off of these slowly with OBGYN. Denies side effects     Rash:  Clotrimazole-betamethasone 1-0.05% external cream - twice daily x 30 days  Lotion daily after completing 30 days treatment with steroids.    Improving. Following with dermatology. Finding steroid helping somewhat - not fully resolved. Does not report thinning or tearing  of skin.     Rosacea:  Metronidazole 1% - topically daily      WOrks well. Denies side effects.     Today's Vitals: Wt 136.2 kg (300 lb 3.2 oz)   BMI 45.81 kg/m    ----------------      I spent 73 minutes with this patient today. All changes were made via collaborative practice agreement with Rosana Washington PA-C. A copy of the visit note was provided to the patient's provider(s).    A summary of these recommendations was sent via Im[employer].    Cristel Recinos, Pharm D., MPH    Medication Therapy Management Pharmacist   [facility]      Telemedicine Visit Details  Type of service:  Video Conference via Kliqed  Start Time:  7:32 AM  End Time:  8:45 AM     Medication Therapy Recommendations  Obesity, Class III, BMI 40-49.9 (morbid obesity) (H)     Current Medication: Semaglutide-Weight Management (WEGOVY) 0.25 MG/0.5ML pen   Rationale: Medication product not available - Adherence - Adherence   Recommendation: Provide Adherence Intervention   Status: Accepted per CPA         Pain    Current Medication: acetaminophen (TYLENOL) 500 MG tablet (Discontinued)   Rationale: More effective medication available - Ineffective medication - Effectiveness   Recommendation: Change Medication Formulation    Status: Patient Agreed - Adherence/Education         Seasonal allergies    Current Medication: Loratadine 10 MG capsule   Rationale: Synergistic therapy - Needs additional medication therapy - Indication   Recommendation: Start Medication   Status: Accepted - no CPA Needed

## 2024-04-18 NOTE — NURSING NOTE
Is the patient currently in the state of [place]? YES    Visit mode:VIDEO    If the visit is dropped, the patient can be reconnected by: VIDEO VISIT: Text to cell phone:   Telephone Information:   Mobile [phone]       Will anyone else be joining the visit? NO  (If patient encounters technical issues they should call [phone])    How would you like to obtain your AVS? MyChart    Are changes needed to the allergy or medication list? Pt stated no changes to allergies and Pt stated no med changes    Reason for visit: Consult    Krys ROBERTS

## 2024-04-18 NOTE — TELEPHONE ENCOUNTER
Prior Authorization Retail Medication Request    Medication/Dose: [facility] insurance requirements Wegovy 0.25 mg - 2.4 mg (all doses) as indicated by supply, safety, and efficacy.    Diagnosis and ICD code (if different than what is on RX):  obesity III  New/renewal/insurance change PA/secondary ins. PA:  Previously Tried and Failed:  diet and exercise      Rationale:  Patient met with [facility] Medication Therapy Management Pharmacist 04/18/2024 per [facility] insurance requirements. Patient denies personal or family history of MEN Type2, MTC, Pancreatitis.     For patients that are under [employer] insurance coverage, it is mandated by insurance that each qualifying patient meet with hospital based Weight Management Medication Therapy Management pharmacist to continue therapy coverage. The following patient meets the below coverage criteria and can therefore continue GLP-1/GIP agonist therapy for Weight Management:    Adult  BMI >40 with or without comorbidities   OR   BMI >30 + NAFLD*   at time of initiating GLP-1/GIP agonist therapy Approved for 29 weeks  Met Updated Initial Criteria   At least 5% weight loss of baseline body weight  Approved for 12 months          Insurance   Primary Visit Coverage    Payer Plan Sponsor Code Group Number Group Name   [facility] CHOICE [id number]      Primary Visit Coverage Subscriber    ID Name SSN Address   [id number] LAKEISHA SALGADO  [address] [place] [address]           Pharmacy Information (if different than what is on RX)  Name:  [facility]    Phone: [phone]  - Press 2 to leave a refill request on a secured voicemail  - Press 3 to place an automated refill request  - Press 4 to speak directly to a member of the pharmacy staff    Hours: Monday through Friday 8am to 7pm and Saturday 8am to 4pm      Cristel Recinos, Pharm D., MPH    Medication Therapy Management  Pharmacist   [facility]

## 2024-04-18 NOTE — Clinical Note
Patient likely to establish with [facility] (interested in learning about bariatric surgery). Due to [facility] insurance requirements - I will continue to see her and use our CPA, KELLIE.

## 2024-04-18 NOTE — Clinical Note
Debra Santillan - I had the pleasure of meeting with Angy today at the [facility] clinic. We discussed some weight gain seen with lamotrigine, but it is not often seen. I wanted to put it on your radar should you have any other thoughts for her care as she works on weight loss. Her mental health is quite stable as you know at this time, so I thought it could be a discussion, but I dont' have any strong recommendations as I only just met her. I told her I'd review her med list for weight positive meds and this was the only one that really stood out (and yet, <5% patients see this).   I will continue to work with her for weight management - so please reach out with any thoughts or questions. I welcome them!  Cristel Recinos, Pharm D., MPH  Medication Therapy Management Pharmacist  [facility]

## 2024-04-18 NOTE — Clinical Note
Debra Cortés -thank you for the referral per the [employer] insurance requirements for [employer] employees. We started Angy on Wegovy today and will continue to monitor.   She also expressed interest in establishing care with the [facility] at [facility]. She may call and schedule that on her own (info provided for her) -- this process may be sped up if you are able to place a referral for the [facility] as well and someone will reach out to her from [facility].  Thank you for collaborating in her care (see attached for other considerations of her chronic meds).  Cristel Recinos, Pharm D., MPH  Medication Therapy Management Pharmacist  [facility]

## 2024-04-18 NOTE — PROGRESS NOTES
"Virtual Visit Details    Type of service:  Video Visit     Originating Location (pt. Location): {video visit patient location:866148::\"Home\"}  {PROVIDER LOCATION On-site should be selected for visits conducted from your clinic location or adjoining [facility] hospital, academic office, or other nearby [facility] building. Off-site should be selected for all other provider locations, including home:193900}  Distant Location (provider location):  {virtual location provider:824384}  Platform used for Video Visit: {Virtual Visit Platforms:099576::\"WebLayers\"}    "

## 2024-04-19 ENCOUNTER — OFFICE VISIT (OUTPATIENT)
Dept: SLEEP MEDICINE | Facility: CLINIC | Age: 59
End: 2024-04-19
Payer: COMMERCIAL

## 2024-04-19 VITALS
WEIGHT: 293 LBS | BODY MASS INDEX: 45.99 KG/M2 | SYSTOLIC BLOOD PRESSURE: 139 MMHG | OXYGEN SATURATION: 100 % | HEART RATE: 64 BPM | HEIGHT: 67 IN | RESPIRATION RATE: 18 BRPM | DIASTOLIC BLOOD PRESSURE: 68 MMHG

## 2024-04-19 DIAGNOSIS — G47.33 OSA (OBSTRUCTIVE SLEEP APNEA): Primary | ICD-10-CM

## 2024-04-19 DIAGNOSIS — G47.00 INSOMNIA, UNSPECIFIED TYPE: ICD-10-CM

## 2024-04-19 DIAGNOSIS — I10 BENIGN ESSENTIAL HYPERTENSION: ICD-10-CM

## 2024-04-19 DIAGNOSIS — E66.01 OBESITY, CLASS III, BMI 40-49.9 (MORBID OBESITY) (H): ICD-10-CM

## 2024-04-19 PROCEDURE — 99214 OFFICE O/P EST MOD 30 MIN: CPT | Performed by: INTERNAL MEDICINE

## 2024-04-19 NOTE — TELEPHONE ENCOUNTER
Prior Authorization Approval    Medication: SEMAGLUTIDE-WEIGHT MANAGEMENT 0.25 MG/0.5ML SC SOAJ  Authorization Effective Date: 4/18/2024  Authorization Expiration Date: 11/7/2024  Approved Dose/Quantity:   Reference #: [id number]   Insurance Company: [employer] - Phone [phone] Fax [phone]  Expected CoPay: $    CoPay Card Available:      Financial Assistance Needed:   Which Pharmacy is filling the prescription: [facility] - [place], [place] - [address]  Pharmacy Notified: faxed approval to pharmacy   Patient Notified: informed pharmacy to contact patient

## 2024-04-19 NOTE — PATIENT INSTRUCTIONS
Find you r PT exercises for pelvic floor  Avoid liquids after 8 pm   For general sleep health questions:   https://www.thensf.org/sleep-health-topics/  http://sleepeducation.org    Medicare and Medicaid patients starting on CPAP or biPAP on or after 5/12/2023  Medicare patients should schedule an IN PERSON CLINIC appointment to provide your CPAP/biPAP usage data to a provider within 90 days of starting CPAP    For new [employer]Med devices, sign up for device elis to monitor your device for your followup visits  We encourage you to utilize the [facility] elis or website (myAir web P[employer]) to monitor your therapy progress and share the data with your healthcare team when you discuss your sleep apnea.                                                      Know your equipment:  CPAP is continuous positive airway pressure that prevents obstructive sleep apnea by keeping the throat from collapsing while you are sleeping. In most cases, the device is  smart  and can slowly self-adjusts if your throat collapses and keeps a record every day of how well you are treated-this information is available to you and your care team.  BPAP is bilevel positive airway pressure that keeps your throat open and also assists each breath with a pressure boost to maintain adequate breathing.  Special kinds of BPAP are used in patients who have inadequate breathing from lung or heart disease. In most cases, the device is  smart  and can slowly self-adjusts to assist breathing. Like CPAP, the device keeps a record of how well you are treated.  Your mask is your connection to the device. You get to choose what feels most comfortable and the staff will help to make sure if fits.     *Masks with magnets:  Updated Contraindications  Masks with magnetic components are contraindicated for use by patients where they, or anyone in close physical contact while using the mask, have the following:   Active medical implants that interact with magnets  (i.e., pacemakers, implantable cardioverter defibrillators (ICD), neurostimulators, cerebrospinal fluid (CSF) shunts, insulin/infusion pumps)   Metallic implants/objects containing ferromagnetic material (i.e., aneurysm clips/flow disruption devices, embolic coils, stents, valves, electrodes, implants to restore hearing or balance with implanted magnets, ocular implants, metallic splinters in the eye)  Updated Warning  Keep the mask magnets at a safe distance of at least 6 inches (150 mm) away from implants or medical devices that may be adversely affected by magnetic interference. This warning applies to you or anyone in close physical contact with your mask. The magnets are in the frame and lower headgear clips, with a magnetic field strength of up to 400mT. When worn, they connect to secure the mask but may inadvertently detach while asleep.  Implants/medical devices, including those listed within contraindications, may be adversely affected if they change function under external magnetic fields or contain ferromagnetic materials that attract/repel to magnetic fields (some metallic implants, e.g., contact lenses with metal, dental implants, metallic cranial plates, screws, veronique hole covers, and bone substitute devices). Consult your physician and  of your implant / other medical device for information on the potential adverse effects of magnetic fields.      Continue PAP therapy every night, for all hours that you are sleeping (including naps.)  As always, try to get at least 8 hours of sleep or more each day, keep a regular sleep schedule, and avoid sleep deprivation. Avoid alcohol.  Reasons that you might need a change to your pressure therapy would be weight gain or loss, waking having inadvertently removed your PAP overnight, having previously felt refreshed by sleep with CPAP use and now waking un-refreshed, and return of daytime sleepiness. Also, the development of new medical problems  (such as  heart failure, stroke, medications such as narcotics) can sometimes affect breathing at night and change your PAP therapy needs.  Please bring PAP with you if you are hospitalized.  If anticipating surgery be sure to discuss with your surgeon that you have sleep apnea and use PAP therapy.    Maintain your equipment as recommended which includes routine cleaning and replacement of supplies.      Call DME for any questions regarding supplies or maintenance.    [facility], [facility] [phone]    Do not drive on engage in potentially dangerous activities if feeling sleepy.    Please follow up in sleep clinic again in 12 months.        Tips for your PAP use-    Mask fitting tips  Mask fitting exercise:    To improve your mask seal and your mobility at night, put mask on and secure in place.  Lie down in bed with full pressure and roll to one side, adjust headgear while in that position to eliminate any leaks. Repeat process rolling to other side.     The mask seal does not have to be perfect:   CPAP machines are designed to make up for small leaks. However, you will not tolerate leaks blowing in your eyes so you will need to adjust.   Any leak should only be near or at the bottom of the mask.  We expect your mask to leak slightly at night.    Do not over-tighten the headgear straps, tighter IS NOT better, we expect minimal leak.    First try re-positioning the mask or headgear before tightening the headgear straps.  Mask leaks are expected due to changing sleeping positions. Try pulling the mask away from your skin allowing the cushion to re-inflate will minimize the leak.  If you struggle for a good fit, try turning the CPAP off and then readjust the mask by pulling it away from your face and then turning back on the CPAP.        Humidifier tips  Humidifiers can be adjusted to increase or decrease the amount of moisture according to your comfort level. You may need to  adjust this frequently at first, but then might only change it with seasonal weather changes.     Try INCREASING the humidity if:  You experience a dry, irritated nasal passage or throat.  You have a runny, drippy nose or sneezing fits after using CPAP.  You experience nasal congestion during or after CPAP use.    Try DECREASING the humidity if:  You have excessive condensation or  rain out  in the tubing or mask.  Otherwise keep the tubing warm during the night by running it underneath the blankets or pillow.      Clinic visit after initial PAP set-up   Bring your equipment with you to your 5-8 week follow up clinic visit.  We will be extracting your data from the machine if not available from the cloud based [email or web address].        Travel  Always take your equipment with you when you travel.  If you fly with your equipment bring it on with you as a carry on.  Medical equipment does not count as a carry on.    If you travel international the machines take 110-240v.  The only adapter needed is the adapter that will fit into the receptacle (outlet).    You may also want to bring an extension cord as many hotel rooms have limited outlets at the bedside.  Do not travel with water in your humidifier chamber.     Cleaning and Maintenance Guidelines    Equipment Frequency Cleaning Method   Mask First Day    Daily      Weekly Soak mask in hot soapy water for 30 minutes, rinse and air dry.  Wipe nasal cushion with a hot soapy (Ivory, baby shampoo) cloth and rinse.  Baby wipes may also be used.  Do not use anti-bacterial soaps,Reina  liquid soap, rubbing alcohol, bleach or ammonia.  Wash frame in hot soapy water (Ivory, baby shampoo) rinse and let air dry   Headgear Biweekly Wash in hot soapy water, rinse and air dry   Reusable Gray Filter Weekly Wash in hot soapy water, rinse, put in towel squeeze moisture out, let air dry   Disposable White Filter Check Weekly Replace when brown or gray in color; at least every 2 to 3 months    Humidifier Chamber Daily    Weekly Empty distilled water from humidifier and let air dry    Hand wash in hot soapy water, rinse and air dry   Tubing Weekly Wash in hot soapy water, rinse and let air dry   Mask, Tubing and Humidifier Chamber As needed Disinfect: Soak in 1 part distilled white vinegar to 3 parts hot water for 30 minutes, rinse well and air dry  Not the material headgear        MASK AND SUPPLY REORDERING and EQUIPMENT NEEDS through your DME and per your insurance  Reminder: Most insurance companies will allow for a new mask, headgear, tubing, and reusable gray filter every six months.  Disposable white ultra-fine filters are covered monthly.      HOME AND SAFETY INSTRUCTIONS  Do not use frayed or cracked electrical cords, multi plug adaptors, or switched receptacles  Do not immerse electrical equipment into water  Assure that electrical cords do not become a tripping hazard     Your BMI is Body mass index is 47.3 kg/m .    What is BMI?  Body mass index (BMI) is one way to tell whether you are at a healthy weight, overweight, or obese. It measures your weight in relation to your height.  A BMI of 18.5 to 24.9 is in the healthy range. A person with a BMI of 25 to 29.9 is considered overweight, and someone with a BMI of 30 or greater is considered obese.  Another way to find out if you are at risk for health problems caused by overweight and obesity is to measure your waist. If you are a woman and your waist is more than 35 inches, or if you are a man and your waist is more than 40 inches, your risk of disease may be higher.  More than two-thirds of American adults are considered overweight or obese. Being overweight or obese increases the risk for further weight gain.  Excess weight may lead to heart disease and diabetes. Creating and following plans for healthy eating and physical activity may help you improve your health.    Methods for maintaining or losing weight.  Weight control is part of healthy  lifestyle and includes exercise, emotional health, and healthy eating habits.  Careful eating habits lifelong is the mainstay of weight control.  Though there are significant health benefits from weight loss, long-term weight loss with diet alone may be very difficult to achieve- studies show long-term success with dietary management in less than 10% of people. Attaining a healthy weight may be especially difficult to achieve in those with severe obesity. In some cases, medications, devices and surgical management might be considered.    What can you do?  If you are overweight or obese and are interested in methods for weight loss, you should discuss this with your provider. In addition, we recommend that you review healthy life styles and methods for weight loss available through the National Institutes of Health patient information sites:   http://win.niddk.nih.gov/publications/index.htm      Your blood pressure was checked while you were in clinic today.  Please read the guidelines below about what these numbers mean and what you should do about them.  Your systolic blood pressure is the top number.  This is the pressure when the heart is pumping.  Your diastolic blood pressure is the bottom number.  This is the pressure in between beats.  If your systolic blood pressure is less than 120 and your diastolic blood pressure is less than 80, then your blood pressure is normal. There is nothing more that you need to do about it  If your systolic blood pressure is 120-139 or your diastolic blood pressure is 80-89, your blood pressure may be higher than it should be.  You should have your blood pressure re-checked within a year by a primary care provider.  If your systolic blood pressure is 140 or greater or your diastolic blood pressure is 90 or greater, you may have high blood pressure.  High blood pressure is treatable, but if left untreated over time it can put you at risk for heart attack, stroke, or kidney  failure.  You should have your blood pressure re-checked by a primary care provider within the next four weeks.

## 2024-04-19 NOTE — PROGRESS NOTES
Chief complaint: Follow-up obstructive sleep apnea    History of Present Illness: 58-year-old female with history of depression and bipolar, hypertension, obesity, mild sleep apnea.  She reports that things have been going well with her CPAP.  She wonders about leak issues but she is not really experiencing much.  She continues to wake up once or twice a night to go to the bathroom.  However she has been having difficulty returning to sleep now.  She admits to some family stressors at home which are contributing.  She keeps a routine sleep schedule going to bed around 10 to 10:30 PM.  Rise time around 6:15-6:30 AM.  She does not have a lot of difficulty initiating sleep it is more falling back asleep.  She did do PT for pelvic floor issues a while back but has not been doing them lately.  She follows with psychiatry and was given hydroxyzine.  She has been taking that at bedtime and that has been somewhat helpful.    She is also looking into weight loss strategies again.  She is possibly going to be starting semaglutide.  She is also looking to get into bariatric surgery program.     Patient is not drinking much alcohol maybe once or twice a month will have alcoholic beverage.  Blood pressure has been in good control with lisinopril.  She thinks it was elevated today due to rushing to get to the appointment.    Clifton Sleepiness Scale  Total score - Clifton: 0 (4/12/2024  1:20 PM)   (Less than 10 normal)    Insomnia Severity Scale  MICHAEL Total Score: 11  (normal 0-7, mild 8-14, moderate 15-21, severe 22-28)    Past Medical History:   Diagnosis Date    Depressive disorder     Previous depressive episodes. Diagnosed with Bipolar illness in 11/18 after manic episode.    Disorders of bursae and tendons in shoulder region, unspecified     Female infertility of unspecified origin     Gallbladder sludge 05/2013    Hypertension 10/18    Mild sleep apnea 05/13/2014    Obesity, unspecified     Oral aphthae     Other acne      Other specified hypoglycemia     PONV (postoperative nausea and vomiting)        Allergies   Allergen Reactions    Seroquel [Quetiapine] Nausea and Vomiting     Had constant vomiting    Olanzapine      Tongue welling, bad EPSE (Parkinsonian)    Penicillins Rash    Risperidone      Tongue welling, bad EPSE (Parkinsonian)      Thimerosal Other (See Comments)     blisters on inside of eyelid       Current Outpatient Medications   Medication Sig Dispense Refill    acetaminophen (TYLENOL) 650 MG CR tablet Take 1 tablet (650 mg) by mouth every 8 hours as needed for mild pain or fever      Carboxymethylcellulose Sod PF (REFRESH PLUS) 0.5 % SOLN ophthalmic solution Place 1 drop into both eyes 3 times daily as needed for dry eyes      clotrimazole-betamethasone (LOTRISONE) 1-0.05 % external cream Apply topically 2 times daily 45 g 2    estradiol (ESTRACE) 0.5 MG tablet Take 1 tablet (0.5 mg) by mouth daily 90 tablet 3    estradiol (VAGIFEM) 10 MCG TABS vaginal tablet Place 1 tablet (10 mcg) vaginally twice a week 26 tablet 3    fluticasone (FLONASE) 50 MCG/ACT nasal spray Spray 1 spray into both nostrils daily      hydrOXYzine HCl (ATARAX) 10 MG tablet Take 1 tablet (10 mg) by mouth nightly as needed (sleep) 30 tablet 0    lamoTRIgine (LAMICTAL) 150 MG tablet Take 1 tablet (150 mg) by mouth daily Take with one tablet of 200 for a total of 350 mg daily. 30 tablet 2    lamoTRIgine (LAMICTAL) 200 MG tablet Take 1 tablet (200 mg) by mouth daily Take with one tablet of 150 for a total of 350 mg daily. 30 tablet 2    lisinopril (ZESTRIL) 10 MG tablet TAKE ONE TABLET BY MOUTH ONCE DAILY 90 tablet 3    lithium (ESKALITH) 150 MG capsule Take 1 capsule (150 mg) by mouth at bedtime 30 capsule 2    lithium 300 MG capsule Take 3 tablet (900 mg) and one tablet (150 mg) for a total of 1050 mg at bedtime. 270 capsule 2    Loratadine 10 MG capsule Take 10 mg by mouth daily      medroxyPROGESTERone (PROVERA) 2.5 MG tablet Take 1 tablet (2.5  mg) by mouth daily 90 tablet 3    metroNIDAZOLE (METROGEL) 1 % external gel Apply topically daily 60 g 9    ORDER FOR DME Use your CPAP device as directed by your provider.      Semaglutide-Weight Management (WEGOVY) 0.25 MG/0.5ML pen Inject 0.25 mg Subcutaneous once a week 2 mL 0    Semaglutide-Weight Management (WEGOVY) 0.5 MG/0.5ML pen Inject 0.5 mg Subcutaneous once a week 2 mL 1    VITAMIN D, CHOLECALCIFEROL, PO Take 2,000 Units by mouth daily       No current facility-administered medications for this visit.       Social History     Socioeconomic History    Marital status:      Spouse name: Danielle Zamarripa    Number of children: 1    Years of education: Masters    Highest education level: Not on file   Occupational History    Occupation: Psychotherapist     Employer: [employer]   Tobacco Use    Smoking status: Never    Smokeless tobacco: Never    Tobacco comments:     non smoke home   Vaping Use    Vaping status: Never Used   Substance and Sexual Activity    Alcohol use: Yes     Comment: 2 Drinks Per Month    Drug use: No    Sexual activity: Yes     Partners: Female     Birth control/protection: None   Other Topics Concern    Parent/sibling w/ CABG, MI or angioplasty before 65F 55M? No   Social History Narrative    8/18/09    Balanced Diet - Yes    Osteoporosis Prevention Measures - Dairy servings per day: 2 and Medication/Supplements (See current meds)    Regular Exercise -  Yes Describe Walking, swimming, biking.     Dental Exam - YES - Date: 5/2009    Eye Exam - YES - Date: 8/2009    Self Breast Exam - No    Abuse: Current or Past (Physical, Sexual or Emotional)- No    Do you feel safe in your environment - Yes    Guns stored in the home - No    Sunscreen used - Yes    Seatbelts used - Yes    Lipids -  YES - Date: 8/10/09    Glucose -  YES - Date: 8/10/09    Colon Cancer Screening - No    Hemoccults - NO    Pap Test -  YES - Date: 7/6/06, no Hx of abnormal paps.    Do you  have any concerns about STD's -  No    Mammography - YES - Date: 8/5/09    DEXA - NO    Immunizations reviewed and up to date - Yes, Tdap given 1/22/08    Amara Hall MA                         Social Determinants of Health     Financial Resource Strain: Not on file   Food Insecurity: Not on file   Transportation Needs: Not on file   Physical Activity: Not on file   Stress: Not on file   Social Connections: Not on file   Interpersonal Safety: Low Risk  (3/6/2024)    Interpersonal Safety     Do you feel physically and emotionally safe where you currently live?: Yes     Within the past 12 months, have you been hit, slapped, kicked or otherwise physically hurt by someone?: No     Within the past 12 months, have you been humiliated or emotionally abused in other ways by your partner or ex-partner?: No   Housing Stability: Not on file       Family History   Problem Relation Age of Onset    Cardiovascular Paternal Grandfather         anyerism    Obesity Paternal Grandfather     Respiratory Paternal Grandfather         pulmonary (air sacs hardening)    Allergies Father         hayfever    Lipids Father         diet therapy    Gallbladder Disease Father         cholecystectomy     Eye Disorder Father         retinal tear x 2     Prostate Cancer Father         Treated at age 77    Alzheimer Disease Maternal Grandmother         ? poor STM    Arthritis Maternal Grandmother     Heart Disease Maternal Grandmother         Arrythmia-got a pacemaker at age 89    Arthritis Mother     Eye Disorder Mother         Cataract/macular tear in one eye    LUNG DISEASE Mother         bronchiectasis from pneumonia    Atrial fibrillation Mother     Cancer Paternal Grandmother         Uterine CA- as well as her sister    Gynecology Paternal Grandmother         prolapsed uterus- had 10 kids    Obesity Paternal Grandmother     Breast Cancer Maternal Aunt     Other Cancer Paternal Aunt 80        endo. cancer            EXAM:  BP (!) 144/78    "Pulse 64   Resp 18   Ht 1.702 m (5' 7\")   Wt 137 kg (302 lb)   SpO2 100%   BMI 47.30 kg/m    GENERAL: Alert and no distress  EYES: Eyes grossly normal to inspection.  No discharge or erythema, or obvious scleral/conjunctival abnormalities.  RESP: No audible wheeze, cough, or visible cyanosis.    SKIN: Visible skin clear. No significant rash, abnormal pigmentation or lesions.  NEURO: Cranial nerves grossly intact.  Mentation and speech appropriate for age.  PSYCH: Appropriate affect, tone, and pace of words       PSG 3/2/2011 [facility]  Weight 241 lbs BMI 36.1  AHI 5.1, Supine AHI 14.5, non supine AHI 1  RDI 52.7  Lowest oxygen saturation 91%    RespirProxima Cancion DreamStation 2 auto PAP download from 3/17/2024 to 4/15/2024 reviewed:  Per cent of days used greater than 4 Hours 100% (minimum goal greater than 70%)  Average use on days used: 7 hours 31 min  Settings: Min EPAP 9 cmH2O    Max EPAP 12 cmH2O  Pressures delivered 90/95th percentile for pressure 12 cmH2O  Average AHI 1.3 events per hour (goal less than 5)  Leak acceptable    TSH   Date Value Ref Range Status   02/28/2024 2.23 0.30 - 4.20 uIU/mL Final   12/07/2022 2.13 0.40 - 4.00 mU/L Final   05/05/2021 2.61 0.40 - 4.00 mU/L Final         ASSESSMENT:  58-year-old female with history of obesity, bipolar disorder, mild sleep apnea with history of daytime sleepiness resolved on sleep apnea therapy with CPAP.  She does have some nocturia and now insomnia related to stressors in the home.  Major comorbidities of hypertension.  Ongoing treatment of obstructive sleep apnea is medically indicated    PLAN:  Orders generated to keep her CPAP supplies up-to-date.  No changes to her settings recommended at this time.  Agree with further attempts at weight loss.  If she does have success over the next year she should contact me if she develops any signs of excessive pressure such as swallowing of air burping and belching excessive air in the morning.  Will then drop her min " pressure from 9 downwards..  Recheck blood pressure before leaving clinic today.  Follow-up with primary care if remains elevated.  We also discussed the importance of resuming her home program physical therapy exercises and avoiding liquids after 8 PM to minimize nocturia.  Okay to continue with hydroxyzine.  Consider sleeping in a separate bedroom if that helps her insomnia.  Follow-up in 1 year or earlier as needed.    38 minutes spent by me on the date of the encounter doing chart review, history and exam, documentation and further activities per the note    eNllie Calloway M.D.  Pulmonary/Critical Care/Sleep Medicine    [facility]   Floor 1, [address]   [address] [place] [address]. [place] [address]   Appointments: [phone]    The above note was dictated using voice recognition software and may include typographical errors. Please contact the author for any clarifications.

## 2024-04-29 ENCOUNTER — OFFICE VISIT (OUTPATIENT)
Dept: PSYCHIATRY | Facility: CLINIC | Age: 59
End: 2024-04-29
Attending: PSYCHIATRY & NEUROLOGY
Payer: COMMERCIAL

## 2024-04-29 VITALS
DIASTOLIC BLOOD PRESSURE: 82 MMHG | WEIGHT: 293 LBS | HEART RATE: 64 BPM | SYSTOLIC BLOOD PRESSURE: 148 MMHG | BODY MASS INDEX: 48.24 KG/M2

## 2024-04-29 DIAGNOSIS — F31.9 BIPOLAR I DISORDER (H): ICD-10-CM

## 2024-04-29 DIAGNOSIS — G47.00 INSOMNIA, UNSPECIFIED TYPE: ICD-10-CM

## 2024-04-29 PROCEDURE — G2211 COMPLEX E/M VISIT ADD ON: HCPCS | Performed by: STUDENT IN AN ORGANIZED HEALTH CARE EDUCATION/TRAINING PROGRAM

## 2024-04-29 PROCEDURE — 99214 OFFICE O/P EST MOD 30 MIN: CPT | Mod: GC | Performed by: STUDENT IN AN ORGANIZED HEALTH CARE EDUCATION/TRAINING PROGRAM

## 2024-04-29 PROCEDURE — 99214 OFFICE O/P EST MOD 30 MIN: CPT | Performed by: STUDENT IN AN ORGANIZED HEALTH CARE EDUCATION/TRAINING PROGRAM

## 2024-04-29 RX ORDER — LITHIUM CARBONATE 150 MG/1
150 CAPSULE ORAL AT BEDTIME
Qty: 30 CAPSULE | Refills: 2 | Status: SHIPPED | OUTPATIENT
Start: 2024-04-29 | End: 2024-06-17

## 2024-04-29 RX ORDER — LAMOTRIGINE 150 MG/1
150 TABLET ORAL DAILY
Qty: 30 TABLET | Refills: 2 | Status: SHIPPED | OUTPATIENT
Start: 2024-04-29 | End: 2024-08-26

## 2024-04-29 RX ORDER — LITHIUM CARBONATE 300 MG/1
CAPSULE ORAL
Qty: 270 CAPSULE | Refills: 2 | Status: SHIPPED | OUTPATIENT
Start: 2024-04-29 | End: 2024-06-17

## 2024-04-29 RX ORDER — HYDROXYZINE HYDROCHLORIDE 10 MG/1
10 TABLET, FILM COATED ORAL
Qty: 30 TABLET | Refills: 1 | Status: SHIPPED | OUTPATIENT
Start: 2024-04-29 | End: 2024-06-17

## 2024-04-29 RX ORDER — LAMOTRIGINE 200 MG/1
200 TABLET ORAL DAILY
Qty: 30 TABLET | Refills: 2 | Status: SHIPPED | OUTPATIENT
Start: 2024-04-29 | End: 2024-06-17

## 2024-04-29 ASSESSMENT — PAIN SCALES - GENERAL: PAINLEVEL: NO PAIN (0)

## 2024-04-29 NOTE — PROGRESS NOTES
[facility]  MEDICAL PROGRESS NOTE     CARE TEAM:    PCP- Mikey Cortés  Therapist- couple therapy    Angy is a 58 year old who uses the pronouns she, her, hers.      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 There is some tension in her marriage now, and she is working on it. Recently that has affected her sleep to the point she needed some medication to sleep. She was prescribed hydroxyzine 10 mg PRN at bedtime. It is very helpful, and she takes it every night. She reported dry mouth. I encouraged her to stay hydrated especially because she is also on lithium.     We also offered her the option to increase Lamictal at bedtime, but she prefers her regimen to stay as it is now. She is concerned about tremor. In the past, she was on a higher dose of lithium, 1200 mg, but they had to  come down because of tremor. I encourage her to take hydroxyzine as needed and stay hydrated. She recently started on Wegovy for weight loss - no interaction with lithium or Lamictal.    No safety concerns. Anxiety back to baseline. No report of anxiety.   Follow up appointment in 7 to 8 weeks    Psychotropic Drug Interactions:  [PSYCHCLINICDDI]  Estrogen Derivatives may decrease the serum concentration of LamoTRIgine  Angiotensin-Converting Enzyme Inhibitors may increase the serum concentration of Lithium.   LamoTRIgine may decrease the serum concentration of Progestins.      Management: routine monitoring    [place]MP was not checked today: not using controlled substances    Risk Statements:   Treatment Risk- Risks, benefits, alternatives and potential adverse effects have been discussed and are understood.   Safety Risk-Angy did not appear to be an imminent  "safety risk to self or others.     Plan     1) Medications:  no medication changes  - Continue Lithium 1050 mg at bedtime  - Continue Lamictal 350 mg daily at bedtime   - continue with hydroxyzine 10 mg PRN at bedtime     2) Psychotherapy: Continue with therapy    3) Next due:  Labs- 08/2024 for lithium monitoring panel  EKG- . Last one was on 03/2024 with   Rating scales-  PHQ9    4) Referrals: none  N/A    5) Other: none    6) Follow-up: Return to clinic in 6 yo 8 weeks     Pertinent Background                                                   [most recent eval 08/31/23]     Angy reported that she didn't have symptoms until November 2018. At which point she was hospitalized for anthony.     This section was copied forward from 08/2021 transfer notes.  \"Patient has not had significant mental health history until 2018, when she developed perimenopausal mood and anxiety symptoms.  She was subsequently referred to our clinic following a hospitalization for anthony in November 2018.  Her care has been complicated by sensitivity to antipsychotics (EPSE including difficulty swallowing), thrombocytopenia secondary to Depakote, lithium toxicity in setting of concurrent Depakote use\"     Pertinent items include: anthony , severe med reaction [described as H/o Thrombocytopenia secondary to Depakote, and H/o lithium toxicity], and psych hosp      Subjective     Said that she dealt with high level of anxiety due to marital stress  Now, she is back to a \"good place\".  High worried, and high anxiety due to financial issue.   Reported that wife is changing job, and need economic support.  She would have preferred her to say longer so the transition could ease, but wife sent her two weeks notice  She was concerned because wife will be out of work, and she would have to use more of her funds.  She couldn't seep, so she reached out to us.    Dr. Bell who was covering me proposed her low dose Seroquel or hydroxyzine.  She " "preferred to take hydroxyzine due to history of abnormal movement and vomiting with antipsychotic  She used it every night. She reports dry mouth from it.     She said that her mind was busy, and she was feeling distressed  Said that hydroxyzine helps  She didn't want to go into a manic episode.   In the past, when she was manic she couldn't stop talking. She had high level of distress, and didn't feel safe in her relationship  She is back to her baseline anxiety now.    Appetite: low, lost a few lbs.     Currently denied manic symptoms including impulsivity or racing thought or grandiosity.   Chronic disease (HTA, DM, or Pain): HTA, HRT tapering down  Medication side effects:  dry mouth  SI/HI: none  Alcohol use: one drink every couple of weeks.  Smoking: none  Other substances (cannabis): none      Mental Status Exam     Alertness: alert  and oriented  Appearance: adequately groomed  Behavior/Demeanor: cooperative and pleasant, with good  eye contact   Speech: normal and regular rate and rhythm  Language: intact  Psychomotor: normal or unremarkable  Mood:  \"better\"  Affect: full range and appropriate; congruent to: mood- yes, content- yes  Thought Process/Associations: unremarkable  Thought Content:  Reports none;  Denies suicidal & violent ideation and delusions  Perception:  Reports none;  Denies auditory hallucinations and visual hallucinations  Insight: good  Judgment: good  Cognition: does  appear grossly intact; formal cognitive testing was not done  Gait and Station: unremarkable     Past Psych Med Trials      Medication Max Dose (mg) Dates / Duration Helpful? DC Reason / Adverse Effects?   Depakote    Tremors   sertraline       olanzapine    EPSE   Seroquel    EPSE   lithium 1200   Tremor at 1200 mg had to lower the dose   Wellbutrin 300 15 years ago               Treatment Course and Resendiz Events since  JULY 2023 08/2023 : no medication changes  11/2023 no medication changes  01/2023 no medication " changes  03/2024 no medication changes  04/2024 no medication changes     Vitals   BP (!) 148/82   Pulse 64   Wt 139.7 kg (308 lb)   BMI 48.24 kg/m    Pulse Readings from Last 3 Encounters:   04/29/24 64   04/19/24 64   03/06/24 70     Wt Readings from Last 3 Encounters:   04/29/24 139.7 kg (308 lb)   04/19/24 137 kg (302 lb)   04/18/24 136.2 kg (300 lb 3.2 oz)     BP Readings from Last 3 Encounters:   04/29/24 (!) 148/82   04/19/24 139/68   03/06/24 130/66      Medical History     ALLERGIES: Seroquel [quetiapine], Olanzapine, Penicillins, Risperidone, and Thimerosal    Patient Active Problem List   Diagnosis    Oral aphthae    CARDIOVASCULAR SCREENING; LDL GOAL LESS THAN 160    Rosacea    Raynaud disease    Vitamin D deficiency disease    MARLA (obstructive sleep apnea)    Atopic rhinitis    Chronic bilateral low back pain without sciatica    Obesity, Class III, BMI 40-49.9 (morbid obesity) (H)    Muscle tension dysphonia    Palpitations    Dysphagia, unspecified type    Anxiety    Benign essential hypertension    Callus of foot    Varicose veins of right lower extremity with edema    Bipolar I disorder (H)    Dribbling of urine    Spinal stenosis at L4-L5 level    Facet arthropathy, lumbar        Medications     Current Outpatient Medications   Medication Sig Dispense Refill    acetaminophen (TYLENOL) 650 MG CR tablet Take 1 tablet (650 mg) by mouth every 8 hours as needed for mild pain or fever      Carboxymethylcellulose Sod PF (REFRESH PLUS) 0.5 % SOLN ophthalmic solution Place 1 drop into both eyes 3 times daily as needed for dry eyes      clotrimazole-betamethasone (LOTRISONE) 1-0.05 % external cream Apply topically 2 times daily 45 g 2    estradiol (ESTRACE) 0.5 MG tablet Take 1 tablet (0.5 mg) by mouth daily 90 tablet 3    estradiol (VAGIFEM) 10 MCG TABS vaginal tablet Place 1 tablet (10 mcg) vaginally twice a week 26 tablet 3    fluticasone (FLONASE) 50 MCG/ACT nasal spray Spray 1 spray into both nostrils  daily      hydrOXYzine HCl (ATARAX) 10 MG tablet Take 1 tablet (10 mg) by mouth nightly as needed (sleep) 30 tablet 0    lamoTRIgine (LAMICTAL) 150 MG tablet Take 1 tablet (150 mg) by mouth daily Take with one tablet of 200 for a total of 350 mg daily. 30 tablet 2    lamoTRIgine (LAMICTAL) 200 MG tablet Take 1 tablet (200 mg) by mouth daily Take with one tablet of 150 for a total of 350 mg daily. 30 tablet 2    lisinopril (ZESTRIL) 10 MG tablet TAKE ONE TABLET BY MOUTH ONCE DAILY 90 tablet 3    lithium (ESKALITH) 150 MG capsule Take 1 capsule (150 mg) by mouth at bedtime 30 capsule 2    lithium 300 MG capsule Take 3 tablet (900 mg) and one tablet (150 mg) for a total of 1050 mg at bedtime. 270 capsule 2    Loratadine 10 MG capsule Take 10 mg by mouth daily      medroxyPROGESTERone (PROVERA) 2.5 MG tablet Take 1 tablet (2.5 mg) by mouth daily 90 tablet 3    metroNIDAZOLE (METROGEL) 1 % external gel Apply topically daily 60 g 9    ORDER FOR DME Use your CPAP device as directed by your provider.      Semaglutide-Weight Management (WEGOVY) 0.25 MG/0.5ML pen Inject 0.25 mg Subcutaneous once a week 2 mL 0    Semaglutide-Weight Management (WEGOVY) 0.5 MG/0.5ML pen Inject 0.5 mg Subcutaneous once a week 2 mL 1    VITAMIN D, CHOLECALCIFEROL, PO Take 2,000 Units by mouth daily          Labs and Data         8/28/2023     6:28 PM 1/3/2024    11:58 AM 4/25/2024     9:09 PM   PROMIS-10 Total Score w/o Sub Scores   PROMIS TOTAL - SUBSCORES 30 28 30          No data to display                  1/3/2024    11:57 AM 3/4/2024     2:58 PM 4/28/2024     3:44 PM   PHQ-9 SCORE   PHQ-9 Total Score MyChart 6 (Mild depression) 5 (Mild depression) 6 (Mild depression)   PHQ-9 Total Score 6 5 6         3/10/2023    11:46 AM 8/28/2023     6:27 PM 10/31/2023     5:25 PM   JOANNA-7 SCORE   Total Score 9 (mild anxiety) 9 (mild anxiety) 11 (moderate anxiety)   Total Score 9 9 11       Liver/Kidney Function, TSH Metabolic Blood counts   Recent Labs    Lab Test 02/28/24  0845 05/15/23  1017   AST 21 17   ALT 15 16   ALKPHOS 91 80   CR 0.63 0.58     Recent Labs   Lab Test 02/28/24  0845   TSH 2.23    Recent Labs   Lab Test 02/28/24  0845   CHOL 215*   TRIG 170*   *   HDL 61     Recent Labs   Lab Test 10/04/22  1651   A1C 5.3     Recent Labs   Lab Test 02/28/24  0845   GLC 97    Recent Labs   Lab Test 02/28/24  0845   WBC 6.6   HGB 14.0   HCT 44.9   MCV 90            Recent Labs   Lab Test 11/08/23  1016 05/15/23  1017 12/07/22  1111   LITHIUM 0.69 0.7 0.6        PROVIDER: Antonio Santillan MD MPH    Level of Medical Decision Making:   - At least 1 chronic problem that is not stable  - Engaged in prescription drug management during visit (discussed any medication benefits, side effects, alternatives, etc.)         Patient was staffed in clinic with Dr. SERGIO Liu.  Supervisor is Dr. Vivian Burnett.      The longitudinal plan of care for Bipolar disorder was addressed during this visit. Due to the added complexity in care, I will continue to support Angy in the subsequent management of this condition(s) and with the ongoing continuity of care of this condition(s)    Answers submitted by the patient for this visit:  Patient Health Questionnaire (Submitted on 4/28/2024)  If you checked off any problems, how difficult have these problems made it for you to do your work, take care of things at home, or get along with other people?: Somewhat difficult  PHQ9 TOTAL SCORE: 6

## 2024-04-29 NOTE — PATIENT INSTRUCTIONS
**For crisis resources, please see the information at the end of this document**   Patient Education    Thank you for coming to the [facility] [place] [facility].     Lab Testing:  If you had lab testing today and your results are reassuring or normal they will be mailed to you or sent through [employer] within 7 days. If the lab tests need quick action we will call you with the results. The phone number we will call with results is # [phone]. If this is not the best number please call our clinic and change the number.     Medication Refills:  If you need any refills please call your pharmacy and they will contact us. Our fax number for refills is [phone].   Three business days of notice are needed for general medication refill requests.   Five business days of notice are needed for controlled substance refill requests.   If you need to change to a different pharmacy, please contact the new pharmacy directly. The new pharmacy will help you get your medications transferred.     Contact Us:  Please call [phone] during business hours (8-5:00 M-F).   If you have medication related questions after clinic hours, or on the weekend, please call [phone].     Financial Assistance [phone]   Medical Records [phone]       MENTAL HEALTH CRISIS RESOURCES:  For a emergency help, please call 911 or go to the nearest Emergency Department.     Emergency Walk-In Options:   EmPATH Unit @ [facility] ([place]): [phone] - Specialized mental health emergency area designed to be calming  [facility] ([place]): [phone]  [facility] ([place]): [phone]  [facility]): [phone]    [facility] Crisis Information:   [place]: [phone]  [place]: [phone]  [place] ([place]) - Adult: [phone]     Child: [phone]  [place] - Adult: [phone]     Child: [phone]  [place]:  [phone]  List of all [facility] resources:   https://[place].gov/dhs/people-we-serve/adults/health-care/mental-health/resources/crisis-contacts.jsp    National Crisis Information:   Crisis Text Line: Text  [place]  to 676960  Suicide & Crisis Lifeline: 988  National Suicide Prevention Lifeline: 5-479-963-TALK (1-700.137.6064)       For online chat options, visit https://suicidepreventionlifeline.org/chat/  Poison Control Center: 5-570-065-2009  Trans Lifeline: 5-807-167-9632 - Hotline for transgender people of all ages  The [place]: 7-317-714-5050 - Hotline for LGBT youth     For Non-Emergency Support:   Fast Tracker: Mental Health & Substance Use Disorder Resources -   https://www.[facility] - 3Pckdotloopn.org/

## 2024-05-01 ENCOUNTER — PATIENT OUTREACH (OUTPATIENT)
Dept: CARE COORDINATION | Facility: CLINIC | Age: 59
End: 2024-05-01
Payer: COMMERCIAL

## 2024-05-06 ENCOUNTER — TELEPHONE (OUTPATIENT)
Dept: ORTHOPEDICS | Facility: CLINIC | Age: 59
End: 2024-05-06
Payer: COMMERCIAL

## 2024-05-06 NOTE — TELEPHONE ENCOUNTER
Patient is asking for a call from Dr. Valadez's team regarding intense left knee pain, and wanting to discuss her symptoms.     Please call patient at [phone], any time, okay to leave detailed msg.

## 2024-05-10 ENCOUNTER — OFFICE VISIT (OUTPATIENT)
Dept: ORTHOPEDICS | Facility: CLINIC | Age: 59
End: 2024-05-10
Payer: COMMERCIAL

## 2024-05-10 DIAGNOSIS — E66.813 CLASS 3 SEVERE OBESITY DUE TO EXCESS CALORIES WITHOUT SERIOUS COMORBIDITY IN ADULT, UNSPECIFIED BMI (H): ICD-10-CM

## 2024-05-10 DIAGNOSIS — M17.11 PRIMARY OSTEOARTHRITIS OF RIGHT KNEE: Primary | ICD-10-CM

## 2024-05-10 DIAGNOSIS — E66.01 CLASS 3 SEVERE OBESITY DUE TO EXCESS CALORIES WITHOUT SERIOUS COMORBIDITY IN ADULT, UNSPECIFIED BMI (H): ICD-10-CM

## 2024-05-10 PROCEDURE — 99213 OFFICE O/P EST LOW 20 MIN: CPT | Mod: 25 | Performed by: FAMILY MEDICINE

## 2024-05-10 PROCEDURE — 20610 DRAIN/INJ JOINT/BURSA W/O US: CPT | Mod: RT | Performed by: FAMILY MEDICINE

## 2024-05-10 RX ORDER — LIDOCAINE HYDROCHLORIDE 10 MG/ML
4 INJECTION, SOLUTION EPIDURAL; INFILTRATION; INTRACAUDAL; PERINEURAL
Status: SHIPPED | OUTPATIENT
Start: 2024-05-10

## 2024-05-10 RX ORDER — TRIAMCINOLONE ACETONIDE 40 MG/ML
40 INJECTION, SUSPENSION INTRA-ARTICULAR; INTRAMUSCULAR
Status: SHIPPED | OUTPATIENT
Start: 2024-05-10

## 2024-05-10 RX ADMIN — LIDOCAINE HYDROCHLORIDE 4 ML: 10 INJECTION, SOLUTION EPIDURAL; INFILTRATION; INTRACAUDAL; PERINEURAL at 09:04

## 2024-05-10 RX ADMIN — TRIAMCINOLONE ACETONIDE 40 MG: 40 INJECTION, SUSPENSION INTRA-ARTICULAR; INTRAMUSCULAR at 09:04

## 2024-05-10 NOTE — PATIENT INSTRUCTIONS
[facility] [place]  [address], [place] [address]  Phone: [phone]

## 2024-05-10 NOTE — NURSING NOTE
[address] [address]  Dept: [phone]  ______________________________________________________________________________    Patient: Angy Haji   : 1965   MRN: [id number]   May 10, 2024    INVASIVE PROCEDURE SAFETY CHECKLIST    Date: May 10, 2024   Procedure: Right knee CSI   Patient Name: Angy Haji  MRN: [id number]  YOB: 1965    Action: Complete sections as appropriate. Any discrepancy results in a HARD COPY until resolved.     PRE PROCEDURE:  Patient ID verified with 2 identifiers (name and  or MRN): Yes  Procedure and site verified with patient/designee (when able): Yes  Accurate consent documentation in medical record: Yes  H&P (or appropriate assessment) documented in medical record: Yes  H&P must be up to 20 days prior to procedure and updates within 24 hours of procedure as applicable: NA  Relevant diagnostic and radiology test results appropriately labeled and displayed as applicable: Yes  Procedure site(s) marked with provider initials: NA    TIMEOUT:  Time-Out performed immediately prior to starting procedure, including verbal and active participation of all team members addressing the following:Yes  * Correct patient identify  * Confirmed that the correct side and site are marked  * An accurate procedure consent form  * Agreement on the procedure to be done  * Correct patient position  * Relevant images and results are properly labeled and appropriately displayed  * The need to administer antibiotics or fluids for irrigation purposes during the procedure as applicable   * Safety precautions based on patient history or medication use    DURING PROCEDURE: Verification of correct person, site, and procedures any time the responsibility for care of the patient is transferred to another member of the care team.       Prior to injection, verified patient identity using patient's name and date of birth.  Due to  injection administration, patient instructed to remain in clinic for 15 minutes  afterwards, and to report any adverse reaction to me immediately.    Joint injection was performed.      Drug Amount Wasted:  Yes: 1 mg/ml  lidocaine   Vial/Syringe: Single dose vial  Expiration Date:  01/28      Linda [place], [facility]  May 10, 2024

## 2024-05-10 NOTE — LETTER
5/10/2024      RE: Angy Haji  [address]  [facility] [address]     Dear Colleague,    Thank you for referring your patient, Angy Haji, to the [facility] [place]. Please see a copy of my visit note below.    ASSESSMENT/PLAN:    60 yo white female with PMHx of chronic bilateral low back pain, obesity presenting with right knee pain    Mild medial compartment joint loss- right knee  Pool therapy  Working on weight loss  Motivated to go to [place] and tour area while there  7 minute work out, add strength training program  Bike on  in basement  Add variety of activities    RTC 6 months    Pt is a 59 year old female here today for:     Right knee pain    ESTABLISHED PATIENT FOLLOW-UP:  Pain of the Right Knee       HISTORY OF PRESENT ILLNESS  Ms. Haji is a pleasant 59 year old year old female who presents to clinic today for follow-up of right knee pain.  Daughter decided to do school in [place].  Pt to fly there to help her move in and hiking planned.  In [place] and knee worse with upstairs and up hills  Long drives and hard to move  Mostly posterior knee pain, some on the front.  No falls.  Practicing on the treadmill, maybe over did it  Doesn't have gym membership, has a bike   Concerns about getting leg over, road bike on     Date of injury: Chronic   Date last seen: 2/22/23  Following Therapeutic Plan: Right knee CSI and PT  Pain: 7/10   Function: Pain and stiffness with sitting for long periods of time   Interval History: Worsening in the last 6-8 weeks; due to increased exercise    Additional medical/Social/Surgical histories reviewed in EPIC and updated as appropriate.    REVIEW OF SYSTEMS (5/10/2024)  CONSTITUTIONAL: Denies fever and weight loss  GASTROINTESTINAL: Denies abdominal pain, nausea, vomiting  MUSCULOSKELETAL: See HPI  SKIN: Denies any recent rash or lesion  NEUROLOGICAL: Denies numbness or focal weakness      EXAM:   right Knee:    ROM: 0-130; Crepitus: mild   Effusion: none ; Swelling: none   Strength: Full in flexion/ extension   Tenderness: Patella - none Medial joint line - ++; Lateral joint line - neg; Quad tendon - neg; Patellar tendon- neg; Hamstring - neg.   Cruciates: anterior drawer - neg/posterior drawer -neg. Lachman - neg   Collaterals: varus -neg/valgus -neg.   Patella: patellar compression - neg; single leg bend- not performed   Meniscus: Tom - neg; Thessaly - not performed  Maneuvers: Leslie - not performed      Past Medical History:   Diagnosis Date     Depressive disorder     Previous depressive episodes. Diagnosed with Bipolar illness in 11/18 after manic episode.     Disorders of bursae and tendons in shoulder region, unspecified      Female infertility of unspecified origin      Gallbladder sludge 05/2013     Hypertension 10/18     Mild sleep apnea 05/13/2014     Obesity, unspecified      Oral aphthae      Other acne      Other specified hypoglycemia      PONV (postoperative nausea and vomiting)       Past Surgical History:   Procedure Laterality Date     BIOPSY      age 42 breast biopsy     COLONOSCOPY  2018     COLONOSCOPY N/A 2/13/2024    Procedure: Colonoscopy;  Surgeon: Cheryl Loza MD;  Location:  GI     GYN SURGERY  2016 for ovarian cyst    left ovary, 2 fallopian tubes removed. endometrioma.     HC TOOTH EXTRACTION W/FORCEP  1984/85    [place] Teeth     IR ENDOVENOUS ABLATION VARICOSE VEINS  12/12/2019     LAPAROSCOPIC CHOLECYSTECTOMY  07/29/2013    Procedure: LAPAROSCOPIC CHOLECYSTECTOMY;  Laparoscopic Cholecystectomy;  Surgeon: Fabio Johnson MD;  Location:  OR     LAPAROSCOPIC OOPHORECTOMY Left 06/23/2016    Procedure: LAPAROSCOPIC OOPHORECTOMY;  Surgeon: Kayley Donnelly MD;  Location:  OR     LAPAROSCOPIC SALPINGECTOMY Bilateral 06/23/2016    Procedure: LAPAROSCOPIC SALPINGECTOMY;  Surgeon: Kayley Donnelly MD;  Location:  OR     VASCULAR SURGERY  12/12    varicose  vein proceedures      Current Outpatient Medications   Medication Sig Dispense Refill     acetaminophen (TYLENOL) 650 MG CR tablet Take 1 tablet (650 mg) by mouth every 8 hours as needed for mild pain or fever       Carboxymethylcellulose Sod PF (REFRESH PLUS) 0.5 % SOLN ophthalmic solution Place 1 drop into both eyes 3 times daily as needed for dry eyes       clotrimazole-betamethasone (LOTRISONE) 1-0.05 % external cream Apply topically 2 times daily 45 g 2     estradiol (ESTRACE) 0.5 MG tablet Take 1 tablet (0.5 mg) by mouth daily 90 tablet 3     estradiol (VAGIFEM) 10 MCG TABS vaginal tablet Place 1 tablet (10 mcg) vaginally twice a week 26 tablet 3     fluticasone (FLONASE) 50 MCG/ACT nasal spray Spray 1 spray into both nostrils daily       hydrOXYzine HCl (ATARAX) 10 MG tablet Take 1 tablet (10 mg) by mouth nightly as needed (sleep) 30 tablet 1     lamoTRIgine (LAMICTAL) 150 MG tablet Take 1 tablet (150 mg) by mouth daily Take with one tablet of 200 for a total of 350 mg daily. 30 tablet 2     lamoTRIgine (LAMICTAL) 200 MG tablet Take 1 tablet (200 mg) by mouth daily Take with one tablet of 150 for a total of 350 mg daily. 30 tablet 2     lisinopril (ZESTRIL) 10 MG tablet TAKE ONE TABLET BY MOUTH ONCE DAILY 90 tablet 3     lithium (ESKALITH) 150 MG capsule Take 1 capsule (150 mg) by mouth at bedtime 30 capsule 2     lithium 300 MG capsule Take 3 tablet (900 mg) and one tablet (150 mg) for a total of 1050 mg at bedtime. 270 capsule 2     Loratadine 10 MG capsule Take 10 mg by mouth daily       medroxyPROGESTERone (PROVERA) 2.5 MG tablet Take 1 tablet (2.5 mg) by mouth daily 90 tablet 3     metroNIDAZOLE (METROGEL) 1 % external gel Apply topically daily 60 g 9     ORDER FOR DME Use your CPAP device as directed by your provider.       Semaglutide-Weight Management (WEGOVY) 0.25 MG/0.5ML pen Inject 0.25 mg Subcutaneous once a week 2 mL 0     Semaglutide-Weight Management (WEGOVY) 0.5 MG/0.5ML pen Inject 0.5 mg  Subcutaneous once a week 2 mL 1     VITAMIN D, CHOLECALCIFEROL, PO Take 2,000 Units by mouth daily        Allergies   Allergen Reactions     Seroquel [Quetiapine] Nausea and Vomiting     Had constant vomiting     Olanzapine      Tongue welling, bad EPSE (Parkinsonian)     Penicillins Rash     Risperidone      Tongue welling, bad EPSE (Parkinsonian)       Thimerosal Other (See Comments)     blisters on inside of eyelid      ROS:   Gen- no fevers/chills   Rheum - no morning stiffness   Derm - no rash/ redness   Neuro - no numbness, no tingling   Remainder of ROS negative.     Exam:   There were no vitals taken for this visit.     Xray of knee on May 10, 2024 at [facility] location - films personally reviewed with patient at time of visit     My impression: mild medial compartment joint space loss bilaterally     Large Joint Injection/Arthocentesis: R knee joint    Date/Time: 5/10/2024 9:04 AM    Performed by: Danielle Valadez MD  Authorized by: Danielle Valadez MD    Indications:  Pain and osteoarthritis  Needle Size:  22 G  Guidance: landmark guided    Approach:  Anterolateral  Location:  Knee      Medications:  40 mg triamcinolone 40 MG/ML; 4 mL lidocaine (PF) 1 %  Outcome:  Tolerated well, no immediate complications  Procedure discussed: discussed risks, benefits, and alternatives    Consent Given by:  Patient  Timeout: timeout called immediately prior to procedure    Prep: patient was prepped and draped in usual sterile fashion      I agree with the following injection documentation.    ELIAN Valadez MD      Again, thank you for allowing me to participate in the care of your patient.      Sincerely,    Danielle Valadez MD

## 2024-05-10 NOTE — PROGRESS NOTES
ASSESSMENT/PLAN:    58 yo white female with PMHx of chronic bilateral low back pain, obesity presenting with right knee pain    Mild medial compartment joint loss- right knee  Pool therapy  Working on weight loss  Motivated to go to [place] and [facility] area while there  7 minute work out, add strength training program  Bike on  in basement  Add variety of activities    RTC 6 months    Pt is a 59 year old female here today for:     Right knee pain    ESTABLISHED PATIENT FOLLOW-UP:  Pain of the Right Knee       HISTORY OF PRESENT ILLNESS  Ms. Haji is a pleasant 59 year old year old female who presents to clinic today for follow-up of right knee pain.  Daughter decided to do school in [place].  Pt to fly there to help her move in and hiking planned.  In [place] and knee worse with upstairs and up hills  Long drives and hard to move  Mostly posterior knee pain, some on the front.  No falls.  Practicing on the treadmill, maybe over did it  Doesn't have gym membership, has a bike   Concerns about getting leg over, road bike on     Date of injury: Chronic   Date last seen: 2/22/23  Following Therapeutic Plan: Right knee CSI and PT  Pain: 7/10   Function: Pain and stiffness with sitting for long periods of time   Interval History: Worsening in the last 6-8 weeks; due to increased exercise    Additional medical/Social/Surgical histories reviewed in [facility] and updated as appropriate.    REVIEW OF SYSTEMS (5/10/2024)  CONSTITUTIONAL: Denies fever and weight loss  GASTROINTESTINAL: Denies abdominal pain, nausea, vomiting  MUSCULOSKELETAL: See HPI  SKIN: Denies any recent rash or lesion  NEUROLOGICAL: Denies numbness or focal weakness      EXAM:   right Knee:   ROM: 0-130; Crepitus: mild   Effusion: none ; Swelling: none   Strength: Full in flexion/ extension   Tenderness: Patella - none Medial joint line - ++; Lateral joint line - neg; Quad tendon - neg; Patellar tendon- neg; Hamstring - neg.   Cruciates:  anterior drawer - neg/posterior drawer -neg. Lachman - neg   Collaterals: varus -neg/valgus -neg.   Patella: patellar compression - neg; single leg bend- not performed   Meniscus: Tom - neg; Thessaly - not performed  Maneuvers: Leslie - not performed      Past Medical History:   Diagnosis Date    Depressive disorder     Previous depressive episodes. Diagnosed with Bipolar illness in 11/18 after manic episode.    Disorders of bursae and tendons in shoulder region, unspecified     Female infertility of unspecified origin     Gallbladder sludge 05/2013    Hypertension 10/18    Mild sleep apnea 05/13/2014    Obesity, unspecified     Oral aphthae     Other acne     Other specified hypoglycemia     PONV (postoperative nausea and vomiting)       Past Surgical History:   Procedure Laterality Date    BIOPSY      age 42 breast biopsy    COLONOSCOPY  2018    COLONOSCOPY N/A 2/13/2024    Procedure: Colonoscopy;  Surgeon: Cheryl Loza MD;  Location:  GI    GYN SURGERY  2016 for ovarian cyst    left ovary, 2 fallopian tubes removed. endometrioma.    HC TOOTH EXTRACTION W/FORCEP  1984/85    [place] Teeth    IR ENDOVENOUS ABLATION VARICOSE VEINS  12/12/2019    LAPAROSCOPIC CHOLECYSTECTOMY  07/29/2013    Procedure: LAPAROSCOPIC CHOLECYSTECTOMY;  Laparoscopic Cholecystectomy;  Surgeon: Fabio Johnson MD;  [facility]    LAPAROSCOPIC OOPHORECTOMY Left 06/23/2016    Procedure: LAPAROSCOPIC OOPHORECTOMY;  Surgeon: Kayley Donnelly MD;  [facility]    LAPAROSCOPIC SALPINGECTOMY Bilateral 06/23/2016    Procedure: LAPAROSCOPIC SALPINGECTOMY;  Surgeon: Kayley Donnelly MD;  [facility]    VASCULAR SURGERY  12/12    varicose vein proceedures      Current Outpatient Medications   Medication Sig Dispense Refill    acetaminophen (TYLENOL) 650 MG CR tablet Take 1 tablet (650 mg) by mouth every 8 hours as needed for mild pain or fever      Carboxymethylcellulose Sod PF (REFRESH PLUS) 0.5 % SOLN  ophthalmic solution Place 1 drop into both eyes 3 times daily as needed for dry eyes      clotrimazole-betamethasone (LOTRISONE) 1-0.05 % external cream Apply topically 2 times daily 45 g 2    estradiol (ESTRACE) 0.5 MG tablet Take 1 tablet (0.5 mg) by mouth daily 90 tablet 3    estradiol (VAGIFEM) 10 MCG TABS vaginal tablet Place 1 tablet (10 mcg) vaginally twice a week 26 tablet 3    fluticasone (FLONASE) 50 MCG/ACT nasal spray Spray 1 spray into both nostrils daily      hydrOXYzine HCl (ATARAX) 10 MG tablet Take 1 tablet (10 mg) by mouth nightly as needed (sleep) 30 tablet 1    lamoTRIgine (LAMICTAL) 150 MG tablet Take 1 tablet (150 mg) by mouth daily Take with one tablet of 200 for a total of 350 mg daily. 30 tablet 2    lamoTRIgine (LAMICTAL) 200 MG tablet Take 1 tablet (200 mg) by mouth daily Take with one tablet of 150 for a total of 350 mg daily. 30 tablet 2    lisinopril (ZESTRIL) 10 MG tablet TAKE ONE TABLET BY MOUTH ONCE DAILY 90 tablet 3    lithium (ESKALITH) 150 MG capsule Take 1 capsule (150 mg) by mouth at bedtime 30 capsule 2    lithium 300 MG capsule Take 3 tablet (900 mg) and one tablet (150 mg) for a total of 1050 mg at bedtime. 270 capsule 2    Loratadine 10 MG capsule Take 10 mg by mouth daily      medroxyPROGESTERone (PROVERA) 2.5 MG tablet Take 1 tablet (2.5 mg) by mouth daily 90 tablet 3    metroNIDAZOLE (METROGEL) 1 % external gel Apply topically daily 60 g 9    ORDER FOR DME Use your CPAP device as directed by your provider.      Semaglutide-Weight Management (WEGOVY) 0.25 MG/0.5ML pen Inject 0.25 mg Subcutaneous once a week 2 mL 0    Semaglutide-Weight Management (WEGOVY) 0.5 MG/0.5ML pen Inject 0.5 mg Subcutaneous once a week 2 mL 1    VITAMIN D, CHOLECALCIFEROL, PO Take 2,000 Units by mouth daily        Allergies   Allergen Reactions    Seroquel [Quetiapine] Nausea and Vomiting     Had constant vomiting    Olanzapine      Tongue welling, bad EPSE (Parkinsonian)    Penicillins Rash     Risperidone      Tongue welling, bad EPSE (Parkinsonian)      Thimerosal Other (See Comments)     blisters on inside of eyelid      ROS:   Gen- no fevers/chills   Rheum - no morning stiffness   Derm - no rash/ redness   Neuro - no numbness, no tingling   Remainder of ROS negative.     Exam:   There were no vitals taken for this visit.     Xray of knee on May 10, 2024 at [facility] location - films personally reviewed with patient at time of visit     My impression: mild medial compartment joint space loss bilaterally     Large Joint Injection/Arthocentesis: R knee joint    Date/Time: 5/10/2024 9:04 AM    Performed by: Danielle Valadez MD  Authorized by: Danielle Valadez MD    Indications:  Pain and osteoarthritis  Needle Size:  22 G  Guidance: landmark guided    Approach:  Anterolateral  Location:  Knee      Medications:  40 mg triamcinolone 40 MG/ML; 4 mL lidocaine (PF) 1 %  Outcome:  Tolerated well, no immediate complications  Procedure discussed: discussed risks, benefits, and alternatives    Consent Given by:  Patient  Timeout: timeout called immediately prior to procedure    Prep: patient was prepped and draped in usual sterile fashion      I agree with the following injection documentation.    ELIAN Valadez MD

## 2024-05-15 ENCOUNTER — PATIENT OUTREACH (OUTPATIENT)
Dept: CARE COORDINATION | Facility: CLINIC | Age: 59
End: 2024-05-15
Payer: COMMERCIAL

## 2024-05-29 ENCOUNTER — LAB (OUTPATIENT)
Dept: LAB | Facility: CLINIC | Age: 59
End: 2024-05-29
Payer: COMMERCIAL

## 2024-05-29 DIAGNOSIS — I10 BENIGN ESSENTIAL HYPERTENSION: ICD-10-CM

## 2024-05-29 DIAGNOSIS — E78.2 MIXED HYPERLIPIDEMIA: ICD-10-CM

## 2024-05-29 PROCEDURE — 36415 COLL VENOUS BLD VENIPUNCTURE: CPT

## 2024-05-29 PROCEDURE — 80061 LIPID PANEL: CPT

## 2024-05-30 LAB
CHOLEST SERPL-MCNC: 160 MG/DL
FASTING STATUS PATIENT QL REPORTED: YES
HDLC SERPL-MCNC: 55 MG/DL
LDLC SERPL CALC-MCNC: 75 MG/DL
NONHDLC SERPL-MCNC: 105 MG/DL
TRIGL SERPL-MCNC: 150 MG/DL

## 2024-06-10 ENCOUNTER — MYC MEDICAL ADVICE (OUTPATIENT)
Dept: CARDIOLOGY | Facility: CLINIC | Age: 59
End: 2024-06-10
Payer: COMMERCIAL

## 2024-06-10 ENCOUNTER — TELEPHONE (OUTPATIENT)
Dept: SURGERY | Facility: CLINIC | Age: 59
End: 2024-06-10
Payer: COMMERCIAL

## 2024-06-10 DIAGNOSIS — E66.01 OBESITY, CLASS III, BMI 40-49.9 (MORBID OBESITY) (H): Primary | ICD-10-CM

## 2024-06-10 NOTE — TELEPHONE ENCOUNTER
Good morning,  This is Mariann, a pharmacist at the [facility] call center. I am contacting you regarding Angy's Wegoavery. Angy called today to check if we had an rx for the next dosage of Wegovy on file, which would be 1mg. She states she is tolerating the Wegovy 0.5mg dosage well and has 1 pen left, so I thought it might be best to send you a request. She states she has her follow up MT appointment in July and will send a message via [employer] as well. Would you consider sending us a new rx for Wegovy 1mg so that we may fill it for her?  Thank you for your time and consideration,  Mariann SCHAFER, PharmD  [phone] (Call center)  [phone] (direct to pharmacists)

## 2024-06-11 NOTE — TELEPHONE ENCOUNTER
Clinical Pharmacy Note    Patient considered risk/benefit of increasing Wegovy - elected to increase to 1mg Wegovy and focus on non-pharm ways to manage side effects (nausea). Patient to monitor closely for worsening side effects. Education provided to mitigate risks.    Cristel Recions, Pharm D., MPH    Medication Therapy Management Pharmacist   [facility]

## 2024-06-12 ENCOUNTER — TELEPHONE (OUTPATIENT)
Dept: SURGERY | Facility: CLINIC | Age: 59
End: 2024-06-12

## 2024-06-12 NOTE — TELEPHONE ENCOUNTER
[facility]  Fax:[phone]  [place]: [phone]  [place]: [phone]

## 2024-06-15 ASSESSMENT — ANXIETY QUESTIONNAIRES
1. FEELING NERVOUS, ANXIOUS, OR ON EDGE: MORE THAN HALF THE DAYS
5. BEING SO RESTLESS THAT IT IS HARD TO SIT STILL: SEVERAL DAYS
6. BECOMING EASILY ANNOYED OR IRRITABLE: SEVERAL DAYS
7. FEELING AFRAID AS IF SOMETHING AWFUL MIGHT HAPPEN: SEVERAL DAYS
IF YOU CHECKED OFF ANY PROBLEMS ON THIS QUESTIONNAIRE, HOW DIFFICULT HAVE THESE PROBLEMS MADE IT FOR YOU TO DO YOUR WORK, TAKE CARE OF THINGS AT HOME, OR GET ALONG WITH OTHER PEOPLE: SOMEWHAT DIFFICULT
4. TROUBLE RELAXING: SEVERAL DAYS
GAD7 TOTAL SCORE: 10
8. IF YOU CHECKED OFF ANY PROBLEMS, HOW DIFFICULT HAVE THESE MADE IT FOR YOU TO DO YOUR WORK, TAKE CARE OF THINGS AT HOME, OR GET ALONG WITH OTHER PEOPLE?: SOMEWHAT DIFFICULT
GAD7 TOTAL SCORE: 10
2. NOT BEING ABLE TO STOP OR CONTROL WORRYING: MORE THAN HALF THE DAYS
GAD7 TOTAL SCORE: 10
7. FEELING AFRAID AS IF SOMETHING AWFUL MIGHT HAPPEN: SEVERAL DAYS
3. WORRYING TOO MUCH ABOUT DIFFERENT THINGS: MORE THAN HALF THE DAYS

## 2024-06-17 ENCOUNTER — OFFICE VISIT (OUTPATIENT)
Dept: PSYCHIATRY | Facility: CLINIC | Age: 59
End: 2024-06-17
Attending: PSYCHIATRY & NEUROLOGY
Payer: COMMERCIAL

## 2024-06-17 VITALS
SYSTOLIC BLOOD PRESSURE: 136 MMHG | WEIGHT: 289.6 LBS | BODY MASS INDEX: 45.36 KG/M2 | DIASTOLIC BLOOD PRESSURE: 73 MMHG | HEART RATE: 63 BPM

## 2024-06-17 DIAGNOSIS — F31.9 BIPOLAR I DISORDER (H): Primary | ICD-10-CM

## 2024-06-17 DIAGNOSIS — Z79.899 ENCOUNTER FOR LONG-TERM (CURRENT) USE OF MEDICATIONS: ICD-10-CM

## 2024-06-17 DIAGNOSIS — G47.00 INSOMNIA, UNSPECIFIED TYPE: ICD-10-CM

## 2024-06-17 PROCEDURE — G2211 COMPLEX E/M VISIT ADD ON: HCPCS | Performed by: STUDENT IN AN ORGANIZED HEALTH CARE EDUCATION/TRAINING PROGRAM

## 2024-06-17 PROCEDURE — 99214 OFFICE O/P EST MOD 30 MIN: CPT | Mod: GC | Performed by: STUDENT IN AN ORGANIZED HEALTH CARE EDUCATION/TRAINING PROGRAM

## 2024-06-17 PROCEDURE — 99214 OFFICE O/P EST MOD 30 MIN: CPT | Performed by: STUDENT IN AN ORGANIZED HEALTH CARE EDUCATION/TRAINING PROGRAM

## 2024-06-17 RX ORDER — HYDROXYZINE HYDROCHLORIDE 10 MG/1
10 TABLET, FILM COATED ORAL
Qty: 30 TABLET | Refills: 1 | Status: SHIPPED | OUTPATIENT
Start: 2024-06-17 | End: 2024-08-26

## 2024-06-17 RX ORDER — LITHIUM CARBONATE 300 MG/1
CAPSULE ORAL
Qty: 270 CAPSULE | Refills: 2 | Status: SHIPPED | OUTPATIENT
Start: 2024-06-17 | End: 2024-08-26

## 2024-06-17 RX ORDER — LITHIUM CARBONATE 150 MG/1
150 CAPSULE ORAL AT BEDTIME
Qty: 30 CAPSULE | Refills: 2 | Status: SHIPPED | OUTPATIENT
Start: 2024-06-17 | End: 2024-08-26

## 2024-06-17 RX ORDER — LAMOTRIGINE 200 MG/1
400 TABLET ORAL DAILY
Qty: 30 TABLET | Refills: 2 | Status: SHIPPED | OUTPATIENT
Start: 2024-06-17 | End: 2024-07-17

## 2024-06-17 ASSESSMENT — PATIENT HEALTH QUESTIONNAIRE - PHQ9
10. IF YOU CHECKED OFF ANY PROBLEMS, HOW DIFFICULT HAVE THESE PROBLEMS MADE IT FOR YOU TO DO YOUR WORK, TAKE CARE OF THINGS AT HOME, OR GET ALONG WITH OTHER PEOPLE: SOMEWHAT DIFFICULT
SUM OF ALL RESPONSES TO PHQ QUESTIONS 1-9: 10
SUM OF ALL RESPONSES TO PHQ QUESTIONS 1-9: 10

## 2024-06-17 ASSESSMENT — PAIN SCALES - GENERAL: PAINLEVEL: NO PAIN (0)

## 2024-06-17 NOTE — NURSING NOTE
Chief Complaint   Patient presents with    Recheck Medication     Bipolar I disorder     Blood pressure 136/73, pulse 63, weight 131.4 kg (289 lb 9.6 oz), not currently breastfeeding.    - Adriano Gonzalez, Visit Facilitator

## 2024-06-17 NOTE — PATIENT INSTRUCTIONS
**For crisis resources, please see the information at the end of this document**   Patient Education    Thank you for coming to the [facility] [place] [facility].     Lab Testing:  If you had lab testing today and your results are reassuring or normal they will be mailed to you or sent through [employer] within 7 days. If the lab tests need quick action we will call you with the results. The phone number we will call with results is # [phone]. If this is not the best number please call our clinic and change the number.     Medication Refills:  If you need any refills please call your pharmacy and they will contact us. Our fax number for refills is [phone].   Three business days of notice are needed for general medication refill requests.   Five business days of notice are needed for controlled substance refill requests.   If you need to change to a different pharmacy, please contact the new pharmacy directly. The new pharmacy will help you get your medications transferred.     Contact Us:  Please call [phone] during business hours (8-5:00 M-F).   If you have medication related questions after clinic hours, or on the weekend, please call [phone].     Financial Assistance [phone]   Medical Records [phone]       MENTAL HEALTH CRISIS RESOURCES:  For a emergency help, please call 911 or go to the nearest Emergency Department.     Emergency Walk-In Options:   EmPATH Unit @ [facility] ([place]): [phone] - Specialized mental health emergency area designed to be calming  [facility] ([place]): [phone]  [facility] ([place]): [phone]  [facility]): [phone]    [facility] Crisis Information:   [place]: [phone]  [place]: [phone]  [place] ([place]) - Adult: [phone]     Child: [phone]  [place] - Adult: [phone]     Child: [phone]  [place]:  [phone]  List of all [facility] resources:   https://[place].gov/dhs/people-we-serve/adults/health-care/mental-health/resources/crisis-contacts.jsp    National Crisis Information:   Crisis Text Line: Text  [place]  to 494689  Suicide & Crisis Lifeline: 988  National Suicide Prevention Lifeline: 4-754-704-TALK (1-318.141.2766)       For online chat options, visit https://suicidepreventionlifeline.org/chat/  Poison Control Center: 4-136-659-5943  Trans Lifeline: 5-169-672-8131 - Hotline for transgender people of all ages  The [employer]: 7-293-906-5596 - Hotline for LGBT youth     For Non-Emergency Support:   Fast Tracker: Mental Health & Substance Use Disorder Resources -   https://www.[employer].org/

## 2024-06-17 NOTE — PROGRESS NOTES
[facility]  MEDICAL PROGRESS NOTE     CARE TEAM:    PCP- Mikey Cortés  Therapist- couple therapy    Angy is a 59 year old who uses the pronouns she, her, hers.      Diagnoses     Bipolar 1 disorder, current depressive episode, moderate   Insomnia, unspecified    H/o EPSE secondary to neuroleptic use  H/o Thrombocytopenia secondary to Depakote  H/o lithium toxicity, resolved     Assessment     Angy has a mid level of mental health crisis. She is able to go to work, but she feels distracted. She has ruminative thoughts, feeling sad and tearful. Her wife wants to divorce her, and she found out that her partner was about to have or had an affair with a male partner. Angy is very concerned because in the past, she had a manic episode after she needed to sell her house and move. The couple has done couple's therapy, but things are not moving in the direction that Angy would want. That impacted her sleep which put her at risk for acute anthony. Today, we discussed on going up on her medication. Reviewed the possible benefits and risks/side effect profile and limitations re: increasing dose of Lamictal - including that it is not the most effective medication to prevent anthony, but may help maintain mood stability and may improve her sleep.  We also talked about increasing lithium dose, as this is likely to be more effective in preventing manic or depressive episodes. However, in the past when she was on 1200 mg/day, she experienced side effect of tremor resulting in the need to go down on dose.   She was interested in increasing lamotrigine from 350 mg at bedtime to 400 mg at bedtime. Will consider increasing lithium dose if she becomes manic or depressed. We also encouraged her to try melatonin 3 mg before bedtime starting one week after increasing lamotrigine dose, if she feels the need for something extra for sleep.     We also discussed  increasing hydroxyzine. It has helped her to fall asleep, but she reported dry mouth, we held off on dose for now, but she may need to get more hydroxyzine if she is willing to it. In fact, we also discussed quetiapine. She was concerned of EPS, and didn't want to try it. She has been on 100 mg BID in the past. We could try a smaller dose of quetiapine (eg, starting her at 25 mg to help her sleep while monitoring movement disorder).     Lastly, we recommended her to reach out to us whenever she needs to, and in particular if she develops worsened mood symptoms, any safety concerns, or if she experiences medication side effects. She has been in touch with us through [employer], but we asked her to call us if she needs to - for quicker response. We also recommend to go to the EMPATH unit or the nearest ED if she feels that she needs urgent psych care or if she has immediate safety concerns. She agrees with all that. Currently there is no safety concerns      Psychotropic Drug Interactions:  [PSYCHCLINICDDI]  Estrogen Derivatives may decrease the serum concentration of LamoTRIgine  Angiotensin-Converting Enzyme Inhibitors may increase the serum concentration of Lithium.   LamoTRIgine may decrease the serum concentration of Progestins.      Management: routine monitoring    [place] was not checked today: not using controlled substances    Risk Statements:   Treatment Risk- Risks, benefits, alternatives and potential adverse effects have been discussed and are understood.   Safety Risk-Angy did not appear to be an imminent safety risk to self or others.     Plan     1) Medications:  no medication changes  - Continue Lithium 1050 mg at bedtime  - Increase Lamictal 350 mg to 400 mg at bedtime   - continue with hydroxyzine 10 mg PRN at bedtime   - add melatonin 1mg to 3mg at bedtime as needed for insomnia    2) Psychotherapy: Continue with couple's therapy    3) Next due:  Labs- ordered  EKG- . Last one was on 03/2024 with [facility]  "421  Rating scales-  PHQ9    4) Referrals: none  N/A    5) Other: none    6) Follow-up: Return to clinic in 6 weeks     Pertinent Background                                                   [most recent eval 08/31/23]     Angy reported that she didn't have symptoms until November 2018. At which point she was hospitalized for anthony.     This section was copied forward from 08/2021 transfer notes.  \"Patient has not had significant mental health history until 2018, when she developed perimenopausal mood and anxiety symptoms.  She was subsequently referred to our clinic following a hospitalization for anthony in November 2018.  Her care has been complicated by sensitivity to antipsychotics (EPSE including difficulty swallowing), thrombocytopenia secondary to Depakote, lithium toxicity in setting of concurrent Depakote use\"     Pertinent items include: anthony , severe med reaction [described as H/o Thrombocytopenia secondary to Depakote, and H/o lithium toxicity], and psych hosp      Subjective     Angy has been on some mid level of crisis.  Wife wants to divorce her. She found out that wife had/ was about to have an affair with a male person.   She is stressed out.  She is the only person bringing in income home now. Wife resigned from her job, took a contractual job. However, she is currently without income. Angy has been using money from her inheritance.     Angy schedule an emergency couple therapy session.   There wife made it clear that she doesn't want to continue anymore  She wants to divorce as soon as they can. She wants to get a .  Angy was okay, but she didn't want a , and she also wanted their son to be ready for that.     She was planing to go to [place] with her, but now that her ex wife doesn't want . She is worried that she would have to go alone.   She is distress, feeling sad, really anxious, tearful, and having ruminative thoughts  Sleep is the same  She has been using hydroxyzine " "every day but leads to dry mouth    Do you feel depressed \"I don't know, maybe shocked, distress\" she replied.   Yury is down  Started the wigovy. She lost a couple of lbs   Otherwise eating more,  has nausea    Meds are working, no recent manic episode.   She continues to have 7 hours of sleep but fragmented. She wake up 5 or 6 times last week.    Hydroxyzine helps her falls asleep, but doesn't stay asleep.  When she was manic in the past, she couldn't sleep, and was buying stuffs.  She uses a CPAP    SI/HI: none - Angy denied any SI or HI.  Alcohol use: no changes in alchoho ahabit once or twice month.  Smoking: none  Other substances (cannabis): none     Attending Psychiatrist Notes:  No safety concerns. Reviewed safety plan and resources with Angy - she agreed to use if needed. T/a pro's and con's of incr lithium, hydroxyzine (she'd be comfortable with these if elev or dep mood roccurs e: lithium, or if ongoing sleep troubles re: hydrox). She's interested in increasing lamotrigine to 400mg po at bedtime at this time, as she's tolerated it well - reviewed potential to improve sleep and maintain stable mood. Add melatonin 1mg to 3mg at bedtime prn in 1 week.  She has been exercising more - walking on treadmill and bicycling 1x/week, in anticipation of hiking trip in [place] children in the [place].     Mental Status Exam     Alertness: alert  and oriented  Appearance: adequately groomed  Behavior/Demeanor: cooperative and pleasant, with good  eye contact   Speech: normal and regular rate and rhythm  Language: intact  Psychomotor: normal or unremarkable  Mood:  \"I don't know, maybe shocked, distress\"  Affect: full range and tearful; congruent to: mood- yes, content- yes  Thought Process/Associations: unremarkable  Thought Content:  Reports none;  Denies suicidal & violent ideation and delusions  Perception:  Reports none;  Denies auditory hallucinations and visual hallucinations  Insight: " good  Judgment: good  Cognition: does  appear grossly intact; formal cognitive testing was not done  Gait and Station: unremarkable     Past Psych Med Trials      Medication Max Dose (mg) Dates / Duration Helpful? DC Reason / Adverse Effects?   Depakote    Tremors   sertraline       olanzapine    EPSE   Seroquel    EPSE   lithium 1200   Tremor at 1200 mg had to lower the dose   Wellbutrin 300 15 years ago               Treatment Course and Resendiz Events since  JULY 2023 08/2023 : no medication changes  11/2023 no medication changes  01/2023 no medication changes  03/2024 no medication changes  04/2024 no medication changes  06/2024 Increase Lamictal to 400 mg     Vitals   /73   Pulse 63   Wt 131.4 kg (289 lb 9.6 oz)   BMI 45.36 kg/m    Pulse Readings from Last 3 Encounters:   06/17/24 63   04/29/24 64   04/19/24 64     Wt Readings from Last 3 Encounters:   06/17/24 131.4 kg (289 lb 9.6 oz)   04/29/24 139.7 kg (308 lb)   04/19/24 137 kg (302 lb)     BP Readings from Last 3 Encounters:   06/17/24 136/73   04/29/24 (!) 148/82   04/19/24 139/68      Medical History     ALLERGIES: Seroquel [quetiapine], Olanzapine, Penicillins, Risperidone, and Thimerosal    Patient Active Problem List   Diagnosis    Oral aphthae    CARDIOVASCULAR SCREENING; LDL GOAL LESS THAN 160    Rosacea    Raynaud disease    Vitamin D deficiency disease    MARLA (obstructive sleep apnea)    Atopic rhinitis    Chronic bilateral low back pain without sciatica    Obesity, Class III, BMI 40-49.9 (morbid obesity) (H)    Muscle tension dysphonia    Palpitations    Dysphagia, unspecified type    Anxiety    Benign essential hypertension    Callus of foot    Varicose veins of right lower extremity with edema    Bipolar I disorder (H)    Dribbling of urine    Spinal stenosis at L4-L5 level    Facet arthropathy, lumbar        Medications     Current Outpatient Medications   Medication Sig Dispense Refill    acetaminophen (TYLENOL) 650 MG CR tablet Take  1 tablet (650 mg) by mouth every 8 hours as needed for mild pain or fever      Carboxymethylcellulose Sod PF (REFRESH PLUS) 0.5 % SOLN ophthalmic solution Place 1 drop into both eyes 3 times daily as needed for dry eyes      clotrimazole-betamethasone (LOTRISONE) 1-0.05 % external cream Apply topically 2 times daily 45 g 2    estradiol (ESTRACE) 0.5 MG tablet Take 1 tablet (0.5 mg) by mouth daily 90 tablet 3    estradiol (VAGIFEM) 10 MCG TABS vaginal tablet Place 1 tablet (10 mcg) vaginally twice a week 26 tablet 3    fluticasone (FLONASE) 50 MCG/ACT nasal spray Spray 1 spray into both nostrils daily      hydrOXYzine HCl (ATARAX) 10 MG tablet Take 1 tablet (10 mg) by mouth nightly as needed (sleep) 30 tablet 1    lamoTRIgine (LAMICTAL) 150 MG tablet Take 1 tablet (150 mg) by mouth daily Take with one tablet of 200 for a total of 350 mg daily. 30 tablet 2    lamoTRIgine (LAMICTAL) 200 MG tablet Take 1 tablet (200 mg) by mouth daily Take with one tablet of 150 for a total of 350 mg daily. 30 tablet 2    lisinopril (ZESTRIL) 10 MG tablet TAKE ONE TABLET BY MOUTH ONCE DAILY 90 tablet 3    lithium (ESKALITH) 150 MG capsule Take 1 capsule (150 mg) by mouth at bedtime 30 capsule 2    lithium 300 MG capsule Take 3 tablet (900 mg) and one tablet (150 mg) for a total of 1050 mg at bedtime. 270 capsule 2    Loratadine 10 MG capsule Take 10 mg by mouth daily      medroxyPROGESTERone (PROVERA) 2.5 MG tablet Take 1 tablet (2.5 mg) by mouth daily 90 tablet 3    metroNIDAZOLE (METROGEL) 1 % external gel Apply topically daily 60 g 9    ORDER FOR DME Use your CPAP device as directed by your provider.      Semaglutide-Weight Management (WEGOVY) 1 MG/0.5ML pen Inject 1 mg Subcutaneous once a week 2 mL 1    VITAMIN D, CHOLECALCIFEROL, PO Take 2,000 Units by mouth daily          Labs and Data         8/28/2023     6:28 PM 1/3/2024    11:58 AM 4/25/2024     9:09 PM   PROMIS-10 Total Score w/o Sub Scores   PROMIS TOTAL - SUBSCORES 30 28 30           No data to display                  3/4/2024     2:58 PM 4/28/2024     3:44 PM 6/17/2024     2:59 PM   PHQ-9 SCORE   PHQ-9 Total Score MyChart 5 (Mild depression) 6 (Mild depression) 10 (Moderate depression)   PHQ-9 Total Score 5 6 10         8/28/2023     6:27 PM 10/31/2023     5:25 PM 6/15/2024     1:27 PM   JOANNA-7 SCORE   Total Score 9 (mild anxiety) 11 (moderate anxiety) 10 (moderate anxiety)   Total Score 9 11 10       Liver/Kidney Function, TSH Metabolic Blood counts   Recent Labs   Lab Test 02/28/24  0845 05/15/23  1017   AST 21 17   ALT 15 16   ALKPHOS 91 80   CR 0.63 0.58     Recent Labs   Lab Test 02/28/24  0845   TSH 2.23    Recent Labs   Lab Test 05/29/24  0815   CHOL 160   TRIG 150*   LDL 75   HDL 55     Recent Labs   Lab Test 10/04/22  1651   A1C 5.3     Recent Labs   Lab Test 02/28/24  0845   GLC 97    Recent Labs   Lab Test 02/28/24  0845   WBC 6.6   HGB 14.0   HCT 44.9   MCV 90            Recent Labs   Lab Test 11/08/23  1016 05/15/23  1017 12/07/22  1111   LITHIUM 0.69 0.7 0.6        PROVIDER: Antonio Santillan MD MPH    Level of Medical Decision Making:   - At least 1 chronic problem that is not stable  - Engaged in prescription drug management during visit (discussed any medication benefits, side effects, alternatives, etc.)         Patient was staffed in clinic with Dr. SERGIO Liu.  Supervisor is Dr. Vivian Burnett.      The longitudinal plan of care for Bipolar disorder was addressed during this visit. Due to the added complexity in care, I will continue to support Angy in the subsequent management of this condition(s) and with the ongoing continuity of care of this condition(s)    Answers submitted by the patient for this visit:  Patient Health Questionnaire (Submitted on 6/17/2024)  If you checked off any problems, how difficult have these problems made it for you to do your work, take care of things at home, or get along with other people?: Somewhat difficult  PHQ9 TOTAL SCORE:  10  JOANNA-7 (Submitted on 6/15/2024)  JOANNA 7 TOTAL SCORE: 10

## 2024-06-20 NOTE — TELEPHONE ENCOUNTER
Retail Pharmacy Prior Authorization Team   Phone: [phone]    Hello,   If this is not a Liaison/PFA handled medication/department   please route Spec Med PA request to the covering department.   Thank you.

## 2024-06-20 NOTE — TELEPHONE ENCOUNTER
PA not needed. Wegovy was just filled at pharmacy on 6- and is in shipping to go out to patient $75 copay. Closing encounter

## 2024-07-09 NOTE — PROGRESS NOTES
Virtual Visit Details    Type of service:  Video Visit     Originating Location (pt. Location): Other work setting     Distant Location (provider location):  On-site  Platform used for Video Visit: Well     NUTRITION REASSESSMENT NOTE   REASON FOR ASSESSMENT  Angy LILIAN Haji referred by Dr. Cortés for MNT related to  Obesity, Class III, BMI 40-49.9 (morbid obesity) (H) [E66.01]          Patient accompanied by self      NUTRITION HISTORY  Patient continues with multiple life changes and stressors.  Patient aware that she likes to self soothe with ice cream which she has recently increased.  Patient is aware that social cues trigger her to eat. Patient's spouse recently had weight loss surgery so there is adjust with eating in the home.      3/7/2022 Self soothe in evening snack-made tea or tells self ate late dinner and does not need snack.  Patient asking self if physically hungry.  Patient drinking fizzy water or malt o meal as evening snack.  Patient aware she has been stress eating.  Tracking when not having dessert -identifying why eating  Exercise-cross country skiing; mall walk; snow shoeing; treadmill 2 times haring treadmill   Yogurt; cheese and crackers; cereal      Patient wants to increase exercise time as will be going to [place] weekend ([place]) [facility]      4/25/2002 Patient able to get 45-60 minutes exercise on weekends and 20-30 minutes of exercise daily.  Patient wants to lose 5 lbs prior to trip to [place].  Patient was able to have 3 days no desserts and then 1 week no dessert. 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  Patient determined ways she could lose weight: Dessert 2 times per week, add treadmill, eliminate morning snack, 1 snack at 3 PM. Alcohol 1 time per week, no caramel lattes, food is fuel, it's too late to be hungry, spouse brings sweet treats without asking, tea/bath/shower      6/15/2022 Patient skips dessert if eats  late dinner.  Patient felt discouraged with weight loss as wanted to lose more weight (down 3.5 lbs). Patient wants weight loss for functional reasons.  Patient signed up for the [place] bike tour.  Patient has increased cravings and thoughts about ice cream.  Shift in relationship with spouse after spouse's weight loss surgery.      7/27/2022 Patient increased exercise-[place] with [employer]; started the last week in June for bike rides (4 rides different times in 2 weeks-9 miles).  Rode in 18 mile race.  Went hiking on vacation[place].     No dessert (hit or miss) 50% 3 days no dessert; ice cream and s'mores on vacation     Typical intake:  Greek yogurt + 1/4 cup granola + fruit + 1/2 and 1/2 with coffee or english muffin with peanut butter/butter and strawberry jam or eggs scrambled or hard boiled  Leftovers -1 cup cherries; cherry tomatoes; 1/2 cup pea pods + 3 oz pork tenderloin (teryaki)   Watermelon, pork tenderloin, 1 cup rice + tomatoes/pea pods; cheeseburger with whole wheat bun + vegetables + watermelon   Snacks: croissant ([place]) Advent oat squares (1 cup dry) (3:00 PM) cheese and crackers    Beverages: coffee; water; fizzy water      Emotional eating at night-ice cream      9/21/2022 Patient has been able to bike 2 times per week.  Patient's weight has been stable.  Patient has not been able to track dinner and snacks.  Stress eating ebbs and flows.  Trying to save dessert for the weekend or may self soothe during th week with ice cream.  Patient stress eating in the evening and may boredom eat prior to lunch (cereal).    Weight: 308.4 lbs       11/16/2022 Biking 2 times per week. Tour biking race: [place] 14 miles (rode by herself). [facility] 12 mile hilly bike ride. (rode with friend).  Patient has consistently walked dog and completes PT exercises.  Patient determining plan for winter exercise.    314.4 lbs (food and sleep changes) Patient  was eating more sweets as patient's wife baked sweets this fall (3-4 weeks with increase of sweets).  Holiday baking plan consists of 5 cookies and 1 candy (peanut brittle).  Ice cream consumption has increased recently.      1/23/2023 Patient was able to bake holiday cookies and freeze them.  Patient has been eating desserts 5 days a week.  Patient has been substituting greek yogurt (flavored or vanilla) or cream of wheat for dessert.  Patient was able to snow shoe.  Patient having barrier with getting on treadmill.  Patient's weight has fluctuated 325.8 and now 316 lbs.  Patient has been working on sleep routine (classical music; reading on paper vs ipad).      3/17/2023 Patient having knee pain and went to orthopedic MD and got cortizone shot in knee and able to walk know. Walked dog 3 times this week. Swimming -[facility] (6 sameer pool). Tyler wants to swim with goal of swimming 2 times per week.  Patient states ice cream and dessert are hit or miss. Patient shifting mindset to days having dessert vs not having dessert. Patient has not been as mindful with practicing self soothe at night.  Has tried tea and engaging book.     5/3/2023 PT exercises for knee; walking 15-30 minutes per day with dog + swimming 1 time per week (9 laps); 5 days per week desserts -patient using self talk to reduce dessert intake. Patient trying to limit desserts.  Patient's wife brining bread and sweets into home in which patient feels compels to eat as not to waste food.  Patient wants to be aware of foods when dining out and would like to split meal with spouse. Patient using zinger tea to soothe at night.       6/28/2023 No dessert 1-2 days; vacation and party so feels increase in desserts.  Patient getting more movement at work by taking stairs at work and size of building as no longer working remote.  Going to PT; less dog walks due to driving to work; found new lunch place to eat lunch at work (3-4 days per week-20  minute walk) -reading book and eating lunch; aims for hour lunch -reading for self care      8/9/2023 Patient is being intentional for choosing desserts.  Skipped ice cream since had late dinner.  Grabbed snack when dinner wasn't ready prior to function and ate later-took cheese and fruit then ate chicken kebab onion, bell pepper, chicken -316 lbs currently.  Was lower weight last week. Patient trying to balance stress in life.      10/4/2023 Patient having 1 night per week not having dessert.  Patient feels like eating ice cream when alone at home while watching a favorite show and feels more indulgent.  Patient did not participate in [employer] as hoped.  Patient will be participating in [employer] this weekend.  Patient continues to walk dog daily.  Patient also continues with increased life stressors.  Reading in the evening + fizzy water.      12/13/2023 Patient with reduction in weight trends: weight 307.4 lbs and 309 lbs.  Patient had stressful family situation which caused drop in appetite (past 2-3 weeks).  Patient states was not using food as coping strategy. Patient having dessert 1-6 days per week with most desserts  3-4 desserts per week -writes reason why no dessert-sets goals in advance of no dessert; no dessert for today or too late to eat dessert; food trigger from spouse (will see eating popcorn and will then eat it if she is -likes caramel corn); self care-journaling as needed; playing piano for fun 3 times per week; movement -walking dog + biking ([place]) + hour walk      1/24/2024 Patient working on self care at night- reading, limited TV watching, playing piano in the evening (5-10 minutes); desserts -aiming for 4 per week 3-5-1-1-3. Patient choose Malt-O meal as replacement for dessert; 304-312 lbs; less cookie baking during the holidays; exercise reduced with cold weather (walking outside)-treadmill; stairs; no pool. Patient working on exercise plan.       4/17/2024  "Patient with extra stress in life which has reduced desire to eat.  Patient working on increasing exercise intensity for upcoming trip.Treadmill-working on distance and incline 4 days per week 15-46 minutes + additional walking. Patient working on self soothe options besides food. Patient plans to start weight loss medication and will be working with pharmacist.  Patient also planning to meet with weight loss clinic.     7/10/2024 Patient focusing on adding movement-treadmill; lunchtime walking 20-30 minutes; biking everything other week 8-16 miles. Patient started Wegovy-283.4 lbs. Patient losing 1-2 lbs per week. Symptoms- no appetite, nausea, acid reflux -spicy, constipation     Greek yogurt or eggs or peanut butter on english muffin (3 T)     DIET RECALL   Breakfast: cereal and banana; yogurt + granola (measures), fruit + coffee; 2 slices toast with butter + 1/2 cup cottage cheese    Lunch: leftovers (includes fruit and vegetable daily)  Snack: fruit or cheese with lower salt nut thin crackers   Dinner: Tuna steak, Asian marinade, stir gomez, baked vegetable + brown rice (3/4 cup) + canned peaches; chicken thighs with beans and rice; sheet pan dinner with chicken and vegetables or grilled salmon and grilled vegetables; taco; protein + fruit and vegetable  Snacks: fruit, string cheese, Ritz crackers or yogurt; dessert in the evening (ice cream recently)  Beverages: mainly water, coffee, mineral water  Dining out: none currently  Exercise: Patient exercising 3-4 times per week. (walking and walking dog).      MEDICATIONS:  Reviewed      ANTHROPOMETRICS  Height: 5'8\" (note height change in chart previously 5'9\")  Weight: 283 lbs   BMI (kg/m2): 44.3 kg/m2   %IBW: 202%  Weight History:   Wt Readings from Last 10 Encounters:   07/10/24 128.5 kg (283 lb 6.4 oz)   04/19/24 137 kg (302 lb)   04/18/24 136.2 kg (300 lb 3.2 oz)   04/17/24 136.5 kg (301 lb)   03/06/24 139.3 kg (307 lb)   02/13/24 140.2 kg (309 lb)   09/09/23 " 142.4 kg (314 lb)   09/07/23 142.4 kg (314 lb)   06/28/23 143.3 kg (316 lb)   05/31/23 144.2 kg (318 lb)              ASSESSED NUTRITION NEEDS  Estimated Energy Needs: 8744-7781 kcals (15-20 kcals/kg) for gradual weight loss   Estimated Protein Needs: 66-79 (1-1.2 gm/kg IBW) for repletion with exercise  RMR: 1440     EVALUATION/PROGRESS TOWARDS GOALS   Previous goals:  Add soothing activity in the evening (8:00 PM) 4 of 7 days-improving   Aim for 3 days of desserts (1 on weekday 2 on weekend) substitute other days with Chobani coffee -met  Exercise 5 days per week for 30 minutes (3 minute warm up and 3 minute cool down) + walking additional days -improving     Previous Nutrition Diagnosis: Obesity related to excess energy intake as evidenced by BMI of 45.9 kg/m2-no change     Current Nutrition Diagnosis:Obesity related to excess energy intake as evidenced by BMI of 44.3 kg/m2     INTERVENTIONS  Nutrition Prescription   Recommend exercise 5 times per week; track intake if eating mindlessly; increase fiber to 25-30 grams per day      Implementation:   Meals and Snacks: Continue eating 3 meals + deliberate snacking (if hungry)   Nutrition Education (Content):              a)  Discussed progress towards goals. Congratulated patient for practicing self soothe alternatives to food. Reviewed current food and eating behavior challenges. Discussed protein options. Continued discussion around self care as way to improve emotional eating.  Discussed weight loss medication and current symptoms. Suggest 1/2 dose Miralax daily. Patient will be working with the comprehensive medical weight loss clinic. Supported patient in her struggle with food and weight. Provided Protein Sources for Weight Loss handout.   Nutrition Education (Application):              a) Discussed strategies to set boundaries with food.                b) Patient verbalized understanding of diet by stating continue self soothe options.    Expected patient  engagement: good     CURRENT GOALS   Add soothing activity in the evening (8:00 PM) 5 of 7 days  Exercise 5 days per week for 30 minutes   80-90 grams protein      FOLLOW UP/MONITORING    Patient to follow up in 8 weeks in Health system   Patient has RD contact information      Time spent with patient: 30 minutes     Mattie Amaya, RD, LD  [facility] Outpatient Dietitian  [phone] (office phone)

## 2024-07-10 ENCOUNTER — HOSPITAL ENCOUNTER (OUTPATIENT)
Dept: NUTRITION | Facility: CLINIC | Age: 59
Discharge: HOME | End: 2024-07-10
Attending: DIETITIAN, REGISTERED | Admitting: DIETITIAN, REGISTERED
Payer: COMMERCIAL

## 2024-07-10 VITALS — WEIGHT: 283.4 LBS | BODY MASS INDEX: 44.39 KG/M2

## 2024-07-10 PROCEDURE — 97803 MED NUTRITION INDIV SUBSEQ: CPT | Mod: GT,95 | Performed by: DIETITIAN, REGISTERED

## 2024-07-10 NOTE — DISCHARGE INSTRUCTIONS
José Luis Travis,    It was good seeing you today. Here's the protein list:    Protein Sources for Weight Loss (aim for 80-90 grams by including protein at each meal)     Please let me know if you have any questions.    Take care,    Mar

## 2024-07-11 ENCOUNTER — LAB (OUTPATIENT)
Dept: LAB | Facility: CLINIC | Age: 59
End: 2024-07-11
Payer: COMMERCIAL

## 2024-07-11 DIAGNOSIS — Z79.899 ENCOUNTER FOR LONG-TERM (CURRENT) USE OF MEDICATIONS: ICD-10-CM

## 2024-07-11 LAB
ALBUMIN UR-MCNC: NEGATIVE MG/DL
ANION GAP SERPL CALCULATED.3IONS-SCNC: 9 MMOL/L (ref 7–15)
APPEARANCE UR: CLEAR
BACTERIA #/AREA URNS HPF: ABNORMAL /HPF
BILIRUB UR QL STRIP: NEGATIVE
BUN SERPL-MCNC: 10.8 MG/DL (ref 6–20)
CALCIUM SERPL-MCNC: 9.8 MG/DL (ref 8.6–10)
CHLORIDE SERPL-SCNC: 104 MMOL/L (ref 98–107)
COLOR UR AUTO: YELLOW
CREAT SERPL-MCNC: 0.63 MG/DL (ref 0.51–0.95)
DEPRECATED HCO3 PLAS-SCNC: 22 MMOL/L (ref 22–29)
EGFRCR SERPLBLD CKD-EPI 2021: >90 ML/MIN/1.73M2
GLUCOSE SERPL-MCNC: 88 MG/DL (ref 70–99)
GLUCOSE UR STRIP-MCNC: NEGATIVE MG/DL
HGB UR QL STRIP: NEGATIVE
KETONES UR STRIP-MCNC: NEGATIVE MG/DL
LEUKOCYTE ESTERASE UR QL STRIP: NEGATIVE
LITHIUM SERPL-SCNC: 1 MMOL/L (ref 0.6–1.2)
NITRATE UR QL: NEGATIVE
PH UR STRIP: 7.5 [PH] (ref 5–7)
POTASSIUM SERPL-SCNC: 4.5 MMOL/L (ref 3.4–5.3)
RBC #/AREA URNS AUTO: ABNORMAL /HPF
SODIUM SERPL-SCNC: 135 MMOL/L (ref 135–145)
SP GR UR STRIP: 1.01 (ref 1–1.03)
SQUAMOUS #/AREA URNS AUTO: ABNORMAL /LPF
TSH SERPL DL<=0.005 MIU/L-ACNC: 2.43 UIU/ML (ref 0.3–4.2)
UROBILINOGEN UR STRIP-ACNC: 0.2 E.U./DL
WBC #/AREA URNS AUTO: ABNORMAL /HPF

## 2024-07-11 PROCEDURE — 80048 BASIC METABOLIC PNL TOTAL CA: CPT

## 2024-07-11 PROCEDURE — 80178 ASSAY OF LITHIUM: CPT

## 2024-07-11 PROCEDURE — 36415 COLL VENOUS BLD VENIPUNCTURE: CPT

## 2024-07-11 PROCEDURE — 84443 ASSAY THYROID STIM HORMONE: CPT

## 2024-07-11 PROCEDURE — 81001 URINALYSIS AUTO W/SCOPE: CPT

## 2024-07-15 DIAGNOSIS — F31.9 BIPOLAR I DISORDER (H): ICD-10-CM

## 2024-07-16 RX ORDER — LAMOTRIGINE 200 MG/1
TABLET ORAL
Qty: 30 TABLET | Refills: 2 | OUTPATIENT
Start: 2024-07-16

## 2024-07-16 NOTE — TELEPHONE ENCOUNTER
lamoTRIgine (LAMICTAL) 200 MG tablet 30 tablet 2 6/17/2024 -- No   Sig - Route: Take 2 tablets (400 mg) by mouth daily - Oral   Sent to pharmacy as: lamoTRIgine 200 MG Oral Tablet (LaMICtal)   Class: E-Prescribe   Order: [id number]   E-Prescribing Status: Receipt confirmed by pharmacy (6/17/2024  4:22 PM CDT)     Printout Tracking    External Result Report     Pharmacy    [place], [place] - [address]

## 2024-07-17 ENCOUNTER — TELEPHONE (OUTPATIENT)
Dept: PSYCHIATRY | Facility: CLINIC | Age: 59
End: 2024-07-17
Payer: COMMERCIAL

## 2024-07-17 DIAGNOSIS — F31.9 BIPOLAR I DISORDER (H): ICD-10-CM

## 2024-07-17 RX ORDER — LAMOTRIGINE 200 MG/1
400 TABLET ORAL DAILY
Qty: 60 TABLET | Refills: 0 | Status: SHIPPED | OUTPATIENT
Start: 2024-07-17 | End: 2024-08-19

## 2024-07-17 NOTE — TELEPHONE ENCOUNTER
[facility] Call Center    Phone Message    May a detailed message be left on voicemail: yes     Reason for Call: Other: Medication denial / RN call request     Caller stated provider recently increased dosage of lamotrigine 200mg to be 400mg per day. Caller stated she tried to refill Rx but that it was denied by pharmacy. Caller stated she spoke to pharmacy who confirmed this was denied. Caller stated she only has a couple of days left of this medication would need this refilled right away. Caller stated she would like a call from RN for clarification.    [facility] [place], [place] - [address]     Action Taken: Message routed to:  Other: [facility] Nurse [place]    Travel Screening: Not Applicable     Date of Service:

## 2024-07-17 NOTE — TELEPHONE ENCOUNTER
Writer spoke with pharmacy.  Patients prescription was only for 30 tablets when patient is directed to take 2 tablets daily, giving her a 15 day supply.  New prescription sent to pharmacy to fulfill entire months supply.   Patient notified via phone call.

## 2024-07-18 ENCOUNTER — VIRTUAL VISIT (OUTPATIENT)
Dept: CARDIOLOGY | Facility: CLINIC | Age: 59
End: 2024-07-18
Attending: PHARMACIST
Payer: COMMERCIAL

## 2024-07-18 VITALS — WEIGHT: 280.3 LBS | BODY MASS INDEX: 43.9 KG/M2

## 2024-07-18 DIAGNOSIS — E66.01 OBESITY, CLASS III, BMI 40-49.9 (MORBID OBESITY) (H): ICD-10-CM

## 2024-07-18 DIAGNOSIS — Z13.820 OSTEOPOROSIS SCREENING: Primary | ICD-10-CM

## 2024-07-18 RX ORDER — CALCIUM CARBONATE 500 MG/1
1 TABLET, CHEWABLE ORAL 2 TIMES DAILY
COMMUNITY

## 2024-07-18 ASSESSMENT — PAIN SCALES - GENERAL: PAINLEVEL: NO PAIN (0)

## 2024-07-18 NOTE — PROGRESS NOTES
Medication Therapy Management (MTM) Encounter    ASSESSMENT:                            Medication Adherence/Access: No issues identified    Weight management: down 6.7% total body weight. Significant portion of visit today educating med MOA and relationship to side effects -- how to manage with  dietary and behavioral modifications.   Patient would benefit from continuing pharmacotherapy for weight management. Recommend to continue current dose given some side effects and significant weight loss with starting doses. Given class III obesity, recommend GLP1/GIP therapy as data to support most significant weight loss and patient has no contraindications. Patient also seeing benefit from reduction in food noise and increased satiety.     Osteoporosis Prevention: exceeding NOF rec for daily vitamin D 1000 international units , safe dose for patient. Education provided on Tums use to help with GERD and to monitor for improvement as plan to continue on stable dose of Wegovy for now. Tums also is source of calcium so recommend to continue daily use to reach NOF goal of 1200 mg calcium daily.    PLAN:                            Continue Wegovy 1mg weekly.    To help with tolerability and effectiveness of Wegovy :  Eat small meals/snacks throughout the day (about every 2 hours)  Focus on getting protein in first with each meal and snack.   Drink plenty of water - goal 64 oz throughout the day  You may try Metamucil, Benefiber, or Citrucel to help feel more full (less nausea) and have softer, more consistent bowel movements.  To optimize weight management - work on incorporating resistance training/weight lifting to build muscle and improve overall metabolism of adipose tissue.  Pretreat spicy foods with Tums and after as needed. Do not lie down for 1 hour after eating.    2. Your goal is to get 1200 mg calcium daily for bone health. Your body can only absorb about 500 mg at one time (1 Tums). So a good guideline is to take a  calcium supplement (Tums) 500 mg twice daily and then get 1-2 servings of dairy or green leafy vegetables daily (each serving is about 300 mg calcium).        Follow-up: 10/22 with Cristel Recinos [facility] for follow up visit    SUBJECTIVE/OBJECTIVE:                          Angy Haji is a 59 year old female seen for a follow-up visit.       Reason for visit: Medication Therapy Management -GLP1/GIP Management .    Allergies/ADRs: Reviewed in chart  Past Medical History: Reviewed in chart  Tobacco: She reports that she has never smoked. She has never used smokeless tobacco.  Alcohol: Less than 1 beverages / week - socially   Caffeine: 32 oz caffeine daily  Personal Healthcare Goals: goal to manage weight with     Medication Adherence/Access: medication concerns - insurance restrictions.    Weight Management   Class III Obesity (BMI 40 or more):   Wegovy 1mg weekly     Following with Mikey Cortés MD in [facility] referral.    Likes Wegovy, but questioning med mechanism of action vs. Side effects. Feeling full quickly - about 1/3 way through meal. Light nausea (working to understand what foods may be contributing to this - carbs, especially greasy), some constipation mixed with loose stools (may be related to food she notes ), some acid reflux (a few times per week, especially at night) --- never had this before in life, especially if having any spice - tums or Maalox helpful.    Notes feeling significant reduction in food noise - some disinterest in food, not eating all fries for first time in life. Reduction in historical food patterns that are problematic for her - less cravings, less emotional eating, easier to distract.    Has tried weight loss programs (weight watchers), diet, exercise, working with dietician. In early 30s, lost 30 lbs and was able to maintain for awhile and then lost over 100 lbs with lots of activity and working with dietician. Kept this weight  "management for 5 years. Then hit menopause, had significant stress in family and had hospitalization for manic episode - gained it all back. Has been current weight for about 5 years.     Nutrition/Eating Habits:  Patient reports interest in exploring bariatric surgery and/or medication weight management - sees dietician Mar Xiong. Focuses on protein, vegetables.  Breakfast: fruit, yogurt  Lunch: leftover dinner (protein, veggies, complex carb)  Dinner: (cooks at home mostly): crockpot a lot - protein, veggies, complex carbs  Water intake  - 60+ oz/day    Exercise/Activity: working with physical therapy friend for safe activity to help with weight management. Walks on treadmill 3-5 days per week 30mins per session. Rides bike. Noting some small increase in stamina recently.     Has SVT, which somewhat limits activity. Hot and dyspnea (attributes to lithium) when overly active in the summer.    Medication Considerations:  Phentermine: positive hypertension and anxiety (bi-polar/manic episode), SVT  Topiramate: No kidney stones, some fatigue symptoms secondary to bipolar depression. Some word retrieval issues secondary to menopause she notes.  Naltrexone: No liver dysfunction, no chronic opiate use  Bupropion:  no seizure, positive hypertension and anxiety (bi-polar/manic episode), SVT  GLP1: Patient denies personal or family history of MEN Type2, MTC, Pancreatitis. Had gall bladder removed.      Initial weight: 300 lb     Current weight today: 280 lbs 4.8 oz  Cumulative Weight Loss: -20 lb, -6.7% from baseline    Wt Readings from Last 4 Encounters:   07/18/24 127.1 kg (280 lb 4.8 oz)   07/10/24 128.5 kg (283 lb 6.4 oz)   04/19/24 137 kg (302 lb)   04/18/24 136.2 kg (300 lb 3.2 oz)     Estimated body mass index is 43.9 kg/m  as calculated from the following:    Height as of 4/19/24: 1.702 m (5' 7\").    Weight as of this encounter: 127.1 kg (280 lb 4.8 oz).      Osteoporosis Prevention:   Vitamin D " 2000 international units once daily   Patient is not experiencing side effects.    Patient is getting 1-2 serving(s)/day of calcium in their diet. Wondering if she should add calcium supplement?  Risk factors: post-menopausal    Today's Vitals: Wt 127.1 kg (280 lb 4.8 oz)   BMI 43.90 kg/m    ----------------      I spent 31 minutes with this patient today. All changes were made via collaborative practice agreement with Rosana Washington PA-C . A copy of the visit note was provided to the patient's provider(s).    A summary of these recommendations was sent via [facility].    Cristel Recinos, Pharm D., MPH    Medication Therapy Management Pharmacist   [facility]      Telemedicine Visit Details  Type of service:  Video Conference via [employer]  Start Time:  8:30 AM  End Time:  9:-01 Am     Medication Therapy Recommendations  Osteoporosis screening    Current Medication: calcium carbonate (TUMS) 500 MG chewable tablet   Rationale: Does not understand instructions - Adherence - Adherence   Recommendation: Provide Education   Status: Patient Agreed - Adherence/Education

## 2024-07-18 NOTE — NURSING NOTE
Current patient location: [facility] [address]  [facility] [address]    Is the patient currently in the state of [place]? YES    Visit mode:VIDEO    If the visit is dropped, the patient can be reconnected by: VIDEO VISIT: Text to cell phone:   Telephone Information:   Mobile [phone]       Will anyone else be joining the visit? NO  (If patient encounters technical issues they should call [phone])    How would you like to obtain your AVS? MyChart    Are changes needed to the allergy or medication list? No    Are refills needed on medications prescribed by this physician? NO    Reason for visit: RECHECK (MTM follow-up/)    Milana ROBERTS

## 2024-07-18 NOTE — LETTER
7/18/2024      RE: Angy Haji  [address]  [facility] [address]       Dear Colleague,    Thank you for the opportunity to participate in the care of your patient, Angy Haji, at the [facility]. Please see a copy of my visit note below.    Medication Therapy Management (MTM) Encounter    ASSESSMENT:                            Medication Adherence/Access: No issues identified    Weight management: down 6.7% total body weight. Significant portion of visit today educating med MOA and relationship to side effects -- how to manage with  dietary and behavioral modifications.   Patient would benefit from continuing pharmacotherapy for weight management. Recommend to continue current dose given some side effects and significant weight loss with starting doses. Given class III obesity, recommend GLP1/GIP therapy as data to support most significant weight loss and patient has no contraindications. Patient also seeing benefit from reduction in food noise and increased satiety.     Osteoporosis Prevention: exceeding NOF rec for daily vitamin D 1000 international units , safe dose for patient. Education provided on Tums use to help with GERD and to monitor for improvement as plan to continue on stable dose of Wegovy for now. Tums also is source of calcium so recommend to continue daily use to reach NOF goal of 1200 mg calcium daily.    PLAN:                            Continue Wegovy 1mg weekly.    To help with tolerability and effectiveness of Wegovy :  Eat small meals/snacks throughout the day (about every 2 hours)  Focus on getting protein in first with each meal and snack.   Drink plenty of water - goal 64 oz throughout the day  You may try Metamucil, Benefiber, or Citrucel to help feel more full (less nausea) and have softer, more consistent bowel movements.  To optimize weight management - work on incorporating resistance  training/weight lifting to build muscle and improve overall metabolism of adipose tissue.  Pretreat spicy foods with Tums and after as needed. Do not lie down for 1 hour after eating.    2. Your goal is to get 1200 mg calcium daily for bone health. Your body can only absorb about 500 mg at one time (1 Tums). So a good guideline is to take a calcium supplement (Tums) 500 mg twice daily and then get 1-2 servings of dairy or green leafy vegetables daily (each serving is about 300 mg calcium).        Follow-up: 10/22 with Cristel Recinos [facility] for follow up visit    SUBJECTIVE/OBJECTIVE:                          Angy Haji is a 59 year old female seen for a follow-up visit.       Reason for visit: Medication Therapy Management -GLP1/GIP Management .    Allergies/ADRs: Reviewed in chart  Past Medical History: Reviewed in chart  Tobacco: She reports that she has never smoked. She has never used smokeless tobacco.  Alcohol: Less than 1 beverages / week - socially   Caffeine: 32 oz caffeine daily  Personal Healthcare Goals: goal to manage weight with     Medication Adherence/Access: medication concerns - insurance restrictions.    Weight Management  Class III Obesity (BMI 40 or more):   Wegovy 1mg weekly     Following with Mikey Cortés MD in [facility] referral.    Likes Wegovy, but questioning med mechanism of action vs. Side effects. Feeling full quickly - about 1/3 way through meal. Light nausea (working to understand what foods may be contributing to this - carbs, especially greasy), some constipation mixed with loose stools (may be related to food she notes ), some acid reflux (a few times per week, especially at night) --- never had this before in life, especially if having any spice - tums or Maalox helpful.    Notes feeling significant reduction in food noise - some disinterest in food, not eating all fries for first time in life. Reduction in historical food patterns  that are problematic for her - less cravings, less emotional eating, easier to distract.    Has tried weight loss programs (weight watchers), diet, exercise, working with dietician. In early 30s, lost 30 lbs and was able to maintain for awhile and then lost over 100 lbs with lots of activity and working with dietician. Kept this weight management for 5 years. Then hit menopause, had significant stress in family and had hospitalization for manic episode - gained it all back. Has been current weight for about 5 years.     Nutrition/Eating Habits:  Patient reports interest in exploring bariatric surgery and/or medication weight management - sees dietician Mar Xiong. Focuses on protein, vegetables.  Breakfast: fruit, yogurt  Lunch: leftover dinner (protein, veggies, complex carb)  Dinner: (cooks at home mostly): crockpot a lot - protein, veggies, complex carbs  Water intake  - 60+ oz/day    Exercise/Activity: working with physical therapy friend for safe activity to help with weight management. Walks on treadmill 3-5 days per week 30mins per session. Rides bike. Noting some small increase in stamina recently.     Has SVT, which somewhat limits activity. Hot and dyspnea (attributes to lithium) when overly active in the summer.    Medication Considerations:  Phentermine: positive hypertension and anxiety (bi-polar/manic episode), SVT  Topiramate: No kidney stones, some fatigue symptoms secondary to bipolar depression. Some word retrieval issues secondary to menopause she notes.  Naltrexone: No liver dysfunction, no chronic opiate use  Bupropion:  no seizure, positive hypertension and anxiety (bi-polar/manic episode), SVT  GLP1: Patient denies personal or family history of MEN Type2, MTC, Pancreatitis. Had gall bladder removed.      Initial weight: 300 lb     Current weight today: 280 lbs 4.8 oz  Cumulative Weight Loss: -20 lb, -6.7% from baseline    Wt Readings from Last 4 Encounters:   07/18/24 127.1 kg  "(280 lb 4.8 oz)   07/10/24 128.5 kg (283 lb 6.4 oz)   04/19/24 137 kg (302 lb)   04/18/24 136.2 kg (300 lb 3.2 oz)     Estimated body mass index is 43.9 kg/m  as calculated from the following:    Height as of 4/19/24: 1.702 m (5' 7\").    Weight as of this encounter: 127.1 kg (280 lb 4.8 oz).      Osteoporosis Prevention:   Vitamin D 2000 international units once daily   Patient is not experiencing side effects.    Patient is getting 1-2 serving(s)/day of calcium in their diet. Wondering if she should add calcium supplement?  Risk factors: post-menopausal    Today's Vitals: Wt 127.1 kg (280 lb 4.8 oz)   BMI 43.90 kg/m    ----------------      I spent 31 minutes with this patient today. All changes were made via collaborative practice agreement with Rosana Washington PA-C . A copy of the visit note was provided to the patient's provider(s).    A summary of these recommendations was sent via [email or web address].    Cristel Recinos, Pharm D., MPH    Medication Therapy Management Pharmacist   [facility]      Telemedicine Visit Details  Type of service:  Video Conference via [employer]  Start Time:  8:30 AM  End Time:  9:-01 Am     Medication Therapy Recommendations  Osteoporosis screening    Current Medication: calcium carbonate (TUMS) 500 MG chewable tablet   Rationale: Does not understand instructions - Adherence - Adherence   Recommendation: Provide Education   Status: Patient Agreed - Adherence/Education                Please do not hesitate to contact me if you have any questions/concerns.     Sincerely,     Cristel Recinos, [facility]  "

## 2024-07-18 NOTE — Clinical Note
Fyi CPA used per [employer] insurance requirements - keeping Wegovy stable as some side effects; following with PCP. Given lifestyle mods to help with tolerability.   Cristel

## 2024-07-18 NOTE — PATIENT INSTRUCTIONS
"Recommendations from MTM Pharmacist visit:                                                    MTM (medication therapy management) is a service provided by a clinical pharmacist designed to help you get the most of out of your medicines.  You may be sent a phone or email survey evaluating today's visit.  Please provide feedback you have for the service he received today if you are able.      Continue Wegovy 1mg weekly.    To help with tolerability and effectiveness of Wegovy :  Eat small meals/snacks throughout the day (about every 2 hours)  Focus on getting protein in first with each meal and snack.   Drink plenty of water - goal 64 oz throughout the day  You may try Metamucil, Benefiber, or Citrucel to help feel more full (less nausea) and have softer, more consistent bowel movements.  To optimize weight management - work on incorporating resistance training/weight lifting to build muscle and improve overall metabolism of adipose tissue.  Pretreat spicy foods with Tums and after as needed. Do not lie down for 1 hour after eating.    2. Your goal is to get 1200 mg calcium daily for bone health. Your body can only absorb about 500 mg at one time (1 Tums). So a good guideline is to take a calcium supplement (Tums) 500 mg twice daily and then get 1-2 servings of dairy or green leafy vegetables daily (each serving is about 300 mg calcium).        Follow-up: 10/22 with Cristel Recinos [facility] for follow up visit        It was great speaking with you today.  I value your experience and would be very thankful for your time in providing feedback in our clinic survey. In the next few days, you may receive an email or text message from [employer] with a link to a survey related to your  clinical pharmacist.\"     To schedule another MTM appointment, please call the clinic directly ([facility] Phone Number: [phone] (schedules for [facility] and [facility] - providers, dietitians, " health coaches) or you may call the [facility] scheduling line at [phone] or toll-free at [phone].     My Clinical Pharmacist's contact information:                                                      Please feel free to contact me with any questions or concerns you have.      Cristel Recinos, Pharm D., MPH    Medication Therapy Management Pharmacist   [facility]

## 2024-07-22 NOTE — PROGRESS NOTES
Infusion completed, pt tolerated well. Observation started   Telehealth Details  Type of service:  video visit for medication management  Time of service:    Start Time:  8:39 AM    End Time:    9:09 AM    Originating Site (patient location):  State- [place]   Location- Patient's home  Distant Site (provider location):  [facility]  Mode of Communication:  Video conference via [employer]           [facility]  Psychiatry Clinic  PSYCHIATRY PROGRESS NOTE     CARE TEAM:  PCP- Magali Lira, Psychotherapist- [facility] with Verna Chan  Specialty Providers- OB/GYN, ENT, PT, Nutrition      Wife- Rachael Haji is a 55 year old who prefers the name Angy and uses pronouns she, her, hers.      DIAGNOSIS     Bipolar 1 disorder, current depressive episode, moderate   H/o EPSE secondary to neuroleptic use  H/o Thrombocytopenia secondary to Depakote  H/o lithium toxicity, resolved     ASSESSMENT    Angy reports that her wife, Rachael has noticed that her depression is improved with Lamictal increase though patient reports that she doesn't totally feel like herself. Patient has noticed that her mood is improved and she enjoyed vacation and time with family. ~12 days(5/17) after the Lamictal increase, Angy reports that she noticed a bump on her skin which expanded while she was on vacation(5/31) an dis intermittently itchy. She does not endorse any other skin or mucosal changes. She states that it's not a rash and she thinks that it might be due to sunburn which is why she hasn't bene concerned about it. I urged her to still get the skin change checked out by a doctor in-person within the next few days. We talked about the risk of SJS while on Lamictal and it was better to be safe. I couldn't examine the patient myself because we were on a virtual visit and though this might likely be a skin reaction from a non-pharmacologic source, it's extremely safe to check it out.  If reported skin reaction suggests pharmacologic cause, patient will  have to discontinue Lamictal. If not, we will increase dose to 300mg.No SI or HI.    [place] was not needed today. Patient is not taking any controlled substances.     PLAN                                                                                                                1) Meds-  - Continue Lithium 1050mg at bedtime  - Continue Lamictal 250mg  daily    2) Psychotherapy-Continue individual therapy    3) Next due-  Labs- Li labs due 11/2022  EKG- PRN  Rating scales- N/A    4) Referrals-  None    5) Dispo- RTC in 4 weeks       PERTINENT BACKGROUND                           [most recent eval 08/22/21]    Patient has not had significant mental health history until 2018, when she developed perimenopausal mood and anxiety symptoms.  She was subsequently referred to our clinic following a hospitalization for anthony in November 2018.  Her care has been complicated by sensitivity to antipsychotics (EPSE including difficulty swallowing), thrombocytopenia secondary to Depakote, lithium toxicity in setting of concurrent Depakote use).    Psych pertinent item history includes Manic episode(h/o one manic episode Nov/Dec 2018), psychiatric hospitalization in November 2018.     SUBJECTIVE   Patient presented for a follow up.    Since last visit:  Angy increased her Lamictal dose to 250mg in May once she got clearance from her cardiologist.  -Angy reports that her depression is improved but she still experiences some episodes of anhedonia. She has received reports from her wife that she has noticed that Angy looks brighter and she thinks that patient is less tired.  -Angy states that her last vacation was stressful to pack and prepare for. She felt really anxious leading up to it but the vacation was good. She enjoyed interacting with family and activities.  -Patient reports that she she noticed a weird bump around her elbow, 5/17 and an expansion and intermittently itchy during her vacation. She thinks that it  might be a sunburn but we talked about letting a doctor see it. She has used hydrocortisone cream on it. The last time she had something similar was when she was younger and sometimes when she has contact dermatitis.    RECENT PSYCH ROS:   Depression: improving mood, less anhedonia, and more energy.  Elevated:  none  Psychosis:  none  Anxiety:  excessive worry  Trauma Related:  none  Sleep: Reports that she's sleeping okay  Other: N/A    Adverse Effects: Vivid dreams have reduced in frequency  Pertinent Negative Symptoms: No suicidal ideation, self-injurious behavior/urges or violent ideation  Recent Substance Use:   Alcohol- 1 drink (beer or wine) per week or 2 weeks.     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   to wifeRachael. They have a teenage son.     PSYCH and SUBSTANCE USE Critical Summary Points since July 2021 08/26/2021: Resident transfer visit. Increased Lithium to 1200mg.  10/15/2021: decrease Lithium back to 1050 mg  11/02/2021: Lamictal titration plan started  11/29/2021: Increase Lamictal to 100 mg for 7 days and then increase to 150 mg  12/14/2021: Increase Lamictal to 200mg  01/24/2022: No medication changes  03/18/2022: No medication changes  04/29/2022: No medication changes  05/05/2022: Increased Lamictal to 250 mg (inbtwn  visits)  06/10/2022: No medication changes     PAST MED TRIALS      Medication Max Dose (mg) Dates / Duration Helpful? DC Reason / Adverse Effects?   Sertraline       Depakote    Tremors   Olanzapine    EPSE   Seroquel    EPSE   Lithium 1050 current          MEDICAL HISTORY and ALLERGY     ALLERGIES: Seroquel [quetiapine], Olanzapine, Penicillins, Risperidone, and Thimerosal    Patient Active Problem List   Diagnosis     Oral aphthae     CARDIOVASCULAR SCREENING; LDL GOAL LESS THAN 160     Rosacea     Raynaud disease     Vitamin D deficiency disease     Mild sleep apnea     Atopic rhinitis     Chronic bilateral low back pain without sciatica     Obesity, Class III, BMI 40-49.9 (morbid obesity) (H)     Muscle tension dysphonia     Palpitations     Dysphagia, unspecified type     Anxiety     Benign essential hypertension     Callus of foot     Varicose veins of right lower extremity with edema     Bipolar I disorder (H)     Dribbling of urine     It band syndrome, right     Spinal stenosis at L4-L5 level     Facet arthropathy, lumbar        MEDICAL REVIEW OF SYSTEMS     Patient is on Prempro     MEDICATIONS     Current Outpatient Medications   Medication Sig Dispense Refill     Carboxymethylcellulose Sod PF (REFRESH PLUS) 0.5 % SOLN ophthalmic solution Place 1 drop into both eyes 3 times daily as needed for dry eyes       cyclobenzaprine (FLEXERIL) 10 MG tablet Take 1 tablet (10 mg) by mouth nightly as needed for muscle spasms 10 tablet 0     estradiol (ESTRACE) 1 MG tablet Take 1 tablet (1 mg) by mouth daily 90 tablet 3     estradiol (VAGIFEM) 10 MCG TABS vaginal tablet Place 1 tablet (10 mcg) vaginally twice a week 25 tablet 3     estrogen conj (PREMARIN) 0.625 MG tablet Take 1 tablet (0.625 mg) by mouth daily 90 tablet 3     estrogen conj-medroxyPROGESTERone (PREMPRO) 0.625-2.5 MG tablet Take 1 tablet by mouth daily 90 tablet 3     lamoTRIgine (LAMICTAL) 100 MG tablet Take half of a tablet (50 mg ) by mouth daily  "in addition to one 200 mg tablet 30 tablet 1     lamoTRIgine (LAMICTAL) 200 MG tablet Take 1 tablet (200 mg) by mouth daily 30 tablet 1     lidocaine (LIDODERM) 5 % patch Place 1-2 patches onto the skin every 24 hours To prevent lidocaine toxicity, patient should be patch free for 12 hrs daily. 30 patch 1     lisinopril (ZESTRIL) 10 MG tablet Take 1 tablet (10 mg) by mouth daily 90 tablet 3     lithium (ESKALITH) 150 MG capsule Take 1 capsule (150 mg) by mouth At Bedtime 30 capsule 1     lithium 300 MG capsule Take 3 capsules (900 mg) by mouth At Bedtime 90 capsule 1     Loratadine 10 MG capsule Take 10 mg by mouth daily as needed.       medroxyPROGESTERone (PROVERA) 2.5 MG tablet Take 1 tablet (2.5 mg) by mouth daily 90 tablet 3     metroNIDAZOLE (METROGEL) 1 % external gel Apply topically daily 60 g 9     ORDER FOR DME Use your CPAP device as directed by your provider.       VITAMIN D, CHOLECALCIFEROL, PO Take 2,000 Units by mouth daily       vitamin D2 (ERGOCALCIFEROL) 29818 units (1250 mcg) capsule Take 1 capsule (50,000 Units) by mouth once a week 8 capsule 0      VITALS   There were no vitals taken for this visit.    MENTAL STATUS EXAM     Alertness: alert and oriented  Appearance: well groomed  Behavior/Demeanor: cooperative, with good  eye contact   Speech: regular rate and rhythm  Language: intact  Psychomotor: normal or unremarkable  Mood: \"okay\"  Affect: appropriate; congruent to: mood- yes, content- yes  Thought Process/Associations: linear and logical  Thought Content:  Reports none;  Denies suicidal ideation and violent ideation  Perception:  Reports none;  Denies auditory hallucinations and visual hallucinations  Insight: good  Judgment: good  Cognition: does  appear grossly intact; formal cognitive testing was not done  Gait and Station: Normal strides with bilateral arm swings     LABS and DATA     PHQ9 TODAY = 6  JOANNA= 11  PHQ 1/17/2022 1/23/2022 6/10/2022   PHQ-9 Total Score 6 6 6   Q9: Thoughts of " better off dead/self-harm past 2 weeks Not at all Not at all Not at all       Recent Labs   Lab Test 05/04/22 0928 09/03/21  1007 05/05/21  0853   CR 0.56 0.61 0.56   GFRESTIMATED >90 >90 >90     Recent Labs   Lab Test 05/04/22 0928 02/03/21  1019 07/15/20  1617   AST 13 13 12   ALT 22 17 18   ALKPHOS 80 88 84     Recent Labs   Lab Test 05/04/22 0928 09/03/21  1007 05/05/21  0853   LITHIUM 0.8 0.8 0.78     Recent Labs   Lab Test 05/04/22 0928 09/03/21  1007   CR 0.56 0.61   GFRESTIMATED >90 >90    137   POTASSIUM 4.0 4.3   SHERRIE 9.2 9.4     Recent Labs   Lab Test 02/03/21  1011 07/15/20  1633   SG 1.011 1.008     Recent Labs   Lab Test 05/04/22 0928 09/03/21  1007   TSH 2.03 2.31     Recent Labs   Lab Test 05/04/22 0928 09/03/21  1007 02/03/21  1019 07/15/20  1617   WBC 7.4 6.4 7.7 8.3   ANEU  --   --  5.1 5.6       ECG :10/25/2021 QTc 442ms     PSYCHOTROPIC DRUG INTERACTIONS     Concurrent use of LITHIUM and ACE INHIBITORS may result in increased risk of lithium toxicity.     Estrogen Derivatives may decrease the serum concentration of LamoTRIgine       MANAGEMENT:  Monitoring for adverse effects, routine labs, periodic EKGs and patient is aware of risks     RISK STATEMENT for SAFETY     Angy Haji did not appear to be an imminent safety risk to self or others.    TREATMENT RISK STATEMENT: The risks, benefits, alternatives and potential adverse effects have been discussed and are understood by the pt. The pt understands the risks of using street drugs or alcohol. There are no medical contraindications, the pt agrees to treatment with the ability to do so. The pt knows to call the clinic for any problems or to access emergency care if needed.  Medical and substance use concerns are documented above.  Psychotropic drug interaction check was done, including changes made today.     PROVIDER: Tolulope Oluwadamilola Odebunmi, MD    Patient not staffed in clinic.  Note will be reviewed and signed by  supervisor Dr. Liu.      Level of Medical Decision Making:   - At least 1 chronic problem that is not stable  - Engaged in prescription drug management during visit (discussed any medication benefits, side effects, alternatives, etc.)        Must meet 2 out of 3 of the above MDM elements to bill at the specified level     For help more info about elements / criteria, click here-> MDM Help Grid     *This blue text will disappear automatically when note is signed, no need to delete*:[id number]}  [id number]}  :[id number]}

## 2024-07-26 DIAGNOSIS — M54.50 LOW BACK PAIN: ICD-10-CM

## 2024-07-26 DIAGNOSIS — M25.561 ACUTE PAIN OF RIGHT KNEE: Primary | ICD-10-CM

## 2024-07-26 ASSESSMENT — ANXIETY QUESTIONNAIRES
7. FEELING AFRAID AS IF SOMETHING AWFUL MIGHT HAPPEN: SEVERAL DAYS
8. IF YOU CHECKED OFF ANY PROBLEMS, HOW DIFFICULT HAVE THESE MADE IT FOR YOU TO DO YOUR WORK, TAKE CARE OF THINGS AT HOME, OR GET ALONG WITH OTHER PEOPLE?: SOMEWHAT DIFFICULT
GAD7 TOTAL SCORE: 8
IF YOU CHECKED OFF ANY PROBLEMS ON THIS QUESTIONNAIRE, HOW DIFFICULT HAVE THESE PROBLEMS MADE IT FOR YOU TO DO YOUR WORK, TAKE CARE OF THINGS AT HOME, OR GET ALONG WITH OTHER PEOPLE: SOMEWHAT DIFFICULT
5. BEING SO RESTLESS THAT IT IS HARD TO SIT STILL: NOT AT ALL
2. NOT BEING ABLE TO STOP OR CONTROL WORRYING: MORE THAN HALF THE DAYS
7. FEELING AFRAID AS IF SOMETHING AWFUL MIGHT HAPPEN: SEVERAL DAYS
6. BECOMING EASILY ANNOYED OR IRRITABLE: SEVERAL DAYS
GAD7 TOTAL SCORE: 8
1. FEELING NERVOUS, ANXIOUS, OR ON EDGE: SEVERAL DAYS
3. WORRYING TOO MUCH ABOUT DIFFERENT THINGS: MORE THAN HALF THE DAYS
GAD7 TOTAL SCORE: 8
4. TROUBLE RELAXING: SEVERAL DAYS

## 2024-07-30 ENCOUNTER — OFFICE VISIT (OUTPATIENT)
Dept: PSYCHIATRY | Facility: CLINIC | Age: 59
End: 2024-07-30
Attending: PSYCHIATRY & NEUROLOGY
Payer: COMMERCIAL

## 2024-07-30 VITALS
SYSTOLIC BLOOD PRESSURE: 146 MMHG | BODY MASS INDEX: 43.76 KG/M2 | WEIGHT: 279.4 LBS | DIASTOLIC BLOOD PRESSURE: 84 MMHG | HEART RATE: 71 BPM

## 2024-07-30 DIAGNOSIS — F31.9 BIPOLAR I DISORDER (H): Primary | ICD-10-CM

## 2024-07-30 PROCEDURE — 99214 OFFICE O/P EST MOD 30 MIN: CPT | Performed by: STUDENT IN AN ORGANIZED HEALTH CARE EDUCATION/TRAINING PROGRAM

## 2024-07-30 PROCEDURE — G2211 COMPLEX E/M VISIT ADD ON: HCPCS | Performed by: STUDENT IN AN ORGANIZED HEALTH CARE EDUCATION/TRAINING PROGRAM

## 2024-07-30 PROCEDURE — 99214 OFFICE O/P EST MOD 30 MIN: CPT | Mod: HN | Performed by: STUDENT IN AN ORGANIZED HEALTH CARE EDUCATION/TRAINING PROGRAM

## 2024-07-30 ASSESSMENT — PATIENT HEALTH QUESTIONNAIRE - PHQ9
SUM OF ALL RESPONSES TO PHQ QUESTIONS 1-9: 8
10. IF YOU CHECKED OFF ANY PROBLEMS, HOW DIFFICULT HAVE THESE PROBLEMS MADE IT FOR YOU TO DO YOUR WORK, TAKE CARE OF THINGS AT HOME, OR GET ALONG WITH OTHER PEOPLE: SOMEWHAT DIFFICULT
SUM OF ALL RESPONSES TO PHQ QUESTIONS 1-9: 8

## 2024-07-30 ASSESSMENT — PAIN SCALES - GENERAL: PAINLEVEL: MILD PAIN (2)

## 2024-07-30 NOTE — PROGRESS NOTES
[facility]  MEDICAL PROGRESS NOTE     CARE TEAM:    PCP- Mikey Cortés     Therapist- individual therapist Elfego Chan and another one for couple therapy    Angy is a 59 year old who uses the pronouns she, her, hers.      Diagnoses     Bipolar 1 disorder, current depressive episode, moderate   Insomnia, unspecified    H/o EPSE secondary to neuroleptic use  H/o Thrombocytopenia secondary to Depakote  H/o lithium toxicity, resolved     Assessment     Angy is dealing with a lot of stress due to divorce. She is trying to adapt to this new role as she has been with her partner with more than two decades. This affects her sleep, and her anxiety. Currently, her individual therapist is out, so we will schedule her to see us four weeks from now. Hydroxyzine has been helpful for sleep, but she has a lot of dry mouth. She would like to continue with the same regimen for now. I recommended her to take melatonin one hour before bedtime to see if it helps.     We discussed switching it for trazodone may be in the future if she finds it intolerable. Due to the risk for a manic episode, we also mentioned that other strategy would be to increase lithium dosage. Lithium may provide greater stabilization and some sedation. She is concerned about tremor, but she will be open to it. [place] labs are updated.    No safety concerns. No report of hallucinations, manic symptoms. She is aware that she can go to the EMPATH unit if she needs it. Go to the nearest ED or call 911 if she decompensate.     Psychotropic Drug Interactions:  [PSYCHCLINICDDI]  Estrogen Derivatives may decrease the serum concentration of LamoTRIgine  Angiotensin-Converting Enzyme Inhibitors may increase the serum concentration of Lithium.   LamoTRIgine may decrease the serum concentration of Progestins.      Management: routine monitoring    [place]MP was not checked today: not using  "controlled substances    Risk Statements:   Treatment Risk- Risks, benefits, alternatives and potential adverse effects have been discussed and are understood.   Safety Risk-nAgy did not appear to be an imminent safety risk to self or others.     Plan     1) Medications:    - Continue Lithium 1050 mg at bedtime  - Continue with Lamictal 350 mg to 400 mg at bedtime   - continue with hydroxyzine 10 mg PRN at bedtime   - add melatonin 1mg to 3mg at bedtime as needed for insomnia    2) Psychotherapy: Continue with couple's therapy    3) Next due:  Labs- 01/2025  EKG- . Last one was on 03/2024 with   Rating scales-  PHQ9    4) Referrals: none  N/A    5) Other: none    6) Follow-up: Return to clinic in 4 weeks     Pertinent Background                                                   [most recent eval 08/31/23]     Angy reported that she didn't have symptoms until November 2018. At which point she was hospitalized for anthony.     This section was copied forward from 08/2021 transfer notes.  \"Patient has not had significant mental health history until 2018, when she developed perimenopausal mood and anxiety symptoms.  She was subsequently referred to our clinic following a hospitalization for anthony in November 2018.  Her care has been complicated by sensitivity to antipsychotics (EPSE including difficulty swallowing), thrombocytopenia secondary to Depakote, lithium toxicity in setting of concurrent Depakote use\"     Pertinent items include: anthony , severe med reaction [described as H/o Thrombocytopenia secondary to Depakote, and H/o lithium toxicity], and psych hosp      Subjective     Angy reported that her \"stress level is so high\"  Sleep is moderate. She is able to sleep from 7 to 7:30 hours  Will get up 3 to 4 times at night especially when after conflict.  Wife now sleep in the guest room.  They haven't told their child that they are going to separate.  They would tell them when their child is back from " "camp.  They have told their family, and friends.  However, she has be working on creating community, and boundaries.  He has been doing exercise and taking a walk too.  Thinks increased in lamictal was helpful.  Hydrozyne causes a lot of dry mouth    She mentioned that her partner retained an .  She also  has to retain one because they will need to divide their possession.  She is also concerned about her inheritance.    She says that anxiety is really high  She has a lot of worry  She is concerned because her wife, Zahra, has done things that make her uneasy for example getting an , or getting money from her inheritance. She is having a  hard time to split stuffs they have in the house as Zahra is planing to leave.     Angy mentioned that her therapist will be available in August. [place], so she would like a sooner appointment.   Currently, she denies pressure speech, but her mind is busy. She also added that Zahra has not reported \"anything above and beyond situation.\" Zahra has seen her in manic state in the past, and think she is doing fine.    She denies AH/VH    Appetite: low because of wegovy. Usually she would be overeating in that situation.   has some day with trouble falling asleep especially after conflict.    Medication side effects: no vomiting, no confusion, no abdominal pain  She reports nausea, acid reflux, and some mild tremor that can worsen when she is stressed out.  Some constipation, can be one day a week. Takes miralax    SI/HI:none    Alcohol use: once a week, one serving of wine. No changes in alcohol habit.    Smoking: none    Other substances (cannabis): none       Mental Status Exam     Alertness: alert  and oriented  Appearance: adequately groomed  Behavior/Demeanor: cooperative and pleasant, with good  eye contact   Speech: normal and regular rate and rhythm  Language: intact  Psychomotor: normal or unremarkable  Mood:  \"stress level is so " "high\"  Affect: full range and tearful; congruent to: mood- yes, content- yes  Thought Process/Associations: unremarkable  Thought Content:  Reports none;  Denies suicidal & violent ideation and delusions  Perception:  Reports none;  Denies auditory hallucinations and visual hallucinations  Insight: good  Judgment: good  Cognition: does  appear grossly intact; formal cognitive testing was not done  Gait and Station: unremarkable     Past Psych Med Trials      Medication Max Dose (mg) Dates / Duration Helpful? DC Reason / Adverse Effects?   Depakote    Tremors   sertraline       olanzapine    EPSE   Seroquel    EPSE   lithium 1200   Tremor at 1200 mg had to lower the dose   Wellbutrin 300 15 years ago     Lamictal 400  yes       Treatment Course and Resendiz Events since  JULY 2023 08/2023 : no medication changes  11/2023 no medication changes  01/2023 no medication changes  03/2024 no medication changes  04/2024 no medication changes  06/2024 Increase Lamictal to 400 mg  07/2024 no medication change     Vitals   BP (!) 146/84   Pulse 71   Wt 126.7 kg (279 lb 6.4 oz)   BMI 43.76 kg/m    Pulse Readings from Last 3 Encounters:   07/30/24 71   06/17/24 63   04/29/24 64     Wt Readings from Last 3 Encounters:   07/30/24 126.7 kg (279 lb 6.4 oz)   07/18/24 127.1 kg (280 lb 4.8 oz)   07/10/24 128.5 kg (283 lb 6.4 oz)     BP Readings from Last 3 Encounters:   07/30/24 (!) 146/84   06/17/24 136/73   04/29/24 (!) 148/82      Medical History     ALLERGIES: Seroquel [quetiapine], Olanzapine, Penicillins, Risperidone, and Thimerosal    Patient Active Problem List   Diagnosis    Oral aphthae    CARDIOVASCULAR SCREENING; LDL GOAL LESS THAN 160    Rosacea    Raynaud disease    Vitamin D deficiency disease    MARLA (obstructive sleep apnea)    Atopic rhinitis    Chronic bilateral low back pain without sciatica    Obesity, Class III, BMI 40-49.9 (morbid obesity) (H)    Muscle tension dysphonia    Palpitations    Dysphagia, unspecified " type    Anxiety    Benign essential hypertension    Callus of foot    Varicose veins of right lower extremity with edema    Bipolar I disorder (H)    Dribbling of urine    Spinal stenosis at L4-L5 level    Facet arthropathy, lumbar        Medications     Current Outpatient Medications   Medication Sig Dispense Refill    acetaminophen (TYLENOL) 650 MG CR tablet Take 1 tablet (650 mg) by mouth every 8 hours as needed for mild pain or fever      calcium carbonate (TUMS) 500 MG chewable tablet Take 1 chew tab by mouth 2 times daily      Carboxymethylcellulose Sod PF (REFRESH PLUS) 0.5 % SOLN ophthalmic solution Place 1 drop into both eyes 3 times daily as needed for dry eyes      clotrimazole-betamethasone (LOTRISONE) 1-0.05 % external cream Apply topically 2 times daily 45 g 2    estradiol (ESTRACE) 0.5 MG tablet Take 1 tablet (0.5 mg) by mouth daily 90 tablet 3    estradiol (VAGIFEM) 10 MCG TABS vaginal tablet Place 1 tablet (10 mcg) vaginally twice a week 26 tablet 3    fluticasone (FLONASE) 50 MCG/ACT nasal spray Spray 1 spray into both nostrils daily      hydrOXYzine HCl (ATARAX) 10 MG tablet Take 1 tablet (10 mg) by mouth nightly as needed (sleep) 30 tablet 1    lamoTRIgine (LAMICTAL) 150 MG tablet Take 1 tablet (150 mg) by mouth daily Take with one tablet of 200 for a total of 350 mg daily. 30 tablet 2    lamoTRIgine (LAMICTAL) 200 MG tablet Take 2 tablets (400 mg) by mouth daily 60 tablet 0    lisinopril (ZESTRIL) 10 MG tablet TAKE ONE TABLET BY MOUTH ONCE DAILY 90 tablet 3    lithium (ESKALITH) 150 MG capsule Take 1 capsule (150 mg) by mouth at bedtime 30 capsule 2    lithium 300 MG capsule Take 3 tablet (900 mg) and one tablet (150 mg) for a total of 1050 mg at bedtime. 270 capsule 2    Loratadine 10 MG capsule Take 10 mg by mouth daily      medroxyPROGESTERone (PROVERA) 2.5 MG tablet Take 1 tablet (2.5 mg) by mouth daily 90 tablet 3    metroNIDAZOLE (METROGEL) 1 % external gel Apply topically daily 60 g 9     ORDER FOR DME Use your CPAP device as directed by your provider.      psyllium (METAMUCIL/KONSYL) 58.6 % powder Take 1 teaspoonful by mouth daily      Semaglutide-Weight Management (WEGOVY) 1 MG/0.5ML pen Inject 1 mg subcutaneously once a week 2 mL 2    VITAMIN D, CHOLECALCIFEROL, PO Take 2,000 Units by mouth daily          Labs and Data         1/3/2024    11:58 AM 4/25/2024     9:09 PM 7/26/2024    10:57 AM   PROMIS-10 Total Score w/o Sub Scores   PROMIS TOTAL - SUBSCORES 28 30 26          No data to display                  4/28/2024     3:44 PM 6/17/2024     2:59 PM 7/30/2024     7:47 AM   PHQ-9 SCORE   PHQ-9 Total Score MyChart 6 (Mild depression) 10 (Moderate depression) 8 (Mild depression)   PHQ-9 Total Score 6 10 8         10/31/2023     5:25 PM 6/15/2024     1:27 PM 7/26/2024    10:56 AM   JOANNA-7 SCORE   Total Score 11 (moderate anxiety) 10 (moderate anxiety) 8 (mild anxiety)   Total Score 11 10 8       Liver/Kidney Function, TSH Metabolic Blood counts   Recent Labs   Lab Test 07/11/24  0809 02/28/24  0845   AST  --  21   ALT  --  15   ALKPHOS  --  91   CR 0.63 0.63     Recent Labs   Lab Test 07/11/24  0809   TSH 2.43    Recent Labs   Lab Test 05/29/24  0815   CHOL 160   TRIG 150*   LDL 75   HDL 55     Recent Labs   Lab Test 10/04/22  1651   A1C 5.3     Recent Labs   Lab Test 07/11/24  0809   GLC 88    Recent Labs   Lab Test 02/28/24  0845   WBC 6.6   HGB 14.0   HCT 44.9   MCV 90            Recent Labs   Lab Test 07/11/24  0809 11/08/23  1016 05/15/23  1017   LITHIUM 1.00 0.69 0.7        PROVIDER: Antonio Santillan MD MPH    Level of Medical Decision Making:   - At least 1 chronic problem that is not stable  - Engaged in prescription drug management during visit (discussed any medication benefits, side effects, alternatives, etc.)         Patient was not staffed in clinic.  Supervisor is Dr. Vivian Burnett.      The longitudinal plan of care for Bipolar disorder was addressed during this visit. Due to  the added complexity in care, I will continue to support Angy in the subsequent management of this condition(s) and with the ongoing continuity of care of this condition(s)      Answers submitted by the patient for this visit:  Patient Health Questionnaire (Submitted on 7/30/2024)  If you checked off any problems, how difficult have these problems made it for you to do your work, take care of things at home, or get along with other people?: Somewhat difficult  PHQ9 TOTAL SCORE: 8  JOANNA-7 (Submitted on 7/26/2024)  JOANNA 7 TOTAL SCORE: 8

## 2024-08-10 ENCOUNTER — HEALTH MAINTENANCE LETTER (OUTPATIENT)
Age: 59
End: 2024-08-10

## 2024-08-14 ENCOUNTER — OFFICE VISIT (OUTPATIENT)
Dept: ORTHOPEDICS | Facility: CLINIC | Age: 59
End: 2024-08-14
Payer: COMMERCIAL

## 2024-08-14 DIAGNOSIS — M17.11 PRIMARY OSTEOARTHRITIS OF RIGHT KNEE: Primary | ICD-10-CM

## 2024-08-14 PROCEDURE — 20610 DRAIN/INJ JOINT/BURSA W/O US: CPT | Mod: RT | Performed by: FAMILY MEDICINE

## 2024-08-14 RX ORDER — LIDOCAINE HYDROCHLORIDE 10 MG/ML
4 INJECTION, SOLUTION EPIDURAL; INFILTRATION; INTRACAUDAL; PERINEURAL
Status: SHIPPED | OUTPATIENT
Start: 2024-08-14

## 2024-08-14 RX ORDER — TRIAMCINOLONE ACETONIDE 40 MG/ML
40 INJECTION, SUSPENSION INTRA-ARTICULAR; INTRAMUSCULAR
Status: SHIPPED | OUTPATIENT
Start: 2024-08-14

## 2024-08-14 RX ADMIN — TRIAMCINOLONE ACETONIDE 40 MG: 40 INJECTION, SUSPENSION INTRA-ARTICULAR; INTRAMUSCULAR at 07:04

## 2024-08-14 RX ADMIN — LIDOCAINE HYDROCHLORIDE 4 ML: 10 INJECTION, SOLUTION EPIDURAL; INFILTRATION; INTRACAUDAL; PERINEURAL at 07:04

## 2024-08-14 ASSESSMENT — ACTIVITIES OF DAILY LIVING (ADL)
STIFFNESS: THE SYMPTOM AFFECTS MY ACTIVITY MODERATELY
SIT WITH YOUR KNEE BENT: ACTIVITY IS MINIMALLY DIFFICULT
PAIN: THE SYMPTOM AFFECTS MY ACTIVITY MODERATELY
GIVING WAY, BUCKLING OR SHIFTING OF KNEE: I DO NOT HAVE THE SYMPTOM
WALK: ACTIVITY IS MINIMALLY DIFFICULT
GO UP STAIRS: ACTIVITY IS FAIRLY DIFFICULT
LIMPING: I DO NOT HAVE THE SYMPTOM
SQUAT: ACTIVITY IS FAIRLY DIFFICULT
KNEEL ON THE FRONT OF YOUR KNEE: ACTIVITY IS VERY DIFFICULT
HOW_WOULD_YOU_RATE_THE_OVERALL_FUNCTION_OF_YOUR_KNEE_DURING_YOUR_USUAL_DAILY_ACTIVITIES?: NEARLY NORMAL
RAW_SCORE: 43
GO DOWN STAIRS: ACTIVITY IS FAIRLY DIFFICULT
SWELLING: THE SYMPTOM AFFECTS MY ACTIVITY MODERATELY
GIVING WAY, BUCKLING OR SHIFTING OF KNEE: I DO NOT HAVE THE SYMPTOM
HOW_WOULD_YOU_RATE_THE_OVERALL_FUNCTION_OF_YOUR_KNEE_DURING_YOUR_USUAL_DAILY_ACTIVITIES?: NEARLY NORMAL
RISE FROM A CHAIR: ACTIVITY IS SOMEWHAT DIFFICULT
SWELLING: THE SYMPTOM AFFECTS MY ACTIVITY MODERATELY
PAIN: THE SYMPTOM AFFECTS MY ACTIVITY MODERATELY
GO DOWN STAIRS: ACTIVITY IS FAIRLY DIFFICULT
WEAKNESS: I DO NOT HAVE THE SYMPTOM
LIMPING: I DO NOT HAVE THE SYMPTOM
AS_A_RESULT_OF_YOUR_KNEE_INJURY,_HOW_WOULD_YOU_RATE_YOUR_CURRENT_LEVEL_OF_DAILY_ACTIVITY?: NEARLY NORMAL
SQUAT: ACTIVITY IS FAIRLY DIFFICULT
HOW_WOULD_YOU_RATE_THE_CURRENT_FUNCTION_OF_YOUR_KNEE_DURING_YOUR_USUAL_DAILY_ACTIVITIES_ON_A_SCALE_FROM_0_TO_100_WITH_100_BEING_YOUR_LEVEL_OF_KNEE_FUNCTION_PRIOR_TO_YOUR_INJURY_AND_0_BEING_THE_INABILITY_TO_PERFORM_ANY_OF_YOUR_USUAL_DAILY_ACTIVITIES?: 50
RISE FROM A CHAIR: ACTIVITY IS SOMEWHAT DIFFICULT
STAND: ACTIVITY IS MINIMALLY DIFFICULT
KNEEL ON THE FRONT OF YOUR KNEE: ACTIVITY IS VERY DIFFICULT
AS_A_RESULT_OF_YOUR_KNEE_INJURY,_HOW_WOULD_YOU_RATE_YOUR_CURRENT_LEVEL_OF_DAILY_ACTIVITY?: NEARLY NORMAL
KNEE_ACTIVITY_OF_DAILY_LIVING_SCORE: 61.43
PLEASE_INDICATE_YOR_PRIMARY_REASON_FOR_REFERRAL_TO_THERAPY:: KNEE
WALK: ACTIVITY IS MINIMALLY DIFFICULT
STIFFNESS: THE SYMPTOM AFFECTS MY ACTIVITY MODERATELY
KNEE_ACTIVITY_OF_DAILY_LIVING_SUM: 43
GO UP STAIRS: ACTIVITY IS FAIRLY DIFFICULT
SIT WITH YOUR KNEE BENT: ACTIVITY IS MINIMALLY DIFFICULT
HOW_WOULD_YOU_RATE_THE_CURRENT_FUNCTION_OF_YOUR_KNEE_DURING_YOUR_USUAL_DAILY_ACTIVITIES_ON_A_SCALE_FROM_0_TO_100_WITH_100_BEING_YOUR_LEVEL_OF_KNEE_FUNCTION_PRIOR_TO_YOUR_INJURY_AND_0_BEING_THE_INABILITY_TO_PERFORM_ANY_OF_YOUR_USUAL_DAILY_ACTIVITIES?: 50
WEAKNESS: I DO NOT HAVE THE SYMPTOM
STAND: ACTIVITY IS MINIMALLY DIFFICULT

## 2024-08-14 NOTE — PATIENT INSTRUCTIONS
Ice  Voltaren gel    Physical therapy- land will start soon      CSI given today      Working on hip abductor and CORE exercises

## 2024-08-14 NOTE — LETTER
8/14/2024      RE: Angy Haji  [address]  [facility] [address]     Dear Colleague,    Thank you for referring your patient, Angy Haji, to the [facility] [place]. Please see a copy of my visit note below.    ASSESSMENT/PLAN:    60 yo female with PMhx of Raynaud's disease, varicose veins in the right lower extremity with edema, spinal stenosis L4-L5 level presenting to follow-up on right knee osteoarthritis  Right knee osteoarthritis-  Difficulties getting into a pool therapy because of location and insurance  Patient was able to get a land physical therapy appointment  Voltaren gel can be applied up to 4 times a day  Tylenol as needed  Patient could go to orthotics for possible special fitted knee compression brace  Patient will be working on strengthening and core exercises  Patient to remain active is much as possible and may require activity modification    Return to clinic 2 months or as needed  Monitor injection for how long it lasts    Pt is a 59 year old female here today for:     Right knee pain.    ESTABLISHED PATIENT FOLLOW-UP:  Pain of the Right Knee       HISTORY OF PRESENT ILLNESS  Ms. Haji is a pleasant 59 year old year old female who presents to clinic today for follow-up of right knee. Patient reports that the previous right knee injection on 5/20/24 provided significant pain relief. She states that the pain was relieved for about 3 months, not quite as long as the previous injection about 10 months. Gardening recently, cutting branches, seems to flare the knee.  Knee seemed swollen, right knee was puffy.  Pt has been icing  Stiffness if sitting for long period  Biking- hard time with hyperflexion and getting around the wheel with leg  First injection of the knee lasted about 10 months and most recent 1 was for couple months    Date of injury: Chronic  Date last seen: 5/10/24  Following Therapeutic Plan: Yes  Pain: 4/10  Function: improved after  injection      Additional medical/Social/Surgical histories reviewed in [facility] and updated as appropriate.    REVIEW OF SYSTEMS (8/14/2024)  CONSTITUTIONAL: Denies fever and weight loss  GASTROINTESTINAL: Denies abdominal pain, nausea, vomiting  MUSCULOSKELETAL: See HPI  SKIN: Denies any recent rash or lesion  NEUROLOGICAL: Denies numbness or focal weakness      EXAM:   right Knee:   ROM: 0-130; Crepitus: none   Effusion: none ; Swelling: mild   Strength: Full in flexion/ extension   Tenderness: Patella - none Medial joint line - none; Lateral joint line - ++; Quad tendon - none; Patellar tendon- none; Hamstring - mild on lateral.   Cruciates: anterior drawer - neg/posterior drawer -neg. Lachman - neg   Collaterals: varus -neg/valgus -neg.   Patella: patellar compression - neg; single leg bend- neg   Meniscus: Tom - neg; Thessaly - not performed   Maneuvers: Leslie - not performed      Past Medical History:   Diagnosis Date     Depressive disorder     Previous depressive episodes. Diagnosed with Bipolar illness in 11/18 after manic episode.     Disorders of bursae and tendons in shoulder region, unspecified      Female infertility of unspecified origin      Gallbladder sludge 05/2013     Hypertension 10/18     Mild sleep apnea 05/13/2014     Obesity, unspecified      Oral aphthae      Other acne      Other specified hypoglycemia      PONV (postoperative nausea and vomiting)       Past Surgical History:   Procedure Laterality Date     BIOPSY      age 42 breast biopsy     COLONOSCOPY  2018     COLONOSCOPY N/A 2/13/2024    Procedure: Colonoscopy;  Surgeon: Cheryl Loza MD;  Location:  GI     GYN SURGERY  2016 for ovarian cyst    left ovary, 2 fallopian tubes removed. endometrioma.     HC TOOTH EXTRACTION W/FORCEP  1984/85    [place] [facility]     IR ENDOVENOUS ABLATION VARICOSE VEINS  12/12/2019     LAPAROSCOPIC CHOLECYSTECTOMY  07/29/2013    Procedure: LAPAROSCOPIC CHOLECYSTECTOMY;  Laparoscopic  Cholecystectomy;  Surgeon: Fabio Johnson MD;  Location: [facility]     LAPAROSCOPIC OOPHORECTOMY Left 06/23/2016    Procedure: LAPAROSCOPIC OOPHORECTOMY;  Surgeon: Kayley Donnelly MD;  Location: [facility]     LAPAROSCOPIC SALPINGECTOMY Bilateral 06/23/2016    Procedure: LAPAROSCOPIC SALPINGECTOMY;  Surgeon: Kayley Donnelly MD;  Location: [facility] OR     VASCULAR SURGERY  12/12    varicose vein proceedures      Current Outpatient Medications   Medication Sig Dispense Refill     acetaminophen (TYLENOL) 650 MG CR tablet Take 1 tablet (650 mg) by mouth every 8 hours as needed for mild pain or fever       calcium carbonate (TUMS) 500 MG chewable tablet Take 1 chew tab by mouth 2 times daily       Carboxymethylcellulose Sod PF (REFRESH PLUS) 0.5 % SOLN ophthalmic solution Place 1 drop into both eyes 3 times daily as needed for dry eyes       clotrimazole-betamethasone (LOTRISONE) 1-0.05 % external cream Apply topically 2 times daily 45 g 2     estradiol (ESTRACE) 0.5 MG tablet Take 1 tablet (0.5 mg) by mouth daily 90 tablet 3     estradiol (VAGIFEM) 10 MCG TABS vaginal tablet Place 1 tablet (10 mcg) vaginally twice a week 26 tablet 3     fluticasone (FLONASE) 50 MCG/ACT nasal spray Spray 1 spray into both nostrils daily       hydrOXYzine HCl (ATARAX) 10 MG tablet Take 1 tablet (10 mg) by mouth nightly as needed (sleep) 30 tablet 1     lamoTRIgine (LAMICTAL) 150 MG tablet Take 1 tablet (150 mg) by mouth daily Take with one tablet of 200 for a total of 350 mg daily. 30 tablet 2     lamoTRIgine (LAMICTAL) 200 MG tablet Take 2 tablets (400 mg) by mouth daily 60 tablet 0     lisinopril (ZESTRIL) 10 MG tablet TAKE ONE TABLET BY MOUTH ONCE DAILY 90 tablet 3     lithium (ESKALITH) 150 MG capsule Take 1 capsule (150 mg) by mouth at bedtime 30 capsule 2     lithium 300 MG capsule Take 3 tablet (900 mg) and one tablet (150 mg) for a total of 1050 mg at bedtime. 270 capsule 2     Loratadine 10 MG capsule Take 10 mg by  mouth daily       medroxyPROGESTERone (PROVERA) 2.5 MG tablet Take 1 tablet (2.5 mg) by mouth daily 90 tablet 3     metroNIDAZOLE (METROGEL) 1 % external gel Apply topically daily 60 g 9     ORDER FOR DME Use your CPAP device as directed by your provider.       psyllium (METAMUCIL/KONSYL) 58.6 % powder Take 1 teaspoonful by mouth daily       Semaglutide-Weight Management (WEGOVY) 1 MG/0.5ML pen Inject 1 mg subcutaneously once a week 2 mL 2     VITAMIN D, CHOLECALCIFEROL, PO Take 2,000 Units by mouth daily        Allergies   Allergen Reactions     Seroquel [Quetiapine] Nausea and Vomiting     Had constant vomiting     Olanzapine      Tongue welling, bad EPSE (Parkinsonian)     Penicillins Rash     Risperidone      Tongue welling, bad EPSE (Parkinsonian)       Thimerosal Other (See Comments)     blisters on inside of eyelid      ROS:   Gen- no fevers/chills   Rheum - no morning stiffness   Derm - no rash/ redness   Neuro - no numbness, no tingling   Remainder of ROS negative.     Exam:   There were no vitals taken for this visit.     Xray of right knee    My impression: mild medial compartment joint space loss bilaterally         Large Joint Injection: R knee joint    Date/Time: 8/14/2024 7:04 AM    Performed by: Danielle Valadez MD  Authorized by: Danielle Valadez MD    Indications:  Pain  Needle Size comment:  23 G  Guidance: landmark guided    Approach:  Superolateral  Location:  Knee      Medications:  40 mg triamcinolone 40 MG/ML; 4 mL lidocaine (PF) 1 %  Outcome:  Tolerated well, no immediate complications  Procedure discussed: discussed risks, benefits, and alternatives    Consent Given by:  Patient  Timeout: timeout called immediately prior to procedure    Prep: patient was prepped and draped in usual sterile fashion    I agree with the following injection documentation.    ELIAN Valadez MD      Again, thank you for allowing me to participate in the care of your patient.       Sincerely,    Danielle Valadez MD

## 2024-08-14 NOTE — NURSING NOTE
[address] [address]  Dept: [phone]  ______________________________________________________________________________    Patient: Angy Haji   : 1965   MRN: [id number]   2024    INVASIVE PROCEDURE SAFETY CHECKLIST    Date: 2024   Procedure: Right knee cortisone injection  Patient Name: Angy Haji  MRN: [id number]  YOB: 1965    Action: Complete sections as appropriate. Any discrepancy results in a HARD COPY until resolved.     PRE PROCEDURE:  Patient ID verified with 2 identifiers (name and  or MRN): Yes  Procedure and site verified with patient/designee (when able): Yes  Accurate consent documentation in medical record: Yes  H&P (or appropriate assessment) documented in medical record: Yes  H&P must be up to 20 days prior to procedure and updates within 24 hours of procedure as applicable: NA  Relevant diagnostic and radiology test results appropriately labeled and displayed as applicable: Yes  Procedure site(s) marked with provider initials: NA    TIMEOUT:  Time-Out performed immediately prior to starting procedure, including verbal and active participation of all team members addressing the following:Yes  * Correct patient identify  * Confirmed that the correct side and site are marked  * An accurate procedure consent form  * Agreement on the procedure to be done  * Correct patient position  * Relevant images and results are properly labeled and appropriately displayed  * The need to administer antibiotics or fluids for irrigation purposes during the procedure as applicable   * Safety precautions based on patient history or medication use    DURING PROCEDURE: Verification of correct person, site, and procedures any time the responsibility for care of the patient is transferred to another member of the care team.       Prior to injection, verified patient identity using patient's name and date of  birth.  Due to injection administration, patient instructed to remain in clinic for 15 minutes  afterwards, and to report any adverse reaction to me immediately.    Joint injection was performed.      Drug Amount Wasted:  Yes: 1 mg/ml   Vial/Syringe: Single dose vial  Expiration Date:  05/2027      Romy Caro, PAMELA  August 14, 2024

## 2024-08-14 NOTE — PROGRESS NOTES
ASSESSMENT/PLAN:    58 yo female with PMhx of Raynaud's disease, varicose veins in the right lower extremity with edema, spinal stenosis L4-L5 level presenting to follow-up on right knee osteoarthritis  Right knee osteoarthritis-  Difficulties getting into a pool therapy because of location and insurance  Patient was able to get a land physical therapy appointment  Voltaren gel can be applied up to 4 times a day  Tylenol as needed  Patient could go to orthotics for possible special fitted knee compression brace  Patient will be working on strengthening and core exercises  Patient to remain active is much as possible and may require activity modification    Return to clinic 2 months or as needed  Monitor injection for how long it lasts    Pt is a 59 year old female here today for:     Right knee pain.    ESTABLISHED PATIENT FOLLOW-UP:  Pain of the Right Knee       HISTORY OF PRESENT ILLNESS  Ms. Haji is a pleasant 59 year old year old female who presents to clinic today for follow-up of right knee. Patient reports that the previous right knee injection on 5/20/24 provided significant pain relief. She states that the pain was relieved for about 3 months, not quite as long as the previous injection about 10 months. Gardening recently, cutting branches, seems to flare the knee.  Knee seemed swollen, right knee was puffy.  Pt has been icing  Stiffness if sitting for long period  Biking- hard time with hyperflexion and getting around the wheel with leg  First injection of the knee lasted about 10 months and most recent 1 was for couple months    Date of injury: Chronic  Date last seen: 5/10/24  Following Therapeutic Plan: Yes  Pain: 4/10  Function: improved after injection      Additional medical/Social/Surgical histories reviewed in [facility] and updated as appropriate.    REVIEW OF SYSTEMS (8/14/2024)  CONSTITUTIONAL: Denies fever and weight loss  GASTROINTESTINAL: Denies abdominal pain, nausea, vomiting  MUSCULOSKELETAL:  See HPI  SKIN: Denies any recent rash or lesion  NEUROLOGICAL: Denies numbness or focal weakness      EXAM:   right Knee:   ROM: 0-130; Crepitus: none   Effusion: none ; Swelling: mild   Strength: Full in flexion/ extension   Tenderness: Patella - none Medial joint line - none; Lateral joint line - ++; Quad tendon - none; Patellar tendon- none; Hamstring - mild on lateral.   Cruciates: anterior drawer - neg/posterior drawer -neg. Lachman - neg   Collaterals: varus -neg/valgus -neg.   Patella: patellar compression - neg; single leg bend- neg   Meniscus: Tom - neg; Thessaly - not performed   Maneuvers: Leslie - not performed      Past Medical History:   Diagnosis Date    Depressive disorder     Previous depressive episodes. Diagnosed with Bipolar illness in 11/18 after manic episode.    Disorders of bursae and tendons in shoulder region, unspecified     Female infertility of unspecified origin     Gallbladder sludge 05/2013    Hypertension 10/18    Mild sleep apnea 05/13/2014    Obesity, unspecified     Oral aphthae     Other acne     Other specified hypoglycemia     PONV (postoperative nausea and vomiting)       Past Surgical History:   Procedure Laterality Date    BIOPSY      age 42 breast biopsy    COLONOSCOPY  2018    COLONOSCOPY N/A 2/13/2024    Procedure: Colonoscopy;  Surgeon: Cheryl Loza MD;  Location:  GI    GYN SURGERY  2016 for ovarian cyst    left ovary, 2 fallopian tubes removed. endometrioma.    HC TOOTH EXTRACTION W/FORCEP  1984/85    [place] [facility]    IR ENDOVENOUS ABLATION VARICOSE VEINS  12/12/2019    LAPAROSCOPIC CHOLECYSTECTOMY  07/29/2013    Procedure: LAPAROSCOPIC CHOLECYSTECTOMY;  Laparoscopic Cholecystectomy;  Surgeon: Fabio Johnson MD;  Location: [facility] OR    LAPAROSCOPIC OOPHORECTOMY Left 06/23/2016    Procedure: LAPAROSCOPIC OOPHORECTOMY;  Surgeon: Kayley Donnelly MD;  Location: [facility] OR    LAPAROSCOPIC SALPINGECTOMY Bilateral 06/23/2016    Procedure: LAPAROSCOPIC  SALPINGECTOMY;  Surgeon: Kayley Donnelly MD;  Location: [facility] OR    VASCULAR SURGERY  12/12    varicose vein proceedures      Current Outpatient Medications   Medication Sig Dispense Refill    acetaminophen (TYLENOL) 650 MG CR tablet Take 1 tablet (650 mg) by mouth every 8 hours as needed for mild pain or fever      calcium carbonate (TUMS) 500 MG chewable tablet Take 1 chew tab by mouth 2 times daily      Carboxymethylcellulose Sod PF (REFRESH PLUS) 0.5 % SOLN ophthalmic solution Place 1 drop into both eyes 3 times daily as needed for dry eyes      clotrimazole-betamethasone (LOTRISONE) 1-0.05 % external cream Apply topically 2 times daily 45 g 2    estradiol (ESTRACE) 0.5 MG tablet Take 1 tablet (0.5 mg) by mouth daily 90 tablet 3    estradiol (VAGIFEM) 10 MCG TABS vaginal tablet Place 1 tablet (10 mcg) vaginally twice a week 26 tablet 3    fluticasone (FLONASE) 50 MCG/ACT nasal spray Spray 1 spray into both nostrils daily      hydrOXYzine HCl (ATARAX) 10 MG tablet Take 1 tablet (10 mg) by mouth nightly as needed (sleep) 30 tablet 1    lamoTRIgine (LAMICTAL) 150 MG tablet Take 1 tablet (150 mg) by mouth daily Take with one tablet of 200 for a total of 350 mg daily. 30 tablet 2    lamoTRIgine (LAMICTAL) 200 MG tablet Take 2 tablets (400 mg) by mouth daily 60 tablet 0    lisinopril (ZESTRIL) 10 MG tablet TAKE ONE TABLET BY MOUTH ONCE DAILY 90 tablet 3    lithium (ESKALITH) 150 MG capsule Take 1 capsule (150 mg) by mouth at bedtime 30 capsule 2    lithium 300 MG capsule Take 3 tablet (900 mg) and one tablet (150 mg) for a total of 1050 mg at bedtime. 270 capsule 2    Loratadine 10 MG capsule Take 10 mg by mouth daily      medroxyPROGESTERone (PROVERA) 2.5 MG tablet Take 1 tablet (2.5 mg) by mouth daily 90 tablet 3    metroNIDAZOLE (METROGEL) 1 % external gel Apply topically daily 60 g 9    ORDER FOR DME Use your CPAP device as directed by your provider.      psyllium (METAMUCIL/KONSYL) 58.6 % powder Take 1  teaspoonful by mouth daily      Semaglutide-Weight Management (WEGOVY) 1 MG/0.5ML pen Inject 1 mg subcutaneously once a week 2 mL 2    VITAMIN D, CHOLECALCIFEROL, PO Take 2,000 Units by mouth daily        Allergies   Allergen Reactions    Seroquel [Quetiapine] Nausea and Vomiting     Had constant vomiting    Olanzapine      Tongue welling, bad EPSE (Parkinsonian)    Penicillins Rash    Risperidone      Tongue welling, bad EPSE (Parkinsonian)      Thimerosal Other (See Comments)     blisters on inside of eyelid      ROS:   Gen- no fevers/chills   Rheum - no morning stiffness   Derm - no rash/ redness   Neuro - no numbness, no tingling   Remainder of ROS negative.     Exam:   There were no vitals taken for this visit.     Xray of right knee    My impression: mild medial compartment joint space loss bilaterally         Large Joint Injection: R knee joint    Date/Time: 8/14/2024 7:04 AM    Performed by: Danielle Valadez MD  Authorized by: Danielle Valadez MD    Indications:  Pain  Needle Size comment:  23 G  Guidance: landmark guided    Approach:  Superolateral  Location:  Knee      Medications:  40 mg triamcinolone 40 MG/ML; 4 mL lidocaine (PF) 1 %  Outcome:  Tolerated well, no immediate complications  Procedure discussed: discussed risks, benefits, and alternatives    Consent Given by:  Patient  Timeout: timeout called immediately prior to procedure    Prep: patient was prepped and draped in usual sterile fashion    I agree with the following injection documentation.    ELIAN Valadez MD

## 2024-08-19 ENCOUNTER — THERAPY VISIT (OUTPATIENT)
Dept: PHYSICAL THERAPY | Facility: CLINIC | Age: 59
End: 2024-08-19
Payer: COMMERCIAL

## 2024-08-19 ENCOUNTER — TELEPHONE (OUTPATIENT)
Dept: PSYCHIATRY | Facility: CLINIC | Age: 59
End: 2024-08-19

## 2024-08-19 DIAGNOSIS — F31.9 BIPOLAR I DISORDER (H): ICD-10-CM

## 2024-08-19 DIAGNOSIS — M25.561 ACUTE PAIN OF RIGHT KNEE: Primary | ICD-10-CM

## 2024-08-19 DIAGNOSIS — M54.41 BILATERAL LOW BACK PAIN WITH RIGHT-SIDED SCIATICA: ICD-10-CM

## 2024-08-19 PROCEDURE — 97110 THERAPEUTIC EXERCISES: CPT | Mod: GP

## 2024-08-19 PROCEDURE — 97161 PT EVAL LOW COMPLEX 20 MIN: CPT | Mod: GP

## 2024-08-19 RX ORDER — LAMOTRIGINE 200 MG/1
400 TABLET ORAL DAILY
Qty: 60 TABLET | Refills: 0 | Status: SHIPPED | OUTPATIENT
Start: 2024-08-19 | End: 2024-08-26

## 2024-08-19 NOTE — TELEPHONE ENCOUNTER
Last seen: 7/30/24  RTC: 4 weeks  Cancel: none  No-show: none  Next appt: 8/26/24     Medication requested:   Pending Prescriptions:                       Disp   Refills    lamoTRIgine (LAMICTAL) 200 MG tablet      60 tab*0            Sig: Take 2 tablets (400 mg) by mouth daily      From chart note:    Continue with Lamictal 350 mg to 400 mg at bedtime      Medication refill approved per refill protocol.

## 2024-08-19 NOTE — TELEPHONE ENCOUNTER
[facility] Call Center    Phone Message    May a detailed message be left on voicemail: yes     Reason for Call: Medication Refill Request    Has the patient contacted the pharmacy for the refill? Yes   Name of medication being requested: lamictal  Provider who prescribed the medication: seth  Pharmacy:      [place], [place] - [address]    Date medication is needed: patient only has a couple days left, will run out before next appt with prescriber    Action Taken: Message routed to:  Other: nursing pool    Travel Screening: Not Applicable

## 2024-08-19 NOTE — PROGRESS NOTES
PHYSICAL THERAPY EVALUATION  Type of Visit: Evaluation       Fall Risk Screen:  Fall screen completed by: PT  Have you fallen 2 or more times in the past year?: No  Have you fallen and had an injury in the past year?: No  Is patient a fall risk?: No    Subjective       Presenting condition or subjective complaint: Knee pain has worsened. Previous PT was very helpfil.  Date of onset: 07/26/24 (Referral date)    Relevant medical history: Depression; High blood pressure; Incontinence; Menopause; Mental Illness; Overweight; Pain at night or rest; Sleep disorder like apnea   Dates & types of surgery: Removal of gall bladder, removal if L ovarian cyst, right leg vein removalt,    Prior diagnostic imaging/testing results: MRI; X-ray     Prior therapy history for the same diagnosis, illness or injury: Yes PT.  One year ago and previously to that.    Pt is a 59 year old female presenting with complaints of right knee pain and low back pain. Pt reports that she had a knee injection last week (which was her third), which has improved some knee pain. Pt reports that she has has been having sharp pain a few inches above and below joint line. Pt reports that she did have some low back pain on the R side that she felt was traveling down the leg. Pt has started some previous PT exercises, which have been somewhat helpful. Pt does report some minor catching of the right knee since injection - has improved since Sunday.     The symptoms started again in the last few months and is getting worse.    Pt describes the pain as sharp into the knee    Aggravating Factors: knee: ascending/descending stairs, squatting, bending the knee; back: bending/lifting    Alleviating Factors: Tylenol (minimal relief), ice    Prior treatments: has had PT in the past    Sleep Quality: interrupted due to pain - sometimes woken up due to pain - more difficult to go back to bed but usually can fall back asleep     Red Flags: pt denies any saddle area  anesthesia or significant leg weakness; pt does report some bowel incontinence with changes in consistency of stool - did start new medication, which has made this worse (told pt to bring this up with primary)    Pt goals: stairs, sleep, strength, work through back vs knee pain    PMH: depressive disorder, disorders of bursae and tendons in shoulder region, female infertility of unspecified origin, gallbladder sludge (2013), HTN, mild sleep apnea, obesity, oral aphthae, other acne, hypoglycemia, PONV    X-ray 2/22/2023 R knee: Impression:  1. No acute osseous abnormality.  2. Mild medial compartment joint space loss bilaterally.      Living Environment  Social support: With a significant other or spouse   Type of home: House; 1 level; Basement   Stairs to enter the home: Yes 3 Is there a railing: No     Ramp: No   Stairs inside the home: Yes 16 Is there a railing: Yes     Help at home: None  Equipment owned:       Employment: Yes Psychotherapist  Hobbies/Interests: Walking, gardening, casual biking    Patient goals for therapy: Stairs are paunful.  Sleep is impaired frim knee pain.  Unsure if current pain is  just from knee  or is having my back contribute to the pain.       Objective   KNEE:    Standing Posture: locked knees    Gait: min toe out; trendelenburg B    Effusion (Sweep Test): not formally assessed but no swelling noted    Functional Screen:   - STS: BUE; slow to ascend, dynamic knee valgus    Patellofemoral Mechanics:   -Abnormal Patellar Posture: WFL B  -Patellar Mobility: min limitation medial, superior, inferior B  -Quad Set: WFL  -SLR: WFL but fatiguing after 10  -Compression: painful on R      AROM/PROM (* Denotes Pain):    L R   Flexion 120 114 / 120   Extension 0 Lacking 3 / 0     End-Feels: empty into flex    Strength (* Denotes Pain):   L R   HIP     Flex 4+/5 4+/5   Ext 4/5 4/5   ABd 4/5 4/5   KNEE     Flex 5/5 5/5   Ext 5/5 5/5     Palpation: TTP postero-medial jt line on R    Hip Screen: hip  flex, IR, ER, ABD WFL; min limitation in hip ext    Special Tests:    L R   Knee     Lachman     Anterior Drawer     Posterior Sag     Step Off     Posterior Drawer     Tom - Medial     Tom - Lateral     Thessaly's     Valgus (0/30) - / - - / -   Varus (0/30) - / - - / -   Tamez's Sign     Patellar Apprehension     Hip/Lumbar     FABIR     FADER     Leslie's     Hamstring 90-90     Milton     Slump     SLR  - -       Patellar tracking: WFL B    Lumbar ROM:  -Flex: hands to ankles before knees bend  -Ext: mod limitation (tightness)  -B SB: Mod limitation B - some pinching on R side     Lumbar Jt mobility: mod decreased mobility L1-L5    Palpation lumbar: hypertonic lumbar paraspinals and quadratus lumborum      Assessment & Plan   CLINICAL IMPRESSIONS  Medical Diagnosis: Acute pain of right knee; Low back pain    Treatment Diagnosis: Right knee pain; low back pain   Impression/Assessment: Patient is a 59 year old female with right knee pain and low back pain complaints.  The following significant findings have been identified: Pain, Decreased ROM/flexibility, Decreased joint mobility, Decreased strength, Impaired balance, Impaired gait, Impaired muscle performance, and Decreased activity tolerance. These impairments interfere with their ability to perform self care tasks, work tasks, recreational activities, household chores, driving , household mobility, and community mobility as compared to previous level of function.     Clinical Decision Making (Complexity):  Clinical Presentation: Stable/Uncomplicated  Clinical Presentation Rationale: based on medical and personal factors listed in PT evaluation  Clinical Decision Making (Complexity): Low complexity    PLAN OF CARE  Treatment Interventions:  Modalities: Cryotherapy, E-stim, Hot Pack, Ultrasound  Interventions: Gait Training, Manual Therapy, Neuromuscular Re-education, Therapeutic Activity, Therapeutic Exercise, Self-Care/Home Management    Long Term  Goals     PT Goal 1  Goal Identifier: Sleep  Goal Description: Pt will be able to sleep 6/7 nights of the week without being interrupted by sleep in order to improve sleep hygiene  Rationale: to maximize safety and independence with self cares  Target Date: 10/01/24  PT Goal 2  Goal Identifier: Stairs  Goal Description: Pt will be able to ascend/descend at least 1 set of stairs without a significant increase in pain in order to improve function in home  Rationale: to maximize safety and independence with performance of ADLs and functional tasks;to maximize safety and independence within the home;to maximize safety and independence within the community  Target Date: 11/12/24  PT Goal 3  Goal Identifier: Lifting/bending  Goal Description: Pt will be able to lift at least 10 pounds from ground with good mechanics and without a significant increase in pain in order to improve QOL  Rationale: to maximize safety and independence with self cares;to maximize safety and independence within the home  Target Date: 11/12/24      Frequency of Treatment: 1x/week, decreasing to every other as appropriate  Duration of Treatment: 12 weeks    Recommended Referrals to Other Professionals:  none  Education Assessment:   Learner/Method: Patient    Risks and benefits of evaluation/treatment have been explained.   Patient/Family/caregiver agrees with Plan of Care.     Evaluation Time:     PT Eval, Low Complexity Minutes (58909): 20     Signing Clinician: Kerline Johnson PT

## 2024-08-19 NOTE — ADDENDUM NOTE
Addended by: LILIANA FERNANDEZ on: 8/19/2024 09:40 AM     Modules accepted: Orders     This note was copied from a baby's chart.  Courtesy lactation visit to see how feedings are going.  Mom states it's going well.  She's happy with the latch.  Infant was clusterfeeding throughout a night so a nuk was given (parent's choice).  She can see colostrum inside the shield.  Infant has lots of voids and stools.  RN went over normal milk supply, engorgement, clogged ducts, and mastitis.  Mom plans to pump eventually, has a Medela and Spectra pump.  No questions and concerns at this time.  Much support given.     Time spent with pt: 5 min     Bita Hayes RN, BSN, [facility]

## 2024-08-26 ENCOUNTER — OFFICE VISIT (OUTPATIENT)
Dept: PSYCHIATRY | Facility: CLINIC | Age: 59
End: 2024-08-26
Attending: PSYCHIATRY & NEUROLOGY
Payer: COMMERCIAL

## 2024-08-26 VITALS
SYSTOLIC BLOOD PRESSURE: 127 MMHG | HEART RATE: 73 BPM | DIASTOLIC BLOOD PRESSURE: 78 MMHG | WEIGHT: 278 LBS | BODY MASS INDEX: 43.54 KG/M2

## 2024-08-26 DIAGNOSIS — F31.9 BIPOLAR I DISORDER (H): ICD-10-CM

## 2024-08-26 DIAGNOSIS — G47.00 INSOMNIA, UNSPECIFIED TYPE: ICD-10-CM

## 2024-08-26 PROCEDURE — 99214 OFFICE O/P EST MOD 30 MIN: CPT | Mod: HN | Performed by: STUDENT IN AN ORGANIZED HEALTH CARE EDUCATION/TRAINING PROGRAM

## 2024-08-26 PROCEDURE — G2211 COMPLEX E/M VISIT ADD ON: HCPCS | Performed by: STUDENT IN AN ORGANIZED HEALTH CARE EDUCATION/TRAINING PROGRAM

## 2024-08-26 PROCEDURE — 99214 OFFICE O/P EST MOD 30 MIN: CPT | Performed by: STUDENT IN AN ORGANIZED HEALTH CARE EDUCATION/TRAINING PROGRAM

## 2024-08-26 RX ORDER — LITHIUM CARBONATE 150 MG/1
150 CAPSULE ORAL AT BEDTIME
Qty: 30 CAPSULE | Refills: 2 | Status: SHIPPED | OUTPATIENT
Start: 2024-08-26 | End: 2024-09-24

## 2024-08-26 RX ORDER — HYDROXYZINE HYDROCHLORIDE 10 MG/1
10 TABLET, FILM COATED ORAL
Qty: 30 TABLET | Refills: 2 | Status: SHIPPED | OUTPATIENT
Start: 2024-08-26 | End: 2024-09-24

## 2024-08-26 RX ORDER — LAMOTRIGINE 200 MG/1
400 TABLET ORAL DAILY
Qty: 60 TABLET | Refills: 2 | Status: SHIPPED | OUTPATIENT
Start: 2024-08-26 | End: 2024-09-24

## 2024-08-26 RX ORDER — LITHIUM CARBONATE 300 MG/1
CAPSULE ORAL
Qty: 270 CAPSULE | Refills: 2 | Status: SHIPPED | OUTPATIENT
Start: 2024-08-26 | End: 2024-09-24

## 2024-08-26 ASSESSMENT — PAIN SCALES - GENERAL: PAINLEVEL: NO PAIN (0)

## 2024-08-26 NOTE — PROGRESS NOTES
[facility]  MEDICAL PROGRESS NOTE     CARE TEAM:    PCP- Mikey Cortés     Therapist- individual therapist Elfego Chan and another one for couple therapy    Angy is a 59 year old who uses the pronouns she, her, hers.      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 She is still dealing with a lot of stress due to the divorce process. Her individual therapist was out, so she wanted a 4 weeks check in/follow up. We met today. There was no report of depression, anthony, psychosis, insomnia or safety concerns.  She wakes up twice at night to use the bathroom. No report of decreased the need for sleep. She wakes up refreshed the next day. She continues to use hydroxyzine daily because she finds it very helpful. I encourage her to use melatonin also given that she has used it once or twice. Her therapist is back. She saw them last Friday. We didn't discuss medication changes today. Her labs are updated. She would like to be seen again in 4 weeks given that she has an upcoming international travel next week. The divorce process has been so difficult on her, and we are trying to support her as much as we can.    Lastly, she wanted to discuss professional boundary. Writer has been assigned to a eight weeks rotation at [facility] program. Given that [facility] and PHP are in the same building, writer has crossed path with Angy a couple of times, and He has also been present at the huddle meeting. We re-affirmed that nothing that from here will discuss there. Writer also promise Angy that he will focus on PHP patients, and not attend IOP session.  The patient was in agreement with such plan.     No safety concerns.      Psychotropic Drug  "Interactions:  [PSYCHCLINICDDI]  Estrogen Derivatives may decrease the serum concentration of LamoTRIgine  Angiotensin-Converting Enzyme Inhibitors may increase the serum concentration of Lithium.   LamoTRIgine may decrease the serum concentration of Progestins.      Management: routine monitoring    [place]MP was not checked today: not using controlled substances    Risk Statements:   Treatment Risk- Risks, benefits, alternatives and potential adverse effects have been discussed and are understood.   Safety Risk-Angy did not appear to be an imminent safety risk to self or others.     Plan     1) Medications:    - Continue Lithium 1050 mg at bedtime  - Continue with Lamictal 400 mg at bedtime   - continue with hydroxyzine 10 mg PRN at bedtime   - add melatonin 1mg to 3mg at bedtime as needed for insomnia    2) Psychotherapy: Continue with couple's therapy    3) Next due:  Labs- 01/2025  EKG- . Last one was on 03/2024 with   Rating scales-  PHQ9    4) Referrals: none  N/A    5) Other: none    6) Follow-up: Return to clinic in 4 weeks     Pertinent Background                                                   [most recent eval 08/31/23]     Angy reported that she didn't have symptoms until November 2018. At which point she was hospitalized for anthony.     This section was copied forward from 08/2021 transfer notes.  \"Patient has not had significant mental health history until 2018, when she developed perimenopausal mood and anxiety symptoms.  She was subsequently referred to our clinic following a hospitalization for anthony in November 2018.  Her care has been complicated by sensitivity to antipsychotics (EPSE including difficulty swallowing), thrombocytopenia secondary to Depakote, lithium toxicity in setting of concurrent Depakote use\"     Pertinent items include: anthony , severe med reaction [described as H/o Thrombocytopenia secondary to Depakote, and H/o lithium toxicity], and psych hosp      Subjective " "    Angy reported that her mood is fine.  She deals with periods where she feels angry  She reported a lot of stress due to financial paperwork for the divorce process.mediation is scheduled in October.  She would like to meet in 4 week for support.    The couple is traveling to  [place] on 9/3, and will be back on 9/11  She is going out with friends, and also her therapist is back from [facility].    Said that Zahra, her partner, moved out for 4 days.  That was stressful for her. She feels that she has all the house care, and the dog care for herself.  Tyler looks fine now. They had overhead people talking about the divorce.        She is taking hydroxyzine daily rather than as needed.   Wake up twice at night to use the bathroom,.  10 to 10:30, and wake up 6:30  Usually feels okay when she wake up  She feels calm.  No recent manic symptoms  \"Fewer repetitive thoughts all day, and not feeling stuck to be around,and hopeless\"  Used to \"feel hopeless, and busy mind thinking\"  More focus on the moment than focus on repetitive thinking.   There is no decreased need for sleep  Denied racing thought, but there is some rumination. It used to be worse a month ago.  She denies AH/VH    Appetite: back to pre wegovy level of appetite. There is.no nausea, back to be more interested in food    Medication side effects: no vomiting, no confusion, no abdominal pain  Fine tremor during type or wipe position   Some constipation, back and forth. Has miralax at home    SI/HI:none    Alcohol use: one glass of wine last night. Loss interest in alcohol    Smoking: none    Other substances (cannabis): none       Mental Status Exam     Alertness: alert  and oriented  Appearance: adequately groomed  Behavior/Demeanor: cooperative and pleasant, with good  eye contact   Speech: normal and regular rate and rhythm  Language: intact  Psychomotor: normal or unremarkable  Mood:  \"fine\"  Affect: full range; congruent to: mood- yes, content- " yes  Thought Process/Associations: unremarkable  Thought Content:  Reports none;  Denies suicidal & violent ideation and delusions  Perception:  Reports none;  Denies auditory hallucinations and visual hallucinations  Insight: good  Judgment: good  Cognition: does  appear grossly intact; formal cognitive testing was not done  Gait and Station: unremarkable     Past Psych Med Trials      Medication Max Dose (mg) Dates / Duration Helpful? DC Reason / Adverse Effects?   Depakote    Tremors   sertraline       olanzapine    EPSE   Seroquel    EPSE   lithium 1200   Tremor at 1200 mg had to lower the dose   Wellbutrin 300 15 years ago     Lamictal 400  yes       Treatment Course and Resendiz Events since  JULY 2023 08/2023 : no medication changes  11/2023 no medication changes  01/2023 no medication changes  03/2024 no medication changes  04/2024 no medication changes  06/2024 Increase Lamictal to 400 mg  07/2024 no medication change  08/2024 no medication changes     Vitals   /78   Pulse 73   Wt 126.1 kg (278 lb)   BMI 43.54 kg/m    Pulse Readings from Last 3 Encounters:   08/26/24 73   07/30/24 71   06/17/24 63     Wt Readings from Last 3 Encounters:   08/26/24 126.1 kg (278 lb)   07/30/24 126.7 kg (279 lb 6.4 oz)   07/18/24 127.1 kg (280 lb 4.8 oz)     BP Readings from Last 3 Encounters:   08/26/24 127/78   07/30/24 (!) 146/84   06/17/24 136/73      Medical History     ALLERGIES: Seroquel [quetiapine], Olanzapine, Penicillins, Risperidone, and Thimerosal    Patient Active Problem List   Diagnosis    Oral aphthae    CARDIOVASCULAR SCREENING; LDL GOAL LESS THAN 160    Rosacea    Raynaud disease    Vitamin D deficiency disease    MARLA (obstructive sleep apnea)    Atopic rhinitis    Chronic bilateral low back pain without sciatica    Obesity, Class III, BMI 40-49.9 (morbid obesity) (H)    Muscle tension dysphonia    Palpitations    Dysphagia, unspecified type    Anxiety    Benign essential hypertension    Callus of  foot    Varicose veins of right lower extremity with edema    Bipolar I disorder (H)    Dribbling of urine    Bilateral low back pain with right-sided sciatica    Spinal stenosis at L4-L5 level    Facet arthropathy, lumbar    Acute pain of right knee        Medications     Current Outpatient Medications   Medication Sig Dispense Refill    acetaminophen (TYLENOL) 650 MG CR tablet Take 1 tablet (650 mg) by mouth every 8 hours as needed for mild pain or fever      calcium carbonate (TUMS) 500 MG chewable tablet Take 1 chew tab by mouth 2 times daily      Carboxymethylcellulose Sod PF (REFRESH PLUS) 0.5 % SOLN ophthalmic solution Place 1 drop into both eyes 3 times daily as needed for dry eyes      clotrimazole-betamethasone (LOTRISONE) 1-0.05 % external cream Apply topically 2 times daily 45 g 2    estradiol (ESTRACE) 0.5 MG tablet Take 1 tablet (0.5 mg) by mouth daily 90 tablet 3    estradiol (VAGIFEM) 10 MCG TABS vaginal tablet Place 1 tablet (10 mcg) vaginally twice a week 26 tablet 3    fluticasone (FLONASE) 50 MCG/ACT nasal spray Spray 1 spray into both nostrils daily      hydrOXYzine HCl (ATARAX) 10 MG tablet Take 1 tablet (10 mg) by mouth nightly as needed (sleep) 30 tablet 1    lamoTRIgine (LAMICTAL) 150 MG tablet Take 1 tablet (150 mg) by mouth daily Take with one tablet of 200 for a total of 350 mg daily. 30 tablet 2    lamoTRIgine (LAMICTAL) 200 MG tablet Take 2 tablets (400 mg) by mouth daily 60 tablet 0    lisinopril (ZESTRIL) 10 MG tablet TAKE ONE TABLET BY MOUTH ONCE DAILY 90 tablet 3    lithium (ESKALITH) 150 MG capsule Take 1 capsule (150 mg) by mouth at bedtime 30 capsule 2    lithium 300 MG capsule Take 3 tablet (900 mg) and one tablet (150 mg) for a total of 1050 mg at bedtime. 270 capsule 2    Loratadine 10 MG capsule Take 10 mg by mouth daily      medroxyPROGESTERone (PROVERA) 2.5 MG tablet Take 1 tablet (2.5 mg) by mouth daily 90 tablet 3    metroNIDAZOLE (METROGEL) 1 % external gel Apply  topically daily 60 g 9    ORDER FOR DME Use your CPAP device as directed by your provider.      psyllium (METAMUCIL/KONSYL) 58.6 % powder Take 1 teaspoonful by mouth daily      Semaglutide-Weight Management (WEGOVY) 1 MG/0.5ML pen Inject 1 mg subcutaneously once a week 2 mL 2    VITAMIN D, CHOLECALCIFEROL, PO Take 2,000 Units by mouth daily          Labs and Data         1/3/2024    11:58 AM 4/25/2024     9:09 PM 7/26/2024    10:57 AM   PROMIS-10 Total Score w/o Sub Scores   PROMIS TOTAL - SUBSCORES 28 30 26          No data to display                  4/28/2024     3:44 PM 6/17/2024     2:59 PM 7/30/2024     7:47 AM   PHQ-9 SCORE   PHQ-9 Total Score MyChart 6 (Mild depression) 10 (Moderate depression) 8 (Mild depression)   PHQ-9 Total Score 6 10 8         10/31/2023     5:25 PM 6/15/2024     1:27 PM 7/26/2024    10:56 AM   JOANNA-7 SCORE   Total Score 11 (moderate anxiety) 10 (moderate anxiety) 8 (mild anxiety)   Total Score 11 10 8       Liver/Kidney Function, TSH Metabolic Blood counts   Recent Labs   Lab Test 07/11/24  0809 02/28/24  0845   AST  --  21   ALT  --  15   ALKPHOS  --  91   CR 0.63 0.63     Recent Labs   Lab Test 07/11/24  0809   TSH 2.43    Recent Labs   Lab Test 05/29/24  0815   CHOL 160   TRIG 150*   LDL 75   HDL 55     Recent Labs   Lab Test 10/04/22  1651   A1C 5.3     Recent Labs   Lab Test 07/11/24  0809   GLC 88    Recent Labs   Lab Test 02/28/24  0845   WBC 6.6   HGB 14.0   HCT 44.9   MCV 90            Recent Labs   Lab Test 07/11/24  0809 11/08/23  1016 05/15/23  1017   LITHIUM 1.00 0.69 0.7        PROVIDER: Antonio Santillan MD MPH    Level of Medical Decision Making:   - At least 1 chronic problem that is not stable  - Engaged in prescription drug management during visit (discussed any medication benefits, side effects, alternatives, etc.)         Patient was not staffed in clinic.  Supervisor is Dr. Vivian Burnett.      The longitudinal plan of care for Bipolar disorder was addressed  during this visit. Due to the added complexity in care, I will continue to support Angy in the subsequent management of this condition(s) and with the ongoing continuity of care of this condition(s)

## 2024-08-28 ENCOUNTER — THERAPY VISIT (OUTPATIENT)
Dept: PHYSICAL THERAPY | Facility: CLINIC | Age: 59
End: 2024-08-28
Payer: COMMERCIAL

## 2024-08-28 DIAGNOSIS — M25.561 ACUTE PAIN OF RIGHT KNEE: ICD-10-CM

## 2024-08-28 DIAGNOSIS — M54.41 BILATERAL LOW BACK PAIN WITH RIGHT-SIDED SCIATICA, UNSPECIFIED CHRONICITY: Primary | ICD-10-CM

## 2024-08-28 PROCEDURE — 97110 THERAPEUTIC EXERCISES: CPT | Mod: GP

## 2024-09-11 ENCOUNTER — MYC MEDICAL ADVICE (OUTPATIENT)
Dept: FAMILY MEDICINE | Facility: CLINIC | Age: 59
End: 2024-09-11
Payer: COMMERCIAL

## 2024-09-11 ENCOUNTER — NURSE TRIAGE (OUTPATIENT)
Dept: FAMILY MEDICINE | Facility: CLINIC | Age: 59
End: 2024-09-11
Payer: COMMERCIAL

## 2024-09-11 NOTE — TELEPHONE ENCOUNTER
Triage Patient Outreach    Attempt # 1    Was call answered?  No.  Left voicemail to return call to Triage at [facility]    Upon c/b: please see if pt is in [place] and triage. If pt is in [place] or out of state please advise pt get care where she is at.     Kelsey Crockett RN

## 2024-09-12 NOTE — TELEPHONE ENCOUNTER
Patient with ongoing knee pain and sports med provider ordered PT. She has 3rd session on Monday. ronald for PT. 2 sessions has 3rd session on Monday. Had knee injection 4 weeks ago and helpful with knee pain. Much walking during recent trip to [place], much on uneven surfaces and thinks this likely exacerbated pain. Right 3 toes tingly and intermittently numb. Hasn't brought up with sports med provider or PT as isn't concerned. Additionally, patient reporting lower right abdomen pain x 1 month. History of ovarian cysts. After further discussion patient wishes to continue to monitor all symptoms for now having appointment scheduled for physical exam with PCP. Encouraged to outreach to PCP clinic, GYN provider, Dr. Rea, or Sports Medicine provider with concerns prior to appointment. Encouraged use of scheduled Tylenol for the next 1-2 days as she recovers from 9 day trip from [place]. Patient/Caller with no further questions or concerns. Provided 24/7 scheduling/triage nurse line ([phone]) to patient. No further questions or concerns from patient at this time.     Diane Springer RN    Reason for Disposition   Long-distance travel in past month (e.g., car, bus, train, plane; with trip lasting 6 or more hours)     But pain was present prior to flight   MODERATE pain (e.g., interferes with normal activities, limping) and present > 3 days   Leg pain   Caused by overuse from recent vigorous activity (e.g., aerobics, jogging/running, physical work, prolonged walking, sports)   Mild abdominal pain     Patient does have history of ovarian cyst and thinks there may have been a cyst on her right ovary.    Additional Information   Negative: Looks like a broken bone or dislocated joint (e.g., crooked or deformed)   Negative: Sounds like a life-threatening emergency to the triager   Negative: Followed a hip injury   Negative: Followed a knee injury   Negative: Followed an ankle or foot injury   Negative: Back pain  radiating (shooting) into leg(s)   Negative: Foot pain is main symptom   Negative: Ankle pain is main symptom   Negative: Knee pain is main symptom     Patient states pain is from 6 inches above knee and 6 inche below and feels that leg and knee both are affected   Negative: Leg swelling is main symptom   Negative: Chest pain   Negative: Difficulty breathing   Negative: Entire foot is cool or blue in comparison to other side   Negative: Unable to walk   Negative: Fever and red area (or area very tender to touch)   Negative: Swollen joint and fever   Negative: Thigh or calf pain in only one leg and present > 1 hour   Negative: Thigh, calf, or ankle swelling in only one leg   Negative: Thigh, calf, or ankle swelling in both legs, but one side is definitely more swollen   Negative: History of prior 'blood clot' in leg or lungs (i.e., deep vein thrombosis, pulmonary embolism)   Negative: History of inherited increased risk of blood clots (e.g., factor 5 Leiden, antithrombin 3, protein C or protein S deficiency, prothrombin mutation)   Negative: Major surgery in past month   Negative: Hip or leg fracture (broken bone) in past month (or had cast on leg or ankle in past month)   Negative: Illness requiring prolonged bedrest in past month (e.g., immobilization, long hospital stay)   Negative: Cancer treatment in past six months (or has cancer now)   Negative: Patient sounds very sick or weak to the triager   Negative: SEVERE pain (e.g., excruciating, unable to do any normal activities)   Negative: Cast on leg or ankle and now has increasing pain   Negative: Red area or streak > 2 inches (or 5 cm)   Negative: Painful rash with multiple small blisters grouped together (i.e., dermatomal distribution or 'band' or 'stripe')   Negative: Looks like a boil, infected sore, deep ulcer, or other infected rash (spreading redness, pus)   Negative: Localized rash is very painful (no fever)   Negative: Numbness in a leg or foot (i.e.,  loss of sensation)   Negative: Localized pain, redness or hard lump along vein   Negative: Patient wants to be seen   Negative: Passed out (i.e., fainted, collapsed and was not responding)   Negative: Shock suspected (e.g., cold/pale/clammy skin, too weak to stand, low BP, rapid pulse)   Negative: Sounds like a life-threatening emergency to the triager   Negative: Followed an abdomen (stomach) injury   Negative: Chest pain   Negative: Abdominal pain and pregnant < 20 weeks   Negative: Abdominal pain and pregnant 20 or more weeks   Negative: Pain is mainly in upper abdomen (if needed ask: 'is it mainly above the belly button?')   Negative: Abdomen bloating or swelling are main symptoms   Negative: SEVERE abdominal pain (e.g., excruciating)   Negative: Vomiting red blood or black (coffee ground) material   Negative: Blood in bowel movements  (Exception: Blood on surface of BM with constipation.)   Negative: Black or tarry bowel movements  (Exception: Chronic-unchanged black-grey BMs AND is taking iron pills or Pepto-Bismol.)   Negative: MILD TO MODERATE constant pain lasting > 2 hours   Negative: Vomiting bile (green color)   Negative: Patient sounds very sick or weak to the triager   Negative: Vomiting and abdomen looks much more swollen than usual   Negative: White of the eyes have turned yellow (i.e., jaundice)   Negative: Blood in urine (red, pink, or tea-colored)   Negative: Fever > 103 F (39.4 C)   Negative: Fever > 101 F (38.3 C) and over 60 years of age   Negative: Fever > 100.0 F (37.8 C) and has diabetes mellitus or a weak immune system (e.g., HIV positive, cancer chemotherapy, organ transplant, splenectomy, chronic steroids)   Negative: Fever > 100.0 F (37.8 C) and bedridden (e.g., CVA, chronic illness, recovering from surgery)   Negative: Pregnant or could be pregnant (i.e., missed last menstrual period)   Negative: MODERATE pain (e.g., interferes with normal activities that comes and goes (cramps) lasts >  "24 hours  (Exception: Pain with Vomiting or Diarrhea - see that Protocol.)   Negative: Unusual vaginal discharge   Negative: Age > 60 years   Negative: Patient wants to be seen   Negative: MILD pain (e.g., does not interfere with normal activities) and pain comes and goes (cramps) lasts > 48 hours  (Exception: This same abdominal pain is a chronic symptom recurrent or ongoing AND present > 4 weeks.)   Negative: Abdominal pain is a chronic symptom (recurrent or ongoing AND lasting > 4 weeks)   Negative: Pain with sexual intercourse (dyspareunia)    Answer Assessment - Initial Assessment Questions  1. ONSET: \"When did the pain start?\"       One month ago. Had trouble with right knee with 3 injections in past 1 1/2 years.   2. LOCATION: \"Where is the pain located?\"       Right leg above and below back of knee.   3. PAIN: \"How bad is the pain?\"    (Scale 1-10; or mild, moderate, severe)    -  MILD (1-3): doesn't interfere with normal activities     -  MODERATE (4-7): interferes with normal activities (e.g., work or school) or awakens from sleep, limping     -  SEVERE (8-10): excruciating pain, unable to do any normal activities, unable to walk      6 with walking  4. WORK OR EXERCISE: \"Has there been any recent work or exercise that involved this part of the body?\"       Much more walking with long trip to [place] on uneven surfaces. And also recently started PT for knee. Next PT session Monday  5. CAUSE: \"What do you think is causing the leg pain?\"      Above.   6. OTHER SYMPTOMS: \"Do you have any other symptoms?\" (e.g., chest pain, back pain, breathing difficulty, swelling, rash, fever, numbness, weakness)      Low back ache x one month  7. PREGNANCY: \"Is there any chance you are pregnant?\" \"When was your last menstrual period?\"      NA    Answer Assessment - Initial Assessment Questions  1. LOCATION: \"Where does it hurt?\"       Right lower abdomen-feels mild and feels like an \"ache\"    2. RADIATION: \"Does the pain " "shoot anywhere else?\" (e.g., chest, back)       ONSET: \"When did the pain begin?\" (e.g., minutes, hours or days ago)       No   4. SUDDEN: \"Gradual or sudden onset?\"      gradual  5. PATTERN \"Does the pain come and go, or is it constant?\"     - If it comes and goes: \"How long does it last?\" \"Do you have pain now?\"      (Note: Comes and goes means the pain is intermittent. It goes away completely between bouts.)     - If constant: \"Is it getting better, staying the same, or getting worse?\"       (Note: Constant means the pain never goes away completely; most serious pain is constant and gets worse.)       Constant   6. SEVERITY: \"How bad is the pain?\"  (e.g., Scale 1-10; mild, moderate, or severe)     - MILD (1-3): Doesn't interfere with normal activities, abdomen soft and not tender to touch.      - MODERATE (4-7): Interferes with normal activities or awakens from sleep, abdomen tender to touch.      - SEVERE (8-10): Excruciating pain, doubled over, unable to do any normal activities.        Very mild. Feels like when she was about to get a menstrual period  7. RECURRENT SYMPTOM: \"Have you ever had this type of stomach pain before?\" If Yes, ask: \"When was the last time?\" and \"What happened that time?\"       See above  8. CAUSE: \"What do you think is causing the stomach pain?\"      No. Did treat for constipation due to diet changes with travel to [place]. Last BM this morning. Firm but \"low volume\". Doesn't feel constipated.   9. RELIEVING/AGGRAVATING FACTORS: \"What makes it better or worse?\" (e.g., antacids, bending or twisting motion, bowel movement)      Patient states she isn't clear.   10. OTHER SYMPTOMS: \"Do you have any other symptoms?\" (e.g., back pain, diarrhea, fever, urination pain, vomiting)        Low back pain   11. PREGNANCY: \"Is there any chance you are pregnant?\" \"When was your last menstrual period?\"        NA    Protocols used: Leg Pain-A-OH, Abdominal Pain - Female-A-OH    "

## 2024-09-13 ENCOUNTER — TELEPHONE (OUTPATIENT)
Dept: PSYCHIATRY | Facility: CLINIC | Age: 59
End: 2024-09-13
Payer: COMMERCIAL

## 2024-09-13 NOTE — TELEPHONE ENCOUNTER
Reached out to patient for an update as writer has not heard back from POD. Denies any concerning symptoms at this time. She is still good with waiting to hear back from POD.

## 2024-09-13 NOTE — TELEPHONE ENCOUNTER
Reached out to patient and relayed Dr. Abraham's recommendations to hold Lithium and Lamictal for the night and resume starting tomorrow night. Also shared that patient can take Hydroxyzine tonight to help with sleep. Patient verbalized understanding. Patient continues to feel fine but shared she felt tired due to taking Hydroxyzine during the day. She agreed to call if symptoms worsen or come into the ED if safety is a concern.

## 2024-09-13 NOTE — TELEPHONE ENCOUNTER
For both lithium and lurasidone, I would recommend just considering the AM dose she took this morning to be the full dose for today, so don't take any of those meds tonight, and just continue as usual tomorrow taking her meds tomorrow night.     For hydroxyzine, she can take an extra dose tonight at bedtime to help with sleep.

## 2024-09-13 NOTE — TELEPHONE ENCOUNTER
Writer received incoming call from patient wanting to speak with a nurse. Patient reports just returning from an [place] trip yesterday. She has been tired and stressed and due to that she accidentally took her night meds this mrcarmen. She reports taking Lithium 1050 mg, Lamnictal 200 mg and Hydroxyzine 10 mg this morning. She also took these meds last night around 1030 pm. Patient denies any concerns or chest pain at this time. She denies shakiness, difficulty breathing, N/V, dizziness or any other concerns at this time. She took the meds at 730 am this morning and felt it was early for any side effects. She is wanting to know how to proceed now and for the nighttime meds. Writer agreed to reach out to provider for further recommendations.

## 2024-09-16 ENCOUNTER — THERAPY VISIT (OUTPATIENT)
Dept: PHYSICAL THERAPY | Facility: CLINIC | Age: 59
End: 2024-09-16
Attending: FAMILY MEDICINE
Payer: COMMERCIAL

## 2024-09-16 DIAGNOSIS — M25.561 ACUTE PAIN OF RIGHT KNEE: ICD-10-CM

## 2024-09-16 DIAGNOSIS — M54.41 BILATERAL LOW BACK PAIN WITH RIGHT-SIDED SCIATICA, UNSPECIFIED CHRONICITY: Primary | ICD-10-CM

## 2024-09-16 PROCEDURE — 97110 THERAPEUTIC EXERCISES: CPT | Mod: GP

## 2024-09-16 PROCEDURE — 97530 THERAPEUTIC ACTIVITIES: CPT | Mod: GP

## 2024-09-23 ENCOUNTER — THERAPY VISIT (OUTPATIENT)
Dept: PHYSICAL THERAPY | Facility: CLINIC | Age: 59
End: 2024-09-23
Attending: FAMILY MEDICINE
Payer: COMMERCIAL

## 2024-09-23 DIAGNOSIS — M25.561 ACUTE PAIN OF RIGHT KNEE: ICD-10-CM

## 2024-09-23 DIAGNOSIS — M54.41 BILATERAL LOW BACK PAIN WITH RIGHT-SIDED SCIATICA, UNSPECIFIED CHRONICITY: Primary | ICD-10-CM

## 2024-09-23 PROCEDURE — 97530 THERAPEUTIC ACTIVITIES: CPT | Mod: GP

## 2024-09-23 PROCEDURE — 97110 THERAPEUTIC EXERCISES: CPT | Mod: GP

## 2024-09-24 ENCOUNTER — OFFICE VISIT (OUTPATIENT)
Dept: PSYCHIATRY | Facility: CLINIC | Age: 59
End: 2024-09-24
Attending: PSYCHIATRY & NEUROLOGY
Payer: COMMERCIAL

## 2024-09-24 VITALS
WEIGHT: 271.8 LBS | HEART RATE: 75 BPM | BODY MASS INDEX: 42.57 KG/M2 | SYSTOLIC BLOOD PRESSURE: 134 MMHG | DIASTOLIC BLOOD PRESSURE: 84 MMHG

## 2024-09-24 DIAGNOSIS — G47.00 INSOMNIA, UNSPECIFIED TYPE: ICD-10-CM

## 2024-09-24 DIAGNOSIS — F31.9 BIPOLAR I DISORDER (H): ICD-10-CM

## 2024-09-24 PROCEDURE — G2211 COMPLEX E/M VISIT ADD ON: HCPCS | Performed by: STUDENT IN AN ORGANIZED HEALTH CARE EDUCATION/TRAINING PROGRAM

## 2024-09-24 PROCEDURE — 99214 OFFICE O/P EST MOD 30 MIN: CPT | Mod: HN | Performed by: STUDENT IN AN ORGANIZED HEALTH CARE EDUCATION/TRAINING PROGRAM

## 2024-09-24 PROCEDURE — 99214 OFFICE O/P EST MOD 30 MIN: CPT | Performed by: STUDENT IN AN ORGANIZED HEALTH CARE EDUCATION/TRAINING PROGRAM

## 2024-09-24 RX ORDER — LAMOTRIGINE 200 MG/1
400 TABLET ORAL DAILY
Qty: 60 TABLET | Refills: 2 | Status: SHIPPED | OUTPATIENT
Start: 2024-09-24

## 2024-09-24 RX ORDER — LITHIUM CARBONATE 300 MG/1
CAPSULE ORAL
Qty: 270 CAPSULE | Refills: 2 | Status: SHIPPED | OUTPATIENT
Start: 2024-09-24

## 2024-09-24 RX ORDER — HYDROXYZINE HYDROCHLORIDE 10 MG/1
10 TABLET, FILM COATED ORAL
Qty: 30 TABLET | Refills: 2 | Status: SHIPPED | OUTPATIENT
Start: 2024-09-24

## 2024-09-24 RX ORDER — LITHIUM CARBONATE 150 MG/1
150 CAPSULE ORAL AT BEDTIME
Qty: 30 CAPSULE | Refills: 2 | Status: SHIPPED | OUTPATIENT
Start: 2024-09-24

## 2024-09-24 ASSESSMENT — PAIN SCALES - GENERAL: PAINLEVEL: MODERATE PAIN (4)

## 2024-09-24 NOTE — PROGRESS NOTES
[facility]  MEDICAL PROGRESS NOTE     CARE TEAM:    PCP- Mikey Cortés     Therapist- individual therapist Elfego Chan and another one for couple therapy    Angy is a 59 year old who uses the pronouns she, her, hers.      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    First, she is going through a divorce with her partner of a little over two decades. Not only she has a lot of paperwork to do, but also being alone during dinner is hard for her. She is trying to read, but it is hard to concentrate. She has now more responsibility such as taking care of her dog, and also work on paperwork that she didn't use to do.    Second, her child is now in [place] for college which also added to the sense of loneliness. Angy and her partner traveled to [place] with their child to help them settle down. Angy continues to see her individual therapist as she is adapting to her situation.    We didn't do any medication changes, but recommended to shorten frequency of visits. We also recommend to work with individual therapist.     Off notes, when she was back from her trip, she took extra Lamictal and lithium without realizing that she had done that the previous night. She mentioned that was due to the jetlag. It was not intentional. In fact, the nurse reported that she was crying when she was mentioning that to them. She reached out to us immediately, and we recommended her to skip the nighttime dose, and take them the next night. She denied side effects from the medication at that time.       Psychotropic Drug Interactions:  [PSYCHCLINICDDI]  Estrogen Derivatives may decrease the serum concentration of  "LamoTRIgine  Angiotensin-Converting Enzyme Inhibitors may increase the serum concentration of Lithium.   LamoTRIgine may decrease the serum concentration of Progestins.      Management: routine monitoring    [place] was not checked today: not using controlled substances    Risk Statements:   Treatment Risk- Risks, benefits, alternatives and potential adverse effects have been discussed and are understood.   Safety Risk-Angy did not appear to be an imminent safety risk to self or others.     Plan     1) Medications:    - Continue Lithium 1050 mg at bedtime  - Continue with Lamictal 400 mg at bedtime   - continue with hydroxyzine 10 mg PRN at bedtime   - add melatonin 1mg to 3mg at bedtime as needed for insomnia    2) Psychotherapy: Continue with couple's therapy    3) Next due:  Labs- 01/2025  EKG- . Last one was on 03/2024 with   Rating scales-  PHQ9    4) Referrals: none  N/A    5) Other: none    6) Follow-up: Return to clinic in 6 weeks     Pertinent Background                                                   [most recent eval 08/31/23]     Angy reported that she didn't have symptoms until November 2018. At which point she was hospitalized for anthony.     This section was copied forward from 08/2021 transfer notes.  \"Patient has not had significant mental health history until 2018, when she developed perimenopausal mood and anxiety symptoms.  She was subsequently referred to our clinic following a hospitalization for anthony in November 2018.  Her care has been complicated by sensitivity to antipsychotics (EPSE including difficulty swallowing), thrombocytopenia secondary to Depakote, lithium toxicity in setting of concurrent Depakote use\"     Pertinent items include: anthony , severe med reaction [described as H/o Thrombocytopenia secondary to Depakote, and H/o lithium toxicity], and psych hosp      Subjective     Angy is back from [place].  Her child will attend school there.  She travel with Zahra. It was " "not an easy trip as they are going through a divorce.  They tourist the place a little bit  Tried coffee shop  Zahra and her had some conflict    They came back on 9/11, and Zahra moved out on 9/14  She lives alone now. The situation is \"okay, challenging, and sad\"  She mentioned that it is hard for her to be by herself after dinner.   She is still working on divorce paperwork.  Zahra used to be the person who does the paperwork especially after Angy hospitalization.  She has to figure them out herself which is not easy.    She is taking care of the dog herself, and feed the dog.  She is trying to engage in our activities such as reading suspense book, but it is still sad in the evening.     She also mentioned that she was jet lagged so that was why she took the extra Lamictal and lithium, but had no side effects including  nausea, vomiting, no tremor/nothing new. Described herself as \"goofy becaue of jet lag\"  Now, she sleeps 7 and half hours     She reported feeling between anxious and ovewelhemed with sadness at night time  Had a panic attack during the trip, and was able to breath. Panic attack is very infequent.    Appetite is on the lower side due to wegovy, but remembering to eat    Thuy none, denied disorganized thougts, lack of need for sleep.    SI/HI:none    Alcohol use: one beer last Thursday that's it    Smoking: none    Other substances (cannabis): none       Mental Status Exam     Alertness: alert  and oriented  Appearance: adequately groomed  Behavior/Demeanor: cooperative and pleasant, with good  eye contact   Speech: normal and regular rate and rhythm  Language: intact  Psychomotor: normal or unremarkable  Mood:  \"Okay, challenging and sad\"  Affect: full range; congruent to: mood- yes, content- yes  Thought Process/Associations: unremarkable  Thought Content:  Reports none;  Denies suicidal & violent ideation and delusions  Perception:  Reports none;  Denies auditory hallucinations and visual " hallucinations  Insight: good  Judgment: good  Cognition: does  appear grossly intact; formal cognitive testing was not done  Gait and Station: unremarkable     Past Psych Med Trials      Medication Max Dose (mg) Dates / Duration Helpful? DC Reason / Adverse Effects?   Depakote    Tremors   sertraline       olanzapine    EPSE   Seroquel    EPSE   lithium 1200   Tremor at 1200 mg had to lower the dose   Wellbutrin 300 15 years ago     Lamictal 400  yes       Treatment Course and Resendiz Events since  JULY 2023 08/2023 : no medication changes  11/2023 no medication changes  01/2023 no medication changes  03/2024 no medication changes  04/2024 no medication changes  06/2024 Increase Lamictal to 400 mg  07/2024 no medication change  08/2024 no medication changes  09/2024 no medication changes     Vitals   /84   Pulse 75   Wt 123.3 kg (271 lb 12.8 oz)   BMI 42.57 kg/m    Pulse Readings from Last 3 Encounters:   09/24/24 75   08/26/24 73   07/30/24 71     Wt Readings from Last 3 Encounters:   09/24/24 123.3 kg (271 lb 12.8 oz)   08/26/24 126.1 kg (278 lb)   07/30/24 126.7 kg (279 lb 6.4 oz)     BP Readings from Last 3 Encounters:   09/24/24 134/84   08/26/24 127/78   07/30/24 (!) 146/84      Medical History     ALLERGIES: Seroquel [quetiapine], Olanzapine, Penicillins, Risperidone, and Thimerosal    Patient Active Problem List   Diagnosis    Oral aphthae    CARDIOVASCULAR SCREENING; LDL GOAL LESS THAN 160    Rosacea    Raynaud disease    Vitamin D deficiency disease    MARLA (obstructive sleep apnea)    Atopic rhinitis    Chronic bilateral low back pain without sciatica    Obesity, Class III, BMI 40-49.9 (morbid obesity) (H)    Muscle tension dysphonia    Palpitations    Dysphagia, unspecified type    Anxiety    Benign essential hypertension    Callus of foot    Varicose veins of right lower extremity with edema    Bipolar I disorder (H)    Dribbling of urine    Bilateral low back pain with right-sided sciatica     Spinal stenosis at L4-L5 level    Facet arthropathy, lumbar    Acute pain of right knee        Medications     Current Outpatient Medications   Medication Sig Dispense Refill    acetaminophen (TYLENOL) 650 MG CR tablet Take 1 tablet (650 mg) by mouth every 8 hours as needed for mild pain or fever      calcium carbonate (TUMS) 500 MG chewable tablet Take 1 chew tab by mouth 2 times daily      Carboxymethylcellulose Sod PF (REFRESH PLUS) 0.5 % SOLN ophthalmic solution Place 1 drop into both eyes 3 times daily as needed for dry eyes      clotrimazole-betamethasone (LOTRISONE) 1-0.05 % external cream Apply topically 2 times daily 45 g 2    estradiol (ESTRACE) 0.5 MG tablet Take 1 tablet (0.5 mg) by mouth daily 90 tablet 3    estradiol (VAGIFEM) 10 MCG TABS vaginal tablet Place 1 tablet (10 mcg) vaginally twice a week 26 tablet 3    fluticasone (FLONASE) 50 MCG/ACT nasal spray Spray 1 spray into both nostrils daily      hydrOXYzine HCl (ATARAX) 10 MG tablet Take 1 tablet (10 mg) by mouth nightly as needed (sleep). 30 tablet 2    lamoTRIgine (LAMICTAL) 200 MG tablet Take 2 tablets (400 mg) by mouth daily. 60 tablet 2    lisinopril (ZESTRIL) 10 MG tablet TAKE ONE TABLET BY MOUTH ONCE DAILY 90 tablet 3    lithium (ESKALITH) 150 MG capsule Take 1 capsule (150 mg) by mouth at bedtime. 30 capsule 2    lithium 300 MG capsule Take 3 tablet (900 mg) and one tablet (150 mg) for a total of 1050 mg at bedtime. 270 capsule 2    Loratadine 10 MG capsule Take 10 mg by mouth daily      medroxyPROGESTERone (PROVERA) 2.5 MG tablet Take 1 tablet (2.5 mg) by mouth daily 90 tablet 3    metroNIDAZOLE (METROGEL) 1 % external gel Apply topically daily 60 g 9    ORDER FOR DME Use your CPAP device as directed by your provider.      psyllium (METAMUCIL/KONSYL) 58.6 % powder Take 1 teaspoonful by mouth daily      Semaglutide-Weight Management (WEGOVY) 1 MG/0.5ML pen Inject 1 mg subcutaneously once a week 2 mL 2    VITAMIN D, CHOLECALCIFEROL, PO  Take 2,000 Units by mouth daily          Labs and Data         1/3/2024    11:58 AM 4/25/2024     9:09 PM 7/26/2024    10:57 AM   PROMIS-10 Total Score w/o Sub Scores   PROMIS TOTAL - SUBSCORES 28 30 26          No data to display                  6/17/2024     2:59 PM 7/30/2024     7:47 AM 9/24/2024     8:15 AM   PHQ-9 SCORE   PHQ-9 Total Score MyChart 10 (Moderate depression) 8 (Mild depression) 8 (Mild depression)   PHQ-9 Total Score 10 8 8         10/31/2023     5:25 PM 6/15/2024     1:27 PM 7/26/2024    10:56 AM   JOANNA-7 SCORE   Total Score 11 (moderate anxiety) 10 (moderate anxiety) 8 (mild anxiety)   Total Score 11 10 8       Liver/Kidney Function, TSH Metabolic Blood counts   Recent Labs   Lab Test 07/11/24  0809 02/28/24  0845   AST  --  21   ALT  --  15   ALKPHOS  --  91   CR 0.63 0.63     Recent Labs   Lab Test 07/11/24  0809   TSH 2.43    Recent Labs   Lab Test 05/29/24  0815   CHOL 160   TRIG 150*   LDL 75   HDL 55     Recent Labs   Lab Test 10/04/22  1651   A1C 5.3     Recent Labs   Lab Test 07/11/24  0809   GLC 88    Recent Labs   Lab Test 02/28/24  0845   WBC 6.6   HGB 14.0   HCT 44.9   MCV 90            Recent Labs   Lab Test 07/11/24  0809 11/08/23  1016 05/15/23  1017   LITHIUM 1.00 0.69 0.7        PROVIDER: Antonio Santillan MD MPH    Level of Medical Decision Making:   - At least 1 chronic problem that is not stable  - Engaged in prescription drug management during visit (discussed any medication benefits, side effects, alternatives, etc.)         Patient was not staffed in clinic.  Supervisor is Dr. Vivian Burnett.      The longitudinal plan of care for Bipolar disorder was addressed during this visit. Due to the added complexity in care, I will continue to support Angy in the subsequent management of this condition(s) and with the ongoing continuity of care of this condition(s)        Answers submitted by the patient for this visit:  Patient Health Questionnaire (Submitted on  9/24/2024)  If you checked off any problems, how difficult have these problems made it for you to do your work, take care of things at home, or get along with other people?: Somewhat difficult  PHQ9 TOTAL SCORE: 8

## 2024-09-27 ENCOUNTER — OFFICE VISIT (OUTPATIENT)
Dept: FAMILY MEDICINE | Facility: CLINIC | Age: 59
End: 2024-09-27
Payer: COMMERCIAL

## 2024-09-27 VITALS
WEIGHT: 272 LBS | DIASTOLIC BLOOD PRESSURE: 67 MMHG | OXYGEN SATURATION: 100 % | HEART RATE: 58 BPM | SYSTOLIC BLOOD PRESSURE: 110 MMHG | TEMPERATURE: 99.2 F | BODY MASS INDEX: 41.22 KG/M2 | HEIGHT: 68 IN

## 2024-09-27 DIAGNOSIS — E78.2 MIXED HYPERLIPIDEMIA: ICD-10-CM

## 2024-09-27 DIAGNOSIS — E66.813 CLASS 3 SEVERE OBESITY DUE TO EXCESS CALORIES WITH SERIOUS COMORBIDITY AND BODY MASS INDEX (BMI) OF 45.0 TO 49.9 IN ADULT (H): ICD-10-CM

## 2024-09-27 DIAGNOSIS — I10 BENIGN ESSENTIAL HYPERTENSION: ICD-10-CM

## 2024-09-27 DIAGNOSIS — L71.9 ROSACEA: ICD-10-CM

## 2024-09-27 DIAGNOSIS — I20.89 EXERTIONAL ANGINA (H): ICD-10-CM

## 2024-09-27 DIAGNOSIS — E66.01 CLASS 3 SEVERE OBESITY DUE TO EXCESS CALORIES WITH SERIOUS COMORBIDITY AND BODY MASS INDEX (BMI) OF 45.0 TO 49.9 IN ADULT (H): ICD-10-CM

## 2024-09-27 DIAGNOSIS — L71.0 PERIORAL DERMATITIS: ICD-10-CM

## 2024-09-27 DIAGNOSIS — M46.96 UNSPECIFIED INFLAMMATORY SPONDYLOPATHY, LUMBAR REGION (H): ICD-10-CM

## 2024-09-27 DIAGNOSIS — Z00.00 HEALTH MAINTENANCE EXAMINATION: Primary | ICD-10-CM

## 2024-09-27 LAB
CHOLEST SERPL-MCNC: 168 MG/DL
ERYTHROCYTE [DISTWIDTH] IN BLOOD BY AUTOMATED COUNT: 12.6 % (ref 10–15)
EST. AVERAGE GLUCOSE BLD GHB EST-MCNC: 100 MG/DL
FASTING STATUS PATIENT QL REPORTED: YES
HBA1C MFR BLD: 5.1 % (ref 0–5.6)
HCT VFR BLD AUTO: 41.6 % (ref 35–47)
HDLC SERPL-MCNC: 54 MG/DL
HGB BLD-MCNC: 13.7 G/DL (ref 11.7–15.7)
LDLC SERPL CALC-MCNC: 87 MG/DL
MCH RBC QN AUTO: 29.5 PG (ref 26.5–33)
MCHC RBC AUTO-ENTMCNC: 32.9 G/DL (ref 31.5–36.5)
MCV RBC AUTO: 90 FL (ref 78–100)
NONHDLC SERPL-MCNC: 114 MG/DL
PLATELET # BLD AUTO: 212 10E3/UL (ref 150–450)
RBC # BLD AUTO: 4.65 10E6/UL (ref 3.8–5.2)
TRIGL SERPL-MCNC: 136 MG/DL
WBC # BLD AUTO: 6.4 10E3/UL (ref 4–11)

## 2024-09-27 PROCEDURE — 83036 HEMOGLOBIN GLYCOSYLATED A1C: CPT | Performed by: FAMILY MEDICINE

## 2024-09-27 PROCEDURE — 80061 LIPID PANEL: CPT | Performed by: FAMILY MEDICINE

## 2024-09-27 PROCEDURE — 36415 COLL VENOUS BLD VENIPUNCTURE: CPT | Performed by: FAMILY MEDICINE

## 2024-09-27 PROCEDURE — 99396 PREV VISIT EST AGE 40-64: CPT | Performed by: FAMILY MEDICINE

## 2024-09-27 PROCEDURE — 99214 OFFICE O/P EST MOD 30 MIN: CPT | Mod: 25 | Performed by: FAMILY MEDICINE

## 2024-09-27 PROCEDURE — 85027 COMPLETE CBC AUTOMATED: CPT | Performed by: FAMILY MEDICINE

## 2024-09-27 RX ORDER — LISINOPRIL 10 MG/1
10 TABLET ORAL DAILY
Qty: 90 TABLET | Refills: 3 | Status: SHIPPED | OUTPATIENT
Start: 2024-09-27

## 2024-09-27 RX ORDER — METRONIDAZOLE 10 MG/G
GEL TOPICAL DAILY
Qty: 60 G | Refills: 9 | Status: SHIPPED | OUTPATIENT
Start: 2024-09-27

## 2024-09-27 ASSESSMENT — PAIN SCALES - GENERAL: PAINLEVEL: NO PAIN (0)

## 2024-09-27 NOTE — PROGRESS NOTES
"Preventive Care Visit  [facility] ANDREY Cortés MD, Family Medicine  Sep 27, 2024      Assessment & Plan     Health maintenance examination    - CBC with platelets; Future  - Lipid panel reflex to direct LDL Fasting; Future  - Hemoglobin A1c; Future    Class 3 severe obesity due to excess calories with serious comorbidity and body mass index (BMI) of 45.0 to 49.9 in adult (H)    - Hemoglobin A1c; Future    Mixed hyperlipidemia    - Hemoglobin A1c; Future    Unspecified inflammatory spondylopathy, lumbar region (H24)      Exertional angina (H)      Benign essential hypertension  Stable   - lisinopril (ZESTRIL) 10 MG tablet; Take 1 tablet (10 mg) by mouth daily.    Rosacea  Stable   - metroNIDAZOLE (METROGEL) 1 % external gel; Apply topically daily.    Perioral dermatitis    - metroNIDAZOLE (METROGEL) 1 % external gel; Apply topically daily.    Patient has been advised of split billing requirements and indicates understanding: Yes        BMI  Estimated body mass index is 41.7 kg/m  as calculated from the following:    Height as of this encounter: 1.72 m (5' 7.72\").    Weight as of this encounter: 123.4 kg (272 lb).   Weight management plan: Discussed healthy diet and exercise guidelines    Counseling  Appropriate preventive services were addressed with this patient via screening, questionnaire, or discussion as appropriate for fall prevention, nutrition, physical activity, Tobacco-use cessation, social engagement, weight loss and cognition.  Checklist reviewing preventive services available has been given to the patient.  Reviewed patient's diet, addressing concerns and/or questions.   She is at risk for lack of exercise and has been provided with information to increase physical activity for the benefit of her well-being.       FUTURE APPOINTMENTS:       - Follow-up visit in 1 year     Ruby Travis is a 59 year old, presenting for the following:  Physical        9/27/2024     7:07 AM "   Additional Questions   Roomed by Ritika SCHAFER        Health Care Directive  Patient has a Health Care Directive on file  Advance care planning document is on file and is current.    HPI        9/26/2024   General Health   How would you rate your overall physical health? Good   Feel stress (tense, anxious, or unable to sleep) To some extent      (!) STRESS CONCERN      9/26/2024   Nutrition   Three or more servings of calcium each day? Yes   Diet: Regular (no restrictions)   How many servings of fruit and vegetables per day? 4 or more   How many sweetened beverages each day? 0-1            9/26/2024   Exercise   Days per week of moderate/strenous exercise 3 days   Average minutes spent exercising at this level 30 min            9/26/2024   Social Factors   Frequency of gathering with friends or relatives Twice a week   Worry food won't last until get money to buy more No   Food not last or not have enough money for food? No   Do you have housing? (Housing is defined as stable permanent housing and does not include staying ouside in a car, in a tent, in an abandoned building, in an overnight shelter, or couch-surfing.) Yes   Are you worried about losing your housing? No   Lack of transportation? No   Unable to get utilities (heat,electricity)? No            9/26/2024   Fall Risk   Fallen 2 or more times in the past year? No   Trouble with walking or balance? No             9/26/2024   Dental   Dentist two times every year? Yes            9/26/2024   TB Screening   Were you born outside of the [place]? No                  9/26/2024   Substance Use   Alcohol more than 3/day or more than 7/wk No   Do you use any other substances recreationally? No        Social History     Tobacco Use    Smoking status: Never    Smokeless tobacco: Never    Tobacco comments:     non smoke home   Vaping Use    Vaping status: Never Used   Substance Use Topics    Alcohol use: Yes     Comment: 2 Drinks Per Month    Drug use: No           1/10/2024    LAST FHS-7 RESULTS   1st degree relative breast or ovarian cancer No   Any relative bilateral breast cancer No   Any male have breast cancer No   Any ONE woman have BOTH breast AND ovarian cancer No   Any woman with breast cancer before 50yrs No   2 or more relatives with breast AND/OR ovarian cancer No   2 or more relatives with breast AND/OR bowel cancer No           Mammogram Screening - Mammogram every 1-2 years updated in Health Maintenance based on mutual decision making        9/26/2024   STI Screening   New sexual partner(s) since last STI/HIV test? No        History of abnormal Pap smear: No - age 30- 64 PAP with HPV every 5 years recommended        Latest Ref Rng & Units 9/23/2020     9:00 AM 9/23/2020     8:55 AM 9/24/2015     9:14 AM   PAP / HPV   PAP (Historical)   NIL     HPV 16 DNA NEG^Negative Negative   Negative    HPV 18 DNA NEG^Negative Negative   Negative    Other HR HPV NEG^Negative Negative   Negative      ASCVD Risk   The 10-year ASCVD risk score (Miryam NORTON, et al., 2019) is: 2%    Values used to calculate the score:      Age: 59 years      Sex: Female      Is Non- : No      Diabetic: No      Tobacco smoker: No      Systolic Blood Pressure: 110 mmHg      Is BP treated: Yes      HDL Cholesterol: 55 mg/dL      Total Cholesterol: 160 mg/dL           Reviewed and updated as needed this visit by Provider                    Past Medical History:   Diagnosis Date    Depressive disorder     Previous depressive episodes. Diagnosed with Bipolar illness in 11/18 after manic episode.    Disorders of bursae and tendons in shoulder region, unspecified     Female infertility of unspecified origin     Gallbladder sludge 05/2013    Hypertension 10/18    Mild sleep apnea 05/13/2014    Obesity, unspecified     Oral aphthae     Other acne     Other specified hypoglycemia     PONV (postoperative nausea and vomiting)      Past Surgical History:   Procedure Laterality Date     BIOPSY      age 42 breast biopsy    COLONOSCOPY      COLONOSCOPY N/A 2024    Procedure: Colonoscopy;  Surgeon: Cheryl Loza MD;  Location:  GI    GYN SURGERY   for ovarian cyst    left ovary, 2 fallopian tubes removed. endometrioma.    HC TOOTH EXTRACTION W/FORCEP      [facility]    IR ENDOVENOUS ABLATION VARICOSE VEINS  2019    LAPAROSCOPIC CHOLECYSTECTOMY  2013    Procedure: LAPAROSCOPIC CHOLECYSTECTOMY;  Laparoscopic Cholecystectomy;  Surgeon: Fabio Johnson MD;  [facility]    LAPAROSCOPIC OOPHORECTOMY Left 2016    Procedure: LAPAROSCOPIC OOPHORECTOMY;  Surgeon: Kayley Donnelly MD;  Location: [facility]    LAPAROSCOPIC SALPINGECTOMY Bilateral 2016    Procedure: LAPAROSCOPIC SALPINGECTOMY;  Surgeon: Kayley Donnelly MD;  Location: [facility]    VASCULAR SURGERY      varicose vein proceedures     OB History    Para Term  AB Living   0 0 0 0 0 0   SAB IAB Ectopic Multiple Live Births   0 0 0 0 0     Labs reviewed in EPIC  BP Readings from Last 3 Encounters:   24 110/67   24 134/84   24 127/78    Wt Readings from Last 3 Encounters:   24 123.4 kg (272 lb)   24 123.3 kg (271 lb 12.8 oz)   24 126.1 kg (278 lb)                  Patient Active Problem List   Diagnosis    Oral aphthae    CARDIOVASCULAR SCREENING; LDL GOAL LESS THAN 160    Rosacea    Raynaud disease    Vitamin D deficiency disease    MARLA (obstructive sleep apnea)    Atopic rhinitis    Chronic bilateral low back pain without sciatica    Obesity, Class III, BMI 40-49.9 (morbid obesity) (H)    Muscle tension dysphonia    Palpitations    Dysphagia, unspecified type    Anxiety    Benign essential hypertension    Callus of foot    Varicose veins of right lower extremity with edema    Bipolar I disorder (H)    Dribbling of urine    Bilateral low back pain with right-sided sciatica    Spinal stenosis at L4-L5 level    Facet  arthropathy, lumbar    Acute pain of right knee    Unspecified inflammatory spondylopathy, lumbar region (H24)    Exertional angina (H)     Past Surgical History:   Procedure Laterality Date    BIOPSY      age 42 breast biopsy    COLONOSCOPY  2018    COLONOSCOPY N/A 2/13/2024    Procedure: Colonoscopy;  Surgeon: Cheryl Loza MD;  Location:  GI    GYN SURGERY  2016 for ovarian cyst    left ovary, 2 fallopian tubes removed. endometrioma.    HC TOOTH EXTRACTION W/FORCEP  1984/85    [place] [facility]    IR ENDOVENOUS ABLATION VARICOSE VEINS  12/12/2019    LAPAROSCOPIC CHOLECYSTECTOMY  07/29/2013    Procedure: LAPAROSCOPIC CHOLECYSTECTOMY;  Laparoscopic Cholecystectomy;  Surgeon: Fabio Johnson MD;  [facility]    LAPAROSCOPIC OOPHORECTOMY Left 06/23/2016    Procedure: LAPAROSCOPIC OOPHORECTOMY;  Surgeon: Kayley Donnelly MD;  [facility]    LAPAROSCOPIC SALPINGECTOMY Bilateral 06/23/2016    Procedure: LAPAROSCOPIC SALPINGECTOMY;  Surgeon: Kayley Donnelly MD;  [facility]    VASCULAR SURGERY  12/12    varicose vein proceedures       Social History     Tobacco Use    Smoking status: Never    Smokeless tobacco: Never    Tobacco comments:     non smoke home   Substance Use Topics    Alcohol use: Yes     Comment: 2 Drinks Per Month     Family History   Problem Relation Age of Onset    Cardiovascular Paternal Grandfather         anyerism    Obesity Paternal Grandfather     Respiratory Paternal Grandfather         pulmonary (air sacs hardening)    Allergies Father         hayfever    Lipids Father         diet therapy    Gallbladder Disease Father         cholecystectomy     Eye Disorder Father         retinal tear x 2     Prostate Cancer Father         Treated at age 77    Alzheimer Disease Maternal Grandmother         ? poor STM    Arthritis Maternal Grandmother     Heart Disease Maternal Grandmother         Arrythmia-got a pacemaker at age 89    Arthritis Mother     Eye Disorder  Mother         Cataract/macular tear in one eye    LUNG DISEASE Mother         bronchiectasis from pneumonia    Atrial fibrillation Mother     Cancer Paternal Grandmother         Uterine CA- as well as her sister    Gynecology Paternal Grandmother         prolapsed uterus- had 10 kids    Obesity Paternal Grandmother     Breast Cancer Maternal Aunt     Other Cancer Paternal Aunt 80        endo. cancer          Current Outpatient Medications   Medication Sig Dispense Refill    acetaminophen (TYLENOL) 650 MG CR tablet Take 1 tablet (650 mg) by mouth every 8 hours as needed for mild pain or fever      calcium carbonate (TUMS) 500 MG chewable tablet Take 1 chew tab by mouth 2 times daily      Carboxymethylcellulose Sod PF (REFRESH PLUS) 0.5 % SOLN ophthalmic solution Place 1 drop into both eyes 3 times daily as needed for dry eyes      clotrimazole-betamethasone (LOTRISONE) 1-0.05 % external cream Apply topically 2 times daily 45 g 2    estradiol (ESTRACE) 0.5 MG tablet Take 1 tablet (0.5 mg) by mouth daily 90 tablet 3    estradiol (VAGIFEM) 10 MCG TABS vaginal tablet Place 1 tablet (10 mcg) vaginally twice a week 26 tablet 3    fluticasone (FLONASE) 50 MCG/ACT nasal spray Spray 1 spray into both nostrils daily      hydrOXYzine HCl (ATARAX) 10 MG tablet Take 1 tablet (10 mg) by mouth nightly as needed (sleep). 30 tablet 2    lamoTRIgine (LAMICTAL) 200 MG tablet Take 2 tablets (400 mg) by mouth daily. 60 tablet 2    lisinopril (ZESTRIL) 10 MG tablet TAKE ONE TABLET BY MOUTH ONCE DAILY 90 tablet 3    lithium (ESKALITH) 150 MG capsule Take 1 capsule (150 mg) by mouth at bedtime. 30 capsule 2    lithium 300 MG capsule Take 3 tablet (900 mg) and one tablet (150 mg) for a total of 1050 mg at bedtime. 270 capsule 2    Loratadine 10 MG capsule Take 10 mg by mouth daily      medroxyPROGESTERone (PROVERA) 2.5 MG tablet Take 1 tablet (2.5 mg) by mouth daily 90 tablet 3    metroNIDAZOLE (METROGEL) 1 % external gel Apply topically  daily 60 g 9    ORDER FOR DME Use your CPAP device as directed by your provider.      psyllium (METAMUCIL/KONSYL) 58.6 % powder Take 1 teaspoonful by mouth daily      Semaglutide-Weight Management (WEGOVY) 1 MG/0.5ML pen Inject 1 mg subcutaneously once a week 2 mL 2    VITAMIN D, CHOLECALCIFEROL, PO Take 2,000 Units by mouth daily       Allergies   Allergen Reactions    Seroquel [Quetiapine] Nausea and Vomiting     Had constant vomiting    Olanzapine      Tongue welling, bad EPSE (Parkinsonian)    Penicillins Rash    Risperidone      Tongue welling, bad EPSE (Parkinsonian)      Thimerosal Other (See Comments)     blisters on inside of eyelid     Recent Labs   Lab Test 07/11/24  0809 05/29/24  0815 02/28/24  0845 05/31/23  0945 05/15/23  1017 12/07/22  1111 12/07/22  1111 10/04/22  1651 09/03/21  1007 05/05/21  0853 02/03/21  1019 07/15/20  1617 01/24/20  1310   A1C  --   --   --   --   --   --   --  5.3  --  5.4  --   --  4.9   LDL  --  75 120* 100  --   --   --   --   --  85  --   --  113   HDL  --  55 61 62  --   --   --   --   --  72  --   --  67.2   TRIG  --  150* 170* 214*  --   --   --   --   --  123  --   --  192.1*   ALT  --   --  15  --  16  --  23  --    < >  --  17   < >  --    CR 0.63  --  0.63  --  0.58  --  0.55  --    < > 0.56 0.58   < >  --    GFRESTIMATED >90  --  >90  --  >90  --  >90  --    < > >90 >90   < >  --    GFRESTBLACK  --   --   --   --   --   --   --   --   --  >90 >90   < >  --    POTASSIUM 4.5  --  4.4  --  4.2   < > 4.4  --    < > 4.3 4.2   < >  --    TSH 2.43  --  2.23  --  2.51   < > 2.13  --    < > 2.61 2.84   < >  --     < > = values in this interval not displayed.          Review of Systems  Constitutional, HEENT, cardiovascular, pulmonary, GI, , musculoskeletal, neuro, skin, endocrine and psych systems are negative, except as otherwise noted.     Objective    Exam  /67 (BP Location: Left arm, Patient Position: Sitting, Cuff Size: Adult Regular)   Pulse 58   Temp 99.2  " F (37.3  C) (Tympanic)   Ht 1.72 m (5' 7.72\")   Wt 123.4 kg (272 lb)   SpO2 100%   BMI 41.70 kg/m     Estimated body mass index is 41.7 kg/m  as calculated from the following:    Height as of this encounter: 1.72 m (5' 7.72\").    Weight as of this encounter: 123.4 kg (272 lb).    Physical Exam  GENERAL: alert and no distress  EYES: Eyes grossly normal to inspection, PERRL and conjunctivae and sclerae normal  HENT: ear canals and TM's normal, nose and mouth without ulcers or lesions  NECK: no adenopathy, no asymmetry, masses, or scars  RESP: lungs clear to auscultation - no rales, rhonchi or wheezes  CV: regular rate and rhythm, normal S1 S2, no S3 or S4, no murmur, click or rub, no peripheral edema  ABDOMEN: soft, nontender, no hepatosplenomegaly, no masses and bowel sounds normal  MS: no gross musculoskeletal defects noted, no edema  SKIN: no suspicious lesions or rashes  NEURO: Normal strength and tone, mentation intact and speech normal  BACK: no CVA tenderness, no paralumbar tenderness  PSYCH: mentation appears normal, affect normal/bright        Signed Electronically by: Mikey Cortés MD    "

## 2024-09-30 ENCOUNTER — HOSPITAL ENCOUNTER (OUTPATIENT)
Dept: NUTRITION | Facility: CLINIC | Age: 59
Discharge: HOME | End: 2024-09-30
Admitting: FAMILY MEDICINE
Payer: COMMERCIAL

## 2024-09-30 PROCEDURE — 97803 MED NUTRITION INDIV SUBSEQ: CPT | Mod: GT,95 | Performed by: DIETITIAN, REGISTERED

## 2024-09-30 NOTE — PROGRESS NOTES
Virtual Visit Details    Type of service:  Video Visit     Originating Location (pt. Location): Other work setting     Distant Location (provider location):  On-site  Platform used for Video Visit: Well     NUTRITION REASSESSMENT NOTE   REASON FOR ASSESSMENT  Angy LILIAN Haji referred by Dr. Cortés for MNT related to  Obesity, Class III, BMI 40-49.9 (morbid obesity) (H) [E66.01]          Patient accompanied by self      NUTRITION HISTORY  Patient continues with multiple life changes and stressors.  Patient aware that she likes to self soothe with ice cream which she has recently increased.  Patient is aware that social cues trigger her to eat. Patient's spouse recently had weight loss surgery so there is adjust with eating in the home.      3/7/2022 Self soothe in evening snack-made tea or tells self ate late dinner and does not need snack.  Patient asking self if physically hungry.  Patient drinking fizzy water or malt o meal as evening snack.  Patient aware she has been stress eating.  Tracking when not having dessert -identifying why eating  Exercise-cross country skiing; mall walk; snow shoeing; treadmill 2 times haring treadmill   Yogurt; cheese and crackers; cereal      Patient wants to increase exercise time as will be going to [place] weekend ([place]) [facility]      4/25/2002 Patient able to get 45-60 minutes exercise on weekends and 20-30 minutes of exercise daily.  Patient wants to lose 5 lbs prior to trip to [place].  Patient was able to have 3 days no desserts and then 1 week no dessert. 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  Patient determined ways she could lose weight: Dessert 2 times per week, add treadmill, eliminate morning snack, 1 snack at 3 PM. Alcohol 1 time per week, no caramel lattes, food is fuel, it's too late to be hungry, spouse brings sweet treats without asking, tea/bath/shower      6/15/2022 Patient skips dessert if eats  late dinner.  Patient felt discouraged with weight loss as wanted to lose more weight (down 3.5 lbs). Patient wants weight loss for functional reasons.  Patient signed up for the [place] bike tour.  Patient has increased cravings and thoughts about ice cream.  Shift in relationship with spouse after spouse's weight loss surgery.      7/27/2022 Patient increased exercise-[place] with [employer]; started the last week in June for bike rides (4 rides different times in 2 weeks-9 miles).  Rode in 18 mile race.  Went hiking on vacation-[place].     No dessert (hit or miss) 50% 3 days no dessert; ice cream and s'mores on vacation     Typical intake:  Greek yogurt + 1/4 cup granola + fruit + 1/2 and 1/2 with coffee or english muffin with peanut butter/butter and strawberry jam or eggs scrambled or hard boiled  Leftovers -1 cup cherries; cherry tomatoes; 1/2 cup pea pods + 3 oz pork tenderloin (teryaki)   Watermelon, pork tenderloin, 1 cup rice + tomatoes/pea pods; cheeseburger with whole wheat bun + vegetables + watermelon   Snacks: croissant ([place]) Episcopalian oat squares (1 cup dry) (3:00 PM) cheese and crackers    Beverages: coffee; water; fizzy water      Emotional eating at night-ice cream      9/21/2022 Patient has been able to bike 2 times per week.  Patient's weight has been stable.  Patient has not been able to track dinner and snacks.  Stress eating ebbs and flows.  Trying to save dessert for the weekend or may self soothe during th week with ice cream.  Patient stress eating in the evening and may boredom eat prior to lunch (cereal).    Weight: 308.4 lbs       11/16/2022 Biking 2 times per week. Tour biking race: [employer] 14 miles (rode by herself). [facility] 12 mile hilly bike ride. (rode with friend).  Patient has consistently walked dog and completes PT exercises.  Patient determining plan for winter exercise.    314.4 lbs (food and sleep changes) Patient  was eating more sweets as patient's wife baked sweets this fall (3-4 weeks with increase of sweets).  Holiday baking plan consists of 5 cookies and 1 candy (peanut brittle).  Ice cream consumption has increased recently.      1/23/2023 Patient was able to bake holiday cookies and freeze them.  Patient has been eating desserts 5 days a week.  Patient has been substituting greek yogurt (flavored or vanilla) or cream of wheat for dessert.  Patient was able to snow shoe.  Patient having barrier with getting on treadmill.  Patient's weight has fluctuated 325.8 and now 316 lbs.  Patient has been working on sleep routine (classical music; reading on paper vs ipad).      3/17/2023 Patient having knee pain and went to orthopedic MD and got cortizone shot in knee and able to walk know. Walked dog 3 times this week. Swimming -[facility] (6 sameer pool). Tyler wants to swim with goal of swimming 2 times per week.  Patient states ice cream and dessert are hit or miss. Patient shifting mindset to days having dessert vs not having dessert. Patient has not been as mindful with practicing self soothe at night.  Has tried tea and engaging book.     5/3/2023 PT exercises for knee; walking 15-30 minutes per day with dog + swimming 1 time per week (9 laps); 5 days per week desserts -patient using self talk to reduce dessert intake. Patient trying to limit desserts.  Patient's wife brining bread and sweets into home in which patient feels compels to eat as not to waste food.  Patient wants to be aware of foods when dining out and would like to split meal with spouse. Patient using zinger tea to soothe at night.       6/28/2023 No dessert 1-2 days; vacation and party so feels increase in desserts.  Patient getting more movement at work by taking stairs at work and size of building as no longer working remote.  Going to PT; less dog walks due to driving to work; found new lunch place to eat lunch at work (3-4 days per week-20  minute walk) -reading book and eating lunch; aims for hour lunch -reading for self care      8/9/2023 Patient is being intentional for choosing desserts.  Skipped ice cream since had late dinner.  Grabbed snack when dinner wasn't ready prior to function and ate later-took cheese and fruit then ate chicken kebab onion, bell pepper, chicken -316 lbs currently.  Was lower weight last week. Patient trying to balance stress in life.      10/4/2023 Patient having 1 night per week not having dessert.  Patient feels like eating ice cream when alone at home while watching a favorite show and feels more indulgent.  Patient did not participate in [employer] as hoped.  Patient will be participating in [employer] this weekend.  Patient continues to walk dog daily.  Patient also continues with increased life stressors.  Reading in the evening + fizzy water.      12/13/2023 Patient with reduction in weight trends: weight 307.4 lbs and 309 lbs.  Patient had stressful family situation which caused drop in appetite (past 2-3 weeks).  Patient states was not using food as coping strategy. Patient having dessert 1-6 days per week with most desserts  3-4 desserts per week -writes reason why no dessert-sets goals in advance of no dessert; no dessert for today or too late to eat dessert; food trigger from spouse (will see eating popcorn and will then eat it if she is -likes caramel corn); self care-journaling as needed; playing piano for fun 3 times per week; movement -walking dog + biking ([place]) + hour walk      1/24/2024 Patient working on self care at night- reading, limited TV watching, playing piano in the evening (5-10 minutes); desserts -aiming for 4 per week 3-5-1-1-3. Patient choose Malt-O meal as replacement for dessert; 304-312 lbs; less cookie baking during the holidays; exercise reduced with cold weather (walking outside)-treadmill; stairs; no pool. Patient working on exercise plan.       4/17/2024  Patient with extra stress in life which has reduced desire to eat.  Patient working on increasing exercise intensity for upcoming trip.Treadmill-working on distance and incline 4 days per week 15-46 minutes + additional walking. Patient working on self soothe options besides food. Patient plans to start weight loss medication and will be working with pharmacist.  Patient also planning to meet with weight loss clinic.      7/10/2024 Patient focusing on adding movement-treadmill; lunchtime walking 20-30 minutes; biking everything other week 8-16 miles. Patient started Wegovy-283.4 lbs. Patient losing 1-2 lbs per week. Symptoms- no appetite, nausea, acid reflux -spicy, constipation      Greek yogurt or eggs or peanut butter on english muffin (3 T)    9/30/2024 Patient has lost 34 lbs. Wegovy 1 mg (3rd month) Patient states appetite low. Patient eating regular meals. Patient has only forgotten to eat on occasion. Patient aware needs to prioritize eating. Patient with back pain walking at slow speeds. Patient with mild nausea and GI distress. Patient working on self care-getting outside; reading; time with friends. Patient will stress eat-ice cream.     Split pea and ham soup; salad with veggies   Cream of wheat and yogurt    Burgers or chicken or BLT     DIET RECALL   Breakfast: cereal and banana; yogurt + granola (measures), fruit + coffee; 2 slices toast with butter + 1/2 cup cottage cheese    Lunch: leftovers (includes fruit and vegetable daily)  Snack: fruit or cheese with lower salt nut thin crackers   Dinner: Tuna steak, Asian marinade, stir gomez, baked vegetable + brown rice (3/4 cup) + canned peaches; chicken thighs with beans and rice; sheet pan dinner with chicken and vegetables or grilled salmon and grilled vegetables; taco; protein + fruit and vegetable  Snacks: fruit, string cheese, Ritz crackers or yogurt; dessert in the evening (ice cream recently)  Beverages: mainly water, coffee, mineral water  Dining  "out: none currently  Exercise: Patient exercising 3-4 times per week. (walking and walking dog).      MEDICATIONS:  Reviewed      ANTHROPOMETRICS  Height: 5'8\" (note height change in chart previously 5'9\")  Weight: 272 lbs   BMI (kg/m2): 41.7 kg/m2   %IBW: 194%  Weight History:   Wt Readings from Last 10 Encounters:   09/27/24 123.4 kg (272 lb)   07/18/24 127.1 kg (280 lb 4.8 oz)   07/10/24 128.5 kg (283 lb 6.4 oz)   04/19/24 137 kg (302 lb)   04/18/24 136.2 kg (300 lb 3.2 oz)   04/17/24 136.5 kg (301 lb)   03/06/24 139.3 kg (307 lb)   02/13/24 140.2 kg (309 lb)   09/09/23 142.4 kg (314 lb)   09/07/23 142.4 kg (314 lb)             ASSESSED NUTRITION NEEDS  Estimated Energy Needs: 2327-8901 kcals (15-20 kcals/kg) for gradual weight loss   Estimated Protein Needs: 66-79 (1-1.2 gm/kg IBW) for repletion with exercise  RMR: 1440     EVALUATION/PROGRESS TOWARDS GOALS   Previous goals:  Add soothing activity in the evening (8:00 PM) 5 of 7 days-met  Exercise 5 days per week for 30 minutes -not met due to injury   80-90 grams protein -improving      Previous Nutrition Diagnosis: Obesity related to excess energy intake as evidenced by BMI of 44.3 kg/m2-no change     Current Nutrition Diagnosis:Obesity related to excess energy intake as evidenced by BMI of 41.7 kg/m2     INTERVENTIONS  Nutrition Prescription   Recommend exercise 5 times per week; track intake if eating mindlessly; increase fiber to 25-30 grams per day      Implementation:   Meals and Snacks: Continue eating 3 meals + deliberate snacking (if hungry)   Nutrition Education (Content):              a)  Discussed progress towards goals. Congratulated patient for practicing self soothe alternatives to food. Reviewed current food and eating behavior challenges. Discussed protein options. Continued discussion around self care as way to improve emotional eating.  Discussed weight loss medication and current symptoms. Suggest continuing 1/2 dose Miralax daily. Patient " will be working with the [facility]. Supported patient in her struggle with food and weight.   Nutrition Education (Application):              a) Discussed strategies to set boundaries with food.                b) Patient verbalized understanding of diet by stating continue self soothe options.    Expected patient engagement: good     CURRENT GOALS   PT exercises and intensive walking as able  Weekly grocery shopping and cooking ahead   Protein options at all meals   Self soothe 5 of 7 days      FOLLOW UP/MONITORING    Patient to follow up in 5 weeks   Patient has RD contact information      Time spent with patient: 30 minutes     Mattie Amaya, RD, LD  [facility] Outpatient Dietitian  [phone] (office phone)

## 2024-10-02 DIAGNOSIS — E66.01 CLASS 3 SEVERE OBESITY DUE TO EXCESS CALORIES WITH SERIOUS COMORBIDITY AND BODY MASS INDEX (BMI) OF 45.0 TO 49.9 IN ADULT (H): Primary | ICD-10-CM

## 2024-10-02 DIAGNOSIS — E66.813 CLASS 3 SEVERE OBESITY DUE TO EXCESS CALORIES WITH SERIOUS COMORBIDITY AND BODY MASS INDEX (BMI) OF 45.0 TO 49.9 IN ADULT (H): Primary | ICD-10-CM

## 2024-10-18 ENCOUNTER — TELEPHONE (OUTPATIENT)
Dept: ORTHOPEDICS | Facility: CLINIC | Age: 59
End: 2024-10-18
Payer: COMMERCIAL

## 2024-10-18 DIAGNOSIS — M54.50 CHRONIC LOW BACK PAIN, UNSPECIFIED BACK PAIN LATERALITY, UNSPECIFIED WHETHER SCIATICA PRESENT: Primary | ICD-10-CM

## 2024-10-18 DIAGNOSIS — G89.29 CHRONIC LOW BACK PAIN, UNSPECIFIED BACK PAIN LATERALITY, UNSPECIFIED WHETHER SCIATICA PRESENT: Primary | ICD-10-CM

## 2024-10-18 NOTE — TELEPHONE ENCOUNTER
Appointment     Reason for Call: Patient is requesting to see Dr Valadez. She said she saw her years ago for something similar, so would like to see her again. She says it's low back down to knee & foot. Checking if ok to schedule w/Dr Valadez or if she needs to see Spine Specialist     Could we send this information to you in [employer] ([employer] or would you prefer to receive a phone call?:   Patient would prefer a phone call   Okay to leave a detailed message?: No at Cell number on file:    Telephone Information:   Mobile [phone]

## 2024-10-22 ENCOUNTER — VIRTUAL VISIT (OUTPATIENT)
Dept: PHARMACY | Facility: CLINIC | Age: 59
End: 2024-10-22
Attending: PHYSICIAN ASSISTANT
Payer: COMMERCIAL

## 2024-10-22 VITALS — WEIGHT: 269 LBS | BODY MASS INDEX: 40.77 KG/M2 | HEIGHT: 68 IN

## 2024-10-22 DIAGNOSIS — E66.01 OBESITY, CLASS III, BMI 40-49.9 (MORBID OBESITY) (H): ICD-10-CM

## 2024-10-22 ASSESSMENT — PAIN SCALES - GENERAL: PAINLEVEL: MODERATE PAIN (5)

## 2024-10-22 NOTE — PROGRESS NOTES
Medication Therapy Management (MTM) Encounter    ASSESSMENT:                            Medication Adherence/Access: education provided today of [facility] insurance requirements changing - stopping GLP1/GIP Agonist coverage for weight management in 2025.   Discussed options for continuing GLP1/GIP agonist in 2025.     Weight management :   Due to [facility] insurance discontinuing coverage of GLP1 agonists for Weight Management in 2025 year, much of today's discussion was spent discussing next steps including alternative injectable options - paying out of pocket w/ savings card cost, compound product through [facility], and others. From this, patient wishing to transition to compound semaglutide through [facility] after current supply of Wegovy  is out.    Given current dose works well and with affordability in mind for 2025 (doses of compounded semaglutide <1mg affordable for patient in 2025, will keep current dose of Wegovy 1mg and patient to transition with pcp to compounded semaglutide in 2025. If not affordable long term, patient may consider metformin (has been on in the past for fertility/PCOS and no intolerabilities reported) +/- naltrexone for cravings. Patient to transition care back to PCP for this.      PLAN:                            Continue Wegovy 1 mg into 2025. COntinue to work with Mar to optimize nutrition.    You will want to get an appointment with Mikey Cortés MD around February to help with transition to compounded semaglutide with [facility] (see cost below for all GLP1/GIP agonist options for weight loss in 2025 for out of pocket cost).    Options for continuing GLP1/GIP agonist in 2025:  Pay out of pocket for Wegovy or Zepbound pens (>$1000/month cash price without savings card)   Zepbound cost with Zepbound savings card (link below to sign up for this): $650/month with  card  https://[email or web address].zepbound.[email or web address]/enroll/checkEnrollment  Wegovy cost with Wegovy savings card (link below to sign up for this): $650/month with card   https://www.[employer]/coverage-and-savings/save-on-wegovy.html  Compounded semaglutide (same active ingredient as Wegovy) from [facility] ($230/month for doses of 1mg or less; $370/month for doses higher than 1mg)  This is an available option for as long as Wegovy is on FDA shortage list, Wegovy has been on the list for the last several months with no foreseeable end in sight as of now   Zepbound Vials through [facility] pharmacy - vials only available in 2.5 mg and 5 mg doses  $399/month for 2.5mg vials  $549/month for 5mg vials   $5 per month for administrations supplies (syringe/needles, etc)       Follow-up: early in 2025 with Mikey Cortés MD for transtion to compounded semaglutide from [facility].    You may request a referral to [facility] in  2025 if you would like further support with these transitons as well. I would love to have you back!    SUBJECTIVE/OBJECTIVE:                          Angy Haji is a 59 year old female seen for a follow-up visit.       Reason for visit: R/[facility] insurance requirements - Medication Therapy Management GLP1/GIP Agonist Management .    Allergies/ADRs: Reviewed in chart  Past Medical History: Reviewed in chart  Tobacco: She reports that she has never smoked. She has never used smokeless tobacco.  Alcohol: Less than 1 beverages / week - socially   Caffeine: 32 oz caffeine daily  Personal Healthcare Goals: goal to manage weight with     Medication Adherence/Access: medication concerns - insurance restrictions.    Weight Management   Class III Obesity (BMI 40 or more):   Wegovy 1mg weekly     Following with Mikey Cortés MD in [place], [facility] referral.    Likes Wegovy. Furstrated with insurance  changes in 2025. Works to optimize nturtion given significant decrease in appetite overall, but some returning cravings for ice cream - following with registered dietician to help with this and strategies helping to reduce ice cream from daily consumption to a few times per week - feels Wegovy supports this intentional eating.    Has tried weight loss programs (weight watchers), diet, exercise, working with dietician. In early 30s, lost 30 lbs and was able to maintain for awhile and then lost over 100 lbs with lots of activity and working with dietician. Kept this weight management for 5 years. Then hit menopause, had significant stress in family and had hospitalization for manic episode - gained it all back. Has been current weight for about 5 years.     Nutrition/Eating Habits:- sees dietician Mar Xiong. Focuses on protein, vegetables.  Breakfast: fruit, yogurt  Lunch: leftover dinner (protein, veggies, complex carb)  Dinner: (cooks at home mostly): crockpot a lot - protein, veggies, complex carbs  Water intake  - 60+ oz/day    Exercise/Activity: working with physical therapy friend for safe activity to help with weight management. Walks on treadmill 3-5 days per week 30mins per session. Rides bike. Noting some small increase in stamina recently.     Has SVT, which somewhat limits activity. Hot and dyspnea (attributes to lithium) when overly active in the summer.    Medication Considerations:  Phentermine: positive hypertension and anxiety (bi-polar/manic episode), SVT  Topiramate: No kidney stones, some fatigue symptoms secondary to bipolar depression. Some word retrieval issues secondary to menopause she notes.  Naltrexone: No liver dysfunction, no chronic opiate use  Bupropion:  no seizure, positive hypertension and anxiety (bi-polar/manic episode), SVT  Metformin: no renal issues  GLP1: Patient denies personal or family history of MEN Type2, MTC, Pancreatitis. Had gall bladder  "removed.      Initial weight: 300 lb         Current weight today: 269 lbs 0 oz  Cumulative Weight Loss: -31 lb, -10% from baseline    Wt Readings from Last 4 Encounters:   10/22/24 269 lb (122 kg)   09/27/24 272 lb (123.4 kg)   07/18/24 280 lb 4.8 oz (127.1 kg)   07/10/24 283 lb 6.4 oz (128.5 kg)     Estimated body mass index is 40.9 kg/m  as calculated from the following:    Height as of this encounter: 5' 8\" (1.727 m).    Weight as of this encounter: 269 lb (122 kg).    Lab Results   Component Value Date    A1C 5.1 09/27/2024    GLC 88 07/11/2024     07/11/2024    POTASSIUM 4.5 07/11/2024    SHERRIE 9.8 07/11/2024    CHLORIDE 104 07/11/2024    CO2 22 07/11/2024    BUN 10.8 07/11/2024    CR 0.63 07/11/2024    GFRESTIMATED >90 07/11/2024    CHOL 168 09/27/2024    LDL 87 09/27/2024    HDL 54 09/27/2024    TRIG 136 09/27/2024    VITDT 33 02/28/2024    TSH 2.43 07/11/2024     Liver Function Studies -   Recent Labs   Lab Test 02/28/24  0845   PROTTOTAL 7.6   ALBUMIN 4.2   BILITOTAL 0.3   ALKPHOS 91   AST 21   ALT 15         Today's Vitals: Ht 5' 8\" (1.727 m)   Wt 269 lb (122 kg)   BMI 40.90 kg/m    ----------------      I spent 25 minutes with this patient today. All changes were made via collaborative practice agreement with Rosana Washington. A copy of the visit note was provided to the patient's provider(s).    A summary of these recommendations was sent via [email or web address].    Cristel Recinos, Pharm D., MPH    Medication Therapy Management Pharmacist   [facility]      Telemedicine Visit Details  The patient's medications can be safely assessed via a telemedicine encounter.  Type of service:  Telephone visit  Originating Location (pt. Location): Home    Distant Location (provider location):  Off-site  Start Time:  8:34 am  End Time:  8:59 AM     Medication Therapy Recommendations  Obesity, Class III, BMI 40-49.9 (morbid obesity) (H)    Current Medication: Semaglutide-Weight " Management (WEGOVY) 1 MG/0.5ML pen   Rationale: Medication product not available - Adherence - Adherence   Recommendation: Provide Adherence Intervention   Status: Patient Agreed - Adherence/Education

## 2024-10-22 NOTE — TELEPHONE ENCOUNTER
After discussing with Dr. Valadez, I called the patient to inform her that Dr. Valadez does feel that it would be best for the patient to see our non-operative spine team as Sports Medicine spine scheduling has now changed since she was last seen by Dr. Valadez years ago. Spine referral was placed for patient, I called the patient to inform her of this. No answer, left message with phone number to schedule with non-operative spine team. Romy Caro ATC on 10/22/2024 at 8:54 AM

## 2024-10-22 NOTE — Clinical Note
"Debra Cortés,    Today I met with our patient regarding GLP1 management for weight . We have recently been informed that as of January 2025, [employer] employees will no longer have coverage for GLP1s for weight management.   Patients will likely transition back to you for weight management (vs sharing patients with Medication Therapy Management at [facility]). AND I want to be supportive with this change/transition. There certainly will be some frustrations with costs and \"what's next\". I did discuss compounded semaglutide and Zepbound vials through [employer] as 2 options that bring down cost slightly. These can be prescribed through your clinic, happy to help if needed with those initial orders.   There is no action needed at this time. Patient is aware of this change and elected to continue GLP1 for now.    Let me know if you have any questions or concerns, Cristel Recinos, Pharm D., MPH  Medication Therapy Management Pharmacist [facility]   "

## 2024-10-22 NOTE — PATIENT INSTRUCTIONS
Recommendations from MTM Pharmacist visit:                                                    MTM (medication therapy management) is a service provided by a clinical pharmacist designed to help you get the most of out of your medicines.  You may be sent a phone or email survey evaluating today's visit.  Please provide feedback you have for the service he received today if you are able.      Continue Wegovy 1 mg into 2025. COntinue to work with Mar to optimize nutrition.    You will want to get an appointment with Mikey Cortés MD around February to help with transition to compounded semaglutide with [facility] (see cost below for all GLP1/GIP agonist options for weight loss in 2025 for out of pocket cost).    Options for continuing GLP1/GIP agonist in 2025:  Pay out of pocket for Wegovy or Zepbound pens (>$1000/month cash price without savings card)   Zepbound cost with Zepbound savings card (link below to sign up for this): $650/month with card  https://www.enrollment.zepbound.[employer]cont[employer]/enroll/checkEnrollment  Wegovy cost with Wegovy savings card (link below to sign up for this): $650/month with card   https://www.[employer]Well.com/coverage-and-savings/save-on-wegovy.html  Compounded semaglutide (same active ingredient as Wegovy) from [facility] ($230/month for doses of 1mg or less; $370/month for doses higher than 1mg)  This is an available option for as long as Wegovy is on FDA shortage list, Wegovy has been on the list for the last several months with no foreseeable end in sight as of now   Zepbound Vials through [facility] - vials only available in 2.5 mg and 5 mg doses  $399/month for 2.5mg vials  $549/month for 5mg vials   $5 per month for administrations supplies (syringe/needles, etc)       Follow-up: early in 2025 with Mikey Cortés MD for transtion to compounded semaglutide from [facility].    You may request a referral to  "[facility] in  2025 if you would like further support with these transitons as well. I would love to have you back!        It was great speaking with you today.  I value your experience and would be very thankful for your time in providing feedback in our clinic survey. In the next few days, you may receive an email or text message from [facility] with a link to a survey related to your  clinical pharmacist.\"     To schedule another MT appointment, please call the clinic directly ([facility] Phone Number: [phone] (schedules for [facility] and [facility] - providers, dietitians, health coaches) or you may call the MTM scheduling line at [phone] or toll-free at [phone].     My Clinical Pharmacist's contact information:                                                      Please feel free to contact me with any questions or concerns you have.      Cristel Recinos, Pharm D., MPH    Medication Therapy Management Pharmacist   [facility]          Meal Replacement Shake Options:   *Protein Shake Criteria: no more than 210 Calories, at least 20 grams of protein, and less than 10 grams of sugar   Premier Protein (160 Calories, 30 g protein)  Slim Fast Advanced Nutrition (180 Calories, 20 g protein)  Muscle Milk, lactose-free, 17 oz bottle (210 Calories, 30 g protein)  Integrated Supplements, no artificial sugars (110 Calories, 20 g protein)  Boost/Ensure Max (160 calories, 30 gm protein)   Fairlife Protein Shakes (160-230 calories, 26-42 gm protein)  Aldi's Elevation Protein Powder (180 calories, 30 gm protein)   Orgain Protein Shakes (130-160 calories, 20-26 gm protein)     Meal Replacement Bar Options:  Quest Protein Bars (190 Calories, 20 g protein)  Built Bar (170 Calories, 15-20 g protein)  One Protein Bar (210 calories, 20 g protein)  Williams Signature Protein Bar ([employer]) (190 Calories, " 21 g protein)  Pure Protein Bars (180 Calories, 21 g protein)    Low Calorie Frozen Meal:  Healthy Choice Power Bowls  Lean Cuisine  Smart Ones  Gael Siva Magedernesto      ---------------------------------------------------------------  Tips to Increase the Protein in Your Diet  You may need more protein in your diet to help you heal from an illness, surgery or wound. Extra protein can also help you gain weight. Here are some ideas for adding high-protein foods to your meals.  Meat and fish  Add chopped cooked meat to vegetables, salads, casseroles, soups, sauces and biscuit dough.  Use in omelets, soufflés, quiches, sandwich fillings and chicken or turkey stuffing.  Wrap in pie crust or biscuit dough to make a turnover.  Add to stuffed baked potatoes.  Make a dip with diced meat or flaked fish mixed with sour cream and spices.  Chopped, hard-cooked eggs  Add to salads.  Use for snacks and sandwich filling.  High-protein milk  To make high-protein milk, mix 1 quart whole milk with 1 cup powdered milk.  Add to cream soups, mashed potatoes, scrambled eggs, cereals and dried eggnog mix.  Use as an ingredient in puddings, custards, hot chocolate, milk shakes and pancakes.  Powdered milk  If you don't have any high-protein milk on hand, you can use powdered milk. Add 3 tablespoons to:  gravies, sauces, cream soups, mayonnaise  casseroles, meat patties, meatloaf, tuna salad, deviled ham  scalloped or mashed potatoes, creamed spinach  scrambled eggs, egg salad  cereals  yogurt, milk drinks, ice cream, frozen desserts, puddings, custards.  Add 4 to 6 tablespoons powdered milk to make:  cream sauces  breads, muffins, pancakes, waffles, cookies, cakes  cream pies, frostings, cake fillings  fruit cobblers, bread or rice pudding, gelatin desserts.  For high-protein eggnog, add 3 to 6 tablespoons powdered milk to prepared eggnog.  Hard or soft cheese  Melt on sandwiches, breads, tortillas, hamburgers, hot dogs, other meats,  vegetables, eggs and pies.  Grate into soups, chili, sauces, casseroles, vegetables, potatoes, rice, noodles or meatloaf.  Eat with toast or crackers, or melt for harriett dip.  Cottage cheese or ricotta cheese  Mix with or scoop on top of fruits and vegetables.  Add to casseroles, lasagna, spaghetti, noodles and egg dishes (omelets, scrambled eggs, soufflés).  Use in gelatin, pudding-type desserts, cheesecake and pancake batter.  Use to stuff crepes, pasta shells or manicotti.  Fruit yogurt  Blend with fruits for a fruit smoothie.  Use as a dip for fruits and vegetables.  Scoop on top of pancakes or waffles.  Tofu  Blend silken tofu with fruits and juices for a smoothie.  Add chunks of firm tofu to soups and stews, or crumble into meatloaf.  Blend dried onion soup mix into soft or silken tofu for dip.  Use pureed silken tofu for part of the mayonnaise, sour cream, cream cheese or ricotta cheese called for in recipes.  Beans  Use cooked beans or peas in soups, casseroles, pasta, tacos and burritos.  Nuts and seeds  Note: For children under 3, discuss with the child's care team.  Use in casseroles, breads, muffins, pancakes, cookies and waffles.  Sprinkle on fruits, cereals, ice cream, yogurt, vegetables and salads.  Mix with raisins, dried fruits and chocolate chips for a snack.  Nut butters  Note: For children under 3, discuss with the child's care team.  Spread on sandwiches, toast, muffins, crackers, waffles, pancakes and fruit slices.  Use as a dip for raw vegetables.  Blend with milk drinks, or swirl through ice cream, yogurt or hot cereal.  Nutrition supplements (nutrition bars, drinks and powders)  Add powders to milk drinks and desserts.  Mix with ice cream, milk and fruit for a high-protein milk shake.    For informational purposes only. Not to replace the advice of your health care provider. Clinically reviewed by Rosa Goins RD, MAKEDA, and the Clinical Nutrition Service Line. Copyright   2005  "[facility]. All rights reserved. [employer] 404595 - REV 04/24.      -----------------------------------------------------------------------------------------------------------------  Learning About High-Protein Foods  What foods are high in protein?     The foods you eat contain nutrients, such as vitamins and minerals. Protein is a nutrient. Your body needs the right amount to stay healthy and work as it should. You can use the list below to help you make choices about which foods to eat.  Here are some examples of foods that are high in protein.  Dairy and dairy alternatives  Cheese  Milk  Soy milk  Yogurt (especially Greek)  Meat  Beef  Chicken  Ham  Lamb  Lunch meat  Pork  Sausage  Turkey  Other protein foods  Beans (black, garbanzo, kidney, lima)  Eggs  Hummus  Lentils  Nuts  Peanut butter and other nut butters  Peas  Soybeans  Tofu  Veggie or soy teddy (Check the nutrition label for the amount of protein in each serving.)  Seafood  Anchovies  Cod  Crab  Halibut  [place]  Sardines  Shrimp  Tilapia  Brick  Tuna  Protein supplements  Bars (Check the nutrition label for the amount of protein in each serving.)  Drinks  Powders  Work with your doctor to find out how much of this nutrient you need. Depending on your health, you may need more or less of it in your diet.  Where can you learn more?  Go to https://www.Or[employer]oFi.net/patiented  Enter P335 in the search box to learn more about \"Learning About High-Protein Foods.\"  Current as of: September 20, 2023               Content Version: 14.0    3820-2655 [employer].   Care instructions adapted under license by your healthcare professional. If you have questions about a medical condition or this instruction, always ask your healthcare professional. [employer] disclaims any warranty or liability for your use of this information.         "

## 2024-10-22 NOTE — NURSING NOTE
Current patient location:  Pt states at [facility].    Is the patient currently in the state of [place]? YES    Visit mode:VIDEO    If the visit is dropped, the patient can be reconnected by: VIDEO VISIT: Text to cell phone:   Telephone Information:   Mobile [phone]       Will anyone else be joining the visit? NO  (If patient encounters technical issues they should call [phone]    Are changes needed to the allergy or medication list? No    Are refills needed on medications prescribed by this physician? NO    Rooming Documentation:  Questionnaire(s) not pre-assigned    Reason for visit: Medication Therapy Management    Pt states 5/10 right low back and knee with intermittent numbness in right small toes due to possible pinched nerve in back-recurring issue.  Trying to schedule with orthopedist.    Kerwin ROBERTS

## 2024-10-28 ENCOUNTER — TELEPHONE (OUTPATIENT)
Dept: PHYSICAL MEDICINE AND REHAB | Facility: CLINIC | Age: 59
End: 2024-10-28
Payer: COMMERCIAL

## 2024-10-28 NOTE — TELEPHONE ENCOUNTER
Left Voicemail (1st Attempt) and Sent Mychart (1st Attempt) for the patient to call back and schedule the following:  Christina Pascual, SHANON - 10/29 at 7 am    Appointment type: Return Med spine   Provider: Dr. Carlos Mariscal [phone]  Return date: first available   Specialty phone number: [phone]  Additional appointment(s) needed: n/a  Additonal Notes: Provider not in clinic     Patient could be reschedule with Dr.Gharib ed [facility] location pt will need to call [phone] to schedule.      Radha Gonzalez on 10/28/2024 at 2:42 PM

## 2024-10-28 NOTE — TELEPHONE ENCOUNTER
Left Voicemail (2nd Attempt) and Sent Mychart (2nd Attempt) for the patient to call back and schedule the following:    Appointment type: New Med Spine MD  Provider: Mendez  Return date: next available  Specialty phone number: [phone]  Additional appointment(s) needed: NA  Additonal Notes: 10/29 Christina out of office. Pt has never been seen in [facility] so visit should be new not return. Appointment cancelled    Madelaine Andre on 10/28/2024 at 5:20 PM

## 2024-10-29 ENCOUNTER — PATIENT OUTREACH (OUTPATIENT)
Dept: CARE COORDINATION | Facility: CLINIC | Age: 59
End: 2024-10-29

## 2024-10-31 ENCOUNTER — PATIENT OUTREACH (OUTPATIENT)
Dept: CARE COORDINATION | Facility: CLINIC | Age: 59
End: 2024-10-31

## 2024-11-04 ASSESSMENT — ANXIETY QUESTIONNAIRES
GAD7 TOTAL SCORE: 11
7. FEELING AFRAID AS IF SOMETHING AWFUL MIGHT HAPPEN: SEVERAL DAYS
8. IF YOU CHECKED OFF ANY PROBLEMS, HOW DIFFICULT HAVE THESE MADE IT FOR YOU TO DO YOUR WORK, TAKE CARE OF THINGS AT HOME, OR GET ALONG WITH OTHER PEOPLE?: SOMEWHAT DIFFICULT
GAD7 TOTAL SCORE: 11
4. TROUBLE RELAXING: MORE THAN HALF THE DAYS
3. WORRYING TOO MUCH ABOUT DIFFERENT THINGS: MORE THAN HALF THE DAYS
5. BEING SO RESTLESS THAT IT IS HARD TO SIT STILL: SEVERAL DAYS
2. NOT BEING ABLE TO STOP OR CONTROL WORRYING: MORE THAN HALF THE DAYS
1. FEELING NERVOUS, ANXIOUS, OR ON EDGE: MORE THAN HALF THE DAYS
IF YOU CHECKED OFF ANY PROBLEMS ON THIS QUESTIONNAIRE, HOW DIFFICULT HAVE THESE PROBLEMS MADE IT FOR YOU TO DO YOUR WORK, TAKE CARE OF THINGS AT HOME, OR GET ALONG WITH OTHER PEOPLE: SOMEWHAT DIFFICULT
7. FEELING AFRAID AS IF SOMETHING AWFUL MIGHT HAPPEN: SEVERAL DAYS
GAD7 TOTAL SCORE: 11
6. BECOMING EASILY ANNOYED OR IRRITABLE: SEVERAL DAYS

## 2024-11-04 ASSESSMENT — PATIENT HEALTH QUESTIONNAIRE - PHQ9
SUM OF ALL RESPONSES TO PHQ QUESTIONS 1-9: 8
SUM OF ALL RESPONSES TO PHQ QUESTIONS 1-9: 8
10. IF YOU CHECKED OFF ANY PROBLEMS, HOW DIFFICULT HAVE THESE PROBLEMS MADE IT FOR YOU TO DO YOUR WORK, TAKE CARE OF THINGS AT HOME, OR GET ALONG WITH OTHER PEOPLE: SOMEWHAT DIFFICULT

## 2024-11-05 ENCOUNTER — OFFICE VISIT (OUTPATIENT)
Dept: PSYCHIATRY | Facility: CLINIC | Age: 59
End: 2024-11-05
Attending: PSYCHIATRY & NEUROLOGY
Payer: COMMERCIAL

## 2024-11-05 VITALS
WEIGHT: 270 LBS | HEART RATE: 66 BPM | BODY MASS INDEX: 41.05 KG/M2 | DIASTOLIC BLOOD PRESSURE: 84 MMHG | SYSTOLIC BLOOD PRESSURE: 125 MMHG

## 2024-11-05 DIAGNOSIS — G47.00 INSOMNIA, UNSPECIFIED TYPE: ICD-10-CM

## 2024-11-05 DIAGNOSIS — F31.9 BIPOLAR I DISORDER (H): Primary | ICD-10-CM

## 2024-11-05 PROCEDURE — 99214 OFFICE O/P EST MOD 30 MIN: CPT | Performed by: STUDENT IN AN ORGANIZED HEALTH CARE EDUCATION/TRAINING PROGRAM

## 2024-11-05 PROCEDURE — 99214 OFFICE O/P EST MOD 30 MIN: CPT | Mod: HN | Performed by: STUDENT IN AN ORGANIZED HEALTH CARE EDUCATION/TRAINING PROGRAM

## 2024-11-05 ASSESSMENT — PAIN SCALES - GENERAL: PAINLEVEL_OUTOF10: MODERATE PAIN (4)

## 2024-11-05 NOTE — PROGRESS NOTES
"   [facility]  MEDICAL PROGRESS NOTE     CARE TEAM:    PCP- Mikey Cortés     Therapist- individual therapist Elfego Chan and another one for couple therapy    Angy is a 59 year old who uses the pronouns she, her, hers.      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 Things that her ex partner used to take care of such as finance or the mortgage, she is now taking care of them. She is trying to adjust to the fact that she is living alone, and need to prepare less meal, and take care of the dog herself.     She describes her mood \"between neutral to a little low\". There is some mild level of anhedonia because she doesn't read as she used to or enjoy her [employer] services. Those things can be due to lack of concentration, the fact that she has more to do during the day, or loss of interest. She talk about some challenging with memory on things that she had to do but forget them. We discussed how stress and anxiety can lead to those symptoms. We also talk about if she feels too sedated in the that could also lead to it too. She agrees to uses hydroxyzine more as PRN than scheduled, and monitor sleep. She also agrees to use 1 or may be 2 mg of melatonin to see. He is getting 7 to 8 hours of sleep but recently due to back pain, knee pain, and tingling on her foot, she may wake up up to 6 times at night. She is scheduled to see an orthopedics next month for intake.     There was no safety concerns. No psychosis or anthony. She continues to go to work, and engage with people there. She continues to pay her bill, or take care of her personal hygiene. No medication changes was recommended " "today.        Psychotropic Drug Interactions:  [PSYCHCLINICDDI]  Estrogen Derivatives may decrease the serum concentration of LamoTRIgine  Angiotensin-Converting Enzyme Inhibitors may increase the serum concentration of Lithium.   LamoTRIgine may decrease the serum concentration of Progestins.      Management: routine monitoring    [place] was not checked today: not using controlled substances    Risk Statements:   Treatment Risk- Risks, benefits, alternatives and potential adverse effects have been discussed and are understood.   Safety Risk-Angy did not appear to be an imminent safety risk to self or others.     Plan     1) Medications:    - Continue Lithium 1050 mg at bedtime  - Continue with Lamictal 400 mg at bedtime   - continue with hydroxyzine 10 mg PRN at bedtime   - Continue with melatonin 1mg to 3mg at bedtime as needed for insomnia    2) Psychotherapy: Continue with therapy    3) Next due:  Labs- 01/2025  EKG- . Last one was on 03/2024 with   Rating scales-  PHQ9    4) Referrals: none  N/A    5) Other: none    6) Follow-up: Return to clinic in 6 weeks     Pertinent Background                                                   [most recent eval 08/31/23]     Angy reported that she didn't have symptoms until November 2018. At which point she was hospitalized for anthony.     This section was copied forward from 08/2021 transfer notes.  \"Patient has not had significant mental health history until 2018, when she developed perimenopausal mood and anxiety symptoms.  She was subsequently referred to our clinic following a hospitalization for anthony in November 2018.  Her care has been complicated by sensitivity to antipsychotics (EPSE including difficulty swallowing), thrombocytopenia secondary to Depakote, lithium toxicity in setting of concurrent Depakote use\"     Pertinent items include: anthony , severe med reaction [described as H/o Thrombocytopenia secondary to Depakote, and H/o lithium toxicity], and " psych hosp      Subjective     Angy reported that things have been very stressful. She went to the financial mediation on 10/7. They are trying to split things equitably. It was a difficult moment, and she walked away anxious. She needed to speak up and advocate for herself, and tolerate her ex partner to get less, and be mad. She often tried to avoid conflict with her, so she had to accept it somehow.     She describes her mood as between neutral and a little low. She is not enjoying things. She used to read. She had stopped her streaming services. She feels Overwhelmed with everything related with the house.  She now needs to pay the mortgage. She almost misses her first mortgage payment. She takes longer to do grocery. She has left over meal. She needs  to clean the dog space. It can take her up to 3 hours to clean the whole house.      Anxiety/ yes, a lot. Daily.  She is worried about the financial responsibility. Her ex partner, Zahra, used to manage the finance. Now, she is paying the bill    No history of psychosis    No recent manic episode.    Sleeps 7 to 8 hours. She has a back pain with knee pain, with tingling on her feet. That limits her mobility She will see an orthopedics doctor. Sometimes, she can get up up to 6 times at night depending on the intensity of the pain.     Appetite:  low appetite due to the wegovy, but she realize that she is self soothing eating at bedtime.     Medication side effects: dry mouth, but reported that she is getting used with it.  She talked about feeling that her memory is being affected. She doesn't remember the close the garage or something. She is not sure if it is due to anxiety, stress, or lack of sleep.    SI/HI:none.    Alcohol use: nothing she is back from [place]    Smoking: none    Other substances (cannabis): none       Mental Status Exam     Alertness: alert  and oriented  Appearance: adequately groomed  Behavior/Demeanor: cooperative and pleasant, with good   "eye contact   Speech: normal and regular rate and rhythm  Language: intact  Psychomotor: normal or unremarkable  Mood:  \"between neutral and a little low.\"  Affect: full range; congruent to: mood- yes, content- yes  Thought Process/Associations: unremarkable  Thought Content:  Reports none;  Denies suicidal & violent ideation and delusions  Perception:  Reports none;  Denies auditory hallucinations and visual hallucinations  Insight: good  Judgment: good  Cognition: does  appear grossly intact; formal cognitive testing was not done  Gait and Station: unremarkable     Past Psych Med Trials      Medication Max Dose (mg) Dates / Duration Helpful? DC Reason / Adverse Effects?   Depakote    Tremors   sertraline       olanzapine    EPSE   Seroquel    EPSE   lithium 1200   Tremor at 1200 mg had to lower the dose   Wellbutrin 300 15 years ago     Lamictal 400  yes       Treatment Course and Resendiz Events since  JULY 2023 08/2023 : no medication changes  11/2023 no medication changes  01/2023 no medication changes  03/2024 no medication changes  04/2024 no medication changes  06/2024 Increase Lamictal to 400 mg  07/2024 no medication change  08/2024 no medication changes  09/2024 no medication changes  11/2024 no medication changes but encourage to use hydroxyzine as needed and melatonin 1 to 2 mg to limit sedation.      Vitals   /84   Pulse 66   Wt 122.5 kg (270 lb)   BMI 41.05 kg/m    Pulse Readings from Last 3 Encounters:   11/05/24 66   09/27/24 58   09/24/24 75     Wt Readings from Last 3 Encounters:   11/05/24 122.5 kg (270 lb)   10/22/24 122 kg (269 lb)   09/27/24 123.4 kg (272 lb)     BP Readings from Last 3 Encounters:   11/05/24 125/84   09/27/24 110/67   09/24/24 134/84      Medical History     ALLERGIES: Seroquel [quetiapine], Olanzapine, Penicillins, Risperidone, and Thimerosal    Patient Active Problem List   Diagnosis    Oral aphthae    CARDIOVASCULAR SCREENING; LDL GOAL LESS THAN 160    Rosacea    " Raynaud disease    Vitamin D deficiency disease    MARLA (obstructive sleep apnea)    Atopic rhinitis    Chronic bilateral low back pain without sciatica    Obesity, Class III, BMI 40-49.9 (morbid obesity) (H)    Muscle tension dysphonia    Palpitations    Dysphagia, unspecified type    Anxiety    Benign essential hypertension    Callus of foot    Varicose veins of right lower extremity with edema    Bipolar I disorder (H)    Dribbling of urine    Bilateral low back pain with right-sided sciatica    Spinal stenosis at L4-L5 level    Facet arthropathy, lumbar    Acute pain of right knee    Unspecified inflammatory spondylopathy, lumbar region (H)    Exertional angina (H)        Medications     Current Outpatient Medications   Medication Sig Dispense Refill    acetaminophen (TYLENOL) 650 MG CR tablet Take 1 tablet (650 mg) by mouth every 8 hours as needed for mild pain or fever      calcium carbonate (TUMS) 500 MG chewable tablet Take 1 chew tab by mouth 2 times daily      Carboxymethylcellulose Sod PF (REFRESH PLUS) 0.5 % SOLN ophthalmic solution Place 1 drop into both eyes 3 times daily as needed for dry eyes      clotrimazole-betamethasone (LOTRISONE) 1-0.05 % external cream Apply topically 2 times daily 45 g 2    estradiol (ESTRACE) 0.5 MG tablet Take 1 tablet (0.5 mg) by mouth daily. 90 tablet 0    estradiol (VAGIFEM) 10 MCG TABS vaginal tablet Place 1 tablet (10 mcg) vaginally twice a week. 26 tablet 0    fluticasone (FLONASE) 50 MCG/ACT nasal spray Spray 1 spray into both nostrils daily      hydrOXYzine HCl (ATARAX) 10 MG tablet Take 1 tablet (10 mg) by mouth nightly as needed (sleep). 30 tablet 2    lamoTRIgine (LAMICTAL) 200 MG tablet Take 2 tablets (400 mg) by mouth daily. 60 tablet 2    lisinopril (ZESTRIL) 10 MG tablet Take 1 tablet (10 mg) by mouth daily. 90 tablet 3    lithium (ESKALITH) 150 MG capsule Take 1 capsule (150 mg) by mouth at bedtime. 30 capsule 2    lithium 300 MG capsule Take 3 tablet (900  mg) and one tablet (150 mg) for a total of 1050 mg at bedtime. 270 capsule 2    Loratadine 10 MG capsule Take 10 mg by mouth daily      medroxyPROGESTERone (PROVERA) 2.5 MG tablet Take 1 tablet (2.5 mg) by mouth daily. 90 tablet 0    metroNIDAZOLE (METROGEL) 1 % external gel Apply topically daily. 60 g 9    ORDER FOR DME Use your CPAP device as directed by your provider.      psyllium (METAMUCIL/KONSYL) 58.6 % powder Take 1 teaspoonful by mouth daily      Semaglutide-Weight Management (WEGOVY) 1 MG/0.5ML pen Inject 1 mg subcutaneously once a week. 6 mL 0    Semaglutide-Weight Management (WEGOVY) 1 MG/0.5ML pen Inject 1 mg subcutaneously once a week. 2 mL 0    VITAMIN D, CHOLECALCIFEROL, PO Take 2,000 Units by mouth daily          Labs and Data         4/25/2024     9:09 PM 7/26/2024    10:57 AM 11/4/2024    12:47 PM   PROMIS-10 Total Score w/o Sub Scores   PROMIS TOTAL - SUBSCORES 30 26 25        Patient-reported          No data to display                  7/30/2024     7:47 AM 9/24/2024     8:15 AM 11/4/2024    12:45 PM   PHQ-9 SCORE   PHQ-9 Total Score MyChart 8 (Mild depression) 8 (Mild depression) 8 (Mild depression)   PHQ-9 Total Score 8 8 8        Patient-reported         6/15/2024     1:27 PM 7/26/2024    10:56 AM 11/4/2024    12:46 PM   JOANNA-7 SCORE   Total Score 10 (moderate anxiety) 8 (mild anxiety) 11 (moderate anxiety)   Total Score 10 8 11        Patient-reported       Liver/Kidney Function, TSH Metabolic Blood counts   Recent Labs   Lab Test 07/11/24  0809 02/28/24  0845   AST  --  21   ALT  --  15   ALKPHOS  --  91   CR 0.63 0.63     Recent Labs   Lab Test 07/11/24  0809   TSH 2.43    Recent Labs   Lab Test 09/27/24  0740   CHOL 168   TRIG 136   LDL 87   HDL 54     Recent Labs   Lab Test 09/27/24  0740   A1C 5.1     Recent Labs   Lab Test 07/11/24  0809   GLC 88    Recent Labs   Lab Test 09/27/24  0740   WBC 6.4   HGB 13.7   HCT 41.6   MCV 90            Recent Labs   Lab Test 07/11/24  0809  11/08/23  1016 05/15/23  1017   LITHIUM 1.00 0.69 0.7        PROVIDER: Antonio Santillan MD MPH    Level of Medical Decision Making:   - At least 1 chronic problem that is not stable  - Engaged in prescription drug management during visit (discussed any medication benefits, side effects, alternatives, etc.)         Patient was not staffed in clinic.  Supervisor is Dr. Vivian Burnett.      The longitudinal plan of care for Bipolar disorder was addressed during this visit. Due to the added complexity in care, I will continue to support Angy in the subsequent management of this condition(s) and with the ongoing continuity of care of this condition(s)

## 2024-11-11 ENCOUNTER — HOSPITAL ENCOUNTER (OUTPATIENT)
Dept: NUTRITION | Facility: CLINIC | Age: 59
Discharge: HOME | End: 2024-11-11
Admitting: FAMILY MEDICINE
Payer: COMMERCIAL

## 2024-11-11 DIAGNOSIS — E66.813 CLASS 3 SEVERE OBESITY DUE TO EXCESS CALORIES WITH SERIOUS COMORBIDITY AND BODY MASS INDEX (BMI) OF 45.0 TO 49.9 IN ADULT (H): ICD-10-CM

## 2024-11-11 DIAGNOSIS — E66.01 CLASS 3 SEVERE OBESITY DUE TO EXCESS CALORIES WITH SERIOUS COMORBIDITY AND BODY MASS INDEX (BMI) OF 45.0 TO 49.9 IN ADULT (H): ICD-10-CM

## 2024-11-11 PROCEDURE — 97803 MED NUTRITION INDIV SUBSEQ: CPT | Mod: GT,95 | Performed by: DIETITIAN, REGISTERED

## 2024-11-11 NOTE — PROGRESS NOTES
Virtual Visit Details    Type of service:  Video Visit     Originating Location (pt. Location): Home    Distant Location (provider location):  On-site  Platform used for Video Visit: [facility]     NUTRITION REASSESSMENT NOTE   REASON FOR ASSESSMENT  Angy QUINTANA Eduardodeann referred by Dr. Cortés for MNT related to  Obesity, Class III, BMI 40-49.9 (morbid obesity) (H) [E66.01]          Patient accompanied by self      NUTRITION HISTORY  Patient continues with multiple life changes and stressors.  Patient aware that she likes to self soothe with ice cream which she has recently increased.  Patient is aware that social cues trigger her to eat. Patient's spouse recently had weight loss surgery so there is adjust with eating in the home.      3/7/2022 Self soothe in evening snack-made tea or tells self ate late dinner and does not need snack.  Patient asking self if physically hungry.  Patient drinking fizzy water or malt o meal as evening snack.  Patient aware she has been stress eating.  Tracking when not having dessert -identifying why eating  Exercise-cross country skiing; mall walk; snow shoeing; treadmill 2 times haring treadmill   Yogurt; cheese and crackers; cereal      Patient wants to increase exercise time as will be going to [facility] Day weekend ([place]) [facility]      4/25/2002 Patient able to get 45-60 minutes exercise on weekends and 20-30 minutes of exercise daily.  Patient wants to lose 5 lbs prior to trip to [place].  Patient was able to have 3 days no desserts and then 1 week no dessert. 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  Patient determined ways she could lose weight: Dessert 2 times per week, add treadmill, eliminate morning snack, 1 snack at 3 PM. Alcohol 1 time per week, no caramel lattes, food is fuel, it's too late to be hungry, spouse brings sweet treats without asking, tea/bath/shower      6/15/2022 Patient skips dessert if eats late dinner.   Patient felt discouraged with weight loss as wanted to lose more weight (down 3.5 lbs). Patient wants weight loss for functional reasons.  Patient signed up for the [place] bike tour.  Patient has increased cravings and thoughts about ice cream.  Shift in relationship with spouse after spouse's weight loss surgery.      7/27/2022 Patient increased exercise-[place] with [employer]; started the last week in June for bike rides (4 rides different times in 2 weeks-9 miles).  Rode in 18 mile race.  Went hiking on vacation-Walk-in Appointment SchedulerJono Caceres.     No dessert (hit or miss) 50% 3 days no dessert; ice cream and s'mores on vacation     Typical intake:  Greek yogurt + 1/4 cup granola + fruit + 1/2 and 1/2 with coffee or english muffin with peanut butter/butter and strawberry jam or eggs scrambled or hard boiled  Leftovers -1 cup cherries; cherry tomatoes; 1/2 cup pea pods + 3 oz pork tenderloin (teryaki)   Watermelon, pork tenderloin, 1 cup rice + tomatoes/pea pods; cheeseburger with whole wheat bun + vegetables + watermelon   Snacks: croissant ([place]) Alevism oat squares (1 cup dry) (3:00 PM) cheese and crackers    Beverages: coffee; water; fizzy water      Emotional eating at night-ice cream      9/21/2022 Patient has been able to bike 2 times per week.  Patient's weight has been stable.  Patient has not been able to track dinner and snacks.  Stress eating ebbs and flows.  Trying to save dessert for the weekend or may self soothe during th week with ice cream.  Patient stress eating in the evening and may boredom eat prior to lunch (cereal).    Weight: 308.4 lbs       11/16/2022 Biking 2 times per week. Tour biking race: [email or web address] 14 miles (rode by herself). [place] 12 mile hilly bike ride. (rode with friend).  Patient has consistently walked dog and completes PT exercises.  Patient determining plan for winter exercise.    314.4 lbs (food and sleep changes) Patient was eating  more sweets as patient's wife baked sweets this fall (3-4 weeks with increase of sweets).  Holiday baking plan consists of 5 cookies and 1 candy (peanut brittle).  Ice cream consumption has increased recently.      1/23/2023 Patient was able to bake holiday cookies and freeze them.  Patient has been eating desserts 5 days a week.  Patient has been substituting greek yogurt (flavored or vanilla) or cream of wheat for dessert.  Patient was able to snow shoe.  Patient having barrier with getting on treadmill.  Patient's weight has fluctuated 325.8 and now 316 lbs.  Patient has been working on sleep routine (classical music; reading on paper vs ipad).      3/17/2023 Patient having knee pain and went to orthopedic MD and got cortizone shot in knee and able to walk know. Walked dog 3 times this week. Swimming -[facility] (6 sameer pool). Tyler wants to swim with goal of swimming 2 times per week.  Patient states ice cream and dessert are hit or miss. Patient shifting mindset to days having dessert vs not having dessert. Patient has not been as mindful with practicing self soothe at night.  Has tried tea and engaging book.     5/3/2023 PT exercises for knee; walking 15-30 minutes per day with dog + swimming 1 time per week (9 laps); 5 days per week desserts -patient using self talk to reduce dessert intake. Patient trying to limit desserts.  Patient's wife brining bread and sweets into home in which patient feels compels to eat as not to waste food.  Patient wants to be aware of foods when dining out and would like to split meal with spouse. Patient using zinger tea to soothe at night.       6/28/2023 No dessert 1-2 days; vacation and party so feels increase in desserts.  Patient getting more movement at work by taking stairs at work and size of building as no longer working remote.  Going to PT; less dog walks due to driving to work; found new lunch place to eat lunch at work (3-4 days per week-20 minute  walk) -reading book and eating lunch; aims for hour lunch -reading for self care      8/9/2023 Patient is being intentional for choosing desserts.  Skipped ice cream since had late dinner.  Grabbed snack when dinner wasn't ready prior to function and ate later-took cheese and fruit then ate chicken kebab onion, bell pepper, chicken -316 lbs currently.  Was lower weight last week. Patient trying to balance stress in life.      10/4/2023 Patient having 1 night per week not having dessert.  Patient feels like eating ice cream when alone at home while watching a favorite show and feels more indulgent.  Patient did not participate in [employer] as hoped.  Patient will be participating in [employer] this weekend.  Patient continues to walk dog daily.  Patient also continues with increased life stressors.  Reading in the evening + fizzy water.      12/13/2023 Patient with reduction in weight trends: weight 307.4 lbs and 309 lbs.  Patient had stressful family situation which caused drop in appetite (past 2-3 weeks).  Patient states was not using food as coping strategy. Patient having dessert 1-6 days per week with most desserts  3-4 desserts per week -writes reason why no dessert-sets goals in advance of no dessert; no dessert for today or too late to eat dessert; food trigger from spouse (will see eating popcorn and will then eat it if she is -likes caramel corn); self care-journaling as needed; playing piano for fun 3 times per week; movement -walking dog + biking ([place]) + hour walk      1/24/2024 Patient working on self care at night- reading, limited TV watching, playing piano in the evening (5-10 minutes); desserts -aiming for 4 per week 3-5-1-1-3. Patient choose Malt-O meal as replacement for dessert; 304-312 lbs; less cookie baking during the holidays; exercise reduced with cold weather (walking outside)-treadmill; stairs; no pool. Patient working on exercise plan.       4/17/2024 Patient  with extra stress in life which has reduced desire to eat.  Patient working on increasing exercise intensity for upcoming trip.Treadmill-working on distance and incline 4 days per week 15-46 minutes + additional walking. Patient working on self soothe options besides food. Patient plans to start weight loss medication and will be working with pharmacist.  Patient also planning to meet with weight loss clinic.      7/10/2024 Patient focusing on adding movement-treadmill; lunchtime walking 20-30 minutes; biking everything other week 8-16 miles. Patient started Wegovy-283.4 lbs. Patient losing 1-2 lbs per week. Symptoms- no appetite, nausea, acid reflux -spicy, constipation      Greek yogurt or eggs or peanut butter on english muffin (3 T)     9/30/2024 Patient has lost 34 lbs. Wegovy 1 mg (3rd month) Patient states appetite low. Patient eating regular meals. Patient has only forgotten to eat on occasion. Patient aware needs to prioritize eating. Patient with back pain walking at slow speeds. Patient with mild nausea and GI distress. Patient working on self care-getting outside; reading; time with friends. Patient will stress eat-ice cream.      Split pea and ham soup; salad with veggies   Cream of wheat and yogurt    Burgers or chicken or BLT     11/11/2024 Patient eating desserts daily-ice cream. Patient scheduling meals as sometimes forgetting to eat. Patient may have constipation or diarrhea. Unable to tolerate 1/2 dose Miralax daily. Patient with reduced appetite on Wegovy and feels gibbs faster so eating 1/2 portions. Holidays-prioritize dessert (watch portions) and choose 3 cookies to bake for [place]     Greek yogurt or english muffin  Pork tenderloin + cutie + pretzels   Chicken stir gomez      DIET RECALL   Breakfast: cereal and banana; yogurt + granola (measures), fruit + coffee; 2 slices toast with butter + 1/2 cup cottage cheese    Lunch: leftovers (includes fruit and vegetable daily)  Snack: fruit or  "cheese with lower salt nut thin crackers   Dinner: Tuna steak, Asian marinade, stir gomez, baked vegetable + brown rice (3/4 cup) + canned peaches; chicken thighs with beans and rice; sheet pan dinner with chicken and vegetables or grilled salmon and grilled vegetables; taco; protein + fruit and vegetable  Snacks: fruit, string cheese, Ritz crackers or yogurt; dessert in the evening (ice cream recently)  Beverages: mainly water, coffee, mineral water  Dining out: none currently  Exercise: Patient exercising 3-4 times per week. (walking and walking dog).      MEDICATIONS:  Reviewed      ANTHROPOMETRICS  Height: 5'8\" (note height change in chart previously 5'9\")  Weight: 269 lbs   BMI (kg/m2): 41.1 kg/m2   %IBW: 192%  Weight History:   Wt Readings from Last 10 Encounters:   10/22/24 122 kg (269 lb)   09/27/24 123.4 kg (272 lb)   07/18/24 127.1 kg (280 lb 4.8 oz)   07/10/24 128.5 kg (283 lb 6.4 oz)   04/19/24 137 kg (302 lb)   04/18/24 136.2 kg (300 lb 3.2 oz)   04/17/24 136.5 kg (301 lb)   03/06/24 139.3 kg (307 lb)   02/13/24 140.2 kg (309 lb)   09/09/23 142.4 kg (314 lb)              ASSESSED NUTRITION NEEDS  Estimated Energy Needs: 9606-3036 kcals (15-20 kcals/kg) for gradual weight loss   Estimated Protein Needs: 66-79 (1-1.2 gm/kg IBW) for repletion with exercise  RMR: 1440     EVALUATION/PROGRESS TOWARDS GOALS   Previous goals:  PT exercises and intensive walking as able-met  Weekly grocery shopping and cooking ahead -met  Protein options at all meals -met  Self soothe 5 of 7 days -improving      Previous Nutrition Diagnosis: Obesity related to excess energy intake as evidenced by BMI of 41.7 kg/m2-no change     Current Nutrition Diagnosis:Obesity related to excess energy intake as evidenced by BMI of 41.1 kg/m2     INTERVENTIONS  Nutrition Prescription   Recommend exercise 5 times per week; track intake if eating mindlessly; increase fiber to 25-30 grams per day      Implementation:   Meals and Snacks: Continue " eating 3 meals + deliberate snacking (if hungry)   Nutrition Education (Content):              a)  Discussed progress towards goals. Commended patient for awareness of eating patterns. Reviewed current food and eating behavior challenges. Discussed protein options. Continued discussion around self care as way to improve emotional eating.  Discussed weight loss medication and current symptoms. Suggest continuing 1/2 dose Miralax daily. Patient will be working with the [facility]. Supported patient in her struggle with food and weight.   Nutrition Education (Application):              a) Discussed strategies to set boundaries with food for the holidays.                b) Patient verbalized understanding of diet by stating continue self soothe options.    Expected patient engagement: good     CURRENT GOALS   Self care between 8:00-9:00 4 days per week   Plan 1-2 meals per week for leftovers and simple sides      FOLLOW UP/MONITORING    Patient to follow up in 8 weeks   Patient has RD contact information      Time spent with patient: 30 minutes     Mattie Amaya, RD, LD  [facility] Outpatient Dietitian  [phone] (office phone)

## 2024-11-18 ENCOUNTER — THERAPY VISIT (OUTPATIENT)
Dept: PHYSICAL THERAPY | Facility: CLINIC | Age: 59
End: 2024-11-18
Payer: COMMERCIAL

## 2024-11-18 DIAGNOSIS — M25.561 ACUTE PAIN OF RIGHT KNEE: ICD-10-CM

## 2024-11-18 DIAGNOSIS — M54.41 BILATERAL LOW BACK PAIN WITH RIGHT-SIDED SCIATICA, UNSPECIFIED CHRONICITY: Primary | ICD-10-CM

## 2024-11-18 PROCEDURE — 97110 THERAPEUTIC EXERCISES: CPT | Mod: GP

## 2024-11-18 PROCEDURE — 97530 THERAPEUTIC ACTIVITIES: CPT | Mod: GP

## 2024-12-02 ENCOUNTER — THERAPY VISIT (OUTPATIENT)
Dept: PHYSICAL THERAPY | Facility: CLINIC | Age: 59
End: 2024-12-02
Payer: COMMERCIAL

## 2024-12-02 DIAGNOSIS — M54.41 BILATERAL LOW BACK PAIN WITH RIGHT-SIDED SCIATICA, UNSPECIFIED CHRONICITY: Primary | ICD-10-CM

## 2024-12-02 DIAGNOSIS — M25.561 ACUTE PAIN OF RIGHT KNEE: ICD-10-CM

## 2024-12-02 PROCEDURE — 97530 THERAPEUTIC ACTIVITIES: CPT | Mod: GP

## 2024-12-02 PROCEDURE — 97110 THERAPEUTIC EXERCISES: CPT | Mod: GP

## 2024-12-09 NOTE — PROGRESS NOTES
new Bariatric Surgery Consultation Note    2024    RE: Angy Haji  MR#: [id number]  : 1965      Referring provider:       12/10/2024     2:53 PM   --   Who referred you Dr. Romain Cortés, [facility]       Chief Complaint/Reason for visit: evaluation for possible weight loss surgery    Dear Mikey Cortés MD (General),    I had the pleasure of seeing your patient, Angy Haji, to evaluate her obesity and consider her for possible weight loss surgery.      Assessment & Plan   Problem List Items Addressed This Visit       Obesity, Class III, BMI 40-49.9 (morbid obesity) (H) - Primary     Initial bariatric surgery visit. Reviewed anatomy changes and some risks/benefits of surgery. Task list created and she will consider if wanting to start this process. Clearance letters for [facility] providers created/sent.           Other Visit Diagnoses       Screening for endocrine, nutritional, metabolic and immunity disorder        Relevant Orders    Vitamin D Deficiency    Comprehensive metabolic panel               In summary, Angy Haji has Class III obesity with a body mass index of Body mass index is 40.53 kg/m . kg/m2 and the comorbidities stated above. She completed an informational seminar and is a possible candidate for bariatric surgery.   She will have to complete the tasklist below.  Once complete she will see the surgeon for consultation for bariatric surgery. Angy  verbalizes understanding of the process to surgery and post operative schedule.     Pt reports she is information seeking. Historically not considering surgery - has lost 110 lbs in 30's/40's over 10 years but some slow regain now over years. Settles 305 lbs typically.     Had many questions on bariatric surgery. Reviewed anatomy/hormone changes with sleeve and bypass. She had questions about weight regain - discussed risk after surgery in general, off the top of my head - recalled studies/KATHIA lectures  reviewing weight regain risk could be 2/3 of patients and regain can occur sometimes 3-5 or more years after surgery (if recalling correctly). Explained can be hard to predict and also cited frequent lack of follow-up makes it hard to fully know extent/frequency of this risk.     Post surgery she reported knowing risk for disordered eating or alcohol dependence. She also reports wife experienced inc sex drive/relationships ending - discussed I wasn't aware of specific studies but can see transference effects.    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 Wife had surgery with [facility] and so from other perspective walking with her and has had a really weight maintenance. Both negative/positive after effects. Using Wegovy and has lost 10% but losing coverage now but now looking to explore other options. She has considered surgery previously but typically hasn't wanted to pursue.    BARIATRIC TASK LIST  Bariatric Task List  Lose 5 lbs prior to surgery.  Goal Weight: 297 lbs  Have preoperative laboratory tests drawn. -  Call [phone] for an appt.    Psychological Evaluation with MMPI and clearance for weight loss surgery.- Call [phone] to schedule.   Achieve clearance from dietitian to see surgeon. Call [phone] to schedule   Mental health clearance   Sleep clearance  In Person Pre-Surgery Visit (Bariatric Education) with your provider Call [phone] to schedule     Watched videos last night    Did call [employer] for coverage: spoke with [employer] and has coverage for single time surgery with PA.     Saw NILAY Mena 10/22/24 per [facility] insurance requirements and utilizing Wegovy and may transition to [facility] semaglutide.     Pt to follow up in 12 wks for a face to face visit with me. We will discuss the available weight loss surgeries including risks and benefits, get an official weight, review tasklist, and do a physical exam.     84  minutes spent on the date of the encounter doing chart review, history and exam, review test results, counseling, developing plan of care, documentation, and further activities as noted above.       HISTORY OF PRESENT ILLNESS:      12/10/2024     2:53 PM   Weight Loss History Reviewed with Patient   How long have you been overweight? Since late teens through early 20's   What is the most that you have ever weighed 341   What is the most weight you have lost? 110   I have tried the following methods to lose weight Watching portions or calories    Exercise    Weight Watchers    Prescription Medications       CO-MORBIDITIES OF OBESITY INCLUDE:      12/10/2024     2:53 PM   --   I have the following health issues associated with obesity High Blood Pressure    Sleep Apnea       PAST MEDICAL HISTORY:  Past Medical History:   Diagnosis Date    Depressive disorder     Previous depressive episodes. Diagnosed with Bipolar illness in 11/18 after manic episode.    Disorders of bursae and tendons in shoulder region, unspecified     Female infertility of unspecified origin     Gallbladder sludge 05/2013    Hypertension 10/18    Mild sleep apnea 05/13/2014    Obesity, unspecified     Oral aphthae     Other acne     Other specified hypoglycemia     PONV (postoperative nausea and vomiting)        PAST SURGICAL HISTORY:  Past Surgical History:   Procedure Laterality Date    BIOPSY      age 42 breast biopsy    COLONOSCOPY  2018    COLONOSCOPY N/A 2/13/2024    Procedure: Colonoscopy;  Surgeon: Cheryl Loza MD;  Location:  GI    GYN SURGERY  2016 for ovarian cyst    left ovary, 2 fallopian tubes removed. endometrioma.    HC TOOTH EXTRACTION W/FORCEP  1984/85    [place] [facility]    IR ENDOVENOUS ABLATION VARICOSE VEINS  12/12/2019    LAPAROSCOPIC CHOLECYSTECTOMY  07/29/2013    Procedure: LAPAROSCOPIC CHOLECYSTECTOMY;  Laparoscopic Cholecystectomy;  Surgeon: Fabio Johnson MD;  Location:  OR    LAPAROSCOPIC OOPHORECTOMY  Left 06/23/2016    Procedure: LAPAROSCOPIC OOPHORECTOMY;  Surgeon: Kayley Donnelly MD;  Location: [facility] OR    LAPAROSCOPIC SALPINGECTOMY Bilateral 06/23/2016    Procedure: LAPAROSCOPIC SALPINGECTOMY;  Surgeon: Kayley Donnelly MD;  Location: [facility] OR    VASCULAR SURGERY  12/12    varicose vein proceedures     No personal or family history of blood clots or anesthesia concerns      SOCIAL HISTORY:       12/10/2024     2:53 PM   Social History Questions Reviewed With Patient   Which best describes your employment status (select all that apply) I work full-time    I work days   If you work, 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 Mom (in her 80's), friends   Who can you count on for support throughout your weight loss surgery journey? therapist, dietician, friends     Just  12/2 but her mother could come stay with her. Discussed being mindful about proceeding during a stressful time/life event.     HABITS:      12/10/2024     2:53 PM   --   How often do you drink alcohol? 2-4 times a month   If you do drink alcohol, how many drinks might you have in a day? (one drink = 5 oz. wine, 1 can/bottle of beer, 1 shot liquor) 1 or 2   Do you currently use any of the following Nicotine products? No   Have you ever used any of the following nicotine products? No   Have you or are you currently using street drugs or prescription strength medication for which you do not have a prescription for? No   Do you have a history of chemical dependency (alcohol or drug abuse)? No         PSYCHOLOGICAL HISTORY:       12/10/2024     2:53 PM   Psychological History Reviewed With Patient   Have you ever attempted suicide? Never.   Have you had thoughts of suicide in the past year? No   Have you ever been hospitalized for mental illness or a suicide attempt? In  the last 5 to 10 years.   Do you have a history of chronic pain? No   Have you ever been diagnosed with fibromyalgia? No   Are you currently being treated for any of the following? (select all that apply) Anxiety    Bipolar   Are you currently seeing a therapist or counselor? Yes   Are you currently seeing a psychiatrist? Yes     Reviewed mental health clearance         12/10/2024     2:53 PM   Questions Reviewed With Patient   How ready are you to make changes regarding your weight? Number 1 = Not ready at all to make changes up to 10 = very ready. 7   How confident are you that you can change? 1 = Not confident that you will be successful making changes up to 10 = very confident. 7       ROS:      12/10/2024     2:53 PM   --   Skin Skin fold rashes (groin or other folds)    Leg swelling    Varicose veins   HEENT None of these   Musculoskeletal Joint Pain    Back pain    Swelling of legs   Cardiovascular None of the above   Pulmonary None of the above   Gastrointestinal Diarrhea    Constipation   Genitourinary Stress incontinence (losing urine when coughing, sneezing, etc.)   Hematological None of the above   Neurological None of the above   Female only Post-menopausal     No DVT/vascular concerns    No cardiac concerns currently.     Will be seeing ortho - some joint injections as well.    No separate GI concerns - suspects diarrhea/constipation Wegovy related    Dysphagia - they suspect was related to antipsychotics in 2018. Some dry mouth as a med side effects.    EATING BEHAVIORS:      12/10/2024     2:53 PM   --   Have you or anyone else thought that you had an eating disorder? No   Do you currently binge eat (eat a large amount of food in a short time)? No   Are you an emotional eater? Yes   Do you get up to eat after falling asleep? No   Can you afford 3 meals a day? Yes   Can you afford 50-60 dollars a month for vitamins? Yes     Reports has coping skills for evening some emotional eating.     EXERCISE:       12/10/2024     2:53 PM   --   How often do you exercise? 3 to 4 times per week   What is the duration of your exercise (in minutes)? 20 Minutes   What types of exercise do you do? walking   What keeps you from being more active? Pain       MEDICATIONS:  Current Outpatient Medications   Medication Sig Dispense Refill    acetaminophen (TYLENOL) 650 MG CR tablet Take 1 tablet (650 mg) by mouth every 8 hours as needed for mild pain or fever      calcium carbonate (TUMS) 500 MG chewable tablet Take 1 chew tab by mouth 2 times daily      Carboxymethylcellulose Sod PF (REFRESH PLUS) 0.5 % SOLN ophthalmic solution Place 1 drop into both eyes 3 times daily as needed for dry eyes      clotrimazole-betamethasone (LOTRISONE) 1-0.05 % external cream Apply topically 2 times daily 45 g 2    estradiol (ESTRACE) 0.5 MG tablet Take 1 tablet (0.5 mg) by mouth daily. 90 tablet 0    estradiol (VAGIFEM) 10 MCG TABS vaginal tablet Place 1 tablet (10 mcg) vaginally twice a week. 26 tablet 0    hydrOXYzine HCl (ATARAX) 10 MG tablet Take 1 tablet (10 mg) by mouth nightly as needed (sleep). 30 tablet 2    lamoTRIgine (LAMICTAL) 200 MG tablet Take 2 tablets (400 mg) by mouth daily. 60 tablet 2    lisinopril (ZESTRIL) 10 MG tablet Take 1 tablet (10 mg) by mouth daily. 90 tablet 3    lithium (ESKALITH) 150 MG capsule Take 1 capsule (150 mg) by mouth at bedtime. 30 capsule 2    lithium 300 MG capsule Take 3 tablet (900 mg) and one tablet (150 mg) for a total of 1050 mg at bedtime. 270 capsule 2    Loratadine 10 MG capsule Take 10 mg by mouth daily      medroxyPROGESTERone (PROVERA) 2.5 MG tablet Take 1 tablet (2.5 mg) by mouth daily. 90 tablet 0    metroNIDAZOLE (METROGEL) 1 % external gel Apply topically daily. 60 g 9    ORDER FOR DME Use your CPAP device as directed by your provider.      psyllium (METAMUCIL/KONSYL) 58.6 % powder Take 1 teaspoonful by mouth daily      Semaglutide-Weight Management (WEGOVY) 1 MG/0.5ML pen Inject 1 mg  subcutaneously once a week. 6 mL 0    Semaglutide-Weight Management (WEGOVY) 1 MG/0.5ML pen Inject 1 mg subcutaneously once a week. 2 mL 0    VITAMIN D, CHOLECALCIFEROL, PO Take 2,000 Units by mouth daily       Current Facility-Administered Medications   Medication Dose Route Frequency Provider Last Rate Last Admin    lidocaine (PF) (XYLOCAINE) 1 % injection 4 mL  4 mL      4 mL at 08/14/24 0704    lidocaine (PF) (XYLOCAINE) 1 % injection 4 mL  4 mL      4 mL at 05/10/24 0904    triamcinolone (KENALOG-40) injection 40 mg  40 mg      40 mg at 08/14/24 0704    triamcinolone (KENALOG-40) injection 40 mg  40 mg      40 mg at 05/10/24 0904      LABS AND RECORDS REVIEWED:  Hemoglobin A1C   Date Value Ref Range Status   09/27/2024 5.1 0.0 - 5.6 % Final     Comment:     Normal <5.7%   Prediabetes 5.7-6.4%    Diabetes 6.5% or higher     Note: Adopted from ADA consensus guidelines.   05/05/2021 5.4 0 - 5.6 % Final     Comment:     Normal <5.7% Prediabetes 5.7-6.4%  Diabetes 6.5% or higher - adopted from ADA   consensus guidelines.       Vitamin D Deficiency screening   Date Value Ref Range Status   10/02/2017 25 20 - 75 ug/L Final     Comment:     Season, race, dietary intake, and treatment affect the concentration of   25-hydroxy-Vitamin D. Values may decrease during winter months and increase   during summer months. Values 20-29 ug/L may indicate Vitamin D insufficiency   and values <20 ug/L may indicate Vitamin D deficiency.  Vitamin D determination is routinely performed by an immunoassay specific for   25 hydroxyvitamin D3.  If an individual is on vitamin D2 (ergocalciferol)   supplementation, please specify 25 OH vitamin D2 and D3 level determination by   LCMSMS test [id number].       Vitamin D, Total (25-Hydroxy)   Date Value Ref Range Status   02/28/2024 33 20 - 50 ng/mL Final     Comment:     optimum levels   05/04/2022 29 20 - 75 ug/L Final     TSH   Date Value Ref Range Status   07/11/2024 2.43 0.30 - 4.20 uIU/mL Final    12/07/2022 2.13 0.40 - 4.00 mU/L Final   05/05/2021 2.61 0.40 - 4.00 mU/L Final     Sodium   Date Value Ref Range Status   07/11/2024 135 135 - 145 mmol/L Final   05/05/2021 138 133 - 144 mmol/L Final     Potassium   Date Value Ref Range Status   07/11/2024 4.5 3.4 - 5.3 mmol/L Final   12/07/2022 4.4 3.4 - 5.3 mmol/L Final   05/05/2021 4.3 3.4 - 5.3 mmol/L Final     Chloride   Date Value Ref Range Status   07/11/2024 104 98 - 107 mmol/L Final   12/07/2022 105 94 - 109 mmol/L Final   05/05/2021 109 94 - 109 mmol/L Final     Carbon Dioxide   Date Value Ref Range Status   05/05/2021 25 20 - 32 mmol/L Final     Carbon Dioxide (CO2)   Date Value Ref Range Status   07/11/2024 22 22 - 29 mmol/L Final   12/07/2022 26 20 - 32 mmol/L Final     Anion Gap   Date Value Ref Range Status   07/11/2024 9 7 - 15 mmol/L Final   12/07/2022 7 3 - 14 mmol/L Final   05/05/2021 5 3 - 14 mmol/L Final     Glucose   Date Value Ref Range Status   07/11/2024 88 70 - 99 mg/dL Final   12/07/2022 96 70 - 99 mg/dL Final   05/05/2021 108 (H) 70 - 99 mg/dL Final     Urea Nitrogen   Date Value Ref Range Status   07/11/2024 10.8 6.0 - 20.0 mg/dL Final   12/07/2022 12 7 - 30 mg/dL Final   05/05/2021 10 7 - 30 mg/dL Final     Creatinine   Date Value Ref Range Status   07/11/2024 0.63 0.51 - 0.95 mg/dL Final   05/05/2021 0.56 0.52 - 1.04 mg/dL Final     GFR Estimate   Date Value Ref Range Status   07/11/2024 >90 >60 mL/min/1.73m2 Final     Comment:     eGFR calculated using 2021 CKD-EPI equation.   05/05/2021 >90 >60 mL/min/[1.73_m2] Final     Comment:     Non  GFR Calc  Starting 12/18/2018, serum creatinine based estimated GFR (eGFR) will be   calculated using the Chronic Kidney Disease Epidemiology Collaboration   (CKD-EPI) equation.       Calcium   Date Value Ref Range Status   07/11/2024 9.8 8.6 - 10.0 mg/dL Final   05/05/2021 9.6 8.5 - 10.1 mg/dL Final     Bilirubin Total   Date Value Ref Range Status   02/28/2024 0.3 <=1.2 mg/dL  Final   02/03/2021 0.4 0.2 - 1.3 mg/dL Final     Alkaline Phosphatase   Date Value Ref Range Status   02/28/2024 91 40 - 150 U/L Final     Comment:     Reference intervals for this test were updated on 11/14/2023 to more accurately reflect our healthy population. There may be differences in the flagging of prior results with similar values performed with this method. Interpretation of those prior results can be made in the context of the updated reference intervals.   02/03/2021 88 40 - 150 U/L Final     ALT   Date Value Ref Range Status   02/28/2024 15 0 - 50 U/L Final     Comment:     Reference intervals for this test were updated on 6/12/2023 to more accurately reflect our healthy population. There may be differences in the flagging of prior results with similar values performed with this method. Interpretation of those prior results can be made in the context of the updated reference intervals.     02/03/2021 17 0 - 50 U/L Final     AST   Date Value Ref Range Status   02/28/2024 21 0 - 45 U/L Final     Comment:     Reference intervals for this test were updated on 6/12/2023 to more accurately reflect our healthy population. There may be differences in the flagging of prior results with similar values performed with this method. Interpretation of those prior results can be made in the context of the updated reference intervals.   02/03/2021 13 0 - 45 U/L Final     Cholesterol   Date Value Ref Range Status   09/27/2024 168 <200 mg/dL Final   05/05/2021 182 <200 mg/dL Final     HDL Cholesterol   Date Value Ref Range Status   05/05/2021 72 >49 mg/dL Final     Direct Measure HDL   Date Value Ref Range Status   09/27/2024 54 >=50 mg/dL Final     LDL Cholesterol Calculated   Date Value Ref Range Status   09/27/2024 87 <100 mg/dL Final   05/05/2021 85 <100 mg/dL Final     Comment:     Desirable:       <100 mg/dl     Triglycerides   Date Value Ref Range Status   09/27/2024 136 <150 mg/dL Final   05/05/2021 123 <150 mg/dL  "Final     WBC   Date Value Ref Range Status   02/03/2021 7.7 4.0 - 11.0 10e9/L Final     WBC Count   Date Value Ref Range Status   09/27/2024 6.4 4.0 - 11.0 10e3/uL Final     Hemoglobin   Date Value Ref Range Status   09/27/2024 13.7 11.7 - 15.7 g/dL Final   02/03/2021 15.0 11.7 - 15.7 g/dL Final     Hematocrit   Date Value Ref Range Status   09/27/2024 41.6 35.0 - 47.0 % Final   02/03/2021 47.5 (H) 35.0 - 47.0 % Final     MCV   Date Value Ref Range Status   09/27/2024 90 78 - 100 fL Final   02/03/2021 90 78 - 100 fl Final     Platelet Count   Date Value Ref Range Status   09/27/2024 212 150 - 450 10e3/uL Final   02/03/2021 234 150 - 450 10e9/L Final       Updated CMP, vitamin D    9/27/2024 hemoglobin A1c normal  CBC normal    PHYSICAL EXAM:  /78   Pulse 63   Ht 5' 7.75\" (1.721 m)   Wt 264 lb 9.6 oz (120 kg)   BMI 40.53 kg/m    GENERAL: Healthy, alert and no distress  EYES: Eyes grossly normal to inspection.    RESP: No audible wheeze, cough, or visible cyanosis.  No increased work of breathing.    SKIN: Visible skin clear. No significant rash, abnormal pigmentation or lesions.  NEURO: Mentation and speech appropriate for age.  PSYCH: Mentation appears normal, affect normal/bright, judgement and insight intact, normal speech and appearance well-groomed.      Sincerely,      Glendy Jules PA-C       "

## 2024-12-10 ASSESSMENT — ANXIETY QUESTIONNAIRES
IF YOU CHECKED OFF ANY PROBLEMS ON THIS QUESTIONNAIRE, HOW DIFFICULT HAVE THESE PROBLEMS MADE IT FOR YOU TO DO YOUR WORK, TAKE CARE OF THINGS AT HOME, OR GET ALONG WITH OTHER PEOPLE: SOMEWHAT DIFFICULT
1. FEELING NERVOUS, ANXIOUS, OR ON EDGE: NEARLY EVERY DAY
5. BEING SO RESTLESS THAT IT IS HARD TO SIT STILL: SEVERAL DAYS
6. BECOMING EASILY ANNOYED OR IRRITABLE: SEVERAL DAYS
GAD7 TOTAL SCORE: 15
7. FEELING AFRAID AS IF SOMETHING AWFUL MIGHT HAPPEN: MORE THAN HALF THE DAYS
3. WORRYING TOO MUCH ABOUT DIFFERENT THINGS: NEARLY EVERY DAY
4. TROUBLE RELAXING: MORE THAN HALF THE DAYS
2. NOT BEING ABLE TO STOP OR CONTROL WORRYING: NEARLY EVERY DAY
GAD7 TOTAL SCORE: 15
8. IF YOU CHECKED OFF ANY PROBLEMS, HOW DIFFICULT HAVE THESE MADE IT FOR YOU TO DO YOUR WORK, TAKE CARE OF THINGS AT HOME, OR GET ALONG WITH OTHER PEOPLE?: SOMEWHAT DIFFICULT

## 2024-12-11 ENCOUNTER — PATIENT OUTREACH (OUTPATIENT)
Dept: CARE COORDINATION | Facility: CLINIC | Age: 59
End: 2024-12-11

## 2024-12-11 ENCOUNTER — OFFICE VISIT (OUTPATIENT)
Dept: SURGERY | Facility: CLINIC | Age: 59
End: 2024-12-11
Payer: COMMERCIAL

## 2024-12-11 VITALS
HEART RATE: 63 BPM | BODY MASS INDEX: 40.1 KG/M2 | WEIGHT: 264.6 LBS | DIASTOLIC BLOOD PRESSURE: 78 MMHG | HEIGHT: 68 IN | SYSTOLIC BLOOD PRESSURE: 139 MMHG

## 2024-12-11 DIAGNOSIS — Z13.0 SCREENING FOR ENDOCRINE, NUTRITIONAL, METABOLIC AND IMMUNITY DISORDER: ICD-10-CM

## 2024-12-11 DIAGNOSIS — Z13.228 SCREENING FOR ENDOCRINE, NUTRITIONAL, METABOLIC AND IMMUNITY DISORDER: ICD-10-CM

## 2024-12-11 DIAGNOSIS — E66.01 OBESITY, CLASS III, BMI 40-49.9 (MORBID OBESITY) (H): Primary | ICD-10-CM

## 2024-12-11 DIAGNOSIS — Z13.21 SCREENING FOR ENDOCRINE, NUTRITIONAL, METABOLIC AND IMMUNITY DISORDER: ICD-10-CM

## 2024-12-11 DIAGNOSIS — Z13.29 SCREENING FOR ENDOCRINE, NUTRITIONAL, METABOLIC AND IMMUNITY DISORDER: ICD-10-CM

## 2024-12-11 PROCEDURE — 99205 OFFICE O/P NEW HI 60 MIN: CPT | Performed by: PHYSICIAN ASSISTANT

## 2024-12-11 PROCEDURE — 99417 PROLNG OP E/M EACH 15 MIN: CPT | Performed by: PHYSICIAN ASSISTANT

## 2024-12-11 PROCEDURE — G2211 COMPLEX E/M VISIT ADD ON: HCPCS | Performed by: PHYSICIAN ASSISTANT

## 2024-12-11 NOTE — LETTER
December 11, 2024      Angy Haji  [address]  [place] [address]              Bariatric Letter from Sleep Provider    Dear Pulmonologist/Sleep Provider:    Thank you for taking the time to see our patient for a preoperative pulmonary clearance for bariatric surgery.  Please include the following in your evaluation:    Arrange and expedite necessary testing.  Specify patient's current treatment plan for their MARLA and comment on compliance.  Provider a statement that the patient is (or is not) cleared and stable to proceed with bariatric surgery from a pulmonary standpoint.    4.  Specify recommendations for care pre and post bariatric surgery.    Sincerely,      Glendy Jules PA-C   [facility]     Please fax letter to [phone] or route in Epic to Glendy Jules PA-C

## 2024-12-11 NOTE — LETTER
December 11, 2024      Angy Haji  [address]  [facility] [id number]        Bariatric Letter of Support from Mental Health Provider    Dear Mental Health Provider:    Angy is seeking bariatric surgery.  Although she will undergo a separate bariatric psychological evaluation, it is important to us that mental health providers of our surgical patients are involved at all stages of the process.     Please write a letter of support including the following information:    Is the patient under your care?  If so, for how long?  Comment on the relevant diagnosis and any current related prescribed medications.  From a mental health standpoint do you feel patient is stable?  It is important that the patient have regular follow up initially and for the first year following surgery.  Are you able to see the patient for this?    Sincerely,      Glendy Jules PA-C   [facility]     Please fax letter to [phone] or route in Epic to Glendy Jules PA-C

## 2024-12-11 NOTE — Clinical Note
Do you have the contact info for surgery costs? Angy was curious total cost for financial planning.  - [employer]

## 2024-12-11 NOTE — ASSESSMENT & PLAN NOTE
Initial bariatric surgery visit. Reviewed anatomy changes and some risks/benefits of surgery. Task list created and she will consider if wanting to start this process. Clearance letters for [employer] [facility] providers created/sent.

## 2024-12-11 NOTE — PATIENT INSTRUCTIONS
Nice to meet you today.  Below is our plan we discussed.-  Glendy Jules PA-C   Bariatric Task List  Lose 5 lbs prior to surgery.  Goal Weight: 297 lbs  Have preoperative laboratory tests drawn. -  Call [phone] for an appt.    Psychological Evaluation with MMPI and clearance for weight loss surgery.- Call [phone] to schedule.   Achieve clearance from dietitian to see surgeon. Call [phone] to schedule   Mental health clearance   Sleep clearance  In Person Pre-Surgery Visit (Bariatric Education) with your provider Call [phone] to schedule   _______________________________________  Road to Surgery:   1. Complete the above tasklist  2. Following tasklist completion, see the surgeon, go over your surgery, and sign consents  3. File will be sent for insurance approval  4.Once approved, our  will call to set up your surgery  (It usually takes 4-6 months to get to surgery from your initial consult)  After Surgery:   Remember, Obesity is a chronic disease, At some point weight gain will occur and hunger may return.   Follow up is important to keep your weight off and your vitamin levels up.  Labs will be monitored every 3 months for the first year and yearly thereafter.  Vitamin supplementation is a lifelong. You will take:  2 Multivitamins containing 18mg of iron  B-12 daily or by injection monthly  Vitamin D3 5000 IU daily   Calcium citrate 2 daily  Thiamine 100 mg weekly   Iron supplement once daily if you are a menstruating female  Prevent ulcers by avoiding tobacco and anti-inflammatories (NSAIDS) forever.  NSAIDS examples: Aspirin, Ibuprofen, Naproxen. Tylenol is fine.    Alcohol affects you differently. There is a 10% increase of Alcohol Use Disorder in patients with bariatric surgery.   If you have even ONE drink DO NOT DRIVE.     For reference:  Invest in yourself with regular exercise. Continue to strive for a range for 150-300 minutes of exercise for weight maintenance and  incorporating strength training twice-three a week to help preserve muscle! Reminder to rotate through all the major muscle groups          Options for continuing GLP1/GIP agonist in 2025:  Pay out of pocket for Wegovy or Zepbound pens (>$1000/month cash price without savings card)   Zepbound cost with Zepbound savings card (link below to sign up for this): $650/month with card  https://www.enrollment.zepbound.[employer].com/enroll/checkEnrollment  Wegovy cost with Wegovy savings card (link below to sign up for this): $650/month with card   https://www.SoundOut/coverage-and-savings/save-on-wegovy.html  Compounded semaglutide (same active ingredient as Wegovy) from [facility] ($230/month for doses of 1mg or less; $370/month for doses higher than 1mg)  This is an available option for as long as Wegovy is on FDA shortage list, Wegovy has been on the list for the last several months with no foreseeable end in sight as of now   Zepbound Vials through [facility] - vials only available in 2.5 mg and 5 mg doses  $399/month for 2.5mg vials  $549/month for 5mg vials   $5 per month for administrations supplies (syringe/needles, etc)

## 2024-12-11 NOTE — LETTER
"December 11, 2024      Angy Haji  [address] [place] [address]              Dear Dr. Cortés,    Good day. I'm seeing Angy for consideration of pursuing Bariatric surgery. She did share that she has a prior history of palpitations/SVT (listed in Epic as well) but reports saw cardiology and no additional testing was recommended. Just wanted to reach out to see if that's something that you would want her to revisit at all prior to surgical clearance? Does appear she saw Dr. Barros 10/25/21 and noting symptomatic SVT but discusses it with benign PAC's and r/o ischemia with stress test on 11/5/21 showing \"  The nuclear stress test is negative for inducible myocardial ischemia or infarction.    Left ventricular function is normal.\"    Just wanting to double check now rather than finding out at a future pre-op appointment that formal cardiology clearance would be recommended.    Thanks!    - Glendy Jules, PAShankarC     "

## 2024-12-12 ENCOUNTER — TELEPHONE (OUTPATIENT)
Dept: PSYCHIATRY | Facility: CLINIC | Age: 59
End: 2024-12-12

## 2024-12-12 ENCOUNTER — VIRTUAL VISIT (OUTPATIENT)
Dept: SURGERY | Facility: CLINIC | Age: 59
End: 2024-12-12
Payer: COMMERCIAL

## 2024-12-12 DIAGNOSIS — E66.01 OBESITY, CLASS III, BMI 40-49.9 (MORBID OBESITY) (H): Primary | ICD-10-CM

## 2024-12-12 NOTE — PATIENT INSTRUCTIONS
José Luis Travis,    I'm attaching the diet and vitamin guidelines. I will send a written copy to your home.     Diet Guidelines after Weight-Loss Surgery     Your Stage 1 Diet: Clear Liquids     Your Stage 2 Diet: Low-fat Full Liquids     Your Stage 3 Diet: Pureed Foods     Your Stage 4 Diet: Soft Foods    Your Stage 5 Diet: Regular Foods    Supplements after Sleeve Gastrectomy, Gastric Bypass or Single Anastomosis Duodenal Switch Surgery    Please call [phone] to schedule follow up appointment with RD as desired.    Take care,    Mattie Gomez, RD, LD  [facility]   [phone] (office phone)

## 2024-12-12 NOTE — LETTER
2024      RE: Angy Haji  [address]  [facility] [address]         To Whom It May Concern,    Angy Haji,  1965 is a patient who has been under my care since 2023, but has been a patient of the clinic for longer. I am writing in support of her request for bariatric surgery.     Angy has a diagnosis of Bipolar 1 disorder, for which she is prescribed lithium and lamotrigine. From a mental health standpoint, I feel that patient is currently stable. She may be seen by me for regular follow up following surgery until 2025 when my residency comes to an end. At this time, Angy's care will be transferred to a new provider within the clinic for continued regular follow up for the first year after surgery.      Sincerely,      Antonio Santillan MD

## 2024-12-12 NOTE — PROGRESS NOTES
"Angy is a 59 year old who is being evaluated via a billable video visit.      The patient has been notified of following:     \"This video visit will be conducted via a call between you and your physician/provider. We have found that certain health care needs can be provided without the need for an in-person physical exam.  This service lets us provide the care you need with a video conversation.  If a prescription is necessary we can send it directly to your pharmacy.  If lab work is needed we can place an order for that and you can then stop by our lab to have the test done at a later time.    Video visits are billed at different rates depending on your insurance coverage.  Please reach out to your insurance provider with any questions.    If during the course of the call the physician/provider feels a video visit is not appropriate, you will not be charged for this service.\"    Patient has given verbal consent for Video visit? Yes    How would you like to obtain your AVS? MyChart    If the video visit is dropped, the invitation should be resent by: Text to cell phone: [phone]    Will anyone else be joining your video visit? No    I    Video-Visit Details    Type of service:  Video Visit    Originating Location (pt. Location): Other work setting     Distant Location (provider location):   Other:  [employer]    Platform used for Video Visit: Mongo[employer]    Video Start Time:  3:00 PM     Video End Time: 3:51 PM       New Bariatric Nutrition Consultation Note    Reason For Visit: Nutrition Assessment    Angy Haji is a 59 year old presenting today for new bariatric nutrition consult.  Pt is interested in laparoscopic sleeve gastrectomy.  Patient is accompanied by self.        12/10/2024     2:53 PM   Support System Reviewed With Patient   Who do you have in your support network that can be available to help you for the first 2 weeks after surgery? Mom (in her 80's), friends   Who can you count on for support " "throughout your weight loss surgery journey? therapist, dietician, friends       ANTHROPOMETRICS:  Estimated body mass index is 40.53 kg/m  as calculated from the following:    Height as of 12/11/24: 1.721 m (5' 7.75\").    Weight as of 12/11/24: 120 kg (264 lb 9.6 oz).    Required weight loss goal pre-op: 5 lbs from initial consult weight (goal weight 297 lbs or less before surgery)        12/10/2024     2:53 PM   --   I have tried the following methods to lose weight Watching portions or calories    Exercise    Weight Watchers    Prescription Medications           12/10/2024     2:53 PM   Weight Loss Questions Reviewed With Patient   How long have you been overweight? Since late teens through early 20's       SUPPLEMENT INFORMATION:  Vitamin D 2000 international unit(s)     NUTRITION HISTORY:      12/10/2024     2:53 PM   Recall Diet Questions Reviewed With Patient   Describe what you typically consume for breakfast (typical or most recent) yogurt with granola added.  English muffin with peanut butter   Describe what you typically consume for lunch (typical or most recent) Home cooked meals leftovers.  Homemade chicken noodle soup.  Homemade maple mustard chicken thigh with rice, split pea soup, leftover meatloaf, sometimes canned soups.  Sides of apples, clementines, cucumbers, carrots.  Crackers with the soups.   Describe what you typically consume for supper (typical or most recent) Homemade lean pork stir gomez with rice, meatloaf with mashed potatoes, take out sesame chicken with rice, some frozen pizza, grilled cheese, canned soup.   Describe what you typically consume as snacks (typical or most recent) string cheese, apple   How many ounces of water, or other low calorie drinks, do you drink daily (8 oz=1 glass)? 48 oz   How many ounces of caffeine (coffee, tea, pop) do you drink daily (8 oz=1 glass)? 32 oz   How many ounces of carbonated (pop, beer, sparkling water) drinks do you drinky daily (8 oz=1 glass)? " 16 oz   How many ounces of juice, pop, sweet tea, sports drinks, protein drinks, other sweetened drinks, do you drink daily (8 oz=1 glass)? 0 oz   How many ounces of milk do you drink daily (8 oz=1 glass) 0 oz   How often do you drink alcohol? 2-4 times a month   If you do drink alcohol, how many drinks might you have in a day? (one drink = 5 oz. wine, 1 can/bottle of beer, 1 shot liquor) 1 or 2           12/10/2024     2:53 PM   Eating Habits   What foods do you crave? ice cream mostly.  I usually enjoy cheesecake when I make it, which is not very often.  Ice cream is my go to for self-soothing or enjoyment.       ADDITIONAL INFORMATION:  Patient has struggled with weight for years. Patient knows patient with successful weight loss surgery. Patient currently taking Wegovy and has lost approximately 10% body weight. Patient experiences diarrhea and constipation. Patient with reduced appetite and eats on a schedule. Patient is not having hunger in the evening and aware that eats ice cream as self soothe behavior. Patient eats 3 balanced meals per day. Patient to determine how much weight loss patient desires with surgery vs weight loss medication.         12/10/2024     2:53 PM   Dining Out History Reviewed With Patient   How often do you dine out? Around once a week.   Where do you dine out? (select all that apply) sit-down restaurants    take out   What types of food do you order when you dine out? salmon with green beans and rice, sesame chicken with rice, pad melony with chicken           12/10/2024     2:53 PM   Physical Activity Reviewed With Patient   How often do you exercise? 3 to 4 times per week   What is the duration of your exercise (in minutes)? 20 Minutes   What types of exercise do you do? walking   What keeps you from being more active? Pain       NUTRITION DIAGNOSIS:  Obesity r/t long history of self-monitoring deficit and excessive energy intake aeb BMI >30 kg/m2.    INTERVENTION:  Intervention  Provided/Education Provided on post-op diet guidelines, vitamins/minerals essential post-operatively, GI anatomy of bariatric surgeries, ways to help prepare for post-op diet guidelines pre-operatively, portion/calorie-control and mindful eating. Provided written diet guidelines for patient.           12/10/2024     2:53 PM   Questions Reviewed With Patient   How ready are you to make changes regarding your weight? Number 1 = Not ready at all to make changes up to 10 = very ready. 7   How confident are you that you can change? 1 = Not confident that you will be successful making changes up to 10 = very confident. 7       Patient Understanding: good  Expected Patient Engagement: good    GOALS:  Patient to contact clinic when ready to pursue weight loss surgery.    Follow-Up:   Recommend 2-3 follow up visits to assist with lifestyle changes or per insurance.    Time spent with patient: 51 minutes.    Mattie Amaya, RD, LD  [facility] Diet[facility]  [phone] (office phone)

## 2024-12-17 ENCOUNTER — OFFICE VISIT (OUTPATIENT)
Dept: PSYCHIATRY | Facility: CLINIC | Age: 59
End: 2024-12-17
Attending: PSYCHIATRY & NEUROLOGY
Payer: COMMERCIAL

## 2024-12-17 VITALS
SYSTOLIC BLOOD PRESSURE: 124 MMHG | DIASTOLIC BLOOD PRESSURE: 78 MMHG | BODY MASS INDEX: 40.94 KG/M2 | HEART RATE: 64 BPM | WEIGHT: 267.3 LBS

## 2024-12-17 DIAGNOSIS — G47.00 INSOMNIA, UNSPECIFIED TYPE: ICD-10-CM

## 2024-12-17 DIAGNOSIS — F31.9 BIPOLAR I DISORDER (H): ICD-10-CM

## 2024-12-17 PROCEDURE — G2211 COMPLEX E/M VISIT ADD ON: HCPCS | Performed by: STUDENT IN AN ORGANIZED HEALTH CARE EDUCATION/TRAINING PROGRAM

## 2024-12-17 PROCEDURE — 99214 OFFICE O/P EST MOD 30 MIN: CPT | Mod: HN | Performed by: STUDENT IN AN ORGANIZED HEALTH CARE EDUCATION/TRAINING PROGRAM

## 2024-12-17 PROCEDURE — 99214 OFFICE O/P EST MOD 30 MIN: CPT | Performed by: STUDENT IN AN ORGANIZED HEALTH CARE EDUCATION/TRAINING PROGRAM

## 2024-12-17 RX ORDER — LAMOTRIGINE 200 MG/1
400 TABLET ORAL DAILY
Qty: 60 TABLET | Refills: 2 | Status: SHIPPED | OUTPATIENT
Start: 2024-12-17

## 2024-12-17 RX ORDER — LITHIUM CARBONATE 150 MG/1
150 CAPSULE ORAL AT BEDTIME
Qty: 30 CAPSULE | Refills: 2 | Status: SHIPPED | OUTPATIENT
Start: 2024-12-17

## 2024-12-17 RX ORDER — HYDROXYZINE HYDROCHLORIDE 10 MG/1
10 TABLET, FILM COATED ORAL
Qty: 30 TABLET | Refills: 2 | Status: SHIPPED | OUTPATIENT
Start: 2024-12-17

## 2024-12-17 RX ORDER — LITHIUM CARBONATE 300 MG/1
CAPSULE ORAL
Qty: 270 CAPSULE | Refills: 2 | Status: SHIPPED | OUTPATIENT
Start: 2024-12-17

## 2024-12-17 ASSESSMENT — PATIENT HEALTH QUESTIONNAIRE - PHQ9
10. IF YOU CHECKED OFF ANY PROBLEMS, HOW DIFFICULT HAVE THESE PROBLEMS MADE IT FOR YOU TO DO YOUR WORK, TAKE CARE OF THINGS AT HOME, OR GET ALONG WITH OTHER PEOPLE: SOMEWHAT DIFFICULT
SUM OF ALL RESPONSES TO PHQ QUESTIONS 1-9: 8
SUM OF ALL RESPONSES TO PHQ QUESTIONS 1-9: 8

## 2024-12-17 ASSESSMENT — PAIN SCALES - GENERAL: PAINLEVEL_OUTOF10: SEVERE PAIN (7)

## 2024-12-17 NOTE — PATIENT INSTRUCTIONS
**For crisis resources, please see the information at the end of this document**   Patient Education    Thank you for coming to the [facility] [place] [facility].     Lab Testing:  If you had lab testing today and your results are reassuring or normal they will be mailed to you or sent through [place] within 7 days. If the lab tests need quick action we will call you with the results. The phone number we will call with results is # [phone]. If this is not the best number please call our clinic and change the number.     Medication Refills:  If you need any refills please call your pharmacy and they will contact us. Our fax number for refills is [phone].   Three business days of notice are needed for general medication refill requests.   Five business days of notice are needed for controlled substance refill requests.   If you need to change to a different pharmacy, please contact the new pharmacy directly. The new pharmacy will help you get your medications transferred.     Contact Us:  Please call [phone] during business hours (8-5:00 M-F).   If you have medication related questions after clinic hours, or on the weekend, please call [phone].     Financial Assistance [phone]   Medical Records [phone]       MENTAL HEALTH CRISIS RESOURCES:  For a emergency help, please call 911 or go to the nearest Emergency Department.     Emergency Walk-In Options:   [facility] Unit @ [facility] ([place]): [phone] - Specialized mental health emergency area designed to be calming  [facility] ([place]): [phone]  [facility] ([place]): [phone]  [facility]): [phone]    [facility] Crisis Information:   [place]: [phone]  [place]: [phone]  [place] ([place]) - Adult: [phone]     Child: [phone]  [place] - Adult: [phone]     Child: [phone]  [place]:  [phone]  List of all [facility] resources:   https://[place].gov/dhs/people-we-serve/adults/health-care/mental-health/resources/crisis-contacts.jsp    National Crisis Information:   Crisis Text Line: Text  [place]  to 653362  Suicide & Crisis Lifeline: 988  National Suicide Prevention Lifeline: 3-398-191-TALK (1-141.358.6056)       For online chat options, visit https://suicidepreventionlifeline.org/chat/  Poison Control Center: 2-092-200-4258  Trans Lifeline: 0-635-643-2098 - Hotline for transgender people of all ages  The [place] Project: 1-244-877-5185 - Hotline for LGBT youth     For Non-Emergency Support:   Fast Tracker: Mental Health & Substance Use Disorder Resources -   https://www.turboBOTZckProcuricsn.org/

## 2024-12-17 NOTE — PROGRESS NOTES
"   [facility]  MEDICAL PROGRESS NOTE     CARE TEAM:    PCP- Mikey Cortés     Therapist- individual therapist Elfego Chan and another one for couple therapy    Angy is a 59 year old who uses the pronouns she, her, hers.      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 Things that her ex partner used to take care of such as finance or the mortgage, she is now taking care of them. She is trying to adjust to the fact that she is living alone, and need to prepare less meal, and take care of the dog herself.     She describes her mood \"between neutral to a little low\". There is some mild level of anhedonia because she doesn't read as she used to or enjoy her [employer] services. Those things can be due to lack of concentration, the fact that she has more to do during the day, or loss of interest. She talk about some challenging with memory on things that she had to do but forget them. We discussed how stress and anxiety can lead to those symptoms. We also talk about if she feels too sedated in the that could also lead to it too. She agrees to uses hydroxyzine more as PRN than scheduled, and monitor sleep. She also agrees to use 1 or may be 2 mg of melatonin to see. He is getting 7 to 8 hours of sleep but recently due to back pain, knee pain, and tingling on her foot, she may wake up up to 6 times at night. She is scheduled to see an orthopedics next month for intake.     There was no safety concerns. No psychosis or anthony. She continues to go to work, and engage with people there. She continues to pay her bill, or take care of her personal hygiene. No medication changes was recommended " "today.        Psychotropic Drug Interactions:  [PSYCHCLINICDDI]  Estrogen Derivatives may decrease the serum concentration of LamoTRIgine  Angiotensin-Converting Enzyme Inhibitors may increase the serum concentration of Lithium.   LamoTRIgine may decrease the serum concentration of Progestins.      Management: routine monitoring    [place] was not checked today: not using controlled substances    Risk Statements:   Treatment Risk- Risks, benefits, alternatives and potential adverse effects have been discussed and are understood.   Safety Risk-Angy did not appear to be an imminent safety risk to self or others.     Plan     1) Medications:    - Continue Lithium 1050 mg at bedtime  - Continue with Lamictal 400 mg at bedtime   - continue with hydroxyzine 10 mg PRN at bedtime   - Continue with melatonin 1mg to 3mg at bedtime as needed for insomnia    2) Psychotherapy: Continue with therapy    3) Next due:  Labs- 01/2025  EKG- . Last one was on 03/2024 with   Rating scales-  PHQ9    4) Referrals: none  N/A    5) Other: none    6) Follow-up: Return to clinic in 6 weeks     Pertinent Background                                                   [most recent eval 08/31/23]     Angy reported that she didn't have symptoms until November 2018. At which point she was hospitalized for anthony.     This section was copied forward from 08/2021 transfer notes.  \"Patient has not had significant mental health history until 2018, when she developed perimenopausal mood and anxiety symptoms.  She was subsequently referred to our clinic following a hospitalization for anthony in November 2018.  Her care has been complicated by sensitivity to antipsychotics (EPSE including difficulty swallowing), thrombocytopenia secondary to Depakote, lithium toxicity in setting of concurrent Depakote use\"     Pertinent items include: anthony , severe med reaction [described as H/o Thrombocytopenia secondary to Depakote, and H/o lithium toxicity], and " "psych hosp      Subjective     A little low mood, increase sleep issue, physical pain seeing the orthopedics, little improvement in thepain, affect sleep.  Divorce paper signed and submitted.   Cooking more in the weekened, doing stuf with left over, doing grocery. Those things are better  Communication more abbout tyler, and not communicating with Zahra.   Nov 17 was her movving out completely. She has 3 days of packing. The man he has emotional move  Back and forth over finances  First thanksgiving without them. She went to two. Tyler is back for zuri   Holidays for the first time.   [place].  Concentration  some challenge, routine at work okay, but at home has anxiety about some stuff that she is avoidant, such as fiance.      Mood:    Anxiety:    Psychosis/Last episode:    Thuy/Last episode:    Sleep: 7 to 8 hours, interruped, having more pain    Appetite: some low appeite due t wegoby, and some overeating more eating.     Chronic disease (HTA, DM, or Pain):    Medication side effects:    SI/HI: none    Alcohol use: none, one glass of wine of the first meal, and the second none.     Smoking: none    Other substances (cannabis): none    Going to to discontinue the HRT         Mental Status Exam     Alertness: alert  and oriented  Appearance: adequately groomed  Behavior/Demeanor: cooperative and pleasant, with good  eye contact   Speech: normal and regular rate and rhythm  Language: intact  Psychomotor: normal or unremarkable  Mood:  \"between neutral and a little low.\"  Affect: full range; congruent to: mood- yes, content- yes  Thought Process/Associations: unremarkable  Thought Content:  Reports none;  Denies suicidal & violent ideation and delusions  Perception:  Reports none;  Denies auditory hallucinations and visual hallucinations  Insight: good  Judgment: good  Cognition: does  appear grossly intact; formal cognitive testing was not done  Gait and Station: unremarkable     Past Psych Med Trials      " Medication Max Dose (mg) Dates / Duration Helpful? DC Reason / Adverse Effects?   Depakote    Tremors   sertraline       olanzapine    EPSE   Seroquel    EPSE   lithium 1200   Tremor at 1200 mg had to lower the dose   Wellbutrin 300 15 years ago     Lamictal 400  yes       Treatment Course and Resendiz Events since  JULY 2023 08/2023 : no medication changes  11/2023 no medication changes  01/2023 no medication changes  03/2024 no medication changes  04/2024 no medication changes  06/2024 Increase Lamictal to 400 mg  07/2024 no medication change  08/2024 no medication changes  09/2024 no medication changes  11/2024 no medication changes but encourage to use hydroxyzine as needed and melatonin 1 to 2 mg to limit sedation.      Vitals   /78   Pulse 64   Wt 121.2 kg (267 lb 4.8 oz)   BMI 40.94 kg/m    Pulse Readings from Last 3 Encounters:   12/17/24 64   12/11/24 63   11/05/24 66     Wt Readings from Last 3 Encounters:   12/17/24 121.2 kg (267 lb 4.8 oz)   12/11/24 120 kg (264 lb 9.6 oz)   11/05/24 122.5 kg (270 lb)     BP Readings from Last 3 Encounters:   12/17/24 124/78   12/11/24 139/78   11/05/24 125/84      Medical History     ALLERGIES: Seroquel [quetiapine], Olanzapine, Penicillins, Risperidone, and Thimerosal    Patient Active Problem List   Diagnosis    Oral aphthae    CARDIOVASCULAR SCREENING; LDL GOAL LESS THAN 160    Rosacea    Raynaud disease    Vitamin D deficiency disease    MARLA (obstructive sleep apnea)    Atopic rhinitis    Chronic bilateral low back pain without sciatica    Obesity, Class III, BMI 40-49.9 (morbid obesity) (H)    Muscle tension dysphonia    Palpitations    Dysphagia, unspecified type    Anxiety    Benign essential hypertension    Callus of foot    Varicose veins of right lower extremity with edema    Bipolar I disorder (H)    Dribbling of urine    Bilateral low back pain with right-sided sciatica    Spinal stenosis at L4-L5 level    Facet arthropathy, lumbar    Acute pain of  right knee    Unspecified inflammatory spondylopathy, lumbar region (H)    Exertional angina (H)        Medications     Current Outpatient Medications   Medication Sig Dispense Refill    acetaminophen (TYLENOL) 650 MG CR tablet Take 1 tablet (650 mg) by mouth every 8 hours as needed for mild pain or fever      calcium carbonate (TUMS) 500 MG chewable tablet Take 1 chew tab by mouth 2 times daily      Carboxymethylcellulose Sod PF (REFRESH PLUS) 0.5 % SOLN ophthalmic solution Place 1 drop into both eyes 3 times daily as needed for dry eyes      clotrimazole-betamethasone (LOTRISONE) 1-0.05 % external cream Apply topically 2 times daily 45 g 2    estradiol (ESTRACE) 0.5 MG tablet Take 1 tablet (0.5 mg) by mouth daily. 90 tablet 0    estradiol (VAGIFEM) 10 MCG TABS vaginal tablet Place 1 tablet (10 mcg) vaginally twice a week. 26 tablet 0    hydrOXYzine HCl (ATARAX) 10 MG tablet Take 1 tablet (10 mg) by mouth nightly as needed (sleep). 30 tablet 2    lamoTRIgine (LAMICTAL) 200 MG tablet Take 2 tablets (400 mg) by mouth daily. 60 tablet 2    lisinopril (ZESTRIL) 10 MG tablet Take 1 tablet (10 mg) by mouth daily. 90 tablet 3    lithium (ESKALITH) 150 MG capsule Take 1 capsule (150 mg) by mouth at bedtime. 30 capsule 2    lithium 300 MG capsule Take 3 tablet (900 mg) and one tablet (150 mg) for a total of 1050 mg at bedtime. 270 capsule 2    Loratadine 10 MG capsule Take 10 mg by mouth daily      medroxyPROGESTERone (PROVERA) 2.5 MG tablet Take 1 tablet (2.5 mg) by mouth daily. 90 tablet 0    metroNIDAZOLE (METROGEL) 1 % external gel Apply topically daily. 60 g 9    ORDER FOR DME Use your CPAP device as directed by your provider.      psyllium (METAMUCIL/KONSYL) 58.6 % powder Take 1 teaspoonful by mouth daily      Semaglutide-Weight Management (WEGOVY) 1 MG/0.5ML pen Inject 1 mg subcutaneously once a week. 6 mL 0    Semaglutide-Weight Management (WEGOVY) 1 MG/0.5ML pen Inject 1 mg subcutaneously once a week. 2 mL 0     VITAMIN D, CHOLECALCIFEROL, PO Take 2,000 Units by mouth daily          Labs and Data         7/26/2024    10:57 AM 11/4/2024    12:47 PM 12/10/2024     2:54 PM   PROMIS-10 Total Score w/o Sub Scores   PROMIS TOTAL - SUBSCORES 26 25  28        Patient-reported          No data to display                  9/24/2024     8:15 AM 11/4/2024    12:45 PM 12/17/2024     7:52 AM   PHQ-9 SCORE   PHQ-9 Total Score MyChart 8 (Mild depression) 8 (Mild depression) 8 (Mild depression)   PHQ-9 Total Score 8 8  8        Patient-reported         7/26/2024    10:56 AM 11/4/2024    12:46 PM 12/10/2024     3:01 PM   JOANNA-7 SCORE   Total Score 8 (mild anxiety) 11 (moderate anxiety) 15 (severe anxiety)   Total Score 8 11  15        Patient-reported       Liver/Kidney Function, TSH Metabolic Blood counts   Recent Labs   Lab Test 07/11/24  0809 02/28/24  0845   AST  --  21   ALT  --  15   ALKPHOS  --  91   CR 0.63 0.63     Recent Labs   Lab Test 07/11/24  0809   TSH 2.43    Recent Labs   Lab Test 09/27/24  0740   CHOL 168   TRIG 136   LDL 87   HDL 54     Recent Labs   Lab Test 09/27/24  0740   A1C 5.1     Recent Labs   Lab Test 07/11/24  0809   GLC 88    Recent Labs   Lab Test 09/27/24  0740   WBC 6.4   HGB 13.7   HCT 41.6   MCV 90            Recent Labs   Lab Test 07/11/24  0809 11/08/23  1016 05/15/23  1017   LITHIUM 1.00 0.69 0.7        PROVIDER: Antonio Santillan MD MPH    Level of Medical Decision Making:   - At least 1 chronic problem that is not stable  - Engaged in prescription drug management during visit (discussed any medication benefits, side effects, alternatives, etc.)         Patient was not staffed in clinic.  Supervisor is Dr. Vivian Burnett.      The longitudinal plan of care for Bipolar disorder was addressed during this visit. Due to the added complexity in care, I will continue to support Angy in the subsequent management of this condition(s) and with the ongoing continuity of care of this condition(s)

## 2024-12-19 ENCOUNTER — OFFICE VISIT (OUTPATIENT)
Dept: PHYSICAL MEDICINE AND REHAB | Facility: CLINIC | Age: 59
End: 2024-12-19
Attending: PHYSICAL MEDICINE & REHABILITATION
Payer: COMMERCIAL

## 2024-12-19 VITALS
SYSTOLIC BLOOD PRESSURE: 140 MMHG | HEART RATE: 64 BPM | DIASTOLIC BLOOD PRESSURE: 84 MMHG | RESPIRATION RATE: 16 BRPM | OXYGEN SATURATION: 100 %

## 2024-12-19 DIAGNOSIS — M79.18 MYALGIA, OTHER SITE: ICD-10-CM

## 2024-12-19 DIAGNOSIS — M47.817 LUMBOSACRAL SPONDYLOSIS WITHOUT MYELOPATHY: Primary | ICD-10-CM

## 2024-12-19 DIAGNOSIS — M53.3 DISORDER OF SACRUM: ICD-10-CM

## 2024-12-19 DIAGNOSIS — M70.62 TROCHANTERIC BURSITIS OF BOTH HIPS: ICD-10-CM

## 2024-12-19 DIAGNOSIS — M70.61 TROCHANTERIC BURSITIS OF BOTH HIPS: ICD-10-CM

## 2024-12-19 NOTE — NURSING NOTE
Chief Complaint   Patient presents with    New Patient     Here for consult, confirmed with patient     Beulah Ny

## 2024-12-19 NOTE — PROGRESS NOTES
CC: I am seeing this patient for evaluation of back and lower extremity pain right side worse than the left.    Patient is a very pleasant 59-year-old  who has been dealing with pain since 2021.  She has been evaluated in sports medicine clinic and has received previous back injections.  These were done to the facets and did not help at that time.  She also has had injections to the knee which she finds helpful.  The most recent 1 helped for a few months.    She feels that when she is active the pain is better.  When she walks on uneven surface such as cobblestones, her pain flares up.  She does have history of obesity and has lost about 40 pounds with treatment.  On a scale of 0-10 she rates her pain currently at a 6 out of 10.  At best it is a 4 and at its worst could be a 7 or 8.  She also notes some numbness in the lateral aspect of the right foot.  Most of the pain is in the lower back and the side of the hips.  Right side is worse.    She has history of bipolar disorder with a manic flare at the time of diagnosis.  She is well treated now with lithium and lamotrigine.  She has not had difficulties tolerating steroids at this point.    She has done physical therapy for her knees and to some extent for the backs and does not feel that is helping.  She has not had physical therapy directly to the back.  Imaging studies are as follows.    MR LUMBAR SPINE W/O CONTRAST 10/5/2021 4:26 PM     History: Low back pain, > 6 wks; sciatica issues for 10-15 years,  right leg weakness, worsening over 3 months; Lumbar pain with  radiation down right leg; Lumbar pain with radiation down right leg.     ICD-10: Lumbar pain with radiation down right leg; Lumbar pain with  radiation down right leg     Comparison: None     Technique: Sagittal STIR, T1-weighted turbo spin-echo, 3-D volumetric  T2-weighted and axial reconstructed T2-weighted images of the lumbar  spine were obtained without intravenous contrast.       Findings: 5 lumbar-type vertebra counting from C2. Exaggerated lumbar  lordosis. Trace anterolisthesis at L4-5. Minimal bone marrow edema in  the superior anterior endplate of L2. The tip of the conus medullaris  is at L1-2.   No vertebral height loss. No pars defect identified.     On a level by level basis:     L1-2: No significant spinal canal or foraminal narrowing.     L2-3: No significant spinal canal or foraminal narrowing.     L3-4: No significant spinal canal or foraminal narrowing.     L4-5: Anterolisthesis. Bilateral degenerative facet arthropathy.  Resultant mild to moderate spinal canal stenosis and bilateral mild  neural foraminal narrowing greater on the left.      L5-S1: No significant spinal canal or foraminal narrowing.     Visualized retroperitoneal structures are unremarkable.                                                                      Impression:  Anterolisthesis with degenerative facet arthropathy at L4-5 with  resultant mild to moderate spinal canal and bilateral mild neural  foraminal narrowing.     ANWTAN SU MD      1 view pelvis 2 views right hip radiographs 8/27/2021 11:49 AM     History: Trochanteric bursitis, unspecified laterality     Comparison: None available.     Findings:     AP view pelvis, AP, frog leg lateral views of the right hip were  obtained.      No acute osseous abnormality.       No substantial degenerative change of the hip.     Others:     Enthesopathic changes of pelvis and trochanters.     No soft tissue swelling over the right greater trochanter to  radiographically suggest significant bursitis.                                                                       Impression:  1. No acute osseous abnormality.  2. No substantial degenerative change of the hip.  3. Enthesopathic change including at the right greater trochanter.  4. No soft tissue swelling over the right greater trochanter to  radiographically suggest significant bursitis.      GRIS  JULIEN   Past Medical History:   Diagnosis Date    Depressive disorder     Previous depressive episodes. Diagnosed with Bipolar illness in 11/18 after manic episode.    Disorders of bursae and tendons in shoulder region, unspecified     Female infertility of unspecified origin     Gallbladder sludge 05/2013    Hypertension 10/18    Mild sleep apnea 05/13/2014    Obesity, unspecified     Oral aphthae     Other acne     Other specified hypoglycemia     PONV (postoperative nausea and vomiting)      Past Surgical History:   Procedure Laterality Date    BIOPSY      age 42 breast biopsy    COLONOSCOPY  2018    COLONOSCOPY N/A 2/13/2024    Procedure: Colonoscopy;  Surgeon: Cheryl Loza MD;  Location:  GI    GYN SURGERY  2016 for ovarian cyst    left ovary, 2 fallopian tubes removed. endometrioma.    HC TOOTH EXTRACTION W/FORCEP  1984/85    [facility]    IR ENDOVENOUS ABLATION VARICOSE VEINS  12/12/2019    LAPAROSCOPIC CHOLECYSTECTOMY  07/29/2013    Procedure: LAPAROSCOPIC CHOLECYSTECTOMY;  Laparoscopic Cholecystectomy;  Surgeon: Fabio Johnson MD;  Location: [facility]    LAPAROSCOPIC OOPHORECTOMY Left 06/23/2016    Procedure: LAPAROSCOPIC OOPHORECTOMY;  Surgeon: Kayley Donnelly MD;  Location: [facility]    LAPAROSCOPIC SALPINGECTOMY Bilateral 06/23/2016    Procedure: LAPAROSCOPIC SALPINGECTOMY;  Surgeon: Kayley Donenlly MD;  Location: [facility] OR    VASCULAR SURGERY  12/12    varicose vein proceedures         Social History     Socioeconomic History    Marital status:      Spouse name: Danielle Zamarripa    Number of children: 1    Years of education: Masters    Highest education level: Not on file   Occupational History    Occupation: Psychotherapist     Employer: [employer]   Tobacco Use    Smoking status: Never    Smokeless tobacco: Never    Tobacco comments:     non smoke home   Vaping Use    Vaping status: Never Used   Substance and Sexual Activity    Alcohol  use: Yes     Comment: 2 Drinks Per Month    Drug use: No    Sexual activity: Yes     Partners: Female     Birth control/protection: None   Other Topics Concern    Parent/sibling w/ CABG, MI or angioplasty before 65F 55M? No   Social History Narrative    8/18/09    Balanced Diet - Yes    Osteoporosis Prevention Measures - Dairy servings per day: 2 and Medication/Supplements (See current meds)    Regular Exercise -  Yes Describe Walking, swimming, biking.     Dental Exam - YES - Date: 5/2009    Eye Exam - YES - Date: 8/2009    Self Breast Exam - No    Abuse: Current or Past (Physical, Sexual or Emotional)- No    Do you feel safe in your environment - Yes    Guns stored in the home - No    Sunscreen used - Yes    Seatbelts used - Yes    Lipids -  YES - Date: 8/10/09    Glucose -  YES - Date: 8/10/09    Colon Cancer Screening - No    Hemoccults - NO    Pap Test -  YES - Date: 7/6/06, no Hx of abnormal paps.    Do you have any concerns about STD's -  No    Mammography - YES - Date: 8/5/09    DEXA - NO    Immunizations reviewed and up to date - Yes, Tdap given 1/22/08    Amara Hall MA                         Social Drivers of Health     Financial Resource Strain: Low Risk  (9/26/2024)    Financial Resource Strain     Within the past 12 months, have you or your family members you live with been unable to get utilities (heat, electricity) when it was really needed?: No   Food Insecurity: Low Risk  (9/26/2024)    Food Insecurity     Within the past 12 months, did you worry that your food would run out before you got money to buy more?: No     Within the past 12 months, did the food you bought just not last and you didn t have money to get more?: No   Transportation Needs: Low Risk  (9/26/2024)    Transportation Needs     Within the past 12 months, has lack of transportation kept you from medical appointments, getting your medicines, non-medical meetings or appointments, work, or from getting things that you need?: No    Physical Activity: Insufficiently Active (9/26/2024)    Exercise Vital Sign     Days of Exercise per Week: 3 days     Minutes of Exercise per Session: 30 min   Stress: Stress Concern Present (9/26/2024)    [employer] - Occupational Stress Questionnaire     Feeling of Stress : To some extent   Social Connections: Unknown (9/26/2024)    Social Connection and Isolation Panel [NHANES]     Frequency of Communication with Friends and Family: Not on file     Frequency of Social Gatherings with Friends and Family: Twice a week     Attends Zoroastrian Services: Not on file     Active Member of Clubs or Organizations: Not on file     Attends Club or Organization Meetings: Not on file     Marital Status: Not on file   Interpersonal Safety: Low Risk  (3/6/2024)    Interpersonal Safety     Do you feel physically and emotionally safe where you currently live?: Yes     Within the past 12 months, have you been hit, slapped, kicked or otherwise physically hurt by someone?: No     Within the past 12 months, have you been humiliated or emotionally abused in other ways by your partner or ex-partner?: No   Housing Stability: Low Risk  (9/26/2024)    Housing Stability     Do you have housing? : Yes     Are you worried about losing your housing?: No     Current Outpatient Medications   Medication Sig Dispense Refill    acetaminophen (TYLENOL) 650 MG CR tablet Take 1 tablet (650 mg) by mouth every 8 hours as needed for mild pain or fever      calcium carbonate (TUMS) 500 MG chewable tablet Take 1 chew tab by mouth 2 times daily      Carboxymethylcellulose Sod PF (REFRESH PLUS) 0.5 % SOLN ophthalmic solution Place 1 drop into both eyes 3 times daily as needed for dry eyes      clotrimazole-betamethasone (LOTRISONE) 1-0.05 % external cream Apply topically 2 times daily 45 g 2    estradiol (ESTRACE) 0.5 MG tablet Take 1 tablet (0.5 mg) by mouth daily. 90 tablet 0    estradiol (VAGIFEM) 10 MCG TABS vaginal tablet  Place 1 tablet (10 mcg) vaginally twice a week. 26 tablet 0    hydrOXYzine HCl (ATARAX) 10 MG tablet Take 1 tablet (10 mg) by mouth nightly as needed (sleep). 30 tablet 2    lamoTRIgine (LAMICTAL) 200 MG tablet Take 2 tablets (400 mg) by mouth daily. 60 tablet 2    lisinopril (ZESTRIL) 10 MG tablet Take 1 tablet (10 mg) by mouth daily. 90 tablet 3    lithium (ESKALITH) 150 MG capsule Take 1 capsule (150 mg) by mouth at bedtime. 30 capsule 2    lithium 300 MG capsule Take 3 tablet (900 mg) and one tablet (150 mg) for a total of 1050 mg at bedtime. 270 capsule 2    Loratadine 10 MG capsule Take 10 mg by mouth daily      medroxyPROGESTERone (PROVERA) 2.5 MG tablet Take 1 tablet (2.5 mg) by mouth daily. 90 tablet 0    metroNIDAZOLE (METROGEL) 1 % external gel Apply topically daily. 60 g 9    ORDER FOR DME Use your CPAP device as directed by your provider.      psyllium (METAMUCIL/KONSYL) 58.6 % powder Take 1 teaspoonful by mouth daily      Semaglutide-Weight Management (WEGOVY) 1 MG/0.5ML pen Inject 1 mg subcutaneously once a week. 6 mL 0    Semaglutide-Weight Management (WEGOVY) 1 MG/0.5ML pen Inject 1 mg subcutaneously once a week. 2 mL 0    VITAMIN D, CHOLECALCIFEROL, PO Take 2,000 Units by mouth daily       BP (!) 140/84 (BP Location: Right arm, Patient Position: Sitting, Cuff Size: Adult Large)   Pulse 64   Resp 16   SpO2 100%     On examination, patient is alert and cooperative.  Vital signs are stable.  She is afebrile.    HEENT is negative.  Extraocular movements are intact.  Face is symmetric.  Tongue is midline.  Neck is supple.    She is able to move her upper extremities functionally.  She is able to carry out straight leg raising test on either side without difficulty.    Josué is positive.  Gaenslen's is positive.  Kennedy's test is positive.  These are bilaterally.  Compression test is positive.    She is also tender over the piriformis bilaterally trochanteric bursa bilaterally.  She is tender over  the lumbar spine right side worse than the left.    She is able to stand.  Romberg is negative.  Gait is otherwise unremarkable.  Strength is full.  Sensation is intact.  Reflexes are symmetric and plantars are downgoing.    Impression: At this point this 59-year-old woman with chronic back pain has both the lumbar spine as well as the tailbone region affected.  She has had previous back injections without the treatment of the tailbone region resulting in persistent pain and incomplete relief.  In terms of treatment I would recommend that we start with treating the SI joints piriformis and trochanteric bursal region followed 2 weeks later by treating the lumbar facets from L4-S1 right side alternating with left-sided weekly intervals.  If she gets good relief from these, she would be a candidate for radiofrequency ablations.    She is continuing to take treatment with the goal being for her weight loss which are recommended will be very helpful for her back as well.    I reviewed this at length with the patient.  We will order physical therapy.  She has already had MRI.  She has had this pain for more than 3 months.  She is not a candidate for NSAIDs due to her lithium use for bipolar disorder.  We reviewed the treatment and plans and answered questions to her satisfaction.    60 minutes visit, greater than 50% was for counseling on above-mentioned issues.    Ang Carmona MD

## 2024-12-19 NOTE — LETTER
12/19/2024       RE: Angy Haji  [address]  [facility] [address]     Dear Colleague,    Thank you for referring your patient, Angy Haji, to the [facility] PHYS[facility]INE [facility]AB[facility]IN[facility] [place] at [facility]. Please see a copy of my visit note below.    CC: I am seeing this patient for evaluation of back and lower extremity pain right side worse than the left.    Patient is a very pleasant 59-year-old  who has been dealing with pain since 2021.  She has been evaluated in sports medicine clinic and has received previous back injections.  These were done to the facets and did not help at that time.  She also has had injections to the knee which she finds helpful.  The most recent 1 helped for a few months.    She feels that when she is active the pain is better.  When she walks on uneven surface such as cobblestones, her pain flares up.  She does have history of obesity and has lost about 40 pounds with treatment.  On a scale of 0-10 she rates her pain currently at a 6 out of 10.  At best it is a 4 and at its worst could be a 7 or 8.  She also notes some numbness in the lateral aspect of the right foot.  Most of the pain is in the lower back and the side of the hips.  Right side is worse.    She has history of bipolar disorder with a manic flare at the time of diagnosis.  She is well treated now with lithium and lamotrigine.  She has not had difficulties tolerating steroids at this point.    She has done physical therapy for her knees and to some extent for the backs and does not feel that is helping.  She has not had physical therapy directly to the back.  Imaging studies are as follows.    MR LUMBAR SPINE W/O CONTRAST 10/5/2021 4:26 PM     History: Low back pain, > 6 wks; sciatica issues for 10-15 years,  right leg weakness, worsening over 3 months; Lumbar pain with  radiation down right leg; Lumbar pain with radiation  down right leg.     ICD-10: Lumbar pain with radiation down right leg; Lumbar pain with  radiation down right leg     Comparison: None     Technique: Sagittal STIR, T1-weighted turbo spin-echo, 3-D volumetric  T2-weighted and axial reconstructed T2-weighted images of the lumbar  spine were obtained without intravenous contrast.      Findings: 5 lumbar-type vertebra counting from C2. Exaggerated lumbar  lordosis. Trace anterolisthesis at L4-5. Minimal bone marrow edema in  the superior anterior endplate of L2. The tip of the conus medullaris  is at L1-2.   No vertebral height loss. No pars defect identified.     On a level by level basis:     L1-2: No significant spinal canal or foraminal narrowing.     L2-3: No significant spinal canal or foraminal narrowing.     L3-4: No significant spinal canal or foraminal narrowing.     L4-5: Anterolisthesis. Bilateral degenerative facet arthropathy.  Resultant mild to moderate spinal canal stenosis and bilateral mild  neural foraminal narrowing greater on the left.      L5-S1: No significant spinal canal or foraminal narrowing.     Visualized retroperitoneal structures are unremarkable.                                                                      Impression:  Anterolisthesis with degenerative facet arthropathy at L4-5 with  resultant mild to moderate spinal canal and bilateral mild neural  foraminal narrowing.     ANTWAN SU MD      1 view pelvis 2 views right hip radiographs 8/27/2021 11:49 AM     History: Trochanteric bursitis, unspecified laterality     Comparison: None available.     Findings:     AP view pelvis, AP, frog leg lateral views of the right hip were  obtained.      No acute osseous abnormality.       No substantial degenerative change of the hip.     Others:     Enthesopathic changes of pelvis and trochanters.     No soft tissue swelling over the right greater trochanter to  radiographically suggest significant bursitis.                                                                        Impression:  1. No acute osseous abnormality.  2. No substantial degenerative change of the hip.  3. Enthesopathic change including at the right greater trochanter.  4. No soft tissue swelling over the right greater trochanter to  radiographically suggest significant bursitis.      GRIS VICTORIA   Past Medical History:   Diagnosis Date     Depressive disorder     Previous depressive episodes. Diagnosed with Bipolar illness in 11/18 after manic episode.     Disorders of bursae and tendons in shoulder region, unspecified      Female infertility of unspecified origin      Gallbladder sludge 05/2013     Hypertension 10/18     Mild sleep apnea 05/13/2014     Obesity, unspecified      Oral aphthae      Other acne      Other specified hypoglycemia      PONV (postoperative nausea and vomiting)      Past Surgical History:   Procedure Laterality Date     BIOPSY      age 42 breast biopsy     COLONOSCOPY  2018     COLONOSCOPY N/A 2/13/2024    Procedure: Colonoscopy;  Surgeon: Cheryl Loza MD;  Location:  GI     GYN SURGERY  2016 for ovarian cyst    left ovary, 2 fallopian tubes removed. endometrioma.     HC TOOTH EXTRACTION W/FORCEP  1984/85    [place] [facility]     IR ENDOVENOUS ABLATION VARICOSE VEINS  12/12/2019     LAPAROSCOPIC CHOLECYSTECTOMY  07/29/2013    Procedure: LAPAROSCOPIC CHOLECYSTECTOMY;  Laparoscopic Cholecystectomy;  Surgeon: Fabio Johnson MD;  Location: [facility] OR     LAPAROSCOPIC OOPHORECTOMY Left 06/23/2016    Procedure: LAPAROSCOPIC OOPHORECTOMY;  Surgeon: Kayley Donnelly MD;  Location: [facility] OR     LAPAROSCOPIC SALPINGECTOMY Bilateral 06/23/2016    Procedure: LAPAROSCOPIC SALPINGECTOMY;  Surgeon: Kayley Donnelly MD;  Location: [facility] OR     VASCULAR SURGERY  12/12    varicose vein proceedures         Social History     Socioeconomic History     Marital status:      Spouse name: Danielle Zamarripa     Number of children: 1     Years of  education: Masters     Highest education level: Not on file   Occupational History     Occupation: Psychotherapist     Employer: [employer]   Tobacco Use     Smoking status: Never     Smokeless tobacco: Never     Tobacco comments:     non smoke home   Vaping Use     Vaping status: Never Used   Substance and Sexual Activity     Alcohol use: Yes     Comment: 2 Drinks Per Month     Drug use: No     Sexual activity: Yes     Partners: Female     Birth control/protection: None   Other Topics Concern     Parent/sibling w/ CABG, MI or angioplasty before 65F 55M? No   Social History Narrative    8/18/09    Balanced Diet - Yes    Osteoporosis Prevention Measures - Dairy servings per day: 2 and Medication/Supplements (See current meds)    Regular Exercise -  Yes Describe Walking, swimming, biking.     Dental Exam - YES - Date: 5/2009    Eye Exam - YES - Date: 8/2009    Self Breast Exam - No    Abuse: Current or Past (Physical, Sexual or Emotional)- No    Do you feel safe in your environment - Yes    Guns stored in the home - No    Sunscreen used - Yes    Seatbelts used - Yes    Lipids -  YES - Date: 8/10/09    Glucose -  YES - Date: 8/10/09    Colon Cancer Screening - No    Hemoccults - NO    Pap Test -  YES - Date: 7/6/06, no Hx of abnormal paps.    Do you have any concerns about STD's -  No    Mammography - YES - Date: 8/5/09    DEXA - NO    Immunizations reviewed and up to date - Yes, Tdap given 1/22/08    Amara Hall MA                         Social Drivers of Health     Financial Resource Strain: Low Risk  (9/26/2024)    Financial Resource Strain      Within the past 12 months, have you or your family members you live with been unable to get utilities (heat, electricity) when it was really needed?: No   Food Insecurity: Low Risk  (9/26/2024)    Food Insecurity      Within the past 12 months, did you worry that your food would run out before you got money to buy more?: No      Within the past  12 months, did the food you bought just not last and you didn t have money to get more?: No   Transportation Needs: Low Risk  (9/26/2024)    Transportation Needs      Within the past 12 months, has lack of transportation kept you from medical appointments, getting your medicines, non-medical meetings or appointments, work, or from getting things that you need?: No   Physical Activity: Insufficiently Active (9/26/2024)    Exercise Vital Sign      Days of Exercise per Week: 3 days      Minutes of Exercise per Session: 30 min   Stress: Stress Concern Present (9/26/2024)    [place] [employer] - Occupational Stress Questionnaire      Feeling of Stress : To some extent   Social Connections: Unknown (9/26/2024)    Social Connection and Isolation Panel [NHANES]      Frequency of Communication with Friends and Family: Not on file      Frequency of Social Gatherings with Friends and Family: Twice a week      Attends [employer] Services: Not on file      Active Member of Clubs or Organizations: Not on file      Attends Club or Organization Meetings: Not on file      Marital Status: Not on file   Interpersonal Safety: Low Risk  (3/6/2024)    Interpersonal Safety      Do you feel physically and emotionally safe where you currently live?: Yes      Within the past 12 months, have you been hit, slapped, kicked or otherwise physically hurt by someone?: No      Within the past 12 months, have you been humiliated or emotionally abused in other ways by your partner or ex-partner?: No   Housing Stability: Low Risk  (9/26/2024)    Housing Stability      Do you have housing? : Yes      Are you worried about losing your housing?: No     Current Outpatient Medications   Medication Sig Dispense Refill     acetaminophen (TYLENOL) 650 MG CR tablet Take 1 tablet (650 mg) by mouth every 8 hours as needed for mild pain or fever       calcium carbonate (TUMS) 500 MG chewable tablet Take 1 chew tab by mouth 2 times daily        Carboxymethylcellulose Sod PF (REFRESH PLUS) 0.5 % SOLN ophthalmic solution Place 1 drop into both eyes 3 times daily as needed for dry eyes       clotrimazole-betamethasone (LOTRISONE) 1-0.05 % external cream Apply topically 2 times daily 45 g 2     estradiol (ESTRACE) 0.5 MG tablet Take 1 tablet (0.5 mg) by mouth daily. 90 tablet 0     estradiol (VAGIFEM) 10 MCG TABS vaginal tablet Place 1 tablet (10 mcg) vaginally twice a week. 26 tablet 0     hydrOXYzine HCl (ATARAX) 10 MG tablet Take 1 tablet (10 mg) by mouth nightly as needed (sleep). 30 tablet 2     lamoTRIgine (LAMICTAL) 200 MG tablet Take 2 tablets (400 mg) by mouth daily. 60 tablet 2     lisinopril (ZESTRIL) 10 MG tablet Take 1 tablet (10 mg) by mouth daily. 90 tablet 3     lithium (ESKALITH) 150 MG capsule Take 1 capsule (150 mg) by mouth at bedtime. 30 capsule 2     lithium 300 MG capsule Take 3 tablet (900 mg) and one tablet (150 mg) for a total of 1050 mg at bedtime. 270 capsule 2     Loratadine 10 MG capsule Take 10 mg by mouth daily       medroxyPROGESTERone (PROVERA) 2.5 MG tablet Take 1 tablet (2.5 mg) by mouth daily. 90 tablet 0     metroNIDAZOLE (METROGEL) 1 % external gel Apply topically daily. 60 g 9     ORDER FOR DME Use your CPAP device as directed by your provider.       psyllium (METAMUCIL/KONSYL) 58.6 % powder Take 1 teaspoonful by mouth daily       Semaglutide-Weight Management (WEGOVY) 1 MG/0.5ML pen Inject 1 mg subcutaneously once a week. 6 mL 0     Semaglutide-Weight Management (WEGOVY) 1 MG/0.5ML pen Inject 1 mg subcutaneously once a week. 2 mL 0     VITAMIN D, CHOLECALCIFEROL, PO Take 2,000 Units by mouth daily       BP (!) 140/84 (BP Location: Right arm, Patient Position: Sitting, Cuff Size: Adult Large)   Pulse 64   Resp 16   SpO2 100%     On examination, patient is alert and cooperative.  Vital signs are stable.  She is afebrile.    HEENT is negative.  Extraocular movements are intact.  Face is symmetric.  Tongue is midline.   Neck is supple.    She is able to move her upper extremities functionally.  She is able to carry out straight leg raising test on either side without difficulty.    Josué is positive.  Gaenslen's is positive.  Kennedy's test is positive.  These are bilaterally.  Compression test is positive.    She is also tender over the piriformis bilaterally trochanteric bursa bilaterally.  She is tender over the lumbar spine right side worse than the left.    She is able to stand.  Romberg is negative.  Gait is otherwise unremarkable.  Strength is full.  Sensation is intact.  Reflexes are symmetric and plantars are downgoing.    Impression: At this point this 59-year-old woman with chronic back pain has both the lumbar spine as well as the tailbone region affected.  She has had previous back injections without the treatment of the tailbone region resulting in persistent pain and incomplete relief.  In terms of treatment I would recommend that we start with treating the SI joints piriformis and trochanteric bursal region followed 2 weeks later by treating the lumbar facets from L4-S1 right side alternating with left-sided weekly intervals.  If she gets good relief from these, she would be a candidate for radiofrequency ablations.    She is continuing to take treatment with the goal being for her weight loss which are recommended will be very helpful for her back as well.    I reviewed this at length with the patient.  We will order physical therapy.  She has already had MRI.  She has had this pain for more than 3 months.  She is not a candidate for NSAIDs due to her lithium use for bipolar disorder.  We reviewed the treatment and plans and answered questions to her satisfaction.    60 minutes visit, greater than 50% was for counseling on above-mentioned issues.    Ang Carmona MD       Again, thank you for allowing me to participate in the care of your patient.      Sincerely,    Ang Carmona MD

## 2024-12-30 ENCOUNTER — TELEPHONE (OUTPATIENT)
Dept: PHYSICAL MEDICINE AND REHAB | Facility: CLINIC | Age: 59
End: 2024-12-30
Payer: COMMERCIAL

## 2024-12-30 NOTE — TELEPHONE ENCOUNTER
Third attempt: Phoned the patient and left VM. Stated to call the pain clinic to schedule injection procedure at [phone].

## 2024-12-31 ENCOUNTER — TELEPHONE (OUTPATIENT)
Dept: OBGYN | Facility: CLINIC | Age: 59
End: 2024-12-31
Payer: COMMERCIAL

## 2024-12-31 DIAGNOSIS — N95.1 VAGINAL DRYNESS, MENOPAUSAL: ICD-10-CM

## 2024-12-31 DIAGNOSIS — N95.1 PERIMENOPAUSAL VASOMOTOR SYMPTOMS: ICD-10-CM

## 2024-12-31 RX ORDER — MEDROXYPROGESTERONE ACETATE 2.5 MG/1
2.5 TABLET ORAL DAILY
Qty: 90 TABLET | Refills: 0 | Status: SHIPPED | OUTPATIENT
Start: 2024-12-31

## 2024-12-31 RX ORDER — ESTRADIOL 0.5 MG/1
0.5 TABLET ORAL DAILY
Qty: 90 TABLET | Refills: 0 | Status: SHIPPED | OUTPATIENT
Start: 2024-12-31

## 2024-12-31 RX ORDER — ESTRADIOL 10 UG/1
10 INSERT VAGINAL
Qty: 26 TABLET | Refills: 0 | Status: SHIPPED | OUTPATIENT
Start: 2025-01-02

## 2024-12-31 NOTE — TELEPHONE ENCOUNTER
[facility] Call Center    Phone Message    May a detailed message be left on voicemail: yes     Reason for Call: Medication Refill Request    Has the patient contacted the pharmacy for the refill? Yes   Name of medication being requested: progesterone and estrogen medication  Provider who prescribed the medication: Dr. Min  Pharmacy: [facility]   Date medication is needed: as soon as possible.     Patient had to reschedule due to the provider being on-call so patient is going to need a refill of those medications until her appointment in march, she is on the wait list as well. Please call her back with any questions.     Action Taken: Other: obgyn    Travel Screening: Not Applicable     Date of Service:

## 2025-01-07 ENCOUNTER — TELEPHONE (OUTPATIENT)
Dept: PHYSICAL MEDICINE AND REHAB | Facility: CLINIC | Age: 60
End: 2025-01-07
Payer: COMMERCIAL

## 2025-01-07 PROBLEM — M70.61 TROCHANTERIC BURSITIS OF BOTH HIPS: Status: ACTIVE | Noted: 2024-12-19

## 2025-01-07 PROBLEM — M79.18 MYALGIA, OTHER SITE: Status: ACTIVE | Noted: 2024-12-19

## 2025-01-07 PROBLEM — M53.3 DISORDER OF SACRUM: Status: ACTIVE | Noted: 2024-12-19

## 2025-01-07 PROBLEM — M47.817 LUMBOSACRAL SPONDYLOSIS WITHOUT MYELOPATHY: Status: ACTIVE | Noted: 2024-12-19

## 2025-01-07 PROBLEM — M70.62 TROCHANTERIC BURSITIS OF BOTH HIPS: Status: ACTIVE | Noted: 2024-12-19

## 2025-01-07 NOTE — TELEPHONE ENCOUNTER
Called patient to schedule procedure with Dr. Carmona    Date of Procedure: 4/14/25, 4/28/25 & 5/12/25    Arrival time given: Yes: Arrival Time 2pm       Procedure Location: [facility]     Verified Location with Patient:  Yes  Address provided to the patient     Pre-op H&P Required:  No: Local anesthesia        Post-Op/Follow Up Appt:  Not Indicated in Request      Informed patient they will need a  to drive them home:  Yes    Patients : Spouse     Patient is aware that pre-op RN from the procedure center will call 2-3 days prior to scheduled procedure to confirm arrival time and review any instructions:  Yes       Additional Comments: N/A        Janina Arriaga MA on 1/7/2025 at 3:55 PM      P: [phone]*

## 2025-01-13 ENCOUNTER — ANCILLARY PROCEDURE (OUTPATIENT)
Dept: MAMMOGRAPHY | Facility: CLINIC | Age: 60
End: 2025-01-13
Attending: FAMILY MEDICINE
Payer: COMMERCIAL

## 2025-01-13 DIAGNOSIS — Z12.31 VISIT FOR SCREENING MAMMOGRAM: ICD-10-CM

## 2025-01-16 ENCOUNTER — NURSE TRIAGE (OUTPATIENT)
Dept: FAMILY MEDICINE | Facility: CLINIC | Age: 60
End: 2025-01-16

## 2025-01-16 NOTE — TELEPHONE ENCOUNTER
Nurse Triage SBAR    Is this a 2nd Level Triage? NO    Situation:  Patient called the clinic and stated that she has been having a cough since Saturday.     Background: She denies any difficulty breathing, chest pain. She has taken two Covid test and both were negative.     Assessment: See below    Protocol Recommended Disposition:   See in Office Today or Tomorrow    Recommendation: Patient has an appointment tomorrow with PCP. She is wondering if it is okay to talk to the provider regarding the cough at the appointment?     Routed to provider    Does the patient meet one of the following criteria for ADS visit consideration? 16+ years old, with an [facility] PCP     TIP  Providers, please consider if this condition is appropriate for management at one of our Acute and Diagnostic Services sites.     If patient is a good candidate, please use dotphrase <dot>triageresponse and select Refer to ADS to document.    Reason for Disposition   Continuous (nonstop) coughing interferes with work or school and no improvement using cough treatment per Care Advice    Additional Information   Negative: Bluish (or gray) lips or face   Negative: SEVERE difficulty breathing (e.g., struggling for each breath, speaks in single words)   Negative: Rapid onset of cough and has hives   Negative: Coughing started suddenly after medicine, an allergic food or bee sting   Negative: Difficulty breathing after exposure to flames, smoke, or fumes   Negative: Sounds like a life-threatening emergency to the triager   Negative: Previous asthma attacks and this feels like asthma attack   Negative: Dry cough (non-productive; no sputum or minimal clear sputum) and within 14 days of COVID-19 Exposure   Negative: MODERATE difficulty breathing (e.g., speaks in phrases, SOB even at rest, pulse 100-120) and still present when not coughing   Negative: Chest pain present when not coughing   Negative: Passed out (e.g., fainted, lost consciousness, blacked out and  "was not responding)   Negative: Patient sounds very sick or weak to the triager   Negative: MILD difficulty breathing (e.g., minimal/no SOB at rest, SOB with walking, pulse <100) and still present when not coughing   Negative: Coughed up > 1 tablespoon (15 ml) blood (Exception: Blood-tinged sputum.)   Negative: Fever > 103 F (39.4 C)   Negative: Fever > 101 F (38.3 C) and over 60 years of age   Negative: Fever > 100 F (37.8 C) and has diabetes mellitus or a weak immune system (e.g., HIV positive, cancer chemotherapy, organ transplant, splenectomy, chronic steroids)   Negative: Fever > 100 F (37.8 C) and bedridden (e.g., CVA, chronic illness, recovering from surgery)   Negative: Increasing ankle swelling   Negative: Wheezing is present   Negative: SEVERE coughing spells (e.g., whooping sound after coughing, vomiting after coughing)   Negative: Coughing up johnny-colored (reddish-brown) or blood-tinged sputum   Negative: Fever present > 3 days (72 hours)   Negative: Fever returns after gone for over 24 hours and symptoms worse or not improved   Negative: Using nasal washes and pain medicine > 24 hours and sinus pain persists   Negative: Known COPD or other severe lung disease (i.e., bronchiectasis, cystic fibrosis, lung surgery) and symptoms getting worse (i.e., increased sputum purulence or amount, increased breathing difficulty)    Answer Assessment - Initial Assessment Questions  1. ONSET: \"When did the cough begin?\"         Saturday    2. SEVERITY: \"How bad is the cough today?\"         Moderate    3. SPUTUM: \"Describe the color of your sputum\" (e.g., none, dry cough; clear, white, yellow, green)        Dry and productive cough; yellow    4. HEMOPTYSIS: \"Are you coughing up any blood?\" If Yes, ask: \"How much?\" (e.g., flecks, streaks, tablespoons, etc.)        No    5. DIFFICULTY BREATHING: \"Are you having difficulty breathing?\" If Yes, ask: \"How bad is it?\" (e.g., mild, moderate, severe)         None    6. FEVER: " "\"Do you have a fever?\" If Yes, ask: \"What is your temperature, how was it measured, and when did it start?\"        No    7. CARDIAC HISTORY: \"Do you have any history of heart disease?\" (e.g., heart attack, congestive heart failure)         See dx list    8. LUNG HISTORY: \"Do you have any history of lung disease?\"  (e.g., pulmonary embolus, asthma, emphysema)        See dx list    9. PE RISK FACTORS: \"Do you have a history of blood clots?\" (or: recent major surgery, recent prolonged travel, bedridden)        No    10. OTHER SYMPTOMS: \"Do you have any other symptoms?\" (e.g., runny nose, wheezing, chest pain)          Runny nose    11. PREGNANCY: \"Is there any chance you are pregnant?\" \"When was your last menstrual period?\"          No    12. TRAVEL: \"Have you traveled out of the country in the last month?\" (e.g., travel history, exposures)          No    Protocols used: Milagros KNOX RN  [facility] Triage Team    "

## 2025-01-20 ENCOUNTER — ANCILLARY PROCEDURE (OUTPATIENT)
Dept: MAMMOGRAPHY | Facility: CLINIC | Age: 60
End: 2025-01-20
Attending: FAMILY MEDICINE
Payer: COMMERCIAL

## 2025-01-20 DIAGNOSIS — Z12.31 VISIT FOR SCREENING MAMMOGRAM: ICD-10-CM

## 2025-01-20 PROCEDURE — 77063 BREAST TOMOSYNTHESIS BI: CPT | Mod: GC | Performed by: RADIOLOGY

## 2025-01-20 PROCEDURE — 77067 SCR MAMMO BI INCL CAD: CPT | Mod: GC | Performed by: RADIOLOGY

## 2025-01-28 ENCOUNTER — OFFICE VISIT (OUTPATIENT)
Dept: PSYCHIATRY | Facility: CLINIC | Age: 60
End: 2025-01-28
Attending: PSYCHIATRY & NEUROLOGY
Payer: COMMERCIAL

## 2025-01-28 VITALS
WEIGHT: 264.8 LBS | HEART RATE: 74 BPM | DIASTOLIC BLOOD PRESSURE: 81 MMHG | SYSTOLIC BLOOD PRESSURE: 144 MMHG | BODY MASS INDEX: 40.56 KG/M2

## 2025-01-28 DIAGNOSIS — Z79.899 ENCOUNTER FOR LONG-TERM (CURRENT) USE OF MEDICATIONS: Primary | ICD-10-CM

## 2025-01-28 DIAGNOSIS — F31.9 BIPOLAR I DISORDER (H): ICD-10-CM

## 2025-01-28 PROCEDURE — 99214 OFFICE O/P EST MOD 30 MIN: CPT | Performed by: STUDENT IN AN ORGANIZED HEALTH CARE EDUCATION/TRAINING PROGRAM

## 2025-01-28 RX ORDER — LITHIUM CARBONATE 300 MG/1
CAPSULE ORAL
Qty: 270 CAPSULE | Refills: 2 | Status: SHIPPED | OUTPATIENT
Start: 2025-01-28

## 2025-01-28 RX ORDER — LITHIUM CARBONATE 150 MG/1
150 CAPSULE ORAL AT BEDTIME
Qty: 30 CAPSULE | Refills: 2 | Status: SHIPPED | OUTPATIENT
Start: 2025-01-28

## 2025-01-28 RX ORDER — LAMOTRIGINE 200 MG/1
400 TABLET ORAL DAILY
Qty: 60 TABLET | Refills: 2 | Status: SHIPPED | OUTPATIENT
Start: 2025-01-28

## 2025-01-28 ASSESSMENT — PAIN SCALES - GENERAL: PAINLEVEL_OUTOF10: NO PAIN (0)

## 2025-01-28 NOTE — PROGRESS NOTES
[facility]  MEDICAL PROGRESS NOTE     CARE TEAM:    PCP- Mikey Cortés     Therapist- individual therapist Elfego Chan and another one for couple therapy    Angy is a 59 year old who uses the pronouns she, her, hers.      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 The holidays were not easy for her, but her daughter came from [place] to spend zuri with her.  She feels tired because she has more on her plate. She needs to take care of her dog, the house, and the bills. She is also realizing that she is forgetting some bills. When her ex partner was with her, they need to split the tasks. She didn't use to take care of the house bills ever since she was hospitalized, so so much of this is new to her. She also mentioned that she can feel so tired that she doesn't need to take the hydroxyzine at bedtime. She would like to to a MOCCA. She had one done in 2023. Writer agreed with her even though that sense of forgetfulness can be multifactorial, especially that she has more tasks to keep track on.     She is also due for lithium labs so I ordered them. No medication changes were recommended. She will also taper her HRT off, so this will likely to affect Lamictal. She denied suicidal and homicidal thoughts.      .     Psychotropic Drug Interactions:  [PSYCHCLINICDDI]  Estrogen Derivatives may decrease the serum concentration of LamoTRIgine  Angiotensin-Converting Enzyme Inhibitors may increase the serum concentration of Lithium.   LamoTRIgine may decrease the serum concentration of Progestins.      Management: routine monitoring    [place] was not checked today: not using controlled substances    Risk  "Statements:   Treatment Risk- Risks, benefits, alternatives and potential adverse effects have been discussed and are understood.   Safety Risk-Angy did not appear to be an imminent safety risk to self or others.     Plan     1) Medications:    - Continue Lithium 1050 mg at bedtime  - Continue with Lamictal 400 mg at bedtime   - continue with hydroxyzine 10 mg PRN at bedtime   - Continue with melatonin 1mg to 3mg at bedtime as needed for insomnia    2) Psychotherapy: Continue with therapy    3) Next due:  Labs- 01/2025  EKG- . Last one was on 03/2024 with   Rating scales-  PHQ9    4) Referrals: none  N/A    5) Other: none    6) Follow-up: Return to clinic in 6 weeks     Pertinent Background                                                   [most recent eval 08/31/23]     Angy reported that she didn't have symptoms until November 2018. At which point she was hospitalized for anthony.     This section was copied forward from 08/2021 transfer notes.  \"Patient has not had significant mental health history until 2018, when she developed perimenopausal mood and anxiety symptoms.  She was subsequently referred to our clinic following a hospitalization for anthony in November 2018.  Her care has been complicated by sensitivity to antipsychotics (EPSE including difficulty swallowing), thrombocytopenia secondary to Depakote, lithium toxicity in setting of concurrent Depakote use\"     Pertinent items include: anthony , severe med reaction [described as H/o Thrombocytopenia secondary to Depakote, and H/o lithium toxicity], and psych hosp      Subjective     Angy reported no headache and dizziness  She said her stress level is pretty high  She added that some things are easier, some are not, this is regarding to life post separation.    Tyler was visiting for the holidays.   She is concerned about her concentration, remembering things  She would like to have a MOCCA done.   \"I kept losing stuffs, can figure out the math about " "finance bills or the house.\"   She is feeling less despair about divorce, but overwhelmed with finance. She sometimes miss some bills.     She also need to take care of the dog, clean the house, take care of herself, so she feels tired.   She feels that it is a lot of work.    She is getting out a couple of time a week to do something social with me  She continues to get twice at night, but she gets around 7 to 8 hours of sleep.   Trying to take hydroxyzine PRN. She takes it everyday except Saturday,     Now she is so tired at night, that she feel her body falls a sleep without taking hydroxyzine.   No manic symptoms, but she needed to work more when her family was coming over.   She needed to take care of the house.     She describes her mood as overwhelmed, and anxious.   She is grieving, and can feel irritated.   It was hard during zuri.   It was the first  zuri without her family even though Tyler was here  Less devastated and annoyed.   MOCA next appointment.     Appetite: she said that she is doing stress eating. She feel that she has lower appetite from wegovy early during the day, but it increases in the afternoon.       Mental Status Exam     Alertness: alert  and oriented  Appearance: adequately groomed  Behavior/Demeanor: cooperative and pleasant, with good  eye contact   Speech: normal and regular rate and rhythm  Language: intact  Psychomotor: normal or unremarkable  Mood:  \"overwhelmed, and anxious\"  Affect: full range; congruent to: mood- yes, content- yes  Thought Process/Associations: unremarkable  Thought Content:  Reports none;  Denies suicidal & violent ideation and delusions  Perception:  Reports none;  Denies auditory hallucinations and visual hallucinations  Insight: good  Judgment: good  Cognition: does  appear grossly intact; formal cognitive testing was not done  Gait and Station: unremarkable     Past Psych Med Trials      Medication Max Dose (mg) Dates / Duration Helpful? DC " Reason / Adverse Effects?   Depakote    Tremors   sertraline       olanzapine    EPSE   Seroquel    EPSE   lithium 1200   Tremor at 1200 mg had to lower the dose   Wellbutrin 300 15 years ago     Lamictal 400  yes       Treatment Course and Resendiz Events since  JULY 2023 08/2023 : no medication changes  11/2023 no medication changes  01/2023 no medication changes  03/2024 no medication changes  04/2024 no medication changes  06/2024 Increase Lamictal to 400 mg  07/2024 no medication change  08/2024 no medication changes  09/2024 no medication changes  11/2024 no medication changes but encourage to use hydroxyzine as needed and melatonin 1 to 2 mg to limit sedation.   12/2024 no medication changes  01/2024 no medication changes     Vitals   BP (!) 144/81   Pulse 74   Wt 120.1 kg (264 lb 12.8 oz)   BMI 40.56 kg/m    Pulse Readings from Last 3 Encounters:   01/28/25 74   01/17/25 63   12/19/24 64     Wt Readings from Last 3 Encounters:   01/28/25 120.1 kg (264 lb 12.8 oz)   01/17/25 119.3 kg (263 lb)   12/17/24 121.2 kg (267 lb 4.8 oz)     BP Readings from Last 3 Encounters:   01/28/25 (!) 144/81   01/17/25 135/85   12/19/24 (!) 140/84      Medical History     ALLERGIES: Seroquel [quetiapine], Olanzapine, Penicillins, Risperidone, and Thimerosal    Patient Active Problem List   Diagnosis    Oral aphthae    CARDIOVASCULAR SCREENING; LDL GOAL LESS THAN 160    Rosacea    Raynaud disease    Vitamin D deficiency disease    MARLA (obstructive sleep apnea)    Atopic rhinitis    Chronic bilateral low back pain without sciatica    Obesity, Class III, BMI 40-49.9 (morbid obesity) (H)    Muscle tension dysphonia    Palpitations    Dysphagia, unspecified type    Anxiety    Benign essential hypertension    Callus of foot    Varicose veins of right lower extremity with edema    Bipolar I disorder (H)    Dribbling of urine    Bilateral low back pain with right-sided sciatica    Spinal stenosis at L4-L5 level    Facet arthropathy,  lumbar    Acute pain of right knee    Unspecified inflammatory spondylopathy, lumbar region    Exertional angina    Disorder of sacrum    Myalgia, other site    Trochanteric bursitis of both hips    Lumbosacral spondylosis without myelopathy        Medications     Current Outpatient Medications   Medication Sig Dispense Refill    acetaminophen (TYLENOL) 650 MG CR tablet Take 1 tablet (650 mg) by mouth every 8 hours as needed for mild pain or fever      albuterol (PROAIR HFA/PROVENTIL HFA/VENTOLIN HFA) 108 (90 Base) MCG/ACT inhaler Inhale 2 puffs into the lungs every 6 hours as needed for shortness of breath, wheezing or cough. 18 g 0    calcium carbonate (TUMS) 500 MG chewable tablet Take 1 chew tab by mouth 2 times daily      Carboxymethylcellulose Sod PF (REFRESH PLUS) 0.5 % SOLN ophthalmic solution Place 1 drop into both eyes 3 times daily as needed for dry eyes      clotrimazole-betamethasone (LOTRISONE) 1-0.05 % external cream Apply topically 2 times daily 45 g 2    doxycycline hyclate (VIBRAMYCIN) 100 MG capsule Take 1 capsule (100 mg) by mouth 2 times daily. 14 capsule 0    estradiol (ESTRACE) 0.5 MG tablet Take 1 tablet (0.5 mg) by mouth daily. 90 tablet 0    estradiol (VAGIFEM) 10 MCG TABS vaginal tablet Place 1 tablet (10 mcg) vaginally twice a week. 26 tablet 0    hydrOXYzine HCl (ATARAX) 10 MG tablet Take 1 tablet (10 mg) by mouth nightly as needed (sleep). 30 tablet 2    lamoTRIgine (LAMICTAL) 200 MG tablet Take 2 tablets (400 mg) by mouth daily. 60 tablet 2    lisinopril (ZESTRIL) 10 MG tablet Take 1 tablet (10 mg) by mouth daily. 90 tablet 3    lithium (ESKALITH) 150 MG capsule Take 1 capsule (150 mg) by mouth at bedtime. 30 capsule 2    lithium 300 MG capsule Take 3 tablet (900 mg) and one tablet (150 mg) for a total of 1050 mg at bedtime. 270 capsule 2    Loratadine 10 MG capsule Take 10 mg by mouth daily      medroxyPROGESTERone (PROVERA) 2.5 MG tablet Take 1 tablet (2.5 mg) by mouth daily. 90  tablet 0    metroNIDAZOLE (METROGEL) 1 % external gel Apply topically daily. 60 g 9    ORDER FOR DME Use your CPAP device as directed by your provider.      psyllium (METAMUCIL/KONSYL) 58.6 % powder Take 1 teaspoonful by mouth daily      Semaglutide-Weight Management (WEGOVY) 1 MG/0.5ML pen Inject 1 mg subcutaneously once a week. 6 mL 0    Semaglutide-Weight Management (WEGOVY) 1 MG/0.5ML pen Inject 1 mg subcutaneously once a week. 2 mL 0    tirzepatide-Weight Management (ZEPBOUND) 10 MG/0.5ML prefilled pen Inject 0.5 mLs (10 mg) subcutaneously every 7 days. 2 mL 2    VITAMIN D, CHOLECALCIFEROL, PO Take 2,000 Units by mouth daily          Labs and Data         7/26/2024    10:57 AM 11/4/2024    12:47 PM 12/10/2024     2:54 PM   PROMIS-10 Total Score w/o Sub Scores   PROMIS TOTAL - SUBSCORES 26 25  28        Patient-reported          No data to display                  11/4/2024    12:45 PM 12/17/2024     7:52 AM 1/28/2025     7:55 AM   PHQ-9 SCORE   PHQ-9 Total Score MyChart 8 (Mild depression) 8 (Mild depression) 6 (Mild depression)   PHQ-9 Total Score 8  8  6        Patient-reported         7/26/2024    10:56 AM 11/4/2024    12:46 PM 12/10/2024     3:01 PM   JOANNA-7 SCORE   Total Score 8 (mild anxiety) 11 (moderate anxiety) 15 (severe anxiety)   Total Score 8 11  15        Patient-reported       Liver/Kidney Function, TSH Metabolic Blood counts   Recent Labs   Lab Test 07/11/24  0809 02/28/24  0845   AST  --  21   ALT  --  15   ALKPHOS  --  91   CR 0.63 0.63     Recent Labs   Lab Test 07/11/24  0809   TSH 2.43    Recent Labs   Lab Test 09/27/24  0740   CHOL 168   TRIG 136   LDL 87   HDL 54     Recent Labs   Lab Test 09/27/24  0740   A1C 5.1     Recent Labs   Lab Test 07/11/24  0809   GLC 88    Recent Labs   Lab Test 09/27/24  0740   WBC 6.4   HGB 13.7   HCT 41.6   MCV 90            Recent Labs   Lab Test 07/11/24  0809 11/08/23  1016 05/15/23  1017   LITHIUM 1.00 0.69 0.7        PROVIDER: Antonio Santillan MD  [facility]    Level of Medical Decision Making:   - At least 1 chronic problem that is not stable  - Engaged in prescription drug management during visit (discussed any medication benefits, side effects, alternatives, etc.)         Patient was not staffed in clinic.  Supervisor is Dr. Vivian Burnett.      The longitudinal plan of care for Bipolar disorder was addressed during this visit. Due to the added complexity in care, I will continue to support Angy in the subsequent management of this condition(s) and with the ongoing continuity of care of this condition(s)

## 2025-02-04 NOTE — PROGRESS NOTES
Virtual Visit Details    Type of service:  Video Visit     Originating Location (pt. Location): Other work setting     Distant Location (provider location):  On-site  Platform used for Video Visit: Well     NUTRITION REASSESSMENT NOTE   REASON FOR ASSESSMENT  Angy LILIAN Haji referred by Dr. Cortés for MNT related to  Obesity, Class III, BMI 40-49.9 (morbid obesity) (H) [E66.01]          Patient accompanied by self      NUTRITION HISTORY  Patient continues with multiple life changes and stressors.  Patient aware that she likes to self soothe with ice cream which she has recently increased.  Patient is aware that social cues trigger her to eat. Patient's spouse recently had weight loss surgery so there is adjust with eating in the home.      3/7/2022 Self soothe in evening snack-made tea or tells self ate late dinner and does not need snack.  Patient asking self if physically hungry.  Patient drinking fizzy water or malt o meal as evening snack.  Patient aware she has been stress eating.  Tracking when not having dessert -identifying why eating  Exercise-cross country skiing; mall walk; snow shoeing; treadmill 2 times haring treadmill   Yogurt; cheese and crackers; cereal      Patient wants to increase exercise time as will be going to [place] weekend ([place]) [facility]      4/25/2002 Patient able to get 45-60 minutes exercise on weekends and 20-30 minutes of exercise daily.  Patient wants to lose 5 lbs prior to trip to [place].  Patient was able to have 3 days no desserts and then 1 week no dessert. 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  Patient determined ways she could lose weight: Dessert 2 times per week, add treadmill, eliminate morning snack, 1 snack at 3 PM. Alcohol 1 time per week, no caramel lattes, food is fuel, it's too late to be hungry, spouse brings sweet treats without asking, tea/bath/shower      6/15/2022 Patient skips dessert if eats  late dinner.  Patient felt discouraged with weight loss as wanted to lose more weight (down 3.5 lbs). Patient wants weight loss for functional reasons.  Patient signed [place] for the [place].  Patient has increased cravings and thoughts about ice cream.  Shift in relationship with spouse after spouse's weight loss surgery.      7/27/2022 Patient increased exercise-[place] with [employer]; started the last week in June for bike rides (4 rides different times in 2 weeks-9 miles).  Rode in 18 mile race.  Went hiking on vacation[place] [employer].     No dessert (hit or miss) 50% 3 days no dessert; ice cream and s'mores on vacation     Typical intake:  Greek yogurt + 1/4 cup granola + fruit + 1/2 and 1/2 with coffee or english muffin with peanut butter/butter and strawberry jam or eggs scrambled or hard boiled  Leftovers -1 cup cherries; cherry tomatoes; 1/2 cup pea pods + 3 oz pork tenderloin (teryaki)   Watermelon, pork tenderloin, 1 cup rice + tomatoes/pea pods; cheeseburger with whole wheat bun + vegetables + watermelon   Snacks: croissant ([place]) Restoration oat squares (1 cup dry) (3:00 PM) cheese and crackers    Beverages: coffee; water; fizzy water      Emotional eating at night-ice cream      9/21/2022 Patient has been able to bike 2 times per week.  Patient's weight has been stable.  Patient has not been able to track dinner and snacks.  Stress eating ebbs and flows.  Trying to save dessert for the weekend or may self soothe during th week with ice cream.  Patient stress eating in the evening and may boredom eat prior to lunch (cereal).    Weight: 308.4 lbs       11/16/2022 Biking 2 times per week. Tour biking race: [place] 14 miles (rode by herself). [facility] 12 mile [place]illy bike ride. (rode with friend).  Patient has consistently walked dog and completes PT exercises.  Patient determining plan for winter exercise.    314.4 lbs (food and sleep changes) Patient  was eating more sweets as patient's wife baked sweets this fall (3-4 weeks with increase of sweets).  Holiday baking plan consists of 5 cookies and 1 candy (peanut brittle).  Ice cream consumption has increased recently.      1/23/2023 Patient was able to bake holiday cookies and freeze them.  Patient has been eating desserts 5 days a week.  Patient has been substituting greek yogurt (flavored or vanilla) or cream of wheat for dessert.  Patient was able to snow shoe.  Patient having barrier with getting on treadmill.  Patient's weight has fluctuated 325.8 and now 316 lbs.  Patient has been working on sleep routine (classical music; reading on paper vs ipad).      3/17/2023 Patient having knee pain and went to orthopedic MD and got cortizone shot in knee and able to walk know. Walked dog 3 times this week. Swimming -[facility] (6 sameer pool). Tyler wants to swim with goal of swimming 2 times per week.  Patient states ice cream and dessert are hit or miss. Patient shifting mindset to days having dessert vs not having dessert. Patient has not been as mindful with practicing self soothe at night.  Has tried tea and engaging book.     5/3/2023 PT exercises for knee; walking 15-30 minutes per day with dog + swimming 1 time per week (9 laps); 5 days per week desserts -patient using self talk to reduce dessert intake. Patient trying to limit desserts.  Patient's wife brining bread and sweets into home in which patient feels compels to eat as not to waste food.  Patient wants to be aware of foods when dining out and would like to split meal with spouse. Patient using zinger tea to soothe at night.       6/28/2023 No dessert 1-2 days; vacation and party so feels increase in desserts.  Patient getting more movement at work by taking stairs at work and size of building as no longer working remote.  Going to PT; less dog walks due to driving to work; found new lunch place to eat lunch at work (3-4 days per week-20  minute walk) -reading book and eating lunch; aims for hour lunch -reading for self care      8/9/2023 Patient is being intentional for choosing desserts.  Skipped ice cream since had late dinner.  Grabbed snack when dinner wasn't ready prior to function and ate later-took cheese and fruit then ate chicken kebab onion, bell pepper, chicken -316 lbs currently.  Was lower weight last week. Patient trying to balance stress in life.      10/4/2023 Patient having 1 night per week not having dessert.  Patient feels like eating ice cream when alone at home while watching a favorite show and feels more indulgent.  Patient did not participate in [employer] as hoped.  Patient will be participating in [employer] this weekend.  Patient continues to walk dog daily.  Patient also continues with increased life stressors.  Reading in the evening + fizzy water.      12/13/2023 Patient with reduction in weight trends: weight 307.4 lbs and 309 lbs.  Patient had stressful family situation which caused drop in appetite (past 2-3 weeks).  Patient states was not using food as coping strategy. Patient having dessert 1-6 days per week with most desserts  3-4 desserts per week -writes reason why no dessert-sets goals in advance of no dessert; no dessert for today or too late to eat dessert; food trigger from spouse (will see eating popcorn and will then eat it if she is -likes caramel corn); self care-journaling as needed; playing piano for fun 3 times per week; movement -walking dog + biking ([place]) + hour walk      1/24/2024 Patient working on self care at night- reading, limited TV watching, playing piano in the evening (5-10 minutes); desserts -aiming for 4 per week 3-5-1-1-3. Patient choose Malt-O meal as replacement for dessert; 304-312 lbs; less cookie baking during the holidays; exercise reduced with cold weather (walking outside)-treadmill; stairs; no pool. Patient working on exercise plan.       4/17/2024  Patient with extra stress in life which has reduced desire to eat.  Patient working on increasing exercise intensity for upcoming trip.Treadmill-working on distance and incline 4 days per week 15-46 minutes + additional walking. Patient working on self soothe options besides food. Patient plans to start weight loss medication and will be working with pharmacist.  Patient also planning to meet with weight loss clinic.      7/10/2024 Patient focusing on adding movement-treadmill; lunchtime walking 20-30 minutes; biking everything other week 8-16 miles. Patient started Wegovy-283.4 lbs. Patient losing 1-2 lbs per week. Symptoms- no appetite, nausea, acid reflux -spicy, constipation      Greek yogurt or eggs or peanut butter on english muffin (3 T)     9/30/2024 Patient has lost 34 lbs. Wegovy 1 mg (3rd month) Patient states appetite low. Patient eating regular meals. Patient has only forgotten to eat on occasion. Patient aware needs to prioritize eating. Patient with back pain walking at slow speeds. Patient with mild nausea and GI distress. Patient working on self care-getting outside; reading; time with friends. Patient will stress eat-ice cream.      Split pea and ham soup; salad with veggies   Cream of wheat and yogurt    Burgers or chicken or BLT      11/11/2024 Patient eating desserts daily-ice cream. Patient scheduling meals as sometimes forgetting to eat. Patient may have constipation or diarrhea. Unable to tolerate 1/2 dose Miralax daily. Patient with reduced appetite on Wegovy and feels gibbs faster so eating 1/2 portions. Holidays-prioritize dessert (watch portions) and choose 3 cookies to bake for [place].       Greek yogurt or english muffin  Pork tenderloin + cutie + pretzels   Chicken stir gomez     2/5/2025 Patient working on self care in the evening (watching series, resumed herbal tea or hot water, limited holidays treats, dog training 10 minutes). Cooking turkey garvni bean chili; spicy tomato white  "bean soup; rotissers chicken nachos; artichoke dip - peppers, broccoli, carrot chips with chips on side; gluten free pizza frozen; 3 weeks of Wegovy left -eating more slowly; dining out lasting 2-3 meals; reducing appetite -forgot to eat breakfast; feels full quickly; dry mouth; less hungry; movement-goal PT exercises 5 of 7 days; walking dog outside       Frittata   Chicken thighs + wild rice       Turkey garvin bean chili      DIET RECALL   Breakfast: cereal and banana; yogurt + granola (measures), fruit + coffee; 2 slices toast with butter + 1/2 cup cottage cheese    Lunch: leftovers (includes fruit and vegetable daily)  Snack: fruit or cheese with lower salt nut thin crackers   Dinner: Tuna steak, Asian marinade, stir gomez, baked vegetable + brown rice (3/4 cup) + canned peaches; chicken thighs with beans and rice; sheet pan dinner with chicken and vegetables or grilled salmon and grilled vegetables; taco; protein + fruit and vegetable  Snacks: fruit, string cheese, Ritz crackers or yogurt; dessert in the evening (ice cream recently)  Beverages: mainly water, coffee, mineral water  Dining out: none currently  Exercise: Patient exercising 3-4 times per week. (walking and walking dog).      MEDICATIONS:  Reviewed      ANTHROPOMETRICS  Height: 5'8\" (note height change in chart previously 5'9\")  Weight: 261.9 lbs per patient    BMI (kg/m2): 39.8 kg/m2   %IBW: 187%  Weight History:   Wt Readings from Last 10 Encounters:   02/05/25 118.8 kg (261 lb 14.4 oz)   01/17/25 119.3 kg (263 lb)   12/11/24 120 kg (264 lb 9.6 oz)   10/22/24 122 kg (269 lb)   09/27/24 123.4 kg (272 lb)   07/18/24 127.1 kg (280 lb 4.8 oz)   07/10/24 128.5 kg (283 lb 6.4 oz)   04/19/24 137 kg (302 lb)   04/18/24 136.2 kg (300 lb 3.2 oz)   04/17/24 136.5 kg (301 lb)               ASSESSED NUTRITION NEEDS  Estimated Energy Needs: 8585-6385 kcals (15-20 kcals/kg) for gradual weight loss   Estimated Protein Needs: 66-79 (1-1.2 gm/kg IBW) for repletion " with exercise  RMR: 1440     EVALUATION/PROGRESS TOWARDS GOALS   Previous goals:  Self care between 8:00-9:00 4 days per week -met  Plan 1-2 meals per week for leftovers and simple sides -met     Previous Nutrition Diagnosis: Obesity related to excess energy intake as evidenced by BMI of 41.1 kg/m2-no change     Current Nutrition Diagnosis:Obesity related to excess energy intake as evidenced by BMI of 39.8 kg/m2     INTERVENTIONS  Nutrition Prescription   Recommend exercise 5 times per week; track intake if eating mindlessly; increase fiber to 25-30 grams per day      Implementation:   Meals and Snacks: Continue eating 3 meals + deliberate snacking (if hungry)   Nutrition Education (Content):              a)  Discussed progress towards goals. Commended patient for awareness of eating patterns. Reviewed current food and eating behavior challenges. Discussed protein options. Continued discussion around self care as way to improve emotional eating. Discussed weight loss medication and current symptoms. Suggest continuing 1/2 dose Miralax daily. Patient will be no longer able to receive GLP-1 due to non formulary product. Supported patient in her struggle with food and weight.   Nutrition Education (Application):              a) Discussed meal prep options.                b) Patient verbalized understanding of diet by stating continue self soothe options.    Expected patient engagement: good     CURRENT GOALS   Self care between 8:00-9:00 PM 5 days per week   Make plan Wegovy titration   Restart movement      FOLLOW UP/MONITORING    Patient to follow up in 8 weeks   Patient has RD contact information      Time spent with patient: 35 minutes     Mattie Amaya, RD, LD  [facility] Outpatient Dietitian  [phone] (office phone)

## 2025-02-05 ENCOUNTER — HOSPITAL ENCOUNTER (OUTPATIENT)
Dept: NUTRITION | Facility: CLINIC | Age: 60
Discharge: HOME | End: 2025-02-05
Payer: COMMERCIAL

## 2025-02-05 VITALS — BODY MASS INDEX: 40.12 KG/M2 | WEIGHT: 261.9 LBS

## 2025-02-05 PROCEDURE — 97803 MED NUTRITION INDIV SUBSEQ: CPT | Mod: GT,95 | Performed by: DIETITIAN, REGISTERED

## 2025-02-17 ENCOUNTER — THERAPY VISIT (OUTPATIENT)
Dept: PHYSICAL THERAPY | Facility: CLINIC | Age: 60
End: 2025-02-17
Payer: COMMERCIAL

## 2025-02-17 DIAGNOSIS — M54.41 BILATERAL LOW BACK PAIN WITH RIGHT-SIDED SCIATICA, UNSPECIFIED CHRONICITY: Primary | ICD-10-CM

## 2025-02-17 DIAGNOSIS — M25.561 ACUTE PAIN OF RIGHT KNEE: ICD-10-CM

## 2025-02-17 PROCEDURE — 97110 THERAPEUTIC EXERCISES: CPT | Mod: GP

## 2025-02-17 PROCEDURE — 97530 THERAPEUTIC ACTIVITIES: CPT | Mod: GP

## 2025-02-17 NOTE — PROGRESS NOTES
02/17/25 0500   Appointment Info   Signing clinician's name / credentials Chantel Cagle, PT, DPT, OCS   Total/Authorized Visits E&T   Visits Used 7   Medical Diagnosis Acute pain of right knee; Low back pain   PT Tx Diagnosis Right knee pain; low back pain   Progress Note/Certification   Onset of illness/injury or Date of Surgery 07/26/24  (Referral date)   Therapy Frequency 1x/week, decreasing to every other as appropriate   Predicted Duration 12 weeks   Progress Note Completed Date 02/17/25   GOALS   PT Goals 2;3   PT Goal 1   Goal Identifier Sleep   Goal Description Pt will be able to sleep 6/7 nights of the week without being interrupted by sleep in order to improve sleep hygiene   Rationale to maximize safety and independence with self cares   Goal Progress improving   Target Date 04/28/25   PT Goal 2   Goal Identifier Stairs   Goal Description Pt will be able to ascend/descend at least 1 set of stairs without a significant increase in pain in order to improve function in home   Rationale to maximize safety and independence with performance of ADLs and functional tasks;to maximize safety and independence within the home;to maximize safety and independence within the community   Goal Progress improving   Target Date 04/28/25   PT Goal 3   Goal Identifier Lifting/bending   Goal Description Pt will be able to lift at least 10 pounds from ground with good mechanics and without a significant increase in pain in order to improve QOL   Rationale to maximize safety and independence with self cares;to maximize safety and independence within the home   Target Date 04/28/25   Subjective Report   Subjective Report Reports that things were feeling pretty well, then took about 3 weeks off of her exercises pain came back. Has started to get back into her routine again. Has some pain behind the R knee again and LBP bothering sleep again. Not as bad as it was, but about 6/10. Has not gotten back to standing exercises yet.  Pain getting in and out of low set car behind the R knee. Low seats are uncomfortable as well. Going down stairs is ok - some pain but not as much.   Objective Measures   Objective Measures Objective Measure 1   Objective Measure 1   Objective Measure sit to stand   Details improved with L leg back   Treatment Interventions (PT)   Interventions Therapeutic Procedure/Exercise;Therapeutic Activity;Neuromuscular Re-education   Therapeutic Procedure/Exercise   Therapeutic Procedures: strength, endurance, ROM, flexibility minutes (64637) 19   PTRx Ther Proc 1 Standing Extension   PTRx Ther Proc 1 - Details VR   PTRx Ther Proc 2 Standing Hip Flexion Straight Leg Raise   PTRx Ther Proc 2 - Details VR   PTRx Ther Proc 3 Posterior Pelvic Tilt   PTRx Ther Proc 3 - Details in clinic with VR   PTRx Ther Proc 4 Hip Flexion Straight Leg Raise   PTRx Ther Proc 4 - Details hep for strengthening   PTRx Ther Proc 5 Ball Exercise Seated Pelvic Tilt   PTRx Ther Proc 5 - Details hep for neutral position   PTRx Ther Proc 6 Isometric Hamstring   PTRx Ther Proc 6 - Details 5 x 5-10 sec hold on R side in clinic with patient ed on pain relief   PTRx Ther Proc 7 Supine Abdominal Exercise #3 (Marching)   PTRx Ther Proc 7 - Details VR in clinic   PTRx Ther Proc 8 Bridging   PTRx Ther Proc 8 - Details x 10 in clinic with VC for set-up   PTRx Ther Proc 9 Front Knee Strengthening   PTRx Ther Proc 9 - Details hep for strengthening VR in clinic   PTRx Ther Proc 10 Sidestep   PTRx Ther Proc 10 - Details VR   PTRx Ther Proc 11 Standing Fire Hydrant   PTRx Ther Proc 11 - Details x 10 each side in clinic with demo for set-up   Skilled Intervention see above   Patient Response/Progress patient reports understanding and tolerated well   Therapeutic Activity   Therapeutic Activities: dynamic activities to improve functional performance minutes (01298) 15   Ther Act 1 patient ed   Ther Act 1 - Details patient ed in clinic with sleeping positions and  prolonged positioning - exercises to help reduce pain in the moment; patient ed on upcoming injections/possible ablation   PTRx Ther Act 1 Education Sheet General   PTRx Ther Act 1 - Details patient ed in clinic sleeping positions and trial in clinic   PTRx Ther Act 2 Sit to Stand   PTRx Ther Act 2 - Details VR and trial in clinic   Skilled Intervention see above   Patient Response/Progress patient reports understanding   Neuromuscular Re-education   Neuromuscular re-ed of mvmt, balance, coord, kinesthetic sense, posture, proprioception minutes (88117) 3   PTRx Neuro Re-ed 1 Pallof Press   PTRx Neuro Re-ed 1 - Details x 10 each direction in clinic with demo for set-up cable machine   Skilled Intervention see above   Patient Response/Progress patient tolerated well   Education   Learner/Method Patient   Plan   Home program See PTRX - phone   Updates to plan of care see PN   Plan for next session progress quad, hip, and core strength, see how R knee feeling   Total Session Time   Timed Code Treatment Minutes 37   Total Treatment Time (sum of timed and untimed services) 37       PLAN  Continue therapy per current plan of care.    Beginning/End Dates of Progress Note Reporting Period:  8/19/2024 to 02/17/2025    Referring Provider:  Danielle Valadez

## 2025-03-10 ENCOUNTER — THERAPY VISIT (OUTPATIENT)
Dept: PHYSICAL THERAPY | Facility: CLINIC | Age: 60
End: 2025-03-10
Payer: COMMERCIAL

## 2025-03-10 DIAGNOSIS — M54.41 BILATERAL LOW BACK PAIN WITH RIGHT-SIDED SCIATICA, UNSPECIFIED CHRONICITY: Primary | ICD-10-CM

## 2025-03-10 DIAGNOSIS — M25.561 ACUTE PAIN OF RIGHT KNEE: ICD-10-CM

## 2025-03-10 PROCEDURE — 97530 THERAPEUTIC ACTIVITIES: CPT | Mod: GP

## 2025-03-10 PROCEDURE — 97110 THERAPEUTIC EXERCISES: CPT | Mod: GP

## 2025-03-18 ENCOUNTER — LAB (OUTPATIENT)
Dept: LAB | Facility: CLINIC | Age: 60
End: 2025-03-18
Payer: COMMERCIAL

## 2025-03-18 DIAGNOSIS — Z13.0 SCREENING FOR ENDOCRINE, NUTRITIONAL, METABOLIC AND IMMUNITY DISORDER: ICD-10-CM

## 2025-03-18 DIAGNOSIS — Z13.228 SCREENING FOR ENDOCRINE, NUTRITIONAL, METABOLIC AND IMMUNITY DISORDER: ICD-10-CM

## 2025-03-18 DIAGNOSIS — Z13.21 SCREENING FOR ENDOCRINE, NUTRITIONAL, METABOLIC AND IMMUNITY DISORDER: ICD-10-CM

## 2025-03-18 DIAGNOSIS — Z79.899 ENCOUNTER FOR LONG-TERM (CURRENT) USE OF MEDICATIONS: ICD-10-CM

## 2025-03-18 DIAGNOSIS — Z13.29 SCREENING FOR ENDOCRINE, NUTRITIONAL, METABOLIC AND IMMUNITY DISORDER: ICD-10-CM

## 2025-03-18 LAB
ALBUMIN UR-MCNC: NEGATIVE MG/DL
APPEARANCE UR: CLEAR
BACTERIA #/AREA URNS HPF: ABNORMAL /HPF
BILIRUB UR QL STRIP: NEGATIVE
COLOR UR AUTO: YELLOW
GLUCOSE UR STRIP-MCNC: NEGATIVE MG/DL
HGB UR QL STRIP: NEGATIVE
KETONES UR STRIP-MCNC: NEGATIVE MG/DL
LEUKOCYTE ESTERASE UR QL STRIP: NEGATIVE
LITHIUM SERPL-SCNC: 1.01 MMOL/L (ref 0.6–1.2)
NITRATE UR QL: NEGATIVE
PH UR STRIP: 7 [PH] (ref 5–7)
RBC #/AREA URNS AUTO: ABNORMAL /HPF
SP GR UR STRIP: 1.01 (ref 1–1.03)
SP GR UR STRIP: 1.01 (ref 1–1.03)
SQUAMOUS #/AREA URNS AUTO: ABNORMAL /LPF
UROBILINOGEN UR STRIP-ACNC: 0.2 E.U./DL
WBC #/AREA URNS AUTO: ABNORMAL /HPF

## 2025-03-18 PROCEDURE — 80178 ASSAY OF LITHIUM: CPT

## 2025-03-18 PROCEDURE — 82306 VITAMIN D 25 HYDROXY: CPT

## 2025-03-18 PROCEDURE — 81003 URINALYSIS AUTO W/O SCOPE: CPT | Mod: 59

## 2025-03-18 PROCEDURE — 80053 COMPREHEN METABOLIC PANEL: CPT

## 2025-03-18 PROCEDURE — 81001 URINALYSIS AUTO W/SCOPE: CPT

## 2025-03-18 PROCEDURE — 36415 COLL VENOUS BLD VENIPUNCTURE: CPT

## 2025-03-19 LAB
ALBUMIN SERPL BCG-MCNC: 4.2 G/DL (ref 3.5–5.2)
ALP SERPL-CCNC: 76 U/L (ref 40–150)
ALT SERPL W P-5'-P-CCNC: 15 U/L (ref 0–50)
ANION GAP SERPL CALCULATED.3IONS-SCNC: 12 MMOL/L (ref 7–15)
AST SERPL W P-5'-P-CCNC: 18 U/L (ref 0–45)
BILIRUB SERPL-MCNC: 0.4 MG/DL
BUN SERPL-MCNC: 10.6 MG/DL (ref 6–20)
CALCIUM SERPL-MCNC: 9.9 MG/DL (ref 8.8–10.4)
CHLORIDE SERPL-SCNC: 104 MMOL/L (ref 98–107)
CREAT SERPL-MCNC: 0.6 MG/DL (ref 0.51–0.95)
EGFRCR SERPLBLD CKD-EPI 2021: >90 ML/MIN/1.73M2
GLUCOSE SERPL-MCNC: 75 MG/DL (ref 70–99)
HCO3 SERPL-SCNC: 21 MMOL/L (ref 22–29)
POTASSIUM SERPL-SCNC: 4.4 MMOL/L (ref 3.4–5.3)
PROT SERPL-MCNC: 7 G/DL (ref 6.4–8.3)
SODIUM SERPL-SCNC: 137 MMOL/L (ref 135–145)
VIT D+METAB SERPL-MCNC: 35 NG/ML (ref 20–50)

## 2025-03-25 ENCOUNTER — OFFICE VISIT (OUTPATIENT)
Dept: PSYCHIATRY | Facility: CLINIC | Age: 60
End: 2025-03-25
Attending: PSYCHIATRY & NEUROLOGY
Payer: COMMERCIAL

## 2025-03-25 VITALS
DIASTOLIC BLOOD PRESSURE: 70 MMHG | SYSTOLIC BLOOD PRESSURE: 113 MMHG | WEIGHT: 262.4 LBS | HEART RATE: 65 BPM | BODY MASS INDEX: 40.19 KG/M2

## 2025-03-25 DIAGNOSIS — F31.9 BIPOLAR I DISORDER (H): Primary | ICD-10-CM

## 2025-03-25 PROCEDURE — 99214 OFFICE O/P EST MOD 30 MIN: CPT | Performed by: STUDENT IN AN ORGANIZED HEALTH CARE EDUCATION/TRAINING PROGRAM

## 2025-03-25 ASSESSMENT — PAIN SCALES - GENERAL: PAINLEVEL_OUTOF10: NO PAIN (0)

## 2025-03-25 NOTE — PROGRESS NOTES
[facility]  MEDICAL PROGRESS NOTE     CARE TEAM:    PCP- Mikey Cortés     Therapist- individual therapist Elfego Chan and another one for couple therapy    Angy is a 59 year old who uses the pronouns she, her, hers.      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 She wanted to have a MOCA done today because she is concerned about her memory. She scored 29 out of 30.    Angy continues to have negative thought about her divorce, but she reaches out to people, and spend time with them. Those thought are sometimes present, but not always there, and they often come when she is not busy. She works as a therapist, and we had try to normalize those thought as the divorce is not finalized yet.     She has some side effects from hydroxyzine especially dry mouth, so we discuss Buspar, but she is not open yet to start anything new. She will see her OBGYN doc in two weeks. They are planing to taper her off HRT. Given that Lamictal blood level may increase due to the absence of progestins, she thought we could discuss about medication changes after that. She denied manic symptoms. She uses meditation music to fall back to sleep when she wakes up in the middle of the night.       No safety concerns. We went over labs result. They are within normal limit,    .     Psychotropic Drug Interactions:  [PSYCHCLINICDDI]  Estrogen Derivatives may decrease the serum concentration of LamoTRIgine  Angiotensin-Converting Enzyme Inhibitors may increase the serum concentration of Lithium.   LamoTRIgine may decrease the serum concentration of Progestins.      Management: routine monitoring    MNPMP was not checked today: not using controlled  "substances    Risk Statements:   Treatment Risk- Risks, benefits, alternatives and potential adverse effects have been discussed and are understood.   Safety Risk-Angy did not appear to be an imminent safety risk to self or others.     Plan     1) Medications:    - Continue Lithium 1050 mg at bedtime  - Continue with Lamictal 400 mg at bedtime   - continue with hydroxyzine 10 mg PRN at bedtime   - Continue with melatonin 1mg to 3mg at bedtime as needed for insomnia    2) Psychotherapy: Continue with therapy    3) Next due:  Labs- 01/2025  EKG- . Last one was on 03/2024 with   Rating scales-  PHQ9    4) Referrals: none  N/A    5) Other: none    6) Follow-up: Return to clinic in 6 weeks     Pertinent Background                                                   [most recent eval 08/31/23]     Angy reported that she didn't have symptoms until November 2018. At which point she was hospitalized for anthony.     This section was copied forward from 08/2021 transfer notes.  \"Patient has not had significant mental health history until 2018, when she developed perimenopausal mood and anxiety symptoms.  She was subsequently referred to our clinic following a hospitalization for anthony in November 2018.  Her care has been complicated by sensitivity to antipsychotics (EPSE including difficulty swallowing), thrombocytopenia secondary to Depakote, lithium toxicity in setting of concurrent Depakote use\"     Pertinent items include: anthony , severe med reaction [described as H/o Thrombocytopenia secondary to Depakote, and H/o lithium toxicity], and psych hosp      Subjective     Angy had a MOCA done today.  She scores 29 out of 30.   She said that her mood is okay.   Most of the day, she is good, but she still has period of anxiety,  She feels anxious sometimes when she gets up, or later during the day when she has no structure to focus her mind on.  She does a lot of thinking that can let her hopeless, but they don't last. " "  Ruminating about what she has not done well in her relationship.  She sometimes think that her ex is having a good life, but hers is crap.  She acknowledges them as negative thinking  She feels fine during the day, but tired at night  She observed a few time that she had with higher energy, but they didn't last long  Her mind start running when she gets up.  She reaches out to people and go on walk with thm.       Anxiety: She will think of Buspar.     No psychosis, no anthony    She denied \"excessive speech, had busy thought but not racing thought, a little increase energy but didn't last long, and no impulsiveness\"    Sleep: a couple of day she experienced decreased sleep, but traded it bed time routing. She would wake up in the middle of the night, and put some meditation music.     Appetite: she eats regular meal, comfort eating after dinner, more dinner than she used to. Taking a bowl cereal.   Currently, they are trying to wean her off wegovy because of insurance. She is paying out of pocket. Wegovy compound, then she will probably change to another medication may be Zepbound.     Medication side effects: dry mouth with hydroxyzine. No saliva to swallow, and it can be hard to articulate words well.     SI/HI: none    Alcohol use: no changes, and think of drinking less    Smoking:  none         Mental Status Exam     Alertness: alert  and oriented  Appearance: adequately groomed  Behavior/Demeanor: cooperative and pleasant, with good  eye contact   Speech: normal and regular rate and rhythm  Language: intact  Psychomotor: normal or unremarkable  Mood:  \"okay\"  Affect: full range; congruent to: mood- yes, content- yes  Thought Process/Associations: unremarkable  Thought Content:  Reports none;  Denies suicidal & violent ideation and delusions  Perception:  Reports none;  Denies auditory hallucinations and visual hallucinations  Insight: good  Judgment: good  Cognition: does  appear grossly intact; formal " cognitive testing was not done  Gait and Station: unremarkable     Past Psych Med Trials      Medication Max Dose (mg) Dates / Duration Helpful? DC Reason / Adverse Effects?   Depakote    Tremors   sertraline       olanzapine    EPSE   Seroquel    EPSE   lithium 1200   Tremor at 1200 mg had to lower the dose   Wellbutrin 300 15 years ago     Lamictal 400  yes       Treatment Course and Resendiz Events since  JULY 2023 08/2023 : no medication changes  11/2023 no medication changes  01/2023 no medication changes  03/2024 no medication changes  04/2024 no medication changes  06/2024 Increase Lamictal to 400 mg  07/2024 no medication change  08/2024 no medication changes  09/2024 no medication changes  11/2024 no medication changes but encourage to use hydroxyzine as needed and melatonin 1 to 2 mg to limit sedation.   12/2024 no medication changes  01/2024 no medication changes  3/2024 no medication changes     Vitals   /70   Pulse 65   Wt 119 kg (262 lb 6.4 oz)   BMI 40.19 kg/m    Pulse Readings from Last 3 Encounters:   03/25/25 65   01/28/25 74   01/17/25 63     Wt Readings from Last 3 Encounters:   03/25/25 119 kg (262 lb 6.4 oz)   02/05/25 118.8 kg (261 lb 14.4 oz)   01/28/25 120.1 kg (264 lb 12.8 oz)     BP Readings from Last 3 Encounters:   03/25/25 113/70   01/28/25 (!) 144/81   01/17/25 135/85      Medical History     ALLERGIES: Seroquel [quetiapine], Olanzapine, Penicillins, Risperidone, and Thimerosal    Patient Active Problem List   Diagnosis    Oral aphthae    CARDIOVASCULAR SCREENING; LDL GOAL LESS THAN 160    Rosacea    Raynaud disease    Vitamin D deficiency disease    MARLA (obstructive sleep apnea)    Atopic rhinitis    Chronic bilateral low back pain without sciatica    Obesity, Class III, BMI 40-49.9 (morbid obesity) (H)    Muscle tension dysphonia    Palpitations    Dysphagia, unspecified type    Anxiety    Benign essential hypertension    Callus of foot    Varicose veins of right lower  extremity with edema    Bipolar I disorder (H)    Dribbling of urine    Bilateral low back pain with right-sided sciatica    Spinal stenosis at L4-L5 level    Facet arthropathy, lumbar    Acute pain of right knee    Unspecified inflammatory spondylopathy, lumbar region    Exertional angina    Disorder of sacrum    Myalgia, other site    Trochanteric bursitis of both hips    Lumbosacral spondylosis without myelopathy        Medications     Current Outpatient Medications   Medication Sig Dispense Refill    acetaminophen (TYLENOL) 650 MG CR tablet Take 1 tablet (650 mg) by mouth every 8 hours as needed for mild pain or fever      Carboxymethylcellulose Sod PF (REFRESH PLUS) 0.5 % SOLN ophthalmic solution Place 1 drop into both eyes 3 times daily as needed for dry eyes      clotrimazole-betamethasone (LOTRISONE) 1-0.05 % external cream Apply topically 2 times daily 45 g 2    COMPOUNDED NON-CONTROLLED SUBSTANCE (CMPD RX) - PHARMACY TO MIX COMPOUNDED MEDICATION Semaglutide 1mg injection every 7 days 4 each 2    estradiol (ESTRACE) 0.5 MG tablet Take 1 tablet (0.5 mg) by mouth daily. 90 tablet 0    estradiol (VAGIFEM) 10 MCG TABS vaginal tablet Place 1 tablet (10 mcg) vaginally twice a week. 26 tablet 0    hydrOXYzine HCl (ATARAX) 10 MG tablet Take 1 tablet (10 mg) by mouth nightly as needed (sleep). 30 tablet 2    lamoTRIgine (LAMICTAL) 200 MG tablet Take 2 tablets (400 mg) by mouth daily. 60 tablet 2    lisinopril (ZESTRIL) 10 MG tablet Take 1 tablet (10 mg) by mouth daily. 90 tablet 3    lithium (ESKALITH) 150 MG capsule Take 1 capsule (150 mg) by mouth at bedtime. 30 capsule 2    lithium 300 MG capsule Take 3 tablet (900 mg) and one tablet (150 mg) for a total of 1050 mg at bedtime. 270 capsule 2    Loratadine 10 MG capsule Take 10 mg by mouth daily      medroxyPROGESTERone (PROVERA) 2.5 MG tablet Take 1 tablet (2.5 mg) by mouth daily. 90 tablet 0    metroNIDAZOLE (METROGEL) 1 % external gel Apply topically daily. 60 g  9    ORDER FOR DME Use your CPAP device as directed by your provider.      VITAMIN D, CHOLECALCIFEROL, PO Take 2,000 Units by mouth daily      albuterol (PROAIR HFA/PROVENTIL HFA/VENTOLIN HFA) 108 (90 Base) MCG/ACT inhaler Inhale 2 puffs into the lungs every 6 hours as needed for shortness of breath, wheezing or cough. 18 g 0    calcium carbonate (TUMS) 500 MG chewable tablet Take 1 chew tab by mouth 2 times daily      doxycycline hyclate (VIBRAMYCIN) 100 MG capsule Take 1 capsule (100 mg) by mouth 2 times daily. 14 capsule 0    psyllium (METAMUCIL/KONSYL) 58.6 % powder Take 1 teaspoonful by mouth daily      Semaglutide-Weight Management (WEGOVY) 1 MG/0.5ML pen Inject 1 mg subcutaneously once a week. 6 mL 0    Semaglutide-Weight Management (WEGOVY) 1 MG/0.5ML pen Inject 1 mg subcutaneously once a week. 2 mL 0    tirzepatide-Weight Management (ZEPBOUND) 10 MG/0.5ML prefilled pen Inject 0.5 mLs (10 mg) subcutaneously every 7 days. 2 mL 2        Labs and Data         11/4/2024    12:47 PM 12/10/2024     2:54 PM 3/23/2025     6:59 PM   PROMIS-10 Total Score w/o Sub Scores   PROMIS TOTAL - SUBSCORES 25  28  29        Patient-reported          No data to display                  12/17/2024     7:52 AM 1/28/2025     7:55 AM 3/24/2025     8:44 AM   PHQ-9 SCORE   PHQ-9 Total Score MyChart 8 (Mild depression) 6 (Mild depression) 7 (Mild depression)   PHQ-9 Total Score 8  6  7        Patient-reported         7/26/2024    10:56 AM 11/4/2024    12:46 PM 12/10/2024     3:01 PM   JOANNA-7 SCORE   Total Score 8 (mild anxiety) 11 (moderate anxiety) 15 (severe anxiety)   Total Score 8 11  15        Patient-reported       Liver/Kidney Function, TSH Metabolic Blood counts   Recent Labs   Lab Test 03/18/25  1033 07/11/24  0809   AST 18  --    ALT 15  --    ALKPHOS 76  --    CR 0.60 0.63     Recent Labs   Lab Test 07/11/24  0809   TSH 2.43    Recent Labs   Lab Test 09/27/24  0740   CHOL 168   TRIG 136   LDL 87   HDL 54     Recent Labs   Lab  Test 09/27/24  0740   A1C 5.1     Recent Labs   Lab Test 03/18/25  1033   GLC 75    Recent Labs   Lab Test 09/27/24  0740   WBC 6.4   HGB 13.7   HCT 41.6   MCV 90            Recent Labs   Lab Test 03/18/25  1033 07/11/24  0809 11/08/23  1016   LITHIUM 1.01 1.00 0.69        PROVIDER: Antonio Santillan MD MPH    Level of Medical Decision Making:   - At least 1 chronic problem that is not stable  - Engaged in prescription drug management during visit (discussed any medication benefits, side effects, alternatives, etc.)         Patient was not staffed in clinic.  Supervisor is Dr. Vivian Burnett.      The longitudinal plan of care for Bipolar disorder was addressed during this visit. Due to the added complexity in care, I will continue to support Angy in the subsequent management of this condition(s) and with the ongoing continuity of care of this condition(s)

## 2025-03-25 NOTE — PATIENT INSTRUCTIONS
**For crisis resources, please see the information at the end of this document**   Patient Education    Thank you for coming to the [facility] [place] [facility].     Lab Testing:  If you had lab testing today and your results are reassuring or normal they will be mailed to you or sent through [employer] within 7 days. If the lab tests need quick action we will call you with the results. The phone number we will call with results is # [phone]. If this is not the best number please call our clinic and change the number.     Medication Refills:  If you need any refills please call your pharmacy and they will contact us. Our fax number for refills is [phone].   Three business days of notice are needed for general medication refill requests.   Five business days of notice are needed for controlled substance refill requests.   If you need to change to a different pharmacy, please contact the new pharmacy directly. The new pharmacy will help you get your medications transferred.     Contact Us:  Please call [phone] during business hours (8-5:00 M-F).   If you have medication related questions after clinic hours, or on the weekend, please call [phone].     Financial Assistance [phone]   Medical Records [phone]       MENTAL HEALTH CRISIS RESOURCES:  For a emergency help, please call 911 or go to the nearest Emergency Department.     Emergency Walk-In Options:   EmPATH Unit @ [facility] ([place]): [phone] - Specialized mental health emergency area designed to be calming  [facility] ([place]): [phone]  [facility] ([place]): [phone]  [facility]): [phone]    [facility] Crisis Information:   [place]: [phone]  [place]: [phone]  [place] ([place]) - Adult: [phone]     Child: [phone]  [place] - Adult: [phone]     Child: [phone]  [place]:  [phone]  List of all [facility] resources:   https://[place].gov/dhs/people-we-serve/adults/health-care/mental-health/resources/crisis-contacts.jsp    National Crisis Information:   Crisis Text Line: Text  [place]  to 136164  Suicide & Crisis Lifeline: 988  National Suicide Prevention Lifeline: 4-206-631-TALK (1-421.238.9118)       For online chat options, visit https://suicidepreventionlifeline.org/chat/  Poison Control Center: 6-367-906-1429  Trans Lifeline: 2-326-422-1649 - Hotline for transgender people of all ages  The [place] [employer]: 2-077-612-8813 - Hotline for LGBT youth     For Non-Emergency Support:   Fast Tracker: Mental Health & Substance Use Disorder Resources -   https://www.[place].org/

## 2025-03-26 ENCOUNTER — DOCUMENTATION ONLY (OUTPATIENT)
Dept: OTHER | Facility: CLINIC | Age: 60
End: 2025-03-26
Payer: COMMERCIAL

## 2025-03-31 ENCOUNTER — THERAPY VISIT (OUTPATIENT)
Dept: PHYSICAL THERAPY | Facility: CLINIC | Age: 60
End: 2025-03-31
Payer: COMMERCIAL

## 2025-03-31 DIAGNOSIS — M54.41 BILATERAL LOW BACK PAIN WITH RIGHT-SIDED SCIATICA, UNSPECIFIED CHRONICITY: Primary | ICD-10-CM

## 2025-03-31 DIAGNOSIS — M25.561 ACUTE PAIN OF RIGHT KNEE: ICD-10-CM

## 2025-03-31 PROCEDURE — 97110 THERAPEUTIC EXERCISES: CPT | Mod: GP

## 2025-03-31 NOTE — PROGRESS NOTES
03/31/25 0500   Appointment Info   Signing clinician's name / credentials Chantel Blankenship, PT, DPT, OCS   Total/Authorized Visits E&T   Visits Used 9   Medical Diagnosis Acute pain of right knee; Low back pain   PT Tx Diagnosis Right knee pain; low back pain   Progress Note/Certification   Onset of illness/injury or Date of Surgery 07/26/24  (Referral date)   Therapy Frequency 1x every 2-3 weeks   Predicted Duration 2-3 months   Progress Note Completed Date 03/31/25   GOALS   PT Goals 2;3   PT Goal 1   Goal Identifier Sleep   Goal Description Pt will be able to sleep 6/7 nights of the week without being interrupted by sleep in order to improve sleep hygiene   Rationale to maximize safety and independence with self cares   Goal Progress improving   Target Date 04/28/25   PT Goal 2   Goal Identifier Stairs   Goal Description Pt will be able to ascend/descend at least 1 set of stairs without a significant increase in pain in order to improve function in home   Rationale to maximize safety and independence with performance of ADLs and functional tasks;to maximize safety and independence within the home;to maximize safety and independence within the community   Goal Progress improving   Target Date 04/28/25   PT Goal 3   Goal Identifier Lifting/bending   Goal Description Pt will be able to lift at least 10 pounds from ground with good mechanics and without a significant increase in pain in order to improve QOL   Rationale to maximize safety and independence with self cares;to maximize safety and independence within the home   Target Date 04/28/25   Subjective Report   Subjective Report Overall having less pain during the day. Pain in the back of R leg going down stairs. Also feels R knee pain with sleeping. Feels core is really weak making it difficult to get up and down from surfaces. Reports fell out of routine this past week. Back pain is an achy dull and R knee pain posterior is sharper.   Objective Measures  "  Objective Measures Objective Measure 1;Objective Measure 2   Objective Measure 1   Objective Measure step up and down   Details 4\" step in clinic, increased hip drop with lateral step down L side with R foot on step   Objective Measure 2   Objective Measure hip hinge   Details good form with wand along back - EC from mirror   Treatment Interventions (PT)   Interventions Therapeutic Procedure/Exercise;Therapeutic Activity;Neuromuscular Re-education   Therapeutic Procedure/Exercise   Therapeutic Procedures: strength, endurance, ROM, flexibility minutes (02419) 25   PTRx Ther Proc 1 Standing Extension   PTRx Ther Proc 1 - Details VR with demo for set-up and patient ed when to use it   PTRx Ther Proc 2 Standing Hip Flexion Straight Leg Raise   PTRx Ther Proc 2 - Details VR   PTRx Ther Proc 3 Posterior Pelvic Tilt   PTRx Ther Proc 3 - Details VR   PTRx Ther Proc 4 Ball Exercise Seated Pelvic Tilt   PTRx Ther Proc 4 - Details hep for neutral position   PTRx Ther Proc 5 Isometric Hamstring   PTRx Ther Proc 5 - Details in clinic on R side with VC for set-up   PTRx Ther Proc 6 Supine Abdominal Exercise #3 (Marching)   PTRx Ther Proc 6 - Details each side with progression - see notes   PTRx Ther Proc 7 Marching Bridge   PTRx Ther Proc 7 - Details x 8 each side in clinic with VC for set-up and hips level   PTRx Ther Proc 8 Knee Bends   PTRx Ther Proc 8 - Details x 10 in clinic with demo for set-up   PTRx Ther Proc 9 Front Knee Strengthening   PTRx Ther Proc 9 - Details hep for strengthening   PTRx Ther Proc 10 Sidestep   PTRx Ther Proc 10 - Details VR   PTRx Ther Proc 11 Standing Fire Hydrant   PTRx Ther Proc 11 - Details each side in clinic with demo for set-up   Skilled Intervention see above   Patient Response/Progress patient reports understanding and tolerated well   PTRx Ther Proc 12 Lateral Stepdown with Neutral Trunk Position   PTRx Ther Proc 12 - Details x 10 each side in clinic with demo for set-up   Therapeutic " Activity   Therapeutic Activities: dynamic activities to improve functional performance minutes (01318) 5   Ther Act 1 patient ed   Ther Act 1 - Details VR in clinic with sleeping positions and prolonged positioning - exercises to help reduce pain in the moment; symptom management with roll ons   PTRx Ther Act 1 Education Sheet General   PTRx Ther Act 1 - Details VR on sleeping positions   PTRx Ther Act 2 Sit to Stand   PTRx Ther Act 2 - Details VR   Skilled Intervention see above   Patient Response/Progress patient reports understanding   Neuromuscular Re-education   Neuromuscular re-ed of mvmt, balance, coord, kinesthetic sense, posture, proprioception minutes (20451) 4   PTRx Neuro Re-ed 1 Pallof Press   PTRx Neuro Re-ed 1 - Details VR   Skilled Intervention see above   Patient Response/Progress patient tolerated well   PTRx Neuro Re-ed 2 Hip Hinge Using Dowel   PTRx Neuro Re-ed 2 - Details 2 x 8 in clinic with EC from [facility] and demo for set-up   Education   Learner/Method Patient   Plan   Home program See PTRX - phone   Updates to plan of care see PN   Plan for next session progress quad, hip, and core strength, check sit to stand and stand to sit, see how R knee feeling   Total Session Time   Timed Code Treatment Minutes 34   Total Treatment Time (sum of timed and untimed services) 34       PLAN  Continue therapy per current plan of care.    Beginning/End Dates of Progress Note Reporting Period:  2/17/2025 to 03/31/2025    Referring Provider:  Danielle Valadez

## 2025-04-07 ENCOUNTER — OFFICE VISIT (OUTPATIENT)
Dept: OBGYN | Facility: CLINIC | Age: 60
End: 2025-04-07
Payer: COMMERCIAL

## 2025-04-07 VITALS
HEIGHT: 68 IN | BODY MASS INDEX: 39.89 KG/M2 | SYSTOLIC BLOOD PRESSURE: 152 MMHG | OXYGEN SATURATION: 100 % | HEART RATE: 68 BPM | WEIGHT: 263.2 LBS | DIASTOLIC BLOOD PRESSURE: 73 MMHG

## 2025-04-07 DIAGNOSIS — N95.1 MENOPAUSAL VASOMOTOR SYNDROME: Primary | ICD-10-CM

## 2025-04-07 DIAGNOSIS — N95.1 VAGINAL DRYNESS, MENOPAUSAL: ICD-10-CM

## 2025-04-07 PROCEDURE — 3077F SYST BP >= 140 MM HG: CPT | Performed by: OBSTETRICS & GYNECOLOGY

## 2025-04-07 PROCEDURE — 3078F DIAST BP <80 MM HG: CPT | Performed by: OBSTETRICS & GYNECOLOGY

## 2025-04-07 PROCEDURE — 99214 OFFICE O/P EST MOD 30 MIN: CPT | Performed by: OBSTETRICS & GYNECOLOGY

## 2025-04-07 RX ORDER — ESTRADIOL 0.5 MG/1
0.5 TABLET ORAL DAILY
Qty: 90 TABLET | Refills: 3 | Status: SHIPPED | OUTPATIENT
Start: 2025-04-07

## 2025-04-07 RX ORDER — ESTRADIOL 10 UG/1
10 TABLET, FILM COATED VAGINAL
Qty: 26 TABLET | Refills: 3 | Status: SHIPPED | OUTPATIENT
Start: 2025-04-07

## 2025-04-07 RX ORDER — MEDROXYPROGESTERONE ACETATE 2.5 MG/1
2.5 TABLET ORAL DAILY
Qty: 90 TABLET | Refills: 3 | Status: SHIPPED | OUTPATIENT
Start: 2025-04-07

## 2025-04-07 NOTE — PROGRESS NOTES
GYN Clinic Consultation  Date of visit: 2025   Chief Complaint: menopausal hormone therapy.    HPI:   Angy Haji is a 59 year old  female who presents to discuss menopausal hormone therapy.     Overall MHT is working very well. She complains of rare hot flashes, mild. Still having some sleep difficulty.     Using topical vaginal estrogen tabs but sometimes forgets. Denies vaginal symptoms.   No bleeding in 2 years  Wakes up a few times per night, usually able to go back to sleep  Does a meditation before bed, repeats it in the middle of the night if needed.  Recently  (started in , through Dec), that has been stressful. Still working through financial stuff - mortgages. Child (Tyler) is now in college in [place].   Mood stabilizers due to h/o manic episode.     The 10-year ASCVD risk score (Miryam NORTON, et al., 2019) is: 4%    Values used to calculate the score:      Age: 59 years      Sex: Female      Is Non- : No      Diabetic: No      Tobacco smoker: No      Systolic Blood Pressure: 152 mmHg      Is BP treated: Yes      HDL Cholesterol: 54 mg/dL      Total Cholesterol: 168 mg/dL          2024     7:52 AM 2025     7:55 AM 3/24/2025     8:44 AM   PHQ   PHQ-9 Total Score 8  6  7    Q9: Thoughts of better off dead/self-harm past 2 weeks Not at all Not at all Not at all       Patient-reported           2024    10:56 AM 2024    12:46 PM 12/10/2024     3:01 PM   JOANNA-7 SCORE   Total Score 8 (mild anxiety) 11 (moderate anxiety) 15 (severe anxiety)   Total Score 8 11  15        Patient-reported       History of breast cancer: no  History of coronary heart disease: no  History of venous thromboembolic event or stroke: no  Active liver disease: no    Last mammogram: 25 BIRADS-1  Last pap: 20 NIL neg HR HPV      Obstetric History:   OB History    Para Term  AB Living   0 0 0 0 0 0   SAB IAB Ectopic Multiple Live Births   0 0 0 0 0        Past Medical History:  Past Medical History:   Diagnosis Date    Depressive disorder     Previous depressive episodes. Diagnosed with Bipolar illness in 11/18 after manic episode.    Disorders of bursae and tendons in shoulder region, unspecified     Female infertility of unspecified origin     Gallbladder sludge 05/2013    Hypertension 10/18    Mild sleep apnea 05/13/2014    Obesity, unspecified     Oral aphthae     Other acne     Other specified hypoglycemia     PONV (postoperative nausea and vomiting)        Past Surgical History:  Past Surgical History:   Procedure Laterality Date    BIOPSY      age 42 breast biopsy    COLONOSCOPY  2018    COLONOSCOPY N/A 2/13/2024    Procedure: Colonoscopy;  Surgeon: Cheryl Loza MD;  Location:  GI    GYN SURGERY  2016 for ovarian cyst    left ovary, 2 fallopian tubes removed. endometrioma.    HC TOOTH EXTRACTION W/FORCEP  1984/85    [place] [facility]    IR ENDOVENOUS ABLATION VARICOSE VEINS  12/12/2019    LAPAROSCOPIC CHOLECYSTECTOMY  07/29/2013    Procedure: LAPAROSCOPIC CHOLECYSTECTOMY;  Laparoscopic Cholecystectomy;  Surgeon: Fabio Johnson MD;  Location: [facility]    LAPAROSCOPIC OOPHORECTOMY Left 06/23/2016    Procedure: LAPAROSCOPIC OOPHORECTOMY;  Surgeon: Kayley Donnelly MD;  Location: [facility]    LAPAROSCOPIC SALPINGECTOMY Bilateral 06/23/2016    Procedure: LAPAROSCOPIC SALPINGECTOMY;  Surgeon: Kayley Donnelly MD;  Location: [facility]    VASCULAR SURGERY  12/12    varicose vein proceedures         Medications:  Current Outpatient Medications   Medication Sig Dispense Refill    acetaminophen (TYLENOL) 650 MG CR tablet Take 1 tablet (650 mg) by mouth every 8 hours as needed for mild pain or fever      Carboxymethylcellulose Sod PF (REFRESH PLUS) 0.5 % SOLN ophthalmic solution Place 1 drop into both eyes 3 times daily as needed for dry eyes      clotrimazole-betamethasone (LOTRISONE) 1-0.05 % external cream Apply topically 2 times daily 45 g 2     COMPOUNDED NON-CONTROLLED SUBSTANCE (CMPD RX) - PHARMACY TO MIX COMPOUNDED MEDICATION Semaglutide 1mg injection every 7 days 4 each 2    estradiol (ESTRACE) 0.5 MG tablet Take 1 tablet (0.5 mg) by mouth daily. 90 tablet 0    estradiol (VAGIFEM) 10 MCG TABS vaginal tablet Place 1 tablet (10 mcg) vaginally twice a week. 26 tablet 0    hydrOXYzine HCl (ATARAX) 10 MG tablet Take 1 tablet (10 mg) by mouth nightly as needed (sleep). 30 tablet 2    lamoTRIgine (LAMICTAL) 200 MG tablet Take 2 tablets (400 mg) by mouth daily. 60 tablet 2    lisinopril (ZESTRIL) 10 MG tablet Take 1 tablet (10 mg) by mouth daily. 90 tablet 3    lithium (ESKALITH) 150 MG capsule Take 1 capsule (150 mg) by mouth at bedtime. 30 capsule 2    lithium 300 MG capsule Take 3 tablet (900 mg) and one tablet (150 mg) for a total of 1050 mg at bedtime. 270 capsule 2    Loratadine 10 MG capsule Take 10 mg by mouth daily      medroxyPROGESTERone (PROVERA) 2.5 MG tablet Take 1 tablet (2.5 mg) by mouth daily. 90 tablet 0    metroNIDAZOLE (METROGEL) 1 % external gel Apply topically daily. 60 g 9    ORDER FOR DME Use your CPAP device as directed by your provider.      VITAMIN D, CHOLECALCIFEROL, PO Take 2,000 Units by mouth daily      albuterol (PROAIR HFA/PROVENTIL HFA/VENTOLIN HFA) 108 (90 Base) MCG/ACT inhaler Inhale 2 puffs into the lungs every 6 hours as needed for shortness of breath, wheezing or cough. 18 g 0    calcium carbonate (TUMS) 500 MG chewable tablet Take 1 chew tab by mouth 2 times daily      doxycycline hyclate (VIBRAMYCIN) 100 MG capsule Take 1 capsule (100 mg) by mouth 2 times daily. 14 capsule 0    psyllium (METAMUCIL/KONSYL) 58.6 % powder Take 1 teaspoonful by mouth daily      Semaglutide-Weight Management (WEGOVY) 1 MG/0.5ML pen Inject 1 mg subcutaneously once a week. 6 mL 0    Semaglutide-Weight Management (WEGOVY) 1 MG/0.5ML pen Inject 1 mg subcutaneously once a week. 2 mL 0    tirzepatide-Weight Management (ZEPBOUND) 10 MG/0.5ML  prefilled pen Inject 0.5 mLs (10 mg) subcutaneously every 7 days. 2 mL 2     No current facility-administered medications for this visit.       Allergy:  Allergies   Allergen Reactions    Seroquel [Quetiapine] Nausea and Vomiting     Had constant vomiting    Olanzapine      Tongue welling, bad EPSE (Parkinsonian)    Penicillins Rash    Risperidone      Tongue welling, bad EPSE (Parkinsonian)      Thimerosal Other (See Comments)     blisters on inside of eyelid     Patient denies food, latex or environmental allergies.     Social History:  Recently   Social History     Tobacco Use    Smoking status: Never    Smokeless tobacco: Never    Tobacco comments:     non smoke home   Vaping Use    Vaping status: Never Used   Substance Use Topics    Alcohol use: Yes     Comment: 2 Drinks Per Month    Drug use: No        Family History   Problem Relation Age of Onset    Cardiovascular Paternal Grandfather         anyerism    Obesity Paternal Grandfather     Respiratory Paternal Grandfather         pulmonary (air sacs hardening)    Allergies Father         hayfever    Lipids Father         diet therapy    Gallbladder Disease Father         cholecystectomy     Eye Disorder Father         retinal tear x 2     Prostate Cancer Father         Treated at age 77    Alzheimer Disease Maternal Grandmother         ? poor STM    Arthritis Maternal Grandmother     Heart Disease Maternal Grandmother         Arrythmia-got a pacemaker at age 89    Arthritis Mother     Eye Disorder Mother         Cataract/macular tear in one eye    LUNG DISEASE Mother         bronchiectasis from pneumonia    Atrial fibrillation Mother     Cancer Paternal Grandmother         Uterine CA- as well as her sister    Gynecology Paternal Grandmother         prolapsed uterus- had 10 kids    Obesity Paternal Grandmother     Breast Cancer Maternal Aunt     Other Cancer Paternal Aunt 80        endo. cancer          Physical Exam:  Vitals:    04/07/25 0804   BP: (!)  "152/73   BP Location: Left arm   Patient Position: Sitting   Cuff Size: Adult Large   Pulse: 68   SpO2: 100%   Weight: 119.4 kg (263 lb 3.2 oz)   Height: 1.721 m (5' 7.75\")     Body mass index is 40.32 kg/m .    Gen: healthy, alert, active, no distress      Assessment:  Angy Haji is a 59 year old  who presents in consultation for bothersome symptoms in the menopausal transition. Desires to continue MHT at current dose. Discussed she is already on lowest dose. Discussed option to switch to transdermal estrogen which would have less VTE risk. She will think about it but wants to continue on current regimen for now. Will be due for pap in the fall, discussed.     Plan:  1. Vaginal dryness, menopausal    - estradiol (VAGIFEM) 10 MCG TABS vaginal tablet; Place 1 tablet (10 mcg) vaginally twice a week.  Dispense: 26 tablet; Refill: 3    2. Perimenopausal vasomotor symptoms    - estradiol (ESTRACE) 0.5 MG tablet; Take 1 tablet (0.5 mg) by mouth daily.  Dispense: 90 tablet; Refill: 3  - medroxyPROGESTERone (PROVERA) 2.5 MG tablet; Take 1 tablet (2.5 mg) by mouth daily.  Dispense: 90 tablet; Refill: 3      Discussed that systemic hormone therapy, with estrogen alone or in combination with progestin, is the most effective therapy for vasomotor symptoms related to menopause. Discussed risks of hormone therapy including thromboembolic disease and breast cancer. Discussed additional benefit of osteoporosis prevention. As patient has a uterus, discussed importance of progesterone component to protect uterine lining.  Reviewed side effects including breast tenderness, vaginal bleeding, bloating, and headaches. Patient does not have any contraindications. Discussed recommendation of using lowest dose for shortest time needed to relieve symptoms. Patient is interested in continuing hormone therapy, Plan to re-evaluate yearly.     Discussed lifestyle modifications that can help manage vasomotor symptoms including  " layering of clothing, maintaining a lower ambient temperature, consuming cool drinks, avoiding consumption of alcohol and caffeine and exercising.      Romy Min MD

## 2025-04-14 ENCOUNTER — HOSPITAL ENCOUNTER (OUTPATIENT)
Facility: AMBULATORY SURGERY CENTER | Age: 60
Discharge: HOME | End: 2025-04-14
Attending: PHYSICAL MEDICINE & REHABILITATION | Admitting: PHYSICAL MEDICINE & REHABILITATION
Payer: COMMERCIAL

## 2025-04-14 ENCOUNTER — ANCILLARY PROCEDURE (OUTPATIENT)
Dept: RADIOLOGY | Facility: AMBULATORY SURGERY CENTER | Age: 60
End: 2025-04-14
Attending: PHYSICAL MEDICINE & REHABILITATION
Payer: COMMERCIAL

## 2025-04-14 VITALS
HEART RATE: 67 BPM | SYSTOLIC BLOOD PRESSURE: 156 MMHG | TEMPERATURE: 96.2 F | OXYGEN SATURATION: 100 % | DIASTOLIC BLOOD PRESSURE: 88 MMHG | WEIGHT: 263 LBS | RESPIRATION RATE: 18 BRPM | HEIGHT: 67 IN | BODY MASS INDEX: 41.28 KG/M2

## 2025-04-14 DIAGNOSIS — M53.3 DISORDER OF SACRUM: ICD-10-CM

## 2025-04-14 PROCEDURE — 20610 DRAIN/INJ JOINT/BURSA W/O US: CPT | Mod: 50 | Performed by: PHYSICAL MEDICINE & REHABILITATION

## 2025-04-14 PROCEDURE — 77002 NEEDLE LOCALIZATION BY XRAY: CPT | Mod: 26 | Performed by: PHYSICAL MEDICINE & REHABILITATION

## 2025-04-14 PROCEDURE — 20610 DRAIN/INJ JOINT/BURSA W/O US: CPT | Mod: 50,XS

## 2025-04-14 PROCEDURE — 27096 INJECT SACROILIAC JOINT: CPT | Mod: 50 | Performed by: PHYSICAL MEDICINE & REHABILITATION

## 2025-04-14 PROCEDURE — G0260 INJ FOR SACROILIAC JT ANESTH: HCPCS

## 2025-04-14 RX ORDER — LIDOCAINE HYDROCHLORIDE 10 MG/ML
INJECTION, SOLUTION EPIDURAL; INFILTRATION; INTRACAUDAL; PERINEURAL DAILY PRN
Status: DISCONTINUED | OUTPATIENT
Start: 2025-04-14 | End: 2025-04-14 | Stop reason: HOSPADM

## 2025-04-14 RX ORDER — BUPIVACAINE HYDROCHLORIDE 5 MG/ML
INJECTION, SOLUTION EPIDURAL; INTRACAUDAL; PERINEURAL DAILY PRN
Status: DISCONTINUED | OUTPATIENT
Start: 2025-04-14 | End: 2025-04-14 | Stop reason: HOSPADM

## 2025-04-14 RX ORDER — IOPAMIDOL 408 MG/ML
INJECTION, SOLUTION INTRATHECAL DAILY PRN
Status: DISCONTINUED | OUTPATIENT
Start: 2025-04-14 | End: 2025-04-14 | Stop reason: HOSPADM

## 2025-04-14 RX ORDER — BETAMETHASONE SODIUM PHOSPHATE AND BETAMETHASONE ACETATE 3; 3 MG/ML; MG/ML
INJECTION, SUSPENSION INTRA-ARTICULAR; INTRALESIONAL; INTRAMUSCULAR; SOFT TISSUE DAILY PRN
Status: DISCONTINUED | OUTPATIENT
Start: 2025-04-14 | End: 2025-04-14 | Stop reason: HOSPADM

## 2025-04-14 NOTE — OP NOTE
"Pre-procedure Diagnoses   Disorder of sacrum [M53.3]   Trochanteric bursitis of both hips [M70.61, M70.62]   Myalgia, other site [M79.18]   Bilateral piriformis syndrome [G57.03]   Post-procedure Diagnoses   Disorder of sacrum [M53.3]   Trochanteric bursitis of both hips [M70.61, M70.62]   Myalgia, other site [M79.18]   Bilateral piriformis syndrome [G57.03]   Procedures   MS INJECTION SACROILIAC JOINT [8645707]   MS INJECTION SACROILIAC JOINT [[id number]]   MS DRAIN/INJ MAJOR JOINT/BURSA W/O US [[id number]]   MS DRAIN/INJ MAJOR JOINT/BURSA W/O US [[id number]]   MS INJECTION SNGL/MULT TRIGGER POINT, 1 OR 2 MUSCLES [2055201]   MS FLUORO GUIDE FOR NEEDLE PLACEMENT BX/ASP/INJ/LOC DEV [[id number]]               []Hide copied text    []Hover for details  The patient returns for undergoing bilateral injections to her SI joints, trochanteric bursa and piriformis for chronic pain affecting the lower back and hip region.     After explaining the benefits and risks of the procedures, with her informed consent, she was taken to the fluoroscopic suite and placed prone on the table.  The area was prepped and draped in a sterile fashion.  Timeout was carried out.  Next using fluoroscopy, the SI joints, the trochanteric bursa and the piriformis trigger points were identified and marked.  Using 1% lidocaine with buffer, topical anesthesia was obtained.  Using 22-gauge 5 inch needles, the SI joints were accessed, confirmed with contrast, and with appropriate spread of the contrast, injected with a mixture comprising of 2 cc of Celestone, 14 cc of 0.5% bupivacaine, 3 cc were injected into each joint.     Next, the trochanteric bursa were accessed on either side with a 25-gauge 5\" needle.  Bursal entry was confirmed with Isovue-200 and with appropriate spread of the contrast, the bursa was injected with 3 cc of the mixture each.     Finally trigger point to the piriformis on either side were identified and accessed with a 3.5 inch 25-gauge " needles confirmed with contrast and injected with 2 cc of the mixture.  She tolerated the procedure well.  Her pain improved at the end of the procedure.  She will use ice and Tylenol as needed and update my clinic as needed.     Thank you much for allowing me to assist in her care.     Ang Carmona MD

## 2025-04-14 NOTE — DISCHARGE INSTRUCTIONS
Home Care Instructions after a Sacroiliac Joint Injection        Activity  -You may resume most normal activity levels with the exception of strenuous activity. It is important for us to know if your pain with normal activity is relieved after this injection.  -DO NOT shower for 24 hours  -DO NOT remove bandaid for 24 hours    Pain  -You may experience soreness at the injection site for one or two days  -You may use an ice pack for 20 minutes every 2 hours for the first 24 hours  -You may use a heating pad after the first 24 hours  -You may use Tylenol (acetaminophen) every 4 hours or other pain medicines as directed by your physician    You may experience numbness radiating into your legs or arms (depending on the procedure location). This numbness may last several hours. Until sensation returns to normal; please use caution in walking, climbing stairs, and stepping out of your vehicle, etc.    DID YOU RECEIVE STEROIDS TODAY?  Yes    Common side effects of steroids:  Not everyone will experience corticosteroid side effects. If side effects are experienced, they will gradually subside in the 7-10 day period following an injection. Most common side effects include:  -Flushed face and/or chest  -Feeling of warmth, particularly in the face but could be an overall feeling of warmth  -Increased blood sugar in diabetic patients  -Menstrual irregularities my occur. If taking hormone-based birth control an alternate method of birth control is recommended  -Sleep disturbances and/or mood swings are possible  -Leg cramps      PLEASE KEEP TRACK OF YOUR SYMPTOMS AND NOTE YOUR IMPROVEMENT FOR YOUR DOCTOR.     Please contact us if you have:  -Severe pain  -Fever more than 101.5 degrees Fahrenheit  -Signs of infection at the injection site (redness, swelling, or drainage)    FOR PM&R PATIENTS:  For patients seen by the PM and R service, please call [phone]. (Monday through Friday 8a-5p.  After business hours and weekends call  [phone] and ask for the PM and R doctor on call). If you need to fax a pain diary to PM&R the fax number is [phone]. If you are unable to fax, uploading to [employer] is encouraged, then send to provider. If you have procedure scheduling questions please call [phone] Option #2.

## 2025-04-15 ENCOUNTER — TELEPHONE (OUTPATIENT)
Dept: SURGERY | Facility: CLINIC | Age: 60
End: 2025-04-15
Payer: COMMERCIAL

## 2025-04-22 ENCOUNTER — TELEPHONE (OUTPATIENT)
Dept: PHYSICAL MEDICINE AND REHAB | Facility: CLINIC | Age: 60
End: 2025-04-22
Payer: COMMERCIAL

## 2025-04-22 NOTE — TELEPHONE ENCOUNTER
Phoned the patient back and left VM. Stated dr. Carmona's next available is not untll July. Stated if you want to reschedule call the pain clinic at [phone].

## 2025-04-28 ENCOUNTER — HOSPITAL ENCOUNTER (OUTPATIENT)
Facility: AMBULATORY SURGERY CENTER | Age: 60
Discharge: HOME | End: 2025-04-28
Attending: PHYSICAL MEDICINE & REHABILITATION | Admitting: PHYSICAL MEDICINE & REHABILITATION
Payer: COMMERCIAL

## 2025-04-28 ENCOUNTER — ANCILLARY PROCEDURE (OUTPATIENT)
Dept: RADIOLOGY | Facility: AMBULATORY SURGERY CENTER | Age: 60
End: 2025-04-28
Attending: PHYSICAL MEDICINE & REHABILITATION
Payer: COMMERCIAL

## 2025-04-28 VITALS
SYSTOLIC BLOOD PRESSURE: 150 MMHG | DIASTOLIC BLOOD PRESSURE: 83 MMHG | RESPIRATION RATE: 16 BRPM | OXYGEN SATURATION: 99 % | HEART RATE: 61 BPM | TEMPERATURE: 96.5 F

## 2025-04-28 DIAGNOSIS — R52 PAIN: ICD-10-CM

## 2025-04-28 PROCEDURE — 64493 INJ PARAVERT F JNT L/S 1 LEV: CPT | Mod: 50,KX

## 2025-04-28 PROCEDURE — 64494 INJ PARAVERT F JNT L/S 2 LEV: CPT | Mod: 50 | Performed by: PHYSICAL MEDICINE & REHABILITATION

## 2025-04-28 PROCEDURE — 64493 INJ PARAVERT F JNT L/S 1 LEV: CPT | Mod: 50 | Performed by: PHYSICAL MEDICINE & REHABILITATION

## 2025-04-28 RX ORDER — LIDOCAINE HYDROCHLORIDE 10 MG/ML
INJECTION, SOLUTION EPIDURAL; INFILTRATION; INTRACAUDAL; PERINEURAL DAILY PRN
Status: DISCONTINUED | OUTPATIENT
Start: 2025-04-28 | End: 2025-04-28 | Stop reason: HOSPADM

## 2025-04-28 RX ORDER — IOPAMIDOL 408 MG/ML
INJECTION, SOLUTION INTRATHECAL DAILY PRN
Status: DISCONTINUED | OUTPATIENT
Start: 2025-04-28 | End: 2025-04-28 | Stop reason: HOSPADM

## 2025-04-28 RX ORDER — BUPIVACAINE HYDROCHLORIDE 5 MG/ML
INJECTION, SOLUTION EPIDURAL; INTRACAUDAL; PERINEURAL DAILY PRN
Status: DISCONTINUED | OUTPATIENT
Start: 2025-04-28 | End: 2025-04-28 | Stop reason: HOSPADM

## 2025-04-28 NOTE — DISCHARGE INSTRUCTIONS
Home Care Instructions after a Medial Branch Block      In a medial branch block, a local anesthetic (numbing medicine) is injected near the medial branch nerve. This stops the transmission of pain signals from the facet joint. If this reduces your pain and improves your mobility, it may tell the doctor which facet joint is causing the pain. This procedure is a diagnostic procedure and is typically short lasting, with intentions of doing a radiofrequency ablation. With this injection, a steroid to increase the longevity of the blocks effect is rarely used.    Activity  -You may resume most normal activity levels with the exception of strenuous activity. It is important for us to know if your pain with normal activity is relieved after this injection.  -DO NOT shower for 24 hours  -DO NOT remove bandaid for 24 hours    Pain  -You may experience soreness at the injection site for one or two days  -You may use an ice pack for 20 minutes every 2 hours for the first 24 hours  -You may use a heating pad after the first 24 hours  -You may use Tylenol (acetaminophen) every 4 hours or other pain medicines as     directed by your physician    You may experience numbness radiating into your legs or arms (depending on the procedure location). This numbness may last several hours. Until sensation returns to normal; please use caution in walking, climbing stairs, and stepping out of your vehicle, etc.    PLEASE KEEP TRACK OF YOUR SYMPTOMS AND NOTE YOUR IMPROVEMENT FOR YOUR DOCTOR.     Fill out the pain diary and fax it back to us. You do not need to call us if the pain diary was faxed. [phone] is the FAX number  If you do not have access to a fax machine, attach it to [employer].  You do not need to call us if the pain diary was attached to [employer].   If you cannot fax or send via [employer], then call our call center and request to speak with a nurse for follow up after a procedure       Please contact us if you have:  -Severe  pain  -Fever more than 101.5 degrees Fahrenheit  -Signs of infection at the injection site (redness, swelling, or drainage)    FOR PM&R PATIENTS:  For patients seen by the PM and R service, please call [phone]. (Monday through Friday 8a-5p).  After business hours and weekends call [phone] and ask for the PM and R doctor on call). If you need to fax a pain diary to PM&R the fax number is [phone]. If you are unable to fax, uploading to [employer] is encouraged, then send to provider. If you have procedure scheduling questions please call [phone] Option #2.

## 2025-04-28 NOTE — OP NOTE
Procedure(s): INJ PARAVERT F JNT L/S 1 LEV  x2; INJ PARAVERT F JNT L/S 2 LEV x2;  WI INJECTION AA&/STRD OTHER PERIPHERAL NERVE/BRANCH x2     Modifier 50  Pre-Procedure Diagnose(s): Lumbosacral spondylosis without myelopathy; Neuritis; Hip pain,     Post-Procedure Diagnose(s): Lumbosacral spondylosis without myelopathy; Neuritis; Hip pain,       Date: 4/28/2025     ATTENDING: AURE DANIEL    SURGEON: AURE DANIEL    CHIEF COMPLAINT   Low back pain and hip pain. Diagnostic     [facility]  medial branch blocks for her ongoing issues related to low back pain. She was  last seen by me in the clinic recently. At that time she was noted to be  tender over the L4, L5 facets either side. It was   therefore felt treating the affected areas with Medial branch blocks from   L4 to Sacral ala, and Lateral branch block of S1 would be beneficial.  She is here for this purpose.      The benefits and risks of the procedure were reviewed with the patient.    With her informed consent, the patient was taken to the fluoroscopic suite  and placed prone on the table. Universal protocol was followed. TIME OUT   conducted just prior to starting procedure confirmed patient identity, site/side,   procedure, patient position, and availability of correct equipment and implants. Yes     The back was prepped with Betasept and draped in a sterile fashion. Using  fluoroscopy, the junction of the superior articular process and the transverse  process at the L4, L5, and the sacral ala were identified and marked.The lateral aspect of S1 foramina was also marked. Using 1% lidocaine, topical anesthesia was obtained. Using 25-gauge, 5 inch  long needles, the above mentioned points were accessed. Using 5 cc of 0.5% bupivacaine, approximately  0.5 cc were injected into each of the above areas. An additional 0.5 cc was injected as the needle was removed. She tolerated the  procedure well,  experienced relief immediately post injection from 4-5/10 to 1/10. I have  advised her to ice the area for the next day or two, keep a diary regarding pain.    Thank you very much for allowing me to assist with her care.     If you have any further questions, please feel free to contact me.       AURE DANIEL MD

## 2025-04-29 ENCOUNTER — OFFICE VISIT (OUTPATIENT)
Dept: PSYCHIATRY | Facility: CLINIC | Age: 60
End: 2025-04-29
Attending: PSYCHIATRY & NEUROLOGY
Payer: COMMERCIAL

## 2025-04-29 VITALS
SYSTOLIC BLOOD PRESSURE: 133 MMHG | WEIGHT: 261.8 LBS | BODY MASS INDEX: 40.99 KG/M2 | DIASTOLIC BLOOD PRESSURE: 80 MMHG | HEART RATE: 71 BPM

## 2025-04-29 DIAGNOSIS — F31.9 BIPOLAR I DISORDER (H): ICD-10-CM

## 2025-04-29 PROCEDURE — 99214 OFFICE O/P EST MOD 30 MIN: CPT | Performed by: STUDENT IN AN ORGANIZED HEALTH CARE EDUCATION/TRAINING PROGRAM

## 2025-04-29 RX ORDER — LAMOTRIGINE 200 MG/1
400 TABLET ORAL DAILY
Qty: 60 TABLET | Refills: 2 | Status: SHIPPED | OUTPATIENT
Start: 2025-04-29

## 2025-04-29 RX ORDER — LITHIUM CARBONATE 300 MG/1
CAPSULE ORAL
Qty: 270 CAPSULE | Refills: 2 | Status: SHIPPED | OUTPATIENT
Start: 2025-04-29

## 2025-04-29 RX ORDER — LITHIUM CARBONATE 150 MG/1
150 CAPSULE ORAL AT BEDTIME
Qty: 30 CAPSULE | Refills: 2 | Status: SHIPPED | OUTPATIENT
Start: 2025-04-29

## 2025-04-29 ASSESSMENT — PAIN SCALES - GENERAL: PAINLEVEL_OUTOF10: MODERATE PAIN (4)

## 2025-04-29 NOTE — PROGRESS NOTES
[facility]  MEDICAL PROGRESS NOTE     CARE TEAM:    PCP- Mikey Cortés     Therapist- individual therapist Elfego Chan and another one for couple therapy    Angy is a 59 year old who uses the pronouns she, her, hers.      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  She talked about increase in ruminating thoughts about her daughter coming back to the . Last year, her daugther moved to [place] to start college, but she had decided to come back home. Angy is helping with that, school and housing, which taking her time. She is also thinking about the divorce too.     Sleep is okay. She has MARLA. She uses a CPAP machine. She sometimes wake up once or twice at night, but went back to sleep. She has seen her OBGYN doc. They were planing to taper her off HRT, but later discussed they can do it later. They have two years to do so.     Today, we discussed and she agreed with   - continue to attend therapy, and discuss those ruminating thoughts with therapist.   - She can discuss with her new provider about Lamictal once she is off HRT.      No safety concerns. We went over labs result. They are within normal limit    .     Psychotropic Drug Interactions:  [PSYCHCLINICDDI]  Estrogen Derivatives may decrease the serum concentration of LamoTRIgine  Angiotensin-Converting Enzyme Inhibitors may increase the serum concentration of Lithium.   LamoTRIgine may decrease the serum concentration of Progestins.      Management: routine monitoring    [place]MP was not checked today: not using controlled substances    Risk Statements:   Treatment Risk- Risks, benefits, alternatives and potential adverse effects have been discussed and are  "understood.   Safety Risk-Angy did not appear to be an imminent safety risk to self or others.     Plan     1) Medications:    - Continue Lithium 1050 mg at bedtime  - Continue with Lamictal 400 mg at bedtime   - continue with hydroxyzine 10 mg PRN at bedtime   - Continue with melatonin 1mg to 3mg at bedtime as needed for insomnia    2) Psychotherapy: Continue with therapy    3) Next due:  Labs- 10/2025  EKG- . Last one was on 03/2024 with   Rating scales-  PHQ9    4) Referrals: none  N/A    5) Other: none    6) Follow-up: Return to clinic in 6 weeks     Pertinent Background                                                   [most recent eval 08/31/23]     Angy reported that she didn't have symptoms until November 2018. At which point she was hospitalized for anthony.     This section was copied forward from 08/2021 transfer notes.  \"Patient has not had significant mental health history until 2018, when she developed perimenopausal mood and anxiety symptoms.  She was subsequently referred to our clinic following a hospitalization for anthony in November 2018.  Her care has been complicated by sensitivity to antipsychotics (EPSE including difficulty swallowing), thrombocytopenia secondary to Depakote, lithium toxicity in setting of concurrent Depakote use\"     Pertinent items include: anthony , severe med reaction [described as H/o Thrombocytopenia secondary to Depakote, and H/o lithium toxicity], and psych hosp      Subjective     Angy reported that she is doing okay overall. She continues to wake up one or twice at night, but not every night.   She is having more ruminating thoughts. She is thinking of her daughter coming back, the house to take care of, and her ex wife.   Daughter is coming back from [place] to [place].  Her mind is always busy. Recently she has been having more negative talk.   She is telling herself that she can't take a decision, not good in organizing.  She denied elevated mood, no grandiosity, " "her mind is busy,   Still getting up at night twice, but went back to sleep right away.    Regarding her HRT, they are not planing to taper off now. Most likely when she turns 62.     Anxiety: yes,     No hallucinations or paranoia    Sleep: 7 to 8 hours. Usually wakes up refreshed. She uses a CPAP machine    Appetite: same but she is having desert, 5 to 6 days a week.    Medication side effects: denied    SI/HI: none    Alcohol use none    Smoking: none    Other substances (cannabis): none       Mental Status Exam     Alertness: alert  and oriented  Appearance: adequately groomed  Behavior/Demeanor: cooperative and pleasant, with good  eye contact   Speech: normal and regular rate and rhythm  Language: intact  Psychomotor: normal or unremarkable  Mood:  \"overall okay\"  Affect: full range; congruent to: mood- yes, content- yes  Thought Process/Associations: unremarkable  Thought Content:  Reports none;  Denies suicidal & violent ideation and delusions  Perception:  Reports none;  Denies auditory hallucinations and visual hallucinations  Insight: good  Judgment: good  Cognition: does  appear grossly intact; formal cognitive testing was not done  Gait and Station: unremarkable     Past Psych Med Trials      Medication Max Dose (mg) Dates / Duration Helpful? DC Reason / Adverse Effects?   Depakote    Tremors   sertraline       olanzapine    EPSE   Seroquel    EPSE   lithium 1200   Tremor at 1200 mg had to lower the dose   Wellbutrin 300 15 years ago     Lamictal 400  yes       Treatment Course and Resendiz Events since  JULY 2023 08/2023 : no medication changes  11/2023 no medication changes  01/2023 no medication changes  03/2024 no medication changes  04/2024 no medication changes  06/2024 Increase Lamictal to 400 mg  07/2024 no medication change  08/2024 no medication changes  09/2024 no medication changes  11/2024 no medication changes but encourage to use hydroxyzine as needed and melatonin 1 to 2 mg to limit " sedation.   12/2024 no medication changes  01/2024 no medication changes  3/2024 no medication changes  4/2024 no medication changes     Vitals   /80   Pulse 71   Wt 118.8 kg (261 lb 12.8 oz)   LMP  (LMP Unknown)   BMI 40.99 kg/m    Pulse Readings from Last 3 Encounters:   04/29/25 71   04/28/25 61   04/24/25 75     Wt Readings from Last 3 Encounters:   04/29/25 118.8 kg (261 lb 12.8 oz)   04/24/25 118.5 kg (261 lb 4.8 oz)   04/14/25 119.3 kg (263 lb)     BP Readings from Last 3 Encounters:   04/29/25 133/80   04/28/25 (!) 150/83   04/24/25 114/68      Medical History     ALLERGIES: Seroquel [quetiapine], Olanzapine, Penicillins, Risperidone, and Thimerosal    Patient Active Problem List   Diagnosis    Oral aphthae    CARDIOVASCULAR SCREENING; LDL GOAL LESS THAN 160    Rosacea    Raynaud disease    Vitamin D deficiency disease    MARLA (obstructive sleep apnea)    Atopic rhinitis    Chronic bilateral low back pain without sciatica    Obesity, Class III, BMI 40-49.9 (morbid obesity) (H)    Muscle tension dysphonia    Palpitations    Dysphagia, unspecified type    Anxiety    Benign essential hypertension    Callus of foot    Varicose veins of right lower extremity with edema    Bipolar I disorder (H)    Dribbling of urine    Bilateral low back pain with right-sided sciatica    Spinal stenosis at L4-L5 level    Facet arthropathy, lumbar    Acute pain of right knee    Unspecified inflammatory spondylopathy, lumbar region    Exertional angina    Disorder of sacrum    Myalgia, other site    Trochanteric bursitis of both hips    Lumbosacral spondylosis without myelopathy        Medications     Current Outpatient Medications   Medication Sig Dispense Refill    acetaminophen (TYLENOL) 650 MG CR tablet Take 1 tablet (650 mg) by mouth every 8 hours as needed for mild pain or fever      azelastine (ASTELIN) 0.1 % nasal spray Spray 2 sprays into both nostrils 2 times daily. 30 mL 2    Carboxymethylcellulose Sod PF  (REFRESH PLUS) 0.5 % SOLN ophthalmic solution Place 1 drop into both eyes 3 times daily as needed for dry eyes      clotrimazole-betamethasone (LOTRISONE) 1-0.05 % external cream Apply topically 2 times daily 45 g 2    COMPOUNDED NON-CONTROLLED SUBSTANCE (CMPD RX) - PHARMACY TO MIX COMPOUNDED MEDICATION Semaglutide 1mg injection every 7 days 4 each 2    estradiol (ESTRACE) 0.5 MG tablet Take 1 tablet (0.5 mg) by mouth daily. 90 tablet 3    estradiol (VAGIFEM) 10 MCG TABS vaginal tablet Place 1 tablet (10 mcg) vaginally twice a week. 26 tablet 3    hydrOXYzine HCl (ATARAX) 10 MG tablet Take 1 tablet (10 mg) by mouth nightly as needed (sleep). 30 tablet 2    lamoTRIgine (LAMICTAL) 200 MG tablet Take 2 tablets (400 mg) by mouth daily. 60 tablet 2    lisinopril (ZESTRIL) 10 MG tablet Take 1 tablet (10 mg) by mouth daily. 90 tablet 3    lithium (ESKALITH) 150 MG capsule Take 1 capsule (150 mg) by mouth at bedtime. 30 capsule 2    lithium 300 MG capsule Take 3 tablet (900 mg) and one tablet (150 mg) for a total of 1050 mg at bedtime. 270 capsule 2    Loratadine 10 MG capsule Take 10 mg by mouth daily      medroxyPROGESTERone (PROVERA) 2.5 MG tablet Take 1 tablet (2.5 mg) by mouth daily. 90 tablet 3    metroNIDAZOLE (METROGEL) 1 % external gel Apply topically daily. 60 g 9    ORDER FOR DME Use your CPAP device as directed by your provider.      VITAMIN D, CHOLECALCIFEROL, PO Take 2,000 Units by mouth daily          Labs and Data         11/4/2024    12:47 PM 12/10/2024     2:54 PM 3/23/2025     6:59 PM   PROMIS-10 Total Score w/o Sub Scores   PROMIS TOTAL - SUBSCORES 25  28  29        Patient-reported          No data to display                  1/28/2025     7:55 AM 3/24/2025     8:44 AM 4/28/2025     5:50 PM   PHQ-9 SCORE   PHQ-9 Total Score MyChart 6 (Mild depression) 7 (Mild depression) 6 (Mild depression)   PHQ-9 Total Score 6  7  6        Patient-reported         7/26/2024    10:56 AM 11/4/2024    12:46 PM 12/10/2024      3:01 PM   JOANNA-7 SCORE   Total Score 8 (mild anxiety) 11 (moderate anxiety) 15 (severe anxiety)   Total Score 8 11  15        Patient-reported       Liver/Kidney Function, TSH Metabolic Blood counts   Recent Labs   Lab Test 03/18/25  1033 07/11/24  0809   AST 18  --    ALT 15  --    ALKPHOS 76  --    CR 0.60 0.63     Recent Labs   Lab Test 07/11/24  0809   TSH 2.43    Recent Labs   Lab Test 09/27/24  0740   CHOL 168   TRIG 136   LDL 87   HDL 54     Recent Labs   Lab Test 09/27/24  0740   A1C 5.1     Recent Labs   Lab Test 03/18/25  1033   GLC 75    Recent Labs   Lab Test 09/27/24  0740   WBC 6.4   HGB 13.7   HCT 41.6   MCV 90            Recent Labs   Lab Test 03/18/25  1033 07/11/24  0809 11/08/23  1016   LITHIUM 1.01 1.00 0.69        PROVIDER: Antonio Santillan MD MPH    Level of Medical Decision Making:   - At least 1 chronic problem that is not stable  - Engaged in prescription drug management during visit (discussed any medication benefits, side effects, alternatives, etc.)         Patient was not staffed in clinic.  Supervisor is Dr. Vivian Burnett.      The longitudinal plan of care for Bipolar disorder was addressed during this visit. Due to the added complexity in care, I will continue to support Angy in the subsequent management of this condition(s) and with the ongoing continuity of care of this condition(s)

## 2025-05-01 ENCOUNTER — TELEPHONE (OUTPATIENT)
Dept: PHYSICAL MEDICINE AND REHAB | Facility: CLINIC | Age: 60
End: 2025-05-01
Payer: COMMERCIAL

## 2025-05-01 NOTE — TELEPHONE ENCOUNTER
Called patient back to review her Pain Diary results and review next steps    Procedure follow-up:    Follow up after Bilateral L4-S1 Medial Branch Block #1   Date of service: 4/28/25  Information reviewed via phone call with patient    Location of pain: low back pain  Percentage & duration of pain relief after procedure:    Hours 1-2 post-injection: 50-60% relief provided  Hours 3-6 post-injection: 50% relief provided  Hour 7 post-injection: 20% relief provided  Hour 8 post-injection: pain returned to baseline level of pain     Additional patient comments: Patient reports she had a hard time  the injection pain to fully evaluate her relief of pain/symptoms following the injection. She additionally had the right foot tingling and numbness issue that she has had periodically in the past that would last for a few minutes at a time and this time when it started lasted for a lot longer.     Reviewed the pathway of Medial Branch Blocks to Radiofrequency Ablation injections with patient. Notified patient this response will be sent to Dr. Carmona to review her results to verify she will be proceeding with the 5/12/25 injection. Patient verbalized understanding and will await a response on the plan.    Follow up: Routed to Dr. Carmona to review and advise if patient would still plan to proceed with her Bilateral L4-S1 Medial Branch Block #2 she has scheduled on 5/12/25 with Dr. Carmona.    Milana Stevens, BSN RN  RN Care Coordinator for Physical Medicine and Rehabilitation  [facility] [address] [address]  Main clinic office: [phone]  Main clinic fax: [phone]

## 2025-05-01 NOTE — TELEPHONE ENCOUNTER
[facility] Call Center    Phone Message    May a detailed message be left on voicemail: yes     Reason for Call: Other: Pt is calling and would like to talk with a nurse to see what the next step is going to be with her treatment also has questions on the pain diary. Please call Pt back to discuss.      Action Taken: Message routed to:  [facility] PMR    Travel Screening: Not Applicable     Date of Service:

## 2025-05-02 ENCOUNTER — MYC MEDICAL ADVICE (OUTPATIENT)
Dept: FAMILY MEDICINE | Facility: CLINIC | Age: 60
End: 2025-05-02
Payer: COMMERCIAL

## 2025-05-03 NOTE — TELEPHONE ENCOUNTER
I can consider switching to SL GLP-1 and podiatry work up/referral via Evisit if it is most convenient to her       thx

## 2025-05-05 ENCOUNTER — E-VISIT (OUTPATIENT)
Dept: FAMILY MEDICINE | Facility: CLINIC | Age: 60
End: 2025-05-05
Payer: COMMERCIAL

## 2025-05-05 DIAGNOSIS — E66.813 CLASS 3 SEVERE OBESITY DUE TO EXCESS CALORIES WITH SERIOUS COMORBIDITY AND BODY MASS INDEX (BMI) OF 45.0 TO 49.9 IN ADULT (H): Primary | ICD-10-CM

## 2025-05-05 DIAGNOSIS — L84 CALLUS OF FOOT: ICD-10-CM

## 2025-05-05 DIAGNOSIS — E78.2 MIXED HYPERLIPIDEMIA: ICD-10-CM

## 2025-05-05 DIAGNOSIS — I10 BENIGN ESSENTIAL HYPERTENSION: ICD-10-CM

## 2025-05-05 DIAGNOSIS — G47.33 OSA (OBSTRUCTIVE SLEEP APNEA): ICD-10-CM

## 2025-05-05 NOTE — TELEPHONE ENCOUNTER
----- Message -----  From: Ang Carmona MD  Sent: 5/2/2025   5:44 PM CDT  To: Milana Stevens RN    Thanks. Will proceed.     Ang Carmona MD  ----- Message -----  From: Milana Stevens RN  Sent: 5/1/2025  12:44 PM CDT  To: Ang Carmona MD    ----- Message from Milana Stevens RN sent at 5/1/2025 12:44 PM CDT -----  Please see telephone encounter note for full response. Can you please advise if she will plan to proceed with Bilateral L4-S1 Medial Branch Block #2 she has scheduled 5/12/25 with you or if there is a different treatment recommendation?    Thank you,    Milana

## 2025-05-06 ENCOUNTER — PATIENT OUTREACH (OUTPATIENT)
Dept: CARE COORDINATION | Facility: CLINIC | Age: 60
End: 2025-05-06
Payer: COMMERCIAL

## 2025-05-08 ENCOUNTER — PATIENT OUTREACH (OUTPATIENT)
Dept: CARE COORDINATION | Facility: CLINIC | Age: 60
End: 2025-05-08
Payer: COMMERCIAL

## 2025-05-12 ENCOUNTER — HOSPITAL ENCOUNTER (OUTPATIENT)
Facility: AMBULATORY SURGERY CENTER | Age: 60
Discharge: HOME | End: 2025-05-12
Attending: PHYSICAL MEDICINE & REHABILITATION | Admitting: PHYSICAL MEDICINE & REHABILITATION
Payer: COMMERCIAL

## 2025-05-12 ENCOUNTER — ANCILLARY PROCEDURE (OUTPATIENT)
Dept: RADIOLOGY | Facility: AMBULATORY SURGERY CENTER | Age: 60
End: 2025-05-12
Attending: PHYSICAL MEDICINE & REHABILITATION
Payer: COMMERCIAL

## 2025-05-12 VITALS
HEART RATE: 66 BPM | RESPIRATION RATE: 16 BRPM | DIASTOLIC BLOOD PRESSURE: 78 MMHG | TEMPERATURE: 97 F | OXYGEN SATURATION: 100 % | WEIGHT: 261 LBS | BODY MASS INDEX: 40.97 KG/M2 | HEIGHT: 67 IN | SYSTOLIC BLOOD PRESSURE: 148 MMHG

## 2025-05-12 DIAGNOSIS — M47.817 LUMBOSACRAL SPONDYLOSIS WITHOUT MYELOPATHY: ICD-10-CM

## 2025-05-12 PROCEDURE — 64493 INJ PARAVERT F JNT L/S 1 LEV: CPT | Mod: 50,KX

## 2025-05-12 PROCEDURE — 64494 INJ PARAVERT F JNT L/S 2 LEV: CPT | Mod: 50 | Performed by: PHYSICAL MEDICINE & REHABILITATION

## 2025-05-12 PROCEDURE — 64493 INJ PARAVERT F JNT L/S 1 LEV: CPT | Mod: 50 | Performed by: PHYSICAL MEDICINE & REHABILITATION

## 2025-05-12 RX ORDER — IOPAMIDOL 408 MG/ML
INJECTION, SOLUTION INTRATHECAL DAILY PRN
Status: DISCONTINUED | OUTPATIENT
Start: 2025-05-12 | End: 2025-05-12 | Stop reason: HOSPADM

## 2025-05-12 RX ORDER — LIDOCAINE HYDROCHLORIDE 10 MG/ML
INJECTION, SOLUTION EPIDURAL; INFILTRATION; INTRACAUDAL; PERINEURAL DAILY PRN
Status: DISCONTINUED | OUTPATIENT
Start: 2025-05-12 | End: 2025-05-12 | Stop reason: HOSPADM

## 2025-05-12 RX ORDER — BUPIVACAINE HYDROCHLORIDE 5 MG/ML
INJECTION, SOLUTION EPIDURAL; INTRACAUDAL; PERINEURAL DAILY PRN
Status: DISCONTINUED | OUTPATIENT
Start: 2025-05-12 | End: 2025-05-12 | Stop reason: HOSPADM

## 2025-05-12 NOTE — OP NOTE
Procedure(s): INJ PARAVERT F JNT L/S 1 LEV  x2; INJ PARAVERT F JNT L/S 2 LEV x2;  MI INJECTION AA&/STRD OTHER PERIPHERAL NERVE/BRANCH x2     Modifier 50  Pre-Procedure Diagnose(s): Lumbosacral spondylosis without myelopathy; Neuritis; Hip pain,     Post-Procedure Diagnose(s): Lumbosacral spondylosis without myelopathy; Neuritis; Hip pain,       Date: 5/12/2025     ATTENDING: AURE DANIEL    SURGEON: AURE DANIEL    CHIEF COMPLAINT   Low back pain and hip pain. Diagnostic, 2nd      [facility]  medial branch blocks for her ongoing issues related to low back pain. She was  last seen by me in the clinic recently. At that time she was noted to be  tender over the L4, L5 facets either side. It was   therefore felt treating the affected areas with Medial branch blocks from   L4 to Sacral ala, and Lateral branch block of S1 would be beneficial.  She is here for this purpose.      The benefits and risks of the procedure were reviewed with the patient.    With her informed consent, the patient was taken to the fluoroscopic suite  and placed prone on the table. Universal protocol was followed. TIME OUT   conducted just prior to starting procedure confirmed patient identity, site/side,   procedure, patient position, and availability of correct equipment and implants. Yes     The back was prepped with Betasept and draped in a sterile fashion. Using  fluoroscopy, the junction of the superior articular process and the transverse  process at the L4, L5, and the sacral ala were identified and marked.The lateral aspect of S1 foramina was also marked. Using 1% lidocaine, topical anesthesia was obtained. Using 25-gauge,3.5 inch  long needles, the above mentioned points were accessed. Using 5 cc of 0.5% bupivacaine, approximately  0.5 cc were injected into each of the above areas. An additional 0.5 cc was injected as the needle was removed. She tolerated the  procedure  well, experienced relief immediately post injection from 5/10 to 1/10. I have  advised her to ice the area for the next day or two, keep a diary regarding pain.    Thank you very much for allowing me to assist with her care.     If you have any further questions, please feel free to contact me.       AURE DANIEL MD

## 2025-05-12 NOTE — DISCHARGE INSTRUCTIONS
Home Care Instructions after a Medial Branch Block      In a medial branch block, a local anesthetic (numbing medicine) is injected near the medial branch nerve. This stops the transmission of pain signals from the facet joint. If this reduces your pain and improves your mobility, it may tell the doctor which facet joint is causing the pain. This procedure is a diagnostic procedure and is typically short lasting, with intentions of doing a radiofrequency ablation. With this injection, a steroid to increase the longevity of the blocks effect is rarely used.    Activity  -You may resume most normal activity levels with the exception of strenuous activity. It is important for us to know if your pain with normal activity is relieved after this injection.  -DO NOT shower for 24 hours  -DO NOT remove bandaid for 24 hours    Pain  -You may experience soreness at the injection site for one or two days  -You may use an ice pack for 20 minutes every 2 hours for the first 24 hours  -You may use a heating pad after the first 24 hours  -You may use Tylenol (acetaminophen) every 4 hours or other pain medicines as     directed by your physician    You may experience numbness radiating into your legs or arms (depending on the procedure location). This numbness may last several hours. Until sensation returns to normal; please use caution in walking, climbing stairs, and stepping out of your vehicle, etc.      PLEASE KEEP TRACK OF YOUR SYMPTOMS AND NOTE YOUR IMPROVEMENT FOR YOUR DOCTOR.     Fill out the pain diary and fax it back to us. You do not need to call us if the pain diary was faxed. [phone] is the FAX number  If you do not have access to a fax machine, attach it to [facility].  You do not need to call us if the pain diary was attached to [facility].   If you cannot fax or send via [facility] [place], then call our call center and request to speak with a nurse for follow up after a procedure       Please contact us if you have:  -Severe  pain  -Fever more than 101.5 degrees Fahrenheit  -Signs of infection at the injection site (redness, swelling, or drainage)      FOR PM&R PATIENTS:  For patients seen by the PM and R service, please call [phone]. (Monday through Friday 8a-5p).  After business hours and weekends call [phone] and ask for the PM and R doctor on call). If you need to fax a pain diary to PM&R the fax number is [phone]. If you are unable to fax, uploading to [place] is encouraged, then send to provider. If you have procedure scheduling questions please call [phone] Option #2.

## 2025-05-14 ENCOUNTER — HOSPITAL ENCOUNTER (OUTPATIENT)
Dept: NUTRITION | Facility: CLINIC | Age: 60
Discharge: HOME | End: 2025-05-14
Admitting: FAMILY MEDICINE
Payer: COMMERCIAL

## 2025-05-14 ENCOUNTER — MYC MEDICAL ADVICE (OUTPATIENT)
Dept: PHYSICAL MEDICINE AND REHAB | Facility: CLINIC | Age: 60
End: 2025-05-14
Payer: COMMERCIAL

## 2025-05-14 DIAGNOSIS — M47.817 LUMBOSACRAL SPONDYLOSIS WITHOUT MYELOPATHY: Primary | ICD-10-CM

## 2025-05-14 PROCEDURE — 97803 MED NUTRITION INDIV SUBSEQ: CPT | Mod: GT,95 | Performed by: DIETITIAN, REGISTERED

## 2025-05-14 NOTE — TELEPHONE ENCOUNTER
Procedure follow-up:    Follow up after Bilateral L4-S1 Medial Branch Block # 2  Date of service: 5/12/25  Information reviewed via Pain Diary submitted by patient via My Chart message    Location of pain: low back pain  Percentage & duration of pain relief after procedure:     Hours 1-2 post-injection: 100% relief in pain   Hours 3-4 post-injection: 80% relief in pain  Hours 5-6 post-injection: 20% relief in pain    Follow up: Routed to Dr. Carmona to review and advise if he will be placing an order for patient to proceed to Radiofrequency Ablation injections.      ALY CarlsonN RN  RN Care Coordinator for Physical Medicine and Rehabilitation  [facility] [address] [address]  Main clinic office: [phone]  Main clinic fax: [phone]

## 2025-05-14 NOTE — PROGRESS NOTES
Virtual Visit Details    Type of service:  Video Visit     Originating Location (pt. Location): Other work setting     Distant Location (provider location):  On-site  Platform used for Video Visit: Well     NUTRITION REASSESSMENT NOTE   REASON FOR ASSESSMENT  Angy LILIAN Haji referred by Dr. Cortés for MNT related to  Obesity, Class III, BMI 40-49.9 (morbid obesity) (H) [E66.01]          Patient accompanied by self      NUTRITION HISTORY  Patient continues with multiple life changes and stressors.  Patient aware that she likes to self soothe with ice cream which she has recently increased.  Patient is aware that social cues trigger her to eat. Patient's spouse recently had weight loss surgery so there is adjust with eating in the home.      3/7/2022 Self soothe in evening snack-made tea or tells self ate late dinner and does not need snack.  Patient asking self if physically hungry.  Patient drinking fizzy water or malt o meal as evening snack.  Patient aware she has been stress eating.  Tracking when not having dessert -identifying why eating  Exercise-cross country skiing; mall walk; snow shoeing; treadmill 2 times haring treadmill   Yogurt; cheese and crackers; cereal      Patient wants to increase exercise time as will be going to [place] weekend ([place]) [facility]      4/25/2002 Patient able to get 45-60 minutes exercise on weekends and 20-30 minutes of exercise daily.  Patient wants to lose 5 lbs prior to trip to [place].  Patient was able to have 3 days no desserts and then 1 week no dessert. 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  Patient determined ways she could lose weight: Dessert 2 times per week, add treadmill, eliminate morning snack, 1 snack at 3 PM. Alcohol 1 time per week, no caramel lattes, food is fuel, it's too late to be hungry, spouse brings sweet treats without asking, tea/bath/shower      6/15/2022 Patient skips dessert if eats  late dinner.  Patient felt discouraged with weight loss as wanted to lose more weight (down 3.5 lbs). Patient wants weight loss for functional reasons.  Patient signed up for the [place] bike tour.  Patient has increased cravings and thoughts about ice cream.  Shift in relationship with spouse after spouse's weight loss surgery.      7/27/2022 Patient increased exercise-[place] with bike [employer]; started the last week in June for bike rides (4 rides different times in 2 weeks-9 miles).  Rode in 18 mile race.  Went hiking on vacation[place].     No dessert (hit or miss) 50% 3 days no dessert; ice cream and s'mores on vacation     Typical intake:  Greek yogurt + 1/4 cup granola + fruit + 1/2 and 1/2 with coffee or english muffin with peanut butter/butter and strawberry jam or eggs scrambled or hard boiled  Leftovers -1 cup cherries; cherry tomatoes; 1/2 cup pea pods + 3 oz pork tenderloin (teryaki)   Watermelon, pork tenderloin, 1 cup rice + tomatoes/pea pods; cheeseburger with whole wheat bun + vegetables + watermelon   Snacks: croissant ([place]) [employer] oat squares (1 cup dry) (3:00 PM) cheese and crackers    Beverages: coffee; water; fizzy water      Emotional eating at night-ice cream      9/21/2022 Patient has been able to bike 2 times per week.  Patient's weight has been stable.  Patient has not been able to track dinner and snacks.  Stress eating ebbs and flows.  Trying to save dessert for the weekend or may self soothe during th week with ice cream.  Patient stress eating in the evening and may boredom eat prior to lunch (cereal).    Weight: 308.4 lbs       11/16/2022 Biking 2 times per week. Tour biking race: [place] 14 miles (rode by herself). [facility] 12 mile hilly bike ride. (rode with friend).  Patient has consistently walked dog and completes PT exercises.  Patient determining plan for winter exercise.    314.4 lbs (food and sleep changes) Patient  was eating more sweets as patient's wife baked sweets this fall (3-4 weeks with increase of sweets).  Holiday baking plan consists of 5 cookies and 1 candy (peanut brittle).  Ice cream consumption has increased recently.      1/23/2023 Patient was able to bake holiday cookies and freeze them.  Patient has been eating desserts 5 days a week.  Patient has been substituting greek yogurt (flavored or vanilla) or cream of wheat for dessert.  Patient was able to snow shoe.  Patient having barrier with getting on treadmill.  Patient's weight has fluctuated 325.8 and now 316 lbs.  Patient has been working on sleep routine (classical music; reading on paper vs ipad).      3/17/2023 Patient having knee pain and went to orthopedic MD and got cortizone shot in knee and able to walk know. Walked dog 3 times this week. Swimming -[facility] (6 sameer pool). Tyler wants to swim with goal of swimming 2 times per week.  Patient states ice cream and dessert are hit or miss. Patient shifting mindset to days having dessert vs not having dessert. Patient has not been as mindful with practicing self soothe at night.  Has tried tea and engaging book.     5/3/2023 PT exercises for knee; walking 15-30 minutes per day with dog + swimming 1 time per week (9 laps); 5 days per week desserts -patient using self talk to reduce dessert intake. Patient trying to limit desserts.  Patient's wife brining bread and sweets into home in which patient feels compels to eat as not to waste food.  Patient wants to be aware of foods when dining out and would like to split meal with spouse. Patient using zinger tea to soothe at night.       6/28/2023 No dessert 1-2 days; vacation and party so feels increase in desserts.  Patient getting more movement at work by taking stairs at work and size of building as no longer working remote.  Going to PT; less dog walks due to driving to work; found new lunch place to eat lunch at work (3-4 days per week-20  minute walk) -reading book and eating lunch; aims for hour lunch -reading for self care      8/9/2023 Patient is being intentional for choosing desserts.  Skipped ice cream since had late dinner.  Grabbed snack when dinner wasn't ready prior to function and ate later-took cheese and fruit then ate chicken kebab onion, bell pepper, chicken -316 lbs currently.  Was lower weight last week. Patient trying to balance stress in life.      10/4/2023 Patient having 1 night per week not having dessert.  Patient feels like eating ice cream when alone at home while watching a favorite show and feels more indulgent.  Patient did not participate in [email or web address] as hoped.  Patient will be participating in [employer] this weekend.  Patient continues to walk dog daily.  Patient also continues with increased life stressors.  Reading in the evening + fizzy water.      12/13/2023 Patient with reduction in weight trends: weight 307.4 lbs and 309 lbs.  Patient had stressful family situation which caused drop in appetite (past 2-3 weeks).  Patient states was not using food as coping strategy. Patient having dessert 1-6 days per week with most desserts  3-4 desserts per week -writes reason why no dessert-sets goals in advance of no dessert; no dessert for today or too late to eat dessert; food trigger from spouse (will see eating popcorn and will then eat it if she is -likes caramel corn); self care-journaling as needed; playing piano for fun 3 times per week; movement -walking dog + biking ([place]) + hour walk      1/24/2024 Patient working on self care at night- reading, limited TV watching, playing piano in the evening (5-10 minutes); desserts -aiming for 4 per week 3-5-1-1-3. Patient choose Malt-O meal as replacement for dessert; 304-312 lbs; less cookie baking during the holidays; exercise reduced with cold weather (walking outside)-treadmill; stairs; no pool. Patient working on exercise plan.       4/17/2024  Patient with extra stress in life which has reduced desire to eat.  Patient working on increasing exercise intensity for upcoming trip.Treadmill-working on distance and incline 4 days per week 15-46 minutes + additional walking. Patient working on self soothe options besides food. Patient plans to start weight loss medication and will be working with pharmacist.  Patient also planning to meet with weight loss clinic.      7/10/2024 Patient focusing on adding movement-treadmill; lunchtime walking 20-30 minutes; biking everything other week 8-16 miles. Patient started Wegovy-283.4 lbs. Patient losing 1-2 lbs per week. Symptoms- no appetite, nausea, acid reflux -spicy, constipation      Greek yogurt or eggs or peanut butter on english muffin (3 T)     9/30/2024 Patient has lost 34 lbs. Wegovy 1 mg (3rd month) Patient states appetite low. Patient eating regular meals. Patient has only forgotten to eat on occasion. Patient aware needs to prioritize eating. Patient with back pain walking at slow speeds. Patient with mild nausea and GI distress. Patient working on self care-getting outside; reading; time with friends. Patient will stress eat-ice cream.      Split pea and ham soup; salad with veggies   Cream of wheat and yogurt    Burgers or chicken or BLT      11/11/2024 Patient eating desserts daily-ice cream. Patient scheduling meals as sometimes forgetting to eat. Patient may have constipation or diarrhea. Unable to tolerate 1/2 dose Miralax daily. Patient with reduced appetite on Wegovy and feels gibbs faster so eating 1/2 portions. Holidays-prioritize dessert (watch portions) and choose 3 cookies to bake for [place].       Greek yogurt or english muffin  Pork tenderloin + cutie + pretzels   Chicken stir gomez      2/5/2025 Patient working on self care in the evening (watching series, resumed herbal tea or hot water, limited holidays treats, dog training 10 minutes). Cooking turkey garvin bean chili; spicy tomato white  "bean soup; rotissers chicken nachos; artichoke dip - peppers, broccoli, carrot chips with chips on side; gluten free pizza frozen; 3 weeks of Wegovy left -eating more slowly; dining out lasting 2-3 meals; reducing appetite -forgot to eat breakfast; feels full quickly; dry mouth; less hungry; movement-goal PT exercises 5 of 7 days; walking dog outside        Frittata   Chicken thighs + wild rice       Turkey garvin meneess chili      5/14/2025 1 day per week walking with friend 30-60 minutes; daily dog walks; using apple watch to track; Wegovy -decreased appetite and feeling full more quickly; compounded Wegovy-subliungual Wegovy currently. Patient weigh remains stable at 261 lbs. Patient feels has uptake in desire for sugary/fatty foods. Patient working on self care -reading/TV shows, baths, talking with friends. Patient will be following up with medical weight loss clinic and working on task list. Patient is unsure if desires weight loss surgery or not.      Cookies; swedish fish; ice cream        DIET RECALL   Breakfast: cereal and banana; yogurt + granola (measures), fruit + coffee; 2 slices toast with butter + 1/2 cup cottage cheese    Lunch: leftovers (includes fruit and vegetable daily)  Snack: fruit or cheese with lower salt nut thin crackers   Dinner: Tuna steak, Asian marinade, stir gomez, baked vegetable + brown rice (3/4 cup) + canned peaches; chicken thighs with beans and rice; sheet pan dinner with chicken and vegetables or grilled salmon and grilled vegetables; taco; protein + fruit and vegetable  Snacks: fruit, string cheese, Ritz crackers or yogurt; dessert in the evening (ice cream recently)  Beverages: mainly water, coffee, mineral water  Dining out: none currently  Exercise: Patient exercising 3-4 times per week. (walking and walking dog).      MEDICATIONS:  Reviewed      ANTHROPOMETRICS  Height: 5'8\" (note height change in chart previously 5'9\")  Weight: 261.9 lbs per patient    BMI (kg/m2): 39.8 kg/m2 "   %IBW: 187%  Weight History:   Wt Readings from Last 10 Encounters:   05/12/25 118.4 kg (261 lb)   04/24/25 118.5 kg (261 lb 4.8 oz)   04/14/25 119.3 kg (263 lb)   04/07/25 119.4 kg (263 lb 3.2 oz)   02/05/25 118.8 kg (261 lb 14.4 oz)   01/17/25 119.3 kg (263 lb)   12/11/24 120 kg (264 lb 9.6 oz)   10/22/24 122 kg (269 lb)   09/27/24 123.4 kg (272 lb)   07/18/24 127.1 kg (280 lb 4.8 oz)                ASSESSED NUTRITION NEEDS  Estimated Energy Needs: 3516-4630 kcals (15-20 kcals/kg) for gradual weight loss   Estimated Protein Needs: 66-79 (1-1.2 gm/kg IBW) for repletion with exercise  RMR: 1440     EVALUATION/PROGRESS TOWARDS GOALS   Previous goals:  Self care between 8:00-9:00 PM 5 days per week -met   Make plan Wegovy titration -met   Restart movement -met     Previous Nutrition Diagnosis: Obesity related to excess energy intake as evidenced by BMI of 39.8  kg/m2-no change     Current Nutrition Diagnosis:Obesity related to excess energy intake as evidenced by BMI of 40.8 kg/m2     INTERVENTIONS  Nutrition Prescription   Recommend exercise 5 times per week; track intake if eating mindlessly; increase fiber to 25-30 grams per day      Implementation:   Meals and Snacks: Continue eating 3 meals + deliberate snacking (if hungry)   Nutrition Education (Content):              a)  Discussed progress towards goals. Commended patient for awareness of eating patterns. Reviewed current food and eating behavior challenges. Continued discussion around self care as way to improve emotional eating. Discussed weight loss medication and current symptoms. Supported patient in her struggle with food and weight.   Nutrition Education (Application):              a) Discussed meal prep options.                b) Patient verbalized understanding of diet by stating will eat at the table.     Expected patient engagement: good     CURRENT GOALS   Eat at table   Add vegetables at lunch and dinner   Mid afternoon snack 3:00-3:30 PM   Get  bike out      FOLLOW UP/MONITORING    Patient to follow up in 8 weeks   Patient has RD contact information      Time spent with patient: 32 minutes     Mattie Amaya, RD, LD  [facility] Outpatient Dietitian  [phone] (office phone)

## 2025-05-15 ENCOUNTER — TELEPHONE (OUTPATIENT)
Dept: PHYSICAL MEDICINE AND REHAB | Facility: CLINIC | Age: 60
End: 2025-05-15
Payer: COMMERCIAL

## 2025-05-15 NOTE — TELEPHONE ENCOUNTER
----- Message -----  From: Ang Carmona MD  Sent: 5/14/2025  12:27 PM CDT  To: Milana Stevens RN  Subject: FW: Pain diary                                   Yes.  Will put orders.  Thanks    Ang Carmona MD

## 2025-05-15 NOTE — TELEPHONE ENCOUNTER
Phoned the patient and left VM. Stated to call the pain clinic to schedule injection procedure at [phone].

## 2025-05-21 ENCOUNTER — TELEPHONE (OUTPATIENT)
Dept: PHYSICAL MEDICINE AND REHAB | Facility: CLINIC | Age: 60
End: 2025-05-21

## 2025-05-21 ENCOUNTER — TELEPHONE (OUTPATIENT)
Dept: PSYCHIATRY | Facility: CLINIC | Age: 60
End: 2025-05-21

## 2025-05-21 DIAGNOSIS — G47.00 INSOMNIA, UNSPECIFIED TYPE: ICD-10-CM

## 2025-05-21 RX ORDER — HYDROXYZINE HYDROCHLORIDE 10 MG/1
10 TABLET, FILM COATED ORAL
Qty: 30 TABLET | Refills: 0 | Status: SHIPPED | OUTPATIENT
Start: 2025-05-21

## 2025-05-21 NOTE — TELEPHONE ENCOUNTER
Duplicate request in refill encounter. Approved there.    Yaneth Daigle RN on 5/21/2025 at 10:13 AM

## 2025-05-21 NOTE — TELEPHONE ENCOUNTER
Second attempt: Phoned the patient and left VM. Stated to call the pain clinic to schedule injection procedure at [phone].

## 2025-05-21 NOTE — TELEPHONE ENCOUNTER
Last seen: 4/29/25  RTC: 6 weeks  Any patient initiated cancellations or no shows since last visit? No  Next appt: 6/29/25  Last filled: 12/17 (30 day) with 2 refills  Last dispensed: 4/8/25     Incoming refill from patient via phone by patient or caregiver    Medication requested:   Hydroxyzine 10mg       From chart note:   1) Medications:    - Continue Lithium 1050 mg at bedtime  - Continue with Lamictal 400 mg at bedtime   - continue with hydroxyzine 10 mg PRN at bedtime   - Continue with melatonin 1mg to 3mg at bedtime as needed for insomnia    Is note signed/closed? Yes    Is this a 90 day request for a psych medication? No    Last refill per :: NA    Medication refill approved per refill protocol.     Yaneth Daigle RN on 5/21/2025 at 10:09 AM

## 2025-05-21 NOTE — TELEPHONE ENCOUNTER
[facility] Call Center    Phone Message    May a detailed message be left on voicemail: yes     Reason for Call: Medication Refill Request    Has the patient contacted the pharmacy for the refill? Yes   Name of medication being requested: Hydroxyzine  Provider who prescribed the medication: Dr. Santillan  Pharmacy:   [place] [address]     Date medication is needed: ASAP, patient needs by TODAY     Patient is leaving tomorrow for a 10-day trip to [place], thought she had refills on file. Needs refills sent by today    Action Taken: Message routed to:  Other: [facility] PSYCHIATRY NURSE-[facility]    Travel Screening: Not Applicable     Date of Service:

## 2025-05-21 NOTE — TELEPHONE ENCOUNTER
Last seen: 4/29/25  RTC: 6 weeks  Any patient initiated cancellations or no shows since last visit? No  Next appt: 6/29/25  Last filled: 12/17 (30 day) with 2 refills  Last dispensed: 4/8/25     Incoming refill from patient via phone by patient or caregiver and pharmacy    Medication requested:   Hydroxyzine 10mg       From chart note:   1) Medications:    - Continue Lithium 1050 mg at bedtime  - Continue with Lamictal 400 mg at bedtime   - continue with hydroxyzine 10 mg PRN at bedtime   - Continue with melatonin 1mg to 3mg at bedtime as needed for insomnia    Is note signed/closed? Yes    Is this a 90 day request for a psych medication? No    Last refill per :: NA    Medication refill approved per refill protocol.     Yaneth Daigle RN on 5/21/2025 at 10:09 AM

## 2025-05-22 ENCOUNTER — TELEPHONE (OUTPATIENT)
Dept: SURGERY | Facility: CLINIC | Age: 60
End: 2025-05-22
Payer: COMMERCIAL

## 2025-05-22 NOTE — TELEPHONE ENCOUNTER
FYI - Status Update    Who is Calling: patient    Update: Pt. Is in the pre surgery program and participated in yesterday evenings support group and would like to be added to the invitation list ongoing and to have the visit last night count towards the surgery qualifications.    Does caller want a call/response back: Yes     Could we send this information to you in [employer] or would you prefer to receive a phone call?:   Patient would like to be contacted via [employer] or phone call, okay to leave a detailed message.

## 2025-05-27 ENCOUNTER — TELEPHONE (OUTPATIENT)
Dept: PHYSICAL MEDICINE AND REHAB | Facility: CLINIC | Age: 60
End: 2025-05-27
Payer: COMMERCIAL

## 2025-05-27 NOTE — TELEPHONE ENCOUNTER
Third attempt: Phoned the patient and left VM. Stated to call the pain clinic to schedule injection procedure at [phone].

## 2025-06-11 ENCOUNTER — VIRTUAL VISIT (OUTPATIENT)
Dept: SURGERY | Facility: CLINIC | Age: 60
End: 2025-06-11
Payer: COMMERCIAL

## 2025-06-11 DIAGNOSIS — E66.01 MORBID OBESITY (H): Primary | ICD-10-CM

## 2025-06-11 NOTE — PROGRESS NOTES
Virtual Visit Details    Type of service:  Video Visit     Originating Location (pt. Location): Home    Distant Location (provider location):  Off-site  Platform used for Video Visit: Zoom (Telehealth)    Start Time: 3:20 PM  End Time: 4:27 PM    Angy would like to follow through with [facility] for health reasons. She has struggled with her weight for much of her life and now is concerned about various comorbidities. She has a history of depression, anxiety, and Bipolar Disorder and is in psychotherapy and on a psychotropic medication regimen. She is a stress and loneliness eater. She has good knowledge of surgery and good support. She will follow up and complete psychological testing. F31.9; F41.1; E66.01

## 2025-06-24 ENCOUNTER — OFFICE VISIT (OUTPATIENT)
Dept: PSYCHIATRY | Facility: CLINIC | Age: 60
End: 2025-06-24
Attending: PSYCHIATRY & NEUROLOGY
Payer: COMMERCIAL

## 2025-06-24 ENCOUNTER — TELEPHONE (OUTPATIENT)
Dept: PHYSICAL MEDICINE AND REHAB | Facility: CLINIC | Age: 60
End: 2025-06-24
Payer: COMMERCIAL

## 2025-06-24 VITALS — TEMPERATURE: 98.5 F | DIASTOLIC BLOOD PRESSURE: 75 MMHG | HEART RATE: 65 BPM | SYSTOLIC BLOOD PRESSURE: 145 MMHG

## 2025-06-24 DIAGNOSIS — G47.00 INSOMNIA, UNSPECIFIED TYPE: ICD-10-CM

## 2025-06-24 DIAGNOSIS — F31.9 BIPOLAR I DISORDER (H): ICD-10-CM

## 2025-06-24 PROCEDURE — 99214 OFFICE O/P EST MOD 30 MIN: CPT | Performed by: STUDENT IN AN ORGANIZED HEALTH CARE EDUCATION/TRAINING PROGRAM

## 2025-06-24 PROCEDURE — 99214 OFFICE O/P EST MOD 30 MIN: CPT | Mod: HN | Performed by: STUDENT IN AN ORGANIZED HEALTH CARE EDUCATION/TRAINING PROGRAM

## 2025-06-24 PROCEDURE — 3077F SYST BP >= 140 MM HG: CPT | Performed by: STUDENT IN AN ORGANIZED HEALTH CARE EDUCATION/TRAINING PROGRAM

## 2025-06-24 PROCEDURE — 1125F AMNT PAIN NOTED PAIN PRSNT: CPT | Performed by: STUDENT IN AN ORGANIZED HEALTH CARE EDUCATION/TRAINING PROGRAM

## 2025-06-24 PROCEDURE — 3078F DIAST BP <80 MM HG: CPT | Performed by: STUDENT IN AN ORGANIZED HEALTH CARE EDUCATION/TRAINING PROGRAM

## 2025-06-24 RX ORDER — LITHIUM CARBONATE 150 MG/1
150 CAPSULE ORAL AT BEDTIME
Qty: 30 CAPSULE | Refills: 2 | Status: SHIPPED | OUTPATIENT
Start: 2025-06-24

## 2025-06-24 RX ORDER — LAMOTRIGINE 200 MG/1
400 TABLET ORAL DAILY
Qty: 60 TABLET | Refills: 2 | Status: SHIPPED | OUTPATIENT
Start: 2025-06-24

## 2025-06-24 RX ORDER — LITHIUM CARBONATE 300 MG/1
CAPSULE ORAL
Qty: 270 CAPSULE | Refills: 2 | Status: SHIPPED | OUTPATIENT
Start: 2025-06-24

## 2025-06-24 RX ORDER — LAMOTRIGINE 200 MG/1
400 TABLET ORAL DAILY
Qty: 60 TABLET | Refills: 2 | Status: SHIPPED | OUTPATIENT
Start: 2025-06-24 | End: 2025-06-24

## 2025-06-24 RX ORDER — HYDROXYZINE HYDROCHLORIDE 10 MG/1
10 TABLET, FILM COATED ORAL
Qty: 30 TABLET | Refills: 0 | Status: SHIPPED | OUTPATIENT
Start: 2025-06-24

## 2025-06-24 ASSESSMENT — PATIENT HEALTH QUESTIONNAIRE - PHQ9
SUM OF ALL RESPONSES TO PHQ QUESTIONS 1-9: 12
SUM OF ALL RESPONSES TO PHQ QUESTIONS 1-9: 12
10. IF YOU CHECKED OFF ANY PROBLEMS, HOW DIFFICULT HAVE THESE PROBLEMS MADE IT FOR YOU TO DO YOUR WORK, TAKE CARE OF THINGS AT HOME, OR GET ALONG WITH OTHER PEOPLE: SOMEWHAT DIFFICULT

## 2025-06-24 ASSESSMENT — PAIN SCALES - GENERAL: PAINLEVEL_OUTOF10: MILD PAIN (2)

## 2025-06-24 NOTE — PROGRESS NOTES
"   [facility]  MEDICAL PROGRESS NOTE     CARE TEAM:    PCP- Mikey Cortés     Therapist- individual therapist Elfego Chan and another one for couple therapy    Angy is a 60 year old who uses the pronouns she, her, hers.      Diagnoses     Bipolar 1 disorder, current depressive episode, moderate   Insomnia, unspecified    H/o EPSE secondary to neuroleptic use  H/o Thrombocytopenia secondary to Depakote  H/o lithium toxicity, resolved     Assessment     Angy reported doing okay, but she is also getting overwhelmed with the divorce, her finance and information that she is getting after their separation. She has been on a same sex relationship for more than two decades, but her partner wanted a divorce, and didn't want to be in any relationship anymore. She knew of an emotional affair\" that was going on, but recently, she had learned that it is more. The ex partner and the man she used to have an affair with are now together. There is some sense of betrayal.     She is also dealing with financial stress. 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 The paid get delayed. Her daughter lives with her, and still looking for a job. Given that in the past, her manic symptoms started in period of high stress. I told her that we will reach out to her in two weeks for check in, and we also have her schedule to see Dr. Leal, the new provider, on 7/24.     It is a stressful time for her now, and needs extra emotional support. 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 I recommended her to reach out to us in the meantime if she needs it. In the past, she had uses hydroxyzine that helped with sleep, but sometimes will give her dry mouth. The plan was not to make any changes on our last day of appointment, so I will refill the hydroxyzine just in case she needs it.     No " "safety concerns.   .     Psychotropic Drug Interactions:  [PSYCHCLINICDDI]  Estrogen Derivatives may decrease the serum concentration of LamoTRIgine  Angiotensin-Converting Enzyme Inhibitors may increase the serum concentration of Lithium.   LamoTRIgine may decrease the serum concentration of Progestins.      Management: routine monitoring    [place]MP was not checked today: not using controlled substances    Risk Statements:   Treatment Risk- Risks, benefits, alternatives and potential adverse effects have been discussed and are understood.   Safety Risk-Angy did not appear to be an imminent safety risk to self or others.     Plan     1) Medications:    - Continue Lithium 1050 mg at bedtime  - Continue with Lamictal 400 mg at bedtime   - continue with hydroxyzine 10 mg PRN at bedtime   - Continue with melatonin 1mg to 3mg at bedtime as needed for insomnia    2) Psychotherapy: Continue with therapy    3) Next due:  Labs- 10/2025  EKG- . Last one was on 03/2024 with   Rating scales- PHQ9    4) Referrals:     5) Other: none    6) Follow-up: with new resident     Pertinent Background                                                   [most recent eval 08/31/23]     Angy reported that she didn't have symptoms until November 2018. At which point she was hospitalized for anthony.     This section was copied forward from 08/2021 transfer notes.  \"Patient has not had significant mental health history until 2018, when she developed perimenopausal mood and anxiety symptoms.  She was subsequently referred to our clinic following a hospitalization for anthony in November 2018.  Her care has been complicated by sensitivity to antipsychotics (EPSE including difficulty swallowing), thrombocytopenia secondary to Depakote, lithium toxicity in setting of concurrent Depakote use\"     Pertinent items include: anthony , severe med reaction [described as H/o Thrombocytopenia secondary to Depakote, and H/o lithium toxicity], and psych hosp " "     Subjective     Went to [place], then to [place], had fun than she had expected, but the travel was tiring.   Good to have Tyler at home. But she is still looking for a job.  She started a private practice by end of March. Still have not many patients to see.  She mentioned seeing patients only on Friday. The pay is delayed.   Sometimes it feels okay, and sometimes stressful. Finance is tight.  A week ago, she wanted to talk with her ex, but decided not to do it at her home, but a neutral place.  She found out that ex is having sexual encounter with the person she said was only an emotional affair with.  Their daughter Tyler had knew before she could knew back in April.  Angy describes her mood as more anxious, not preoccupied, overwhelmed  Not enough irritability      Anxiety: yes,     No hallucinations or paranoia    Sleep: not great, more interrupted, had a few nights she couldn't fall back sleep. Will used meditation elis to fall back asleep./    Appetite: currently up, increased weight, had been on second cycle of semiglutin, but feels physically hungry mid day,     Medication side effects: denied    SI/HI: none    Alcohol use none    Smoking: none    Other substances (cannabis): none       Mental Status Exam     Alertness: alert  and oriented  Appearance: adequately groomed  Behavior/Demeanor: cooperative and pleasant, with good  eye contact   Speech: normal and regular rate and rhythm  Language: intact  Psychomotor: normal or unremarkable  Mood: \"anxious \"  Affect: full range; congruent to: mood- yes, content- yes  Thought Process/Associations: unremarkable  Thought Content:  Reports none;  Denies suicidal & violent ideation and delusions  Perception:  Reports none;  Denies auditory hallucinations and visual hallucinations  Insight: good  Judgment: good  Cognition: does  appear grossly intact; formal cognitive testing was not done  Gait and Station: unremarkable     Past Psych Med Trials      Medication Max " Dose (mg) Dates / Duration Helpful? DC Reason / Adverse Effects?   Depakote    Tremors   sertraline       olanzapine    EPSE   Seroquel    EPSE   lithium 1200   Tremor at 1200 mg had to lower the dose   Wellbutrin 300 15 years ago     Lamictal 400  yes       Treatment Course and Resendiz Events since  JULY 2023 08/2023 : no medication changes  11/2023 no medication changes  01/2023 no medication changes  03/2024 no medication changes  04/2024 no medication changes  06/2024 Increase Lamictal to 400 mg  07/2024 no medication change  08/2024 no medication changes  09/2024 no medication changes  11/2024 no medication changes but encourage to use hydroxyzine as needed and melatonin 1 to 2 mg to limit sedation.   12/2024 no medication changes  01/2024 no medication changes  3/2024 no medication changes  4/2024 no medication changes  6/2024 no medication changes     Vitals   LMP  (LMP Unknown)   Pulse Readings from Last 3 Encounters:   05/12/25 66   04/29/25 71   04/28/25 61     Wt Readings from Last 3 Encounters:   05/12/25 118.4 kg (261 lb)   04/29/25 118.8 kg (261 lb 12.8 oz)   04/24/25 118.5 kg (261 lb 4.8 oz)     BP Readings from Last 3 Encounters:   05/12/25 (!) 148/78   04/29/25 133/80   04/28/25 (!) 150/83      Medical History     ALLERGIES: Seroquel [quetiapine], Olanzapine, Penicillins, Risperidone, and Thimerosal    Patient Active Problem List   Diagnosis    Oral aphthae    CARDIOVASCULAR SCREENING; LDL GOAL LESS THAN 160    Rosacea    Raynaud disease    Vitamin D deficiency disease    MARLA (obstructive sleep apnea)    Atopic rhinitis    Chronic bilateral low back pain without sciatica    Obesity, Class III, BMI 40-49.9 (morbid obesity) (H)    Muscle tension dysphonia    Palpitations    Dysphagia, unspecified type    Anxiety    Benign essential hypertension    Callus of foot    Varicose veins of right lower extremity with edema    Bipolar I disorder (H)    Dribbling of urine    Bilateral low back pain with  right-sided sciatica    Spinal stenosis at L4-L5 level    Facet arthropathy, lumbar    Acute pain of right knee    Unspecified inflammatory spondylopathy, lumbar region    Exertional angina    Disorder of sacrum    Myalgia, other site    Trochanteric bursitis of both hips    Lumbosacral spondylosis without myelopathy        Medications     Current Outpatient Medications   Medication Sig Dispense Refill    acetaminophen (TYLENOL) 650 MG CR tablet Take 1 tablet (650 mg) by mouth every 8 hours as needed for mild pain or fever      azelastine (ASTELIN) 0.1 % nasal spray Spray 2 sprays into both nostrils 2 times daily. 30 mL 2    Carboxymethylcellulose Sod PF (REFRESH PLUS) 0.5 % SOLN ophthalmic solution Place 1 drop into both eyes 3 times daily as needed for dry eyes      clotrimazole-betamethasone (LOTRISONE) 1-0.05 % external cream Apply topically 2 times daily 45 g 2    COMPOUNDED NON-CONTROLLED SUBSTANCE (CMPD RX) - PHARMACY TO MIX COMPOUNDED MEDICATION Semaglutide 1mg injection every 7 days 4 each 2    estradiol (ESTRACE) 0.5 MG tablet Take 1 tablet (0.5 mg) by mouth daily. 90 tablet 3    estradiol (VAGIFEM) 10 MCG TABS vaginal tablet Place 1 tablet (10 mcg) vaginally twice a week. 26 tablet 3    hydrOXYzine HCl (ATARAX) 10 MG tablet TAKE ONE TABLET BY MOUTH EVERY EVENING AS NEEDED FOR SLEEP 30 tablet 0    lamoTRIgine (LAMICTAL) 200 MG tablet Take 2 tablets (400 mg) by mouth daily. 60 tablet 2    lisinopril (ZESTRIL) 10 MG tablet Take 1 tablet (10 mg) by mouth daily. 90 tablet 3    lithium (ESKALITH) 150 MG capsule Take 1 capsule (150 mg) by mouth at bedtime. 30 capsule 2    lithium 300 MG capsule Take 3 tablet (900 mg) and one tablet (150 mg) for a total of 1050 mg at bedtime. 270 capsule 2    Loratadine 10 MG capsule Take 10 mg by mouth daily      medroxyPROGESTERone (PROVERA) 2.5 MG tablet Take 1 tablet (2.5 mg) by mouth daily. 90 tablet 3    metroNIDAZOLE (METROGEL) 1 % external gel Apply topically daily. 60 g  9    ORDER FOR DME Use your CPAP device as directed by your provider.      VITAMIN D, CHOLECALCIFEROL, PO Take 2,000 Units by mouth daily          Labs and Data         12/10/2024     2:54 PM 3/23/2025     6:59 PM 6/19/2025     4:22 PM   PROMIS-10 Total Score w/o Sub Scores   PROMIS TOTAL - SUBSCORES 28  29  27        Patient-reported          No data to display                  3/24/2025     8:44 AM 4/28/2025     5:50 PM 6/24/2025     8:01 AM   PHQ-9 SCORE   PHQ-9 Total Score MyChart 7 (Mild depression) 6 (Mild depression) 12 (Moderate depression)   PHQ-9 Total Score 7  6  12        Patient-reported         7/26/2024    10:56 AM 11/4/2024    12:46 PM 12/10/2024     3:01 PM   JOANNA-7 SCORE   Total Score 8 (mild anxiety) 11 (moderate anxiety) 15 (severe anxiety)   Total Score 8 11  15        Patient-reported       Liver/Kidney Function, TSH Metabolic Blood counts   Recent Labs   Lab Test 03/18/25  1033 07/11/24  0809   AST 18  --    ALT 15  --    ALKPHOS 76  --    CR 0.60 0.63     Recent Labs   Lab Test 07/11/24  0809   TSH 2.43    Recent Labs   Lab Test 09/27/24  0740   CHOL 168   TRIG 136   LDL 87   HDL 54     Recent Labs   Lab Test 09/27/24  0740   A1C 5.1     Recent Labs   Lab Test 03/18/25  1033   GLC 75    Recent Labs   Lab Test 09/27/24  0740   WBC 6.4   HGB 13.7   HCT 41.6   MCV 90            Recent Labs   Lab Test 03/18/25  1033 07/11/24  0809 11/08/23  1016   LITHIUM 1.01 1.00 0.69        PROVIDER: Antonio Santillan MD MPH    Level of Medical Decision Making:   - At least 1 chronic problem that is not stable  - Engaged in prescription drug management during visit (discussed any medication benefits, side effects, alternatives, etc.)         Patient was not staffed in clinic.  Supervisor is Dr. Vviian Burnett.      The longitudinal plan of care for Bipolar disorder was addressed during this visit. Due to the added complexity in care, I will continue to support Angy in the subsequent management of this  condition(s) and with the ongoing continuity of care of this condition(s)

## 2025-06-24 NOTE — TELEPHONE ENCOUNTER
Called patient to schedule procedure with Dr. Carmona    Date of Procedure: 9/15/25    Arrival time given: Yes: Arrival Time 12:30pm      Procedure Location: [facility]     Verified Location with Patient:  Yes  Address provided to the patient     Pre-op H&P Required:  No: Moderate Sedation       Post-Op/Follow Up Appt:  Not Indicated in Request      Informed patient they will need a  to drive them home:  Yes    Patients : Spouse      Patient is aware that pre-op RN from the procedure center will call 2-3 days prior to scheduled procedure to confirm arrival time and review any instructions:  Yes       Additional Comments: N/A        Janina Arriaga MA on 6/24/2025 at 12:05 PM      P: [phone]

## 2025-06-25 ENCOUNTER — VIRTUAL VISIT (OUTPATIENT)
Dept: FAMILY MEDICINE | Facility: CLINIC | Age: 60
End: 2025-06-25
Payer: COMMERCIAL

## 2025-06-25 DIAGNOSIS — I10 BENIGN ESSENTIAL HYPERTENSION: ICD-10-CM

## 2025-06-25 DIAGNOSIS — E66.813 CLASS 3 SEVERE OBESITY DUE TO EXCESS CALORIES WITH SERIOUS COMORBIDITY AND BODY MASS INDEX (BMI) OF 45.0 TO 49.9 IN ADULT (H): ICD-10-CM

## 2025-06-25 DIAGNOSIS — G47.33 OSA (OBSTRUCTIVE SLEEP APNEA): ICD-10-CM

## 2025-06-25 DIAGNOSIS — E78.2 MIXED HYPERLIPIDEMIA: ICD-10-CM

## 2025-06-25 PROCEDURE — 98006 SYNCH AUDIO-VIDEO EST MOD 30: CPT | Performed by: FAMILY MEDICINE

## 2025-06-25 NOTE — PROGRESS NOTES
"Angy is a 60 year old who is being evaluated via a billable video visit.    How would you like to obtain your AVS? MyChart  If the video visit is dropped, the invitation should be resent by: Text to cell phone: [phone]  Will anyone else be joining your video visit? No      Assessment & Plan     Class 3 severe obesity due to excess calories with serious comorbidity and body mass index (BMI) of 45.0 to 49.9 in adult (H)  Has been gained with with SL form of GLP1  Encouraged her to consider [employer] Tirzepatide vs increasing the dos of GLP-1 compounding med via [facility], pt will ponder and budget for the plan and replies      Mixed hyperlipidemia  Stable   Keep monitoring   Will review the lab and update pt    Benign essential hypertension  Stable   Keep monitoring   Will review the lab and update pt    MARLA (obstructive sleep apnea)  Stable         BMI  Estimated body mass index is 40.88 kg/m  as calculated from the following:    Height as of 5/12/25: 1.702 m (5' 7\").    Weight as of 5/12/25: 118.4 kg (261 lb).   Weight management plan: Discussed healthy diet and exercise guidelines          Subjective   Angy is a 60 year old, presenting for the following health issues:  Weight Management        6/25/2025     1:01 PM   Additional Questions   Roomed by Poli     History of Present Illness       Reason for visit:  Weight management medication check in    She eats 4 or more servings of fruits and vegetables daily.She consumes 0 sweetened beverage(s) daily.She exercises with enough effort to increase her heart rate 10 to 19 minutes per day.  She exercises with enough effort to increase her heart rate 4 days per week.   She is taking medications regularly.            Review of Systems  Constitutional, HEENT, cardiovascular, pulmonary, GI, , musculoskeletal, neuro, skin, endocrine and psych systems are negative, except as otherwise noted.      Objective           Vitals:  No vitals were obtained today due to " Done, thanks!   virtual visit.    Physical Exam   GENERAL: alert and no distress  EYES: Eyes grossly normal to inspection.  No discharge or erythema, or obvious scleral/conjunctival abnormalities.  RESP: No audible wheeze, cough, or visible cyanosis.    SKIN: Visible skin clear. No significant rash, abnormal pigmentation or lesions.  NEURO: Cranial nerves grossly intact.  Mentation and speech appropriate for age.  PSYCH: Appropriate affect, tone, and pace of words          Video-Visit Details    Type of service:  Video Visit   Originating Location (pt. Location): Home    Distant Location (provider location):  On-site  [facility]  Signed Electronically by: Mikey Cortés MD

## 2025-07-08 ENCOUNTER — TELEPHONE (OUTPATIENT)
Dept: PSYCHIATRY | Facility: CLINIC | Age: 60
End: 2025-07-08
Payer: COMMERCIAL

## 2025-07-08 NOTE — TELEPHONE ENCOUNTER
Jacque Veliz, RN  Jacque Veliz, RN; Theresa Petty RN; Anali Tillman, UBALDO; Amisha Quintero, Antonio Shirley MD P [facility], this patient is dealing with stress at home, and would like to receive a call in two weeks from now. The last time she was hospitalized for anthony was after a high stress situation. Please check in with her..    Follow up:     Reached out to patient to connect. No answer at number provided. LVM, requesting a call back. Clinic number provided.

## 2025-07-09 ENCOUNTER — HOSPITAL ENCOUNTER (OUTPATIENT)
Dept: NUTRITION | Facility: CLINIC | Age: 60
Discharge: HOME | End: 2025-07-09
Admitting: FAMILY MEDICINE
Payer: COMMERCIAL

## 2025-07-09 VITALS — BODY MASS INDEX: 41.66 KG/M2 | WEIGHT: 266 LBS

## 2025-07-09 PROCEDURE — 97803 MED NUTRITION INDIV SUBSEQ: CPT | Mod: GT,95 | Performed by: DIETITIAN, REGISTERED

## 2025-07-09 NOTE — PROGRESS NOTES
Virtual Visit Details    Type of service:  Video Visit     Originating Location (pt. Location): Other work setting     Distant Location (provider location):  On-site  Platform used for Video Visit: Well     NUTRITION REASSESSMENT NOTE   REASON FOR ASSESSMENT  Angy LILIAN Haji referred by Dr. Cortés for MNT related to  Obesity, Class III, BMI 40-49.9 (morbid obesity) (H) [E66.01]          Patient accompanied by self      NUTRITION HISTORY  Patient continues with multiple life changes and stressors.  Patient aware that she likes to self soothe with ice cream which she has recently increased.  Patient is aware that social cues trigger her to eat. Patient's spouse recently had weight loss surgery so there is adjust with eating in the home.      3/7/2022 Self soothe in evening snack-made tea or tells self ate late dinner and does not need snack.  Patient asking self if physically hungry.  Patient drinking fizzy water or malt o meal as evening snack.  Patient aware she has been stress eating.  Tracking when not having dessert -identifying why eating  Exercise-cross country skiing; mall walk; snow shoeing; treadmill 2 times haring treadmill   Yogurt; cheese and crackers; cereal      Patient wants to increase exercise time as will be going to [place] weekend ([place]) [facility]      4/25/2002 Patient able to get 45-60 minutes exercise on weekends and 20-30 minutes of exercise daily.  Patient wants to lose 5 lbs prior to trip to [place].  Patient was able to have 3 days no desserts and then 1 week no dessert. 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  Patient determined ways she could lose weight: Dessert 2 times per week, add treadmill, eliminate morning snack, 1 snack at 3 PM. Alcohol 1 time per week, no caramel lattes, food is fuel, it's too late to be hungry, spouse brings sweet treats without asking, tea/bath/shower      6/15/2022 Patient skips dessert if eats  late dinner.  Patient felt discouraged with weight loss as wanted to lose more weight (down 3.5 lbs). Patient wants weight loss for functional reasons.  Patient signed up for the [place] bike tour.  Patient has increased cravings and thoughts about ice cream.  Shift in relationship with spouse after spouse's weight loss surgery.      7/27/2022 Patient increased exercise-[place] with bike [employer]; started the last week in June for bike rides (4 rides different times in 2 weeks-9 miles).  Rode in 18 mile race.  Went [place]     No dessert (hit or miss) 50% 3 days no dessert; ice cream and s'mores on vacation     Typical intake:  Greek yogurt + 1/4 cup granola + fruit + 1/2 and 1/2 with coffee or english muffin with peanut butter/butter and strawberry jam or eggs scrambled or hard boiled  Leftovers -1 cup cherries; cherry tomatoes; 1/2 cup pea pods + 3 oz pork tenderloin (teryaki)   Watermelon, pork tenderloin, 1 cup rice + tomatoes/pea pods; cheeseburger with whole wheat bun + vegetables + watermelon   Snacks: croissant ([place]) Jewish oat squares (1 cup dry) (3:00 PM) cheese and crackers    Beverages: coffee; water; fizzy water      Emotional eating at night-ice cream      9/21/2022 Patient has been able to bike 2 times per week.  Patient's weight has been stable.  Patient has not been able to track dinner and snacks.  Stress eating ebbs and flows.  Trying to save dessert for the weekend or may self soothe during th week with ice cream.  Patient stress eating in the evening and may boredom eat prior to lunch (cereal).    Weight: 308.4 lbs       11/16/2022 Biking 2 times per week. Tour biking race: "[employer] 14 miles (rode by herself). [facility] 12 mile hilly bike ride. (rode with friend).  Patient has consistently walked dog and completes PT exercises.  Patient determining plan for winter exercise.    314.4 lbs (food and sleep changes) Patient  was eating more sweets as patient's wife baked sweets this fall (3-4 weeks with increase of sweets).  Holiday baking plan consists of 5 cookies and 1 candy (peanut brittle).  Ice cream consumption has increased recently.      1/23/2023 Patient was able to bake holiday cookies and freeze them.  Patient has been eating desserts 5 days a week.  Patient has been substituting greek yogurt (flavored or vanilla) or cream of wheat for dessert.  Patient was able to snow shoe.  Patient having barrier with getting on treadmill.  Patient's weight has fluctuated 325.8 and now 316 lbs.  Patient has been working on sleep routine (classical music; reading on paper vs ipad).      3/17/2023 Patient having knee pain and went to orthopedic MD and got cortizone shot in knee and able to walk know. Walked dog 3 times this week. Swimming -[facility] (6 sameer pool). Tyler wants to swim with goal of swimming 2 times per week.  Patient states ice cream and dessert are hit or miss. Patient shifting mindset to days having dessert vs not having dessert. Patient has not been as mindful with practicing self soothe at night.  Has tried tea and engaging book.     5/3/2023 PT exercises for knee; walking 15-30 minutes per day with dog + swimming 1 time per week (9 laps); 5 days per week desserts -patient using self talk to reduce dessert intake. Patient trying to limit desserts.  Patient's wife brining bread and sweets into home in which patient feels compels to eat as not to waste food.  Patient wants to be aware of foods when dining out and would like to split meal with spouse. Patient using zinger tea to soothe at night.       6/28/2023 No dessert 1-2 days; vacation and party so feels increase in desserts.  Patient getting more movement at work by taking stairs at work and size of building as no longer working remote.  Going to PT; less dog walks due to driving to work; found new lunch place to eat lunch at work (3-4 days per week-20  minute walk) -reading book and eating lunch; aims for hour lunch -reading for self care      8/9/2023 Patient is being intentional for choosing desserts.  Skipped ice cream since had late dinner.  Grabbed snack when dinner wasn't ready prior to function and ate later-took cheese and fruit then ate chicken kebab onion, bell pepper, chicken -316 lbs currently.  Was lower weight last week. Patient trying to balance stress in life.      10/4/2023 Patient having 1 night per week not having dessert.  Patient feels like eating ice cream when alone at home while watching a favorite show and feels more indulgent.  Patient did not participate in [place] as hoped.  Patient will be participating in [employer] this weekend.  Patient continues to walk dog daily.  Patient also continues with increased life stressors.  Reading in the evening + fizzy water.      12/13/2023 Patient with reduction in weight trends: weight 307.4 lbs and 309 lbs.  Patient had stressful family situation which caused drop in appetite (past 2-3 weeks).  Patient states was not using food as coping strategy. Patient having dessert 1-6 days per week with most desserts  3-4 desserts per week -writes reason why no dessert-sets goals in advance of no dessert; no dessert for today or too late to eat dessert; food trigger from spouse (will see eating popcorn and will then eat it if she is -likes caramel corn); self care-journaling as needed; playing piano for fun 3 times per week; movement -walking dog + biking ([place]) + hour walk      1/24/2024 Patient working on self care at night- reading, limited TV watching, playing piano in the evening (5-10 minutes); desserts -aiming for 4 per week 3-5-1-1-3. Patient choose Malt-O meal as replacement for dessert; 304-312 lbs; less cookie baking during the holidays; exercise reduced with cold weather (walking outside)-treadmill; stairs; no pool. Patient working on exercise plan.       4/17/2024  Patient with extra stress in life which has reduced desire to eat.  Patient working on increasing exercise intensity for upcoming trip.Treadmill-working on distance and incline 4 days per week 15-46 minutes + additional walking. Patient working on self soothe options besides food. Patient plans to start weight loss medication and will be working with pharmacist.  Patient also planning to meet with weight loss clinic.      7/10/2024 Patient focusing on adding movement-treadmill; lunchtime walking 20-30 minutes; biking everything other week 8-16 miles. Patient started Wegovy-283.4 lbs. Patient losing 1-2 lbs per week. Symptoms- no appetite, nausea, acid reflux -spicy, constipation      Greek yogurt or eggs or peanut butter on english muffin (3 T)     9/30/2024 Patient has lost 34 lbs. Wegovy 1 mg (3rd month) Patient states appetite low. Patient eating regular meals. Patient has only forgotten to eat on occasion. Patient aware needs to prioritize eating. Patient with back pain walking at slow speeds. Patient with mild nausea and GI distress. Patient working on self care-getting outside; reading; time with friends. Patient will stress eat-ice cream.      Split pea and ham soup; salad with veggies   Cream of wheat and yogurt    Burgers or chicken or BLT      11/11/2024 Patient eating desserts daily-ice cream. Patient scheduling meals as sometimes forgetting to eat. Patient may have constipation or diarrhea. Unable to tolerate 1/2 dose Miralax daily. Patient with reduced appetite on Wegovy and feels gibbs faster so eating 1/2 portions. Holidays-prioritize dessert (watch portions) and choose 3 cookies to bake for Warren.       Greek yogurt or english muffin  Pork tenderloin + cutie + pretzels   Chicken stir gomez      2/5/2025 Patient working on self care in the evening (watching series, resumed herbal tea or hot water, limited holidays treats, dog training 10 minutes). Cooking turkey garvin bean chili; spicy tomato white  bean soup; rotissers chicken nachos; artichoke dip - peppers, broccoli, carrot chips with chips on side; gluten free pizza frozen; 3 weeks of Wegovy left -eating more slowly; dining out lasting 2-3 meals; reducing appetite -forgot to eat breakfast; feels full quickly; dry mouth; less hungry; movement-goal PT exercises 5 of 7 days; walking dog outside        Frittata   Chicken thighs + wild rice       Turkey garvin meneses chili       5/14/2025 1 day per week walking with friend 30-60 minutes; daily dog walks; using apple watch to track; Wegovy -decreased appetite and feeling full more quickly; compounded Wegovy-subliungual Wegovy currently. Patient weigh remains stable at 261 lbs. Patient feels has uptake in desire for sugary/fatty foods. Patient working on self care -reading/TV shows, baths, talking with friends. Patient will be following up with medical weight loss clinic and working on task list. Patient is unsure if desires weight loss surgery or not.       Cookies; swedish fish; ice cream       7/9/2025 trying to reduce desserts; changing routine at night -[place] tea to replace ice cream; eating outside and taking break; moving body daily 30 minutes; cheese sticks, crackers as afternoon snack; weight gain 265 lbs after Jai 266 lbs currently; sublingual wegovy $340 per month -doesn't have disinterest in food; getting hunger signs         DIET RECALL   Breakfast: cereal and banana; yogurt + granola (measures), fruit + coffee; 2 slices toast with butter + 1/2 cup cottage cheese    Lunch: leftovers (includes fruit and vegetable daily)  Snack: fruit or cheese with lower salt nut thin crackers   Dinner: Tuna steak, Asian marinade, stir gomez, baked vegetable + brown rice (3/4 cup) + canned peaches; chicken thighs with beans and rice; sheet pan dinner with chicken and vegetables or grilled salmon and grilled vegetables; taco; protein + fruit and vegetable  Snacks: fruit, string cheese, Ritz crackers or yogurt; dessert  "in the evening (ice cream recently)  Beverages: mainly water, coffee, mineral water  Dining out: none currently  Exercise: Patient exercising 3-4 times per week. (walking and walking dog).      MEDICATIONS:  Reviewed      ANTHROPOMETRICS  Height: 5'8\" (note height change in chart previously 5'9\")  Weight: 266 lbs per patient    BMI (kg/m2): 41.6 kg/m2   %IBW: 190%  Weight History:   Wt Readings from Last 10 Encounters:   07/09/25 120.7 kg (266 lb)   05/12/25 118.4 kg (261 lb)   04/24/25 118.5 kg (261 lb 4.8 oz)   04/14/25 119.3 kg (263 lb)   04/07/25 119.4 kg (263 lb 3.2 oz)   02/05/25 118.8 kg (261 lb 14.4 oz)   01/17/25 119.3 kg (263 lb)   12/11/24 120 kg (264 lb 9.6 oz)   10/22/24 122 kg (269 lb)   09/27/24 123.4 kg (272 lb)                 ASSESSED NUTRITION NEEDS  Estimated Energy Needs: 1103-1097 kcals (15-20 kcals/kg) for gradual weight loss   Estimated Protein Needs: 66-79 (1-1.2 gm/kg IBW) for repletion with exercise  RMR: 1440     EVALUATION/PROGRESS TOWARDS GOALS   Previous goals:  Eat at table -improving   Add vegetables at lunch and dinner -improving   Mid afternoon snack 3:00-3:30 PM -improving   Get bike out -not met      Previous Nutrition Diagnosis: Obesity related to excess energy intake as evidenced by BMI of 39.8  kg/m2-no change     Current Nutrition Diagnosis:Obesity related to excess energy intake as evidenced by BMI of 41.6 kg/m2     INTERVENTIONS  Nutrition Prescription   Recommend exercise 5 times per week; track intake if eating mindlessly; increase fiber to 25-30 grams per day      Implementation:   Meals and Snacks: Continue eating 3 meals + deliberate snacking (if hungry)   Nutrition Education (Content):              a)  Discussed progress towards goals. Commended patient for awareness of eating patterns. Reviewed current food and eating behavior challenges. Continued discussion around self care as way to improve emotional eating. Discussed weight loss medication vs surgery. Patient " will be meeting with provider to discuss further. Supported patient in her struggle with food and weight.   Nutrition Education (Application):              a) Discussed meal prep options.                b) Patient verbalized understanding of diet by stating will continue focusing on vegetables.      Expected patient engagement: good     CURRENT GOALS   Snack in the afternoon 6 days per week with focus on fiber and protein   Add vegetables at lunch and dinner  Try interval walking      FOLLOW UP/MONITORING    Patient to follow up in 6 weeks   Patient has RD contact information      Time spent with patient: 30 minutes     Mattie Amaya, RD, LD  [facility] Outpatient Dietitian  [phone] (office phone)

## 2025-07-10 NOTE — TELEPHONE ENCOUNTER
"Received return call from Angy. She reports things are going well overall. Her mental health symptoms have been \"up and down,\" primarily due to feeling anxious and overwhelmed at times. She continues to adjust to new living situation after finalizing divorce, and feels like she has a never ending to-do list. Struggling with what to prioritize while also managing good self care.   Denies experiencing any manic symptoms. Denies any safety concerns.  Reports things are going well with her medications. No side effects or concerns.   Angy knows she has refills available at pharmacy. She agreed to reach out if she has any questions or concerns prior to her visit with Dr Leal on 7/24/25.     "

## 2025-07-14 ENCOUNTER — NURSE TRIAGE (OUTPATIENT)
Dept: FAMILY MEDICINE | Facility: CLINIC | Age: 60
End: 2025-07-14

## 2025-07-14 NOTE — TELEPHONE ENCOUNTER
Yes, she can wait til tomorrow.   Since she is on lithium, we will consider EKG at the visit, and will decide echocardiogram if indicated

## 2025-07-14 NOTE — TELEPHONE ENCOUNTER
Nurse Triage SBAR    Is this a 2nd Level Triage? YES, LICENSED PRACTITIONER REVIEW IS REQUIRED    Situation: Pt called because she was at the [facility] on Friday and Provider Glendy Jules heard heart murmur and recommended primary care follow-up.     Background: Pt denies history of heart murmur. States only symptoms are fatigue and moving slowly.    Assessment: denies difficulty breathing, chest pain, palpations.     Protocol Recommended Disposition:   See in Office Today    Recommendation: Per protocol, pt should be seen in office today. No in person visits available today. Scheduled pt for appointment tomorrow 7/15 at 1:30pm. Please advise if OK with pt waiting until tomorrow to be seen.     Routed to provider    Does the patient meet one of the following criteria for ADS visit consideration? 16+ years old, with an [facility] PCP     TIP  Providers, please consider if this condition is appropriate for management at one of our Acute and Diagnostic Services sites.     If patient is a good candidate, please use dotphrase <dot>triageresponse and select Refer to ADS to document.      To Dr. Cortés,   Heart murmer? Provider was Glendy Jules at [facility] on Friday.    Fatigue, moving slowly.       Carissa Odom RN   Reason for Disposition   Patient wants to be seen    Additional Information   Negative: Passed out (e.g., fainted, lost consciousness, blacked out and was not responding)   Negative: Shock suspected (e.g., cold/pale/clammy skin, too weak to stand, low BP, rapid pulse)   Negative: Difficult to awaken or acting confused (e.g., disoriented, slurred speech)   Negative: Visible sweat on face or sweat dripping down face   Negative: Unable to walk, or can only walk with assistance (e.g., requires support)   Negative: Received SHOCK from implantable cardiac defibrillator and has persisting symptoms (i.e., palpitations, lightheadedness)   Negative: Feeling weak or  "lightheaded (e.g., woozy, feeling like they might faint) AND heart beating very rapidly (e.g., > 140 / minute)   Negative: Feeling weak or lightheaded (e.g., woozy, feeling like they might faint) AND heart beating very slowly (e.g., < 50 / minute)   Negative: Sounds like a life-threatening emergency to the triager   Negative: Chest pain   Negative: Difficulty breathing   Negative: Feeling weak or lightheaded (e.g., woozy, feeling like they might faint)   Negative: Heart beating very rapidly (e.g., > 140 / minute) and present now  (Exception: During exercise.)   Negative: Heart beating very slowly (e.g., < 50 / minute)  (Exception: Athlete and heart rate normal for caller.)   Negative: New or worsened shortness of breath with activity (dyspnea on exertion)   Negative: Patient sounds very sick or weak to the triager   Negative: Wearing a cardiac monitor (e.g., cardiac event, Holter)   Negative: Received SHOCK from implantable cardiac defibrillator (and now feels well)   Negative: Heart beating very rapidly (e.g., > 140 / minute) and not present now  (Exception: During exercise.)   Negative: Skipped or extra beat(s) and increases with exercise or exertion   Negative: Skipped or extra beat(s) and occurs 4 or more times per minute   Negative: History of heart disease (i.e., heart attack, bypass surgery, angina, angioplasty)  (Exception: Brief heartbeat symptoms that went away and now feels well.)   Negative: Age > 60 years  (Exception: Brief heartbeat symptoms that went away and now feels well.)   Negative: Taking water pill (i.e., diuretic) or heart medication (e.g., digoxin)    Answer Assessment - Initial Assessment Questions  1. DESCRIPTION: \"Please describe your heart rate or heartbeat that you are having\" (e.g., fast/slow, regular/irregular, skipped or extra beats, \"palpitations\")      [place] [facility] murmur at [facility]  2. ONSET: \"When did it start?\" (e.g., minutes, hours, days)       Friday  3. DURATION: \"How " "long does it last\" (e.g., seconds, minutes, hours)      unsure  4. PATTERN \"Does it come and go, or has it been constant since it started?\"  \"Does it get worse with exertion?\"   \"Are you feeling it now?\"      unsure  5. TAP: \"Using your hand, can you tap out what you are feeling on a chair or table in front of you, so that I can hear?\" Note: Not all patients can do this.          6. HEART RATE: \"Can you tell me your heart rate?\" \"How many beats in 15 seconds?\"  Note: Not all patients can do this.          7. RECURRENT SYMPTOM: \"Have you ever had this before?\" If Yes, ask: \"When was the last time?\" and \"What happened that time?\"       denies  8. CAUSE: \"What do you think is causing the palpitations?\"      unsure  9. CARDIAC HISTORY: \"Do you have any history of heart disease?\" (e.g., heart attack, angina, bypass surgery, angioplasty, arrhythmia)       denies  10. OTHER SYMPTOMS: \"Do you have any other symptoms?\" (e.g., dizziness, chest pain, sweating, difficulty breathing)        Fatigue. Denes breathing difficulty, chest pain.    Protocols used: Heart Rate and Heartbeat [id number]    "

## 2025-07-15 ENCOUNTER — OFFICE VISIT (OUTPATIENT)
Dept: FAMILY MEDICINE | Facility: CLINIC | Age: 60
End: 2025-07-15
Payer: COMMERCIAL

## 2025-07-15 VITALS
BODY MASS INDEX: 41.1 KG/M2 | RESPIRATION RATE: 14 BRPM | SYSTOLIC BLOOD PRESSURE: 108 MMHG | TEMPERATURE: 97.6 F | WEIGHT: 268.3 LBS | OXYGEN SATURATION: 100 % | HEART RATE: 62 BPM | DIASTOLIC BLOOD PRESSURE: 54 MMHG

## 2025-07-15 DIAGNOSIS — E66.813 OBESITY, CLASS III, BMI 40-49.9 (MORBID OBESITY) (H): ICD-10-CM

## 2025-07-15 DIAGNOSIS — R00.2 PALPITATIONS: Primary | ICD-10-CM

## 2025-07-15 DIAGNOSIS — R01.1 SYSTOLIC MURMUR: ICD-10-CM

## 2025-07-15 PROCEDURE — 3074F SYST BP LT 130 MM HG: CPT | Performed by: FAMILY MEDICINE

## 2025-07-15 PROCEDURE — 99214 OFFICE O/P EST MOD 30 MIN: CPT | Performed by: FAMILY MEDICINE

## 2025-07-15 PROCEDURE — 1126F AMNT PAIN NOTED NONE PRSNT: CPT | Performed by: FAMILY MEDICINE

## 2025-07-15 PROCEDURE — 3078F DIAST BP <80 MM HG: CPT | Performed by: FAMILY MEDICINE

## 2025-07-15 PROCEDURE — 93000 ELECTROCARDIOGRAM COMPLETE: CPT | Performed by: FAMILY MEDICINE

## 2025-07-15 ASSESSMENT — PAIN SCALES - GENERAL: PAINLEVEL_OUTOF10: NO PAIN (0)

## 2025-07-15 NOTE — PROGRESS NOTES
Assessment & Plan     Palpitations  Stable, has no ST or QT change, will keep monitoring   - EKG 12-lead complete w/read - Clinics    Systolic murmur  Very soft 1/5 degree systolic murmur on right sternal border, has no click, has no clinical sx, will keep monitoring and consider referral to echocardiogram if accentuated     Obesity, Class III, BMI 40-49.9 (morbid obesity) (H)  Not improving, will have her to consider direct payment option of GLP-1 though [employer] direct or [employer]        Subjective   Angy is a 60 year old, presenting for the following health issues:  Heart Problem        7/15/2025     1:21 PM   Additional Questions   Roomed by Milana ADAIR     Heart Problem    History of Present Illness       Reason for visit:  Assess for possible heart murmur  Symptoms include:  Unsure   She is taking medications regularly.            Review of Systems  Constitutional, HEENT, cardiovascular, pulmonary, GI, , musculoskeletal, neuro, skin, endocrine and psych systems are negative, except as otherwise noted.      Objective    /54   Pulse 62   Temp 97.6  F (36.4  C)   Resp 14   Wt 121.7 kg (268 lb 4.8 oz)   LMP  (LMP Unknown)   SpO2 100%   BMI 41.10 kg/m    Body mass index is 41.1 kg/m .  Physical Exam   GENERAL: alert and no distress  EYES: Eyes grossly normal to inspection, PERRL and conjunctivae and sclerae normal  HENT: ear canals and TM's normal, nose and mouth without ulcers or lesions  NECK: no adenopathy, no asymmetry, masses, or scars  RESP: lungs clear to auscultation - no rales, rhonchi or wheezes  CV: regular rates and rhythm, normal S1 S2, no S3 or S4, grade 1/6 systolic murmur heard best over the right sternal border, peripheral pulses strong, and no peripheral edema  ABDOMEN: soft, nontender, no hepatosplenomegaly, no masses and bowel sounds normal  MS: no gross musculoskeletal defects noted, no edema            Signed Electronically by: Mikey Cortés MD

## 2025-07-21 ENCOUNTER — THERAPY VISIT (OUTPATIENT)
Dept: PHYSICAL THERAPY | Facility: CLINIC | Age: 60
End: 2025-07-21
Payer: COMMERCIAL

## 2025-07-21 DIAGNOSIS — M54.41 BILATERAL LOW BACK PAIN WITH RIGHT-SIDED SCIATICA, UNSPECIFIED CHRONICITY: Primary | ICD-10-CM

## 2025-07-21 DIAGNOSIS — M25.561 ACUTE PAIN OF RIGHT KNEE: ICD-10-CM

## 2025-07-21 PROCEDURE — 97110 THERAPEUTIC EXERCISES: CPT | Mod: GP | Performed by: PHYSICAL THERAPIST

## 2025-07-21 PROCEDURE — 97530 THERAPEUTIC ACTIVITIES: CPT | Mod: GP | Performed by: PHYSICAL THERAPIST

## 2025-07-22 NOTE — PROGRESS NOTES
07/21/25 0500   Appointment Info   Signing clinician's name / credentials Berta Fuentes PT   Total/Authorized Visits E&T   Visits Used 10   Medical Diagnosis Acute pain of right knee; Low back pain   PT Tx Diagnosis Right knee pain; low back pain   Progress Note/Certification   Onset of illness/injury or Date of Surgery 07/26/24  (Referral date)   Therapy Frequency 1x every 2-3 weeks   Predicted Duration 2-3 months   Progress Note Completed Date 03/31/25   GOALS   PT Goals 2;3   PT Goal 1   Goal Identifier Sleep   Goal Description Pt will be able to sleep 6/7 nights of the week without being interrupted by sleep in order to improve sleep hygiene   Rationale to maximize safety and independence with self cares   Goal Progress improving   Target Date 04/28/25   PT Goal 2   Goal Identifier Stairs   Goal Description Pt will be able to ascend/descend at least 1 set of stairs without a significant increase in pain in order to improve function in home   Rationale to maximize safety and independence with performance of ADLs and functional tasks;to maximize safety and independence within the home;to maximize safety and independence within the community   Goal Progress still doing 1 step at a time but not all the time   Target Date 09/22/25   PT Goal 3   Goal Identifier Lifting/bending   Goal Description Pt will be able to lift at least 10 pounds from ground with good mechanics and without a significant increase in pain in order to improve QOL   Rationale to maximize safety and independence with self cares;to maximize safety and independence within the home   Goal Progress pt. can lift 10 lbs with pain 2-3/10   Target Date 09/22/25   Subjective Report   Subjective Report Pt. returns to PT for the first time in almost 4 months. She has fallen off on alot of her HEP and would like to see if the exercises need to be advanced or updated. She still notes some R knee pain with prolonged walking and stairs. Sit to stand  "transfers remain somewhat difficult as well.   Objective Measures   Objective Measures Objective Measure 1;Objective Measure 2   Objective Measure 1   Objective Measure step up and down   Details 4\" step in clinic, increased hip drop with lateral step down L side with R foot on step   Objective Measure 2   Objective Measure hip hinge   Details good form with wand along back - EC from mirror   Treatment Interventions (PT)   Interventions Therapeutic Procedure/Exercise;Therapeutic Activity;Neuromuscular Re-education   Therapeutic Procedure/Exercise   Therapeutic Procedures: strength, endurance, ROM, flexibility minutes (72989) 20   Therapeutic Procedures Ther Proc 2;Ther Proc 3   Ther Proc 1 abdom stab #3   Ther Proc 1 - Details 5 sec x 10   Ther Proc 2 SLR abd   Ther Proc 2 - Details 10 reps R   Ther Proc 3 clam shell   Ther Proc 3 - Details 10 reps R   PTRx Ther Proc 1 Standing Extension   PTRx Ther Proc 1 - Details VR with demo for set-up and patient ed when to use it   PTRx Ther Proc 2 Standing Hip Flexion Straight Leg Raise - hold   PTRx Ther Proc 2 - Details VR   PTRx Ther Proc 3 Posterior Pelvic Tilt   PTRx Ther Proc 3 - Details VR   PTRx Ther Proc 4 Ball Exercise Seated Pelvic Tilt - hold   PTRx Ther Proc 4 - Details hep for neutral position   PTRx Ther Proc 5 Isometric Hamstring - hold   PTRx Ther Proc 5 - Details in clinic on R side with VC for set-up   PTRx Ther Proc 6 Supine Abdominal Exercise #3 (Marching)   PTRx Ther Proc 6 - Details each side with progression - see notes   PTRx Ther Proc 7 Marching Bridge   PTRx Ther Proc 7 - Details x 8 each side in clinic with VC for set-up and hips level   PTRx Ther Proc 8 Knee Bends - hold   PTRx Ther Proc 8 - Details x 10 in clinic with demo for set-up   PTRx Ther Proc 9 Front Knee Strengthening   PTRx Ther Proc 9 - Details hep for strengthening   PTRx Ther Proc 10 Sidestep   PTRx Ther Proc 10 - Details VR   PTRx Ther Proc 11 Standing Fire Hydrant - hold   PTRx " Ther Proc 11 - Details each side in clinic with demo for set-up   PTRx Ther Proc 12 Lateral Stepdown with Neutral Trunk Position - hold   PTRx Ther Proc 12 - Details x 10 each side in clinic with demo for set-up   Skilled Intervention see above   Patient Response/Progress patient reports understanding and tolerated well   Therapeutic Activity   Therapeutic Activities: dynamic activities to improve functional performance minutes (60744) 10   Ther Act 1 patient ed   Ther Act 1 - Details VR in clinic with sleeping positions and prolonged positioning - exercises to help reduce pain in the moment; symptom management with roll ons   PTRx Ther Act 1 Education Sheet General   PTRx Ther Act 1 - Details VR on sleeping positions   PTRx Ther Act 2 Sit to Stand   PTRx Ther Act 2 - Details VR   Skilled Intervention see above   Patient Response/Progress patient reports understanding   Neuromuscular Re-education   PTRx Neuro Re-ed 1 Pallof Press   PTRx Neuro Re-ed 1 - Details VR   PTRx Neuro Re-ed 2 Hip Hinge Using Dowel   PTRx Neuro Re-ed 2 - Details 2 x 8 in clinic with EC from mirror and demo for set-up   Skilled Intervention see above   Patient Response/Progress patient tolerated well   Education   Learner/Method Patient   Plan   Home program See PTRX - phone   Updates to plan of care see PN   Plan for next session patient will work on her condensed HEP and return as needed   Total Session Time   Timed Code Treatment Minutes 30   Total Treatment Time (sum of timed and untimed services) 30       PLAN  Continue therapy per current plan of care.    Beginning/End Dates of Progress Note Reporting Period:  03/31/25 to 07/21/2025    Referring Provider:  Danielle Valadez

## 2025-07-23 ASSESSMENT — ANXIETY QUESTIONNAIRES
IF YOU CHECKED OFF ANY PROBLEMS ON THIS QUESTIONNAIRE, HOW DIFFICULT HAVE THESE PROBLEMS MADE IT FOR YOU TO DO YOUR WORK, TAKE CARE OF THINGS AT HOME, OR GET ALONG WITH OTHER PEOPLE: SOMEWHAT DIFFICULT
GAD7 TOTAL SCORE: 10
1. FEELING NERVOUS, ANXIOUS, OR ON EDGE: MORE THAN HALF THE DAYS
3. WORRYING TOO MUCH ABOUT DIFFERENT THINGS: MORE THAN HALF THE DAYS
6. BECOMING EASILY ANNOYED OR IRRITABLE: SEVERAL DAYS
8. IF YOU CHECKED OFF ANY PROBLEMS, HOW DIFFICULT HAVE THESE MADE IT FOR YOU TO DO YOUR WORK, TAKE CARE OF THINGS AT HOME, OR GET ALONG WITH OTHER PEOPLE?: SOMEWHAT DIFFICULT
7. FEELING AFRAID AS IF SOMETHING AWFUL MIGHT HAPPEN: MORE THAN HALF THE DAYS
4. TROUBLE RELAXING: SEVERAL DAYS
5. BEING SO RESTLESS THAT IT IS HARD TO SIT STILL: NOT AT ALL
2. NOT BEING ABLE TO STOP OR CONTROL WORRYING: MORE THAN HALF THE DAYS
GAD7 TOTAL SCORE: 10

## 2025-07-24 ENCOUNTER — OFFICE VISIT (OUTPATIENT)
Dept: PSYCHIATRY | Facility: CLINIC | Age: 60
End: 2025-07-24
Attending: PSYCHIATRY & NEUROLOGY
Payer: COMMERCIAL

## 2025-07-24 VITALS
BODY MASS INDEX: 41.14 KG/M2 | DIASTOLIC BLOOD PRESSURE: 72 MMHG | SYSTOLIC BLOOD PRESSURE: 118 MMHG | WEIGHT: 268.6 LBS | TEMPERATURE: 97.7 F | HEART RATE: 83 BPM

## 2025-07-24 DIAGNOSIS — Z79.899 ENCOUNTER FOR LONG-TERM (CURRENT) USE OF MEDICATIONS: ICD-10-CM

## 2025-07-24 DIAGNOSIS — G47.00 INSOMNIA, UNSPECIFIED TYPE: ICD-10-CM

## 2025-07-24 DIAGNOSIS — F31.9 BIPOLAR I DISORDER (H): Primary | ICD-10-CM

## 2025-07-24 PROCEDURE — 3078F DIAST BP <80 MM HG: CPT

## 2025-07-24 PROCEDURE — 1126F AMNT PAIN NOTED NONE PRSNT: CPT

## 2025-07-24 PROCEDURE — 90792 PSYCH DIAG EVAL W/MED SRVCS: CPT | Mod: GC

## 2025-07-24 PROCEDURE — 3074F SYST BP LT 130 MM HG: CPT

## 2025-07-24 ASSESSMENT — PAIN SCALES - GENERAL: PAINLEVEL_OUTOF10: NO PAIN (0)

## 2025-07-24 NOTE — PATIENT INSTRUCTIONS
Angy Haji , it was nice seeing you today.     -Discuss lisinopril and its potential interactions with lithium with your PCP.     - Light therapy (also known as phototherapy) is a safe and effective treatment that uses bright artificial light to mimic natural sunlight. It is especially helpful for:    Seasonal Affective Disorder (SAD)  Depression with a seasonal or circadian pattern  Sleep disorders (e.g., early morning awakening, delayed sleep phase)  Fatigue and low energy during winter or shift work    How to Use Light Therapy    Use the light box early in the morning, ideally within 30 to 60 minutes after waking. This is especially important during the fall and winter months or if you wake up before sunrise. Morning light exposure helps signal your brain that it s time to be awake and alert, which supports mood and energy regulation throughout the day.    Start with 20 to 30 minutes per day. Most people begin to feel benefits within one to two weeks of consistent use. If needed, the duration can be gradually increased up to 45 minutes, depending on how your symptoms respond.    Position the light box about 16 to 24 inches from your face, with the light angled slightly off-center or above your line of sight. Your eyes should be open during use, but don t look directly into the light.    Choosing the Right Lamp  Look for a light therapy lamp that is:  10,000 lux in brightness; UV-free (filters out harmful ultraviolet light); White or cool light, not colored or full-spectrum; Broad enough to provide full facial illumination; Do not use tanning lamps -- they are not safe or effective for this purpose.    When and Why to Use It  If you feel low or sluggish during the winter, using the lamp in the early morning can improve mood, focus, and energy.  If you wake up very early and can t fall back asleep, morning light can help reset your body s rhythm.  If you tend to stay up too late or struggle to wake up, early  light therapy helps shift your internal clock earlier.  If you wake up when it s still dark out, using light therapy soon after waking simulates the missing morning sunlight.    Common Side Effects  Light therapy is safe for most people. Mild and temporary side effects may include:Headache;Eye strain;Feeling wired or overstimulated; Nausea    If side effects occur, try sitting farther away from the light or reducing the duration. Avoid using light therapy in the evening, as it may interfere with sleep.      Final Tips    Use your lamp at the same time every morning for best results  Combine with healthy sleep habits, like limiting screens before bed  Keep using it daily during the fall and winter, or during periods of disrupted sleep       In the case of an emergency, crisis numbers are below and clinic after hours number is [phone].     Patient Education    Thank you for coming to the [facility] [place] [facility].     Lab Testing:  If you had lab testing today and your results are reassuring or normal they will be mailed to you or sent through [employer] within 7 days. If the lab tests need quick action we will call you with the results. The phone number we will call with results is # [phone]. If this is not the best number please call our clinic and change the number.     Medication Refills:  If you need any refills please call your pharmacy and they will contact us. Our fax number for refills is [phone].   Three business days of notice are needed for general medication refill requests.   Five business days of notice are needed for controlled substance refill requests.   If you need to change to a different pharmacy, please contact the new pharmacy directly. The new pharmacy will help you get your medications transferred.     Contact Us:  Please call [phone] during business hours (8-5:00 M-F).   If you have medication related questions after clinic hours, or on  the weekend, please call [phone].     Financial Assistance [phone]   Medical Records [phone]       MENTAL HEALTH CRISIS RESOURCES:  For a emergency help, please call 911 or go to the nearest Emergency Department.     Emergency Walk-In Options:     EmPATH Unit @ [facility] ([place]): [phone] - Specialized mental health emergency area designed to be calming  [facility] ([place]): [phone]  [facility] ([place]): [phone]  [facility] ([place]): [phone]    County Crisis Information:     [place]: [phone]  [place]: [phone]  [place] ([place]) - Adult: [phone]     [place]: [phone]  [place] - Adult: [phone]     Child: [phone]  [place]: [phone]  List of all [facility] resources:   https://[place].gov/dhs/people-we-serve/adults/health-care/mental-health/resources/crisis-contacts.jsp    National Crisis Information:     Crisis Text Line: Text  [place]  to 914230  Suicide & Crisis Lifeline: 988  National Suicide Prevention Lifeline: 6-553-591-TALK (1-876.654.7233)       For online chat options, visit https://suicidepreventionlifeline.org/chat/  Poison Control Center: 1-363.894.7048  Trans Lifeline: 7-935-654-1412 - Hotline for transgender people of all ages  The [employer]: 7-499-634-9441 - Hotline for LGBT youth       For Non-Emergency Support:     Fast Tracker: Mental Health & Substance Use Disorder Resources -   https://www.Shopventoryn.org/

## 2025-07-24 NOTE — NURSING NOTE
Chief Complaint   Patient presents with    Recheck Medication     Bipolar I disorder     - Faheem ANSARI, Visit Facilitator

## 2025-07-24 NOTE — PROGRESS NOTES
"   General acute hospital Psychiatry Clinic  TRANSFER of CARE DIAGNOSTIC ASSESSMENT  General Clinic Team       CARE TEAM:    PCP- Mikey Cortés  Therapist- {NONE:433839}  {prov:973943}    Angy is a 60 year old who uses the pronouns {:098730}.                   Chief Concern    ***                Assessment & Plan   {Must include bolded diagnoses. 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   LamoTRIgine may decrease the serum concentration of Progestins.    Angiotensin-Converting Enzyme Inhibitors may increase serum concentration of Lithium     Management: {di:794126}    MNPMP {was:942664::\"was\"} checked today: {P:805848}    Risk Statements:   Treatment Risk: Risks, benefits, alternatives and potential adverse effects have been discussed and are understood.   Risk Assessment     SUICIDE RISK ASSESSMENT-  Risk factors for self-harm: {Suicide Risk Factors:106594}.  Mitigating factors: {Suicide Protective Factors:861878}.  The patient {does / does not:866595} appear to be at imminent risk for self-harm, hospitalization {:848784} recommended which the pt {does / does not:969363} agree with. {psyhosp:672786} will be arranged. Based on degree of symptoms {psy ref :021853} was/were recommended which the pt {does / does not:393454} agree to. Additional steps to minimize risk: {SIred:643004}.  Safety Plan placed in Pt Instructions: {yes no:703397}.  Rate SI-{SIrate:090062}.    VIOLENCE RISK " "ASSESSMENT- Risk factors for violent behavior including {VIOL:354629}.  However, risk is mitigated by {PROTECT:731280}.  Based on all available evidence she {:575909} appear to be at imminent risk for harm to others therefore {:476678} meet criteria for a 72-hr hold/ involuntary hospitalization.  Duty to warn {:216113} appear to be appropriate.  Based on degree of symptoms {psy ref :826427} was recommended which the pt {:636626} agree to.  Additional steps to minimize risk include: {SIred:510115}.  Safety Plan placed in AVS: {yes no:766935}.    {safe:294053}      PLAN    1) Medications:   - ***    2) Psychotherapy: ***    3) Next due:  Labs: Lithium level 3/25, BMP 3/25   EKG: {:484271::Routine monitoring is not indicated for current psychotropic medication regimen}   Rating scales: {PSYCHRATINGSCALES:672502}    4) Care Coordination / Referrals: ***    5) Disposition: Return to clinic in ***                   Pertinent Background  {DO NOT COPY FORWARD PERTINENT BACKGROUND.   UPDATE IT AND TRANSFER IT TO THE PROBLEM LIST:479265}  \"Patient has not had significant mental health history until 2018, when she developed perimenopausal mood and anxiety symptoms.  She was subsequently referred to our clinic following a hospitalization for anthony in November 2018.  Her care has been complicated by sensitivity to antipsychotics (EPSE including difficulty swallowing), thrombocytopenia secondary to Depakote, lithium toxicity in setting of concurrent Depakote use\"      Pertinent items include: anthony , severe med reaction [described as H/o Thrombocytopenia secondary to Depakote, and H/o lithium toxicity], and psych hosp        Pertinent items include: {cr:878006}                   History of Present Illness   {If acute symptoms, start with duration and description of most recent history:633094}  ***    {PSYCH ROS (Optional) If including Psych ROS information in narrative, DO NOT repeat here:085324}    Current Social " "History:  Financial/occupational: ***  Living situation{partner, children, pets, etc:843770}: ***  Social/spiritual support: ***      Pertinent Substance Use:  ***    Medical Review of Systems:   {Optional sections - USE TO ASSESS FOR MED SFX:461488::\"A comprehensive review of systems was performed and is negative other than noted above.\"}                  Physical Exam  (Vitals Only)    Pulse Readings from Last 3 Encounters:   07/15/25 62   25 70   25 65     Wt Readings from Last 3 Encounters:   07/15/25 121.7 kg (268 lb 4.8 oz)   25 120.2 kg (265 lb 1.6 oz)   25 120.7 kg (266 lb)     BP Readings from Last 3 Encounters:   07/15/25 108/54   25 117/71   25 (!) 145/75                      Mental Status Exam    Alertness: {ALERTNESS DESCRIPTION:048545}  Appearance: {a:141684}  Behavior/Demeanor: {BEHAVIOR Description:310771}, with {d:298047} eye contact   Speech: {SPEECH Description:514845}  Language: {LANGUAGE Description:540256}  Psychomotor: {p:734147}  Mood: {m:866461}  Affect: {a:359978}; congruent to: mood- {:804481}, content- {:302962}  Thought Process/Associations: {THOUGHT PROCESS Description:580412}  Thought Content:  Reports {t:657381};  Denies {t:704721}  Perception:  Reports {p:938519};  Denies {p:626221}  Insight: {INSIGHT Description:421756}  Judgment: {JUDGMENT Description:283532}  Cognition: {co}  Gait and Station: {}                  Past Psychiatric History     Self injurious behavior [method, most recent]: {:60}  Suicide attempt [#, most recent, method]: {:60}  Suicidal ideation hx [passive, active]: {:60}    Violence / aggression hx:{:60}   Psychosis hx: {:60}   Eating disorder hx: {:60}  Trauma hx: {:60}    Psych Hosp [#, most recent]: {:60}  Commitment: {:60}   Interventional (ECT/TMS/ketamine): {:60}  Outpatient Programs [Day treatment, DBT, eating disorder tx, etc]: {:60}    {DEVELOPMENTAL HX (optional / when relevant) :278620}    SUBSTANCE " USE HISTORY {Delete sections that are not relevant:835363}  Past Use: {:60}  Treatment [#, most recent]: {:60}   Medical Consequences: {:60}   Legal Consequences: {:60}                   Family History     ***                  Past Psychotropic Medication Trials  {Use these categories as prompts to fill in the table below, then delete these sections:489735}   Medication Max Dose (mg) Dates / Duration Helpful? DC Reason / Adverse Effects?   Depakote       Tremors   sertraline           olanzapine       EPSE   Seroquel       EPSE   lithium 1200     Tremor at 1200 mg had to lower the dose   Wellbutrin 300 15 years ago       Lamictal 400   yes                     Past Medical History       Neurologic Hx [head injury, seizures, etc]: {NONE:295658}      Very soft 1/5 degree systolic murmur on right sternal border, has no click, has no clinical sx         Patient Active Problem List    Diagnosis Date Noted    Disorder of sacrum 12/19/2024     Priority: Medium    Myalgia, other site 12/19/2024     Priority: Medium    Trochanteric bursitis of both hips 12/19/2024     Priority: Medium    Lumbosacral spondylosis without myelopathy 12/19/2024     Priority: Medium    Unspecified inflammatory spondylopathy, lumbar region 09/27/2024     Priority: Medium    Exertional angina 09/27/2024     Priority: Medium    Acute pain of right knee 08/19/2024     Priority: Medium    Spinal stenosis at L4-L5 level 01/07/2022     Priority: Medium    Facet arthropathy, lumbar 01/07/2022     Priority: Medium    Bilateral low back pain with right-sided sciatica 06/09/2021     Priority: Medium    Dribbling of urine 10/09/2020     Priority: Medium    Varicose veins of right lower extremity with edema 12/13/2019     Priority: Medium    Benign essential hypertension 06/26/2019     Priority: Medium    Callus of foot 06/26/2019     Priority: Medium    Bipolar I disorder (H) 11/09/2018     Priority: Medium    Anxiety 10/25/2018     Priority: Medium     Dysphagia, unspecified type 09/30/2018     Priority: Medium    Muscle tension dysphonia 08/31/2018     Priority: Medium    Palpitations 08/31/2018     Priority: Medium    Obesity, Class III, BMI 40-49.9 (morbid obesity) (H) 08/14/2018     Priority: Medium    Chronic bilateral low back pain without sciatica 05/16/2016     Priority: Medium    MARLA (obstructive sleep apnea) 05/13/2014     Priority: Medium    Atopic rhinitis 05/13/2014     Priority: Medium    Vitamin D deficiency disease 09/20/2013     Priority: Medium    Raynaud disease 04/19/2013     Priority: Medium    Rosacea 09/07/2012     Priority: Medium    CARDIOVASCULAR SCREENING; LDL GOAL LESS THAN 160 05/09/2010     Priority: Medium    Oral aphthae 11/10/2003     Priority: Medium                     Allergies     Seroquel [quetiapine], Olanzapine, Penicillins, Risperidone, and Thimerosal                Medications     Current Outpatient Medications   Medication Sig Dispense Refill    acetaminophen (TYLENOL) 650 MG CR tablet Take 1 tablet (650 mg) by mouth every 8 hours as needed for mild pain or fever      azelastine (ASTELIN) 0.1 % nasal spray Spray 2 sprays into both nostrils 2 times daily. 30 mL 2    Carboxymethylcellulose Sod PF (REFRESH PLUS) 0.5 % SOLN ophthalmic solution Place 1 drop into both eyes 3 times daily as needed for dry eyes      clotrimazole-betamethasone (LOTRISONE) 1-0.05 % external cream Apply topically 2 times daily (Patient not taking: Reported on 7/15/2025) 45 g 2    COMPOUNDED NON-CONTROLLED SUBSTANCE (CMPD RX) - PHARMACY TO MIX COMPOUNDED MEDICATION Semaglutide 1mg injection every 7 days (Patient not taking: Reported on 7/15/2025) 4 each 2    doxycycline monohydrate (ADOXA) 50 MG tablet Take 50 mg by mouth daily.      estradiol (ESTRACE) 0.5 MG tablet Take 1 tablet (0.5 mg) by mouth daily. 90 tablet 3    estradiol (VAGIFEM) 10 MCG TABS vaginal tablet Place 1 tablet (10 mcg) vaginally twice a week. 26 tablet 3    hydrOXYzine HCl  "(ATARAX) 10 MG tablet Take 1 tablet (10 mg) by mouth every evening as needed for anxiety. 30 tablet 0    lamoTRIgine (LAMICTAL) 200 MG tablet Take 2 tablets (400 mg) by mouth daily. 60 tablet 2    lisinopril (ZESTRIL) 10 MG tablet Take 1 tablet (10 mg) by mouth daily. 90 tablet 3    lithium (ESKALITH) 150 MG capsule Take 1 capsule (150 mg) by mouth at bedtime. 30 capsule 2    lithium 300 MG capsule Take 3 tablet (900 mg) and one tablet (150 mg) for a total of 1050 mg at bedtime. 270 capsule 2    Loratadine 10 MG capsule Take 10 mg by mouth daily      medroxyPROGESTERone (PROVERA) 2.5 MG tablet Take 1 tablet (2.5 mg) by mouth daily. 90 tablet 3    metroNIDAZOLE (METROGEL) 1 % external gel Apply topically daily. 60 g 9    ORDER FOR DME Use your CPAP device as directed by your provider.      VITAMIN D, CHOLECALCIFEROL, PO Take 2,000 Units by mouth daily                       Data         12/10/2024     2:54 PM 3/23/2025     6:59 PM 6/19/2025     4:22 PM   PROMIS-10 Total Score w/o Sub Scores   PROMIS TOTAL - SUBSCORES 28  29  27        Patient-reported          No data to display                  3/24/2025     8:44 AM 4/28/2025     5:50 PM 6/24/2025     8:01 AM   PHQ-9 SCORE   PHQ-9 Total Score MyChart 7 (Mild depression) 6 (Mild depression) 12 (Moderate depression)   PHQ-9 Total Score 7  6  12        Patient-reported         11/4/2024    12:46 PM 12/10/2024     3:01 PM 7/23/2025    10:55 AM   JOANNA-7 SCORE   Total Score 11 (moderate anxiety) 15 (severe anxiety) 10 (moderate anxiety)   Total Score 11  15  10        Patient-reported       {Insert relevant labs here with PSYCHLAB** SmartPhrases:205215}    {Only keep ECG results >1 yr old if QTc>460ms:331036}  ECG *** QTc = 427ms  7/15  Sinus Rhythm -occasional PAC  # PACs = 1.  -Nonspecific QRS widening.      PROVIDER:     Phil \"Yelena\" MD Deirdre  PGY 3, Psychiatry Resident    Patient staffed in clinic with  *** who will sign the note.  Supervisor is  ***.   "   Answers submitted by the patient for this visit:  Patient Health Questionnaire (G7) (Submitted on 7/23/2025)  JOANNA 7 TOTAL SCORE: 10

## 2025-08-12 ENCOUNTER — LAB (OUTPATIENT)
Dept: LAB | Facility: CLINIC | Age: 60
End: 2025-08-12
Payer: COMMERCIAL

## 2025-08-12 DIAGNOSIS — Z79.899 ENCOUNTER FOR LONG-TERM (CURRENT) USE OF MEDICATIONS: ICD-10-CM

## 2025-08-12 DIAGNOSIS — Z13.21 SCREENING FOR ENDOCRINE, NUTRITIONAL, METABOLIC AND IMMUNITY DISORDER: ICD-10-CM

## 2025-08-12 DIAGNOSIS — Z13.29 SCREENING FOR ENDOCRINE, NUTRITIONAL, METABOLIC AND IMMUNITY DISORDER: ICD-10-CM

## 2025-08-12 DIAGNOSIS — Z13.0 SCREENING FOR ENDOCRINE, NUTRITIONAL, METABOLIC AND IMMUNITY DISORDER: ICD-10-CM

## 2025-08-12 DIAGNOSIS — Z13.228 SCREENING FOR ENDOCRINE, NUTRITIONAL, METABOLIC AND IMMUNITY DISORDER: ICD-10-CM

## 2025-08-12 DIAGNOSIS — F31.9 BIPOLAR I DISORDER (H): ICD-10-CM

## 2025-08-12 DIAGNOSIS — E66.813 OBESITY, CLASS III, BMI 40-49.9 (MORBID OBESITY) (H): ICD-10-CM

## 2025-08-12 LAB
ANION GAP SERPL CALCULATED.3IONS-SCNC: 12 MMOL/L (ref 7–15)
BUN SERPL-MCNC: 10.8 MG/DL (ref 6–20)
CALCIUM SERPL-MCNC: 10.2 MG/DL (ref 8.8–10.4)
CALCIUM SERPL-MCNC: 10.2 MG/DL (ref 8.8–10.4)
CHLORIDE SERPL-SCNC: 100 MMOL/L (ref 98–107)
CREAT SERPL-MCNC: 0.66 MG/DL (ref 0.51–0.95)
EGFRCR SERPLBLD CKD-EPI 2021: >90 ML/MIN/1.73M2
GLUCOSE SERPL-MCNC: 96 MG/DL (ref 70–99)
HCO3 SERPL-SCNC: 25 MMOL/L (ref 22–29)
LITHIUM SERPL-SCNC: 0.31 MMOL/L (ref 0.6–1.2)
POTASSIUM SERPL-SCNC: 4.5 MMOL/L (ref 3.4–5.3)
SODIUM SERPL-SCNC: 137 MMOL/L (ref 135–145)
TSH SERPL DL<=0.005 MIU/L-ACNC: 1.7 UIU/ML (ref 0.3–4.2)

## 2025-08-12 PROCEDURE — 36415 COLL VENOUS BLD VENIPUNCTURE: CPT

## 2025-08-12 PROCEDURE — 84443 ASSAY THYROID STIM HORMONE: CPT

## 2025-08-12 PROCEDURE — 80178 ASSAY OF LITHIUM: CPT

## 2025-08-12 PROCEDURE — 80048 BASIC METABOLIC PNL TOTAL CA: CPT

## 2025-08-13 ENCOUNTER — MYC MEDICAL ADVICE (OUTPATIENT)
Dept: FAMILY MEDICINE | Facility: CLINIC | Age: 60
End: 2025-08-13
Payer: COMMERCIAL

## 2025-08-13 DIAGNOSIS — G47.00 INSOMNIA, UNSPECIFIED TYPE: ICD-10-CM

## 2025-08-14 RX ORDER — HYDROXYZINE HYDROCHLORIDE 10 MG/1
10 TABLET, FILM COATED ORAL
Qty: 30 TABLET | Refills: 0 | Status: SHIPPED | OUTPATIENT
Start: 2025-08-14

## 2025-08-20 ENCOUNTER — VIRTUAL VISIT (OUTPATIENT)
Dept: SURGERY | Facility: CLINIC | Age: 60
End: 2025-08-20
Payer: COMMERCIAL

## 2025-08-20 DIAGNOSIS — E66.01 MORBID OBESITY (H): Primary | ICD-10-CM

## 2025-08-20 ASSESSMENT — PATIENT HEALTH QUESTIONNAIRE - PHQ9
SUM OF ALL RESPONSES TO PHQ QUESTIONS 1-9: 10
10. IF YOU CHECKED OFF ANY PROBLEMS, HOW DIFFICULT HAVE THESE PROBLEMS MADE IT FOR YOU TO DO YOUR WORK, TAKE CARE OF THINGS AT HOME, OR GET ALONG WITH OTHER PEOPLE: SOMEWHAT DIFFICULT
SUM OF ALL RESPONSES TO PHQ QUESTIONS 1-9: 10

## 2025-08-21 ENCOUNTER — OFFICE VISIT (OUTPATIENT)
Dept: PSYCHIATRY | Facility: CLINIC | Age: 60
End: 2025-08-21
Attending: PSYCHIATRY & NEUROLOGY
Payer: COMMERCIAL

## 2025-08-21 DIAGNOSIS — F31.9 BIPOLAR I DISORDER (H): ICD-10-CM

## 2025-08-21 ASSESSMENT — PAIN SCALES - GENERAL: PAINLEVEL_OUTOF10: NO PAIN (0)

## 2025-08-25 RX ORDER — LAMOTRIGINE 200 MG/1
400 TABLET ORAL DAILY
Qty: 60 TABLET | Refills: 2 | OUTPATIENT
Start: 2025-08-25

## 2025-08-28 ENCOUNTER — PATIENT OUTREACH (OUTPATIENT)
Dept: CARE COORDINATION | Facility: CLINIC | Age: 60
End: 2025-08-28

## 2025-09-03 ENCOUNTER — HOSPITAL ENCOUNTER (OUTPATIENT)
Dept: NUTRITION | Facility: CLINIC | Age: 60
Discharge: HOME | End: 2025-09-03
Attending: DIETITIAN, REGISTERED | Admitting: FAMILY MEDICINE
Payer: COMMERCIAL

## 2025-09-03 ENCOUNTER — TELEPHONE (OUTPATIENT)
Dept: PSYCHIATRY | Facility: CLINIC | Age: 60
End: 2025-09-03
Payer: COMMERCIAL

## 2025-09-03 VITALS — WEIGHT: 271.2 LBS | BODY MASS INDEX: 41.54 KG/M2

## 2025-09-03 PROCEDURE — 97803 MED NUTRITION INDIV SUBSEQ: CPT | Mod: GT,95 | Performed by: DIETITIAN, REGISTERED

## 2025-09-04 ENCOUNTER — LAB (OUTPATIENT)
Dept: LAB | Facility: CLINIC | Age: 60
End: 2025-09-04
Payer: COMMERCIAL

## 2025-09-04 DIAGNOSIS — Z79.899 ENCOUNTER FOR LONG-TERM (CURRENT) USE OF MEDICATIONS: ICD-10-CM

## 2025-09-04 LAB
ANION GAP SERPL CALCULATED.3IONS-SCNC: 8 MMOL/L (ref 7–15)
BUN SERPL-MCNC: 8.1 MG/DL (ref 6–20)
CALCIUM SERPL-MCNC: 10.3 MG/DL (ref 8.8–10.4)
CHLORIDE SERPL-SCNC: 102 MMOL/L (ref 98–107)
CREAT SERPL-MCNC: 0.66 MG/DL (ref 0.51–0.95)
EGFRCR SERPLBLD CKD-EPI 2021: >90 ML/MIN/1.73M2
GLUCOSE SERPL-MCNC: 99 MG/DL (ref 70–99)
HCO3 SERPL-SCNC: 26 MMOL/L (ref 22–29)
LITHIUM SERPL-SCNC: 0.87 MMOL/L (ref 0.6–1.2)
POTASSIUM SERPL-SCNC: 4.5 MMOL/L (ref 3.4–5.3)
SODIUM SERPL-SCNC: 136 MMOL/L (ref 135–145)

## (undated) DEVICE — GLOVE BIOGEL PI ULTRATOUCH SZ 7.0 41170

## (undated) DEVICE — NDL SPINAL 22GA 3.5" QUINCKE 405181

## (undated) DEVICE — NDL SPINAL 25GA 3.5" QUINCKE 405180

## (undated) DEVICE — PREP CHLORAPREP W/ORANGE TINT 10.5ML 260715

## (undated) DEVICE — GLOVE BIOGEL PI MICRO SZ 7.0 48570

## (undated) DEVICE — TRAY PAIN INJECTION 97A 640

## (undated) RX ORDER — TRIAMCINOLONE ACETONIDE 40 MG/ML
INJECTION, SUSPENSION INTRA-ARTICULAR; INTRAMUSCULAR
Status: DISPENSED
Start: 2024-05-10

## (undated) RX ORDER — LIDOCAINE HYDROCHLORIDE 10 MG/ML
INJECTION, SOLUTION EPIDURAL; INFILTRATION; INTRACAUDAL; PERINEURAL
Status: DISPENSED
Start: 2021-12-07

## (undated) RX ORDER — TRIAMCINOLONE ACETONIDE 40 MG/ML
INJECTION, SUSPENSION INTRA-ARTICULAR; INTRAMUSCULAR
Status: DISPENSED
Start: 2024-08-14

## (undated) RX ORDER — DIAZEPAM 5 MG
TABLET ORAL
Status: DISPENSED
Start: 2019-12-12

## (undated) RX ORDER — FENTANYL CITRATE 50 UG/ML
INJECTION, SOLUTION INTRAMUSCULAR; INTRAVENOUS
Status: DISPENSED
Start: 2024-02-13

## (undated) RX ORDER — REGADENOSON 0.08 MG/ML
INJECTION, SOLUTION INTRAVENOUS
Status: DISPENSED
Start: 2021-11-05

## (undated) RX ORDER — TRIAMCINOLONE ACETONIDE 40 MG/ML
INJECTION, SUSPENSION INTRA-ARTICULAR; INTRAMUSCULAR
Status: DISPENSED
Start: 2023-02-22

## (undated) RX ORDER — ONDANSETRON 2 MG/ML
INJECTION INTRAMUSCULAR; INTRAVENOUS
Status: DISPENSED
Start: 2024-02-13

## (undated) RX ORDER — LIDOCAINE HYDROCHLORIDE 10 MG/ML
INJECTION, SOLUTION EPIDURAL; INFILTRATION; INTRACAUDAL; PERINEURAL
Status: DISPENSED
Start: 2024-08-14

## (undated) RX ORDER — LIDOCAINE HYDROCHLORIDE 10 MG/ML
INJECTION, SOLUTION EPIDURAL; INFILTRATION; INTRACAUDAL; PERINEURAL
Status: DISPENSED
Start: 2024-05-10

## (undated) RX ORDER — LIDOCAINE HYDROCHLORIDE 10 MG/ML
INJECTION, SOLUTION EPIDURAL; INFILTRATION; INTRACAUDAL; PERINEURAL
Status: DISPENSED
Start: 2023-02-22

## (undated) RX ORDER — LIDOCAINE HYDROCHLORIDE 10 MG/ML
INJECTION, SOLUTION EPIDURAL; INFILTRATION; INTRACAUDAL; PERINEURAL
Status: DISPENSED
Start: 2019-12-12

## (undated) RX ORDER — TRIAMCINOLONE ACETONIDE 40 MG/ML
INJECTION, SUSPENSION INTRA-ARTICULAR; INTRAMUSCULAR
Status: DISPENSED
Start: 2021-12-07